# Patient Record
Sex: MALE | Race: WHITE | NOT HISPANIC OR LATINO | Employment: OTHER | URBAN - NONMETROPOLITAN AREA
[De-identification: names, ages, dates, MRNs, and addresses within clinical notes are randomized per-mention and may not be internally consistent; named-entity substitution may affect disease eponyms.]

---

## 2017-01-01 ENCOUNTER — HOSPITAL ENCOUNTER (EMERGENCY)
Facility: HOSPITAL | Age: 52
Discharge: HOME | End: 2017-12-16
Attending: EMERGENCY MEDICINE | Admitting: EMERGENCY MEDICINE

## 2017-01-01 ENCOUNTER — TELEPHONE (OUTPATIENT)
Dept: FAMILY MEDICINE CLINIC | Facility: CLINIC | Age: 52
End: 2017-01-01

## 2017-01-01 ENCOUNTER — APPOINTMENT (OUTPATIENT)
Dept: GENERAL RADIOLOGY | Facility: HOSPITAL | Age: 52
End: 2017-01-01

## 2017-01-01 VITALS
HEIGHT: 73 IN | SYSTOLIC BLOOD PRESSURE: 138 MMHG | DIASTOLIC BLOOD PRESSURE: 87 MMHG | BODY MASS INDEX: 35.78 KG/M2 | HEART RATE: 84 BPM | RESPIRATION RATE: 18 BRPM | OXYGEN SATURATION: 97 % | WEIGHT: 270 LBS | TEMPERATURE: 98 F

## 2017-01-01 DIAGNOSIS — S22.42XA CLOSED FRACTURE OF MULTIPLE RIBS OF LEFT SIDE, INITIAL ENCOUNTER: Primary | ICD-10-CM

## 2017-01-01 PROCEDURE — 96372 THER/PROPH/DIAG INJ SC/IM: CPT

## 2017-01-01 PROCEDURE — 99283 EMERGENCY DEPT VISIT LOW MDM: CPT

## 2017-01-01 PROCEDURE — 71101 X-RAY EXAM UNILAT RIBS/CHEST: CPT

## 2017-01-01 RX ORDER — TRAMADOL HYDROCHLORIDE 50 MG/1
50 TABLET ORAL EVERY 8 HOURS PRN
Qty: 12 TABLET | Refills: 0 | Status: SHIPPED | OUTPATIENT
Start: 2017-01-01 | End: 2018-01-01

## 2017-01-01 RX ADMIN — MEPERIDINE HYDROCHLORIDE 25 MG: 25 INJECTION, SOLUTION INTRAMUSCULAR; INTRAVENOUS; SUBCUTANEOUS at 20:45

## 2017-01-05 ENCOUNTER — HOSPITAL ENCOUNTER (OUTPATIENT)
Dept: OTHER | Facility: HOSPITAL | Age: 52
Discharge: HOME | End: 2017-01-05
Attending: FAMILY MEDICINE | Admitting: FAMILY MEDICINE

## 2017-01-05 LAB
BACTERIA #/AREA URNS AUTO: ABNORMAL /[HPF]
CASTS URNS QL MICRO: ABNORMAL
CLARITY UR REFRACT.AUTO: ABNORMAL
COLOR UR AUTO: YELLOW
CRYSTALS FLD MICRO: ABNORMAL
EPI CELLS #/AREA URNS AUTO: ABNORMAL /[HPF]
GLUCOSE UR STRIP.AUTO-MCNC: NEGATIVE MG/DL
HGB UR QL STRIP.AUTO: ABNORMAL
KETONES UR STRIP.AUTO-MCNC: NEGATIVE MG/DL
LEUKOCYTE ESTERASE UR QL STRIP.AUTO: ABNORMAL
Lab: ABNORMAL
MUCOUS THREADS URNS QL MICRO: NEGATIVE
NITRITE UR QL STRIP.AUTO: POSITIVE
PH UR STRIP.AUTO: 6 PH UNITS (ref 5–9)
PROT UR STRIP.AUTO-MCNC: NEGATIVE MG/DL
RBC #/AREA URNS AUTO: ABNORMAL /[HPF]
SP GR UR REFRACT.AUTO: 1.01 (ref 1–1.03)
SQUAMOUS SPT QL MICRO: ABNORMAL
UROBILINOGEN UR STRIP-ACNC: 4
WBC # UR AUTO: ABNORMAL /UL

## 2017-01-13 ENCOUNTER — HOSPITAL ENCOUNTER (OUTPATIENT)
Dept: OTHER | Facility: HOSPITAL | Age: 52
Discharge: HOME | End: 2017-01-13
Attending: INTERNAL MEDICINE | Admitting: INTERNAL MEDICINE

## 2017-01-13 LAB
ALBUMIN SERPL-MCNC: 2.3 GM/DL (ref 3.4–4.8)
ALP SERPL-CCNC: 138 U/L (ref 38–126)
ALT SERPL-CCNC: 25 U/L (ref 21–72)
ANION GAP SERPL CALCULATED.3IONS-SCNC: 10 MMOL/L (ref 5–15)
AST SERPL-CCNC: 38 U/L (ref 17–59)
BASOPHILS # BLD AUTO: 0.09 X1000/UL (ref 0–0.2)
BASOPHILS NFR BLD AUTO: 0.7 % (ref 0–2)
BILIRUB SERPL-MCNC: 1.7 MG/DL (ref 0.2–1.3)
BUN SERPL-MCNC: 46 MG/DL (ref 7–21)
CALCIUM SERPL-MCNC: 9.6 MG/DL (ref 8.4–10.2)
CHLORIDE SERPL-SCNC: 96 MMOL/L (ref 95–110)
CO2 SERPL-SCNC: 26 MMOL/L (ref 22–31)
CONV AUTO IG#: 0.05 X1000/UL (ref 0.01–0.02)
CONV AUTO IG%: 0.4 % (ref 0–0.5)
CREAT SERPL-MCNC: 2.8 MG/DL (ref 0.7–1.3)
EOSINOPHIL # BLD AUTO: 0.35 X1000/UL (ref 0–0.7)
EOSINOPHIL NFR BLD AUTO: 2.5 % (ref 0–7)
ERYTHROCYTE [DISTWIDTH] IN BLOOD: 14.9 % (ref 11.5–14.5)
GLUCOSE SERPL-MCNC: 88 MG/DL (ref 60–100)
GRANULOCYTES # BLD AUTO: 9.97 X1000/UL (ref 2–8.6)
GRANULOCYTES NFR BLD AUTO: 72.5 % (ref 37–80)
HCT VFR BLD CALC: 29.2 % (ref 39–49)
HGB BLD-MCNC: 10 GM/DL (ref 13.7–17.3)
INR PPP: 1.3 (ref 0.8–1.2)
LYMPHOCYTES # BLD AUTO: 2 X1000/UL (ref 0.6–4.2)
LYMPHOCYTES NFR BLD AUTO: 14.6 % (ref 10–50)
MCH RBC QN: 35.1 PG (ref 26–34)
MCHC RBC-ENTMCNC: 34.2 GM/DL (ref 31.5–36.3)
MCV RBC: 102.5 FL (ref 80–98)
MONOCYTES # BLD AUTO: 1.28 X1000/UL (ref 0–0.9)
MONOCYTES NFR BLD AUTO: 9.3 % (ref 0–12)
PLATELET # BLD: 170 X1000/MM3 (ref 150–450)
PMV BLD: 9.1 FL (ref 8–12)
POTASSIUM SERPL-SCNC: 5.6 MMOL/L (ref 3.5–5.1)
PROT SERPL-MCNC: 6.7 GM/DL (ref 6.3–8.6)
PROTHROMBIN TIME: 15.8 SECONDS (ref 11.1–15.3)
RBC # BLD: 2.85 MEGA/MM3 (ref 4.37–5.74)
SODIUM SERPL-SCNC: 132 MMOL/L (ref 137–145)
WBC # BLD: 13.7 X1000/UL (ref 3.2–9.8)

## 2017-01-15 LAB — AFP-TM SERPL-MCNC: <0.7 NG/ML (ref 0–8.3)

## 2017-01-18 LAB
Lab: NORMAL
Lab: NORMAL
REF LAB TEST METHOD: NORMAL

## 2017-01-20 ENCOUNTER — OFFICE VISIT (OUTPATIENT)
Dept: ORTHOPEDIC SURGERY | Facility: CLINIC | Age: 52
End: 2017-01-20

## 2017-01-20 DIAGNOSIS — S72.492D OTHER CLOSED FRACTURE OF DISTAL END OF LEFT FEMUR WITH ROUTINE HEALING, SUBSEQUENT ENCOUNTER: ICD-10-CM

## 2017-01-20 DIAGNOSIS — S42.294D OTHER CLOSED NONDISPLACED FRACTURE OF PROXIMAL END OF RIGHT HUMERUS WITH ROUTINE HEALING, SUBSEQUENT ENCOUNTER: Primary | ICD-10-CM

## 2017-01-20 PROBLEM — S42.309A CLOSED FRACTURE OF HUMERUS: Status: ACTIVE | Noted: 2017-01-20

## 2017-01-20 PROBLEM — S72.90XA FRACTURE OF FEMUR (HCC): Status: ACTIVE | Noted: 2017-01-20

## 2017-01-20 PROCEDURE — 99024 POSTOP FOLLOW-UP VISIT: CPT | Performed by: ORTHOPAEDIC SURGERY

## 2017-01-20 RX ORDER — CLINDAMYCIN HYDROCHLORIDE 300 MG/1
300 CAPSULE ORAL 3 TIMES DAILY
COMMUNITY
End: 2017-02-21

## 2017-01-20 RX ORDER — MORPHINE SULFATE 15 MG/1
15 TABLET, FILM COATED, EXTENDED RELEASE ORAL 2 TIMES DAILY
COMMUNITY
End: 2017-02-21

## 2017-01-20 RX ORDER — AMLODIPINE BESYLATE 2.5 MG/1
2.5 TABLET ORAL DAILY
COMMUNITY
End: 2017-02-21

## 2017-01-20 NOTE — MR AVS SNAPSHOT
Armando Mcmullen   1/20/2017 8:50 AM   Office Visit    Dept Phone:  [phone]   Encounter #:  [id number]    Provider:  Shawn Vanegas MD   Department:  [facility]                Your Full Care Plan              Your Updated Medication List          This list is accurate as of: 1/20/17  9:15 AM.  Always use your most recent med list.                amLODIPine 2.5 MG tablet   Commonly known as:  NORVASC       clindamycin 300 MG capsule   Commonly known as:  CLEOCIN       lactulose 10 GM/15ML solution   Commonly known as:  CHRONULAC       LIDODERM 5 %   Generic drug:  lidocaine       losartan 100 MG tablet   Commonly known as:  COZAAR       Morphine 15 MG 12 hr tablet   Commonly known as:  MS CONTIN       MULTIVITAMIN ADULT PO       oxyCODONE-acetaminophen  MG per tablet   Commonly known as:  PERCOCET       triamterene-hydrochlorothiazide 37.5-25 MG per tablet   Commonly known as:  MAXZIDE-25       vitamin D 10415 UNITS capsule capsule   Commonly known as:  ERGOCALCIFEROL               You Were Diagnosed With        Codes Comments    Other closed nondisplaced fracture of proximal end of right humerus with routine healing, subsequent encounter    -  Primary ICD-10-CM: S42.294D  ICD-9-CM: V54.11     Other closed fracture of distal end of left femur with routine healing, subsequent encounter     ICD-10-CM: S72.492D  ICD-9-CM: V54.15       Instructions     None    Patient Instructions History      Upcoming Appointments     Visit Type Date Time Department    POST-OP 1/20/2017  8:50 AM [facility]    FOLLOW UP 2/28/2017 10:20 AM [facility]      MyChart Signup     Our records indicate that you have declined [facility]. If you would like to sign up for [email or web address], please email [email or web address] or call [phone] to obtain an activation code.             Other Info from Your Visit           Your Appointments      Feb 28, 2017 10:20 AM CST   Follow Up with Shawn Vanegas MD   [facility] (--)    [address]  [facility] [address] [phone]           Arrive 15 minutes prior to appointment.              Allergies     Codeine Sulfate        Reason for Visit     Right Shoulder - Follow-up, Fracture     Left Knee - Follow-up, Fracture           Problems and Diagnoses Noted     Broken arm    Fracture of femur

## 2017-01-20 NOTE — PROGRESS NOTES
The patient is a 51 y.o. male who presents for followup.    Chief Complaint   Patient presents with   • Right Shoulder - Follow-up, Fracture   • Left Knee - Follow-up, Fracture       HPI: patient was seen in the ER on 1/13/2017 due to increased pain had xrays done of left knee and right shoulder/humerus. Repeat xrays done on 1/19/2017 by mobile xray.  No new complaints.  He is doing much better and is now transferring on his own.  He is still using a wheelchair for mobilization.  His pain is improved.  He does have a sore on the outside part of his right arm that has been draining slightly but no fevers or chills.      Current Outpatient Prescriptions:   •  amLODIPine (NORVASC) 2.5 MG tablet, Take 2.5 mg by mouth Daily., Disp: , Rfl:   •  clindamycin (CLEOCIN) 300 MG capsule, Take 300 mg by mouth 3 (Three) Times a Day., Disp: , Rfl:   •  lactulose (CHRONULAC) 10 GM/15ML solution, Take 20 g by mouth 2 (Two) Times a Day As Needed., Disp: , Rfl:   •  lidocaine (LIDODERM) 5 %, Place 1 patch on the skin Daily. Remove & Discard patch within 12 hours or as directed by MD, Disp: , Rfl:   •  losartan (COZAAR) 100 MG tablet, Take 100 mg by mouth Daily., Disp: , Rfl:   •  Morphine (MS CONTIN) 15 MG 12 hr tablet, Take 15 mg by mouth 2 (Two) Times a Day., Disp: , Rfl:   •  Multiple Vitamins-Minerals (MULTIVITAMIN ADULT PO), Take  by mouth., Disp: , Rfl:   •  oxyCODONE-acetaminophen (PERCOCET)  MG per tablet, Take 1 tablet by mouth Every 6 (Six) Hours As Needed for moderate pain (4-6)., Disp: , Rfl:   •  triamterene-hydrochlorothiazide (MAXZIDE-25) 37.5-25 MG per tablet, Take 1 tablet by mouth Daily., Disp: , Rfl:   •  vitamin D (ERGOCALCIFEROL) 32653 UNITS capsule capsule, Take 50,000 Units by mouth 1 (One) Time Per Week., Disp: , Rfl:     Allergies   Allergen Reactions   • Codeine Sulfate        ROS:  No fevers or chills.  No nausea or vomiting    PHYSICAL EXAM:    There were no vitals filed for this visit.    GAIT:      []  Normal  []  Antalgic    Assistive device: []  None  []  Walker     []  Crutches  []  Cane     [x]  Wheelchair  []  Stretcher    Patient is awake and alert, answers questions appropriately, and is in no apparent distress.    Skin on the left below-knee amputation stump is in good condition.  No breakdown.  No erythema.    Right upper extremity shows that he has some stiffness in his right elbow but good finger and wrist motion.  He has some mild swelling in the upper arm and an open sore on the posterior lateral aspect of his upper brachium.  No surrounding erythema.    Xr Shoulder 2+ View Right    Result Date: 1/13/2017  Narrative: Patient Name-  LAMONT FISHER Patient ID-  [id number] Ordering-  ORLANDO SORIANO MD Attending-  DR MAGANA ------------------------------------------------  Two view right shoulder  HISTORY- Fracture follow-up  AP and scapular Y view were obtained.  COMPARISON- November 25, 2016  Increased diastases of fracture of the humeral neck. Slight increased displacement fragments of the comminuted fractures of the humeral head. Very minimal callus formation laterally. No dislocation. Old right rib fractures.  CONCLUSION- Increased diastases of fracture of the humeral neck. Slight increased displacement fragments of the comminuted fractures of the humeral head. Very minimal callus formation laterally.  [id number]  Electronically Signed By- Claude Whatley MD On: 2017-01-13 17:06:35    Xr Shoulder 2+ View Right    Impression: 3 views of the right shoulder show a right proximal humerus fracture that has mildly collapsing humeral head.  He shows no further displacement from previous films.  Progressive healing noted in the proximal humerus.  No other acute bony on amount is noted.  Severe osteopenia is noted. Comparison views 11/25/16. 12/22/16 at 2:17 PM by Shawn Vanegas MD     Xr Humerus Right    Result Date: 1/13/2017  Narrative: Patient Name-  LAMONT FISHER Patient ID-  [id number]  Ordering-  ORLANDO SORIANO MD Attending-  DR MAGANA Referring-     ------------------------------------------------ Status post fall and pain.  The right humerus, two views.  Comparison is made with study dated November 25, 2016.  Examination again revealed comminuted fracture of the right humeral head. Very little callus formation is seen. There appears to be lytic changes seen in the humeral head and neck. The alignment is satisfactory.  CONCLUSION- Fracture of the right humeral head. Some callus formation is seen compared to the prior exam. Pathologic fracture cannot be rule out.  Electronically Signed By- Blayne Conrad MD On: 2017-01-13 17:28:40    Xr Knee 1 Or 2 View Left    Impression: AP and lateral of the left knee shows mild varus angulation of a distal femur fracture.  Progressive healing is noted.  He has severe osteopenia but no changes in position or alignment from previous films.  No other acute bony abnormality is noted.  Comparison views 11/29/16. 12/22/16 at 2:17 PM by Shawn Vanegas MD     Xr Knee 3 View Left    Result Date: 1/13/2017  Narrative: Patient Name-  LAMONT FISHER Patient ID-  [id number] Ordering-  ORLANDO SORIANO MD Attending-  DR MAGANA Referring-     ------------------------------------------------ Status post fall and knee pain.  Left knee, three views.  Comparison is made with study dated November 29, 2016.  Examination again revealed healed or healing fracture of the distal femur with mild deformity. Good callus formation is seen. The alignment is unchanged. There is old healed fracture of the proximal tibia. The alignment is well maintained. No acute fracture is identified. There are degenerative changes in the knee joint manifested by joint space narrowing and mild periarticular osteophytes. No joint effusion is seen. There is severe osteopenia. There is below the knee amputation.  CONCLUSION- No acute abnormality is identified. Healed or healing fracture of the  distal femur and old fracture of the proximal tibia.  Electronically Signed By- Blayne Conrad MD On: 2017-01-13 17:22:49      ASSESSMENT:  Diagnoses and all orders for this visit:    Other closed nondisplaced fracture of proximal end of right humerus with routine healing, subsequent encounter    Other closed fracture of distal end of left femur with routine healing, subsequent encounter    Other orders  -     amLODIPine (NORVASC) 2.5 MG tablet; Take 2.5 mg by mouth Daily.  -     Morphine (MS CONTIN) 15 MG 12 hr tablet; Take 15 mg by mouth 2 (Two) Times a Day.  -     clindamycin (CLEOCIN) 300 MG capsule; Take 300 mg by mouth 3 (Three) Times a Day.        PLAN:    Patient was seen in the emergency department approximately one week ago with new x-rays of his proximal humerus and his distal femur.  He has a severely comminuted humeral head fracture with a proximal humerus fracture that has maintained its overall alignment and does seem to have some progressive healing.  He has severe osteopenia noted throughout the x-rays.  His distal femur on the left shows slow progression of healing.  No new changes are noted.  He also shows severe osteopenia in this area.    The patient has been progressing much better and is able to transition on his own now.  No surgical indication is warranted for either fracture and they should slowly and progressively improved.  He is to continue with mobility and began gentle range of motion exercises involving his right upper extremity.  He is going to be discharged from the nursing facility and is content to be going home.  He tells me that he will be having home health come to do physical therapy.  He or Dr. Aguero is going to let me know which home health agency is involved so that I can send specific orders.  Return in about 4 weeks (around 2/17/2017) for Recheck with repeat xrays.    Shawn Vanegas MD

## 2017-02-20 ENCOUNTER — APPOINTMENT (OUTPATIENT)
Dept: GENERAL RADIOLOGY | Facility: HOSPITAL | Age: 52
End: 2017-02-20

## 2017-02-20 ENCOUNTER — HOSPITAL ENCOUNTER (INPATIENT)
Facility: HOSPITAL | Age: 52
LOS: 16 days | Discharge: NURSING, REHAB OR LONG-TERM CARE | End: 2017-03-09
Attending: FAMILY MEDICINE | Admitting: FAMILY MEDICINE

## 2017-02-20 DIAGNOSIS — N39.0 URINARY TRACT INFECTION, SITE UNSPECIFIED: ICD-10-CM

## 2017-02-20 DIAGNOSIS — Z74.09 IMPAIRED FUNCTIONAL MOBILITY, BALANCE, AND ENDURANCE: ICD-10-CM

## 2017-02-20 DIAGNOSIS — R26.89 IMPAIRED GAIT AND MOBILITY: ICD-10-CM

## 2017-02-20 DIAGNOSIS — E72.20 HYPERAMMONEMIA (HCC): Primary | ICD-10-CM

## 2017-02-20 DIAGNOSIS — K59.1 FUNCTIONAL DIARRHEA: ICD-10-CM

## 2017-02-20 LAB
ALBUMIN SERPL-MCNC: 2.5 G/DL (ref 3.4–4.8)
ALBUMIN/GLOB SERPL: 0.7 G/DL (ref 1.1–1.8)
ALP SERPL-CCNC: 69 U/L (ref 38–126)
ALT SERPL W P-5'-P-CCNC: 27 U/L (ref 21–72)
AMMONIA BLD-SCNC: 80 UMOL/L (ref 9–30)
AMYLASE SERPL-CCNC: 59 U/L (ref 50–130)
ANION GAP SERPL CALCULATED.3IONS-SCNC: 10 MMOL/L (ref 5–15)
ANISOCYTOSIS BLD QL: NORMAL
AST SERPL-CCNC: 38 U/L (ref 17–59)
BACTERIA UR QL AUTO: ABNORMAL /HPF
BASOPHILS # BLD AUTO: 0.14 10*3/MM3 (ref 0–0.2)
BASOPHILS NFR BLD AUTO: 1.5 % (ref 0–2)
BILIRUB SERPL-MCNC: 5.4 MG/DL (ref 0.2–1.3)
BILIRUB UR QL STRIP: ABNORMAL
BUN BLD-MCNC: 11 MG/DL (ref 7–21)
BUN/CREAT SERPL: 10.6 (ref 7–25)
CALCIUM SPEC-SCNC: 8.9 MG/DL (ref 8.4–10.2)
CHLORIDE SERPL-SCNC: 111 MMOL/L (ref 95–110)
CK SERPL-CCNC: 38 U/L (ref 55–170)
CLARITY UR: ABNORMAL
CO2 SERPL-SCNC: 17 MMOL/L (ref 22–31)
COLOR UR: ABNORMAL
CREAT BLD-MCNC: 1.04 MG/DL (ref 0.7–1.3)
DEPRECATED RDW RBC AUTO: 63.8 FL (ref 35.1–43.9)
EOSINOPHIL # BLD AUTO: 0.25 10*3/MM3 (ref 0–0.7)
EOSINOPHIL NFR BLD AUTO: 2.7 % (ref 0–7)
ERYTHROCYTE [DISTWIDTH] IN BLOOD BY AUTOMATED COUNT: 16.8 % (ref 11.5–14.5)
GFR SERPL CREATININE-BSD FRML MDRD: 75 ML/MIN/1.73 (ref 56–130)
GLOBULIN UR ELPH-MCNC: 3.7 GM/DL (ref 2.3–3.5)
GLUCOSE BLD-MCNC: 82 MG/DL (ref 60–100)
GLUCOSE UR STRIP-MCNC: NEGATIVE MG/DL
HCT VFR BLD AUTO: 34.5 % (ref 39–49)
HGB BLD-MCNC: 11.7 G/DL (ref 13.7–17.3)
HGB UR QL STRIP.AUTO: ABNORMAL
HYALINE CASTS UR QL AUTO: ABNORMAL /LPF
HYPOCHROMIA BLD QL: NORMAL
IMM GRANULOCYTES # BLD: 0.06 10*3/MM3 (ref 0–0.02)
IMM GRANULOCYTES NFR BLD: 0.6 % (ref 0–0.5)
KETONES UR QL STRIP: ABNORMAL
LEUKOCYTE ESTERASE UR QL STRIP.AUTO: ABNORMAL
LIPASE SERPL-CCNC: 189 U/L (ref 23–300)
LYMPHOCYTES # BLD AUTO: 2.46 10*3/MM3 (ref 0.6–4.2)
LYMPHOCYTES NFR BLD AUTO: 26.3 % (ref 10–50)
MCH RBC QN AUTO: 34.9 PG (ref 26.5–34)
MCHC RBC AUTO-ENTMCNC: 33.9 G/DL (ref 31.5–36.3)
MCV RBC AUTO: 103 FL (ref 80–98)
MONOCYTES # BLD AUTO: 1.15 10*3/MM3 (ref 0–0.9)
MONOCYTES NFR BLD AUTO: 12.3 % (ref 0–12)
MUCOUS THREADS URNS QL MICRO: ABNORMAL /HPF
NEUTROPHILS # BLD AUTO: 5.3 10*3/MM3 (ref 2–8.6)
NEUTROPHILS NFR BLD AUTO: 56.6 % (ref 37–80)
NITRITE UR QL STRIP: NEGATIVE
NRBC BLD MANUAL-RTO: 0 /100 WBC (ref 0–0)
PH UR STRIP.AUTO: 5.5 [PH] (ref 5–9)
PLATELET # BLD AUTO: 60 10*3/MM3 (ref 150–450)
PMV BLD AUTO: 9.6 FL (ref 8–12)
POTASSIUM BLD-SCNC: 3.5 MMOL/L (ref 3.5–5.1)
PROT SERPL-MCNC: 6.2 G/DL (ref 6.3–8.6)
PROT UR QL STRIP: ABNORMAL
RBC # BLD AUTO: 3.35 10*6/MM3 (ref 4.37–5.74)
RBC # UR: ABNORMAL /HPF
REF LAB TEST METHOD: ABNORMAL
SMALL PLATELETS BLD QL SMEAR: NORMAL
SODIUM BLD-SCNC: 138 MMOL/L (ref 137–145)
SP GR UR STRIP: 1.02 (ref 1–1.03)
SQUAMOUS #/AREA URNS HPF: ABNORMAL /HPF
UROBILINOGEN UR QL STRIP: ABNORMAL
WBC MORPH BLD: NORMAL
WBC NRBC COR # BLD: 9.36 10*3/MM3 (ref 3.2–9.8)
WBC UR QL AUTO: ABNORMAL /HPF

## 2017-02-20 PROCEDURE — 87086 URINE CULTURE/COLONY COUNT: CPT | Performed by: FAMILY MEDICINE

## 2017-02-20 PROCEDURE — 82140 ASSAY OF AMMONIA: CPT | Performed by: FAMILY MEDICINE

## 2017-02-20 PROCEDURE — 80053 COMPREHEN METABOLIC PANEL: CPT | Performed by: FAMILY MEDICINE

## 2017-02-20 PROCEDURE — 83690 ASSAY OF LIPASE: CPT | Performed by: FAMILY MEDICINE

## 2017-02-20 PROCEDURE — 25010000002 ONDANSETRON PER 1 MG: Performed by: FAMILY MEDICINE

## 2017-02-20 PROCEDURE — 81001 URINALYSIS AUTO W/SCOPE: CPT | Performed by: FAMILY MEDICINE

## 2017-02-20 PROCEDURE — 99284 EMERGENCY DEPT VISIT MOD MDM: CPT

## 2017-02-20 PROCEDURE — 85025 COMPLETE CBC W/AUTO DIFF WBC: CPT | Performed by: FAMILY MEDICINE

## 2017-02-20 PROCEDURE — 82150 ASSAY OF AMYLASE: CPT | Performed by: FAMILY MEDICINE

## 2017-02-20 PROCEDURE — 85007 BL SMEAR W/DIFF WBC COUNT: CPT | Performed by: FAMILY MEDICINE

## 2017-02-20 PROCEDURE — 82550 ASSAY OF CK (CPK): CPT | Performed by: FAMILY MEDICINE

## 2017-02-20 PROCEDURE — 74020 HC XR ABDOMEN FLAT & UPRIGHT: CPT

## 2017-02-20 PROCEDURE — 87077 CULTURE AEROBIC IDENTIFY: CPT | Performed by: FAMILY MEDICINE

## 2017-02-20 PROCEDURE — 25010000002 HYDROMORPHONE PER 4 MG: Performed by: FAMILY MEDICINE

## 2017-02-20 PROCEDURE — G0378 HOSPITAL OBSERVATION PER HR: HCPCS

## 2017-02-20 PROCEDURE — 87186 SC STD MICRODIL/AGAR DIL: CPT | Performed by: FAMILY MEDICINE

## 2017-02-20 RX ORDER — ONDANSETRON 2 MG/ML
4 INJECTION INTRAMUSCULAR; INTRAVENOUS ONCE
Status: COMPLETED | OUTPATIENT
Start: 2017-02-20 | End: 2017-02-20

## 2017-02-20 RX ORDER — LACTULOSE 10 G/15ML
30 SOLUTION ORAL ONCE
Status: COMPLETED | OUTPATIENT
Start: 2017-02-20 | End: 2017-02-21

## 2017-02-20 RX ADMIN — ONDANSETRON 4 MG: 2 INJECTION INTRAMUSCULAR; INTRAVENOUS at 21:11

## 2017-02-20 RX ADMIN — SODIUM CHLORIDE 1000 ML: 9 INJECTION, SOLUTION INTRAVENOUS at 21:11

## 2017-02-20 RX ADMIN — HYDROMORPHONE HYDROCHLORIDE 1 MG: 1 INJECTION, SOLUTION INTRAMUSCULAR; INTRAVENOUS; SUBCUTANEOUS at 21:11

## 2017-02-21 LAB
ALBUMIN SERPL-MCNC: 2.2 G/DL (ref 3.4–4.8)
ALBUMIN/GLOB SERPL: 0.6 G/DL (ref 1.1–1.8)
ALP SERPL-CCNC: 60 U/L (ref 38–126)
ALT SERPL W P-5'-P-CCNC: 27 U/L (ref 21–72)
ANION GAP SERPL CALCULATED.3IONS-SCNC: 8 MMOL/L (ref 5–15)
AST SERPL-CCNC: 34 U/L (ref 17–59)
BASOPHILS # BLD AUTO: 0.11 10*3/MM3 (ref 0–0.2)
BASOPHILS NFR BLD AUTO: 1.7 % (ref 0–2)
BILIRUB SERPL-MCNC: 5 MG/DL (ref 0.2–1.3)
BUN BLD-MCNC: 11 MG/DL (ref 7–21)
BUN/CREAT SERPL: 10.4 (ref 7–25)
C DIFF TOX GENS STL QL NAA+PROBE: POSITIVE
CALCIUM SPEC-SCNC: 8.4 MG/DL (ref 8.4–10.2)
CHLORIDE SERPL-SCNC: 112 MMOL/L (ref 95–110)
CO2 SERPL-SCNC: 22 MMOL/L (ref 22–31)
CREAT BLD-MCNC: 1.06 MG/DL (ref 0.7–1.3)
DEPRECATED RDW RBC AUTO: 63.8 FL (ref 35.1–43.9)
EOSINOPHIL # BLD AUTO: 0.28 10*3/MM3 (ref 0–0.7)
EOSINOPHIL NFR BLD AUTO: 4.4 % (ref 0–7)
ERYTHROCYTE [DISTWIDTH] IN BLOOD BY AUTOMATED COUNT: 16.8 % (ref 11.5–14.5)
GFR SERPL CREATININE-BSD FRML MDRD: 73 ML/MIN/1.73 (ref 60–130)
GLOBULIN UR ELPH-MCNC: 3.6 GM/DL (ref 2.3–3.5)
GLUCOSE BLD-MCNC: 75 MG/DL (ref 60–100)
HCT VFR BLD AUTO: 30.1 % (ref 39–49)
HGB BLD-MCNC: 10.1 G/DL (ref 13.7–17.3)
HOLD SPECIMEN: NORMAL
IMM GRANULOCYTES # BLD: 0.02 10*3/MM3 (ref 0–0.02)
IMM GRANULOCYTES NFR BLD: 0.3 % (ref 0–0.5)
LYMPHOCYTES # BLD AUTO: 2.06 10*3/MM3 (ref 0.6–4.2)
LYMPHOCYTES NFR BLD AUTO: 32.3 % (ref 10–50)
MCH RBC QN AUTO: 34.6 PG (ref 26.5–34)
MCHC RBC AUTO-ENTMCNC: 33.6 G/DL (ref 31.5–36.3)
MCV RBC AUTO: 103.1 FL (ref 80–98)
MONOCYTES # BLD AUTO: 0.63 10*3/MM3 (ref 0–0.9)
MONOCYTES NFR BLD AUTO: 9.9 % (ref 0–12)
NEUTROPHILS # BLD AUTO: 3.27 10*3/MM3 (ref 2–8.6)
NEUTROPHILS NFR BLD AUTO: 51.4 % (ref 37–80)
PLATELET # BLD AUTO: 69 10*3/MM3 (ref 150–450)
PMV BLD AUTO: 9.8 FL (ref 8–12)
POTASSIUM BLD-SCNC: 3.4 MMOL/L (ref 3.5–5.1)
PROT SERPL-MCNC: 5.8 G/DL (ref 6.3–8.6)
RBC # BLD AUTO: 2.92 10*6/MM3 (ref 4.37–5.74)
SODIUM BLD-SCNC: 142 MMOL/L (ref 137–145)
WBC NRBC COR # BLD: 6.37 10*3/MM3 (ref 3.2–9.8)

## 2017-02-21 PROCEDURE — 87493 C DIFF AMPLIFIED PROBE: CPT | Performed by: INTERNAL MEDICINE

## 2017-02-21 PROCEDURE — 87040 BLOOD CULTURE FOR BACTERIA: CPT | Performed by: FAMILY MEDICINE

## 2017-02-21 PROCEDURE — 25010000002 LEVOFLOXACIN PER 250 MG: Performed by: INTERNAL MEDICINE

## 2017-02-21 PROCEDURE — G0378 HOSPITAL OBSERVATION PER HR: HCPCS

## 2017-02-21 PROCEDURE — 85025 COMPLETE CBC W/AUTO DIFF WBC: CPT | Performed by: INTERNAL MEDICINE

## 2017-02-21 PROCEDURE — 80053 COMPREHEN METABOLIC PANEL: CPT | Performed by: INTERNAL MEDICINE

## 2017-02-21 PROCEDURE — 25010000002 CEFTRIAXONE: Performed by: FAMILY MEDICINE

## 2017-02-21 PROCEDURE — 25010000002 VANCOMYCIN PER 500 MG: Performed by: FAMILY MEDICINE

## 2017-02-21 PROCEDURE — 87040 BLOOD CULTURE FOR BACTERIA: CPT | Performed by: INTERNAL MEDICINE

## 2017-02-21 PROCEDURE — 25010000002 HYDROMORPHONE PER 4 MG: Performed by: INTERNAL MEDICINE

## 2017-02-21 RX ORDER — LACTULOSE 10 G/15ML
30 SOLUTION ORAL 2 TIMES DAILY
COMMUNITY
End: 2017-05-16 | Stop reason: HOSPADM

## 2017-02-21 RX ORDER — OXYCODONE AND ACETAMINOPHEN 10; 325 MG/1; MG/1
1 TABLET ORAL EVERY 4 HOURS PRN
COMMUNITY
End: 2017-04-27 | Stop reason: HOSPADM

## 2017-02-21 RX ORDER — NYSTATIN 100000 [USP'U]/G
POWDER TOPICAL EVERY 12 HOURS SCHEDULED
Status: DISCONTINUED | OUTPATIENT
Start: 2017-02-21 | End: 2017-03-09 | Stop reason: HOSPADM

## 2017-02-21 RX ORDER — ERGOCALCIFEROL 1.25 MG/1
50000 CAPSULE ORAL WEEKLY
COMMUNITY
End: 2017-06-07 | Stop reason: SDUPTHER

## 2017-02-21 RX ORDER — SODIUM CHLORIDE 9 MG/ML
25 INJECTION, SOLUTION INTRAVENOUS CONTINUOUS
Status: DISCONTINUED | OUTPATIENT
Start: 2017-02-21 | End: 2017-03-09 | Stop reason: HOSPADM

## 2017-02-21 RX ORDER — AMLODIPINE BESYLATE 2.5 MG/1
2.5 TABLET ORAL DAILY PRN
COMMUNITY
End: 2017-06-07

## 2017-02-21 RX ORDER — LEVOFLOXACIN 5 MG/ML
750 INJECTION, SOLUTION INTRAVENOUS EVERY 24 HOURS
Status: DISCONTINUED | OUTPATIENT
Start: 2017-02-21 | End: 2017-02-21

## 2017-02-21 RX ORDER — GABAPENTIN 100 MG/1
300 CAPSULE ORAL 3 TIMES DAILY
COMMUNITY
End: 2018-01-01

## 2017-02-21 RX ORDER — CLINDAMYCIN HYDROCHLORIDE 300 MG/1
300 CAPSULE ORAL 3 TIMES DAILY
Status: ON HOLD | COMMUNITY
Start: 2017-02-07 | End: 2017-02-27

## 2017-02-21 RX ADMIN — CEFTRIAXONE 1 G: 1 INJECTION, POWDER, FOR SOLUTION INTRAMUSCULAR; INTRAVENOUS at 21:47

## 2017-02-21 RX ADMIN — METRONIDAZOLE 500 MG: 500 INJECTION, SOLUTION INTRAVENOUS at 09:23

## 2017-02-21 RX ADMIN — NYSTATIN: 100000 POWDER TOPICAL at 21:50

## 2017-02-21 RX ADMIN — HYDROMORPHONE HYDROCHLORIDE 1 MG: 1 INJECTION, SOLUTION INTRAMUSCULAR; INTRAVENOUS; SUBCUTANEOUS at 04:44

## 2017-02-21 RX ADMIN — NYSTATIN: 100000 POWDER TOPICAL at 15:41

## 2017-02-21 RX ADMIN — METRONIDAZOLE 500 MG: 500 INJECTION, SOLUTION INTRAVENOUS at 15:39

## 2017-02-21 RX ADMIN — HYDROMORPHONE HYDROCHLORIDE 1 MG: 1 INJECTION, SOLUTION INTRAMUSCULAR; INTRAVENOUS; SUBCUTANEOUS at 16:51

## 2017-02-21 RX ADMIN — LEVOFLOXACIN 750 MG: 5 INJECTION, SOLUTION INTRAVENOUS at 04:49

## 2017-02-21 RX ADMIN — HYDROMORPHONE HYDROCHLORIDE 1 MG: 1 INJECTION, SOLUTION INTRAMUSCULAR; INTRAVENOUS; SUBCUTANEOUS at 09:22

## 2017-02-21 RX ADMIN — Medication: at 16:00

## 2017-02-21 RX ADMIN — METRONIDAZOLE 500 MG: 500 INJECTION, SOLUTION INTRAVENOUS at 02:50

## 2017-02-21 RX ADMIN — VANCOMYCIN HYDROCHLORIDE 250 MG: 1 INJECTION, POWDER, LYOPHILIZED, FOR SOLUTION INTRAVENOUS at 22:40

## 2017-02-21 RX ADMIN — CEFTRIAXONE 1 G: 1 INJECTION, POWDER, FOR SOLUTION INTRAMUSCULAR; INTRAVENOUS at 00:26

## 2017-02-21 RX ADMIN — RIFAXIMIN 550 MG: 550 TABLET ORAL at 15:40

## 2017-02-21 RX ADMIN — Medication: at 21:50

## 2017-02-21 RX ADMIN — NYSTATIN: 100000 POWDER TOPICAL at 17:22

## 2017-02-21 RX ADMIN — HYDROMORPHONE HYDROCHLORIDE 1 MG: 1 INJECTION, SOLUTION INTRAMUSCULAR; INTRAVENOUS; SUBCUTANEOUS at 21:47

## 2017-02-21 RX ADMIN — LACTULOSE 30 G: 10 SOLUTION ORAL at 01:01

## 2017-02-21 RX ADMIN — SODIUM CHLORIDE 100 ML/HR: 900 INJECTION, SOLUTION INTRAVENOUS at 02:50

## 2017-02-21 RX ADMIN — RIFAXIMIN 550 MG: 550 TABLET ORAL at 17:22

## 2017-02-22 ENCOUNTER — APPOINTMENT (OUTPATIENT)
Dept: GENERAL RADIOLOGY | Facility: HOSPITAL | Age: 52
End: 2017-02-22

## 2017-02-22 PROBLEM — S72.456G: Status: ACTIVE | Noted: 2017-01-20

## 2017-02-22 PROBLEM — S42.201G CLOSED FRACTURE OF PROXIMAL END OF RIGHT HUMERUS WITH DELAYED HEALING: Status: ACTIVE | Noted: 2017-01-20

## 2017-02-22 LAB
ALBUMIN SERPL-MCNC: 2 G/DL (ref 3.4–4.8)
ALBUMIN/GLOB SERPL: 0.6 G/DL (ref 1.1–1.8)
ALP SERPL-CCNC: 79 U/L (ref 38–126)
ALT SERPL W P-5'-P-CCNC: 23 U/L (ref 21–72)
AMMONIA BLD-SCNC: 38 UMOL/L (ref 9–30)
ANION GAP SERPL CALCULATED.3IONS-SCNC: 8 MMOL/L (ref 5–15)
ARTERIAL PATENCY WRIST A: ABNORMAL
ARTICHOKE IGE QN: 36 MG/DL (ref 1–129)
AST SERPL-CCNC: 74 U/L (ref 17–59)
ATMOSPHERIC PRESS: ABNORMAL MMHG
BASE EXCESS BLDA CALC-SCNC: -2.9 MMOL/L (ref -2.4–2.4)
BASOPHILS # BLD AUTO: 0.09 10*3/MM3 (ref 0–0.2)
BASOPHILS NFR BLD AUTO: 1 % (ref 0–2)
BDY SITE: ABNORMAL
BILIRUB SERPL-MCNC: 3.4 MG/DL (ref 0.2–1.3)
BUN BLD-MCNC: 11 MG/DL (ref 7–21)
BUN/CREAT SERPL: 9.8 (ref 7–25)
CA-I BLD-MCNC: 4.4 MG/DL (ref 4.5–4.9)
CALCIUM SPEC-SCNC: 8 MG/DL (ref 8.4–10.2)
CHLORIDE SERPL-SCNC: 109 MMOL/L (ref 95–110)
CHOLEST SERPL-MCNC: 88 MG/DL (ref 0–199)
CO2 BLDA-SCNC: 21.4 MMOL/L (ref 23–27)
CO2 SERPL-SCNC: 20 MMOL/L (ref 22–31)
CREAT BLD-MCNC: 1.12 MG/DL (ref 0.7–1.3)
CRP SERPL-MCNC: 2.8 MG/DL (ref 0–1)
CRP SERPL-MCNC: 3.5 MG/DL (ref 0–1)
D-LACTATE SERPL-SCNC: 1.6 MMOL/L (ref 0.5–2)
DEPRECATED RDW RBC AUTO: 62 FL (ref 35.1–43.9)
EOSINOPHIL # BLD AUTO: 0.58 10*3/MM3 (ref 0–0.7)
EOSINOPHIL NFR BLD AUTO: 6.7 % (ref 0–7)
ERYTHROCYTE [DISTWIDTH] IN BLOOD BY AUTOMATED COUNT: 16.4 % (ref 11.5–14.5)
GFR SERPL CREATININE-BSD FRML MDRD: 69 ML/MIN/1.73 (ref 56–130)
GLOBULIN UR ELPH-MCNC: 3.4 GM/DL (ref 2.3–3.5)
GLUCOSE BLD-MCNC: 84 MG/DL (ref 60–100)
GLUCOSE BLDA-MCNC: 95 MMOL/L
HBA1C MFR BLD: <4 % (ref 4–5.6)
HCO3 BLDA-SCNC: 20.4 MMOL/L (ref 22–26)
HCT VFR BLD AUTO: 29.6 % (ref 39–49)
HCT VFR BLD CALC: 31 % (ref 40–54)
HDLC SERPL-MCNC: 22 MG/DL (ref 60–200)
HGB BLD-MCNC: 10.2 G/DL (ref 13.7–17.3)
HGB BLDA-MCNC: 10.4 G/DL (ref 14–18)
IMM GRANULOCYTES # BLD: 0.03 10*3/MM3 (ref 0–0.02)
IMM GRANULOCYTES NFR BLD: 0.3 % (ref 0–0.5)
LDLC/HDLC SERPL: 2.3 {RATIO} (ref 0–3.55)
LYMPHOCYTES # BLD AUTO: 1.42 10*3/MM3 (ref 0.6–4.2)
LYMPHOCYTES NFR BLD AUTO: 16.3 % (ref 10–50)
MAGNESIUM SERPL-MCNC: 1.7 MG/DL (ref 1.6–2.3)
MCH RBC QN AUTO: 35.3 PG (ref 26.5–34)
MCHC RBC AUTO-ENTMCNC: 34.5 G/DL (ref 31.5–36.3)
MCV RBC AUTO: 102.4 FL (ref 80–98)
MODALITY: ABNORMAL
MONOCYTES # BLD AUTO: 0.74 10*3/MM3 (ref 0–0.9)
MONOCYTES NFR BLD AUTO: 8.5 % (ref 0–12)
NEUTROPHILS # BLD AUTO: 5.86 10*3/MM3 (ref 2–8.6)
NEUTROPHILS NFR BLD AUTO: 67.2 % (ref 37–80)
PCO2 BLDA: 30.7 MM HG (ref 35–45)
PH BLDA: 7.44 PH UNITS (ref 7.35–7.45)
PLATELET # BLD AUTO: 54 10*3/MM3 (ref 150–450)
PMV BLD AUTO: 9.7 FL (ref 8–12)
PO2 BLDA: 66.7 MM HG (ref 80–105)
POTASSIUM BLD-SCNC: 3.2 MMOL/L (ref 3.5–5.1)
POTASSIUM BLDA-SCNC: 3.04 MMOL/L (ref 3.6–4.9)
PROT SERPL-MCNC: 5.4 G/DL (ref 6.3–8.6)
RBC # BLD AUTO: 2.89 10*6/MM3 (ref 4.37–5.74)
SAO2 % BLDCOA: 92.9 % (ref 94–100)
SODIUM BLD-SCNC: 137 MMOL/L (ref 137–145)
SODIUM BLDA-SCNC: 135.5 MMOL/L (ref 138–146)
TRIGL SERPL-MCNC: 77 MG/DL (ref 20–199)
TSH SERPL DL<=0.05 MIU/L-ACNC: 2.36 MIU/ML (ref 0.46–4.68)
WBC NRBC COR # BLD: 8.72 10*3/MM3 (ref 3.2–9.8)

## 2017-02-22 PROCEDURE — 87077 CULTURE AEROBIC IDENTIFY: CPT | Performed by: FAMILY MEDICINE

## 2017-02-22 PROCEDURE — 86140 C-REACTIVE PROTEIN: CPT | Performed by: FAMILY MEDICINE

## 2017-02-22 PROCEDURE — 99253 IP/OBS CNSLTJ NEW/EST LOW 45: CPT | Performed by: ORTHOPAEDIC SURGERY

## 2017-02-22 PROCEDURE — 83036 HEMOGLOBIN GLYCOSYLATED A1C: CPT | Performed by: FAMILY MEDICINE

## 2017-02-22 PROCEDURE — 83735 ASSAY OF MAGNESIUM: CPT | Performed by: FAMILY MEDICINE

## 2017-02-22 PROCEDURE — 25010000002 CEFTRIAXONE: Performed by: FAMILY MEDICINE

## 2017-02-22 PROCEDURE — 25010000002 VANCOMYCIN PER 500 MG: Performed by: FAMILY MEDICINE

## 2017-02-22 PROCEDURE — 25010000002 HYDROMORPHONE PER 4 MG: Performed by: INTERNAL MEDICINE

## 2017-02-22 PROCEDURE — 83605 ASSAY OF LACTIC ACID: CPT | Performed by: FAMILY MEDICINE

## 2017-02-22 PROCEDURE — 80061 LIPID PANEL: CPT | Performed by: FAMILY MEDICINE

## 2017-02-22 PROCEDURE — 80053 COMPREHEN METABOLIC PANEL: CPT | Performed by: FAMILY MEDICINE

## 2017-02-22 PROCEDURE — 87070 CULTURE OTHR SPECIMN AEROBIC: CPT | Performed by: FAMILY MEDICINE

## 2017-02-22 PROCEDURE — 84443 ASSAY THYROID STIM HORMONE: CPT | Performed by: FAMILY MEDICINE

## 2017-02-22 PROCEDURE — 87205 SMEAR GRAM STAIN: CPT | Performed by: FAMILY MEDICINE

## 2017-02-22 PROCEDURE — 73060 X-RAY EXAM OF HUMERUS: CPT

## 2017-02-22 PROCEDURE — 87186 SC STD MICRODIL/AGAR DIL: CPT | Performed by: FAMILY MEDICINE

## 2017-02-22 PROCEDURE — 82803 BLOOD GASES ANY COMBINATION: CPT | Performed by: FAMILY MEDICINE

## 2017-02-22 PROCEDURE — 85025 COMPLETE CBC W/AUTO DIFF WBC: CPT | Performed by: INTERNAL MEDICINE

## 2017-02-22 PROCEDURE — 87147 CULTURE TYPE IMMUNOLOGIC: CPT | Performed by: FAMILY MEDICINE

## 2017-02-22 PROCEDURE — 82140 ASSAY OF AMMONIA: CPT | Performed by: FAMILY MEDICINE

## 2017-02-22 RX ORDER — POTASSIUM CHLORIDE 7.45 MG/ML
10 INJECTION INTRAVENOUS
Status: DISCONTINUED | OUTPATIENT
Start: 2017-02-22 | End: 2017-03-09 | Stop reason: HOSPADM

## 2017-02-22 RX ADMIN — HYDROMORPHONE HYDROCHLORIDE 1 MG: 1 INJECTION, SOLUTION INTRAMUSCULAR; INTRAVENOUS; SUBCUTANEOUS at 09:52

## 2017-02-22 RX ADMIN — HYDROMORPHONE HYDROCHLORIDE 1 MG: 1 INJECTION, SOLUTION INTRAMUSCULAR; INTRAVENOUS; SUBCUTANEOUS at 02:43

## 2017-02-22 RX ADMIN — CEFTRIAXONE 1 G: 1 INJECTION, POWDER, FOR SOLUTION INTRAMUSCULAR; INTRAVENOUS at 20:52

## 2017-02-22 RX ADMIN — RIFAXIMIN 550 MG: 550 TABLET ORAL at 09:04

## 2017-02-22 RX ADMIN — RIFAXIMIN 550 MG: 550 TABLET ORAL at 17:56

## 2017-02-22 RX ADMIN — HYDROMORPHONE HYDROCHLORIDE 1 MG: 1 INJECTION, SOLUTION INTRAMUSCULAR; INTRAVENOUS; SUBCUTANEOUS at 19:36

## 2017-02-22 RX ADMIN — VANCOMYCIN HYDROCHLORIDE 250 MG: 1 INJECTION, POWDER, LYOPHILIZED, FOR SOLUTION INTRAVENOUS at 01:22

## 2017-02-22 RX ADMIN — VANCOMYCIN HYDROCHLORIDE 250 MG: 1 INJECTION, POWDER, LYOPHILIZED, FOR SOLUTION INTRAVENOUS at 05:39

## 2017-02-22 RX ADMIN — NYSTATIN: 100000 POWDER TOPICAL at 09:04

## 2017-02-22 RX ADMIN — VANCOMYCIN HYDROCHLORIDE 250 MG: 1 INJECTION, POWDER, LYOPHILIZED, FOR SOLUTION INTRAVENOUS at 17:57

## 2017-02-22 RX ADMIN — Medication: at 20:52

## 2017-02-22 RX ADMIN — HYDROMORPHONE HYDROCHLORIDE 1 MG: 1 INJECTION, SOLUTION INTRAMUSCULAR; INTRAVENOUS; SUBCUTANEOUS at 14:18

## 2017-02-22 RX ADMIN — Medication: at 09:04

## 2017-02-22 RX ADMIN — VANCOMYCIN HYDROCHLORIDE 250 MG: 1 INJECTION, POWDER, LYOPHILIZED, FOR SOLUTION INTRAVENOUS at 11:05

## 2017-02-22 RX ADMIN — Medication: at 17:05

## 2017-02-22 RX ADMIN — VANCOMYCIN HYDROCHLORIDE 1750 MG: 500 INJECTION, POWDER, LYOPHILIZED, FOR SOLUTION INTRAVENOUS at 17:57

## 2017-02-22 RX ADMIN — NYSTATIN: 100000 POWDER TOPICAL at 20:52

## 2017-02-23 ENCOUNTER — APPOINTMENT (OUTPATIENT)
Dept: MRI IMAGING | Facility: HOSPITAL | Age: 52
End: 2017-02-23

## 2017-02-23 ENCOUNTER — APPOINTMENT (OUTPATIENT)
Dept: ULTRASOUND IMAGING | Facility: HOSPITAL | Age: 52
End: 2017-02-23

## 2017-02-23 ENCOUNTER — APPOINTMENT (OUTPATIENT)
Dept: GENERAL RADIOLOGY | Facility: HOSPITAL | Age: 52
End: 2017-02-23

## 2017-02-23 ENCOUNTER — APPOINTMENT (OUTPATIENT)
Dept: INTERVENTIONAL RADIOLOGY/VASCULAR | Facility: HOSPITAL | Age: 52
End: 2017-02-23

## 2017-02-23 LAB
ALBUMIN SERPL-MCNC: 2 G/DL (ref 3.4–4.8)
ALBUMIN/GLOB SERPL: 0.6 G/DL (ref 1.1–1.8)
ALP SERPL-CCNC: 71 U/L (ref 38–126)
ALT SERPL W P-5'-P-CCNC: 29 U/L (ref 21–72)
ANION GAP SERPL CALCULATED.3IONS-SCNC: 6 MMOL/L (ref 5–15)
AST SERPL-CCNC: 51 U/L (ref 17–59)
BASOPHILS # BLD AUTO: 0.11 10*3/MM3 (ref 0–0.2)
BASOPHILS NFR BLD AUTO: 1.4 % (ref 0–2)
BILIRUB SERPL-MCNC: 2.7 MG/DL (ref 0.2–1.3)
BUN BLD-MCNC: 9 MG/DL (ref 7–21)
BUN/CREAT SERPL: 9.2 (ref 7–25)
CALCIUM SPEC-SCNC: 7.7 MG/DL (ref 8.4–10.2)
CHLORIDE SERPL-SCNC: 110 MMOL/L (ref 95–110)
CO2 SERPL-SCNC: 20 MMOL/L (ref 22–31)
CREAT BLD-MCNC: 0.98 MG/DL (ref 0.7–1.3)
CRP SERPL-MCNC: 3.7 MG/DL (ref 0–1)
DEPRECATED RDW RBC AUTO: 58.9 FL (ref 35.1–43.9)
EOSINOPHIL # BLD AUTO: 0.58 10*3/MM3 (ref 0–0.7)
EOSINOPHIL NFR BLD AUTO: 7.4 % (ref 0–7)
ERYTHROCYTE [DISTWIDTH] IN BLOOD BY AUTOMATED COUNT: 15.9 % (ref 11.5–14.5)
GFR SERPL CREATININE-BSD FRML MDRD: 80 ML/MIN/1.73 (ref 56–130)
GLOBULIN UR ELPH-MCNC: 3.2 GM/DL (ref 2.3–3.5)
GLUCOSE BLD-MCNC: 97 MG/DL (ref 60–100)
HCT VFR BLD AUTO: 28.8 % (ref 39–49)
HGB BLD-MCNC: 9.8 G/DL (ref 13.7–17.3)
IMM GRANULOCYTES # BLD: 0.03 10*3/MM3 (ref 0–0.02)
IMM GRANULOCYTES NFR BLD: 0.4 % (ref 0–0.5)
LYMPHOCYTES # BLD AUTO: 2.24 10*3/MM3 (ref 0.6–4.2)
LYMPHOCYTES NFR BLD AUTO: 28.4 % (ref 10–50)
MAGNESIUM SERPL-MCNC: 1.7 MG/DL (ref 1.6–2.3)
MCH RBC QN AUTO: 34.6 PG (ref 26.5–34)
MCHC RBC AUTO-ENTMCNC: 34 G/DL (ref 31.5–36.3)
MCV RBC AUTO: 101.8 FL (ref 80–98)
MONOCYTES # BLD AUTO: 0.95 10*3/MM3 (ref 0–0.9)
MONOCYTES NFR BLD AUTO: 12 % (ref 0–12)
NEUTROPHILS # BLD AUTO: 3.98 10*3/MM3 (ref 2–8.6)
NEUTROPHILS NFR BLD AUTO: 50.4 % (ref 37–80)
PLATELET # BLD AUTO: 58 10*3/MM3 (ref 150–450)
PMV BLD AUTO: 10.3 FL (ref 8–12)
POTASSIUM BLD-SCNC: 2.9 MMOL/L (ref 3.5–5.1)
PROT SERPL-MCNC: 5.2 G/DL (ref 6.3–8.6)
RBC # BLD AUTO: 2.83 10*6/MM3 (ref 4.37–5.74)
SODIUM BLD-SCNC: 136 MMOL/L (ref 137–145)
WBC NRBC COR # BLD: 7.89 10*3/MM3 (ref 3.2–9.8)

## 2017-02-23 PROCEDURE — 80053 COMPREHEN METABOLIC PANEL: CPT | Performed by: INTERNAL MEDICINE

## 2017-02-23 PROCEDURE — 25010000002 GADOTERIDOL PER 1 ML: Performed by: INTERNAL MEDICINE

## 2017-02-23 PROCEDURE — C1751 CATH, INF, PER/CENT/MIDLINE: HCPCS

## 2017-02-23 PROCEDURE — 73220 MRI UPPR EXTREMITY W/O&W/DYE: CPT

## 2017-02-23 PROCEDURE — 83735 ASSAY OF MAGNESIUM: CPT | Performed by: FAMILY MEDICINE

## 2017-02-23 PROCEDURE — 02HV33Z INSERTION OF INFUSION DEVICE INTO SUPERIOR VENA CAVA, PERCUTANEOUS APPROACH: ICD-10-PCS | Performed by: FAMILY MEDICINE

## 2017-02-23 PROCEDURE — 25010000002 VANCOMYCIN PER 500 MG: Performed by: FAMILY MEDICINE

## 2017-02-23 PROCEDURE — 25010000002 HYDROMORPHONE PER 4 MG: Performed by: INTERNAL MEDICINE

## 2017-02-23 PROCEDURE — 25010000003 POTASSIUM CHLORIDE 10 MEQ/100ML SOLUTION: Performed by: FAMILY MEDICINE

## 2017-02-23 PROCEDURE — B548ZZA ULTRASONOGRAPHY OF SUPERIOR VENA CAVA, GUIDANCE: ICD-10-PCS | Performed by: FAMILY MEDICINE

## 2017-02-23 PROCEDURE — 85025 COMPLETE CBC W/AUTO DIFF WBC: CPT | Performed by: INTERNAL MEDICINE

## 2017-02-23 PROCEDURE — 86140 C-REACTIVE PROTEIN: CPT | Performed by: FAMILY MEDICINE

## 2017-02-23 PROCEDURE — 76937 US GUIDE VASCULAR ACCESS: CPT

## 2017-02-23 PROCEDURE — A9576 INJ PROHANCE MULTIPACK: HCPCS | Performed by: INTERNAL MEDICINE

## 2017-02-23 PROCEDURE — 25010000002 CEFTRIAXONE: Performed by: FAMILY MEDICINE

## 2017-02-23 PROCEDURE — 77001 FLUOROGUIDE FOR VEIN DEVICE: CPT

## 2017-02-23 RX ADMIN — POTASSIUM CHLORIDE 10 MEQ: 10 INJECTION, SOLUTION INTRAVENOUS at 17:33

## 2017-02-23 RX ADMIN — HYDROMORPHONE HYDROCHLORIDE 1 MG: 1 INJECTION, SOLUTION INTRAMUSCULAR; INTRAVENOUS; SUBCUTANEOUS at 18:31

## 2017-02-23 RX ADMIN — VANCOMYCIN HYDROCHLORIDE 250 MG: 1 INJECTION, POWDER, LYOPHILIZED, FOR SOLUTION INTRAVENOUS at 11:15

## 2017-02-23 RX ADMIN — POTASSIUM CHLORIDE 10 MEQ: 10 INJECTION, SOLUTION INTRAVENOUS at 18:31

## 2017-02-23 RX ADMIN — SODIUM CHLORIDE 50 ML/HR: 900 INJECTION, SOLUTION INTRAVENOUS at 10:05

## 2017-02-23 RX ADMIN — VANCOMYCIN HYDROCHLORIDE 1750 MG: 500 INJECTION, POWDER, LYOPHILIZED, FOR SOLUTION INTRAVENOUS at 05:36

## 2017-02-23 RX ADMIN — NYSTATIN: 100000 POWDER TOPICAL at 08:15

## 2017-02-23 RX ADMIN — CEFTRIAXONE 1 G: 1 INJECTION, POWDER, FOR SOLUTION INTRAMUSCULAR; INTRAVENOUS at 20:46

## 2017-02-23 RX ADMIN — Medication: at 17:39

## 2017-02-23 RX ADMIN — VANCOMYCIN HYDROCHLORIDE 1750 MG: 500 INJECTION, POWDER, LYOPHILIZED, FOR SOLUTION INTRAVENOUS at 17:34

## 2017-02-23 RX ADMIN — HYDROMORPHONE HYDROCHLORIDE 1 MG: 1 INJECTION, SOLUTION INTRAMUSCULAR; INTRAVENOUS; SUBCUTANEOUS at 22:29

## 2017-02-23 RX ADMIN — Medication: at 08:14

## 2017-02-23 RX ADMIN — VANCOMYCIN HYDROCHLORIDE 250 MG: 1 INJECTION, POWDER, LYOPHILIZED, FOR SOLUTION INTRAVENOUS at 00:07

## 2017-02-23 RX ADMIN — VANCOMYCIN HYDROCHLORIDE 250 MG: 1 INJECTION, POWDER, LYOPHILIZED, FOR SOLUTION INTRAVENOUS at 05:36

## 2017-02-23 RX ADMIN — HYDROMORPHONE HYDROCHLORIDE 1 MG: 1 INJECTION, SOLUTION INTRAMUSCULAR; INTRAVENOUS; SUBCUTANEOUS at 05:49

## 2017-02-23 RX ADMIN — VANCOMYCIN HYDROCHLORIDE 250 MG: 1 INJECTION, POWDER, LYOPHILIZED, FOR SOLUTION INTRAVENOUS at 23:54

## 2017-02-23 RX ADMIN — POTASSIUM CHLORIDE 10 MEQ: 10 INJECTION, SOLUTION INTRAVENOUS at 12:57

## 2017-02-23 RX ADMIN — RIFAXIMIN 550 MG: 550 TABLET ORAL at 17:34

## 2017-02-23 RX ADMIN — POTASSIUM CHLORIDE 10 MEQ: 10 INJECTION, SOLUTION INTRAVENOUS at 11:13

## 2017-02-23 RX ADMIN — POTASSIUM CHLORIDE 10 MEQ: 10 INJECTION, SOLUTION INTRAVENOUS at 10:05

## 2017-02-23 RX ADMIN — HYDROMORPHONE HYDROCHLORIDE 1 MG: 1 INJECTION, SOLUTION INTRAMUSCULAR; INTRAVENOUS; SUBCUTANEOUS at 00:07

## 2017-02-23 RX ADMIN — NYSTATIN: 100000 POWDER TOPICAL at 20:45

## 2017-02-23 RX ADMIN — POTASSIUM CHLORIDE 10 MEQ: 10 INJECTION, SOLUTION INTRAVENOUS at 14:39

## 2017-02-23 RX ADMIN — HYDROMORPHONE HYDROCHLORIDE 1 MG: 1 INJECTION, SOLUTION INTRAMUSCULAR; INTRAVENOUS; SUBCUTANEOUS at 10:10

## 2017-02-23 RX ADMIN — GADOTERIDOL 20 ML: 279.3 INJECTION, SOLUTION INTRAVENOUS at 10:30

## 2017-02-23 RX ADMIN — VANCOMYCIN HYDROCHLORIDE 250 MG: 1 INJECTION, POWDER, LYOPHILIZED, FOR SOLUTION INTRAVENOUS at 17:34

## 2017-02-23 RX ADMIN — RIFAXIMIN 550 MG: 550 TABLET ORAL at 08:14

## 2017-02-23 RX ADMIN — Medication: at 20:45

## 2017-02-23 RX ADMIN — SODIUM CHLORIDE 50 ML/HR: 900 INJECTION, SOLUTION INTRAVENOUS at 00:06

## 2017-02-24 ENCOUNTER — DOCUMENTATION (OUTPATIENT)
Dept: ORTHOPEDIC SURGERY | Facility: CLINIC | Age: 52
End: 2017-02-24

## 2017-02-24 ENCOUNTER — PREP FOR SURGERY (OUTPATIENT)
Dept: ORTHOPEDIC SURGERY | Facility: CLINIC | Age: 52
End: 2017-02-24

## 2017-02-24 DIAGNOSIS — M86.611 CHRONIC OSTEOMYELITIS OF RIGHT SHOULDER REGION (HCC): Primary | ICD-10-CM

## 2017-02-24 DIAGNOSIS — K73.9 CHRONIC HEPATITIS (HCC): ICD-10-CM

## 2017-02-24 DIAGNOSIS — S42.294G OTHER CLOSED NONDISPLACED FRACTURE OF PROXIMAL END OF RIGHT HUMERUS WITH DELAYED HEALING, SUBSEQUENT ENCOUNTER: ICD-10-CM

## 2017-02-24 DIAGNOSIS — K74.60 HEPATIC CIRRHOSIS, UNSPECIFIED HEPATIC CIRRHOSIS TYPE (HCC): ICD-10-CM

## 2017-02-24 DIAGNOSIS — M86.611 CHRONIC OSTEOMYELITIS OF RIGHT SHOULDER REGION (HCC): Primary | Chronic | ICD-10-CM

## 2017-02-24 PROBLEM — E72.20 HYPERAMMONEMIA (HCC): Status: RESOLVED | Noted: 2017-02-20 | Resolved: 2017-02-24

## 2017-02-24 PROBLEM — F19.11 HISTORY OF SUBSTANCE ABUSE (HCC): Chronic | Status: ACTIVE | Noted: 2017-02-24

## 2017-02-24 LAB
ALBUMIN SERPL-MCNC: 2 G/DL (ref 3.4–4.8)
ALBUMIN/GLOB SERPL: 0.6 G/DL (ref 1.1–1.8)
ALP SERPL-CCNC: 65 U/L (ref 38–126)
ALT SERPL W P-5'-P-CCNC: 25 U/L (ref 21–72)
AMMONIA BLD-SCNC: 18 UMOL/L (ref 9–30)
ANION GAP SERPL CALCULATED.3IONS-SCNC: 3 MMOL/L (ref 5–15)
AST SERPL-CCNC: 40 U/L (ref 17–59)
BACTERIA SPEC AEROBE CULT: ABNORMAL
BACTERIA SPEC AEROBE CULT: ABNORMAL
BASOPHILS # BLD AUTO: 0.12 10*3/MM3 (ref 0–0.2)
BASOPHILS NFR BLD AUTO: 1.6 % (ref 0–2)
BETA LACTAMASE: ABNORMAL
BILIRUB SERPL-MCNC: 3.3 MG/DL (ref 0.2–1.3)
BUN BLD-MCNC: 7 MG/DL (ref 7–21)
BUN/CREAT SERPL: 8.1 (ref 7–25)
CALCIUM SPEC-SCNC: 7.9 MG/DL (ref 8.4–10.2)
CHLORIDE SERPL-SCNC: 110 MMOL/L (ref 95–110)
CO2 SERPL-SCNC: 21 MMOL/L (ref 22–31)
CREAT BLD-MCNC: 0.86 MG/DL (ref 0.7–1.3)
DEPRECATED RDW RBC AUTO: 61.6 FL (ref 35.1–43.9)
EOSINOPHIL # BLD AUTO: 0.59 10*3/MM3 (ref 0–0.7)
EOSINOPHIL NFR BLD AUTO: 7.7 % (ref 0–7)
ERYTHROCYTE [DISTWIDTH] IN BLOOD BY AUTOMATED COUNT: 16.4 % (ref 11.5–14.5)
GFR SERPL CREATININE-BSD FRML MDRD: 93 ML/MIN/1.73 (ref 60–130)
GLOBULIN UR ELPH-MCNC: 3.4 GM/DL (ref 2.3–3.5)
GLUCOSE BLD-MCNC: 77 MG/DL (ref 60–100)
GRAM STN SPEC: ABNORMAL
GRAM STN SPEC: ABNORMAL
HCT VFR BLD AUTO: 29.6 % (ref 39–49)
HGB BLD-MCNC: 10.1 G/DL (ref 13.7–17.3)
IMM GRANULOCYTES # BLD: 0.03 10*3/MM3 (ref 0–0.02)
IMM GRANULOCYTES NFR BLD: 0.4 % (ref 0–0.5)
INR PPP: 1.62 (ref 0.8–1.2)
LYMPHOCYTES # BLD AUTO: 2.33 10*3/MM3 (ref 0.6–4.2)
LYMPHOCYTES NFR BLD AUTO: 30.5 % (ref 10–50)
MAGNESIUM SERPL-MCNC: 1.7 MG/DL (ref 1.6–2.3)
MCH RBC QN AUTO: 34.6 PG (ref 26.5–34)
MCHC RBC AUTO-ENTMCNC: 34.1 G/DL (ref 31.5–36.3)
MCV RBC AUTO: 101.4 FL (ref 80–98)
MONOCYTES # BLD AUTO: 0.99 10*3/MM3 (ref 0–0.9)
MONOCYTES NFR BLD AUTO: 12.9 % (ref 0–12)
NEUTROPHILS # BLD AUTO: 3.59 10*3/MM3 (ref 2–8.6)
NEUTROPHILS NFR BLD AUTO: 46.9 % (ref 37–80)
NRBC BLD MANUAL-RTO: 0 /100 WBC (ref 0–0)
PLATELET # BLD AUTO: 54 10*3/MM3 (ref 150–450)
PMV BLD AUTO: 8.7 FL (ref 8–12)
POTASSIUM BLD-SCNC: 3.4 MMOL/L (ref 3.5–5.1)
PROT SERPL-MCNC: 5.4 G/DL (ref 6.3–8.6)
PROTHROMBIN TIME: 19 SECONDS (ref 11.1–15.3)
RBC # BLD AUTO: 2.92 10*6/MM3 (ref 4.37–5.74)
SODIUM BLD-SCNC: 134 MMOL/L (ref 137–145)
VANCOMYCIN TROUGH SERPL-MCNC: 15.57 MCG/ML (ref 10–15)
WBC NRBC COR # BLD: 7.65 10*3/MM3 (ref 3.2–9.8)

## 2017-02-24 PROCEDURE — 25010000002 HYDROMORPHONE PER 4 MG: Performed by: INTERNAL MEDICINE

## 2017-02-24 PROCEDURE — 83735 ASSAY OF MAGNESIUM: CPT | Performed by: FAMILY MEDICINE

## 2017-02-24 PROCEDURE — 85025 COMPLETE CBC W/AUTO DIFF WBC: CPT | Performed by: INTERNAL MEDICINE

## 2017-02-24 PROCEDURE — 25010000002 CEFTRIAXONE: Performed by: FAMILY MEDICINE

## 2017-02-24 PROCEDURE — 80202 ASSAY OF VANCOMYCIN: CPT | Performed by: FAMILY MEDICINE

## 2017-02-24 PROCEDURE — 99232 SBSQ HOSP IP/OBS MODERATE 35: CPT | Performed by: ORTHOPAEDIC SURGERY

## 2017-02-24 PROCEDURE — 85610 PROTHROMBIN TIME: CPT | Performed by: FAMILY MEDICINE

## 2017-02-24 PROCEDURE — 80053 COMPREHEN METABOLIC PANEL: CPT | Performed by: INTERNAL MEDICINE

## 2017-02-24 PROCEDURE — 82140 ASSAY OF AMMONIA: CPT | Performed by: FAMILY MEDICINE

## 2017-02-24 PROCEDURE — 25010000002 VANCOMYCIN PER 500 MG: Performed by: FAMILY MEDICINE

## 2017-02-24 RX ORDER — LACTULOSE 10 G/15ML
20 SOLUTION ORAL DAILY
Status: DISCONTINUED | OUTPATIENT
Start: 2017-02-24 | End: 2017-03-08 | Stop reason: SDUPTHER

## 2017-02-24 RX ORDER — OXYCODONE AND ACETAMINOPHEN 10; 325 MG/1; MG/1
1 TABLET ORAL EVERY 4 HOURS PRN
Status: DISCONTINUED | OUTPATIENT
Start: 2017-02-24 | End: 2017-03-09 | Stop reason: HOSPADM

## 2017-02-24 RX ADMIN — Medication: at 08:13

## 2017-02-24 RX ADMIN — VANCOMYCIN HYDROCHLORIDE 250 MG: 1 INJECTION, POWDER, LYOPHILIZED, FOR SOLUTION INTRAVENOUS at 17:20

## 2017-02-24 RX ADMIN — NYSTATIN: 100000 POWDER TOPICAL at 08:13

## 2017-02-24 RX ADMIN — VANCOMYCIN HYDROCHLORIDE 1750 MG: 500 INJECTION, POWDER, LYOPHILIZED, FOR SOLUTION INTRAVENOUS at 04:40

## 2017-02-24 RX ADMIN — LACTULOSE 20 G: 10 SOLUTION ORAL at 08:13

## 2017-02-24 RX ADMIN — CEFTRIAXONE 1 G: 1 INJECTION, POWDER, FOR SOLUTION INTRAMUSCULAR; INTRAVENOUS at 20:55

## 2017-02-24 RX ADMIN — SODIUM CHLORIDE 50 ML/HR: 900 INJECTION, SOLUTION INTRAVENOUS at 18:09

## 2017-02-24 RX ADMIN — VANCOMYCIN HYDROCHLORIDE 250 MG: 1 INJECTION, POWDER, LYOPHILIZED, FOR SOLUTION INTRAVENOUS at 12:12

## 2017-02-24 RX ADMIN — NYSTATIN: 100000 POWDER TOPICAL at 20:55

## 2017-02-24 RX ADMIN — OXYCODONE HYDROCHLORIDE AND ACETAMINOPHEN 1 TABLET: 10; 325 TABLET ORAL at 23:58

## 2017-02-24 RX ADMIN — OXYCODONE HYDROCHLORIDE AND ACETAMINOPHEN 1 TABLET: 10; 325 TABLET ORAL at 08:12

## 2017-02-24 RX ADMIN — HYDROMORPHONE HYDROCHLORIDE 1 MG: 1 INJECTION, SOLUTION INTRAMUSCULAR; INTRAVENOUS; SUBCUTANEOUS at 03:11

## 2017-02-24 RX ADMIN — Medication: at 15:14

## 2017-02-24 RX ADMIN — VANCOMYCIN HYDROCHLORIDE 250 MG: 1 INJECTION, POWDER, LYOPHILIZED, FOR SOLUTION INTRAVENOUS at 06:42

## 2017-02-24 RX ADMIN — VANCOMYCIN HYDROCHLORIDE 1750 MG: 500 INJECTION, POWDER, LYOPHILIZED, FOR SOLUTION INTRAVENOUS at 15:28

## 2017-02-24 RX ADMIN — Medication: at 20:55

## 2017-02-24 RX ADMIN — RIFAXIMIN 550 MG: 550 TABLET ORAL at 08:12

## 2017-02-24 RX ADMIN — OXYCODONE HYDROCHLORIDE AND ACETAMINOPHEN 1 TABLET: 10; 325 TABLET ORAL at 15:14

## 2017-02-24 RX ADMIN — OXYCODONE HYDROCHLORIDE AND ACETAMINOPHEN 1 TABLET: 10; 325 TABLET ORAL at 19:35

## 2017-02-24 RX ADMIN — VANCOMYCIN HYDROCHLORIDE 250 MG: 1 INJECTION, POWDER, LYOPHILIZED, FOR SOLUTION INTRAVENOUS at 23:58

## 2017-02-24 RX ADMIN — RIFAXIMIN 550 MG: 550 TABLET ORAL at 17:20

## 2017-02-25 ENCOUNTER — ANESTHESIA EVENT (OUTPATIENT)
Dept: PERIOP | Facility: HOSPITAL | Age: 52
End: 2017-02-25

## 2017-02-25 LAB
ALBUMIN SERPL-MCNC: 1.8 G/DL (ref 3.4–4.8)
ALBUMIN/GLOB SERPL: 0.6 G/DL (ref 1.1–1.8)
ALP SERPL-CCNC: 64 U/L (ref 38–126)
ALT SERPL W P-5'-P-CCNC: 29 U/L (ref 21–72)
ANION GAP SERPL CALCULATED.3IONS-SCNC: 1 MMOL/L (ref 5–15)
AST SERPL-CCNC: 38 U/L (ref 17–59)
BASOPHILS # BLD AUTO: 0.13 10*3/MM3 (ref 0–0.2)
BASOPHILS NFR BLD AUTO: 2.1 % (ref 0–2)
BILIRUB SERPL-MCNC: 2.5 MG/DL (ref 0.2–1.3)
BUN BLD-MCNC: 7 MG/DL (ref 7–21)
BUN/CREAT SERPL: 8 (ref 7–25)
CALCIUM SPEC-SCNC: 7.6 MG/DL (ref 8.4–10.2)
CHLORIDE SERPL-SCNC: 111 MMOL/L (ref 95–110)
CO2 SERPL-SCNC: 21 MMOL/L (ref 22–31)
CREAT BLD-MCNC: 0.88 MG/DL (ref 0.7–1.3)
DEPRECATED RDW RBC AUTO: 58.6 FL (ref 35.1–43.9)
EOSINOPHIL # BLD AUTO: 0.57 10*3/MM3 (ref 0–0.7)
EOSINOPHIL NFR BLD AUTO: 9 % (ref 0–7)
ERYTHROCYTE [DISTWIDTH] IN BLOOD BY AUTOMATED COUNT: 15.9 % (ref 11.5–14.5)
GFR SERPL CREATININE-BSD FRML MDRD: 91 ML/MIN/1.73 (ref 60–130)
GLOBULIN UR ELPH-MCNC: 3.2 GM/DL (ref 2.3–3.5)
GLUCOSE BLD-MCNC: 107 MG/DL (ref 60–100)
HCT VFR BLD AUTO: 27.3 % (ref 39–49)
HGB BLD-MCNC: 9.5 G/DL (ref 13.7–17.3)
IMM GRANULOCYTES # BLD: 0.01 10*3/MM3 (ref 0–0.02)
IMM GRANULOCYTES NFR BLD: 0.2 % (ref 0–0.5)
LYMPHOCYTES # BLD AUTO: 2.13 10*3/MM3 (ref 0.6–4.2)
LYMPHOCYTES NFR BLD AUTO: 33.8 % (ref 10–50)
MAGNESIUM SERPL-MCNC: 1.9 MG/DL (ref 1.6–2.3)
MCH RBC QN AUTO: 35.1 PG (ref 26.5–34)
MCHC RBC AUTO-ENTMCNC: 34.8 G/DL (ref 31.5–36.3)
MCV RBC AUTO: 100.7 FL (ref 80–98)
MONOCYTES # BLD AUTO: 0.69 10*3/MM3 (ref 0–0.9)
MONOCYTES NFR BLD AUTO: 10.9 % (ref 0–12)
NEUTROPHILS # BLD AUTO: 2.78 10*3/MM3 (ref 2–8.6)
NEUTROPHILS NFR BLD AUTO: 44 % (ref 37–80)
PLATELET # BLD AUTO: 66 10*3/MM3 (ref 150–450)
PMV BLD AUTO: 10.9 FL (ref 8–12)
POTASSIUM BLD-SCNC: 3.3 MMOL/L (ref 3.5–5.1)
POTASSIUM BLD-SCNC: 3.4 MMOL/L (ref 3.5–5.1)
PROT SERPL-MCNC: 5 G/DL (ref 6.3–8.6)
RBC # BLD AUTO: 2.71 10*6/MM3 (ref 4.37–5.74)
SODIUM BLD-SCNC: 133 MMOL/L (ref 137–145)
WBC NRBC COR # BLD: 6.31 10*3/MM3 (ref 3.2–9.8)

## 2017-02-25 PROCEDURE — 84132 ASSAY OF SERUM POTASSIUM: CPT | Performed by: FAMILY MEDICINE

## 2017-02-25 PROCEDURE — 25010000003 POTASSIUM CHLORIDE 10 MEQ/100ML SOLUTION: Performed by: FAMILY MEDICINE

## 2017-02-25 PROCEDURE — 25010000002 CEFTRIAXONE: Performed by: FAMILY MEDICINE

## 2017-02-25 PROCEDURE — 25010000002 VANCOMYCIN PER 500 MG: Performed by: FAMILY MEDICINE

## 2017-02-25 PROCEDURE — 83735 ASSAY OF MAGNESIUM: CPT | Performed by: FAMILY MEDICINE

## 2017-02-25 PROCEDURE — 85025 COMPLETE CBC W/AUTO DIFF WBC: CPT | Performed by: INTERNAL MEDICINE

## 2017-02-25 PROCEDURE — 80053 COMPREHEN METABOLIC PANEL: CPT | Performed by: INTERNAL MEDICINE

## 2017-02-25 RX ORDER — MAGNESIUM SULFATE HEPTAHYDRATE 40 MG/ML
4 INJECTION, SOLUTION INTRAVENOUS AS NEEDED
Status: DISCONTINUED | OUTPATIENT
Start: 2017-02-25 | End: 2017-03-09 | Stop reason: HOSPADM

## 2017-02-25 RX ADMIN — OXYCODONE HYDROCHLORIDE AND ACETAMINOPHEN 1 TABLET: 10; 325 TABLET ORAL at 16:47

## 2017-02-25 RX ADMIN — POTASSIUM CHLORIDE 10 MEQ: 10 INJECTION, SOLUTION INTRAVENOUS at 04:31

## 2017-02-25 RX ADMIN — POTASSIUM CHLORIDE 10 MEQ: 10 INJECTION, SOLUTION INTRAVENOUS at 16:58

## 2017-02-25 RX ADMIN — POTASSIUM CHLORIDE 10 MEQ: 10 INJECTION, SOLUTION INTRAVENOUS at 20:19

## 2017-02-25 RX ADMIN — POTASSIUM CHLORIDE 10 MEQ: 10 INJECTION, SOLUTION INTRAVENOUS at 08:19

## 2017-02-25 RX ADMIN — NYSTATIN: 100000 POWDER TOPICAL at 08:22

## 2017-02-25 RX ADMIN — VANCOMYCIN HYDROCHLORIDE 250 MG: 1 INJECTION, POWDER, LYOPHILIZED, FOR SOLUTION INTRAVENOUS at 16:48

## 2017-02-25 RX ADMIN — VANCOMYCIN HYDROCHLORIDE 250 MG: 1 INJECTION, POWDER, LYOPHILIZED, FOR SOLUTION INTRAVENOUS at 06:17

## 2017-02-25 RX ADMIN — VANCOMYCIN HYDROCHLORIDE 1750 MG: 500 INJECTION, POWDER, LYOPHILIZED, FOR SOLUTION INTRAVENOUS at 04:30

## 2017-02-25 RX ADMIN — Medication: at 21:07

## 2017-02-25 RX ADMIN — OXYCODONE HYDROCHLORIDE AND ACETAMINOPHEN 1 TABLET: 10; 325 TABLET ORAL at 04:30

## 2017-02-25 RX ADMIN — Medication: at 15:42

## 2017-02-25 RX ADMIN — OXYCODONE HYDROCHLORIDE AND ACETAMINOPHEN 1 TABLET: 10; 325 TABLET ORAL at 08:19

## 2017-02-25 RX ADMIN — VANCOMYCIN HYDROCHLORIDE 1750 MG: 500 INJECTION, POWDER, LYOPHILIZED, FOR SOLUTION INTRAVENOUS at 15:42

## 2017-02-25 RX ADMIN — POTASSIUM CHLORIDE 10 MEQ: 10 INJECTION, SOLUTION INTRAVENOUS at 06:41

## 2017-02-25 RX ADMIN — OXYCODONE HYDROCHLORIDE AND ACETAMINOPHEN 1 TABLET: 10; 325 TABLET ORAL at 21:08

## 2017-02-25 RX ADMIN — Medication: at 08:22

## 2017-02-25 RX ADMIN — OXYCODONE HYDROCHLORIDE AND ACETAMINOPHEN 1 TABLET: 10; 325 TABLET ORAL at 12:26

## 2017-02-25 RX ADMIN — MAGNESIUM SULFATE HEPTAHYDRATE 4 G: 40 INJECTION, SOLUTION INTRAVENOUS at 04:31

## 2017-02-25 RX ADMIN — MAGNESIUM SULFATE HEPTAHYDRATE 4 G: 40 INJECTION, SOLUTION INTRAVENOUS at 16:47

## 2017-02-25 RX ADMIN — SODIUM CHLORIDE 50 ML/HR: 900 INJECTION, SOLUTION INTRAVENOUS at 21:14

## 2017-02-25 RX ADMIN — POTASSIUM CHLORIDE 10 MEQ: 10 INJECTION, SOLUTION INTRAVENOUS at 17:58

## 2017-02-25 RX ADMIN — POTASSIUM CHLORIDE 10 MEQ: 10 INJECTION, SOLUTION INTRAVENOUS at 19:03

## 2017-02-25 RX ADMIN — VANCOMYCIN HYDROCHLORIDE 250 MG: 1 INJECTION, POWDER, LYOPHILIZED, FOR SOLUTION INTRAVENOUS at 12:22

## 2017-02-25 RX ADMIN — CEFTRIAXONE 1 G: 1 INJECTION, POWDER, FOR SOLUTION INTRAMUSCULAR; INTRAVENOUS at 20:19

## 2017-02-25 RX ADMIN — RIFAXIMIN 550 MG: 550 TABLET ORAL at 08:19

## 2017-02-25 RX ADMIN — RIFAXIMIN 550 MG: 550 TABLET ORAL at 16:47

## 2017-02-25 RX ADMIN — NYSTATIN: 100000 POWDER TOPICAL at 21:09

## 2017-02-26 LAB
ALBUMIN SERPL-MCNC: 1.8 G/DL (ref 3.4–4.8)
ALBUMIN/GLOB SERPL: 0.5 G/DL (ref 1.1–1.8)
ALP SERPL-CCNC: 82 U/L (ref 38–126)
ALT SERPL W P-5'-P-CCNC: 30 U/L (ref 21–72)
ANION GAP SERPL CALCULATED.3IONS-SCNC: 2 MMOL/L (ref 5–15)
AST SERPL-CCNC: 45 U/L (ref 17–59)
BACTERIA SPEC AEROBE CULT: NORMAL
BACTERIA SPEC AEROBE CULT: NORMAL
BASOPHILS # BLD AUTO: 0.15 10*3/MM3 (ref 0–0.2)
BASOPHILS NFR BLD AUTO: 2.2 % (ref 0–2)
BILIRUB SERPL-MCNC: 1.9 MG/DL (ref 0.2–1.3)
BUN BLD-MCNC: 7 MG/DL (ref 7–21)
BUN/CREAT SERPL: 6.8 (ref 7–25)
CALCIUM SPEC-SCNC: 7.6 MG/DL (ref 8.4–10.2)
CHLORIDE SERPL-SCNC: 111 MMOL/L (ref 95–110)
CO2 SERPL-SCNC: 21 MMOL/L (ref 22–31)
CREAT BLD-MCNC: 1.03 MG/DL (ref 0.7–1.3)
DEPRECATED RDW RBC AUTO: 58.1 FL (ref 35.1–43.9)
EOSINOPHIL # BLD AUTO: 0.57 10*3/MM3 (ref 0–0.7)
EOSINOPHIL NFR BLD AUTO: 8.3 % (ref 0–7)
ERYTHROCYTE [DISTWIDTH] IN BLOOD BY AUTOMATED COUNT: 15.9 % (ref 11.5–14.5)
GFR SERPL CREATININE-BSD FRML MDRD: 76 ML/MIN/1.73 (ref 60–130)
GLOBULIN UR ELPH-MCNC: 3.3 GM/DL (ref 2.3–3.5)
GLUCOSE BLD-MCNC: 94 MG/DL (ref 60–100)
HCT VFR BLD AUTO: 27.2 % (ref 39–49)
HGB BLD-MCNC: 9.5 G/DL (ref 13.7–17.3)
IMM GRANULOCYTES # BLD: 0.01 10*3/MM3 (ref 0–0.02)
IMM GRANULOCYTES NFR BLD: 0.1 % (ref 0–0.5)
LYMPHOCYTES # BLD AUTO: 2.38 10*3/MM3 (ref 0.6–4.2)
LYMPHOCYTES NFR BLD AUTO: 34.5 % (ref 10–50)
MCH RBC QN AUTO: 34.9 PG (ref 26.5–34)
MCHC RBC AUTO-ENTMCNC: 34.9 G/DL (ref 31.5–36.3)
MCV RBC AUTO: 100 FL (ref 80–98)
MONOCYTES # BLD AUTO: 0.82 10*3/MM3 (ref 0–0.9)
MONOCYTES NFR BLD AUTO: 11.9 % (ref 0–12)
NEUTROPHILS # BLD AUTO: 2.96 10*3/MM3 (ref 2–8.6)
NEUTROPHILS NFR BLD AUTO: 43 % (ref 37–80)
PLATELET # BLD AUTO: 70 10*3/MM3 (ref 150–450)
PMV BLD AUTO: 9.9 FL (ref 8–12)
POTASSIUM BLD-SCNC: 3.8 MMOL/L (ref 3.5–5.1)
PROT SERPL-MCNC: 5.1 G/DL (ref 6.3–8.6)
RBC # BLD AUTO: 2.72 10*6/MM3 (ref 4.37–5.74)
SODIUM BLD-SCNC: 134 MMOL/L (ref 137–145)
WBC NRBC COR # BLD: 6.89 10*3/MM3 (ref 3.2–9.8)

## 2017-02-26 PROCEDURE — 25010000002 FUROSEMIDE PER 20 MG: Performed by: FAMILY MEDICINE

## 2017-02-26 PROCEDURE — 25010000002 CEFTRIAXONE: Performed by: FAMILY MEDICINE

## 2017-02-26 PROCEDURE — 85025 COMPLETE CBC W/AUTO DIFF WBC: CPT | Performed by: INTERNAL MEDICINE

## 2017-02-26 PROCEDURE — 25010000002 VANCOMYCIN PER 500 MG: Performed by: FAMILY MEDICINE

## 2017-02-26 PROCEDURE — 80053 COMPREHEN METABOLIC PANEL: CPT | Performed by: INTERNAL MEDICINE

## 2017-02-26 RX ORDER — FUROSEMIDE 20 MG/1
20 TABLET ORAL DAILY
Status: DISCONTINUED | OUTPATIENT
Start: 2017-02-27 | End: 2017-02-27

## 2017-02-26 RX ORDER — FUROSEMIDE 10 MG/ML
40 INJECTION INTRAMUSCULAR; INTRAVENOUS ONCE
Status: COMPLETED | OUTPATIENT
Start: 2017-02-26 | End: 2017-02-26

## 2017-02-26 RX ADMIN — Medication: at 17:14

## 2017-02-26 RX ADMIN — VANCOMYCIN HYDROCHLORIDE 1750 MG: 500 INJECTION, POWDER, LYOPHILIZED, FOR SOLUTION INTRAVENOUS at 05:00

## 2017-02-26 RX ADMIN — VANCOMYCIN HYDROCHLORIDE 250 MG: 1 INJECTION, POWDER, LYOPHILIZED, FOR SOLUTION INTRAVENOUS at 17:14

## 2017-02-26 RX ADMIN — Medication: at 21:14

## 2017-02-26 RX ADMIN — RIFAXIMIN 550 MG: 550 TABLET ORAL at 08:22

## 2017-02-26 RX ADMIN — VANCOMYCIN HYDROCHLORIDE 1750 MG: 500 INJECTION, POWDER, LYOPHILIZED, FOR SOLUTION INTRAVENOUS at 17:14

## 2017-02-26 RX ADMIN — OXYCODONE HYDROCHLORIDE AND ACETAMINOPHEN 1 TABLET: 10; 325 TABLET ORAL at 21:14

## 2017-02-26 RX ADMIN — VANCOMYCIN HYDROCHLORIDE 250 MG: 1 INJECTION, POWDER, LYOPHILIZED, FOR SOLUTION INTRAVENOUS at 12:23

## 2017-02-26 RX ADMIN — VANCOMYCIN HYDROCHLORIDE 250 MG: 1 INJECTION, POWDER, LYOPHILIZED, FOR SOLUTION INTRAVENOUS at 00:39

## 2017-02-26 RX ADMIN — CEFTRIAXONE 1 G: 1 INJECTION, POWDER, FOR SOLUTION INTRAMUSCULAR; INTRAVENOUS at 21:14

## 2017-02-26 RX ADMIN — OXYCODONE HYDROCHLORIDE AND ACETAMINOPHEN 1 TABLET: 10; 325 TABLET ORAL at 06:10

## 2017-02-26 RX ADMIN — OXYCODONE HYDROCHLORIDE AND ACETAMINOPHEN 1 TABLET: 10; 325 TABLET ORAL at 17:16

## 2017-02-26 RX ADMIN — OXYCODONE HYDROCHLORIDE AND ACETAMINOPHEN 1 TABLET: 10; 325 TABLET ORAL at 00:39

## 2017-02-26 RX ADMIN — NYSTATIN: 100000 POWDER TOPICAL at 08:22

## 2017-02-26 RX ADMIN — Medication: at 08:22

## 2017-02-26 RX ADMIN — NYSTATIN: 100000 POWDER TOPICAL at 21:14

## 2017-02-26 RX ADMIN — VANCOMYCIN HYDROCHLORIDE 250 MG: 1 INJECTION, POWDER, LYOPHILIZED, FOR SOLUTION INTRAVENOUS at 06:09

## 2017-02-26 RX ADMIN — RIFAXIMIN 550 MG: 550 TABLET ORAL at 17:14

## 2017-02-26 RX ADMIN — OXYCODONE HYDROCHLORIDE AND ACETAMINOPHEN 1 TABLET: 10; 325 TABLET ORAL at 10:39

## 2017-02-26 RX ADMIN — FUROSEMIDE 40 MG: 10 INJECTION, SOLUTION INTRAMUSCULAR; INTRAVENOUS at 10:39

## 2017-02-27 ENCOUNTER — ANESTHESIA (OUTPATIENT)
Dept: PERIOP | Facility: HOSPITAL | Age: 52
End: 2017-02-27

## 2017-02-27 PROBLEM — M86.611: Chronic | Status: ACTIVE | Noted: 2017-02-24

## 2017-02-27 LAB
ALBUMIN SERPL-MCNC: 1.9 G/DL (ref 3.4–4.8)
ALBUMIN/GLOB SERPL: 0.6 G/DL (ref 1.1–1.8)
ALP SERPL-CCNC: 66 U/L (ref 38–126)
ALT SERPL W P-5'-P-CCNC: 25 U/L (ref 21–72)
ANION GAP SERPL CALCULATED.3IONS-SCNC: 2 MMOL/L (ref 5–15)
AST SERPL-CCNC: 44 U/L (ref 17–59)
BASOPHILS # BLD AUTO: 0.19 10*3/MM3 (ref 0–0.2)
BASOPHILS NFR BLD AUTO: 2.7 % (ref 0–2)
BILIRUB SERPL-MCNC: 2.4 MG/DL (ref 0.2–1.3)
BUN BLD-MCNC: 7 MG/DL (ref 7–21)
BUN/CREAT SERPL: 6.3 (ref 7–25)
CALCIUM SPEC-SCNC: 8 MG/DL (ref 8.4–10.2)
CHLORIDE SERPL-SCNC: 109 MMOL/L (ref 95–110)
CO2 SERPL-SCNC: 23 MMOL/L (ref 22–31)
CREAT BLD-MCNC: 1.11 MG/DL (ref 0.7–1.3)
DEPRECATED RDW RBC AUTO: 58.5 FL (ref 35.1–43.9)
EOSINOPHIL # BLD AUTO: 0.68 10*3/MM3 (ref 0–0.7)
EOSINOPHIL NFR BLD AUTO: 9.6 % (ref 0–7)
ERYTHROCYTE [DISTWIDTH] IN BLOOD BY AUTOMATED COUNT: 16 % (ref 11.5–14.5)
GFR SERPL CREATININE-BSD FRML MDRD: 70 ML/MIN/1.73 (ref 60–130)
GLOBULIN UR ELPH-MCNC: 3.3 GM/DL (ref 2.3–3.5)
GLUCOSE BLD-MCNC: 71 MG/DL (ref 60–100)
HCT VFR BLD AUTO: 27.8 % (ref 39–49)
HGB BLD-MCNC: 9.8 G/DL (ref 13.7–17.3)
IMM GRANULOCYTES # BLD: 0.02 10*3/MM3 (ref 0–0.02)
IMM GRANULOCYTES NFR BLD: 0.3 % (ref 0–0.5)
LYMPHOCYTES # BLD AUTO: 2.4 10*3/MM3 (ref 0.6–4.2)
LYMPHOCYTES NFR BLD AUTO: 33.7 % (ref 10–50)
MCH RBC QN AUTO: 35.5 PG (ref 26.5–34)
MCHC RBC AUTO-ENTMCNC: 35.3 G/DL (ref 31.5–36.3)
MCV RBC AUTO: 100.7 FL (ref 80–98)
MONOCYTES # BLD AUTO: 0.74 10*3/MM3 (ref 0–0.9)
MONOCYTES NFR BLD AUTO: 10.4 % (ref 0–12)
NEUTROPHILS # BLD AUTO: 3.09 10*3/MM3 (ref 2–8.6)
NEUTROPHILS NFR BLD AUTO: 43.3 % (ref 37–80)
PLATELET # BLD AUTO: 71 10*3/MM3 (ref 150–450)
PMV BLD AUTO: 10.1 FL (ref 8–12)
POTASSIUM BLD-SCNC: 3.5 MMOL/L (ref 3.5–5.1)
PROT SERPL-MCNC: 5.2 G/DL (ref 6.3–8.6)
RBC # BLD AUTO: 2.76 10*6/MM3 (ref 4.37–5.74)
SODIUM BLD-SCNC: 134 MMOL/L (ref 137–145)
VANCOMYCIN TROUGH SERPL-MCNC: 39.71 MCG/ML (ref 10–15)
WBC NRBC COR # BLD: 7.12 10*3/MM3 (ref 3.2–9.8)

## 2017-02-27 PROCEDURE — 25010000002 PIPERACILLIN SOD-TAZOBACTAM PER 1 G: Performed by: FAMILY MEDICINE

## 2017-02-27 PROCEDURE — 0J9D0ZZ DRAINAGE OF RIGHT UPPER ARM SUBCUTANEOUS TISSUE AND FASCIA, OPEN APPROACH: ICD-10-PCS | Performed by: ORTHOPAEDIC SURGERY

## 2017-02-27 PROCEDURE — 85025 COMPLETE CBC W/AUTO DIFF WBC: CPT | Performed by: INTERNAL MEDICINE

## 2017-02-27 PROCEDURE — 80053 COMPREHEN METABOLIC PANEL: CPT | Performed by: INTERNAL MEDICINE

## 2017-02-27 PROCEDURE — 23030 I&D SHOULDER DEEP ABSC/HMTMA: CPT | Performed by: ORTHOPAEDIC SURGERY

## 2017-02-27 PROCEDURE — 25010000002 MEPIVACAINE PF 2 % SOLUTION 20 ML VIAL: Performed by: ORTHOPAEDIC SURGERY

## 2017-02-27 PROCEDURE — 80202 ASSAY OF VANCOMYCIN: CPT | Performed by: ORTHOPAEDIC SURGERY

## 2017-02-27 PROCEDURE — 25010000002 VANCOMYCIN PER 500 MG: Performed by: FAMILY MEDICINE

## 2017-02-27 RX ORDER — ERGOCALCIFEROL 1.25 MG/1
50000 CAPSULE ORAL
Status: DISCONTINUED | OUTPATIENT
Start: 2017-03-01 | End: 2017-03-09 | Stop reason: HOSPADM

## 2017-02-27 RX ORDER — BACTERIOSTATIC SODIUM CHLORIDE 0.9 %
VIAL (ML) INJECTION AS NEEDED
Status: DISCONTINUED | OUTPATIENT
Start: 2017-02-27 | End: 2017-03-09 | Stop reason: HOSPADM

## 2017-02-27 RX ORDER — SODIUM CHLORIDE 0.9 % (FLUSH) 0.9 %
10 SYRINGE (ML) INJECTION ONCE
Status: COMPLETED | OUTPATIENT
Start: 2017-02-27 | End: 2017-02-27

## 2017-02-27 RX ORDER — LACTULOSE 10 G/15ML
30 SOLUTION ORAL 2 TIMES DAILY
Status: DISCONTINUED | OUTPATIENT
Start: 2017-02-27 | End: 2017-03-09 | Stop reason: HOSPADM

## 2017-02-27 RX ORDER — FUROSEMIDE 40 MG/1
40 TABLET ORAL DAILY
Status: DISCONTINUED | OUTPATIENT
Start: 2017-02-28 | End: 2017-03-01

## 2017-02-27 RX ORDER — OXYCODONE AND ACETAMINOPHEN 10; 325 MG/1; MG/1
1 TABLET ORAL EVERY 4 HOURS PRN
Status: DISCONTINUED | OUTPATIENT
Start: 2017-02-27 | End: 2017-03-09 | Stop reason: HOSPADM

## 2017-02-27 RX ORDER — GABAPENTIN 100 MG/1
100 CAPSULE ORAL 3 TIMES DAILY
Status: DISCONTINUED | OUTPATIENT
Start: 2017-02-27 | End: 2017-03-09 | Stop reason: HOSPADM

## 2017-02-27 RX ORDER — AMLODIPINE BESYLATE 2.5 MG/1
2.5 TABLET ORAL DAILY PRN
Status: DISCONTINUED | OUTPATIENT
Start: 2017-02-27 | End: 2017-03-09 | Stop reason: HOSPADM

## 2017-02-27 RX ORDER — BACITRACIN 50000 [USP'U]/1
INJECTION, POWDER, LYOPHILIZED, FOR SOLUTION INTRAMUSCULAR AS NEEDED
Status: DISCONTINUED | OUTPATIENT
Start: 2017-02-27 | End: 2017-03-09 | Stop reason: HOSPADM

## 2017-02-27 RX ADMIN — Medication 1 TABLET: at 16:36

## 2017-02-27 RX ADMIN — OXYCODONE HYDROCHLORIDE AND ACETAMINOPHEN 1 TABLET: 10; 325 TABLET ORAL at 22:05

## 2017-02-27 RX ADMIN — GABAPENTIN 100 MG: 100 CAPSULE ORAL at 16:36

## 2017-02-27 RX ADMIN — VANCOMYCIN HYDROCHLORIDE 250 MG: 1 INJECTION, POWDER, LYOPHILIZED, FOR SOLUTION INTRAVENOUS at 00:33

## 2017-02-27 RX ADMIN — FUROSEMIDE 20 MG: 20 TABLET ORAL at 10:07

## 2017-02-27 RX ADMIN — Medication: at 16:37

## 2017-02-27 RX ADMIN — TAZOBACTAM SODIUM AND PIPERACILLIN SODIUM 3.38 G: 375; 3 INJECTION, SOLUTION INTRAVENOUS at 18:15

## 2017-02-27 RX ADMIN — VANCOMYCIN HYDROCHLORIDE 1750 MG: 500 INJECTION, POWDER, LYOPHILIZED, FOR SOLUTION INTRAVENOUS at 05:49

## 2017-02-27 RX ADMIN — NYSTATIN: 100000 POWDER TOPICAL at 10:09

## 2017-02-27 RX ADMIN — OXYCODONE HYDROCHLORIDE AND ACETAMINOPHEN 1 TABLET: 10; 325 TABLET ORAL at 14:25

## 2017-02-27 RX ADMIN — VANCOMYCIN HYDROCHLORIDE 250 MG: 1 INJECTION, POWDER, LYOPHILIZED, FOR SOLUTION INTRAVENOUS at 05:49

## 2017-02-27 RX ADMIN — OXYCODONE HYDROCHLORIDE AND ACETAMINOPHEN 1 TABLET: 10; 325 TABLET ORAL at 01:20

## 2017-02-27 RX ADMIN — Medication: at 10:09

## 2017-02-27 RX ADMIN — RIFAXIMIN 550 MG: 550 TABLET ORAL at 10:08

## 2017-02-27 RX ADMIN — NYSTATIN: 100000 POWDER TOPICAL at 22:05

## 2017-02-27 RX ADMIN — Medication: at 20:45

## 2017-02-27 RX ADMIN — SODIUM CHLORIDE, PRESERVATIVE FREE 10 ML: 5 INJECTION INTRAVENOUS at 12:20

## 2017-02-27 RX ADMIN — GABAPENTIN 100 MG: 100 CAPSULE ORAL at 20:45

## 2017-02-27 RX ADMIN — LACTULOSE 20 G: 10 SOLUTION ORAL at 10:06

## 2017-02-27 RX ADMIN — RIFAXIMIN 550 MG: 550 TABLET ORAL at 18:14

## 2017-02-27 RX ADMIN — OXYCODONE HYDROCHLORIDE AND ACETAMINOPHEN 1 TABLET: 10; 325 TABLET ORAL at 18:20

## 2017-02-27 RX ADMIN — OXYCODONE HYDROCHLORIDE AND ACETAMINOPHEN 1 TABLET: 10; 325 TABLET ORAL at 06:47

## 2017-02-27 NOTE — ANESTHESIA PREPROCEDURE EVALUATION
Anesthesia Evaluation     Patient summary reviewed and Nursing notes reviewed   NPO Status: > 8 hours   Airway   Mallampati: II  TM distance: >3 FB  Neck ROM: full  possible difficult intubation  Dental    (+) poor dentation    Comment: Upper incisors chipped.    Pulmonary - negative pulmonary ROS and normal exam    breath sounds clear to auscultation  Cardiovascular - normal exam    Rhythm: regular  Rate: normal    (+) hypertension,       Neuro/Psych  (+) numbness, psychiatric history Anxiety and Depression,    GI/Hepatic/Renal/Endo    (+)  hepatitis C, liver disease, chronic renal disease (Creatinine 1.1),     Musculoskeletal     (+) back pain,   Abdominal    Substance History - negative use     OB/GYN negative ob/gyn ROS         Other   (+) arthritis                                 Anesthesia Plan    ASA 4     MAC     intravenous induction   Anesthetic plan and risks discussed with patient.    Plan discussed with CRNA.

## 2017-02-27 NOTE — ANESTHESIA POSTPROCEDURE EVALUATION
Patient: Armando Mcmullen    Procedure Summary     Date Anesthesia Start Anesthesia Stop Room / Location    02/27/17 1223 1255  [facility] OR [facility] / [facility]       Procedure Diagnosis Surgeon Provider    INCISION AND DRAINAGE RIGHT SHOULDER (Right Shoulder) Chronic osteomyelitis of right shoulder region  (Chronic osteomyelitis of right shoulder region [M86.611]) MD Enoc Antonio MD          Anesthesia Type: MAC  Last vitals  BP      Temp      Pulse     Resp      SpO2        Post Anesthesia Care and Evaluation    Patient location during evaluation: bedside  Patient participation: complete - patient participated  Level of consciousness: awake and awake and alert  Pain score: 3  Pain management: satisfactory to patient  Airway patency: patent  Anesthetic complications: No anesthetic complications  PONV Status: none  Cardiovascular status: acceptable and stable  Respiratory status: acceptable, room air, unassisted and spontaneous ventilation  Hydration status: acceptable  Post Neuraxial Block status: Motor and sensory function returned to baseline

## 2017-02-27 NOTE — PROGRESS NOTES
Seen in the hospital and still a presently a patient in the hospital because of C. difficile diarrhea.  He is a draining sinus from his right shoulder fracture that occurred in November of last year.  Developed a spontaneous infection and this drained is MRSA positive.  Subcutaneous abscess.  Sinus tract to the right proximal humeral area.  Plan is to debride that and drain it better.  This is a non-curable condition.  The patient's metabolic status does not allow him to have any type of surgical procedure.  This is an extremely high risk problem and is not amenable to cure unless one does a forequarter amputation.  Patient is not septic this infection is not causing him any problems as long as he continues to drain within up to just provide a better drainage and he is developed a sinus tract will continue with that.  We'll provide suppression antibiotics.  I've discussed this with Dr. Vanegas and Dr. Aguero his treating physician.02/27/17 at 7:50 AM by Shawn Cortez MD

## 2017-02-28 PROBLEM — N30.00 ACUTE CYSTITIS WITHOUT HEMATURIA: Status: ACTIVE | Noted: 2017-02-28

## 2017-02-28 LAB
ALBUMIN SERPL-MCNC: 1.8 G/DL (ref 3.4–4.8)
ALBUMIN/GLOB SERPL: 0.6 G/DL (ref 1.1–1.8)
ALP SERPL-CCNC: 66 U/L (ref 38–126)
ALT SERPL W P-5'-P-CCNC: 30 U/L (ref 21–72)
ANION GAP SERPL CALCULATED.3IONS-SCNC: 1 MMOL/L (ref 5–15)
AST SERPL-CCNC: 43 U/L (ref 17–59)
BASOPHILS # BLD AUTO: 0.14 10*3/MM3 (ref 0–0.2)
BASOPHILS NFR BLD AUTO: 2.4 % (ref 0–2)
BILIRUB SERPL-MCNC: 2.4 MG/DL (ref 0.2–1.3)
BUN BLD-MCNC: 7 MG/DL (ref 7–21)
BUN/CREAT SERPL: 6.3 (ref 7–25)
CALCIUM SPEC-SCNC: 8.1 MG/DL (ref 8.4–10.2)
CHLORIDE SERPL-SCNC: 109 MMOL/L (ref 95–110)
CO2 SERPL-SCNC: 24 MMOL/L (ref 22–31)
CREAT BLD-MCNC: 1.11 MG/DL (ref 0.7–1.3)
DEPRECATED RDW RBC AUTO: 58.8 FL (ref 35.1–43.9)
EOSINOPHIL # BLD AUTO: 0.52 10*3/MM3 (ref 0–0.7)
EOSINOPHIL NFR BLD AUTO: 8.7 % (ref 0–7)
ERYTHROCYTE [DISTWIDTH] IN BLOOD BY AUTOMATED COUNT: 16 % (ref 11.5–14.5)
GFR SERPL CREATININE-BSD FRML MDRD: 70 ML/MIN/1.73 (ref 60–130)
GLOBULIN UR ELPH-MCNC: 3.1 GM/DL (ref 2.3–3.5)
GLUCOSE BLD-MCNC: 70 MG/DL (ref 60–100)
HCT VFR BLD AUTO: 27 % (ref 39–49)
HGB BLD-MCNC: 9.3 G/DL (ref 13.7–17.3)
IMM GRANULOCYTES # BLD: 0.01 10*3/MM3 (ref 0–0.02)
IMM GRANULOCYTES NFR BLD: 0.2 % (ref 0–0.5)
LYMPHOCYTES # BLD AUTO: 2.11 10*3/MM3 (ref 0.6–4.2)
LYMPHOCYTES NFR BLD AUTO: 35.5 % (ref 10–50)
MAGNESIUM SERPL-MCNC: 1.7 MG/DL (ref 1.6–2.3)
MCH RBC QN AUTO: 34.7 PG (ref 26.5–34)
MCHC RBC AUTO-ENTMCNC: 34.4 G/DL (ref 31.5–36.3)
MCV RBC AUTO: 100.7 FL (ref 80–98)
MONOCYTES # BLD AUTO: 0.68 10*3/MM3 (ref 0–0.9)
MONOCYTES NFR BLD AUTO: 11.4 % (ref 0–12)
NEUTROPHILS # BLD AUTO: 2.49 10*3/MM3 (ref 2–8.6)
NEUTROPHILS NFR BLD AUTO: 41.8 % (ref 37–80)
PLATELET # BLD AUTO: 71 10*3/MM3 (ref 150–450)
PMV BLD AUTO: 9.9 FL (ref 8–12)
POTASSIUM BLD-SCNC: 3.4 MMOL/L (ref 3.5–5.1)
POTASSIUM BLD-SCNC: 3.5 MMOL/L (ref 3.5–5.1)
PROT SERPL-MCNC: 4.9 G/DL (ref 6.3–8.6)
RBC # BLD AUTO: 2.68 10*6/MM3 (ref 4.37–5.74)
SODIUM BLD-SCNC: 134 MMOL/L (ref 137–145)
VANCOMYCIN SERPL-MCNC: 29.38 MCG/ML
WBC NRBC COR # BLD: 5.95 10*3/MM3 (ref 3.2–9.8)

## 2017-02-28 PROCEDURE — 85025 COMPLETE CBC W/AUTO DIFF WBC: CPT | Performed by: INTERNAL MEDICINE

## 2017-02-28 PROCEDURE — 80053 COMPREHEN METABOLIC PANEL: CPT | Performed by: INTERNAL MEDICINE

## 2017-02-28 PROCEDURE — 25010000002 PIPERACILLIN SOD-TAZOBACTAM PER 1 G: Performed by: FAMILY MEDICINE

## 2017-02-28 PROCEDURE — 25010000003 POTASSIUM CHLORIDE 10 MEQ/100ML SOLUTION: Performed by: FAMILY MEDICINE

## 2017-02-28 PROCEDURE — 84132 ASSAY OF SERUM POTASSIUM: CPT | Performed by: FAMILY MEDICINE

## 2017-02-28 PROCEDURE — 83735 ASSAY OF MAGNESIUM: CPT | Performed by: FAMILY MEDICINE

## 2017-02-28 PROCEDURE — 80202 ASSAY OF VANCOMYCIN: CPT | Performed by: FAMILY MEDICINE

## 2017-02-28 RX ADMIN — Medication 1 TABLET: at 08:32

## 2017-02-28 RX ADMIN — Medication: at 16:39

## 2017-02-28 RX ADMIN — LACTULOSE 30 G: 10 SOLUTION ORAL at 17:50

## 2017-02-28 RX ADMIN — TAZOBACTAM SODIUM AND PIPERACILLIN SODIUM 3.38 G: 375; 3 INJECTION, SOLUTION INTRAVENOUS at 16:39

## 2017-02-28 RX ADMIN — GABAPENTIN 100 MG: 100 CAPSULE ORAL at 16:38

## 2017-02-28 RX ADMIN — POTASSIUM CHLORIDE 10 MEQ: 10 INJECTION, SOLUTION INTRAVENOUS at 13:01

## 2017-02-28 RX ADMIN — OXYCODONE HYDROCHLORIDE AND ACETAMINOPHEN 1 TABLET: 10; 325 TABLET ORAL at 09:57

## 2017-02-28 RX ADMIN — POTASSIUM CHLORIDE 10 MEQ: 10 INJECTION, SOLUTION INTRAVENOUS at 11:45

## 2017-02-28 RX ADMIN — RIFAXIMIN 550 MG: 550 TABLET ORAL at 08:32

## 2017-02-28 RX ADMIN — POTASSIUM CHLORIDE 10 MEQ: 10 INJECTION, SOLUTION INTRAVENOUS at 09:57

## 2017-02-28 RX ADMIN — TAZOBACTAM SODIUM AND PIPERACILLIN SODIUM 3.38 G: 375; 3 INJECTION, SOLUTION INTRAVENOUS at 11:45

## 2017-02-28 RX ADMIN — Medication: at 08:32

## 2017-02-28 RX ADMIN — NYSTATIN: 100000 POWDER TOPICAL at 08:33

## 2017-02-28 RX ADMIN — OXYCODONE HYDROCHLORIDE AND ACETAMINOPHEN 1 TABLET: 10; 325 TABLET ORAL at 14:07

## 2017-02-28 RX ADMIN — OXYCODONE HYDROCHLORIDE AND ACETAMINOPHEN 1 TABLET: 10; 325 TABLET ORAL at 05:43

## 2017-02-28 RX ADMIN — GABAPENTIN 100 MG: 100 CAPSULE ORAL at 08:32

## 2017-02-28 RX ADMIN — POTASSIUM CHLORIDE 10 MEQ: 10 INJECTION, SOLUTION INTRAVENOUS at 14:07

## 2017-02-28 RX ADMIN — OXYCODONE HYDROCHLORIDE AND ACETAMINOPHEN 1 TABLET: 10; 325 TABLET ORAL at 22:39

## 2017-02-28 RX ADMIN — TAZOBACTAM SODIUM AND PIPERACILLIN SODIUM 3.38 G: 375; 3 INJECTION, SOLUTION INTRAVENOUS at 05:24

## 2017-02-28 RX ADMIN — NYSTATIN: 100000 POWDER TOPICAL at 20:23

## 2017-02-28 RX ADMIN — GABAPENTIN 100 MG: 100 CAPSULE ORAL at 20:22

## 2017-02-28 RX ADMIN — FUROSEMIDE 40 MG: 40 TABLET ORAL at 08:32

## 2017-02-28 RX ADMIN — Medication: at 20:22

## 2017-02-28 RX ADMIN — RIFAXIMIN 550 MG: 550 TABLET ORAL at 17:50

## 2017-02-28 RX ADMIN — TAZOBACTAM SODIUM AND PIPERACILLIN SODIUM 3.38 G: 375; 3 INJECTION, SOLUTION INTRAVENOUS at 23:02

## 2017-02-28 RX ADMIN — OXYCODONE HYDROCHLORIDE AND ACETAMINOPHEN 1 TABLET: 10; 325 TABLET ORAL at 18:25

## 2017-03-01 LAB
ALBUMIN SERPL-MCNC: 1.9 G/DL (ref 3.4–4.8)
ALBUMIN/GLOB SERPL: 0.6 G/DL (ref 1.1–1.8)
ALP SERPL-CCNC: 70 U/L (ref 38–126)
ALT SERPL W P-5'-P-CCNC: 24 U/L (ref 21–72)
ANION GAP SERPL CALCULATED.3IONS-SCNC: 1 MMOL/L (ref 5–15)
AST SERPL-CCNC: 39 U/L (ref 17–59)
BASOPHILS # BLD AUTO: 0.2 10*3/MM3 (ref 0–0.2)
BASOPHILS NFR BLD AUTO: 3.1 % (ref 0–2)
BILIRUB SERPL-MCNC: 2.5 MG/DL (ref 0.2–1.3)
BUN BLD-MCNC: 7 MG/DL (ref 7–21)
BUN/CREAT SERPL: 5.2 (ref 7–25)
CALCIUM SPEC-SCNC: 8.1 MG/DL (ref 8.4–10.2)
CHLORIDE SERPL-SCNC: 105 MMOL/L (ref 95–110)
CO2 SERPL-SCNC: 28 MMOL/L (ref 22–31)
CREAT BLD-MCNC: 1.35 MG/DL (ref 0.7–1.3)
DEPRECATED RDW RBC AUTO: 59.7 FL (ref 35.1–43.9)
EOSINOPHIL # BLD AUTO: 0.65 10*3/MM3 (ref 0–0.7)
EOSINOPHIL NFR BLD AUTO: 10 % (ref 0–7)
ERYTHROCYTE [DISTWIDTH] IN BLOOD BY AUTOMATED COUNT: 16.2 % (ref 11.5–14.5)
GFR SERPL CREATININE-BSD FRML MDRD: 55 ML/MIN/1.73 (ref 60–130)
GLOBULIN UR ELPH-MCNC: 3.3 GM/DL (ref 2.3–3.5)
GLUCOSE BLD-MCNC: 75 MG/DL (ref 60–100)
HCT VFR BLD AUTO: 27.5 % (ref 39–49)
HGB BLD-MCNC: 9.5 G/DL (ref 13.7–17.3)
IMM GRANULOCYTES # BLD: 0.01 10*3/MM3 (ref 0–0.02)
IMM GRANULOCYTES NFR BLD: 0.2 % (ref 0–0.5)
LYMPHOCYTES # BLD AUTO: 2.28 10*3/MM3 (ref 0.6–4.2)
LYMPHOCYTES NFR BLD AUTO: 34.9 % (ref 10–50)
MAGNESIUM SERPL-MCNC: 2.4 MG/DL (ref 1.6–2.3)
MCH RBC QN AUTO: 35.1 PG (ref 26.5–34)
MCHC RBC AUTO-ENTMCNC: 34.5 G/DL (ref 31.5–36.3)
MCV RBC AUTO: 101.5 FL (ref 80–98)
MONOCYTES # BLD AUTO: 0.73 10*3/MM3 (ref 0–0.9)
MONOCYTES NFR BLD AUTO: 11.2 % (ref 0–12)
NEUTROPHILS # BLD AUTO: 2.66 10*3/MM3 (ref 2–8.6)
NEUTROPHILS NFR BLD AUTO: 40.6 % (ref 37–80)
PLATELET # BLD AUTO: 72 10*3/MM3 (ref 150–450)
PMV BLD AUTO: 9.8 FL (ref 8–12)
POTASSIUM BLD-SCNC: 3.4 MMOL/L (ref 3.5–5.1)
PROT SERPL-MCNC: 5.2 G/DL (ref 6.3–8.6)
RBC # BLD AUTO: 2.71 10*6/MM3 (ref 4.37–5.74)
SODIUM BLD-SCNC: 134 MMOL/L (ref 137–145)
VANCOMYCIN SERPL-MCNC: 18.39 MCG/ML
WBC NRBC COR # BLD: 6.53 10*3/MM3 (ref 3.2–9.8)

## 2017-03-01 PROCEDURE — G8979 MOBILITY GOAL STATUS: HCPCS | Performed by: PHYSICAL THERAPIST

## 2017-03-01 PROCEDURE — 83735 ASSAY OF MAGNESIUM: CPT | Performed by: FAMILY MEDICINE

## 2017-03-01 PROCEDURE — 97162 PT EVAL MOD COMPLEX 30 MIN: CPT | Performed by: PHYSICAL THERAPIST

## 2017-03-01 PROCEDURE — 25010000002 VANCOMYCIN PER 500 MG: Performed by: FAMILY MEDICINE

## 2017-03-01 PROCEDURE — G8978 MOBILITY CURRENT STATUS: HCPCS | Performed by: PHYSICAL THERAPIST

## 2017-03-01 PROCEDURE — 85025 COMPLETE CBC W/AUTO DIFF WBC: CPT | Performed by: INTERNAL MEDICINE

## 2017-03-01 PROCEDURE — 25010000002 PIPERACILLIN SOD-TAZOBACTAM PER 1 G: Performed by: FAMILY MEDICINE

## 2017-03-01 PROCEDURE — 80202 ASSAY OF VANCOMYCIN: CPT | Performed by: FAMILY MEDICINE

## 2017-03-01 PROCEDURE — 80053 COMPREHEN METABOLIC PANEL: CPT | Performed by: INTERNAL MEDICINE

## 2017-03-01 PROCEDURE — 99024 POSTOP FOLLOW-UP VISIT: CPT | Performed by: ORTHOPAEDIC SURGERY

## 2017-03-01 RX ORDER — POTASSIUM CHLORIDE 20 MEQ/1
20 TABLET, EXTENDED RELEASE ORAL 2 TIMES DAILY WITH MEALS
Status: DISCONTINUED | OUTPATIENT
Start: 2017-03-01 | End: 2017-03-02

## 2017-03-01 RX ORDER — FUROSEMIDE 40 MG/1
40 TABLET ORAL 2 TIMES DAILY
Status: DISCONTINUED | OUTPATIENT
Start: 2017-03-01 | End: 2017-03-09 | Stop reason: HOSPADM

## 2017-03-01 RX ADMIN — VANCOMYCIN HYDROCHLORIDE 1750 MG: 1 INJECTION, POWDER, LYOPHILIZED, FOR SOLUTION INTRAVENOUS at 12:53

## 2017-03-01 RX ADMIN — VANCOMYCIN HYDROCHLORIDE 250 MG: 1 INJECTION, POWDER, LYOPHILIZED, FOR SOLUTION INTRAVENOUS at 11:17

## 2017-03-01 RX ADMIN — OXYCODONE HYDROCHLORIDE AND ACETAMINOPHEN 1 TABLET: 10; 325 TABLET ORAL at 07:08

## 2017-03-01 RX ADMIN — OXYCODONE HYDROCHLORIDE AND ACETAMINOPHEN 1 TABLET: 10; 325 TABLET ORAL at 20:06

## 2017-03-01 RX ADMIN — RIFAXIMIN 550 MG: 550 TABLET ORAL at 17:07

## 2017-03-01 RX ADMIN — ERGOCALCIFEROL 50000 UNITS: 1.25 CAPSULE ORAL at 09:14

## 2017-03-01 RX ADMIN — FUROSEMIDE 40 MG: 40 TABLET ORAL at 17:07

## 2017-03-01 RX ADMIN — NYSTATIN: 100000 POWDER TOPICAL at 09:13

## 2017-03-01 RX ADMIN — GABAPENTIN 100 MG: 100 CAPSULE ORAL at 16:05

## 2017-03-01 RX ADMIN — Medication: at 23:05

## 2017-03-01 RX ADMIN — TAZOBACTAM SODIUM AND PIPERACILLIN SODIUM 3.38 G: 375; 3 INJECTION, SOLUTION INTRAVENOUS at 05:59

## 2017-03-01 RX ADMIN — TAZOBACTAM SODIUM AND PIPERACILLIN SODIUM 3.38 G: 375; 3 INJECTION, SOLUTION INTRAVENOUS at 11:17

## 2017-03-01 RX ADMIN — POTASSIUM CHLORIDE 20 MEQ: 20 TABLET, EXTENDED RELEASE ORAL at 09:14

## 2017-03-01 RX ADMIN — OXYCODONE HYDROCHLORIDE AND ACETAMINOPHEN 1 TABLET: 10; 325 TABLET ORAL at 02:27

## 2017-03-01 RX ADMIN — TAZOBACTAM SODIUM AND PIPERACILLIN SODIUM 3.38 G: 375; 3 INJECTION, SOLUTION INTRAVENOUS at 23:04

## 2017-03-01 RX ADMIN — GABAPENTIN 100 MG: 100 CAPSULE ORAL at 09:14

## 2017-03-01 RX ADMIN — RIFAXIMIN 550 MG: 550 TABLET ORAL at 09:14

## 2017-03-01 RX ADMIN — FUROSEMIDE 40 MG: 40 TABLET ORAL at 09:14

## 2017-03-01 RX ADMIN — NYSTATIN: 100000 POWDER TOPICAL at 23:05

## 2017-03-01 RX ADMIN — OXYCODONE HYDROCHLORIDE AND ACETAMINOPHEN 1 TABLET: 10; 325 TABLET ORAL at 11:17

## 2017-03-01 RX ADMIN — TAZOBACTAM SODIUM AND PIPERACILLIN SODIUM 3.38 G: 375; 3 INJECTION, SOLUTION INTRAVENOUS at 17:10

## 2017-03-01 RX ADMIN — GABAPENTIN 100 MG: 100 CAPSULE ORAL at 20:06

## 2017-03-01 RX ADMIN — Medication 1 TABLET: at 09:14

## 2017-03-01 RX ADMIN — POTASSIUM CHLORIDE 20 MEQ: 20 TABLET, EXTENDED RELEASE ORAL at 17:07

## 2017-03-01 RX ADMIN — OXYCODONE HYDROCHLORIDE AND ACETAMINOPHEN 1 TABLET: 10; 325 TABLET ORAL at 16:05

## 2017-03-01 RX ADMIN — MAGNESIUM SULFATE HEPTAHYDRATE 4 G: 40 INJECTION, SOLUTION INTRAVENOUS at 01:04

## 2017-03-01 RX ADMIN — Medication: at 09:14

## 2017-03-01 RX ADMIN — Medication: at 17:07

## 2017-03-01 NOTE — PLAN OF CARE
Problem: Patient Care Overview (Adult)  Goal: Plan of Care Review  Outcome: Ongoing (interventions implemented as appropriate)    03/01/17 1330   Outcome Evaluation   Outcome Summary/Follow up Plan PT evaluation completed. Pt demonstrates an overall decrease in functional strength, limiting functional mobility & transfers at this time. Pt transferred stand pivot from bed<>bsc with SBA with no AD. Gait not attempted this date due to pt request. Pt will benefit from skilled PT services in order to improve strength, safety, overall functional mobility/transfers. Pt may benefit from SNF placement at time of discharge.   Coping/Psychosocial Response Interventions   Plan Of Care Reviewed With patient       Goal: Discharge Needs Assessment    03/01/17 1330 03/01/17 1656   Discharge Needs Assessment   Discharge Facility/Level Of Care Needs --  LTACH (long term acute care hospital);nursing facility, skilled   Self-Care   Equipment Currently Used at Home crutches;hospital bed;prosthesis;commode;shower chair;wheelchair;walker, rolling  (Pt mostly uses crutches for ambulation) --          Problem: Inpatient Physical Therapy  Goal: Transfer Training Goal 1 LTG- PT  Outcome: Ongoing (interventions implemented as appropriate)    03/01/17 1330   Transfer Training PT LTG   Transfer Training PT LTG, Date Established 03/01/17   Transfer Training PT LTG, Time to Achieve by discharge   Transfer Training PT LTG, Activity Type bed to chair /chair to bed;sit to stand/stand to sit;toilet   Transfer Training PT LTG, [place] Level conditional independence   Transfer Training PT LTG, Assist Device (AAD)       Goal: Gait Training Goal STG- PT  Outcome: Ongoing (interventions implemented as appropriate)    03/01/17 1330   Gait Training PT STG   Gait Training Goal PT STG, Date Established 03/01/17   Gait Training Goal PT STG, Time to Achieve 3 days   Gait Training Goal PT STG, [place] Level moderate assist (50% patient effort);minimum  assist (75% patient effort)   Gait Training Goal PT STG, Assist Device (AAD)   Gait Training Goal PT STG, Distance to Achieve 5 feet       Goal: Gait Training Goal LTG- PT  Outcome: Ongoing (interventions implemented as appropriate)    03/01/17 1330   Gait Training PT LTG   Gait Training Goal PT LTG, Date Established 03/01/17   Gait Training Goal PT LTG, Time to Achieve by discharge   Gait Training Goal PT LTG, [place] Level contact guard assist   Gait Training Goal PT LTG, Assist Device (AAD)   Gait Training Goal PT LTG, Distance to Achieve 20 feet       Goal: Wheelchair Propulsion Goal LTG- PT  Outcome: Ongoing (interventions implemented as appropriate)    03/01/17 1330   Wheelchair Propulsion PT LTG   Wheelchair Propulsion Goal PT LTG, Date Established 03/01/17   Wheelchair Propulsion Goal PT LTG, Time to Achieve by discharge   Wheelchair Propulsion Goal PT LTG, [place] Level conditional independence   Wheelchair Propulsion Goal PT LTG, Distance to Achieve 150 feet

## 2017-03-01 NOTE — PLAN OF CARE
Problem: Patient Care Overview (Adult)  Goal: Plan of Care Review  Outcome: Ongoing (interventions implemented as appropriate)    03/01/17 3742   Patient Care Overview   Progress no change   Coping/Psychosocial Response Interventions   Plan Of Care Reviewed With patient       Goal: Adult Individualization and Mutuality  Outcome: Ongoing (interventions implemented as appropriate)  Goal: Discharge Needs Assessment  Outcome: Ongoing (interventions implemented as appropriate)    Problem: Fall Risk (Adult)  Goal: Absence of Falls  Outcome: Ongoing (interventions implemented as appropriate)    Problem: Pressure Ulcer (Adult)  Goal: Signs and Symptoms of Listed Potential Problems Will be Absent or Manageable (Pressure Ulcer)  Outcome: Ongoing (interventions implemented as appropriate)

## 2017-03-01 NOTE — PLAN OF CARE
Problem: Patient Care Overview (Adult)  Goal: Plan of Care Review  Outcome: Ongoing (interventions implemented as appropriate)    03/01/17 0457   Patient Care Overview   Progress no change   Coping/Psychosocial Response Interventions   Plan Of Care Reviewed With patient       Goal: Adult Individualization and Mutuality  Outcome: Ongoing (interventions implemented as appropriate)  Goal: Discharge Needs Assessment  Outcome: Ongoing (interventions implemented as appropriate)    Problem: Fall Risk (Adult)  Goal: Absence of Falls  Outcome: Ongoing (interventions implemented as appropriate)    Problem: Pressure Ulcer (Adult)  Goal: Signs and Symptoms of Listed Potential Problems Will be Absent or Manageable (Pressure Ulcer)  Outcome: Ongoing (interventions implemented as appropriate)

## 2017-03-01 NOTE — PROGRESS NOTES
Acute Care - Physical Therapy Initial Evaluation  [facility]     Patient Name: Armando Mcmullen  : 1965  MRN: [id number]  Today's Date: 3/1/2017   Onset of Illness/Injury or Date of Surgery Date: 17 (s/p I&D R shoulder on 17)  Date of Referral to PT: 17  Referring Physician: Dr. Aguero      Admit Date: 2017     Visit Dx:    ICD-10-CM ICD-9-CM   1. Hyperammonemia E72.20 270.6   2. Urinary tract infection, site unspecified N39.0    3. Functional diarrhea K59.1 564.5   4. Impaired functional mobility, balance, and endurance Z74.09 V49.89     Patient Active Problem List   Diagnosis   • Anxiety state   • Back pain   • Furuncle of buttock   • Chronic hepatitis   • Hepatic cirrhosis   • Depression   • Hypersplenism   • Insomnia   • Mononeuritis   • Osteoarthritis   • Essential hypertension   • Closed fracture of proximal end of right humerus with delayed healing   • Closed nondisplaced supracondylar fracture of distal end of femur without intracondylar extension with delayed healing   • History of substance abuse   • Chronic osteomyelitis of right shoulder region   • Acute cystitis without hematuria     Past Medical History   Diagnosis Date   • Anxiety state 2008   • Back pain 2008   • Chronic hepatitis 2009   • Depression 2008   • Essential hypertension 2008   • Furuncle of buttock 2009   • Hepatic cirrhosis 2009   • Hypersplenism 2010   • Insomnia 2008   • Mononeuritis 2009   • Osteoarthritis 2008     Past Surgical History   Procedure Laterality Date   • Shoulder surgery     • Hip surgery     • Leg amputation     • Wrist surgery     • Incision and drainage leg Right 2017     Procedure: INCISION AND DRAINAGE RIGHT SHOULDER;  Surgeon: Shawn Cortez MD;  Location: [facility] OR;  Service:           PT ASSESSMENT (last 72 hours)      PT Evaluation       17 1330 17 1539    Rehab Evaluation    Document Type evaluation  -KG      Subjective Information agree to therapy;complains of;pain  -KG     Patient Effort, Rehab Treatment good  -KG     General Information    Patient Profile Review yes  -KG     Onset of Illness/Injury or Date of Surgery Date 02/20/17   s/p I&D R shoulder on 2/27/17  -KG     Referring Physician Dr. Aguero  -KG     General Observations Pt supine in bed with HOB elevated upon arrival; LLE BKA  -KG     Precautions/Limitations fall precautions  -KG     Prior Level of Function independent:;all household mobility;gait;transfer;ADL's   Son stays with pt at times; helps with IADL's  -KG     Equipment Currently Used at Home crutches;hospital bed;prosthesis;commode;shower chair;wheelchair;walker, rolling   Pt mostly uses crutches for ambulation  -KG     Plans/Goals Discussed With patient  -KG     Risks Reviewed patient:  -KG     Benefits Reviewed patient:;improve function;increase independence;increase strength  -KG     Barriers to Rehab --   Co-morbidities  -KG     Living Environment    Lives With child(wallace), adult   Son  -KG     Living Arrangements house  -KG     Home Accessibility no concerns;bed and bath on same level;ramps present at home  -KG     Stair Railings at Home none  -KG     Transportation Available  ambulance  -RA    Clinical Impression    Date of Referral to PT 03/01/17  -KG     PT Diagnosis s/p I&D R shoulder on 2/27/17; decreased functional strength/mobility  -KG     Prognosis Good  -KG     Functional Level At Time Of Evaluation Mod I bed mobility, SBA for transfers  -KG     Patient/Family Goals Statement Improve strength  -KG     Criteria for Skilled Therapeutic Interventions Met yes;treatment indicated  -KG     Pathology/Pathophysiology Noted (Describe Specifically for Each System) musculoskeletal  -KG     Impairments Found (describe specific impairments) aerobic capacity/endurance;gait, locomotion, and balance;muscle performance  -KG     Rehab Potential good, to achieve stated therapy goals  -KG      Predicted Duration of Therapy Intervention (days/wks) Until discharge or all therapy goals met  -KG     Vital Signs    Pretreatment Heart Rate (beats/min) 91  -KG     Posttreatment Heart Rate (beats/min) 93  -KG     Pre SpO2 (%) 97  -KG     O2 Delivery Pre Treatment room air  -KG     Post SpO2 (%) 96  -KG     O2 Delivery Post Treatment room air  -KG     Pre Patient Position Supine  -KG     Intra Patient Position Sitting  -KG     Post Patient Position Supine  -KG     Pain Assessment    Pain Assessment 0-10  -KG     Pain Score 5  -KG     Post Pain Score 5  -KG     Pain Type Acute pain;Surgical pain  -KG     Pain Location Shoulder  -KG     Vision Assessment/Intervention    Visual Impairment WFL  -KG     Cognitive Assessment/Intervention    Current Cognitive/Communication Assessment functional  -KG     Orientation Status oriented x 4  -KG     Follows Commands/Answers Questions 100% of the time;able to follow single-step instructions  -KG     Personal Safety mild impairment  -KG     Personal Safety Interventions gait belt;supervised activity;nonskid shoes/slippers when out of bed  -KG     ROM (Range of Motion)    General ROM Detail BLE AROM WFL; R shoulder elevation limited to ~20 deg  -KG     MMT (Manual Muscle Testing)    General MMT Assessment Detail Bilateral hip flexion 4+/5, [place] knee flex-ext grossy 3+/5, R ankle DF grossly 3+/5  -KG     Mobility Assessment/Training    Extremity Weight-Bearing Status --   WBAT  -KG     Bed Mobility, Assessment/Treatment    Bed Mobility, Assistive Device head of bed elevated;bed rails  -KG     Bed Mob, Supine to Sit, [place] independent  -KG     Bed Mob, Sit to Supine, [place] independent  -KG     Transfer Assessment/Treatment    Transfers, Bed-Chair [place] supervision required  -KG     Transfers, Chair-Bed [place] supervision required  -KG     Transfers, Bed-Chair-Bed, Assist Device --   No AD; stand pivot to BSC  -KG     Transfers, Sit-Stand [place]  supervision required  -KG     Transfers, Stand-Sit [place] supervision required  -KG     Transfers, Sit-Stand-Sit, Assist Device --   No AD; stand pivot to BSC  -KG     Transfer, Impairments strength decreased;impaired balance  -KG     Gait Assessment/Treatment    Gait, [place] Level not tested  -KG     Sensory Assessment/Intervention    Light Touch LLE;RLE  -KG     LLE Light Touch WNL  -KG     RLE Light Touch WNL  -KG     Edema Management    Edema Amount left:;right:;moderate   in BLE  -KG     Positioning and Restraints    Pre-Treatment Position in bed  -KG     Post Treatment Position bed  -KG     In Bed supine;call light within reach;exit alarm on  -KG       User Key  (r) = Recorded By, (t) = Taken By, (c) = Cosigned By    Initials Name Provider Type    KG Ailyn Nava, PT Physical Therapist    VERONICA Flores           Physical Therapy Education     Title: PT OT SLP Therapies (Active)     Topic: Physical Therapy (Active)     Point: Mobility training (Active)    Learning Progress Summary    Learner Readiness Method Response Comment Documented by Status   Patient Acceptance E NR  KG 03/01/17 1330 Active               Point: Home exercise program (Active)    Learning Progress Summary    Learner Readiness Method Response Comment Documented by Status   Patient Acceptance E,D,TB NR LAQ, heels slides, ankle pumps KG 03/01/17 1330 Active                      User Key     Initials Effective Dates Name Provider Type Discipline    KG 10/17/16 -  Ailyn Nava, PT Physical Therapist PT                PT Recommendation and Plan  Anticipated Discharge Disposition: skilled nursing facility  Planned Therapy Interventions: bed mobility training, gait training, home exercise program, strengthening, transfer training  PT Frequency: other (see comments) (5-14 times/wk)  Plan of Care Review  Plan Of Care Reviewed With: patient  Outcome Summary/Follow up Plan: PT evaluation completed. Pt demonstrates an  overall decrease in functional strength, limiting functional mobility & transfers at this time. Pt transferred stand pivot from bed<>bsc with SBA with no AD. Gait not attempted this date due to pt request. Pt will benefit from skilled PT services in order to improve strength, safety, overall functional mobility/transfers. Pt may benefit from SNF placement at time of discharge.          IP PT Goals       03/01/17 1330          Transfer Training PT LTG    Transfer Training PT LTG, Date Established 03/01/17  -KG      Transfer Training PT LTG, Time to Achieve by discharge  -KG      Transfer Training PT LTG, Activity Type bed to chair /chair to bed;sit to stand/stand to sit;toilet  -KG      Transfer Training PT LTG, [place] Level conditional independence  -KG      Transfer Training PT LTG, Assist Device --   AAD  -KG      Gait Training PT STG    Gait Training Goal PT STG, Date Established 03/01/17  -KG      Gait Training Goal PT STG, Time to Achieve 3 days  -KG      Gait Training Goal PT STG, [place] Level moderate assist (50% patient effort);minimum assist (75% patient effort)  -KG      Gait Training Goal PT STG, Assist Device --   AAD  -KG      Gait Training Goal PT STG, Distance to Achieve 5 feet  -KG      Gait Training PT LTG    Gait Training Goal PT LTG, Date Established 03/01/17  -KG      Gait Training Goal PT LTG, Time to Achieve by discharge  -KG      Gait Training Goal PT LTG, [place] Level contact guard assist  -KG      Gait Training Goal PT LTG, Assist Device --   AAD  -KG      Gait Training Goal PT LTG, Distance to Achieve 20 feet  -KG      Wheelchair Propulsion PT LTG    Wheelchair Propulsion Goal PT LTG, Date Established 03/01/17  -KG      Wheelchair Propulsion Goal PT LTG, Time to Achieve by discharge  -KG      Wheelchair Propulsion Goal PT LTG, [place] Level conditional independence  -KG      Wheelchair Propulsion Goal PT LTG, Distance to Achieve 150 feet  -KG        User Key  (r) =  Recorded By, (t) = Taken By, (c) = Cosigned By    Initials Name Provider Type    THELMA Nava PT Physical Therapist                Outcome Measures       03/01/17 1330          How much help from another person do you currently need...    Turning from your back to your side while in flat bed without using bedrails? 4  -KG      Moving from lying on back to sitting on the side of a flat bed without bedrails? 4  -KG      Moving to and from a bed to a chair (including a wheelchair)? 3  -KG      Standing up from a chair using your arms (e.g., wheelchair, bedside chair)? 3  -KG      Climbing 3-5 steps with a railing? 1  -KG      To walk in hospital room? 2  -KG      AM-PAC 6 Clicks Score 17  -KG      Functional Assessment    Outcome Measure Options AM-PAC 6 Clicks Basic Mobility (PT)  -KG        User Key  (r) = Recorded By, (t) = Taken By, (c) = Cosigned By    Initials Name Provider Type    THELMA Nava PT Physical Therapist           Time Calculation:         PT Charges       03/01/17 1330          Time Calculation    Start Time 1330  -KG      Stop Time 1354  -KG      Time Calculation (min) 24 min  -KG      PT Received On 03/01/17  -KG      PT Goal Re-Cert Due Date 03/14/17  -KG      Time Calculation- PT    Total Timed Code Minutes- PT 0 minute(s)  -KG        User Key  (r) = Recorded By, (t) = Taken By, (c) = Cosigned By    Initials Name Provider Type    THELMA Nava PT Physical Therapist          Therapy Charges for Today     Code Description Service Date Service Provider Modifiers Qty    08459892077 HC PT MOBILITY CURRENT 3/1/2017 Ailyn Nava, PT GP, CK 1    42434854886 HC PT MOBILITY PROJECTED 3/1/2017 Ailyn Nava, PT GP, CI 1    38192950320 HC PT EVAL MOD COMPLEXITY 2 3/1/2017 Ailyn Nava, PT  1          PT G-Codes  PT Professional Judgement Used?: Yes  Outcome Measure Options: AM-PAC 6 Clicks Basic Mobility (PT)  Score: 17  Functional Limitation: Mobility: Walking and moving  around  Mobility: Walking and Moving Around Current Status (): At least 40 percent but less than 60 percent impaired, limited or restricted  Mobility: Walking and Moving Around Goal Status (): At least 1 percent but less than 20 percent impaired, limited or restricted      Ailyn Nava, PT  3/1/2017

## 2017-03-01 NOTE — PLAN OF CARE
Problem: Patient Care Overview (Adult)  Goal: Plan of Care Review  Outcome: Ongoing (interventions implemented as appropriate)    02/28/17 1805   Patient Care Overview   Progress no change   Outcome Evaluation   Outcome Summary/Follow up Plan patient feels he is swelling more and thats what is causing his pain    Coping/Psychosocial Response Interventions   Plan Of Care Reviewed With patient       Goal: Adult Individualization and Mutuality  Outcome: Ongoing (interventions implemented as appropriate)  Goal: Discharge Needs Assessment  Outcome: Ongoing (interventions implemented as appropriate)    Problem: Fall Risk (Adult)  Goal: Absence of Falls  Outcome: Ongoing (interventions implemented as appropriate)    Problem: Pressure Ulcer (Adult)  Goal: Signs and Symptoms of Listed Potential Problems Will be Absent or Manageable (Pressure Ulcer)  Outcome: Ongoing (interventions implemented as appropriate)

## 2017-03-01 NOTE — PROGRESS NOTES
POD #1 co of pain as expected minimal drainage.03/01/17 at 11:28 AM by Shawn Cortez MD  Treatment plans discussed with dr hammond yesterday.03/01/17 at 11:29 AM by Shawn Cortez MD

## 2017-03-01 NOTE — PROGRESS NOTES
[facility] progress note    HPI: Patient is a 51-year-old male who is status post incision for drainage of a abscess.   Patient states that his arm is having significant swelling as well as bilateral lower extremities.  He states the increased dose of Lasix did not help enough to go ahead and increase this slightly further also start this patient on some oral potassium replacement to see if this will help.  The following portions of the patient's history were reviewed and updated as appropriate: allergies, current medications, past family history, past medical history, past social history, past surgical history and problem list.    Review Of Systems:  Review of Systems   Constitution: Positive for weakness. Negative for decreased appetite, fever and malaise/fatigue.   HENT: Negative for congestion, ear discharge, ear pain, headaches, hearing loss, hoarse voice, nosebleeds and sore throat.    Eyes: Negative for blurred vision, discharge, double vision, pain, photophobia, visual disturbance and visual halos.   Cardiovascular: Negative for chest pain, claudication, dyspnea on exertion, leg swelling, near-syncope, palpitations and paroxysmal nocturnal dyspnea.   Respiratory: Negative for cough, hemoptysis, shortness of breath, snoring, sputum production and wheezing.    Endocrine: Negative for cold intolerance, heat intolerance, polydipsia, polyphagia and polyuria.   Skin: Negative for color change, flushing, itching, nail changes and rash.   Musculoskeletal: Positive for arthritis, back pain, joint pain, muscle weakness and stiffness. Negative for joint swelling, muscle cramps and neck pain.   Gastrointestinal: Negative for bloating, abdominal pain, anorexia, change in bowel habit, bowel incontinence, constipation, diarrhea, dysphagia, heartburn, hematochezia, nausea and vomiting.   Genitourinary: Negative for flank pain, frequency, hematuria, hesitancy, nocturia and urgency.   Neurological: Negative for  dizziness, focal weakness, loss of balance, numbness, paresthesias and sensory change.   Psychiatric/Behavioral: Negative for altered mental status, depression, memory loss, substance abuse and thoughts of violence. The patient does not have insomnia and is not nervous/anxious.    Allergic/Immunologic: Negative for environmental allergies, HIV exposure, hives and persistent infections.       LABS:   Recent Results (from the past 24 hour(s))   Potassium    Collection Time: 02/28/17  6:48 PM   Result Value Ref Range    Potassium 3.4 (L) 3.5 - 5.1 mmol/L   Magnesium    Collection Time: 02/28/17  6:48 PM   Result Value Ref Range    Magnesium 1.7 1.6 - 2.3 mg/dL   Comprehensive Metabolic Panel    Collection Time: 03/01/17  6:42 AM   Result Value Ref Range    Glucose 75 60 - 100 mg/dL    BUN 7 7 - 21 mg/dL    Creatinine 1.35 (H) 0.70 - 1.30 mg/dL    Sodium 134 (L) 137 - 145 mmol/L    Potassium 3.4 (L) 3.5 - 5.1 mmol/L    Chloride 105 95 - 110 mmol/L    CO2 28.0 22.0 - 31.0 mmol/L    Calcium 8.1 (L) 8.4 - 10.2 mg/dL    Total Protein 5.2 (L) 6.3 - 8.6 g/dL    Albumin 1.90 (L) 3.40 - 4.80 g/dL    ALT (SGPT) 24 21 - 72 U/L    AST (SGOT) 39 17 - 59 U/L    Alkaline Phosphatase 70 38 - 126 U/L    Total Bilirubin 2.5 (H) 0.2 - 1.3 mg/dL    eGFR Non African Amer 55 (L) >60 mL/min/1.73    Globulin 3.3 2.3 - 3.5 gm/dL    A/G Ratio 0.6 (L) 1.1 - 1.8 g/dL    BUN/Creatinine Ratio 5.2 (L) 7.0 - 25.0    Anion Gap 1.0 (L) 5.0 - 15.0 mmol/L   Vancomycin, Random    Collection Time: 03/01/17  6:42 AM   Result Value Ref Range    Vancomycin Random 18.39 mcg/mL   CBC Auto Differential    Collection Time: 03/01/17  6:42 AM   Result Value Ref Range    WBC 6.53 3.20 - 9.80 10*3/mm3    RBC 2.71 (L) 4.37 - 5.74 10*6/mm3    Hemoglobin 9.5 (L) 13.7 - 17.3 g/dL    Hematocrit 27.5 (L) 39.0 - 49.0 %    .5 (H) 80.0 - 98.0 fL    MCH 35.1 (H) 26.5 - 34.0 pg    MCHC 34.5 31.5 - 36.3 g/dL    RDW 16.2 (H) 11.5 - 14.5 %    RDW-SD 59.7 (H) 35.1 - 43.9 fl     MPV 9.8 8.0 - 12.0 fL    Platelets 72 (L) 150 - 450 10*3/mm3    Neutrophil % 40.6 37.0 - 80.0 %    Lymphocyte % 34.9 10.0 - 50.0 %    Monocyte % 11.2 0.0 - 12.0 %    Eosinophil % 10.0 (H) 0.0 - 7.0 %    Basophil % 3.1 (H) 0.0 - 2.0 %    Immature Grans % 0.2 0.0 - 0.5 %    Neutrophils, Absolute 2.66 2.00 - 8.60 10*3/mm3    Lymphocytes, Absolute 2.28 0.60 - 4.20 10*3/mm3    Monocytes, Absolute 0.73 0.00 - 0.90 10*3/mm3    Eosinophils, Absolute 0.65 0.00 - 0.70 10*3/mm3    Basophils, Absolute 0.20 0.00 - 0.20 10*3/mm3    Immature Grans, Absolute 0.01 0.00 - 0.02 10*3/mm3   Magnesium    Collection Time: 03/01/17  6:42 AM   Result Value Ref Range    Magnesium 2.4 (H) 1.6 - 2.3 mg/dL   ]  Physical Exam:  Vitals  Vitals:    03/01/17 0015   BP: 133/80   Pulse: 76   Resp: 20   Temp: 98.6 °F (37 °C)   SpO2: 96%       Exam:  Physical Exam   Constitutional: He is oriented to person, place, and time. He appears well-developed and well-nourished. No distress.   HENT:   Head: Normocephalic and atraumatic.   Right Ear: External ear normal.   Left Ear: External ear normal.   Nose: Nose normal.   Mouth/Throat: Oropharynx is clear and moist.   Eyes: Conjunctivae and EOM are normal. Pupils are equal, round, and reactive to light. Right eye exhibits no discharge. Left eye exhibits no discharge. No scleral icterus.   Neck: No JVD present. No thyromegaly present.   Cardiovascular: Normal rate, regular rhythm, normal heart sounds and intact distal pulses.  Exam reveals no gallop and no friction rub.    No murmur heard.  Pulmonary/Chest: Effort normal. No stridor. No respiratory distress. He has no wheezes. He has no rales.   Abdominal: Soft. Bowel sounds are normal. He exhibits no distension and no mass. There is no tenderness. No hernia.   Musculoskeletal: Normal range of motion. He exhibits no edema, tenderness or deformity.   Lymphadenopathy:     He has no cervical adenopathy.   Neurological: He is alert and oriented to person,  place, and time. He has normal reflexes. He displays normal reflexes. No cranial nerve deficit. He exhibits normal muscle tone. Coordination normal.   Skin: Skin is warm and dry. No rash noted. He is not diaphoretic. No erythema.    Open wound on right arm   Psychiatric: He has a normal mood and affect. His behavior is normal. Judgment and thought content normal.       Assessment:  Active Hospital Problems (** Indicates Principal Problem)    Diagnosis Date Noted   • Acute cystitis without hematuria [N30.00] 02/28/2017   • Closed fracture of proximal end of right humerus with delayed healing [S42.201G] 01/20/2017   • Closed nondisplaced supracondylar fracture of distal end of femur without intracondylar extension with delayed healing [S72.456G] 01/20/2017   • Chronic hepatitis [K73.9] 06/18/2009   • Hepatic cirrhosis [K74.60] 05/26/2009   • Essential hypertension [I10] 12/01/2008      Resolved Hospital Problems    Diagnosis Date Noted Date Resolved   • Hyperammonemia [E72.20] 02/20/2017 02/24/2017       Plan:     Patient currently on Zosyn for urinary tract infection, we'll increase this patient's Lasix and added potassium supplementation and awaiting long-term placement for this patient.

## 2017-03-02 PROBLEM — E87.6 HYPOKALEMIA: Status: ACTIVE | Noted: 2017-03-02

## 2017-03-02 LAB
ALBUMIN SERPL-MCNC: 2.1 G/DL (ref 3.4–4.8)
ALBUMIN/GLOB SERPL: 0.7 G/DL (ref 1.1–1.8)
ALP SERPL-CCNC: 76 U/L (ref 38–126)
ALT SERPL W P-5'-P-CCNC: 30 U/L (ref 21–72)
ANION GAP SERPL CALCULATED.3IONS-SCNC: 3 MMOL/L (ref 5–15)
AST SERPL-CCNC: 37 U/L (ref 17–59)
BASOPHILS # BLD AUTO: 0.2 10*3/MM3 (ref 0–0.2)
BASOPHILS NFR BLD AUTO: 2.8 % (ref 0–2)
BILIRUB SERPL-MCNC: 2.2 MG/DL (ref 0.2–1.3)
BUN BLD-MCNC: 9 MG/DL (ref 7–21)
BUN/CREAT SERPL: 6.3 (ref 7–25)
CALCIUM SPEC-SCNC: 8 MG/DL (ref 8.4–10.2)
CHLORIDE SERPL-SCNC: 104 MMOL/L (ref 95–110)
CO2 SERPL-SCNC: 29 MMOL/L (ref 22–31)
CREAT BLD-MCNC: 1.44 MG/DL (ref 0.7–1.3)
DEPRECATED RDW RBC AUTO: 60.4 FL (ref 35.1–43.9)
EOSINOPHIL # BLD AUTO: 0.59 10*3/MM3 (ref 0–0.7)
EOSINOPHIL NFR BLD AUTO: 8.3 % (ref 0–7)
ERYTHROCYTE [DISTWIDTH] IN BLOOD BY AUTOMATED COUNT: 16.3 % (ref 11.5–14.5)
GFR SERPL CREATININE-BSD FRML MDRD: 51 ML/MIN/1.73 (ref 60–130)
GLOBULIN UR ELPH-MCNC: 3.2 GM/DL (ref 2.3–3.5)
GLUCOSE BLD-MCNC: 70 MG/DL (ref 60–100)
HCT VFR BLD AUTO: 28.8 % (ref 39–49)
HGB BLD-MCNC: 9.9 G/DL (ref 13.7–17.3)
IMM GRANULOCYTES # BLD: 0.02 10*3/MM3 (ref 0–0.02)
IMM GRANULOCYTES NFR BLD: 0.3 % (ref 0–0.5)
LYMPHOCYTES # BLD AUTO: 2.57 10*3/MM3 (ref 0.6–4.2)
LYMPHOCYTES NFR BLD AUTO: 36.1 % (ref 10–50)
MCH RBC QN AUTO: 35.1 PG (ref 26.5–34)
MCHC RBC AUTO-ENTMCNC: 34.4 G/DL (ref 31.5–36.3)
MCV RBC AUTO: 102.1 FL (ref 80–98)
MONOCYTES # BLD AUTO: 0.87 10*3/MM3 (ref 0–0.9)
MONOCYTES NFR BLD AUTO: 12.2 % (ref 0–12)
NEUTROPHILS # BLD AUTO: 2.86 10*3/MM3 (ref 2–8.6)
NEUTROPHILS NFR BLD AUTO: 40.3 % (ref 37–80)
PLATELET # BLD AUTO: 81 10*3/MM3 (ref 150–450)
PMV BLD AUTO: 10.3 FL (ref 8–12)
POTASSIUM BLD-SCNC: 3.3 MMOL/L (ref 3.5–5.1)
PROT SERPL-MCNC: 5.3 G/DL (ref 6.3–8.6)
RBC # BLD AUTO: 2.82 10*6/MM3 (ref 4.37–5.74)
SODIUM BLD-SCNC: 136 MMOL/L (ref 137–145)
WBC NRBC COR # BLD: 7.11 10*3/MM3 (ref 3.2–9.8)

## 2017-03-02 PROCEDURE — 97110 THERAPEUTIC EXERCISES: CPT

## 2017-03-02 PROCEDURE — 25010000002 VANCOMYCIN PER 500 MG: Performed by: FAMILY MEDICINE

## 2017-03-02 PROCEDURE — 25010000002 PIPERACILLIN SOD-TAZOBACTAM PER 1 G: Performed by: FAMILY MEDICINE

## 2017-03-02 PROCEDURE — 85025 COMPLETE CBC W/AUTO DIFF WBC: CPT | Performed by: INTERNAL MEDICINE

## 2017-03-02 PROCEDURE — 97530 THERAPEUTIC ACTIVITIES: CPT

## 2017-03-02 PROCEDURE — 80053 COMPREHEN METABOLIC PANEL: CPT | Performed by: INTERNAL MEDICINE

## 2017-03-02 RX ORDER — POTASSIUM CHLORIDE 20 MEQ/1
20 TABLET, EXTENDED RELEASE ORAL
Status: DISCONTINUED | OUTPATIENT
Start: 2017-03-02 | End: 2017-03-03

## 2017-03-02 RX ADMIN — OXYCODONE HYDROCHLORIDE AND ACETAMINOPHEN 1 TABLET: 10; 325 TABLET ORAL at 05:24

## 2017-03-02 RX ADMIN — TAZOBACTAM SODIUM AND PIPERACILLIN SODIUM 3.38 G: 375; 3 INJECTION, SOLUTION INTRAVENOUS at 17:54

## 2017-03-02 RX ADMIN — NYSTATIN: 100000 POWDER TOPICAL at 21:53

## 2017-03-02 RX ADMIN — FUROSEMIDE 40 MG: 40 TABLET ORAL at 17:53

## 2017-03-02 RX ADMIN — RIFAXIMIN 550 MG: 550 TABLET ORAL at 17:53

## 2017-03-02 RX ADMIN — GABAPENTIN 100 MG: 100 CAPSULE ORAL at 10:48

## 2017-03-02 RX ADMIN — OXYCODONE HYDROCHLORIDE AND ACETAMINOPHEN 1 TABLET: 10; 325 TABLET ORAL at 21:55

## 2017-03-02 RX ADMIN — VANCOMYCIN HYDROCHLORIDE 250 MG: 1 INJECTION, POWDER, LYOPHILIZED, FOR SOLUTION INTRAVENOUS at 17:53

## 2017-03-02 RX ADMIN — OXYCODONE HYDROCHLORIDE AND ACETAMINOPHEN 1 TABLET: 10; 325 TABLET ORAL at 09:01

## 2017-03-02 RX ADMIN — GABAPENTIN 100 MG: 100 CAPSULE ORAL at 18:49

## 2017-03-02 RX ADMIN — VANCOMYCIN HYDROCHLORIDE 250 MG: 1 INJECTION, POWDER, LYOPHILIZED, FOR SOLUTION INTRAVENOUS at 01:16

## 2017-03-02 RX ADMIN — FUROSEMIDE 40 MG: 40 TABLET ORAL at 09:00

## 2017-03-02 RX ADMIN — TAZOBACTAM SODIUM AND PIPERACILLIN SODIUM 3.38 G: 375; 3 INJECTION, SOLUTION INTRAVENOUS at 12:49

## 2017-03-02 RX ADMIN — NYSTATIN: 100000 POWDER TOPICAL at 09:05

## 2017-03-02 RX ADMIN — VANCOMYCIN HYDROCHLORIDE 250 MG: 1 INJECTION, POWDER, LYOPHILIZED, FOR SOLUTION INTRAVENOUS at 07:17

## 2017-03-02 RX ADMIN — OXYCODONE HYDROCHLORIDE AND ACETAMINOPHEN 1 TABLET: 10; 325 TABLET ORAL at 00:07

## 2017-03-02 RX ADMIN — Medication: at 17:57

## 2017-03-02 RX ADMIN — TAZOBACTAM SODIUM AND PIPERACILLIN SODIUM 3.38 G: 375; 3 INJECTION, SOLUTION INTRAVENOUS at 05:24

## 2017-03-02 RX ADMIN — GABAPENTIN 100 MG: 100 CAPSULE ORAL at 21:54

## 2017-03-02 RX ADMIN — OXYCODONE HYDROCHLORIDE AND ACETAMINOPHEN 1 TABLET: 10; 325 TABLET ORAL at 18:05

## 2017-03-02 RX ADMIN — OXYCODONE HYDROCHLORIDE AND ACETAMINOPHEN 1 TABLET: 10; 325 TABLET ORAL at 21:53

## 2017-03-02 RX ADMIN — POTASSIUM CHLORIDE 20 MEQ: 20 TABLET, EXTENDED RELEASE ORAL at 17:53

## 2017-03-02 RX ADMIN — OXYCODONE HYDROCHLORIDE AND ACETAMINOPHEN 1 TABLET: 10; 325 TABLET ORAL at 13:00

## 2017-03-02 RX ADMIN — POTASSIUM CHLORIDE 20 MEQ: 20 TABLET, EXTENDED RELEASE ORAL at 09:00

## 2017-03-02 RX ADMIN — VANCOMYCIN HYDROCHLORIDE 250 MG: 1 INJECTION, POWDER, LYOPHILIZED, FOR SOLUTION INTRAVENOUS at 12:49

## 2017-03-02 RX ADMIN — Medication: at 21:53

## 2017-03-02 RX ADMIN — Medication 1 TABLET: at 09:00

## 2017-03-02 RX ADMIN — Medication: at 09:05

## 2017-03-02 RX ADMIN — RIFAXIMIN 550 MG: 550 TABLET ORAL at 09:00

## 2017-03-02 RX ADMIN — OXYCODONE HYDROCHLORIDE AND ACETAMINOPHEN 1 TABLET: 10; 325 TABLET ORAL at 21:56

## 2017-03-02 NOTE — PROGRESS NOTES
Acute Care - Physical Therapy Treatment Note  [facility]     Patient Name: Armando Mcmullen  : 1965  MRN: [id number]  Today's Date: 3/2/2017  Onset of Illness/Injury or Date of Surgery Date: 17 (s/p I&D R shoulder on 17)  Date of Referral to PT: 17  Referring Physician: Dr. Aguero    Admit Date: 2017    Visit Dx:    ICD-10-CM ICD-9-CM   1. Hyperammonemia E72.20 270.6   2. Urinary tract infection, site unspecified N39.0    3. Functional diarrhea K59.1 564.5   4. Impaired functional mobility, balance, and endurance Z74.09 V49.89   5. Impaired gait and mobility R26.89 781.2     Patient Active Problem List   Diagnosis   • Anxiety state   • Back pain   • Furuncle of buttock   • Chronic hepatitis   • Hepatic cirrhosis   • Depression   • Hypersplenism   • Insomnia   • Mononeuritis   • Osteoarthritis   • Essential hypertension   • Closed fracture of proximal end of right humerus with delayed healing   • Closed nondisplaced supracondylar fracture of distal end of femur without intracondylar extension with delayed healing   • History of substance abuse   • Chronic osteomyelitis of right shoulder region   • Acute cystitis without hematuria   • Hypokalemia               Adult Rehabilitation Note       17 1435          Rehab Assessment/Intervention    Discipline physical therapy assistant  -LN      Document Type therapy note (daily note)  -LN      Subjective Information agree to therapy;complains of;pain  -LN      Precautions/Limitations fall precautions  -LN      Recorded by [LN] Stephanie HAY Florian, PTA      Vital Signs    Pre Systolic BP Rehab 120  -LN      Pre Treatment Diastolic BP 60  -LN      Post Systolic BP Rehab 120  -LN      Post Treatment Diastolic BP 70  -LN      Pretreatment Heart Rate (beats/min) 73  -LN      Posttreatment Heart Rate (beats/min) 83  -LN      Pre SpO2 (%) 97  -LN      O2 Delivery Pre Treatment room air  -LN      Post SpO2 (%) 97  -LN      Recorded by [DYLAN] Stephanie HAY  JULITA Du      Pain Assessment    Pain Assessment 0-10  -LN      Pain Score 7  -LN      Post Pain Score 7  -LN      Pain Type Acute pain  -LN      Pain Location Shoulder  -LN      Pain Orientation Right  -LN      Recorded by [LN] Stephanie Du PTA      Cognitive Assessment/Intervention    Orientation Status oriented x 4  -LN      Recorded by [LN] Stephanie Du PTA      Bed Mobility, Assessment/Treatment    Bed Mobility, Assistive Device bed rails;head of bed elevated  -LN      Bed Mob, Supine to Sit, [place] conditional independence  -LN      Recorded by [LN] Stephanie Du PTA      Transfer Assessment/Treatment    Transfers, Bed-Chair [place] contact guard assist   sit pivot to chair  -LN      Transfers, Bed-Chair-Bed, Assist Device --   none  -LN      Recorded by [LN] Stephanie Du, JULITA      Gait Assessment/Treatment    Gait, Comment deferred no prosthesis  -LN      Recorded by [LN] Stephanie Du, JULITA      Therapy Exercises    Right Lower Extremity AROM:;20 reps;sitting;ankle pumps/circles;hip abduction/adduction;hip flexion;LAQ   with 2#  -LN      Left Lower Extremity AROM:;20 reps;sitting;hip abduction/adduction;hip flexion;LAQ  -LN      Recorded by [LN] Stephanie Du PTA      Positioning and Restraints    Post Treatment Position chair  -LN      In Chair sitting;call light within reach;encouraged to call for assist;legs elevated  -LN      Recorded by [LN] Stephanie Du PTA        User Key  (r) = Recorded By, (t) = Taken By, (c) = Cosigned By    Initials Name Effective Dates    LN Stephanie Du PTA 10/17/16 -                 IP PT Goals       03/02/17 1538 03/02/17 1435 03/01/17 1330    Transfer Training PT LTG    Transfer Training PT LTG, Date Established   03/01/17  -KG    Transfer Training PT LTG, Time to Achieve   by discharge  -KG    Transfer Training PT LTG, Activity Type   bed to chair /chair to bed;sit to stand/stand to sit;toilet  -KG    Transfer Training PT LTG, [place] Level   conditional  independence  -KG    Transfer Training PT LTG, Assist Device   --   AAD  -KG    Transfer Training PT  LTG, Date Goal Reviewed 03/02/17  -LN      Transfer Training PT LTG, Outcome goal not met  -LN      Transfer Training PT LTG, Reason Goal Not Met progress slower than expected  -LN      Gait Training PT STG    Gait Training Goal PT STG, Date Established   03/01/17  -KG    Gait Training Goal PT STG, Time to Achieve   3 days  -KG    Gait Training Goal PT STG, [place] Level   moderate assist (50% patient effort);minimum assist (75% patient effort)  -KG    Gait Training Goal PT STG, Assist Device   --   AAD  -KG    Gait Training Goal PT STG, Distance to Achieve   5 feet  -KG    Gait Training Goal PT STG, Date Goal Reviewed  03/02/17  -LN     Gait Training Goal PT STG, Outcome  goal not met  -LN     Gait Training Goal PT STG, Reason Goal Not Met  progress slower than expected  -LN     Gait Training PT LTG    Gait Training Goal PT LTG, Date Established   03/01/17  -KG    Gait Training Goal PT LTG, Time to Achieve   by discharge  -KG    Gait Training Goal PT LTG, [place] Level   contact guard assist  -KG    Gait Training Goal PT LTG, Assist Device   --   AAD  -KG    Gait Training Goal PT LTG, Distance to Achieve   20 feet  -KG    Gait Training Goal PT LTG, Date Goal Reviewed  03/02/17  -LN     Gait Training Goal PT LTG, Outcome  goal not met  -LN     Gait Training Goal PT LTG, Reason Goal Not Met  progress slower than expected  -LN     Wheelchair Propulsion PT LTG    Wheelchair Propulsion Goal PT LTG, Date Established   03/01/17  -KG    Wheelchair Propulsion Goal PT LTG, Time to Achieve   by discharge  -KG    Wheelchair Propulsion Goal PT LTG, [place] Level   conditional independence  -KG    Wheelchair Propulsion Goal PT LTG, Distance to Achieve   150 feet  -KG    Wheelchair Propulsion Goal PT LTG, Date Goal Reviewed  03/02/17  -LN     Wheelchair Propulsion Goal PT LTG, Outcome  goal not met  -LN      Wheelchair Propulsion Goal PT LTG, Reason Goal Not Met  progress slower than expected  -LN       User Key  (r) = Recorded By, (t) = Taken By, (c) = Cosigned By    Initials Name Provider Type    KG Ailyn Nava, PT Physical Therapist    LN Stephanie Du, JULITA Physical Therapy Assistant          Physical Therapy Education     Title: PT OT SLP Therapies (Active)     Topic: Physical Therapy (Active)     Point: Mobility training (Active)    Learning Progress Summary    Learner Readiness Method Response Comment Documented by Status   Patient Acceptance E NR  LN 03/02/17 1537 Active    Acceptance E NR  KG 03/01/17 1330 Active               Point: Home exercise program (Active)    Learning Progress Summary    Learner Readiness Method Response Comment Documented by Status   Patient Acceptance E NR  LN 03/02/17 1537 Active    Acceptance E,D,TB NR LAQ, heels slides, ankle pumps KG 03/01/17 1330 Active               Point: Precautions (Active)    Learning Progress Summary    Learner Readiness Method Response Comment Documented by Status   Patient Acceptance E NR  LN 03/02/17 1537 Active                      User Key     Initials Effective Dates Name Provider Type Discipline    KG 10/17/16 -  Ailyn Nava, PT Physical Therapist PT     10/17/16 -  Stephanie Du PTA Physical Therapy Assistant PT                    PT Recommendation and Plan  Anticipated Discharge Disposition: skilled nursing facility  Planned Therapy Interventions: bed mobility training, gait training, home exercise program, strengthening, transfer training  PT Frequency: other (see comments) (5-14 times/wk)  Plan of Care Review  Progress: progress toward functional goals as expected  Outcome Summary/Follow up Plan: bed to chair with cga of 1,20 reps of ex-may benefit from cont therapy at [facility]          Outcome Measures       03/02/17 1435 03/01/17 1330       How much help from another person do you currently need...    Turning from your back to your side  while in flat bed without using bedrails? 4  -LN 4  -KG     Moving from lying on back to sitting on the side of a flat bed without bedrails? 4  -LN 4  -KG     Moving to and from a bed to a chair (including a wheelchair)? 3  -LN 3  -KG     Standing up from a chair using your arms (e.g., wheelchair, bedside chair)? 2  -LN 3  -KG     Climbing 3-5 steps with a railing? 1  -LN 1  -KG     To walk in hospital room? 2  -LN 2  -KG     AM-PAC 6 Clicks Score 16  -LN 17  -KG     Functional Assessment    Outcome Measure Options AM-PAC 6 Clicks Basic Mobility (PT)  -LN AM-PAC 6 Clicks Basic Mobility (PT)  -KG       User Key  (r) = Recorded By, (t) = Taken By, (c) = Cosigned By    Initials Name Provider Type    KG Ailyn Nava, PT Physical Therapist    LN Stephanie Du PTA Physical Therapy Assistant           Time Calculation:         PT Charges       03/02/17 1435          Time Calculation    Start Time 1435  -LN      Stop Time 1505  -LN      Time Calculation (min) 30 min  -LN      PT Received On 03/02/17  -LN      Time Calculation- PT    Total Timed Code Minutes- PT 30 minute(s)  -LN        User Key  (r) = Recorded By, (t) = Taken By, (c) = Cosigned By    Initials Name Provider Type    LN Stephanie S JULITA Du Physical Therapy Assistant          Therapy Charges for Today     Code Description Service Date Service Provider Modifiers Qty    71084102727 HC NSG/PT ASSIST 3/2/2017 Stephanie Du PTA  1    [id number]  PT THER PROC EA 15 MIN 3/2/2017 Stephanie S JULITA Du GP 1    [id number]  PT THERAPEUTIC ACT EA 15 MIN 3/2/2017 Stephanie S Florian, PTA GP 1          PT G-Codes  PT Professional Judgement Used?: Yes  Outcome Measure Options: AM-PAC 6 Clicks Basic Mobility (PT)  Score: 17  Functional Limitation: Mobility: Walking and moving around  Mobility: Walking and Moving Around Current Status (): At least 40 percent but less than 60 percent impaired, limited or restricted  Mobility: Walking and Moving Around Goal Status (): At  least 1 percent but less than 20 percent impaired, limited or restricted    Stephanie Du, PTA  3/2/2017

## 2017-03-02 NOTE — PLAN OF CARE
Problem: Patient Care Overview (Adult)  Goal: Plan of Care Review  Outcome: Ongoing (interventions implemented as appropriate)    03/02/17 0557   Patient Care Overview   Progress improving   Coping/Psychosocial Response Interventions   Plan Of Care Reviewed With patient       Goal: Adult Individualization and Mutuality  Outcome: Ongoing (interventions implemented as appropriate)  Goal: Discharge Needs Assessment  Outcome: Ongoing (interventions implemented as appropriate)    Problem: Fall Risk (Adult)  Goal: Absence of Falls  Outcome: Ongoing (interventions implemented as appropriate)    Problem: Pressure Ulcer (Adult)  Goal: Signs and Symptoms of Listed Potential Problems Will be Absent or Manageable (Pressure Ulcer)  Outcome: Ongoing (interventions implemented as appropriate)

## 2017-03-02 NOTE — SIGNIFICANT NOTE
"   03/02/17 0935   Rehab Treatment   Discipline physical therapy assistant   Treatment Not Performed patient/family decline treatment, pt not feeling well  (\"not right now-my stomach is cramping,my legs are swollen,and my arm is hurting\" encouraged supine ex but pt cont to decline)     "

## 2017-03-02 NOTE — DISCHARGE PLACEMENT REQUEST
"Lamont Mcmullen (52 y.o. Male)     Date of Birth Social Security Number Address Home Phone MRN    [id number]  [id number]  [facility] [address]  [id number]    L[employer] Marital Status          Other        Admission Date Admission Type Admitting Provider Attending Provider Department, Room/Bed    2/20/17 Emergency Cj Aguero MD G[place], Abraham Stuart, MD [facility] [address], 332/1    Discharge Date Discharge Disposition Discharge Destination                      Attending Provider: Cj Aguero MD     Allergies:  Aspirin, Codeine Sulfate    Isolation:  Spore, Contact   Infection:  MRSA (02/23/17), C.difficile (02/21/17)   Code Status:  FULL    Ht:  72.01\" (182.9 cm)   Wt:  259 lb 14.4 oz (118 kg)    Admission Cmt:  None   Principal Problem:  None                Active Insurance as of 2/20/2017     Primary Coverage     Payor Plan Insurance Group Employer/Plan Group    [facility]      Payor Plan Address Payor Plan Phone Number Effective From Effective To    [address] [address]  1/1/2014     [place] [address]       Subscriber Name Subscriber Birth Date Member ID       LAMONT MCMULLEN 1965 [id number]                 Emergency Contacts      (Rel.) Home Phone Work Phone Mobile Phone    Loco Mcmullen (Daughter) -- -- [phone]               History & Physical      Bravo Marinelli MD at 2/21/2017 12:58 AM                [facility] Admission      Date of Admission: 2/20/2017      Primary Care Physician: Cj Aguero MD      Chief Complaint: Diarrhea    HPI: This is a 51-year-old white male admitted to the hospital with diarrhea that has been going on for the past few days, described as watery nonbloody.  He also reports right arm drainage and has been treated with antibiotics.  He continues to follow up with Dr. Vanegas.  He reports darker urine but no " hematuria or dysuria.  He has decreased oral intake and decreased appetite.  He denies subjective fever or chills.  Denies nausea or vomiting.  Reports epigastric abdominal pain that has been going on for the past few weeks but got worse in the past few days, described as sharp and nonradiating moderate in severity.  He is currently in but does not appear to be in distress.      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 No pertinent family history.    Social History:   Social History     Social History   • Marital status:      Spouse name: N/A   • Number of children: N/A   • Years of education: N/A     Social History Main Topics   • Smoking status: Current Every Day Smoker     Packs/day: 0.50     Types: Cigarettes   • Smokeless tobacco: None   • Alcohol use No   • Drug use: No   • Sexual activity: Not Asked     Other Topics Concern   • None     Social History Narrative   • None       Allergies:   Allergies   Allergen Reactions   • Codeine Sulfate        Medications:   Prior to Admission medications    Medication Sig Start Date End Date Taking? Authorizing Provider   amLODIPine (NORVASC) 2.5 MG tablet Take 2.5 mg by mouth Daily.   Yes Historical Provider, MD   clindamycin (CLEOCIN) 300 MG capsule Take 300 mg by mouth 3 (Three) Times a Day.   Yes Historical Provider, MD   lactulose (CHRONULAC) 10 GM/15ML solution Take 20 g by mouth 2 (Two) Times a Day As Needed.   Yes  Historical Provider, MD   losartan (COZAAR) 100 MG tablet Take 100 mg by mouth Daily.   Yes Historical Provider, MD   Multiple Vitamins-Minerals (MULTIVITAMIN ADULT PO) Take  by mouth.   Yes Historical Provider, MD   triamterene-hydrochlorothiazide (MAXZIDE-25) 37.5-25 MG per tablet Take 1 tablet by mouth Daily.   Yes Historical Provider, MD   vitamin D (ERGOCALCIFEROL) 26130 UNITS capsule capsule Take 50,000 Units by mouth 1 (One) Time Per Week.   Yes Historical Provider, MD   lidocaine (LIDODERM) 5 % Place 1 patch on the skin Daily. Remove & Discard patch within 12 hours or as directed by MD    Historical Provider, MD   Morphine (MS CONTIN) 15 MG 12 hr tablet Take 15 mg by mouth 2 (Two) Times a Day.    Historical Provider, MD   oxyCODONE-acetaminophen (PERCOCET)  MG per tablet Take 1 tablet by mouth Every 6 (Six) Hours As Needed for moderate pain (4-6).    Historical Provider, MD       Review of Systems:  Review of Systems   Otherwise complete ROS is negative except as mentioned above.    Physical Exam:   Temp:  [97.3 °F (36.3 °C)] 97.3 °F (36.3 °C)  Heart Rate:  [55-66] 62  Resp:  [16-18] 18  BP: (138-166)/(72-96) 162/83  Physical Exam  Patient appears well, alert and oriented x 3, pleasant, cooperative.   Neck supple  No JVD No thyromegaly.  JENNI. Ears, throat are normal.  Lungs are clear to auscultation. No crackles no wheezing   CV S1S2 normal, no murmurs, clicks, gallops or rubs.  Abdomen is soft, no tenderness, masses or organomegaly.    Extremities: no clubbing cyanosis or edema , left BKA, right shoulder chronic wound with Bandage.  Neurological CN 2-12 intact   Skin is normal without suspicious lesions noted.    Results Reviewed:  I have personally reviewed current lab, radiology, and data and agree with results.  Lab Results (last 24 hours)     Procedure Component Value Units Date/Time    Comprehensive Metabolic Panel [[id number]]  (Abnormal) Collected:  02/20/17 2102    Specimen:  Blood Updated:   02/20/17 2124     Glucose 82 mg/dL      BUN 11 mg/dL      Creatinine 1.04 mg/dL      Sodium 138 mmol/L      Potassium 3.5 mmol/L      Chloride 111 (H) mmol/L      CO2 17.0 (L) mmol/L      Calcium 8.9 mg/dL      Total Protein 6.2 (L) g/dL      Albumin 2.50 (L) g/dL      ALT (SGPT) 27 U/L      AST (SGOT) 38 U/L      Alkaline Phosphatase 69 U/L      Total Bilirubin 5.4 (H) mg/dL      eGFR Non African Amer 75 mL/min/1.73      Globulin 3.7 (H) gm/dL      A/G Ratio 0.7 (L) g/dL      BUN/Creatinine Ratio 10.6      Anion Gap 10.0 mmol/L     Amylase [[id number]]  (Normal) Collected:  02/20/17 2102    Specimen:  Blood Updated:  02/20/17 2124     Amylase 59 U/L     Lipase [[id number]]  (Normal) Collected:  02/20/17 2102    Specimen:  Blood Updated:  02/20/17 2124     Lipase 189 U/L     CK [[id number]]  (Abnormal) Collected:  02/20/17 2102    Specimen:  Blood Updated:  02/20/17 2124     Creatine Kinase 38 (L) U/L     Ammonia [[id number]]  (Abnormal) Collected:  02/20/17 2102    Specimen:  Blood Updated:  02/20/17 2124     Ammonia 80 (H) umol/L     CBC Auto Differential [[id number]]  (Abnormal) Collected:  02/20/17 2136    Specimen:  Blood Updated:  02/20/17 2143     WBC 9.36 10*3/mm3      RBC 3.35 (L) 10*6/mm3      Hemoglobin 11.7 (L) g/dL      Hematocrit 34.5 (L) %      .0 (H) fL      MCH 34.9 (H) pg      MCHC 33.9 g/dL      RDW 16.8 (H) %      RDW-SD 63.8 (H) fl      MPV 9.6 fL      Platelets 60 (L) 10*3/mm3      Neutrophil % 56.6 %      Lymphocyte % 26.3 %      Monocyte % 12.3 (H) %      Eosinophil % 2.7 %      Basophil % 1.5 %      Immature Grans % 0.6 (H) %      Neutrophils, Absolute 5.30 10*3/mm3      Lymphocytes, Absolute 2.46 10*3/mm3      Monocytes, Absolute 1.15 (H) 10*3/mm3      Eosinophils, Absolute 0.25 10*3/mm3      Basophils, Absolute 0.14 10*3/mm3      Immature Grans, Absolute 0.06 (H) 10*3/mm3      nRBC 0.0 /100 WBC     Urine Culture [[id number]] Collected:  02/20/17 2881    Specimen:  Urine from Urine, Clean Catch  Updated:  02/20/17 2152    Urinalysis With / Culture If Indicated [[id number]]  (Abnormal) Collected:  02/20/17 2145    Specimen:  Urine from Urine, Clean Catch Updated:  02/20/17 2202     Color, UA Orange (A)      Appearance, UA Turbid (A)      pH, UA 5.5      Specific Gravity, UA 1.017      Glucose, UA Negative      Ketones, UA Trace (A)      Bilirubin, UA Small (1+) (A)      Blood, UA Moderate (2+) (A)      Protein, UA Trace (A)      Leuk Esterase, UA Large (3+) (A)      Nitrite, UA Negative      Urobilinogen, UA 2.0 E.U./dL (A)     Urinalysis, Microscopic Only [[id number]]  (Abnormal) Collected:  02/20/17 2145    Specimen:  Urine from Urine, Clean Catch Updated:  02/20/17 2220     RBC, UA 6-12 (A) /HPF      WBC, UA Too Numerous to Count (A) /HPF      Bacteria, UA 1+ (A) /HPF      Squamous Epithelial Cells, UA 3-5 (A) /HPF      Hyaline Casts, UA 7-12 /LPF      Mucus, UA Small/1+ (A) /HPF      Methodology Automated Microscopy     CBC & Differential [[id number]] Collected:  02/20/17 2136    Specimen:  Blood Updated:  02/20/17 2232    Narrative:       The following orders were created for panel order CBC & Differential.  Procedure                               Abnormality         Status                     ---------                               -----------         ------                     Scan Slide[[id number]]                                        Final result               CBC Auto Differential[[id number]]         Abnormal            Final result                 Please view results for these tests on the individual orders.    Scan Slide [[id number]] Collected:  02/20/17 2136    Specimen:  Blood Updated:  02/20/17 2232     Anisocytosis Slight/1+      Hypochromia Slight/1+      WBC Morphology Normal      Platelet Estimate Decreased         Imaging Results (last 24 hours)     Procedure Component Value Units Date/Time    XR Abdomen Flat & Upright [[id number]] Collected:  02/20/17 2147     Updated:  02/20/17 2150    Narrative:          [facility]    Patient Name: LAMONT MCMULLEN    ATTENDING: ORLANDO SORIANO     REFERRING: ORLANDO SORIANO    ORDERING: ORLANDO SORIANO    -----------------------    PROCEDURE: Abdomen series    Date of exam: 2/20/2017    HISTORY: Abdomen pain    Supine and erect images of the abdomen were obtained. Study is  compared to prior exam of 12/4/2016.    The bowel gas pattern is  unremarkable. There is no evidence of  significant distention or  obstruction. No free air is seen. No  mass lesions or abnormal  calcifications are appreciated.  Vascular stent is noted in the right upper abdomen. Compression  screw is seen in the left hip.      Impression:       Unremarkable abdomen series.      Electronically signed by:  Pramod Grant MD  2/20/2017 9:49 PM  CST Workstation: [id number]            Assessment/Plan:             1.  Diarrhea: Unspecified etiology, and a patient with recent antibiotic usage, obtain blood cultures, stool cultures as well as C. difficile cultures start empirically on Flagyl and Levaquin, start IV fluids normal saline at 100 mL/h.  2.  Acute UTI: Start Levaquin, obtain urine and blood cultures.  3.  Chronic right shoulder wound, consult with wound care.  4.  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  5.  Hypovolemia start normal saline at 100 mL per hour.  6.  Metabolic acidosis: In the setting of diarrhea this should improve with improvement in volume status.  SCDs for DVT prophylaxis.    Bravo Marinelli MD  02/21/17  12:58 AM                     Electronically signed by Bravo Marinelli MD at 2/21/2017  1:54 AM      Shawn Vanegas MD at 2/22/2017  8:55 PM          Patient Name:  Lamont Mcmullen  Consult Date:  2/22/2017      Referring Provider:  Bel Celeste MD  Reason for Consultation:  Draining wound right arm     Patient Care Team:  Cj Aguero MD as PCP - General    Chief complaint: right arm  pain    Subjective .     History of present illness:  The patient is a 51-year-old white male who is well known to our service.  He has a long list of chronic, serious, medical conditions.  He had a fall in November suffering a comminuted right proximal humerus fracture.  He also had a recurrence of a left distal femur fracture that was minimally displaced.  Patient also has a history of a below-knee amputation on the left side.  He has been treated nonoperatively due to his poor medical status and severity of his fractures.  He has had a sore on the right upper arm for a long period of time and has had some intermittent drainage from this.  There is no report of a specific wound or injury that started this process.  No new injury is reported as well.  Patient was admitted for diarrhea, acute UTI, hepatic encephalopathy.  While he was here consult was obtained for evaluation of his right arm injury and recommendation for further treatment options.    Review of Systems:  He denies any fevers or chills.  He denies nausea or vomiting.  Denies any chest pain.  He has had some abdominal pain and is complaining of pain in his right arm all other systems reviewed as negative.    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 No pertinent family history.,   Social History   Substance Use Topics   • Smoking status: Current Every Day Smoker     Packs/day: 0.25     Types: Cigarettes   • Smokeless tobacco: None   • Alcohol use No   ,   Prescriptions Prior  to Admission   Medication Sig Dispense Refill Last Dose   • amLODIPine (NORVASC) 2.5 MG tablet Take 2.5 mg by mouth Daily As Needed (only takes if his blood pressure is high).   Past Week at Unknown time   • clindamycin (CLEOCIN) 300 MG capsule Take 300 mg by mouth 3 (Three) Times a Day.   2/20/2017 at Unknown time   • gabapentin (NEURONTIN) 100 MG capsule Take 100 mg by mouth 3 (Three) Times a Day.   2/20/2017 at Unknown time   • lactulose (CHRONULAC) 10 GM/15ML solution Take 30 g by mouth 2 (Two) Times a Day.   2/20/2017 at Unknown time   • Multiple Vitamins-Minerals (COMPLETE MULTIVITAMIN/MINERAL PO) Take 1 tablet by mouth Daily.   2/20/2017 at Unknown time   • oxyCODONE-acetaminophen (PERCOCET)  MG per tablet Take 1 tablet by mouth Every 4 (Four) Hours As Needed for moderate pain (4-6) (every 4-6 hours as needed).   2/20/2017 at Unknown time   • vitamin D (ERGOCALCIFEROL) 18077 UNITS capsule capsule Take 50,000 Units by mouth 1 (One) Time Per Week. Takes on Wednesdays   2/15/2017    and Allergies:  Codeine sulfate    Objective     Vital Signs   Temp:  [97.7 °F (36.5 °C)] 97.7 °F (36.5 °C)  Heart Rate:  [79-86] 84  Resp:  [20] 20  BP: (132)/(78) 132/78    Physical Exam:   The patient is awake, alert, oriented, and in no apparent distress.  He is morbidly obese and lying in the bed.  His mobility is very poor.    The right upper extremity shows very limited motion.  He is able to bend his elbow he is able to wiggle his fingers.  He has good capillary refill distally.  He has a sore about the size of a quarter on the lateral aspect of the brachium.  He has some granulating tissue in the base of the wound.  He has some mild serous drainage with squeezing around this area.  It is very tender to touch.  He has essentially no motion of his shoulder but does have active activation of the muscles.  Good stability on exam.  He has poor muscle tone.  Left upper extremity shows good range of motion actively.  Good  capillary refill distally.  Poor muscle tone and strength diffusely.  Stable joint exam.  Intact distal sensation grossly.    Results Review:    Imaging Results (last 24 hours)     Procedure Component Value Units Date/Time    XR Humerus Right [[id number]] Collected:  02/22/17 1335     Updated:  02/22/17 1340    Narrative:       Patient Name:  LAMONT FISHER  Patient ID:  [id number]   Ordering:  MARJ FORRESTER  Attending:  HENRY CALVO  Referring:  MARJ FORRESTER  ------------------------------------------------  Procedure: Right humerus two views    Reason for exam: Wound right arm, possible osteomyelitis.    FINDINGS: Comparison exam dated January 13, 2017. No significant  interval change in appearance of right humerus comminuted  surgical neck fracture. Cannot exclude pathologic fracture.  Otherwise right humerus is unremarkable.      Impression:       1.  No significant interval change in appearance of right humerus  comminuted surgical neck fracture. Cannot exclude pathologic  fracture.    Electronically signed by:  Eduard Vigil MD  2/22/2017 1:39 PM CST  Workstation: [id number]          Lab Results (last 24 hours)     Procedure Component Value Units Date/Time    Blood Gas, Arterial [[id number]]  (Abnormal) Collected:  02/22/17 0428    Specimen:  Arterial Blood Updated:  02/22/17 0443     Site --       Not performed at this site.        Aren's Test --       Not performed at this site.        pH, Arterial 7.441 pH units      pCO2, Arterial 30.7 (L) mm Hg      pO2, Arterial 66.7 (L) mm Hg      HCO3, Arterial 20.4 (L) mmol/L      Base Excess, Arterial -2.9 (L) mmol/L      O2 Saturation, Arterial 92.9 (L) %      Hemoglobin, Blood Gas 10.4 (L) g/dL      Hematocrit, Blood Gas 31.0 %      CO2 Content 21.4 (L)      Sodium, Arterial 135.5 (L) mmol/L      Potassium, Arterial 3.04 (L) mmol/L      Glucose, Arterial 95 mmol/L      Barometric Pressure for Blood Gas -- mmHg       Not performed at this site.        Modality --        Not performed at this site.        Ionized Calcium 4.4 (L) mg/dL     Urine Culture [[id number]]  (Abnormal) Collected:  02/20/17 2145    Specimen:  Urine from Urine, Clean Catch Updated:  02/22/17 0632     Urine Culture >100,000 CFU/mL Gram Negative Bacilli (A)      Beta Lactamase --     Blood Culture [[id number]]  (Normal) Collected:  02/21/17 0540    Specimen:  Blood from Arm, Left Updated:  02/22/17 0701     Blood Culture No growth at 24 hours     Blood Culture [[id number]]  (Normal) Collected:  02/21/17 0525    Specimen:  Blood from Arm, Left Updated:  02/22/17 0701     Blood Culture No growth at 24 hours     CBC & Differential [[id number]] Collected:  02/22/17 0600    Specimen:  Blood Updated:  02/22/17 0708    Narrative:       The following orders were created for panel order CBC & Differential.  Procedure                               Abnormality         Status                     ---------                               -----------         ------                     CBC Auto Differential[[id number]]         Abnormal            Final result                 Please view results for these tests on the individual orders.    CBC Auto Differential [[id number]]  (Abnormal) Collected:  02/22/17 0600    Specimen:  Blood Updated:  02/22/17 0708     WBC 8.72 10*3/mm3      RBC 2.89 (L) 10*6/mm3      Hemoglobin 10.2 (L) g/dL      Hematocrit 29.6 (L) %      .4 (H) fL      MCH 35.3 (H) pg      MCHC 34.5 g/dL      RDW 16.4 (H) %      RDW-SD 62.0 (H) fl      MPV 9.7 fL      Platelets 54 (L) 10*3/mm3      Neutrophil % 67.2 %      Lymphocyte % 16.3 %      Monocyte % 8.5 %      Eosinophil % 6.7 %      Basophil % 1.0 %      Immature Grans % 0.3 %      Neutrophils, Absolute 5.86 10*3/mm3      Lymphocytes, Absolute 1.42 10*3/mm3      Monocytes, Absolute 0.74 10*3/mm3      Eosinophils, Absolute 0.58 10*3/mm3      Basophils, Absolute 0.09 10*3/mm3      Immature Grans, Absolute 0.03 (H) 10*3/mm3     Lactic Acid, Plasma [[id number]]   (Normal) Collected:  02/22/17 0600    Specimen:  Blood Updated:  02/22/17 0713     Lactate 1.6 mmol/L     Magnesium [[id number]]  (Normal) Collected:  02/22/17 0600    Specimen:  Blood Updated:  02/22/17 0714     Magnesium 1.7 mg/dL     C-reactive Protein [[id number]]  (Abnormal) Collected:  02/22/17 0600    Specimen:  Blood Updated:  02/22/17 0714     C-Reactive Protein 2.80 (H) mg/dL     Comprehensive Metabolic Panel [[id number]]  (Abnormal) Collected:  02/22/17 0600    Specimen:  Blood Updated:  02/22/17 0720     Glucose 84 mg/dL      BUN 11 mg/dL      Creatinine 1.12 mg/dL      Sodium 137 mmol/L      Potassium 3.2 (L) mmol/L      Chloride 109 mmol/L      CO2 20.0 (L) mmol/L      Calcium 8.0 (L) mg/dL      Total Protein 5.4 (L) g/dL      Albumin 2.00 (L) g/dL      ALT (SGPT) 23 U/L      AST (SGOT) 74 (H) U/L      Alkaline Phosphatase 79 U/L      Total Bilirubin 3.4 (H) mg/dL      eGFR Non African Amer 69 mL/min/1.73      Globulin 3.4 gm/dL      A/G Ratio 0.6 (L) g/dL      BUN/Creatinine Ratio 9.8      Anion Gap 8.0 mmol/L     Ammonia [[id number]]  (Abnormal) Collected:  02/22/17 0600    Specimen:  Blood Updated:  02/22/17 0723     Ammonia 38 (H) umol/L       Specimen hemolyzed.  Results may be affected.       Lipid Panel [[id number]]  (Abnormal) Collected:  02/22/17 0600    Specimen:  Blood Updated:  02/22/17 0723     Total Cholesterol 88 mg/dL      Triglycerides 77 mg/dL      HDL Cholesterol 22 (L) mg/dL      LDL Cholesterol  36 mg/dL      LDL/HDL Ratio 2.30     TSH [[id number]]  (Normal) Collected:  02/22/17 0600    Specimen:  Blood Updated:  02/22/17 0746     TSH 2.360 mIU/mL     Hemoglobin A1c [[id number]]  (Abnormal) Collected:  02/22/17 0600    Specimen:  Blood Updated:  02/22/17 0805     Hemoglobin A1C <4 (L) %     C-reactive Protein [[id number]]  (Abnormal) Collected:  02/22/17 1316    Specimen:  Blood Updated:  02/22/17 1346     C-Reactive Protein 3.50 (H) mg/dL     Wound Culture [[id number]] Collected:  02/22/17 8452     Specimen:  Wound from Arm Updated:  02/22/17 8580     Gram Stain Result Rare (1+) WBCs seen       Rare (1+) Gram positive cocci in pairs            I reviewed the patient's new clinical results.  I reviewed the patient's new imaging results and agree with the interpretation.      Assessment/Plan     Active Problems:    Chronic hepatitis    Hepatic cirrhosis    Essential hypertension    Closed fracture of proximal end of right humerus with delayed healing    Closed nondisplaced supracondylar fracture of distal end of femur without intracondylar extension with delayed healing    Hyperammonemia      From an orthopedic standpoint, the patient has a nonhealing, comminuted, fracture of the proximal humerus.  He also has a wound on the lateral aspect of the upper arm.  These are chronic and stable.  He is an extremely poor surgical candidate.  To further assess the wound on the lateral aspect of his arm I recommendation would be an MRI of the humerus.  This would allow for evaluation of the extent of the wound, any potential abscess underlying, and potential for osteomyelitis.  Otherwise, continuing with IV antibiotics and wound care would be warranted.  Thank you for this consult and for allowing me to participate in the care of this patient.    I discussed the patients findings and my recommendations with patient, nursing staff and consulting provider    Shawn Vanegas MD  02/22/17  9:09 PM             Electronically signed by Shawn Vanegas MD at 2/22/2017  9:12 PM      Shawn Cortez MD at 2/27/2017 12:12 PM          H&P reviewed. The patient was examined and there are no changes to the H&P.   Risk and benefits discussed     Electronically signed by Shawn Cortez MD at 2/27/2017 12:14 PM        Hospital Medications (active)       Dose Frequency Start End    amLODIPine (NORVASC) tablet 2.5 mg 2.5 mg Daily PRN 2/27/2017     Sig - Route: Take 1 tablet by mouth Daily As Needed (only takes if his blood  "pressure is high). - Oral    bacitracin injection  As Needed 2/27/2017     Sig: As Needed.    furosemide (LASIX) tablet 40 mg 40 mg 2 Times Daily 3/1/2017     Sig - Route: Take 1 tablet by mouth 2 (Two) Times a Day. - Oral    gabapentin (NEURONTIN) capsule 100 mg 100 mg 3 Times Daily 2/27/2017     Sig - Route: Take 1 capsule by mouth 3 (Three) Times a Day. - Oral    lactulose (CHRONULAC) 10 GM/15ML solution 20 g 20 g Daily 2/24/2017     Sig - Route: Take 30 mL by mouth Daily. - Oral    lactulose (CHRONULAC) 10 GM/15ML solution 30 g 30 g 2 Times Daily 2/27/2017     Sig - Route: Take 45 mL by mouth 2 (Two) Times a Day. - Oral    magic butt ointment  3 Times Daily 2/21/2017     Sig - Route: Apply  topically 3 (Three) Times a Day. - Topical    Magnesium Sulfate 4 GM/100ML infusion 4 g 4 g As Needed 2/25/2017     Sig - Route: Infuse 100 mL into a venous catheter As Needed (magnesium sulfate 1 g in D5W 100 mL IVPB - Mg 1.6 - 1.9 mg/dL). - Intravenous    Linked Group 1:  \"Or\" Linked Group Details        magnesium sulfate 6 g in dextrose (D5W) 5 % 250 mL infusion 6 g As Needed 2/25/2017     Sig - Route: Infuse 6 g into a venous catheter As Needed (Mg 1.1-1.5 mg/dL). - Intravenous    Linked Group 1:  \"Or\" Linked Group Details        magnesium sulfate 8 g in dextrose (D5W) 5 % 250 mL infusion 8 g As Needed 2/25/2017     Sig - Route: Infuse 8 g into a venous catheter As Needed (Mg less than or equal to 1 mg/dL). - Intravenous    Linked Group 1:  \"Or\" Linked Group Details        magnesium sulfate in D5W 1g/100mL (PREMIX) IVPB 1 g 1 g As Needed 2/25/2017     Sig - Route: Infuse 100 mL into a venous catheter As Needed (Mg less than or equal 1 mg/dL). - Intravenous    Linked Group 1:  \"Or\" Linked Group Details        mepivacaine PF (CARBOCAINE) 2 % injection  As Needed 2/27/2017     Sig: As Needed.    multivitamin with minerals tablet 1 tablet 1 tablet Daily 2/27/2017     Sig - Route: Take 1 tablet by mouth Daily. - Oral    " nystatin (MYCOSTATIN) powder  Every 12 Hours Scheduled 2/21/2017     Sig - Route: Apply  topically Every 12 (Twelve) Hours. - Topical    oxyCODONE-acetaminophen (PERCOCET)  MG per tablet 1 tablet 1 tablet Every 4 Hours PRN 2/24/2017 3/6/2017    Sig - Route: Take 1 tablet by mouth Every 4 (Four) Hours As Needed for severe pain (7-10). - Oral    oxyCODONE-acetaminophen (PERCOCET)  MG per tablet 1 tablet 1 tablet Every 4 Hours PRN 2/27/2017     Sig - Route: Take 1 tablet by mouth Every 4 (Four) Hours As Needed for moderate pain (4-6) (every 4-6 hours as needed). - Oral    Pharmacy to dose vancomycin  Continuous PRN 2/22/2017     Sig - Route: Continuous As Needed for consult. - Does not apply    piperacillin-tazobactam (ZOSYN) 3.375 g in dextrose 50 mL IVPB (premix) 3.375 g Every 6 Hours 2/27/2017     Sig - Route: Infuse 50 mL into a venous catheter Every 6 (Six) Hours. - Intravenous    potassium chloride (K-DUR,KLOR-CON) CR tablet 20 mEq 20 mEq 2 Times Daily With Meals 3/1/2017     Sig - Route: Take 1 tablet by mouth 2 (Two) Times a Day With Meals. - Oral    potassium chloride 10 mEq in 100 mL IVPB 10 mEq Every 1 Hour PRN 2/22/2017     Sig - Route: Infuse 100 mL into a venous catheter Every 1 (One) Hour As Needed (See admin Instructions.). - Intravenous    rifaximin (XIFAXAN) tablet 550 mg 550 mg 2 Times Daily 2/21/2017     Sig - Route: Take 1 tablet by mouth 2 (Two) Times a Day. - Oral    sodium chloride (BACTERIOSTATIC) injection  As Needed 2/27/2017     Sig: As Needed.    sodium chloride 0.9 % infusion 25 mL/hr Continuous 2/21/2017     Sig - Route: Infuse 25 mL/hr into a venous catheter Continuous. - Intravenous    vancomycin (VANCOCIN) 1,750 mg in sodium chloride 0.9 % 500 mL IVPB 1,750 mg Once 3/1/2017 3/1/2017    Sig - Route: Infuse 1,750 mg into a venous catheter 1 (One) Time. - Intravenous    vancomycin (VANCOCIN) 50 mg/ml oral solution 250 mg 250 mg Every 6 Hours Scheduled 2/21/2017     Sig -  Route: Take 5 mL by mouth Every 6 (Six) Hours. - Oral    vitamin D (ERGOCALCIFEROL) capsule 50,000 Units 50,000 Units Every 7 Days 3/1/2017     Sig - Route: Take 1 capsule by mouth Every 7 (Seven) Days. - Oral             Physician Progress Notes (last 24 hours) (Notes from 3/1/2017 10:51 AM through 3/2/2017 10:51 AM)      Shawn Cortez MD at 3/1/2017 11:24 AM  Version 1 of 1         POD #1 co of pain as expected minimal drainage.03/01/17 at 11:28 AM by Shawn Cortez MD  Treatment plans discussed with dr hammond yesterday.03/01/17 at 11:29 AM by Shawn Cortez MD       Electronically signed by Shawn Cortez MD at 3/1/2017 11:31 AM        Consult Notes (last 24 hours) (Notes from 3/1/2017 10:51 AM through 3/2/2017 10:51 AM)     No notes of this type exist for this encounter.

## 2017-03-02 NOTE — PROGRESS NOTES
[facility] progress note    HPI: Patient is a 51-year-old male who is status post incision for drainage of a abscess.   His continued to do well after surgery and the abscess continues to drain discussed this with Dr. Seth and advised that this is going to need to continue to drain an order for this patient not to become septic.  We'll continue him on vancomycin for the time being.  He states that his edema has gotten significantly better with his increased dose of Lasix.  The following portions of the patient's history were reviewed and updated as appropriate: allergies, current medications, past family history, past medical history, past social history, past surgical history and problem list.    Review Of Systems:  Review of Systems   Constitution: Positive for weakness. Negative for decreased appetite, fever and malaise/fatigue.   HENT: Negative for congestion, ear discharge, ear pain, headaches, hearing loss, hoarse voice, nosebleeds and sore throat.    Eyes: Negative for blurred vision, discharge, double vision, pain, photophobia, visual disturbance and visual halos.   Cardiovascular: Negative for chest pain, claudication, dyspnea on exertion, leg swelling, near-syncope, palpitations and paroxysmal nocturnal dyspnea.   Respiratory: Negative for cough, hemoptysis, shortness of breath, snoring, sputum production and wheezing.    Endocrine: Negative for cold intolerance, heat intolerance, polydipsia, polyphagia and polyuria.   Skin: Negative for color change, flushing, itching, nail changes and rash.   Musculoskeletal: Positive for arthritis, back pain, joint pain, muscle weakness and stiffness. Negative for joint swelling, muscle cramps and neck pain.   Gastrointestinal: Negative for bloating, abdominal pain, anorexia, change in bowel habit, bowel incontinence, constipation, diarrhea, dysphagia, heartburn, hematochezia, nausea and vomiting.   Genitourinary: Negative for flank pain, frequency,  hematuria, hesitancy, nocturia and urgency.   Neurological: Negative for dizziness, focal weakness, loss of balance, numbness, paresthesias and sensory change.   Psychiatric/Behavioral: Negative for altered mental status, depression, memory loss, substance abuse and thoughts of violence. The patient does not have insomnia and is not nervous/anxious.    Allergic/Immunologic: Negative for environmental allergies, HIV exposure, hives and persistent infections.       LABS:   Recent Results (from the past 24 hour(s))   Comprehensive Metabolic Panel    Collection Time: 03/02/17  5:22 AM   Result Value Ref Range    Glucose 70 60 - 100 mg/dL    BUN 9 7 - 21 mg/dL    Creatinine 1.44 (H) 0.70 - 1.30 mg/dL    Sodium 136 (L) 137 - 145 mmol/L    Potassium 3.3 (L) 3.5 - 5.1 mmol/L    Chloride 104 95 - 110 mmol/L    CO2 29.0 22.0 - 31.0 mmol/L    Calcium 8.0 (L) 8.4 - 10.2 mg/dL    Total Protein 5.3 (L) 6.3 - 8.6 g/dL    Albumin 2.10 (L) 3.40 - 4.80 g/dL    ALT (SGPT) 30 21 - 72 U/L    AST (SGOT) 37 17 - 59 U/L    Alkaline Phosphatase 76 38 - 126 U/L    Total Bilirubin 2.2 (H) 0.2 - 1.3 mg/dL    eGFR Non African Amer 51 (L) >60 mL/min/1.73    Globulin 3.2 2.3 - 3.5 gm/dL    A/G Ratio 0.7 (L) 1.1 - 1.8 g/dL    BUN/Creatinine Ratio 6.3 (L) 7.0 - 25.0    Anion Gap 3.0 (L) 5.0 - 15.0 mmol/L   CBC Auto Differential    Collection Time: 03/02/17  5:22 AM   Result Value Ref Range    WBC 7.11 3.20 - 9.80 10*3/mm3    RBC 2.82 (L) 4.37 - 5.74 10*6/mm3    Hemoglobin 9.9 (L) 13.7 - 17.3 g/dL    Hematocrit 28.8 (L) 39.0 - 49.0 %    .1 (H) 80.0 - 98.0 fL    MCH 35.1 (H) 26.5 - 34.0 pg    MCHC 34.4 31.5 - 36.3 g/dL    RDW 16.3 (H) 11.5 - 14.5 %    RDW-SD 60.4 (H) 35.1 - 43.9 fl    MPV 10.3 8.0 - 12.0 fL    Platelets 81 (L) 150 - 450 10*3/mm3    Neutrophil % 40.3 37.0 - 80.0 %    Lymphocyte % 36.1 10.0 - 50.0 %    Monocyte % 12.2 (H) 0.0 - 12.0 %    Eosinophil % 8.3 (H) 0.0 - 7.0 %    Basophil % 2.8 (H) 0.0 - 2.0 %    Immature Grans % 0.3  0.0 - 0.5 %    Neutrophils, Absolute 2.86 2.00 - 8.60 10*3/mm3    Lymphocytes, Absolute 2.57 0.60 - 4.20 10*3/mm3    Monocytes, Absolute 0.87 0.00 - 0.90 10*3/mm3    Eosinophils, Absolute 0.59 0.00 - 0.70 10*3/mm3    Basophils, Absolute 0.20 0.00 - 0.20 10*3/mm3    Immature Grans, Absolute 0.02 0.00 - 0.02 10*3/mm3   ]  Physical Exam:  Vitals  Vitals:    03/02/17 0839   BP: 132/70   Pulse: 77   Resp: 18   Temp: 98 °F (36.7 °C)   SpO2: 94%       Exam:  Physical Exam   Constitutional: He is oriented to person, place, and time. He appears well-developed and well-nourished. No distress.   HENT:   Head: Normocephalic and atraumatic.   Right Ear: External ear normal.   Left Ear: External ear normal.   Nose: Nose normal.   Mouth/Throat: Oropharynx is clear and moist.   Eyes: Conjunctivae and EOM are normal. Pupils are equal, round, and reactive to light. Right eye exhibits no discharge. Left eye exhibits no discharge. No scleral icterus.   Neck: No JVD present. No thyromegaly present.   Cardiovascular: Normal rate, regular rhythm, normal heart sounds and intact distal pulses.  Exam reveals no gallop and no friction rub.    No murmur heard.  Pulmonary/Chest: Effort normal. No stridor. No respiratory distress. He has no wheezes. He has no rales.   Abdominal: Soft. Bowel sounds are normal. He exhibits no distension and no mass. There is no tenderness. No hernia.   Musculoskeletal: Normal range of motion. He exhibits no edema, tenderness or deformity.   Lymphadenopathy:     He has no cervical adenopathy.   Neurological: He is alert and oriented to person, place, and time. He has normal reflexes. He displays normal reflexes. No cranial nerve deficit. He exhibits normal muscle tone. Coordination normal.   Skin: Skin is warm and dry. No rash noted. He is not diaphoretic. No erythema.    Open wound on right arm   Psychiatric: He has a normal mood and affect. His behavior is normal. Judgment and thought content normal.        Assessment:  Active Hospital Problems (** Indicates Principal Problem)    Diagnosis Date Noted   • Hypokalemia [E87.6] 03/02/2017   • Acute cystitis without hematuria [N30.00] 02/28/2017   • Chronic osteomyelitis of right shoulder region [M86.611] 02/24/2017   • Closed fracture of proximal end of right humerus with delayed healing [S42.201G] 01/20/2017   • Closed nondisplaced supracondylar fracture of distal end of femur without intracondylar extension with delayed healing [S72.456G] 01/20/2017   • Chronic hepatitis [K73.9] 06/18/2009   • Hepatic cirrhosis [K74.60] 05/26/2009   • Essential hypertension [I10] 12/01/2008      Resolved Hospital Problems    Diagnosis Date Noted Date Resolved   • Hyperammonemia [E72.20] 02/20/2017 02/24/2017       Plan:     To continue on vancomycin and Zosyn for his urinary tract infection and osteomyelitis.  We'll continue to replace this patient's potassium increase his dosage to 20 3 times a day and follow his levels.

## 2017-03-02 NOTE — PLAN OF CARE
Problem: Patient Care Overview (Adult)  Goal: Plan of Care Review  Outcome: Ongoing (interventions implemented as appropriate)    03/02/17 0900 03/02/17 1435   Patient Care Overview   Progress --  progress toward functional goals as expected   Outcome Evaluation   Outcome Summary/Follow up Plan --  bed to chair with cga of 1,20 reps of ex-may benefit from cont therapy at snf   Coping/Psychosocial Response Interventions   Plan Of Care Reviewed With patient --        Goal: Adult Individualization and Mutuality    03/02/17 1538   Individualization   Patient Specific Preferences pt prefers pm treatments       Goal: Discharge Needs Assessment  Outcome: Ongoing (interventions implemented as appropriate)    02/21/17 1830 02/28/17 1539 03/01/17 1330   Discharge Needs Assessment   Concerns To Be Addressed --  --  --    Readmission Within The Last 30 Days no previous admission in last 30 days --  --    Equipment Needed After Discharge none --  --    Discharge Facility/Level Of Care Needs --  --  --    Discharge Planning Comments --  Pt needs 4 weeks IV antx, LTACH vs nursing facility discussed. --    Current Health   Anticipated Changes Related to Illness none --  --    Self-Care   Equipment Currently Used at Home --  --  crutches;hospital bed;prosthesis;commode;shower chair;wheelchair;walker, rolling  (Pt mostly uses crutches for ambulation)   Living Environment   Transportation Available --  ambulance --      03/01/17 1656 03/02/17 0557   Discharge Needs Assessment   Concerns To Be Addressed --  no discharge needs identified   Readmission Within The Last 30 Days --  --    Equipment Needed After Discharge --  --    Discharge Facility/Level Of Care Needs LTACH ([facility] acute [facility]);nursing facility, skilled --    Discharge Planning Comments --  --    Current Health   Anticipated Changes Related to Illness --  --    Self-Care   Equipment Currently Used at Home --  --    Living Environment   Transportation Available  --  --          Problem: Inpatient Physical Therapy  Goal: Transfer Training Goal 1 LTG- PT  Outcome: Ongoing (interventions implemented as appropriate)    03/01/17 1330 03/02/17 1538   Transfer Training PT LTG   Transfer Training PT LTG, Date Established 03/01/17 --    Transfer Training PT LTG, Time to Achieve by discharge --    Transfer Training PT LTG, Activity Type bed to chair /chair to bed;sit to stand/stand to sit;toilet --    Transfer Training PT LTG, [place] Level conditional independence --    Transfer Training PT LTG, Assist Device (AAD) --    Transfer Training PT LTG, Date Goal Reviewed --  03/02/17   Transfer Training PT LTG, Outcome --  goal not met   Transfer Training PT LTG, Reason Goal Not Met --  progress slower than expected       Goal: Gait Training Goal STG- PT  Outcome: Ongoing (interventions implemented as appropriate)    03/01/17 1330 03/02/17 1435   Gait Training PT STG   Gait Training Goal PT STG, Date Established 03/01/17 --    Gait Training Goal PT STG, Time to Achieve 3 days --    Gait Training Goal PT STG, [place] Level moderate assist (50% patient effort);minimum assist (75% patient effort) --    Gait Training Goal PT STG, Assist Device (AAD) --    Gait Training Goal PT STG, Distance to Achieve 5 feet --    Gait Training Goal PT STG, Date Goal Reviewed --  03/02/17   Gait Training Goal PT STG, Outcome --  goal not met   Gait Training Goal PT STG, Reason Goal Not Met --  progress slower than expected       Goal: Gait Training Goal LTG- PT  Outcome: Ongoing (interventions implemented as appropriate)    03/01/17 1330 03/02/17 1435   Gait Training PT LTG   Gait Training Goal PT LTG, Date Established 03/01/17 --    Gait Training Goal PT LTG, Time to Achieve by discharge --    Gait Training Goal PT LTG, [place] Level contact guard assist --    Gait Training Goal PT LTG, Assist Device (AAD) --    Gait Training Goal PT LTG, Distance to Achieve 20 feet --    Gait Training Goal PT  LTG, Date Goal Reviewed --  03/02/17   Gait Training Goal PT LTG, Outcome --  goal not met   Gait Training Goal PT LTG, Reason Goal Not Met --  progress slower than expected       Goal: Wheelchair Propulsion Goal LTG- PT  Outcome: Ongoing (interventions implemented as appropriate)    03/01/17 1330 03/02/17 1435   Wheelchair Propulsion PT LTG   Wheelchair Propulsion Goal PT LTG, Date Established 03/01/17 --    Wheelchair Propulsion Goal PT LTG, Time to Achieve by discharge --    Wheelchair Propulsion Goal PT LTG, [place] Level conditional independence --    Wheelchair Propulsion Goal PT LTG, Distance to Achieve 150 feet --    Wheelchair Propulsion Goal PT LTG, Date Goal Reviewed --  03/02/17   Wheelchair Propulsion Goal PT LTG, Outcome --  goal not met   Wheelchair Propulsion Goal PT LTG, Reason Goal Not Met --  progress slower than expected

## 2017-03-03 ENCOUNTER — APPOINTMENT (OUTPATIENT)
Dept: GENERAL RADIOLOGY | Facility: HOSPITAL | Age: 52
End: 2017-03-03

## 2017-03-03 LAB
ALBUMIN SERPL-MCNC: 2.2 G/DL (ref 3.4–4.8)
ALBUMIN/GLOB SERPL: 0.7 G/DL (ref 1.1–1.8)
ALP SERPL-CCNC: 76 U/L (ref 38–126)
ALT SERPL W P-5'-P-CCNC: 23 U/L (ref 21–72)
ANION GAP SERPL CALCULATED.3IONS-SCNC: 4 MMOL/L (ref 5–15)
ANISOCYTOSIS BLD QL: NORMAL
AST SERPL-CCNC: 36 U/L (ref 17–59)
BASOPHILS # BLD MANUAL: 0.07 10*3/MM3 (ref 0–0.2)
BASOPHILS NFR BLD AUTO: 1 % (ref 0–2)
BILIRUB SERPL-MCNC: 2.6 MG/DL (ref 0.2–1.3)
BUN BLD-MCNC: 9 MG/DL (ref 7–21)
BUN/CREAT SERPL: 6.6 (ref 7–25)
CALCIUM SPEC-SCNC: 7.9 MG/DL (ref 8.4–10.2)
CHLORIDE SERPL-SCNC: 101 MMOL/L (ref 95–110)
CO2 SERPL-SCNC: 29 MMOL/L (ref 22–31)
CREAT BLD-MCNC: 1.36 MG/DL (ref 0.7–1.3)
DEPRECATED RDW RBC AUTO: 62.2 FL (ref 35.1–43.9)
EOSINOPHIL # BLD MANUAL: 0.39 10*3/MM3 (ref 0–0.7)
EOSINOPHIL NFR BLD MANUAL: 6 % (ref 0–7)
ERYTHROCYTE [DISTWIDTH] IN BLOOD BY AUTOMATED COUNT: 16.6 % (ref 11.5–14.5)
GFR SERPL CREATININE-BSD FRML MDRD: 55 ML/MIN/1.73 (ref 60–130)
GLOBULIN UR ELPH-MCNC: 3 GM/DL (ref 2.3–3.5)
GLUCOSE BLD-MCNC: 97 MG/DL (ref 60–100)
HCT VFR BLD AUTO: 28.9 % (ref 39–49)
HGB BLD-MCNC: 10 G/DL (ref 13.7–17.3)
LYMPHOCYTES # BLD MANUAL: 2.28 10*3/MM3 (ref 0.6–4.2)
LYMPHOCYTES NFR BLD MANUAL: 35 % (ref 10–50)
LYMPHOCYTES NFR BLD MANUAL: 5 % (ref 0–12)
MACROCYTES BLD QL SMEAR: NORMAL
MCH RBC QN AUTO: 35.5 PG (ref 26.5–34)
MCHC RBC AUTO-ENTMCNC: 34.6 G/DL (ref 31.5–36.3)
MCV RBC AUTO: 102.5 FL (ref 80–98)
MONOCYTES # BLD AUTO: 0.33 10*3/MM3 (ref 0–0.9)
NEUTROPHILS # BLD AUTO: 3.32 10*3/MM3 (ref 2–8.6)
NEUTROPHILS NFR BLD MANUAL: 50 % (ref 37–80)
NEUTS BAND NFR BLD MANUAL: 1 % (ref 0–5)
NT-PROBNP SERPL-MCNC: 992 PG/ML (ref 0–900)
PLATELET # BLD AUTO: 75 10*3/MM3 (ref 150–450)
PMV BLD AUTO: 10.2 FL (ref 8–12)
POLYCHROMASIA BLD QL SMEAR: NORMAL
POTASSIUM BLD-SCNC: 3.3 MMOL/L (ref 3.5–5.1)
PROT SERPL-MCNC: 5.2 G/DL (ref 6.3–8.6)
RBC # BLD AUTO: 2.82 10*6/MM3 (ref 4.37–5.74)
SMALL PLATELETS BLD QL SMEAR: NORMAL
SODIUM BLD-SCNC: 134 MMOL/L (ref 137–145)
VANCOMYCIN SERPL-MCNC: 14.65 MCG/ML
VARIANT LYMPHS NFR BLD MANUAL: 2 % (ref 0–5)
WBC MORPH BLD: NORMAL
WBC NRBC COR # BLD: 6.5 10*3/MM3 (ref 3.2–9.8)

## 2017-03-03 PROCEDURE — 83880 ASSAY OF NATRIURETIC PEPTIDE: CPT | Performed by: FAMILY MEDICINE

## 2017-03-03 PROCEDURE — 80053 COMPREHEN METABOLIC PANEL: CPT | Performed by: INTERNAL MEDICINE

## 2017-03-03 PROCEDURE — 85007 BL SMEAR W/DIFF WBC COUNT: CPT | Performed by: INTERNAL MEDICINE

## 2017-03-03 PROCEDURE — 71020 HC CHEST PA AND LATERAL: CPT

## 2017-03-03 PROCEDURE — 85025 COMPLETE CBC W/AUTO DIFF WBC: CPT | Performed by: INTERNAL MEDICINE

## 2017-03-03 PROCEDURE — 25010000002 VANCOMYCIN PER 500 MG: Performed by: FAMILY MEDICINE

## 2017-03-03 PROCEDURE — 80202 ASSAY OF VANCOMYCIN: CPT | Performed by: FAMILY MEDICINE

## 2017-03-03 PROCEDURE — 25010000002 PIPERACILLIN SOD-TAZOBACTAM PER 1 G: Performed by: FAMILY MEDICINE

## 2017-03-03 PROCEDURE — 97110 THERAPEUTIC EXERCISES: CPT

## 2017-03-03 RX ORDER — POTASSIUM CHLORIDE 20 MEQ/1
40 TABLET, EXTENDED RELEASE ORAL 2 TIMES DAILY WITH MEALS
Status: DISCONTINUED | OUTPATIENT
Start: 2017-03-03 | End: 2017-03-09 | Stop reason: HOSPADM

## 2017-03-03 RX ADMIN — OXYCODONE HYDROCHLORIDE AND ACETAMINOPHEN 1 TABLET: 10; 325 TABLET ORAL at 17:31

## 2017-03-03 RX ADMIN — GABAPENTIN 100 MG: 100 CAPSULE ORAL at 17:30

## 2017-03-03 RX ADMIN — TAZOBACTAM SODIUM AND PIPERACILLIN SODIUM 3.38 G: 375; 3 INJECTION, SOLUTION INTRAVENOUS at 22:49

## 2017-03-03 RX ADMIN — GABAPENTIN 100 MG: 100 CAPSULE ORAL at 20:36

## 2017-03-03 RX ADMIN — VANCOMYCIN HYDROCHLORIDE 250 MG: 1 INJECTION, POWDER, LYOPHILIZED, FOR SOLUTION INTRAVENOUS at 01:32

## 2017-03-03 RX ADMIN — FUROSEMIDE 40 MG: 40 TABLET ORAL at 08:45

## 2017-03-03 RX ADMIN — VANCOMYCIN HYDROCHLORIDE 250 MG: 1 INJECTION, POWDER, LYOPHILIZED, FOR SOLUTION INTRAVENOUS at 23:45

## 2017-03-03 RX ADMIN — OXYCODONE HYDROCHLORIDE AND ACETAMINOPHEN 1 TABLET: 10; 325 TABLET ORAL at 13:10

## 2017-03-03 RX ADMIN — POTASSIUM CHLORIDE 40 MEQ: 20 TABLET, EXTENDED RELEASE ORAL at 17:31

## 2017-03-03 RX ADMIN — OXYCODONE HYDROCHLORIDE AND ACETAMINOPHEN 1 TABLET: 10; 325 TABLET ORAL at 20:36

## 2017-03-03 RX ADMIN — POTASSIUM CHLORIDE 40 MEQ: 20 TABLET, EXTENDED RELEASE ORAL at 08:45

## 2017-03-03 RX ADMIN — VANCOMYCIN HYDROCHLORIDE 250 MG: 1 INJECTION, POWDER, LYOPHILIZED, FOR SOLUTION INTRAVENOUS at 13:10

## 2017-03-03 RX ADMIN — OXYCODONE HYDROCHLORIDE AND ACETAMINOPHEN 1 TABLET: 10; 325 TABLET ORAL at 08:49

## 2017-03-03 RX ADMIN — GABAPENTIN 100 MG: 100 CAPSULE ORAL at 08:45

## 2017-03-03 RX ADMIN — RIFAXIMIN 550 MG: 550 TABLET ORAL at 17:30

## 2017-03-03 RX ADMIN — VANCOMYCIN HYDROCHLORIDE 250 MG: 1 INJECTION, POWDER, LYOPHILIZED, FOR SOLUTION INTRAVENOUS at 05:18

## 2017-03-03 RX ADMIN — RIFAXIMIN 550 MG: 550 TABLET ORAL at 08:45

## 2017-03-03 RX ADMIN — Medication: at 17:34

## 2017-03-03 RX ADMIN — FUROSEMIDE 40 MG: 40 TABLET ORAL at 17:31

## 2017-03-03 RX ADMIN — VANCOMYCIN HYDROCHLORIDE 1500 MG: 1 INJECTION, POWDER, LYOPHILIZED, FOR SOLUTION INTRAVENOUS at 11:31

## 2017-03-03 RX ADMIN — Medication 1 TABLET: at 08:45

## 2017-03-03 RX ADMIN — VANCOMYCIN HYDROCHLORIDE 250 MG: 1 INJECTION, POWDER, LYOPHILIZED, FOR SOLUTION INTRAVENOUS at 17:31

## 2017-03-03 RX ADMIN — TAZOBACTAM SODIUM AND PIPERACILLIN SODIUM 3.38 G: 375; 3 INJECTION, SOLUTION INTRAVENOUS at 13:22

## 2017-03-03 RX ADMIN — LACTULOSE 30 G: 10 SOLUTION ORAL at 08:45

## 2017-03-03 RX ADMIN — TAZOBACTAM SODIUM AND PIPERACILLIN SODIUM 3.38 G: 375; 3 INJECTION, SOLUTION INTRAVENOUS at 04:35

## 2017-03-03 RX ADMIN — OXYCODONE HYDROCHLORIDE AND ACETAMINOPHEN 1 TABLET: 10; 325 TABLET ORAL at 23:45

## 2017-03-03 RX ADMIN — OXYCODONE HYDROCHLORIDE AND ACETAMINOPHEN 1 TABLET: 10; 325 TABLET ORAL at 04:35

## 2017-03-03 RX ADMIN — TAZOBACTAM SODIUM AND PIPERACILLIN SODIUM 3.38 G: 375; 3 INJECTION, SOLUTION INTRAVENOUS at 17:31

## 2017-03-03 NOTE — PROGRESS NOTES
Acute Care - Physical Therapy Treatment Note  [facility]     Patient Name: Armando Mcmullen  : 1965  MRN: [id number]  Today's Date: 3/3/2017  Onset of Illness/Injury or Date of Surgery Date: 17 (s/p I&D R shoulder on 17)  Date of Referral to PT: 17  Referring Physician: Dr. Aguero    Admit Date: 2017    Visit Dx:    ICD-10-CM ICD-9-CM   1. Hyperammonemia E72.20 270.6   2. Urinary tract infection, site unspecified N39.0    3. Functional diarrhea K59.1 564.5   4. Impaired functional mobility, balance, and endurance Z74.09 V49.89   5. Impaired gait and mobility R26.89 781.2     Patient Active Problem List   Diagnosis   • Anxiety state   • Back pain   • Furuncle of buttock   • Chronic hepatitis   • Hepatic cirrhosis   • Depression   • Hypersplenism   • Insomnia   • Mononeuritis   • Osteoarthritis   • Essential hypertension   • Closed fracture of proximal end of right humerus with delayed healing   • Closed nondisplaced supracondylar fracture of distal end of femur without intracondylar extension with delayed healing   • History of substance abuse   • Chronic osteomyelitis of right shoulder region   • Acute cystitis without hematuria   • Hypokalemia               Adult Rehabilitation Note       17 1403 17 1435       Rehab Assessment/Intervention    Discipline physical therapy assistant  -LN physical therapy assistant  -LN     Document Type therapy note (daily note)  -LN therapy note (daily note)  -LN     Subjective Information agree to therapy;complains of;pain  -LN agree to therapy;complains of;pain  -LN     Patient Effort, Rehab Treatment good   declined w/c mobs and oob this rx  -LN      Precautions/Limitations fall precautions   contact,c-diff  -LN fall precautions  -LN     Recorded by [LN] Stephanie Du, PTA [LN] Stephanie Du, PTA     Vital Signs    Pre Systolic BP Rehab 120  -  -LN     Pre Treatment Diastolic BP 64  -LN 60  -LN     Post Systolic BP Rehab 130  -LN  120  -LN     Post Treatment Diastolic BP 60  -LN 70  -LN     Pretreatment Heart Rate (beats/min) 78  -LN 73  -LN     Posttreatment Heart Rate (beats/min) 83  -LN 83  -LN     Pre SpO2 (%) 98  -LN 97  -LN     O2 Delivery Pre Treatment  room air  -LN     Post SpO2 (%) 96  -LN 97  -LN     Pre Patient Position Supine  -LN      Intra Patient Position Sitting  -LN      Post Patient Position Supine  -LN      Recorded by [LN] Stephanie Du PTA [LN] Stephanie Du PTA     Pain Assessment    Pain Assessment 0-10  -LN 0-10  -LN     Pain Score 7  -LN 7  -LN     Post Pain Score 7  -LN 7  -LN     Pain Type Acute pain  -LN Acute pain  -LN     Pain Location Shoulder  -LN Shoulder  -LN     Pain Orientation Right  -LN Right  -LN     Recorded by [LN] Stephanie Du PTA [LN] Stephanie Du PTA     Cognitive Assessment/Intervention    Orientation Status oriented to;person     -LN oriented x 4  -LN     Recorded by [LN] Stephanie Du, JULITA [LN] Stephanie Du PTA     Bed Mobility, Assessment/Treatment    Bed Mobility, Assistive Device head of bed elevated;bed rails  -LN bed rails;head of bed elevated  -LN     Bed Mob, Supine to Sit, [place] conditional independence  -LN conditional independence  -LN     Bed Mob, Sit to Supine, [place] conditional independence  -LN      Recorded by [LN] Stephanie Du, JULITA [LN] Stephanie Du PTA     Transfer Assessment/Treatment    Transfers, Bed-Chair [place]  contact guard assist   sit pivot to chair  -LN     Transfers, Bed-Chair-Bed, Assist Device  --   none  -LN     Recorded by  [LN] Stephanie Du PTA     Gait Assessment/Treatment    Gait, Comment --   deferred no prosthesis  -LN deferred no prosthesis  -LN     Recorded by [LN] Stephanie Du, JULITA [LN] Stephanie Du PTA     Therapy Exercises    Right Lower Extremity  AROM:;20 reps;sitting;ankle pumps/circles;hip abduction/adduction;hip flexion;LAQ   with 2#  -LN     Left Lower Extremity  AROM:;20 reps;sitting;hip abduction/adduction;hip  flexion;LAQ  -LN     Bilateral Lower Extremities AROM:;20 reps;sitting;hip abduction/adduction;hip flexion;LAQ   with 2# on right;ankle pumps r le  -LN      Recorded by [LN] Stephanie Du, PTA [LN] Stephanie Du PTA     Positioning and Restraints    Post Treatment Position chair  -LN chair  -LN     In Bed supine;call light within reach;encouraged to call for assist  -LN      In Chair  sitting;call light within reach;encouraged to call for assist;legs elevated  -LN     Recorded by [LN] Stephanie Du, PTA [LN] Stephanie Du, JULITA       User Key  (r) = Recorded By, (t) = Taken By, (c) = Cosigned By    Initials Name Effective Dates    LN Stephanie Du PTA 10/17/16 -                 IP PT Goals       03/03/17 1437 03/03/17 1403 03/02/17 1538    Transfer Training PT LTG    Transfer Training PT  LTG, Date Goal Reviewed 03/03/17  -LN  03/02/17  -LN    Transfer Training PT LTG, Outcome goal not met  -LN  goal not met  -LN    Transfer Training PT LTG, Reason Goal Not Met progress slower than expected  -LN  progress slower than expected  -LN    Gait Training PT STG    Gait Training Goal PT STG, Date Goal Reviewed  03/03/17  -LN     Gait Training Goal PT STG, Outcome  goal not met  -LN     Gait Training Goal PT STG, Reason Goal Not Met  progress slower than expected  -LN     Gait Training PT LTG    Gait Training Goal PT LTG, Date Goal Reviewed  03/03/17  -LN     Gait Training Goal PT LTG, Outcome  goal not met  -LN     Gait Training Goal PT LTG, Reason Goal Not Met  progress slower than expected  -LN     Wheelchair Propulsion PT LTG    Wheelchair Propulsion Goal PT LTG, Date Goal Reviewed  03/03/17  -LN     Wheelchair Propulsion Goal PT LTG, Outcome  goal not met  -LN     Wheelchair Propulsion Goal PT LTG, Reason Goal Not Met  progress slower than expected  -LN       03/02/17 1435 03/01/17 1330       Transfer Training PT LTG    Transfer Training PT LTG, Date Established  03/01/17  -KG     Transfer Training PT LTG, Time to Achieve   by discharge  -KG     Transfer Training PT LTG, Activity Type  bed to chair /chair to bed;sit to stand/stand to sit;toilet  -KG     Transfer Training PT LTG, [place] Level  conditional independence  -KG     Transfer Training PT LTG, Assist Device  --   AAD  -KG     Gait Training PT STG    Gait Training Goal PT STG, Date Established  03/01/17  -KG     Gait Training Goal PT STG, Time to Achieve  3 days  -KG     Gait Training Goal PT STG, [place] Level  moderate assist (50% patient effort);minimum assist (75% patient effort)  -KG     Gait Training Goal PT STG, Assist Device  --   AAD  -KG     Gait Training Goal PT STG, Distance to Achieve  5 feet  -KG     Gait Training Goal PT STG, Date Goal Reviewed 03/02/17  -LN      Gait Training Goal PT STG, Outcome goal not met  -LN      Gait Training Goal PT STG, Reason Goal Not Met progress slower than expected  -LN      Gait Training PT LTG    Gait Training Goal PT LTG, Date Established  03/01/17  -KG     Gait Training Goal PT LTG, Time to Achieve  by discharge  -KG     Gait Training Goal PT LTG, [place] Level  contact guard assist  -KG     Gait Training Goal PT LTG, Assist Device  --   AAD  -KG     Gait Training Goal PT LTG, Distance to Achieve  20 feet  -KG     Gait Training Goal PT LTG, Date Goal Reviewed 03/02/17  -LN      Gait Training Goal PT LTG, Outcome goal not met  -LN      Gait Training Goal PT LTG, Reason Goal Not Met progress slower than expected  -LN      Wheelchair Propulsion PT LTG    Wheelchair Propulsion Goal PT LTG, Date Established  03/01/17  -KG     Wheelchair Propulsion Goal PT LTG, Time to Achieve  by discharge  -KG     Wheelchair Propulsion Goal PT LTG, [place] Level  conditional independence  -KG     Wheelchair Propulsion Goal PT LTG, Distance to Achieve  150 feet  -KG     Wheelchair Propulsion Goal PT LTG, Date Goal Reviewed 03/02/17  -LN      Wheelchair Propulsion Goal PT LTG, Outcome goal not met  -LN      Wheelchair Propulsion  Goal PT LTG, Reason Goal Not Met progress slower than expected  -LN        User Key  (r) = Recorded By, (t) = Taken By, (c) = Cosigned By    Initials Name Provider Type    KG Ailyn Nava, PT Physical Therapist    LN Stephanie Du, JULITA Physical Therapy Assistant          Physical Therapy Education     Title: PT OT SLP Therapies (Active)     Topic: Physical Therapy (Active)     Point: Mobility training (Active)    Learning Progress Summary    Learner Readiness Method Response Comment Documented by Status   Patient Acceptance E NR  LN 03/03/17 1437 Active    Acceptance E NR  LN 03/02/17 1537 Active    Acceptance E NR  KG 03/01/17 1330 Active               Point: Home exercise program (Active)    Learning Progress Summary    Learner Readiness Method Response Comment Documented by Status   Patient Acceptance E NR  LN 03/03/17 1437 Active    Acceptance E NR  LN 03/02/17 1537 Active    Acceptance E,D,TB NR LAQ, heels slides, ankle pumps KG 03/01/17 1330 Active               Point: Precautions (Active)    Learning Progress Summary    Learner Readiness Method Response Comment Documented by Status   Patient Acceptance E NR  LN 03/03/17 1437 Active    Acceptance E NR  LN 03/02/17 1537 Active                      User Key     Initials Effective Dates Name Provider Type Discipline    KG 10/17/16 -  Ailyn Nava, PT Physical Therapist PT    LN 10/17/16 -  Stephanie Du, JULITA Physical Therapy Assistant PT                    PT Recommendation and Plan  Anticipated Discharge Disposition: skilled nursing facility  Planned Therapy Interventions: bed mobility training, gait training, home exercise program, strengthening, transfer training  PT Frequency: other (see comments) (5-14 times/wk)  Plan of Care Review  Progress: progress toward functional goals as expected  Outcome Summary/Follow up Plan: pt performed ther ex with vss,declined oob to w/c this pm,would benefit from cont therapy at snf prior to d/c home          Outcome  Measures       03/03/17 1403 03/02/17 1435 03/01/17 1330    How much help from another person do you currently need...    Turning from your back to your side while in flat bed without using bedrails? 4  -LN 4  -LN 4  -KG    Moving from lying on back to sitting on the side of a flat bed without bedrails? 4  -LN 4  -LN 4  -KG    Moving to and from a bed to a chair (including a wheelchair)? 3  -LN 3  -LN 3  -KG    Standing up from a chair using your arms (e.g., wheelchair, bedside chair)? 2  -LN 2  -LN 3  -KG    Climbing 3-5 steps with a railing? 1  -LN 1  -LN 1  -KG    To walk in hospital room? 2  -LN 2  -LN 2  -KG    AM-PAC 6 Clicks Score 16  -LN 16  -LN 17  -KG    Functional Assessment    Outcome Measure Options AM-PAC 6 Clicks Basic Mobility (PT)  -LN AM-PAC 6 Clicks Basic Mobility (PT)  -LN AM-PAC 6 Clicks Basic Mobility (PT)  -KG      User Key  (r) = Recorded By, (t) = Taken By, (c) = Cosigned By    Initials Name Provider Type    KG Ailyn Nava, PT Physical Therapist    LN Stephanie Du PTA Physical Therapy Assistant           Time Calculation:         PT Charges       03/03/17 1403          Time Calculation    Start Time 1403  -LN      Stop Time 1421  -LN      Time Calculation (min) 18 min  -LN      PT Received On 03/03/17  -LN      Time Calculation- PT    Total Timed Code Minutes- PT 18 minute(s)  -LN        User Key  (r) = Recorded By, (t) = Taken By, (c) = Cosigned By    Initials Name Provider Type    LN Stephanie BHARTI Du PTA Physical Therapy Assistant          Therapy Charges for Today     Code Description Service Date Service Provider Modifiers Qty    87594034759 HC NSG/PT ASSIST 3/2/2017 Stephanie Du PTA  1    04904583412 HC PT THER PROC EA 15 MIN 3/2/2017 Stephanie S Florian, PTA GP 1    76667895984 HC PT THERAPEUTIC ACT EA 15 MIN 3/2/2017 Stephanie S Florian, PTA GP 1    87664297543 HC PT THER PROC EA 15 MIN 3/3/2017 Stephanie S Florian, PTA GP 1          PT G-Codes  PT Professional Judgement Used?: Yes  Outcome Measure  Options: AM-PAC 6 Clicks Basic Mobility (PT)  Score: 17  Functional Limitation: Mobility: Walking and moving around  Mobility: Walking and Moving Around Current Status (): At least 40 percent but less than 60 percent impaired, limited or restricted  Mobility: Walking and Moving Around Goal Status (): At least 1 percent but less than 20 percent impaired, limited or restricted    Stephanie Du, PTA  3/3/2017

## 2017-03-03 NOTE — PROGRESS NOTES
[facility] progress note    HPI: Patient is a 51-year-old male who is status post incision for drainage of a abscess.     The abscess continues to drain nicely and patient continues to do well on his antibiotics.  He is concerned about swelling and edema today we had recently increased his Lasix will see how his BNP is doing at this time.    The following portions of the patient's history were reviewed and updated as appropriate: allergies, current medications, past family history, past medical history, past social history, past surgical history and problem list.    Review Of Systems:  Review of Systems   Constitution: Positive for weakness. Negative for decreased appetite, fever and malaise/fatigue.   HENT: Negative for congestion, ear discharge, ear pain, headaches, hearing loss, hoarse voice, nosebleeds and sore throat.    Eyes: Negative for blurred vision, discharge, double vision, pain, photophobia, visual disturbance and visual halos.   Cardiovascular: Negative for chest pain, claudication, dyspnea on exertion, leg swelling, near-syncope, palpitations and paroxysmal nocturnal dyspnea.   Respiratory: Negative for cough, hemoptysis, shortness of breath, snoring, sputum production and wheezing.    Endocrine: Negative for cold intolerance, heat intolerance, polydipsia, polyphagia and polyuria.   Skin: Negative for color change, flushing, itching, nail changes and rash.   Musculoskeletal: Positive for arthritis, back pain, joint pain, muscle weakness and stiffness. Negative for joint swelling, muscle cramps and neck pain.   Gastrointestinal: Negative for bloating, abdominal pain, anorexia, change in bowel habit, bowel incontinence, constipation, diarrhea, dysphagia, heartburn, hematochezia, nausea and vomiting.   Genitourinary: Negative for flank pain, frequency, hematuria, hesitancy, nocturia and urgency.   Neurological: Negative for dizziness, focal weakness, loss of balance, numbness, paresthesias and  sensory change.   Psychiatric/Behavioral: Negative for altered mental status, depression, memory loss, substance abuse and thoughts of violence. The patient does not have insomnia and is not nervous/anxious.    Allergic/Immunologic: Negative for environmental allergies, HIV exposure, hives and persistent infections.       LABS:   Recent Results (from the past 24 hour(s))   Comprehensive Metabolic Panel    Collection Time: 03/03/17  6:30 AM   Result Value Ref Range    Glucose 97 60 - 100 mg/dL    BUN 9 7 - 21 mg/dL    Creatinine 1.36 (H) 0.70 - 1.30 mg/dL    Sodium 134 (L) 137 - 145 mmol/L    Potassium 3.3 (L) 3.5 - 5.1 mmol/L    Chloride 101 95 - 110 mmol/L    CO2 29.0 22.0 - 31.0 mmol/L    Calcium 7.9 (L) 8.4 - 10.2 mg/dL    Total Protein 5.2 (L) 6.3 - 8.6 g/dL    Albumin 2.20 (L) 3.40 - 4.80 g/dL    ALT (SGPT) 23 21 - 72 U/L    AST (SGOT) 36 17 - 59 U/L    Alkaline Phosphatase 76 38 - 126 U/L    Total Bilirubin 2.6 (H) 0.2 - 1.3 mg/dL    eGFR Non African Amer 55 (L) >60 mL/min/1.73    Globulin 3.0 2.3 - 3.5 gm/dL    A/G Ratio 0.7 (L) 1.1 - 1.8 g/dL    BUN/Creatinine Ratio 6.6 (L) 7.0 - 25.0    Anion Gap 4.0 (L) 5.0 - 15.0 mmol/L   Vancomycin, Random    Collection Time: 03/03/17  6:30 AM   Result Value Ref Range    Vancomycin Random 14.65 mcg/mL   CBC Auto Differential    Collection Time: 03/03/17  6:30 AM   Result Value Ref Range    WBC 6.50 3.20 - 9.80 10*3/mm3    RBC 2.82 (L) 4.37 - 5.74 10*6/mm3    Hemoglobin 10.0 (L) 13.7 - 17.3 g/dL    Hematocrit 28.9 (L) 39.0 - 49.0 %    .5 (H) 80.0 - 98.0 fL    MCH 35.5 (H) 26.5 - 34.0 pg    MCHC 34.6 31.5 - 36.3 g/dL    RDW 16.6 (H) 11.5 - 14.5 %    RDW-SD 62.2 (H) 35.1 - 43.9 fl    MPV 10.2 8.0 - 12.0 fL    Platelets 75 (L) 150 - 450 10*3/mm3   ]  Physical Exam:  Vitals  Vitals:    03/03/17 0811   BP: 144/89   Pulse: 77   Resp: 18   Temp: 96.3 °F (35.7 °C)   SpO2: 97%       Exam:  Physical Exam   Constitutional: He is oriented to person, place, and time. He appears  well-developed and well-nourished. No distress.   HENT:   Head: Normocephalic and atraumatic.   Right Ear: External ear normal.   Left Ear: External ear normal.   Nose: Nose normal.   Mouth/Throat: Oropharynx is clear and moist.   Eyes: Conjunctivae and EOM are normal. Pupils are equal, round, and reactive to light. Right eye exhibits no discharge. Left eye exhibits no discharge. No scleral icterus.   Neck: No JVD present. No thyromegaly present.   Cardiovascular: Normal rate, regular rhythm, normal heart sounds and intact distal pulses.  Exam reveals no gallop and no friction rub.    No murmur heard.  Pulmonary/Chest: Effort normal. No stridor. No respiratory distress. He has no wheezes. He has no rales.   Abdominal: Soft. Bowel sounds are normal. He exhibits no distension and no mass. There is no tenderness. No hernia.   Musculoskeletal: Normal range of motion. He exhibits no edema, tenderness or deformity.   Lymphadenopathy:     He has no cervical adenopathy.   Neurological: He is alert and oriented to person, place, and time. He has normal reflexes. He displays normal reflexes. No cranial nerve deficit. He exhibits normal muscle tone. Coordination normal.   Skin: Skin is warm and dry. No rash noted. He is not diaphoretic. No erythema.    Open wound on right arm   Psychiatric: He has a normal mood and affect. His behavior is normal. Judgment and thought content normal.       Assessment:  Active Hospital Problems (** Indicates Principal Problem)    Diagnosis Date Noted   • Hypokalemia [E87.6] 03/02/2017   • Acute cystitis without hematuria [N30.00] 02/28/2017   • Chronic osteomyelitis of right shoulder region [M86.611] 02/24/2017   • Closed fracture of proximal end of right humerus with delayed healing [S42.201G] 01/20/2017   • Closed nondisplaced supracondylar fracture of distal end of femur without intracondylar extension with delayed healing [S72.456G] 01/20/2017   • Chronic hepatitis [K73.9] 06/18/2009   •  Hepatic cirrhosis [K74.60] 05/26/2009   • Essential hypertension [I10] 12/01/2008      Resolved Hospital Problems    Diagnosis Date Noted Date Resolved   • Hyperammonemia [E72.20] 02/20/2017 02/24/2017       Plan:   Patient to continue on current dose of antibiotics awaiting case management for placement.

## 2017-03-03 NOTE — CONSULTS
"Adult Nutrition  Assessment    Patient Name:  Armando Mcmullen  YOB: 1965  MRN: [id number]  Admit Date:  2/20/2017    Assessment Date:  3/3/2017          Reason for Assessment       03/03/17 1733    Reason for Assessment    Reason For Assessment/Visit follow up protocol                Anthropometrics       03/03/17 1733    Anthropometrics    Height 182.9 cm (72.01\")    Weight 115 kg (253 lb 8.5 oz)    Ideal Body Weight (IBW)    Ideal Body Weight (IBW), Male (kg) 82.09    % Ideal Body Weight 140.38    Body Mass Index (BMI)    BMI (kg/m2) 34.45    BMI Grade 30 - 34.9- obesity - grade I            Labs/Tests/Procedures/Meds       03/03/17 1734    Labs/Tests/Procedures/Meds    Labs/Tests Review Hgb Hct;Alb;Na+;Creat    Medication Review Diuretic;Multivitamin            Physical Findings       03/03/17 1735    Physical Appearance    Skin other (see comments)   right upper arm blister, incision right shoulder              Nutrition Prescription Ordered       03/03/17 1735    Nutrition Prescription PO    Current PO Diet Regular    Common Modifiers Low Protein    Low Protein Details 60 gm Protein            Evaluation of Received Nutrient/Fluid Intake       03/03/17 1736    PO Evaluation    Number of Meals 14    % PO Intake 100% - 5x, 75% - 2x, 50% - 4x, 25% - 2x, 0% - 1x            Comments:  Pt reports good appetite.  Voiced no food preferences.  Intake - 100% - 5x, 75% - 2x, 50% - 4x, 25%- 2x, 0% - 1x.  Pt with wt fluctuations - possibly fluid.  Lasix prescribed.  Magic Butt prescribed due to wounds.  Hgb, Hct are low.  MVI prescribed.        Electronically signed by:  Nirmala Ashton, MODE  03/03/17 5:39 PM  "

## 2017-03-03 NOTE — PAYOR COMM NOTE
"Lamont Mcmullen (52 y.o. Male)     Date of Birth Social Security Number Address Home Phone MRN    [id number]  [id number]  [facility] [address]  [id number]    [facility] Marital Status          Other        Admission Date Admission Type Admitting Provider Attending Provider Department, Room/Bed    2/20/17 Emergency Cj Aguero MD Galloway, Abraham Stuart, MD [address], 332/1    Discharge Date Discharge Disposition Discharge Destination                      Attending Provider: Cj Aguero MD     Allergies:  Aspirin, Codeine Sulfate    Isolation:  Spore, Contact   Infection:  MRSA (02/23/17), C.difficile (02/21/17)   Code Status:  FULL    Ht:  72.01\" (182.9 cm)   Wt:  253 lb 3.2 oz (115 kg)    Admission Cmt:  None   Principal Problem:  None                Active Insurance as of 2/20/2017     Primary Coverage     Payor Plan Insurance Group Employer/Plan Group    [employer] [place] [employer] [employer] [place] [place]      Payor Plan Address Payor Plan Phone Number Effective From Effective To    [address] [address]  1/1/2014     [place] [place] [address]       Subscriber Name Subscriber Birth Date Member ID       LAMONT MCMULLEN 1965 [id number]                 Emergency Contacts      (Rel.) Home Phone Work Phone Mobile Phone    Loco Mcmullen (Daughter) -- -- [phone]            Insurance Information                [employer] [place] Phone:     Subscriber: Benedict Lamont Subscriber#: [id number]    Group#:  Precert#:              Physician Progress Notes (last 72 hours) (Notes from 2/28/2017  8:42 AM through 3/3/2017  8:42 AM)      Cj Aguero MD at 2/28/2017  9:49 AM  Version 1 of 1         [facility] progress note    HPI: Patient is a 51-year-old male who is status post incision for drainage of a abscess.   Patient states that he is doing well at this time and that his pain is fairly well controlled.  " He does complain is continued edema but is receiving a increased dose of Lasix today so will see how he does.  The following portions of the patient's history were reviewed and updated as appropriate: allergies, current medications, past family history, past medical history, past social history, past surgical history and problem list.    Review Of Systems:  Review of Systems   Constitution: Positive for weakness. Negative for decreased appetite, fever and malaise/fatigue.   HENT: Negative for congestion, ear discharge, ear pain, headaches, hearing loss, hoarse voice, nosebleeds and sore throat.    Eyes: Negative for blurred vision, discharge, double vision, pain, photophobia, visual disturbance and visual halos.   Cardiovascular: Negative for chest pain, claudication, dyspnea on exertion, leg swelling, near-syncope, palpitations and paroxysmal nocturnal dyspnea.   Respiratory: Negative for cough, hemoptysis, shortness of breath, snoring, sputum production and wheezing.    Endocrine: Negative for cold intolerance, heat intolerance, polydipsia, polyphagia and polyuria.   Skin: Negative for color change, flushing, itching, nail changes and rash.   Musculoskeletal: Positive for arthritis, back pain, joint pain, muscle weakness and stiffness. Negative for joint swelling, muscle cramps and neck pain.   Gastrointestinal: Negative for bloating, abdominal pain, anorexia, change in bowel habit, bowel incontinence, constipation, diarrhea, dysphagia, heartburn, hematochezia, nausea and vomiting.   Genitourinary: Negative for flank pain, frequency, hematuria, hesitancy, nocturia and urgency.   Neurological: Negative for dizziness, focal weakness, loss of balance, numbness, paresthesias and sensory change.   Psychiatric/Behavioral: Negative for altered mental status, depression, memory loss, substance abuse and thoughts of violence. The patient does not have insomnia and is not nervous/anxious.    Allergic/Immunologic: Negative  for environmental allergies, HIV exposure, hives and persistent infections.       LABS:   Recent Results (from the past 24 hour(s))   Comprehensive Metabolic Panel    Collection Time: 02/28/17  5:38 AM   Result Value Ref Range    Glucose 70 60 - 100 mg/dL    BUN 7 7 - 21 mg/dL    Creatinine 1.11 0.70 - 1.30 mg/dL    Sodium 134 (L) 137 - 145 mmol/L    Potassium 3.5 3.5 - 5.1 mmol/L    Chloride 109 95 - 110 mmol/L    CO2 24.0 22.0 - 31.0 mmol/L    Calcium 8.1 (L) 8.4 - 10.2 mg/dL    Total Protein 4.9 (L) 6.3 - 8.6 g/dL    Albumin 1.80 (L) 3.40 - 4.80 g/dL    ALT (SGPT) 30 21 - 72 U/L    AST (SGOT) 43 17 - 59 U/L    Alkaline Phosphatase 66 38 - 126 U/L    Total Bilirubin 2.4 (H) 0.2 - 1.3 mg/dL    eGFR Non African Amer 70 >60 mL/min/1.73    Globulin 3.1 2.3 - 3.5 gm/dL    A/G Ratio 0.6 (L) 1.1 - 1.8 g/dL    BUN/Creatinine Ratio 6.3 (L) 7.0 - 25.0    Anion Gap 1.0 (L) 5.0 - 15.0 mmol/L   Vancomycin, Random    Collection Time: 02/28/17  5:38 AM   Result Value Ref Range    Vancomycin Random 29.38 mcg/mL   CBC Auto Differential    Collection Time: 02/28/17  5:38 AM   Result Value Ref Range    WBC 5.95 3.20 - 9.80 10*3/mm3    RBC 2.68 (L) 4.37 - 5.74 10*6/mm3    Hemoglobin 9.3 (L) 13.7 - 17.3 g/dL    Hematocrit 27.0 (L) 39.0 - 49.0 %    .7 (H) 80.0 - 98.0 fL    MCH 34.7 (H) 26.5 - 34.0 pg    MCHC 34.4 31.5 - 36.3 g/dL    RDW 16.0 (H) 11.5 - 14.5 %    RDW-SD 58.8 (H) 35.1 - 43.9 fl    MPV 9.9 8.0 - 12.0 fL    Platelets 71 (L) 150 - 450 10*3/mm3    Neutrophil % 41.8 37.0 - 80.0 %    Lymphocyte % 35.5 10.0 - 50.0 %    Monocyte % 11.4 0.0 - 12.0 %    Eosinophil % 8.7 (H) 0.0 - 7.0 %    Basophil % 2.4 (H) 0.0 - 2.0 %    Immature Grans % 0.2 0.0 - 0.5 %    Neutrophils, Absolute 2.49 2.00 - 8.60 10*3/mm3    Lymphocytes, Absolute 2.11 0.60 - 4.20 10*3/mm3    Monocytes, Absolute 0.68 0.00 - 0.90 10*3/mm3    Eosinophils, Absolute 0.52 0.00 - 0.70 10*3/mm3    Basophils, Absolute 0.14 0.00 - 0.20 10*3/mm3    Immature Grans,  Absolute 0.01 0.00 - 0.02 10*3/mm3   ]  Physical Exam:  Vitals  Vitals:    02/28/17 0718   BP: 139/78   Pulse: 72   Resp: 18   Temp: 97.6 °F (36.4 °C)   SpO2: 96%       Exam:  Physical Exam   Constitutional: He is oriented to person, place, and time. He appears well-developed and well-nourished. No distress.   HENT:   Head: Normocephalic and atraumatic.   Right Ear: External ear normal.   Left Ear: External ear normal.   Nose: Nose normal.   Mouth/Throat: Oropharynx is clear and moist.   Eyes: Conjunctivae and EOM are normal. Pupils are equal, round, and reactive to light. Right eye exhibits no discharge. Left eye exhibits no discharge. No scleral icterus.   Neck: No JVD present. No thyromegaly present.   Cardiovascular: Normal rate, regular rhythm, normal heart sounds and intact distal pulses.  Exam reveals no gallop and no friction rub.    No murmur heard.  Pulmonary/Chest: Effort normal. No stridor. No respiratory distress. He has no wheezes. He has no rales.   Abdominal: Soft. Bowel sounds are normal. He exhibits no distension and no mass. There is no tenderness. No hernia.   Musculoskeletal: Normal range of motion. He exhibits no edema, tenderness or deformity.   Lymphadenopathy:     He has no cervical adenopathy.   Neurological: He is alert and oriented to person, place, and time. He has normal reflexes. He displays normal reflexes. No cranial nerve deficit. He exhibits normal muscle tone. Coordination normal.   Skin: Skin is warm and dry. No rash noted. He is not diaphoretic. No erythema.    Open wound on right arm   Psychiatric: He has a normal mood and affect. His behavior is normal. Judgment and thought content normal.       Assessment:  Active Hospital Problems (** Indicates Principal Problem)    Diagnosis Date Noted   • Acute cystitis without hematuria [N30.00] 02/28/2017   • Closed fracture of proximal end of right humerus with delayed healing [S42.201G] 01/20/2017   • Closed nondisplaced supracondylar  fracture of distal end of femur without intracondylar extension with delayed healing [S72.456G] 01/20/2017   • Chronic hepatitis [K73.9] 06/18/2009   • Hepatic cirrhosis [K74.60] 05/26/2009   • Essential hypertension [I10] 12/01/2008      Resolved Hospital Problems    Diagnosis Date Noted Date Resolved   • Hyperammonemia [E72.20] 02/20/2017 02/24/2017       Plan:     Patient currently on Zosyn for urinary tract infection, has significant edema and will be following dose of Lasix for this currently awaiting case management for nursing home placement     Electronically signed by Cj Aguero MD at 2/28/2017  9:51 AM      Cj Aguero MD at 3/1/2017  7:51 AM  Version 1 of 1         [facility] progress note    HPI: Patient is a 51-year-old male who is status post incision for drainage of a abscess.   Patient states that his arm is having significant swelling as well as bilateral lower extremities.  He states the increased dose of Lasix did not help enough to go ahead and increase this slightly further also start this patient on some oral potassium replacement to see if this will help.  The following portions of the patient's history were reviewed and updated as appropriate: allergies, current medications, past family history, past medical history, past social history, past surgical history and problem list.    Review Of Systems:  Review of Systems   Constitution: Positive for weakness. Negative for decreased appetite, fever and malaise/fatigue.   HENT: Negative for congestion, ear discharge, ear pain, headaches, hearing loss, hoarse voice, nosebleeds and sore throat.    Eyes: Negative for blurred vision, discharge, double vision, pain, photophobia, visual disturbance and visual halos.   Cardiovascular: Negative for chest pain, claudication, dyspnea on exertion, leg swelling, near-syncope, palpitations and paroxysmal nocturnal dyspnea.   Respiratory: Negative for cough, hemoptysis,  shortness of breath, snoring, sputum production and wheezing.    Endocrine: Negative for cold intolerance, heat intolerance, polydipsia, polyphagia and polyuria.   Skin: Negative for color change, flushing, itching, nail changes and rash.   Musculoskeletal: Positive for arthritis, back pain, joint pain, muscle weakness and stiffness. Negative for joint swelling, muscle cramps and neck pain.   Gastrointestinal: Negative for bloating, abdominal pain, anorexia, change in bowel habit, bowel incontinence, constipation, diarrhea, dysphagia, heartburn, hematochezia, nausea and vomiting.   Genitourinary: Negative for flank pain, frequency, hematuria, hesitancy, nocturia and urgency.   Neurological: Negative for dizziness, focal weakness, loss of balance, numbness, paresthesias and sensory change.   Psychiatric/Behavioral: Negative for altered mental status, depression, memory loss, substance abuse and thoughts of violence. The patient does not have insomnia and is not nervous/anxious.    Allergic/Immunologic: Negative for environmental allergies, HIV exposure, hives and persistent infections.       LABS:   Recent Results (from the past 24 hour(s))   Potassium    Collection Time: 02/28/17  6:48 PM   Result Value Ref Range    Potassium 3.4 (L) 3.5 - 5.1 mmol/L   Magnesium    Collection Time: 02/28/17  6:48 PM   Result Value Ref Range    Magnesium 1.7 1.6 - 2.3 mg/dL   Comprehensive Metabolic Panel    Collection Time: 03/01/17  6:42 AM   Result Value Ref Range    Glucose 75 60 - 100 mg/dL    BUN 7 7 - 21 mg/dL    Creatinine 1.35 (H) 0.70 - 1.30 mg/dL    Sodium 134 (L) 137 - 145 mmol/L    Potassium 3.4 (L) 3.5 - 5.1 mmol/L    Chloride 105 95 - 110 mmol/L    CO2 28.0 22.0 - 31.0 mmol/L    Calcium 8.1 (L) 8.4 - 10.2 mg/dL    Total Protein 5.2 (L) 6.3 - 8.6 g/dL    Albumin 1.90 (L) 3.40 - 4.80 g/dL    ALT (SGPT) 24 21 - 72 U/L    AST (SGOT) 39 17 - 59 U/L    Alkaline Phosphatase 70 38 - 126 U/L    Total Bilirubin 2.5 (H) 0.2 - 1.3  mg/dL    eGFR Non African Amer 55 (L) >60 mL/min/1.73    Globulin 3.3 2.3 - 3.5 gm/dL    A/G Ratio 0.6 (L) 1.1 - 1.8 g/dL    BUN/Creatinine Ratio 5.2 (L) 7.0 - 25.0    Anion Gap 1.0 (L) 5.0 - 15.0 mmol/L   Vancomycin, Random    Collection Time: 03/01/17  6:42 AM   Result Value Ref Range    Vancomycin Random 18.39 mcg/mL   CBC Auto Differential    Collection Time: 03/01/17  6:42 AM   Result Value Ref Range    WBC 6.53 3.20 - 9.80 10*3/mm3    RBC 2.71 (L) 4.37 - 5.74 10*6/mm3    Hemoglobin 9.5 (L) 13.7 - 17.3 g/dL    Hematocrit 27.5 (L) 39.0 - 49.0 %    .5 (H) 80.0 - 98.0 fL    MCH 35.1 (H) 26.5 - 34.0 pg    MCHC 34.5 31.5 - 36.3 g/dL    RDW 16.2 (H) 11.5 - 14.5 %    RDW-SD 59.7 (H) 35.1 - 43.9 fl    MPV 9.8 8.0 - 12.0 fL    Platelets 72 (L) 150 - 450 10*3/mm3    Neutrophil % 40.6 37.0 - 80.0 %    Lymphocyte % 34.9 10.0 - 50.0 %    Monocyte % 11.2 0.0 - 12.0 %    Eosinophil % 10.0 (H) 0.0 - 7.0 %    Basophil % 3.1 (H) 0.0 - 2.0 %    Immature Grans % 0.2 0.0 - 0.5 %    Neutrophils, Absolute 2.66 2.00 - 8.60 10*3/mm3    Lymphocytes, Absolute 2.28 0.60 - 4.20 10*3/mm3    Monocytes, Absolute 0.73 0.00 - 0.90 10*3/mm3    Eosinophils, Absolute 0.65 0.00 - 0.70 10*3/mm3    Basophils, Absolute 0.20 0.00 - 0.20 10*3/mm3    Immature Grans, Absolute 0.01 0.00 - 0.02 10*3/mm3   Magnesium    Collection Time: 03/01/17  6:42 AM   Result Value Ref Range    Magnesium 2.4 (H) 1.6 - 2.3 mg/dL   ]  Physical Exam:  Vitals  Vitals:    03/01/17 0015   BP: 133/80   Pulse: 76   Resp: 20   Temp: 98.6 °F (37 °C)   SpO2: 96%       Exam:  Physical Exam   Constitutional: He is oriented to person, place, and time. He appears well-developed and well-nourished. No distress.   HENT:   Head: Normocephalic and atraumatic.   Right Ear: External ear normal.   Left Ear: External ear normal.   Nose: Nose normal.   Mouth/Throat: Oropharynx is clear and moist.   Eyes: Conjunctivae and EOM are normal. Pupils are equal, round, and reactive to light.  Right eye exhibits no discharge. Left eye exhibits no discharge. No scleral icterus.   Neck: No JVD present. No thyromegaly present.   Cardiovascular: Normal rate, regular rhythm, normal heart sounds and intact distal pulses.  Exam reveals no gallop and no friction rub.    No murmur heard.  Pulmonary/Chest: Effort normal. No stridor. No respiratory distress. He has no wheezes. He has no rales.   Abdominal: Soft. Bowel sounds are normal. He exhibits no distension and no mass. There is no tenderness. No hernia.   Musculoskeletal: Normal range of motion. He exhibits no edema, tenderness or deformity.   Lymphadenopathy:     He has no cervical adenopathy.   Neurological: He is alert and oriented to person, place, and time. He has normal reflexes. He displays normal reflexes. No cranial nerve deficit. He exhibits normal muscle tone. Coordination normal.   Skin: Skin is warm and dry. No rash noted. He is not diaphoretic. No erythema.    Open wound on right arm   Psychiatric: He has a normal mood and affect. His behavior is normal. Judgment and thought content normal.       Assessment:  Active Hospital Problems (** Indicates Principal Problem)    Diagnosis Date Noted   • Acute cystitis without hematuria [N30.00] 02/28/2017   • Closed fracture of proximal end of right humerus with delayed healing [S42.201G] 01/20/2017   • Closed nondisplaced supracondylar fracture of distal end of femur without intracondylar extension with delayed healing [S72.456G] 01/20/2017   • Chronic hepatitis [K73.9] 06/18/2009   • Hepatic cirrhosis [K74.60] 05/26/2009   • Essential hypertension [I10] 12/01/2008      Resolved Hospital Problems    Diagnosis Date Noted Date Resolved   • Hyperammonemia [E72.20] 02/20/2017 02/24/2017       Plan:     Patient currently on Zosyn for urinary tract infection, we'll increase this patient's Lasix and added potassium supplementation and awaiting long-term placement for this patient.     Electronically signed by  Cj Hammond MD at 3/1/2017  7:53 AM      Shawn Cortez MD at 3/1/2017 11:24 AM  Version 1 of 1         POD #1 co of pain as expected minimal drainage.03/01/17 at 11:28 AM by Shawn Cortez MD  Treatment plans discussed with dr hammond yesterday.03/01/17 at 11:29 AM by Shawn Cortez MD       Electronically signed by Shawn Cortez MD at 3/1/2017 11:31 AM      Cj Hammond MD at 3/2/2017 12:22 PM  Version 1 of 1         [facility] progress note    HPI: Patient is a 51-year-old male who is status post incision for drainage of a abscess.   His continued to do well after surgery and the abscess continues to drain discussed this with Dr. Seth and advised that this is going to need to continue to drain an order for this patient not to become septic.  We'll continue him on vancomycin for the time being.  He states that his edema has gotten significantly better with his increased dose of Lasix.  The following portions of the patient's history were reviewed and updated as appropriate: allergies, current medications, past family history, past medical history, past social history, past surgical history and problem list.    Review Of Systems:  Review of Systems   Constitution: Positive for weakness. Negative for decreased appetite, fever and malaise/fatigue.   HENT: Negative for congestion, ear discharge, ear pain, headaches, hearing loss, hoarse voice, nosebleeds and sore throat.    Eyes: Negative for blurred vision, discharge, double vision, pain, photophobia, visual disturbance and visual halos.   Cardiovascular: Negative for chest pain, claudication, dyspnea on exertion, leg swelling, near-syncope, palpitations and paroxysmal nocturnal dyspnea.   Respiratory: Negative for cough, hemoptysis, shortness of breath, snoring, sputum production and wheezing.    Endocrine: Negative for cold intolerance, heat intolerance, polydipsia, polyphagia and polyuria.   Skin: Negative for color  change, flushing, itching, nail changes and rash.   Musculoskeletal: Positive for arthritis, back pain, joint pain, muscle weakness and stiffness. Negative for joint swelling, muscle cramps and neck pain.   Gastrointestinal: Negative for bloating, abdominal pain, anorexia, change in bowel habit, bowel incontinence, constipation, diarrhea, dysphagia, heartburn, hematochezia, nausea and vomiting.   Genitourinary: Negative for flank pain, frequency, hematuria, hesitancy, nocturia and urgency.   Neurological: Negative for dizziness, focal weakness, loss of balance, numbness, paresthesias and sensory change.   Psychiatric/Behavioral: Negative for altered mental status, depression, memory loss, substance abuse and thoughts of violence. The patient does not have insomnia and is not nervous/anxious.    Allergic/Immunologic: Negative for environmental allergies, HIV exposure, hives and persistent infections.       LABS:   Recent Results (from the past 24 hour(s))   Comprehensive Metabolic Panel    Collection Time: 03/02/17  5:22 AM   Result Value Ref Range    Glucose 70 60 - 100 mg/dL    BUN 9 7 - 21 mg/dL    Creatinine 1.44 (H) 0.70 - 1.30 mg/dL    Sodium 136 (L) 137 - 145 mmol/L    Potassium 3.3 (L) 3.5 - 5.1 mmol/L    Chloride 104 95 - 110 mmol/L    CO2 29.0 22.0 - 31.0 mmol/L    Calcium 8.0 (L) 8.4 - 10.2 mg/dL    Total Protein 5.3 (L) 6.3 - 8.6 g/dL    Albumin 2.10 (L) 3.40 - 4.80 g/dL    ALT (SGPT) 30 21 - 72 U/L    AST (SGOT) 37 17 - 59 U/L    Alkaline Phosphatase 76 38 - 126 U/L    Total Bilirubin 2.2 (H) 0.2 - 1.3 mg/dL    eGFR Non African Amer 51 (L) >60 mL/min/1.73    Globulin 3.2 2.3 - 3.5 gm/dL    A/G Ratio 0.7 (L) 1.1 - 1.8 g/dL    BUN/Creatinine Ratio 6.3 (L) 7.0 - 25.0    Anion Gap 3.0 (L) 5.0 - 15.0 mmol/L   CBC Auto Differential    Collection Time: 03/02/17  5:22 AM   Result Value Ref Range    WBC 7.11 3.20 - 9.80 10*3/mm3    RBC 2.82 (L) 4.37 - 5.74 10*6/mm3    Hemoglobin 9.9 (L) 13.7 - 17.3 g/dL     Hematocrit 28.8 (L) 39.0 - 49.0 %    .1 (H) 80.0 - 98.0 fL    MCH 35.1 (H) 26.5 - 34.0 pg    MCHC 34.4 31.5 - 36.3 g/dL    RDW 16.3 (H) 11.5 - 14.5 %    RDW-SD 60.4 (H) 35.1 - 43.9 fl    MPV 10.3 8.0 - 12.0 fL    Platelets 81 (L) 150 - 450 10*3/mm3    Neutrophil % 40.3 37.0 - 80.0 %    Lymphocyte % 36.1 10.0 - 50.0 %    Monocyte % 12.2 (H) 0.0 - 12.0 %    Eosinophil % 8.3 (H) 0.0 - 7.0 %    Basophil % 2.8 (H) 0.0 - 2.0 %    Immature Grans % 0.3 0.0 - 0.5 %    Neutrophils, Absolute 2.86 2.00 - 8.60 10*3/mm3    Lymphocytes, Absolute 2.57 0.60 - 4.20 10*3/mm3    Monocytes, Absolute 0.87 0.00 - 0.90 10*3/mm3    Eosinophils, Absolute 0.59 0.00 - 0.70 10*3/mm3    Basophils, Absolute 0.20 0.00 - 0.20 10*3/mm3    Immature Grans, Absolute 0.02 0.00 - 0.02 10*3/mm3   ]  Physical Exam:  Vitals  Vitals:    03/02/17 0839   BP: 132/70   Pulse: 77   Resp: 18   Temp: 98 °F (36.7 °C)   SpO2: 94%       Exam:  Physical Exam   Constitutional: He is oriented to person, place, and time. He appears well-developed and well-nourished. No distress.   HENT:   Head: Normocephalic and atraumatic.   Right Ear: External ear normal.   Left Ear: External ear normal.   Nose: Nose normal.   Mouth/Throat: Oropharynx is clear and moist.   Eyes: Conjunctivae and EOM are normal. Pupils are equal, round, and reactive to light. Right eye exhibits no discharge. Left eye exhibits no discharge. No scleral icterus.   Neck: No JVD present. No thyromegaly present.   Cardiovascular: Normal rate, regular rhythm, normal heart sounds and intact distal pulses.  Exam reveals no gallop and no friction rub.    No murmur heard.  Pulmonary/Chest: Effort normal. No stridor. No respiratory distress. He has no wheezes. He has no rales.   Abdominal: Soft. Bowel sounds are normal. He exhibits no distension and no mass. There is no tenderness. No hernia.   Musculoskeletal: Normal range of motion. He exhibits no edema, tenderness or deformity.   Lymphadenopathy:     He  has no cervical adenopathy.   Neurological: He is alert and oriented to person, place, and time. He has normal reflexes. He displays normal reflexes. No cranial nerve deficit. He exhibits normal muscle tone. Coordination normal.   Skin: Skin is warm and dry. No rash noted. He is not diaphoretic. No erythema.    Open wound on right arm   Psychiatric: He has a normal mood and affect. His behavior is normal. Judgment and thought content normal.       Assessment:  Active Hospital Problems (** Indicates Principal Problem)    Diagnosis Date Noted   • Hypokalemia [E87.6] 03/02/2017   • Acute cystitis without hematuria [N30.00] 02/28/2017   • Chronic osteomyelitis of right shoulder region [M86.611] 02/24/2017   • Closed fracture of proximal end of right humerus with delayed healing [S42.201G] 01/20/2017   • Closed nondisplaced supracondylar fracture of distal end of femur without intracondylar extension with delayed healing [S72.456G] 01/20/2017   • Chronic hepatitis [K73.9] 06/18/2009   • Hepatic cirrhosis [K74.60] 05/26/2009   • Essential hypertension [I10] 12/01/2008      Resolved Hospital Problems    Diagnosis Date Noted Date Resolved   • Hyperammonemia [E72.20] 02/20/2017 02/24/2017       Plan:     To continue on vancomycin and Zosyn for his urinary tract infection and osteomyelitis.  We'll continue to replace this patient's potassium increase his dosage to 20 3 times a day and follow his levels.     Electronically signed by Cj Aguero MD at 3/2/2017 12:23 PM      Cj Aguero MD at 3/3/2017  8:17 AM  Version 1 of 1         [facility] progress note    HPI: Patient is a 51-year-old male who is status post incision for drainage of a abscess.     The abscess continues to drain nicely and patient continues to do well on his antibiotics.  He is concerned about swelling and edema today we had recently increased his Lasix will see how his BNP is doing at this time.    The following portions  of the patient's history were reviewed and updated as appropriate: allergies, current medications, past family history, past medical history, past social history, past surgical history and problem list.    Review Of Systems:  Review of Systems   Constitution: Positive for weakness. Negative for decreased appetite, fever and malaise/fatigue.   HENT: Negative for congestion, ear discharge, ear pain, headaches, hearing loss, hoarse voice, nosebleeds and sore throat.    Eyes: Negative for blurred vision, discharge, double vision, pain, photophobia, visual disturbance and visual halos.   Cardiovascular: Negative for chest pain, claudication, dyspnea on exertion, leg swelling, near-syncope, palpitations and paroxysmal nocturnal dyspnea.   Respiratory: Negative for cough, hemoptysis, shortness of breath, snoring, sputum production and wheezing.    Endocrine: Negative for cold intolerance, heat intolerance, polydipsia, polyphagia and polyuria.   Skin: Negative for color change, flushing, itching, nail changes and rash.   Musculoskeletal: Positive for arthritis, back pain, joint pain, muscle weakness and stiffness. Negative for joint swelling, muscle cramps and neck pain.   Gastrointestinal: Negative for bloating, abdominal pain, anorexia, change in bowel habit, bowel incontinence, constipation, diarrhea, dysphagia, heartburn, hematochezia, nausea and vomiting.   Genitourinary: Negative for flank pain, frequency, hematuria, hesitancy, nocturia and urgency.   Neurological: Negative for dizziness, focal weakness, loss of balance, numbness, paresthesias and sensory change.   Psychiatric/Behavioral: Negative for altered mental status, depression, memory loss, substance abuse and thoughts of violence. The patient does not have insomnia and is not nervous/anxious.    Allergic/Immunologic: Negative for environmental allergies, HIV exposure, hives and persistent infections.       LABS:   Recent Results (from the past 24 hour(s))    Comprehensive Metabolic Panel    Collection Time: 03/03/17  6:30 AM   Result Value Ref Range    Glucose 97 60 - 100 mg/dL    BUN 9 7 - 21 mg/dL    Creatinine 1.36 (H) 0.70 - 1.30 mg/dL    Sodium 134 (L) 137 - 145 mmol/L    Potassium 3.3 (L) 3.5 - 5.1 mmol/L    Chloride 101 95 - 110 mmol/L    CO2 29.0 22.0 - 31.0 mmol/L    Calcium 7.9 (L) 8.4 - 10.2 mg/dL    Total Protein 5.2 (L) 6.3 - 8.6 g/dL    Albumin 2.20 (L) 3.40 - 4.80 g/dL    ALT (SGPT) 23 21 - 72 U/L    AST (SGOT) 36 17 - 59 U/L    Alkaline Phosphatase 76 38 - 126 U/L    Total Bilirubin 2.6 (H) 0.2 - 1.3 mg/dL    eGFR Non African Amer 55 (L) >60 mL/min/1.73    Globulin 3.0 2.3 - 3.5 gm/dL    A/G Ratio 0.7 (L) 1.1 - 1.8 g/dL    BUN/Creatinine Ratio 6.6 (L) 7.0 - 25.0    Anion Gap 4.0 (L) 5.0 - 15.0 mmol/L   Vancomycin, Random    Collection Time: 03/03/17  6:30 AM   Result Value Ref Range    Vancomycin Random 14.65 mcg/mL   CBC Auto Differential    Collection Time: 03/03/17  6:30 AM   Result Value Ref Range    WBC 6.50 3.20 - 9.80 10*3/mm3    RBC 2.82 (L) 4.37 - 5.74 10*6/mm3    Hemoglobin 10.0 (L) 13.7 - 17.3 g/dL    Hematocrit 28.9 (L) 39.0 - 49.0 %    .5 (H) 80.0 - 98.0 fL    MCH 35.5 (H) 26.5 - 34.0 pg    MCHC 34.6 31.5 - 36.3 g/dL    RDW 16.6 (H) 11.5 - 14.5 %    RDW-SD 62.2 (H) 35.1 - 43.9 fl    MPV 10.2 8.0 - 12.0 fL    Platelets 75 (L) 150 - 450 10*3/mm3   ]  Physical Exam:  Vitals  Vitals:    03/03/17 0811   BP: 144/89   Pulse: 77   Resp: 18   Temp: 96.3 °F (35.7 °C)   SpO2: 97%       Exam:  Physical Exam   Constitutional: He is oriented to person, place, and time. He appears well-developed and well-nourished. No distress.   HENT:   Head: Normocephalic and atraumatic.   Right Ear: External ear normal.   Left Ear: External ear normal.   Nose: Nose normal.   Mouth/Throat: Oropharynx is clear and moist.   Eyes: Conjunctivae and EOM are normal. Pupils are equal, round, and reactive to light. Right eye exhibits no discharge. Left eye exhibits no  discharge. No scleral icterus.   Neck: No JVD present. No thyromegaly present.   Cardiovascular: Normal rate, regular rhythm, normal heart sounds and intact distal pulses.  Exam reveals no gallop and no friction rub.    No murmur heard.  Pulmonary/Chest: Effort normal. No stridor. No respiratory distress. He has no wheezes. He has no rales.   Abdominal: Soft. Bowel sounds are normal. He exhibits no distension and no mass. There is no tenderness. No hernia.   Musculoskeletal: Normal range of motion. He exhibits no edema, tenderness or deformity.   Lymphadenopathy:     He has no cervical adenopathy.   Neurological: He is alert and oriented to person, place, and time. He has normal reflexes. He displays normal reflexes. No cranial nerve deficit. He exhibits normal muscle tone. Coordination normal.   Skin: Skin is warm and dry. No rash noted. He is not diaphoretic. No erythema.    Open wound on right arm   Psychiatric: He has a normal mood and affect. His behavior is normal. Judgment and thought content normal.       Assessment:  Active Hospital Problems (** Indicates Principal Problem)    Diagnosis Date Noted   • Hypokalemia [E87.6] 03/02/2017   • Acute cystitis without hematuria [N30.00] 02/28/2017   • Chronic osteomyelitis of right shoulder region [M86.611] 02/24/2017   • Closed fracture of proximal end of right humerus with delayed healing [S42.201G] 01/20/2017   • Closed nondisplaced supracondylar fracture of distal end of femur without intracondylar extension with delayed healing [S72.456G] 01/20/2017   • Chronic hepatitis [K73.9] 06/18/2009   • Hepatic cirrhosis [K74.60] 05/26/2009   • Essential hypertension [I10] 12/01/2008      Resolved Hospital Problems    Diagnosis Date Noted Date Resolved   • Hyperammonemia [E72.20] 02/20/2017 02/24/2017       Plan:   Patient to continue on current dose of antibiotics awaiting case management for placement.     Electronically signed by Cj Aguero MD at 3/3/2017   8:19 AM        Consult Notes (last 72 hours) (Notes from 2/28/2017  8:42 AM through 3/3/2017  8:42 AM)     No notes of this type exist for this encounter.

## 2017-03-03 NOTE — PROGRESS NOTES
"Pharmacokinetics by Pharmacy - Vancomycin    Armando Mcmullen is a 52 y.o. male  [Ht: 72.01\" (182.9 cm); Wt: 253 lb 3.2 oz (115 kg)]    Estimated Creatinine Clearance: 82.3 mL/min (by C-G formula based on Cr of 1.36).   Lab Results   Component Value Date    CREATININE 1.36 (H) 03/03/2017    CREATININE 1.44 (H) 03/02/2017    CREATININE 1.35 (H) 03/01/2017    CREATININE 2.1 (H) 01/19/2017    CREATININE 2.8 (H) 01/13/2017    CREATININE 1.2 12/08/2016      Lab Results   Component Value Date    WBC 6.50 03/03/2017    WBC 7.11 03/02/2017    WBC 6.53 03/01/2017      Temp Readings from Last 1 Encounters:   03/03/17 96.3 °F (35.7 °C) (Oral)      Lab Results   Component Value Date    VANCOTROUGH 39.71 (H) 02/27/2017    VANCORANDOM 14.65 03/03/2017         Culture Results:  Microbiology Results (last 10 days)       Procedure Component Value - Date/Time      Wound Culture [[id number]]  (Abnormal)  (Susceptibility) Collected:  02/22/17 1422    Lab Status:  Final result Specimen:  Wound from Arm Updated:  02/24/17 0953     Wound Culture        Moderate growth (3+) Staphylococcus aureus, MRSA (C)        Methicillin resistant Staphylococcus aureus, Patient may be an isolation risk.  Multi Drug Resistant Organism  contact precautions requested          Gram Stain Result Rare (1+) WBCs seen       Rare (1+) Gram positive cocci in pairs     Susceptibility        Staphylococcus aureus, MRSA     NERI     Clindamycin >4 ug/ml Resistant     Erythromycin >4 ug/ml Resistant     Gentamicin <=4 ug/ml Susceptible     Oxacillin >2 ug/ml Resistant     Penicillin G >8 ug/ml Resistant     Rifampin <=1 ug/ml Susceptible     Trimethoprim + Sulfamethoxazole <=0.5/9.5 ug/ml Susceptible     Vancomycin 1 ug/ml Susceptible                                 Indication for use: Abscess    Current Vancomycin Dose:  Pulse dosing.      Assessment/Plan:  Random level was 14.65 roughly 36 hours since the last dose (trough goal 15-20).  Will dose vancomycin 1500 mg IV q " 24 hours with a trough goal of 15-20.  Will check trough 3/5.  Patient also on Zosyn for a UTI (P. Aeruginosa), day 5 of therapy.  Pharmacy will continue to monitor renal function and adjust dose accordingly.    Rudolph Younger III, [facility]   03/03/17 9:36 AM

## 2017-03-03 NOTE — PLAN OF CARE
Problem: Patient Care Overview (Adult)  Goal: Plan of Care Review  Outcome: Ongoing (interventions implemented as appropriate)    03/02/17 2100 03/03/17 1403   Patient Care Overview   Progress --  progress toward functional goals as expected   Outcome Evaluation   Outcome Summary/Follow up Plan --  pt performed ther ex with vss,declined oob to w/c this pm,would benefit from cont therapy at snf prior to d/c home   Coping/Psychosocial Response Interventions   Plan Of Care Reviewed With patient --        Goal: Discharge Needs Assessment  Outcome: Ongoing (interventions implemented as appropriate)    02/21/17 1830 02/28/17 1539 03/01/17 1330   Discharge Needs Assessment   Concerns To Be Addressed --  --  --    Readmission Within The Last 30 Days no previous admission in last 30 days --  --    Equipment Needed After Discharge none --  --    Discharge Facility/Level Of Care Needs --  --  --    Discharge Planning Comments --  Pt needs 4 weeks IV antx, LTACH vs nursing facility discussed. --    Current Health   Anticipated Changes Related to Illness none --  --    Self-Care   Equipment Currently Used at Home --  --  crutches;hospital bed;prosthesis;commode;shower chair;wheelchair;walker, rolling  (Pt mostly uses crutches for ambulation)   Living Environment   Transportation Available --  ambulance --      03/01/17 1656 03/02/17 0557   Discharge Needs Assessment   Concerns To Be Addressed --  no discharge needs identified   Readmission Within The Last 30 Days --  --    Equipment Needed After Discharge --  --    Discharge Facility/Level Of Care Needs LTACH (long [facility] acute [facility] hospital);nursing facility, skilled --    Discharge Planning Comments --  --    Current Health   Anticipated Changes Related to Illness --  --    Self-Care   Equipment Currently Used at Home --  --    Living Environment   Transportation Available --  --          Problem: Inpatient Physical Therapy  Goal: Transfer Training Goal 1 LTG- PT  Outcome:  Ongoing (interventions implemented as appropriate)    03/01/17 1330 03/03/17 1437   Transfer Training PT LTG   Transfer Training PT LTG, Date Established 03/01/17 --    Transfer Training PT LTG, Time to Achieve by discharge --    Transfer Training PT LTG, Activity Type bed to chair /chair to bed;sit to stand/stand to sit;toilet --    Transfer Training PT LTG, [place] Level conditional independence --    Transfer Training PT LTG, Assist Device (AAD) --    Transfer Training PT LTG, Date Goal Reviewed --  03/03/17   Transfer Training PT LTG, Outcome --  goal not met   Transfer Training PT LTG, Reason Goal Not Met --  progress slower than expected       Goal: Gait Training Goal STG- PT  Outcome: Ongoing (interventions implemented as appropriate)    03/01/17 1330 03/03/17 1403   Gait Training PT STG   Gait Training Goal PT STG, Date Established 03/01/17 --    Gait Training Goal PT STG, Time to Achieve 3 days --    Gait Training Goal PT STG, [place] Level moderate assist (50% patient effort);minimum assist (75% patient effort) --    Gait Training Goal PT STG, Assist Device (AAD) --    Gait Training Goal PT STG, Distance to Achieve 5 feet --    Gait Training Goal PT STG, Date Goal Reviewed --  03/03/17   Gait Training Goal PT STG, Outcome --  goal not met   Gait Training Goal PT STG, Reason Goal Not Met --  progress slower than expected       Goal: Gait Training Goal LTG- PT  Outcome: Ongoing (interventions implemented as appropriate)    03/01/17 1330 03/03/17 1403   Gait Training PT LTG   Gait Training Goal PT LTG, Date Established 03/01/17 --    Gait Training Goal PT LTG, Time to Achieve by discharge --    Gait Training Goal PT LTG, [place] Level contact guard assist --    Gait Training Goal PT LTG, Assist Device (AAD) --    Gait Training Goal PT LTG, Distance to Achieve 20 feet --    Gait Training Goal PT LTG, Date Goal Reviewed --  03/03/17   Gait Training Goal PT LTG, Outcome --  goal not met   Gait  Training Goal PT LTG, Reason Goal Not Met --  progress slower than expected       Goal: Wheelchair Propulsion Goal LTG- PT  Outcome: Ongoing (interventions implemented as appropriate)    03/01/17 1330 03/03/17 1403   Wheelchair Propulsion PT LTG   Wheelchair Propulsion Goal PT LTG, Date Established 03/01/17 --    Wheelchair Propulsion Goal PT LTG, Time to Achieve by discharge --    Wheelchair Propulsion Goal PT LTG, [place] Level conditional independence --    Wheelchair Propulsion Goal PT LTG, Distance to Achieve 150 feet --    Wheelchair Propulsion Goal PT LTG, Date Goal Reviewed --  03/03/17   Wheelchair Propulsion Goal PT LTG, Outcome --  goal not met   Wheelchair Propulsion Goal PT LTG, Reason Goal Not Met --  progress slower than expected

## 2017-03-03 NOTE — DISCHARGE PLACEMENT REQUEST
"Lamont Mcmullen (52 y.o. Male)     Date of Birth Social Security Number Address Home Phone MRN    1965  [address]  [facility] [address]  [id number]    [employer] Marital Status          Other        Admission Date Admission Type Admitting Provider Attending Provider Department, Room/Bed    2/20/17 Emergency Cj Aguero MD [place], Abraham Stuart, MD [address], 332/1    Discharge Date Discharge Disposition Discharge Destination                      Attending Provider: Cj Aguero MD     Allergies:  Aspirin, Codeine Sulfate    Isolation:  Spore, Contact   Infection:  MRSA (02/23/17), C.difficile (02/21/17)   Code Status:  FULL    Ht:  72.01\" (182.9 cm)   Wt:  253 lb 3.2 oz (115 kg)    Admission Cmt:  None   Principal Problem:  None                Active Insurance as of 2/20/2017     Primary Coverage     Payor Plan Insurance Group Employer/Plan Group    [facility] [facility]      Payor Plan Address Payor Plan Phone Number Effective From Effective To    [address] [address]  1/1/2014     [place] [address]       Subscriber Name Subscriber Birth Date Member ID       LAMONT MCMULLEN 1965 [id number]                 Emergency Contacts      (Rel.) Home Phone Work Phone Mobile Phone    Loco Mcmullen (Daughter) -- -- [phone]               Physical Therapy Notes (last 72 hours) (Notes from 2/28/2017 10:54 AM through 3/3/2017 10:54 AM)      Ailyn Nava, PT at 3/1/2017  5:07 PM  Version 1 of 1         Problem: Patient Care Overview (Adult)  Goal: Plan of Care Review  Outcome: Ongoing (interventions implemented as appropriate)    03/01/17 1330   Outcome Evaluation   Outcome Summary/Follow up Plan PT evaluation completed. Pt demonstrates an overall decrease in functional strength, limiting functional mobility & transfers at this time. Pt transferred stand pivot from bed<>bsc with SBA with no AD. Gait not " attempted this date due to pt request. Pt will benefit from skilled PT services in order to improve strength, safety, overall functional mobility/transfers. Pt may benefit from SNF placement at time of discharge.   Coping/Psychosocial Response Interventions   Plan Of Care Reviewed With patient       Goal: Discharge Needs Assessment    03/01/17 1330 03/01/17 1656   Discharge Needs Assessment   Discharge Facility/Level Of Care Needs --  LTACH (long term acute care hospital);nursing facility, skilled   Self-Care   Equipment Currently Used at Home crutches;hospital bed;prosthesis;commode;shower chair;wheelchair;walker, rolling  (Pt mostly uses crutches for ambulation) --          Problem: Inpatient Physical Therapy  Goal: Transfer Training Goal 1 LTG- PT  Outcome: Ongoing (interventions implemented as appropriate)    03/01/17 1330   Transfer Training PT LTG   Transfer Training PT LTG, Date Established 03/01/17   Transfer Training PT LTG, Time to Achieve by discharge   Transfer Training PT LTG, Activity Type bed to chair /chair to bed;sit to stand/stand to sit;toilet   Transfer Training PT LTG, [place] Level conditional independence   Transfer Training PT LTG, Assist Device (AAD)       Goal: Gait Training Goal STG- PT  Outcome: Ongoing (interventions implemented as appropriate)    03/01/17 1330   Gait Training PT STG   Gait Training Goal PT STG, Date Established 03/01/17   Gait Training Goal PT STG, Time to Achieve 3 days   Gait Training Goal PT STG, [place] Level moderate assist (50% patient effort);minimum assist (75% patient effort)   Gait Training Goal PT STG, Assist Device (AAD)   Gait Training Goal PT STG, Distance to Achieve 5 feet       Goal: Gait Training Goal LTG- PT  Outcome: Ongoing (interventions implemented as appropriate)    03/01/17 1330   Gait Training PT LTG   Gait Training Goal PT LTG, Date Established 03/01/17   Gait Training Goal PT LTG, Time to Achieve by discharge   Gait Training Goal PT  LTG, [place] Level contact guard assist   Gait Training Goal PT LTG, Assist Device (AAD)   Gait Training Goal PT LTG, Distance to Achieve 20 feet       Goal: Wheelchair Propulsion Goal LTG- PT  Outcome: Ongoing (interventions implemented as appropriate)    17 1330   Wheelchair Propulsion PT LTG   Wheelchair Propulsion Goal PT LTG, Date Established 17   Wheelchair Propulsion Goal PT LTG, Time to Achieve by discharge   Wheelchair Propulsion Goal PT LTG, [place] Level conditional independence   Wheelchair Propulsion Goal PT LTG, Distance to Achieve 150 feet              Electronically signed by Ailyn Nava, PT at 3/1/2017  5:07 PM      Ailyn Nava, PT at 3/1/2017  5:09 PM  Version 1 of 1         Acute Care - Physical Therapy Initial Evaluation  [facility]     Patient Name: Armando Mcmullen  : 1965  MRN: [id number]  Today's Date: 3/1/2017   Onset of Illness/Injury or Date of Surgery Date: 17 (s/p I&D R shoulder on 17)  Date of Referral to PT: 17  Referring Physician: Dr. Aguero      Admit Date: 2017     Visit Dx:    ICD-10-CM ICD-9-CM   1. Hyperammonemia E72.20 270.6   2. Urinary tract infection, site unspecified N39.0    3. Functional diarrhea K59.1 564.5   4. Impaired functional mobility, balance, and endurance Z74.09 V49.89     Patient Active Problem List   Diagnosis   • Anxiety state   • Back pain   • Furuncle of buttock   • Chronic hepatitis   • Hepatic cirrhosis   • Depression   • Hypersplenism   • Insomnia   • Mononeuritis   • Osteoarthritis   • Essential hypertension   • Closed fracture of proximal end of right humerus with delayed healing   • Closed nondisplaced supracondylar fracture of distal end of femur without intracondylar extension with delayed healing   • History of substance abuse   • Chronic osteomyelitis of right shoulder region   • Acute cystitis without hematuria     Past Medical History   Diagnosis Date   • Anxiety state 2008    • Back pain 12/1/2008   • Chronic hepatitis 6/18/2009   • Depression 12/1/2008   • Essential hypertension 12/1/2008   • Furuncle of buttock 11/23/2009   • Hepatic cirrhosis 5/26/2009   • Hypersplenism 6/28/2010   • Insomnia 12/1/2008   • Mononeuritis 11/23/2009   • Osteoarthritis 12/1/2008     Past Surgical History   Procedure Laterality Date   • Shoulder surgery     • Hip surgery     • Leg amputation     • Wrist surgery     • Incision and drainage leg Right 2/27/2017     Procedure: INCISION AND DRAINAGE RIGHT SHOULDER;  Surgeon: Shawn Cortez MD;  Location: [facility] OR;  Service:           PT ASSESSMENT (last 72 hours)      PT Evaluation       03/01/17 1330 02/28/17 1539    Rehab Evaluation    Document Type evaluation  -KG     Subjective Information agree to therapy;complains of;pain  -KG     Patient Effort, Rehab Treatment good  -KG     General Information    Patient Profile Review yes  -KG     Onset of Illness/Injury or Date of Surgery Date 02/20/17   s/p I&D R shoulder on 2/27/17  -KG     Referring Physician Dr. Aguero  -KG     General Observations Pt supine in bed with HOB elevated upon arrival; LLE BKA  -KG     Precautions/Limitations fall precautions  -KG     Prior Level of Function independent:;all household mobility;gait;transfer;ADL's   Son stays with pt at times; helps with IADL's  -KG     Equipment Currently Used at Home crutches;hospital bed;prosthesis;commode;shower chair;wheelchair;walker, rolling   Pt mostly uses crutches for ambulation  -KG     Plans/Goals Discussed With patient  -KG     Risks Reviewed patient:  -KG     Benefits Reviewed patient:;improve function;increase independence;increase strength  -KG     Barriers to Rehab --   Co-morbidities  -KG     Living Environment    Lives With child(wallace), adult   Son  -KG     Living Arrangements house  -KG     Home Accessibility no concerns;bed and bath on same level;ramps present at home  -KG     Stair Railings at Home none  -KG     Transportation  Available  ambulance  -RA    Clinical Impression    Date of Referral to PT 03/01/17  -KG     PT Diagnosis s/p I&D R shoulder on 2/27/17; decreased functional strength/mobility  -KG     Prognosis Good  -KG     Functional Level At Time Of Evaluation Mod I bed mobility, SBA for transfers  -KG     Patient/Family Goals Statement Improve strength  -KG     Criteria for Skilled Therapeutic Interventions Met yes;treatment indicated  -KG     Pathology/Pathophysiology Noted (Describe Specifically for Each System) musculoskeletal  -KG     Impairments Found (describe specific impairments) aerobic capacity/endurance;gait, locomotion, and balance;muscle performance  -KG     Rehab Potential good, to achieve stated therapy goals  -KG     Predicted Duration of Therapy Intervention (days/wks) Until discharge or all therapy goals met  -KG     Vital Signs    Pretreatment Heart Rate (beats/min) 91  -KG     Posttreatment Heart Rate (beats/min) 93  -KG     Pre SpO2 (%) 97  -KG     O2 Delivery Pre Treatment room air  -KG     Post SpO2 (%) 96  -KG     O2 Delivery Post Treatment room air  -KG     Pre Patient Position Supine  -KG     Intra Patient Position Sitting  -KG     Post Patient Position Supine  -KG     Pain Assessment    Pain Assessment 0-10  -KG     Pain Score 5  -KG     Post Pain Score 5  -KG     Pain Type Acute pain;Surgical pain  -KG     Pain Location Shoulder  -KG     Vision Assessment/Intervention    Visual Impairment WFL  -KG     Cognitive Assessment/Intervention    Current Cognitive/Communication Assessment functional  -KG     Orientation Status oriented x 4  -KG     Follows Commands/Answers Questions 100% of the time;able to follow single-step instructions  -KG     Personal Safety mild impairment  -KG     Personal Safety Interventions gait belt;supervised activity;nonskid shoes/slippers when out of bed  -KG     ROM (Range of Motion)    General ROM Detail BLE AROM WFL; R shoulder elevation limited to ~20 deg  -KG     MMT  (Manual Muscle Testing)    General MMT Assessment Detail Bilateral hip flexion 4+/5, [place] knee flex-ext grossy 3+/5, R ankle DF grossly 3+/5  -KG     Mobility Assessment/Training    Extremity Weight-Bearing Status --   WBAT  -KG     Bed Mobility, Assessment/Treatment    Bed Mobility, Assistive Device head of bed elevated;bed rails  -KG     Bed Mob, Supine to Sit, [place] independent  -KG     Bed Mob, Sit to Supine, [place] independent  -KG     Transfer Assessment/Treatment    Transfers, Bed-Chair [place] supervision required  -KG     Transfers, Chair-Bed [place] supervision required  -KG     Transfers, Bed-Chair-Bed, Assist Device --   No AD; stand pivot to BSC  -KG     Transfers, Sit-Stand [place] supervision required  -KG     Transfers, Stand-Sit [place] supervision required  -KG     Transfers, Sit-Stand-Sit, Assist Device --   No AD; stand pivot to BSC  -KG     Transfer, Impairments strength decreased;impaired balance  -KG     Gait Assessment/Treatment    Gait, [place] Level not tested  -KG     Sensory Assessment/Intervention    Light Touch LLE;RLE  -KG     LLE Light Touch WNL  -KG     RLE Light Touch WNL  -KG     Edema Management    Edema Amount left:;right:;moderate   in BLE  -KG     Positioning and Restraints    Pre-Treatment Position in bed  -KG     Post Treatment Position bed  -KG     In Bed supine;call light within reach;exit alarm on  -KG       User Key  (r) = Recorded By, (t) = Taken By, (c) = Cosigned By    Initials Name Provider Type    KG Ailyn Nava, PT Physical Therapist    VERONICA Flores           Physical Therapy Education     Title: PT OT SLP Therapies (Active)     Topic: Physical Therapy (Active)     Point: Mobility training (Active)    Learning Progress Summary    Learner Readiness Method Response Comment Documented by Status   Patient Acceptance E NR  KG 03/01/17 8191 Active               Point: Home exercise program (Active)    Learning  Progress Summary    Learner Readiness Method Response Comment Documented by Status   Patient Acceptance E,D,TB NR LAQ, heels slides, ankle pumps KG 03/01/17 1330 Active                      User Key     Initials Effective Dates Name Provider Type Discipline    KG 10/17/16 -  Ailyn Nava, PT Physical Therapist PT                PT Recommendation and Plan  Anticipated Discharge Disposition: skilled nursing facility  Planned Therapy Interventions: bed mobility training, gait training, home exercise program, strengthening, transfer training  PT Frequency: other (see comments) (5-14 times/wk)  Plan of Care Review  Plan Of Care Reviewed With: patient  Outcome Summary/Follow up Plan: PT evaluation completed. Pt demonstrates an overall decrease in functional strength, limiting functional mobility & transfers at this time. Pt transferred stand pivot from bed<>bsc with SBA with no AD. Gait not attempted this date due to pt request. Pt will benefit from skilled PT services in order to improve strength, safety, overall functional mobility/transfers. Pt may benefit from SNF placement at time of discharge.          IP PT Goals       03/01/17 1330          Transfer Training PT LTG    Transfer Training PT LTG, Date Established 03/01/17  -KG      Transfer Training PT LTG, Time to Achieve by discharge  -KG      Transfer Training PT LTG, Activity Type bed to chair /chair to bed;sit to stand/stand to sit;toilet  -KG      Transfer Training PT LTG, [place] Level conditional independence  -KG      Transfer Training PT LTG, Assist Device --   AAD  -KG      Gait Training PT STG    Gait Training Goal PT STG, Date Established 03/01/17  -KG      Gait Training Goal PT STG, Time to Achieve 3 days  -KG      Gait Training Goal PT STG, [place] Level moderate assist (50% patient effort);minimum assist (75% patient effort)  -KG      Gait Training Goal PT STG, Assist Device --   AAD  -KG      Gait Training Goal PT STG, Distance to  Achieve 5 feet  -KG      Gait Training PT LTG    Gait Training Goal PT LTG, Date Established 03/01/17  -KG      Gait Training Goal PT LTG, Time to Achieve by discharge  -KG      Gait Training Goal PT LTG, [place] Level contact guard assist  -KG      Gait Training Goal PT LTG, Assist Device --   AAD  -KG      Gait Training Goal PT LTG, Distance to Achieve 20 feet  -KG      Wheelchair Propulsion PT LTG    Wheelchair Propulsion Goal PT LTG, Date Established 03/01/17  -KG      Wheelchair Propulsion Goal PT LTG, Time to Achieve by discharge  -KG      Wheelchair Propulsion Goal PT LTG, [place] Level conditional independence  -KG      Wheelchair Propulsion Goal PT LTG, Distance to Achieve 150 feet  -KG        User Key  (r) = Recorded By, (t) = Taken By, (c) = Cosigned By    Initials Name Provider Type    THELMA Nava PT Physical Therapist                Outcome Measures       03/01/17 1330          How much help from another person do you currently need...    Turning from your back to your side while in flat bed without using bedrails? 4  -KG      Moving from lying on back to sitting on the side of a flat bed without bedrails? 4  -KG      Moving to and from a bed to a chair (including a wheelchair)? 3  -KG      Standing up from a chair using your arms (e.g., wheelchair, bedside chair)? 3  -KG      Climbing 3-5 steps with a railing? 1  -KG      To walk in hospital room? 2  -KG      AM-PAC 6 Clicks Score 17  -KG      Functional Assessment    Outcome Measure Options AM-PAC 6 Clicks Basic Mobility (PT)  -KG        User Key  (r) = Recorded By, (t) = Taken By, (c) = Cosigned By    Initials Name Provider Type    THELMA Nava PT Physical Therapist           Time Calculation:         PT Charges       03/01/17 1330          Time Calculation    Start Time 1330  -KG      Stop Time 1354  -KG      Time Calculation (min) 24 min  -KG      PT Received On 03/01/17  -KG      PT Goal Re-Cert Due Date 03/14/17  -KG    "   Time Calculation- PT    Total Timed Code Minutes- PT 0 minute(s)  -KG        User Key  (r) = Recorded By, (t) = Taken By, (c) = Cosigned By    Initials Name Provider Type    KG Ailyn Nava PT Physical Therapist          Therapy Charges for Today     Code Description Service Date Service Provider Modifiers Qty    21167070779 HC PT MOBILITY CURRENT 3/1/2017 Ailyn Nava, PT GP, CK 1    61214477059 HC PT MOBILITY PROJECTED 3/1/2017 Ailyn Nava, PT GP, CI 1    35202742868 HC PT EVAL MOD COMPLEXITY 2 3/1/2017 Ailyn Nava, PT  1          PT G-Codes  PT Professional Judgement Used?: Yes  Outcome Measure Options: AM-PAC 6 Clicks Basic Mobility (PT)  Score: 17  Functional Limitation: Mobility: Walking and moving around  Mobility: Walking and Moving Around Current Status (): At least 40 percent but less than 60 percent impaired, limited or restricted  Mobility: Walking and Moving Around Goal Status (): At least 1 percent but less than 20 percent impaired, limited or restricted      Ailyn Nava PT  3/1/2017          Electronically signed by Ailyn Nava PT at 3/1/2017  5:09 PM      Stephanie Du PTA at 3/2/2017  9:36 AM  Version 1 of 1 03/02/17 0935   Rehab Treatment   Discipline physical therapy assistant   Treatment Not Performed patient/family decline treatment, pt not feeling well  (\"not right now-my stomach is cramping,my legs are swollen,and my arm is hurting\" encouraged supine ex but pt cont to decline)        Electronically signed by Stephanie Du PTA at 3/2/2017  9:36 AM      Stephanie Du PTA at 3/2/2017  3:41 PM  Version 1 of 1         Problem: Patient Care Overview (Adult)  Goal: Plan of Care Review  Outcome: Ongoing (interventions implemented as appropriate)    03/02/17 0900 03/02/17 1435   Patient Care Overview   Progress --  progress toward functional goals as expected   Outcome Evaluation   Outcome Summary/Follow up Plan --  bed to chair with cga of 1,20 " reps of ex-may benefit from cont therapy at snf   Coping/Psychosocial Response Interventions   Plan Of Care Reviewed With patient --        Goal: Adult Individualization and Mutuality    03/02/17 1538   Individualization   Patient Specific Preferences pt prefers pm treatments       Goal: Discharge Needs Assessment  Outcome: Ongoing (interventions implemented as appropriate)    02/21/17 1830 02/28/17 1539 03/01/17 1330   Discharge Needs Assessment   Concerns To Be Addressed --  --  --    Readmission Within The Last 30 Days no previous admission in last 30 days --  --    Equipment Needed After Discharge none --  --    Discharge Facility/Level Of Care Needs --  --  --    Discharge Planning Comments --  Pt needs 4 weeks IV antx, LTACH vs nursing facility discussed. --    Current Health   Anticipated Changes Related to Illness none --  --    Self-Care   Equipment Currently Used at Home --  --  crutches;hospital bed;prosthesis;commode;shower chair;wheelchair;walker, rolling  (Pt mostly uses crutches for ambulation)   Living Environment   Transportation Available --  ambulance --      03/01/17 1656 03/02/17 0557   Discharge Needs Assessment   Concerns To Be Addressed --  no discharge needs identified   Readmission Within The Last 30 Days --  --    Equipment Needed After Discharge --  --    Discharge Facility/Level Of Care Needs LTACH (long term acute care hospital);nursing facility, skilled --    Discharge Planning Comments --  --    Current Health   Anticipated Changes Related to Illness --  --    Self-Care   Equipment Currently Used at Home --  --    Living Environment   Transportation Available --  --          Problem: Inpatient Physical Therapy  Goal: Transfer Training Goal 1 LTG- PT  Outcome: Ongoing (interventions implemented as appropriate)    03/01/17 1330 03/02/17 1538   Transfer Training PT LTG   Transfer Training PT LTG, Date Established 03/01/17 --    Transfer Training PT LTG, Time to Achieve by discharge --     Transfer Training PT LTG, Activity Type bed to chair /chair to bed;sit to stand/stand to sit;toilet --    Transfer Training PT LTG, [place] Level conditional independence --    Transfer Training PT LTG, Assist Device (AAD) --    Transfer Training PT LTG, Date Goal Reviewed --  03/02/17   Transfer Training PT LTG, Outcome --  goal not met   Transfer Training PT LTG, Reason Goal Not Met --  progress slower than expected       Goal: Gait Training Goal STG- PT  Outcome: Ongoing (interventions implemented as appropriate)    03/01/17 1330 03/02/17 1435   Gait Training PT STG   Gait Training Goal PT STG, Date Established 03/01/17 --    Gait Training Goal PT STG, Time to Achieve 3 days --    Gait Training Goal PT STG, [place] Level moderate assist (50% patient effort);minimum assist (75% patient effort) --    Gait Training Goal PT STG, Assist Device (AAD) --    Gait Training Goal PT STG, Distance to Achieve 5 feet --    Gait Training Goal PT STG, Date Goal Reviewed --  03/02/17   Gait Training Goal PT STG, Outcome --  goal not met   Gait Training Goal PT STG, Reason Goal Not Met --  progress slower than expected       Goal: Gait Training Goal LTG- PT  Outcome: Ongoing (interventions implemented as appropriate)    03/01/17 1330 03/02/17 1435   Gait Training PT LTG   Gait Training Goal PT LTG, Date Established 03/01/17 --    Gait Training Goal PT LTG, Time to Achieve by discharge --    Gait Training Goal PT LTG, [place] Level contact guard assist --    Gait Training Goal PT LTG, Assist Device (AAD) --    Gait Training Goal PT LTG, Distance to Achieve 20 feet --    Gait Training Goal PT LTG, Date Goal Reviewed --  03/02/17   Gait Training Goal PT LTG, Outcome --  goal not met   Gait Training Goal PT LTG, Reason Goal Not Met --  progress slower than expected       Goal: Wheelchair Propulsion Goal LTG- PT  Outcome: Ongoing (interventions implemented as appropriate)    03/01/17 1330 03/02/17 1435   Wheelchair  Propulsion PT LTG   Wheelchair Propulsion Goal PT LTG, Date Established 17 --    Wheelchair Propulsion Goal PT LTG, Time to Achieve by discharge --    Wheelchair Propulsion Goal PT LTG, [place] Level conditional independence --    Wheelchair Propulsion Goal PT LTG, Distance to Achieve 150 feet --    Wheelchair Propulsion Goal PT LTG, Date Goal Reviewed --  17   Wheelchair Propulsion Goal PT LTG, Outcome --  goal not met   Wheelchair Propulsion Goal PT LTG, Reason Goal Not Met --  progress slower than expected              Electronically signed by Stephanie Du PTA at 3/2/2017  3:41 PM      Stephanie Du PTA at 3/2/2017  3:42 PM  Version 1 of 1         Acute Care - Physical Therapy Treatment Note  [facility]     Patient Name: Armando Mcmullen  : 1965  MRN: [id number]  Today's Date: 3/2/2017  Onset of Illness/Injury or Date of Surgery Date: 17 (s/p I&D R shoulder on 17)  Date of Referral to PT: 17  Referring Physician: Dr. Aguero    Admit Date: 2017    Visit Dx:    ICD-10-CM ICD-9-CM   1. Hyperammonemia E72.20 270.6   2. Urinary tract infection, site unspecified N39.0    3. Functional diarrhea K59.1 564.5   4. Impaired functional mobility, balance, and endurance Z74.09 V49.89   5. Impaired gait and mobility R26.89 781.2     Patient Active Problem List   Diagnosis   • Anxiety state   • Back pain   • Furuncle of buttock   • Chronic hepatitis   • Hepatic cirrhosis   • Depression   • Hypersplenism   • Insomnia   • Mononeuritis   • Osteoarthritis   • Essential hypertension   • Closed fracture of proximal end of right humerus with delayed healing   • Closed nondisplaced supracondylar fracture of distal end of femur without intracondylar extension with delayed healing   • History of substance abuse   • Chronic osteomyelitis of right shoulder region   • Acute cystitis without hematuria   • Hypokalemia               Adult Rehabilitation Note       17 1435          Rehab  Assessment/Intervention    Discipline physical therapy assistant  -LN      Document Type therapy note (daily note)  -LN      Subjective Information agree to therapy;complains of;pain  -LN      Precautions/Limitations fall precautions  -LN      Recorded by [LN] Stephanie Du PTA      Vital Signs    Pre Systolic BP Rehab 120  -LN      Pre Treatment Diastolic BP 60  -LN      Post Systolic BP Rehab 120  -LN      Post Treatment Diastolic BP 70  -LN      Pretreatment Heart Rate (beats/min) 73  -LN      Posttreatment Heart Rate (beats/min) 83  -LN      Pre SpO2 (%) 97  -LN      O2 Delivery Pre Treatment room air  -LN      Post SpO2 (%) 97  -LN      Recorded by [LN] Stephanie Du PTA      Pain Assessment    Pain Assessment 0-10  -LN      Pain Score 7  -LN      Post Pain Score 7  -LN      Pain Type Acute pain  -LN      Pain Location Shoulder  -LN      Pain Orientation Right  -LN      Recorded by [LN] Stephanie Du PTA      Cognitive Assessment/Intervention    Orientation Status oriented x 4  -LN      Recorded by [LN] Stephanie Du PTA      Bed Mobility, Assessment/Treatment    Bed Mobility, Assistive Device bed rails;head of bed elevated  -LN      Bed Mob, Supine to Sit, [place] conditional independence  -LN      Recorded by [LN] Stephanie Du, JULITA      Transfer Assessment/Treatment    Transfers, Bed-Chair [place] contact guard assist   sit pivot to chair  -LN      Transfers, Bed-Chair-Bed, Assist Device --   none  -LN      Recorded by [LN] Stephanie Du, JULITA      Gait Assessment/Treatment    Gait, Comment deferred no prosthesis  -LN      Recorded by [LN] Stephanie Du, JULITA      Therapy Exercises    Right Lower Extremity AROM:;20 reps;sitting;ankle pumps/circles;hip abduction/adduction;hip flexion;LAQ   with 2#  -LN      Left Lower Extremity AROM:;20 reps;sitting;hip abduction/adduction;hip flexion;LAQ  -LN      Recorded by [LN] Stephanie Du PTA      Positioning and Restraints    Post Treatment Position chair  -LN       In Chair sitting;call light within reach;encouraged to call for assist;legs elevated  -LN      Recorded by [LN] Stephanie Du PTA        User Key  (r) = Recorded By, (t) = Taken By, (c) = Cosigned By    Initials Name Effective Dates    DYLAN Du, PTA 10/17/16 -                 IP PT Goals       03/02/17 1538 03/02/17 1435 03/01/17 1330    Transfer Training PT LTG    Transfer Training PT LTG, Date Established   03/01/17  -KG    Transfer Training PT LTG, Time to Achieve   by discharge  -KG    Transfer Training PT LTG, Activity Type   bed to chair /chair to bed;sit to stand/stand to sit;toilet  -KG    Transfer Training PT LTG, [place] Level   conditional independence  -KG    Transfer Training PT LTG, Assist Device   --   AAD  -KG    Transfer Training PT  LTG, Date Goal Reviewed 03/02/17  -LN      Transfer Training PT LTG, Outcome goal not met  -LN      Transfer Training PT LTG, Reason Goal Not Met progress slower than expected  -LN      Gait Training PT STG    Gait Training Goal PT STG, Date Established   03/01/17  -KG    Gait Training Goal PT STG, Time to Achieve   3 days  -KG    Gait Training Goal PT STG, [place] Level   moderate assist (50% patient effort);minimum assist (75% patient effort)  -KG    Gait Training Goal PT STG, Assist Device   --   AAD  -KG    Gait Training Goal PT STG, Distance to Achieve   5 feet  -KG    Gait Training Goal PT STG, Date Goal Reviewed  03/02/17  -LN     Gait Training Goal PT STG, Outcome  goal not met  -LN     Gait Training Goal PT STG, Reason Goal Not Met  progress slower than expected  -LN     Gait Training PT LTG    Gait Training Goal PT LTG, Date Established   03/01/17  -KG    Gait Training Goal PT LTG, Time to Achieve   by discharge  -KG    Gait Training Goal PT LTG, [place] Level   contact guard assist  -KG    Gait Training Goal PT LTG, Assist Device   --   AAD  -KG    Gait Training Goal PT LTG, Distance to Achieve   20 feet  -KG    Gait Training Goal PT  LTG, Date Goal Reviewed  03/02/17  -LN     Gait Training Goal PT LTG, Outcome  goal not met  -LN     Gait Training Goal PT LTG, Reason Goal Not Met  progress slower than expected  -LN     Wheelchair Propulsion PT LTG    Wheelchair Propulsion Goal PT LTG, Date Established   03/01/17  -KG    Wheelchair Propulsion Goal PT LTG, Time to Achieve   by discharge  -KG    Wheelchair Propulsion Goal PT LTG, [place] Level   conditional independence  -KG    Wheelchair Propulsion Goal PT LTG, Distance to Achieve   150 feet  -KG    Wheelchair Propulsion Goal PT LTG, Date Goal Reviewed  03/02/17  -LN     Wheelchair Propulsion Goal PT LTG, Outcome  goal not met  -LN     Wheelchair Propulsion Goal PT LTG, Reason Goal Not Met  progress slower than expected  -LN       User Key  (r) = Recorded By, (t) = Taken By, (c) = Cosigned By    Initials Name Provider Type    KG Ailyn Nava, PT Physical Therapist    LN Stephanie Du, JULITA Physical Therapy Assistant          Physical Therapy Education     Title: PT OT SLP Therapies (Active)     Topic: Physical Therapy (Active)     Point: Mobility training (Active)    Learning Progress Summary    Learner Readiness Method Response Comment Documented by Status   Patient Acceptance E NR  LN 03/02/17 1537 Active    Acceptance E NR  KG 03/01/17 1330 Active               Point: Home exercise program (Active)    Learning Progress Summary    Learner Readiness Method Response Comment Documented by Status   Patient Acceptance E NR  LN 03/02/17 1537 Active    Acceptance E,D,TB NR LAQ, heels slides, ankle pumps KG 03/01/17 1330 Active               Point: Precautions (Active)    Learning Progress Summary    Learner Readiness Method Response Comment Documented by Status   Patient Acceptance E NR  LN 03/02/17 1537 Active                      User Key     Initials Effective Dates Name Provider Type Discipline    KG 10/17/16 -  Ailyn Nava, PT Physical Therapist PT    LN 10/17/16 -  Stephanie Du, JULITA  Physical Therapy Assistant PT                    PT Recommendation and Plan  Anticipated Discharge Disposition: skilled nursing facility  Planned Therapy Interventions: bed mobility training, gait training, home exercise program, strengthening, transfer training  PT Frequency: other (see comments) (5-14 times/wk)  Plan of Care Review  Progress: progress toward functional goals as expected  Outcome Summary/Follow up Plan: bed to chair with cga of 1,20 reps of ex-may benefit from cont therapy at snf          Outcome Measures       03/02/17 1435 03/01/17 1330       How much help from another person do you currently need...    Turning from your back to your side while in flat bed without using bedrails? 4  -LN 4  -KG     Moving from lying on back to sitting on the side of a flat bed without bedrails? 4  -LN 4  -KG     Moving to and from a bed to a chair (including a wheelchair)? 3  -LN 3  -KG     Standing up from a chair using your arms (e.g., wheelchair, bedside chair)? 2  -LN 3  -KG     Climbing 3-5 steps with a railing? 1  -LN 1  -KG     To walk in hospital room? 2  -LN 2  -KG     AM-PAC 6 Clicks Score 16  -LN 17  -KG     Functional Assessment    Outcome Measure Options AM-PAC 6 Clicks Basic Mobility (PT)  -LN AM-PAC 6 Clicks Basic Mobility (PT)  -KG       User Key  (r) = Recorded By, (t) = Taken By, (c) = Cosigned By    Initials Name Provider Type    THELMA Nava, PT Physical Therapist    DYLAN Du PTA Physical Therapy Assistant           Time Calculation:         PT Charges       03/02/17 1435          Time Calculation    Start Time 1435  -LN      Stop Time 1505  -LN      Time Calculation (min) 30 min  -LN      PT Received On 03/02/17  -LN      Time Calculation- PT    Total Timed Code Minutes- PT 30 minute(s)  -LN        User Key  (r) = Recorded By, (t) = Taken By, (c) = Cosigned By    Initials Name Provider Type    DYLAN Du PTA Physical Therapy Assistant          Therapy Charges for Today      Code Description Service Date Service Provider Modifiers Qty    06935191526 HC NSG/PT ASSIST 3/2/2017 Stephanie Du PTA  1    88133223458 HC PT THER PROC EA 15 MIN 3/2/2017 Stephanie Du PTA GP 1    15804781329 HC PT THERAPEUTIC ACT EA 15 MIN 3/2/2017 Stephanie Du PTA GP 1          PT G-Codes  PT Professional Judgement Used?: Yes  Outcome Measure Options: AM-PAC 6 Clicks Basic Mobility (PT)  Score: 17  Functional Limitation: Mobility: Walking and moving around  Mobility: Walking and Moving Around Current Status (): At least 40 percent but less than 60 percent impaired, limited or restricted  Mobility: Walking and Moving Around Goal Status (): At least 1 percent but less than 20 percent impaired, limited or restricted    Stephanie Du PTA  3/2/2017          Electronically signed by Stephanie Du PTA at 3/2/2017  3:43 PM        Occupational Therapy Notes (last 72 hours) (Notes from 2/28/2017 10:54 AM through 3/3/2017 10:54 AM)     No notes of this type exist for this encounter.

## 2017-03-03 NOTE — PLAN OF CARE
Problem: Patient Care Overview (Adult)  Goal: Plan of Care Review  Outcome: Ongoing (interventions implemented as appropriate)  Intake 100% - 5x, 75% - 2x, 50% - 4x, 25% - 2x, 0% - 1x    03/03/17 3946   Patient Care Overview   Progress no change   Outcome Evaluation   Outcome Summary/Follow up Plan ammonia level not repreated at present   Coping/Psychosocial Response Interventions   Plan Of Care Reviewed With patient

## 2017-03-03 NOTE — PLAN OF CARE
Problem: Patient Care Overview (Adult)  Goal: Plan of Care Review  Outcome: Ongoing (interventions implemented as appropriate)    03/02/17 8415   Patient Care Overview   Progress improving   Coping/Psychosocial Response Interventions   Plan Of Care Reviewed With patient       Goal: Adult Individualization and Mutuality  Outcome: Ongoing (interventions implemented as appropriate)  Goal: Discharge Needs Assessment  Outcome: Ongoing (interventions implemented as appropriate)    Problem: Fall Risk (Adult)  Goal: Absence of Falls  Outcome: Ongoing (interventions implemented as appropriate)    Problem: Pressure Ulcer (Adult)  Goal: Signs and Symptoms of Listed Potential Problems Will be Absent or Manageable (Pressure Ulcer)  Outcome: Ongoing (interventions implemented as appropriate)

## 2017-03-04 ENCOUNTER — APPOINTMENT (OUTPATIENT)
Dept: CT IMAGING | Facility: HOSPITAL | Age: 52
End: 2017-03-04

## 2017-03-04 LAB
ALBUMIN SERPL-MCNC: 2.2 G/DL (ref 3.4–4.8)
ALBUMIN/GLOB SERPL: 0.7 G/DL (ref 1.1–1.8)
ALP SERPL-CCNC: 102 U/L (ref 38–126)
ALT SERPL W P-5'-P-CCNC: 16 U/L (ref 21–72)
ANION GAP SERPL CALCULATED.3IONS-SCNC: 1 MMOL/L (ref 5–15)
AST SERPL-CCNC: 38 U/L (ref 17–59)
BASOPHILS # BLD AUTO: 0.25 10*3/MM3 (ref 0–0.2)
BASOPHILS NFR BLD AUTO: 3.1 % (ref 0–2)
BILIRUB SERPL-MCNC: 2.1 MG/DL (ref 0.2–1.3)
BUN BLD-MCNC: 10 MG/DL (ref 7–21)
BUN/CREAT SERPL: 6.3 (ref 7–25)
CALCIUM SPEC-SCNC: 8.3 MG/DL (ref 8.4–10.2)
CHLORIDE SERPL-SCNC: 100 MMOL/L (ref 95–110)
CO2 SERPL-SCNC: 33 MMOL/L (ref 22–31)
CREAT BLD-MCNC: 1.58 MG/DL (ref 0.7–1.3)
CRP SERPL-MCNC: 2.2 MG/DL (ref 0–1)
DEPRECATED RDW RBC AUTO: 62.4 FL (ref 35.1–43.9)
EOSINOPHIL # BLD AUTO: 0.98 10*3/MM3 (ref 0–0.7)
EOSINOPHIL NFR BLD AUTO: 12.1 % (ref 0–7)
ERYTHROCYTE [DISTWIDTH] IN BLOOD BY AUTOMATED COUNT: 16.4 % (ref 11.5–14.5)
GFR SERPL CREATININE-BSD FRML MDRD: 46 ML/MIN/1.73 (ref 60–130)
GLOBULIN UR ELPH-MCNC: 3.3 GM/DL (ref 2.3–3.5)
GLUCOSE BLD-MCNC: 80 MG/DL (ref 60–100)
HCT VFR BLD AUTO: 29.8 % (ref 39–49)
HGB BLD-MCNC: 10.3 G/DL (ref 13.7–17.3)
IMM GRANULOCYTES # BLD: 0.02 10*3/MM3 (ref 0–0.02)
IMM GRANULOCYTES NFR BLD: 0.2 % (ref 0–0.5)
LYMPHOCYTES # BLD AUTO: 3.14 10*3/MM3 (ref 0.6–4.2)
LYMPHOCYTES NFR BLD AUTO: 38.9 % (ref 10–50)
MCH RBC QN AUTO: 35.5 PG (ref 26.5–34)
MCHC RBC AUTO-ENTMCNC: 34.6 G/DL (ref 31.5–36.3)
MCV RBC AUTO: 102.8 FL (ref 80–98)
MONOCYTES # BLD AUTO: 0.84 10*3/MM3 (ref 0–0.9)
MONOCYTES NFR BLD AUTO: 10.4 % (ref 0–12)
NEUTROPHILS # BLD AUTO: 2.84 10*3/MM3 (ref 2–8.6)
NEUTROPHILS NFR BLD AUTO: 35.3 % (ref 37–80)
PLATELET # BLD AUTO: 82 10*3/MM3 (ref 150–450)
PMV BLD AUTO: 10.4 FL (ref 8–12)
POTASSIUM BLD-SCNC: 3.6 MMOL/L (ref 3.5–5.1)
PROT SERPL-MCNC: 5.5 G/DL (ref 6.3–8.6)
RBC # BLD AUTO: 2.9 10*6/MM3 (ref 4.37–5.74)
SODIUM BLD-SCNC: 134 MMOL/L (ref 137–145)
WBC NRBC COR # BLD: 8.07 10*3/MM3 (ref 3.2–9.8)

## 2017-03-04 PROCEDURE — 85025 COMPLETE CBC W/AUTO DIFF WBC: CPT | Performed by: INTERNAL MEDICINE

## 2017-03-04 PROCEDURE — 74176 CT ABD & PELVIS W/O CONTRAST: CPT

## 2017-03-04 PROCEDURE — 25010000002 VANCOMYCIN PER 500 MG: Performed by: FAMILY MEDICINE

## 2017-03-04 PROCEDURE — 25010000002 PIPERACILLIN SOD-TAZOBACTAM PER 1 G: Performed by: FAMILY MEDICINE

## 2017-03-04 PROCEDURE — 80053 COMPREHEN METABOLIC PANEL: CPT | Performed by: INTERNAL MEDICINE

## 2017-03-04 PROCEDURE — 86140 C-REACTIVE PROTEIN: CPT | Performed by: ORTHOPAEDIC SURGERY

## 2017-03-04 RX ORDER — PANTOPRAZOLE SODIUM 40 MG/1
40 TABLET, DELAYED RELEASE ORAL
Status: DISCONTINUED | OUTPATIENT
Start: 2017-03-05 | End: 2017-03-09 | Stop reason: HOSPADM

## 2017-03-04 RX ADMIN — POTASSIUM CHLORIDE 40 MEQ: 20 TABLET, EXTENDED RELEASE ORAL at 08:58

## 2017-03-04 RX ADMIN — CEFTAZIDIME 1 G: 1 INJECTION, POWDER, FOR SOLUTION INTRAMUSCULAR; INTRAVENOUS at 17:45

## 2017-03-04 RX ADMIN — GABAPENTIN 100 MG: 100 CAPSULE ORAL at 08:58

## 2017-03-04 RX ADMIN — NYSTATIN: 100000 POWDER TOPICAL at 20:44

## 2017-03-04 RX ADMIN — VANCOMYCIN HYDROCHLORIDE 250 MG: 1 INJECTION, POWDER, LYOPHILIZED, FOR SOLUTION INTRAVENOUS at 05:45

## 2017-03-04 RX ADMIN — RIFAXIMIN 550 MG: 550 TABLET ORAL at 08:58

## 2017-03-04 RX ADMIN — TAZOBACTAM SODIUM AND PIPERACILLIN SODIUM 3.38 G: 375; 3 INJECTION, SOLUTION INTRAVENOUS at 04:47

## 2017-03-04 RX ADMIN — POTASSIUM CHLORIDE 40 MEQ: 20 TABLET, EXTENDED RELEASE ORAL at 17:45

## 2017-03-04 RX ADMIN — Medication: at 20:45

## 2017-03-04 RX ADMIN — GABAPENTIN 100 MG: 100 CAPSULE ORAL at 20:45

## 2017-03-04 RX ADMIN — CEFTAZIDIME 1 G: 1 INJECTION, POWDER, FOR SOLUTION INTRAMUSCULAR; INTRAVENOUS at 14:06

## 2017-03-04 RX ADMIN — VANCOMYCIN HYDROCHLORIDE 250 MG: 1 INJECTION, POWDER, LYOPHILIZED, FOR SOLUTION INTRAVENOUS at 14:46

## 2017-03-04 RX ADMIN — Medication: at 17:46

## 2017-03-04 RX ADMIN — GABAPENTIN 100 MG: 100 CAPSULE ORAL at 17:45

## 2017-03-04 RX ADMIN — OXYCODONE HYDROCHLORIDE AND ACETAMINOPHEN 1 TABLET: 10; 325 TABLET ORAL at 14:00

## 2017-03-04 RX ADMIN — FUROSEMIDE 40 MG: 40 TABLET ORAL at 08:58

## 2017-03-04 RX ADMIN — OXYCODONE HYDROCHLORIDE AND ACETAMINOPHEN 1 TABLET: 10; 325 TABLET ORAL at 17:45

## 2017-03-04 RX ADMIN — Medication: at 09:01

## 2017-03-04 RX ADMIN — FUROSEMIDE 40 MG: 40 TABLET ORAL at 17:45

## 2017-03-04 RX ADMIN — OXYCODONE HYDROCHLORIDE AND ACETAMINOPHEN 1 TABLET: 10; 325 TABLET ORAL at 08:58

## 2017-03-04 RX ADMIN — Medication 1 TABLET: at 08:58

## 2017-03-04 RX ADMIN — NYSTATIN: 100000 POWDER TOPICAL at 09:01

## 2017-03-04 RX ADMIN — RIFAXIMIN 550 MG: 550 TABLET ORAL at 17:45

## 2017-03-04 RX ADMIN — VANCOMYCIN HYDROCHLORIDE 250 MG: 1 INJECTION, POWDER, LYOPHILIZED, FOR SOLUTION INTRAVENOUS at 23:45

## 2017-03-04 RX ADMIN — VANCOMYCIN HYDROCHLORIDE 1500 MG: 1 INJECTION, POWDER, LYOPHILIZED, FOR SOLUTION INTRAVENOUS at 14:06

## 2017-03-04 RX ADMIN — VANCOMYCIN HYDROCHLORIDE 250 MG: 1 INJECTION, POWDER, LYOPHILIZED, FOR SOLUTION INTRAVENOUS at 17:45

## 2017-03-04 RX ADMIN — OXYCODONE HYDROCHLORIDE AND ACETAMINOPHEN 1 TABLET: 10; 325 TABLET ORAL at 20:45

## 2017-03-04 RX ADMIN — OXYCODONE HYDROCHLORIDE AND ACETAMINOPHEN 1 TABLET: 10; 325 TABLET ORAL at 03:42

## 2017-03-04 NOTE — PROGRESS NOTES
[facility] Medicine Services  INPATIENT PROGRESS NOTE     LOS: 10 days   Patient Care Team:  Cj Aguero MD as PCP - General    Chief Complaint:  <principal problem not specified>      Subjective     Interval History:     Patient Complaints: Patient complains of having pain and abdominal wall.    History taken from: patient RN    Review of Systems:    Review of Systems   Constitutional: Negative for appetite change, chills, diaphoresis and fever.   HENT: Negative for congestion, rhinorrhea, sore throat and trouble swallowing.    Eyes: Negative for visual disturbance.   Respiratory: Negative for cough, chest tightness, shortness of breath and wheezing.    Cardiovascular: Negative for chest pain, palpitations and leg swelling.   Gastrointestinal: Positive for abdominal pain. Negative for blood in stool, diarrhea, nausea and vomiting. Anal bleeding: on lateral side in the folds of pannus.   Endocrine: Negative for cold intolerance and heat intolerance.   Genitourinary: Negative for decreased urine volume and difficulty urinating.   Musculoskeletal: Negative for back pain, gait problem and neck pain.   Skin: Negative for rash.   Neurological: Negative for dizziness, syncope, weakness, light-headedness, numbness and headaches.   Psychiatric/Behavioral: The patient is not nervous/anxious.          Objective     Vital Signs  Temp:  [97.3 °F (36.3 °C)-98.7 °F (37.1 °C)] 97.7 °F (36.5 °C)  Heart Rate:  [63-84] 84  Resp:  [18] 18  BP: (120-137)/(64-89) 137/89    Physical Exam:   Physical Exam   Constitutional: He is oriented to person, place, and time. He appears well-developed and well-nourished.   HENT:   Head: Normocephalic and atraumatic.   Nose: Nose normal.   Eyes: Conjunctivae and EOM are normal. Pupils are equal, round, and reactive to light.   Neck: Normal range of motion. Neck supple. No JVD present. No tracheal deviation present. No thyromegaly present.    Cardiovascular: Normal rate, regular rhythm, normal heart sounds and intact distal pulses.    Pulmonary/Chest: Effort normal and breath sounds normal. No respiratory distress. He has no wheezes. He has no rales. He exhibits no tenderness.   Abdominal: Soft. Bowel sounds are normal. He exhibits no distension. There is tenderness (by lateral abdominal wall on left side. Possible some subcutaneous swelling. No erythema or warmth noted.). There is no rebound and no guarding.   Musculoskeletal: He exhibits no edema.   Lymphadenopathy:     He has no cervical adenopathy.   Neurological: He is alert and oriented to person, place, and time. No cranial nerve deficit.   Skin: Skin is warm and dry.   Intact   Psychiatric: He has a normal mood and affect.   Nursing note and vitals reviewed.         Results Review:       Results from last 7 days  Lab Units 03/04/17  0602 03/03/17  0630 03/02/17  0522 03/01/17  0642 02/28/17  1848 02/28/17  0538 02/27/17  0517 02/26/17  0257   SODIUM mmol/L 134* 134* 136* 134*  --  134* 134* 134*   POTASSIUM mmol/L 3.6 3.3* 3.3* 3.4* 3.4* 3.5 3.5 3.8   CHLORIDE mmol/L 100 101 104 105  --  109 109 111*   TOTAL CO2 mmol/L 33.0* 29.0 29.0 28.0  --  24.0 23.0 21.0*   BUN mg/dL 10 9 9 7  --  7 7 7   CREATININE mg/dL 1.58* 1.36* 1.44* 1.35*  --  1.11 1.11 1.03   GLUCOSE mg/dL 80 97 70 75  --  70 71 94   CALCIUM mg/dL 8.3* 7.9* 8.0* 8.1*  --  8.1* 8.0* 7.6*   BILIRUBIN mg/dL 2.1* 2.6* 2.2* 2.5*  --  2.4* 2.4* 1.9*   ALK PHOS U/L 102 76 76 70  --  66 66 82   ALT (SGPT) U/L 16* 23 30 24  --  30 25 30   AST (SGOT) U/L 38 36 37 39  --  43 44 45         Results from last 7 days  Lab Units 03/01/17  0642 02/28/17  1848   MAGNESIUM mg/dL 2.4* 1.7         Results from last 7 days  Lab Units 03/04/17  0602 03/03/17  0630 03/02/17  0522 03/01/17  0642 02/28/17  0538 02/27/17  0516 02/26/17  0257   WBC 10*3/mm3 8.07 6.50 7.11 6.53 5.95 7.12 6.89   HEMOGLOBIN g/dL 10.3* 10.0* 9.9* 9.5* 9.3* 9.8* 9.5*   HEMATOCRIT  % 29.8* 28.9* 28.8* 27.5* 27.0* 27.8* 27.2*   PLATELETS 10*3/mm3 82* 75* 81* 72* 71* 71* 70*              Imaging Results (last 7 days)     Procedure Component Value Units Date/Time    US Guided Vascular Access [[id number]] Resulted:  02/27/17 0835     Updated:  02/27/17 0835    XR Chest PA & Lateral [[id number]] Collected:  03/03/17 1042     Updated:  03/03/17 1048    Narrative:       Patient Name:  LAMONT FISHER  Patient ID:  [id number]   Ordering:  ROGER REDDING  Attending:  ROGER REDDING  Referring:  ROGER REDDING  ------------------------------------------------      PROCEDURE: Chest PA and lateral    REASON FOR EXAM: edema, 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  Left PICC  line with tip overlying the SVC. . Cardiac and pulmonary  vasculature are normal . Lungs are clear. Pleural spaces are  normal . No acute osseous abnormality. Stable proximal right  humerus surgical neck comminuted fracture. Old right lateral  seventh eighth and ninth rib fractures. T12 old anterior  vertebral body wedge compression fractures with loss of anterior  vertebral body height by 30-40%.      Impression:       1.  Stable proximal right humerus surgical neck comminuted  fracture.  2.  Old right lateral seventh eighth and ninth rib fractures. T12  old anterior vertebral body wedge compression fractures with loss  of anterior vertebral body height by 30-40%.  3.  No acute cardiopulmonary abnormality.    Electronically signed by:  Eduard Vigil MD  3/3/2017 10:46 AM [facility]  Workstation: [id number]                                   Medication Review:   Current Facility-Administered Medications   Medication Dose Route Frequency Provider Last Rate Last Dose   • amLODIPine (NORVASC) tablet 2.5 mg  2.5  mg Oral Daily PRN Shawn Cortez MD       • bacitracin injection    PRN Shawn Cortez MD   100,000 Units at 02/27/17 1244   • ceftazidime (FORTAZ) 1 g/100 mL 0.9% NS IVPB (mpb)  1 g Intravenous Q8H Shawn Vanegas MD       • furosemide (LASIX) tablet 40 mg  40 mg Oral BID Cj Aguero MD   40 mg at 03/04/17 0858   • gabapentin (NEURONTIN) capsule 100 mg  100 mg Oral TID Shawn Cortez MD   100 mg at 03/04/17 0858   • lactulose (CHRONULAC) 10 GM/15ML solution 20 g  20 g Oral Daily Cj Aguero MD   20 g at 02/27/17 1006   • lactulose (CHRONULAC) 10 GM/15ML solution 30 g  30 g Oral BID Shawn Cortez MD   30 g at 03/03/17 0845   • magic butt ointment   Topical TID Bel Celeste MD       • Magnesium Sulfate 4 GM/100ML infusion 4 g  4 g Intravenous PRN Cj Aguero MD   4 g at 03/01/17 0104    Or   • magnesium sulfate in D5W 1g/100mL (PREMIX) IVPB 1 g  1 g Intravenous PRN Cj Aguero MD        Or   • magnesium sulfate 8 g in dextrose (D5W) 5 % 250 mL infusion  8 g Intravenous PRN Cj Aguero MD        Or   • magnesium sulfate 6 g in dextrose (D5W) 5 % 250 mL infusion  6 g Intravenous PRN Cj Aguero MD       • mepivacaine PF (CARBOCAINE) 2 % injection    PRN Shawn Cortez MD   20 mL at 02/27/17 1248   • multivitamin with minerals tablet 1 tablet  1 tablet Oral Daily Shawn Cortez MD   1 tablet at 03/04/17 0858   • nystatin (MYCOSTATIN) powder   Topical Q12H Bel Celeste MD       • oxyCODONE-acetaminophen (PERCOCET)  MG per tablet 1 tablet  1 tablet Oral Q4H PRN Cj Aguero MD   1 tablet at 03/04/17 0858   • oxyCODONE-acetaminophen (PERCOCET)  MG per tablet 1 tablet  1 tablet Oral Q4H PRN Shawn Cortez MD   1 tablet at 03/03/17 4406   • Pharmacy to dose vancomycin   Does not apply Continuous PRN Bel Celeste MD       • potassium chloride (K-DUR,KLOR-CON) CR tablet 40 mEq  40 mEq Oral  BID With Meals Cj Aguero MD   40 mEq at 03/04/17 0858   • potassium chloride 10 mEq in 100 mL IVPB  10 mEq Intravenous Q1H PRN Bel Celeste MD   Stopped at 02/28/17 1514   • rifaximin (XIFAXAN) tablet 550 mg  550 mg Oral BID Bel Celeste MD   550 mg at 03/04/17 0858   • sodium chloride (BACTERIOSTATIC) injection    PRN Shawn Cortez MD   27 mL at 02/27/17 1237   • sodium chloride 0.9 % infusion  25 mL/hr Intravenous Continuous Cj Aguero MD 25 mL/hr at 02/26/17 1040 25 mL/hr at 02/26/17 1040   • vancomycin (VANCOCIN) 1,500 mg in sodium chloride 0.9 % 500 mL IVPB  1,500 mg Intravenous Q24H Cj Aguero MD   1,500 mg at 03/03/17 1131   • vancomycin (VANCOCIN) 50 mg/ml oral solution 250 mg  250 mg Oral Q6H Bel Celeste MD   250 mg at 03/04/17 0545   • vitamin D (ERGOCALCIFEROL) capsule 50,000 Units  50,000 Units Oral Q7 Days Shawn Cortez MD   50,000 Units at 03/01/17 0914         Assessment/Plan     Active Problems:    Chronic hepatitis    Hepatic cirrhosis    Essential hypertension    Closed fracture of proximal end of right humerus with delayed healing    Closed nondisplaced supracondylar fracture of distal end of femur without intracondylar extension with delayed healing    Chronic osteomyelitis of right shoulder region    Acute cystitis without hematuria    Hypokalemia          -Continue IV antibiotics.  -We'll get a noncontrast abdominal and pelvis CT.  -Patient is awaiting placement at this point.  -DVT and GI prophylaxis in place.      Isaura Schmitt MD  03/04/17  12:04 PM    EMR Dragon/Transcription disclaimer:   Much of this encounter note is an electronic transcription/translation of spoken language to printed text. The electronic translation of spoken language may permit erroneous, or at times, nonsensical words or phrases to be inadvertently transcribed; Although I have reviewed the note for such errors, some may still exist.

## 2017-03-04 NOTE — PROGRESS NOTES
Ortho progress note:    No new complaints.    Vitals:    03/04/17 0013 03/04/17 0027 03/04/17 0300 03/04/17 0743   BP:  132/64     BP Location:  Left arm     Patient Position:  Lying     Pulse: 80 84  72   Resp:  18     Temp:  98.6 °F (37 °C)     TempSrc:  Temporal Artery      SpO2:  94%     Weight:   255 lb 9.6 oz (116 kg)    Height:           Current Facility-Administered Medications:   •  amLODIPine (NORVASC) tablet 2.5 mg, 2.5 mg, Oral, Daily PRN, Shawn Cortez MD  •  bacitracin injection, , , PRN, Shawn Cortez MD, 100,000 Units at 02/27/17 1244  •  furosemide (LASIX) tablet 40 mg, 40 mg, Oral, BID, Cj Aguero MD, 40 mg at 03/03/17 1731  •  gabapentin (NEURONTIN) capsule 100 mg, 100 mg, Oral, TID, Shawn Cortez MD, 100 mg at 03/03/17 2036  •  lactulose (CHRONULAC) 10 GM/15ML solution 20 g, 20 g, Oral, Daily, Cj Aguero MD, 20 g at 02/27/17 1006  •  lactulose (CHRONULAC) 10 GM/15ML solution 30 g, 30 g, Oral, BID, Shawn Cortez MD, 30 g at 03/03/17 0845  •  magic butt ointment, , Topical, TID, Bel Celeste MD  •  magnesium sulfate in D5W 1g/100mL (PREMIX) IVPB 1 g, 1 g, Intravenous, PRN **OR** magnesium sulfate 8 g in dextrose (D5W) 5 % 250 mL infusion, 8 g, Intravenous, PRN **OR** magnesium sulfate 6 g in dextrose (D5W) 5 % 250 mL infusion, 6 g, Intravenous, PRN **OR** Magnesium Sulfate 4 GM/100ML infusion 4 g, 4 g, Intravenous, PRN, Cj Aguero MD, 4 g at 03/01/17 0104  •  mepivacaine PF (CARBOCAINE) 2 % injection, , , PRN, Shawn Cortez MD, 20 mL at 02/27/17 1248  •  multivitamin with minerals tablet 1 tablet, 1 tablet, Oral, Daily, Shawn Cortez MD, 1 tablet at 03/03/17 0845  •  nystatin (MYCOSTATIN) powder, , Topical, Q12H, Bel Celeste MD  •  oxyCODONE-acetaminophen (PERCOCET)  MG per tablet 1 tablet, 1 tablet, Oral, Q4H PRN, Cj Aguero MD, 1 tablet at 03/04/17 0342  •  oxyCODONE-acetaminophen (PERCOCET)  MG per  tablet 1 tablet, 1 tablet, Oral, Q4H PRN, Shawn Cortez MD, 1 tablet at 03/03/17 2345  •  Pharmacy to dose vancomycin, , Does not apply, Continuous PRN, Bel Celeste MD  •  piperacillin-tazobactam (ZOSYN) 3.375 g in dextrose 50 mL IVPB (premix), 3.375 g, Intravenous, Q6H, Cj Aguero MD, Last Rate: 0 mL/hr at 03/03/17 2330, 3.375 g at 03/04/17 0447  •  potassium chloride (K-DUR,KLOR-CON) CR tablet 40 mEq, 40 mEq, Oral, BID With Meals, Cj Aguero MD, 40 mEq at 03/03/17 1731  •  potassium chloride 10 mEq in 100 mL IVPB, 10 mEq, Intravenous, Q1H PRN, Bel Celeste MD, Stopped at 02/28/17 1514  •  rifaximin (XIFAXAN) tablet 550 mg, 550 mg, Oral, BID, eBl Celeste MD, 550 mg at 03/03/17 1730  •  sodium chloride (BACTERIOSTATIC) injection, , , PRN, Shawn Cortez MD, 27 mL at 02/27/17 1237  •  sodium chloride 0.9 % infusion, 25 mL/hr, Intravenous, Continuous, Cj Aguero MD, Last Rate: 25 mL/hr at 02/26/17 1040, 25 mL/hr at 02/26/17 1040  •  vancomycin (VANCOCIN) 1,500 mg in sodium chloride 0.9 % 500 mL IVPB, 1,500 mg, Intravenous, Q24H, Cj Aguero MD, 1,500 mg at 03/03/17 1131  •  vancomycin (VANCOCIN) 50 mg/ml oral solution 250 mg, 250 mg, Oral, Q6H, Bel Celeste MD, 250 mg at 03/04/17 0545  •  vitamin D (ERGOCALCIFEROL) capsule 50,000 Units, 50,000 Units, Oral, Q7 Days, Shawn Cortez MD, 50,000 Units at 03/01/17 0914    On vanc and zosyn.  S/p I&D right arm.  Very poor surgical candidate.    Continue observation and IV antibiotics.    Monitor CRP    03/04/17 at 8:16 AM by Shawn Vanegas MD

## 2017-03-04 NOTE — PROGRESS NOTES
"Pharmacokinetics by Pharmacy - Vancomycin    Armando Mcmullen is a 52 y.o. male  [Ht: 72.01\" (182.9 cm); Wt: 255 lb 9.6 oz (116 kg)]    Estimated Creatinine Clearance: 71.1 mL/min (by C-G formula based on Cr of 1.58).   Lab Results   Component Value Date    CREATININE 1.58 (H) 03/04/2017    CREATININE 1.36 (H) 03/03/2017    CREATININE 1.44 (H) 03/02/2017    CREATININE 2.1 (H) 01/19/2017    CREATININE 2.8 (H) 01/13/2017    CREATININE 1.2 12/08/2016      Lab Results   Component Value Date    WBC 8.07 03/04/2017    WBC 6.50 03/03/2017    WBC 7.11 03/02/2017      Temp Readings from Last 1 Encounters:   03/04/17 97.7 °F (36.5 °C) (Temporal Artery )      Lab Results   Component Value Date    VANCOTROUGH 39.71 (H) 02/27/2017    VANCORANDOM 14.65 03/03/2017         Culture Results:  Microbiology Results (last 10 days)       Procedure Component Value - Date/Time      Wound Culture [[id number]]  (Abnormal)  (Susceptibility) Collected:  02/22/17 1422    Lab Status:  Final result Specimen:  Wound from Arm Updated:  02/24/17 0953     Wound Culture        Moderate growth (3+) Staphylococcus aureus, MRSA (C)        Methicillin resistant Staphylococcus aureus, Patient may be an isolation risk.  Multi Drug Resistant Organism  contact precautions requested          Gram Stain Result Rare (1+) WBCs seen       Rare (1+) Gram positive cocci in pairs     Susceptibility        Staphylococcus aureus, MRSA     NERI     Clindamycin >4 ug/ml Resistant     Erythromycin >4 ug/ml Resistant     Gentamicin <=4 ug/ml Susceptible     Oxacillin >2 ug/ml Resistant     Penicillin G >8 ug/ml Resistant     Rifampin <=1 ug/ml Susceptible     Trimethoprim + Sulfamethoxazole <=0.5/9.5 ug/ml Susceptible     Vancomycin 1 ug/ml Susceptible                                 Indication for use: Abscess     Current Vancomycin Dose:  1500 mg IVPB every 24 hours, day 10 of therapy.      Assessment/Plan:  Renal function still worsening.  Spoke with Dr. Vanegas, he would like " to leave patient on vancomycin for now but agreed to switch pip/tazo to ceftazidime.  Will reevalulate in the AM, BMP and vancomycin trough tomorrow.  Pharmacy will continue to monitor renal function and adjust dose accordingly.    Rudolph Younger III, [facility]   03/04/17 9:53 AM

## 2017-03-05 LAB
ALBUMIN SERPL-MCNC: 2.2 G/DL (ref 3.4–4.8)
ALBUMIN/GLOB SERPL: 0.7 G/DL (ref 1.1–1.8)
ALP SERPL-CCNC: 76 U/L (ref 38–126)
ALT SERPL W P-5'-P-CCNC: 22 U/L (ref 21–72)
ANION GAP SERPL CALCULATED.3IONS-SCNC: 3 MMOL/L (ref 5–15)
AST SERPL-CCNC: 39 U/L (ref 17–59)
BASOPHILS # BLD AUTO: 0.2 10*3/MM3 (ref 0–0.2)
BASOPHILS NFR BLD AUTO: 2.4 % (ref 0–2)
BILIRUB SERPL-MCNC: 2.3 MG/DL (ref 0.2–1.3)
BUN BLD-MCNC: 12 MG/DL (ref 7–21)
BUN/CREAT SERPL: 7.6 (ref 7–25)
CALCIUM SPEC-SCNC: 8.4 MG/DL (ref 8.4–10.2)
CHLORIDE SERPL-SCNC: 99 MMOL/L (ref 95–110)
CO2 SERPL-SCNC: 32 MMOL/L (ref 22–31)
CREAT BLD-MCNC: 1.58 MG/DL (ref 0.7–1.3)
DEPRECATED RDW RBC AUTO: 60.7 FL (ref 35.1–43.9)
EOSINOPHIL # BLD AUTO: 1.16 10*3/MM3 (ref 0–0.7)
EOSINOPHIL NFR BLD AUTO: 14.1 % (ref 0–7)
ERYTHROCYTE [DISTWIDTH] IN BLOOD BY AUTOMATED COUNT: 16.1 % (ref 11.5–14.5)
GFR SERPL CREATININE-BSD FRML MDRD: 46 ML/MIN/1.73 (ref 60–130)
GLOBULIN UR ELPH-MCNC: 3.3 GM/DL (ref 2.3–3.5)
GLUCOSE BLD-MCNC: 79 MG/DL (ref 60–100)
HCT VFR BLD AUTO: 29.5 % (ref 39–49)
HGB BLD-MCNC: 10.1 G/DL (ref 13.7–17.3)
IMM GRANULOCYTES # BLD: 0.02 10*3/MM3 (ref 0–0.02)
IMM GRANULOCYTES NFR BLD: 0.2 % (ref 0–0.5)
LYMPHOCYTES # BLD AUTO: 3.23 10*3/MM3 (ref 0.6–4.2)
LYMPHOCYTES NFR BLD AUTO: 39.2 % (ref 10–50)
MAGNESIUM SERPL-MCNC: 1.7 MG/DL (ref 1.6–2.3)
MCH RBC QN AUTO: 35.2 PG (ref 26.5–34)
MCHC RBC AUTO-ENTMCNC: 34.2 G/DL (ref 31.5–36.3)
MCV RBC AUTO: 102.8 FL (ref 80–98)
MONOCYTES # BLD AUTO: 0.75 10*3/MM3 (ref 0–0.9)
MONOCYTES NFR BLD AUTO: 9.1 % (ref 0–12)
NEUTROPHILS # BLD AUTO: 2.88 10*3/MM3 (ref 2–8.6)
NEUTROPHILS NFR BLD AUTO: 35 % (ref 37–80)
NRBC BLD MANUAL-RTO: 0 /100 WBC (ref 0–0)
PLATELET # BLD AUTO: 80 10*3/MM3 (ref 150–450)
PMV BLD AUTO: 10.2 FL (ref 8–12)
POTASSIUM BLD-SCNC: 3.6 MMOL/L (ref 3.5–5.1)
PROT SERPL-MCNC: 5.5 G/DL (ref 6.3–8.6)
RBC # BLD AUTO: 2.87 10*6/MM3 (ref 4.37–5.74)
SODIUM BLD-SCNC: 134 MMOL/L (ref 137–145)
VANCOMYCIN TROUGH SERPL-MCNC: 20.11 MCG/ML (ref 10–15)
WBC NRBC COR # BLD: 8.24 10*3/MM3 (ref 3.2–9.8)

## 2017-03-05 PROCEDURE — 97530 THERAPEUTIC ACTIVITIES: CPT

## 2017-03-05 PROCEDURE — 85025 COMPLETE CBC W/AUTO DIFF WBC: CPT | Performed by: INTERNAL MEDICINE

## 2017-03-05 PROCEDURE — 97110 THERAPEUTIC EXERCISES: CPT

## 2017-03-05 PROCEDURE — 83735 ASSAY OF MAGNESIUM: CPT | Performed by: INTERNAL MEDICINE

## 2017-03-05 PROCEDURE — 25010000002 VANCOMYCIN PER 500 MG: Performed by: FAMILY MEDICINE

## 2017-03-05 PROCEDURE — 80053 COMPREHEN METABOLIC PANEL: CPT | Performed by: INTERNAL MEDICINE

## 2017-03-05 PROCEDURE — 93005 ELECTROCARDIOGRAM TRACING: CPT | Performed by: FAMILY MEDICINE

## 2017-03-05 PROCEDURE — 80202 ASSAY OF VANCOMYCIN: CPT | Performed by: FAMILY MEDICINE

## 2017-03-05 RX ADMIN — NYSTATIN: 100000 POWDER TOPICAL at 20:32

## 2017-03-05 RX ADMIN — SODIUM CHLORIDE 25 ML/HR: 900 INJECTION, SOLUTION INTRAVENOUS at 19:00

## 2017-03-05 RX ADMIN — CEFTAZIDIME 1 G: 1 INJECTION, POWDER, FOR SOLUTION INTRAMUSCULAR; INTRAVENOUS at 08:56

## 2017-03-05 RX ADMIN — GABAPENTIN 100 MG: 100 CAPSULE ORAL at 08:56

## 2017-03-05 RX ADMIN — CEFTAZIDIME 1 G: 1 INJECTION, POWDER, FOR SOLUTION INTRAMUSCULAR; INTRAVENOUS at 02:54

## 2017-03-05 RX ADMIN — OXYCODONE HYDROCHLORIDE AND ACETAMINOPHEN 1 TABLET: 10; 325 TABLET ORAL at 10:08

## 2017-03-05 RX ADMIN — OXYCODONE HYDROCHLORIDE AND ACETAMINOPHEN 1 TABLET: 10; 325 TABLET ORAL at 06:11

## 2017-03-05 RX ADMIN — GABAPENTIN 100 MG: 100 CAPSULE ORAL at 20:32

## 2017-03-05 RX ADMIN — VANCOMYCIN HYDROCHLORIDE 250 MG: 1 INJECTION, POWDER, LYOPHILIZED, FOR SOLUTION INTRAVENOUS at 18:10

## 2017-03-05 RX ADMIN — RIFAXIMIN 550 MG: 550 TABLET ORAL at 08:56

## 2017-03-05 RX ADMIN — CEFTAZIDIME 1 G: 1 INJECTION, POWDER, FOR SOLUTION INTRAMUSCULAR; INTRAVENOUS at 18:12

## 2017-03-05 RX ADMIN — OXYCODONE HYDROCHLORIDE AND ACETAMINOPHEN 1 TABLET: 10; 325 TABLET ORAL at 18:09

## 2017-03-05 RX ADMIN — RIFAXIMIN 550 MG: 550 TABLET ORAL at 18:10

## 2017-03-05 RX ADMIN — Medication: at 20:32

## 2017-03-05 RX ADMIN — OXYCODONE HYDROCHLORIDE AND ACETAMINOPHEN 1 TABLET: 10; 325 TABLET ORAL at 23:10

## 2017-03-05 RX ADMIN — POTASSIUM CHLORIDE 40 MEQ: 20 TABLET, EXTENDED RELEASE ORAL at 18:10

## 2017-03-05 RX ADMIN — Medication 1 TABLET: at 08:56

## 2017-03-05 RX ADMIN — Medication: at 08:57

## 2017-03-05 RX ADMIN — FUROSEMIDE 40 MG: 40 TABLET ORAL at 08:56

## 2017-03-05 RX ADMIN — VANCOMYCIN HYDROCHLORIDE 1500 MG: 1 INJECTION, POWDER, LYOPHILIZED, FOR SOLUTION INTRAVENOUS at 13:17

## 2017-03-05 RX ADMIN — OXYCODONE HYDROCHLORIDE AND ACETAMINOPHEN 1 TABLET: 10; 325 TABLET ORAL at 00:49

## 2017-03-05 RX ADMIN — VANCOMYCIN HYDROCHLORIDE 250 MG: 1 INJECTION, POWDER, LYOPHILIZED, FOR SOLUTION INTRAVENOUS at 13:18

## 2017-03-05 RX ADMIN — PANTOPRAZOLE SODIUM 40 MG: 40 TABLET, DELAYED RELEASE ORAL at 06:10

## 2017-03-05 RX ADMIN — VANCOMYCIN HYDROCHLORIDE 250 MG: 1 INJECTION, POWDER, LYOPHILIZED, FOR SOLUTION INTRAVENOUS at 06:10

## 2017-03-05 RX ADMIN — GABAPENTIN 100 MG: 100 CAPSULE ORAL at 18:10

## 2017-03-05 RX ADMIN — OXYCODONE HYDROCHLORIDE AND ACETAMINOPHEN 1 TABLET: 10; 325 TABLET ORAL at 14:17

## 2017-03-05 RX ADMIN — POTASSIUM CHLORIDE 40 MEQ: 20 TABLET, EXTENDED RELEASE ORAL at 08:56

## 2017-03-05 RX ADMIN — NYSTATIN: 100000 POWDER TOPICAL at 08:58

## 2017-03-05 RX ADMIN — FUROSEMIDE 40 MG: 40 TABLET ORAL at 18:10

## 2017-03-05 NOTE — PLAN OF CARE
Problem: Patient Care Overview (Adult)  Goal: Plan of Care Review  Outcome: Ongoing (interventions implemented as appropriate)    03/05/17 1001   Patient Care Overview   Progress no change   Outcome Evaluation   Outcome Summary/Follow up Plan pt goldie ther ex on EOB well, pt w/ Ind. sitting balance w/ ther ex, defers OOB to chair secondary to pain, pt cont. to require skilled PT services at this time   Coping/Psychosocial Response Interventions   Plan Of Care Reviewed With patient         Problem: Inpatient Physical Therapy  Goal: Transfer Training Goal 1 LTG- PT  Outcome: Ongoing (interventions implemented as appropriate)    03/01/17 1330 03/03/17 1437 03/05/17 1001   Transfer Training PT LTG   Transfer Training PT LTG, Date Established 03/01/17 --  --    Transfer Training PT LTG, Time to Achieve by discharge --  --    Transfer Training PT LTG, Activity Type bed to chair /chair to bed;sit to stand/stand to sit;toilet --  --    Transfer Training PT LTG, [place] Level conditional independence --  --    Transfer Training PT LTG, Assist Device (AAD) --  --    Transfer Training PT LTG, Date Goal Reviewed --  --  03/05/17   Transfer Training PT LTG, Outcome --  --  goal not met   Transfer Training PT LTG, Reason Goal Not Met --  progress slower than expected --        Goal: Gait Training Goal STG- PT  Outcome: Ongoing (interventions implemented as appropriate)    03/01/17 1330 03/03/17 1403 03/05/17 1001   Gait Training PT STG   Gait Training Goal PT STG, Date Established 03/01/17 --  --    Gait Training Goal PT STG, Time to Achieve 3 days --  --    Gait Training Goal PT STG, [place] Level moderate assist (50% patient effort);minimum assist (75% patient effort) --  --    Gait Training Goal PT STG, Assist Device (AAD) --  --    Gait Training Goal PT STG, Distance to Achieve 5 feet --  --    Gait Training Goal PT STG, Date Goal Reviewed --  --  03/05/17   Gait Training Goal PT STG, Outcome --  --  goal not met    Gait Training Goal PT STG, Reason Goal Not Met --  progress slower than expected --        Goal: Gait Training Goal LTG- PT  Outcome: Ongoing (interventions implemented as appropriate)    03/01/17 1330 03/03/17 1403 03/05/17 1001   Gait Training PT LTG   Gait Training Goal PT LTG, Date Established 03/01/17 --  --    Gait Training Goal PT LTG, Time to Achieve by discharge --  --    Gait Training Goal PT LTG, [place] Level contact guard assist --  --    Gait Training Goal PT LTG, Assist Device (AAD) --  --    Gait Training Goal PT LTG, Distance to Achieve 20 feet --  --    Gait Training Goal PT LTG, Date Goal Reviewed --  --  03/05/17   Gait Training Goal PT LTG, Outcome --  --  goal not met   Gait Training Goal PT LTG, Reason Goal Not Met --  progress slower than expected --        Goal: Wheelchair Propulsion Goal LTG- PT  Outcome: Ongoing (interventions implemented as appropriate)    03/01/17 1330 03/03/17 1403 03/05/17 1001   Wheelchair Propulsion PT LTG   Wheelchair Propulsion Goal PT LTG, Date Established 03/01/17 --  --    Wheelchair Propulsion Goal PT LTG, Time to Achieve by discharge --  --    Wheelchair Propulsion Goal PT LTG, [place] Level conditional independence --  --    Wheelchair Propulsion Goal PT LTG, Distance to Achieve 150 feet --  --    Wheelchair Propulsion Goal PT LTG, Date Goal Reviewed --  --  03/05/17   Wheelchair Propulsion Goal PT LTG, Outcome --  --  goal not met   Wheelchair Propulsion Goal PT LTG, Reason Goal Not Met --  progress slower than expected --

## 2017-03-05 NOTE — PROGRESS NOTES
Acute Care - Physical Therapy Treatment Note  [facility]     Patient Name: Armando Mcmullen  : 1965  MRN: [id number]  Today's Date: 3/5/2017  Onset of Illness/Injury or Date of Surgery Date: 17 (s/p I&D R shoulder on 17)  Date of Referral to PT: 17  Referring Physician: Dr. Aguero    Admit Date: 2017    Visit Dx:    ICD-10-CM ICD-9-CM   1. Hyperammonemia E72.20 270.6   2. Urinary tract infection, site unspecified N39.0    3. Functional diarrhea K59.1 564.5   4. Impaired functional mobility, balance, and endurance Z74.09 V49.89   5. Impaired gait and mobility R26.89 781.2     Patient Active Problem List   Diagnosis   • Anxiety state   • Back pain   • Furuncle of buttock   • Chronic hepatitis   • Hepatic cirrhosis   • Depression   • Hypersplenism   • Insomnia   • Mononeuritis   • Osteoarthritis   • Essential hypertension   • Closed fracture of proximal end of right humerus with delayed healing   • Closed nondisplaced supracondylar fracture of distal end of femur without intracondylar extension with delayed healing   • History of substance abuse   • Chronic osteomyelitis of right shoulder region   • Acute cystitis without hematuria   • Hypokalemia               Adult Rehabilitation Note       17 1001 17 1403 17 1435    Rehab Assessment/Intervention    Discipline physical therapy assistant  -JW physical therapy assistant  -LN physical therapy assistant  -LN    Document Type therapy note (daily note)  -JW therapy note (daily note)  -LN therapy note (daily note)  -LN    Subjective Information agree to therapy;complains of;pain  -JW agree to therapy;complains of;pain  -LN agree to therapy;complains of;pain  -LN    Patient Effort, Rehab Treatment  good   declined w/c mobs and oob this rx  -LN     Precautions/Limitations fall precautions;other (see comments)   c-diff  -JW fall precautions   contact,c-diff  -LN fall precautions  -LN    Recorded by [JW] Casie Emmanuel,  PTA [LN] Stephanie Du, PTA [LN] Stephanie S Florian, PTA    Vital Signs    Pre Systolic BP Rehab  120  -  -LN    Pre Treatment Diastolic BP  64  -LN 60  -LN    Post Systolic BP Rehab  130  -  -LN    Post Treatment Diastolic BP  60  -LN 70  -LN    Pretreatment Heart Rate (beats/min)  78  -LN 73  -LN    Posttreatment Heart Rate (beats/min)  83  -LN 83  -LN    Pre SpO2 (%)  98  -LN 97  -LN    O2 Delivery Pre Treatment   room air  -LN    Post SpO2 (%)  96  -LN 97  -LN    Pre Patient Position  Supine  -LN     Intra Patient Position  Sitting  -LN     Post Patient Position  Supine  -LN     Recorded by  [LN] Stephanie Du, PTA [LN] Stephanie S Florian, PTA    Pain Assessment    Pain Assessment 0-10  -JW 0-10  -LN 0-10  -LN    Pain Score 9  -JW 7  -LN 7  -LN    Post Pain Score 9  -JW 7  -LN 7  -LN    Pain Type Surgical pain  -JW Acute pain  -LN Acute pain  -LN    Pain Location Shoulder  -JW Shoulder  -LN Shoulder  -LN    Pain Orientation Right  -JW Right  -LN Right  -LN    Pain Intervention(s) Medication (See MAR);Repositioned  -JW      Recorded by [JW] Casie Emmanuel, JULITA [LN] Stephanie Du, PTA [LN] Stephanie S Florian, PTA    Cognitive Assessment/Intervention    Current Cognitive/Communication Assessment functional  -JW      Orientation Status oriented x 4  -JW oriented to;person     -LN oriented x 4  -LN    Follows Commands/Answers Questions 100% of the time  -JW      Recorded by [JW] Casie Emmanuel, JULITA [LN] Stephanie Du, PTA [LN] Stephanie S Florian, PTA    Bed Mobility, Assessment/Treatment    Bed Mobility, Assistive Device bed rails;head of bed elevated  -JW head of bed elevated;bed rails  -LN bed rails;head of bed elevated  -LN    Bed Mob, Supine to Sit, [place] conditional independence  -JW conditional independence  -LN conditional independence  -LN    Bed Mob, Sit to Supine, [place] conditional independence  -JW conditional independence  -LN     Recorded by [JW] Casie Emmanuel, JULITA [LN] Stephanie Du,  PTA [LN] Stephanie BHARTI Du, PTA    Transfer Assessment/Treatment    Transfers, Bed-Chair [place]   contact guard assist   sit pivot to chair  -LN    Transfers, Bed-Chair-Bed, Assist Device   --   none  -LN    Transfer, Comment pt defers t/fers secondary to increased pain  -JW      Recorded by [JW] Casie Emmanuel, PTA  [LN] Stephanie Du, PTA    Gait Assessment/Treatment    Gait, Comment  --   deferred no prosthesis  -LN deferred no prosthesis  -LN    Recorded by  [LN] Stephanie Du, PTA [LN] Stephanie S Florian, PTA    Therapy Exercises    Right Lower Extremity AROM:;20 reps;sitting;ankle pumps/circles  -JW  AROM:;20 reps;sitting;ankle pumps/circles;hip abduction/adduction;hip flexion;LAQ   with 2#  -LN    Left Lower Extremity   AROM:;20 reps;sitting;hip abduction/adduction;hip flexion;LAQ  -LN    Bilateral Lower Extremities AROM:;20 reps;sitting;hip flexion;LAQ   add squeeze w/ pillow  -JW AROM:;20 reps;sitting;hip abduction/adduction;hip flexion;LAQ   with 2# on right;ankle pumps r le  -LN     Recorded by [JW] Casie Emmanuel PTA [LN] Stephanie Du, PTA [LN] Stephanie S Florian, PTA    Positioning and Restraints    Pre-Treatment Position in bed  -JW      Post Treatment Position bed  -JW chair  -LN chair  -LN    In Bed supine;call light within reach;encouraged to call for assist;exit alarm on  -JW supine;call light within reach;encouraged to call for assist  -LN     In Chair   sitting;call light within reach;encouraged to call for assist;legs elevated  -LN    Recorded by [JW] Casie Emmanuel, JULITA [LN] Stephanie Du, PTA [LN] Stephanie Du, PTA      User Key  (r) = Recorded By, (t) = Taken By, (c) = Cosigned By    Initials Name Effective Dates    AVELINA Emmanuel, JULITA 08/11/15 -     LN Stephanie Du, PTA 10/17/16 -                 IP PT Goals       03/05/17 1001 03/03/17 1437 03/03/17 1403    Transfer Training PT LTG    Transfer Training PT  LTG, Date Goal Reviewed 03/05/17  -JW 03/03/17  -LN     Transfer  Training PT LTG, Outcome goal not met  -JW goal not met  -LN     Transfer Training PT LTG, Reason Goal Not Met  progress slower than expected  -LN     Gait Training PT STG    Gait Training Goal PT STG, Date Goal Reviewed 03/05/17  -JW  03/03/17  -LN    Gait Training Goal PT STG, Outcome goal not met  -JW  goal not met  -LN    Gait Training Goal PT STG, Reason Goal Not Met   progress slower than expected  -LN    Gait Training PT LTG    Gait Training Goal PT LTG, Date Goal Reviewed 03/05/17  -JW  03/03/17  -LN    Gait Training Goal PT LTG, Outcome goal not met  -JW  goal not met  -LN    Gait Training Goal PT LTG, Reason Goal Not Met   progress slower than expected  -LN    Wheelchair Propulsion PT LTG    Wheelchair Propulsion Goal PT LTG, Date Goal Reviewed 03/05/17  -JW  03/03/17  -LN    Wheelchair Propulsion Goal PT LTG, Outcome goal not met  -JW  goal not met  -LN    Wheelchair Propulsion Goal PT LTG, Reason Goal Not Met   progress slower than expected  -LN      03/02/17 1538 03/02/17 1435 03/01/17 1330    Transfer Training PT LTG    Transfer Training PT LTG, Date Established   03/01/17  -KG    Transfer Training PT LTG, Time to Achieve   by discharge  -KG    Transfer Training PT LTG, Activity Type   bed to chair /chair to bed;sit to stand/stand to sit;toilet  -KG    Transfer Training PT LTG, [place] Level   conditional independence  -KG    Transfer Training PT LTG, Assist Device   --   AAD  -KG    Transfer Training PT  LTG, Date Goal Reviewed 03/02/17  -LN      Transfer Training PT LTG, Outcome goal not met  -LN      Transfer Training PT LTG, Reason Goal Not Met progress slower than expected  -LN      Gait Training PT STG    Gait Training Goal PT STG, Date Established   03/01/17  -KG    Gait Training Goal PT STG, Time to Achieve   3 days  -KG    Gait Training Goal PT STG, [place] Level   moderate assist (50% patient effort);minimum assist (75% patient effort)  -KG    Gait Training Goal PT STG, Assist  Device   --   AAD  -KG    Gait Training Goal PT STG, Distance to Achieve   5 feet  -KG    Gait Training Goal PT STG, Date Goal Reviewed  03/02/17  -LN     Gait Training Goal PT STG, Outcome  goal not met  -LN     Gait Training Goal PT STG, Reason Goal Not Met  progress slower than expected  -LN     Gait Training PT LTG    Gait Training Goal PT LTG, Date Established   03/01/17  -KG    Gait Training Goal PT LTG, Time to Achieve   by discharge  -KG    Gait Training Goal PT LTG, [place] Level   contact guard assist  -KG    Gait Training Goal PT LTG, Assist Device   --   AAD  -KG    Gait Training Goal PT LTG, Distance to Achieve   20 feet  -KG    Gait Training Goal PT LTG, Date Goal Reviewed  03/02/17  -LN     Gait Training Goal PT LTG, Outcome  goal not met  -LN     Gait Training Goal PT LTG, Reason Goal Not Met  progress slower than expected  -LN     Wheelchair Propulsion PT LTG    Wheelchair Propulsion Goal PT LTG, Date Established   03/01/17  -KG    Wheelchair Propulsion Goal PT LTG, Time to Achieve   by discharge  -KG    Wheelchair Propulsion Goal PT LTG, [place] Level   conditional independence  -KG    Wheelchair Propulsion Goal PT LTG, Distance to Achieve   150 feet  -KG    Wheelchair Propulsion Goal PT LTG, Date Goal Reviewed  03/02/17  -LN     Wheelchair Propulsion Goal PT LTG, Outcome  goal not met  -LN     Wheelchair Propulsion Goal PT LTG, Reason Goal Not Met  progress slower than expected  -LN       User Key  (r) = Recorded By, (t) = Taken By, (c) = Cosigned By    Initials Name Provider Type    JW Casie Emmanuel, PTA Physical Therapy Assistant    KG Ailyn Nava, PT Physical Therapist    LN Stephanie Du, PTA Physical Therapy Assistant          Physical Therapy Education     Title: PT OT SLP Therapies (Active)     Topic: Physical Therapy (Active)     Point: Mobility training (Active)    Learning Progress Summary    Learner Readiness Method Response Comment Documented by Status   Patient  Acceptance E NR  LN 03/03/17 1437 Active    Acceptance E NR  LN 03/02/17 1537 Active    Acceptance E NR  KG 03/01/17 1330 Active               Point: Home exercise program (Active)    Learning Progress Summary    Learner Readiness Method Response Comment Documented by Status   Patient Acceptance E NR  LN 03/03/17 1437 Active    Acceptance E NR  LN 03/02/17 1537 Active    Acceptance E,D,TB NR LAQ, heels slides, ankle pumps KG 03/01/17 1330 Active               Point: Precautions (Active)    Learning Progress Summary    Learner Readiness Method Response Comment Documented by Status   Patient Acceptance E NR  LN 03/03/17 1437 Active    Acceptance E NR  LN 03/02/17 1537 Active                      User Key     Initials Effective Dates Name Provider Type Discipline    KG 10/17/16 -  Ailyn Nava, PT Physical Therapist PT    LN 10/17/16 -  Stephanie Du PTA Physical Therapy Assistant PT                    PT Recommendation and Plan  Anticipated Discharge Disposition: skilled nursing facility  Planned Therapy Interventions: bed mobility training, gait training, home exercise program, strengthening, transfer training  PT Frequency: other (see comments) (5-14 times/wk)  Plan of Care Review  Plan Of Care Reviewed With: patient  Progress: no change  Outcome Summary/Follow up Plan: pt goldie ther ex on EOB well, pt w/ Ind. sitting balance w/ ther ex, defers OOB to  chair secondary to pain, pt cont. to require skilled PT services  at this time          Outcome Measures       03/05/17 1001 03/03/17 1403 03/02/17 1435    How much help from another person do you currently need...    Turning from your back to your side while in flat bed without using bedrails? 4  -JW 4  -LN 4  -LN    Moving from lying on back to sitting on the side of a flat bed without bedrails? 4  -JW 4  -LN 4  -LN    Moving to and from a bed to a chair (including a wheelchair)? 3  -JW 3  -LN 3  -LN    Standing up from a chair using your arms (e.g., wheelchair,  bedside chair)? 2  -JW 2  -LN 2  -LN    Climbing 3-5 steps with a railing? 1  -JW 1  -LN 1  -LN    To walk in hospital room? 2  -JW 2  -LN 2  -LN    AM-PAC 6 Clicks Score 16  -JW 16  -LN 16  -LN    Functional Assessment    Outcome Measure Options AM-PAC 6 Clicks Basic Mobility (PT)  -JW AM-PAC 6 Clicks Basic Mobility (PT)  -LN AM-PAC 6 Clicks Basic Mobility (PT)  -LN      User Key  (r) = Recorded By, (t) = Taken By, (c) = Cosigned By    Initials Name Provider Type    AVELINA Emmanuel PTA Physical Therapy Assistant    DYLNA Du PTA Physical Therapy Assistant           Time Calculation:         PT Charges       03/05/17 1001          Time Calculation    Start Time 1001  -      Stop Time 1026  -      Time Calculation (min) 25 min  -      PT Received On 03/05/17  -      Time Calculation- PT    Total Timed Code Minutes- PT 25 minute(s)  -        User Key  (r) = Recorded By, (t) = Taken By, (c) = Cosigned By    Initials Name Provider Type    AVELINA Emmanuel PTA Physical Therapy Assistant          Therapy Charges for Today     Code Description Service Date Service Provider Modifiers Qty    34212655018 HC PT THERAPEUTIC ACT EA 15 MIN 3/5/2017 Casie Emmanuel PTA GP 1    71804001231 HC PT THER PROC EA 15 MIN 3/5/2017 Casie Emmanuel PTA GP 1          PT G-Codes  PT Professional Judgement Used?: Yes  Outcome Measure Options: AM-PAC 6 Clicks Basic Mobility (PT)  Score: 17  Functional Limitation: Mobility: Walking and moving around  Mobility: Walking and Moving Around Current Status (): At least 40 percent but less than 60 percent impaired, limited or restricted  Mobility: Walking and Moving Around Goal Status (): At least 1 percent but less than 20 percent impaired, limited or restricted    Casie Emmanuel PTA  3/5/2017

## 2017-03-05 NOTE — PROGRESS NOTES
"Pharmacokinetics by Pharmacy - Vancomycin    Armando Mcmullen is a 52 y.o. male  [Ht: 72.01\" (182.9 cm); Wt: 252 lb (114 kg)]    Estimated Creatinine Clearance: 70.5 mL/min (by C-G formula based on Cr of 1.58).   Lab Results   Component Value Date    CREATININE 1.58 (H) 03/05/2017    CREATININE 1.58 (H) 03/04/2017    CREATININE 1.36 (H) 03/03/2017    CREATININE 2.1 (H) 01/19/2017    CREATININE 2.8 (H) 01/13/2017    CREATININE 1.2 12/08/2016      Lab Results   Component Value Date    WBC 8.24 03/05/2017    WBC 8.07 03/04/2017    WBC 6.50 03/03/2017      Temp Readings from Last 1 Encounters:   03/05/17 97.8 °F (36.6 °C)      Lab Results   Component Value Date    VANCOTROUGH 20.11 (H) 03/05/2017    VANCORANDOM 14.65 03/03/2017         Culture Results:  Microbiology Results (last 10 days)       ** No results found for the last 240 hours. **               Indication for use: Abscess     Current Vancomycin Dose:  1500 mg IVPB every 24 hours, day 11 of therapy.      Assessment/Plan:  Vancomycin trough 20.11.  Trough drawn roughly 2.5 hours early, actual trough would be within goal of 15-20. No change.  Spoke with Abiola BEAR and asked her to go ahead and hang the next dose despite the trough being greater than 20.  Pharmacy will continue to monitor renal function and adjust dose accordingly.    Rudolph Younger III, [facility]   03/05/17 12:13 PM   "

## 2017-03-05 NOTE — PLAN OF CARE
Problem: Patient Care Overview (Adult)  Goal: Plan of Care Review  Outcome: Ongoing (interventions implemented as appropriate)    03/03/17 1746   Patient Care Overview   Progress no change   Outcome Evaluation   Outcome Summary/Follow up Plan ammonia level not repreated at present   Coping/Psychosocial Response Interventions   Plan Of Care Reviewed With patient       Goal: Adult Individualization and Mutuality  Outcome: Ongoing (interventions implemented as appropriate)    Problem: Fall Risk (Adult)  Goal: Absence of Falls  Outcome: Outcome(s) achieved Date Met:  03/04/17    Problem: Pressure Ulcer (Adult)  Goal: Signs and Symptoms of Listed Potential Problems Will be Absent or Manageable (Pressure Ulcer)  Outcome: Ongoing (interventions implemented as appropriate)

## 2017-03-05 NOTE — PROGRESS NOTES
[facility] Medicine Services  INPATIENT PROGRESS NOTE     LOS: 11 days   Patient Care Team:  Cj Aguero MD as PCP - General    Chief Complaint:  SOA      Subjective     Interval History:     Patient Complaints: Patient resting comfortably denies any complaints.    History taken from: patient RN    Review of Systems:    Review of Systems   Constitutional: Negative for appetite change, chills, diaphoresis and fever.   HENT: Negative for congestion, rhinorrhea, sore throat and trouble swallowing.    Eyes: Negative for visual disturbance.   Respiratory: Negative for cough, chest tightness, shortness of breath and wheezing.    Cardiovascular: Negative for chest pain, palpitations and leg swelling.   Gastrointestinal: Positive for abdominal pain. Negative for blood in stool, diarrhea, nausea and vomiting. Anal bleeding: on lateral side in the folds of pannus.   Endocrine: Negative for cold intolerance and heat intolerance.   Genitourinary: Negative for decreased urine volume and difficulty urinating.   Musculoskeletal: Negative for back pain, gait problem and neck pain.   Skin: Negative for rash.   Neurological: Negative for dizziness, syncope, weakness, light-headedness, numbness and headaches.   Psychiatric/Behavioral: The patient is not nervous/anxious.          Objective     Vital Signs  Temp:  [97.7 °F (36.5 °C)-98.2 °F (36.8 °C)] 97.8 °F (36.6 °C)  Heart Rate:  [54-88] 88  Resp:  [18-20] 20  BP: (100-123)/(57-77) 120/77    Physical Exam:   Physical Exam   Constitutional: He is oriented to person, place, and time. He appears well-developed and well-nourished.   HENT:   Head: Normocephalic and atraumatic.   Nose: Nose normal.   Eyes: Conjunctivae and EOM are normal. Pupils are equal, round, and reactive to light.   Neck: Normal range of motion. Neck supple. No JVD present. No tracheal deviation present. No thyromegaly present.   Cardiovascular: Normal rate,  regular rhythm, normal heart sounds and intact distal pulses.    Pulmonary/Chest: Effort normal and breath sounds normal. No respiratory distress. He has no wheezes. He has no rales. He exhibits no tenderness.   Abdominal: Soft. Bowel sounds are normal. He exhibits no distension. There is tenderness (by lateral abdominal wall on left side. Possible some subcutaneous swelling. No erythema or warmth noted.). There is no rebound and no guarding.   Musculoskeletal: He exhibits no edema.   Lymphadenopathy:     He has no cervical adenopathy.   Neurological: He is alert and oriented to person, place, and time. No cranial nerve deficit.   Skin: Skin is warm and dry.   Intact   Psychiatric: He has a normal mood and affect.   Nursing note and vitals reviewed.         Results Review:         Results from last 7 days  Lab Units 03/05/17  0653 03/04/17  0602 03/03/17  0630 03/02/17  0522 03/01/17  0642 02/28/17  1848 02/28/17  0538 02/27/17  0517   SODIUM mmol/L 134* 134* 134* 136* 134*  --  134* 134*   POTASSIUM mmol/L 3.6 3.6 3.3* 3.3* 3.4* 3.4* 3.5 3.5   CHLORIDE mmol/L 99 100 101 104 105  --  109 109   TOTAL CO2 mmol/L 32.0* 33.0* 29.0 29.0 28.0  --  24.0 23.0   BUN mg/dL 12 10 9 9 7  --  7 7   CREATININE mg/dL 1.58* 1.58* 1.36* 1.44* 1.35*  --  1.11 1.11   GLUCOSE mg/dL 79 80 97 70 75  --  70 71   CALCIUM mg/dL 8.4 8.3* 7.9* 8.0* 8.1*  --  8.1* 8.0*   BILIRUBIN mg/dL 2.3* 2.1* 2.6* 2.2* 2.5*  --  2.4* 2.4*   ALK PHOS U/L 76 102 76 76 70  --  66 66   ALT (SGPT) U/L 22 16* 23 30 24  --  30 25   AST (SGOT) U/L 39 38 36 37 39  --  43 44         Results from last 7 days  Lab Units 03/05/17  0653 03/01/17  0642 02/28/17  1848   MAGNESIUM mg/dL 1.7 2.4* 1.7         Results from last 7 days  Lab Units 03/05/17  0653 03/04/17  0602 03/03/17  0630 03/02/17  0522 03/01/17  0642 02/28/17  0538 02/27/17  0516   WBC 10*3/mm3 8.24 8.07 6.50 7.11 6.53 5.95 7.12   HEMOGLOBIN g/dL 10.1* 10.3* 10.0* 9.9* 9.5* 9.3* 9.8*   HEMATOCRIT % 29.5*  29.8* 28.9* 28.8* 27.5* 27.0* 27.8*   PLATELETS 10*3/mm3 80* 82* 75* 81* 72* 71* 71*              Imaging Results (last 7 days)     Procedure Component Value Units Date/Time    US Guided Vascular Access [[id number]] Resulted:  02/27/17 0835     Updated:  02/27/17 0835    XR Chest PA & Lateral [[id number]] Collected:  03/03/17 1042     Updated:  03/03/17 1048    Narrative:       Patient Name:  LAMONT FISHER  Patient ID:  [id number]   Ordering:  ROGER REDDING  Attending:  ROGER REDDING  Referring:  ROGER REDDING  ------------------------------------------------      PROCEDURE: Chest PA and lateral    REASON FOR EXAM: edema, 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  Left PICC  line with tip overlying the SVC. . Cardiac and pulmonary  vasculature are normal . Lungs are clear. Pleural spaces are  normal . No acute osseous abnormality. Stable proximal right  humerus surgical neck comminuted fracture. Old right lateral  seventh eighth and ninth rib fractures. T12 old anterior  vertebral body wedge compression fractures with loss of anterior  vertebral body height by 30-40%.      Impression:       1.  Stable proximal right humerus surgical neck comminuted  fracture.  2.  Old right lateral seventh eighth and ninth rib fractures. T12  old anterior vertebral body wedge compression fractures with loss  of anterior vertebral body height by 30-40%.  3.  No acute cardiopulmonary abnormality.    Electronically signed by:  Eduard Vigil MD  3/3/2017 10:46 AM [facility]  Workstation: [id number]                                   Medication Review:   Current Facility-Administered Medications   Medication Dose Route Frequency Provider Last Rate Last Dose   • amLODIPine (NORVASC) tablet 2.5 mg  2.5 mg Oral  Daily PRN Shawn Cortez MD       • bacitracin injection    PRN Shawn Cortez MD   100,000 Units at 02/27/17 1244   • ceftazidime (FORTAZ) 1 g/100 mL 0.9% NS IVPB (mpb)  1 g Intravenous Q8H Shawn Vanegas MD   1 g at 03/05/17 0856   • furosemide (LASIX) tablet 40 mg  40 mg Oral BID Cj Aguero MD   40 mg at 03/05/17 0856   • gabapentin (NEURONTIN) capsule 100 mg  100 mg Oral TID Shawn Cortez MD   100 mg at 03/05/17 0856   • lactulose (CHRONULAC) 10 GM/15ML solution 20 g  20 g Oral Daily Cj Aguero MD   20 g at 02/27/17 1006   • lactulose (CHRONULAC) 10 GM/15ML solution 30 g  30 g Oral BID Shawn Cortez MD   30 g at 03/03/17 0845   • magic butt ointment   Topical TID Bel Celeste MD       • Magnesium Sulfate 4 GM/100ML infusion 4 g  4 g Intravenous PRN Cj Aguero MD   4 g at 03/01/17 0104    Or   • magnesium sulfate in D5W 1g/100mL (PREMIX) IVPB 1 g  1 g Intravenous PRN Cj Aguero MD        Or   • magnesium sulfate 8 g in dextrose (D5W) 5 % 250 mL infusion  8 g Intravenous PRN Cj Aguero MD        Or   • magnesium sulfate 6 g in dextrose (D5W) 5 % 250 mL infusion  6 g Intravenous PRN Cj Aguero MD       • mepivacaine PF (CARBOCAINE) 2 % injection    PRN Shawn Cortez MD   20 mL at 02/27/17 1248   • multivitamin with minerals tablet 1 tablet  1 tablet Oral Daily Shawn Cortez MD   1 tablet at 03/05/17 0856   • nystatin (MYCOSTATIN) powder   Topical Q12H Bel Celeste MD       • oxyCODONE-acetaminophen (PERCOCET)  MG per tablet 1 tablet  1 tablet Oral Q4H PRN Isaura Schmitt MD   1 tablet at 03/04/17 1745   • oxyCODONE-acetaminophen (PERCOCET)  MG per tablet 1 tablet  1 tablet Oral Q4H PRN Shawn Cortez MD   1 tablet at 03/05/17 1008   • pantoprazole (PROTONIX) EC tablet 40 mg  40 mg Oral Q AM Isaura Schmitt MD   40 mg at 03/05/17 0610   • Pharmacy to dose vancomycin   Does  not apply Continuous PRN Bel Celeste MD       • potassium chloride (K-DUR,KLOR-CON) CR tablet 40 mEq  40 mEq Oral BID With Meals Cj Aguero MD   40 mEq at 03/05/17 0856   • potassium chloride 10 mEq in 100 mL IVPB  10 mEq Intravenous Q1H PRN Bel Celeste MD   Stopped at 02/28/17 1514   • rifaximin (XIFAXAN) tablet 550 mg  550 mg Oral BID Bel Celeste MD   550 mg at 03/05/17 0856   • sodium chloride (BACTERIOSTATIC) injection    PRN Shawn Cortez MD   27 mL at 02/27/17 1237   • sodium chloride 0.9 % infusion  25 mL/hr Intravenous Continuous Cj Aguero MD 25 mL/hr at 02/26/17 1040 25 mL/hr at 02/26/17 1040   • vancomycin (VANCOCIN) 1,500 mg in sodium chloride 0.9 % 500 mL IVPB  1,500 mg Intravenous Q24H Cj Aguero MD   1,500 mg at 03/04/17 1406   • vancomycin (VANCOCIN) 50 mg/ml oral solution 250 mg  250 mg Oral Q6H Bel Celeste MD   250 mg at 03/05/17 0610   • vitamin D (ERGOCALCIFEROL) capsule 50,000 Units  50,000 Units Oral Q7 Days Shawn Cortez MD   50,000 Units at 03/01/17 0914         Assessment/Plan     Active Problems:    Chronic hepatitis    Hepatic cirrhosis    Essential hypertension    Closed fracture of proximal end of right humerus with delayed healing    Closed nondisplaced supracondylar fracture of distal end of femur without intracondylar extension with delayed healing    Chronic osteomyelitis of right shoulder region    Acute cystitis without hematuria    Hypokalemia          -CAT scan reviewed.  -We'll continue IV antibiotics and present management.  -Patient is awaiting placement at this point.  -DVT and GI prophylaxis in place.      Isaura Schmitt MD  03/05/17  11:39 AM    EMR Dragon/Transcription disclaimer:   Much of this encounter note is an electronic transcription/translation of spoken language to printed text. The electronic translation of spoken language may permit erroneous, or at times, nonsensical words or  phrases to be inadvertently transcribed; Although I have reviewed the note for such errors, some may still exist.

## 2017-03-06 PROBLEM — R60.1 ANASARCA: Status: ACTIVE | Noted: 2017-03-06

## 2017-03-06 LAB
ALBUMIN SERPL-MCNC: 2.1 G/DL (ref 3.4–4.8)
ALBUMIN/GLOB SERPL: 0.7 G/DL (ref 1.1–1.8)
ALP SERPL-CCNC: 98 U/L (ref 38–126)
ALT SERPL W P-5'-P-CCNC: 24 U/L (ref 21–72)
ANION GAP SERPL CALCULATED.3IONS-SCNC: 4 MMOL/L (ref 5–15)
AST SERPL-CCNC: 38 U/L (ref 17–59)
BASOPHILS # BLD AUTO: 0.22 10*3/MM3 (ref 0–0.2)
BASOPHILS NFR BLD AUTO: 3.4 % (ref 0–2)
BILIRUB SERPL-MCNC: 1.8 MG/DL (ref 0.2–1.3)
BUN BLD-MCNC: 11 MG/DL (ref 7–21)
BUN/CREAT SERPL: 7.5 (ref 7–25)
CALCIUM SPEC-SCNC: 8.2 MG/DL (ref 8.4–10.2)
CHLORIDE SERPL-SCNC: 99 MMOL/L (ref 95–110)
CO2 SERPL-SCNC: 31 MMOL/L (ref 22–31)
CREAT BLD-MCNC: 1.46 MG/DL (ref 0.7–1.3)
DEPRECATED RDW RBC AUTO: 65.3 FL (ref 35.1–43.9)
EOSINOPHIL # BLD AUTO: 0.88 10*3/MM3 (ref 0–0.7)
EOSINOPHIL NFR BLD AUTO: 13.5 % (ref 0–7)
ERYTHROCYTE [DISTWIDTH] IN BLOOD BY AUTOMATED COUNT: 17.1 % (ref 11.5–14.5)
GFR SERPL CREATININE-BSD FRML MDRD: 51 ML/MIN/1.73 (ref 60–130)
GLOBULIN UR ELPH-MCNC: 3.2 GM/DL (ref 2.3–3.5)
GLUCOSE BLD-MCNC: 104 MG/DL (ref 60–100)
HCT VFR BLD AUTO: 28.5 % (ref 39–49)
HGB BLD-MCNC: 9.5 G/DL (ref 13.7–17.3)
IMM GRANULOCYTES # BLD: 0.02 10*3/MM3 (ref 0–0.02)
IMM GRANULOCYTES NFR BLD: 0.3 % (ref 0–0.5)
LYMPHOCYTES # BLD AUTO: 2.41 10*3/MM3 (ref 0.6–4.2)
LYMPHOCYTES NFR BLD AUTO: 37 % (ref 10–50)
MAGNESIUM SERPL-MCNC: 1.8 MG/DL (ref 1.6–2.3)
MCH RBC QN AUTO: 34.8 PG (ref 26.5–34)
MCHC RBC AUTO-ENTMCNC: 33.3 G/DL (ref 31.5–36.3)
MCV RBC AUTO: 104.4 FL (ref 80–98)
MONOCYTES # BLD AUTO: 0.67 10*3/MM3 (ref 0–0.9)
MONOCYTES NFR BLD AUTO: 10.3 % (ref 0–12)
NEUTROPHILS # BLD AUTO: 2.32 10*3/MM3 (ref 2–8.6)
NEUTROPHILS NFR BLD AUTO: 35.5 % (ref 37–80)
NRBC BLD MANUAL-RTO: 0 /100 WBC (ref 0–0)
PLATELET # BLD AUTO: 73 10*3/MM3 (ref 150–450)
PMV BLD AUTO: 8.8 FL (ref 8–12)
POTASSIUM BLD-SCNC: 3.5 MMOL/L (ref 3.5–5.1)
PROT SERPL-MCNC: 5.3 G/DL (ref 6.3–8.6)
RBC # BLD AUTO: 2.73 10*6/MM3 (ref 4.37–5.74)
SODIUM BLD-SCNC: 134 MMOL/L (ref 137–145)
WBC NRBC COR # BLD: 6.52 10*3/MM3 (ref 3.2–9.8)

## 2017-03-06 PROCEDURE — 97110 THERAPEUTIC EXERCISES: CPT | Performed by: PHYSICAL THERAPIST

## 2017-03-06 PROCEDURE — 85025 COMPLETE CBC W/AUTO DIFF WBC: CPT | Performed by: INTERNAL MEDICINE

## 2017-03-06 PROCEDURE — 83735 ASSAY OF MAGNESIUM: CPT | Performed by: INTERNAL MEDICINE

## 2017-03-06 PROCEDURE — 80053 COMPREHEN METABOLIC PANEL: CPT | Performed by: INTERNAL MEDICINE

## 2017-03-06 PROCEDURE — 25010000002 VANCOMYCIN PER 500 MG: Performed by: FAMILY MEDICINE

## 2017-03-06 PROCEDURE — 97530 THERAPEUTIC ACTIVITIES: CPT | Performed by: PHYSICAL THERAPIST

## 2017-03-06 RX ADMIN — Medication: at 16:59

## 2017-03-06 RX ADMIN — GABAPENTIN 100 MG: 100 CAPSULE ORAL at 21:19

## 2017-03-06 RX ADMIN — Medication 1 TABLET: at 08:30

## 2017-03-06 RX ADMIN — CEFTAZIDIME 1 G: 1 INJECTION, POWDER, FOR SOLUTION INTRAMUSCULAR; INTRAVENOUS at 03:15

## 2017-03-06 RX ADMIN — GABAPENTIN 100 MG: 100 CAPSULE ORAL at 08:29

## 2017-03-06 RX ADMIN — OXYCODONE HYDROCHLORIDE AND ACETAMINOPHEN 1 TABLET: 10; 325 TABLET ORAL at 09:47

## 2017-03-06 RX ADMIN — VANCOMYCIN HYDROCHLORIDE 250 MG: 1 INJECTION, POWDER, LYOPHILIZED, FOR SOLUTION INTRAVENOUS at 17:43

## 2017-03-06 RX ADMIN — RIFAXIMIN 550 MG: 550 TABLET ORAL at 17:43

## 2017-03-06 RX ADMIN — CEFTAZIDIME 1 G: 1 INJECTION, POWDER, FOR SOLUTION INTRAMUSCULAR; INTRAVENOUS at 17:43

## 2017-03-06 RX ADMIN — VANCOMYCIN HYDROCHLORIDE 1500 MG: 1 INJECTION, POWDER, LYOPHILIZED, FOR SOLUTION INTRAVENOUS at 11:11

## 2017-03-06 RX ADMIN — LACTULOSE 30 G: 10 SOLUTION ORAL at 17:58

## 2017-03-06 RX ADMIN — OXYCODONE HYDROCHLORIDE AND ACETAMINOPHEN 1 TABLET: 10; 325 TABLET ORAL at 16:59

## 2017-03-06 RX ADMIN — CEFTAZIDIME 1 G: 1 INJECTION, POWDER, FOR SOLUTION INTRAMUSCULAR; INTRAVENOUS at 09:47

## 2017-03-06 RX ADMIN — OXYCODONE HYDROCHLORIDE AND ACETAMINOPHEN 1 TABLET: 10; 325 TABLET ORAL at 05:27

## 2017-03-06 RX ADMIN — VANCOMYCIN HYDROCHLORIDE 250 MG: 1 INJECTION, POWDER, LYOPHILIZED, FOR SOLUTION INTRAVENOUS at 11:11

## 2017-03-06 RX ADMIN — PANTOPRAZOLE SODIUM 40 MG: 40 TABLET, DELAYED RELEASE ORAL at 05:25

## 2017-03-06 RX ADMIN — VANCOMYCIN HYDROCHLORIDE 250 MG: 1 INJECTION, POWDER, LYOPHILIZED, FOR SOLUTION INTRAVENOUS at 00:00

## 2017-03-06 RX ADMIN — POTASSIUM CHLORIDE 40 MEQ: 20 TABLET, EXTENDED RELEASE ORAL at 08:29

## 2017-03-06 RX ADMIN — NYSTATIN: 100000 POWDER TOPICAL at 08:30

## 2017-03-06 RX ADMIN — OXYCODONE HYDROCHLORIDE AND ACETAMINOPHEN 1 TABLET: 10; 325 TABLET ORAL at 21:19

## 2017-03-06 RX ADMIN — GABAPENTIN 100 MG: 100 CAPSULE ORAL at 16:59

## 2017-03-06 RX ADMIN — RIFAXIMIN 550 MG: 550 TABLET ORAL at 08:28

## 2017-03-06 RX ADMIN — FUROSEMIDE 40 MG: 40 TABLET ORAL at 17:43

## 2017-03-06 RX ADMIN — POTASSIUM CHLORIDE 40 MEQ: 20 TABLET, EXTENDED RELEASE ORAL at 17:43

## 2017-03-06 RX ADMIN — VANCOMYCIN HYDROCHLORIDE 250 MG: 1 INJECTION, POWDER, LYOPHILIZED, FOR SOLUTION INTRAVENOUS at 05:26

## 2017-03-06 RX ADMIN — Medication: at 08:30

## 2017-03-06 RX ADMIN — Medication: at 21:19

## 2017-03-06 RX ADMIN — FUROSEMIDE 40 MG: 40 TABLET ORAL at 08:29

## 2017-03-06 RX ADMIN — NYSTATIN: 100000 POWDER TOPICAL at 21:19

## 2017-03-06 NOTE — DISCHARGE PLACEMENT REQUEST
"Lamont Mcmullen (52 y.o. Male)     Date of Birth Social Security Number Address Home Phone MRN    [id number]  [id number]  [facility] [address]  [id number]    Jewish Marital Status          Other        Admission Date Admission Type Admitting Provider Attending Provider Department, Room/Bed    2/20/17 Emergency Cj Aguero MD [place], Abraham Stuart, MD [facility] [address], 332/1    Discharge Date Discharge Disposition Discharge Destination                      Attending Provider: Cj Aguero MD     Allergies:  Aspirin, Codeine Sulfate    Isolation:  Spore, Contact   Infection:  MRSA (02/23/17), C.difficile (02/21/17)   Code Status:  FULL    Ht:  72.01\" (182.9 cm)   Wt:  249 lb 4.8 oz (113 kg)    Admission Cmt:  None   Principal Problem:  None                Active Insurance as of 2/20/2017     Primary Coverage     Payor Plan Insurance Group Employer/Plan Group    [facility]      Payor Plan Address Payor Plan Phone Number Effective From Effective To    [address] [address]  1/1/2014     [place] [address]       Subscriber Name Subscriber Birth Date Member ID       LAMONT MCMULLEN 1965 [id number]                 Emergency Contacts      (Rel.) Home Phone Work Phone Mobile Phone    Loco Mcmullen (Daughter) -- -- [phone]               History & Physical      Bravo Marinelli MD at 2/21/2017 12:58 AM                [facility] Admission      Date of Admission: 2/20/2017      Primary Care Physician: Cj Aguero MD      Chief Complaint: Diarrhea    HPI: This is a 51-year-old white male admitted to the hospital with diarrhea that has been going on for the past few days, described as watery nonbloody.  He also reports right arm drainage and has been treated with antibiotics.  He continues to follow up with Dr. Vanegas.  He reports darker urine but no " hematuria or dysuria.  He has decreased oral intake and decreased appetite.  He denies subjective fever or chills.  Denies nausea or vomiting.  Reports epigastric abdominal pain that has been going on for the past few weeks but got worse in the past few days, described as sharp and nonradiating moderate in severity.  He is currently in but does not appear to be in distress.      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 No pertinent family history.    Social History:   Social History     Social History   • Marital status:      Spouse name: N/A   • Number of children: N/A   • Years of education: N/A     Social History Main Topics   • Smoking status: Current Every Day Smoker     Packs/day: 0.50     Types: Cigarettes   • Smokeless tobacco: None   • Alcohol use No   • Drug use: No   • Sexual activity: Not Asked     Other Topics Concern   • None     Social History Narrative   • None       Allergies:   Allergies   Allergen Reactions   • Codeine Sulfate        Medications:   Prior to Admission medications    Medication Sig Start Date End Date Taking? Authorizing Provider   amLODIPine (NORVASC) 2.5 MG tablet Take 2.5 mg by mouth Daily.   Yes Historical Provider, MD   clindamycin (CLEOCIN) 300 MG capsule Take 300 mg by mouth 3 (Three) Times a Day.   Yes Historical Provider, MD   lactulose (CHRONULAC) 10 GM/15ML solution Take 20 g by mouth 2 (Two) Times a Day As Needed.   Yes  Historical Provider, MD   losartan (COZAAR) 100 MG tablet Take 100 mg by mouth Daily.   Yes Historical Provider, MD   Multiple Vitamins-Minerals (MULTIVITAMIN ADULT PO) Take  by mouth.   Yes Historical Provider, MD   triamterene-hydrochlorothiazide (MAXZIDE-25) 37.5-25 MG per tablet Take 1 tablet by mouth Daily.   Yes Historical Provider, MD   vitamin D (ERGOCALCIFEROL) 84365 UNITS capsule capsule Take 50,000 Units by mouth 1 (One) Time Per Week.   Yes Historical Provider, MD   lidocaine (LIDODERM) 5 % Place 1 patch on the skin Daily. Remove & Discard patch within 12 hours or as directed by MD    Historical Provider, MD   Morphine (MS CONTIN) 15 MG 12 hr tablet Take 15 mg by mouth 2 (Two) Times a Day.    Historical Provider, MD   oxyCODONE-acetaminophen (PERCOCET)  MG per tablet Take 1 tablet by mouth Every 6 (Six) Hours As Needed for moderate pain (4-6).    Historical Provider, MD       Review of Systems:  Review of Systems   Otherwise complete ROS is negative except as mentioned above.    Physical Exam:   Temp:  [97.3 °F (36.3 °C)] 97.3 °F (36.3 °C)  Heart Rate:  [55-66] 62  Resp:  [16-18] 18  BP: (138-166)/(72-96) 162/83  Physical Exam  Patient appears well, alert and oriented x 3, pleasant, cooperative.   Neck supple  No JVD No thyromegaly.  JENNI. Ears, throat are normal.  Lungs are clear to auscultation. No crackles no wheezing   CV S1S2 normal, no murmurs, clicks, gallops or rubs.  Abdomen is soft, no tenderness, masses or organomegaly.    Extremities: no clubbing cyanosis or edema , left BKA, right shoulder chronic wound with Bandage.  Neurological CN 2-12 intact   Skin is normal without suspicious lesions noted.    Results Reviewed:  I have personally reviewed current lab, radiology, and data and agree with results.  Lab Results (last 24 hours)     Procedure Component Value Units Date/Time    Comprehensive Metabolic Panel [[id number]]  (Abnormal) Collected:  02/20/17 2102    Specimen:  Blood Updated:   02/20/17 2124     Glucose 82 mg/dL      BUN 11 mg/dL      Creatinine 1.04 mg/dL      Sodium 138 mmol/L      Potassium 3.5 mmol/L      Chloride 111 (H) mmol/L      CO2 17.0 (L) mmol/L      Calcium 8.9 mg/dL      Total Protein 6.2 (L) g/dL      Albumin 2.50 (L) g/dL      ALT (SGPT) 27 U/L      AST (SGOT) 38 U/L      Alkaline Phosphatase 69 U/L      Total Bilirubin 5.4 (H) mg/dL      eGFR Non African Amer 75 mL/min/1.73      Globulin 3.7 (H) gm/dL      A/G Ratio 0.7 (L) g/dL      BUN/Creatinine Ratio 10.6      Anion Gap 10.0 mmol/L     Amylase [[id number]]  (Normal) Collected:  02/20/17 2102    Specimen:  Blood Updated:  02/20/17 2124     Amylase 59 U/L     Lipase [[id number]]  (Normal) Collected:  02/20/17 2102    Specimen:  Blood Updated:  02/20/17 2124     Lipase 189 U/L     CK [[id number]]  (Abnormal) Collected:  02/20/17 2102    Specimen:  Blood Updated:  02/20/17 2124     Creatine Kinase 38 (L) U/L     Ammonia [[id number]]  (Abnormal) Collected:  02/20/17 2102    Specimen:  Blood Updated:  02/20/17 2124     Ammonia 80 (H) umol/L     CBC Auto Differential [[id number]]  (Abnormal) Collected:  02/20/17 2136    Specimen:  Blood Updated:  02/20/17 2143     WBC 9.36 10*3/mm3      RBC 3.35 (L) 10*6/mm3      Hemoglobin 11.7 (L) g/dL      Hematocrit 34.5 (L) %      .0 (H) fL      MCH 34.9 (H) pg      MCHC 33.9 g/dL      RDW 16.8 (H) %      RDW-SD 63.8 (H) fl      MPV 9.6 fL      Platelets 60 (L) 10*3/mm3      Neutrophil % 56.6 %      Lymphocyte % 26.3 %      Monocyte % 12.3 (H) %      Eosinophil % 2.7 %      Basophil % 1.5 %      Immature Grans % 0.6 (H) %      Neutrophils, Absolute 5.30 10*3/mm3      Lymphocytes, Absolute 2.46 10*3/mm3      Monocytes, Absolute 1.15 (H) 10*3/mm3      Eosinophils, Absolute 0.25 10*3/mm3      Basophils, Absolute 0.14 10*3/mm3      Immature Grans, Absolute 0.06 (H) 10*3/mm3      nRBC 0.0 /100 WBC     Urine Culture [[id number]] Collected:  02/20/17 6018    Specimen:  Urine from Urine, Clean Catch  Updated:  02/20/17 2152    Urinalysis With / Culture If Indicated [[id number]]  (Abnormal) Collected:  02/20/17 2145    Specimen:  Urine from Urine, Clean Catch Updated:  02/20/17 2202     Color, UA Orange (A)      Appearance, UA Turbid (A)      pH, UA 5.5      Specific Gravity, UA 1.017      Glucose, UA Negative      Ketones, UA Trace (A)      Bilirubin, UA Small (1+) (A)      Blood, UA Moderate (2+) (A)      Protein, UA Trace (A)      Leuk Esterase, UA Large (3+) (A)      Nitrite, UA Negative      Urobilinogen, UA 2.0 E.U./dL (A)     Urinalysis, Microscopic Only [[id number]]  (Abnormal) Collected:  02/20/17 2145    Specimen:  Urine from Urine, Clean Catch Updated:  02/20/17 2220     RBC, UA 6-12 (A) /HPF      WBC, UA Too Numerous to Count (A) /HPF      Bacteria, UA 1+ (A) /HPF      Squamous Epithelial Cells, UA 3-5 (A) /HPF      Hyaline Casts, UA 7-12 /LPF      Mucus, UA Small/1+ (A) /HPF      Methodology Automated Microscopy     CBC & Differential [[id number]] Collected:  02/20/17 2136    Specimen:  Blood Updated:  02/20/17 2232    Narrative:       The following orders were created for panel order CBC & Differential.  Procedure                               Abnormality         Status                     ---------                               -----------         ------                     Scan Slide[[id number]]                                        Final result               CBC Auto Differential[[id number]]         Abnormal            Final result                 Please view results for these tests on the individual orders.    Scan Slide [[id number]] Collected:  02/20/17 2136    Specimen:  Blood Updated:  02/20/17 2232     Anisocytosis Slight/1+      Hypochromia Slight/1+      WBC Morphology Normal      Platelet Estimate Decreased         Imaging Results (last 24 hours)     Procedure Component Value Units Date/Time    XR Abdomen Flat & Upright [[id number]] Collected:  02/20/17 2147     Updated:  02/20/17 2150    Narrative:          [facility]    Patient Name: LAMONT MCMULLEN    ATTENDING: ORLANDO SORIANO     REFERRING: ORLANDO SORIANO    ORDERING: ORLANDO SORIANO    -----------------------    PROCEDURE: Abdomen series    Date of exam: 2/20/2017    HISTORY: Abdomen pain    Supine and erect images of the abdomen were obtained. Study is  compared to prior exam of 12/4/2016.    The bowel gas pattern is  unremarkable. There is no evidence of  significant distention or  obstruction. No free air is seen. No  mass lesions or abnormal  calcifications are appreciated.  Vascular stent is noted in the right upper abdomen. Compression  screw is seen in the left hip.      Impression:       Unremarkable abdomen series.      Electronically signed by:  Pramod Grant MD  2/20/2017 9:49 PM  CST Workstation: [id number]            Assessment/Plan:             1.  Diarrhea: Unspecified etiology, and a patient with recent antibiotic usage, obtain blood cultures, stool cultures as well as C. difficile cultures start empirically on Flagyl and Levaquin, start IV fluids normal saline at 100 mL/h.  2.  Acute UTI: Start Levaquin, obtain urine and blood cultures.  3.  Chronic right shoulder wound, consult with wound care.  4.  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  5.  Hypovolemia start normal saline at 100 mL per hour.  6.  Metabolic acidosis: In the setting of diarrhea this should improve with improvement in volume status.  SCDs for DVT prophylaxis.    Bravo Marinelli MD  02/21/17  12:58 AM                     Electronically signed by Bravo Marinelli MD at 2/21/2017  1:54 AM      Shawn Vanegas MD at 2/22/2017  8:55 PM          Patient Name:  Lamont Mcmullen  Consult Date:  2/22/2017      Referring Provider:  Bel Celeste MD  Reason for Consultation:  Draining wound right arm     Patient Care Team:  Cj Aguero MD as PCP - General    Chief complaint: right arm  pain    Subjective .     History of present illness:  The patient is a 51-year-old white male who is well known to our service.  He has a long list of chronic, serious, medical conditions.  He had a fall in November suffering a comminuted right proximal humerus fracture.  He also had a recurrence of a left distal femur fracture that was minimally displaced.  Patient also has a history of a below-knee amputation on the left side.  He has been treated nonoperatively due to his poor medical status and severity of his fractures.  He has had a sore on the right upper arm for a long period of time and has had some intermittent drainage from this.  There is no report of a specific wound or injury that started this process.  No new injury is reported as well.  Patient was admitted for diarrhea, acute UTI, hepatic encephalopathy.  While he was here consult was obtained for evaluation of his right arm injury and recommendation for further treatment options.    Review of Systems:  He denies any fevers or chills.  He denies nausea or vomiting.  Denies any chest pain.  He has had some abdominal pain and is complaining of pain in his right arm all other systems reviewed as negative.    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 No pertinent family history.,   Social History   Substance Use Topics   • Smoking status: Current Every Day Smoker     Packs/day: 0.25     Types: Cigarettes   • Smokeless tobacco: None   • Alcohol use No   ,   Prescriptions Prior  to Admission   Medication Sig Dispense Refill Last Dose   • amLODIPine (NORVASC) 2.5 MG tablet Take 2.5 mg by mouth Daily As Needed (only takes if his blood pressure is high).   Past Week at Unknown time   • clindamycin (CLEOCIN) 300 MG capsule Take 300 mg by mouth 3 (Three) Times a Day.   2/20/2017 at Unknown time   • gabapentin (NEURONTIN) 100 MG capsule Take 100 mg by mouth 3 (Three) Times a Day.   2/20/2017 at Unknown time   • lactulose (CHRONULAC) 10 GM/15ML solution Take 30 g by mouth 2 (Two) Times a Day.   2/20/2017 at Unknown time   • Multiple Vitamins-Minerals (COMPLETE MULTIVITAMIN/MINERAL PO) Take 1 tablet by mouth Daily.   2/20/2017 at Unknown time   • oxyCODONE-acetaminophen (PERCOCET)  MG per tablet Take 1 tablet by mouth Every 4 (Four) Hours As Needed for moderate pain (4-6) (every 4-6 hours as needed).   2/20/2017 at Unknown time   • vitamin D (ERGOCALCIFEROL) 85304 UNITS capsule capsule Take 50,000 Units by mouth 1 (One) Time Per Week. Takes on Wednesdays   2/15/2017    and Allergies:  Codeine sulfate    Objective     Vital Signs   Temp:  [97.7 °F (36.5 °C)] 97.7 °F (36.5 °C)  Heart Rate:  [79-86] 84  Resp:  [20] 20  BP: (132)/(78) 132/78    Physical Exam:   The patient is awake, alert, oriented, and in no apparent distress.  He is morbidly obese and lying in the bed.  His mobility is very poor.    The right upper extremity shows very limited motion.  He is able to bend his elbow he is able to wiggle his fingers.  He has good capillary refill distally.  He has a sore about the size of a quarter on the lateral aspect of the brachium.  He has some granulating tissue in the base of the wound.  He has some mild serous drainage with squeezing around this area.  It is very tender to touch.  He has essentially no motion of his shoulder but does have active activation of the muscles.  Good stability on exam.  He has poor muscle tone.  Left upper extremity shows good range of motion actively.  Good  capillary refill distally.  Poor muscle tone and strength diffusely.  Stable joint exam.  Intact distal sensation grossly.    Results Review:    Imaging Results (last 24 hours)     Procedure Component Value Units Date/Time    XR Humerus Right [[id number]] Collected:  02/22/17 1335     Updated:  02/22/17 1340    Narrative:       Patient Name:  LAMONT FISHER  Patient ID:  [id number]   Ordering:  MARJ FORRESTER  Attending:  HENRY ACLVO  Referring:  MARJ FORRESTER  ------------------------------------------------  Procedure: Right humerus two views    Reason for exam: Wound right arm, possible osteomyelitis.    FINDINGS: Comparison exam dated January 13, 2017. No significant  interval change in appearance of right humerus comminuted  surgical neck fracture. Cannot exclude pathologic fracture.  Otherwise right humerus is unremarkable.      Impression:       1.  No significant interval change in appearance of right humerus  comminuted surgical neck fracture. Cannot exclude pathologic  fracture.    Electronically signed by:  Eduard Vigil MD  2/22/2017 1:39 PM CST  Workstation: [id number]          Lab Results (last 24 hours)     Procedure Component Value Units Date/Time    Blood Gas, Arterial [[id number]]  (Abnormal) Collected:  02/22/17 0428    Specimen:  Arterial Blood Updated:  02/22/17 0443     Site --       Not performed at this site.        Aren's Test --       Not performed at this site.        pH, Arterial 7.441 pH units      pCO2, Arterial 30.7 (L) mm Hg      pO2, Arterial 66.7 (L) mm Hg      HCO3, Arterial 20.4 (L) mmol/L      Base Excess, Arterial -2.9 (L) mmol/L      O2 Saturation, Arterial 92.9 (L) %      Hemoglobin, Blood Gas 10.4 (L) g/dL      Hematocrit, Blood Gas 31.0 %      CO2 Content 21.4 (L)      Sodium, Arterial 135.5 (L) mmol/L      Potassium, Arterial 3.04 (L) mmol/L      Glucose, Arterial 95 mmol/L      Barometric Pressure for Blood Gas -- mmHg       Not performed at this site.        Modality --        Not performed at this site.        Ionized Calcium 4.4 (L) mg/dL     Urine Culture [[id number]]  (Abnormal) Collected:  02/20/17 2145    Specimen:  Urine from Urine, Clean Catch Updated:  02/22/17 0632     Urine Culture >100,000 CFU/mL Gram Negative Bacilli (A)      Beta Lactamase --     Blood Culture [[id number]]  (Normal) Collected:  02/21/17 0540    Specimen:  Blood from Arm, Left Updated:  02/22/17 0701     Blood Culture No growth at 24 hours     Blood Culture [[id number]]  (Normal) Collected:  02/21/17 0525    Specimen:  Blood from Arm, Left Updated:  02/22/17 0701     Blood Culture No growth at 24 hours     CBC & Differential [[id number]] Collected:  02/22/17 0600    Specimen:  Blood Updated:  02/22/17 0708    Narrative:       The following orders were created for panel order CBC & Differential.  Procedure                               Abnormality         Status                     ---------                               -----------         ------                     CBC Auto Differential[[id number]]         Abnormal            Final result                 Please view results for these tests on the individual orders.    CBC Auto Differential [[id number]]  (Abnormal) Collected:  02/22/17 0600    Specimen:  Blood Updated:  02/22/17 0708     WBC 8.72 10*3/mm3      RBC 2.89 (L) 10*6/mm3      Hemoglobin 10.2 (L) g/dL      Hematocrit 29.6 (L) %      .4 (H) fL      MCH 35.3 (H) pg      MCHC 34.5 g/dL      RDW 16.4 (H) %      RDW-SD 62.0 (H) fl      MPV 9.7 fL      Platelets 54 (L) 10*3/mm3      Neutrophil % 67.2 %      Lymphocyte % 16.3 %      Monocyte % 8.5 %      Eosinophil % 6.7 %      Basophil % 1.0 %      Immature Grans % 0.3 %      Neutrophils, Absolute 5.86 10*3/mm3      Lymphocytes, Absolute 1.42 10*3/mm3      Monocytes, Absolute 0.74 10*3/mm3      Eosinophils, Absolute 0.58 10*3/mm3      Basophils, Absolute 0.09 10*3/mm3      Immature Grans, Absolute 0.03 (H) 10*3/mm3     Lactic Acid, Plasma [[id number]]   (Normal) Collected:  02/22/17 0600    Specimen:  Blood Updated:  02/22/17 0713     Lactate 1.6 mmol/L     Magnesium [[id number]]  (Normal) Collected:  02/22/17 0600    Specimen:  Blood Updated:  02/22/17 0714     Magnesium 1.7 mg/dL     C-reactive Protein [[id number]]  (Abnormal) Collected:  02/22/17 0600    Specimen:  Blood Updated:  02/22/17 0714     C-Reactive Protein 2.80 (H) mg/dL     Comprehensive Metabolic Panel [[id number]]  (Abnormal) Collected:  02/22/17 0600    Specimen:  Blood Updated:  02/22/17 0720     Glucose 84 mg/dL      BUN 11 mg/dL      Creatinine 1.12 mg/dL      Sodium 137 mmol/L      Potassium 3.2 (L) mmol/L      Chloride 109 mmol/L      CO2 20.0 (L) mmol/L      Calcium 8.0 (L) mg/dL      Total Protein 5.4 (L) g/dL      Albumin 2.00 (L) g/dL      ALT (SGPT) 23 U/L      AST (SGOT) 74 (H) U/L      Alkaline Phosphatase 79 U/L      Total Bilirubin 3.4 (H) mg/dL      eGFR Non African Amer 69 mL/min/1.73      Globulin 3.4 gm/dL      A/G Ratio 0.6 (L) g/dL      BUN/Creatinine Ratio 9.8      Anion Gap 8.0 mmol/L     Ammonia [[id number]]  (Abnormal) Collected:  02/22/17 0600    Specimen:  Blood Updated:  02/22/17 0723     Ammonia 38 (H) umol/L       Specimen hemolyzed.  Results may be affected.       Lipid Panel [[id number]]  (Abnormal) Collected:  02/22/17 0600    Specimen:  Blood Updated:  02/22/17 0723     Total Cholesterol 88 mg/dL      Triglycerides 77 mg/dL      HDL Cholesterol 22 (L) mg/dL      LDL Cholesterol  36 mg/dL      LDL/HDL Ratio 2.30     TSH [[id number]]  (Normal) Collected:  02/22/17 0600    Specimen:  Blood Updated:  02/22/17 0746     TSH 2.360 mIU/mL     Hemoglobin A1c [[id number]]  (Abnormal) Collected:  02/22/17 0600    Specimen:  Blood Updated:  02/22/17 0805     Hemoglobin A1C <4 (L) %     C-reactive Protein [[id number]]  (Abnormal) Collected:  02/22/17 1316    Specimen:  Blood Updated:  02/22/17 1346     C-Reactive Protein 3.50 (H) mg/dL     Wound Culture [[id number]] Collected:  02/22/17 2572     Specimen:  Wound from Arm Updated:  02/22/17 8250     Gram Stain Result Rare (1+) WBCs seen       Rare (1+) Gram positive cocci in pairs            I reviewed the patient's new clinical results.  I reviewed the patient's new imaging results and agree with the interpretation.      Assessment/Plan     Active Problems:    Chronic hepatitis    Hepatic cirrhosis    Essential hypertension    Closed fracture of proximal end of right humerus with delayed healing    Closed nondisplaced supracondylar fracture of distal end of femur without intracondylar extension with delayed healing    Hyperammonemia      From an orthopedic standpoint, the patient has a nonhealing, comminuted, fracture of the proximal humerus.  He also has a wound on the lateral aspect of the upper arm.  These are chronic and stable.  He is an extremely poor surgical candidate.  To further assess the wound on the lateral aspect of his arm I recommendation would be an MRI of the humerus.  This would allow for evaluation of the extent of the wound, any potential abscess underlying, and potential for osteomyelitis.  Otherwise, continuing with IV antibiotics and wound care would be warranted.  Thank you for this consult and for allowing me to participate in the care of this patient.    I discussed the patients findings and my recommendations with patient, nursing staff and consulting provider    Shawn Vanegas MD  02/22/17  9:09 PM             Electronically signed by Shawn Vanegas MD at 2/22/2017  9:12 PM      Shawn Cortez MD at 2/27/2017 12:12 PM          H&P reviewed. The patient was examined and there are no changes to the H&P.   Risk and benefits discussed     Electronically signed by Shawn Cortez MD at 2/27/2017 12:14 PM        Hospital Medications (active)       Dose Frequency Start End    amLODIPine (NORVASC) tablet 2.5 mg 2.5 mg Daily PRN 2/27/2017     Sig - Route: Take 1 tablet by mouth Daily As Needed (only takes if his blood  "pressure is high). - Oral    bacitracin injection  As Needed 2/27/2017     Sig: As Needed.    ceftazidime (FORTAZ) 1 g/100 mL 0.9% NS IVPB (mpb) 1 g Every 8 Hours 3/4/2017     Sig - Route: Infuse 100 mL into a venous catheter Every 8 (Eight) Hours. - Intravenous    furosemide (LASIX) tablet 40 mg 40 mg 2 Times Daily 3/1/2017     Sig - Route: Take 1 tablet by mouth 2 (Two) Times a Day. - Oral    gabapentin (NEURONTIN) capsule 100 mg 100 mg 3 Times Daily 2/27/2017     Sig - Route: Take 1 capsule by mouth 3 (Three) Times a Day. - Oral    lactulose (CHRONULAC) 10 GM/15ML solution 20 g 20 g Daily 2/24/2017     Sig - Route: Take 30 mL by mouth Daily. - Oral    lactulose (CHRONULAC) 10 GM/15ML solution 30 g 30 g 2 Times Daily 2/27/2017     Sig - Route: Take 45 mL by mouth 2 (Two) Times a Day. - Oral    magic butt ointment  3 Times Daily 2/21/2017     Sig - Route: Apply  topically 3 (Three) Times a Day. - Topical    Magnesium Sulfate 4 GM/100ML infusion 4 g 4 g As Needed 2/25/2017     Sig - Route: Infuse 100 mL into a venous catheter As Needed (magnesium sulfate 1 g in D5W 100 mL IVPB - Mg 1.6 - 1.9 mg/dL). - Intravenous    Linked Group 1:  \"Or\" Linked Group Details        magnesium sulfate 6 g in dextrose (D5W) 5 % 250 mL infusion 6 g As Needed 2/25/2017     Sig - Route: Infuse 6 g into a venous catheter As Needed (Mg 1.1-1.5 mg/dL). - Intravenous    Linked Group 1:  \"Or\" Linked Group Details        magnesium sulfate 8 g in dextrose (D5W) 5 % 250 mL infusion 8 g As Needed 2/25/2017     Sig - Route: Infuse 8 g into a venous catheter As Needed (Mg less than or equal to 1 mg/dL). - Intravenous    Linked Group 1:  \"Or\" Linked Group Details        magnesium sulfate in D5W 1g/100mL (PREMIX) IVPB 1 g 1 g As Needed 2/25/2017     Sig - Route: Infuse 100 mL into a venous catheter As Needed (Mg less than or equal 1 mg/dL). - Intravenous    Linked Group 1:  \"Or\" Linked Group Details        mepivacaine PF (CARBOCAINE) 2 % injection  As " Needed 2/27/2017     Sig: As Needed.    multivitamin with minerals tablet 1 tablet 1 tablet Daily 2/27/2017     Sig - Route: Take 1 tablet by mouth Daily. - Oral    nystatin (MYCOSTATIN) powder  Every 12 Hours Scheduled 2/21/2017     Sig - Route: Apply  topically Every 12 (Twelve) Hours. - Topical    oxyCODONE-acetaminophen (PERCOCET)  MG per tablet 1 tablet 1 tablet Every 4 Hours PRN 2/24/2017 3/14/2017    Sig - Route: Take 1 tablet by mouth Every 4 (Four) Hours As Needed for severe pain (7-10). - Oral    oxyCODONE-acetaminophen (PERCOCET)  MG per tablet 1 tablet 1 tablet Every 4 Hours PRN 2/27/2017     Sig - Route: Take 1 tablet by mouth Every 4 (Four) Hours As Needed for moderate pain (4-6) (every 4-6 hours as needed). - Oral    pantoprazole (PROTONIX) EC tablet 40 mg 40 mg Every Early Morning 3/5/2017     Sig - Route: Take 1 tablet by mouth Every Morning. - Oral    Pharmacy to dose vancomycin  Continuous PRN 2/22/2017     Sig - Route: Continuous As Needed for consult. - Does not apply    potassium chloride (K-DUR,KLOR-CON) CR tablet 40 mEq 40 mEq 2 Times Daily With Meals 3/3/2017     Sig - Route: Take 2 tablets by mouth 2 (Two) Times a Day With Meals. - Oral    potassium chloride 10 mEq in 100 mL IVPB 10 mEq Every 1 Hour PRN 2/22/2017     Sig - Route: Infuse 100 mL into a venous catheter Every 1 (One) Hour As Needed (See admin Instructions.). - Intravenous    rifaximin (XIFAXAN) tablet 550 mg 550 mg 2 Times Daily 2/21/2017     Sig - Route: Take 1 tablet by mouth 2 (Two) Times a Day. - Oral    sodium chloride (BACTERIOSTATIC) injection  As Needed 2/27/2017     Sig: As Needed.    sodium chloride 0.9 % infusion 25 mL/hr Continuous 2/21/2017     Sig - Route: Infuse 25 mL/hr into a venous catheter Continuous. - Intravenous    vancomycin (VANCOCIN) 1,500 mg in sodium chloride 0.9 % 500 mL IVPB 1,500 mg Every 24 Hours 3/3/2017     Sig - Route: Infuse 1,500 mg into a venous catheter Daily. - Intravenous     vancomycin (VANCOCIN) 50 mg/ml oral solution 250 mg 250 mg Every 6 Hours Scheduled 2/21/2017     Sig - Route: Take 5 mL by mouth Every 6 (Six) Hours. - Oral    vitamin D (ERGOCALCIFEROL) capsule 50,000 Units 50,000 Units Every 7 Days 3/1/2017     Sig - Route: Take 1 capsule by mouth Every 7 (Seven) Days. - Oral             Physician Progress Notes (last 24 hours) (Notes from 3/5/2017 10:01 AM through 3/6/2017 10:01 AM)      Isaura Schmitt MD at 3/5/2017 11:39 AM  Version 1 of 1                [facility] Medicine Services  INPATIENT PROGRESS NOTE     LOS: 11 days   Patient Care Team:  Cj Aguero MD as PCP - General    Chief Complaint:  SOA      Subjective     Interval History:     Patient Complaints: Patient resting comfortably denies any complaints.    History taken from: patient RN    Review of Systems:    Review of Systems   Constitutional: Negative for appetite change, chills, diaphoresis and fever.   HENT: Negative for congestion, rhinorrhea, sore throat and trouble swallowing.    Eyes: Negative for visual disturbance.   Respiratory: Negative for cough, chest tightness, shortness of breath and wheezing.    Cardiovascular: Negative for chest pain, palpitations and leg swelling.   Gastrointestinal: Positive for abdominal pain. Negative for blood in stool, diarrhea, nausea and vomiting. Anal bleeding: on lateral side in the folds of pannus.   Endocrine: Negative for cold intolerance and heat intolerance.   Genitourinary: Negative for decreased urine volume and difficulty urinating.   Musculoskeletal: Negative for back pain, gait problem and neck pain.   Skin: Negative for rash.   Neurological: Negative for dizziness, syncope, weakness, light-headedness, numbness and headaches.   Psychiatric/Behavioral: The patient is not nervous/anxious.          Objective     Vital Signs  Temp:  [97.7 °F (36.5 °C)-98.2 °F (36.8 °C)] 97.8 °F (36.6 °C)  Heart Rate:   [54-88] 88  Resp:  [18-20] 20  BP: (100-123)/(57-77) 120/77    Physical Exam:   Physical Exam   Constitutional: He is oriented to person, place, and time. He appears well-developed and well-nourished.   HENT:   Head: Normocephalic and atraumatic.   Nose: Nose normal.   Eyes: Conjunctivae and EOM are normal. Pupils are equal, round, and reactive to light.   Neck: Normal range of motion. Neck supple. No JVD present. No tracheal deviation present. No thyromegaly present.   Cardiovascular: Normal rate, regular rhythm, normal heart sounds and intact distal pulses.    Pulmonary/Chest: Effort normal and breath sounds normal. No respiratory distress. He has no wheezes. He has no rales. He exhibits no tenderness.   Abdominal: Soft. Bowel sounds are normal. He exhibits no distension. There is tenderness (by lateral abdominal wall on left side. Possible some subcutaneous swelling. No erythema or warmth noted.). There is no rebound and no guarding.   Musculoskeletal: He exhibits no edema.   Lymphadenopathy:     He has no cervical adenopathy.   Neurological: He is alert and oriented to person, place, and time. No cranial nerve deficit.   Skin: Skin is warm and dry.   Intact   Psychiatric: He has a normal mood and affect. note and vitals reviewed.         Results Review:         Results from last 7 days  Lab Units 03/05/17  0653 03/04/17  0602 03/03/17  0630 03/02/17  0522 03/01/17  0642 02/28/17  1848 02/28/17  0538 02/27/17  0517   SODIUM mmol/L 134* 134* 134* 136* 134*  --  134* 134*   POTASSIUM mmol/L 3.6 3.6 3.3* 3.3* 3.4* 3.4* 3.5 3.5   CHLORIDE mmol/L 99 100 101 104 105  --  109 109   TOTAL CO2 mmol/L 32.0* 33.0* 29.0 29.0 28.0  --  24.0 23.0   BUN mg/dL 12 10 9 9 7  --  7 7   CREATININE mg/dL 1.58* 1.58* 1.36* 1.44* 1.35*  --  1.11 1.11   GLUCOSE mg/dL 79 80 97 70 75  --  70 71   CALCIUM mg/dL 8.4 8.3* 7.9* 8.0* 8.1*  --  8.1* 8.0*   BILIRUBIN mg/dL 2.3* 2.1* 2.6* 2.2* 2.5*  --  2.4* 2.4*   ALK PHOS U/L 76 102 76 76 70   --  66 66   ALT (SGPT) U/L 22 16* 23 30 24  --  30 25   AST (SGOT) U/L 39 38 36 37 39  --  43 44         Results from last 7 days  Lab Units 03/05/17  0653 03/01/17  0642 02/28/17  1848   MAGNESIUM mg/dL 1.7 2.4* 1.7         Results from last 7 days  Lab Units 03/05/17  0653 03/04/17  0602 03/03/17  0630 03/02/17  0522 03/01/17  0642 02/28/17  0538 02/27/17  0516   WBC 10*3/mm3 8.24 8.07 6.50 7.11 6.53 5.95 7.12   HEMOGLOBIN g/dL 10.1* 10.3* 10.0* 9.9* 9.5* 9.3* 9.8*   HEMATOCRIT % 29.5* 29.8* 28.9* 28.8* 27.5* 27.0* 27.8*   PLATELETS 10*3/mm3 80* 82* 75* 81* 72* 71* 71*              Imaging Results (last 7 days)     Procedure Component Value Units Date/Time    US Guided Vascular Access [[id number]] Resulted:  02/27/17 0835     Updated:  02/27/17 0835    XR Chest PA & Lateral [[id number]] Collected:  03/03/17 1042     Updated:  03/03/17 1048    Narrative:       Patient Name:  LAMONT FISHER  Patient ID:  [id number]   Ordering:  ROGER REDDING  Attending:  ROGER REDDING  Referring:  ROGER REDDING  ------------------------------------------------      PROCEDURE: Chest PA and lateral    REASON FOR EXAM: edema, 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  Left PICC  line with tip overlying the SVC. . Cardiac and pulmonary  vasculature are normal . Lungs are clear. Pleural spaces are  normal . No acute osseous abnormality. Stable proximal right  humerus surgical neck comminuted fracture. Old right lateral  seventh eighth and ninth rib fractures. T12 old anterior  vertebral body wedge compression fractures with loss of anterior  vertebral body height by 30-40%.      Impression:       1.  Stable proximal right humerus surgical neck comminuted  fracture.  2.  Old right lateral seventh  eighth and ninth rib fractures. T12  old anterior vertebral body wedge compression fractures with loss  of anterior vertebral body height by 30-40%.  3.  No acute cardiopulmonary abnormality.    Electronically signed by:  Eduard Vigil MD  3/3/2017 10:46 AM [facility]  Workstation: [id number]                                   Medication Review:   Current Facility-Administered Medications   Medication Dose Route Frequency Provider Last Rate Last Dose   • amLODIPine (NORVASC) tablet 2.5 mg  2.5 mg Oral Daily PRN Shawn Cortez MD       • bacitracin injection    PRN Shawn Cortez MD   100,000 Units at 02/27/17 1244   • ceftazidime (FORTAZ) 1 g/100 mL 0.9% NS IVPB (mpb)  1 g Intravenous Q8H Shawn Vanegas MD   1 g at 03/05/17 0856   • furosemide (LASIX) tablet 40 mg  40 mg Oral BID Cj Aguero MD   40 mg at 03/05/17 0856   • gabapentin (NEURONTIN) capsule 100 mg  100 mg Oral TID Shawn Cortez MD   100 mg at 03/05/17 0856   • lactulose (CHRONULAC) 10 GM/15ML solution 20 g  20 g Oral Daily Cj Aguero MD   20 g at 02/27/17 1006   • lactulose (CHRONULAC) 10 GM/15ML solution 30 g  30 g Oral BID Shawn Cortez MD   30 g at 03/03/17 0845   • magic butt ointment   Topical TID Bel Celeste MD       • Magnesium Sulfate 4 GM/100ML infusion 4 g  4 g Intravenous PRN Cj Aguero MD   4 g at 03/01/17 0104    Or   • magnesium sulfate in D5W 1g/100mL (PREMIX) IVPB 1 g  1 g Intravenous PRN Cj Aguero MD        Or   • magnesium sulfate 8 g in dextrose (D5W) 5 % 250 mL infusion  8 g Intravenous PRN Cj Aguero MD        Or   • magnesium sulfate 6 g in dextrose (D5W) 5 % 250 mL infusion  6 g Intravenous PRN Cj Aguero MD       • mepivacaine PF (CARBOCAINE) 2 % injection    PRN Shawn Cortez MD   20 mL at 02/27/17 1248   • multivitamin with minerals tablet 1 tablet  1 tablet Oral Daily Shawn Cortez MD   1 tablet at 03/05/17 0808   • nystatin  (MYCOSTATIN) powder   Topical Q12H Bel Celeste MD       • oxyCODONE-acetaminophen (PERCOCET)  MG per tablet 1 tablet  1 tablet Oral Q4H PRN Isaura Schmitt MD   1 tablet at 03/04/17 1745   • oxyCODONE-acetaminophen (PERCOCET)  MG per tablet 1 tablet  1 tablet Oral Q4H PRN Shawn Cortez MD   1 tablet at 03/05/17 1008   • pantoprazole (PROTONIX) EC tablet 40 mg  40 mg Oral Q AM Isaura Schmitt MD   40 mg at 03/05/17 0610   • Pharmacy to dose vancomycin   Does not apply Continuous PRN Bel Celeste MD       • potassium chloride (K-DUR,KLOR-CON) CR tablet 40 mEq  40 mEq Oral BID With Meals Cj Aguero MD   40 mEq at 03/05/17 0856   • potassium chloride 10 mEq in 100 mL IVPB  10 mEq Intravenous Q1H PRN Bel Celeste MD   Stopped at 02/28/17 1514   • rifaximin (XIFAXAN) tablet 550 mg  550 mg Oral BID Bel Celeste MD   550 mg at 03/05/17 0856   • sodium chloride (BACTERIOSTATIC) injection    PRN Shawn Cortez MD   27 mL at 02/27/17 1237   • sodium chloride 0.9 % infusion  25 mL/hr Intravenous Continuous Cj Aguero MD 25 mL/hr at 02/26/17 1040 25 mL/hr at 02/26/17 1040   • vancomycin (VANCOCIN) 1,500 mg in sodium chloride 0.9 % 500 mL IVPB  1,500 mg Intravenous Q24H Cj Aguero MD   1,500 mg at 03/04/17 1406   • vancomycin (VANCOCIN) 50 mg/ml oral solution 250 mg  250 mg Oral Q6H Bel Celeste MD   250 mg at 03/05/17 0610   • vitamin D (ERGOCALCIFEROL) capsule 50,000 Units  50,000 Units Oral Q7 Days Shawn Cortez MD   50,000 Units at 03/01/17 0914         Assessment&#47;Plan     Active Problems:    Chronic hepatitis    Hepatic cirrhosis    Essential hypertension    Closed fracture of proximal end of right humerus with delayed healing    Closed nondisplaced supracondylar fracture of distal end of femur without intracondylar extension with delayed healing    Chronic osteomyelitis of right shoulder region    Acute  cystitis without hematuria    Hypokalemia          CAT scan reviewed.  -We'll continue IV antibiotics and present management.  -Patient is awaiting placement at this point.  -DVT and GI prophylaxis in place.      Isaura Schmitt MD  03/05/17  11:39 AM    EMR Dragon/Transcription disclaimer:   Much of this encounter note is an electronic transcription/translation of spoken language to printed text. The electronic translation of spoken language may permit erroneous, or at times, nonsensical words or phrases to be inadvertently transcribed; Although I have reviewed the note for such errors, some may still exist.               Electronically signed by Isaura Schmitt MD at 3/5/2017  1:47 PM

## 2017-03-06 NOTE — PROGRESS NOTES
Continued Stay Note  [facility]     Patient Name: Armando Mcmullen  MRN: [id number]  Today's Date: 3/6/2017    Admit Date: 2/20/2017          Discharge Plan       03/06/17 1004    Case Management/Social Work Plan    Plan SNF    Patient/Family In Agreement With Plan yes    Additional Comments CM spoke with pt at bedside this date to inform him that [facility] has denied per Fanny. Pt reports he does not want a referral to be sent to [facility] or [facility] but agrees to a referral to be sent to [facility]. CM explained that [facility] may not have bed availability and whether pt had alternate choices. Cm provided pt with list of all SNF in the surrounding areas. CM faxed referral to [facility] this date and will f/u re: referral later this date.               Discharge Codes     None        Expected Discharge Date and Time     Expected Discharge Date Expected Discharge Time    Mar 3, 2017             COLLIN Vasquez

## 2017-03-06 NOTE — PROGRESS NOTES
[facility] progress note    HPI:  Patient continues to do well at this time.  He states that he is having significant edema in his body this is most likely secondary to anasarca and his chronic  Liver failure.  Will increase this patient's Lasix at this time.  He otherwise states no new problems complaints or concerns  The following portions of the patient's history were reviewed and updated as appropriate: allergies, current medications, past family history, past medical history, past social history, past surgical history and problem list.    Review Of Systems:  Review of Systems   Constitution: Positive for weakness. Negative for decreased appetite, fever and malaise/fatigue.   HENT: Negative for congestion, ear discharge, ear pain, headaches, hearing loss, hoarse voice, nosebleeds and sore throat.    Eyes: Negative for blurred vision, discharge, double vision, pain, photophobia, visual disturbance and visual halos.   Cardiovascular: Negative for chest pain, claudication, dyspnea on exertion, leg swelling, near-syncope, palpitations and paroxysmal nocturnal dyspnea.   Respiratory: Negative for cough, hemoptysis, shortness of breath, snoring, sputum production and wheezing.    Endocrine: Negative for cold intolerance, heat intolerance, polydipsia, polyphagia and polyuria.   Skin: Negative for color change, flushing, itching, nail changes and rash.   Musculoskeletal: Positive for arthritis, back pain, joint pain, muscle weakness and stiffness. Negative for joint swelling, muscle cramps and neck pain.   Gastrointestinal: Negative for bloating, abdominal pain, anorexia, change in bowel habit, bowel incontinence, constipation, diarrhea, dysphagia, heartburn, hematochezia, nausea and vomiting.   Genitourinary: Negative for flank pain, frequency, hematuria, hesitancy, nocturia and urgency.   Neurological: Negative for dizziness, focal weakness, loss of balance, numbness, paresthesias and sensory change.    Psychiatric/Behavioral: Negative for altered mental status, depression, memory loss, substance abuse and thoughts of violence. The patient does not have insomnia and is not nervous/anxious.    Allergic/Immunologic: Negative for environmental allergies, HIV exposure, hives and persistent infections.       LABS:   Recent Results (from the past 24 hour(s))   Comprehensive Metabolic Panel    Collection Time: 03/06/17  6:29 AM   Result Value Ref Range    Glucose 104 (H) 60 - 100 mg/dL    BUN 11 7 - 21 mg/dL    Creatinine 1.46 (H) 0.70 - 1.30 mg/dL    Sodium 134 (L) 137 - 145 mmol/L    Potassium 3.5 3.5 - 5.1 mmol/L    Chloride 99 95 - 110 mmol/L    CO2 31.0 22.0 - 31.0 mmol/L    Calcium 8.2 (L) 8.4 - 10.2 mg/dL    Total Protein 5.3 (L) 6.3 - 8.6 g/dL    Albumin 2.10 (L) 3.40 - 4.80 g/dL    ALT (SGPT) 24 21 - 72 U/L    AST (SGOT) 38 17 - 59 U/L    Alkaline Phosphatase 98 38 - 126 U/L    Total Bilirubin 1.8 (H) 0.2 - 1.3 mg/dL    eGFR Non African Amer 51 (L) >60 mL/min/1.73    Globulin 3.2 2.3 - 3.5 gm/dL    A/G Ratio 0.7 (L) 1.1 - 1.8 g/dL    BUN/Creatinine Ratio 7.5 7.0 - 25.0    Anion Gap 4.0 (L) 5.0 - 15.0 mmol/L   Magnesium    Collection Time: 03/06/17  6:29 AM   Result Value Ref Range    Magnesium 1.8 1.6 - 2.3 mg/dL   CBC Auto Differential    Collection Time: 03/06/17  6:29 AM   Result Value Ref Range    WBC 6.52 3.20 - 9.80 10*3/mm3    RBC 2.73 (L) 4.37 - 5.74 10*6/mm3    Hemoglobin 9.5 (L) 13.7 - 17.3 g/dL    Hematocrit 28.5 (L) 39.0 - 49.0 %    .4 (H) 80.0 - 98.0 fL    MCH 34.8 (H) 26.5 - 34.0 pg    MCHC 33.3 31.5 - 36.3 g/dL    RDW 17.1 (H) 11.5 - 14.5 %    RDW-SD 65.3 (H) 35.1 - 43.9 fl    MPV 8.8 8.0 - 12.0 fL    Platelets 73 (L) 150 - 450 10*3/mm3    Neutrophil % 35.5 (L) 37.0 - 80.0 %    Lymphocyte % 37.0 10.0 - 50.0 %    Monocyte % 10.3 0.0 - 12.0 %    Eosinophil % 13.5 (H) 0.0 - 7.0 %    Basophil % 3.4 (H) 0.0 - 2.0 %    Immature Grans % 0.3 0.0 - 0.5 %    Neutrophils, Absolute 2.32 2.00 - 8.60  10*3/mm3    Lymphocytes, Absolute 2.41 0.60 - 4.20 10*3/mm3    Monocytes, Absolute 0.67 0.00 - 0.90 10*3/mm3    Eosinophils, Absolute 0.88 (H) 0.00 - 0.70 10*3/mm3    Basophils, Absolute 0.22 (H) 0.00 - 0.20 10*3/mm3    Immature Grans, Absolute 0.02 0.00 - 0.02 10*3/mm3    nRBC 0.0 0.0 - 0.0 /100 WBC   ]  Physical Exam:  Vitals  Vitals:    03/06/17 0927   BP:    Pulse: 80   Resp:    Temp:    SpO2:        Exam:  Physical Exam   Constitutional: He is oriented to person, place, and time. He appears well-developed and well-nourished. No distress.   HENT:   Head: Normocephalic and atraumatic.   Right Ear: External ear normal.   Left Ear: External ear normal.   Nose: Nose normal.   Mouth/Throat: Oropharynx is clear and moist.   Eyes: Conjunctivae and EOM are normal. Pupils are equal, round, and reactive to light. Right eye exhibits no discharge. Left eye exhibits no discharge. No scleral icterus.   Neck: No JVD present. No thyromegaly present.   Cardiovascular: Normal rate, regular rhythm, normal heart sounds and intact distal pulses.  Exam reveals no gallop and no friction rub.    No murmur heard.  Pulmonary/Chest: Effort normal. No stridor. No respiratory distress. He has no wheezes. He has no rales.   Abdominal: Soft. Bowel sounds are normal. He exhibits no distension and no mass. There is no tenderness. No hernia.   Musculoskeletal: Normal range of motion. He exhibits no edema, tenderness or deformity.   Lymphadenopathy:     He has no cervical adenopathy.   Neurological: He is alert and oriented to person, place, and time. He has normal reflexes. He displays normal reflexes. No cranial nerve deficit. He exhibits normal muscle tone. Coordination normal.   Skin: Skin is warm and dry. No rash noted. He is not diaphoretic. No erythema.    Open wound on right arm   Psychiatric: He has a normal mood and affect. His behavior is normal. Judgment and thought content normal.       Assessment:  Active Hospital Problems (**  Indicates Principal Problem)    Diagnosis Date Noted   • Hypokalemia [E87.6] 03/02/2017   • Acute cystitis without hematuria [N30.00] 02/28/2017   • Chronic osteomyelitis of right shoulder region [M86.611] 02/24/2017   • Closed fracture of proximal end of right humerus with delayed healing [S42.201G] 01/20/2017   • Closed nondisplaced supracondylar fracture of distal end of femur without intracondylar extension with delayed healing [S72.456G] 01/20/2017   • Chronic hepatitis [K73.9] 06/18/2009   • Hepatic cirrhosis [K74.60] 05/26/2009   • Essential hypertension [I10] 12/01/2008      Resolved Hospital Problems    Diagnosis Date Noted Date Resolved   • Hyperammonemia [E72.20] 02/20/2017 02/24/2017       Plan:   Patient still awaiting case management for placement in a facility for continued antibiotics

## 2017-03-06 NOTE — PLAN OF CARE
Problem: Fall Risk (Adult)  Goal: Identify Related Risk Factors and Signs and Symptoms  Outcome: Ongoing (interventions implemented as appropriate)    02/21/17 1830   Fall Risk   Fall Risk: Related Risk Factors gait/mobility problems;history of falls   Fall Risk: Signs and Symptoms presence of risk factors         Problem: Pressure Ulcer (Adult)  Goal: Signs and Symptoms of Listed Potential Problems Will be Absent or Manageable (Pressure Ulcer)  Outcome: Ongoing (interventions implemented as appropriate)    03/06/17 0537   Pressure Ulcer   Problems Assessed (Pressure Ulcer) all   Problems Present (Pressure Ulcer) pain

## 2017-03-06 NOTE — PROGRESS NOTES
Acute Care - Physical Therapy Treatment Note  [facility]     Patient Name: Armando Mcmullen  : 1965  MRN: [id number]  Today's Date: 3/6/2017  Onset of Illness/Injury or Date of Surgery Date: 17 (s/p I&D R shoulder on 17)  Date of Referral to PT: 17  Referring Physician: Dr. Aguero    Admit Date: 2017    Visit Dx:    ICD-10-CM ICD-9-CM   1. Hyperammonemia E72.20 270.6   2. Urinary tract infection, site unspecified N39.0    3. Functional diarrhea K59.1 564.5   4. Impaired functional mobility, balance, and endurance Z74.09 V49.89   5. Impaired gait and mobility R26.89 781.2     Patient Active Problem List   Diagnosis   • Anxiety state   • Back pain   • Furuncle of buttock   • Chronic hepatitis   • Hepatic cirrhosis   • Depression   • Hypersplenism   • Insomnia   • Mononeuritis   • Osteoarthritis   • Essential hypertension   • Closed fracture of proximal end of right humerus with delayed healing   • Closed nondisplaced supracondylar fracture of distal end of femur without intracondylar extension with delayed healing   • History of substance abuse   • Chronic osteomyelitis of right shoulder region   • Acute cystitis without hematuria   • Hypokalemia               Adult Rehabilitation Note       17 0838 17 1001 17 1403    Rehab Assessment/Intervention    Discipline physical therapist  -LM physical therapy assistant  -JW physical therapy assistant  -LN    Document Type therapy note (daily note)  -LM therapy note (daily note)  -JW therapy note (daily note)  -LN    Subjective Information agree to therapy;complains of;pain  -LM agree to therapy;complains of;pain  -JW agree to therapy;complains of;pain  -LN    Patient Effort, Rehab Treatment good  -LM  good   declined w/c mobs and oob this rx  -LN    Symptoms Noted During/After Treatment none  -LM      Precautions/Limitations fall precautions;other (see comments)   C-Diff  -LM fall precautions;other (see comments)   c-diff   -JW fall precautions   contact,c-diff  -LN    Recorded by [LM] Stephanie Vergara, PT [JW] Casie Emmanuel, PTA [LN] Stephanie Du PTA    Vital Signs    Pre Systolic BP Rehab 137  -LM  120  -LN    Pre Treatment Diastolic BP 83  -LM  64  -LN    Post Systolic BP Rehab 139  -LM  130  -LN    Post Treatment Diastolic BP 85  -LM  60  -LN    Pretreatment Heart Rate (beats/min) 74  -LM  78  -LN    Posttreatment Heart Rate (beats/min) 75  -LM  83  -LN    Pre SpO2 (%) 95  -LM  98  -LN    O2 Delivery Pre Treatment room air  -LM      Post SpO2 (%) 96  -LM  96  -LN    O2 Delivery Post Treatment room air  -LM      Pre Patient Position Supine  -LM  Supine  -LN    Intra Patient Position   Sitting  -LN    Post Patient Position Sitting  -LM  Supine  -LN    Recorded by [LM] Stephanie Vergara, PT  [LN] Stephanie Du PTA    Pain Assessment    Pain Assessment 0-10  -LM 0-10  -JW 0-10  -LN    Pain Score 5  -LM 9  -JW 7  -LN    Post Pain Score 6  -LM 9  -JW 7  -LN    Pain Type Surgical pain  -LM Surgical pain  -JW Acute pain  -LN    Pain Location Shoulder  -LM Shoulder  -JW Shoulder  -LN    Pain Orientation Right  -LM Right  -JW Right  -LN    Pain Intervention(s)  Medication (See MAR);Repositioned  -JW     Recorded by [LM] Stephanie Vergara, PT [JW] Casie Emmanuel, PTA [LN] Stephanie Du, PTA    Cognitive Assessment/Intervention    Current Cognitive/Communication Assessment functional  -LM functional  -JW     Orientation Status oriented x 4  -LM oriented x 4  -JW oriented to;person     -LN    Follows Commands/Answers Questions 100% of the time  -% of the time  -JW     Personal Safety WNL/WFL  -LM      Recorded by [LM] Stephanie Vergara, PT [JW] Casie Emmanuel, PTA [LN] Stephanie Du, PTA    Bed Mobility, Assessment/Treatment    Bed Mobility, Assistive Device bed rails;head of bed elevated  -LM bed rails;head of bed elevated  -JW head of bed elevated;bed rails  -LN    Bed Mob, Supine to Sit, [place] conditional independence  -LM  conditional independence  -JW conditional independence  -LN    Bed Mob, Sit to Supine, [place] not tested  -LM conditional independence  -JW conditional independence  -LN    Recorded by [LM] Stephanie Vergara, PT [JW] Casie Emmanuel, PTA [LN] Stephanie Du, JULITA    Transfer Assessment/Treatment    Transfers, Bed-Chair [place] stand by assist   Sit Pivot to recliner  -LM      Transfers, Bed-Chair-Bed, Assist Device --   None  -LM      Transfer, Comment  pt defers t/fers secondary to increased pain  -JW     Recorded by [LM] Stephanie Vergara, PT [JW] Casie Emmanuel, PTA     Gait Assessment/Treatment    Gait, Comment Pt defers ambulation - states he hasn't walked since last Thanksgiving  -LM  --   deferred no prosthesis  -LN    Recorded by [LM] Stephanie Vergara, PT  [LN] Stephanie Du, JULITA    Therapy Exercises    Right Lower Extremity AROM:;30 reps;supine;ankle pumps/circles  -LM AROM:;20 reps;sitting;ankle pumps/circles  -JW     Bilateral Lower Extremities AROM:;supine;20 reps;SLR;hip abduction/adduction;heel slides;sitting;LAQ  -LM AROM:;20 reps;sitting;hip flexion;LAQ   add squeeze w/ pillow  -JW AROM:;20 reps;sitting;hip abduction/adduction;hip flexion;LAQ   with 2# on right;ankle pumps r le  -LN    Recorded by [LM] Stephanie Vergara, PT [JW] Casie Emmanuel, PTA [LN] Stephanie Du, PTA    Positioning and Restraints    Pre-Treatment Position in bed  -LM in bed  -JW     Post Treatment Position chair  -LM bed  -JW chair  -LN    In Bed  supine;call light within reach;encouraged to call for assist;exit alarm on  -JW supine;call light within reach;encouraged to call for assist  -LN    In Chair notified nsg;sitting;call light within reach;encouraged to call for assist  -LM      Recorded by [LM] Stephanie Vergara, PT [JW] Casie Emmanuel, PTA [LN] Stephanie Du PTA      User Key  (r) = Recorded By, (t) = Taken By, (c) = Cosigned By    Initials Name Effective Dates     Casie Emmanuel PTA 08/11/15 -      Stephanie  Jai, PT 06/15/16 -     LN Stephanie Du, PTA 10/17/16 -                 IP PT Goals       03/06/17 0838 03/05/17 1001 03/03/17 1437    Transfer Training PT LTG    Transfer Training PT  LTG, Date Goal Reviewed 03/06/17  -LM 03/05/17  -JW 03/03/17  -LN    Transfer Training PT LTG, Outcome goal ongoing  -LM goal not met  -JW goal not met  -LN    Transfer Training PT LTG, Reason Goal Not Met   progress slower than expected  -LN    Gait Training PT STG    Gait Training Goal PT STG, Date Goal Reviewed 03/06/17  -LM 03/05/17  -JW     Gait Training Goal PT STG, Outcome goal not met  -LM goal not met  -JW     Gait Training PT LTG    Gait Training Goal PT LTG, Date Goal Reviewed 03/06/17  -LM 03/05/17  -JW     Gait Training Goal PT LTG, Outcome goal not met  -LM goal not met  -JW     Wheelchair Propulsion PT LTG    Wheelchair Propulsion Goal PT LTG, Date Goal Reviewed 03/06/17  -LM 03/05/17  -JW     Wheelchair Propulsion Goal PT LTG, Outcome goal ongoing  -LM goal not met  -JW       03/03/17 1403 03/02/17 1538 03/02/17 1435    Transfer Training PT LTG    Transfer Training PT  LTG, Date Goal Reviewed  03/02/17  -LN     Transfer Training PT LTG, Outcome  goal not met  -LN     Transfer Training PT LTG, Reason Goal Not Met  progress slower than expected  -LN     Gait Training PT STG    Gait Training Goal PT STG, Date Goal Reviewed 03/03/17  -LN  03/02/17  -LN    Gait Training Goal PT STG, Outcome goal not met  -LN  goal not met  -LN    Gait Training Goal PT STG, Reason Goal Not Met progress slower than expected  -LN  progress slower than expected  -LN    Gait Training PT LTG    Gait Training Goal PT LTG, Date Goal Reviewed 03/03/17  -LN  03/02/17  -LN    Gait Training Goal PT LTG, Outcome goal not met  -LN  goal not met  -LN    Gait Training Goal PT LTG, Reason Goal Not Met progress slower than expected  -LN  progress slower than expected  -LN    Wheelchair Propulsion PT LTG    Wheelchair Propulsion Goal PT LTG, Date Goal  Reviewed 03/03/17  -LN  03/02/17  -LN    Wheelchair Propulsion Goal PT LTG, Outcome goal not met  -LN  goal not met  -LN    Wheelchair Propulsion Goal PT LTG, Reason Goal Not Met progress slower than expected  -LN  progress slower than expected  -LN      03/01/17 1330          Transfer Training PT LTG    Transfer Training PT LTG, Date Established 03/01/17  -KG      Transfer Training PT LTG, Time to Achieve by discharge  -KG      Transfer Training PT LTG, Activity Type bed to chair /chair to bed;sit to stand/stand to sit;toilet  -KG      Transfer Training PT LTG, [place] conditional independence  -KG      Transfer Training PT LTG, Assist Device --   AAD  -KG      Gait Training PT STG    Gait Training Goal PT STG, Date Established 03/01/17  -KG      Gait Training Goal PT STG, Time to Achieve 3 days  -KG      Gait Training Goal PT STG, [place] moderate assist (50% patient effort);minimum assist (75% patient effort)  -KG      Gait Training Goal PT STG, Assist Device --   AAD  -KG      Gait Training Goal PT STG, Distance to Achieve 5 feet  -KG      Gait Training PT LTG    Gait Training Goal PT LTG, Date Established 03/01/17  -KG      Gait Training Goal PT LTG, Time to Achieve by discharge  -KG      Gait Training Goal PT LTG, [place] contact guard assist  -KG      Gait Training Goal PT LTG, Assist Device --   AAD  -KG      Gait Training Goal PT LTG, Distance to Achieve 20 feet  -KG      Wheelchair Propulsion PT LTG    Wheelchair Propulsion Goal PT LTG, Date Established 03/01/17  -KG      Wheelchair Propulsion Goal PT LTG, Time to Achieve by discharge  -KG      Wheelchair Propulsion Goal PT LTG, [place] conditional independence  -KG      Wheelchair Propulsion Goal PT LTG, Distance to Achieve 150 feet  -KG        User Key  (r) = Recorded By, (t) = Taken By, (c) = Cosigned By    Initials Name Provider Type    AVELINA Emmanuel, PTA Physical Therapy Assistant    OBED Brown  Jai, PT Physical Therapist    KG Ailyn Nava, PT Physical Therapist    LN Stephanie Du, PTA Physical Therapy Assistant          Physical Therapy Education     Title: PT OT SLP Therapies (Active)     Topic: Physical Therapy (Active)     Point: Mobility training (Done)    Learning Progress Summary    Learner Readiness Method Response Comment Documented by Status   Patient Acceptance E VU Pt educated on safety with stand pivot transfers and able to complete safely. LM 03/06/17 1145 Done    Acceptance E NR  LN 03/03/17 1437 Active    Acceptance E NR  LN 03/02/17 1537 Active    Acceptance E NR  KG 03/01/17 1330 Active               Point: Home exercise program (Active)    Learning Progress Summary    Learner Readiness Method Response Comment Documented by Status   Patient Acceptance E NR  LN 03/03/17 1437 Active    Acceptance E NR  LN 03/02/17 1537 Active    Acceptance E,D,TB NR LAQ, heels slides, ankle pumps KG 03/01/17 1330 Active               Point: Precautions (Done)    Learning Progress Summary    Learner Readiness Method Response Comment Documented by Status   Patient Acceptance E VU Pt educated on safety with stand pivot transfers and able to complete safely. LM 03/06/17 1145 Done    Acceptance E NR  LN 03/03/17 1437 Active    Acceptance E NR  LN 03/02/17 1537 Active                      User Key     Initials Effective Dates Name Provider Type Discipline     06/15/16 -  Stephanie Vergara, PT Physical Therapist PT     10/17/16 -  Ailyn Nava, PT Physical Therapist PT     10/17/16 -  Stephanie Du, JULITA Physical Therapy Assistant PT                    PT Recommendation and Plan  Anticipated Discharge Disposition: skilled nursing facility  Planned Therapy Interventions: bed mobility training, gait training, home exercise program, strengthening, transfer training  PT Frequency: other (see comments) (5-14 times/wk)  Plan of Care Review  Plan Of Care Reviewed With: patient  Outcome Summary/Follow up Plan:  Pt completed bed ther ex well but had increase in pain in B knees.  Pt then sat EOB independently and completed sit pivot t/f to recliner with SBA.  Pt continues to be appropriate for skilled PT to improve mobility and safety prior to d/c.          Outcome Measures       03/06/17 0838 03/05/17 1001 03/03/17 1403    How much help from another person do you currently need...    Turning from your back to your side while in flat bed without using bedrails? 4  -LM 4  -JW 4  -LN    Moving from lying on back to sitting on the side of a flat bed without bedrails? 4  -LM 4  -JW 4  -LN    Moving to and from a bed to a chair (including a wheelchair)? 3  -LM 3  -JW 3  -LN    Standing up from a chair using your arms (e.g., wheelchair, bedside chair)? 3  -LM 2  -JW 2  -LN    Climbing 3-5 steps with a railing? 1  -LM 1  -JW 1  -LN    To walk in hospital room? 1  -LM 2  -JW 2  -LN    AM-PAC 6 Clicks Score 16  -LM 16  -JW 16  -LN    Functional Assessment    Outcome Measure Options AM-PAC 6 Clicks Basic Mobility (PT)  -LM AM-PAC 6 Clicks Basic Mobility (PT)  -JW AM-PAC 6 Clicks Basic Mobility (PT)  -LN      User Key  (r) = Recorded By, (t) = Taken By, (c) = Cosigned By    Initials Name Provider Type     Casie Emmanuel, PTA Physical Therapy Assistant    OBED Vergara PT Physical Therapist    DYLAN Du PTA Physical Therapy Assistant           Time Calculation:         PT Charges       03/06/17 0838          Time Calculation    Start Time 0838  -LM      Stop Time 0906  -LM      Time Calculation (min) 28 min  -LM      PT Received On 03/06/17  -LM      Time Calculation- PT    Total Timed Code Minutes- PT 28 minute(s)  -LM        User Key  (r) = Recorded By, (t) = Taken By, (c) = Cosigned By    Initials Name Provider Type    OBED Vergara PT Physical Therapist          Therapy Charges for Today     Code Description Service Date Service Provider Modifiers Qty    41252645584 HC PT THER PROC EA 15 MIN 3/6/2017 Stephanie  Jai, PT GP 1    [id number]  PT THERAPEUTIC ACT EA 15 MIN 3/6/2017 Stephanie Vergara, PT GP 1          PT G-Codes  PT Professional Judgement Used?: Yes  Outcome Measure Options: AM-PAC 6 Clicks Basic Mobility (PT)  Score: 17  Functional Limitation: Mobility: Walking and moving around  Mobility: Walking and Moving Around Current Status (): At least 40 percent but less than 60 percent impaired, limited or restricted  Mobility: Walking and Moving Around Goal Status (): At least 1 percent but less than 20 percent impaired, limited or restricted    Stephanie Vergara, PT  3/6/2017

## 2017-03-06 NOTE — PLAN OF CARE
Problem: Patient Care Overview (Adult)  Goal: Plan of Care Review  Outcome: Ongoing (interventions implemented as appropriate)    03/06/17 0838   Outcome Evaluation   Outcome Summary/Follow up Plan Pt completed bed ther ex well but had increase in pain in B knees. Pt then sat EOB independently and completed sit pivot t/f to recliner with SBA. Pt continues to be appropriate for skilled PT to improve mobility and safety prior to d/c.   Coping/Psychosocial Response Interventions   Plan Of Care Reviewed With patient         Problem: Inpatient Physical Therapy  Goal: Transfer Training Goal 1 LTG- PT  Outcome: Ongoing (interventions implemented as appropriate)    03/06/17 0838   Transfer Training PT LTG   Transfer Training PT LTG, Date Goal Reviewed 03/06/17   Transfer Training PT LTG, Outcome goal ongoing       Goal: Gait Training Goal STG- PT  Outcome: Ongoing (interventions implemented as appropriate)    03/06/17 0838   Gait Training PT STG   Gait Training Goal PT STG, Date Goal Reviewed 03/06/17   Gait Training Goal PT STG, Outcome goal not met       Goal: Gait Training Goal LTG- PT  Outcome: Ongoing (interventions implemented as appropriate)    03/06/17 0838   Gait Training PT LTG   Gait Training Goal PT LTG, Date Goal Reviewed 03/06/17   Gait Training Goal PT LTG, Outcome goal not met       Goal: Wheelchair Propulsion Goal LTG- PT  Outcome: Ongoing (interventions implemented as appropriate)    03/06/17 0838   Wheelchair Propulsion PT LTG   Wheelchair Propulsion Goal PT LTG, Date Goal Reviewed 03/06/17   Wheelchair Propulsion Goal PT LTG, Outcome goal ongoing

## 2017-03-07 LAB
ALBUMIN SERPL-MCNC: 2 G/DL (ref 3.4–4.8)
ALBUMIN/GLOB SERPL: 0.6 G/DL (ref 1.1–1.8)
ALP SERPL-CCNC: 109 U/L (ref 38–126)
ALT SERPL W P-5'-P-CCNC: 15 U/L (ref 21–72)
ANION GAP SERPL CALCULATED.3IONS-SCNC: 2 MMOL/L (ref 5–15)
AST SERPL-CCNC: 44 U/L (ref 17–59)
BASOPHILS # BLD AUTO: 0.19 10*3/MM3 (ref 0–0.2)
BASOPHILS NFR BLD AUTO: 2.9 % (ref 0–2)
BILIRUB SERPL-MCNC: 1.7 MG/DL (ref 0.2–1.3)
BUN BLD-MCNC: 11 MG/DL (ref 7–21)
BUN/CREAT SERPL: 7.2 (ref 7–25)
CALCIUM SPEC-SCNC: 8.3 MG/DL (ref 8.4–10.2)
CHLORIDE SERPL-SCNC: 99 MMOL/L (ref 95–110)
CO2 SERPL-SCNC: 33 MMOL/L (ref 22–31)
CREAT BLD-MCNC: 1.52 MG/DL (ref 0.7–1.3)
DEPRECATED RDW RBC AUTO: 62 FL (ref 35.1–43.9)
EOSINOPHIL # BLD AUTO: 0.94 10*3/MM3 (ref 0–0.7)
EOSINOPHIL NFR BLD AUTO: 14.6 % (ref 0–7)
ERYTHROCYTE [DISTWIDTH] IN BLOOD BY AUTOMATED COUNT: 16.4 % (ref 11.5–14.5)
GFR SERPL CREATININE-BSD FRML MDRD: 48 ML/MIN/1.73 (ref 60–130)
GLOBULIN UR ELPH-MCNC: 3.3 GM/DL (ref 2.3–3.5)
GLUCOSE BLD-MCNC: 89 MG/DL (ref 60–100)
HCT VFR BLD AUTO: 28.1 % (ref 39–49)
HGB BLD-MCNC: 9.6 G/DL (ref 13.7–17.3)
IMM GRANULOCYTES # BLD: 0.01 10*3/MM3 (ref 0–0.02)
IMM GRANULOCYTES NFR BLD: 0.2 % (ref 0–0.5)
LYMPHOCYTES # BLD AUTO: 2.27 10*3/MM3 (ref 0.6–4.2)
LYMPHOCYTES NFR BLD AUTO: 35.2 % (ref 10–50)
MAGNESIUM SERPL-MCNC: 1.9 MG/DL (ref 1.6–2.3)
MCH RBC QN AUTO: 35.4 PG (ref 26.5–34)
MCHC RBC AUTO-ENTMCNC: 34.2 G/DL (ref 31.5–36.3)
MCV RBC AUTO: 103.7 FL (ref 80–98)
MONOCYTES # BLD AUTO: 0.69 10*3/MM3 (ref 0–0.9)
MONOCYTES NFR BLD AUTO: 10.7 % (ref 0–12)
NEUTROPHILS # BLD AUTO: 2.35 10*3/MM3 (ref 2–8.6)
NEUTROPHILS NFR BLD AUTO: 36.4 % (ref 37–80)
PLATELET # BLD AUTO: 69 10*3/MM3 (ref 150–450)
PMV BLD AUTO: 10.2 FL (ref 8–12)
POTASSIUM BLD-SCNC: 3.4 MMOL/L (ref 3.5–5.1)
PROT SERPL-MCNC: 5.3 G/DL (ref 6.3–8.6)
RBC # BLD AUTO: 2.71 10*6/MM3 (ref 4.37–5.74)
SODIUM BLD-SCNC: 134 MMOL/L (ref 137–145)
VANCOMYCIN TROUGH SERPL-MCNC: 22.92 MCG/ML (ref 10–15)
WBC NRBC COR # BLD: 6.45 10*3/MM3 (ref 3.2–9.8)

## 2017-03-07 PROCEDURE — 83735 ASSAY OF MAGNESIUM: CPT | Performed by: INTERNAL MEDICINE

## 2017-03-07 PROCEDURE — 97110 THERAPEUTIC EXERCISES: CPT

## 2017-03-07 PROCEDURE — 85025 COMPLETE CBC W/AUTO DIFF WBC: CPT | Performed by: INTERNAL MEDICINE

## 2017-03-07 PROCEDURE — 97530 THERAPEUTIC ACTIVITIES: CPT

## 2017-03-07 PROCEDURE — 80053 COMPREHEN METABOLIC PANEL: CPT | Performed by: INTERNAL MEDICINE

## 2017-03-07 PROCEDURE — 25010000002 VANCOMYCIN PER 500 MG: Performed by: FAMILY MEDICINE

## 2017-03-07 PROCEDURE — 80202 ASSAY OF VANCOMYCIN: CPT | Performed by: FAMILY MEDICINE

## 2017-03-07 RX ORDER — ZOLPIDEM TARTRATE 5 MG/1
5 TABLET ORAL NIGHTLY PRN
Status: DISCONTINUED | OUTPATIENT
Start: 2017-03-07 | End: 2017-03-09 | Stop reason: HOSPADM

## 2017-03-07 RX ADMIN — Medication: at 09:13

## 2017-03-07 RX ADMIN — ZOLPIDEM TARTRATE 5 MG: 5 TABLET, FILM COATED ORAL at 23:31

## 2017-03-07 RX ADMIN — GABAPENTIN 100 MG: 100 CAPSULE ORAL at 09:11

## 2017-03-07 RX ADMIN — OXYCODONE HYDROCHLORIDE AND ACETAMINOPHEN 1 TABLET: 10; 325 TABLET ORAL at 11:17

## 2017-03-07 RX ADMIN — OXYCODONE HYDROCHLORIDE AND ACETAMINOPHEN 1 TABLET: 10; 325 TABLET ORAL at 05:57

## 2017-03-07 RX ADMIN — POTASSIUM CHLORIDE 40 MEQ: 20 TABLET, EXTENDED RELEASE ORAL at 09:11

## 2017-03-07 RX ADMIN — CEFTAZIDIME 1 G: 1 INJECTION, POWDER, FOR SOLUTION INTRAMUSCULAR; INTRAVENOUS at 18:31

## 2017-03-07 RX ADMIN — NYSTATIN: 100000 POWDER TOPICAL at 20:40

## 2017-03-07 RX ADMIN — Medication: at 20:40

## 2017-03-07 RX ADMIN — GABAPENTIN 100 MG: 100 CAPSULE ORAL at 20:40

## 2017-03-07 RX ADMIN — FUROSEMIDE 40 MG: 40 TABLET ORAL at 19:27

## 2017-03-07 RX ADMIN — VANCOMYCIN HYDROCHLORIDE 250 MG: 1 INJECTION, POWDER, LYOPHILIZED, FOR SOLUTION INTRAVENOUS at 18:31

## 2017-03-07 RX ADMIN — VANCOMYCIN HYDROCHLORIDE 250 MG: 1 INJECTION, POWDER, LYOPHILIZED, FOR SOLUTION INTRAVENOUS at 11:17

## 2017-03-07 RX ADMIN — Medication 1 TABLET: at 09:12

## 2017-03-07 RX ADMIN — NYSTATIN: 100000 POWDER TOPICAL at 09:13

## 2017-03-07 RX ADMIN — GABAPENTIN 100 MG: 100 CAPSULE ORAL at 15:22

## 2017-03-07 RX ADMIN — OXYCODONE HYDROCHLORIDE AND ACETAMINOPHEN 1 TABLET: 10; 325 TABLET ORAL at 23:31

## 2017-03-07 RX ADMIN — Medication: at 15:22

## 2017-03-07 RX ADMIN — OXYCODONE HYDROCHLORIDE AND ACETAMINOPHEN 1 TABLET: 10; 325 TABLET ORAL at 15:22

## 2017-03-07 RX ADMIN — FUROSEMIDE 40 MG: 40 TABLET ORAL at 09:12

## 2017-03-07 RX ADMIN — VANCOMYCIN HYDROCHLORIDE 250 MG: 1 INJECTION, POWDER, LYOPHILIZED, FOR SOLUTION INTRAVENOUS at 00:57

## 2017-03-07 RX ADMIN — VANCOMYCIN HYDROCHLORIDE 250 MG: 1 INJECTION, POWDER, LYOPHILIZED, FOR SOLUTION INTRAVENOUS at 23:31

## 2017-03-07 RX ADMIN — CEFTAZIDIME 1 G: 1 INJECTION, POWDER, FOR SOLUTION INTRAMUSCULAR; INTRAVENOUS at 03:28

## 2017-03-07 RX ADMIN — SODIUM CHLORIDE 25 ML/HR: 900 INJECTION, SOLUTION INTRAVENOUS at 13:38

## 2017-03-07 RX ADMIN — RIFAXIMIN 550 MG: 550 TABLET ORAL at 18:31

## 2017-03-07 RX ADMIN — POTASSIUM CHLORIDE 40 MEQ: 20 TABLET, EXTENDED RELEASE ORAL at 18:31

## 2017-03-07 RX ADMIN — VANCOMYCIN HYDROCHLORIDE 250 MG: 1 INJECTION, POWDER, LYOPHILIZED, FOR SOLUTION INTRAVENOUS at 05:57

## 2017-03-07 RX ADMIN — RIFAXIMIN 550 MG: 550 TABLET ORAL at 09:12

## 2017-03-07 RX ADMIN — PANTOPRAZOLE SODIUM 40 MG: 40 TABLET, DELAYED RELEASE ORAL at 05:57

## 2017-03-07 RX ADMIN — CEFTAZIDIME 1 G: 1 INJECTION, POWDER, FOR SOLUTION INTRAMUSCULAR; INTRAVENOUS at 11:21

## 2017-03-07 RX ADMIN — OXYCODONE HYDROCHLORIDE AND ACETAMINOPHEN 1 TABLET: 10; 325 TABLET ORAL at 00:57

## 2017-03-07 RX ADMIN — OXYCODONE HYDROCHLORIDE AND ACETAMINOPHEN 1 TABLET: 10; 325 TABLET ORAL at 19:27

## 2017-03-07 NOTE — PLAN OF CARE
Problem: Patient Care Overview (Adult)  Goal: Plan of Care Review  Outcome: Ongoing (interventions implemented as appropriate)  Goal: Adult Individualization and Mutuality  Outcome: Ongoing (interventions implemented as appropriate)  Goal: Discharge Needs Assessment  Outcome: Ongoing (interventions implemented as appropriate)    Problem: Pressure Ulcer (Adult)  Goal: Signs and Symptoms of Listed Potential Problems Will be Absent or Manageable (Pressure Ulcer)  Outcome: Ongoing (interventions implemented as appropriate)

## 2017-03-07 NOTE — DISCHARGE PLACEMENT REQUEST
"Lamont Mcmullen (52 y.o. Male)     Date of Birth Social Security Number Address Home Phone MRN    1965  [id number]  [facility] [address]  [id number]    Rastafari Marital Status          Other        Admission Date Admission Type Admitting Provider Attending Provider Department, Room/Bed    2/20/17 Emergency Cj Aguero MD [place], Abraham Stuart, MD [address], [address]    Discharge Date Discharge Disposition Discharge Destination                      Attending Provider: Cj Aguero MD     Allergies:  Aspirin, Codeine Sulfate    Isolation:  Spore, Contact   Infection:  MRSA (02/23/17), C.difficile (02/21/17)   Code Status:  FULL    Ht:  72.01\" (182.9 cm)   Wt:  249 lb 4.8 oz (113 kg)    Admission Cmt:  None   Principal Problem:  None                Active Insurance as of 2/20/2017     Primary Coverage     Payor Plan Insurance Group Employer/Plan Group    [employer] [place] [employer] [employer] [place]      Payor Plan Address Payor Plan Phone Number Effective From Effective To    [address] [address]  1/1/2014     [place] [address]       Subscriber Name Subscriber Birth Date Member ID       LAMONT MCMULLEN 1965 [id number]                 Emergency Contacts      (Rel.) Home Phone Work Phone Mobile Phone    Loco Mcmullen (Daughter) -- -- [phone]            Insurance Information                [employer] [employer] [place] [employer] [place] [place] Phone:     Subscriber: Lamont Mcmullen Subscriber#: [id number]    Group#:  Precert#:           "

## 2017-03-07 NOTE — PAYOR COMM NOTE
"Lamont Mcmullen (52 y.o. Male)     Date of Birth Social Security Number Address Home Phone MRN    [id number]  [id number]  [facility] [address]  [id number]    Jehovah's witness Marital Status          Other        Admission Date Admission Type Admitting Provider Attending Provider Department, Room/Bed    2/20/17 Emergency Cj Aguero MD Galloway, Abraham Stuart, MD [address], 332/[address]    Discharge Date Discharge Disposition Discharge Destination                      Attending Provider: Cj Aguero MD     Allergies:  Aspirin, Codeine Sulfate    Isolation:  Spore, Contact   Infection:  MRSA (02/23/17), C.difficile (02/21/17)   Code Status:  FULL    Ht:  72.01\" (182.9 cm)   Wt:  249 lb 4.8 oz (113 kg)    Admission Cmt:  None   Principal Problem:  None                Active Insurance as of 2/20/2017     Primary Coverage     Payor Plan Insurance Group Employer/Plan Group    [facility]      Payor Plan Address Payor Plan Phone Number Effective From Effective To    [address] [address]  1/1/2014     [place] [address]       Subscriber Name Subscriber Birth Date Member ID       LAMONT MCMULLEN 1965 [id number]                 Emergency Contacts      (Rel.) Home Phone Work Phone Mobile Phone    Loco Mcmullen (Daughter) -- -- [phone]            [facility] Medications (active)       Dose Frequency Start End    amLODIPine (NORVASC) tablet 2.5 mg 2.5 mg Daily PRN 2/27/2017     Sig - Route: Take 1 tablet by mouth Daily As Needed (only takes if his blood pressure is high). - Oral    bacitracin injection  As Needed 2/27/2017     Sig: As Needed.    ceftazidime (FORTAZ) 1 g/100 mL 0.9% NS IVPB (mpb) 1 g Every 8 Hours 3/4/2017     Sig - Route: Infuse 100 mL into a venous catheter Every 8 (Eight) Hours. - Intravenous    furosemide (LASIX) tablet 40 mg 40 mg 2 Times Daily 3/1/2017     Sig - Route: Take 1 tablet by mouth 2 (Two) " "Times a Day. - Oral    gabapentin (NEURONTIN) capsule 100 mg 100 mg 3 Times Daily 2/27/2017     Sig - Route: Take 1 capsule by mouth 3 (Three) Times a Day. - Oral    lactulose (CHRONULAC) 10 GM/15ML solution 20 g 20 g Daily 2/24/2017     Sig - Route: Take 30 mL by mouth Daily. - Oral    lactulose (CHRONULAC) 10 GM/15ML solution 30 g 30 g 2 Times Daily 2/27/2017     Sig - Route: Take 45 mL by mouth 2 (Two) Times a Day. - Oral    magic butt ointment  3 Times Daily 2/21/2017     Sig - Route: Apply  topically 3 (Three) Times a Day. - Topical    Magnesium Sulfate 4 GM/100ML infusion 4 g 4 g As Needed 2/25/2017     Sig - Route: Infuse 100 mL into a venous catheter As Needed (magnesium sulfate 1 g in D5W 100 mL IVPB - Mg 1.6 - 1.9 mg/dL). - Intravenous    Linked Group 1:  \"Or\" Linked Group Details        magnesium sulfate 6 g in dextrose (D5W) 5 % 250 mL infusion 6 g As Needed 2/25/2017     Sig - Route: Infuse 6 g into a venous catheter As Needed (Mg 1.1-1.5 mg/dL). - Intravenous    Linked Group 1:  \"Or\" Linked Group Details        magnesium sulfate 8 g in dextrose (D5W) 5 % 250 mL infusion 8 g As Needed 2/25/2017     Sig - Route: Infuse 8 g into a venous catheter As Needed (Mg less than or equal to 1 mg/dL). - Intravenous    Linked Group 1:  \"Or\" Linked Group Details        magnesium sulfate in D5W 1g/100mL (PREMIX) IVPB 1 g 1 g As Needed 2/25/2017     Sig - Route: Infuse 100 mL into a venous catheter As Needed (Mg less than or equal 1 mg/dL). - Intravenous    Linked Group 1:  \"Or\" Linked Group Details        mepivacaine PF (CARBOCAINE) 2 % injection  As Needed 2/27/2017     Sig: As Needed.    multivitamin with minerals tablet 1 tablet 1 tablet Daily 2/27/2017     Sig - Route: Take 1 tablet by mouth Daily. - Oral    nystatin (MYCOSTATIN) powder  Every 12 Hours Scheduled 2/21/2017     Sig - Route: Apply  topically Every 12 (Twelve) Hours. - Topical    oxyCODONE-acetaminophen (PERCOCET)  MG per tablet 1 tablet 1 " tablet Every 4 Hours PRN 2/24/2017 3/14/2017    Sig - Route: Take 1 tablet by mouth Every 4 (Four) Hours As Needed for severe pain (7-10). - Oral    oxyCODONE-acetaminophen (PERCOCET)  MG per tablet 1 tablet 1 tablet Every 4 Hours PRN 2/27/2017     Sig - Route: Take 1 tablet by mouth Every 4 (Four) Hours As Needed for moderate pain (4-6) (every 4-6 hours as needed). - Oral    pantoprazole (PROTONIX) EC tablet 40 mg 40 mg Every Early Morning 3/5/2017     Sig - Route: Take 1 tablet by mouth Every Morning. - Oral    Pharmacy to dose vancomycin  Continuous PRN 2/22/2017     Sig - Route: Continuous As Needed for consult. - Does not apply    potassium chloride (K-DUR,KLOR-CON) CR tablet 40 mEq 40 mEq 2 Times Daily With Meals 3/3/2017     Sig - Route: Take 2 tablets by mouth 2 (Two) Times a Day With Meals. - Oral    potassium chloride 10 mEq in 100 mL IVPB 10 mEq Every 1 Hour PRN 2/22/2017     Sig - Route: Infuse 100 mL into a venous catheter Every 1 (One) Hour As Needed (See admin Instructions.). - Intravenous    rifaximin (XIFAXAN) tablet 550 mg 550 mg 2 Times Daily 2/21/2017     Sig - Route: Take 1 tablet by mouth 2 (Two) Times a Day. - Oral    sodium chloride (BACTERIOSTATIC) injection  As Needed 2/27/2017     Sig: As Needed.    sodium chloride 0.9 % infusion 25 mL/hr Continuous 2/21/2017     Sig - Route: Infuse 25 mL/hr into a venous catheter Continuous. - Intravenous    vancomycin (VANCOCIN) 1,250 mg in sodium chloride 0.9 % 250 mL IVPB 1,250 mg Every 24 Hours 3/7/2017     Sig - Route: Infuse 1,250 mg into a venous catheter Daily. - Intravenous    vancomycin (VANCOCIN) 50 mg/ml oral solution 250 mg 250 mg Every 6 Hours Scheduled 2/21/2017     Sig - Route: Take 5 mL by mouth Every 6 (Six) Hours. - Oral    vitamin D (ERGOCALCIFEROL) capsule 50,000 Units 50,000 Units Every 7 Days 3/1/2017     Sig - Route: Take 1 capsule by mouth Every 7 (Seven) Days. - Oral    zolpidem (AMBIEN) tablet 5 mg 5 mg Nightly PRN  3/7/2017 3/17/2017    Sig - Route: Take 1 tablet by mouth At Night As Needed for sleep. - Oral    vancomycin (VANCOCIN) 1,500 mg in sodium chloride 0.9 % 500 mL IVPB (Discontinued) 1,500 mg Every 24 Hours 3/3/2017 3/7/2017    Sig - Route: Infuse 1,500 mg into a venous catheter Daily. - Intravenous    Reason for Discontinue: Dose adjustment             Physician Progress Notes (last 24 hours) (Notes from 3/6/2017 12:54 PM through 3/7/2017 12:54 PM)      Cj Aguero MD at 3/6/2017  4:04 PM  Version 1 of 1         [facility] progress note    HPI:  Patient continues to do well at this time.  He states that he is having significant edema in his body this is most likely secondary to anasarca and his chronic  Liver failure.  Will increase this patient's Lasix at this time.  He otherwise states no new problems complaints or concerns  The following portions of the patient's history were reviewed and updated as appropriate: allergies, current medications, past family history, past medical history, past social history, past surgical history and problem list.    Review Of Systems:  Review of Systems   Constitution: Positive for weakness. Negative for decreased appetite, fever and malaise/fatigue.   HENT: Negative for congestion, ear discharge, ear pain, headaches, hearing loss, hoarse voice, nosebleeds and sore throat.    Eyes: Negative for blurred vision, discharge, double vision, pain, photophobia, visual disturbance and visual halos.   Cardiovascular: Negative for chest pain, claudication, dyspnea on exertion, leg swelling, near-syncope, palpitations and paroxysmal nocturnal dyspnea.   Respiratory: Negative for cough, hemoptysis, shortness of breath, snoring, sputum production and wheezing.    Endocrine: Negative for cold intolerance, heat intolerance, polydipsia, polyphagia and polyuria.   Skin: Negative for color change, flushing, itching, nail changes and rash.   Musculoskeletal: Positive for  arthritis, back pain, joint pain, muscle weakness and stiffness. Negative for joint swelling, muscle cramps and neck pain.   Gastrointestinal: Negative for bloating, abdominal pain, anorexia, change in bowel habit, bowel incontinence, constipation, diarrhea, dysphagia, heartburn, hematochezia, nausea and vomiting.   Genitourinary: Negative for flank pain, frequency, hematuria, hesitancy, nocturia and urgency.   Neurological: Negative for dizziness, focal weakness, loss of balance, numbness, paresthesias and sensory change.   Psychiatric/Behavioral: Negative for altered mental status, depression, memory loss, substance abuse and thoughts of violence. The patient does not have insomnia and is not nervous/anxious.    Allergic/Immunologic: Negative for environmental allergies, HIV exposure, hives and persistent infections.       LABS:   Recent Results (from the past 24 hour(s))   Comprehensive Metabolic Panel    Collection Time: 03/06/17  6:29 AM   Result Value Ref Range    Glucose 104 (H) 60 - 100 mg/dL    BUN 11 7 - 21 mg/dL    Creatinine 1.46 (H) 0.70 - 1.30 mg/dL    Sodium 134 (L) 137 - 145 mmol/L    Potassium 3.5 3.5 - 5.1 mmol/L    Chloride 99 95 - 110 mmol/L    CO2 31.0 22.0 - 31.0 mmol/L    Calcium 8.2 (L) 8.4 - 10.2 mg/dL    Total Protein 5.3 (L) 6.3 - 8.6 g/dL    Albumin 2.10 (L) 3.40 - 4.80 g/dL    ALT (SGPT) 24 21 - 72 U/L    AST (SGOT) 38 17 - 59 U/L    Alkaline Phosphatase 98 38 - 126 U/L    Total Bilirubin 1.8 (H) 0.2 - 1.3 mg/dL    eGFR Non African Amer 51 (L) >60 mL/min/1.73    Globulin 3.2 2.3 - 3.5 gm/dL    A/G Ratio 0.7 (L) 1.1 - 1.8 g/dL    BUN/Creatinine Ratio 7.5 7.0 - 25.0    Anion Gap 4.0 (L) 5.0 - 15.0 mmol/L   Magnesium    Collection Time: 03/06/17  6:29 AM   Result Value Ref Range    Magnesium 1.8 1.6 - 2.3 mg/dL   CBC Auto Differential    Collection Time: 03/06/17  6:29 AM   Result Value Ref Range    WBC 6.52 3.20 - 9.80 10*3/mm3    RBC 2.73 (L) 4.37 - 5.74 10*6/mm3    Hemoglobin 9.5 (L)  13.7 - 17.3 g/dL    Hematocrit 28.5 (L) 39.0 - 49.0 %    .4 (H) 80.0 - 98.0 fL    MCH 34.8 (H) 26.5 - 34.0 pg    MCHC 33.3 31.5 - 36.3 g/dL    RDW 17.1 (H) 11.5 - 14.5 %    RDW-SD 65.3 (H) 35.1 - 43.9 fl    MPV 8.8 8.0 - 12.0 fL    Platelets 73 (L) 150 - 450 10*3/mm3    Neutrophil % 35.5 (L) 37.0 - 80.0 %    Lymphocyte % 37.0 10.0 - 50.0 %    Monocyte % 10.3 0.0 - 12.0 %    Eosinophil % 13.5 (H) 0.0 - 7.0 %    Basophil % 3.4 (H) 0.0 - 2.0 %    Immature Grans % 0.3 0.0 - 0.5 %    Neutrophils, Absolute 2.32 2.00 - 8.60 10*3/mm3    Lymphocytes, Absolute 2.41 0.60 - 4.20 10*3/mm3    Monocytes, Absolute 0.67 0.00 - 0.90 10*3/mm3    Eosinophils, Absolute 0.88 (H) 0.00 - 0.70 10*3/mm3    Basophils, Absolute 0.22 (H) 0.00 - 0.20 10*3/mm3    Immature Grans, Absolute 0.02 0.00 - 0.02 10*3/mm3    nRBC 0.0 0.0 - 0.0 /100 WBC   ]  Physical Exam:  Vitals  Vitals:    03/06/17 0927   BP:    Pulse: 80   Resp:    Temp:    SpO2:        Exam:  Physical Exam   Constitutional: He is oriented to person, place, and time. He appears well-developed and well-nourished. No distress.   HENT:   Head: Normocephalic and atraumatic.   Right Ear: External ear normal.   Left Ear: External ear normal.   Nose: Nose normal.   Mouth/Throat: Oropharynx is clear and moist.   Eyes: Conjunctivae and EOM are normal. Pupils are equal, round, and reactive to light. Right eye exhibits no discharge. Left eye exhibits no discharge. No scleral icterus.   Neck: No JVD present. No thyromegaly present.   Cardiovascular: Normal rate, regular rhythm, normal heart sounds and intact distal pulses.  Exam reveals no gallop and no friction rub.    No murmur heard.  Pulmonary/Chest: Effort normal. No stridor. No respiratory distress. He has no wheezes. He has no rales.   Abdominal: Soft. Bowel sounds are normal. He exhibits no distension and no mass. There is no tenderness. No hernia.   Musculoskeletal: Normal range of motion. He exhibits no edema, tenderness or  deformity.   Lymphadenopathy:     He has no cervical adenopathy.   Neurological: He is alert and oriented to person, place, and time. He has normal reflexes. He displays normal reflexes. No cranial nerve deficit. He exhibits normal muscle tone. Coordination normal.   Skin: Skin is warm and dry. No rash noted. He is not diaphoretic. No erythema.    Open wound on right arm   Psychiatric: He has a normal mood and affect. His behavior is normal. Judgment and thought content normal.       Assessment:  Active Hospital Problems (** Indicates Principal Problem)    Diagnosis Date Noted   • Hypokalemia [E87.6] 03/02/2017   • Acute cystitis without hematuria [N30.00] 02/28/2017   • Chronic osteomyelitis of right shoulder region [M86.611] 02/24/2017   • Closed fracture of proximal end of right humerus with delayed healing [S42.201G] 01/20/2017   • Closed nondisplaced supracondylar fracture of distal end of femur without intracondylar extension with delayed healing [S72.456G] 01/20/2017   • Chronic hepatitis [K73.9] 06/18/2009   • Hepatic cirrhosis [K74.60] 05/26/2009   • Essential hypertension [I10] 12/01/2008      Resolved Hospital Problems    Diagnosis Date Noted Date Resolved   • Hyperammonemia [E72.20] 02/20/2017 02/24/2017       Plan:   Patient still awaiting case management for placement in a facility for continued antibiotics     Electronically signed by Cj Aguero MD at 3/6/2017  4:06 PM      Cj Aguero MD at 3/7/2017  9:02 AM  Version 1 of 1         [facility] progress note    HPI:  Patient continues to do well at this time.   Patient complains of difficulty sleeping this a.m. Would like something to help him sleep at night.  He otherwise states that he does feel somewhat better.   Anasarca Remained stable and he continues to have drainage from his wound site. Patient's hypokalemia is moderately decreased today but will follow this closely.  The following portions of the patient's  history were reviewed and updated as appropriate: allergies, current medications, past family history, past medical history, past social history, past surgical history and problem list.    Review Of Systems:  Review of Systems   Constitution: Positive for weakness. Negative for decreased appetite, fever and malaise/fatigue.   HENT: Negative for congestion, ear discharge, ear pain, headaches, hearing loss, hoarse voice, nosebleeds and sore throat.    Eyes: Negative for blurred vision, discharge, double vision, pain, photophobia, visual disturbance and visual halos.   Cardiovascular: Negative for chest pain, claudication, dyspnea on exertion, leg swelling, near-syncope, palpitations and paroxysmal nocturnal dyspnea.   Respiratory: Negative for cough, hemoptysis, shortness of breath, snoring, sputum production and wheezing.    Endocrine: Negative for cold intolerance, heat intolerance, polydipsia, polyphagia and polyuria.   Skin: Negative for color change, flushing, itching, nail changes and rash.   Musculoskeletal: Positive for arthritis, back pain, joint pain, muscle weakness and stiffness. Negative for joint swelling, muscle cramps and neck pain.   Gastrointestinal: Negative for bloating, abdominal pain, anorexia, change in bowel habit, bowel incontinence, constipation, diarrhea, dysphagia, heartburn, hematochezia, nausea and vomiting.   Genitourinary: Negative for flank pain, frequency, hematuria, hesitancy, nocturia and urgency.   Neurological: Negative for dizziness, focal weakness, loss of balance, numbness, paresthesias and sensory change.   Psychiatric/Behavioral: Negative for altered mental status, depression, memory loss, substance abuse and thoughts of violence. The patient does not have insomnia and is not nervous/anxious.    Allergic/Immunologic: Negative for environmental allergies, HIV exposure, hives and persistent infections.       LABS:   Recent Results (from the past 24 hour(s))   Comprehensive  Metabolic Panel    Collection Time: 03/07/17  5:48 AM   Result Value Ref Range    Glucose 89 60 - 100 mg/dL    BUN 11 7 - 21 mg/dL    Creatinine 1.52 (H) 0.70 - 1.30 mg/dL    Sodium 134 (L) 137 - 145 mmol/L    Potassium 3.4 (L) 3.5 - 5.1 mmol/L    Chloride 99 95 - 110 mmol/L    CO2 33.0 (H) 22.0 - 31.0 mmol/L    Calcium 8.3 (L) 8.4 - 10.2 mg/dL    Total Protein 5.3 (L) 6.3 - 8.6 g/dL    Albumin 2.00 (L) 3.40 - 4.80 g/dL    ALT (SGPT) 15 (L) 21 - 72 U/L    AST (SGOT) 44 17 - 59 U/L    Alkaline Phosphatase 109 38 - 126 U/L    Total Bilirubin 1.7 (H) 0.2 - 1.3 mg/dL    eGFR Non African Amer 48 (L) >60 mL/min/1.73    Globulin 3.3 2.3 - 3.5 gm/dL    A/G Ratio 0.6 (L) 1.1 - 1.8 g/dL    BUN/Creatinine Ratio 7.2 7.0 - 25.0    Anion Gap 2.0 (L) 5.0 - 15.0 mmol/L   Magnesium    Collection Time: 03/07/17  5:48 AM   Result Value Ref Range    Magnesium 1.9 1.6 - 2.3 mg/dL   CBC Auto Differential    Collection Time: 03/07/17  5:48 AM   Result Value Ref Range    WBC 6.45 3.20 - 9.80 10*3/mm3    RBC 2.71 (L) 4.37 - 5.74 10*6/mm3    Hemoglobin 9.6 (L) 13.7 - 17.3 g/dL    Hematocrit 28.1 (L) 39.0 - 49.0 %    .7 (H) 80.0 - 98.0 fL    MCH 35.4 (H) 26.5 - 34.0 pg    MCHC 34.2 31.5 - 36.3 g/dL    RDW 16.4 (H) 11.5 - 14.5 %    RDW-SD 62.0 (H) 35.1 - 43.9 fl    MPV 10.2 8.0 - 12.0 fL    Platelets 69 (L) 150 - 450 10*3/mm3    Neutrophil % 36.4 (L) 37.0 - 80.0 %    Lymphocyte % 35.2 10.0 - 50.0 %    Monocyte % 10.7 0.0 - 12.0 %    Eosinophil % 14.6 (H) 0.0 - 7.0 %    Basophil % 2.9 (H) 0.0 - 2.0 %    Immature Grans % 0.2 0.0 - 0.5 %    Neutrophils, Absolute 2.35 2.00 - 8.60 10*3/mm3    Lymphocytes, Absolute 2.27 0.60 - 4.20 10*3/mm3    Monocytes, Absolute 0.69 0.00 - 0.90 10*3/mm3    Eosinophils, Absolute 0.94 (H) 0.00 - 0.70 10*3/mm3    Basophils, Absolute 0.19 0.00 - 0.20 10*3/mm3    Immature Grans, Absolute 0.01 0.00 - 0.02 10*3/mm3   ]  Physical Exam:  Vitals  Vitals:    03/07/17 0743   BP: 136/71   Pulse: 60   Resp: 18   Temp:  97.4 °F (36.3 °C)   SpO2: 99%       Exam:  Physical Exam   Constitutional: He is oriented to person, place, and time. He appears well-developed and well-nourished. No distress.   HENT:   Head: Normocephalic and atraumatic.   Right Ear: External ear normal.   Left Ear: External ear normal.   Nose: Nose normal.   Mouth/Throat: Oropharynx is clear and moist.   Eyes: Conjunctivae and EOM are normal. Pupils are equal, round, and reactive to light. Right eye exhibits no discharge. Left eye exhibits no discharge. No scleral icterus.   Neck: No JVD present. No thyromegaly present.   Cardiovascular: Normal rate, regular rhythm, normal heart sounds and intact distal pulses.  Exam reveals no gallop and no friction rub.    No murmur heard.  Pulmonary/Chest: Effort normal. No stridor. No respiratory distress. He has no wheezes. He has no rales.   Abdominal: Soft. Bowel sounds are normal. He exhibits no distension and no mass. There is no tenderness. No hernia.   Musculoskeletal: Normal range of motion. He exhibits no edema, tenderness or deformity.   Lymphadenopathy:     He has no cervical adenopathy.   Neurological: He is alert and oriented to person, place, and time. He has normal reflexes. He displays normal reflexes. No cranial nerve deficit. He exhibits normal muscle tone. Coordination normal.   Skin: Skin is warm and dry. No rash noted. He is not diaphoretic. No erythema.    Open wound on right arm   Psychiatric: He has a normal mood and affect. His behavior is normal. Judgment and thought content normal.       Assessment:  Active Hospital Problems (** Indicates Principal Problem)    Diagnosis Date Noted   • Anasarca [R60.1] 03/06/2017   • Hypokalemia [E87.6] 03/02/2017   • Acute cystitis without hematuria [N30.00] 02/28/2017   • Chronic osteomyelitis of right shoulder region [M86.611] 02/24/2017   • Closed fracture of proximal end of right humerus with delayed healing [S42.201G] 01/20/2017   • Closed nondisplaced  supracondylar fracture of distal end of femur without intracondylar extension with delayed healing [S72.456G] 01/20/2017   • Chronic hepatitis [K73.9] 06/18/2009   • Hepatic cirrhosis [K74.60] 05/26/2009   • Essential hypertension [I10] 12/01/2008      Resolved Hospital Problems    Diagnosis Date Noted Date Resolved   • Hyperammonemia [E72.20] 02/20/2017 02/24/2017       Plan:   Patient still awaiting case management for placement in a facility for continued antibiotics     Electronically signed by Cj Aguero MD at 3/7/2017  9:03 AM        Consult Notes (last 24 hours) (Notes from 3/6/2017 12:54 PM through 3/7/2017 12:54 PM)     No notes of this type exist for this encounter.

## 2017-03-07 NOTE — PROGRESS NOTES
"Pharmacokinetics by Pharmacy - Vancomycin    Armando Mcmullen is a 52 y.o. male  [Ht: 72.01\" (182.9 cm); Wt: 249 lb 4.8 oz (113 kg)]    Estimated Creatinine Clearance: 73 mL/min (by C-G formula based on Cr of 1.52).   Lab Results   Component Value Date    CREATININE 1.52 (H) 03/07/2017    CREATININE 1.46 (H) 03/06/2017    CREATININE 1.58 (H) 03/05/2017    CREATININE 2.1 (H) 01/19/2017    CREATININE 2.8 (H) 01/13/2017    CREATININE 1.2 12/08/2016      Lab Results   Component Value Date    WBC 6.45 03/07/2017    WBC 6.52 03/06/2017    WBC 8.24 03/05/2017      Temp Readings from Last 1 Encounters:   03/07/17 97.4 °F (36.3 °C)      Lab Results   Component Value Date    VANCOTROUGH 22.92 (H) 03/07/2017    VANCORANDOM 14.65 03/03/2017         Culture Results:  Microbiology Results (last 10 days)       ** No results found for the last 240 hours. **               Indication for use: abscess    Current Vancomycin Dose:  1250 mg IVPB every 24 hours, day 13 of therapy.      Assessment/Plan:  Trough supratherapeutic, 22.92. Drawn 30min early, however trough still expected to be high. Will decrease dose to 1250mg q24h. Trough ordered for 3/11 @ 1130 Pharmacy will continue to monitor renal function and adjust dose accordingly.    Jono Breen, [facility]   03/07/17 11:11 AM    "

## 2017-03-07 NOTE — PROGRESS NOTES
Acute Care - Physical Therapy Treatment Note  [facility]     Patient Name: Armando Mcmullen  : 1965  MRN: [id number]  Today's Date: 3/7/2017  Onset of Illness/Injury or Date of Surgery Date: 17 (s/p I&D R shoulder on 17)  Date of Referral to PT: 17  Referring Physician: Dr. Aguero    Admit Date: 2017    Visit Dx:    ICD-10-CM ICD-9-CM   1. Hyperammonemia E72.20 270.6   2. Urinary tract infection, site unspecified N39.0    3. Functional diarrhea K59.1 564.5   4. Impaired functional mobility, balance, and endurance Z74.09 V49.89   5. Impaired gait and mobility R26.89 781.2     Patient Active Problem List   Diagnosis   • Anxiety state   • Back pain   • Furuncle of buttock   • Chronic hepatitis   • Hepatic cirrhosis   • Depression   • Hypersplenism   • Insomnia   • Mononeuritis   • Osteoarthritis   • Essential hypertension   • Closed fracture of proximal end of right humerus with delayed healing   • Closed nondisplaced supracondylar fracture of distal end of femur without intracondylar extension with delayed healing   • History of substance abuse   • Chronic osteomyelitis of right shoulder region   • Acute cystitis without hematuria   • Hypokalemia   • Anasarca               Adult Rehabilitation Note       17 1115 17 0838 17 1001    Rehab Assessment/Intervention    Discipline physical therapy assistant  -CH physical therapist  -LM physical therapy assistant  -JW    Document Type therapy note (daily note)  -CH therapy note (daily note)  -LM therapy note (daily note)  -JW    Subjective Information agree to therapy;complains of;pain  -CH agree to therapy;complains of;pain  -LM agree to therapy;complains of;pain  -JW    Patient Effort, Rehab Treatment good  -CH good  -LM     Symptoms Noted During/After Treatment  none  -LM     Precautions/Limitations fall precautions;other (see comments)   C-Diff, MRSA  -CH fall precautions;other (see comments)   C-Diff  -LM fall  precautions;other (see comments)   c-diff  -JW    Recorded by [] Gail Mckoy PTA [LM] Stephanie Vergara, PT [JW] Casie Emmanuel, PTA    Vital Signs    Pre Systolic BP Rehab 122  -  -LM     Pre Treatment Diastolic BP 78  -CH 83  -LM     Post Systolic BP Rehab 148  -  -LM     Post Treatment Diastolic BP 85  -CH 85  -LM     Pretreatment Heart Rate (beats/min) 70  -CH 74  -LM     Posttreatment Heart Rate (beats/min) 73  -CH 75  -LM     Pre SpO2 (%) 96  -CH 95  -LM     O2 Delivery Pre Treatment room air  -CH room air  -LM     Post SpO2 (%) 94  -CH 96  -LM     O2 Delivery Post Treatment room air  -CH room air  -LM     Pre Patient Position Supine  -CH Supine  -LM     Intra Patient Position Sitting  -CH      Post Patient Position Sitting  -CH Sitting  -LM     Recorded by [] Gail Mckoy PTA [LM] Stephanie Vergara, LIAN     Pain Assessment    Pain Assessment 0-10  -CH 0-10  -LM 0-10  -JW    Pain Score 10  -CH 5  -LM 9  -JW    Post Pain Score 6  -CH 6  -LM 9  -JW    Pain Type Surgical pain  -CH Surgical pain  -LM Surgical pain  -JW    Pain Location Shoulder  -CH Shoulder  -LM Shoulder  -JW    Pain Orientation Right  -CH Right  -LM Right  -JW    Pain Intervention(s) Medication (See MAR)  -  Medication (See MAR);Repositioned  -JW    Recorded by [] Gail Mckoy PTA [LM] Stephanie Vergara, PT [JW] Casie Emmanuel, PTA    Vision Assessment/Intervention    Visual Impairment WFL  -CH      Recorded by [] Gail Mckoy PTA      Cognitive Assessment/Intervention    Current Cognitive/Communication Assessment functional  -CH functional  -LM functional  -JW    Orientation Status oriented x 4;person;place;time;situation  -CH oriented x 4  -LM oriented x 4  -JW    Follows Commands/Answers Questions 100% of the time  -% of the time  -% of the time  -JW    Personal Safety WNL/WFL  -CH WNL/WFL  -LM     Recorded by [] Gail Mckoy PTA [LM] Stephanie Vergara, PT [JW] Casie Emmanuel, PTA     Bed Mobility, Assessment/Treatment    Bed Mobility, Assistive Device bed rails;head of bed elevated  -CH bed rails;head of bed elevated  -LM bed rails;head of bed elevated  -JW    Bed Mob, Supine to Sit, [place] conditional independence  -CH conditional independence  -LM conditional independence  -JW    Bed Mob, Sit to Supine, [place] not tested  -CH not tested  -LM conditional independence  -JW    Recorded by [CH] Gail Mckoy, PTA [LM] Stephanie Vergara, PT [JW] Casie Emmanuel, PTA    Transfer Assessment/Treatment    Transfers, Bed-Chair [place] stand by assist  -CH stand by assist   Sit Pivot to recliner  -LM     Transfers, Chair-Bed [place] not tested  -CH      Transfers, Bed-Chair-Bed, Assist Device --   none  -CH --   None  -LM     Transfer, Comment pt defers t/f to w/c and w/c mobility due to increased pain in R UE  -CH  pt defers t/fers secondary to increased pain  -JW    Recorded by [CH] Gail Mckoy, PTA [LM] Stephanie Vergara, PT [JW] Casie Emmanuel, PTA    Gait Assessment/Treatment    Gait, Comment  Pt defers ambulation - states he hasn't walked since last Thanksgiving  -LM     Recorded by  [LM] Stephanie Vergara PT     Therapy Exercises    Right Lower Extremity  AROM:;30 reps;supine;ankle pumps/circles  -LM AROM:;20 reps;sitting;ankle pumps/circles  -JW    Bilateral Lower Extremities AROM:;25 reps;sitting;ankle pumps/circles;hip abduction/adduction;hip flexion;LAQ;glut sets  -CH AROM:;supine;20 reps;SLR;hip abduction/adduction;heel slides;sitting;LAQ  -LM AROM:;20 reps;sitting;hip flexion;LAQ   add squeeze w/ pillow  -JW    Recorded by [CH] Gail Mckoy, PTA [LM] Stephanie Vergara, PT [JW] Casie Emmanuel, PTA    Positioning and Restraints    Pre-Treatment Position in bed  -CH in bed  -LM in bed  -JW    Post Treatment Position chair  -CH chair  -LM bed  -JW    In Bed   supine;call light within reach;encouraged to call for assist;exit alarm on  -JW    In Chair sitting;call  light within reach;encouraged to call for assist;reclined;legs elevated  - notified nsg;sitting;call light within reach;encouraged to call for assist  -LM     Recorded by [CH] Gail Mckoy, PTA [LM] Stephanie Vergara, PT [JW] Casie Emmanuel, PTA      User Key  (r) = Recorded By, (t) = Taken By, (c) = Cosigned By    Initials Name Effective Dates    JW Casie Emmanuel, PTA 08/11/15 -     LM Stephanie Vergara, PT 06/15/16 -     CH Gail Mckoy, PTA 10/17/16 -                 IP PT Goals       03/07/17 1115 03/06/17 0838 03/05/17 1001    Transfer Training PT LTG    Transfer Training PT  LTG, Date Goal Reviewed 03/07/17  -CH 03/06/17  -LM 03/05/17  -JW    Transfer Training PT LTG, Outcome goal ongoing  -CH goal ongoing  -LM goal not met  -JW    Gait Training PT STG    Gait Training Goal PT STG, Date Goal Reviewed 03/07/17  -CH 03/06/17  -LM 03/05/17  -JW    Gait Training Goal PT STG, Outcome goal ongoing  -CH goal not met  -LM goal not met  -JW    Gait Training PT LTG    Gait Training Goal PT LTG, Date Goal Reviewed 03/07/17  -CH 03/06/17  -LM 03/05/17  -JW    Gait Training Goal PT LTG, Outcome goal ongoing  -CH goal not met  -LM goal not met  -JW    Wheelchair Propulsion PT LTG    Wheelchair Propulsion Goal PT LTG, Date Goal Reviewed 03/07/17  -CH 03/06/17  -LM 03/05/17  -JW    Wheelchair Propulsion Goal PT LTG, Outcome goal ongoing  -CH goal ongoing  -LM goal not met  -JW      03/03/17 1437 03/03/17 1403 03/02/17 1538    Transfer Training PT LTG    Transfer Training PT  LTG, Date Goal Reviewed 03/03/17  -LN  03/02/17  -LN    Transfer Training PT LTG, Outcome goal not met  -LN  goal not met  -LN    Transfer Training PT LTG, Reason Goal Not Met progress slower than expected  -LN  progress slower than expected  -LN    Gait Training PT STG    Gait Training Goal PT STG, Date Goal Reviewed  03/03/17  -LN     Gait Training Goal PT STG, Outcome  goal not met  -LN     Gait Training Goal PT STG, Reason Goal Not Met   progress slower than expected  -LN     Gait Training PT LTG    Gait Training Goal PT LTG, Date Goal Reviewed  03/03/17  -LN     Gait Training Goal PT LTG, Outcome  goal not met  -LN     Gait Training Goal PT LTG, Reason Goal Not Met  progress slower than expected  -LN     Wheelchair Propulsion PT LTG    Wheelchair Propulsion Goal PT LTG, Date Goal Reviewed  03/03/17  -LN     Wheelchair Propulsion Goal PT LTG, Outcome  goal not met  -LN     Wheelchair Propulsion Goal PT LTG, Reason Goal Not Met  progress slower than expected  -LN       03/02/17 1435 03/01/17 1330       Transfer Training PT LTG    Transfer Training PT LTG, Date Established  03/01/17  -KG     Transfer Training PT LTG, Time to Achieve  by discharge  -KG     Transfer Training PT LTG, Activity Type  bed to chair /chair to bed;sit to stand/stand to sit;toilet  -KG     Transfer Training PT LTG, [place] Level  conditional independence  -KG     Transfer Training PT LTG, Assist Device  --   AAD  -KG     Gait Training PT STG    Gait Training Goal PT STG, Date Established  03/01/17  -KG     Gait Training Goal PT STG, Time to Achieve  3 days  -KG     Gait Training Goal PT STG, [place] Level  moderate assist (50% patient effort);minimum assist (75% patient effort)  -KG     Gait Training Goal PT STG, Assist Device  --   AAD  -KG     Gait Training Goal PT STG, Distance to Achieve  5 feet  -KG     Gait Training Goal PT STG, Date Goal Reviewed 03/02/17  -LN      Gait Training Goal PT STG, Outcome goal not met  -LN      Gait Training Goal PT STG, Reason Goal Not Met progress slower than expected  -LN      Gait Training PT LTG    Gait Training Goal PT LTG, Date Established  03/01/17  -KG     Gait Training Goal PT LTG, Time to Achieve  by discharge  -KG     Gait Training Goal PT LTG, [place] Level  contact guard assist  -KG     Gait Training Goal PT LTG, Assist Device  --   AAD  -KG     Gait Training Goal PT LTG, Distance to Achieve  20 feet  -KG     Gait  Training Goal PT LTG, Date Goal Reviewed 03/02/17  -LN      Gait Training Goal PT LTG, Outcome goal not met  -LN      Gait Training Goal PT LTG, Reason Goal Not Met progress slower than expected  -LN      Wheelchair Propulsion PT LTG    Wheelchair Propulsion Goal PT LTG, Date Established  03/01/17  -KG     Wheelchair Propulsion Goal PT LTG, Time to Achieve  by discharge  -KG     Wheelchair Propulsion Goal PT LTG, [place] Level  conditional independence  -KG     Wheelchair Propulsion Goal PT LTG, Distance to Achieve  150 feet  -KG     Wheelchair Propulsion Goal PT LTG, Date Goal Reviewed 03/02/17  -LN      Wheelchair Propulsion Goal PT LTG, Outcome goal not met  -LN      Wheelchair Propulsion Goal PT LTG, Reason Goal Not Met progress slower than expected  -LN        User Key  (r) = Recorded By, (t) = Taken By, (c) = Cosigned By    Initials Name Provider Type    JW Casie Emmanuel, PTA Physical Therapy Assistant    LM Stephanie Vergara, PT Physical Therapist    KG Ailyn Nava, PT Physical Therapist    ALEXIS Mckoy, PTA Physical Therapy Assistant    LN Stephanie Du, PTA Physical Therapy Assistant          Physical Therapy Education     Title: PT OT SLP Therapies (Active)     Topic: Physical Therapy (Active)     Point: Mobility training (Done)    Learning Progress Summary    Learner Readiness Method Response Comment Documented by Status   Patient Acceptance E VU Pt educated on safety with stand pivot transfers and able to complete safely. LM 03/06/17 1145 Done    Acceptance E NR  LN 03/03/17 1437 Active    Acceptance E NR  LN 03/02/17 1537 Active    Acceptance E NR  KG 03/01/17 1330 Active               Point: Home exercise program (Active)    Learning Progress Summary    Learner Readiness Method Response Comment Documented by Status   Patient Acceptance E NR  LN 03/03/17 1437 Active    Acceptance E NR  LN 03/02/17 1537 Active    Acceptance E,D,TB NR LAQ, heels slides, ankle pumps KG 03/01/17 1330  Active               Point: Precautions (Done)    Learning Progress Summary    Learner Readiness Method Response Comment Documented by Status   Patient Acceptance E VU Pt educated on safety with stand pivot transfers and able to complete safely. LM 03/06/17 1145 Done    Acceptance E NR  LN 03/03/17 1437 Active    Acceptance E NR  LN 03/02/17 1537 Active                      User Key     Initials Effective Dates Name Provider Type Discipline    LM 06/15/16 -  Stephanie Vergara, PT Physical Therapist PT    KG 10/17/16 -  Ailyn Nava, PT Physical Therapist PT    LN 10/17/16 -  Stephanie Du, JULITA Physical Therapy Assistant PT                    PT Recommendation and Plan  Anticipated Discharge Disposition: skilled nursing facility  Planned Therapy Interventions: bed mobility training, gait training, home exercise program, strengthening, transfer training  PT Frequency: other (see comments) (5-14 times/wk)  Plan of Care Review  Plan Of Care Reviewed With: patient  Outcome Summary/Follow up Plan: No new goals met this tx. Pt t/f'd from bed to chair SBA X1 with no AD. Pt performed sitting ther ex AROM, however did have complaints of increased pain in B knees. Pt would benefit from continued skilled therapy services          Outcome Measures       03/07/17 1115 03/06/17 0838 03/05/17 1001    How much help from another person do you currently need...    Turning from your back to your side while in flat bed without using bedrails? 4  -CH 4  -LM 4  -JW    Moving from lying on back to sitting on the side of a flat bed without bedrails? 4  -CH 4  -LM 4  -JW    Moving to and from a bed to a chair (including a wheelchair)? 3  -CH 3  -LM 3  -JW    Standing up from a chair using your arms (e.g., wheelchair, bedside chair)? 3  -CH 3  -LM 2  -JW    Climbing 3-5 steps with a railing? 1  -CH 1  -LM 1  -JW    To walk in hospital room? 1  -CH 1  -LM 2  -JW    AM-PAC 6 Clicks Score 16  -CH 16  -LM 16  -JW    Functional Assessment    Outcome  Measure Options AM-PAC 6 Clicks Basic Mobility (PT)  - AM-PAC 6 Clicks Basic Mobility (PT)  -LM AM-PAC 6 Clicks Basic Mobility (PT)  -      User Key  (r) = Recorded By, (t) = Taken By, (c) = Cosigned By    Initials Name Provider Type    JW Casie Emmanuel, PTA Physical Therapy Assistant    LM Stephanie Vergara, PT Physical Therapist     Gail Mckoy, PTA Physical Therapy Assistant           Time Calculation:         PT Charges       03/07/17 1115          Time Calculation    Start Time 1115  -CH      Stop Time 1146  -CH      Time Calculation (min) 31 min  -CH      PT Received On 03/07/17  -      PT Goal Re-Cert Due Date 03/14/17  -      Time Calculation- PT    Total Timed Code Minutes- PT 31 minute(s)  -        User Key  (r) = Recorded By, (t) = Taken By, (c) = Cosigned By    Initials Name Provider Type     Gail Mckoy, PTA Physical Therapy Assistant          Therapy Charges for Today     Code Description Service Date Service Provider Modifiers Qty    64894283418 HC PT THERAPEUTIC ACT EA 15 MIN 3/7/2017 Gail Mckoy, PTA GP 1    80336331490 HC PT THER PROC EA 15 MIN 3/7/2017 Gail Mckoy, JULITA GP 1          PT G-Codes  PT Professional Judgement Used?: Yes  Outcome Measure Options: AM-PAC 6 Clicks Basic Mobility (PT)  Score: 17  Functional Limitation: Mobility: Walking and moving around  Mobility: Walking and Moving Around Current Status (): At least 40 percent but less than 60 percent impaired, limited or restricted  Mobility: Walking and Moving Around Goal Status (): At least 1 percent but less than 20 percent impaired, limited or restricted    Gail Mckoy PTA  3/7/2017

## 2017-03-07 NOTE — PLAN OF CARE
Problem: Patient Care Overview (Adult)  Goal: Plan of Care Review  Outcome: Ongoing (interventions implemented as appropriate)    03/07/17 1115   Outcome Evaluation   Outcome Summary/Follow up Plan No new goals met this tx. Pt t/f'd from bed to chair SBA X1 with no AD. Pt performed sitting ther ex AROM, however did have complaints of increased pain in B knees. Pt would benefit from continued skilled therapy services   Coping/Psychosocial Response Interventions   Plan Of Care Reviewed With patient       Goal: Discharge Needs Assessment  Outcome: Ongoing (interventions implemented as appropriate)    03/07/17 1115   Discharge Needs Assessment   Discharge Facility/Level Of Care Needs nursing facility, skilled;LTACH (long term acute care hospital)   Self-Care   Equipment Currently Used at Home crutches;hospital bed;prosthesis;commode;shower chair;wheelchair;walker, rolling         Problem: Inpatient Physical Therapy  Goal: Transfer Training Goal 1 LTG- PT  Outcome: Ongoing (interventions implemented as appropriate)    03/01/17 1330 03/03/17 1437 03/07/17 1115   Transfer Training PT LTG   Transfer Training PT LTG, Date Established 03/01/17 --  --    Transfer Training PT LTG, Time to Achieve by discharge --  --    Transfer Training PT LTG, Activity Type bed to chair /chair to bed;sit to stand/stand to sit;toilet --  --    Transfer Training PT LTG, [place] Level conditional independence --  --    Transfer Training PT LTG, Assist Device (AAD) --  --    Transfer Training PT LTG, Date Goal Reviewed --  --  03/07/17   Transfer Training PT LTG, Outcome --  --  goal ongoing   Transfer Training PT LTG, Reason Goal Not Met --  progress slower than expected --        Goal: Gait Training Goal STG- PT  Outcome: Ongoing (interventions implemented as appropriate)    03/01/17 1330 03/03/17 1403 03/07/17 1115   Gait Training PT STG   Gait Training Goal PT STG, Date Established 03/01/17 --  --    Gait Training Goal PT STG, Time to  Achieve 3 days --  --    Gait Training Goal PT STG, [place] Level moderate assist (50% patient effort);minimum assist (75% patient effort) --  --    Gait Training Goal PT STG, Assist Device (AAD) --  --    Gait Training Goal PT STG, Distance to Achieve 5 feet --  --    Gait Training Goal PT STG, Date Goal Reviewed --  --  03/07/17   Gait Training Goal PT STG, Outcome --  --  goal ongoing   Gait Training Goal PT STG, Reason Goal Not Met --  progress slower than expected --        Goal: Gait Training Goal LTG- PT  Outcome: Ongoing (interventions implemented as appropriate)    03/01/17 1330 03/03/17 1403 03/07/17 1115   Gait Training PT LTG   Gait Training Goal PT LTG, Date Established 03/01/17 --  --    Gait Training Goal PT LTG, Time to Achieve by discharge --  --    Gait Training Goal PT LTG, [place] contact guard assist --  --    Gait Training Goal PT LTG, Assist Device (AAD) --  --    Gait Training Goal PT LTG, Distance to Achieve 20 feet --  --    Gait Training Goal PT LTG, Date Goal Reviewed --  --  03/07/17   Gait Training Goal PT LTG, Outcome --  --  goal ongoing   Gait Training Goal PT LTG, Reason Goal Not Met --  progress slower than expected --        Goal: Wheelchair Propulsion Goal LTG- PT  Outcome: Ongoing (interventions implemented as appropriate)    03/01/17 1330 03/03/17 1403 03/07/17 1115   Wheelchair Propulsion PT LTG   Wheelchair Propulsion Goal PT LTG, Date Established 03/01/17 --  --    Wheelchair Propulsion Goal PT LTG, Time to Achieve by discharge --  --    Wheelchair Propulsion Goal PT LTG, [place] conditional independence --  --    Wheelchair Propulsion Goal PT LTG, Distance to Achieve 150 feet --  --    Wheelchair Propulsion Goal PT LTG, Date Goal Reviewed --  --  03/07/17   Wheelchair Propulsion Goal PT LTG, Outcome --  --  goal ongoing   Wheelchair Propulsion Goal PT LTG, Reason Goal Not Met --  progress slower than expected --

## 2017-03-07 NOTE — PROGRESS NOTES
[facility] progress note    HPI:  Patient continues to do well at this time.   Patient complains of difficulty sleeping this a.m. Would like something to help him sleep at night.  He otherwise states that he does feel somewhat better.   Anasarca Remained stable and he continues to have drainage from his wound site. Patient's hypokalemia is moderately decreased today but will follow this closely.  The following portions of the patient's history were reviewed and updated as appropriate: allergies, current medications, past family history, past medical history, past social history, past surgical history and problem list.    Review Of Systems:  Review of Systems   Constitution: Positive for weakness. Negative for decreased appetite, fever and malaise/fatigue.   HENT: Negative for congestion, ear discharge, ear pain, headaches, hearing loss, hoarse voice, nosebleeds and sore throat.    Eyes: Negative for blurred vision, discharge, double vision, pain, photophobia, visual disturbance and visual halos.   Cardiovascular: Negative for chest pain, claudication, dyspnea on exertion, leg swelling, near-syncope, palpitations and paroxysmal nocturnal dyspnea.   Respiratory: Negative for cough, hemoptysis, shortness of breath, snoring, sputum production and wheezing.    Endocrine: Negative for cold intolerance, heat intolerance, polydipsia, polyphagia and polyuria.   Skin: Negative for color change, flushing, itching, nail changes and rash.   Musculoskeletal: Positive for arthritis, back pain, joint pain, muscle weakness and stiffness. Negative for joint swelling, muscle cramps and neck pain.   Gastrointestinal: Negative for bloating, abdominal pain, anorexia, change in bowel habit, bowel incontinence, constipation, diarrhea, dysphagia, heartburn, hematochezia, nausea and vomiting.   Genitourinary: Negative for flank pain, frequency, hematuria, hesitancy, nocturia and urgency.   Neurological: Negative for dizziness,  focal weakness, loss of balance, numbness, paresthesias and sensory change.   Psychiatric/Behavioral: Negative for altered mental status, depression, memory loss, substance abuse and thoughts of violence. The patient does not have insomnia and is not nervous/anxious.    Allergic/Immunologic: Negative for environmental allergies, HIV exposure, hives and persistent infections.       LABS:   Recent Results (from the past 24 hour(s))   Comprehensive Metabolic Panel    Collection Time: 03/07/17  5:48 AM   Result Value Ref Range    Glucose 89 60 - 100 mg/dL    BUN 11 7 - 21 mg/dL    Creatinine 1.52 (H) 0.70 - 1.30 mg/dL    Sodium 134 (L) 137 - 145 mmol/L    Potassium 3.4 (L) 3.5 - 5.1 mmol/L    Chloride 99 95 - 110 mmol/L    CO2 33.0 (H) 22.0 - 31.0 mmol/L    Calcium 8.3 (L) 8.4 - 10.2 mg/dL    Total Protein 5.3 (L) 6.3 - 8.6 g/dL    Albumin 2.00 (L) 3.40 - 4.80 g/dL    ALT (SGPT) 15 (L) 21 - 72 U/L    AST (SGOT) 44 17 - 59 U/L    Alkaline Phosphatase 109 38 - 126 U/L    Total Bilirubin 1.7 (H) 0.2 - 1.3 mg/dL    eGFR Non African Amer 48 (L) >60 mL/min/1.73    Globulin 3.3 2.3 - 3.5 gm/dL    A/G Ratio 0.6 (L) 1.1 - 1.8 g/dL    BUN/Creatinine Ratio 7.2 7.0 - 25.0    Anion Gap 2.0 (L) 5.0 - 15.0 mmol/L   Magnesium    Collection Time: 03/07/17  5:48 AM   Result Value Ref Range    Magnesium 1.9 1.6 - 2.3 mg/dL   CBC Auto Differential    Collection Time: 03/07/17  5:48 AM   Result Value Ref Range    WBC 6.45 3.20 - 9.80 10*3/mm3    RBC 2.71 (L) 4.37 - 5.74 10*6/mm3    Hemoglobin 9.6 (L) 13.7 - 17.3 g/dL    Hematocrit 28.1 (L) 39.0 - 49.0 %    .7 (H) 80.0 - 98.0 fL    MCH 35.4 (H) 26.5 - 34.0 pg    MCHC 34.2 31.5 - 36.3 g/dL    RDW 16.4 (H) 11.5 - 14.5 %    RDW-SD 62.0 (H) 35.1 - 43.9 fl    MPV 10.2 8.0 - 12.0 fL    Platelets 69 (L) 150 - 450 10*3/mm3    Neutrophil % 36.4 (L) 37.0 - 80.0 %    Lymphocyte % 35.2 10.0 - 50.0 %    Monocyte % 10.7 0.0 - 12.0 %    Eosinophil % 14.6 (H) 0.0 - 7.0 %    Basophil % 2.9 (H) 0.0 -  2.0 %    Immature Grans % 0.2 0.0 - 0.5 %    Neutrophils, Absolute 2.35 2.00 - 8.60 10*3/mm3    Lymphocytes, Absolute 2.27 0.60 - 4.20 10*3/mm3    Monocytes, Absolute 0.69 0.00 - 0.90 10*3/mm3    Eosinophils, Absolute 0.94 (H) 0.00 - 0.70 10*3/mm3    Basophils, Absolute 0.19 0.00 - 0.20 10*3/mm3    Immature Grans, Absolute 0.01 0.00 - 0.02 10*3/mm3   ]  Physical Exam:  Vitals  Vitals:    03/07/17 0743   BP: 136/71   Pulse: 60   Resp: 18   Temp: 97.4 °F (36.3 °C)   SpO2: 99%       Exam:  Physical Exam   Constitutional: He is oriented to person, place, and time. He appears well-developed and well-nourished. No distress.   HENT:   Head: Normocephalic and atraumatic.   Right Ear: External ear normal.   Left Ear: External ear normal.   Nose: Nose normal.   Mouth/Throat: Oropharynx is clear and moist.   Eyes: Conjunctivae and EOM are normal. Pupils are equal, round, and reactive to light. Right eye exhibits no discharge. Left eye exhibits no discharge. No scleral icterus.   Neck: No JVD present. No thyromegaly present.   Cardiovascular: Normal rate, regular rhythm, normal heart sounds and intact distal pulses.  Exam reveals no gallop and no friction rub.    No murmur heard.  Pulmonary/Chest: Effort normal. No stridor. No respiratory distress. He has no wheezes. He has no rales.   Abdominal: Soft. Bowel sounds are normal. He exhibits no distension and no mass. There is no tenderness. No hernia.   Musculoskeletal: Normal range of motion. He exhibits no edema, tenderness or deformity.   Lymphadenopathy:     He has no cervical adenopathy.   Neurological: He is alert and oriented to person, place, and time. He has normal reflexes. He displays normal reflexes. No cranial nerve deficit. He exhibits normal muscle tone. Coordination normal.   Skin: Skin is warm and dry. No rash noted. He is not diaphoretic. No erythema.    Open wound on right arm   Psychiatric: He has a normal mood and affect. His behavior is normal. Judgment and  thought content normal.       Assessment:  Active Hospital Problems (** Indicates Principal Problem)    Diagnosis Date Noted   • Anasarca [R60.1] 03/06/2017   • Hypokalemia [E87.6] 03/02/2017   • Acute cystitis without hematuria [N30.00] 02/28/2017   • Chronic osteomyelitis of right shoulder region [M86.611] 02/24/2017   • Closed fracture of proximal end of right humerus with delayed healing [S42.201G] 01/20/2017   • Closed nondisplaced supracondylar fracture of distal end of femur without intracondylar extension with delayed healing [S72.456G] 01/20/2017   • Chronic hepatitis [K73.9] 06/18/2009   • Hepatic cirrhosis [K74.60] 05/26/2009   • Essential hypertension [I10] 12/01/2008      Resolved Hospital Problems    Diagnosis Date Noted Date Resolved   • Hyperammonemia [E72.20] 02/20/2017 02/24/2017       Plan:   Patient still awaiting case management for placement in a facility for continued antibiotics

## 2017-03-08 LAB
ALBUMIN SERPL-MCNC: 2.4 G/DL (ref 3.4–4.8)
ALBUMIN/GLOB SERPL: 0.7 G/DL (ref 1.1–1.8)
ALP SERPL-CCNC: 142 U/L (ref 38–126)
ALT SERPL W P-5'-P-CCNC: 26 U/L (ref 21–72)
ANION GAP SERPL CALCULATED.3IONS-SCNC: 8 MMOL/L (ref 5–15)
AST SERPL-CCNC: 42 U/L (ref 17–59)
BASOPHILS # BLD AUTO: 0.29 10*3/MM3 (ref 0–0.2)
BASOPHILS NFR BLD AUTO: 3.2 % (ref 0–2)
BILIRUB SERPL-MCNC: 1.9 MG/DL (ref 0.2–1.3)
BUN BLD-MCNC: 12 MG/DL (ref 7–21)
BUN/CREAT SERPL: 7.7 (ref 7–25)
CALCIUM SPEC-SCNC: 8.4 MG/DL (ref 8.4–10.2)
CHLORIDE SERPL-SCNC: 103 MMOL/L (ref 95–110)
CO2 SERPL-SCNC: 27 MMOL/L (ref 22–31)
CREAT BLD-MCNC: 1.56 MG/DL (ref 0.7–1.3)
DEPRECATED RDW RBC AUTO: 66.6 FL (ref 35.1–43.9)
EOSINOPHIL # BLD AUTO: 1.11 10*3/MM3 (ref 0–0.7)
EOSINOPHIL NFR BLD AUTO: 12.4 % (ref 0–7)
ERYTHROCYTE [DISTWIDTH] IN BLOOD BY AUTOMATED COUNT: 17.3 % (ref 11.5–14.5)
GFR SERPL CREATININE-BSD FRML MDRD: 47 ML/MIN/1.73 (ref 56–130)
GLOBULIN UR ELPH-MCNC: 3.5 GM/DL (ref 2.3–3.5)
GLUCOSE BLD-MCNC: 85 MG/DL (ref 60–100)
HCT VFR BLD AUTO: 31.8 % (ref 39–49)
HGB BLD-MCNC: 10.6 G/DL (ref 13.7–17.3)
IMM GRANULOCYTES # BLD: 0.03 10*3/MM3 (ref 0–0.02)
IMM GRANULOCYTES NFR BLD: 0.3 % (ref 0–0.5)
LYMPHOCYTES # BLD AUTO: 3.21 10*3/MM3 (ref 0.6–4.2)
LYMPHOCYTES NFR BLD AUTO: 35.7 % (ref 10–50)
MAGNESIUM SERPL-MCNC: 1.8 MG/DL (ref 1.6–2.3)
MCH RBC QN AUTO: 35 PG (ref 26.5–34)
MCHC RBC AUTO-ENTMCNC: 33.3 G/DL (ref 31.5–36.3)
MCV RBC AUTO: 105 FL (ref 80–98)
MONOCYTES # BLD AUTO: 0.85 10*3/MM3 (ref 0–0.9)
MONOCYTES NFR BLD AUTO: 9.5 % (ref 0–12)
NEUTROPHILS # BLD AUTO: 3.49 10*3/MM3 (ref 2–8.6)
NEUTROPHILS NFR BLD AUTO: 38.9 % (ref 37–80)
NRBC BLD MANUAL-RTO: 0 /100 WBC (ref 0–0)
PLATELET # BLD AUTO: 87 10*3/MM3 (ref 150–450)
PMV BLD AUTO: 9.9 FL (ref 8–12)
POTASSIUM BLD-SCNC: 4 MMOL/L (ref 3.5–5.1)
PROT SERPL-MCNC: 5.9 G/DL (ref 6.3–8.6)
RBC # BLD AUTO: 3.03 10*6/MM3 (ref 4.37–5.74)
SODIUM BLD-SCNC: 138 MMOL/L (ref 137–145)
WBC NRBC COR # BLD: 8.98 10*3/MM3 (ref 3.2–9.8)

## 2017-03-08 PROCEDURE — 25010000002 VANCOMYCIN PER 500 MG: Performed by: FAMILY MEDICINE

## 2017-03-08 PROCEDURE — 83735 ASSAY OF MAGNESIUM: CPT | Performed by: INTERNAL MEDICINE

## 2017-03-08 PROCEDURE — 85025 COMPLETE CBC W/AUTO DIFF WBC: CPT | Performed by: INTERNAL MEDICINE

## 2017-03-08 PROCEDURE — 80053 COMPREHEN METABOLIC PANEL: CPT | Performed by: INTERNAL MEDICINE

## 2017-03-08 RX ADMIN — GABAPENTIN 100 MG: 100 CAPSULE ORAL at 09:11

## 2017-03-08 RX ADMIN — CEFTAZIDIME 1 G: 1 INJECTION, POWDER, FOR SOLUTION INTRAMUSCULAR; INTRAVENOUS at 11:08

## 2017-03-08 RX ADMIN — Medication: at 09:12

## 2017-03-08 RX ADMIN — POTASSIUM CHLORIDE 40 MEQ: 20 TABLET, EXTENDED RELEASE ORAL at 09:10

## 2017-03-08 RX ADMIN — RIFAXIMIN 550 MG: 550 TABLET ORAL at 17:24

## 2017-03-08 RX ADMIN — VANCOMYCIN HYDROCHLORIDE 250 MG: 1 INJECTION, POWDER, LYOPHILIZED, FOR SOLUTION INTRAVENOUS at 23:32

## 2017-03-08 RX ADMIN — OXYCODONE HYDROCHLORIDE AND ACETAMINOPHEN 1 TABLET: 10; 325 TABLET ORAL at 23:32

## 2017-03-08 RX ADMIN — VANCOMYCIN HYDROCHLORIDE 250 MG: 1 INJECTION, POWDER, LYOPHILIZED, FOR SOLUTION INTRAVENOUS at 13:32

## 2017-03-08 RX ADMIN — OXYCODONE HYDROCHLORIDE AND ACETAMINOPHEN 1 TABLET: 10; 325 TABLET ORAL at 06:04

## 2017-03-08 RX ADMIN — Medication: at 21:04

## 2017-03-08 RX ADMIN — Medication: at 16:00

## 2017-03-08 RX ADMIN — FUROSEMIDE 40 MG: 40 TABLET ORAL at 17:25

## 2017-03-08 RX ADMIN — VANCOMYCIN HYDROCHLORIDE 250 MG: 1 INJECTION, POWDER, LYOPHILIZED, FOR SOLUTION INTRAVENOUS at 17:25

## 2017-03-08 RX ADMIN — GABAPENTIN 100 MG: 100 CAPSULE ORAL at 21:03

## 2017-03-08 RX ADMIN — OXYCODONE HYDROCHLORIDE AND ACETAMINOPHEN 1 TABLET: 10; 325 TABLET ORAL at 15:26

## 2017-03-08 RX ADMIN — CEFTAZIDIME 1 G: 1 INJECTION, POWDER, FOR SOLUTION INTRAMUSCULAR; INTRAVENOUS at 03:06

## 2017-03-08 RX ADMIN — POTASSIUM CHLORIDE 40 MEQ: 20 TABLET, EXTENDED RELEASE ORAL at 17:24

## 2017-03-08 RX ADMIN — VANCOMYCIN HYDROCHLORIDE 250 MG: 1 INJECTION, POWDER, LYOPHILIZED, FOR SOLUTION INTRAVENOUS at 06:04

## 2017-03-08 RX ADMIN — VANCOMYCIN HYDROCHLORIDE 1250 MG: 1 INJECTION, POWDER, LYOPHILIZED, FOR SOLUTION INTRAVENOUS at 13:31

## 2017-03-08 RX ADMIN — GABAPENTIN 100 MG: 100 CAPSULE ORAL at 15:27

## 2017-03-08 RX ADMIN — ERGOCALCIFEROL 50000 UNITS: 1.25 CAPSULE ORAL at 09:11

## 2017-03-08 RX ADMIN — FUROSEMIDE 40 MG: 40 TABLET ORAL at 09:11

## 2017-03-08 RX ADMIN — NYSTATIN: 100000 POWDER TOPICAL at 09:13

## 2017-03-08 RX ADMIN — NYSTATIN: 100000 POWDER TOPICAL at 21:04

## 2017-03-08 RX ADMIN — OXYCODONE HYDROCHLORIDE AND ACETAMINOPHEN 1 TABLET: 10; 325 TABLET ORAL at 19:38

## 2017-03-08 RX ADMIN — RIFAXIMIN 550 MG: 550 TABLET ORAL at 09:11

## 2017-03-08 RX ADMIN — OXYCODONE HYDROCHLORIDE AND ACETAMINOPHEN 1 TABLET: 10; 325 TABLET ORAL at 11:08

## 2017-03-08 RX ADMIN — CEFTAZIDIME 1 G: 1 INJECTION, POWDER, FOR SOLUTION INTRAMUSCULAR; INTRAVENOUS at 18:18

## 2017-03-08 RX ADMIN — Medication 1 TABLET: at 09:13

## 2017-03-08 RX ADMIN — PANTOPRAZOLE SODIUM 40 MG: 40 TABLET, DELAYED RELEASE ORAL at 06:04

## 2017-03-08 NOTE — PLAN OF CARE
Problem: Patient Care Overview (Adult)  Goal: Plan of Care Review  Outcome: Ongoing (interventions implemented as appropriate)  Goal: Adult Individualization and Mutuality  Outcome: Ongoing (interventions implemented as appropriate)  Goal: Discharge Needs Assessment  Outcome: Ongoing (interventions implemented as appropriate)    Problem: Fall Risk (Adult)  Goal: Identify Related Risk Factors and Signs and Symptoms  Outcome: Ongoing (interventions implemented as appropriate)    Problem: Pressure Ulcer (Adult)  Goal: Signs and Symptoms of Listed Potential Problems Will be Absent or Manageable (Pressure Ulcer)  Outcome: Ongoing (interventions implemented as appropriate)

## 2017-03-08 NOTE — PROGRESS NOTES
[facility] progress note    HPI:  Patient continues to do well at this time.     Patient states that his sleeping is gotten significantly better since we started him on the Ambien he continues to have significant drainage from his wound at this time and continues on IV antibiotic  The following portions of the patient's history were reviewed and updated as appropriate: allergies, current medications, past family history, past medical history, past social history, past surgical history and problem list.    Review Of Systems:  Review of Systems   Constitution: Positive for weakness. Negative for decreased appetite, fever and malaise/fatigue.   HENT: Negative for congestion, ear discharge, ear pain, headaches, hearing loss, hoarse voice, nosebleeds and sore throat.    Eyes: Negative for blurred vision, discharge, double vision, pain, photophobia, visual disturbance and visual halos.   Cardiovascular: Negative for chest pain, claudication, dyspnea on exertion, leg swelling, near-syncope, palpitations and paroxysmal nocturnal dyspnea.   Respiratory: Negative for cough, hemoptysis, shortness of breath, snoring, sputum production and wheezing.    Endocrine: Negative for cold intolerance, heat intolerance, polydipsia, polyphagia and polyuria.   Skin: Negative for color change, flushing, itching, nail changes and rash.   Musculoskeletal: Positive for arthritis, back pain, joint pain, muscle weakness and stiffness. Negative for joint swelling, muscle cramps and neck pain.   Gastrointestinal: Negative for bloating, abdominal pain, anorexia, change in bowel habit, bowel incontinence, constipation, diarrhea, dysphagia, heartburn, hematochezia, nausea and vomiting.   Genitourinary: Negative for flank pain, frequency, hematuria, hesitancy, nocturia and urgency.   Neurological: Negative for dizziness, focal weakness, loss of balance, numbness, paresthesias and sensory change.   Psychiatric/Behavioral: Negative for altered  mental status, depression, memory loss, substance abuse and thoughts of violence. The patient does not have insomnia and is not nervous/anxious.    Allergic/Immunologic: Negative for environmental allergies, HIV exposure, hives and persistent infections.       LABS:   Recent Results (from the past 24 hour(s))   Vancomycin, Trough    Collection Time: 03/07/17  9:58 AM   Result Value Ref Range    Vancomycin Trough 22.92 (H) 10.00 - 15.00 mcg/mL   Comprehensive Metabolic Panel    Collection Time: 03/08/17  6:46 AM   Result Value Ref Range    Glucose 85 60 - 100 mg/dL    BUN 12 7 - 21 mg/dL    Creatinine 1.56 (H) 0.70 - 1.30 mg/dL    Sodium 138 137 - 145 mmol/L    Potassium 4.0 3.5 - 5.1 mmol/L    Chloride 103 95 - 110 mmol/L    CO2 27.0 22.0 - 31.0 mmol/L    Calcium 8.4 8.4 - 10.2 mg/dL    Total Protein 5.9 (L) 6.3 - 8.6 g/dL    Albumin 2.40 (L) 3.40 - 4.80 g/dL    ALT (SGPT) 26 21 - 72 U/L    AST (SGOT) 42 17 - 59 U/L    Alkaline Phosphatase 142 (H) 38 - 126 U/L    Total Bilirubin 1.9 (H) 0.2 - 1.3 mg/dL    eGFR Non  Amer 47 (L) 56 - 130 mL/min/1.73    Globulin 3.5 2.3 - 3.5 gm/dL    A/G Ratio 0.7 (L) 1.1 - 1.8 g/dL    BUN/Creatinine Ratio 7.7 7.0 - 25.0    Anion Gap 8.0 5.0 - 15.0 mmol/L   Magnesium    Collection Time: 03/08/17  6:46 AM   Result Value Ref Range    Magnesium 1.8 1.6 - 2.3 mg/dL   CBC Auto Differential    Collection Time: 03/08/17  6:46 AM   Result Value Ref Range    WBC 8.98 3.20 - 9.80 10*3/mm3    RBC 3.03 (L) 4.37 - 5.74 10*6/mm3    Hemoglobin 10.6 (L) 13.7 - 17.3 g/dL    Hematocrit 31.8 (L) 39.0 - 49.0 %    .0 (H) 80.0 - 98.0 fL    MCH 35.0 (H) 26.5 - 34.0 pg    MCHC 33.3 31.5 - 36.3 g/dL    RDW 17.3 (H) 11.5 - 14.5 %    RDW-SD 66.6 (H) 35.1 - 43.9 fl    MPV 9.9 8.0 - 12.0 fL    Platelets 87 (L) 150 - 450 10*3/mm3    Neutrophil % 38.9 37.0 - 80.0 %    Lymphocyte % 35.7 10.0 - 50.0 %    Monocyte % 9.5 0.0 - 12.0 %    Eosinophil % 12.4 (H) 0.0 - 7.0 %    Basophil % 3.2 (H) 0.0 - 2.0 %     Immature Grans % 0.3 0.0 - 0.5 %    Neutrophils, Absolute 3.49 2.00 - 8.60 10*3/mm3    Lymphocytes, Absolute 3.21 0.60 - 4.20 10*3/mm3    Monocytes, Absolute 0.85 0.00 - 0.90 10*3/mm3    Eosinophils, Absolute 1.11 (H) 0.00 - 0.70 10*3/mm3    Basophils, Absolute 0.29 (H) 0.00 - 0.20 10*3/mm3    Immature Grans, Absolute 0.03 (H) 0.00 - 0.02 10*3/mm3    nRBC 0.0 0.0 - 0.0 /100 WBC   ]  Physical Exam:  Vitals  Vitals:    03/08/17 0714   BP:    Pulse: 79   Resp:    Temp:    SpO2:        Exam:  Physical Exam   Constitutional: He is oriented to person, place, and time. He appears well-developed and well-nourished. No distress.   HENT:   Head: Normocephalic and atraumatic.   Right Ear: External ear normal.   Left Ear: External ear normal.   Nose: Nose normal.   Mouth/Throat: Oropharynx is clear and moist.   Eyes: Conjunctivae and EOM are normal. Pupils are equal, round, and reactive to light. Right eye exhibits no discharge. Left eye exhibits no discharge. No scleral icterus.   Neck: No JVD present. No thyromegaly present.   Cardiovascular: Normal rate, regular rhythm, normal heart sounds and intact distal pulses.  Exam reveals no gallop and no friction rub.    No murmur heard.  Pulmonary/Chest: Effort normal. No stridor. No respiratory distress. He has no wheezes. He has no rales.   Abdominal: Soft. Bowel sounds are normal. He exhibits no distension and no mass. There is no tenderness. No hernia.   Musculoskeletal: Normal range of motion. He exhibits no edema, tenderness or deformity.   Lymphadenopathy:     He has no cervical adenopathy.   Neurological: He is alert and oriented to person, place, and time. He has normal reflexes. He displays normal reflexes. No cranial nerve deficit. He exhibits normal muscle tone. Coordination normal.   Skin: Skin is warm and dry. No rash noted. He is not diaphoretic. No erythema.    Open wound on right arm   Psychiatric: He has a normal mood and affect. His behavior is normal. Judgment  and thought content normal.       Assessment:  Active Hospital Problems (** Indicates Principal Problem)    Diagnosis Date Noted   • Anasarca [R60.1] 03/06/2017   • Hypokalemia [E87.6] 03/02/2017   • Acute cystitis without hematuria [N30.00] 02/28/2017   • Chronic osteomyelitis of right shoulder region [M86.611] 02/24/2017   • Closed fracture of proximal end of right humerus with delayed healing [S42.201G] 01/20/2017   • Closed nondisplaced supracondylar fracture of distal end of femur without intracondylar extension with delayed healing [S72.456G] 01/20/2017   • Chronic hepatitis [K73.9] 06/18/2009   • Hepatic cirrhosis [K74.60] 05/26/2009   • Essential hypertension [I10] 12/01/2008      Resolved Hospital Problems    Diagnosis Date Noted Date Resolved   • Hyperammonemia [E72.20] 02/20/2017 02/24/2017       Plan:   Patient still awaiting case management for placement in a facility for continued antibiotics

## 2017-03-08 NOTE — SIGNIFICANT NOTE
"   03/08/17 1326   Rehab Treatment   Discipline physical therapy assistant   Treatment Not Performed patient/family declined treatment  (pt states his stomach is cramping and his diarrhea is back again-\"I just don't feel up to therapy today\" offered eob or supine ther ex bu cont to decline)     "

## 2017-03-08 NOTE — NURSING NOTE
Patient has skin tear coccyx fold due to excoriation. Pt has loose stools that has contributed to wound. Needs good skin care and protection. Magic Butt has been ordered. NPOA.

## 2017-03-09 VITALS
BODY MASS INDEX: 33.77 KG/M2 | DIASTOLIC BLOOD PRESSURE: 74 MMHG | HEIGHT: 72 IN | RESPIRATION RATE: 18 BRPM | HEART RATE: 74 BPM | WEIGHT: 249.3 LBS | OXYGEN SATURATION: 95 % | SYSTOLIC BLOOD PRESSURE: 115 MMHG | TEMPERATURE: 96.8 F

## 2017-03-09 LAB
ALBUMIN SERPL-MCNC: 2.2 G/DL (ref 3.4–4.8)
ALBUMIN/GLOB SERPL: 0.7 G/DL (ref 1.1–1.8)
ALP SERPL-CCNC: 112 U/L (ref 38–126)
ALT SERPL W P-5'-P-CCNC: 21 U/L (ref 21–72)
ANION GAP SERPL CALCULATED.3IONS-SCNC: 5 MMOL/L (ref 5–15)
AST SERPL-CCNC: 40 U/L (ref 17–59)
BASOPHILS # BLD AUTO: 0.25 10*3/MM3 (ref 0–0.2)
BASOPHILS NFR BLD AUTO: 3.3 % (ref 0–2)
BILIRUB SERPL-MCNC: 1.8 MG/DL (ref 0.2–1.3)
BUN BLD-MCNC: 11 MG/DL (ref 7–21)
BUN/CREAT SERPL: 8.1 (ref 7–25)
CALCIUM SPEC-SCNC: 8.3 MG/DL (ref 8.4–10.2)
CHLORIDE SERPL-SCNC: 101 MMOL/L (ref 95–110)
CO2 SERPL-SCNC: 30 MMOL/L (ref 22–31)
CREAT BLD-MCNC: 1.36 MG/DL (ref 0.7–1.3)
DEPRECATED RDW RBC AUTO: 61 FL (ref 35.1–43.9)
EOSINOPHIL # BLD AUTO: 0.93 10*3/MM3 (ref 0–0.7)
EOSINOPHIL NFR BLD AUTO: 12.2 % (ref 0–7)
ERYTHROCYTE [DISTWIDTH] IN BLOOD BY AUTOMATED COUNT: 16.1 % (ref 11.5–14.5)
GFR SERPL CREATININE-BSD FRML MDRD: 55 ML/MIN/1.73 (ref 60–130)
GLOBULIN UR ELPH-MCNC: 3.2 GM/DL (ref 2.3–3.5)
GLUCOSE BLD-MCNC: 69 MG/DL (ref 60–100)
HCT VFR BLD AUTO: 28.9 % (ref 39–49)
HGB BLD-MCNC: 9.8 G/DL (ref 13.7–17.3)
IMM GRANULOCYTES # BLD: 0.03 10*3/MM3 (ref 0–0.02)
IMM GRANULOCYTES NFR BLD: 0.4 % (ref 0–0.5)
LYMPHOCYTES # BLD AUTO: 2.81 10*3/MM3 (ref 0.6–4.2)
LYMPHOCYTES NFR BLD AUTO: 36.9 % (ref 10–50)
MAGNESIUM SERPL-MCNC: 1.8 MG/DL (ref 1.6–2.3)
MCH RBC QN AUTO: 35.1 PG (ref 26.5–34)
MCHC RBC AUTO-ENTMCNC: 33.9 G/DL (ref 31.5–36.3)
MCV RBC AUTO: 103.6 FL (ref 80–98)
MONOCYTES # BLD AUTO: 0.8 10*3/MM3 (ref 0–0.9)
MONOCYTES NFR BLD AUTO: 10.5 % (ref 0–12)
NEUTROPHILS # BLD AUTO: 2.8 10*3/MM3 (ref 2–8.6)
NEUTROPHILS NFR BLD AUTO: 36.7 % (ref 37–80)
PLATELET # BLD AUTO: 76 10*3/MM3 (ref 150–450)
PMV BLD AUTO: 10.2 FL (ref 8–12)
POTASSIUM BLD-SCNC: 3.6 MMOL/L (ref 3.5–5.1)
PROT SERPL-MCNC: 5.4 G/DL (ref 6.3–8.6)
RBC # BLD AUTO: 2.79 10*6/MM3 (ref 4.37–5.74)
SODIUM BLD-SCNC: 136 MMOL/L (ref 137–145)
WBC NRBC COR # BLD: 7.62 10*3/MM3 (ref 3.2–9.8)

## 2017-03-09 PROCEDURE — 25010000002 VANCOMYCIN PER 500 MG: Performed by: FAMILY MEDICINE

## 2017-03-09 PROCEDURE — 85025 COMPLETE CBC W/AUTO DIFF WBC: CPT | Performed by: INTERNAL MEDICINE

## 2017-03-09 PROCEDURE — 80053 COMPREHEN METABOLIC PANEL: CPT | Performed by: INTERNAL MEDICINE

## 2017-03-09 PROCEDURE — 83735 ASSAY OF MAGNESIUM: CPT | Performed by: INTERNAL MEDICINE

## 2017-03-09 RX ORDER — PROMETHAZINE HYDROCHLORIDE 25 MG/1
25 TABLET ORAL EVERY 6 HOURS PRN
Status: DISCONTINUED | OUTPATIENT
Start: 2017-03-09 | End: 2017-03-09 | Stop reason: HOSPADM

## 2017-03-09 RX ORDER — FUROSEMIDE 40 MG/1
40 TABLET ORAL 2 TIMES DAILY
Qty: 30 TABLET | Refills: 11 | Status: SHIPPED | OUTPATIENT
Start: 2017-03-09 | End: 2017-06-07 | Stop reason: SDUPTHER

## 2017-03-09 RX ORDER — PROMETHAZINE HYDROCHLORIDE 25 MG/1
25 TABLET ORAL EVERY 6 HOURS PRN
Qty: 30 TABLET | Refills: 11 | Status: SHIPPED | OUTPATIENT
Start: 2017-03-09 | End: 2017-06-07

## 2017-03-09 RX ORDER — OXYCODONE AND ACETAMINOPHEN 10; 325 MG/1; MG/1
1 TABLET ORAL EVERY 4 HOURS PRN
Qty: 180 TABLET | Refills: 0 | Status: SHIPPED | OUTPATIENT
Start: 2017-03-09 | End: 2017-03-14

## 2017-03-09 RX ORDER — PANTOPRAZOLE SODIUM 40 MG/1
40 TABLET, DELAYED RELEASE ORAL DAILY
Qty: 30 TABLET | Refills: 11 | Status: SHIPPED | OUTPATIENT
Start: 2017-03-09 | End: 2017-06-07 | Stop reason: SDUPTHER

## 2017-03-09 RX ORDER — NYSTATIN 100000 [USP'U]/G
POWDER TOPICAL EVERY 12 HOURS SCHEDULED
Qty: 60 G | Refills: 11 | Status: SHIPPED | OUTPATIENT
Start: 2017-03-09 | End: 2017-09-20 | Stop reason: SDUPTHER

## 2017-03-09 RX ORDER — POTASSIUM CHLORIDE 20 MEQ/1
40 TABLET, EXTENDED RELEASE ORAL 2 TIMES DAILY WITH MEALS
Qty: 30 TABLET | Refills: 11 | Status: SHIPPED | OUTPATIENT
Start: 2017-03-09 | End: 2017-06-08 | Stop reason: SDUPTHER

## 2017-03-09 RX ADMIN — FUROSEMIDE 40 MG: 40 TABLET ORAL at 08:13

## 2017-03-09 RX ADMIN — VANCOMYCIN HYDROCHLORIDE 250 MG: 1 INJECTION, POWDER, LYOPHILIZED, FOR SOLUTION INTRAVENOUS at 05:58

## 2017-03-09 RX ADMIN — OXYCODONE HYDROCHLORIDE AND ACETAMINOPHEN 1 TABLET: 10; 325 TABLET ORAL at 03:46

## 2017-03-09 RX ADMIN — GABAPENTIN 100 MG: 100 CAPSULE ORAL at 08:14

## 2017-03-09 RX ADMIN — CEFTAZIDIME 1 G: 1 INJECTION, POWDER, FOR SOLUTION INTRAMUSCULAR; INTRAVENOUS at 01:32

## 2017-03-09 RX ADMIN — OXYCODONE HYDROCHLORIDE AND ACETAMINOPHEN 1 TABLET: 10; 325 TABLET ORAL at 17:12

## 2017-03-09 RX ADMIN — Medication: at 17:17

## 2017-03-09 RX ADMIN — VANCOMYCIN HYDROCHLORIDE 1250 MG: 1 INJECTION, POWDER, LYOPHILIZED, FOR SOLUTION INTRAVENOUS at 12:23

## 2017-03-09 RX ADMIN — VANCOMYCIN HYDROCHLORIDE 250 MG: 1 INJECTION, POWDER, LYOPHILIZED, FOR SOLUTION INTRAVENOUS at 17:12

## 2017-03-09 RX ADMIN — OXYCODONE HYDROCHLORIDE AND ACETAMINOPHEN 1 TABLET: 10; 325 TABLET ORAL at 08:13

## 2017-03-09 RX ADMIN — GABAPENTIN 100 MG: 100 CAPSULE ORAL at 17:12

## 2017-03-09 RX ADMIN — POTASSIUM CHLORIDE 40 MEQ: 20 TABLET, EXTENDED RELEASE ORAL at 17:12

## 2017-03-09 RX ADMIN — POTASSIUM CHLORIDE 40 MEQ: 20 TABLET, EXTENDED RELEASE ORAL at 08:13

## 2017-03-09 RX ADMIN — Medication 1 TABLET: at 08:13

## 2017-03-09 RX ADMIN — CEFTAZIDIME 1 G: 1 INJECTION, POWDER, FOR SOLUTION INTRAMUSCULAR; INTRAVENOUS at 12:23

## 2017-03-09 RX ADMIN — PANTOPRAZOLE SODIUM 40 MG: 40 TABLET, DELAYED RELEASE ORAL at 05:58

## 2017-03-09 RX ADMIN — OXYCODONE HYDROCHLORIDE AND ACETAMINOPHEN 1 TABLET: 10; 325 TABLET ORAL at 12:34

## 2017-03-09 RX ADMIN — NYSTATIN: 100000 POWDER TOPICAL at 08:16

## 2017-03-09 RX ADMIN — Medication: at 08:16

## 2017-03-09 RX ADMIN — RIFAXIMIN 550 MG: 550 TABLET ORAL at 08:13

## 2017-03-09 RX ADMIN — FUROSEMIDE 40 MG: 40 TABLET ORAL at 17:12

## 2017-03-09 RX ADMIN — RIFAXIMIN 550 MG: 550 TABLET ORAL at 17:12

## 2017-03-09 RX ADMIN — SODIUM CHLORIDE 25 ML/HR: 900 INJECTION, SOLUTION INTRAVENOUS at 08:21

## 2017-03-09 NOTE — PROGRESS NOTES
[facility] progress note    HPI:  Patient continues to do well at this time.Patient complains of a headache today but otherwise states that he has remained in a stable condition.  We will go ahead and give him some Phenergan for nausea and follow up to see how he does.  The following portions of the patient's history were reviewed and updated as appropriate: allergies, current medications, past family history, past medical history, past social history, past surgical history and problem list.    Review Of Systems:  Review of Systems   Constitution: Positive for weakness. Negative for decreased appetite, fever and malaise/fatigue.   HENT: Negative for congestion, ear discharge, ear pain, headaches, hearing loss, hoarse voice, nosebleeds and sore throat.    Eyes: Negative for blurred vision, discharge, double vision, pain, photophobia, visual disturbance and visual halos.   Cardiovascular: Negative for chest pain, claudication, dyspnea on exertion, leg swelling, near-syncope, palpitations and paroxysmal nocturnal dyspnea.   Respiratory: Negative for cough, hemoptysis, shortness of breath, snoring, sputum production and wheezing.    Endocrine: Negative for cold intolerance, heat intolerance, polydipsia, polyphagia and polyuria.   Skin: Negative for color change, flushing, itching, nail changes and rash.   Musculoskeletal: Positive for arthritis, back pain, joint pain, muscle weakness and stiffness. Negative for joint swelling, muscle cramps and neck pain.   Gastrointestinal: Negative for bloating, abdominal pain, anorexia, change in bowel habit, bowel incontinence, constipation, diarrhea, dysphagia, heartburn, hematochezia, nausea and vomiting.   Genitourinary: Negative for flank pain, frequency, hematuria, hesitancy, nocturia and urgency.   Neurological: Negative for dizziness, focal weakness, loss of balance, numbness, paresthesias and sensory change.   Psychiatric/Behavioral: Negative for altered mental  status, depression, memory loss, substance abuse and thoughts of violence. The patient does not have insomnia and is not nervous/anxious.    Allergic/Immunologic: Negative for environmental allergies, HIV exposure, hives and persistent infections.       LABS:   Recent Results (from the past 24 hour(s))   Comprehensive Metabolic Panel    Collection Time: 03/09/17  5:36 AM   Result Value Ref Range    Glucose 69 60 - 100 mg/dL    BUN 11 7 - 21 mg/dL    Creatinine 1.36 (H) 0.70 - 1.30 mg/dL    Sodium 136 (L) 137 - 145 mmol/L    Potassium 3.6 3.5 - 5.1 mmol/L    Chloride 101 95 - 110 mmol/L    CO2 30.0 22.0 - 31.0 mmol/L    Calcium 8.3 (L) 8.4 - 10.2 mg/dL    Total Protein 5.4 (L) 6.3 - 8.6 g/dL    Albumin 2.20 (L) 3.40 - 4.80 g/dL    ALT (SGPT) 21 21 - 72 U/L    AST (SGOT) 40 17 - 59 U/L    Alkaline Phosphatase 112 38 - 126 U/L    Total Bilirubin 1.8 (H) 0.2 - 1.3 mg/dL    eGFR Non African Amer 55 (L) >60 mL/min/1.73    Globulin 3.2 2.3 - 3.5 gm/dL    A/G Ratio 0.7 (L) 1.1 - 1.8 g/dL    BUN/Creatinine Ratio 8.1 7.0 - 25.0    Anion Gap 5.0 5.0 - 15.0 mmol/L   Magnesium    Collection Time: 03/09/17  5:36 AM   Result Value Ref Range    Magnesium 1.8 1.6 - 2.3 mg/dL   CBC Auto Differential    Collection Time: 03/09/17  5:36 AM   Result Value Ref Range    WBC 7.62 3.20 - 9.80 10*3/mm3    RBC 2.79 (L) 4.37 - 5.74 10*6/mm3    Hemoglobin 9.8 (L) 13.7 - 17.3 g/dL    Hematocrit 28.9 (L) 39.0 - 49.0 %    .6 (H) 80.0 - 98.0 fL    MCH 35.1 (H) 26.5 - 34.0 pg    MCHC 33.9 31.5 - 36.3 g/dL    RDW 16.1 (H) 11.5 - 14.5 %    RDW-SD 61.0 (H) 35.1 - 43.9 fl    MPV 10.2 8.0 - 12.0 fL    Platelets 76 (L) 150 - 450 10*3/mm3    Neutrophil % 36.7 (L) 37.0 - 80.0 %    Lymphocyte % 36.9 10.0 - 50.0 %    Monocyte % 10.5 0.0 - 12.0 %    Eosinophil % 12.2 (H) 0.0 - 7.0 %    Basophil % 3.3 (H) 0.0 - 2.0 %    Immature Grans % 0.4 0.0 - 0.5 %    Neutrophils, Absolute 2.80 2.00 - 8.60 10*3/mm3    Lymphocytes, Absolute 2.81 0.60 - 4.20 10*3/mm3     Monocytes, Absolute 0.80 0.00 - 0.90 10*3/mm3    Eosinophils, Absolute 0.93 (H) 0.00 - 0.70 10*3/mm3    Basophils, Absolute 0.25 (H) 0.00 - 0.20 10*3/mm3    Immature Grans, Absolute 0.03 (H) 0.00 - 0.02 10*3/mm3   ]  Physical Exam:  Vitals  Vitals:    03/09/17 0805   BP: 112/82   Pulse: 86   Resp: 18   Temp:    SpO2: 95%       Exam:  Physical Exam   Constitutional: He is oriented to person, place, and time. He appears well-developed and well-nourished. No distress.   HENT:   Head: Normocephalic and atraumatic.   Right Ear: External ear normal.   Left Ear: External ear normal.   Nose: Nose normal.   Mouth/Throat: Oropharynx is clear and moist.   Eyes: Conjunctivae and EOM are normal. Pupils are equal, round, and reactive to light. Right eye exhibits no discharge. Left eye exhibits no discharge. No scleral icterus.   Neck: No JVD present. No thyromegaly present.   Cardiovascular: Normal rate, regular rhythm, normal heart sounds and intact distal pulses.  Exam reveals no gallop and no friction rub.    No murmur heard.  Pulmonary/Chest: Effort normal. No stridor. No respiratory distress. He has no wheezes. He has no rales.   Abdominal: Soft. Bowel sounds are normal. He exhibits no distension and no mass. There is no tenderness. No hernia.   Musculoskeletal: Normal range of motion. He exhibits no edema, tenderness or deformity.   Lymphadenopathy:     He has no cervical adenopathy.   Neurological: He is alert and oriented to person, place, and time. He has normal reflexes. He displays normal reflexes. No cranial nerve deficit. He exhibits normal muscle tone. Coordination normal.   Skin: Skin is warm and dry. No rash noted. He is not diaphoretic. No erythema.    Open wound on right arm   Psychiatric: He has a normal mood and affect. His behavior is normal. Judgment and thought content normal.       Assessment:  Active Hospital Problems (** Indicates Principal Problem)    Diagnosis Date Noted   • Bo [R60.1] 03/06/2017    • Hypokalemia [E87.6] 03/02/2017   • Acute cystitis without hematuria [N30.00] 02/28/2017   • Chronic osteomyelitis of right shoulder region [M86.611] 02/24/2017   • Closed fracture of proximal end of right humerus with delayed healing [S42.201G] 01/20/2017   • Closed nondisplaced supracondylar fracture of distal end of femur without intracondylar extension with delayed healing [S72.456G] 01/20/2017   • Chronic hepatitis [K73.9] 06/18/2009   • Hepatic cirrhosis [K74.60] 05/26/2009   • Essential hypertension [I10] 12/01/2008      Resolved Hospital Problems    Diagnosis Date Noted Date Resolved   • Hyperammonemia [E72.20] 02/20/2017 02/24/2017       Plan:   Patient still awaiting case management for placement in a facility

## 2017-03-09 NOTE — DISCHARGE PLACEMENT REQUEST
"Lamont Mcmullen (52 y.o. Male)     Date of Birth Social Security Number Address Home Phone MRN    [id number]  [id number]  [facility] [id number]  [id number]    [employer] Marital Status          Other        Admission Date Admission Type Admitting Provider Attending Provider Department, Room/Bed    2/20/17 Emergency Cj Aguero MD G[place], Abraham Stuart, MD [address], 332[address]1    Discharge Date Discharge Disposition Discharge Destination                      Attending Provider: Cj Aguero MD     Allergies:  Aspirin, Codeine Sulfate    Isolation:  Spore, Contact   Infection:  MRSA (02/23/17), C.difficile (02/21/17)   Code Status:  FULL    Ht:  72.01\" (182.9 cm)   Wt:  249 lb 4.8 oz (113 kg)    Admission Cmt:  None   Principal Problem:  None                Active Insurance as of 2/20/2017     Primary Coverage     Payor Plan Insurance Group Employer/Plan Group    [facility]      Payor Plan Address Payor Plan Phone Number Effective From Effective To    [address] [address]  1/1/2014     [place] [address]       Subscriber Name Subscriber Birth Date Member ID       LAMONT MCMULLEN 1965 [id number]                 Emergency Contacts      (Rel.) Home Phone Work Phone Mobile Phone    Loco Mcmullen (Daughter) -- -- [phone]              "

## 2017-03-09 NOTE — DISCHARGE SUMMARY
[facility] Discharge Summary        Date of Admission: 2/20/2017  Date of Discharge:  3/9/2017    Presenting Problem:  Functional diarrhea [K59.1]  Hyperammonemia [E72.20]  Urinary tract infection, site unspecified [N39.0]  Hyperammonemia [E72.20]       Final Discharge Diagnoses:  Hospital Problem List     Chronic hepatitis    Hepatic cirrhosis    Essential hypertension    Closed fracture of proximal end of right humerus with delayed healing    Closed nondisplaced supracondylar fracture of distal end of femur without intracondylar extension with delayed healing    Chronic osteomyelitis of right shoulder region (Chronic)    Acute cystitis without hematuria    Hypokalemia    Anasarca          Consults:   Consults     Date and Time Order Name Status Description    2/23/2017 0958 Inpatient Consult to Family Practice      2/22/2017 3459 Inpatient Consult to Orthopedic Surgery Completed     2/20/2017 1772 Hospitalist (on-call MD unless specified)            Procedures Performed: Procedure(s):  INCISION AND DRAINAGE RIGHT SHOULDER                  Hospital Course: Patient is a 52-year-old male   Who was hospitalized secondary to multiple medical complaints.  Patient states that his primary concern is his chronic osteomyelitis. 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 Chiu with better drainage tract.  Patient also initially had C difficile as well as a multi drug-resistant urinary tract infection but this appears to be doing better at this time.    Condition on Discharge:    Stable    Discharge Disposition:  Skilled Nursing Facility (DC - External)    Discharge Medications:   Armando Mcmullen   Home Medication  Instructions [facility]:[phone]    Printed on:03/09/17 0805   Medication Information                      amLODIPine (NORVASC) 2.5 MG tablet  Take 2.5 mg by mouth Daily As Needed (only takes if his blood pressure is high).             Bacitracin Zinc (MAGIC BUTT OINTMENT)  Apply 1 g topically 3 (Three) Times a Day.             furosemide (LASIX) 40 MG tablet  Take 1 tablet by mouth 2 (Two) Times a Day.             gabapentin (NEURONTIN) 100 MG capsule  Take 100 mg by mouth 3 (Three) Times a Day.             lactulose (CHRONULAC) 10 GM/15ML solution  Take 30 g by mouth 2 (Two) Times a Day.             Multiple Vitamins-Minerals (COMPLETE MULTIVITAMIN/MINERAL PO)  Take 1 tablet by mouth Daily.             nystatin (MYCOSTATIN) 221061 UNIT/GM powder  Apply  topically Every 12 (Twelve) Hours.             oxyCODONE-acetaminophen (PERCOCET)  MG per tablet  Take 1 tablet by mouth Every 4 (Four) Hours As Needed for moderate pain (4-6) (every 4-6 hours as needed).             oxyCODONE-acetaminophen (PERCOCET)  MG per tablet  Take 1 tablet by mouth Every 4 (Four) Hours As Needed for severe pain (7-10) for up to 5 days.             pantoprazole (PROTONIX) 40 MG EC tablet  Take 1 tablet by mouth Daily.             potassium chloride (K-DUR,KLOR-CON) 20 MEQ CR tablet  Take 2 tablets by mouth 2 (Two) Times a Day With Meals.             promethazine (PHENERGAN) 25 MG tablet  Take 1 tablet by mouth Every 6 (Six) Hours As Needed for Nausea or Vomiting.             rifaximin (XIFAXAN) 550 MG tablet  Take 1 tablet by mouth 2 (Two) Times a Day.             vancomycin 1,250 mg in sodium chloride 0.9 % 250 mL IVPB  Infuse 1,250 mg into a venous catheter Daily for 30 doses. Indications: abscess             vitamin D (ERGOCALCIFEROL) 85814 UNITS capsule capsule  Take 50,000 Units by mouth 1 (One) Time Per Week. Takes on Wednesdays                 Discharge Diet:   Diet Instructions     Diet: Regular; Thin Liquids, No  Restrictions       Discharge Diet:  Regular   Fluid Consistency:  Thin Liquids, No Restrictions                 Activity at Discharge:   Activity Instructions     Activity as Tolerated                     Discharge Care Plan/Instructions:  Continue on IV antibiotics to be followed by provider at nursing home.    Follow-up Appointments:   No future appointments.    Test Results Pending at Discharge:     Cj Aguero MD  03/09/17  4:36 PM

## 2017-03-09 NOTE — PLAN OF CARE
Problem: Patient Care Overview (Adult)  Goal: Plan of Care Review  Outcome: Ongoing (interventions implemented as appropriate)  Goal: Adult Individualization and Mutuality  Outcome: Ongoing (interventions implemented as appropriate)  Goal: Discharge Needs Assessment  Outcome: Ongoing (interventions implemented as appropriate)    Problem: Fall Risk (Adult)  Goal: Identify Related Risk Factors and Signs and Symptoms  Outcome: Ongoing (interventions implemented as appropriate)    Problem: Pressure Ulcer (Adult)  Goal: Signs and Symptoms of Listed Potential Problems Will be Absent or Manageable (Pressure Ulcer)  Outcome: Ongoing (interventions implemented as appropriate)    Problem: Skin Integrity Impairment, Risk/Actual (Adult)  Goal: Identify Related Risk Factors and Signs and Symptoms  Outcome: Ongoing (interventions implemented as appropriate)  Goal: Skin Integrity/Wound Healing  Outcome: Ongoing (interventions implemented as appropriate)

## 2017-03-09 NOTE — SIGNIFICANT NOTE
03/09/17 1435   Rehab Treatment   Discipline physical therapy assistant   Treatment Not Performed patient/family declined treatment   Recommendation   PT - Next Appointment 03/10/17

## 2017-03-10 NOTE — PLAN OF CARE
Problem: Inpatient Physical Therapy  Goal: Transfer Training Goal 1 LTG- PT  Outcome: Unable to achieve outcome(s) by discharge Date Met:  03/10/17    03/10/17 0830   Transfer Training PT LTG   Transfer Training PT LTG, Date Goal Reviewed 03/10/17   Transfer Training PT LTG, Outcome goal not met   Transfer Training PT LTG, Reason Goal Not Met discharged from facility       Goal: Gait Training Goal STG- PT  Outcome: Unable to achieve outcome(s) by discharge Date Met:  03/10/17    03/10/17 0830   Gait Training PT STG   Gait Training Goal PT STG, Date Goal Reviewed 03/10/17   Gait Training Goal PT STG, Outcome goal not met   Gait Training Goal PT STG, Reason Goal Not Met discharged from facility       Goal: Gait Training Goal LTG- PT  Outcome: Unable to achieve outcome(s) by discharge Date Met:  03/10/17    03/10/17 0830   Gait Training PT LTG   Gait Training Goal PT LTG, Date Goal Reviewed 03/10/17   Gait Training Goal PT LTG, Outcome goal not met   Gait Training Goal PT LTG, Reason Goal Not Met discharged from facility       Goal: Wheelchair Propulsion Goal LTG- PT  Outcome: Unable to achieve outcome(s) by discharge Date Met:  03/10/17    03/10/17 0830   Wheelchair Propulsion PT LTG   Wheelchair Propulsion Goal PT LTG, Date Goal Reviewed 03/10/17   Wheelchair Propulsion Goal PT LTG, Outcome goal not met   Wheelchair Propulsion Goal PT LTG, Reason Goal Not Met discharged from facility

## 2017-03-10 NOTE — THERAPY DISCHARGE NOTE
Acute Care - Physical Therapy Discharge Summary  [facility]       Patient Name: Armando Mcmullen  : 1965  MRN: [id number]    Today's Date: 3/10/2017  Onset of Illness/Injury or Date of Surgery Date: 17 (s/p I&D R shoulder on 17)    Date of Referral to PT: 17  Referring Physician: Dr. Aguero      Admit Date: 2017      PT Recommendation and Plan    Visit Dx:    ICD-10-CM ICD-9-CM   1. Hyperammonemia E72.20 270.6   2. Urinary tract infection, site unspecified N39.0    3. Functional diarrhea K59.1 564.5   4. Impaired functional mobility, balance, and endurance Z74.09 V49.89   5. Impaired gait and mobility R26.89 781.2             Outcome Measures       17 1115          How much help from another person do you currently need...    Turning from your back to your side while in flat bed without using bedrails? 4  -CH      Moving from lying on back to sitting on the side of a flat bed without bedrails? 4  -CH      Moving to and from a bed to a chair (including a wheelchair)? 3  -CH      Standing up from a chair using your arms (e.g., wheelchair, bedside chair)? 3  -CH      Climbing 3-5 steps with a railing? 1  -CH      To walk in hospital room? 1  -CH      AM-PAC 6 Clicks Score 16  -CH      Functional Assessment    Outcome Measure Options AM-PAC 6 Clicks Basic Mobility (PT)  -CH        User Key  (r) = Recorded By, (t) = Taken By, (c) = Cosigned By    Initials Name Provider Type    ALEXIS Mckoy, PTA Physical Therapy Assistant                      IP PT Goals       03/10/17 0830 17 1115 17 0838    Transfer Training PT LTG    Transfer Training PT  LTG, Date Goal Reviewed 03/10/17  -MN 17  -CH 17  -LM    Transfer Training PT LTG, Outcome goal not met  -MN goal ongoing  -CH goal ongoing  -LM    Transfer Training PT LTG, Reason Goal Not Met discharged from facility  -MN      Gait Training PT STG    Gait Training Goal PT STG, Date Goal Reviewed 03/10/17  -MN  03/07/17  -CH 03/06/17  -LM    Gait Training Goal PT STG, Outcome goal not met  -MN goal ongoing  -CH goal not met  -LM    Gait Training Goal PT STG, Reason Goal Not Met discharged from facility  -MN      Gait Training PT LTG    Gait Training Goal PT LTG, Date Goal Reviewed 03/10/17  -MN 03/07/17  -CH 03/06/17  -LM    Gait Training Goal PT LTG, Outcome goal not met  -MN goal ongoing  -CH goal not met  -LM    Gait Training Goal PT LTG, Reason Goal Not Met discharged from facility  -MN      Wheelchair Propulsion PT LTG    Wheelchair Propulsion Goal PT LTG, Date Goal Reviewed 03/10/17  -MN 03/07/17  -CH 03/06/17  -LM    Wheelchair Propulsion Goal PT LTG, Outcome goal not met  -MN goal ongoing  -CH goal ongoing  -LM    Wheelchair Propulsion Goal PT LTG, Reason Goal Not Met discharged from facility  -MN        03/05/17 1001 03/03/17 1437 03/03/17 1403    Transfer Training PT LTG    Transfer Training PT  LTG, Date Goal Reviewed 03/05/17  -JW 03/03/17  -LN     Transfer Training PT LTG, Outcome goal not met  - goal not met  -LN     Transfer Training PT LTG, Reason Goal Not Met  progress slower than expected  -LN     Gait Training PT STG    Gait Training Goal PT STG, Date Goal Reviewed 03/05/17  -JW  03/03/17  -LN    Gait Training Goal PT STG, Outcome goal not met  -  goal not met  -LN    Gait Training Goal PT STG, Reason Goal Not Met   progress slower than expected  -LN    Gait Training PT LTG    Gait Training Goal PT LTG, Date Goal Reviewed 03/05/17  -JW  03/03/17  -LN    Gait Training Goal PT LTG, Outcome goal not met  -  goal not met  -LN    Gait Training Goal PT LTG, Reason Goal Not Met   progress slower than expected  -LN    Wheelchair Propulsion PT LTG    Wheelchair Propulsion Goal PT LTG, Date Goal Reviewed 03/05/17  -JW  03/03/17  -LN    Wheelchair Propulsion Goal PT LTG, Outcome goal not met  -  goal not met  -LN    Wheelchair Propulsion Goal PT LTG, Reason Goal Not Met   progress slower than expected  -LN       03/02/17 1538 03/02/17 1435 03/01/17 1330    Transfer Training PT LTG    Transfer Training PT LTG, Date Established   03/01/17  -KG    Transfer Training PT LTG, Time to Achieve   by discharge  -KG    Transfer Training PT LTG, Activity Type   bed to chair /chair to bed;sit to stand/stand to sit;toilet  -KG    Transfer Training PT LTG, [place] Level   conditional independence  -KG    Transfer Training PT LTG, Assist Device   --   AAD  -KG    Transfer Training PT  LTG, Date Goal Reviewed 03/02/17  -LN      Transfer Training PT LTG, Outcome goal not met  -LN      Transfer Training PT LTG, Reason Goal Not Met progress slower than expected  -LN      Gait Training PT STG    Gait Training Goal PT STG, Date Established   03/01/17  -KG    Gait Training Goal PT STG, Time to Achieve   3 days  -KG    Gait Training Goal PT STG, [place] Level   moderate assist (50% patient effort);minimum assist (75% patient effort)  -KG    Gait Training Goal PT STG, Assist Device   --   AAD  -KG    Gait Training Goal PT STG, Distance to Achieve   5 feet  -KG    Gait Training Goal PT STG, Date Goal Reviewed  03/02/17  -LN     Gait Training Goal PT STG, Outcome  goal not met  -LN     Gait Training Goal PT STG, Reason Goal Not Met  progress slower than expected  -LN     Gait Training PT LTG    Gait Training Goal PT LTG, Date Established   03/01/17  -KG    Gait Training Goal PT LTG, Time to Achieve   by discharge  -KG    Gait Training Goal PT LTG, [place] Level   contact guard assist  -KG    Gait Training Goal PT LTG, Assist Device   --   AAD  -KG    Gait Training Goal PT LTG, Distance to Achieve   20 feet  -KG    Gait Training Goal PT LTG, Date Goal Reviewed  03/02/17  -LN     Gait Training Goal PT LTG, Outcome  goal not met  -LN     Gait Training Goal PT LTG, Reason Goal Not Met  progress slower than expected  -LN     Wheelchair Propulsion PT LTG    Wheelchair Propulsion Goal PT LTG, Date Established   03/01/17  -KG     Wheelchair Propulsion Goal PT LTG, Time to Achieve   by discharge  -KG    Wheelchair Propulsion Goal PT LTG, [place] Level   conditional independence  -KG    Wheelchair Propulsion Goal PT LTG, Distance to Achieve   150 feet  -KG    Wheelchair Propulsion Goal PT LTG, Date Goal Reviewed  03/02/17  -LN     Wheelchair Propulsion Goal PT LTG, Outcome  goal not met  -LN     Wheelchair Propulsion Goal PT LTG, Reason Goal Not Met  progress slower than expected  -LN       User Key  (r) = Recorded By, (t) = Taken By, (c) = Cosigned By    Initials Name Provider Type    JW Casie Emmanuel, PTA Physical Therapy Assistant    LM Stephanie Vergara, PT Physical Therapist    KG Ailyn Nava, PT Physical Therapist    MN Tammy Salazar, PT Physical Therapist    ALEXIS Mckoy, PTA Physical Therapy Assistant    LN Stephanie Du, PTA Physical Therapy Assistant              PT Discharge Summary  Anticipated Discharge Disposition: skilled nursing facility  Reason for Discharge: Discharge from facility, Per MD order  Outcomes Achieved: Unable to make functional progress toward goals at this time  Discharge Destination: SNF      Tammy Salazar, PT   3/10/2017

## 2017-03-23 DIAGNOSIS — S72.492D OTHER CLOSED FRACTURE OF DISTAL END OF LEFT FEMUR WITH ROUTINE HEALING, SUBSEQUENT ENCOUNTER: Primary | ICD-10-CM

## 2017-03-23 DIAGNOSIS — Z89.9 STATUS POST AMPUTATION: ICD-10-CM

## 2017-04-23 ENCOUNTER — APPOINTMENT (OUTPATIENT)
Dept: GENERAL RADIOLOGY | Facility: HOSPITAL | Age: 52
End: 2017-04-23

## 2017-04-23 ENCOUNTER — APPOINTMENT (OUTPATIENT)
Dept: CT IMAGING | Facility: HOSPITAL | Age: 52
End: 2017-04-23

## 2017-04-23 ENCOUNTER — HOSPITAL ENCOUNTER (INPATIENT)
Facility: HOSPITAL | Age: 52
LOS: 4 days | Discharge: HOME | End: 2017-04-27
Attending: EMERGENCY MEDICINE | Admitting: FAMILY MEDICINE

## 2017-04-23 DIAGNOSIS — E72.20 HYPERAMMONEMIA (HCC): ICD-10-CM

## 2017-04-23 DIAGNOSIS — A41.9 SEPSIS, DUE TO UNSPECIFIED ORGANISM: Primary | ICD-10-CM

## 2017-04-23 DIAGNOSIS — G93.41 METABOLIC ENCEPHALOPATHY: ICD-10-CM

## 2017-04-23 LAB
ALBUMIN SERPL-MCNC: 3.4 G/DL (ref 3.4–4.8)
ALBUMIN/GLOB SERPL: 0.8 G/DL (ref 1.1–1.8)
ALP SERPL-CCNC: 165 U/L (ref 38–126)
ALT SERPL W P-5'-P-CCNC: 23 U/L (ref 21–72)
AMMONIA BLD-SCNC: 300 UMOL/L (ref 9–30)
AMPHET+METHAMPHET UR QL: NEGATIVE
ANION GAP SERPL CALCULATED.3IONS-SCNC: 16 MMOL/L (ref 5–15)
APTT PPP: 35.9 SECONDS (ref 20–40.3)
ARTERIAL PATENCY WRIST A: ABNORMAL
AST SERPL-CCNC: 50 U/L (ref 17–59)
ATMOSPHERIC PRESS: ABNORMAL MMHG
BACTERIA UR QL AUTO: ABNORMAL /HPF
BARBITURATES UR QL SCN: NEGATIVE
BASE EXCESS BLDA CALC-SCNC: -5.7 MMOL/L (ref -2.4–2.4)
BASOPHILS # BLD AUTO: 0.12 10*3/MM3 (ref 0–0.2)
BASOPHILS NFR BLD AUTO: 1.2 % (ref 0–2)
BDY SITE: ABNORMAL
BENZODIAZ UR QL SCN: NEGATIVE
BILIRUB SERPL-MCNC: 2.4 MG/DL (ref 0.2–1.3)
BILIRUB UR QL STRIP: NEGATIVE
BUN BLD-MCNC: 21 MG/DL (ref 7–21)
BUN/CREAT SERPL: 11.2 (ref 7–25)
CA-I BLD-MCNC: 4.5 MG/DL (ref 4.5–4.9)
CALCIUM SPEC-SCNC: 9.1 MG/DL (ref 8.4–10.2)
CANNABINOIDS SERPL QL: POSITIVE
CHLORIDE SERPL-SCNC: 110 MMOL/L (ref 95–110)
CLARITY UR: CLEAR
CO2 BLDA-SCNC: 18.8 MMOL/L (ref 23–27)
CO2 SERPL-SCNC: 18 MMOL/L (ref 22–31)
COCAINE UR QL: NEGATIVE
COLOR UR: YELLOW
CREAT BLD-MCNC: 1.88 MG/DL (ref 0.7–1.3)
D-LACTATE SERPL-SCNC: 2.4 MMOL/L (ref 0.5–2)
D-LACTATE SERPL-SCNC: 8.3 MMOL/L (ref 0.5–2)
DEPRECATED RDW RBC AUTO: 52.7 FL (ref 35.1–43.9)
EOSINOPHIL # BLD AUTO: 0.35 10*3/MM3 (ref 0–0.7)
EOSINOPHIL NFR BLD AUTO: 3.5 % (ref 0–7)
ERYTHROCYTE [DISTWIDTH] IN BLOOD BY AUTOMATED COUNT: 14.7 % (ref 11.5–14.5)
ETHANOL BLD-MCNC: <10 MG/DL (ref 0–10)
ETHANOL UR QL: <0.01 %
GFR SERPL CREATININE-BSD FRML MDRD: 38 ML/MIN/1.73 (ref 56–130)
GLOBULIN UR ELPH-MCNC: 4.1 GM/DL (ref 2.3–3.5)
GLUCOSE BLD-MCNC: 125 MG/DL (ref 60–100)
GLUCOSE BLDA-MCNC: 115 MMOL/L
GLUCOSE UR STRIP-MCNC: NEGATIVE MG/DL
HCO3 BLDA-SCNC: 17.9 MMOL/L (ref 22–26)
HCT VFR BLD AUTO: 38.6 % (ref 39–49)
HCT VFR BLD CALC: 37 % (ref 40–54)
HGB BLD-MCNC: 13.3 G/DL (ref 13.7–17.3)
HGB BLDA-MCNC: 12.5 G/DL (ref 14–18)
HGB UR QL STRIP.AUTO: ABNORMAL
HYALINE CASTS UR QL AUTO: ABNORMAL /LPF
IMM GRANULOCYTES # BLD: 0.02 10*3/MM3 (ref 0–0.02)
IMM GRANULOCYTES NFR BLD: 0.2 % (ref 0–0.5)
INR PPP: 1.27 (ref 0.8–1.2)
KETONES UR QL STRIP: NEGATIVE
LEUKOCYTE ESTERASE UR QL STRIP.AUTO: ABNORMAL
LIPASE SERPL-CCNC: 105 U/L (ref 23–300)
LYMPHOCYTES # BLD AUTO: 1.28 10*3/MM3 (ref 0.6–4.2)
LYMPHOCYTES NFR BLD AUTO: 12.7 % (ref 10–50)
MAGNESIUM SERPL-MCNC: 2.2 MG/DL (ref 1.6–2.3)
MCH RBC QN AUTO: 34.1 PG (ref 26.5–34)
MCHC RBC AUTO-ENTMCNC: 34.5 G/DL (ref 31.5–36.3)
MCV RBC AUTO: 99 FL (ref 80–98)
METHADONE UR QL SCN: NEGATIVE
MODALITY: ABNORMAL
MONOCYTES # BLD AUTO: 0.74 10*3/MM3 (ref 0–0.9)
MONOCYTES NFR BLD AUTO: 7.3 % (ref 0–12)
NEUTROPHILS # BLD AUTO: 7.58 10*3/MM3 (ref 2–8.6)
NEUTROPHILS NFR BLD AUTO: 75.1 % (ref 37–80)
NITRITE UR QL STRIP: NEGATIVE
NRBC BLD MANUAL-RTO: 0 /100 WBC (ref 0–0)
OPIATES UR QL: POSITIVE
OXYCODONE UR QL SCN: POSITIVE
PCO2 BLDA: 29.4 MM HG (ref 35–45)
PH BLDA: 7.4 PH UNITS (ref 7.35–7.45)
PH UR STRIP.AUTO: 5.5 [PH] (ref 5–9)
PLATELET # BLD AUTO: 98 10*3/MM3 (ref 150–450)
PMV BLD AUTO: 10.3 FL (ref 8–12)
PO2 BLDA: 91.4 MM HG (ref 80–105)
POTASSIUM BLD-SCNC: 4.3 MMOL/L (ref 3.5–5.1)
POTASSIUM BLDA-SCNC: 4.11 MMOL/L (ref 3.6–4.9)
PROT SERPL-MCNC: 7.5 G/DL (ref 6.3–8.6)
PROT UR QL STRIP: NEGATIVE
PROTHROMBIN TIME: 15.9 SECONDS (ref 11.1–15.3)
RBC # BLD AUTO: 3.9 10*6/MM3 (ref 4.37–5.74)
RBC # UR: ABNORMAL /HPF
REF LAB TEST METHOD: ABNORMAL
SAO2 % BLDCOA: 96.7 % (ref 94–100)
SODIUM BLD-SCNC: 144 MMOL/L (ref 137–145)
SODIUM BLDA-SCNC: 143.7 MMOL/L (ref 138–146)
SP GR UR STRIP: 1.01 (ref 1–1.03)
SQUAMOUS #/AREA URNS HPF: ABNORMAL /HPF
TROPONIN I SERPL-MCNC: <0.012 NG/ML
UROBILINOGEN UR QL STRIP: ABNORMAL
WBC NRBC COR # BLD: 10.09 10*3/MM3 (ref 3.2–9.8)
WBC UR QL AUTO: ABNORMAL /HPF

## 2017-04-23 PROCEDURE — 25010000002 LORAZEPAM PER 2 MG: Performed by: EMERGENCY MEDICINE

## 2017-04-23 PROCEDURE — 99285 EMERGENCY DEPT VISIT HI MDM: CPT

## 2017-04-23 PROCEDURE — 93010 ELECTROCARDIOGRAM REPORT: CPT | Performed by: INTERNAL MEDICINE

## 2017-04-23 PROCEDURE — 80307 DRUG TEST PRSMV CHEM ANLYZR: CPT | Performed by: EMERGENCY MEDICINE

## 2017-04-23 PROCEDURE — 90715 TDAP VACCINE 7 YRS/> IM: CPT | Performed by: EMERGENCY MEDICINE

## 2017-04-23 PROCEDURE — 83690 ASSAY OF LIPASE: CPT | Performed by: EMERGENCY MEDICINE

## 2017-04-23 PROCEDURE — 25010000002 PIPERACILLIN SOD-TAZOBACTAM PER 1 G: Performed by: EMERGENCY MEDICINE

## 2017-04-23 PROCEDURE — 83735 ASSAY OF MAGNESIUM: CPT | Performed by: EMERGENCY MEDICINE

## 2017-04-23 PROCEDURE — 93005 ELECTROCARDIOGRAM TRACING: CPT | Performed by: EMERGENCY MEDICINE

## 2017-04-23 PROCEDURE — 87040 BLOOD CULTURE FOR BACTERIA: CPT | Performed by: EMERGENCY MEDICINE

## 2017-04-23 PROCEDURE — 85025 COMPLETE CBC W/AUTO DIFF WBC: CPT | Performed by: EMERGENCY MEDICINE

## 2017-04-23 PROCEDURE — 84484 ASSAY OF TROPONIN QUANT: CPT | Performed by: EMERGENCY MEDICINE

## 2017-04-23 PROCEDURE — 70450 CT HEAD/BRAIN W/O DYE: CPT

## 2017-04-23 PROCEDURE — 82803 BLOOD GASES ANY COMBINATION: CPT | Performed by: EMERGENCY MEDICINE

## 2017-04-23 PROCEDURE — 80053 COMPREHEN METABOLIC PANEL: CPT | Performed by: EMERGENCY MEDICINE

## 2017-04-23 PROCEDURE — 25010000002 PIPERACILLIN SOD-TAZOBACTAM PER 1 G: Performed by: FAMILY MEDICINE

## 2017-04-23 PROCEDURE — 25010000002 TDAP 5-2.5-18.5 LF-MCG/0.5 SUSPENSION: Performed by: EMERGENCY MEDICINE

## 2017-04-23 PROCEDURE — 83605 ASSAY OF LACTIC ACID: CPT | Performed by: EMERGENCY MEDICINE

## 2017-04-23 PROCEDURE — 25010000002 VANCOMYCIN PER 500 MG: Performed by: FAMILY MEDICINE

## 2017-04-23 PROCEDURE — 25010000002 LEVOFLOXACIN PER 250 MG: Performed by: EMERGENCY MEDICINE

## 2017-04-23 PROCEDURE — 85730 THROMBOPLASTIN TIME PARTIAL: CPT | Performed by: EMERGENCY MEDICINE

## 2017-04-23 PROCEDURE — 74176 CT ABD & PELVIS W/O CONTRAST: CPT

## 2017-04-23 PROCEDURE — 82140 ASSAY OF AMMONIA: CPT | Performed by: EMERGENCY MEDICINE

## 2017-04-23 PROCEDURE — 90471 IMMUNIZATION ADMIN: CPT | Performed by: EMERGENCY MEDICINE

## 2017-04-23 PROCEDURE — 81001 URINALYSIS AUTO W/SCOPE: CPT | Performed by: EMERGENCY MEDICINE

## 2017-04-23 PROCEDURE — 71010 HC CHEST PA OR AP: CPT

## 2017-04-23 PROCEDURE — 85610 PROTHROMBIN TIME: CPT | Performed by: EMERGENCY MEDICINE

## 2017-04-23 RX ORDER — HYDRALAZINE HYDROCHLORIDE 20 MG/ML
10 INJECTION INTRAMUSCULAR; INTRAVENOUS EVERY 6 HOURS PRN
Status: DISCONTINUED | OUTPATIENT
Start: 2017-04-23 | End: 2017-04-27 | Stop reason: HOSPADM

## 2017-04-23 RX ORDER — DOCUSATE SODIUM 100 MG/1
100 CAPSULE, LIQUID FILLED ORAL 2 TIMES DAILY
Status: DISCONTINUED | OUTPATIENT
Start: 2017-04-23 | End: 2017-04-27 | Stop reason: HOSPADM

## 2017-04-23 RX ORDER — SODIUM CHLORIDE 9 MG/ML
125 INJECTION, SOLUTION INTRAVENOUS CONTINUOUS
Status: DISCONTINUED | OUTPATIENT
Start: 2017-04-23 | End: 2017-04-27 | Stop reason: HOSPADM

## 2017-04-23 RX ORDER — AMLODIPINE BESYLATE 2.5 MG/1
2.5 TABLET ORAL DAILY PRN
Status: DISCONTINUED | OUTPATIENT
Start: 2017-04-23 | End: 2017-04-27 | Stop reason: HOSPADM

## 2017-04-23 RX ORDER — LORAZEPAM 2 MG/ML
1 INJECTION INTRAMUSCULAR ONCE
Status: COMPLETED | OUTPATIENT
Start: 2017-04-23 | End: 2017-04-23

## 2017-04-23 RX ORDER — LEVOFLOXACIN 5 MG/ML
750 INJECTION, SOLUTION INTRAVENOUS EVERY 24 HOURS
Status: DISCONTINUED | OUTPATIENT
Start: 2017-04-24 | End: 2017-04-23

## 2017-04-23 RX ORDER — LACTULOSE 10 G/15ML
30 SOLUTION ORAL 2 TIMES DAILY
Status: DISCONTINUED | OUTPATIENT
Start: 2017-04-23 | End: 2017-04-27 | Stop reason: HOSPADM

## 2017-04-23 RX ORDER — NYSTATIN 100000 [USP'U]/G
POWDER TOPICAL EVERY 12 HOURS SCHEDULED
Status: DISCONTINUED | OUTPATIENT
Start: 2017-04-23 | End: 2017-04-27 | Stop reason: HOSPADM

## 2017-04-23 RX ORDER — LEVOFLOXACIN 5 MG/ML
750 INJECTION, SOLUTION INTRAVENOUS ONCE
Status: COMPLETED | OUTPATIENT
Start: 2017-04-23 | End: 2017-04-23

## 2017-04-23 RX ORDER — LORAZEPAM 2 MG/ML
1 INJECTION INTRAMUSCULAR ONCE
Status: DISCONTINUED | OUTPATIENT
Start: 2017-04-23 | End: 2017-04-23

## 2017-04-23 RX ORDER — SODIUM CHLORIDE 0.9 % (FLUSH) 0.9 %
10 SYRINGE (ML) INJECTION AS NEEDED
Status: DISCONTINUED | OUTPATIENT
Start: 2017-04-23 | End: 2017-04-27 | Stop reason: HOSPADM

## 2017-04-23 RX ORDER — NICOTINE 21 MG/24HR
1 PATCH, TRANSDERMAL 24 HOURS TRANSDERMAL EVERY 24 HOURS
Status: DISCONTINUED | OUTPATIENT
Start: 2017-04-23 | End: 2017-04-27 | Stop reason: HOSPADM

## 2017-04-23 RX ORDER — GABAPENTIN 100 MG/1
100 CAPSULE ORAL 3 TIMES DAILY
Status: DISCONTINUED | OUTPATIENT
Start: 2017-04-23 | End: 2017-04-27 | Stop reason: HOSPADM

## 2017-04-23 RX ORDER — LACTULOSE 10 G/15ML
20 SOLUTION ORAL ONCE
Status: COMPLETED | OUTPATIENT
Start: 2017-04-23 | End: 2017-04-23

## 2017-04-23 RX ORDER — ONDANSETRON 2 MG/ML
4 INJECTION INTRAMUSCULAR; INTRAVENOUS EVERY 6 HOURS PRN
Status: DISCONTINUED | OUTPATIENT
Start: 2017-04-23 | End: 2017-04-27 | Stop reason: HOSPADM

## 2017-04-23 RX ORDER — ACETAMINOPHEN 325 MG/1
650 TABLET ORAL EVERY 4 HOURS PRN
Status: DISCONTINUED | OUTPATIENT
Start: 2017-04-23 | End: 2017-04-27 | Stop reason: HOSPADM

## 2017-04-23 RX ORDER — PANTOPRAZOLE SODIUM 40 MG/1
40 TABLET, DELAYED RELEASE ORAL DAILY
Status: DISCONTINUED | OUTPATIENT
Start: 2017-04-23 | End: 2017-04-27 | Stop reason: HOSPADM

## 2017-04-23 RX ORDER — FUROSEMIDE 40 MG/1
40 TABLET ORAL 2 TIMES DAILY
Status: DISCONTINUED | OUTPATIENT
Start: 2017-04-23 | End: 2017-04-27 | Stop reason: HOSPADM

## 2017-04-23 RX ORDER — OXYCODONE AND ACETAMINOPHEN 10; 325 MG/1; MG/1
1 TABLET ORAL EVERY 4 HOURS PRN
Status: DISCONTINUED | OUTPATIENT
Start: 2017-04-23 | End: 2017-04-27 | Stop reason: HOSPADM

## 2017-04-23 RX ORDER — SODIUM CHLORIDE 0.9 % (FLUSH) 0.9 %
1-10 SYRINGE (ML) INJECTION AS NEEDED
Status: DISCONTINUED | OUTPATIENT
Start: 2017-04-23 | End: 2017-04-27 | Stop reason: HOSPADM

## 2017-04-23 RX ORDER — PROMETHAZINE HYDROCHLORIDE 25 MG/1
25 TABLET ORAL EVERY 6 HOURS PRN
Status: DISCONTINUED | OUTPATIENT
Start: 2017-04-23 | End: 2017-04-27 | Stop reason: HOSPADM

## 2017-04-23 RX ADMIN — VANCOMYCIN HYDROCHLORIDE 1750 MG: 5 INJECTION, POWDER, LYOPHILIZED, FOR SOLUTION INTRAVENOUS at 16:35

## 2017-04-23 RX ADMIN — RIFAXIMIN 550 MG: 550 TABLET ORAL at 17:58

## 2017-04-23 RX ADMIN — OXYCODONE HYDROCHLORIDE AND ACETAMINOPHEN 1 TABLET: 10; 325 TABLET ORAL at 19:47

## 2017-04-23 RX ADMIN — METRONIDAZOLE 500 MG: 500 INJECTION, SOLUTION INTRAVENOUS at 15:29

## 2017-04-23 RX ADMIN — LORAZEPAM 1 MG: 2 INJECTION, SOLUTION INTRAMUSCULAR; INTRAVENOUS at 12:14

## 2017-04-23 RX ADMIN — Medication 1 G: at 19:51

## 2017-04-23 RX ADMIN — TETANUS TOXOID, REDUCED DIPHTHERIA TOXOID AND ACELLULAR PERTUSSIS VACCINE, ADSORBED 0.5 ML: 5; 2.5; 8; 8; 2.5 SUSPENSION INTRAMUSCULAR at 12:33

## 2017-04-23 RX ADMIN — LACTULOSE 30 G: 10 SOLUTION ORAL at 17:28

## 2017-04-23 RX ADMIN — SODIUM CHLORIDE 3000 ML: 9 INJECTION, SOLUTION INTRAVENOUS at 12:38

## 2017-04-23 RX ADMIN — METRONIDAZOLE 500 MG: 500 INJECTION, SOLUTION INTRAVENOUS at 23:44

## 2017-04-23 RX ADMIN — LEVOFLOXACIN 750 MG: 5 INJECTION, SOLUTION INTRAVENOUS at 12:40

## 2017-04-23 RX ADMIN — SODIUM CHLORIDE 125 ML/HR: 900 INJECTION, SOLUTION INTRAVENOUS at 15:26

## 2017-04-23 RX ADMIN — FUROSEMIDE 40 MG: 40 TABLET ORAL at 17:28

## 2017-04-23 RX ADMIN — TAZOBACTAM SODIUM AND PIPERACILLIN SODIUM 4.5 G: 500; 4 INJECTION, SOLUTION INTRAVENOUS at 13:33

## 2017-04-23 RX ADMIN — SODIUM CHLORIDE 500 ML: 9 INJECTION, SOLUTION INTRAVENOUS at 12:13

## 2017-04-23 RX ADMIN — NICOTINE 1 PATCH: 21 PATCH TRANSDERMAL at 17:28

## 2017-04-23 RX ADMIN — LACTULOSE 20 G: 10 SOLUTION ORAL at 13:50

## 2017-04-23 RX ADMIN — TAZOBACTAM SODIUM AND PIPERACILLIN SODIUM 4.5 G: 500; 4 INJECTION, SOLUTION INTRAVENOUS at 19:47

## 2017-04-23 RX ADMIN — NYSTATIN: 100000 POWDER TOPICAL at 19:50

## 2017-04-24 PROBLEM — G93.41 METABOLIC ENCEPHALOPATHY: Status: ACTIVE | Noted: 2017-04-24

## 2017-04-24 LAB
AMMONIA BLD-SCNC: 112 UMOL/L (ref 9–30)
ANION GAP SERPL CALCULATED.3IONS-SCNC: 6 MMOL/L (ref 5–15)
BASOPHILS # BLD AUTO: 0.08 10*3/MM3 (ref 0–0.2)
BASOPHILS NFR BLD AUTO: 1 % (ref 0–2)
BUN BLD-MCNC: 18 MG/DL (ref 7–21)
BUN/CREAT SERPL: 11.3 (ref 7–25)
C DIFF TOX GENS STL QL NAA+PROBE: NEGATIVE
CALCIUM SPEC-SCNC: 8.4 MG/DL (ref 8.4–10.2)
CHLORIDE SERPL-SCNC: 118 MMOL/L (ref 95–110)
CO2 SERPL-SCNC: 21 MMOL/L (ref 22–31)
CREAT BLD-MCNC: 1.59 MG/DL (ref 0.7–1.3)
DEPRECATED RDW RBC AUTO: 50.4 FL (ref 35.1–43.9)
EOSINOPHIL # BLD AUTO: 0.5 10*3/MM3 (ref 0–0.7)
EOSINOPHIL NFR BLD AUTO: 6.3 % (ref 0–7)
ERYTHROCYTE [DISTWIDTH] IN BLOOD BY AUTOMATED COUNT: 14.1 % (ref 11.5–14.5)
GFR SERPL CREATININE-BSD FRML MDRD: 46 ML/MIN/1.73 (ref 56–130)
GLUCOSE BLD-MCNC: 77 MG/DL (ref 60–100)
HCT VFR BLD AUTO: 31 % (ref 39–49)
HEMOCCULT STL QL: POSITIVE
HGB BLD-MCNC: 10.9 G/DL (ref 13.7–17.3)
IMM GRANULOCYTES # BLD: 0.03 10*3/MM3 (ref 0–0.02)
IMM GRANULOCYTES NFR BLD: 0.4 % (ref 0–0.5)
LYMPHOCYTES # BLD AUTO: 1.94 10*3/MM3 (ref 0.6–4.2)
LYMPHOCYTES NFR BLD AUTO: 24.6 % (ref 10–50)
MCH RBC QN AUTO: 34.4 PG (ref 26.5–34)
MCHC RBC AUTO-ENTMCNC: 35.2 G/DL (ref 31.5–36.3)
MCV RBC AUTO: 97.8 FL (ref 80–98)
MONOCYTES # BLD AUTO: 0.87 10*3/MM3 (ref 0–0.9)
MONOCYTES NFR BLD AUTO: 11 % (ref 0–12)
NEUTROPHILS # BLD AUTO: 4.47 10*3/MM3 (ref 2–8.6)
NEUTROPHILS NFR BLD AUTO: 56.7 % (ref 37–80)
PLATELET # BLD AUTO: 61 10*3/MM3 (ref 150–450)
PMV BLD AUTO: 10.9 FL (ref 8–12)
POTASSIUM BLD-SCNC: 4.1 MMOL/L (ref 3.5–5.1)
RBC # BLD AUTO: 3.17 10*6/MM3 (ref 4.37–5.74)
SODIUM BLD-SCNC: 145 MMOL/L (ref 137–145)
WBC NRBC COR # BLD: 7.89 10*3/MM3 (ref 3.2–9.8)

## 2017-04-24 PROCEDURE — 87493 C DIFF AMPLIFIED PROBE: CPT | Performed by: FAMILY MEDICINE

## 2017-04-24 PROCEDURE — 85025 COMPLETE CBC W/AUTO DIFF WBC: CPT | Performed by: FAMILY MEDICINE

## 2017-04-24 PROCEDURE — 80048 BASIC METABOLIC PNL TOTAL CA: CPT | Performed by: FAMILY MEDICINE

## 2017-04-24 PROCEDURE — 82270 OCCULT BLOOD FECES: CPT | Performed by: FAMILY MEDICINE

## 2017-04-24 PROCEDURE — 25010000002 VANCOMYCIN PER 500 MG: Performed by: FAMILY MEDICINE

## 2017-04-24 PROCEDURE — 25010000002 PIPERACILLIN SOD-TAZOBACTAM PER 1 G: Performed by: FAMILY MEDICINE

## 2017-04-24 PROCEDURE — 82140 ASSAY OF AMMONIA: CPT | Performed by: FAMILY MEDICINE

## 2017-04-24 RX ADMIN — TAZOBACTAM SODIUM AND PIPERACILLIN SODIUM 4.5 G: 500; 4 INJECTION, SOLUTION INTRAVENOUS at 00:58

## 2017-04-24 RX ADMIN — TAZOBACTAM SODIUM AND PIPERACILLIN SODIUM 4.5 G: 500; 4 INJECTION, SOLUTION INTRAVENOUS at 19:58

## 2017-04-24 RX ADMIN — LACTULOSE 30 G: 10 SOLUTION ORAL at 18:06

## 2017-04-24 RX ADMIN — METRONIDAZOLE 500 MG: 500 INJECTION, SOLUTION INTRAVENOUS at 22:56

## 2017-04-24 RX ADMIN — PANTOPRAZOLE SODIUM 40 MG: 40 TABLET, DELAYED RELEASE ORAL at 09:14

## 2017-04-24 RX ADMIN — SODIUM CHLORIDE 125 ML/HR: 900 INJECTION, SOLUTION INTRAVENOUS at 22:57

## 2017-04-24 RX ADMIN — METRONIDAZOLE 500 MG: 500 INJECTION, SOLUTION INTRAVENOUS at 06:24

## 2017-04-24 RX ADMIN — NYSTATIN: 100000 POWDER TOPICAL at 20:01

## 2017-04-24 RX ADMIN — NYSTATIN: 100000 POWDER TOPICAL at 09:17

## 2017-04-24 RX ADMIN — NICOTINE 1 PATCH: 21 PATCH TRANSDERMAL at 15:38

## 2017-04-24 RX ADMIN — FUROSEMIDE 40 MG: 40 TABLET ORAL at 09:14

## 2017-04-24 RX ADMIN — LACTULOSE 30 G: 10 SOLUTION ORAL at 09:16

## 2017-04-24 RX ADMIN — Medication 1 G: at 16:51

## 2017-04-24 RX ADMIN — TAZOBACTAM SODIUM AND PIPERACILLIN SODIUM 4.5 G: 500; 4 INJECTION, SOLUTION INTRAVENOUS at 13:24

## 2017-04-24 RX ADMIN — METRONIDAZOLE 500 MG: 500 INJECTION, SOLUTION INTRAVENOUS at 15:38

## 2017-04-24 RX ADMIN — SODIUM CHLORIDE 125 ML/HR: 900 INJECTION, SOLUTION INTRAVENOUS at 06:26

## 2017-04-24 RX ADMIN — GABAPENTIN 100 MG: 100 CAPSULE ORAL at 19:59

## 2017-04-24 RX ADMIN — FUROSEMIDE 40 MG: 40 TABLET ORAL at 18:06

## 2017-04-24 RX ADMIN — OXYCODONE HYDROCHLORIDE AND ACETAMINOPHEN 1 TABLET: 10; 325 TABLET ORAL at 19:58

## 2017-04-24 RX ADMIN — VANCOMYCIN HYDROCHLORIDE 1500 MG: 1 INJECTION, POWDER, LYOPHILIZED, FOR SOLUTION INTRAVENOUS at 17:22

## 2017-04-24 RX ADMIN — Medication 1 G: at 20:01

## 2017-04-24 RX ADMIN — GABAPENTIN 100 MG: 100 CAPSULE ORAL at 09:15

## 2017-04-24 RX ADMIN — RIFAXIMIN 550 MG: 550 TABLET ORAL at 18:06

## 2017-04-24 RX ADMIN — Medication 1 G: at 09:17

## 2017-04-24 RX ADMIN — RIFAXIMIN 550 MG: 550 TABLET ORAL at 09:15

## 2017-04-24 RX ADMIN — TAZOBACTAM SODIUM AND PIPERACILLIN SODIUM 4.5 G: 500; 4 INJECTION, SOLUTION INTRAVENOUS at 06:30

## 2017-04-24 RX ADMIN — GABAPENTIN 100 MG: 100 CAPSULE ORAL at 16:51

## 2017-04-25 LAB
ALBUMIN SERPL-MCNC: 2.5 G/DL (ref 3.4–4.8)
ALBUMIN SERPL-MCNC: 2.6 G/DL (ref 3.4–4.8)
ALBUMIN/GLOB SERPL: 0.7 G/DL (ref 1.1–1.8)
ALBUMIN/GLOB SERPL: 0.7 G/DL (ref 1.1–1.8)
ALP SERPL-CCNC: 79 U/L (ref 38–126)
ALP SERPL-CCNC: 82 U/L (ref 38–126)
ALT SERPL W P-5'-P-CCNC: 29 U/L (ref 21–72)
ALT SERPL W P-5'-P-CCNC: 29 U/L (ref 21–72)
AMMONIA BLD-SCNC: 19 UMOL/L (ref 9–30)
ANION GAP SERPL CALCULATED.3IONS-SCNC: 11 MMOL/L (ref 5–15)
ANION GAP SERPL CALCULATED.3IONS-SCNC: 13 MMOL/L (ref 5–15)
AST SERPL-CCNC: 49 U/L (ref 17–59)
AST SERPL-CCNC: 54 U/L (ref 17–59)
BASOPHILS # BLD AUTO: 0.08 10*3/MM3 (ref 0–0.2)
BASOPHILS NFR BLD AUTO: 1.2 % (ref 0–2)
BILIRUB SERPL-MCNC: 4.1 MG/DL (ref 0.2–1.3)
BILIRUB SERPL-MCNC: 4.2 MG/DL (ref 0.2–1.3)
BUN BLD-MCNC: 16 MG/DL (ref 7–21)
BUN BLD-MCNC: 18 MG/DL (ref 7–21)
BUN/CREAT SERPL: 10.7 (ref 7–25)
BUN/CREAT SERPL: 9.5 (ref 7–25)
CALCIUM SPEC-SCNC: 8.7 MG/DL (ref 8.4–10.2)
CALCIUM SPEC-SCNC: 8.8 MG/DL (ref 8.4–10.2)
CHLORIDE SERPL-SCNC: 117 MMOL/L (ref 95–110)
CHLORIDE SERPL-SCNC: 118 MMOL/L (ref 95–110)
CO2 SERPL-SCNC: 17 MMOL/L (ref 22–31)
CO2 SERPL-SCNC: 18 MMOL/L (ref 22–31)
CREAT BLD-MCNC: 1.68 MG/DL (ref 0.7–1.3)
CREAT BLD-MCNC: 1.69 MG/DL (ref 0.7–1.3)
DEPRECATED RDW RBC AUTO: 52.4 FL (ref 35.1–43.9)
EOSINOPHIL # BLD AUTO: 0.28 10*3/MM3 (ref 0–0.7)
EOSINOPHIL NFR BLD AUTO: 4.2 % (ref 0–7)
ERYTHROCYTE [DISTWIDTH] IN BLOOD BY AUTOMATED COUNT: 14.8 % (ref 11.5–14.5)
GFR SERPL CREATININE-BSD FRML MDRD: 43 ML/MIN/1.73 (ref 56–130)
GFR SERPL CREATININE-BSD FRML MDRD: 43 ML/MIN/1.73 (ref 56–130)
GLOBULIN UR ELPH-MCNC: 3.5 GM/DL (ref 2.3–3.5)
GLOBULIN UR ELPH-MCNC: 3.6 GM/DL (ref 2.3–3.5)
GLUCOSE BLD-MCNC: 88 MG/DL (ref 60–100)
GLUCOSE BLD-MCNC: 91 MG/DL (ref 60–100)
HCT VFR BLD AUTO: 32.1 % (ref 39–49)
HGB BLD-MCNC: 11.2 G/DL (ref 13.7–17.3)
IMM GRANULOCYTES # BLD: 0.04 10*3/MM3 (ref 0–0.02)
IMM GRANULOCYTES NFR BLD: 0.6 % (ref 0–0.5)
LYMPHOCYTES # BLD AUTO: 1.4 10*3/MM3 (ref 0.6–4.2)
LYMPHOCYTES NFR BLD AUTO: 21.2 % (ref 10–50)
MCH RBC QN AUTO: 33.9 PG (ref 26.5–34)
MCHC RBC AUTO-ENTMCNC: 34.9 G/DL (ref 31.5–36.3)
MCV RBC AUTO: 97.3 FL (ref 80–98)
MONOCYTES # BLD AUTO: 0.69 10*3/MM3 (ref 0–0.9)
MONOCYTES NFR BLD AUTO: 10.5 % (ref 0–12)
NEUTROPHILS # BLD AUTO: 4.11 10*3/MM3 (ref 2–8.6)
NEUTROPHILS NFR BLD AUTO: 62.3 % (ref 37–80)
PLATELET # BLD AUTO: 51 10*3/MM3 (ref 150–450)
PMV BLD AUTO: 10.6 FL (ref 8–12)
POTASSIUM BLD-SCNC: 3.2 MMOL/L (ref 3.5–5.1)
POTASSIUM BLD-SCNC: 3.4 MMOL/L (ref 3.5–5.1)
PROT SERPL-MCNC: 6 G/DL (ref 6.3–8.6)
PROT SERPL-MCNC: 6.2 G/DL (ref 6.3–8.6)
RBC # BLD AUTO: 3.3 10*6/MM3 (ref 4.37–5.74)
SODIUM BLD-SCNC: 147 MMOL/L (ref 137–145)
SODIUM BLD-SCNC: 147 MMOL/L (ref 137–145)
WBC NRBC COR # BLD: 6.6 10*3/MM3 (ref 3.2–9.8)

## 2017-04-25 PROCEDURE — 82140 ASSAY OF AMMONIA: CPT | Performed by: FAMILY MEDICINE

## 2017-04-25 PROCEDURE — 25010000002 PIPERACILLIN SOD-TAZOBACTAM PER 1 G: Performed by: FAMILY MEDICINE

## 2017-04-25 PROCEDURE — 80053 COMPREHEN METABOLIC PANEL: CPT | Performed by: FAMILY MEDICINE

## 2017-04-25 PROCEDURE — 85025 COMPLETE CBC W/AUTO DIFF WBC: CPT | Performed by: FAMILY MEDICINE

## 2017-04-25 PROCEDURE — 25010000002 VANCOMYCIN PER 500 MG: Performed by: FAMILY MEDICINE

## 2017-04-25 PROCEDURE — 25010000003 POTASSIUM CHLORIDE 10 MEQ/100ML SOLUTION: Performed by: FAMILY MEDICINE

## 2017-04-25 PROCEDURE — 25010000002 HYDRALAZINE PER 20 MG: Performed by: FAMILY MEDICINE

## 2017-04-25 RX ORDER — POTASSIUM CHLORIDE 7.45 MG/ML
10 INJECTION INTRAVENOUS
Status: DISCONTINUED | OUTPATIENT
Start: 2017-04-25 | End: 2017-04-27 | Stop reason: HOSPADM

## 2017-04-25 RX ORDER — SODIUM CHLORIDE 9 MG/ML
INJECTION, SOLUTION INTRAVENOUS
Status: COMPLETED
Start: 2017-04-25 | End: 2017-04-25

## 2017-04-25 RX ORDER — POTASSIUM CHLORIDE 1.5 G/1.77G
40 POWDER, FOR SOLUTION ORAL AS NEEDED
Status: DISCONTINUED | OUTPATIENT
Start: 2017-04-25 | End: 2017-04-27 | Stop reason: HOSPADM

## 2017-04-25 RX ORDER — POTASSIUM CHLORIDE 750 MG/1
40 CAPSULE, EXTENDED RELEASE ORAL AS NEEDED
Status: DISCONTINUED | OUTPATIENT
Start: 2017-04-25 | End: 2017-04-27 | Stop reason: HOSPADM

## 2017-04-25 RX ADMIN — Medication 1 G: at 15:08

## 2017-04-25 RX ADMIN — GABAPENTIN 100 MG: 100 CAPSULE ORAL at 15:08

## 2017-04-25 RX ADMIN — FUROSEMIDE 40 MG: 40 TABLET ORAL at 18:12

## 2017-04-25 RX ADMIN — OXYCODONE HYDROCHLORIDE AND ACETAMINOPHEN 1 TABLET: 10; 325 TABLET ORAL at 08:12

## 2017-04-25 RX ADMIN — METRONIDAZOLE 500 MG: 500 INJECTION, SOLUTION INTRAVENOUS at 23:03

## 2017-04-25 RX ADMIN — TAZOBACTAM SODIUM AND PIPERACILLIN SODIUM 4.5 G: 500; 4 INJECTION, SOLUTION INTRAVENOUS at 08:20

## 2017-04-25 RX ADMIN — SODIUM CHLORIDE 250 ML: 900 INJECTION, SOLUTION INTRAVENOUS at 15:04

## 2017-04-25 RX ADMIN — OXYCODONE HYDROCHLORIDE AND ACETAMINOPHEN 1 TABLET: 10; 325 TABLET ORAL at 02:53

## 2017-04-25 RX ADMIN — LACTULOSE 30 G: 10 SOLUTION ORAL at 08:13

## 2017-04-25 RX ADMIN — OXYCODONE HYDROCHLORIDE AND ACETAMINOPHEN 1 TABLET: 10; 325 TABLET ORAL at 21:37

## 2017-04-25 RX ADMIN — AMLODIPINE BESYLATE 2.5 MG: 2.5 TABLET ORAL at 12:28

## 2017-04-25 RX ADMIN — METRONIDAZOLE 500 MG: 500 INJECTION, SOLUTION INTRAVENOUS at 06:25

## 2017-04-25 RX ADMIN — TAZOBACTAM SODIUM AND PIPERACILLIN SODIUM 4.5 G: 500; 4 INJECTION, SOLUTION INTRAVENOUS at 21:38

## 2017-04-25 RX ADMIN — NYSTATIN: 100000 POWDER TOPICAL at 08:13

## 2017-04-25 RX ADMIN — METRONIDAZOLE 500 MG: 500 INJECTION, SOLUTION INTRAVENOUS at 14:51

## 2017-04-25 RX ADMIN — LACTULOSE 30 G: 10 SOLUTION ORAL at 18:15

## 2017-04-25 RX ADMIN — OXYCODONE HYDROCHLORIDE AND ACETAMINOPHEN 1 TABLET: 10; 325 TABLET ORAL at 13:15

## 2017-04-25 RX ADMIN — SODIUM CHLORIDE 250 ML: 9 INJECTION, SOLUTION INTRAVENOUS at 15:04

## 2017-04-25 RX ADMIN — POTASSIUM CHLORIDE 40 MEQ: 1.5 POWDER, FOR SOLUTION ORAL at 21:38

## 2017-04-25 RX ADMIN — GABAPENTIN 100 MG: 100 CAPSULE ORAL at 21:37

## 2017-04-25 RX ADMIN — Medication 1 G: at 21:40

## 2017-04-25 RX ADMIN — Medication 1 G: at 08:13

## 2017-04-25 RX ADMIN — RIFAXIMIN 550 MG: 550 TABLET ORAL at 08:15

## 2017-04-25 RX ADMIN — VANCOMYCIN HYDROCHLORIDE 1500 MG: 1 INJECTION, POWDER, LYOPHILIZED, FOR SOLUTION INTRAVENOUS at 16:17

## 2017-04-25 RX ADMIN — HYDRALAZINE HYDROCHLORIDE 10 MG: 20 INJECTION INTRAMUSCULAR; INTRAVENOUS at 14:05

## 2017-04-25 RX ADMIN — FUROSEMIDE 40 MG: 40 TABLET ORAL at 08:12

## 2017-04-25 RX ADMIN — SODIUM CHLORIDE 125 ML/HR: 900 INJECTION, SOLUTION INTRAVENOUS at 22:26

## 2017-04-25 RX ADMIN — POTASSIUM CHLORIDE 40 MEQ: 1.5 POWDER, FOR SOLUTION ORAL at 15:44

## 2017-04-25 RX ADMIN — PANTOPRAZOLE SODIUM 40 MG: 40 TABLET, DELAYED RELEASE ORAL at 08:13

## 2017-04-25 RX ADMIN — POTASSIUM CHLORIDE 10 MEQ: 7.46 INJECTION, SOLUTION INTRAVENOUS at 14:21

## 2017-04-25 RX ADMIN — RIFAXIMIN 550 MG: 550 TABLET ORAL at 18:12

## 2017-04-25 RX ADMIN — HYDRALAZINE HYDROCHLORIDE 10 MG: 20 INJECTION INTRAMUSCULAR; INTRAVENOUS at 06:21

## 2017-04-25 RX ADMIN — DOCUSATE SODIUM 100 MG: 100 CAPSULE, LIQUID FILLED ORAL at 08:13

## 2017-04-25 RX ADMIN — GABAPENTIN 100 MG: 100 CAPSULE ORAL at 08:14

## 2017-04-25 RX ADMIN — NICOTINE 1 PATCH: 21 PATCH TRANSDERMAL at 15:06

## 2017-04-25 RX ADMIN — NYSTATIN: 100000 POWDER TOPICAL at 21:40

## 2017-04-25 RX ADMIN — TAZOBACTAM SODIUM AND PIPERACILLIN SODIUM 4.5 G: 500; 4 INJECTION, SOLUTION INTRAVENOUS at 01:53

## 2017-04-25 RX ADMIN — TAZOBACTAM SODIUM AND PIPERACILLIN SODIUM 4.5 G: 500; 4 INJECTION, SOLUTION INTRAVENOUS at 13:15

## 2017-04-25 RX ADMIN — SODIUM CHLORIDE 125 ML/HR: 900 INJECTION, SOLUTION INTRAVENOUS at 08:14

## 2017-04-26 LAB
ALBUMIN SERPL-MCNC: 2.3 G/DL (ref 3.4–4.8)
ALBUMIN/GLOB SERPL: 0.7 G/DL (ref 1.1–1.8)
ALP SERPL-CCNC: 67 U/L (ref 38–126)
ALT SERPL W P-5'-P-CCNC: 29 U/L (ref 21–72)
ANION GAP SERPL CALCULATED.3IONS-SCNC: 9 MMOL/L (ref 5–15)
AST SERPL-CCNC: 50 U/L (ref 17–59)
BASOPHILS # BLD AUTO: 0.13 10*3/MM3 (ref 0–0.2)
BASOPHILS NFR BLD AUTO: 2.1 % (ref 0–2)
BILIRUB SERPL-MCNC: 3.3 MG/DL (ref 0.2–1.3)
BUN BLD-MCNC: 13 MG/DL (ref 7–21)
BUN/CREAT SERPL: 7.4 (ref 7–25)
CALCIUM SPEC-SCNC: 8 MG/DL (ref 8.4–10.2)
CHLORIDE SERPL-SCNC: 111 MMOL/L (ref 95–110)
CO2 SERPL-SCNC: 18 MMOL/L (ref 22–31)
CREAT BLD-MCNC: 1.75 MG/DL (ref 0.7–1.3)
DEPRECATED RDW RBC AUTO: 51.1 FL (ref 35.1–43.9)
EOSINOPHIL # BLD AUTO: 0.6 10*3/MM3 (ref 0–0.7)
EOSINOPHIL NFR BLD AUTO: 9.8 % (ref 0–7)
ERYTHROCYTE [DISTWIDTH] IN BLOOD BY AUTOMATED COUNT: 14.5 % (ref 11.5–14.5)
GFR SERPL CREATININE-BSD FRML MDRD: 41 ML/MIN/1.73 (ref 56–130)
GLOBULIN UR ELPH-MCNC: 3.3 GM/DL (ref 2.3–3.5)
GLUCOSE BLD-MCNC: 76 MG/DL (ref 60–100)
HCT VFR BLD AUTO: 30.2 % (ref 39–49)
HGB BLD-MCNC: 10.5 G/DL (ref 13.7–17.3)
IMM GRANULOCYTES # BLD: 0.02 10*3/MM3 (ref 0–0.02)
IMM GRANULOCYTES NFR BLD: 0.3 % (ref 0–0.5)
LYMPHOCYTES # BLD AUTO: 2.04 10*3/MM3 (ref 0.6–4.2)
LYMPHOCYTES NFR BLD AUTO: 33.2 % (ref 10–50)
MAGNESIUM SERPL-MCNC: 1.6 MG/DL (ref 1.6–2.3)
MCH RBC QN AUTO: 33.5 PG (ref 26.5–34)
MCHC RBC AUTO-ENTMCNC: 34.8 G/DL (ref 31.5–36.3)
MCV RBC AUTO: 96.5 FL (ref 80–98)
MONOCYTES # BLD AUTO: 0.63 10*3/MM3 (ref 0–0.9)
MONOCYTES NFR BLD AUTO: 10.3 % (ref 0–12)
NEUTROPHILS # BLD AUTO: 2.72 10*3/MM3 (ref 2–8.6)
NEUTROPHILS NFR BLD AUTO: 44.3 % (ref 37–80)
PLATELET # BLD AUTO: 50 10*3/MM3 (ref 150–450)
PMV BLD AUTO: 11.7 FL (ref 8–12)
POTASSIUM BLD-SCNC: 3.2 MMOL/L (ref 3.5–5.1)
PROT SERPL-MCNC: 5.6 G/DL (ref 6.3–8.6)
RBC # BLD AUTO: 3.13 10*6/MM3 (ref 4.37–5.74)
SODIUM BLD-SCNC: 138 MMOL/L (ref 137–145)
VANCOMYCIN TROUGH SERPL-MCNC: 21.62 MCG/ML (ref 10–15)
WBC NRBC COR # BLD: 6.14 10*3/MM3 (ref 3.2–9.8)

## 2017-04-26 PROCEDURE — 80053 COMPREHEN METABOLIC PANEL: CPT | Performed by: FAMILY MEDICINE

## 2017-04-26 PROCEDURE — 25010000002 PIPERACILLIN SOD-TAZOBACTAM PER 1 G: Performed by: FAMILY MEDICINE

## 2017-04-26 PROCEDURE — 25010000002 VANCOMYCIN PER 500 MG: Performed by: FAMILY MEDICINE

## 2017-04-26 PROCEDURE — 85025 COMPLETE CBC W/AUTO DIFF WBC: CPT | Performed by: FAMILY MEDICINE

## 2017-04-26 PROCEDURE — 83735 ASSAY OF MAGNESIUM: CPT | Performed by: FAMILY MEDICINE

## 2017-04-26 PROCEDURE — 25010000002 ONDANSETRON PER 1 MG: Performed by: FAMILY MEDICINE

## 2017-04-26 PROCEDURE — 80202 ASSAY OF VANCOMYCIN: CPT | Performed by: FAMILY MEDICINE

## 2017-04-26 RX ORDER — POTASSIUM CHLORIDE 750 MG/1
40 CAPSULE, EXTENDED RELEASE ORAL AS NEEDED
Status: DISCONTINUED | OUTPATIENT
Start: 2017-04-26 | End: 2017-04-27 | Stop reason: HOSPADM

## 2017-04-26 RX ORDER — POTASSIUM CHLORIDE 1.5 G/1.77G
40 POWDER, FOR SOLUTION ORAL AS NEEDED
Status: DISCONTINUED | OUTPATIENT
Start: 2017-04-26 | End: 2017-04-27 | Stop reason: HOSPADM

## 2017-04-26 RX ORDER — POTASSIUM CHLORIDE 1.5 G/1.77G
20 POWDER, FOR SOLUTION ORAL DAILY
Status: DISCONTINUED | OUTPATIENT
Start: 2017-04-26 | End: 2017-04-27 | Stop reason: HOSPADM

## 2017-04-26 RX ADMIN — OXYCODONE HYDROCHLORIDE AND ACETAMINOPHEN 1 TABLET: 10; 325 TABLET ORAL at 16:33

## 2017-04-26 RX ADMIN — SODIUM CHLORIDE 125 ML/HR: 900 INJECTION, SOLUTION INTRAVENOUS at 20:57

## 2017-04-26 RX ADMIN — FUROSEMIDE 40 MG: 40 TABLET ORAL at 08:01

## 2017-04-26 RX ADMIN — OXYCODONE HYDROCHLORIDE AND ACETAMINOPHEN 1 TABLET: 10; 325 TABLET ORAL at 08:03

## 2017-04-26 RX ADMIN — Medication 1 G: at 16:38

## 2017-04-26 RX ADMIN — Medication 10 ML: at 20:59

## 2017-04-26 RX ADMIN — POTASSIUM CHLORIDE 40 MEQ: 750 CAPSULE, EXTENDED RELEASE ORAL at 08:01

## 2017-04-26 RX ADMIN — NICOTINE 1 PATCH: 21 PATCH TRANSDERMAL at 16:40

## 2017-04-26 RX ADMIN — NYSTATIN: 100000 POWDER TOPICAL at 20:58

## 2017-04-26 RX ADMIN — Medication 1 G: at 20:59

## 2017-04-26 RX ADMIN — RIFAXIMIN 550 MG: 550 TABLET ORAL at 18:00

## 2017-04-26 RX ADMIN — ONDANSETRON 4 MG: 2 INJECTION INTRAMUSCULAR; INTRAVENOUS at 09:14

## 2017-04-26 RX ADMIN — TAZOBACTAM SODIUM AND PIPERACILLIN SODIUM 4.5 G: 500; 4 INJECTION, SOLUTION INTRAVENOUS at 20:56

## 2017-04-26 RX ADMIN — OXYCODONE HYDROCHLORIDE AND ACETAMINOPHEN 1 TABLET: 10; 325 TABLET ORAL at 20:58

## 2017-04-26 RX ADMIN — GABAPENTIN 100 MG: 100 CAPSULE ORAL at 20:57

## 2017-04-26 RX ADMIN — GABAPENTIN 100 MG: 100 CAPSULE ORAL at 16:41

## 2017-04-26 RX ADMIN — RIFAXIMIN 550 MG: 550 TABLET ORAL at 08:01

## 2017-04-26 RX ADMIN — TAZOBACTAM SODIUM AND PIPERACILLIN SODIUM 4.5 G: 500; 4 INJECTION, SOLUTION INTRAVENOUS at 15:24

## 2017-04-26 RX ADMIN — GABAPENTIN 100 MG: 100 CAPSULE ORAL at 08:01

## 2017-04-26 RX ADMIN — OXYCODONE HYDROCHLORIDE AND ACETAMINOPHEN 1 TABLET: 10; 325 TABLET ORAL at 11:49

## 2017-04-26 RX ADMIN — METRONIDAZOLE 500 MG: 500 INJECTION, SOLUTION INTRAVENOUS at 23:15

## 2017-04-26 RX ADMIN — TAZOBACTAM SODIUM AND PIPERACILLIN SODIUM 4.5 G: 500; 4 INJECTION, SOLUTION INTRAVENOUS at 08:40

## 2017-04-26 RX ADMIN — METRONIDAZOLE 500 MG: 500 INJECTION, SOLUTION INTRAVENOUS at 07:29

## 2017-04-26 RX ADMIN — POTASSIUM CHLORIDE 20 MEQ: 1.5 POWDER, FOR SOLUTION ORAL at 11:32

## 2017-04-26 RX ADMIN — NYSTATIN: 100000 POWDER TOPICAL at 08:01

## 2017-04-26 RX ADMIN — Medication 1 G: at 08:01

## 2017-04-26 RX ADMIN — TAZOBACTAM SODIUM AND PIPERACILLIN SODIUM 4.5 G: 500; 4 INJECTION, SOLUTION INTRAVENOUS at 01:30

## 2017-04-26 RX ADMIN — PANTOPRAZOLE SODIUM 40 MG: 40 TABLET, DELAYED RELEASE ORAL at 08:01

## 2017-04-26 RX ADMIN — METRONIDAZOLE 500 MG: 500 INJECTION, SOLUTION INTRAVENOUS at 16:33

## 2017-04-26 RX ADMIN — SODIUM CHLORIDE 125 ML/HR: 900 INJECTION, SOLUTION INTRAVENOUS at 08:03

## 2017-04-26 RX ADMIN — LACTULOSE 30 G: 10 SOLUTION ORAL at 08:01

## 2017-04-26 RX ADMIN — FUROSEMIDE 40 MG: 40 TABLET ORAL at 17:58

## 2017-04-27 VITALS
DIASTOLIC BLOOD PRESSURE: 97 MMHG | HEIGHT: 72 IN | HEART RATE: 80 BPM | WEIGHT: 247.36 LBS | RESPIRATION RATE: 20 BRPM | SYSTOLIC BLOOD PRESSURE: 145 MMHG | BODY MASS INDEX: 33.5 KG/M2 | OXYGEN SATURATION: 95 % | TEMPERATURE: 96.3 F

## 2017-04-27 PROBLEM — A41.9 SEPSIS (HCC): Status: RESOLVED | Noted: 2017-04-23 | Resolved: 2017-04-27

## 2017-04-27 LAB
ALBUMIN SERPL-MCNC: 2.3 G/DL (ref 3.4–4.8)
ALBUMIN/GLOB SERPL: 0.7 G/DL (ref 1.1–1.8)
ALP SERPL-CCNC: 67 U/L (ref 38–126)
ALT SERPL W P-5'-P-CCNC: 22 U/L (ref 21–72)
ANION GAP SERPL CALCULATED.3IONS-SCNC: 8 MMOL/L (ref 5–15)
AST SERPL-CCNC: 46 U/L (ref 17–59)
BASOPHILS # BLD AUTO: 0.09 10*3/MM3 (ref 0–0.2)
BASOPHILS NFR BLD AUTO: 1.4 % (ref 0–2)
BILIRUB SERPL-MCNC: 2.4 MG/DL (ref 0.2–1.3)
BUN BLD-MCNC: 11 MG/DL (ref 7–21)
BUN/CREAT SERPL: 6.6 (ref 7–25)
CALCIUM SPEC-SCNC: 7.8 MG/DL (ref 8.4–10.2)
CHLORIDE SERPL-SCNC: 106 MMOL/L (ref 95–110)
CO2 SERPL-SCNC: 21 MMOL/L (ref 22–31)
CREAT BLD-MCNC: 1.67 MG/DL (ref 0.7–1.3)
DEPRECATED RDW RBC AUTO: 51.2 FL (ref 35.1–43.9)
EOSINOPHIL # BLD AUTO: 0.68 10*3/MM3 (ref 0–0.7)
EOSINOPHIL NFR BLD AUTO: 10.7 % (ref 0–7)
ERYTHROCYTE [DISTWIDTH] IN BLOOD BY AUTOMATED COUNT: 14.3 % (ref 11.5–14.5)
GFR SERPL CREATININE-BSD FRML MDRD: 43 ML/MIN/1.73 (ref 60–130)
GLOBULIN UR ELPH-MCNC: 3.4 GM/DL (ref 2.3–3.5)
GLUCOSE BLD-MCNC: 82 MG/DL (ref 60–100)
HCT VFR BLD AUTO: 31.2 % (ref 39–49)
HGB BLD-MCNC: 11 G/DL (ref 13.7–17.3)
IMM GRANULOCYTES # BLD: 0.03 10*3/MM3 (ref 0–0.02)
IMM GRANULOCYTES NFR BLD: 0.5 % (ref 0–0.5)
LYMPHOCYTES # BLD AUTO: 1.77 10*3/MM3 (ref 0.6–4.2)
LYMPHOCYTES NFR BLD AUTO: 27.8 % (ref 10–50)
MCH RBC QN AUTO: 34.1 PG (ref 26.5–34)
MCHC RBC AUTO-ENTMCNC: 35.3 G/DL (ref 31.5–36.3)
MCV RBC AUTO: 96.6 FL (ref 80–98)
MONOCYTES # BLD AUTO: 0.62 10*3/MM3 (ref 0–0.9)
MONOCYTES NFR BLD AUTO: 9.7 % (ref 0–12)
NEUTROPHILS # BLD AUTO: 3.17 10*3/MM3 (ref 2–8.6)
NEUTROPHILS NFR BLD AUTO: 49.9 % (ref 37–80)
PLATELET # BLD AUTO: 46 10*3/MM3 (ref 150–450)
PMV BLD AUTO: 10.9 FL (ref 8–12)
POTASSIUM BLD-SCNC: 3.4 MMOL/L (ref 3.5–5.1)
PROT SERPL-MCNC: 5.7 G/DL (ref 6.3–8.6)
RBC # BLD AUTO: 3.23 10*6/MM3 (ref 4.37–5.74)
SODIUM BLD-SCNC: 135 MMOL/L (ref 137–145)
WBC NRBC COR # BLD: 6.36 10*3/MM3 (ref 3.2–9.8)

## 2017-04-27 PROCEDURE — 25010000002 VANCOMYCIN PER 500 MG: Performed by: FAMILY MEDICINE

## 2017-04-27 PROCEDURE — 25010000002 PIPERACILLIN SOD-TAZOBACTAM PER 1 G: Performed by: FAMILY MEDICINE

## 2017-04-27 PROCEDURE — 80053 COMPREHEN METABOLIC PANEL: CPT | Performed by: FAMILY MEDICINE

## 2017-04-27 PROCEDURE — 85025 COMPLETE CBC W/AUTO DIFF WBC: CPT | Performed by: FAMILY MEDICINE

## 2017-04-27 RX ADMIN — TAZOBACTAM SODIUM AND PIPERACILLIN SODIUM 4.5 G: 500; 4 INJECTION, SOLUTION INTRAVENOUS at 01:30

## 2017-04-27 RX ADMIN — VANCOMYCIN HYDROCHLORIDE 1250 MG: 1 INJECTION, POWDER, LYOPHILIZED, FOR SOLUTION INTRAVENOUS at 08:18

## 2017-04-27 RX ADMIN — RIFAXIMIN 550 MG: 550 TABLET ORAL at 08:08

## 2017-04-27 RX ADMIN — LACTULOSE 30 G: 10 SOLUTION ORAL at 08:08

## 2017-04-27 RX ADMIN — METRONIDAZOLE 500 MG: 500 INJECTION, SOLUTION INTRAVENOUS at 08:12

## 2017-04-27 RX ADMIN — POTASSIUM CHLORIDE 20 MEQ: 1.5 POWDER, FOR SOLUTION ORAL at 08:08

## 2017-04-27 RX ADMIN — Medication 1 G: at 08:11

## 2017-04-27 RX ADMIN — DOCUSATE SODIUM 100 MG: 100 CAPSULE, LIQUID FILLED ORAL at 08:08

## 2017-04-27 RX ADMIN — FUROSEMIDE 40 MG: 40 TABLET ORAL at 08:08

## 2017-04-27 RX ADMIN — GABAPENTIN 100 MG: 100 CAPSULE ORAL at 08:08

## 2017-04-27 RX ADMIN — TAZOBACTAM SODIUM AND PIPERACILLIN SODIUM 4.5 G: 500; 4 INJECTION, SOLUTION INTRAVENOUS at 08:17

## 2017-04-27 RX ADMIN — OXYCODONE HYDROCHLORIDE AND ACETAMINOPHEN 1 TABLET: 10; 325 TABLET ORAL at 04:18

## 2017-04-27 RX ADMIN — NYSTATIN: 100000 POWDER TOPICAL at 08:12

## 2017-04-28 LAB
BACTERIA SPEC AEROBE CULT: NORMAL
BACTERIA SPEC AEROBE CULT: NORMAL

## 2017-05-05 ENCOUNTER — APPOINTMENT (OUTPATIENT)
Dept: GENERAL RADIOLOGY | Facility: HOSPITAL | Age: 52
End: 2017-05-05

## 2017-05-05 ENCOUNTER — HOSPITAL ENCOUNTER (INPATIENT)
Facility: HOSPITAL | Age: 52
LOS: 11 days | Discharge: HOME HEALTH | End: 2017-05-16
Attending: FAMILY MEDICINE | Admitting: INTERNAL MEDICINE

## 2017-05-05 ENCOUNTER — APPOINTMENT (OUTPATIENT)
Dept: CT IMAGING | Facility: HOSPITAL | Age: 52
End: 2017-05-05

## 2017-05-05 DIAGNOSIS — K74.60 CIRRHOSIS OF LIVER WITHOUT ASCITES, UNSPECIFIED HEPATIC CIRRHOSIS TYPE (HCC): Chronic | ICD-10-CM

## 2017-05-05 DIAGNOSIS — Z74.09 IMPAIRED MOBILITY AND ADLS: ICD-10-CM

## 2017-05-05 DIAGNOSIS — K74.60 HEPATIC CIRRHOSIS, UNSPECIFIED HEPATIC CIRRHOSIS TYPE (HCC): Chronic | ICD-10-CM

## 2017-05-05 DIAGNOSIS — N19: ICD-10-CM

## 2017-05-05 DIAGNOSIS — Z78.9 IMPAIRED MOBILITY AND ADLS: ICD-10-CM

## 2017-05-05 DIAGNOSIS — Z74.09 IMPAIRED FUNCTIONAL MOBILITY, BALANCE, GAIT, AND ENDURANCE: ICD-10-CM

## 2017-05-05 DIAGNOSIS — K76.82 ACUTE HEPATIC ENCEPHALOPATHY (HCC): Primary | ICD-10-CM

## 2017-05-05 DIAGNOSIS — K73.9 CHRONIC HEPATITIS (HCC): Chronic | ICD-10-CM

## 2017-05-05 PROBLEM — N18.9 ACUTE ON CHRONIC RENAL FAILURE (HCC): Chronic | Status: ACTIVE | Noted: 2017-05-05

## 2017-05-05 PROBLEM — F19.10 SUBSTANCE ABUSE (HCC): Chronic | Status: ACTIVE | Noted: 2017-05-05

## 2017-05-05 PROBLEM — K72.91 LIVER FAILURE WITH HEPATIC COMA (HCC): Chronic | Status: ACTIVE | Noted: 2017-05-05

## 2017-05-05 PROBLEM — N17.9 ACUTE ON CHRONIC RENAL FAILURE (HCC): Chronic | Status: ACTIVE | Noted: 2017-05-05

## 2017-05-05 LAB
ALBUMIN SERPL-MCNC: 2.7 G/DL (ref 3.4–4.8)
ALBUMIN/GLOB SERPL: 0.8 G/DL (ref 1.1–1.8)
ALP SERPL-CCNC: 152 U/L (ref 38–126)
ALT SERPL W P-5'-P-CCNC: 23 U/L (ref 21–72)
AMMONIA BLD-SCNC: 271 UMOL/L (ref 9–30)
AMPHET+METHAMPHET UR QL: NEGATIVE
ANION GAP SERPL CALCULATED.3IONS-SCNC: 10 MMOL/L (ref 5–15)
ARTERIAL PATENCY WRIST A: ABNORMAL
AST SERPL-CCNC: 41 U/L (ref 17–59)
ATMOSPHERIC PRESS: ABNORMAL MMHG
BACTERIA UR QL AUTO: NORMAL /HPF
BARBITURATES UR QL SCN: NEGATIVE
BASE EXCESS BLDA CALC-SCNC: 0.5 MMOL/L (ref -2.4–2.4)
BASOPHILS # BLD AUTO: 0.13 10*3/MM3 (ref 0–0.2)
BASOPHILS NFR BLD AUTO: 1 % (ref 0–2)
BDY SITE: ABNORMAL
BENZODIAZ UR QL SCN: NEGATIVE
BILIRUB SERPL-MCNC: 1.8 MG/DL (ref 0.2–1.3)
BILIRUB UR QL STRIP: NEGATIVE
BUN BLD-MCNC: 30 MG/DL (ref 7–21)
BUN/CREAT SERPL: 14 (ref 7–25)
CA-I BLD-MCNC: 4.7 MG/DL (ref 4.5–4.9)
CALCIUM SPEC-SCNC: 8.5 MG/DL (ref 8.4–10.2)
CANNABINOIDS SERPL QL: POSITIVE
CHLORIDE SERPL-SCNC: 105 MMOL/L (ref 95–110)
CK MB SERPL-CCNC: 1.26 NG/ML (ref 0–5)
CK SERPL-CCNC: 47 U/L (ref 55–170)
CLARITY UR: CLEAR
CO2 BLDA-SCNC: 24.1 MMOL/L (ref 23–27)
CO2 SERPL-SCNC: 22 MMOL/L (ref 22–31)
COCAINE UR QL: NEGATIVE
COLOR UR: YELLOW
CREAT BLD-MCNC: 2.15 MG/DL (ref 0.7–1.3)
DEPRECATED RDW RBC AUTO: 59.8 FL (ref 35.1–43.9)
EOSINOPHIL # BLD AUTO: 0.94 10*3/MM3 (ref 0–0.7)
EOSINOPHIL NFR BLD AUTO: 7.4 % (ref 0–7)
ERYTHROCYTE [DISTWIDTH] IN BLOOD BY AUTOMATED COUNT: 16.4 % (ref 11.5–14.5)
GFR SERPL CREATININE-BSD FRML MDRD: 32 ML/MIN/1.73 (ref 60–130)
GLOBULIN UR ELPH-MCNC: 3.3 GM/DL (ref 2.3–3.5)
GLUCOSE BLD-MCNC: 115 MG/DL (ref 60–100)
GLUCOSE BLDA-MCNC: 106 MMOL/L
GLUCOSE BLDC GLUCOMTR-MCNC: 121 MG/DL (ref 70–130)
GLUCOSE UR STRIP-MCNC: NEGATIVE MG/DL
HCO3 BLDA-SCNC: 23.1 MMOL/L (ref 22–26)
HCT VFR BLD AUTO: 32.9 % (ref 39–49)
HCT VFR BLD CALC: 35 % (ref 40–54)
HGB BLD-MCNC: 11.2 G/DL (ref 13.7–17.3)
HGB BLDA-MCNC: 11.8 G/DL (ref 14–18)
HGB UR QL STRIP.AUTO: NEGATIVE
HOLD SPECIMEN: NORMAL
HOLD SPECIMEN: NORMAL
HYALINE CASTS UR QL AUTO: NORMAL /LPF
IMM GRANULOCYTES # BLD: 0.05 10*3/MM3 (ref 0–0.02)
IMM GRANULOCYTES NFR BLD: 0.4 % (ref 0–0.5)
KETONES UR QL STRIP: NEGATIVE
LEUKOCYTE ESTERASE UR QL STRIP.AUTO: ABNORMAL
LIPASE SERPL-CCNC: 64 U/L (ref 23–300)
LYMPHOCYTES # BLD AUTO: 2.19 10*3/MM3 (ref 0.6–4.2)
LYMPHOCYTES NFR BLD AUTO: 17.3 % (ref 10–50)
MCH RBC QN AUTO: 34.1 PG (ref 26.5–34)
MCHC RBC AUTO-ENTMCNC: 34 G/DL (ref 31.5–36.3)
MCV RBC AUTO: 100.3 FL (ref 80–98)
METHADONE UR QL SCN: NEGATIVE
MODALITY: ABNORMAL
MONOCYTES # BLD AUTO: 0.82 10*3/MM3 (ref 0–0.9)
MONOCYTES NFR BLD AUTO: 6.5 % (ref 0–12)
NEUTROPHILS # BLD AUTO: 8.56 10*3/MM3 (ref 2–8.6)
NEUTROPHILS NFR BLD AUTO: 67.4 % (ref 37–80)
NITRITE UR QL STRIP: NEGATIVE
NRBC BLD MANUAL-RTO: 0 /100 WBC (ref 0–0)
OPIATES UR QL: NEGATIVE
OXYCODONE UR QL SCN: POSITIVE
PCO2 BLDA: 30.9 MM HG (ref 35–45)
PH BLDA: 7.49 PH UNITS (ref 7.35–7.45)
PH UR STRIP.AUTO: 8 [PH] (ref 5–9)
PLATELET # BLD AUTO: 41 10*3/MM3 (ref 150–450)
PMV BLD AUTO: 10.4 FL (ref 8–12)
PO2 BLDA: 88.2 MM HG (ref 80–105)
POTASSIUM BLD-SCNC: 4.9 MMOL/L (ref 3.5–5.1)
POTASSIUM BLDA-SCNC: 4.88 MMOL/L (ref 3.6–4.9)
PROT SERPL-MCNC: 6 G/DL (ref 6.3–8.6)
PROT UR QL STRIP: NEGATIVE
RBC # BLD AUTO: 3.28 10*6/MM3 (ref 4.37–5.74)
RBC # UR: NORMAL /HPF
REF LAB TEST METHOD: NORMAL
SAO2 % BLDCOA: 96.5 %
SODIUM BLD-SCNC: 137 MMOL/L (ref 137–145)
SODIUM BLDA-SCNC: 137.5 MMOL/L (ref 138–146)
SP GR UR STRIP: <=1.005 (ref 1–1.03)
SQUAMOUS #/AREA URNS HPF: NORMAL /HPF
T4 FREE SERPL-MCNC: 0.69 NG/DL (ref 0.78–2.19)
TROPONIN I SERPL-MCNC: <0.012 NG/ML
TSH SERPL DL<=0.05 MIU/L-ACNC: 1.84 MIU/ML (ref 0.46–4.68)
UROBILINOGEN UR QL STRIP: ABNORMAL
WBC NRBC COR # BLD: 12.69 10*3/MM3 (ref 3.2–9.8)
WBC UR QL AUTO: NORMAL /HPF
WHOLE BLOOD HOLD SPECIMEN: NORMAL

## 2017-05-05 PROCEDURE — 84443 ASSAY THYROID STIM HORMONE: CPT | Performed by: FAMILY MEDICINE

## 2017-05-05 PROCEDURE — 87040 BLOOD CULTURE FOR BACTERIA: CPT | Performed by: FAMILY MEDICINE

## 2017-05-05 PROCEDURE — 93005 ELECTROCARDIOGRAM TRACING: CPT | Performed by: FAMILY MEDICINE

## 2017-05-05 PROCEDURE — 80307 DRUG TEST PRSMV CHEM ANLYZR: CPT | Performed by: FAMILY MEDICINE

## 2017-05-05 PROCEDURE — 82803 BLOOD GASES ANY COMBINATION: CPT | Performed by: FAMILY MEDICINE

## 2017-05-05 PROCEDURE — 85025 COMPLETE CBC W/AUTO DIFF WBC: CPT | Performed by: FAMILY MEDICINE

## 2017-05-05 PROCEDURE — 80053 COMPREHEN METABOLIC PANEL: CPT | Performed by: FAMILY MEDICINE

## 2017-05-05 PROCEDURE — 25010000002 ALBUMIN HUMAN 25% PER 50 ML: Performed by: INTERNAL MEDICINE

## 2017-05-05 PROCEDURE — 74000 HC ABDOMEN KUB: CPT

## 2017-05-05 PROCEDURE — 71010 HC CHEST PA OR AP: CPT

## 2017-05-05 PROCEDURE — 84439 ASSAY OF FREE THYROXINE: CPT | Performed by: FAMILY MEDICINE

## 2017-05-05 PROCEDURE — 81001 URINALYSIS AUTO W/SCOPE: CPT | Performed by: FAMILY MEDICINE

## 2017-05-05 PROCEDURE — 93010 ELECTROCARDIOGRAM REPORT: CPT | Performed by: INTERNAL MEDICINE

## 2017-05-05 PROCEDURE — 94760 N-INVAS EAR/PLS OXIMETRY 1: CPT

## 2017-05-05 PROCEDURE — 82553 CREATINE MB FRACTION: CPT | Performed by: FAMILY MEDICINE

## 2017-05-05 PROCEDURE — 83690 ASSAY OF LIPASE: CPT | Performed by: FAMILY MEDICINE

## 2017-05-05 PROCEDURE — 84484 ASSAY OF TROPONIN QUANT: CPT | Performed by: FAMILY MEDICINE

## 2017-05-05 PROCEDURE — 70450 CT HEAD/BRAIN W/O DYE: CPT

## 2017-05-05 PROCEDURE — P9046 ALBUMIN (HUMAN), 25%, 20 ML: HCPCS | Performed by: INTERNAL MEDICINE

## 2017-05-05 PROCEDURE — 82962 GLUCOSE BLOOD TEST: CPT

## 2017-05-05 PROCEDURE — 99285 EMERGENCY DEPT VISIT HI MDM: CPT

## 2017-05-05 PROCEDURE — 25010000002 FUROSEMIDE PER 20 MG: Performed by: INTERNAL MEDICINE

## 2017-05-05 PROCEDURE — 82550 ASSAY OF CK (CPK): CPT | Performed by: FAMILY MEDICINE

## 2017-05-05 PROCEDURE — 82140 ASSAY OF AMMONIA: CPT | Performed by: FAMILY MEDICINE

## 2017-05-05 RX ORDER — SODIUM CHLORIDE 0.9 % (FLUSH) 0.9 %
1-10 SYRINGE (ML) INJECTION AS NEEDED
Status: DISCONTINUED | OUTPATIENT
Start: 2017-05-05 | End: 2017-05-16 | Stop reason: HOSPADM

## 2017-05-05 RX ORDER — LISINOPRIL 20 MG/1
20 TABLET ORAL DAILY
COMMUNITY
End: 2017-09-06 | Stop reason: SDUPTHER

## 2017-05-05 RX ORDER — LACTULOSE 10 G/15ML
20 SOLUTION ORAL 3 TIMES DAILY
Status: DISCONTINUED | OUTPATIENT
Start: 2017-05-05 | End: 2017-05-09

## 2017-05-05 RX ORDER — ALBUMIN (HUMAN) 12.5 G/50ML
25 SOLUTION INTRAVENOUS 2 TIMES DAILY
Status: COMPLETED | OUTPATIENT
Start: 2017-05-05 | End: 2017-05-07

## 2017-05-05 RX ORDER — FUROSEMIDE 10 MG/ML
20 INJECTION INTRAMUSCULAR; INTRAVENOUS 2 TIMES DAILY
Status: DISCONTINUED | OUTPATIENT
Start: 2017-05-05 | End: 2017-05-16 | Stop reason: HOSPADM

## 2017-05-05 RX ORDER — SODIUM CHLORIDE 9 MG/ML
125 INJECTION, SOLUTION INTRAVENOUS CONTINUOUS
Status: DISCONTINUED | OUTPATIENT
Start: 2017-05-05 | End: 2017-05-05

## 2017-05-05 RX ORDER — LACTULOSE 10 G/15ML
20 SOLUTION ORAL ONCE
Status: COMPLETED | OUTPATIENT
Start: 2017-05-05 | End: 2017-05-05

## 2017-05-05 RX ORDER — DICLOFENAC SODIUM AND MISOPROSTOL 75; 200 MG/1; UG/1
1 TABLET, DELAYED RELEASE ORAL 2 TIMES DAILY
COMMUNITY
End: 2017-06-07

## 2017-05-05 RX ADMIN — ALBUMIN (HUMAN) 25 G: 0.25 INJECTION, SOLUTION INTRAVENOUS at 17:14

## 2017-05-05 RX ADMIN — LACTULOSE 20 G: 10 SOLUTION ORAL at 14:09

## 2017-05-05 RX ADMIN — SODIUM CHLORIDE 125 ML/HR: 900 INJECTION, SOLUTION INTRAVENOUS at 13:00

## 2017-05-05 RX ADMIN — LACTULOSE 20 G: 10 SOLUTION ORAL at 17:14

## 2017-05-05 RX ADMIN — FUROSEMIDE 20 MG: 10 INJECTION, SOLUTION INTRAVENOUS at 17:14

## 2017-05-05 RX ADMIN — LACTULOSE 20 G: 10 SOLUTION ORAL at 20:38

## 2017-05-06 LAB
ALBUMIN SERPL-MCNC: 2.5 G/DL (ref 3.4–4.8)
ALBUMIN/GLOB SERPL: 0.8 G/DL (ref 1.1–1.8)
ALP SERPL-CCNC: 90 U/L (ref 38–126)
ALT SERPL W P-5'-P-CCNC: 24 U/L (ref 21–72)
AMMONIA BLD-SCNC: 71 UMOL/L (ref 9–30)
ANION GAP SERPL CALCULATED.3IONS-SCNC: 10 MMOL/L (ref 5–15)
AST SERPL-CCNC: 42 U/L (ref 17–59)
BASOPHILS # BLD AUTO: 0.14 10*3/MM3 (ref 0–0.2)
BASOPHILS NFR BLD AUTO: 1.8 % (ref 0–2)
BILIRUB SERPL-MCNC: 2.4 MG/DL (ref 0.2–1.3)
BUN BLD-MCNC: 26 MG/DL (ref 7–21)
BUN/CREAT SERPL: 14.3 (ref 7–25)
CALCIUM SPEC-SCNC: 8.9 MG/DL (ref 8.4–10.2)
CHLORIDE SERPL-SCNC: 112 MMOL/L (ref 95–110)
CO2 SERPL-SCNC: 22 MMOL/L (ref 22–31)
CREAT BLD-MCNC: 1.82 MG/DL (ref 0.7–1.3)
DEPRECATED RDW RBC AUTO: 57.9 FL (ref 35.1–43.9)
EOSINOPHIL # BLD AUTO: 0.8 10*3/MM3 (ref 0–0.7)
EOSINOPHIL NFR BLD AUTO: 10.1 % (ref 0–7)
ERYTHROCYTE [DISTWIDTH] IN BLOOD BY AUTOMATED COUNT: 16.1 % (ref 11.5–14.5)
GFR SERPL CREATININE-BSD FRML MDRD: 39 ML/MIN/1.73 (ref 56–130)
GLOBULIN UR ELPH-MCNC: 3.1 GM/DL (ref 2.3–3.5)
GLUCOSE BLD-MCNC: 76 MG/DL (ref 60–100)
HCT VFR BLD AUTO: 29.1 % (ref 39–49)
HGB BLD-MCNC: 10 G/DL (ref 13.7–17.3)
IMM GRANULOCYTES # BLD: 0.04 10*3/MM3 (ref 0–0.02)
IMM GRANULOCYTES NFR BLD: 0.5 % (ref 0–0.5)
LYMPHOCYTES # BLD AUTO: 2.45 10*3/MM3 (ref 0.6–4.2)
LYMPHOCYTES NFR BLD AUTO: 30.9 % (ref 10–50)
MCH RBC QN AUTO: 33.9 PG (ref 26.5–34)
MCHC RBC AUTO-ENTMCNC: 34.4 G/DL (ref 31.5–36.3)
MCV RBC AUTO: 98.6 FL (ref 80–98)
MONOCYTES # BLD AUTO: 0.77 10*3/MM3 (ref 0–0.9)
MONOCYTES NFR BLD AUTO: 9.7 % (ref 0–12)
NEUTROPHILS # BLD AUTO: 3.72 10*3/MM3 (ref 2–8.6)
NEUTROPHILS NFR BLD AUTO: 47 % (ref 37–80)
PLATELET # BLD AUTO: 82 10*3/MM3 (ref 150–450)
PMV BLD AUTO: 10.3 FL (ref 8–12)
POTASSIUM BLD-SCNC: 4 MMOL/L (ref 3.5–5.1)
PROT SERPL-MCNC: 5.6 G/DL (ref 6.3–8.6)
RBC # BLD AUTO: 2.95 10*6/MM3 (ref 4.37–5.74)
SODIUM BLD-SCNC: 144 MMOL/L (ref 137–145)
WBC NRBC COR # BLD: 7.92 10*3/MM3 (ref 3.2–9.8)

## 2017-05-06 PROCEDURE — P9046 ALBUMIN (HUMAN), 25%, 20 ML: HCPCS | Performed by: INTERNAL MEDICINE

## 2017-05-06 PROCEDURE — 82140 ASSAY OF AMMONIA: CPT | Performed by: INTERNAL MEDICINE

## 2017-05-06 PROCEDURE — 85025 COMPLETE CBC W/AUTO DIFF WBC: CPT | Performed by: INTERNAL MEDICINE

## 2017-05-06 PROCEDURE — 25010000002 ALBUMIN HUMAN 25% PER 50 ML: Performed by: INTERNAL MEDICINE

## 2017-05-06 PROCEDURE — 25010000002 FUROSEMIDE PER 20 MG: Performed by: INTERNAL MEDICINE

## 2017-05-06 PROCEDURE — 80053 COMPREHEN METABOLIC PANEL: CPT | Performed by: INTERNAL MEDICINE

## 2017-05-06 RX ADMIN — LACTULOSE 20 G: 10 SOLUTION ORAL at 18:37

## 2017-05-06 RX ADMIN — LACTULOSE 20 G: 10 SOLUTION ORAL at 20:00

## 2017-05-06 RX ADMIN — ALBUMIN (HUMAN) 25 G: 0.25 INJECTION, SOLUTION INTRAVENOUS at 08:50

## 2017-05-06 RX ADMIN — FUROSEMIDE 20 MG: 10 INJECTION, SOLUTION INTRAVENOUS at 08:51

## 2017-05-06 RX ADMIN — LACTULOSE 20 G: 10 SOLUTION ORAL at 08:50

## 2017-05-06 RX ADMIN — FUROSEMIDE 20 MG: 10 INJECTION, SOLUTION INTRAVENOUS at 19:49

## 2017-05-06 RX ADMIN — ALBUMIN (HUMAN) 25 G: 0.25 INJECTION, SOLUTION INTRAVENOUS at 18:38

## 2017-05-07 LAB
ALBUMIN SERPL-MCNC: 2.7 G/DL (ref 3.4–4.8)
ALBUMIN/GLOB SERPL: 1 G/DL (ref 1.1–1.8)
ALP SERPL-CCNC: 70 U/L (ref 38–126)
ALT SERPL W P-5'-P-CCNC: 27 U/L (ref 21–72)
AMMONIA BLD-SCNC: 45 UMOL/L (ref 9–30)
ANION GAP SERPL CALCULATED.3IONS-SCNC: 10 MMOL/L (ref 5–15)
AST SERPL-CCNC: 50 U/L (ref 17–59)
BASOPHILS # BLD AUTO: 0.16 10*3/MM3 (ref 0–0.2)
BASOPHILS NFR BLD AUTO: 1.8 % (ref 0–2)
BILIRUB SERPL-MCNC: 2.1 MG/DL (ref 0.2–1.3)
BUN BLD-MCNC: 19 MG/DL (ref 7–21)
BUN/CREAT SERPL: 11.9 (ref 7–25)
CALCIUM SPEC-SCNC: 8.5 MG/DL (ref 8.4–10.2)
CHLORIDE SERPL-SCNC: 104 MMOL/L (ref 95–110)
CO2 SERPL-SCNC: 21 MMOL/L (ref 22–31)
CREAT BLD-MCNC: 1.59 MG/DL (ref 0.7–1.3)
DEPRECATED RDW RBC AUTO: 56.7 FL (ref 35.1–43.9)
EOSINOPHIL # BLD AUTO: 0.73 10*3/MM3 (ref 0–0.7)
EOSINOPHIL NFR BLD AUTO: 8.4 % (ref 0–7)
ERYTHROCYTE [DISTWIDTH] IN BLOOD BY AUTOMATED COUNT: 15.9 % (ref 11.5–14.5)
GFR SERPL CREATININE-BSD FRML MDRD: 46 ML/MIN/1.73 (ref 60–130)
GLOBULIN UR ELPH-MCNC: 2.8 GM/DL (ref 2.3–3.5)
GLUCOSE BLD-MCNC: 75 MG/DL (ref 60–100)
HCT VFR BLD AUTO: 26.7 % (ref 39–49)
HGB BLD-MCNC: 9.3 G/DL (ref 13.7–17.3)
IMM GRANULOCYTES # BLD: 0.06 10*3/MM3 (ref 0–0.02)
IMM GRANULOCYTES NFR BLD: 0.7 % (ref 0–0.5)
INR PPP: 1.44 (ref 0.8–1.2)
LYMPHOCYTES # BLD AUTO: 2.59 10*3/MM3 (ref 0.6–4.2)
LYMPHOCYTES NFR BLD AUTO: 29.9 % (ref 10–50)
MCH RBC QN AUTO: 34.2 PG (ref 26.5–34)
MCHC RBC AUTO-ENTMCNC: 34.8 G/DL (ref 31.5–36.3)
MCV RBC AUTO: 98.2 FL (ref 80–98)
MONOCYTES # BLD AUTO: 1.21 10*3/MM3 (ref 0–0.9)
MONOCYTES NFR BLD AUTO: 14 % (ref 0–12)
NEUTROPHILS # BLD AUTO: 3.91 10*3/MM3 (ref 2–8.6)
NEUTROPHILS NFR BLD AUTO: 45.2 % (ref 37–80)
PLATELET # BLD AUTO: 91 10*3/MM3 (ref 150–450)
PMV BLD AUTO: 10.6 FL (ref 8–12)
POTASSIUM BLD-SCNC: 3.3 MMOL/L (ref 3.5–5.1)
PROT SERPL-MCNC: 5.5 G/DL (ref 6.3–8.6)
PROTHROMBIN TIME: 17.5 SECONDS (ref 11.1–15.3)
RBC # BLD AUTO: 2.72 10*6/MM3 (ref 4.37–5.74)
SODIUM BLD-SCNC: 135 MMOL/L (ref 137–145)
WBC NRBC COR # BLD: 8.66 10*3/MM3 (ref 3.2–9.8)

## 2017-05-07 PROCEDURE — 25010000002 FUROSEMIDE PER 20 MG: Performed by: INTERNAL MEDICINE

## 2017-05-07 PROCEDURE — 82140 ASSAY OF AMMONIA: CPT | Performed by: INTERNAL MEDICINE

## 2017-05-07 PROCEDURE — P9046 ALBUMIN (HUMAN), 25%, 20 ML: HCPCS | Performed by: INTERNAL MEDICINE

## 2017-05-07 PROCEDURE — 85025 COMPLETE CBC W/AUTO DIFF WBC: CPT | Performed by: INTERNAL MEDICINE

## 2017-05-07 PROCEDURE — 85610 PROTHROMBIN TIME: CPT | Performed by: INTERNAL MEDICINE

## 2017-05-07 PROCEDURE — 25010000002 ALBUMIN HUMAN 25% PER 50 ML: Performed by: INTERNAL MEDICINE

## 2017-05-07 PROCEDURE — 80053 COMPREHEN METABOLIC PANEL: CPT | Performed by: INTERNAL MEDICINE

## 2017-05-07 RX ORDER — POTASSIUM CHLORIDE 1.5 G/1.77G
40 POWDER, FOR SOLUTION ORAL AS NEEDED
Status: DISCONTINUED | OUTPATIENT
Start: 2017-05-07 | End: 2017-05-16 | Stop reason: HOSPADM

## 2017-05-07 RX ORDER — GABAPENTIN 100 MG/1
100 CAPSULE ORAL EVERY 8 HOURS SCHEDULED
Status: DISCONTINUED | OUTPATIENT
Start: 2017-05-07 | End: 2017-05-09

## 2017-05-07 RX ORDER — FUROSEMIDE 40 MG/1
40 TABLET ORAL DAILY
Status: DISCONTINUED | OUTPATIENT
Start: 2017-05-07 | End: 2017-05-11

## 2017-05-07 RX ORDER — PANTOPRAZOLE SODIUM 40 MG/1
40 TABLET, DELAYED RELEASE ORAL
Status: DISCONTINUED | OUTPATIENT
Start: 2017-05-07 | End: 2017-05-16 | Stop reason: HOSPADM

## 2017-05-07 RX ORDER — NYSTATIN 100000 [USP'U]/G
POWDER TOPICAL EVERY 12 HOURS SCHEDULED
Status: DISCONTINUED | OUTPATIENT
Start: 2017-05-07 | End: 2017-05-16 | Stop reason: HOSPADM

## 2017-05-07 RX ORDER — AMLODIPINE BESYLATE 5 MG/1
5 TABLET ORAL
Status: DISCONTINUED | OUTPATIENT
Start: 2017-05-07 | End: 2017-05-16 | Stop reason: HOSPADM

## 2017-05-07 RX ORDER — POTASSIUM CHLORIDE 750 MG/1
40 CAPSULE, EXTENDED RELEASE ORAL AS NEEDED
Status: DISCONTINUED | OUTPATIENT
Start: 2017-05-07 | End: 2017-05-14 | Stop reason: SDUPTHER

## 2017-05-07 RX ORDER — POTASSIUM CHLORIDE 750 MG/1
40 CAPSULE, EXTENDED RELEASE ORAL AS NEEDED
Status: DISCONTINUED | OUTPATIENT
Start: 2017-05-07 | End: 2017-05-16 | Stop reason: HOSPADM

## 2017-05-07 RX ORDER — POTASSIUM CHLORIDE 1.5 G/1.77G
40 POWDER, FOR SOLUTION ORAL AS NEEDED
Status: DISCONTINUED | OUTPATIENT
Start: 2017-05-07 | End: 2017-05-14 | Stop reason: SDUPTHER

## 2017-05-07 RX ORDER — LISINOPRIL 20 MG/1
20 TABLET ORAL
Status: DISCONTINUED | OUTPATIENT
Start: 2017-05-07 | End: 2017-05-16 | Stop reason: HOSPADM

## 2017-05-07 RX ADMIN — FUROSEMIDE 20 MG: 10 INJECTION, SOLUTION INTRAVENOUS at 18:07

## 2017-05-07 RX ADMIN — FUROSEMIDE 20 MG: 10 INJECTION, SOLUTION INTRAVENOUS at 10:46

## 2017-05-07 RX ADMIN — FUROSEMIDE 40 MG: 40 TABLET ORAL at 13:16

## 2017-05-07 RX ADMIN — NYSTATIN: 100000 POWDER TOPICAL at 10:46

## 2017-05-07 RX ADMIN — ALBUMIN (HUMAN) 25 G: 0.25 INJECTION, SOLUTION INTRAVENOUS at 10:07

## 2017-05-07 RX ADMIN — GABAPENTIN 100 MG: 100 CAPSULE ORAL at 13:16

## 2017-05-07 RX ADMIN — Medication 10 ML: at 10:46

## 2017-05-07 RX ADMIN — GABAPENTIN 100 MG: 100 CAPSULE ORAL at 21:38

## 2017-05-07 RX ADMIN — LACTULOSE 20 G: 10 SOLUTION ORAL at 20:10

## 2017-05-07 RX ADMIN — LACTULOSE 20 G: 10 SOLUTION ORAL at 17:13

## 2017-05-07 RX ADMIN — AMLODIPINE BESYLATE 5 MG: 5 TABLET ORAL at 13:16

## 2017-05-07 RX ADMIN — LISINOPRIL 20 MG: 20 TABLET ORAL at 13:16

## 2017-05-07 RX ADMIN — NYSTATIN: 100000 POWDER TOPICAL at 22:06

## 2017-05-07 RX ADMIN — LACTULOSE 20 G: 10 SOLUTION ORAL at 10:07

## 2017-05-08 LAB
ALBUMIN SERPL-MCNC: 3.2 G/DL (ref 3.4–4.8)
ALBUMIN/GLOB SERPL: 1.1 G/DL (ref 1.1–1.8)
ALP SERPL-CCNC: 115 U/L (ref 38–126)
ALT SERPL W P-5'-P-CCNC: 66 U/L (ref 21–72)
AMMONIA BLD-SCNC: 76 UMOL/L (ref 9–30)
ANION GAP SERPL CALCULATED.3IONS-SCNC: 10 MMOL/L (ref 5–15)
AST SERPL-CCNC: 177 U/L (ref 17–59)
BASOPHILS # BLD AUTO: 0.14 10*3/MM3 (ref 0–0.2)
BASOPHILS NFR BLD AUTO: 1.8 % (ref 0–2)
BILIRUB SERPL-MCNC: 2.2 MG/DL (ref 0.2–1.3)
BUN BLD-MCNC: 15 MG/DL (ref 7–21)
BUN/CREAT SERPL: 10.5 (ref 7–25)
CALCIUM SPEC-SCNC: 9 MG/DL (ref 8.4–10.2)
CHLORIDE SERPL-SCNC: 102 MMOL/L (ref 95–110)
CO2 SERPL-SCNC: 26 MMOL/L (ref 22–31)
CREAT BLD-MCNC: 1.43 MG/DL (ref 0.7–1.3)
DEPRECATED RDW RBC AUTO: 58.2 FL (ref 35.1–43.9)
EOSINOPHIL # BLD AUTO: 0.64 10*3/MM3 (ref 0–0.7)
EOSINOPHIL NFR BLD AUTO: 8.2 % (ref 0–7)
ERYTHROCYTE [DISTWIDTH] IN BLOOD BY AUTOMATED COUNT: 16.2 % (ref 11.5–14.5)
GFR SERPL CREATININE-BSD FRML MDRD: 52 ML/MIN/1.73 (ref 60–130)
GLOBULIN UR ELPH-MCNC: 2.9 GM/DL (ref 2.3–3.5)
GLUCOSE BLD-MCNC: 101 MG/DL (ref 60–100)
HCT VFR BLD AUTO: 30.9 % (ref 39–49)
HGB BLD-MCNC: 10.7 G/DL (ref 13.7–17.3)
IMM GRANULOCYTES # BLD: 0.07 10*3/MM3 (ref 0–0.02)
IMM GRANULOCYTES NFR BLD: 0.9 % (ref 0–0.5)
LYMPHOCYTES # BLD AUTO: 2.16 10*3/MM3 (ref 0.6–4.2)
LYMPHOCYTES NFR BLD AUTO: 27.7 % (ref 10–50)
MCH RBC QN AUTO: 34.1 PG (ref 26.5–34)
MCHC RBC AUTO-ENTMCNC: 34.6 G/DL (ref 31.5–36.3)
MCV RBC AUTO: 98.4 FL (ref 80–98)
MONOCYTES # BLD AUTO: 1.1 10*3/MM3 (ref 0–0.9)
MONOCYTES NFR BLD AUTO: 14.1 % (ref 0–12)
NEUTROPHILS # BLD AUTO: 3.69 10*3/MM3 (ref 2–8.6)
NEUTROPHILS NFR BLD AUTO: 47.3 % (ref 37–80)
PLATELET # BLD AUTO: 115 10*3/MM3 (ref 150–450)
PMV BLD AUTO: 9.4 FL (ref 8–12)
POTASSIUM BLD-SCNC: 3.6 MMOL/L (ref 3.5–5.1)
PROT SERPL-MCNC: 6.1 G/DL (ref 6.3–8.6)
RBC # BLD AUTO: 3.14 10*6/MM3 (ref 4.37–5.74)
SODIUM BLD-SCNC: 138 MMOL/L (ref 137–145)
WBC NRBC COR # BLD: 7.8 10*3/MM3 (ref 3.2–9.8)

## 2017-05-08 PROCEDURE — 82140 ASSAY OF AMMONIA: CPT | Performed by: HOSPITALIST

## 2017-05-08 PROCEDURE — 80053 COMPREHEN METABOLIC PANEL: CPT | Performed by: HOSPITALIST

## 2017-05-08 PROCEDURE — 85025 COMPLETE CBC W/AUTO DIFF WBC: CPT | Performed by: HOSPITALIST

## 2017-05-08 PROCEDURE — 25010000002 FUROSEMIDE PER 20 MG: Performed by: INTERNAL MEDICINE

## 2017-05-08 RX ADMIN — GABAPENTIN 100 MG: 100 CAPSULE ORAL at 06:00

## 2017-05-08 RX ADMIN — GABAPENTIN 100 MG: 100 CAPSULE ORAL at 13:23

## 2017-05-08 RX ADMIN — PANTOPRAZOLE SODIUM 40 MG: 40 TABLET, DELAYED RELEASE ORAL at 06:00

## 2017-05-08 RX ADMIN — NYSTATIN: 100000 POWDER TOPICAL at 22:00

## 2017-05-08 RX ADMIN — LISINOPRIL 20 MG: 20 TABLET ORAL at 09:23

## 2017-05-08 RX ADMIN — FUROSEMIDE 40 MG: 40 TABLET ORAL at 09:23

## 2017-05-08 RX ADMIN — NYSTATIN: 100000 POWDER TOPICAL at 09:23

## 2017-05-08 RX ADMIN — Medication 10 ML: at 09:20

## 2017-05-08 RX ADMIN — LACTULOSE 20 G: 10 SOLUTION ORAL at 17:19

## 2017-05-08 RX ADMIN — FUROSEMIDE 20 MG: 10 INJECTION, SOLUTION INTRAVENOUS at 17:19

## 2017-05-08 RX ADMIN — FUROSEMIDE 20 MG: 10 INJECTION, SOLUTION INTRAVENOUS at 09:23

## 2017-05-08 RX ADMIN — AMLODIPINE BESYLATE 5 MG: 5 TABLET ORAL at 09:23

## 2017-05-08 RX ADMIN — LACTULOSE 20 G: 10 SOLUTION ORAL at 09:23

## 2017-05-08 RX ADMIN — LACTULOSE 20 G: 10 SOLUTION ORAL at 21:58

## 2017-05-08 RX ADMIN — GABAPENTIN 100 MG: 100 CAPSULE ORAL at 21:58

## 2017-05-09 LAB
ALBUMIN SERPL-MCNC: 3.1 G/DL (ref 3.4–4.8)
ALBUMIN/GLOB SERPL: 0.9 G/DL (ref 1.1–1.8)
ALP SERPL-CCNC: 107 U/L (ref 38–126)
ALT SERPL W P-5'-P-CCNC: 69 U/L (ref 21–72)
ANION GAP SERPL CALCULATED.3IONS-SCNC: 10 MMOL/L (ref 5–15)
AST SERPL-CCNC: 157 U/L (ref 17–59)
BASOPHILS # BLD AUTO: 0.2 10*3/MM3 (ref 0–0.2)
BASOPHILS NFR BLD AUTO: 2.4 % (ref 0–2)
BILIRUB SERPL-MCNC: 2.6 MG/DL (ref 0.2–1.3)
BUN BLD-MCNC: 14 MG/DL (ref 7–21)
BUN/CREAT SERPL: 10.1 (ref 7–25)
CALCIUM SPEC-SCNC: 9.1 MG/DL (ref 8.4–10.2)
CHLORIDE SERPL-SCNC: 103 MMOL/L (ref 95–110)
CO2 SERPL-SCNC: 24 MMOL/L (ref 22–31)
CREAT BLD-MCNC: 1.39 MG/DL (ref 0.7–1.3)
DEPRECATED RDW RBC AUTO: 55.7 FL (ref 35.1–43.9)
EOSINOPHIL # BLD AUTO: 0.95 10*3/MM3 (ref 0–0.7)
EOSINOPHIL NFR BLD AUTO: 11.4 % (ref 0–7)
ERYTHROCYTE [DISTWIDTH] IN BLOOD BY AUTOMATED COUNT: 15.7 % (ref 11.5–14.5)
GFR SERPL CREATININE-BSD FRML MDRD: 53 ML/MIN/1.73 (ref 56–130)
GLOBULIN UR ELPH-MCNC: 3.3 GM/DL (ref 2.3–3.5)
GLUCOSE BLD-MCNC: 81 MG/DL (ref 60–100)
HCT VFR BLD AUTO: 32 % (ref 39–49)
HGB BLD-MCNC: 11.3 G/DL (ref 13.7–17.3)
IMM GRANULOCYTES # BLD: 0.08 10*3/MM3 (ref 0–0.02)
IMM GRANULOCYTES NFR BLD: 1 % (ref 0–0.5)
LYMPHOCYTES # BLD AUTO: 2.71 10*3/MM3 (ref 0.6–4.2)
LYMPHOCYTES NFR BLD AUTO: 32.5 % (ref 10–50)
MCH RBC QN AUTO: 34.2 PG (ref 26.5–34)
MCHC RBC AUTO-ENTMCNC: 35.3 G/DL (ref 31.5–36.3)
MCV RBC AUTO: 97 FL (ref 80–98)
MONOCYTES # BLD AUTO: 1.16 10*3/MM3 (ref 0–0.9)
MONOCYTES NFR BLD AUTO: 13.9 % (ref 0–12)
NEUTROPHILS # BLD AUTO: 3.23 10*3/MM3 (ref 2–8.6)
NEUTROPHILS NFR BLD AUTO: 38.8 % (ref 37–80)
PLATELET # BLD AUTO: 130 10*3/MM3 (ref 150–450)
PMV BLD AUTO: 10.4 FL (ref 8–12)
POTASSIUM BLD-SCNC: 3.4 MMOL/L (ref 3.5–5.1)
PROT SERPL-MCNC: 6.4 G/DL (ref 6.3–8.6)
RBC # BLD AUTO: 3.3 10*6/MM3 (ref 4.37–5.74)
SODIUM BLD-SCNC: 137 MMOL/L (ref 137–145)
WBC NRBC COR # BLD: 8.33 10*3/MM3 (ref 3.2–9.8)

## 2017-05-09 PROCEDURE — 80053 COMPREHEN METABOLIC PANEL: CPT | Performed by: HOSPITALIST

## 2017-05-09 PROCEDURE — 25010000002 FUROSEMIDE PER 20 MG: Performed by: INTERNAL MEDICINE

## 2017-05-09 PROCEDURE — 85025 COMPLETE CBC W/AUTO DIFF WBC: CPT | Performed by: HOSPITALIST

## 2017-05-09 RX ORDER — HYDROCODONE BITARTRATE AND ACETAMINOPHEN 5; 325 MG/1; MG/1
1 TABLET ORAL ONCE AS NEEDED
Status: COMPLETED | OUTPATIENT
Start: 2017-05-09 | End: 2017-05-09

## 2017-05-09 RX ORDER — LACTULOSE 10 G/15ML
20 SOLUTION ORAL 4 TIMES DAILY
Status: DISCONTINUED | OUTPATIENT
Start: 2017-05-09 | End: 2017-05-11

## 2017-05-09 RX ORDER — GABAPENTIN 100 MG/1
200 CAPSULE ORAL EVERY 8 HOURS SCHEDULED
Status: DISCONTINUED | OUTPATIENT
Start: 2017-05-09 | End: 2017-05-10

## 2017-05-09 RX ADMIN — NYSTATIN: 100000 POWDER TOPICAL at 22:13

## 2017-05-09 RX ADMIN — NYSTATIN: 100000 POWDER TOPICAL at 08:50

## 2017-05-09 RX ADMIN — LACTULOSE 20 G: 10 SOLUTION ORAL at 08:47

## 2017-05-09 RX ADMIN — LACTULOSE 20 G: 20 SOLUTION ORAL at 17:35

## 2017-05-09 RX ADMIN — FUROSEMIDE 20 MG: 10 INJECTION, SOLUTION INTRAVENOUS at 08:47

## 2017-05-09 RX ADMIN — HYDROCODONE BITARTRATE AND ACETAMINOPHEN 1 TABLET: 5; 325 TABLET ORAL at 22:12

## 2017-05-09 RX ADMIN — FUROSEMIDE 40 MG: 40 TABLET ORAL at 08:47

## 2017-05-09 RX ADMIN — FUROSEMIDE 20 MG: 10 INJECTION, SOLUTION INTRAVENOUS at 17:35

## 2017-05-09 RX ADMIN — GABAPENTIN 100 MG: 100 CAPSULE ORAL at 05:36

## 2017-05-09 RX ADMIN — GABAPENTIN 200 MG: 100 CAPSULE ORAL at 22:12

## 2017-05-09 RX ADMIN — GABAPENTIN 100 MG: 100 CAPSULE ORAL at 13:28

## 2017-05-09 RX ADMIN — Medication 10 ML: at 22:14

## 2017-05-09 RX ADMIN — AMLODIPINE BESYLATE 5 MG: 5 TABLET ORAL at 08:47

## 2017-05-09 RX ADMIN — LISINOPRIL 20 MG: 20 TABLET ORAL at 08:47

## 2017-05-09 RX ADMIN — LACTULOSE 20 G: 20 SOLUTION ORAL at 22:11

## 2017-05-09 RX ADMIN — PANTOPRAZOLE SODIUM 40 MG: 40 TABLET, DELAYED RELEASE ORAL at 05:36

## 2017-05-10 LAB
BACTERIA SPEC AEROBE CULT: NORMAL
BACTERIA SPEC AEROBE CULT: NORMAL

## 2017-05-10 PROCEDURE — 25010000002 FUROSEMIDE PER 20 MG: Performed by: INTERNAL MEDICINE

## 2017-05-10 PROCEDURE — 97140 MANUAL THERAPY 1/> REGIONS: CPT

## 2017-05-10 PROCEDURE — G8988 SELF CARE GOAL STATUS: HCPCS

## 2017-05-10 PROCEDURE — 97165 OT EVAL LOW COMPLEX 30 MIN: CPT

## 2017-05-10 PROCEDURE — 97535 SELF CARE MNGMENT TRAINING: CPT

## 2017-05-10 PROCEDURE — G8987 SELF CARE CURRENT STATUS: HCPCS

## 2017-05-10 RX ORDER — GABAPENTIN 300 MG/1
300 CAPSULE ORAL EVERY 8 HOURS SCHEDULED
Status: DISCONTINUED | OUTPATIENT
Start: 2017-05-10 | End: 2017-05-16 | Stop reason: HOSPADM

## 2017-05-10 RX ADMIN — FUROSEMIDE 40 MG: 40 TABLET ORAL at 08:30

## 2017-05-10 RX ADMIN — GABAPENTIN 200 MG: 100 CAPSULE ORAL at 13:50

## 2017-05-10 RX ADMIN — LACTULOSE 20 G: 20 SOLUTION ORAL at 18:09

## 2017-05-10 RX ADMIN — LISINOPRIL 20 MG: 20 TABLET ORAL at 08:30

## 2017-05-10 RX ADMIN — LACTULOSE 20 G: 20 SOLUTION ORAL at 20:42

## 2017-05-10 RX ADMIN — Medication 10 ML: at 18:10

## 2017-05-10 RX ADMIN — LACTULOSE 20 G: 20 SOLUTION ORAL at 08:29

## 2017-05-10 RX ADMIN — FUROSEMIDE 20 MG: 10 INJECTION, SOLUTION INTRAVENOUS at 08:29

## 2017-05-10 RX ADMIN — NYSTATIN: 100000 POWDER TOPICAL at 08:30

## 2017-05-10 RX ADMIN — AMLODIPINE BESYLATE 5 MG: 5 TABLET ORAL at 08:30

## 2017-05-10 RX ADMIN — GABAPENTIN 300 MG: 300 CAPSULE ORAL at 22:17

## 2017-05-10 RX ADMIN — NYSTATIN: 100000 POWDER TOPICAL at 20:42

## 2017-05-10 RX ADMIN — PANTOPRAZOLE SODIUM 40 MG: 40 TABLET, DELAYED RELEASE ORAL at 05:33

## 2017-05-10 RX ADMIN — LACTULOSE 20 G: 20 SOLUTION ORAL at 13:50

## 2017-05-10 RX ADMIN — GABAPENTIN 200 MG: 100 CAPSULE ORAL at 05:33

## 2017-05-10 RX ADMIN — FUROSEMIDE 20 MG: 10 INJECTION, SOLUTION INTRAVENOUS at 18:09

## 2017-05-11 LAB
ALBUMIN SERPL-MCNC: 2.9 G/DL (ref 3.4–4.8)
ALBUMIN/GLOB SERPL: 1 G/DL (ref 1.1–1.8)
ALP SERPL-CCNC: 95 U/L (ref 38–126)
ALT SERPL W P-5'-P-CCNC: 60 U/L (ref 21–72)
AMMONIA BLD-SCNC: 98 UMOL/L (ref 9–30)
ANION GAP SERPL CALCULATED.3IONS-SCNC: 9 MMOL/L (ref 5–15)
AST SERPL-CCNC: 128 U/L (ref 17–59)
BASOPHILS # BLD AUTO: 0.16 10*3/MM3 (ref 0–0.2)
BASOPHILS NFR BLD AUTO: 1.8 % (ref 0–2)
BILIRUB SERPL-MCNC: 1.9 MG/DL (ref 0.2–1.3)
BUN BLD-MCNC: 21 MG/DL (ref 7–21)
BUN/CREAT SERPL: 10.1 (ref 7–25)
CALCIUM SPEC-SCNC: 8.7 MG/DL (ref 8.4–10.2)
CHLORIDE SERPL-SCNC: 97 MMOL/L (ref 95–110)
CO2 SERPL-SCNC: 29 MMOL/L (ref 22–31)
CREAT BLD-MCNC: 2.08 MG/DL (ref 0.7–1.3)
DEPRECATED RDW RBC AUTO: 56.5 FL (ref 35.1–43.9)
EOSINOPHIL # BLD AUTO: 1.13 10*3/MM3 (ref 0–0.7)
EOSINOPHIL NFR BLD AUTO: 12.7 % (ref 0–7)
ERYTHROCYTE [DISTWIDTH] IN BLOOD BY AUTOMATED COUNT: 15.8 % (ref 11.5–14.5)
GFR SERPL CREATININE-BSD FRML MDRD: 34 ML/MIN/1.73 (ref 60–130)
GLOBULIN UR ELPH-MCNC: 3 GM/DL (ref 2.3–3.5)
GLUCOSE BLD-MCNC: 77 MG/DL (ref 60–100)
HCT VFR BLD AUTO: 29.6 % (ref 39–49)
HGB BLD-MCNC: 10.4 G/DL (ref 13.7–17.3)
HOLD SPECIMEN: NORMAL
IMM GRANULOCYTES # BLD: 0.06 10*3/MM3 (ref 0–0.02)
IMM GRANULOCYTES NFR BLD: 0.7 % (ref 0–0.5)
LYMPHOCYTES # BLD AUTO: 3.27 10*3/MM3 (ref 0.6–4.2)
LYMPHOCYTES NFR BLD AUTO: 36.7 % (ref 10–50)
MCH RBC QN AUTO: 34.2 PG (ref 26.5–34)
MCHC RBC AUTO-ENTMCNC: 35.1 G/DL (ref 31.5–36.3)
MCV RBC AUTO: 97.4 FL (ref 80–98)
MONOCYTES # BLD AUTO: 1.07 10*3/MM3 (ref 0–0.9)
MONOCYTES NFR BLD AUTO: 12 % (ref 0–12)
NEUTROPHILS # BLD AUTO: 3.21 10*3/MM3 (ref 2–8.6)
NEUTROPHILS NFR BLD AUTO: 36.1 % (ref 37–80)
NRBC BLD MANUAL-RTO: 0 /100 WBC (ref 0–0)
PLATELET # BLD AUTO: 142 10*3/MM3 (ref 150–450)
PMV BLD AUTO: 10.1 FL (ref 8–12)
POTASSIUM BLD-SCNC: 3.8 MMOL/L (ref 3.5–5.1)
PROT SERPL-MCNC: 5.9 G/DL (ref 6.3–8.6)
RBC # BLD AUTO: 3.04 10*6/MM3 (ref 4.37–5.74)
SODIUM BLD-SCNC: 135 MMOL/L (ref 137–145)
WBC NRBC COR # BLD: 8.9 10*3/MM3 (ref 3.2–9.8)
WHOLE BLOOD HOLD SPECIMEN: NORMAL

## 2017-05-11 PROCEDURE — 80053 COMPREHEN METABOLIC PANEL: CPT | Performed by: HOSPITALIST

## 2017-05-11 PROCEDURE — 82140 ASSAY OF AMMONIA: CPT | Performed by: NURSE PRACTITIONER

## 2017-05-11 PROCEDURE — 97535 SELF CARE MNGMENT TRAINING: CPT

## 2017-05-11 PROCEDURE — 97110 THERAPEUTIC EXERCISES: CPT

## 2017-05-11 PROCEDURE — 25010000002 FUROSEMIDE PER 20 MG: Performed by: INTERNAL MEDICINE

## 2017-05-11 PROCEDURE — 97162 PT EVAL MOD COMPLEX 30 MIN: CPT

## 2017-05-11 PROCEDURE — 97755 ASSISTIVE TECHNOLOGY ASSESS: CPT

## 2017-05-11 PROCEDURE — G8978 MOBILITY CURRENT STATUS: HCPCS

## 2017-05-11 PROCEDURE — 25010000002 ZIPRASIDONE MESYLATE PER 10 MG: Performed by: INTERNAL MEDICINE

## 2017-05-11 PROCEDURE — G8979 MOBILITY GOAL STATUS: HCPCS

## 2017-05-11 PROCEDURE — 85025 COMPLETE CBC W/AUTO DIFF WBC: CPT | Performed by: HOSPITALIST

## 2017-05-11 RX ORDER — LACTULOSE 10 G/15ML
30 SOLUTION ORAL 4 TIMES DAILY
Status: DISCONTINUED | OUTPATIENT
Start: 2017-05-11 | End: 2017-05-14

## 2017-05-11 RX ORDER — ZIPRASIDONE MESYLATE 20 MG/ML
20 INJECTION, POWDER, LYOPHILIZED, FOR SOLUTION INTRAMUSCULAR ONCE
Status: COMPLETED | OUTPATIENT
Start: 2017-05-11 | End: 2017-05-11

## 2017-05-11 RX ADMIN — Medication 10 ML: at 13:57

## 2017-05-11 RX ADMIN — LACTULOSE 20 G: 20 SOLUTION ORAL at 08:47

## 2017-05-11 RX ADMIN — NYSTATIN: 100000 POWDER TOPICAL at 20:49

## 2017-05-11 RX ADMIN — PANTOPRAZOLE SODIUM 40 MG: 40 TABLET, DELAYED RELEASE ORAL at 05:24

## 2017-05-11 RX ADMIN — NYSTATIN: 100000 POWDER TOPICAL at 08:48

## 2017-05-11 RX ADMIN — GABAPENTIN 300 MG: 300 CAPSULE ORAL at 05:24

## 2017-05-11 RX ADMIN — ZIPRASIDONE MESYLATE 20 MG: 20 INJECTION, POWDER, LYOPHILIZED, FOR SOLUTION INTRAMUSCULAR at 23:03

## 2017-05-11 RX ADMIN — FUROSEMIDE 20 MG: 10 INJECTION, SOLUTION INTRAVENOUS at 13:57

## 2017-05-11 RX ADMIN — LACTULOSE 20 G: 20 SOLUTION ORAL at 13:57

## 2017-05-11 RX ADMIN — LACTULOSE 30 G: 10 SOLUTION ORAL at 18:35

## 2017-05-11 RX ADMIN — FUROSEMIDE 20 MG: 10 INJECTION, SOLUTION INTRAVENOUS at 18:35

## 2017-05-11 RX ADMIN — Medication: at 20:49

## 2017-05-12 ENCOUNTER — APPOINTMENT (OUTPATIENT)
Dept: GENERAL RADIOLOGY | Facility: HOSPITAL | Age: 52
End: 2017-05-12

## 2017-05-12 LAB
AMMONIA BLD-SCNC: 84 UMOL/L (ref 9–30)
ANION GAP SERPL CALCULATED.3IONS-SCNC: 14 MMOL/L (ref 5–15)
BUN BLD-MCNC: 21 MG/DL (ref 7–21)
BUN/CREAT SERPL: 11.9 (ref 7–25)
CALCIUM SPEC-SCNC: 9.5 MG/DL (ref 8.4–10.2)
CHLORIDE SERPL-SCNC: 102 MMOL/L (ref 95–110)
CO2 SERPL-SCNC: 27 MMOL/L (ref 22–31)
CREAT BLD-MCNC: 1.77 MG/DL (ref 0.7–1.3)
DEPRECATED RDW RBC AUTO: 56.8 FL (ref 35.1–43.9)
ERYTHROCYTE [DISTWIDTH] IN BLOOD BY AUTOMATED COUNT: 15.9 % (ref 11.5–14.5)
GFR SERPL CREATININE-BSD FRML MDRD: 40 ML/MIN/1.73 (ref 56–130)
GLUCOSE BLD-MCNC: 94 MG/DL (ref 60–100)
HCT VFR BLD AUTO: 32.7 % (ref 39–49)
HGB BLD-MCNC: 11.3 G/DL (ref 13.7–17.3)
MCH RBC QN AUTO: 33.9 PG (ref 26.5–34)
MCHC RBC AUTO-ENTMCNC: 34.6 G/DL (ref 31.5–36.3)
MCV RBC AUTO: 98.2 FL (ref 80–98)
PLATELET # BLD AUTO: 158 10*3/MM3 (ref 150–450)
PMV BLD AUTO: 10.2 FL (ref 8–12)
POTASSIUM BLD-SCNC: 3.6 MMOL/L (ref 3.5–5.1)
RBC # BLD AUTO: 3.33 10*6/MM3 (ref 4.37–5.74)
SODIUM BLD-SCNC: 143 MMOL/L (ref 137–145)
WBC NRBC COR # BLD: 10.62 10*3/MM3 (ref 3.2–9.8)

## 2017-05-12 PROCEDURE — 74000 HC ABDOMEN KUB: CPT

## 2017-05-12 PROCEDURE — 82140 ASSAY OF AMMONIA: CPT | Performed by: NURSE PRACTITIONER

## 2017-05-12 PROCEDURE — 80048 BASIC METABOLIC PNL TOTAL CA: CPT | Performed by: NURSE PRACTITIONER

## 2017-05-12 PROCEDURE — 85027 COMPLETE CBC AUTOMATED: CPT | Performed by: NURSE PRACTITIONER

## 2017-05-12 PROCEDURE — 25010000002 LORAZEPAM PER 2 MG: Performed by: INTERNAL MEDICINE

## 2017-05-12 PROCEDURE — 25010000002 FUROSEMIDE PER 20 MG: Performed by: INTERNAL MEDICINE

## 2017-05-12 RX ORDER — LORAZEPAM 2 MG/ML
1 INJECTION INTRAMUSCULAR ONCE
Status: COMPLETED | OUTPATIENT
Start: 2017-05-12 | End: 2017-05-12

## 2017-05-12 RX ADMIN — GABAPENTIN 300 MG: 300 CAPSULE ORAL at 22:00

## 2017-05-12 RX ADMIN — LACTULOSE 30 G: 10 SOLUTION ORAL at 14:22

## 2017-05-12 RX ADMIN — NYSTATIN: 100000 POWDER TOPICAL at 14:24

## 2017-05-12 RX ADMIN — NYSTATIN: 100000 POWDER TOPICAL at 20:22

## 2017-05-12 RX ADMIN — GABAPENTIN 300 MG: 300 CAPSULE ORAL at 14:22

## 2017-05-12 RX ADMIN — Medication: at 09:00

## 2017-05-12 RX ADMIN — FUROSEMIDE 20 MG: 10 INJECTION, SOLUTION INTRAVENOUS at 14:22

## 2017-05-12 RX ADMIN — LORAZEPAM 1 MG: 2 INJECTION, SOLUTION INTRAMUSCULAR; INTRAVENOUS at 05:27

## 2017-05-12 RX ADMIN — AMLODIPINE BESYLATE 5 MG: 5 TABLET ORAL at 14:22

## 2017-05-12 RX ADMIN — LACTULOSE 30 G: 10 SOLUTION ORAL at 20:20

## 2017-05-13 LAB
AMMONIA BLD-SCNC: 84 UMOL/L (ref 9–30)
ANION GAP SERPL CALCULATED.3IONS-SCNC: 13 MMOL/L (ref 5–15)
BUN BLD-MCNC: 24 MG/DL (ref 7–21)
BUN/CREAT SERPL: 12.7 (ref 7–25)
CALCIUM SPEC-SCNC: 9.5 MG/DL (ref 8.4–10.2)
CHLORIDE SERPL-SCNC: 104 MMOL/L (ref 95–110)
CO2 SERPL-SCNC: 27 MMOL/L (ref 22–31)
CREAT BLD-MCNC: 1.89 MG/DL (ref 0.7–1.3)
DEPRECATED RDW RBC AUTO: 58.1 FL (ref 35.1–43.9)
ERYTHROCYTE [DISTWIDTH] IN BLOOD BY AUTOMATED COUNT: 16 % (ref 11.5–14.5)
GFR SERPL CREATININE-BSD FRML MDRD: 37 ML/MIN/1.73 (ref 60–130)
GLUCOSE BLD-MCNC: 86 MG/DL (ref 60–100)
HCT VFR BLD AUTO: 31.6 % (ref 39–49)
HGB BLD-MCNC: 11 G/DL (ref 13.7–17.3)
MCH RBC QN AUTO: 34.5 PG (ref 26.5–34)
MCHC RBC AUTO-ENTMCNC: 34.8 G/DL (ref 31.5–36.3)
MCV RBC AUTO: 99.1 FL (ref 80–98)
PLATELET # BLD AUTO: 153 10*3/MM3 (ref 150–450)
PMV BLD AUTO: 10.4 FL (ref 8–12)
POTASSIUM BLD-SCNC: 3.5 MMOL/L (ref 3.5–5.1)
RBC # BLD AUTO: 3.19 10*6/MM3 (ref 4.37–5.74)
SODIUM BLD-SCNC: 144 MMOL/L (ref 137–145)
WBC NRBC COR # BLD: 8.49 10*3/MM3 (ref 3.2–9.8)

## 2017-05-13 PROCEDURE — 82140 ASSAY OF AMMONIA: CPT | Performed by: NURSE PRACTITIONER

## 2017-05-13 PROCEDURE — 97535 SELF CARE MNGMENT TRAINING: CPT

## 2017-05-13 PROCEDURE — 97110 THERAPEUTIC EXERCISES: CPT

## 2017-05-13 PROCEDURE — 25010000002 FUROSEMIDE PER 20 MG: Performed by: INTERNAL MEDICINE

## 2017-05-13 PROCEDURE — 85027 COMPLETE CBC AUTOMATED: CPT | Performed by: NURSE PRACTITIONER

## 2017-05-13 PROCEDURE — 80048 BASIC METABOLIC PNL TOTAL CA: CPT | Performed by: NURSE PRACTITIONER

## 2017-05-13 RX ORDER — TRAMADOL HYDROCHLORIDE 50 MG/1
50 TABLET ORAL EVERY 6 HOURS PRN
Status: DISCONTINUED | OUTPATIENT
Start: 2017-05-13 | End: 2017-05-16 | Stop reason: HOSPADM

## 2017-05-13 RX ADMIN — Medication: at 17:36

## 2017-05-13 RX ADMIN — PANTOPRAZOLE SODIUM 40 MG: 40 TABLET, DELAYED RELEASE ORAL at 05:24

## 2017-05-13 RX ADMIN — NYSTATIN: 100000 POWDER TOPICAL at 09:07

## 2017-05-13 RX ADMIN — LACTULOSE 30 G: 10 SOLUTION ORAL at 20:44

## 2017-05-13 RX ADMIN — GABAPENTIN 300 MG: 300 CAPSULE ORAL at 22:02

## 2017-05-13 RX ADMIN — GABAPENTIN 300 MG: 300 CAPSULE ORAL at 14:11

## 2017-05-13 RX ADMIN — LACTULOSE 30 G: 10 SOLUTION ORAL at 17:36

## 2017-05-13 RX ADMIN — AMLODIPINE BESYLATE 5 MG: 5 TABLET ORAL at 09:04

## 2017-05-13 RX ADMIN — LACTULOSE 30 G: 10 SOLUTION ORAL at 09:05

## 2017-05-13 RX ADMIN — FUROSEMIDE 20 MG: 10 INJECTION, SOLUTION INTRAVENOUS at 17:36

## 2017-05-13 RX ADMIN — Medication: at 09:05

## 2017-05-13 RX ADMIN — LACTULOSE 30 G: 10 SOLUTION ORAL at 12:41

## 2017-05-13 RX ADMIN — NYSTATIN: 100000 POWDER TOPICAL at 22:02

## 2017-05-13 RX ADMIN — LISINOPRIL 20 MG: 20 TABLET ORAL at 09:04

## 2017-05-13 RX ADMIN — FUROSEMIDE 20 MG: 10 INJECTION, SOLUTION INTRAVENOUS at 09:05

## 2017-05-13 RX ADMIN — Medication 10 ML: at 09:05

## 2017-05-13 RX ADMIN — GABAPENTIN 300 MG: 300 CAPSULE ORAL at 05:25

## 2017-05-13 RX ADMIN — TRAMADOL HYDROCHLORIDE 50 MG: 50 TABLET, FILM COATED ORAL at 14:38

## 2017-05-14 LAB
AMMONIA BLD-SCNC: 81 UMOL/L (ref 9–30)
ANION GAP SERPL CALCULATED.3IONS-SCNC: 12 MMOL/L (ref 5–15)
BUN BLD-MCNC: 26 MG/DL (ref 7–21)
BUN/CREAT SERPL: 12.3 (ref 7–25)
CALCIUM SPEC-SCNC: 9.8 MG/DL (ref 8.4–10.2)
CHLORIDE SERPL-SCNC: 101 MMOL/L (ref 95–110)
CO2 SERPL-SCNC: 26 MMOL/L (ref 22–31)
CREAT BLD-MCNC: 2.12 MG/DL (ref 0.7–1.3)
DEPRECATED RDW RBC AUTO: 56 FL (ref 35.1–43.9)
ERYTHROCYTE [DISTWIDTH] IN BLOOD BY AUTOMATED COUNT: 15.6 % (ref 11.5–14.5)
GFR SERPL CREATININE-BSD FRML MDRD: 33 ML/MIN/1.73 (ref 60–130)
GLUCOSE BLD-MCNC: 119 MG/DL (ref 60–100)
HCT VFR BLD AUTO: 31.7 % (ref 39–49)
HGB BLD-MCNC: 11 G/DL (ref 13.7–17.3)
MCH RBC QN AUTO: 34 PG (ref 26.5–34)
MCHC RBC AUTO-ENTMCNC: 34.7 G/DL (ref 31.5–36.3)
MCV RBC AUTO: 97.8 FL (ref 80–98)
PLATELET # BLD AUTO: 169 10*3/MM3 (ref 150–450)
PMV BLD AUTO: 10.1 FL (ref 8–12)
POTASSIUM BLD-SCNC: 3.6 MMOL/L (ref 3.5–5.1)
POTASSIUM BLD-SCNC: 4.2 MMOL/L (ref 3.5–5.1)
RBC # BLD AUTO: 3.24 10*6/MM3 (ref 4.37–5.74)
SODIUM BLD-SCNC: 139 MMOL/L (ref 137–145)
WBC NRBC COR # BLD: 10.4 10*3/MM3 (ref 3.2–9.8)

## 2017-05-14 PROCEDURE — 82140 ASSAY OF AMMONIA: CPT | Performed by: NURSE PRACTITIONER

## 2017-05-14 PROCEDURE — 25010000002 FUROSEMIDE PER 20 MG: Performed by: INTERNAL MEDICINE

## 2017-05-14 PROCEDURE — 97110 THERAPEUTIC EXERCISES: CPT

## 2017-05-14 PROCEDURE — 80048 BASIC METABOLIC PNL TOTAL CA: CPT | Performed by: NURSE PRACTITIONER

## 2017-05-14 PROCEDURE — 84132 ASSAY OF SERUM POTASSIUM: CPT | Performed by: INTERNAL MEDICINE

## 2017-05-14 PROCEDURE — 85027 COMPLETE CBC AUTOMATED: CPT | Performed by: NURSE PRACTITIONER

## 2017-05-14 PROCEDURE — 97530 THERAPEUTIC ACTIVITIES: CPT

## 2017-05-14 RX ORDER — LACTULOSE 10 G/15ML
40 SOLUTION ORAL 4 TIMES DAILY
Status: DISCONTINUED | OUTPATIENT
Start: 2017-05-14 | End: 2017-05-16 | Stop reason: HOSPADM

## 2017-05-14 RX ADMIN — Medication: at 08:40

## 2017-05-14 RX ADMIN — FUROSEMIDE 20 MG: 10 INJECTION, SOLUTION INTRAVENOUS at 08:42

## 2017-05-14 RX ADMIN — AMLODIPINE BESYLATE 5 MG: 5 TABLET ORAL at 08:42

## 2017-05-14 RX ADMIN — LACTULOSE 30 G: 10 SOLUTION ORAL at 08:41

## 2017-05-14 RX ADMIN — LACTULOSE 40 G: 10 SOLUTION ORAL at 20:34

## 2017-05-14 RX ADMIN — GABAPENTIN 300 MG: 300 CAPSULE ORAL at 21:35

## 2017-05-14 RX ADMIN — LACTULOSE 30 G: 10 SOLUTION ORAL at 13:16

## 2017-05-14 RX ADMIN — Medication 10 ML: at 17:25

## 2017-05-14 RX ADMIN — GABAPENTIN 300 MG: 300 CAPSULE ORAL at 13:17

## 2017-05-14 RX ADMIN — GABAPENTIN 300 MG: 300 CAPSULE ORAL at 06:14

## 2017-05-14 RX ADMIN — PANTOPRAZOLE SODIUM 40 MG: 40 TABLET, DELAYED RELEASE ORAL at 06:14

## 2017-05-14 RX ADMIN — Medication: at 17:25

## 2017-05-14 RX ADMIN — Medication 10 ML: at 08:42

## 2017-05-14 RX ADMIN — LACTULOSE 40 G: 10 SOLUTION ORAL at 17:25

## 2017-05-14 RX ADMIN — TRAMADOL HYDROCHLORIDE 50 MG: 50 TABLET, FILM COATED ORAL at 09:43

## 2017-05-14 RX ADMIN — LISINOPRIL 20 MG: 20 TABLET ORAL at 08:42

## 2017-05-14 RX ADMIN — NYSTATIN: 100000 POWDER TOPICAL at 08:40

## 2017-05-14 RX ADMIN — FUROSEMIDE 20 MG: 10 INJECTION, SOLUTION INTRAVENOUS at 17:25

## 2017-05-15 LAB
AMMONIA BLD-SCNC: 34 UMOL/L (ref 9–30)
ANION GAP SERPL CALCULATED.3IONS-SCNC: 10 MMOL/L (ref 5–15)
BUN BLD-MCNC: 26 MG/DL (ref 7–21)
BUN/CREAT SERPL: 12.6 (ref 7–25)
CALCIUM SPEC-SCNC: 9.6 MG/DL (ref 8.4–10.2)
CHLORIDE SERPL-SCNC: 104 MMOL/L (ref 95–110)
CO2 SERPL-SCNC: 27 MMOL/L (ref 22–31)
CREAT BLD-MCNC: 2.06 MG/DL (ref 0.7–1.3)
DEPRECATED RDW RBC AUTO: 57.7 FL (ref 35.1–43.9)
ERYTHROCYTE [DISTWIDTH] IN BLOOD BY AUTOMATED COUNT: 15.9 % (ref 11.5–14.5)
GFR SERPL CREATININE-BSD FRML MDRD: 34 ML/MIN/1.73 (ref 60–130)
GLUCOSE BLD-MCNC: 115 MG/DL (ref 60–100)
HCT VFR BLD AUTO: 31 % (ref 39–49)
HGB BLD-MCNC: 10.6 G/DL (ref 13.7–17.3)
MCH RBC QN AUTO: 34.2 PG (ref 26.5–34)
MCHC RBC AUTO-ENTMCNC: 34.2 G/DL (ref 31.5–36.3)
MCV RBC AUTO: 100 FL (ref 80–98)
PLATELET # BLD AUTO: 160 10*3/MM3 (ref 150–450)
PMV BLD AUTO: 10.6 FL (ref 8–12)
POTASSIUM BLD-SCNC: 3.6 MMOL/L (ref 3.5–5.1)
RBC # BLD AUTO: 3.1 10*6/MM3 (ref 4.37–5.74)
SODIUM BLD-SCNC: 141 MMOL/L (ref 137–145)
WBC NRBC COR # BLD: 9.13 10*3/MM3 (ref 3.2–9.8)

## 2017-05-15 PROCEDURE — 97110 THERAPEUTIC EXERCISES: CPT

## 2017-05-15 PROCEDURE — 97530 THERAPEUTIC ACTIVITIES: CPT

## 2017-05-15 PROCEDURE — 25010000002 FUROSEMIDE PER 20 MG: Performed by: INTERNAL MEDICINE

## 2017-05-15 PROCEDURE — 82140 ASSAY OF AMMONIA: CPT | Performed by: NURSE PRACTITIONER

## 2017-05-15 PROCEDURE — 85027 COMPLETE CBC AUTOMATED: CPT | Performed by: NURSE PRACTITIONER

## 2017-05-15 PROCEDURE — 80048 BASIC METABOLIC PNL TOTAL CA: CPT | Performed by: NURSE PRACTITIONER

## 2017-05-15 RX ADMIN — Medication: at 18:04

## 2017-05-15 RX ADMIN — NYSTATIN: 100000 POWDER TOPICAL at 21:17

## 2017-05-15 RX ADMIN — Medication: at 08:55

## 2017-05-15 RX ADMIN — NYSTATIN: 100000 POWDER TOPICAL at 08:54

## 2017-05-15 RX ADMIN — LISINOPRIL 20 MG: 20 TABLET ORAL at 08:53

## 2017-05-15 RX ADMIN — LACTULOSE 40 G: 10 SOLUTION ORAL at 11:24

## 2017-05-15 RX ADMIN — GABAPENTIN 300 MG: 300 CAPSULE ORAL at 21:14

## 2017-05-15 RX ADMIN — FUROSEMIDE 20 MG: 10 INJECTION, SOLUTION INTRAVENOUS at 08:53

## 2017-05-15 RX ADMIN — GABAPENTIN 300 MG: 300 CAPSULE ORAL at 05:44

## 2017-05-15 RX ADMIN — FUROSEMIDE 20 MG: 10 INJECTION, SOLUTION INTRAVENOUS at 18:04

## 2017-05-15 RX ADMIN — GABAPENTIN 300 MG: 300 CAPSULE ORAL at 14:00

## 2017-05-15 RX ADMIN — PANTOPRAZOLE SODIUM 40 MG: 40 TABLET, DELAYED RELEASE ORAL at 05:44

## 2017-05-15 RX ADMIN — LACTULOSE 40 G: 10 SOLUTION ORAL at 21:14

## 2017-05-15 RX ADMIN — AMLODIPINE BESYLATE 5 MG: 5 TABLET ORAL at 08:53

## 2017-05-15 RX ADMIN — LACTULOSE 40 G: 10 SOLUTION ORAL at 18:04

## 2017-05-15 RX ADMIN — LACTULOSE 40 G: 10 SOLUTION ORAL at 08:53

## 2017-05-16 VITALS
HEART RATE: 83 BPM | HEIGHT: 72 IN | RESPIRATION RATE: 16 BRPM | TEMPERATURE: 98.1 F | DIASTOLIC BLOOD PRESSURE: 64 MMHG | SYSTOLIC BLOOD PRESSURE: 98 MMHG | BODY MASS INDEX: 29.32 KG/M2 | WEIGHT: 216.49 LBS | OXYGEN SATURATION: 97 %

## 2017-05-16 LAB
AMMONIA BLD-SCNC: 49 UMOL/L (ref 9–30)
ANION GAP SERPL CALCULATED.3IONS-SCNC: 14 MMOL/L (ref 5–15)
BUN BLD-MCNC: 23 MG/DL (ref 7–21)
BUN/CREAT SERPL: 11.9 (ref 7–25)
CALCIUM SPEC-SCNC: 9.6 MG/DL (ref 8.4–10.2)
CHLORIDE SERPL-SCNC: 101 MMOL/L (ref 95–110)
CO2 SERPL-SCNC: 24 MMOL/L (ref 22–31)
CREAT BLD-MCNC: 1.94 MG/DL (ref 0.7–1.3)
DEPRECATED RDW RBC AUTO: 55.6 FL (ref 35.1–43.9)
ERYTHROCYTE [DISTWIDTH] IN BLOOD BY AUTOMATED COUNT: 15.4 % (ref 11.5–14.5)
GFR SERPL CREATININE-BSD FRML MDRD: 36 ML/MIN/1.73 (ref 56–130)
GLUCOSE BLD-MCNC: 137 MG/DL (ref 60–100)
HCT VFR BLD AUTO: 29.9 % (ref 39–49)
HGB BLD-MCNC: 10.4 G/DL (ref 13.7–17.3)
MCH RBC QN AUTO: 34.2 PG (ref 26.5–34)
MCHC RBC AUTO-ENTMCNC: 34.8 G/DL (ref 31.5–36.3)
MCV RBC AUTO: 98.4 FL (ref 80–98)
PLATELET # BLD AUTO: 154 10*3/MM3 (ref 150–450)
PMV BLD AUTO: 10.4 FL (ref 8–12)
POTASSIUM BLD-SCNC: 3.4 MMOL/L (ref 3.5–5.1)
RBC # BLD AUTO: 3.04 10*6/MM3 (ref 4.37–5.74)
SODIUM BLD-SCNC: 139 MMOL/L (ref 137–145)
WBC NRBC COR # BLD: 7.67 10*3/MM3 (ref 3.2–9.8)

## 2017-05-16 PROCEDURE — 97530 THERAPEUTIC ACTIVITIES: CPT

## 2017-05-16 PROCEDURE — 97535 SELF CARE MNGMENT TRAINING: CPT

## 2017-05-16 PROCEDURE — 85027 COMPLETE CBC AUTOMATED: CPT | Performed by: NURSE PRACTITIONER

## 2017-05-16 PROCEDURE — 97110 THERAPEUTIC EXERCISES: CPT

## 2017-05-16 PROCEDURE — 80048 BASIC METABOLIC PNL TOTAL CA: CPT | Performed by: NURSE PRACTITIONER

## 2017-05-16 PROCEDURE — 82140 ASSAY OF AMMONIA: CPT | Performed by: NURSE PRACTITIONER

## 2017-05-16 PROCEDURE — 97542 WHEELCHAIR MNGMENT TRAINING: CPT

## 2017-05-16 RX ORDER — LACTULOSE 10 G/15ML
30 SOLUTION ORAL 4 TIMES DAILY
Qty: 1892 ML | Refills: 0 | Status: SHIPPED | OUTPATIENT
Start: 2017-05-16 | End: 2017-09-06 | Stop reason: SDUPTHER

## 2017-05-16 RX ORDER — POTASSIUM CHLORIDE 750 MG/1
40 CAPSULE, EXTENDED RELEASE ORAL ONCE
Status: DISCONTINUED | OUTPATIENT
Start: 2017-05-16 | End: 2017-05-16 | Stop reason: HOSPADM

## 2017-05-16 RX ADMIN — NYSTATIN: 100000 POWDER TOPICAL at 09:29

## 2017-05-16 RX ADMIN — GABAPENTIN 300 MG: 300 CAPSULE ORAL at 13:20

## 2017-05-16 RX ADMIN — POTASSIUM CHLORIDE 40 MEQ: 750 CAPSULE, EXTENDED RELEASE ORAL at 10:25

## 2017-05-16 RX ADMIN — GABAPENTIN 300 MG: 300 CAPSULE ORAL at 05:46

## 2017-05-16 RX ADMIN — LACTULOSE 40 G: 10 SOLUTION ORAL at 12:02

## 2017-05-16 RX ADMIN — Medication: at 08:34

## 2017-05-16 RX ADMIN — TRAMADOL HYDROCHLORIDE 50 MG: 50 TABLET, FILM COATED ORAL at 10:25

## 2017-05-16 RX ADMIN — PANTOPRAZOLE SODIUM 40 MG: 40 TABLET, DELAYED RELEASE ORAL at 05:46

## 2017-05-16 RX ADMIN — Medication 10 ML: at 08:34

## 2017-05-16 RX ADMIN — LACTULOSE 40 G: 10 SOLUTION ORAL at 08:34

## 2017-06-05 ENCOUNTER — OFFICE VISIT (OUTPATIENT)
Dept: ORTHOPEDIC SURGERY | Facility: CLINIC | Age: 52
End: 2017-06-05

## 2017-06-05 ENCOUNTER — LAB (OUTPATIENT)
Dept: LAB | Facility: HOSPITAL | Age: 52
End: 2017-06-05

## 2017-06-05 DIAGNOSIS — M86.611 CHRONIC OSTEOMYELITIS OF RIGHT SHOULDER REGION (HCC): ICD-10-CM

## 2017-06-05 DIAGNOSIS — M86.611 CHRONIC OSTEOMYELITIS OF RIGHT SHOULDER REGION (HCC): Primary | ICD-10-CM

## 2017-06-05 LAB
BASOPHILS # BLD AUTO: 0.14 10*3/MM3 (ref 0–0.2)
BASOPHILS NFR BLD AUTO: 1.8 % (ref 0–2)
CRP SERPL-MCNC: 1.9 MG/DL (ref 0–1)
DEPRECATED RDW RBC AUTO: 49.9 FL (ref 35.1–43.9)
EOSINOPHIL # BLD AUTO: 0.49 10*3/MM3 (ref 0–0.7)
EOSINOPHIL NFR BLD AUTO: 6.4 % (ref 0–7)
ERYTHROCYTE [DISTWIDTH] IN BLOOD BY AUTOMATED COUNT: 13.8 % (ref 11.5–14.5)
ERYTHROCYTE [SEDIMENTATION RATE] IN BLOOD: 30 MM/HR (ref 0–15)
HCT VFR BLD AUTO: 32.8 % (ref 39–49)
HGB BLD-MCNC: 11.2 G/DL (ref 13.7–17.3)
IMM GRANULOCYTES # BLD: 0.01 10*3/MM3 (ref 0–0.02)
IMM GRANULOCYTES NFR BLD: 0.1 % (ref 0–0.5)
LYMPHOCYTES # BLD AUTO: 2.43 10*3/MM3 (ref 0.6–4.2)
LYMPHOCYTES NFR BLD AUTO: 31.8 % (ref 10–50)
MCH RBC QN AUTO: 33.8 PG (ref 26.5–34)
MCHC RBC AUTO-ENTMCNC: 34.1 G/DL (ref 31.5–36.3)
MCV RBC AUTO: 99.1 FL (ref 80–98)
MONOCYTES # BLD AUTO: 0.52 10*3/MM3 (ref 0–0.9)
MONOCYTES NFR BLD AUTO: 6.8 % (ref 0–12)
NEUTROPHILS # BLD AUTO: 4.05 10*3/MM3 (ref 2–8.6)
NEUTROPHILS NFR BLD AUTO: 53.1 % (ref 37–80)
PLATELET # BLD AUTO: 104 10*3/MM3 (ref 150–450)
PMV BLD AUTO: 11.5 FL (ref 8–12)
RBC # BLD AUTO: 3.31 10*6/MM3 (ref 4.37–5.74)
WBC NRBC COR # BLD: 7.64 10*3/MM3 (ref 3.2–9.8)

## 2017-06-05 PROCEDURE — 80053 COMPREHEN METABOLIC PANEL: CPT | Performed by: NURSE PRACTITIONER

## 2017-06-05 PROCEDURE — 85025 COMPLETE CBC W/AUTO DIFF WBC: CPT | Performed by: NURSE PRACTITIONER

## 2017-06-05 PROCEDURE — 85651 RBC SED RATE NONAUTOMATED: CPT | Performed by: NURSE PRACTITIONER

## 2017-06-05 PROCEDURE — 99213 OFFICE O/P EST LOW 20 MIN: CPT | Performed by: NURSE PRACTITIONER

## 2017-06-05 PROCEDURE — 86140 C-REACTIVE PROTEIN: CPT | Performed by: NURSE PRACTITIONER

## 2017-06-05 PROCEDURE — 36415 COLL VENOUS BLD VENIPUNCTURE: CPT

## 2017-06-05 NOTE — PROGRESS NOTES
Armando Mcmullen is a 52 y.o. male returns for     Chief Complaint   Patient presents with   • Right Shoulder - Follow-up       HISTORY OF PRESENT ILLNESS: Patient is in today for follow-up of right shoulder pain treatment of chronic osteomyelitis and increased pain since the previous evaluation back in February which Dr. Seth performed an incision and drainage of right shoulder.  He reports an increase in the pain denies any fever chills.  He recently spent approximately a month and a half in the hospital for other comorbidities.      CONCURRENT MEDICAL HISTORY:    Past Medical History:   Diagnosis Date   • Anxiety state 12/1/2008   • Back pain 12/1/2008   • Chronic hepatitis 6/18/2009   • Depression 12/1/2008   • Essential hypertension 12/1/2008   • Hepatic cirrhosis 5/26/2009   • Hypersplenism 6/28/2010   • Insomnia 12/1/2008   • Osteoarthritis 12/1/2008       Allergies   Allergen Reactions   • Aspirin Other (See Comments)     D/T liver   • Codeine Sulfate          Current Outpatient Prescriptions:   •  amLODIPine (NORVASC) 2.5 MG tablet, Take 2.5 mg by mouth Daily As Needed (only takes if his blood pressure is high)., Disp: , Rfl:   •  Bacitracin Zinc (MAGIC BUTT OINTMENT), Apply 1 g topically 3 (Three) Times a Day., Disp: 120 g, Rfl: 11  •  diclofenac-misoprostol (ARTHROTEC 75) 75-0.2 MG EC tablet, Take 1 tablet by mouth 2 (Two) Times a Day., Disp: , Rfl:   •  furosemide (LASIX) 40 MG tablet, Take 1 tablet by mouth 2 (Two) Times a Day., Disp: 30 tablet, Rfl: 11  •  gabapentin (NEURONTIN) 100 MG capsule, Take 100 mg by mouth 3 (Three) Times a Day., Disp: , Rfl:   •  Incontinence Supplies (BEDPAN) misc, Bedpan (Dx occasional incontinence, weakness, AMS related to hepatic encephalopathy), Disp: 1 each, Rfl: 0  •  lactulose (CHRONULAC) 10 GM/15ML solution, Take 45 mL by mouth 4 (Four) Times a Day., Disp: 1892 mL, Rfl: 0  •  lisinopril (PRINIVIL,ZESTRIL) 20 MG tablet, Take 20 mg by mouth Daily., Disp: , Rfl:   •   Mis. Devices (COMMODE BEDSIDE) misc, Large bedside commode (Dx hepatic encephalopathy, LLE amputation), Disp: 1 each, Rfl: 0  •  Multiple Vitamins-Minerals (COMPLETE MULTIVITAMIN/MINERAL PO), Take 1 tablet by mouth Daily., Disp: , Rfl:   •  nystatin (MYCOSTATIN) 153777 UNIT/GM powder, Apply  topically Every 12 (Twelve) Hours., Disp: 60 g, Rfl: 11  •  pantoprazole (PROTONIX) 40 MG EC tablet, Take 1 tablet by mouth Daily., Disp: 30 tablet, Rfl: 11  •  potassium chloride (K-DUR,KLOR-CON) 20 MEQ CR tablet, Take 2 tablets by mouth 2 (Two) Times a Day With Meals., Disp: 30 tablet, Rfl: 11  •  promethazine (PHENERGAN) 25 MG tablet, Take 1 tablet by mouth Every 6 (Six) Hours As Needed for Nausea or Vomiting., Disp: 30 tablet, Rfl: 11  •  rifaximin (XIFAXAN) 550 MG tablet, Take 1 tablet by mouth 2 (Two) Times a Day., Disp: 60 tablet, Rfl: 11  •  vitamin D (ERGOCALCIFEROL) 48592 UNITS capsule capsule, Take 50,000 Units by mouth 1 (One) Time Per Week. Takes on Wednesdays, Disp: , Rfl:     Past Surgical History:   Procedure Laterality Date   • HIP SURGERY     • INCISION AND DRAINAGE LEG Right 2/27/2017   • LEG AMPUTATION     • SHOULDER SURGERY     • TIPS PROCEDURE         ROS  No fevers or chills.  No chest pain or shortness of air.  No GI or  disturbances.    PHYSICAL EXAMINATION:       There were no vitals taken for this visit.    Physical Exam   Constitutional: He is oriented to person, place, and time. Vital signs are normal. He appears well-developed and well-nourished. He is cooperative.   HENT:   Head: Normocephalic and atraumatic.   Neck: Trachea normal and phonation normal.   Pulmonary/Chest: Effort normal. No respiratory distress.   Abdominal: Soft. Normal appearance. He exhibits no distension.   Neurological: He is alert and oriented to person, place, and time. GCS eye subscore is 4. GCS verbal subscore is 5. GCS motor subscore is 6.   Skin: Skin is warm, dry and intact.   Psychiatric: He has a normal mood and  affect. His speech is normal and behavior is normal. Judgment and thought content normal. Cognition and memory are normal.   Vitals reviewed.      GAIT:     []  Normal  []  Antalgic    Assistive device: []  None  []  Walker     []  Crutches  []  Cane     [x]  Wheelchair  []  Stretcher    Right Shoulder Exam     Tenderness   Right shoulder tenderness location: Diffuse pain.    Range of Motion   Active Abduction: abnormal   Passive Abduction: abnormal   Extension: abnormal   Forward Flexion: abnormal   External Rotation: abnormal   Internal Rotation 0 degrees: abnormal   Internal Rotation 90 degrees: abnormal     Muscle Strength   Abduction: 3/5   Internal Rotation: 3/5   External Rotation: 3/5   Supraspinatus: 3/5     Other   Erythema: absent  Scars: absent  Sensation: normal  Pulse: present      Left Shoulder Exam   Left shoulder exam is normal.              Xr Shoulder 2+ View Right    Result Date: 6/5/2017  Narrative: Three views of the right shoulder reveals non-union of a proximal humerus fracture with changes consist ant with osteomyelitis.   No acute bony abnormalities are noted.   06/05/17 at 10:58 PM by KALEB Clifton         ASSESSMENT:    Diagnoses and all orders for this visit:    Chronic osteomyelitis of right shoulder region  -     Sedimentation Rate; Future  -     C-reactive Protein; Future  -     CBC & Differential; Future          PLAN  This point the patient does not appear to be septic.  There is no change in his plain film x-rays from previous evaluations are performed CRP and CBC and sedimentation rate to evaluate for exacerbations of her chronic osteomyelitis.  He may also need an MRI of the shoulder in the future to rule out an abscess.    No Follow-up on file.    KALEB Clifton

## 2017-06-07 ENCOUNTER — OFFICE VISIT (OUTPATIENT)
Dept: FAMILY MEDICINE CLINIC | Facility: CLINIC | Age: 52
End: 2017-06-07

## 2017-06-07 VITALS
SYSTOLIC BLOOD PRESSURE: 146 MMHG | BODY MASS INDEX: 29.26 KG/M2 | HEART RATE: 90 BPM | HEIGHT: 72 IN | OXYGEN SATURATION: 99 % | WEIGHT: 216 LBS | DIASTOLIC BLOOD PRESSURE: 80 MMHG

## 2017-06-07 DIAGNOSIS — K74.60 CIRRHOSIS OF LIVER WITHOUT ASCITES, UNSPECIFIED HEPATIC CIRRHOSIS TYPE (HCC): Primary | Chronic | ICD-10-CM

## 2017-06-07 LAB
ALBUMIN SERPL-MCNC: 3 G/DL (ref 3.4–4.8)
ALBUMIN/GLOB SERPL: 0.8 G/DL (ref 1.1–1.8)
ALP SERPL-CCNC: 132 U/L (ref 38–126)
ALT SERPL W P-5'-P-CCNC: 48 U/L (ref 21–72)
ANION GAP SERPL CALCULATED.3IONS-SCNC: 10 MMOL/L (ref 5–15)
AST SERPL-CCNC: 73 U/L (ref 17–59)
BILIRUB SERPL-MCNC: 2 MG/DL (ref 0.2–1.3)
BUN BLD-MCNC: 23 MG/DL (ref 7–21)
BUN/CREAT SERPL: 6.9 (ref 7–25)
CALCIUM SPEC-SCNC: 9.5 MG/DL (ref 8.4–10.2)
CHLORIDE SERPL-SCNC: 107 MMOL/L (ref 95–110)
CO2 SERPL-SCNC: 20 MMOL/L (ref 22–31)
CREAT BLD-MCNC: 3.32 MG/DL (ref 0.7–1.3)
GFR SERPL CREATININE-BSD FRML MDRD: 20 ML/MIN/1.73 (ref 56–130)
GLOBULIN UR ELPH-MCNC: 3.9 GM/DL (ref 2.3–3.5)
GLUCOSE BLD-MCNC: 73 MG/DL (ref 60–100)
POTASSIUM BLD-SCNC: 5.6 MMOL/L (ref 3.5–5.1)
PROT SERPL-MCNC: 6.9 G/DL (ref 6.3–8.6)
SODIUM BLD-SCNC: 137 MMOL/L (ref 137–145)

## 2017-06-07 PROCEDURE — 99203 OFFICE O/P NEW LOW 30 MIN: CPT | Performed by: FAMILY MEDICINE

## 2017-06-07 RX ORDER — LISINOPRIL 20 MG/1
20 TABLET ORAL DAILY
Status: CANCELLED | OUTPATIENT
Start: 2017-06-07

## 2017-06-07 RX ORDER — DICLOFENAC SODIUM AND MISOPROSTOL 75; 200 MG/1; UG/1
1 TABLET, DELAYED RELEASE ORAL 2 TIMES DAILY
Status: CANCELLED | OUTPATIENT
Start: 2017-06-07

## 2017-06-07 RX ORDER — PANTOPRAZOLE SODIUM 40 MG/1
40 TABLET, DELAYED RELEASE ORAL DAILY
Qty: 30 TABLET | Refills: 11 | Status: SHIPPED | OUTPATIENT
Start: 2017-06-07 | End: 2017-09-06 | Stop reason: SDUPTHER

## 2017-06-07 RX ORDER — ERGOCALCIFEROL 1.25 MG/1
50000 CAPSULE ORAL
Qty: 4 CAPSULE | Refills: 11 | Status: SHIPPED | OUTPATIENT
Start: 2017-06-07 | End: 2017-09-06 | Stop reason: SDUPTHER

## 2017-06-07 RX ORDER — FUROSEMIDE 40 MG/1
40 TABLET ORAL 2 TIMES DAILY
Qty: 60 TABLET | Refills: 1 | Status: SHIPPED | OUTPATIENT
Start: 2017-06-07 | End: 2017-09-06 | Stop reason: SDUPTHER

## 2017-06-07 RX ORDER — GABAPENTIN 100 MG/1
100 CAPSULE ORAL 3 TIMES DAILY
Status: CANCELLED | OUTPATIENT
Start: 2017-06-07
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
## 2017-06-08 DIAGNOSIS — K74.60 CIRRHOSIS OF LIVER WITHOUT ASCITES, UNSPECIFIED HEPATIC CIRRHOSIS TYPE (HCC): Primary | Chronic | ICD-10-CM

## 2017-06-08 RX ORDER — POTASSIUM CHLORIDE 20 MEQ/1
20 TABLET, EXTENDED RELEASE ORAL DAILY
Qty: 30 TABLET | Refills: 11
Start: 2017-06-08 | End: 2017-09-06 | Stop reason: SDUPTHER

## 2017-06-09 ENCOUNTER — APPOINTMENT (OUTPATIENT)
Dept: LAB | Facility: HOSPITAL | Age: 52
End: 2017-06-09

## 2017-06-09 LAB
ALBUMIN SERPL-MCNC: 2.8 G/DL (ref 3.4–4.8)
ALBUMIN/GLOB SERPL: 0.8 G/DL (ref 1.1–1.8)
ALP SERPL-CCNC: 126 U/L (ref 38–126)
ALT SERPL W P-5'-P-CCNC: 45 U/L (ref 21–72)
ANION GAP SERPL CALCULATED.3IONS-SCNC: 11 MMOL/L (ref 5–15)
AST SERPL-CCNC: 113 U/L (ref 17–59)
BILIRUB SERPL-MCNC: 2.3 MG/DL (ref 0.2–1.3)
BUN BLD-MCNC: 20 MG/DL (ref 7–21)
BUN/CREAT SERPL: 8.2 (ref 7–25)
CALCIUM SPEC-SCNC: 9.2 MG/DL (ref 8.4–10.2)
CHLORIDE SERPL-SCNC: 108 MMOL/L (ref 95–110)
CO2 SERPL-SCNC: 23 MMOL/L (ref 22–31)
CREAT BLD-MCNC: 2.44 MG/DL (ref 0.7–1.3)
GFR SERPL CREATININE-BSD FRML MDRD: 28 ML/MIN/1.73 (ref 56–130)
GLOBULIN UR ELPH-MCNC: 3.6 GM/DL (ref 2.3–3.5)
GLUCOSE BLD-MCNC: 115 MG/DL (ref 60–100)
POTASSIUM BLD-SCNC: 4.1 MMOL/L (ref 3.5–5.1)
PROT SERPL-MCNC: 6.4 G/DL (ref 6.3–8.6)
SODIUM BLD-SCNC: 142 MMOL/L (ref 137–145)

## 2017-06-09 PROCEDURE — 80053 COMPREHEN METABOLIC PANEL: CPT | Performed by: FAMILY MEDICINE

## 2017-06-09 PROCEDURE — 36415 COLL VENOUS BLD VENIPUNCTURE: CPT | Performed by: FAMILY MEDICINE

## 2017-06-14 ENCOUNTER — APPOINTMENT (OUTPATIENT)
Dept: ULTRASOUND IMAGING | Facility: HOSPITAL | Age: 52
End: 2017-06-14

## 2017-06-14 ENCOUNTER — HOSPITAL ENCOUNTER (INPATIENT)
Facility: HOSPITAL | Age: 52
LOS: 14 days | Discharge: HOME HEALTH | End: 2017-06-28
Attending: EMERGENCY MEDICINE | Admitting: INTERNAL MEDICINE

## 2017-06-14 ENCOUNTER — APPOINTMENT (OUTPATIENT)
Dept: GENERAL RADIOLOGY | Facility: HOSPITAL | Age: 52
End: 2017-06-14

## 2017-06-14 ENCOUNTER — APPOINTMENT (OUTPATIENT)
Dept: CT IMAGING | Facility: HOSPITAL | Age: 52
End: 2017-06-14

## 2017-06-14 DIAGNOSIS — Z74.09 IMPAIRED MOBILITY AND ADLS: ICD-10-CM

## 2017-06-14 DIAGNOSIS — R13.10 DYSPHAGIA, UNSPECIFIED TYPE: ICD-10-CM

## 2017-06-14 DIAGNOSIS — Z78.9 IMPAIRED MOBILITY AND ADLS: ICD-10-CM

## 2017-06-14 DIAGNOSIS — J96.00 ACUTE RESPIRATORY FAILURE, UNSPECIFIED WHETHER WITH HYPOXIA OR HYPERCAPNIA (HCC): ICD-10-CM

## 2017-06-14 DIAGNOSIS — N17.9 ACUTE ON CHRONIC RENAL FAILURE (HCC): Chronic | ICD-10-CM

## 2017-06-14 DIAGNOSIS — D73.1 HYPERSPLENISM: ICD-10-CM

## 2017-06-14 DIAGNOSIS — A41.9 SEPSIS, DUE TO UNSPECIFIED ORGANISM: ICD-10-CM

## 2017-06-14 DIAGNOSIS — R60.1 ANASARCA: ICD-10-CM

## 2017-06-14 DIAGNOSIS — M86.611 CHRONIC OSTEOMYELITIS OF RIGHT SHOULDER REGION (HCC): ICD-10-CM

## 2017-06-14 DIAGNOSIS — R40.2430 GLASGOW COMA SCALE TOTAL SCORE 3-8, UNSPECIFIED TIME: Primary | ICD-10-CM

## 2017-06-14 DIAGNOSIS — N18.9 ACUTE ON CHRONIC RENAL FAILURE (HCC): Chronic | ICD-10-CM

## 2017-06-14 DIAGNOSIS — K76.82 ACUTE HEPATIC ENCEPHALOPATHY (HCC): ICD-10-CM

## 2017-06-14 DIAGNOSIS — Z74.09 IMPAIRED MOBILITY: ICD-10-CM

## 2017-06-14 DIAGNOSIS — F19.10 SUBSTANCE ABUSE (HCC): Chronic | ICD-10-CM

## 2017-06-14 LAB
ALBUMIN SERPL-MCNC: 2.9 G/DL (ref 3.4–4.8)
ALBUMIN/GLOB SERPL: 0.8 G/DL (ref 1.1–1.8)
ALP SERPL-CCNC: 127 U/L (ref 38–126)
ALT SERPL W P-5'-P-CCNC: 42 U/L (ref 21–72)
AMMONIA BLD-SCNC: 172 UMOL/L (ref 9–30)
AMMONIA BLD-SCNC: 63 UMOL/L (ref 9–30)
AMPHET+METHAMPHET UR QL: NEGATIVE
ANION GAP SERPL CALCULATED.3IONS-SCNC: 13 MMOL/L (ref 5–15)
ANISOCYTOSIS BLD QL: NORMAL
APTT PPP: 35 SECONDS (ref 20–40.3)
ARTERIAL PATENCY WRIST A: ABNORMAL
AST SERPL-CCNC: 61 U/L (ref 17–59)
ATMOSPHERIC PRESS: ABNORMAL MMHG
BACTERIA UR QL AUTO: ABNORMAL /HPF
BARBITURATES UR QL SCN: NEGATIVE
BASE EXCESS BLDA CALC-SCNC: -6.7 MMOL/L (ref -2.4–2.4)
BASOPHILS # BLD AUTO: 0.05 10*3/MM3 (ref 0–0.2)
BASOPHILS NFR BLD AUTO: 0.8 % (ref 0–2)
BDY SITE: ABNORMAL
BENZODIAZ UR QL SCN: NEGATIVE
BILIRUB SERPL-MCNC: 2.9 MG/DL (ref 0.2–1.3)
BILIRUB UR QL STRIP: NEGATIVE
BUN BLD-MCNC: 19 MG/DL (ref 7–21)
BUN/CREAT SERPL: 8.3 (ref 7–25)
CA-I BLD-MCNC: 4.7 MG/DL (ref 4.5–4.9)
CALCIUM SPEC-SCNC: 8.4 MG/DL (ref 8.4–10.2)
CANNABINOIDS SERPL QL: POSITIVE
CHLORIDE SERPL-SCNC: 113 MMOL/L (ref 95–110)
CK MB SERPL-CCNC: 0.84 NG/ML (ref 0–5)
CK SERPL-CCNC: 108 U/L (ref 55–170)
CLARITY UR: ABNORMAL
CO2 BLDA-SCNC: 17.6 MMOL/L (ref 23–27)
CO2 SERPL-SCNC: 18 MMOL/L (ref 22–31)
COCAINE UR QL: NEGATIVE
COLOR UR: YELLOW
CREAT BLD-MCNC: 2.28 MG/DL (ref 0.7–1.3)
DEPRECATED RDW RBC AUTO: 49.5 FL (ref 35.1–43.9)
EOSINOPHIL # BLD AUTO: 0.1 10*3/MM3 (ref 0–0.7)
EOSINOPHIL NFR BLD AUTO: 1.5 % (ref 0–7)
ERYTHROCYTE [DISTWIDTH] IN BLOOD BY AUTOMATED COUNT: 13.8 % (ref 11.5–14.5)
ETHANOL BLD-MCNC: <10 MG/DL (ref 0–10)
ETHANOL UR QL: <0.01 %
GFR SERPL CREATININE-BSD FRML MDRD: 30 ML/MIN/1.73 (ref 56–130)
GLOBULIN UR ELPH-MCNC: 3.8 GM/DL (ref 2.3–3.5)
GLUCOSE BLD-MCNC: 132 MG/DL (ref 60–100)
GLUCOSE BLDA-MCNC: 119 MMOL/L
GLUCOSE BLDC GLUCOMTR-MCNC: 126 MG/DL (ref 70–130)
GLUCOSE BLDC GLUCOMTR-MCNC: 146 MG/DL (ref 70–130)
GLUCOSE UR STRIP-MCNC: NEGATIVE MG/DL
HCO3 BLDA-SCNC: 16.7 MMOL/L (ref 22–26)
HCT VFR BLD AUTO: 28.6 % (ref 39–49)
HCT VFR BLD CALC: 31 % (ref 40–54)
HGB BLD-MCNC: 9.9 G/DL (ref 13.7–17.3)
HGB BLDA-MCNC: 10.6 G/DL (ref 14–18)
HGB UR QL STRIP.AUTO: ABNORMAL
HYALINE CASTS UR QL AUTO: ABNORMAL /LPF
HYPOCHROMIA BLD QL: NORMAL
IMM GRANULOCYTES # BLD: 0.04 10*3/MM3 (ref 0–0.02)
IMM GRANULOCYTES NFR BLD: 0.6 % (ref 0–0.5)
INR PPP: 1.34 (ref 0.8–1.2)
KETONES UR QL STRIP: NEGATIVE
LEUKOCYTE ESTERASE UR QL STRIP.AUTO: ABNORMAL
LIPASE SERPL-CCNC: 195 U/L (ref 23–300)
LYMPHOCYTES # BLD AUTO: 0.97 10*3/MM3 (ref 0.6–4.2)
LYMPHOCYTES NFR BLD AUTO: 14.8 % (ref 10–50)
MAGNESIUM SERPL-MCNC: 1.8 MG/DL (ref 1.6–2.3)
MCH RBC QN AUTO: 33.9 PG (ref 26.5–34)
MCHC RBC AUTO-ENTMCNC: 34.6 G/DL (ref 31.5–36.3)
MCV RBC AUTO: 97.9 FL (ref 80–98)
METHADONE UR QL SCN: NEGATIVE
MODALITY: ABNORMAL
MONOCYTES # BLD AUTO: 0.75 10*3/MM3 (ref 0–0.9)
MONOCYTES NFR BLD AUTO: 11.4 % (ref 0–12)
NEUTROPHILS # BLD AUTO: 4.65 10*3/MM3 (ref 2–8.6)
NEUTROPHILS NFR BLD AUTO: 70.9 % (ref 37–80)
NITRITE UR QL STRIP: NEGATIVE
OPIATES UR QL: NEGATIVE
OXYCODONE UR QL SCN: NEGATIVE
PCO2 BLDA: 27 MM HG (ref 35–45)
PH BLDA: 7.41 PH UNITS (ref 7.35–7.45)
PH UR STRIP.AUTO: 5.5 [PH] (ref 5–9)
PLATELET # BLD AUTO: 70 10*3/MM3 (ref 150–450)
PMV BLD AUTO: 10.8 FL (ref 8–12)
PO2 BLDA: 245.2 MM HG (ref 80–105)
POTASSIUM BLD-SCNC: 4.2 MMOL/L (ref 3.5–5.1)
POTASSIUM BLDA-SCNC: 4.06 MMOL/L (ref 3.6–4.9)
PROT SERPL-MCNC: 6.7 G/DL (ref 6.3–8.6)
PROT UR QL STRIP: NEGATIVE
PROTHROMBIN TIME: 16.6 SECONDS (ref 11.1–15.3)
RBC # BLD AUTO: 2.92 10*6/MM3 (ref 4.37–5.74)
RBC # UR: ABNORMAL /HPF
REF LAB TEST METHOD: ABNORMAL
SAO2 % BLDCOA: 99.4 %
SMALL PLATELETS BLD QL SMEAR: NORMAL
SODIUM BLD-SCNC: 144 MMOL/L (ref 137–145)
SODIUM BLDA-SCNC: 141.2 MMOL/L (ref 138–146)
SP GR UR STRIP: 1.01 (ref 1–1.03)
SQUAMOUS #/AREA URNS HPF: ABNORMAL /HPF
TROPONIN I SERPL-MCNC: 0.01 NG/ML
TROPONIN I SERPL-MCNC: 0.06 NG/ML
UROBILINOGEN UR QL STRIP: ABNORMAL
WBC MORPH BLD: NORMAL
WBC NRBC COR # BLD: 6.56 10*3/MM3 (ref 3.2–9.8)
WBC UR QL AUTO: ABNORMAL /HPF
YEAST URNS QL MICRO: ABNORMAL /HPF

## 2017-06-14 PROCEDURE — 87040 BLOOD CULTURE FOR BACTERIA: CPT | Performed by: EMERGENCY MEDICINE

## 2017-06-14 PROCEDURE — 94002 VENT MGMT INPAT INIT DAY: CPT

## 2017-06-14 PROCEDURE — 85610 PROTHROMBIN TIME: CPT | Performed by: EMERGENCY MEDICINE

## 2017-06-14 PROCEDURE — 82550 ASSAY OF CK (CPK): CPT | Performed by: EMERGENCY MEDICINE

## 2017-06-14 PROCEDURE — 80307 DRUG TEST PRSMV CHEM ANLYZR: CPT | Performed by: EMERGENCY MEDICINE

## 2017-06-14 PROCEDURE — 94799 UNLISTED PULMONARY SVC/PX: CPT

## 2017-06-14 PROCEDURE — 25010000002 SUCCINYLCHOLINE PER 20 MG: Performed by: EMERGENCY MEDICINE

## 2017-06-14 PROCEDURE — 82553 CREATINE MB FRACTION: CPT | Performed by: EMERGENCY MEDICINE

## 2017-06-14 PROCEDURE — 94760 N-INVAS EAR/PLS OXIMETRY 1: CPT

## 2017-06-14 PROCEDURE — 25010000002 PIPERACILLIN SOD-TAZOBACTAM PER 1 G: Performed by: INTERNAL MEDICINE

## 2017-06-14 PROCEDURE — 0T9B70Z DRAINAGE OF BLADDER WITH DRAINAGE DEVICE, VIA NATURAL OR ARTIFICIAL OPENING: ICD-10-PCS | Performed by: EMERGENCY MEDICINE

## 2017-06-14 PROCEDURE — 25010000002 MAGNESIUM SULFATE PER 500 MG OF MAGNESIUM: Performed by: EMERGENCY MEDICINE

## 2017-06-14 PROCEDURE — 84484 ASSAY OF TROPONIN QUANT: CPT | Performed by: EMERGENCY MEDICINE

## 2017-06-14 PROCEDURE — 31500 INSERT EMERGENCY AIRWAY: CPT | Performed by: EMERGENCY MEDICINE

## 2017-06-14 PROCEDURE — 87150 DNA/RNA AMPLIFIED PROBE: CPT | Performed by: EMERGENCY MEDICINE

## 2017-06-14 PROCEDURE — 5A1955Z RESPIRATORY VENTILATION, GREATER THAN 96 CONSECUTIVE HOURS: ICD-10-PCS | Performed by: EMERGENCY MEDICINE

## 2017-06-14 PROCEDURE — 25010000002 PIPERACILLIN-TAZOBACTAM: Performed by: EMERGENCY MEDICINE

## 2017-06-14 PROCEDURE — 25010000002 PROPOFOL 1000 MG/ML EMULSION: Performed by: EMERGENCY MEDICINE

## 2017-06-14 PROCEDURE — 80053 COMPREHEN METABOLIC PANEL: CPT | Performed by: EMERGENCY MEDICINE

## 2017-06-14 PROCEDURE — 85025 COMPLETE CBC W/AUTO DIFF WBC: CPT | Performed by: EMERGENCY MEDICINE

## 2017-06-14 PROCEDURE — 70450 CT HEAD/BRAIN W/O DYE: CPT

## 2017-06-14 PROCEDURE — 87086 URINE CULTURE/COLONY COUNT: CPT | Performed by: EMERGENCY MEDICINE

## 2017-06-14 PROCEDURE — 82803 BLOOD GASES ANY COMBINATION: CPT | Performed by: EMERGENCY MEDICINE

## 2017-06-14 PROCEDURE — 85007 BL SMEAR W/DIFF WBC COUNT: CPT | Performed by: EMERGENCY MEDICINE

## 2017-06-14 PROCEDURE — 0BH17EZ INSERTION OF ENDOTRACHEAL AIRWAY INTO TRACHEA, VIA NATURAL OR ARTIFICIAL OPENING: ICD-10-PCS | Performed by: EMERGENCY MEDICINE

## 2017-06-14 PROCEDURE — 99291 CRITICAL CARE FIRST HOUR: CPT

## 2017-06-14 PROCEDURE — 82962 GLUCOSE BLOOD TEST: CPT

## 2017-06-14 PROCEDURE — 31500 INSERT EMERGENCY AIRWAY: CPT

## 2017-06-14 PROCEDURE — 84484 ASSAY OF TROPONIN QUANT: CPT | Performed by: INTERNAL MEDICINE

## 2017-06-14 PROCEDURE — 93010 ELECTROCARDIOGRAM REPORT: CPT | Performed by: INTERNAL MEDICINE

## 2017-06-14 PROCEDURE — 71010 HC CHEST PA OR AP: CPT

## 2017-06-14 PROCEDURE — 93005 ELECTROCARDIOGRAM TRACING: CPT | Performed by: EMERGENCY MEDICINE

## 2017-06-14 PROCEDURE — 82140 ASSAY OF AMMONIA: CPT | Performed by: EMERGENCY MEDICINE

## 2017-06-14 PROCEDURE — 83690 ASSAY OF LIPASE: CPT | Performed by: EMERGENCY MEDICINE

## 2017-06-14 PROCEDURE — 81001 URINALYSIS AUTO W/SCOPE: CPT | Performed by: EMERGENCY MEDICINE

## 2017-06-14 PROCEDURE — 36600 WITHDRAWAL OF ARTERIAL BLOOD: CPT

## 2017-06-14 PROCEDURE — 51702 INSERT TEMP BLADDER CATH: CPT

## 2017-06-14 PROCEDURE — 25010000002 THIAMINE PER 100 MG: Performed by: EMERGENCY MEDICINE

## 2017-06-14 PROCEDURE — 87070 CULTURE OTHR SPECIMN AEROBIC: CPT | Performed by: EMERGENCY MEDICINE

## 2017-06-14 PROCEDURE — 85730 THROMBOPLASTIN TIME PARTIAL: CPT | Performed by: EMERGENCY MEDICINE

## 2017-06-14 PROCEDURE — 87205 SMEAR GRAM STAIN: CPT | Performed by: EMERGENCY MEDICINE

## 2017-06-14 PROCEDURE — 83735 ASSAY OF MAGNESIUM: CPT | Performed by: EMERGENCY MEDICINE

## 2017-06-14 PROCEDURE — 25010000002 VANCOMYCIN PER 500 MG: Performed by: EMERGENCY MEDICINE

## 2017-06-14 PROCEDURE — 74176 CT ABD & PELVIS W/O CONTRAST: CPT

## 2017-06-14 PROCEDURE — 82140 ASSAY OF AMMONIA: CPT | Performed by: FAMILY MEDICINE

## 2017-06-14 RX ORDER — SODIUM CHLORIDE 9 MG/ML
75 INJECTION, SOLUTION INTRAVENOUS CONTINUOUS
Status: DISCONTINUED | OUTPATIENT
Start: 2017-06-14 | End: 2017-06-16

## 2017-06-14 RX ORDER — ONDANSETRON 2 MG/ML
4 INJECTION INTRAMUSCULAR; INTRAVENOUS EVERY 6 HOURS PRN
Status: DISCONTINUED | OUTPATIENT
Start: 2017-06-14 | End: 2017-06-28 | Stop reason: HOSPADM

## 2017-06-14 RX ORDER — LACTULOSE 10 G/15ML
45 SOLUTION ORAL ONCE
Status: COMPLETED | OUTPATIENT
Start: 2017-06-14 | End: 2017-06-14

## 2017-06-14 RX ORDER — LACTULOSE 10 G/15ML
30 SOLUTION ORAL 2 TIMES DAILY
Status: DISCONTINUED | OUTPATIENT
Start: 2017-06-14 | End: 2017-06-16

## 2017-06-14 RX ORDER — SUCCINYLCHOLINE CHLORIDE 20 MG/ML
INJECTION INTRAMUSCULAR; INTRAVENOUS
Status: COMPLETED | OUTPATIENT
Start: 2017-06-14 | End: 2017-06-14

## 2017-06-14 RX ORDER — ETOMIDATE 2 MG/ML
INJECTION INTRAVENOUS
Status: COMPLETED | OUTPATIENT
Start: 2017-06-14 | End: 2017-06-14

## 2017-06-14 RX ORDER — SODIUM CHLORIDE 0.9 % (FLUSH) 0.9 %
10 SYRINGE (ML) INJECTION AS NEEDED
Status: DISCONTINUED | OUTPATIENT
Start: 2017-06-14 | End: 2017-06-28 | Stop reason: HOSPADM

## 2017-06-14 RX ORDER — ONDANSETRON 2 MG/ML
4 INJECTION INTRAMUSCULAR; INTRAVENOUS EVERY 6 HOURS PRN
Status: DISCONTINUED | OUTPATIENT
Start: 2017-06-14 | End: 2017-06-14 | Stop reason: SDUPTHER

## 2017-06-14 RX ADMIN — TAZOBACTAM SODIUM AND PIPERACILLIN SODIUM 3.38 G: 375; 3 INJECTION, SOLUTION INTRAVENOUS at 12:54

## 2017-06-14 RX ADMIN — VANCOMYCIN HYDROCHLORIDE 1500 MG: 5 INJECTION, POWDER, LYOPHILIZED, FOR SOLUTION INTRAVENOUS at 09:47

## 2017-06-14 RX ADMIN — FOLIC ACID 1000 ML/HR: 5 INJECTION, SOLUTION INTRAMUSCULAR; INTRAVENOUS; SUBCUTANEOUS at 09:46

## 2017-06-14 RX ADMIN — LACTULOSE 45 ML: 10 SOLUTION ORAL at 08:52

## 2017-06-14 RX ADMIN — PROPOFOL 30 MCG/KG/MIN: 10 INJECTION, EMULSION INTRAVENOUS at 07:45

## 2017-06-14 RX ADMIN — PROPOFOL 35 MCG/KG/MIN: 10 INJECTION, EMULSION INTRAVENOUS at 16:05

## 2017-06-14 RX ADMIN — TAZOBACTAM SODIUM AND PIPERACILLIN SODIUM 3.38 G: 375; 3 INJECTION, SOLUTION INTRAVENOUS at 21:01

## 2017-06-14 RX ADMIN — SODIUM CHLORIDE 125 ML/HR: 900 INJECTION, SOLUTION INTRAVENOUS at 07:30

## 2017-06-14 RX ADMIN — PIPERACILLIN SODIUM,TAZOBACTAM SODIUM 3.38 G: 3; .375 INJECTION, POWDER, FOR SOLUTION INTRAVENOUS at 08:48

## 2017-06-14 RX ADMIN — SUCCINYLCHOLINE CHLORIDE 100 MG: 20 INJECTION, SOLUTION INTRAMUSCULAR; INTRAVENOUS at 07:30

## 2017-06-14 RX ADMIN — SODIUM CHLORIDE 125 ML/HR: 900 INJECTION, SOLUTION INTRAVENOUS at 11:52

## 2017-06-14 RX ADMIN — LACTULOSE 30 G: 10 SOLUTION ORAL at 22:18

## 2017-06-14 RX ADMIN — ETOMIDATE 20 MG: 2 INJECTION, SOLUTION INTRAVENOUS at 07:29

## 2017-06-14 RX ADMIN — SODIUM CHLORIDE 125 ML/HR: 900 INJECTION, SOLUTION INTRAVENOUS at 19:35

## 2017-06-14 RX ADMIN — PROPOFOL 40 MCG/KG/MIN: 10 INJECTION, EMULSION INTRAVENOUS at 11:52

## 2017-06-14 RX ADMIN — PROPOFOL 30 MCG/KG/MIN: 10 INJECTION, EMULSION INTRAVENOUS at 22:18

## 2017-06-15 ENCOUNTER — APPOINTMENT (OUTPATIENT)
Dept: GENERAL RADIOLOGY | Facility: HOSPITAL | Age: 52
End: 2017-06-15

## 2017-06-15 LAB
ALBUMIN SERPL-MCNC: 2.2 G/DL (ref 3.4–4.8)
ALBUMIN SERPL-MCNC: 2.3 G/DL (ref 3.4–4.8)
ALBUMIN/GLOB SERPL: 0.7 G/DL (ref 1.1–1.8)
ALBUMIN/GLOB SERPL: 0.7 G/DL (ref 1.1–1.8)
ALP SERPL-CCNC: 78 U/L (ref 38–126)
ALP SERPL-CCNC: 83 U/L (ref 38–126)
ALT SERPL W P-5'-P-CCNC: 38 U/L (ref 21–72)
ALT SERPL W P-5'-P-CCNC: 45 U/L (ref 21–72)
AMMONIA BLD-SCNC: 73 UMOL/L (ref 9–30)
ANION GAP SERPL CALCULATED.3IONS-SCNC: 11 MMOL/L (ref 5–15)
ANION GAP SERPL CALCULATED.3IONS-SCNC: 8 MMOL/L (ref 5–15)
AST SERPL-CCNC: 61 U/L (ref 17–59)
AST SERPL-CCNC: 63 U/L (ref 17–59)
BACTERIA SPEC AEROBE CULT: NORMAL
BASOPHILS # BLD AUTO: 0.07 10*3/MM3 (ref 0–0.2)
BASOPHILS # BLD AUTO: 0.09 10*3/MM3 (ref 0–0.2)
BASOPHILS NFR BLD AUTO: 1 % (ref 0–2)
BASOPHILS NFR BLD AUTO: 1.1 % (ref 0–2)
BILIRUB SERPL-MCNC: 2.2 MG/DL (ref 0.2–1.3)
BILIRUB SERPL-MCNC: 2.3 MG/DL (ref 0.2–1.3)
BUN BLD-MCNC: 17 MG/DL (ref 7–21)
BUN BLD-MCNC: 18 MG/DL (ref 7–21)
BUN/CREAT SERPL: 8.8 (ref 7–25)
BUN/CREAT SERPL: 9 (ref 7–25)
CALCIUM SPEC-SCNC: 8.1 MG/DL (ref 8.4–10.2)
CALCIUM SPEC-SCNC: 8.3 MG/DL (ref 8.4–10.2)
CHLORIDE SERPL-SCNC: 117 MMOL/L (ref 95–110)
CHLORIDE SERPL-SCNC: 118 MMOL/L (ref 95–110)
CO2 SERPL-SCNC: 19 MMOL/L (ref 22–31)
CO2 SERPL-SCNC: 20 MMOL/L (ref 22–31)
CREAT BLD-MCNC: 1.93 MG/DL (ref 0.7–1.3)
CREAT BLD-MCNC: 2 MG/DL (ref 0.7–1.3)
DEPRECATED RDW RBC AUTO: 51 FL (ref 35.1–43.9)
DEPRECATED RDW RBC AUTO: 52.2 FL (ref 35.1–43.9)
EOSINOPHIL # BLD AUTO: 0.33 10*3/MM3 (ref 0–0.7)
EOSINOPHIL # BLD AUTO: 0.5 10*3/MM3 (ref 0–0.7)
EOSINOPHIL NFR BLD AUTO: 4.8 % (ref 0–7)
EOSINOPHIL NFR BLD AUTO: 6.2 % (ref 0–7)
ERYTHROCYTE [DISTWIDTH] IN BLOOD BY AUTOMATED COUNT: 14.4 % (ref 11.5–14.5)
ERYTHROCYTE [DISTWIDTH] IN BLOOD BY AUTOMATED COUNT: 14.7 % (ref 11.5–14.5)
GFR SERPL CREATININE-BSD FRML MDRD: 35 ML/MIN/1.73 (ref 60–130)
GFR SERPL CREATININE-BSD FRML MDRD: 37 ML/MIN/1.73 (ref 56–130)
GLOBULIN UR ELPH-MCNC: 3.2 GM/DL (ref 2.3–3.5)
GLOBULIN UR ELPH-MCNC: 3.3 GM/DL (ref 2.3–3.5)
GLUCOSE BLD-MCNC: 74 MG/DL (ref 60–100)
GLUCOSE BLD-MCNC: 76 MG/DL (ref 60–100)
HCT VFR BLD AUTO: 26.4 % (ref 39–49)
HCT VFR BLD AUTO: 26.8 % (ref 39–49)
HGB BLD-MCNC: 9.1 G/DL (ref 13.7–17.3)
HGB BLD-MCNC: 9.2 G/DL (ref 13.7–17.3)
IMM GRANULOCYTES # BLD: 0.02 10*3/MM3 (ref 0–0.02)
IMM GRANULOCYTES # BLD: 0.03 10*3/MM3 (ref 0–0.02)
IMM GRANULOCYTES NFR BLD: 0.3 % (ref 0–0.5)
IMM GRANULOCYTES NFR BLD: 0.4 % (ref 0–0.5)
LYMPHOCYTES # BLD AUTO: 1.6 10*3/MM3 (ref 0.6–4.2)
LYMPHOCYTES # BLD AUTO: 2.22 10*3/MM3 (ref 0.6–4.2)
LYMPHOCYTES NFR BLD AUTO: 23.2 % (ref 10–50)
LYMPHOCYTES NFR BLD AUTO: 27.4 % (ref 10–50)
MCH RBC QN AUTO: 33.1 PG (ref 26.5–34)
MCH RBC QN AUTO: 33.5 PG (ref 26.5–34)
MCHC RBC AUTO-ENTMCNC: 34.3 G/DL (ref 31.5–36.3)
MCHC RBC AUTO-ENTMCNC: 34.5 G/DL (ref 31.5–36.3)
MCV RBC AUTO: 96.4 FL (ref 80–98)
MCV RBC AUTO: 97.1 FL (ref 80–98)
MONOCYTES # BLD AUTO: 0.64 10*3/MM3 (ref 0–0.9)
MONOCYTES # BLD AUTO: 0.98 10*3/MM3 (ref 0–0.9)
MONOCYTES NFR BLD AUTO: 12.1 % (ref 0–12)
MONOCYTES NFR BLD AUTO: 9.3 % (ref 0–12)
NEUTROPHILS # BLD AUTO: 4.25 10*3/MM3 (ref 2–8.6)
NEUTROPHILS # BLD AUTO: 4.29 10*3/MM3 (ref 2–8.6)
NEUTROPHILS NFR BLD AUTO: 52.8 % (ref 37–80)
NEUTROPHILS NFR BLD AUTO: 61.4 % (ref 37–80)
NRBC BLD MANUAL-RTO: 0 /100 WBC (ref 0–0)
NRBC BLD MANUAL-RTO: 0 /100 WBC (ref 0–0)
PLATELET # BLD AUTO: 58 10*3/MM3 (ref 150–450)
PLATELET # BLD AUTO: 69 10*3/MM3 (ref 150–450)
PMV BLD AUTO: 10.1 FL (ref 8–12)
PMV BLD AUTO: 11.1 FL (ref 8–12)
POTASSIUM BLD-SCNC: 3.5 MMOL/L (ref 3.5–5.1)
POTASSIUM BLD-SCNC: 3.7 MMOL/L (ref 3.5–5.1)
PROT SERPL-MCNC: 5.4 G/DL (ref 6.3–8.6)
PROT SERPL-MCNC: 5.6 G/DL (ref 6.3–8.6)
RBC # BLD AUTO: 2.72 10*6/MM3 (ref 4.37–5.74)
RBC # BLD AUTO: 2.78 10*6/MM3 (ref 4.37–5.74)
SODIUM BLD-SCNC: 146 MMOL/L (ref 137–145)
SODIUM BLD-SCNC: 147 MMOL/L (ref 137–145)
TROPONIN I SERPL-MCNC: 0.04 NG/ML
TROPONIN I SERPL-MCNC: 0.05 NG/ML
WBC NRBC COR # BLD: 6.91 10*3/MM3 (ref 3.2–9.8)
WBC NRBC COR # BLD: 8.11 10*3/MM3 (ref 3.2–9.8)

## 2017-06-15 PROCEDURE — 71010 HC CHEST PA OR AP: CPT

## 2017-06-15 PROCEDURE — 80053 COMPREHEN METABOLIC PANEL: CPT | Performed by: INTERNAL MEDICINE

## 2017-06-15 PROCEDURE — 82140 ASSAY OF AMMONIA: CPT | Performed by: FAMILY MEDICINE

## 2017-06-15 PROCEDURE — 84484 ASSAY OF TROPONIN QUANT: CPT | Performed by: INTERNAL MEDICINE

## 2017-06-15 PROCEDURE — 85025 COMPLETE CBC W/AUTO DIFF WBC: CPT | Performed by: INTERNAL MEDICINE

## 2017-06-15 PROCEDURE — 80053 COMPREHEN METABOLIC PANEL: CPT | Performed by: FAMILY MEDICINE

## 2017-06-15 PROCEDURE — 85025 COMPLETE CBC W/AUTO DIFF WBC: CPT | Performed by: FAMILY MEDICINE

## 2017-06-15 PROCEDURE — 25010000002 PROPOFOL 1000 MG/ML EMULSION: Performed by: EMERGENCY MEDICINE

## 2017-06-15 PROCEDURE — 94760 N-INVAS EAR/PLS OXIMETRY 1: CPT

## 2017-06-15 PROCEDURE — 25010000002 VANCOMYCIN PER 500 MG: Performed by: INTERNAL MEDICINE

## 2017-06-15 PROCEDURE — 94799 UNLISTED PULMONARY SVC/PX: CPT

## 2017-06-15 PROCEDURE — 25010000002 PIPERACILLIN SOD-TAZOBACTAM PER 1 G: Performed by: INTERNAL MEDICINE

## 2017-06-15 PROCEDURE — 94003 VENT MGMT INPAT SUBQ DAY: CPT

## 2017-06-15 PROCEDURE — 99232 SBSQ HOSP IP/OBS MODERATE 35: CPT | Performed by: INTERNAL MEDICINE

## 2017-06-15 RX ORDER — PANTOPRAZOLE SODIUM 40 MG/10ML
40 INJECTION, POWDER, LYOPHILIZED, FOR SOLUTION INTRAVENOUS
Status: DISCONTINUED | OUTPATIENT
Start: 2017-06-15 | End: 2017-06-15

## 2017-06-15 RX ORDER — PANTOPRAZOLE SODIUM 40 MG/10ML
40 INJECTION, POWDER, LYOPHILIZED, FOR SOLUTION INTRAVENOUS
Status: DISCONTINUED | OUTPATIENT
Start: 2017-06-15 | End: 2017-06-28 | Stop reason: HOSPADM

## 2017-06-15 RX ADMIN — SODIUM CHLORIDE 125 ML/HR: 900 INJECTION, SOLUTION INTRAVENOUS at 15:00

## 2017-06-15 RX ADMIN — PROPOFOL 60 MCG/KG/MIN: 10 INJECTION, EMULSION INTRAVENOUS at 17:54

## 2017-06-15 RX ADMIN — PROPOFOL 40 MCG/KG/MIN: 10 INJECTION, EMULSION INTRAVENOUS at 12:07

## 2017-06-15 RX ADMIN — TAZOBACTAM SODIUM AND PIPERACILLIN SODIUM 3.38 G: 375; 3 INJECTION, SOLUTION INTRAVENOUS at 21:55

## 2017-06-15 RX ADMIN — TAZOBACTAM SODIUM AND PIPERACILLIN SODIUM 3.38 G: 375; 3 INJECTION, SOLUTION INTRAVENOUS at 04:33

## 2017-06-15 RX ADMIN — LACTULOSE 30 G: 10 SOLUTION ORAL at 08:53

## 2017-06-15 RX ADMIN — VANCOMYCIN HYDROCHLORIDE 1750 MG: 500 INJECTION, POWDER, LYOPHILIZED, FOR SOLUTION INTRAVENOUS at 09:58

## 2017-06-15 RX ADMIN — SODIUM CHLORIDE 125 ML/HR: 900 INJECTION, SOLUTION INTRAVENOUS at 03:30

## 2017-06-15 RX ADMIN — PANTOPRAZOLE SODIUM 40 MG: 40 INJECTION, POWDER, FOR SOLUTION INTRAVENOUS at 02:30

## 2017-06-15 RX ADMIN — LACTULOSE 30 G: 10 SOLUTION ORAL at 17:06

## 2017-06-15 RX ADMIN — PROPOFOL 30 MCG/KG/MIN: 10 INJECTION, EMULSION INTRAVENOUS at 08:39

## 2017-06-15 RX ADMIN — PROPOFOL 30 MCG/KG/MIN: 10 INJECTION, EMULSION INTRAVENOUS at 23:00

## 2017-06-15 RX ADMIN — PROPOFOL 60 MCG/KG/MIN: 10 INJECTION, EMULSION INTRAVENOUS at 15:00

## 2017-06-15 RX ADMIN — PROPOFOL 17.87 MCG/KG/MIN: 10 INJECTION, EMULSION INTRAVENOUS at 02:40

## 2017-06-15 RX ADMIN — TAZOBACTAM SODIUM AND PIPERACILLIN SODIUM 3.38 G: 375; 3 INJECTION, SOLUTION INTRAVENOUS at 12:29

## 2017-06-16 ENCOUNTER — APPOINTMENT (OUTPATIENT)
Dept: GENERAL RADIOLOGY | Facility: HOSPITAL | Age: 52
End: 2017-06-16

## 2017-06-16 LAB
ALBUMIN SERPL-MCNC: 2.1 G/DL (ref 3.4–4.8)
ALBUMIN/GLOB SERPL: 0.7 G/DL (ref 1.1–1.8)
ALP SERPL-CCNC: 71 U/L (ref 38–126)
ALT SERPL W P-5'-P-CCNC: 38 U/L (ref 21–72)
AMMONIA BLD-SCNC: 86 UMOL/L (ref 9–30)
AMMONIA BLD-SCNC: 98 UMOL/L (ref 9–30)
ANION GAP SERPL CALCULATED.3IONS-SCNC: 11 MMOL/L (ref 5–15)
ARTERIAL PATENCY WRIST A: ABNORMAL
AST SERPL-CCNC: 62 U/L (ref 17–59)
ATMOSPHERIC PRESS: ABNORMAL MMHG
BACTERIA BLD CULT: ABNORMAL
BACTERIA SPEC AEROBE CULT: ABNORMAL
BACTERIA SPEC RESP CULT: NORMAL
BASE EXCESS BLDA CALC-SCNC: -6 MMOL/L (ref -2.4–2.4)
BASOPHILS # BLD AUTO: 0.09 10*3/MM3 (ref 0–0.2)
BASOPHILS NFR BLD AUTO: 1.6 % (ref 0–2)
BDY SITE: ABNORMAL
BILIRUB SERPL-MCNC: 2.1 MG/DL (ref 0.2–1.3)
BUN BLD-MCNC: 16 MG/DL (ref 7–21)
BUN/CREAT SERPL: 8.6 (ref 7–25)
CA-I BLD-MCNC: 4.8 MG/DL (ref 4.5–4.9)
CALCIUM SPEC-SCNC: 8 MG/DL (ref 8.4–10.2)
CHLORIDE SERPL-SCNC: 120 MMOL/L (ref 95–110)
CO2 BLDA-SCNC: 19 MMOL/L (ref 23–27)
CO2 SERPL-SCNC: 18 MMOL/L (ref 22–31)
CREAT BLD-MCNC: 1.86 MG/DL (ref 0.7–1.3)
DEPRECATED RDW RBC AUTO: 50.8 FL (ref 35.1–43.9)
EOSINOPHIL # BLD AUTO: 0.43 10*3/MM3 (ref 0–0.7)
EOSINOPHIL NFR BLD AUTO: 7.5 % (ref 0–7)
ERYTHROCYTE [DISTWIDTH] IN BLOOD BY AUTOMATED COUNT: 14.2 % (ref 11.5–14.5)
GFR SERPL CREATININE-BSD FRML MDRD: 38 ML/MIN/1.73 (ref 60–130)
GLOBULIN UR ELPH-MCNC: 3 GM/DL (ref 2.3–3.5)
GLUCOSE BLD-MCNC: 66 MG/DL (ref 60–100)
GLUCOSE BLDA-MCNC: 68 MMOL/L
GRAM STN SPEC: ABNORMAL
GRAM STN SPEC: NORMAL
HCO3 BLDA-SCNC: 18 MMOL/L (ref 22–26)
HCT VFR BLD AUTO: 26 % (ref 39–49)
HCT VFR BLD CALC: 28 % (ref 40–54)
HGB BLD-MCNC: 8.9 G/DL (ref 13.7–17.3)
HGB BLDA-MCNC: 9.5 G/DL (ref 14–18)
IMM GRANULOCYTES # BLD: 0.02 10*3/MM3 (ref 0–0.02)
IMM GRANULOCYTES NFR BLD: 0.4 % (ref 0–0.5)
INR PPP: 1.5 (ref 0.8–1.2)
LYMPHOCYTES # BLD AUTO: 1.91 10*3/MM3 (ref 0.6–4.2)
LYMPHOCYTES NFR BLD AUTO: 33.5 % (ref 10–50)
MCH RBC QN AUTO: 33.6 PG (ref 26.5–34)
MCHC RBC AUTO-ENTMCNC: 34.2 G/DL (ref 31.5–36.3)
MCV RBC AUTO: 98.1 FL (ref 80–98)
MODALITY: ABNORMAL
MONOCYTES # BLD AUTO: 0.53 10*3/MM3 (ref 0–0.9)
MONOCYTES NFR BLD AUTO: 9.3 % (ref 0–12)
NEUTROPHILS # BLD AUTO: 2.73 10*3/MM3 (ref 2–8.6)
NEUTROPHILS NFR BLD AUTO: 47.7 % (ref 37–80)
PCO2 BLDA: 30.2 MM HG (ref 35–45)
PH BLDA: 7.39 PH UNITS (ref 7.35–7.45)
PLATELET # BLD AUTO: 49 10*3/MM3 (ref 150–450)
PMV BLD AUTO: 11.2 FL (ref 8–12)
PO2 BLDA: 110.2 MM HG (ref 80–105)
POTASSIUM BLD-SCNC: 3.5 MMOL/L (ref 3.5–5.1)
POTASSIUM BLDA-SCNC: 3.62 MMOL/L (ref 3.6–4.9)
PROT SERPL-MCNC: 5.1 G/DL (ref 6.3–8.6)
PROTHROMBIN TIME: 18.1 SECONDS (ref 11.1–15.3)
RBC # BLD AUTO: 2.65 10*6/MM3 (ref 4.37–5.74)
SAO2 % BLDCOA: 97.3 % (ref 94–100)
SODIUM BLD-SCNC: 149 MMOL/L (ref 137–145)
SODIUM BLDA-SCNC: 147.7 MMOL/L (ref 138–146)
WBC NRBC COR # BLD: 5.71 10*3/MM3 (ref 3.2–9.8)

## 2017-06-16 PROCEDURE — 36600 WITHDRAWAL OF ARTERIAL BLOOD: CPT

## 2017-06-16 PROCEDURE — 94799 UNLISTED PULMONARY SVC/PX: CPT

## 2017-06-16 PROCEDURE — 82803 BLOOD GASES ANY COMBINATION: CPT | Performed by: INTERNAL MEDICINE

## 2017-06-16 PROCEDURE — 25010000002 PIPERACILLIN SOD-TAZOBACTAM PER 1 G: Performed by: INTERNAL MEDICINE

## 2017-06-16 PROCEDURE — 85610 PROTHROMBIN TIME: CPT | Performed by: INTERNAL MEDICINE

## 2017-06-16 PROCEDURE — 25010000002 MIDAZOLAM 50 MG/10ML SOLUTION 10 ML VIAL: Performed by: INTERNAL MEDICINE

## 2017-06-16 PROCEDURE — 25010000002 PROPOFOL 1000 MG/ML EMULSION: Performed by: EMERGENCY MEDICINE

## 2017-06-16 PROCEDURE — 82140 ASSAY OF AMMONIA: CPT | Performed by: INTERNAL MEDICINE

## 2017-06-16 PROCEDURE — 94760 N-INVAS EAR/PLS OXIMETRY 1: CPT

## 2017-06-16 PROCEDURE — 85025 COMPLETE CBC W/AUTO DIFF WBC: CPT | Performed by: INTERNAL MEDICINE

## 2017-06-16 PROCEDURE — 94003 VENT MGMT INPAT SUBQ DAY: CPT

## 2017-06-16 PROCEDURE — 25010000002 VANCOMYCIN PER 500 MG: Performed by: INTERNAL MEDICINE

## 2017-06-16 PROCEDURE — 71010 HC CHEST PA OR AP: CPT

## 2017-06-16 PROCEDURE — 99232 SBSQ HOSP IP/OBS MODERATE 35: CPT | Performed by: INTERNAL MEDICINE

## 2017-06-16 PROCEDURE — 80053 COMPREHEN METABOLIC PANEL: CPT | Performed by: INTERNAL MEDICINE

## 2017-06-16 RX ORDER — DEXTROSE MONOHYDRATE 50 MG/ML
75 INJECTION, SOLUTION INTRAVENOUS CONTINUOUS
Status: DISCONTINUED | OUTPATIENT
Start: 2017-06-16 | End: 2017-06-23

## 2017-06-16 RX ORDER — LACTULOSE 10 G/15ML
30 SOLUTION ORAL 4 TIMES DAILY
Status: DISCONTINUED | OUTPATIENT
Start: 2017-06-16 | End: 2017-06-17

## 2017-06-16 RX ADMIN — SODIUM CHLORIDE 125 ML/HR: 900 INJECTION, SOLUTION INTRAVENOUS at 09:26

## 2017-06-16 RX ADMIN — LACTULOSE 30 G: 10 SOLUTION ORAL at 20:48

## 2017-06-16 RX ADMIN — PANTOPRAZOLE SODIUM 40 MG: 40 INJECTION, POWDER, FOR SOLUTION INTRAVENOUS at 05:49

## 2017-06-16 RX ADMIN — MIDAZOLAM 3 MG/HR: 5 INJECTION INTRAMUSCULAR; INTRAVENOUS at 18:26

## 2017-06-16 RX ADMIN — TAZOBACTAM SODIUM AND PIPERACILLIN SODIUM 3.38 G: 375; 3 INJECTION, SOLUTION INTRAVENOUS at 04:23

## 2017-06-16 RX ADMIN — LACTULOSE 30 G: 10 SOLUTION ORAL at 18:00

## 2017-06-16 RX ADMIN — TAZOBACTAM SODIUM AND PIPERACILLIN SODIUM 3.38 G: 375; 3 INJECTION, SOLUTION INTRAVENOUS at 21:18

## 2017-06-16 RX ADMIN — VANCOMYCIN HYDROCHLORIDE 1750 MG: 500 INJECTION, POWDER, LYOPHILIZED, FOR SOLUTION INTRAVENOUS at 09:58

## 2017-06-16 RX ADMIN — DEXTROSE MONOHYDRATE 75 ML/HR: 50 INJECTION, SOLUTION INTRAVENOUS at 14:38

## 2017-06-16 RX ADMIN — MIDAZOLAM 6 MG/HR: 5 INJECTION INTRAMUSCULAR; INTRAVENOUS at 10:30

## 2017-06-16 RX ADMIN — SODIUM CHLORIDE 125 ML/HR: 900 INJECTION, SOLUTION INTRAVENOUS at 00:21

## 2017-06-16 RX ADMIN — PROPOFOL 30 MCG/KG/MIN: 10 INJECTION, EMULSION INTRAVENOUS at 04:45

## 2017-06-16 RX ADMIN — TAZOBACTAM SODIUM AND PIPERACILLIN SODIUM 3.38 G: 375; 3 INJECTION, SOLUTION INTRAVENOUS at 13:28

## 2017-06-16 RX ADMIN — LACTULOSE 30 G: 10 SOLUTION ORAL at 09:24

## 2017-06-17 ENCOUNTER — APPOINTMENT (OUTPATIENT)
Dept: INTERVENTIONAL RADIOLOGY/VASCULAR | Facility: HOSPITAL | Age: 52
End: 2017-06-17

## 2017-06-17 ENCOUNTER — APPOINTMENT (OUTPATIENT)
Dept: GENERAL RADIOLOGY | Facility: HOSPITAL | Age: 52
End: 2017-06-17

## 2017-06-17 LAB
ALBUMIN SERPL-MCNC: 2 G/DL (ref 3.4–4.8)
ALBUMIN/GLOB SERPL: 0.7 G/DL (ref 1.1–1.8)
ALP SERPL-CCNC: 70 U/L (ref 38–126)
ALT SERPL W P-5'-P-CCNC: 37 U/L (ref 21–72)
ANION GAP SERPL CALCULATED.3IONS-SCNC: 10 MMOL/L (ref 5–15)
ARTERIAL PATENCY WRIST A: ABNORMAL
AST SERPL-CCNC: 53 U/L (ref 17–59)
ATMOSPHERIC PRESS: ABNORMAL MMHG
BASE EXCESS BLDA CALC-SCNC: -3.5 MMOL/L (ref -2.4–2.4)
BASOPHILS # BLD AUTO: 0.09 10*3/MM3 (ref 0–0.2)
BASOPHILS NFR BLD AUTO: 1.3 % (ref 0–2)
BDY SITE: ABNORMAL
BILIRUB SERPL-MCNC: 2.3 MG/DL (ref 0.2–1.3)
BUN BLD-MCNC: 13 MG/DL (ref 7–21)
BUN/CREAT SERPL: 6.7 (ref 7–25)
CA-I BLD-MCNC: 4.9 MG/DL (ref 4.5–4.9)
CALCIUM SPEC-SCNC: 7.9 MG/DL (ref 8.4–10.2)
CHLORIDE SERPL-SCNC: 120 MMOL/L (ref 95–110)
CO2 BLDA-SCNC: 20.7 MMOL/L (ref 23–27)
CO2 SERPL-SCNC: 18 MMOL/L (ref 22–31)
CREAT BLD-MCNC: 1.93 MG/DL (ref 0.7–1.3)
DEPRECATED RDW RBC AUTO: 50.5 FL (ref 35.1–43.9)
EOSINOPHIL # BLD AUTO: 0.46 10*3/MM3 (ref 0–0.7)
EOSINOPHIL NFR BLD AUTO: 6.7 % (ref 0–7)
ERYTHROCYTE [DISTWIDTH] IN BLOOD BY AUTOMATED COUNT: 14.3 % (ref 11.5–14.5)
GFR SERPL CREATININE-BSD FRML MDRD: 37 ML/MIN/1.73 (ref 56–130)
GLOBULIN UR ELPH-MCNC: 2.9 GM/DL (ref 2.3–3.5)
GLUCOSE BLD-MCNC: 83 MG/DL (ref 60–100)
GLUCOSE BLDA-MCNC: 90 MMOL/L
HCO3 BLDA-SCNC: 19.8 MMOL/L (ref 22–26)
HCT VFR BLD AUTO: 26.2 % (ref 39–49)
HCT VFR BLD CALC: 29 % (ref 40–54)
HGB BLD-MCNC: 8.9 G/DL (ref 13.7–17.3)
HGB BLDA-MCNC: 9.9 G/DL (ref 14–18)
IMM GRANULOCYTES # BLD: 0.03 10*3/MM3 (ref 0–0.02)
IMM GRANULOCYTES NFR BLD: 0.4 % (ref 0–0.5)
LYMPHOCYTES # BLD AUTO: 1.7 10*3/MM3 (ref 0.6–4.2)
LYMPHOCYTES NFR BLD AUTO: 24.8 % (ref 10–50)
MCH RBC QN AUTO: 33.1 PG (ref 26.5–34)
MCHC RBC AUTO-ENTMCNC: 34 G/DL (ref 31.5–36.3)
MCV RBC AUTO: 97.4 FL (ref 80–98)
MODALITY: ABNORMAL
MONOCYTES # BLD AUTO: 0.72 10*3/MM3 (ref 0–0.9)
MONOCYTES NFR BLD AUTO: 10.5 % (ref 0–12)
NEUTROPHILS # BLD AUTO: 3.85 10*3/MM3 (ref 2–8.6)
NEUTROPHILS NFR BLD AUTO: 56.3 % (ref 37–80)
PCO2 BLDA: 29.7 MM HG (ref 35–45)
PH BLDA: 7.44 PH UNITS (ref 7.35–7.45)
PLATELET # BLD AUTO: 63 10*3/MM3 (ref 150–450)
PMV BLD AUTO: 10.9 FL (ref 8–12)
PO2 BLDA: 64.4 MM HG (ref 80–105)
POTASSIUM BLD-SCNC: 2.9 MMOL/L (ref 3.5–5.1)
POTASSIUM BLDA-SCNC: 3.19 MMOL/L (ref 3.6–4.9)
PROT SERPL-MCNC: 4.9 G/DL (ref 6.3–8.6)
RBC # BLD AUTO: 2.69 10*6/MM3 (ref 4.37–5.74)
SAO2 % BLDCOA: 92.1 %
SODIUM BLD-SCNC: 148 MMOL/L (ref 137–145)
SODIUM BLDA-SCNC: 145.4 MMOL/L (ref 138–146)
VANCOMYCIN TROUGH SERPL-MCNC: 30.58 MCG/ML (ref 10–15)
WBC NRBC COR # BLD: 6.85 10*3/MM3 (ref 3.2–9.8)

## 2017-06-17 PROCEDURE — 36600 WITHDRAWAL OF ARTERIAL BLOOD: CPT

## 2017-06-17 PROCEDURE — 80202 ASSAY OF VANCOMYCIN: CPT | Performed by: INTERNAL MEDICINE

## 2017-06-17 PROCEDURE — 25010000003 POTASSIUM CHLORIDE 10 MEQ/100ML SOLUTION: Performed by: INTERNAL MEDICINE

## 2017-06-17 PROCEDURE — 94003 VENT MGMT INPAT SUBQ DAY: CPT

## 2017-06-17 PROCEDURE — 82803 BLOOD GASES ANY COMBINATION: CPT | Performed by: INTERNAL MEDICINE

## 2017-06-17 PROCEDURE — 99232 SBSQ HOSP IP/OBS MODERATE 35: CPT | Performed by: INTERNAL MEDICINE

## 2017-06-17 PROCEDURE — 25010000002 PIPERACILLIN SOD-TAZOBACTAM PER 1 G: Performed by: INTERNAL MEDICINE

## 2017-06-17 PROCEDURE — 94799 UNLISTED PULMONARY SVC/PX: CPT

## 2017-06-17 PROCEDURE — 80053 COMPREHEN METABOLIC PANEL: CPT | Performed by: INTERNAL MEDICINE

## 2017-06-17 PROCEDURE — 94640 AIRWAY INHALATION TREATMENT: CPT

## 2017-06-17 PROCEDURE — 94003 VENT MGMT INPAT SUBQ DAY: CPT | Performed by: INTERNAL MEDICINE

## 2017-06-17 PROCEDURE — 25010000002 METHYLPREDNISOLONE PER 40 MG: Performed by: INTERNAL MEDICINE

## 2017-06-17 PROCEDURE — 85025 COMPLETE CBC W/AUTO DIFF WBC: CPT | Performed by: INTERNAL MEDICINE

## 2017-06-17 PROCEDURE — 25010000002 MIDAZOLAM 50 MG/10ML SOLUTION 10 ML VIAL: Performed by: INTERNAL MEDICINE

## 2017-06-17 RX ORDER — METHYLPREDNISOLONE SODIUM SUCCINATE 40 MG/ML
40 INJECTION, POWDER, LYOPHILIZED, FOR SOLUTION INTRAMUSCULAR; INTRAVENOUS EVERY 6 HOURS
Status: DISCONTINUED | OUTPATIENT
Start: 2017-06-17 | End: 2017-06-25

## 2017-06-17 RX ORDER — POTASSIUM CHLORIDE 7.45 MG/ML
10 INJECTION INTRAVENOUS
Status: DISCONTINUED | OUTPATIENT
Start: 2017-06-17 | End: 2017-06-28 | Stop reason: HOSPADM

## 2017-06-17 RX ORDER — ALBUTEROL SULFATE 90 UG/1
4 AEROSOL, METERED RESPIRATORY (INHALATION)
Status: DISCONTINUED | OUTPATIENT
Start: 2017-06-17 | End: 2017-06-21

## 2017-06-17 RX ORDER — LACTULOSE 10 G/15ML
30 SOLUTION ORAL 2 TIMES DAILY
Status: DISCONTINUED | OUTPATIENT
Start: 2017-06-17 | End: 2017-06-18

## 2017-06-17 RX ADMIN — POTASSIUM CHLORIDE 10 MEQ: 7.46 INJECTION, SOLUTION INTRAVENOUS at 18:39

## 2017-06-17 RX ADMIN — LACTULOSE 30 G: 10 SOLUTION ORAL at 09:05

## 2017-06-17 RX ADMIN — ALBUTEROL SULFATE 4 PUFF: 90 AEROSOL, METERED RESPIRATORY (INHALATION) at 19:06

## 2017-06-17 RX ADMIN — LACTULOSE 30 G: 10 SOLUTION ORAL at 18:40

## 2017-06-17 RX ADMIN — POTASSIUM CHLORIDE 10 MEQ: 7.46 INJECTION, SOLUTION INTRAVENOUS at 21:33

## 2017-06-17 RX ADMIN — PANTOPRAZOLE SODIUM 40 MG: 40 INJECTION, POWDER, FOR SOLUTION INTRAVENOUS at 05:19

## 2017-06-17 RX ADMIN — POTASSIUM CHLORIDE 10 MEQ: 7.46 INJECTION, SOLUTION INTRAVENOUS at 17:20

## 2017-06-17 RX ADMIN — IPRATROPIUM BROMIDE 4 PUFF: 17 AEROSOL, METERED RESPIRATORY (INHALATION) at 15:45

## 2017-06-17 RX ADMIN — IPRATROPIUM BROMIDE 4 PUFF: 17 AEROSOL, METERED RESPIRATORY (INHALATION) at 23:10

## 2017-06-17 RX ADMIN — ALBUTEROL SULFATE 4 PUFF: 90 AEROSOL, METERED RESPIRATORY (INHALATION) at 11:28

## 2017-06-17 RX ADMIN — IPRATROPIUM BROMIDE 4 PUFF: 17 AEROSOL, METERED RESPIRATORY (INHALATION) at 12:20

## 2017-06-17 RX ADMIN — TAZOBACTAM SODIUM AND PIPERACILLIN SODIUM 3.38 G: 375; 3 INJECTION, SOLUTION INTRAVENOUS at 13:28

## 2017-06-17 RX ADMIN — IPRATROPIUM BROMIDE 4 PUFF: 17 AEROSOL, METERED RESPIRATORY (INHALATION) at 19:06

## 2017-06-17 RX ADMIN — DEXTROSE MONOHYDRATE 75 ML/HR: 50 INJECTION, SOLUTION INTRAVENOUS at 02:07

## 2017-06-17 RX ADMIN — TAZOBACTAM SODIUM AND PIPERACILLIN SODIUM 3.38 G: 375; 3 INJECTION, SOLUTION INTRAVENOUS at 05:18

## 2017-06-17 RX ADMIN — MIDAZOLAM 3 MG/HR: 5 INJECTION INTRAMUSCULAR; INTRAVENOUS at 15:01

## 2017-06-17 RX ADMIN — METHYLPREDNISOLONE SODIUM SUCCINATE 40 MG: 40 INJECTION, POWDER, FOR SOLUTION INTRAMUSCULAR; INTRAVENOUS at 13:30

## 2017-06-17 RX ADMIN — ALBUTEROL SULFATE 4 PUFF: 90 AEROSOL, METERED RESPIRATORY (INHALATION) at 23:10

## 2017-06-17 RX ADMIN — ALBUTEROL SULFATE 4 PUFF: 90 AEROSOL, METERED RESPIRATORY (INHALATION) at 15:45

## 2017-06-17 RX ADMIN — METHYLPREDNISOLONE SODIUM SUCCINATE 40 MG: 40 INJECTION, POWDER, FOR SOLUTION INTRAMUSCULAR; INTRAVENOUS at 21:32

## 2017-06-17 RX ADMIN — TAZOBACTAM SODIUM AND PIPERACILLIN SODIUM 3.38 G: 375; 3 INJECTION, SOLUTION INTRAVENOUS at 21:32

## 2017-06-17 RX ADMIN — POTASSIUM CHLORIDE 10 MEQ: 7.46 INJECTION, SOLUTION INTRAVENOUS at 22:45

## 2017-06-17 RX ADMIN — SODIUM BICARBONATE 50 MEQ: 84 INJECTION INTRAVENOUS at 09:12

## 2017-06-18 ENCOUNTER — APPOINTMENT (OUTPATIENT)
Dept: GENERAL RADIOLOGY | Facility: HOSPITAL | Age: 52
End: 2017-06-18

## 2017-06-18 ENCOUNTER — APPOINTMENT (OUTPATIENT)
Dept: ULTRASOUND IMAGING | Facility: HOSPITAL | Age: 52
End: 2017-06-18

## 2017-06-18 ENCOUNTER — APPOINTMENT (OUTPATIENT)
Dept: INTERVENTIONAL RADIOLOGY/VASCULAR | Facility: HOSPITAL | Age: 52
End: 2017-06-18

## 2017-06-18 LAB
ALBUMIN SERPL-MCNC: 2.2 G/DL (ref 3.4–4.8)
ALBUMIN/GLOB SERPL: 0.7 G/DL (ref 1.1–1.8)
ALP SERPL-CCNC: 77 U/L (ref 38–126)
ALT SERPL W P-5'-P-CCNC: 42 U/L (ref 21–72)
AMMONIA BLD-SCNC: <9 UMOL/L (ref 9–30)
ANION GAP SERPL CALCULATED.3IONS-SCNC: 11 MMOL/L (ref 5–15)
ARTERIAL PATENCY WRIST A: ABNORMAL
AST SERPL-CCNC: 46 U/L (ref 17–59)
ATMOSPHERIC PRESS: ABNORMAL MMHG
BASE EXCESS BLDA CALC-SCNC: -5.4 MMOL/L (ref -2.4–2.4)
BASOPHILS # BLD AUTO: 0.02 10*3/MM3 (ref 0–0.2)
BASOPHILS NFR BLD AUTO: 0.3 % (ref 0–2)
BDY SITE: ABNORMAL
BILIRUB SERPL-MCNC: 2 MG/DL (ref 0.2–1.3)
BUN BLD-MCNC: 12 MG/DL (ref 7–21)
BUN/CREAT SERPL: 7.2 (ref 7–25)
CA-I BLD-MCNC: 4.9 MG/DL (ref 4.5–4.9)
CALCIUM SPEC-SCNC: 8.2 MG/DL (ref 8.4–10.2)
CHLORIDE SERPL-SCNC: 116 MMOL/L (ref 95–110)
CO2 BLDA-SCNC: 18.5 MMOL/L (ref 23–27)
CO2 SERPL-SCNC: 17 MMOL/L (ref 22–31)
CREAT BLD-MCNC: 1.67 MG/DL (ref 0.7–1.3)
DEPRECATED RDW RBC AUTO: 48.4 FL (ref 35.1–43.9)
EOSINOPHIL # BLD AUTO: 0 10*3/MM3 (ref 0–0.7)
EOSINOPHIL NFR BLD AUTO: 0 % (ref 0–7)
ERYTHROCYTE [DISTWIDTH] IN BLOOD BY AUTOMATED COUNT: 14 % (ref 11.5–14.5)
GFR SERPL CREATININE-BSD FRML MDRD: 43 ML/MIN/1.73 (ref 56–130)
GLOBULIN UR ELPH-MCNC: 3.2 GM/DL (ref 2.3–3.5)
GLUCOSE BLD-MCNC: 144 MG/DL (ref 60–100)
GLUCOSE BLDA-MCNC: 102 MMOL/L
HCO3 BLDA-SCNC: 17.6 MMOL/L (ref 22–26)
HCT VFR BLD AUTO: 25.5 % (ref 39–49)
HCT VFR BLD CALC: 29 % (ref 40–54)
HGB BLD-MCNC: 8.9 G/DL (ref 13.7–17.3)
HGB BLDA-MCNC: 9.8 G/DL (ref 14–18)
IMM GRANULOCYTES # BLD: 0.06 10*3/MM3 (ref 0–0.02)
IMM GRANULOCYTES NFR BLD: 1 % (ref 0–0.5)
LYMPHOCYTES # BLD AUTO: 0.84 10*3/MM3 (ref 0.6–4.2)
LYMPHOCYTES NFR BLD AUTO: 13.7 % (ref 10–50)
MAGNESIUM SERPL-MCNC: 2.2 MG/DL (ref 1.6–2.3)
MCH RBC QN AUTO: 33.2 PG (ref 26.5–34)
MCHC RBC AUTO-ENTMCNC: 34.9 G/DL (ref 31.5–36.3)
MCV RBC AUTO: 95.1 FL (ref 80–98)
MODALITY: ABNORMAL
MONOCYTES # BLD AUTO: 0.3 10*3/MM3 (ref 0–0.9)
MONOCYTES NFR BLD AUTO: 4.9 % (ref 0–12)
NEUTROPHILS # BLD AUTO: 4.91 10*3/MM3 (ref 2–8.6)
NEUTROPHILS NFR BLD AUTO: 80.1 % (ref 37–80)
PCO2 BLDA: 26.4 MM HG (ref 35–45)
PH BLDA: 7.44 PH UNITS (ref 7.35–7.45)
PLATELET # BLD AUTO: 57 10*3/MM3 (ref 150–450)
PMV BLD AUTO: 11.1 FL (ref 8–12)
PO2 BLDA: 87.5 MM HG (ref 80–105)
POTASSIUM BLD-SCNC: 3.2 MMOL/L (ref 3.5–5.1)
POTASSIUM BLDA-SCNC: 3.74 MMOL/L (ref 3.6–4.9)
PROT SERPL-MCNC: 5.4 G/DL (ref 6.3–8.6)
RBC # BLD AUTO: 2.68 10*6/MM3 (ref 4.37–5.74)
SAO2 % BLDCOA: 95.7 % (ref 94–100)
SODIUM BLD-SCNC: 144 MMOL/L (ref 137–145)
SODIUM BLDA-SCNC: 143.2 MMOL/L (ref 138–146)
VANCOMYCIN SERPL-MCNC: 18.37 MCG/ML
WBC NRBC COR # BLD: 6.13 10*3/MM3 (ref 3.2–9.8)

## 2017-06-18 PROCEDURE — 83735 ASSAY OF MAGNESIUM: CPT | Performed by: INTERNAL MEDICINE

## 2017-06-18 PROCEDURE — 80053 COMPREHEN METABOLIC PANEL: CPT | Performed by: INTERNAL MEDICINE

## 2017-06-18 PROCEDURE — 94668 MNPJ CHEST WALL SBSQ: CPT

## 2017-06-18 PROCEDURE — 94667 MNPJ CHEST WALL 1ST: CPT

## 2017-06-18 PROCEDURE — 99232 SBSQ HOSP IP/OBS MODERATE 35: CPT | Performed by: INTERNAL MEDICINE

## 2017-06-18 PROCEDURE — 94003 VENT MGMT INPAT SUBQ DAY: CPT | Performed by: INTERNAL MEDICINE

## 2017-06-18 PROCEDURE — 25010000003 POTASSIUM CHLORIDE 10 MEQ/100ML SOLUTION: Performed by: INTERNAL MEDICINE

## 2017-06-18 PROCEDURE — 80202 ASSAY OF VANCOMYCIN: CPT | Performed by: INTERNAL MEDICINE

## 2017-06-18 PROCEDURE — 25010000002 PIPERACILLIN SOD-TAZOBACTAM PER 1 G: Performed by: INTERNAL MEDICINE

## 2017-06-18 PROCEDURE — 94799 UNLISTED PULMONARY SVC/PX: CPT

## 2017-06-18 PROCEDURE — 82140 ASSAY OF AMMONIA: CPT | Performed by: INTERNAL MEDICINE

## 2017-06-18 PROCEDURE — 94003 VENT MGMT INPAT SUBQ DAY: CPT

## 2017-06-18 PROCEDURE — 25010000002 METHYLPREDNISOLONE PER 40 MG: Performed by: INTERNAL MEDICINE

## 2017-06-18 PROCEDURE — 25010000002 HYDRALAZINE PER 20 MG: Performed by: INTERNAL MEDICINE

## 2017-06-18 PROCEDURE — 25010000002 MIDAZOLAM 50 MG/10ML SOLUTION 10 ML VIAL: Performed by: INTERNAL MEDICINE

## 2017-06-18 PROCEDURE — 82803 BLOOD GASES ANY COMBINATION: CPT | Performed by: INTERNAL MEDICINE

## 2017-06-18 PROCEDURE — 71010 HC CHEST PA OR AP: CPT

## 2017-06-18 PROCEDURE — 85025 COMPLETE CBC W/AUTO DIFF WBC: CPT | Performed by: INTERNAL MEDICINE

## 2017-06-18 RX ORDER — AMLODIPINE BESYLATE 5 MG/1
5 TABLET ORAL
Status: DISCONTINUED | OUTPATIENT
Start: 2017-06-18 | End: 2017-06-25

## 2017-06-18 RX ORDER — ACETYLCYSTEINE 100 MG/ML
2 SOLUTION ORAL; RESPIRATORY (INHALATION)
Status: DISCONTINUED | OUTPATIENT
Start: 2017-06-18 | End: 2017-06-21

## 2017-06-18 RX ORDER — ENALAPRILAT 2.5 MG/2ML
1.25 INJECTION INTRAVENOUS EVERY 6 HOURS
Status: DISCONTINUED | OUTPATIENT
Start: 2017-06-18 | End: 2017-06-24

## 2017-06-18 RX ORDER — POTASSIUM CHLORIDE 7.45 MG/ML
10 INJECTION INTRAVENOUS
Status: DISCONTINUED | OUTPATIENT
Start: 2017-06-18 | End: 2017-06-18

## 2017-06-18 RX ORDER — HYDRALAZINE HYDROCHLORIDE 20 MG/ML
10 INJECTION INTRAMUSCULAR; INTRAVENOUS EVERY 6 HOURS PRN
Status: DISCONTINUED | OUTPATIENT
Start: 2017-06-18 | End: 2017-06-28 | Stop reason: HOSPADM

## 2017-06-18 RX ORDER — LACTULOSE 10 G/15ML
30 SOLUTION ORAL 3 TIMES DAILY
Status: DISCONTINUED | OUTPATIENT
Start: 2017-06-18 | End: 2017-06-19

## 2017-06-18 RX ADMIN — ALBUTEROL SULFATE 4 PUFF: 90 AEROSOL, METERED RESPIRATORY (INHALATION) at 19:02

## 2017-06-18 RX ADMIN — DEXTROSE MONOHYDRATE 75 ML/HR: 50 INJECTION, SOLUTION INTRAVENOUS at 16:37

## 2017-06-18 RX ADMIN — ALBUTEROL SULFATE 4 PUFF: 90 AEROSOL, METERED RESPIRATORY (INHALATION) at 07:05

## 2017-06-18 RX ADMIN — ALBUTEROL SULFATE 4 PUFF: 90 AEROSOL, METERED RESPIRATORY (INHALATION) at 02:54

## 2017-06-18 RX ADMIN — METHYLPREDNISOLONE SODIUM SUCCINATE 40 MG: 40 INJECTION, POWDER, FOR SOLUTION INTRAMUSCULAR; INTRAVENOUS at 15:30

## 2017-06-18 RX ADMIN — ALBUTEROL SULFATE 4 PUFF: 90 AEROSOL, METERED RESPIRATORY (INHALATION) at 23:20

## 2017-06-18 RX ADMIN — POTASSIUM CHLORIDE 10 MEQ: 7.46 INJECTION, SOLUTION INTRAVENOUS at 05:50

## 2017-06-18 RX ADMIN — ALBUTEROL SULFATE 4 PUFF: 90 AEROSOL, METERED RESPIRATORY (INHALATION) at 15:44

## 2017-06-18 RX ADMIN — IPRATROPIUM BROMIDE 4 PUFF: 17 AEROSOL, METERED RESPIRATORY (INHALATION) at 11:49

## 2017-06-18 RX ADMIN — POTASSIUM CHLORIDE 10 MEQ: 7.46 INJECTION, SOLUTION INTRAVENOUS at 06:59

## 2017-06-18 RX ADMIN — AMLODIPINE BESYLATE 5 MG: 5 TABLET ORAL at 19:44

## 2017-06-18 RX ADMIN — IPRATROPIUM BROMIDE 4 PUFF: 17 AEROSOL, METERED RESPIRATORY (INHALATION) at 07:05

## 2017-06-18 RX ADMIN — METHYLPREDNISOLONE SODIUM SUCCINATE 40 MG: 40 INJECTION, POWDER, FOR SOLUTION INTRAMUSCULAR; INTRAVENOUS at 19:54

## 2017-06-18 RX ADMIN — HYDRALAZINE HYDROCHLORIDE 10 MG: 20 INJECTION INTRAMUSCULAR; INTRAVENOUS at 16:33

## 2017-06-18 RX ADMIN — METHYLPREDNISOLONE SODIUM SUCCINATE 40 MG: 40 INJECTION, POWDER, FOR SOLUTION INTRAMUSCULAR; INTRAVENOUS at 08:05

## 2017-06-18 RX ADMIN — MIDAZOLAM 8 MG/HR: 5 INJECTION INTRAMUSCULAR; INTRAVENOUS at 19:45

## 2017-06-18 RX ADMIN — PANTOPRAZOLE SODIUM 40 MG: 40 INJECTION, POWDER, FOR SOLUTION INTRAVENOUS at 05:25

## 2017-06-18 RX ADMIN — HYDRALAZINE HYDROCHLORIDE 10 MG: 20 INJECTION INTRAMUSCULAR; INTRAVENOUS at 21:30

## 2017-06-18 RX ADMIN — TAZOBACTAM SODIUM AND PIPERACILLIN SODIUM 3.38 G: 375; 3 INJECTION, SOLUTION INTRAVENOUS at 04:03

## 2017-06-18 RX ADMIN — POTASSIUM CHLORIDE 10 MEQ: 7.46 INJECTION, SOLUTION INTRAVENOUS at 10:14

## 2017-06-18 RX ADMIN — IPRATROPIUM BROMIDE 4 PUFF: 17 AEROSOL, METERED RESPIRATORY (INHALATION) at 23:20

## 2017-06-18 RX ADMIN — IPRATROPIUM BROMIDE 4 PUFF: 17 AEROSOL, METERED RESPIRATORY (INHALATION) at 19:01

## 2017-06-18 RX ADMIN — ALBUTEROL SULFATE 4 PUFF: 90 AEROSOL, METERED RESPIRATORY (INHALATION) at 11:48

## 2017-06-18 RX ADMIN — LACTULOSE 30 G: 10 SOLUTION ORAL at 20:02

## 2017-06-18 RX ADMIN — MIDAZOLAM 6 MG/HR: 5 INJECTION INTRAMUSCULAR; INTRAVENOUS at 01:33

## 2017-06-18 RX ADMIN — ACETYLCYSTEINE 2 ML: 100 SOLUTION ORAL; RESPIRATORY (INHALATION) at 19:02

## 2017-06-18 RX ADMIN — ACETYLCYSTEINE 2 ML: 100 SOLUTION ORAL; RESPIRATORY (INHALATION) at 15:43

## 2017-06-18 RX ADMIN — IPRATROPIUM BROMIDE 4 PUFF: 17 AEROSOL, METERED RESPIRATORY (INHALATION) at 02:54

## 2017-06-18 RX ADMIN — TAZOBACTAM SODIUM AND PIPERACILLIN SODIUM 3.38 G: 375; 3 INJECTION, SOLUTION INTRAVENOUS at 19:54

## 2017-06-18 RX ADMIN — LACTULOSE 30 G: 10 SOLUTION ORAL at 08:05

## 2017-06-18 RX ADMIN — MIDAZOLAM 8 MG/HR: 5 INJECTION INTRAMUSCULAR; INTRAVENOUS at 12:01

## 2017-06-18 RX ADMIN — METHYLPREDNISOLONE SODIUM SUCCINATE 40 MG: 40 INJECTION, POWDER, FOR SOLUTION INTRAMUSCULAR; INTRAVENOUS at 04:02

## 2017-06-18 RX ADMIN — ENALAPRILAT 1.25 MG: 1.25 INJECTION INTRAVENOUS at 18:56

## 2017-06-18 RX ADMIN — IPRATROPIUM BROMIDE 4 PUFF: 17 AEROSOL, METERED RESPIRATORY (INHALATION) at 15:43

## 2017-06-18 RX ADMIN — TAZOBACTAM SODIUM AND PIPERACILLIN SODIUM 3.38 G: 375; 3 INJECTION, SOLUTION INTRAVENOUS at 12:46

## 2017-06-18 RX ADMIN — LACTULOSE 30 G: 10 SOLUTION ORAL at 16:33

## 2017-06-18 RX ADMIN — POTASSIUM CHLORIDE 10 MEQ: 7.46 INJECTION, SOLUTION INTRAVENOUS at 08:05

## 2017-06-19 ENCOUNTER — APPOINTMENT (OUTPATIENT)
Dept: ULTRASOUND IMAGING | Facility: HOSPITAL | Age: 52
End: 2017-06-19

## 2017-06-19 ENCOUNTER — APPOINTMENT (OUTPATIENT)
Dept: GENERAL RADIOLOGY | Facility: HOSPITAL | Age: 52
End: 2017-06-19

## 2017-06-19 LAB
ALBUMIN SERPL-MCNC: 2.4 G/DL (ref 3.4–4.8)
ALBUMIN/GLOB SERPL: 0.6 G/DL (ref 1.1–1.8)
ALP SERPL-CCNC: 76 U/L (ref 38–126)
ALT SERPL W P-5'-P-CCNC: 37 U/L (ref 21–72)
ANION GAP SERPL CALCULATED.3IONS-SCNC: 10 MMOL/L (ref 5–15)
ARTERIAL PATENCY WRIST A: ABNORMAL
AST SERPL-CCNC: 50 U/L (ref 17–59)
ATMOSPHERIC PRESS: ABNORMAL MMHG
BACTERIA SPEC AEROBE CULT: NORMAL
BASE EXCESS BLDA CALC-SCNC: -3 MMOL/L (ref -2.4–2.4)
BASOPHILS # BLD AUTO: 0.01 10*3/MM3 (ref 0–0.2)
BASOPHILS NFR BLD AUTO: 0.1 % (ref 0–2)
BDY SITE: ABNORMAL
BILIRUB SERPL-MCNC: 2.1 MG/DL (ref 0.2–1.3)
BUN BLD-MCNC: 14 MG/DL (ref 7–21)
BUN/CREAT SERPL: 9.3 (ref 7–25)
CA-I BLD-MCNC: 5 MG/DL (ref 4.5–4.9)
CALCIUM SPEC-SCNC: 8.5 MG/DL (ref 8.4–10.2)
CHLORIDE SERPL-SCNC: 109 MMOL/L (ref 95–110)
CO2 BLDA-SCNC: 20.4 MMOL/L (ref 23–27)
CO2 SERPL-SCNC: 18 MMOL/L (ref 22–31)
CREAT BLD-MCNC: 1.5 MG/DL (ref 0.7–1.3)
DEPRECATED RDW RBC AUTO: 47.9 FL (ref 35.1–43.9)
EOSINOPHIL # BLD AUTO: 0 10*3/MM3 (ref 0–0.7)
EOSINOPHIL NFR BLD AUTO: 0 % (ref 0–7)
ERYTHROCYTE [DISTWIDTH] IN BLOOD BY AUTOMATED COUNT: 14.1 % (ref 11.5–14.5)
GFR SERPL CREATININE-BSD FRML MDRD: 49 ML/MIN/1.73 (ref 60–130)
GLOBULIN UR ELPH-MCNC: 3.7 GM/DL (ref 2.3–3.5)
GLUCOSE BLD-MCNC: 176 MG/DL (ref 60–100)
GLUCOSE BLDA-MCNC: 183 MMOL/L
HCO3 BLDA-SCNC: 19.5 MMOL/L (ref 22–26)
HCT VFR BLD AUTO: 27.4 % (ref 39–49)
HCT VFR BLD CALC: 30 % (ref 40–54)
HGB BLD-MCNC: 9.6 G/DL (ref 13.7–17.3)
HGB BLDA-MCNC: 10.2 G/DL (ref 14–18)
IMM GRANULOCYTES # BLD: 0.12 10*3/MM3 (ref 0–0.02)
IMM GRANULOCYTES NFR BLD: 0.8 % (ref 0–0.5)
LYMPHOCYTES # BLD AUTO: 0.86 10*3/MM3 (ref 0.6–4.2)
LYMPHOCYTES NFR BLD AUTO: 5.5 % (ref 10–50)
MAGNESIUM SERPL-MCNC: 2.3 MG/DL (ref 1.6–2.3)
MCH RBC QN AUTO: 33.1 PG (ref 26.5–34)
MCHC RBC AUTO-ENTMCNC: 35 G/DL (ref 31.5–36.3)
MCV RBC AUTO: 94.5 FL (ref 80–98)
MODALITY: ABNORMAL
MONOCYTES # BLD AUTO: 0.37 10*3/MM3 (ref 0–0.9)
MONOCYTES NFR BLD AUTO: 2.4 % (ref 0–12)
NEUTROPHILS # BLD AUTO: 14.23 10*3/MM3 (ref 2–8.6)
NEUTROPHILS NFR BLD AUTO: 91.2 % (ref 37–80)
PCO2 BLDA: 26.9 MM HG (ref 35–45)
PH BLDA: 7.48 PH UNITS (ref 7.35–7.45)
PLATELET # BLD AUTO: 67 10*3/MM3 (ref 150–450)
PMV BLD AUTO: 10.6 FL (ref 8–12)
PO2 BLDA: 79.4 MM HG (ref 80–105)
POTASSIUM BLD-SCNC: 3.7 MMOL/L (ref 3.5–5.1)
POTASSIUM BLDA-SCNC: 3.8 MMOL/L (ref 3.6–4.9)
PROT SERPL-MCNC: 6.1 G/DL (ref 6.3–8.6)
RBC # BLD AUTO: 2.9 10*6/MM3 (ref 4.37–5.74)
SAO2 % BLDCOA: 95.7 %
SODIUM BLD-SCNC: 137 MMOL/L (ref 137–145)
SODIUM BLDA-SCNC: 137.8 MMOL/L (ref 138–146)
WBC NRBC COR # BLD: 15.59 10*3/MM3 (ref 3.2–9.8)

## 2017-06-19 PROCEDURE — 94799 UNLISTED PULMONARY SVC/PX: CPT

## 2017-06-19 PROCEDURE — 25010000002 METHYLPREDNISOLONE PER 40 MG: Performed by: INTERNAL MEDICINE

## 2017-06-19 PROCEDURE — 71010 HC CHEST PA OR AP: CPT

## 2017-06-19 PROCEDURE — 99232 SBSQ HOSP IP/OBS MODERATE 35: CPT | Performed by: INTERNAL MEDICINE

## 2017-06-19 PROCEDURE — 25010000002 PIPERACILLIN SOD-TAZOBACTAM PER 1 G: Performed by: INTERNAL MEDICINE

## 2017-06-19 PROCEDURE — 25010000002 MIDAZOLAM 50 MG/10ML SOLUTION 10 ML VIAL: Performed by: INTERNAL MEDICINE

## 2017-06-19 PROCEDURE — 94003 VENT MGMT INPAT SUBQ DAY: CPT

## 2017-06-19 PROCEDURE — 85025 COMPLETE CBC W/AUTO DIFF WBC: CPT | Performed by: INTERNAL MEDICINE

## 2017-06-19 PROCEDURE — 94760 N-INVAS EAR/PLS OXIMETRY 1: CPT

## 2017-06-19 PROCEDURE — 80053 COMPREHEN METABOLIC PANEL: CPT | Performed by: INTERNAL MEDICINE

## 2017-06-19 PROCEDURE — 83735 ASSAY OF MAGNESIUM: CPT | Performed by: INTERNAL MEDICINE

## 2017-06-19 PROCEDURE — 94668 MNPJ CHEST WALL SBSQ: CPT

## 2017-06-19 PROCEDURE — 82803 BLOOD GASES ANY COMBINATION: CPT | Performed by: INTERNAL MEDICINE

## 2017-06-19 RX ORDER — LACTULOSE 10 G/15ML
30 SOLUTION ORAL 3 TIMES DAILY
Status: DISCONTINUED | OUTPATIENT
Start: 2017-06-19 | End: 2017-06-28 | Stop reason: HOSPADM

## 2017-06-19 RX ADMIN — MIDAZOLAM 8 MG/HR: 5 INJECTION INTRAMUSCULAR; INTRAVENOUS at 03:22

## 2017-06-19 RX ADMIN — PANTOPRAZOLE SODIUM 40 MG: 40 INJECTION, POWDER, FOR SOLUTION INTRAVENOUS at 05:54

## 2017-06-19 RX ADMIN — IPRATROPIUM BROMIDE 4 PUFF: 17 AEROSOL, METERED RESPIRATORY (INHALATION) at 07:30

## 2017-06-19 RX ADMIN — IPRATROPIUM BROMIDE 4 PUFF: 17 AEROSOL, METERED RESPIRATORY (INHALATION) at 03:45

## 2017-06-19 RX ADMIN — ALBUTEROL SULFATE 4 PUFF: 90 AEROSOL, METERED RESPIRATORY (INHALATION) at 07:30

## 2017-06-19 RX ADMIN — IPRATROPIUM BROMIDE 4 PUFF: 17 AEROSOL, METERED RESPIRATORY (INHALATION) at 11:06

## 2017-06-19 RX ADMIN — TAZOBACTAM SODIUM AND PIPERACILLIN SODIUM 3.38 G: 375; 3 INJECTION, SOLUTION INTRAVENOUS at 19:59

## 2017-06-19 RX ADMIN — DEXTROSE MONOHYDRATE 75 ML/HR: 50 INJECTION, SOLUTION INTRAVENOUS at 05:41

## 2017-06-19 RX ADMIN — ACETYLCYSTEINE 2 ML: 100 SOLUTION ORAL; RESPIRATORY (INHALATION) at 01:01

## 2017-06-19 RX ADMIN — ACETYLCYSTEINE 2 ML: 100 SOLUTION ORAL; RESPIRATORY (INHALATION) at 07:31

## 2017-06-19 RX ADMIN — ALBUTEROL SULFATE 4 PUFF: 90 AEROSOL, METERED RESPIRATORY (INHALATION) at 13:23

## 2017-06-19 RX ADMIN — IPRATROPIUM BROMIDE 4 PUFF: 17 AEROSOL, METERED RESPIRATORY (INHALATION) at 23:04

## 2017-06-19 RX ADMIN — LACTULOSE 30 G: 10 SOLUTION ORAL at 16:30

## 2017-06-19 RX ADMIN — ALBUTEROL SULFATE 4 PUFF: 90 AEROSOL, METERED RESPIRATORY (INHALATION) at 03:45

## 2017-06-19 RX ADMIN — LACTULOSE 30 G: 10 SOLUTION ORAL at 21:56

## 2017-06-19 RX ADMIN — IPRATROPIUM BROMIDE 4 PUFF: 17 AEROSOL, METERED RESPIRATORY (INHALATION) at 15:02

## 2017-06-19 RX ADMIN — METHYLPREDNISOLONE SODIUM SUCCINATE 40 MG: 40 INJECTION, POWDER, FOR SOLUTION INTRAMUSCULAR; INTRAVENOUS at 19:44

## 2017-06-19 RX ADMIN — METHYLPREDNISOLONE SODIUM SUCCINATE 40 MG: 40 INJECTION, POWDER, FOR SOLUTION INTRAMUSCULAR; INTRAVENOUS at 14:20

## 2017-06-19 RX ADMIN — ALBUTEROL SULFATE 4 PUFF: 90 AEROSOL, METERED RESPIRATORY (INHALATION) at 23:04

## 2017-06-19 RX ADMIN — METHYLPREDNISOLONE SODIUM SUCCINATE 40 MG: 40 INJECTION, POWDER, FOR SOLUTION INTRAMUSCULAR; INTRAVENOUS at 08:48

## 2017-06-19 RX ADMIN — DEXTROSE MONOHYDRATE 75 ML/HR: 50 INJECTION, SOLUTION INTRAVENOUS at 16:42

## 2017-06-19 RX ADMIN — TAZOBACTAM SODIUM AND PIPERACILLIN SODIUM 3.38 G: 375; 3 INJECTION, SOLUTION INTRAVENOUS at 14:20

## 2017-06-19 RX ADMIN — ACETYLCYSTEINE 2 ML: 100 SOLUTION ORAL; RESPIRATORY (INHALATION) at 19:01

## 2017-06-19 RX ADMIN — ALBUTEROL SULFATE 4 PUFF: 90 AEROSOL, METERED RESPIRATORY (INHALATION) at 19:01

## 2017-06-19 RX ADMIN — IPRATROPIUM BROMIDE 4 PUFF: 17 AEROSOL, METERED RESPIRATORY (INHALATION) at 19:01

## 2017-06-19 RX ADMIN — METHYLPREDNISOLONE SODIUM SUCCINATE 40 MG: 40 INJECTION, POWDER, FOR SOLUTION INTRAMUSCULAR; INTRAVENOUS at 03:03

## 2017-06-19 RX ADMIN — ALBUTEROL SULFATE 4 PUFF: 90 AEROSOL, METERED RESPIRATORY (INHALATION) at 15:03

## 2017-06-19 RX ADMIN — ALBUTEROL SULFATE 4 PUFF: 90 AEROSOL, METERED RESPIRATORY (INHALATION) at 11:06

## 2017-06-19 RX ADMIN — TAZOBACTAM SODIUM AND PIPERACILLIN SODIUM 3.38 G: 375; 3 INJECTION, SOLUTION INTRAVENOUS at 05:34

## 2017-06-19 RX ADMIN — ACETYLCYSTEINE 2 ML: 100 SOLUTION ORAL; RESPIRATORY (INHALATION) at 13:22

## 2017-06-20 ENCOUNTER — APPOINTMENT (OUTPATIENT)
Dept: GENERAL RADIOLOGY | Facility: HOSPITAL | Age: 52
End: 2017-06-20

## 2017-06-20 ENCOUNTER — APPOINTMENT (OUTPATIENT)
Dept: ULTRASOUND IMAGING | Facility: HOSPITAL | Age: 52
End: 2017-06-20

## 2017-06-20 ENCOUNTER — APPOINTMENT (OUTPATIENT)
Dept: INTERVENTIONAL RADIOLOGY/VASCULAR | Facility: HOSPITAL | Age: 52
End: 2017-06-20

## 2017-06-20 LAB
ALBUMIN SERPL-MCNC: 2.4 G/DL (ref 3.4–4.8)
ALBUMIN/GLOB SERPL: 0.7 G/DL (ref 1.1–1.8)
ALP SERPL-CCNC: 89 U/L (ref 38–126)
ALT SERPL W P-5'-P-CCNC: 58 U/L (ref 21–72)
ANION GAP SERPL CALCULATED.3IONS-SCNC: 9 MMOL/L (ref 5–15)
ARTERIAL PATENCY WRIST A: ABNORMAL
AST SERPL-CCNC: 71 U/L (ref 17–59)
ATMOSPHERIC PRESS: ABNORMAL MMHG
BASE EXCESS BLDA CALC-SCNC: -1.7 MMOL/L (ref -2.4–2.4)
BASOPHILS # BLD AUTO: 0.01 10*3/MM3 (ref 0–0.2)
BASOPHILS NFR BLD AUTO: 0.1 % (ref 0–2)
BDY SITE: ABNORMAL
BILIRUB SERPL-MCNC: 1.8 MG/DL (ref 0.2–1.3)
BUN BLD-MCNC: 19 MG/DL (ref 7–21)
BUN/CREAT SERPL: 11.7 (ref 7–25)
CA-I BLD-MCNC: 5.1 MG/DL (ref 4.5–4.9)
CALCIUM SPEC-SCNC: 8.7 MG/DL (ref 8.4–10.2)
CHLORIDE SERPL-SCNC: 111 MMOL/L (ref 95–110)
CO2 BLDA-SCNC: 22.4 MMOL/L (ref 23–27)
CO2 SERPL-SCNC: 19 MMOL/L (ref 22–31)
CREAT BLD-MCNC: 1.63 MG/DL (ref 0.7–1.3)
DEPRECATED RDW RBC AUTO: 48.2 FL (ref 35.1–43.9)
EOSINOPHIL # BLD AUTO: 0 10*3/MM3 (ref 0–0.7)
EOSINOPHIL NFR BLD AUTO: 0 % (ref 0–7)
ERYTHROCYTE [DISTWIDTH] IN BLOOD BY AUTOMATED COUNT: 14.1 % (ref 11.5–14.5)
GFR SERPL CREATININE-BSD FRML MDRD: 44 ML/MIN/1.73 (ref 56–130)
GLOBULIN UR ELPH-MCNC: 3.4 GM/DL (ref 2.3–3.5)
GLUCOSE BLD-MCNC: 122 MG/DL (ref 60–100)
GLUCOSE BLDA-MCNC: 149 MMOL/L
HCO3 BLDA-SCNC: 21.4 MMOL/L (ref 22–26)
HCT VFR BLD AUTO: 26.2 % (ref 39–49)
HCT VFR BLD CALC: 30 % (ref 40–54)
HGB BLD-MCNC: 9.2 G/DL (ref 13.7–17.3)
HGB BLDA-MCNC: 10.2 G/DL (ref 14–18)
IMM GRANULOCYTES # BLD: 0.13 10*3/MM3 (ref 0–0.02)
IMM GRANULOCYTES NFR BLD: 0.8 % (ref 0–0.5)
LYMPHOCYTES # BLD AUTO: 0.76 10*3/MM3 (ref 0.6–4.2)
LYMPHOCYTES NFR BLD AUTO: 4.8 % (ref 10–50)
MCH RBC QN AUTO: 33.2 PG (ref 26.5–34)
MCHC RBC AUTO-ENTMCNC: 35.1 G/DL (ref 31.5–36.3)
MCV RBC AUTO: 94.6 FL (ref 80–98)
MODALITY: ABNORMAL
MONOCYTES # BLD AUTO: 0.75 10*3/MM3 (ref 0–0.9)
MONOCYTES NFR BLD AUTO: 4.7 % (ref 0–12)
NEUTROPHILS # BLD AUTO: 14.2 10*3/MM3 (ref 2–8.6)
NEUTROPHILS NFR BLD AUTO: 89.6 % (ref 37–80)
PCO2 BLDA: 30.6 MM HG (ref 35–45)
PH BLDA: 7.46 PH UNITS (ref 7.35–7.45)
PLATELET # BLD AUTO: 62 10*3/MM3 (ref 150–450)
PMV BLD AUTO: 11.1 FL (ref 8–12)
PO2 BLDA: 88.4 MM HG (ref 80–105)
POTASSIUM BLD-SCNC: 3.8 MMOL/L (ref 3.5–5.1)
POTASSIUM BLDA-SCNC: 3.83 MMOL/L (ref 3.6–4.9)
PROT SERPL-MCNC: 5.8 G/DL (ref 6.3–8.6)
RBC # BLD AUTO: 2.77 10*6/MM3 (ref 4.37–5.74)
SAO2 % BLDCOA: 96.6 %
SODIUM BLD-SCNC: 139 MMOL/L (ref 137–145)
SODIUM BLDA-SCNC: 138.1 MMOL/L (ref 138–146)
WBC NRBC COR # BLD: 15.85 10*3/MM3 (ref 3.2–9.8)

## 2017-06-20 PROCEDURE — 80053 COMPREHEN METABOLIC PANEL: CPT | Performed by: INTERNAL MEDICINE

## 2017-06-20 PROCEDURE — 94003 VENT MGMT INPAT SUBQ DAY: CPT

## 2017-06-20 PROCEDURE — 85025 COMPLETE CBC W/AUTO DIFF WBC: CPT | Performed by: INTERNAL MEDICINE

## 2017-06-20 PROCEDURE — 36600 WITHDRAWAL OF ARTERIAL BLOOD: CPT

## 2017-06-20 PROCEDURE — 94668 MNPJ CHEST WALL SBSQ: CPT

## 2017-06-20 PROCEDURE — 99232 SBSQ HOSP IP/OBS MODERATE 35: CPT | Performed by: INTERNAL MEDICINE

## 2017-06-20 PROCEDURE — 94799 UNLISTED PULMONARY SVC/PX: CPT

## 2017-06-20 PROCEDURE — C1751 CATH, INF, PER/CENT/MIDLINE: HCPCS

## 2017-06-20 PROCEDURE — 25010000002 PIPERACILLIN SOD-TAZOBACTAM PER 1 G: Performed by: INTERNAL MEDICINE

## 2017-06-20 PROCEDURE — 71010 HC CHEST PA OR AP: CPT

## 2017-06-20 PROCEDURE — 25010000002 MIDAZOLAM 50 MG/10ML SOLUTION 10 ML VIAL: Performed by: INTERNAL MEDICINE

## 2017-06-20 PROCEDURE — 25010000002 METHYLPREDNISOLONE PER 40 MG: Performed by: INTERNAL MEDICINE

## 2017-06-20 PROCEDURE — 76937 US GUIDE VASCULAR ACCESS: CPT

## 2017-06-20 PROCEDURE — 94760 N-INVAS EAR/PLS OXIMETRY 1: CPT

## 2017-06-20 PROCEDURE — 82803 BLOOD GASES ANY COMBINATION: CPT | Performed by: INTERNAL MEDICINE

## 2017-06-20 RX ADMIN — IPRATROPIUM BROMIDE 4 PUFF: 17 AEROSOL, METERED RESPIRATORY (INHALATION) at 03:02

## 2017-06-20 RX ADMIN — IPRATROPIUM BROMIDE 4 PUFF: 17 AEROSOL, METERED RESPIRATORY (INHALATION) at 11:18

## 2017-06-20 RX ADMIN — IPRATROPIUM BROMIDE 4 PUFF: 17 AEROSOL, METERED RESPIRATORY (INHALATION) at 19:20

## 2017-06-20 RX ADMIN — IPRATROPIUM BROMIDE 4 PUFF: 17 AEROSOL, METERED RESPIRATORY (INHALATION) at 23:24

## 2017-06-20 RX ADMIN — ENALAPRILAT 1.25 MG: 1.25 INJECTION INTRAVENOUS at 18:15

## 2017-06-20 RX ADMIN — ALBUTEROL SULFATE 4 PUFF: 90 AEROSOL, METERED RESPIRATORY (INHALATION) at 15:14

## 2017-06-20 RX ADMIN — AMLODIPINE BESYLATE 5 MG: 5 TABLET ORAL at 10:10

## 2017-06-20 RX ADMIN — ALBUTEROL SULFATE 4 PUFF: 90 AEROSOL, METERED RESPIRATORY (INHALATION) at 23:24

## 2017-06-20 RX ADMIN — DEXTROSE MONOHYDRATE 75 ML/HR: 50 INJECTION, SOLUTION INTRAVENOUS at 05:58

## 2017-06-20 RX ADMIN — ALBUTEROL SULFATE 4 PUFF: 90 AEROSOL, METERED RESPIRATORY (INHALATION) at 03:02

## 2017-06-20 RX ADMIN — DEXTROSE MONOHYDRATE 75 ML/HR: 50 INJECTION, SOLUTION INTRAVENOUS at 21:07

## 2017-06-20 RX ADMIN — MIDAZOLAM 8 MG/HR: 5 INJECTION INTRAMUSCULAR; INTRAVENOUS at 12:25

## 2017-06-20 RX ADMIN — MIDAZOLAM 8 MG/HR: 5 INJECTION INTRAMUSCULAR; INTRAVENOUS at 18:11

## 2017-06-20 RX ADMIN — ENALAPRILAT 1.25 MG: 1.25 INJECTION INTRAVENOUS at 13:05

## 2017-06-20 RX ADMIN — IPRATROPIUM BROMIDE 4 PUFF: 17 AEROSOL, METERED RESPIRATORY (INHALATION) at 06:46

## 2017-06-20 RX ADMIN — LACTULOSE 30 G: 10 SOLUTION ORAL at 15:38

## 2017-06-20 RX ADMIN — ALBUTEROL SULFATE 4 PUFF: 90 AEROSOL, METERED RESPIRATORY (INHALATION) at 19:20

## 2017-06-20 RX ADMIN — METHYLPREDNISOLONE SODIUM SUCCINATE 40 MG: 40 INJECTION, POWDER, FOR SOLUTION INTRAMUSCULAR; INTRAVENOUS at 21:08

## 2017-06-20 RX ADMIN — TAZOBACTAM SODIUM AND PIPERACILLIN SODIUM 3.38 G: 375; 3 INJECTION, SOLUTION INTRAVENOUS at 21:07

## 2017-06-20 RX ADMIN — LACTULOSE 30 G: 10 SOLUTION ORAL at 10:11

## 2017-06-20 RX ADMIN — IPRATROPIUM BROMIDE 4 PUFF: 17 AEROSOL, METERED RESPIRATORY (INHALATION) at 15:14

## 2017-06-20 RX ADMIN — ACETYLCYSTEINE 2 ML: 100 SOLUTION ORAL; RESPIRATORY (INHALATION) at 19:20

## 2017-06-20 RX ADMIN — ALBUTEROL SULFATE 4 PUFF: 90 AEROSOL, METERED RESPIRATORY (INHALATION) at 11:18

## 2017-06-20 RX ADMIN — LACTULOSE 30 G: 10 SOLUTION ORAL at 21:08

## 2017-06-20 RX ADMIN — METHYLPREDNISOLONE SODIUM SUCCINATE 40 MG: 40 INJECTION, POWDER, FOR SOLUTION INTRAMUSCULAR; INTRAVENOUS at 02:40

## 2017-06-20 RX ADMIN — ACETYLCYSTEINE 2 ML: 100 SOLUTION ORAL; RESPIRATORY (INHALATION) at 13:12

## 2017-06-20 RX ADMIN — TAZOBACTAM SODIUM AND PIPERACILLIN SODIUM 3.38 G: 375; 3 INJECTION, SOLUTION INTRAVENOUS at 13:05

## 2017-06-20 RX ADMIN — ACETYLCYSTEINE 2 ML: 100 SOLUTION ORAL; RESPIRATORY (INHALATION) at 06:47

## 2017-06-20 RX ADMIN — TAZOBACTAM SODIUM AND PIPERACILLIN SODIUM 3.38 G: 375; 3 INJECTION, SOLUTION INTRAVENOUS at 06:00

## 2017-06-20 RX ADMIN — METHYLPREDNISOLONE SODIUM SUCCINATE 40 MG: 40 INJECTION, POWDER, FOR SOLUTION INTRAMUSCULAR; INTRAVENOUS at 14:49

## 2017-06-20 RX ADMIN — ACETYLCYSTEINE 2 ML: 100 SOLUTION ORAL; RESPIRATORY (INHALATION) at 01:24

## 2017-06-20 RX ADMIN — PANTOPRAZOLE SODIUM 40 MG: 40 INJECTION, POWDER, FOR SOLUTION INTRAVENOUS at 06:01

## 2017-06-20 RX ADMIN — ENALAPRILAT 1.25 MG: 1.25 INJECTION INTRAVENOUS at 10:11

## 2017-06-20 RX ADMIN — METHYLPREDNISOLONE SODIUM SUCCINATE 40 MG: 40 INJECTION, POWDER, FOR SOLUTION INTRAMUSCULAR; INTRAVENOUS at 10:10

## 2017-06-20 RX ADMIN — ALBUTEROL SULFATE 4 PUFF: 90 AEROSOL, METERED RESPIRATORY (INHALATION) at 06:46

## 2017-06-21 ENCOUNTER — APPOINTMENT (OUTPATIENT)
Dept: GENERAL RADIOLOGY | Facility: HOSPITAL | Age: 52
End: 2017-06-21

## 2017-06-21 LAB
ALBUMIN SERPL-MCNC: 2.3 G/DL (ref 3.4–4.8)
ALBUMIN/GLOB SERPL: 0.7 G/DL (ref 1.1–1.8)
ALP SERPL-CCNC: 116 U/L (ref 38–126)
ALT SERPL W P-5'-P-CCNC: 76 U/L (ref 21–72)
AMMONIA BLD-SCNC: 15 UMOL/L (ref 9–30)
ANION GAP SERPL CALCULATED.3IONS-SCNC: 7 MMOL/L (ref 5–15)
ARTERIAL PATENCY WRIST A: ABNORMAL
AST SERPL-CCNC: 122 U/L (ref 17–59)
ATMOSPHERIC PRESS: ABNORMAL MMHG
BASE EXCESS BLDA CALC-SCNC: -1.5 MMOL/L (ref -2.4–2.4)
BASOPHILS # BLD AUTO: 0.01 10*3/MM3 (ref 0–0.2)
BASOPHILS NFR BLD AUTO: 0.1 % (ref 0–2)
BDY SITE: ABNORMAL
BILIRUB SERPL-MCNC: 1.7 MG/DL (ref 0.2–1.3)
BUN BLD-MCNC: 25 MG/DL (ref 7–21)
BUN/CREAT SERPL: 16.2 (ref 7–25)
CA-I BLD-MCNC: 5.1 MG/DL (ref 4.5–4.9)
CALCIUM SPEC-SCNC: 8.5 MG/DL (ref 8.4–10.2)
CHLORIDE SERPL-SCNC: 109 MMOL/L (ref 95–110)
CO2 BLDA-SCNC: 23.3 MMOL/L (ref 23–27)
CO2 SERPL-SCNC: 22 MMOL/L (ref 22–31)
CREAT BLD-MCNC: 1.54 MG/DL (ref 0.7–1.3)
DEPRECATED RDW RBC AUTO: 48.5 FL (ref 35.1–43.9)
EOSINOPHIL # BLD AUTO: 0 10*3/MM3 (ref 0–0.7)
EOSINOPHIL NFR BLD AUTO: 0 % (ref 0–7)
ERYTHROCYTE [DISTWIDTH] IN BLOOD BY AUTOMATED COUNT: 14.4 % (ref 11.5–14.5)
GFR SERPL CREATININE-BSD FRML MDRD: 47 ML/MIN/1.73 (ref 60–130)
GLOBULIN UR ELPH-MCNC: 3.1 GM/DL (ref 2.3–3.5)
GLUCOSE BLD-MCNC: 122 MG/DL (ref 60–100)
GLUCOSE BLDA-MCNC: 129 MMOL/L
HCO3 BLDA-SCNC: 22.3 MMOL/L (ref 22–26)
HCT VFR BLD AUTO: 25.3 % (ref 39–49)
HCT VFR BLD CALC: 28 % (ref 40–54)
HGB BLD-MCNC: 8.8 G/DL (ref 13.7–17.3)
HGB BLDA-MCNC: 9.6 G/DL (ref 14–18)
IMM GRANULOCYTES # BLD: 0.17 10*3/MM3 (ref 0–0.02)
IMM GRANULOCYTES NFR BLD: 1.5 % (ref 0–0.5)
LYMPHOCYTES # BLD AUTO: 0.8 10*3/MM3 (ref 0.6–4.2)
LYMPHOCYTES NFR BLD AUTO: 6.9 % (ref 10–50)
MCH RBC QN AUTO: 33.1 PG (ref 26.5–34)
MCHC RBC AUTO-ENTMCNC: 34.8 G/DL (ref 31.5–36.3)
MCV RBC AUTO: 95.1 FL (ref 80–98)
MODALITY: ABNORMAL
MONOCYTES # BLD AUTO: 0.67 10*3/MM3 (ref 0–0.9)
MONOCYTES NFR BLD AUTO: 5.7 % (ref 0–12)
NEUTROPHILS # BLD AUTO: 10.02 10*3/MM3 (ref 2–8.6)
NEUTROPHILS NFR BLD AUTO: 85.8 % (ref 37–80)
PCO2 BLDA: 33.9 MM HG (ref 35–45)
PH BLDA: 7.44 PH UNITS (ref 7.35–7.45)
PLATELET # BLD AUTO: 58 10*3/MM3 (ref 150–450)
PMV BLD AUTO: 10.9 FL (ref 8–12)
PO2 BLDA: 86.1 MM HG (ref 80–105)
POTASSIUM BLD-SCNC: 3.7 MMOL/L (ref 3.5–5.1)
POTASSIUM BLDA-SCNC: 3.71 MMOL/L (ref 3.6–4.9)
PROT SERPL-MCNC: 5.4 G/DL (ref 6.3–8.6)
RBC # BLD AUTO: 2.66 10*6/MM3 (ref 4.37–5.74)
SAO2 % BLDCOA: 95.6 % (ref 94–100)
SODIUM BLD-SCNC: 138 MMOL/L (ref 137–145)
SODIUM BLDA-SCNC: 137.5 MMOL/L (ref 138–146)
WBC NRBC COR # BLD: 11.67 10*3/MM3 (ref 3.2–9.8)
WHOLE BLOOD HOLD SPECIMEN: NORMAL

## 2017-06-21 PROCEDURE — 25010000002 PIPERACILLIN SOD-TAZOBACTAM PER 1 G: Performed by: INTERNAL MEDICINE

## 2017-06-21 PROCEDURE — 82140 ASSAY OF AMMONIA: CPT | Performed by: INTERNAL MEDICINE

## 2017-06-21 PROCEDURE — 94003 VENT MGMT INPAT SUBQ DAY: CPT

## 2017-06-21 PROCEDURE — 94799 UNLISTED PULMONARY SVC/PX: CPT

## 2017-06-21 PROCEDURE — 25010000002 HYDRALAZINE PER 20 MG: Performed by: INTERNAL MEDICINE

## 2017-06-21 PROCEDURE — 94640 AIRWAY INHALATION TREATMENT: CPT

## 2017-06-21 PROCEDURE — 94668 MNPJ CHEST WALL SBSQ: CPT

## 2017-06-21 PROCEDURE — 80053 COMPREHEN METABOLIC PANEL: CPT | Performed by: INTERNAL MEDICINE

## 2017-06-21 PROCEDURE — 85025 COMPLETE CBC W/AUTO DIFF WBC: CPT | Performed by: INTERNAL MEDICINE

## 2017-06-21 PROCEDURE — 82803 BLOOD GASES ANY COMBINATION: CPT | Performed by: INTERNAL MEDICINE

## 2017-06-21 PROCEDURE — 99232 SBSQ HOSP IP/OBS MODERATE 35: CPT | Performed by: INTERNAL MEDICINE

## 2017-06-21 PROCEDURE — 71010 HC CHEST PA OR AP: CPT

## 2017-06-21 PROCEDURE — 25010000002 MORPHINE SULFATE (PF) 2 MG/ML SOLUTION: Performed by: HOSPITALIST

## 2017-06-21 PROCEDURE — 25010000002 MIDAZOLAM 50 MG/10ML SOLUTION 10 ML VIAL: Performed by: INTERNAL MEDICINE

## 2017-06-21 PROCEDURE — 25010000002 METHYLPREDNISOLONE PER 40 MG: Performed by: INTERNAL MEDICINE

## 2017-06-21 PROCEDURE — 94760 N-INVAS EAR/PLS OXIMETRY 1: CPT

## 2017-06-21 PROCEDURE — 36600 WITHDRAWAL OF ARTERIAL BLOOD: CPT

## 2017-06-21 RX ORDER — ALBUTEROL SULFATE 2.5 MG/3ML
2.5 SOLUTION RESPIRATORY (INHALATION)
Status: DISCONTINUED | OUTPATIENT
Start: 2017-06-21 | End: 2017-06-28 | Stop reason: HOSPADM

## 2017-06-21 RX ORDER — MORPHINE SULFATE 2 MG/ML
1 INJECTION, SOLUTION INTRAMUSCULAR; INTRAVENOUS EVERY 4 HOURS PRN
Status: DISCONTINUED | OUTPATIENT
Start: 2017-06-21 | End: 2017-06-28 | Stop reason: HOSPADM

## 2017-06-21 RX ADMIN — Medication 10 ML: at 20:03

## 2017-06-21 RX ADMIN — METHYLPREDNISOLONE SODIUM SUCCINATE 40 MG: 40 INJECTION, POWDER, FOR SOLUTION INTRAMUSCULAR; INTRAVENOUS at 19:56

## 2017-06-21 RX ADMIN — AMLODIPINE BESYLATE 5 MG: 5 TABLET ORAL at 09:02

## 2017-06-21 RX ADMIN — ENALAPRILAT 1.25 MG: 1.25 INJECTION INTRAVENOUS at 20:03

## 2017-06-21 RX ADMIN — TAZOBACTAM SODIUM AND PIPERACILLIN SODIUM 3.38 G: 375; 3 INJECTION, SOLUTION INTRAVENOUS at 04:24

## 2017-06-21 RX ADMIN — PANTOPRAZOLE SODIUM 40 MG: 40 INJECTION, POWDER, FOR SOLUTION INTRAVENOUS at 06:01

## 2017-06-21 RX ADMIN — TAZOBACTAM SODIUM AND PIPERACILLIN SODIUM 3.38 G: 375; 3 INJECTION, SOLUTION INTRAVENOUS at 12:23

## 2017-06-21 RX ADMIN — LACTULOSE 30 G: 10 SOLUTION ORAL at 21:11

## 2017-06-21 RX ADMIN — Medication 10 ML: at 09:03

## 2017-06-21 RX ADMIN — HYDRALAZINE HYDROCHLORIDE 10 MG: 20 INJECTION INTRAMUSCULAR; INTRAVENOUS at 16:21

## 2017-06-21 RX ADMIN — IPRATROPIUM BROMIDE 4 PUFF: 17 AEROSOL, METERED RESPIRATORY (INHALATION) at 07:38

## 2017-06-21 RX ADMIN — ACETYLCYSTEINE 2 ML: 100 SOLUTION ORAL; RESPIRATORY (INHALATION) at 01:17

## 2017-06-21 RX ADMIN — METHYLPREDNISOLONE SODIUM SUCCINATE 40 MG: 40 INJECTION, POWDER, FOR SOLUTION INTRAMUSCULAR; INTRAVENOUS at 09:03

## 2017-06-21 RX ADMIN — IPRATROPIUM BROMIDE 4 PUFF: 17 AEROSOL, METERED RESPIRATORY (INHALATION) at 03:22

## 2017-06-21 RX ADMIN — ALBUTEROL SULFATE 2.5 MG: 2.5 SOLUTION RESPIRATORY (INHALATION) at 19:24

## 2017-06-21 RX ADMIN — MORPHINE SULFATE 1 MG: 2 INJECTION, SOLUTION INTRAMUSCULAR; INTRAVENOUS at 21:04

## 2017-06-21 RX ADMIN — ALBUTEROL SULFATE 4 PUFF: 90 AEROSOL, METERED RESPIRATORY (INHALATION) at 03:22

## 2017-06-21 RX ADMIN — ALBUTEROL SULFATE 4 PUFF: 90 AEROSOL, METERED RESPIRATORY (INHALATION) at 07:38

## 2017-06-21 RX ADMIN — ENALAPRILAT 1.25 MG: 1.25 INJECTION INTRAVENOUS at 02:03

## 2017-06-21 RX ADMIN — ENALAPRILAT 1.25 MG: 1.25 INJECTION INTRAVENOUS at 12:37

## 2017-06-21 RX ADMIN — MIDAZOLAM 8 MG/HR: 5 INJECTION INTRAMUSCULAR; INTRAVENOUS at 02:04

## 2017-06-21 RX ADMIN — ENALAPRILAT 1.25 MG: 1.25 INJECTION INTRAVENOUS at 06:01

## 2017-06-21 RX ADMIN — DEXTROSE MONOHYDRATE 75 ML/HR: 50 INJECTION, SOLUTION INTRAVENOUS at 09:52

## 2017-06-21 RX ADMIN — METHYLPREDNISOLONE SODIUM SUCCINATE 40 MG: 40 INJECTION, POWDER, FOR SOLUTION INTRAMUSCULAR; INTRAVENOUS at 13:54

## 2017-06-21 RX ADMIN — TAZOBACTAM SODIUM AND PIPERACILLIN SODIUM 3.38 G: 375; 3 INJECTION, SOLUTION INTRAVENOUS at 19:56

## 2017-06-21 RX ADMIN — METHYLPREDNISOLONE SODIUM SUCCINATE 40 MG: 40 INJECTION, POWDER, FOR SOLUTION INTRAMUSCULAR; INTRAVENOUS at 02:03

## 2017-06-21 RX ADMIN — LACTULOSE 30 G: 10 SOLUTION ORAL at 16:26

## 2017-06-22 LAB
ALBUMIN SERPL-MCNC: 2.6 G/DL (ref 3.4–4.8)
ALBUMIN/GLOB SERPL: 0.7 G/DL (ref 1.1–1.8)
ALP SERPL-CCNC: 128 U/L (ref 38–126)
ALT SERPL W P-5'-P-CCNC: 95 U/L (ref 21–72)
ANION GAP SERPL CALCULATED.3IONS-SCNC: 11 MMOL/L (ref 5–15)
ARTERIAL PATENCY WRIST A: ABNORMAL
AST SERPL-CCNC: 114 U/L (ref 17–59)
ATMOSPHERIC PRESS: ABNORMAL MMHG
BASE EXCESS BLDA CALC-SCNC: -2.8 MMOL/L (ref -2.4–2.4)
BASOPHILS # BLD AUTO: 0.02 10*3/MM3 (ref 0–0.2)
BASOPHILS NFR BLD AUTO: 0.2 % (ref 0–2)
BDY SITE: ABNORMAL
BILIRUB SERPL-MCNC: 2.8 MG/DL (ref 0.2–1.3)
BUN BLD-MCNC: 30 MG/DL (ref 7–21)
BUN/CREAT SERPL: 19.2 (ref 7–25)
CA-I BLD-MCNC: 5.2 MG/DL (ref 4.5–4.9)
CALCIUM SPEC-SCNC: 8.8 MG/DL (ref 8.4–10.2)
CHLORIDE SERPL-SCNC: 111 MMOL/L (ref 95–110)
CO2 BLDA-SCNC: 21.1 MMOL/L (ref 23–27)
CO2 SERPL-SCNC: 19 MMOL/L (ref 22–31)
CREAT BLD-MCNC: 1.56 MG/DL (ref 0.7–1.3)
DEPRECATED RDW RBC AUTO: 48.9 FL (ref 35.1–43.9)
EOSINOPHIL # BLD AUTO: 0 10*3/MM3 (ref 0–0.7)
EOSINOPHIL NFR BLD AUTO: 0 % (ref 0–7)
ERYTHROCYTE [DISTWIDTH] IN BLOOD BY AUTOMATED COUNT: 14.7 % (ref 11.5–14.5)
GFR SERPL CREATININE-BSD FRML MDRD: 47 ML/MIN/1.73 (ref 56–130)
GLOBULIN UR ELPH-MCNC: 3.6 GM/DL (ref 2.3–3.5)
GLUCOSE BLD-MCNC: 115 MG/DL (ref 60–100)
GLUCOSE BLDA-MCNC: 131 MMOL/L
HCO3 BLDA-SCNC: 20.2 MMOL/L (ref 22–26)
HCT VFR BLD AUTO: 27.5 % (ref 39–49)
HCT VFR BLD CALC: 30 % (ref 40–54)
HGB BLD-MCNC: 9.6 G/DL (ref 13.7–17.3)
HGB BLDA-MCNC: 10.3 G/DL (ref 14–18)
IMM GRANULOCYTES # BLD: 0.24 10*3/MM3 (ref 0–0.02)
IMM GRANULOCYTES NFR BLD: 2.1 % (ref 0–0.5)
LYMPHOCYTES # BLD AUTO: 0.68 10*3/MM3 (ref 0.6–4.2)
LYMPHOCYTES NFR BLD AUTO: 6.1 % (ref 10–50)
MCH RBC QN AUTO: 33.2 PG (ref 26.5–34)
MCHC RBC AUTO-ENTMCNC: 34.9 G/DL (ref 31.5–36.3)
MCV RBC AUTO: 95.2 FL (ref 80–98)
MODALITY: ABNORMAL
MONOCYTES # BLD AUTO: 0.65 10*3/MM3 (ref 0–0.9)
MONOCYTES NFR BLD AUTO: 5.8 % (ref 0–12)
NEUTROPHILS # BLD AUTO: 9.59 10*3/MM3 (ref 2–8.6)
NEUTROPHILS NFR BLD AUTO: 85.8 % (ref 37–80)
PCO2 BLDA: 29.1 MM HG (ref 35–45)
PH BLDA: 7.46 PH UNITS (ref 7.35–7.45)
PLATELET # BLD AUTO: 51 10*3/MM3 (ref 150–450)
PMV BLD AUTO: 10.4 FL (ref 8–12)
PO2 BLDA: 72.3 MM HG (ref 80–105)
POTASSIUM BLD-SCNC: 3.4 MMOL/L (ref 3.5–5.1)
POTASSIUM BLDA-SCNC: 3.62 MMOL/L (ref 3.6–4.9)
PROT SERPL-MCNC: 6.2 G/DL (ref 6.3–8.6)
RBC # BLD AUTO: 2.89 10*6/MM3 (ref 4.37–5.74)
SAO2 % BLDCOA: 93.2 % (ref 94–100)
SODIUM BLD-SCNC: 141 MMOL/L (ref 137–145)
SODIUM BLDA-SCNC: 140.7 MMOL/L (ref 138–146)
WBC NRBC COR # BLD: 11.18 10*3/MM3 (ref 3.2–9.8)

## 2017-06-22 PROCEDURE — 92610 EVALUATE SWALLOWING FUNCTION: CPT | Performed by: SPEECH-LANGUAGE PATHOLOGIST

## 2017-06-22 PROCEDURE — 25010000002 HYDRALAZINE PER 20 MG: Performed by: INTERNAL MEDICINE

## 2017-06-22 PROCEDURE — G8981 BODY POS CURRENT STATUS: HCPCS

## 2017-06-22 PROCEDURE — 99232 SBSQ HOSP IP/OBS MODERATE 35: CPT | Performed by: INTERNAL MEDICINE

## 2017-06-22 PROCEDURE — 94760 N-INVAS EAR/PLS OXIMETRY 1: CPT

## 2017-06-22 PROCEDURE — G8996 SWALLOW CURRENT STATUS: HCPCS | Performed by: SPEECH-LANGUAGE PATHOLOGIST

## 2017-06-22 PROCEDURE — G8982 BODY POS GOAL STATUS: HCPCS

## 2017-06-22 PROCEDURE — 97150 GROUP THERAPEUTIC PROCEDURES: CPT

## 2017-06-22 PROCEDURE — 94799 UNLISTED PULMONARY SVC/PX: CPT

## 2017-06-22 PROCEDURE — 36600 WITHDRAWAL OF ARTERIAL BLOOD: CPT

## 2017-06-22 PROCEDURE — 25010000002 METHYLPREDNISOLONE PER 40 MG: Performed by: INTERNAL MEDICINE

## 2017-06-22 PROCEDURE — G8997 SWALLOW GOAL STATUS: HCPCS | Performed by: SPEECH-LANGUAGE PATHOLOGIST

## 2017-06-22 PROCEDURE — 25010000002 PIPERACILLIN SOD-TAZOBACTAM PER 1 G: Performed by: INTERNAL MEDICINE

## 2017-06-22 PROCEDURE — 82803 BLOOD GASES ANY COMBINATION: CPT | Performed by: INTERNAL MEDICINE

## 2017-06-22 PROCEDURE — 97163 PT EVAL HIGH COMPLEX 45 MIN: CPT

## 2017-06-22 PROCEDURE — 25010000002 MORPHINE SULFATE (PF) 2 MG/ML SOLUTION: Performed by: HOSPITALIST

## 2017-06-22 PROCEDURE — 80053 COMPREHEN METABOLIC PANEL: CPT | Performed by: INTERNAL MEDICINE

## 2017-06-22 PROCEDURE — 85025 COMPLETE CBC W/AUTO DIFF WBC: CPT | Performed by: INTERNAL MEDICINE

## 2017-06-22 RX ADMIN — METHYLPREDNISOLONE SODIUM SUCCINATE 40 MG: 40 INJECTION, POWDER, FOR SOLUTION INTRAMUSCULAR; INTRAVENOUS at 13:23

## 2017-06-22 RX ADMIN — ENALAPRILAT 1.25 MG: 1.25 INJECTION INTRAVENOUS at 19:50

## 2017-06-22 RX ADMIN — ALBUTEROL SULFATE 2.5 MG: 2.5 SOLUTION RESPIRATORY (INHALATION) at 19:36

## 2017-06-22 RX ADMIN — METHYLPREDNISOLONE SODIUM SUCCINATE 40 MG: 40 INJECTION, POWDER, FOR SOLUTION INTRAMUSCULAR; INTRAVENOUS at 08:54

## 2017-06-22 RX ADMIN — ALBUTEROL SULFATE 2.5 MG: 2.5 SOLUTION RESPIRATORY (INHALATION) at 07:27

## 2017-06-22 RX ADMIN — HYDRALAZINE HYDROCHLORIDE 10 MG: 20 INJECTION INTRAMUSCULAR; INTRAVENOUS at 22:03

## 2017-06-22 RX ADMIN — METHYLPREDNISOLONE SODIUM SUCCINATE 40 MG: 40 INJECTION, POWDER, FOR SOLUTION INTRAMUSCULAR; INTRAVENOUS at 02:00

## 2017-06-22 RX ADMIN — MORPHINE SULFATE 1 MG: 2 INJECTION, SOLUTION INTRAMUSCULAR; INTRAVENOUS at 19:19

## 2017-06-22 RX ADMIN — LACTULOSE 30 G: 10 SOLUTION ORAL at 16:53

## 2017-06-22 RX ADMIN — ALBUTEROL SULFATE 2.5 MG: 2.5 SOLUTION RESPIRATORY (INHALATION) at 00:48

## 2017-06-22 RX ADMIN — ALBUTEROL SULFATE 2.5 MG: 2.5 SOLUTION RESPIRATORY (INHALATION) at 13:01

## 2017-06-22 RX ADMIN — LACTULOSE 30 G: 10 SOLUTION ORAL at 08:54

## 2017-06-22 RX ADMIN — AMLODIPINE BESYLATE 5 MG: 5 TABLET ORAL at 08:54

## 2017-06-22 RX ADMIN — DEXTROSE MONOHYDRATE 75 ML/HR: 50 INJECTION, SOLUTION INTRAVENOUS at 00:20

## 2017-06-22 RX ADMIN — ENALAPRILAT 1.25 MG: 1.25 INJECTION INTRAVENOUS at 08:54

## 2017-06-22 RX ADMIN — LACTULOSE 30 G: 10 SOLUTION ORAL at 20:18

## 2017-06-22 RX ADMIN — ENALAPRILAT 1.25 MG: 1.25 INJECTION INTRAVENOUS at 13:23

## 2017-06-22 RX ADMIN — METHYLPREDNISOLONE SODIUM SUCCINATE 40 MG: 40 INJECTION, POWDER, FOR SOLUTION INTRAMUSCULAR; INTRAVENOUS at 19:51

## 2017-06-22 RX ADMIN — MORPHINE SULFATE 1 MG: 2 INJECTION, SOLUTION INTRAMUSCULAR; INTRAVENOUS at 15:57

## 2017-06-22 RX ADMIN — ENALAPRILAT 1.25 MG: 1.25 INJECTION INTRAVENOUS at 00:54

## 2017-06-22 RX ADMIN — DEXTROSE MONOHYDRATE 75 ML/HR: 50 INJECTION, SOLUTION INTRAVENOUS at 16:10

## 2017-06-22 RX ADMIN — TAZOBACTAM SODIUM AND PIPERACILLIN SODIUM 3.38 G: 375; 3 INJECTION, SOLUTION INTRAVENOUS at 19:54

## 2017-06-22 RX ADMIN — TAZOBACTAM SODIUM AND PIPERACILLIN SODIUM 3.38 G: 375; 3 INJECTION, SOLUTION INTRAVENOUS at 13:23

## 2017-06-22 RX ADMIN — TAZOBACTAM SODIUM AND PIPERACILLIN SODIUM 3.38 G: 375; 3 INJECTION, SOLUTION INTRAVENOUS at 04:45

## 2017-06-22 RX ADMIN — PANTOPRAZOLE SODIUM 40 MG: 40 INJECTION, POWDER, FOR SOLUTION INTRAVENOUS at 05:56

## 2017-06-23 ENCOUNTER — APPOINTMENT (OUTPATIENT)
Dept: GENERAL RADIOLOGY | Facility: HOSPITAL | Age: 52
End: 2017-06-23

## 2017-06-23 LAB
ALBUMIN SERPL-MCNC: 2.6 G/DL (ref 3.4–4.8)
ALBUMIN/GLOB SERPL: 0.7 G/DL (ref 1.1–1.8)
ALP SERPL-CCNC: 120 U/L (ref 38–126)
ALT SERPL W P-5'-P-CCNC: 90 U/L (ref 21–72)
ANION GAP SERPL CALCULATED.3IONS-SCNC: 10 MMOL/L (ref 5–15)
AST SERPL-CCNC: 90 U/L (ref 17–59)
BASOPHILS # BLD AUTO: 0.02 10*3/MM3 (ref 0–0.2)
BASOPHILS NFR BLD AUTO: 0.2 % (ref 0–2)
BILIRUB SERPL-MCNC: 2.7 MG/DL (ref 0.2–1.3)
BUN BLD-MCNC: 31 MG/DL (ref 7–21)
BUN/CREAT SERPL: 19.9 (ref 7–25)
CALCIUM SPEC-SCNC: 8.9 MG/DL (ref 8.4–10.2)
CHLORIDE SERPL-SCNC: 109 MMOL/L (ref 95–110)
CO2 SERPL-SCNC: 20 MMOL/L (ref 22–31)
CREAT BLD-MCNC: 1.56 MG/DL (ref 0.7–1.3)
DEPRECATED RDW RBC AUTO: 49.8 FL (ref 35.1–43.9)
EOSINOPHIL # BLD AUTO: 0 10*3/MM3 (ref 0–0.7)
EOSINOPHIL NFR BLD AUTO: 0 % (ref 0–7)
ERYTHROCYTE [DISTWIDTH] IN BLOOD BY AUTOMATED COUNT: 15.1 % (ref 11.5–14.5)
GFR SERPL CREATININE-BSD FRML MDRD: 47 ML/MIN/1.73 (ref 60–130)
GLOBULIN UR ELPH-MCNC: 3.6 GM/DL (ref 2.3–3.5)
GLUCOSE BLD-MCNC: 119 MG/DL (ref 60–100)
HCT VFR BLD AUTO: 27.6 % (ref 39–49)
HGB BLD-MCNC: 9.9 G/DL (ref 13.7–17.3)
IMM GRANULOCYTES # BLD: 0.37 10*3/MM3 (ref 0–0.02)
IMM GRANULOCYTES NFR BLD: 3.7 % (ref 0–0.5)
LYMPHOCYTES # BLD AUTO: 0.5 10*3/MM3 (ref 0.6–4.2)
LYMPHOCYTES NFR BLD AUTO: 5 % (ref 10–50)
MCH RBC QN AUTO: 34.1 PG (ref 26.5–34)
MCHC RBC AUTO-ENTMCNC: 35.9 G/DL (ref 31.5–36.3)
MCV RBC AUTO: 95.2 FL (ref 80–98)
MONOCYTES # BLD AUTO: 0.56 10*3/MM3 (ref 0–0.9)
MONOCYTES NFR BLD AUTO: 5.6 % (ref 0–12)
NEUTROPHILS # BLD AUTO: 8.5 10*3/MM3 (ref 2–8.6)
NEUTROPHILS NFR BLD AUTO: 85.5 % (ref 37–80)
PLATELET # BLD AUTO: 54 10*3/MM3 (ref 150–450)
PMV BLD AUTO: 10.8 FL (ref 8–12)
POTASSIUM BLD-SCNC: 3.5 MMOL/L (ref 3.5–5.1)
PROT SERPL-MCNC: 6.2 G/DL (ref 6.3–8.6)
RBC # BLD AUTO: 2.9 10*6/MM3 (ref 4.37–5.74)
SODIUM BLD-SCNC: 139 MMOL/L (ref 137–145)
WBC NRBC COR # BLD: 9.95 10*3/MM3 (ref 3.2–9.8)

## 2017-06-23 PROCEDURE — 94760 N-INVAS EAR/PLS OXIMETRY 1: CPT

## 2017-06-23 PROCEDURE — G8988 SELF CARE GOAL STATUS: HCPCS

## 2017-06-23 PROCEDURE — 25010000002 MORPHINE SULFATE (PF) 2 MG/ML SOLUTION: Performed by: HOSPITALIST

## 2017-06-23 PROCEDURE — 97110 THERAPEUTIC EXERCISES: CPT

## 2017-06-23 PROCEDURE — 97166 OT EVAL MOD COMPLEX 45 MIN: CPT

## 2017-06-23 PROCEDURE — 25010000002 PIPERACILLIN SOD-TAZOBACTAM PER 1 G: Performed by: INTERNAL MEDICINE

## 2017-06-23 PROCEDURE — 85025 COMPLETE CBC W/AUTO DIFF WBC: CPT | Performed by: INTERNAL MEDICINE

## 2017-06-23 PROCEDURE — 99231 SBSQ HOSP IP/OBS SF/LOW 25: CPT | Performed by: INTERNAL MEDICINE

## 2017-06-23 PROCEDURE — 80053 COMPREHEN METABOLIC PANEL: CPT | Performed by: INTERNAL MEDICINE

## 2017-06-23 PROCEDURE — 71010 HC CHEST PA OR AP: CPT

## 2017-06-23 PROCEDURE — G8987 SELF CARE CURRENT STATUS: HCPCS

## 2017-06-23 PROCEDURE — 94799 UNLISTED PULMONARY SVC/PX: CPT

## 2017-06-23 PROCEDURE — 25010000003 POTASSIUM CHLORIDE 10 MEQ/100ML SOLUTION: Performed by: INTERNAL MEDICINE

## 2017-06-23 PROCEDURE — 92526 ORAL FUNCTION THERAPY: CPT | Performed by: SPEECH-LANGUAGE PATHOLOGIST

## 2017-06-23 PROCEDURE — 25010000002 METHYLPREDNISOLONE PER 40 MG: Performed by: INTERNAL MEDICINE

## 2017-06-23 RX ORDER — SODIUM CHLORIDE 9 MG/ML
30 INJECTION, SOLUTION INTRAVENOUS CONTINUOUS
Status: DISCONTINUED | OUTPATIENT
Start: 2017-06-23 | End: 2017-06-28 | Stop reason: HOSPADM

## 2017-06-23 RX ORDER — TEMAZEPAM 15 MG/1
15 CAPSULE ORAL NIGHTLY PRN
Status: DISCONTINUED | OUTPATIENT
Start: 2017-06-23 | End: 2017-06-28 | Stop reason: HOSPADM

## 2017-06-23 RX ADMIN — LACTULOSE 30 G: 10 SOLUTION ORAL at 08:36

## 2017-06-23 RX ADMIN — ENALAPRILAT 1.25 MG: 1.25 INJECTION INTRAVENOUS at 01:21

## 2017-06-23 RX ADMIN — TAZOBACTAM SODIUM AND PIPERACILLIN SODIUM 3.38 G: 375; 3 INJECTION, SOLUTION INTRAVENOUS at 03:59

## 2017-06-23 RX ADMIN — LACTULOSE 30 G: 10 SOLUTION ORAL at 15:36

## 2017-06-23 RX ADMIN — ENALAPRILAT 1.25 MG: 1.25 INJECTION INTRAVENOUS at 08:36

## 2017-06-23 RX ADMIN — ALBUTEROL SULFATE 2.5 MG: 2.5 SOLUTION RESPIRATORY (INHALATION) at 07:26

## 2017-06-23 RX ADMIN — POTASSIUM CHLORIDE 10 MEQ: 7.46 INJECTION, SOLUTION INTRAVENOUS at 05:50

## 2017-06-23 RX ADMIN — MORPHINE SULFATE 1 MG: 2 INJECTION, SOLUTION INTRAMUSCULAR; INTRAVENOUS at 13:33

## 2017-06-23 RX ADMIN — POTASSIUM CHLORIDE 10 MEQ: 7.46 INJECTION, SOLUTION INTRAVENOUS at 09:20

## 2017-06-23 RX ADMIN — TAZOBACTAM SODIUM AND PIPERACILLIN SODIUM 3.38 G: 375; 3 INJECTION, SOLUTION INTRAVENOUS at 20:31

## 2017-06-23 RX ADMIN — ALBUTEROL SULFATE 2.5 MG: 2.5 SOLUTION RESPIRATORY (INHALATION) at 01:26

## 2017-06-23 RX ADMIN — ENALAPRILAT 1.25 MG: 1.25 INJECTION INTRAVENOUS at 13:28

## 2017-06-23 RX ADMIN — LACTULOSE 30 G: 10 SOLUTION ORAL at 21:54

## 2017-06-23 RX ADMIN — ALBUTEROL SULFATE 2.5 MG: 2.5 SOLUTION RESPIRATORY (INHALATION) at 19:12

## 2017-06-23 RX ADMIN — AMLODIPINE BESYLATE 5 MG: 5 TABLET ORAL at 08:36

## 2017-06-23 RX ADMIN — SODIUM CHLORIDE 30 ML/HR: 900 INJECTION, SOLUTION INTRAVENOUS at 16:49

## 2017-06-23 RX ADMIN — ALBUTEROL SULFATE 2.5 MG: 2.5 SOLUTION RESPIRATORY (INHALATION) at 13:47

## 2017-06-23 RX ADMIN — METHYLPREDNISOLONE SODIUM SUCCINATE 40 MG: 40 INJECTION, POWDER, FOR SOLUTION INTRAMUSCULAR; INTRAVENOUS at 08:36

## 2017-06-23 RX ADMIN — POTASSIUM CHLORIDE 10 MEQ: 7.46 INJECTION, SOLUTION INTRAVENOUS at 05:07

## 2017-06-23 RX ADMIN — METHYLPREDNISOLONE SODIUM SUCCINATE 40 MG: 40 INJECTION, POWDER, FOR SOLUTION INTRAMUSCULAR; INTRAVENOUS at 01:21

## 2017-06-23 RX ADMIN — TAZOBACTAM SODIUM AND PIPERACILLIN SODIUM 3.38 G: 375; 3 INJECTION, SOLUTION INTRAVENOUS at 13:28

## 2017-06-23 RX ADMIN — SODIUM CHLORIDE 30 ML/HR: 900 INJECTION, SOLUTION INTRAVENOUS at 20:30

## 2017-06-23 RX ADMIN — POTASSIUM CHLORIDE 10 MEQ: 7.46 INJECTION, SOLUTION INTRAVENOUS at 07:12

## 2017-06-23 RX ADMIN — PANTOPRAZOLE SODIUM 40 MG: 40 INJECTION, POWDER, FOR SOLUTION INTRAVENOUS at 05:53

## 2017-06-23 RX ADMIN — METHYLPREDNISOLONE SODIUM SUCCINATE 40 MG: 40 INJECTION, POWDER, FOR SOLUTION INTRAMUSCULAR; INTRAVENOUS at 21:55

## 2017-06-23 RX ADMIN — ENALAPRILAT 1.25 MG: 1.25 INJECTION INTRAVENOUS at 20:30

## 2017-06-23 RX ADMIN — METHYLPREDNISOLONE SODIUM SUCCINATE 40 MG: 40 INJECTION, POWDER, FOR SOLUTION INTRAMUSCULAR; INTRAVENOUS at 15:36

## 2017-06-24 LAB
ALBUMIN SERPL-MCNC: 2.7 G/DL (ref 3.4–4.8)
ALBUMIN/GLOB SERPL: 0.8 G/DL (ref 1.1–1.8)
ALP SERPL-CCNC: 214 U/L (ref 38–126)
ALT SERPL W P-5'-P-CCNC: 128 U/L (ref 21–72)
AMMONIA BLD-SCNC: 14 UMOL/L (ref 9–30)
ANION GAP SERPL CALCULATED.3IONS-SCNC: 13 MMOL/L (ref 5–15)
AST SERPL-CCNC: 125 U/L (ref 17–59)
BASOPHILS # BLD AUTO: 0.02 10*3/MM3 (ref 0–0.2)
BASOPHILS NFR BLD AUTO: 0.2 % (ref 0–2)
BILIRUB SERPL-MCNC: 2.7 MG/DL (ref 0.2–1.3)
BUN BLD-MCNC: 35 MG/DL (ref 7–21)
BUN/CREAT SERPL: 20.8 (ref 7–25)
CALCIUM SPEC-SCNC: 8.7 MG/DL (ref 8.4–10.2)
CHLORIDE SERPL-SCNC: 110 MMOL/L (ref 95–110)
CO2 SERPL-SCNC: 19 MMOL/L (ref 22–31)
CREAT BLD-MCNC: 1.68 MG/DL (ref 0.7–1.3)
DEPRECATED RDW RBC AUTO: 50.6 FL (ref 35.1–43.9)
EOSINOPHIL # BLD AUTO: 0 10*3/MM3 (ref 0–0.7)
EOSINOPHIL NFR BLD AUTO: 0 % (ref 0–7)
ERYTHROCYTE [DISTWIDTH] IN BLOOD BY AUTOMATED COUNT: 15.6 % (ref 11.5–14.5)
GFR SERPL CREATININE-BSD FRML MDRD: 43 ML/MIN/1.73 (ref 56–130)
GLOBULIN UR ELPH-MCNC: 3.4 GM/DL (ref 2.3–3.5)
GLUCOSE BLD-MCNC: 119 MG/DL (ref 60–100)
HCT VFR BLD AUTO: 28.5 % (ref 39–49)
HGB BLD-MCNC: 10 G/DL (ref 13.7–17.3)
IMM GRANULOCYTES # BLD: 0.36 10*3/MM3 (ref 0–0.02)
IMM GRANULOCYTES NFR BLD: 2.7 % (ref 0–0.5)
LYMPHOCYTES # BLD AUTO: 0.44 10*3/MM3 (ref 0.6–4.2)
LYMPHOCYTES NFR BLD AUTO: 3.4 % (ref 10–50)
MCH RBC QN AUTO: 33.7 PG (ref 26.5–34)
MCHC RBC AUTO-ENTMCNC: 35.1 G/DL (ref 31.5–36.3)
MCV RBC AUTO: 96 FL (ref 80–98)
MONOCYTES # BLD AUTO: 0.55 10*3/MM3 (ref 0–0.9)
MONOCYTES NFR BLD AUTO: 4.2 % (ref 0–12)
NEUTROPHILS # BLD AUTO: 11.73 10*3/MM3 (ref 2–8.6)
NEUTROPHILS NFR BLD AUTO: 89.5 % (ref 37–80)
PLATELET # BLD AUTO: 52 10*3/MM3 (ref 150–450)
PMV BLD AUTO: 11.6 FL (ref 8–12)
POTASSIUM BLD-SCNC: 3.4 MMOL/L (ref 3.5–5.1)
PROT SERPL-MCNC: 6.1 G/DL (ref 6.3–8.6)
RBC # BLD AUTO: 2.97 10*6/MM3 (ref 4.37–5.74)
SODIUM BLD-SCNC: 142 MMOL/L (ref 137–145)
WBC NRBC COR # BLD: 13.1 10*3/MM3 (ref 3.2–9.8)

## 2017-06-24 PROCEDURE — 25010000002 METHYLPREDNISOLONE PER 40 MG: Performed by: INTERNAL MEDICINE

## 2017-06-24 PROCEDURE — 85025 COMPLETE CBC W/AUTO DIFF WBC: CPT | Performed by: INTERNAL MEDICINE

## 2017-06-24 PROCEDURE — 25010000002 FUROSEMIDE PER 20 MG: Performed by: INTERNAL MEDICINE

## 2017-06-24 PROCEDURE — 80053 COMPREHEN METABOLIC PANEL: CPT | Performed by: INTERNAL MEDICINE

## 2017-06-24 PROCEDURE — 25010000002 MORPHINE SULFATE (PF) 2 MG/ML SOLUTION: Performed by: HOSPITALIST

## 2017-06-24 PROCEDURE — 82140 ASSAY OF AMMONIA: CPT | Performed by: NURSE PRACTITIONER

## 2017-06-24 PROCEDURE — 94760 N-INVAS EAR/PLS OXIMETRY 1: CPT

## 2017-06-24 PROCEDURE — 92526 ORAL FUNCTION THERAPY: CPT | Performed by: SPEECH-LANGUAGE PATHOLOGIST

## 2017-06-24 PROCEDURE — 25010000002 PIPERACILLIN SOD-TAZOBACTAM PER 1 G: Performed by: INTERNAL MEDICINE

## 2017-06-24 PROCEDURE — 94799 UNLISTED PULMONARY SVC/PX: CPT

## 2017-06-24 PROCEDURE — 87040 BLOOD CULTURE FOR BACTERIA: CPT | Performed by: NURSE PRACTITIONER

## 2017-06-24 RX ORDER — FUROSEMIDE 10 MG/ML
40 INJECTION INTRAMUSCULAR; INTRAVENOUS DAILY
Status: DISCONTINUED | OUTPATIENT
Start: 2017-06-24 | End: 2017-06-27 | Stop reason: SDUPTHER

## 2017-06-24 RX ADMIN — ALBUTEROL SULFATE 2.5 MG: 2.5 SOLUTION RESPIRATORY (INHALATION) at 13:40

## 2017-06-24 RX ADMIN — ALBUTEROL SULFATE 2.5 MG: 2.5 SOLUTION RESPIRATORY (INHALATION) at 06:15

## 2017-06-24 RX ADMIN — PANTOPRAZOLE SODIUM 40 MG: 40 INJECTION, POWDER, FOR SOLUTION INTRAVENOUS at 06:03

## 2017-06-24 RX ADMIN — LACTULOSE 30 G: 10 SOLUTION ORAL at 15:41

## 2017-06-24 RX ADMIN — TEMAZEPAM 15 MG: 15 CAPSULE ORAL at 20:35

## 2017-06-24 RX ADMIN — METHYLPREDNISOLONE SODIUM SUCCINATE 40 MG: 40 INJECTION, POWDER, FOR SOLUTION INTRAMUSCULAR; INTRAVENOUS at 03:10

## 2017-06-24 RX ADMIN — MORPHINE SULFATE 1 MG: 2 INJECTION, SOLUTION INTRAMUSCULAR; INTRAVENOUS at 11:32

## 2017-06-24 RX ADMIN — METHYLPREDNISOLONE SODIUM SUCCINATE 40 MG: 40 INJECTION, POWDER, FOR SOLUTION INTRAMUSCULAR; INTRAVENOUS at 14:04

## 2017-06-24 RX ADMIN — TAZOBACTAM SODIUM AND PIPERACILLIN SODIUM 3.38 G: 375; 3 INJECTION, SOLUTION INTRAVENOUS at 20:36

## 2017-06-24 RX ADMIN — LACTULOSE 30 G: 10 SOLUTION ORAL at 08:40

## 2017-06-24 RX ADMIN — TEMAZEPAM 15 MG: 15 CAPSULE ORAL at 00:07

## 2017-06-24 RX ADMIN — ALBUTEROL SULFATE 2.5 MG: 2.5 SOLUTION RESPIRATORY (INHALATION) at 01:00

## 2017-06-24 RX ADMIN — TAZOBACTAM SODIUM AND PIPERACILLIN SODIUM 3.38 G: 375; 3 INJECTION, SOLUTION INTRAVENOUS at 14:05

## 2017-06-24 RX ADMIN — LACTULOSE 30 G: 10 SOLUTION ORAL at 21:20

## 2017-06-24 RX ADMIN — METHYLPREDNISOLONE SODIUM SUCCINATE 40 MG: 40 INJECTION, POWDER, FOR SOLUTION INTRAMUSCULAR; INTRAVENOUS at 20:36

## 2017-06-24 RX ADMIN — MORPHINE SULFATE 1 MG: 2 INJECTION, SOLUTION INTRAMUSCULAR; INTRAVENOUS at 19:17

## 2017-06-24 RX ADMIN — ALBUTEROL SULFATE 2.5 MG: 2.5 SOLUTION RESPIRATORY (INHALATION) at 18:58

## 2017-06-24 RX ADMIN — FUROSEMIDE 40 MG: 10 INJECTION, SOLUTION INTRAMUSCULAR; INTRAVENOUS at 14:05

## 2017-06-24 RX ADMIN — TAZOBACTAM SODIUM AND PIPERACILLIN SODIUM 3.38 G: 375; 3 INJECTION, SOLUTION INTRAVENOUS at 06:02

## 2017-06-24 RX ADMIN — METHYLPREDNISOLONE SODIUM SUCCINATE 40 MG: 40 INJECTION, POWDER, FOR SOLUTION INTRAMUSCULAR; INTRAVENOUS at 08:40

## 2017-06-24 RX ADMIN — AMLODIPINE BESYLATE 5 MG: 5 TABLET ORAL at 08:40

## 2017-06-25 LAB
ALBUMIN SERPL-MCNC: 2.3 G/DL (ref 3.4–4.8)
ALBUMIN/GLOB SERPL: 0.7 G/DL (ref 1.1–1.8)
ALP SERPL-CCNC: 162 U/L (ref 38–126)
ALT SERPL W P-5'-P-CCNC: 130 U/L (ref 21–72)
ANION GAP SERPL CALCULATED.3IONS-SCNC: 9 MMOL/L (ref 5–15)
AST SERPL-CCNC: 105 U/L (ref 17–59)
BASOPHILS # BLD AUTO: 0.02 10*3/MM3 (ref 0–0.2)
BASOPHILS NFR BLD AUTO: 0.2 % (ref 0–2)
BILIRUB SERPL-MCNC: 2.5 MG/DL (ref 0.2–1.3)
BUN BLD-MCNC: 33 MG/DL (ref 7–21)
BUN/CREAT SERPL: 19 (ref 7–25)
CALCIUM SPEC-SCNC: 8.3 MG/DL (ref 8.4–10.2)
CHLORIDE SERPL-SCNC: 105 MMOL/L (ref 95–110)
CO2 SERPL-SCNC: 21 MMOL/L (ref 22–31)
CREAT BLD-MCNC: 1.74 MG/DL (ref 0.7–1.3)
DEPRECATED RDW RBC AUTO: 51.5 FL (ref 35.1–43.9)
EOSINOPHIL # BLD AUTO: 0 10*3/MM3 (ref 0–0.7)
EOSINOPHIL NFR BLD AUTO: 0 % (ref 0–7)
ERYTHROCYTE [DISTWIDTH] IN BLOOD BY AUTOMATED COUNT: 16.2 % (ref 11.5–14.5)
GFR SERPL CREATININE-BSD FRML MDRD: 41 ML/MIN/1.73 (ref 60–130)
GLOBULIN UR ELPH-MCNC: 3.2 GM/DL (ref 2.3–3.5)
GLUCOSE BLD-MCNC: 116 MG/DL (ref 60–100)
GLUCOSE BLDC GLUCOMTR-MCNC: 163 MG/DL (ref 70–130)
HCT VFR BLD AUTO: 25.6 % (ref 39–49)
HGB BLD-MCNC: 9.1 G/DL (ref 13.7–17.3)
IMM GRANULOCYTES # BLD: 0.25 10*3/MM3 (ref 0–0.02)
IMM GRANULOCYTES NFR BLD: 2.8 % (ref 0–0.5)
LYMPHOCYTES # BLD AUTO: 0.35 10*3/MM3 (ref 0.6–4.2)
LYMPHOCYTES NFR BLD AUTO: 4 % (ref 10–50)
MAGNESIUM SERPL-MCNC: 2.4 MG/DL (ref 1.6–2.3)
MCH RBC QN AUTO: 33.7 PG (ref 26.5–34)
MCHC RBC AUTO-ENTMCNC: 35.5 G/DL (ref 31.5–36.3)
MCV RBC AUTO: 94.8 FL (ref 80–98)
MONOCYTES # BLD AUTO: 0.48 10*3/MM3 (ref 0–0.9)
MONOCYTES NFR BLD AUTO: 5.4 % (ref 0–12)
NEUTROPHILS # BLD AUTO: 7.74 10*3/MM3 (ref 2–8.6)
NEUTROPHILS NFR BLD AUTO: 87.6 % (ref 37–80)
NRBC BLD MANUAL-RTO: 0.3 /100 WBC (ref 0–0)
PLATELET # BLD AUTO: 42 10*3/MM3 (ref 150–450)
PMV BLD AUTO: ABNORMAL FL (ref 8–12)
POTASSIUM BLD-SCNC: 3.3 MMOL/L (ref 3.5–5.1)
PROT SERPL-MCNC: 5.5 G/DL (ref 6.3–8.6)
RBC # BLD AUTO: 2.7 10*6/MM3 (ref 4.37–5.74)
SODIUM BLD-SCNC: 135 MMOL/L (ref 137–145)
WBC NRBC COR # BLD: 8.84 10*3/MM3 (ref 3.2–9.8)

## 2017-06-25 PROCEDURE — 82962 GLUCOSE BLOOD TEST: CPT

## 2017-06-25 PROCEDURE — 94799 UNLISTED PULMONARY SVC/PX: CPT

## 2017-06-25 PROCEDURE — 97535 SELF CARE MNGMENT TRAINING: CPT

## 2017-06-25 PROCEDURE — 85025 COMPLETE CBC W/AUTO DIFF WBC: CPT | Performed by: INTERNAL MEDICINE

## 2017-06-25 PROCEDURE — 25010000003 POTASSIUM CHLORIDE 10 MEQ/100ML SOLUTION: Performed by: INTERNAL MEDICINE

## 2017-06-25 PROCEDURE — 25010000002 PIPERACILLIN SOD-TAZOBACTAM PER 1 G: Performed by: INTERNAL MEDICINE

## 2017-06-25 PROCEDURE — 25010000002 MORPHINE SULFATE (PF) 2 MG/ML SOLUTION: Performed by: HOSPITALIST

## 2017-06-25 PROCEDURE — 25010000002 METHYLPREDNISOLONE PER 40 MG: Performed by: INTERNAL MEDICINE

## 2017-06-25 PROCEDURE — 83735 ASSAY OF MAGNESIUM: CPT | Performed by: HOSPITALIST

## 2017-06-25 PROCEDURE — 25010000002 METHYLPREDNISOLONE PER 40 MG: Performed by: NURSE PRACTITIONER

## 2017-06-25 PROCEDURE — 97110 THERAPEUTIC EXERCISES: CPT

## 2017-06-25 PROCEDURE — 94760 N-INVAS EAR/PLS OXIMETRY 1: CPT

## 2017-06-25 PROCEDURE — 80053 COMPREHEN METABOLIC PANEL: CPT | Performed by: INTERNAL MEDICINE

## 2017-06-25 PROCEDURE — 25010000002 FUROSEMIDE PER 20 MG: Performed by: INTERNAL MEDICINE

## 2017-06-25 RX ORDER — SPIRONOLACTONE 25 MG/1
25 TABLET ORAL DAILY
Status: DISCONTINUED | OUTPATIENT
Start: 2017-06-25 | End: 2017-06-27

## 2017-06-25 RX ORDER — POTASSIUM CHLORIDE 750 MG/1
40 CAPSULE, EXTENDED RELEASE ORAL ONCE
Status: COMPLETED | OUTPATIENT
Start: 2017-06-25 | End: 2017-06-25

## 2017-06-25 RX ORDER — METHYLPREDNISOLONE SODIUM SUCCINATE 40 MG/ML
20 INJECTION, POWDER, LYOPHILIZED, FOR SOLUTION INTRAMUSCULAR; INTRAVENOUS EVERY 6 HOURS
Status: DISCONTINUED | OUTPATIENT
Start: 2017-06-25 | End: 2017-06-26

## 2017-06-25 RX ADMIN — METHYLPREDNISOLONE SODIUM SUCCINATE 40 MG: 40 INJECTION, POWDER, FOR SOLUTION INTRAMUSCULAR; INTRAVENOUS at 09:06

## 2017-06-25 RX ADMIN — TAZOBACTAM SODIUM AND PIPERACILLIN SODIUM 3.38 G: 375; 3 INJECTION, SOLUTION INTRAVENOUS at 05:31

## 2017-06-25 RX ADMIN — MORPHINE SULFATE 1 MG: 2 INJECTION, SOLUTION INTRAMUSCULAR; INTRAVENOUS at 01:00

## 2017-06-25 RX ADMIN — METHYLPREDNISOLONE SODIUM SUCCINATE 20 MG: 40 INJECTION, POWDER, FOR SOLUTION INTRAMUSCULAR; INTRAVENOUS at 13:42

## 2017-06-25 RX ADMIN — ALBUTEROL SULFATE 2.5 MG: 2.5 SOLUTION RESPIRATORY (INHALATION) at 00:38

## 2017-06-25 RX ADMIN — SODIUM CHLORIDE 30 ML/HR: 900 INJECTION, SOLUTION INTRAVENOUS at 05:30

## 2017-06-25 RX ADMIN — MORPHINE SULFATE 1 MG: 2 INJECTION, SOLUTION INTRAMUSCULAR; INTRAVENOUS at 18:05

## 2017-06-25 RX ADMIN — PANTOPRAZOLE SODIUM 40 MG: 40 INJECTION, POWDER, FOR SOLUTION INTRAVENOUS at 05:31

## 2017-06-25 RX ADMIN — ALBUTEROL SULFATE 2.5 MG: 2.5 SOLUTION RESPIRATORY (INHALATION) at 06:48

## 2017-06-25 RX ADMIN — FUROSEMIDE 40 MG: 10 INJECTION, SOLUTION INTRAMUSCULAR; INTRAVENOUS at 09:06

## 2017-06-25 RX ADMIN — SPIRONOLACTONE 25 MG: 25 TABLET ORAL at 09:06

## 2017-06-25 RX ADMIN — METHYLPREDNISOLONE SODIUM SUCCINATE 40 MG: 40 INJECTION, POWDER, FOR SOLUTION INTRAMUSCULAR; INTRAVENOUS at 01:00

## 2017-06-25 RX ADMIN — ALBUTEROL SULFATE 2.5 MG: 2.5 SOLUTION RESPIRATORY (INHALATION) at 19:03

## 2017-06-25 RX ADMIN — POTASSIUM CHLORIDE 10 MEQ: 7.46 INJECTION, SOLUTION INTRAVENOUS at 11:11

## 2017-06-25 RX ADMIN — MORPHINE SULFATE 1 MG: 2 INJECTION, SOLUTION INTRAMUSCULAR; INTRAVENOUS at 21:49

## 2017-06-25 RX ADMIN — LACTULOSE 30 G: 10 SOLUTION ORAL at 09:06

## 2017-06-25 RX ADMIN — POTASSIUM CHLORIDE 40 MEQ: 750 CAPSULE, EXTENDED RELEASE ORAL at 01:01

## 2017-06-25 RX ADMIN — ALBUTEROL SULFATE 2.5 MG: 2.5 SOLUTION RESPIRATORY (INHALATION) at 13:44

## 2017-06-25 RX ADMIN — METHYLPREDNISOLONE SODIUM SUCCINATE 20 MG: 40 INJECTION, POWDER, FOR SOLUTION INTRAMUSCULAR; INTRAVENOUS at 20:53

## 2017-06-25 RX ADMIN — MORPHINE SULFATE 1 MG: 2 INJECTION, SOLUTION INTRAMUSCULAR; INTRAVENOUS at 11:11

## 2017-06-26 LAB
ALBUMIN SERPL-MCNC: 2.3 G/DL (ref 3.4–4.8)
ALBUMIN/GLOB SERPL: 0.7 G/DL (ref 1.1–1.8)
ALP SERPL-CCNC: 158 U/L (ref 38–126)
ALT SERPL W P-5'-P-CCNC: 112 U/L (ref 21–72)
ANION GAP SERPL CALCULATED.3IONS-SCNC: 8 MMOL/L (ref 5–15)
ANISOCYTOSIS BLD QL: NORMAL
AST SERPL-CCNC: 64 U/L (ref 17–59)
BASOPHILS # BLD AUTO: 0.01 10*3/MM3 (ref 0–0.2)
BASOPHILS NFR BLD AUTO: 0.1 % (ref 0–2)
BILIRUB SERPL-MCNC: 2.4 MG/DL (ref 0.2–1.3)
BUN BLD-MCNC: 35 MG/DL (ref 7–21)
BUN/CREAT SERPL: 21.1 (ref 7–25)
CALCIUM SPEC-SCNC: 8.1 MG/DL (ref 8.4–10.2)
CHLORIDE SERPL-SCNC: 103 MMOL/L (ref 95–110)
CO2 SERPL-SCNC: 22 MMOL/L (ref 22–31)
CREAT BLD-MCNC: 1.66 MG/DL (ref 0.7–1.3)
DEPRECATED RDW RBC AUTO: 54 FL (ref 35.1–43.9)
EOSINOPHIL # BLD AUTO: 0 10*3/MM3 (ref 0–0.7)
EOSINOPHIL NFR BLD AUTO: 0 % (ref 0–7)
ERYTHROCYTE [DISTWIDTH] IN BLOOD BY AUTOMATED COUNT: 16.5 % (ref 11.5–14.5)
GFR SERPL CREATININE-BSD FRML MDRD: 44 ML/MIN/1.73 (ref 60–130)
GLOBULIN UR ELPH-MCNC: 3.2 GM/DL (ref 2.3–3.5)
GLUCOSE BLD-MCNC: 117 MG/DL (ref 60–100)
HCT VFR BLD AUTO: 27.3 % (ref 39–49)
HGB BLD-MCNC: 9.5 G/DL (ref 13.7–17.3)
IMM GRANULOCYTES # BLD: 0.23 10*3/MM3 (ref 0–0.02)
IMM GRANULOCYTES NFR BLD: 2.8 % (ref 0–0.5)
LYMPHOCYTES # BLD AUTO: 0.39 10*3/MM3 (ref 0.6–4.2)
LYMPHOCYTES NFR BLD AUTO: 4.8 % (ref 10–50)
MCH RBC QN AUTO: 33.7 PG (ref 26.5–34)
MCHC RBC AUTO-ENTMCNC: 34.8 G/DL (ref 31.5–36.3)
MCV RBC AUTO: 96.8 FL (ref 80–98)
MONOCYTES # BLD AUTO: 0.36 10*3/MM3 (ref 0–0.9)
MONOCYTES NFR BLD AUTO: 4.4 % (ref 0–12)
NEUTROPHILS # BLD AUTO: 7.22 10*3/MM3 (ref 2–8.6)
NEUTROPHILS NFR BLD AUTO: 87.9 % (ref 37–80)
NRBC BLD MANUAL-RTO: 0 /100 WBC (ref 0–0)
PLATELET # BLD AUTO: 41 10*3/MM3 (ref 150–450)
PMV BLD AUTO: ABNORMAL FL (ref 8–12)
POLYCHROMASIA BLD QL SMEAR: NORMAL
POTASSIUM BLD-SCNC: 3.4 MMOL/L (ref 3.5–5.1)
PROT SERPL-MCNC: 5.5 G/DL (ref 6.3–8.6)
RBC # BLD AUTO: 2.82 10*6/MM3 (ref 4.37–5.74)
SMALL PLATELETS BLD QL SMEAR: NORMAL
SODIUM BLD-SCNC: 133 MMOL/L (ref 137–145)
WBC MORPH BLD: NORMAL
WBC NRBC COR # BLD: 8.21 10*3/MM3 (ref 3.2–9.8)

## 2017-06-26 PROCEDURE — 63710000001 PREDNISONE PER 1 MG: Performed by: NURSE PRACTITIONER

## 2017-06-26 PROCEDURE — 25010000002 METHYLPREDNISOLONE PER 40 MG: Performed by: NURSE PRACTITIONER

## 2017-06-26 PROCEDURE — 80053 COMPREHEN METABOLIC PANEL: CPT | Performed by: INTERNAL MEDICINE

## 2017-06-26 PROCEDURE — 94799 UNLISTED PULMONARY SVC/PX: CPT

## 2017-06-26 PROCEDURE — 92526 ORAL FUNCTION THERAPY: CPT | Performed by: SPEECH-LANGUAGE PATHOLOGIST

## 2017-06-26 PROCEDURE — 97110 THERAPEUTIC EXERCISES: CPT

## 2017-06-26 PROCEDURE — G8996 SWALLOW CURRENT STATUS: HCPCS | Performed by: SPEECH-LANGUAGE PATHOLOGIST

## 2017-06-26 PROCEDURE — 94760 N-INVAS EAR/PLS OXIMETRY 1: CPT

## 2017-06-26 PROCEDURE — 85025 COMPLETE CBC W/AUTO DIFF WBC: CPT | Performed by: INTERNAL MEDICINE

## 2017-06-26 PROCEDURE — G8998 SWALLOW D/C STATUS: HCPCS | Performed by: SPEECH-LANGUAGE PATHOLOGIST

## 2017-06-26 PROCEDURE — G8997 SWALLOW GOAL STATUS: HCPCS | Performed by: SPEECH-LANGUAGE PATHOLOGIST

## 2017-06-26 PROCEDURE — 85007 BL SMEAR W/DIFF WBC COUNT: CPT | Performed by: INTERNAL MEDICINE

## 2017-06-26 PROCEDURE — 25010000002 FUROSEMIDE PER 20 MG: Performed by: INTERNAL MEDICINE

## 2017-06-26 PROCEDURE — 97530 THERAPEUTIC ACTIVITIES: CPT

## 2017-06-26 PROCEDURE — 25010000002 MORPHINE SULFATE (PF) 2 MG/ML SOLUTION: Performed by: HOSPITALIST

## 2017-06-26 RX ORDER — PREDNISONE 20 MG/1
20 TABLET ORAL 2 TIMES DAILY WITH MEALS
Status: DISCONTINUED | OUTPATIENT
Start: 2017-06-26 | End: 2017-06-28 | Stop reason: HOSPADM

## 2017-06-26 RX ADMIN — MORPHINE SULFATE 1 MG: 2 INJECTION, SOLUTION INTRAMUSCULAR; INTRAVENOUS at 22:42

## 2017-06-26 RX ADMIN — METHYLPREDNISOLONE SODIUM SUCCINATE 20 MG: 40 INJECTION, POWDER, FOR SOLUTION INTRAMUSCULAR; INTRAVENOUS at 08:34

## 2017-06-26 RX ADMIN — MORPHINE SULFATE 1 MG: 2 INJECTION, SOLUTION INTRAMUSCULAR; INTRAVENOUS at 09:30

## 2017-06-26 RX ADMIN — ALBUTEROL SULFATE 2.5 MG: 2.5 SOLUTION RESPIRATORY (INHALATION) at 00:01

## 2017-06-26 RX ADMIN — FUROSEMIDE 40 MG: 10 INJECTION, SOLUTION INTRAMUSCULAR; INTRAVENOUS at 08:34

## 2017-06-26 RX ADMIN — ALBUTEROL SULFATE 2.5 MG: 2.5 SOLUTION RESPIRATORY (INHALATION) at 18:28

## 2017-06-26 RX ADMIN — MORPHINE SULFATE 1 MG: 2 INJECTION, SOLUTION INTRAMUSCULAR; INTRAVENOUS at 05:12

## 2017-06-26 RX ADMIN — MORPHINE SULFATE 1 MG: 2 INJECTION, SOLUTION INTRAMUSCULAR; INTRAVENOUS at 17:26

## 2017-06-26 RX ADMIN — LACTULOSE 30 G: 10 SOLUTION ORAL at 21:38

## 2017-06-26 RX ADMIN — ALBUTEROL SULFATE 2.5 MG: 2.5 SOLUTION RESPIRATORY (INHALATION) at 13:24

## 2017-06-26 RX ADMIN — MORPHINE SULFATE 1 MG: 2 INJECTION, SOLUTION INTRAMUSCULAR; INTRAVENOUS at 13:20

## 2017-06-26 RX ADMIN — ALBUTEROL SULFATE 2.5 MG: 2.5 SOLUTION RESPIRATORY (INHALATION) at 06:39

## 2017-06-26 RX ADMIN — METHYLPREDNISOLONE SODIUM SUCCINATE 20 MG: 40 INJECTION, POWDER, FOR SOLUTION INTRAMUSCULAR; INTRAVENOUS at 02:37

## 2017-06-26 RX ADMIN — SODIUM CHLORIDE 30 ML/HR: 900 INJECTION, SOLUTION INTRAVENOUS at 14:14

## 2017-06-26 RX ADMIN — Medication 10 ML: at 08:34

## 2017-06-26 RX ADMIN — SPIRONOLACTONE 25 MG: 25 TABLET ORAL at 08:34

## 2017-06-26 RX ADMIN — LACTULOSE 30 G: 10 SOLUTION ORAL at 15:21

## 2017-06-26 RX ADMIN — PANTOPRAZOLE SODIUM 40 MG: 40 INJECTION, POWDER, FOR SOLUTION INTRAVENOUS at 05:12

## 2017-06-26 RX ADMIN — PREDNISONE 20 MG: 20 TABLET ORAL at 17:23

## 2017-06-26 RX ADMIN — LACTULOSE 30 G: 10 SOLUTION ORAL at 08:34

## 2017-06-27 LAB
ALBUMIN SERPL-MCNC: 2.4 G/DL (ref 3.4–4.8)
ALBUMIN/GLOB SERPL: 0.8 G/DL (ref 1.1–1.8)
ALP SERPL-CCNC: 273 U/L (ref 38–126)
ALT SERPL W P-5'-P-CCNC: 226 U/L (ref 21–72)
ANION GAP SERPL CALCULATED.3IONS-SCNC: 7 MMOL/L (ref 5–15)
AST SERPL-CCNC: 224 U/L (ref 17–59)
BASOPHILS # BLD AUTO: 0.01 10*3/MM3 (ref 0–0.2)
BASOPHILS NFR BLD AUTO: 0.1 % (ref 0–2)
BILIRUB SERPL-MCNC: 2.7 MG/DL (ref 0.2–1.3)
BUN BLD-MCNC: 32 MG/DL (ref 7–21)
BUN/CREAT SERPL: 22.1 (ref 7–25)
CALCIUM SPEC-SCNC: 8.1 MG/DL (ref 8.4–10.2)
CHLORIDE SERPL-SCNC: 103 MMOL/L (ref 95–110)
CO2 SERPL-SCNC: 23 MMOL/L (ref 22–31)
CREAT BLD-MCNC: 1.45 MG/DL (ref 0.7–1.3)
DEPRECATED RDW RBC AUTO: 53.1 FL (ref 35.1–43.9)
EOSINOPHIL # BLD AUTO: 0 10*3/MM3 (ref 0–0.7)
EOSINOPHIL NFR BLD AUTO: 0 % (ref 0–7)
ERYTHROCYTE [DISTWIDTH] IN BLOOD BY AUTOMATED COUNT: 17 % (ref 11.5–14.5)
GFR SERPL CREATININE-BSD FRML MDRD: 51 ML/MIN/1.73 (ref 56–130)
GLOBULIN UR ELPH-MCNC: 3.1 GM/DL (ref 2.3–3.5)
GLUCOSE BLD-MCNC: 99 MG/DL (ref 60–100)
HCT VFR BLD AUTO: 27.3 % (ref 39–49)
HGB BLD-MCNC: 9.6 G/DL (ref 13.7–17.3)
IMM GRANULOCYTES # BLD: 0.23 10*3/MM3 (ref 0–0.02)
IMM GRANULOCYTES NFR BLD: 2.1 % (ref 0–0.5)
LYMPHOCYTES # BLD AUTO: 0.56 10*3/MM3 (ref 0.6–4.2)
LYMPHOCYTES NFR BLD AUTO: 5 % (ref 10–50)
MCH RBC QN AUTO: 33.4 PG (ref 26.5–34)
MCHC RBC AUTO-ENTMCNC: 35.2 G/DL (ref 31.5–36.3)
MCV RBC AUTO: 95.1 FL (ref 80–98)
MONOCYTES # BLD AUTO: 0.74 10*3/MM3 (ref 0–0.9)
MONOCYTES NFR BLD AUTO: 6.6 % (ref 0–12)
NEUTROPHILS # BLD AUTO: 9.63 10*3/MM3 (ref 2–8.6)
NEUTROPHILS NFR BLD AUTO: 86.2 % (ref 37–80)
NRBC BLD MANUAL-RTO: 0 /100 WBC (ref 0–0)
PLATELET # BLD AUTO: 40 10*3/MM3 (ref 150–450)
PMV BLD AUTO: 10.4 FL (ref 8–12)
POTASSIUM BLD-SCNC: 3 MMOL/L (ref 3.5–5.1)
PROT SERPL-MCNC: 5.5 G/DL (ref 6.3–8.6)
RBC # BLD AUTO: 2.87 10*6/MM3 (ref 4.37–5.74)
SODIUM BLD-SCNC: 133 MMOL/L (ref 137–145)
WBC NRBC COR # BLD: 11.17 10*3/MM3 (ref 3.2–9.8)

## 2017-06-27 PROCEDURE — 25010000002 MORPHINE SULFATE (PF) 2 MG/ML SOLUTION: Performed by: HOSPITALIST

## 2017-06-27 PROCEDURE — 97110 THERAPEUTIC EXERCISES: CPT

## 2017-06-27 PROCEDURE — 85025 COMPLETE CBC W/AUTO DIFF WBC: CPT | Performed by: INTERNAL MEDICINE

## 2017-06-27 PROCEDURE — 94799 UNLISTED PULMONARY SVC/PX: CPT

## 2017-06-27 PROCEDURE — 80053 COMPREHEN METABOLIC PANEL: CPT | Performed by: INTERNAL MEDICINE

## 2017-06-27 PROCEDURE — 63710000001 PREDNISONE PER 1 MG: Performed by: NURSE PRACTITIONER

## 2017-06-27 PROCEDURE — 94760 N-INVAS EAR/PLS OXIMETRY 1: CPT

## 2017-06-27 PROCEDURE — 25010000002 FUROSEMIDE PER 20 MG: Performed by: INTERNAL MEDICINE

## 2017-06-27 PROCEDURE — 97535 SELF CARE MNGMENT TRAINING: CPT

## 2017-06-27 RX ORDER — SPIRONOLACTONE 25 MG/1
25 TABLET ORAL ONCE
Status: COMPLETED | OUTPATIENT
Start: 2017-06-27 | End: 2017-06-27

## 2017-06-27 RX ORDER — FUROSEMIDE 10 MG/ML
20 INJECTION INTRAMUSCULAR; INTRAVENOUS DAILY
Status: DISCONTINUED | OUTPATIENT
Start: 2017-06-27 | End: 2017-06-28 | Stop reason: HOSPADM

## 2017-06-27 RX ORDER — SPIRONOLACTONE 50 MG/1
50 TABLET, FILM COATED ORAL DAILY
Status: DISCONTINUED | OUTPATIENT
Start: 2017-06-28 | End: 2017-06-28 | Stop reason: HOSPADM

## 2017-06-27 RX ORDER — POTASSIUM CHLORIDE 750 MG/1
40 CAPSULE, EXTENDED RELEASE ORAL ONCE
Status: COMPLETED | OUTPATIENT
Start: 2017-06-27 | End: 2017-06-27

## 2017-06-27 RX ADMIN — MORPHINE SULFATE 1 MG: 2 INJECTION, SOLUTION INTRAMUSCULAR; INTRAVENOUS at 21:30

## 2017-06-27 RX ADMIN — SPIRONOLACTONE 25 MG: 25 TABLET ORAL at 08:07

## 2017-06-27 RX ADMIN — LACTULOSE 30 G: 10 SOLUTION ORAL at 15:58

## 2017-06-27 RX ADMIN — MORPHINE SULFATE 1 MG: 2 INJECTION, SOLUTION INTRAMUSCULAR; INTRAVENOUS at 15:58

## 2017-06-27 RX ADMIN — PREDNISONE 20 MG: 20 TABLET ORAL at 18:13

## 2017-06-27 RX ADMIN — ALBUTEROL SULFATE 2.5 MG: 2.5 SOLUTION RESPIRATORY (INHALATION) at 01:13

## 2017-06-27 RX ADMIN — ALBUTEROL SULFATE 2.5 MG: 2.5 SOLUTION RESPIRATORY (INHALATION) at 12:57

## 2017-06-27 RX ADMIN — ALBUTEROL SULFATE 2.5 MG: 2.5 SOLUTION RESPIRATORY (INHALATION) at 06:33

## 2017-06-27 RX ADMIN — PANTOPRAZOLE SODIUM 40 MG: 40 INJECTION, POWDER, FOR SOLUTION INTRAVENOUS at 06:47

## 2017-06-27 RX ADMIN — Medication 10 ML: at 08:07

## 2017-06-27 RX ADMIN — PREDNISONE 20 MG: 20 TABLET ORAL at 08:07

## 2017-06-27 RX ADMIN — POTASSIUM CHLORIDE 40 MEQ: 750 CAPSULE, EXTENDED RELEASE ORAL at 18:13

## 2017-06-27 RX ADMIN — ALBUTEROL SULFATE 2.5 MG: 2.5 SOLUTION RESPIRATORY (INHALATION) at 18:45

## 2017-06-27 RX ADMIN — LACTULOSE 30 G: 10 SOLUTION ORAL at 08:07

## 2017-06-27 RX ADMIN — FUROSEMIDE 40 MG: 10 INJECTION, SOLUTION INTRAMUSCULAR; INTRAVENOUS at 08:29

## 2017-06-27 RX ADMIN — MORPHINE SULFATE 1 MG: 2 INJECTION, SOLUTION INTRAMUSCULAR; INTRAVENOUS at 08:29

## 2017-06-27 RX ADMIN — SPIRONOLACTONE 25 MG: 25 TABLET ORAL at 12:55

## 2017-06-27 RX ADMIN — POTASSIUM CHLORIDE 40 MEQ: 750 CAPSULE, EXTENDED RELEASE ORAL at 09:50

## 2017-06-27 RX ADMIN — LACTULOSE 30 G: 10 SOLUTION ORAL at 20:28

## 2017-06-27 RX ADMIN — FUROSEMIDE 20 MG: 10 INJECTION, SOLUTION INTRAVENOUS at 18:13

## 2017-06-28 VITALS
OXYGEN SATURATION: 96 % | SYSTOLIC BLOOD PRESSURE: 123 MMHG | BODY MASS INDEX: 33.73 KG/M2 | TEMPERATURE: 97.3 F | DIASTOLIC BLOOD PRESSURE: 77 MMHG | HEART RATE: 77 BPM | HEIGHT: 72 IN | WEIGHT: 249.06 LBS | RESPIRATION RATE: 18 BRPM

## 2017-06-28 LAB
ALBUMIN SERPL-MCNC: 2.4 G/DL (ref 3.4–4.8)
ALBUMIN/GLOB SERPL: 0.8 G/DL (ref 1.1–1.8)
ALP SERPL-CCNC: 278 U/L (ref 38–126)
ALT SERPL W P-5'-P-CCNC: 207 U/L (ref 21–72)
ANION GAP SERPL CALCULATED.3IONS-SCNC: 7 MMOL/L (ref 5–15)
AST SERPL-CCNC: 144 U/L (ref 17–59)
BASOPHILS # BLD AUTO: 0.01 10*3/MM3 (ref 0–0.2)
BASOPHILS NFR BLD AUTO: 0.1 % (ref 0–2)
BILIRUB SERPL-MCNC: 2.6 MG/DL (ref 0.2–1.3)
BUN BLD-MCNC: 29 MG/DL (ref 7–21)
BUN/CREAT SERPL: 20.1 (ref 7–25)
CALCIUM SPEC-SCNC: 8.1 MG/DL (ref 8.4–10.2)
CHLORIDE SERPL-SCNC: 101 MMOL/L (ref 95–110)
CO2 SERPL-SCNC: 25 MMOL/L (ref 22–31)
CREAT BLD-MCNC: 1.44 MG/DL (ref 0.7–1.3)
DEPRECATED RDW RBC AUTO: 55.1 FL (ref 35.1–43.9)
EOSINOPHIL # BLD AUTO: 0.02 10*3/MM3 (ref 0–0.7)
EOSINOPHIL NFR BLD AUTO: 0.2 % (ref 0–7)
ERYTHROCYTE [DISTWIDTH] IN BLOOD BY AUTOMATED COUNT: 17.3 % (ref 11.5–14.5)
GFR SERPL CREATININE-BSD FRML MDRD: 51 ML/MIN/1.73 (ref 60–130)
GLOBULIN UR ELPH-MCNC: 2.9 GM/DL (ref 2.3–3.5)
GLUCOSE BLD-MCNC: 95 MG/DL (ref 60–100)
HCT VFR BLD AUTO: 27.1 % (ref 39–49)
HGB BLD-MCNC: 9.6 G/DL (ref 13.7–17.3)
IMM GRANULOCYTES # BLD: 0.21 10*3/MM3 (ref 0–0.02)
IMM GRANULOCYTES NFR BLD: 1.8 % (ref 0–0.5)
LYMPHOCYTES # BLD AUTO: 0.56 10*3/MM3 (ref 0.6–4.2)
LYMPHOCYTES NFR BLD AUTO: 4.7 % (ref 10–50)
MCH RBC QN AUTO: 33.8 PG (ref 26.5–34)
MCHC RBC AUTO-ENTMCNC: 35.4 G/DL (ref 31.5–36.3)
MCV RBC AUTO: 95.4 FL (ref 80–98)
MONOCYTES # BLD AUTO: 0.72 10*3/MM3 (ref 0–0.9)
MONOCYTES NFR BLD AUTO: 6 % (ref 0–12)
NEUTROPHILS # BLD AUTO: 10.44 10*3/MM3 (ref 2–8.6)
NEUTROPHILS NFR BLD AUTO: 87.2 % (ref 37–80)
PLATELET # BLD AUTO: 42 10*3/MM3 (ref 150–450)
PMV BLD AUTO: 11.2 FL (ref 8–12)
POTASSIUM BLD-SCNC: 3.5 MMOL/L (ref 3.5–5.1)
PROT SERPL-MCNC: 5.3 G/DL (ref 6.3–8.6)
RBC # BLD AUTO: 2.84 10*6/MM3 (ref 4.37–5.74)
SODIUM BLD-SCNC: 133 MMOL/L (ref 137–145)
WBC NRBC COR # BLD: 11.96 10*3/MM3 (ref 3.2–9.8)

## 2017-06-28 PROCEDURE — 97755 ASSISTIVE TECHNOLOGY ASSESS: CPT

## 2017-06-28 PROCEDURE — 63710000001 PREDNISONE PER 1 MG: Performed by: NURSE PRACTITIONER

## 2017-06-28 PROCEDURE — 25010000002 MORPHINE SULFATE (PF) 2 MG/ML SOLUTION: Performed by: HOSPITALIST

## 2017-06-28 PROCEDURE — 97110 THERAPEUTIC EXERCISES: CPT

## 2017-06-28 PROCEDURE — 97535 SELF CARE MNGMENT TRAINING: CPT

## 2017-06-28 PROCEDURE — 85025 COMPLETE CBC W/AUTO DIFF WBC: CPT | Performed by: INTERNAL MEDICINE

## 2017-06-28 PROCEDURE — 97530 THERAPEUTIC ACTIVITIES: CPT

## 2017-06-28 PROCEDURE — 80053 COMPREHEN METABOLIC PANEL: CPT | Performed by: INTERNAL MEDICINE

## 2017-06-28 PROCEDURE — 94760 N-INVAS EAR/PLS OXIMETRY 1: CPT

## 2017-06-28 PROCEDURE — 94799 UNLISTED PULMONARY SVC/PX: CPT

## 2017-06-28 RX ORDER — DIPHENHYDRAMINE, LIDOCAINE, NYSTATIN
5 KIT ORAL 4 TIMES DAILY
Qty: 120 ML | Refills: 3 | Status: SHIPPED | OUTPATIENT
Start: 2017-06-28 | End: 2018-01-01 | Stop reason: HOSPADM

## 2017-06-28 RX ORDER — FUROSEMIDE 40 MG/1
40 TABLET ORAL 2 TIMES DAILY
Status: DISCONTINUED | OUTPATIENT
Start: 2017-06-28 | End: 2017-06-28 | Stop reason: HOSPADM

## 2017-06-28 RX ADMIN — PREDNISONE 20 MG: 20 TABLET ORAL at 09:13

## 2017-06-28 RX ADMIN — MORPHINE SULFATE 1 MG: 2 INJECTION, SOLUTION INTRAMUSCULAR; INTRAVENOUS at 03:54

## 2017-06-28 RX ADMIN — LACTULOSE 30 G: 10 SOLUTION ORAL at 09:13

## 2017-06-28 RX ADMIN — FUROSEMIDE 40 MG: 40 TABLET ORAL at 14:30

## 2017-06-28 RX ADMIN — PANTOPRAZOLE SODIUM 40 MG: 40 INJECTION, POWDER, FOR SOLUTION INTRAVENOUS at 05:46

## 2017-06-28 RX ADMIN — ALBUTEROL SULFATE 2.5 MG: 2.5 SOLUTION RESPIRATORY (INHALATION) at 00:53

## 2017-06-28 RX ADMIN — ALBUTEROL SULFATE 2.5 MG: 2.5 SOLUTION RESPIRATORY (INHALATION) at 06:47

## 2017-06-28 RX ADMIN — SPIRONOLACTONE 50 MG: 50 TABLET, FILM COATED ORAL at 09:13

## 2017-06-28 RX ADMIN — SODIUM CHLORIDE 30 ML/HR: 900 INJECTION, SOLUTION INTRAVENOUS at 09:23

## 2017-06-28 RX ADMIN — ALBUTEROL SULFATE 2.5 MG: 2.5 SOLUTION RESPIRATORY (INHALATION) at 13:48

## 2017-06-29 LAB
BACTERIA SPEC AEROBE CULT: NORMAL
BACTERIA SPEC AEROBE CULT: NORMAL

## 2017-06-30 ENCOUNTER — APPOINTMENT (OUTPATIENT)
Dept: GENERAL RADIOLOGY | Facility: HOSPITAL | Age: 52
End: 2017-06-30

## 2017-06-30 ENCOUNTER — HOSPITAL ENCOUNTER (INPATIENT)
Facility: HOSPITAL | Age: 52
LOS: 11 days | Discharge: NURSING, REHAB OR LONG-TERM CARE | End: 2017-07-12
Attending: FAMILY MEDICINE | Admitting: INTERNAL MEDICINE

## 2017-06-30 DIAGNOSIS — Z74.09 IMPAIRED MOBILITY AND ADLS: ICD-10-CM

## 2017-06-30 DIAGNOSIS — L03.115 CELLULITIS OF RIGHT LOWER EXTREMITY: Primary | ICD-10-CM

## 2017-06-30 DIAGNOSIS — Z78.9 IMPAIRED MOBILITY AND ADLS: ICD-10-CM

## 2017-06-30 DIAGNOSIS — Z74.09 IMPAIRED FUNCTIONAL MOBILITY, BALANCE, GAIT, AND ENDURANCE: ICD-10-CM

## 2017-06-30 LAB
ALBUMIN SERPL-MCNC: 2.6 G/DL (ref 3.4–4.8)
ALBUMIN/GLOB SERPL: 0.8 G/DL (ref 1.1–1.8)
ALP SERPL-CCNC: 227 U/L (ref 38–126)
ALT SERPL W P-5'-P-CCNC: 161 U/L (ref 21–72)
AMMONIA BLD-SCNC: 36 UMOL/L (ref 9–30)
ANION GAP SERPL CALCULATED.3IONS-SCNC: 9 MMOL/L (ref 5–15)
ARTERIAL PATENCY WRIST A: ABNORMAL
AST SERPL-CCNC: 151 U/L (ref 17–59)
ATMOSPHERIC PRESS: ABNORMAL MMHG
BACTERIA UR QL AUTO: ABNORMAL /HPF
BASE EXCESS BLDA CALC-SCNC: 1.1 MMOL/L (ref -2.4–2.4)
BASOPHILS # BLD AUTO: 0.01 10*3/MM3 (ref 0–0.2)
BASOPHILS NFR BLD AUTO: 0.1 % (ref 0–2)
BDY SITE: ABNORMAL
BILIRUB SERPL-MCNC: 4.7 MG/DL (ref 0.2–1.3)
BILIRUB UR QL STRIP: NEGATIVE
BUN BLD-MCNC: 28 MG/DL (ref 7–21)
BUN/CREAT SERPL: 20.7 (ref 7–25)
CA-I BLD-MCNC: 4.7 MG/DL (ref 4.5–4.9)
CALCIUM SPEC-SCNC: 8.3 MG/DL (ref 8.4–10.2)
CHLORIDE SERPL-SCNC: 103 MMOL/L (ref 95–110)
CK SERPL-CCNC: 771 U/L (ref 55–170)
CLARITY UR: CLEAR
CO2 BLDA-SCNC: 25.1 MMOL/L (ref 23–27)
CO2 SERPL-SCNC: 26 MMOL/L (ref 22–31)
COLOR UR: YELLOW
CREAT BLD-MCNC: 1.35 MG/DL (ref 0.7–1.3)
D-LACTATE SERPL-SCNC: 1.9 MMOL/L (ref 0.5–2)
DEPRECATED RDW RBC AUTO: 63.8 FL (ref 35.1–43.9)
EOSINOPHIL # BLD AUTO: 0.55 10*3/MM3 (ref 0–0.7)
EOSINOPHIL NFR BLD AUTO: 4.4 % (ref 0–7)
ERYTHROCYTE [DISTWIDTH] IN BLOOD BY AUTOMATED COUNT: 19 % (ref 11.5–14.5)
GFR SERPL CREATININE-BSD FRML MDRD: 55 ML/MIN/1.73 (ref 56–130)
GLOBULIN UR ELPH-MCNC: 3.1 GM/DL (ref 2.3–3.5)
GLUCOSE BLD-MCNC: 64 MG/DL (ref 60–100)
GLUCOSE BLDA-MCNC: 109 MMOL/L
GLUCOSE BLDC GLUCOMTR-MCNC: 128 MG/DL (ref 70–130)
GLUCOSE BLDC GLUCOMTR-MCNC: 59 MG/DL (ref 70–130)
GLUCOSE UR STRIP-MCNC: ABNORMAL MG/DL
HCO3 BLDA-SCNC: 24.1 MMOL/L (ref 22–26)
HCT VFR BLD AUTO: 29.7 % (ref 39–49)
HCT VFR BLD CALC: 33 % (ref 40–54)
HGB BLD-MCNC: 10.3 G/DL (ref 13.7–17.3)
HGB BLDA-MCNC: 11.1 G/DL (ref 14–18)
HGB UR QL STRIP.AUTO: NEGATIVE
HOLD SPECIMEN: NORMAL
HYALINE CASTS UR QL AUTO: ABNORMAL /LPF
IMM GRANULOCYTES # BLD: 0.08 10*3/MM3 (ref 0–0.02)
IMM GRANULOCYTES NFR BLD: 0.6 % (ref 0–0.5)
KETONES UR QL STRIP: NEGATIVE
LEUKOCYTE ESTERASE UR QL STRIP.AUTO: ABNORMAL
LYMPHOCYTES # BLD AUTO: 1.37 10*3/MM3 (ref 0.6–4.2)
LYMPHOCYTES NFR BLD AUTO: 11 % (ref 10–50)
MCH RBC QN AUTO: 34.1 PG (ref 26.5–34)
MCHC RBC AUTO-ENTMCNC: 34.7 G/DL (ref 31.5–36.3)
MCV RBC AUTO: 98.3 FL (ref 80–98)
MODALITY: ABNORMAL
MONOCYTES # BLD AUTO: 0.79 10*3/MM3 (ref 0–0.9)
MONOCYTES NFR BLD AUTO: 6.3 % (ref 0–12)
NEUTROPHILS # BLD AUTO: 9.68 10*3/MM3 (ref 2–8.6)
NEUTROPHILS NFR BLD AUTO: 77.6 % (ref 37–80)
NITRITE UR QL STRIP: NEGATIVE
NT-PROBNP SERPL-MCNC: 723 PG/ML (ref 0–900)
PCO2 BLDA: 32.8 MM HG (ref 35–45)
PH BLDA: 7.48 PH UNITS (ref 7.35–7.45)
PH UR STRIP.AUTO: <=5 [PH] (ref 5–9)
PLATELET # BLD AUTO: 53 10*3/MM3 (ref 150–450)
PMV BLD AUTO: 10.8 FL (ref 8–12)
PO2 BLDA: 72.1 MM HG (ref 80–105)
POTASSIUM BLD-SCNC: 3.5 MMOL/L (ref 3.5–5.1)
POTASSIUM BLDA-SCNC: 3.66 MMOL/L (ref 3.6–4.9)
PROT SERPL-MCNC: 5.7 G/DL (ref 6.3–8.6)
PROT UR QL STRIP: NEGATIVE
RBC # BLD AUTO: 3.02 10*6/MM3 (ref 4.37–5.74)
RBC # UR: ABNORMAL /HPF
REF LAB TEST METHOD: ABNORMAL
SAO2 % BLDCOA: 94 %
SODIUM BLD-SCNC: 138 MMOL/L (ref 137–145)
SODIUM BLDA-SCNC: 138.9 MMOL/L (ref 138–146)
SP GR UR STRIP: 1.01 (ref 1–1.03)
SQUAMOUS #/AREA URNS HPF: ABNORMAL /HPF
UROBILINOGEN UR QL STRIP: ABNORMAL
WBC NRBC COR # BLD: 12.48 10*3/MM3 (ref 3.2–9.8)
WBC UR QL AUTO: ABNORMAL /HPF
WHOLE BLOOD HOLD SPECIMEN: NORMAL
YEAST URNS QL MICRO: ABNORMAL /HPF

## 2017-06-30 PROCEDURE — 85025 COMPLETE CBC W/AUTO DIFF WBC: CPT | Performed by: NURSE PRACTITIONER

## 2017-06-30 PROCEDURE — 81001 URINALYSIS AUTO W/SCOPE: CPT | Performed by: NURSE PRACTITIONER

## 2017-06-30 PROCEDURE — 25010000002 MORPHINE SULFATE (PF) 2 MG/ML SOLUTION: Performed by: INTERNAL MEDICINE

## 2017-06-30 PROCEDURE — 73590 X-RAY EXAM OF LOWER LEG: CPT

## 2017-06-30 PROCEDURE — 25010000002 VANCOMYCIN PER 500 MG: Performed by: NURSE PRACTITIONER

## 2017-06-30 PROCEDURE — 82550 ASSAY OF CK (CPK): CPT | Performed by: FAMILY MEDICINE

## 2017-06-30 PROCEDURE — 82140 ASSAY OF AMMONIA: CPT | Performed by: NURSE PRACTITIONER

## 2017-06-30 PROCEDURE — G0378 HOSPITAL OBSERVATION PER HR: HCPCS

## 2017-06-30 PROCEDURE — 25010000003 POTASSIUM CHLORIDE 10 MEQ/100ML SOLUTION: Performed by: INTERNAL MEDICINE

## 2017-06-30 PROCEDURE — 25010000002 HYDROMORPHONE PER 4 MG: Performed by: NURSE PRACTITIONER

## 2017-06-30 PROCEDURE — 82962 GLUCOSE BLOOD TEST: CPT

## 2017-06-30 PROCEDURE — 83880 ASSAY OF NATRIURETIC PEPTIDE: CPT | Performed by: FAMILY MEDICINE

## 2017-06-30 PROCEDURE — 71010 HC CHEST PA OR AP: CPT

## 2017-06-30 PROCEDURE — 36600 WITHDRAWAL OF ARTERIAL BLOOD: CPT

## 2017-06-30 PROCEDURE — 80053 COMPREHEN METABOLIC PANEL: CPT | Performed by: NURSE PRACTITIONER

## 2017-06-30 PROCEDURE — 25010000002 METHYLPREDNISOLONE PER 125 MG: Performed by: INTERNAL MEDICINE

## 2017-06-30 PROCEDURE — 82803 BLOOD GASES ANY COMBINATION: CPT | Performed by: INTERNAL MEDICINE

## 2017-06-30 PROCEDURE — 83605 ASSAY OF LACTIC ACID: CPT | Performed by: NURSE PRACTITIONER

## 2017-06-30 PROCEDURE — 25010000002 ONDANSETRON PER 1 MG: Performed by: NURSE PRACTITIONER

## 2017-06-30 PROCEDURE — 87086 URINE CULTURE/COLONY COUNT: CPT | Performed by: NURSE PRACTITIONER

## 2017-06-30 PROCEDURE — 25010000002 FUROSEMIDE PER 20 MG: Performed by: INTERNAL MEDICINE

## 2017-06-30 PROCEDURE — 99284 EMERGENCY DEPT VISIT MOD MDM: CPT

## 2017-06-30 PROCEDURE — 73562 X-RAY EXAM OF KNEE 3: CPT

## 2017-06-30 RX ORDER — METHYLPREDNISOLONE SODIUM SUCCINATE 125 MG/2ML
60 INJECTION, POWDER, LYOPHILIZED, FOR SOLUTION INTRAMUSCULAR; INTRAVENOUS EVERY 8 HOURS
Status: DISCONTINUED | OUTPATIENT
Start: 2017-06-30 | End: 2017-07-01

## 2017-06-30 RX ORDER — PANTOPRAZOLE SODIUM 40 MG/1
40 TABLET, DELAYED RELEASE ORAL DAILY
Status: DISCONTINUED | OUTPATIENT
Start: 2017-07-01 | End: 2017-07-12 | Stop reason: HOSPADM

## 2017-06-30 RX ORDER — ONDANSETRON 2 MG/ML
4 INJECTION INTRAMUSCULAR; INTRAVENOUS EVERY 6 HOURS PRN
Status: DISCONTINUED | OUTPATIENT
Start: 2017-06-30 | End: 2017-07-12 | Stop reason: HOSPADM

## 2017-06-30 RX ORDER — GABAPENTIN 300 MG/1
300 CAPSULE ORAL 3 TIMES DAILY
Status: DISCONTINUED | OUTPATIENT
Start: 2017-06-30 | End: 2017-07-12 | Stop reason: HOSPADM

## 2017-06-30 RX ORDER — ONDANSETRON 2 MG/ML
4 INJECTION INTRAMUSCULAR; INTRAVENOUS ONCE
Status: COMPLETED | OUTPATIENT
Start: 2017-06-30 | End: 2017-06-30

## 2017-06-30 RX ORDER — HYDRALAZINE HYDROCHLORIDE 20 MG/ML
10 INJECTION INTRAMUSCULAR; INTRAVENOUS EVERY 6 HOURS PRN
Status: DISCONTINUED | OUTPATIENT
Start: 2017-06-30 | End: 2017-07-12 | Stop reason: HOSPADM

## 2017-06-30 RX ORDER — FUROSEMIDE 10 MG/ML
60 INJECTION INTRAMUSCULAR; INTRAVENOUS EVERY 12 HOURS
Status: DISCONTINUED | OUTPATIENT
Start: 2017-06-30 | End: 2017-07-01

## 2017-06-30 RX ORDER — LACTULOSE 10 G/15ML
30 SOLUTION ORAL 3 TIMES DAILY
Status: DISCONTINUED | OUTPATIENT
Start: 2017-06-30 | End: 2017-07-01

## 2017-06-30 RX ORDER — LACTULOSE 10 G/15ML
30 SOLUTION ORAL 4 TIMES DAILY
Status: DISCONTINUED | OUTPATIENT
Start: 2017-06-30 | End: 2017-06-30 | Stop reason: SDUPTHER

## 2017-06-30 RX ORDER — MORPHINE SULFATE 2 MG/ML
1 INJECTION, SOLUTION INTRAMUSCULAR; INTRAVENOUS EVERY 4 HOURS PRN
Status: DISPENSED | OUTPATIENT
Start: 2017-06-30 | End: 2017-07-03

## 2017-06-30 RX ORDER — SODIUM CHLORIDE 0.9 % (FLUSH) 0.9 %
10 SYRINGE (ML) INJECTION AS NEEDED
Status: DISCONTINUED | OUTPATIENT
Start: 2017-06-30 | End: 2017-07-12 | Stop reason: HOSPADM

## 2017-06-30 RX ORDER — ACETAMINOPHEN 325 MG/1
650 TABLET ORAL EVERY 6 HOURS PRN
Status: DISCONTINUED | OUTPATIENT
Start: 2017-06-30 | End: 2017-07-12 | Stop reason: HOSPADM

## 2017-06-30 RX ORDER — IPRATROPIUM BROMIDE AND ALBUTEROL SULFATE 2.5; .5 MG/3ML; MG/3ML
3 SOLUTION RESPIRATORY (INHALATION)
Status: DISCONTINUED | OUTPATIENT
Start: 2017-06-30 | End: 2017-07-12 | Stop reason: HOSPADM

## 2017-06-30 RX ORDER — SPIRONOLACTONE 50 MG/1
50 TABLET, FILM COATED ORAL DAILY
Status: DISCONTINUED | OUTPATIENT
Start: 2017-06-30 | End: 2017-07-06

## 2017-06-30 RX ORDER — TEMAZEPAM 15 MG/1
15 CAPSULE ORAL NIGHTLY PRN
Status: DISPENSED | OUTPATIENT
Start: 2017-06-30 | End: 2017-07-05

## 2017-06-30 RX ORDER — POTASSIUM CHLORIDE 7.45 MG/ML
10 INJECTION INTRAVENOUS
Status: DISCONTINUED | OUTPATIENT
Start: 2017-06-30 | End: 2017-07-12 | Stop reason: HOSPADM

## 2017-06-30 RX ADMIN — TEMAZEPAM 15 MG: 15 CAPSULE ORAL at 21:42

## 2017-06-30 RX ADMIN — GABAPENTIN 300 MG: 300 CAPSULE ORAL at 21:34

## 2017-06-30 RX ADMIN — HYDROMORPHONE HYDROCHLORIDE 0.5 MG: 1 INJECTION, SOLUTION INTRAMUSCULAR; INTRAVENOUS; SUBCUTANEOUS at 12:30

## 2017-06-30 RX ADMIN — MORPHINE SULFATE 1 MG: 2 INJECTION, SOLUTION INTRAMUSCULAR; INTRAVENOUS at 21:42

## 2017-06-30 RX ADMIN — LACTULOSE 30 G: 10 SOLUTION ORAL at 17:41

## 2017-06-30 RX ADMIN — FUROSEMIDE 60 MG: 10 INJECTION, SOLUTION INTRAVENOUS at 17:41

## 2017-06-30 RX ADMIN — METHYLPREDNISOLONE SODIUM SUCCINATE 60 MG: 125 INJECTION, POWDER, FOR SOLUTION INTRAMUSCULAR; INTRAVENOUS at 17:42

## 2017-06-30 RX ADMIN — SPIRONOLACTONE 50 MG: 50 TABLET, FILM COATED ORAL at 17:41

## 2017-06-30 RX ADMIN — Medication: at 16:15

## 2017-06-30 RX ADMIN — GABAPENTIN 300 MG: 300 CAPSULE ORAL at 17:41

## 2017-06-30 RX ADMIN — ONDANSETRON 4 MG: 2 INJECTION INTRAMUSCULAR; INTRAVENOUS at 12:29

## 2017-06-30 RX ADMIN — Medication 10 ML: at 17:43

## 2017-06-30 RX ADMIN — Medication 10 ML: at 12:29

## 2017-06-30 RX ADMIN — METHYLPREDNISOLONE SODIUM SUCCINATE 60 MG: 125 INJECTION, POWDER, FOR SOLUTION INTRAMUSCULAR; INTRAVENOUS at 23:52

## 2017-06-30 RX ADMIN — LACTULOSE 30 G: 10 SOLUTION ORAL at 21:35

## 2017-06-30 RX ADMIN — POTASSIUM CHLORIDE 10 MEQ: 7.46 INJECTION, SOLUTION INTRAVENOUS at 23:53

## 2017-06-30 RX ADMIN — VANCOMYCIN HYDROCHLORIDE 1500 MG: 500 INJECTION, POWDER, LYOPHILIZED, FOR SOLUTION INTRAVENOUS at 15:04

## 2017-07-01 ENCOUNTER — APPOINTMENT (OUTPATIENT)
Dept: ULTRASOUND IMAGING | Facility: HOSPITAL | Age: 52
End: 2017-07-01

## 2017-07-01 ENCOUNTER — APPOINTMENT (OUTPATIENT)
Dept: GENERAL RADIOLOGY | Facility: HOSPITAL | Age: 52
End: 2017-07-01

## 2017-07-01 ENCOUNTER — APPOINTMENT (OUTPATIENT)
Dept: INTERVENTIONAL RADIOLOGY/VASCULAR | Facility: HOSPITAL | Age: 52
End: 2017-07-01

## 2017-07-01 LAB
ALBUMIN SERPL-MCNC: 2.4 G/DL (ref 3.4–4.8)
ALBUMIN/GLOB SERPL: 0.8 G/DL (ref 1.1–1.8)
ALP SERPL-CCNC: 263 U/L (ref 38–126)
ALT SERPL W P-5'-P-CCNC: 148 U/L (ref 21–72)
ANION GAP SERPL CALCULATED.3IONS-SCNC: 8 MMOL/L (ref 5–15)
AST SERPL-CCNC: 124 U/L (ref 17–59)
BACTERIA SPEC AEROBE CULT: NORMAL
BASOPHILS # BLD AUTO: 0 10*3/MM3 (ref 0–0.2)
BASOPHILS NFR BLD AUTO: 0 % (ref 0–2)
BILIRUB SERPL-MCNC: 2.7 MG/DL (ref 0.2–1.3)
BUN BLD-MCNC: 27 MG/DL (ref 7–21)
BUN/CREAT SERPL: 18.6 (ref 7–25)
CALCIUM SPEC-SCNC: 8.2 MG/DL (ref 8.4–10.2)
CHLORIDE SERPL-SCNC: 104 MMOL/L (ref 95–110)
CO2 SERPL-SCNC: 24 MMOL/L (ref 22–31)
CREAT BLD-MCNC: 1.45 MG/DL (ref 0.7–1.3)
DEPRECATED RDW RBC AUTO: 64.4 FL (ref 35.1–43.9)
EOSINOPHIL # BLD AUTO: 0.01 10*3/MM3 (ref 0–0.7)
EOSINOPHIL NFR BLD AUTO: 0.1 % (ref 0–7)
ERYTHROCYTE [DISTWIDTH] IN BLOOD BY AUTOMATED COUNT: 18.6 % (ref 11.5–14.5)
GFR SERPL CREATININE-BSD FRML MDRD: 51 ML/MIN/1.73 (ref 56–130)
GLOBULIN UR ELPH-MCNC: 3 GM/DL (ref 2.3–3.5)
GLUCOSE BLD-MCNC: 147 MG/DL (ref 60–100)
GLUCOSE BLDC GLUCOMTR-MCNC: 236 MG/DL (ref 70–130)
HBA1C MFR BLD: 4.39 % (ref 4–5.6)
HCT VFR BLD AUTO: 27.8 % (ref 39–49)
HGB BLD-MCNC: 9.8 G/DL (ref 13.7–17.3)
IMM GRANULOCYTES # BLD: 0.06 10*3/MM3 (ref 0–0.02)
IMM GRANULOCYTES NFR BLD: 0.6 % (ref 0–0.5)
LYMPHOCYTES # BLD AUTO: 0.38 10*3/MM3 (ref 0.6–4.2)
LYMPHOCYTES NFR BLD AUTO: 3.5 % (ref 10–50)
MCH RBC QN AUTO: 34.1 PG (ref 26.5–34)
MCHC RBC AUTO-ENTMCNC: 35.3 G/DL (ref 31.5–36.3)
MCV RBC AUTO: 96.9 FL (ref 80–98)
MONOCYTES # BLD AUTO: 0.16 10*3/MM3 (ref 0–0.9)
MONOCYTES NFR BLD AUTO: 1.5 % (ref 0–12)
NEUTROPHILS # BLD AUTO: 10.21 10*3/MM3 (ref 2–8.6)
NEUTROPHILS NFR BLD AUTO: 94.3 % (ref 37–80)
PLATELET # BLD AUTO: 48 10*3/MM3 (ref 150–450)
PMV BLD AUTO: 11 FL (ref 8–12)
POTASSIUM BLD-SCNC: 4.1 MMOL/L (ref 3.5–5.1)
PROT SERPL-MCNC: 5.4 G/DL (ref 6.3–8.6)
RBC # BLD AUTO: 2.87 10*6/MM3 (ref 4.37–5.74)
SODIUM BLD-SCNC: 136 MMOL/L (ref 137–145)
WBC NRBC COR # BLD: 10.82 10*3/MM3 (ref 3.2–9.8)

## 2017-07-01 PROCEDURE — 93971 EXTREMITY STUDY: CPT

## 2017-07-01 PROCEDURE — P9046 ALBUMIN (HUMAN), 25%, 20 ML: HCPCS | Performed by: INTERNAL MEDICINE

## 2017-07-01 PROCEDURE — 83036 HEMOGLOBIN GLYCOSYLATED A1C: CPT | Performed by: INTERNAL MEDICINE

## 2017-07-01 PROCEDURE — 80053 COMPREHEN METABOLIC PANEL: CPT | Performed by: INTERNAL MEDICINE

## 2017-07-01 PROCEDURE — 25010000002 ALBUMIN HUMAN 25% PER 50 ML: Performed by: INTERNAL MEDICINE

## 2017-07-01 PROCEDURE — G8988 SELF CARE GOAL STATUS: HCPCS

## 2017-07-01 PROCEDURE — 97110 THERAPEUTIC EXERCISES: CPT

## 2017-07-01 PROCEDURE — G8987 SELF CARE CURRENT STATUS: HCPCS

## 2017-07-01 PROCEDURE — 25010000002 MORPHINE SULFATE (PF) 2 MG/ML SOLUTION: Performed by: INTERNAL MEDICINE

## 2017-07-01 PROCEDURE — 97166 OT EVAL MOD COMPLEX 45 MIN: CPT

## 2017-07-01 PROCEDURE — 94640 AIRWAY INHALATION TREATMENT: CPT

## 2017-07-01 PROCEDURE — 25010000002 FUROSEMIDE PER 20 MG: Performed by: INTERNAL MEDICINE

## 2017-07-01 PROCEDURE — 94799 UNLISTED PULMONARY SVC/PX: CPT

## 2017-07-01 PROCEDURE — 76937 US GUIDE VASCULAR ACCESS: CPT

## 2017-07-01 PROCEDURE — 82962 GLUCOSE BLOOD TEST: CPT

## 2017-07-01 PROCEDURE — G8978 MOBILITY CURRENT STATUS: HCPCS

## 2017-07-01 PROCEDURE — 85025 COMPLETE CBC W/AUTO DIFF WBC: CPT | Performed by: INTERNAL MEDICINE

## 2017-07-01 PROCEDURE — 25010000002 VANCOMYCIN PER 500 MG: Performed by: INTERNAL MEDICINE

## 2017-07-01 PROCEDURE — 97530 THERAPEUTIC ACTIVITIES: CPT

## 2017-07-01 PROCEDURE — G8979 MOBILITY GOAL STATUS: HCPCS

## 2017-07-01 PROCEDURE — 02HV33Z INSERTION OF INFUSION DEVICE INTO SUPERIOR VENA CAVA, PERCUTANEOUS APPROACH: ICD-10-PCS | Performed by: RADIOLOGY

## 2017-07-01 PROCEDURE — B548ZZA ULTRASONOGRAPHY OF SUPERIOR VENA CAVA, GUIDANCE: ICD-10-PCS | Performed by: RADIOLOGY

## 2017-07-01 PROCEDURE — 97162 PT EVAL MOD COMPLEX 30 MIN: CPT

## 2017-07-01 PROCEDURE — C1751 CATH, INF, PER/CENT/MIDLINE: HCPCS

## 2017-07-01 PROCEDURE — 25010000003 POTASSIUM CHLORIDE 10 MEQ/100ML SOLUTION: Performed by: INTERNAL MEDICINE

## 2017-07-01 RX ORDER — CLOTRIMAZOLE 1 G/ML
SOLUTION TOPICAL EVERY 12 HOURS SCHEDULED
Status: DISCONTINUED | OUTPATIENT
Start: 2017-07-01 | End: 2017-07-12 | Stop reason: HOSPADM

## 2017-07-01 RX ORDER — FUROSEMIDE 10 MG/ML
40 INJECTION INTRAMUSCULAR; INTRAVENOUS EVERY 12 HOURS
Status: DISCONTINUED | OUTPATIENT
Start: 2017-07-01 | End: 2017-07-06

## 2017-07-01 RX ORDER — FLUCONAZOLE 200 MG/1
200 TABLET ORAL DAILY
Status: DISCONTINUED | OUTPATIENT
Start: 2017-07-01 | End: 2017-07-12 | Stop reason: HOSPADM

## 2017-07-01 RX ORDER — ALBUMIN (HUMAN) 12.5 G/50ML
12.5 SOLUTION INTRAVENOUS ONCE
Status: COMPLETED | OUTPATIENT
Start: 2017-07-01 | End: 2017-07-01

## 2017-07-01 RX ADMIN — MORPHINE SULFATE 1 MG: 2 INJECTION, SOLUTION INTRAMUSCULAR; INTRAVENOUS at 12:20

## 2017-07-01 RX ADMIN — POTASSIUM CHLORIDE 10 MEQ: 7.46 INJECTION, SOLUTION INTRAVENOUS at 01:06

## 2017-07-01 RX ADMIN — SPIRONOLACTONE 50 MG: 50 TABLET, FILM COATED ORAL at 12:20

## 2017-07-01 RX ADMIN — POTASSIUM CHLORIDE 10 MEQ: 7.46 INJECTION, SOLUTION INTRAVENOUS at 03:12

## 2017-07-01 RX ADMIN — GABAPENTIN 300 MG: 300 CAPSULE ORAL at 20:38

## 2017-07-01 RX ADMIN — LACTULOSE 30 G: 10 SOLUTION ORAL at 16:31

## 2017-07-01 RX ADMIN — PANTOPRAZOLE SODIUM 40 MG: 40 TABLET, DELAYED RELEASE ORAL at 12:21

## 2017-07-01 RX ADMIN — POTASSIUM CHLORIDE 10 MEQ: 7.46 INJECTION, SOLUTION INTRAVENOUS at 02:00

## 2017-07-01 RX ADMIN — IPRATROPIUM BROMIDE AND ALBUTEROL SULFATE 3 ML: 2.5; .5 SOLUTION RESPIRATORY (INHALATION) at 16:09

## 2017-07-01 RX ADMIN — GABAPENTIN 300 MG: 300 CAPSULE ORAL at 16:31

## 2017-07-01 RX ADMIN — FUROSEMIDE 60 MG: 10 INJECTION, SOLUTION INTRAVENOUS at 05:45

## 2017-07-01 RX ADMIN — CLOTRIMAZOLE: 1 SOLUTION TOPICAL at 20:38

## 2017-07-01 RX ADMIN — IPRATROPIUM BROMIDE AND ALBUTEROL SULFATE 3 ML: 2.5; .5 SOLUTION RESPIRATORY (INHALATION) at 07:42

## 2017-07-01 RX ADMIN — LACTULOSE 30 G: 10 SOLUTION ORAL at 12:20

## 2017-07-01 RX ADMIN — FLUCONAZOLE 200 MG: 200 TABLET ORAL at 12:19

## 2017-07-01 RX ADMIN — Medication 10 ML: at 17:49

## 2017-07-01 RX ADMIN — ALBUMIN (HUMAN) 12.5 G: 5 SOLUTION INTRAVENOUS at 11:45

## 2017-07-01 RX ADMIN — TEMAZEPAM 15 MG: 15 CAPSULE ORAL at 20:45

## 2017-07-01 RX ADMIN — Medication 10 ML: at 12:22

## 2017-07-01 RX ADMIN — VANCOMYCIN HYDROCHLORIDE 1500 MG: 1 INJECTION, POWDER, LYOPHILIZED, FOR SOLUTION INTRAVENOUS at 12:20

## 2017-07-01 RX ADMIN — GABAPENTIN 300 MG: 300 CAPSULE ORAL at 12:20

## 2017-07-01 RX ADMIN — MORPHINE SULFATE 1 MG: 2 INJECTION, SOLUTION INTRAMUSCULAR; INTRAVENOUS at 16:36

## 2017-07-01 RX ADMIN — FUROSEMIDE 40 MG: 10 INJECTION, SOLUTION INTRAMUSCULAR; INTRAVENOUS at 16:31

## 2017-07-01 RX ADMIN — CLOTRIMAZOLE: 1 SOLUTION TOPICAL at 12:21

## 2017-07-01 RX ADMIN — MORPHINE SULFATE 1 MG: 2 INJECTION, SOLUTION INTRAMUSCULAR; INTRAVENOUS at 20:45
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
## 2017-07-01 NOTE — PAYOR COMM NOTE
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 Intravenous    Cosign for Ordering: Accepted by Roque Parker MD on 6/30/2017 12:58 PM    ondansetron (ZOFRAN) injection 4 mg 4 mg Every 6 Hours PRN 6/30/2017     Sig - Route: Infuse 2 mL into a venous catheter Every 6 (Six) Hours As Needed for Nausea or Vomiting. - Intravenous    pantoprazole (PROTONIX) EC tablet 40 mg 40 mg Daily 7/1/2017     Sig - Route: Take 1 tablet by mouth Daily. - Oral    Pharmacy to dose vancomycin  Once 6/30/2017 6/30/2017    Sig - Route: 1 (One) Time. - Does not apply    Cosign for Ordering: Accepted by Roque Parker MD on 7/1/2017  7:16 AM    Pharmacy to dose vancomycin  Continuous PRN 6/30/2017     Sig - Route: Continuous As Needed for Consult. - Does not apply    potassium chloride 10 mEq in 100 mL IVPB 10 mEq Every 1 Hour PRN 6/30/2017     Sig - Route: Infuse 100 mL into a venous catheter Every 1 (One) Hour As Needed (See admin Instructions.). - Intravenous    sodium chloride 0.9 % flush 10 mL 10 mL As Needed 6/30/2017     Sig - Route: Infuse 10 mL into a venous catheter As Needed for Line Care. - Intravenous    Cosign for Ordering: Accepted by Roque Parker MD  "on 6/30/2017 12:58 PM    Linked Group 1:  \"And\" Linked Group Details        sodium chloride 0.9 % flush 10 mL 10 mL As Needed 6/30/2017     Sig - Route: Infuse 10 mL into a venous catheter As Needed for Line Care. - Intravenous    spironolactone (ALDACTONE) tablet 50 mg 50 mg Daily 6/30/2017     Sig - Route: Take 1 tablet by mouth Daily. - Oral    temazepam (RESTORIL) capsule 15 mg 15 mg Nightly PRN 6/30/2017 7/5/2017    Sig - Route: Take 1 capsule by mouth At Night As Needed for Sleep. - Oral    vancomycin (VANCOCIN) 1,500 mg in sodium chloride 0.9 % 500 mL IVPB 15 mg/kg × 106 kg Once 6/30/2017 6/30/2017    Sig - Route: Infuse 1,500 mg into a venous catheter 1 (One) Time. - Intravenous    Cosign for Ordering: Accepted by Roque Parker MD on 7/1/2017  7:16 AM    vancomycin (VANCOCIN) 1,500 mg in sodium chloride 0.9 % 500 mL IVPB 1,500 mg Every 18 Hours 7/1/2017     Sig - Route: Infuse 1,500 mg into a venous catheter Every 18 (Eighteen) Hours. - Intravenous    enoxaparin (LOVENOX) syringe 40 mg (Discontinued) 40 mg Every 24 Hours 7/1/2017 7/1/2017    Sig - Route: Inject 0.4 mL under the skin Daily. - Subcutaneous    furosemide (LASIX) injection 60 mg (Discontinued) 60 mg Every 12 Hours 6/30/2017 7/1/2017    Sig - Route: Infuse 6 mL into a venous catheter Every 12 (Twelve) Hours. - Intravenous    lactulose (CHRONULAC) 10 GM/15ML solution 30 g (Discontinued) 30 g 4 Times Daily 6/30/2017 6/30/2017    Sig - Route: Take 45 mL by mouth 4 (Four) Times a Day. - Oral    Reason for Discontinue: Duplicate order    methylPREDNISolone sodium succinate (SOLU-Medrol) injection 60 mg (Discontinued) 60 mg Every 8 Hours 6/30/2017 7/1/2017    Sig - Route: Infuse 0.96 mL into a venous catheter Every 8 (Eight) Hours. - Intravenous             Physician Progress Notes (last 72 hours) (Notes from 6/28/2017  2:13 PM through 7/1/2017  2:13 PM)      Armando Richard MD at 7/1/2017 10:59 AM  Version 1 of 1                [facility] " [facility] Medicine Services  INPATIENT PROGRESS NOTE     LOS: 0 days   Patient Care Team:  Cj Aguero MD as PCP - General  Shawn Cortez MD as Surgeon (Orthopedic Surgery)  Tushar Becerril DO as Consulting Physician (Gastroenterology)  Josep Colón MD as Consulting Physician (Nephrology)    Chief Complaint:    Patient is seen for follow-up today.  He was readmitted yesterday due to ongoing cellulitis of the right lower extremity.  He complains of periodic pain on the right lower extremity, weakness but denies fever, chills, nausea, vomiting, hematemesis, hematochezia, or shortness of air.      Subjective     Interval History:     Patient Complaints: As above    History taken from: Patient    Review of Systems:    Review of Systems   Constitutional: Negative for activity change, appetite change, chills, diaphoresis, fatigue, fever and unexpected weight change.   Respiratory: Negative for apnea, cough, choking, chest tightness, shortness of breath, wheezing and stridor.    Cardiovascular: Positive for leg swelling. Negative for chest pain and palpitations.   Gastrointestinal: Negative for abdominal distention, abdominal pain, anal bleeding, blood in stool, constipation, diarrhea, nausea, rectal pain and vomiting.   Endocrine: Negative for cold intolerance, heat intolerance, polydipsia, polyphagia and polyuria.   Genitourinary: Negative for decreased urine volume, difficulty urinating, dysuria, enuresis, flank pain, frequency, hematuria and urgency.   Musculoskeletal: Negative for arthralgias, back pain, joint swelling, myalgias, neck pain and neck stiffness.   Neurological: Positive for weakness. Negative for dizziness, tremors, seizures, syncope, facial asymmetry, speech difficulty, light-headedness, numbness and headaches.   Psychiatric/Behavioral: Negative for agitation, behavioral problems and confusion.         Objective     Vital Signs  Temp:  [97.3 °F (36.3  °C)-97.8 °F (36.6 °C)] 97.6 °F (36.4 °C)  Heart Rate:  [63-72] 71  Resp:  [18-20] 18  BP: (110-145)/(65-84) 126/71    Physical Exam:   Physical Exam   Constitutional: He is oriented to person, place, and time. He appears well-developed and well-nourished. No distress.   HENT:   Head: Normocephalic and atraumatic.   Eyes: EOM are normal. Pupils are equal, round, and reactive to light.   Neck: Normal range of motion. Neck supple. No JVD present. No thyromegaly present.   Cardiovascular: Normal rate, regular rhythm and intact distal pulses.  Exam reveals no gallop.    No murmur heard.  Pulmonary/Chest: Effort normal and breath sounds normal. Stridor present. No respiratory distress. He has no wheezes. He has no rales.   Abdominal: Soft. Bowel sounds are normal. He exhibits no distension. There is no tenderness. There is no rebound and no guarding.   Musculoskeletal: He exhibits edema.   Neurological: He is alert and oriented to person, place, and time.   Skin: He is not diaphoretic.   Psychiatric: He has a normal mood and affect. His behavior is normal.   Nursing note and vitals reviewed.   Extremities: He has left lower extremity BKA.  There is pitting pedal edema on the right foot  Skin: Whitish plaques on both groins compatible with tinea corporis.       Results Review:       Results from last 7 days  Lab Units 07/01/17  0748 06/30/17  1739 06/30/17  1204 06/28/17  0541 06/27/17  0537 06/26/17  0603 06/25/17  0518   SODIUM mmol/L 136*  --  138 133* 133* 133* 135*   SODIUM, ARTERIAL mmol/L  --  138.9  --   --   --   --   --    POTASSIUM mmol/L 4.1  --  3.5 3.5 3.0* 3.4* 3.3*   CHLORIDE mmol/L 104  --  103 101 103 103 105   CO2 mmol/L 24.0  --  26.0 25.0 23.0 22.0 21.0*   BUN mg/dL 27*  --  28* 29* 32* 35* 33*   CREATININE mg/dL 1.45*  --  1.35* 1.44* 1.45* 1.66* 1.74*   GLUCOSE mg/dL 147*  --  64 95 99 117* 116*   GLUCOSE, ARTERIAL mmol/L  --  109  --   --   --   --   --    CALCIUM mg/dL 8.2*  --  8.3* 8.1* 8.1* 8.1*  8.3*   BILIRUBIN mg/dL 2.7*  --  4.7* 2.6* 2.7* 2.4* 2.5*   ALK PHOS U/L 263*  --  227* 278* 273* 158* 162*   ALT (SGPT) U/L 148*  --  161* 207* 226* 112* 130*   AST (SGOT) U/L 124*  --  151* 144* 224* 64* 105*         Results from last 7 days  Lab Units 06/25/17  0010   MAGNESIUM mg/dL 2.4*         Results from last 7 days  Lab Units 07/01/17  0748 06/30/17  1204 06/28/17  0541 06/27/17  0537 06/26/17  0603 06/25/17  0518   WBC 10*3/mm3 10.82* 12.48* 11.96* 11.17* 8.21 8.84   HEMOGLOBIN g/dL 9.8* 10.3* 9.6* 9.6* 9.5* 9.1*   HEMATOCRIT % 27.8* 29.7* 27.1* 27.3* 27.3* 25.6*   PLATELETS 10*3/mm3 48* 53* 42* 40* 41* 42*       Lab Results   Component Value Date    CKTOTAL 771 (H) 06/30/2017    CKMB 0.84 06/14/2017    TROPONINI 0.038 (H) 06/15/2017       pH, Arterial   Date Value Ref Range Status   06/30/2017 7.484 (H) 7.350 - 7.450 pH units Final     CO2   Date Value Ref Range Status   07/01/2017 24.0 22.0 - 31.0 mmol/L Final              Imaging Results (last 7 days)     Procedure Component Value Units Date/Time    XR Tibia Fibula 2 View Right [[id number]] Collected:  06/30/17 1159     Updated:  06/30/17 1226    Narrative:         PROCEDURE: Right tibia and fibula 4 views    REASON FOR EXAM: fell    FINDINGS:  No comparison. .  Bony structures of the tibia and  fibula are normal. There is no evidence of fracture or  dislocation. No radiopaque foreign body . Mild diffuse right  lower leg soft tissue swelling and subcutaneous fatty stranding.      Impression:       1.  Mild right lower leg soft tissues swelling and subcutaneous  fatty stranding suspicious for edema and/or cellulitis.  2.  Otherwise unremarkable right tibia and fibula.    Electronically signed by:  Eduard Vigil MD  6/30/2017 12:25 PM CDT  Workstation: [id number]    XR Knee 3 View Right [[id number]] Collected:  06/30/17 1159     Updated:  06/30/17 1241    Narrative:         EXAM:  Radiograph(s), Osseous         REGION:   Knee    SIDE:     Right     VIEWS:    4             INDICATION:    Pain status post fall      COMPARISON:    none            FINDINGS:           No evidence of a fracture or bony malalignment.     No evidence of osteolytic/osteoblastic disease.     Age-related degenerative changes, with moderate medial, lateral,  and patellofemoral compartment degenerative changes.   Presumed healed or partially healed Osgood-Schlatter defect.                            .        Impression:       CONCLUSION:           1. No gross evidence of acute osseous pathology.              Note:  if pain or symptoms persist beyond reasonable expectations  and follow-up imaging is anticipated,  cross sectional imaging  (MRI) is suggested, as is deemed clinically appropriate.                                 Electronically signed by:  PRISCILLA Bustillo MD  6/30/2017 12:40  PM CDT Workstation: [id number]    XR Chest 1 View [[id number]] Collected:  06/30/17 1349     Updated:  06/30/17 1419    Narrative:       [facility] [place]    Patient Name: LAMONT FISHER    ORDERING: ORLANDO SORIANO     ATTENDING: ORLANDO SORIANO     REFERRING: ORLANDO SORIANO    -----------------------    PROCEDURE: Portable chest x-ray    TECHNIQUE: Single AP view of the chest    COMPARISON: 6/23/2017, 12/3/2015    HISTORY: SOA    FINDINGS:     Life-support devices: None    Lungs/pleura: No overt pulmonary vascular congestion, focal  pulmonary parenchymal opacity, pleural effusion or pneumothorax.    Heart, hilar and mediastinal structures:  Enlargement of the  right hilar shadow is unchanged since 12/3/2015 and likely  reflects and enlarged right pulmonary artery. Cardiac silhouette  is normal in size.    Multiple old healed bilateral rib fractures are noted. Ununited  old left clavicular fracture noted. Old healed right clavicular  fracture noted. Ununited right humeral fracture unchanged, screws  noted in the proximal left humerus.      Impression:       CONCLUSION:  No acute pulmonary  disease.    Electronically signed by:  Jose Boudreaux MD  6/30/2017 2:18 PM CDT  Workstation: [id number]                           Urine Culture   Date Value Ref Range Status   06/30/2017 Culture in progress  Preliminary           Medication Review:   Current Facility-Administered Medications   Medication Dose Route Frequency Provider Last Rate Last Dose   • acetaminophen (TYLENOL) tablet 650 mg  650 mg Oral Q6H PRN Santiago Marinelli MD       • clotrimazole (LOTRIMIN) 1 % external solution   Topical Q12H Armando Richard MD       • fluconazole (DIFLUCAN) tablet 200 mg  200 mg Oral Daily Armando Richard MD       • furosemide (LASIX) injection 60 mg  60 mg Intravenous Q12H Santiago Marinelli MD   60 mg at 07/01/17 0545   • gabapentin (NEURONTIN) capsule 300 mg  300 mg Oral TID Santiago Marinelli MD   300 mg at 06/30/17 2134   • hydrALAZINE (APRESOLINE) injection 10 mg  10 mg Intravenous Q6H PRN Santiago Marinelli MD       • ipratropium-albuterol (DUO-NEB) nebulizer solution 3 mL  3 mL Nebulization 4x Daily - RT Santiago Marinelli MD   3 mL at 07/01/17 0742   • lactulose (CHRONULAC) 10 GM/15ML solution 30 g  30 g Oral TID Santiago Marinelli MD   30 g at 06/30/17 2135   • Morphine sulfate (PF) injection 1 mg  1 mg Intravenous Q4H PRN Santiago Marinelli MD   1 mg at 06/30/17 2142   • ondansetron (ZOFRAN) injection 4 mg  4 mg Intravenous Q6H PRN Santiago Marinelli MD       • pantoprazole (PROTONIX) EC tablet 40 mg  40 mg Oral Daily Santiago Marinelli MD       • Pharmacy to dose vancomycin   Does not apply Continuous PRN Santiago Marinelli MD       • potassium chloride 10 mEq in 100 mL IVPB  10 mEq Intravenous Q1H PRN Santiago Marinelli  mL/hr at 07/01/17 0312 10 mEq at 07/01/17 0312   • sodium chloride 0.9 % flush 10 mL  10 mL Intravenous PRN KALEB Flores   10 mL at 06/30/17 1229   • sodium chloride 0.9 % flush 10 mL  10 mL Intravenous PRN Santiago Marinelli MD   10 mL at 06/30/17 0783   • spironolactone (ALDACTONE) tablet 50 mg  50 mg Oral Daily  Santiago Marinelli MD   50 mg at 06/30/17 1741   • temazepam (RESTORIL) capsule 15 mg  15 mg Oral Nightly PRN Santiago Marinelli MD   15 mg at 06/30/17 2142   • vancomycin (VANCOCIN) 1,500 mg in sodium chloride 0.9 % 500 mL IVPB  1,500 mg Intravenous Q18H Santiago Marinelli MD             Assessment/Plan   1.  Cellulitis right lower extremity: Continue broad-spectrum antibiotics.  Check duplex ultrasound to rule out DVT.  2.  Candiduria: Begin oral Diflucan.  Urine cultures are pending.  3.  History of liver cirrhosis: This accounts for the elevated LFTs and thrombocytopenia.  Continue lactulose and Aldactone.  4.  Tinea corporis: Prescribe local application of antifungal cream.  5.  Deconditioning: Physiotherapy is ongoing.  6.  Continue Protonix/ TEDs for for GI and DVT prophylaxis respectively.  7.  Anticipate discharge in 2-3 days.        Armando Richard MD  07/01/17      EMR Dragon/Transcription disclaimer:   Much of this encounter note is an electronic transcription/translation of spoken language to printed text. The electronic translation of spoken language may permit erroneous, or at times, nonsensical words or phrases to be inadvertently transcribed; Although I have reviewed the note for such errors, some may still exist.                 Electronically signed by Armando Richard MD at 7/1/2017 11:13 AM        Consult Notes (last 72 hours) (Notes from 6/28/2017  2:13 PM through 7/1/2017  2:13 PM)     No notes of this type exist for this encounter.

## 2017-07-01 NOTE — PROGRESS NOTES
[facility] Medicine Services  INPATIENT PROGRESS NOTE     LOS: 0 days   Patient Care Team:  Cj Aguero MD as PCP - General  Shawn Cortez MD as Surgeon (Orthopedic Surgery)  Tushar Becerril DO as Consulting Physician (Gastroenterology)  Josep Colón MD as Consulting Physician (Nephrology)    Chief Complaint:    Patient is seen for follow-up today.  He was readmitted yesterday due to ongoing cellulitis of the right lower extremity.  He complains of periodic pain on the right lower extremity, weakness but denies fever, chills, nausea, vomiting, hematemesis, hematochezia, or shortness of air.      Subjective     Interval History:     Patient Complaints: As above    History taken from: Patient    Review of Systems:    Review of Systems   Constitutional: Negative for activity change, appetite change, chills, diaphoresis, fatigue, fever and unexpected weight change.   Respiratory: Negative for apnea, cough, choking, chest tightness, shortness of breath, wheezing and stridor.    Cardiovascular: Positive for leg swelling. Negative for chest pain and palpitations.   Gastrointestinal: Negative for abdominal distention, abdominal pain, anal bleeding, blood in stool, constipation, diarrhea, nausea, rectal pain and vomiting.   Endocrine: Negative for cold intolerance, heat intolerance, polydipsia, polyphagia and polyuria.   Genitourinary: Negative for decreased urine volume, difficulty urinating, dysuria, enuresis, flank pain, frequency, hematuria and urgency.   Musculoskeletal: Negative for arthralgias, back pain, joint swelling, myalgias, neck pain and neck stiffness.   Neurological: Positive for weakness. Negative for dizziness, tremors, seizures, syncope, facial asymmetry, speech difficulty, light-headedness, numbness and headaches.   Psychiatric/Behavioral: Negative for agitation, behavioral problems and confusion.         Objective     Vital Signs  Temp:   [97.3 °F (36.3 °C)-97.8 °F (36.6 °C)] 97.6 °F (36.4 °C)  Heart Rate:  [63-72] 71  Resp:  [18-20] 18  BP: (110-145)/(65-84) 126/71    Physical Exam:   Physical Exam   Constitutional: He is oriented to person, place, and time. He appears well-developed and well-nourished. No distress.   HENT:   Head: Normocephalic and atraumatic.   Eyes: EOM are normal. Pupils are equal, round, and reactive to light.   Neck: Normal range of motion. Neck supple. No JVD present. No thyromegaly present.   Cardiovascular: Normal rate, regular rhythm and intact distal pulses.  Exam reveals no gallop.    No murmur heard.  Pulmonary/Chest: Effort normal and breath sounds normal. Stridor present. No respiratory distress. He has no wheezes. He has no rales.   Abdominal: Soft. Bowel sounds are normal. He exhibits no distension. There is no tenderness. There is no rebound and no guarding.   Musculoskeletal: He exhibits edema.   Neurological: He is alert and oriented to person, place, and time.   Skin: He is not diaphoretic.   Psychiatric: He has a normal mood and affect. His behavior is normal.   Nursing note and vitals reviewed.   Extremities: He has left lower extremity BKA.  There is pitting pedal edema on the right foot  Skin: Whitish plaques on both groins compatible with tinea corporis.       Results Review:       Results from last 7 days  Lab Units 07/01/17  0748 06/30/17  1739 06/30/17  1204 06/28/17  0541 06/27/17  0537 06/26/17  0603 06/25/17  0518   SODIUM mmol/L 136*  --  138 133* 133* 133* 135*   SODIUM, ARTERIAL mmol/L  --  138.9  --   --   --   --   --    POTASSIUM mmol/L 4.1  --  3.5 3.5 3.0* 3.4* 3.3*   CHLORIDE mmol/L 104  --  103 101 103 103 105   CO2 mmol/L 24.0  --  26.0 25.0 23.0 22.0 21.0*   BUN mg/dL 27*  --  28* 29* 32* 35* 33*   CREATININE mg/dL 1.45*  --  1.35* 1.44* 1.45* 1.66* 1.74*   GLUCOSE mg/dL 147*  --  64 95 99 117* 116*   GLUCOSE, ARTERIAL mmol/L  --  109  --   --   --   --   --    CALCIUM mg/dL 8.2*  --  8.3*  8.1* 8.1* 8.1* 8.3*   BILIRUBIN mg/dL 2.7*  --  4.7* 2.6* 2.7* 2.4* 2.5*   ALK PHOS U/L 263*  --  227* 278* 273* 158* 162*   ALT (SGPT) U/L 148*  --  161* 207* 226* 112* 130*   AST (SGOT) U/L 124*  --  151* 144* 224* 64* 105*         Results from last 7 days  Lab Units 06/25/17  0010   MAGNESIUM mg/dL 2.4*         Results from last 7 days  Lab Units 07/01/17  0748 06/30/17  1204 06/28/17  0541 06/27/17  0537 06/26/17  0603 06/25/17  0518   WBC 10*3/mm3 10.82* 12.48* 11.96* 11.17* 8.21 8.84   HEMOGLOBIN g/dL 9.8* 10.3* 9.6* 9.6* 9.5* 9.1*   HEMATOCRIT % 27.8* 29.7* 27.1* 27.3* 27.3* 25.6*   PLATELETS 10*3/mm3 48* 53* 42* 40* 41* 42*       Lab Results   Component Value Date    CKTOTAL 771 (H) 06/30/2017    CKMB 0.84 06/14/2017    TROPONINI 0.038 (H) 06/15/2017       pH, Arterial   Date Value Ref Range Status   06/30/2017 7.484 (H) 7.350 - 7.450 pH units Final     CO2   Date Value Ref Range Status   07/01/2017 24.0 22.0 - 31.0 mmol/L Final              Imaging Results (last 7 days)     Procedure Component Value Units Date/Time    XR Tibia Fibula 2 View Right [[id number]] Collected:  06/30/17 1159     Updated:  06/30/17 1226    Narrative:         PROCEDURE: Right tibia and fibula 4 views    REASON FOR EXAM: fell    FINDINGS:  No comparison. .  Bony structures of the tibia and  fibula are normal. There is no evidence of fracture or  dislocation. No radiopaque foreign body . Mild diffuse right  lower leg soft tissue swelling and subcutaneous fatty stranding.      Impression:       1.  Mild right lower leg soft tissues swelling and subcutaneous  fatty stranding suspicious for edema and/or cellulitis.  2.  Otherwise unremarkable right tibia and fibula.    Electronically signed by:  Eduard Vigil MD  6/30/2017 12:25 PM CDT  Workstation: [id number]    XR Knee 3 View Right [[id number]] Collected:  06/30/17 1159     Updated:  06/30/17 1241    Narrative:         EXAM:  Radiograph(s), Osseous         REGION:   Knee    SIDE:      Right     VIEWS:   4             INDICATION:    Pain status post fall      COMPARISON:    none            FINDINGS:           No evidence of a fracture or bony malalignment.     No evidence of osteolytic/osteoblastic disease.     Age-related degenerative changes, with moderate medial, lateral,  and patellofemoral compartment degenerative changes.   Presumed healed or partially healed Osgood-Schlatter defect.                            .        Impression:       CONCLUSION:           1. No gross evidence of acute osseous pathology.              Note:  if pain or symptoms persist beyond reasonable expectations  and follow-up imaging is anticipated,  cross sectional imaging  (MRI) is suggested, as is deemed clinically appropriate.                                 Electronically signed by:  PRISCILLA Bustillo MD  6/30/2017 12:40  PM CDT Workstation: [id number]    XR Chest 1 View [[id number]] Collected:  06/30/17 1349     Updated:  06/30/17 1419    Narrative:       [facility] [place]    Patient Name: LAMONT FISHER    ORDERING: ORLANDO SORIANO     ATTENDING: ORLANDO SORIANO     REFERRING: ORLANDO SORIANO    -----------------------    PROCEDURE: Portable chest x-ray    TECHNIQUE: Single AP view of the chest    COMPARISON: 6/23/2017, 12/3/2015    HISTORY: SOA    FINDINGS:     Life-support devices: None    Lungs/pleura: No overt pulmonary vascular congestion, focal  pulmonary parenchymal opacity, pleural effusion or pneumothorax.    Heart, hilar and mediastinal structures:  Enlargement of the  right hilar shadow is unchanged since 12/3/2015 and likely  reflects and enlarged right pulmonary artery. Cardiac silhouette  is normal in size.    Multiple old healed bilateral rib fractures are noted. Ununited  old left clavicular fracture noted. Old healed right clavicular  fracture noted. Ununited right humeral fracture unchanged, screws  noted in the proximal left humerus.      Impression:        CONCLUSION:  No acute pulmonary disease.    Electronically signed by:  Jose Boudreaux MD  6/30/2017 2:18 PM CDT  Workstation: [id number]                           Urine Culture   Date Value Ref Range Status   06/30/2017 Culture in progress  Preliminary           Medication Review:   Current Facility-Administered Medications   Medication Dose Route Frequency Provider Last Rate Last Dose   • acetaminophen (TYLENOL) tablet 650 mg  650 mg Oral Q6H PRN Santiago Marinelli MD       • clotrimazole (LOTRIMIN) 1 % external solution   Topical Q12H Armando Richard MD       • fluconazole (DIFLUCAN) tablet 200 mg  200 mg Oral Daily Armando Richard MD       • furosemide (LASIX) injection 60 mg  60 mg Intravenous Q12H Santiago Marinelli MD   60 mg at 07/01/17 0545   • gabapentin (NEURONTIN) capsule 300 mg  300 mg Oral TID Santiago Marinelli MD   300 mg at 06/30/17 2134   • hydrALAZINE (APRESOLINE) injection 10 mg  10 mg Intravenous Q6H PRN Santiago Marinelli MD       • ipratropium-albuterol (DUO-NEB) nebulizer solution 3 mL  3 mL Nebulization 4x Daily - RT Santiago Marinelli MD   3 mL at 07/01/17 0742   • lactulose (CHRONULAC) 10 GM/15ML solution 30 g  30 g Oral TID Santiago Marinelli MD   30 g at 06/30/17 2135   • Morphine sulfate (PF) injection 1 mg  1 mg Intravenous Q4H PRN Santiago Marinelli MD   1 mg at 06/30/17 2142   • ondansetron (ZOFRAN) injection 4 mg  4 mg Intravenous Q6H PRN Santiago Marinelli MD       • pantoprazole (PROTONIX) EC tablet 40 mg  40 mg Oral Daily Santiago Marinelli MD       • Pharmacy to dose vancomycin   Does not apply Continuous PRN Santiago Marinelli MD       • potassium chloride 10 mEq in 100 mL IVPB  10 mEq Intravenous Q1H PRN Santiago Marinelli  mL/hr at 07/01/17 0312 10 mEq at 07/01/17 0312   • sodium chloride 0.9 % flush 10 mL  10 mL Intravenous PRN KALEB Flores   10 mL at 06/30/17 1229   • sodium chloride 0.9 % flush 10 mL  10 mL Intravenous PRN Samer MD Isis   10 mL at 06/30/17 5337   • spironolactone (ALDACTONE)  tablet 50 mg  50 mg Oral Daily Jessir MD Isis   50 mg at 06/30/17 1741   • temazepam (RESTORIL) capsule 15 mg  15 mg Oral Nightly PRN Jessir MD Isis   15 mg at 06/30/17 2142   • vancomycin (VANCOCIN) 1,500 mg in sodium chloride 0.9 % 500 mL IVPB  1,500 mg Intravenous Q18H Samer MD Isis             Assessment/Plan   1.  Cellulitis right lower extremity: Continue broad-spectrum antibiotics.  Check duplex ultrasound to rule out DVT.  2.  Candiduria: Begin oral Diflucan.  Urine cultures are pending.  3.  History of liver cirrhosis: This accounts for the elevated LFTs and thrombocytopenia.  Continue lactulose and Aldactone.  4.  Tinea corporis: Prescribe local application of antifungal cream.  5.  Deconditioning: Physiotherapy is ongoing.  6.  Continue Protonix/ TEDs for for GI and DVT prophylaxis respectively.  7.  Anticipate discharge in 2-3 days.        Armando Richard MD  07/01/17      EMR Dragon/Transcription disclaimer:   Much of this encounter note is an electronic transcription/translation of spoken language to printed text. The electronic translation of spoken language may permit erroneous, or at times, nonsensical words or phrases to be inadvertently transcribed; Although I have reviewed the note for such errors, some may still exist.

## 2017-07-01 NOTE — PLAN OF CARE
Problem: Patient Care Overview (Adult)  Goal: Plan of Care Review  Outcome: Ongoing (interventions implemented as appropriate)    07/01/17 1601   Coping/Psychosocial Response Interventions   Plan Of Care Reviewed With patient   Outcome Evaluation   Outcome Summary/Follow up Plan Initial OT evaluation completed. Pt demonstrates decreased ROM R shoulder as well as decreased BUE strength. He performed supine-sit SBA with bedrail/HOB up & tolerated sitting EOB x10 min. Pt was able to tolerate leaning forward for RLE weight bearing EOB but deferred standing this date. He would benefit from skilled OT to increase independence, strength, & ax tolerance. Pt will require placement for further rehab prior to discharge home.         Problem: Inpatient Occupational Therapy  Goal: Transfer Training Goal 1 LTG- OT  Outcome: Ongoing (interventions implemented as appropriate)    07/01/17 1601   Transfer Training OT LTG   Transfer Training OT LTG, Date Established 07/01/17   Transfer Training OT LTG, Time to Achieve 2 wks   Transfer Training OT LTG, Activity Type toilet   Transfer Training OT LTG, [place] Level minimum assist (75% patient effort)   Transfer Training OT LTG, Assist Device commode, bedside       Goal: Static Standing Balance Goal OT- STG  Outcome: Ongoing (interventions implemented as appropriate)    07/01/17 1601   Static Standing Balance OT STG   Static Standing Balance OT STG, Date Established 07/01/17   Static Standing Balance OT STG, Time to Achieve 5 - 7 days   Static Standing Balance OT STG, [place] Level minimum assist (75% patient effort)   Static Standing Balance OT STG, Assist Device assistive device   Static Standing Balance OT STG, Additional Goal 30 sec       Goal: Bathing Goal LTG- OT  Outcome: Ongoing (interventions implemented as appropriate)    07/01/17 1601   Bathing OT LTG   Bathing Goal OT LTG, Date Established 07/01/17   Bathing Goal OT LTG, Time to Achieve 2 wks   Bathing Goal OT LTG,  Activity Type upper body bathing;lower body bathing   Bathing Goal OT LTG, [place] Level minimum assist (75% patient effort)       Goal: Grooming Goal STG- OT  Outcome: Ongoing (interventions implemented as appropriate)    07/01/17 1601   Grooming OT STG   Grooming Goal OT STG, Date Established 07/01/17   Grooming Goal OT STG, Time to Achieve 5 - 7 days   Grooming Goal OT STG, [place] Level independent   Grooming Goal OT STG, Position sitting, edge of bed

## 2017-07-01 NOTE — PROGRESS NOTES
TWO PATIENT IDENTIFIERS WERE USED. CONSENT WAS SIGNED PER PATIENT EDUCATION MATERIAL WAS GIVEN TO PATIENT AND / OR FAMILY. THE PATIENT WAS DRAPED WITH FULL BODY DRAPE AND PATIENT'SLEFT   ARM WAS PREPPED WITH CHLORAPREP.  ULTRASOUND WAS USED TO LOCALIZE THELEFT BRACHIAL VEIN. SUBCUTANEOUS TISSUE AT THE CATHETER SITE WAS INFILTRATED WITH 2% LIDOCAINE. UNDER ULTRASOUND GUIDANCE, THE VEIN WAS ACCESSED WITH A 21GAUGE  NEEDLE. AN 0.018 WIRE WAS THEN THREADED THROUGH THE NEEDLE INTO THE CENTRAL VENOUS SYSTEM. THE 21GAUGE  NEEDLE WAS REMOVED AND A 6 FRENCHPEEL AWAY SHEATH WAS PLACED OVER THE WIRE. THE PICC LINE CATHETER WAS CUT AT 44 CM. THE PICC LINE CATHETER WAS THEN PLACED OVER THE WIRE INTO THE VEIN, THE SHEATH WAS PEELED AWAY,WIRE WAS REMOVED. CATHETER WAS FLUSHED WITH NORMAL SALINE AND TIPS APPLIED. BIOPATCH PLACED. CATHETER SECURED WITHSTATLOCK  AND TEGADERM. PATIENT TOLERATED PROCEDURE WELL. THIS WAS DONE IN   ANGIOSUITE      IMPRESSION: SUCCESSFUL PLACEMENT OF TRIPLE LUMEN PICC        Rob Daly  7/1/2017  11:22 AM

## 2017-07-01 NOTE — PLAN OF CARE
Problem: Patient Care Overview (Adult)  Goal: Plan of Care Review  Outcome: Ongoing (interventions implemented as appropriate)    Problem: Infection, Risk/Actual (Adult)  Goal: Identify Related Risk Factors and Signs and Symptoms  Outcome: Outcome(s) achieved Date Met:  07/01/17  Goal: Infection Prevention/Resolution  Outcome: Ongoing (interventions implemented as appropriate)    Problem: Fall Risk (Adult)  Goal: Identify Related Risk Factors and Signs and Symptoms  Outcome: Outcome(s) achieved Date Met:  07/01/17  Goal: Absence of Falls  Outcome: Ongoing (interventions implemented as appropriate)    Problem: Pain, Acute (Adult)  Goal: Identify Related Risk Factors and Signs and Symptoms  Outcome: Outcome(s) achieved Date Met:  07/01/17  Goal: Acceptable Pain Control/Comfort Level  Outcome: Ongoing (interventions implemented as appropriate)

## 2017-07-01 NOTE — PLAN OF CARE
Problem: Patient Care Overview (Adult)  Goal: Plan of Care Review  Outcome: Ongoing (interventions implemented as appropriate)    07/01/17 1309   Coping/Psychosocial Response Interventions   Plan Of Care Reviewed With patient   Outcome Evaluation   Outcome Summary/Follow up Plan PT evaluation completed today. Pt presents with sig decrease in UE/LE strength, increased edema BLEs and pain limiting ability to perform all mobility. Pt recently d/c from hospital and was only home 2 days before returning. He says that when he got home from last admission, it took 3 people to transfer into his recliner and then he did not leave recliner for 2 days 2* weakness. Prior to initial admission pt reports being indep with bed mobility and transfers in/out of w/c. He will need placement for further rehab prior to d/c home.          Problem: Inpatient Physical Therapy  Goal: Bed Mobility Goal LTG- PT  Outcome: Ongoing (interventions implemented as appropriate)    07/01/17 1309   Bed Mobility PT LTG   Bed Mobility PT LTG, Date Established 07/01/17   Bed Mobility PT LTG, Time to Achieve 2 wks   Bed Mobility PT LTG, Activity Type roll left/roll right;supine to sit/sit to supine   Bed Mobility PT LTG, [place] Level minimum assist (75% patient effort)   Bed Mobility PT Goal LTG, Assist Device bed rails       Goal: Transfer Training Goal 1 LTG- PT  Outcome: Ongoing (interventions implemented as appropriate)    07/01/17 1309   Transfer Training PT LTG   Transfer Training PT LTG, Date Established 07/01/17   Transfer Training PT LTG, Time to Achieve 2 wks   Transfer Training PT LTG, Activity Type bed to chair /chair to bed;other (see comments)  (w/c)   Transfer Training PT LTG, [place] Level moderate assist (50% patient effort)   Transfer Training PT LTG, Assist Device other (see comments)  (slide board prn)   Transfer Training PT LTG, Additional Goal scoot or squat pivot transfer       Goal: Static Sitting Balance Goal STG-  PT  Outcome: Ongoing (interventions implemented as appropriate)    07/01/17 7475   Static Sitting Balance PT STG   Static Sitting Balance PT STG, Date Established 07/01/17   Static Sitting Balance PT STG, Time to Achieve 2 - 3 days   Static Sitting Balance PT STG, [place] Level contact guard assist   Static Sitting Balance PT STG, Assist Device UE Support

## 2017-07-02 LAB
ALBUMIN SERPL-MCNC: 2.3 G/DL (ref 3.4–4.8)
ALBUMIN/GLOB SERPL: 0.9 G/DL (ref 1.1–1.8)
ALP SERPL-CCNC: 279 U/L (ref 38–126)
ALT SERPL W P-5'-P-CCNC: 146 U/L (ref 21–72)
ANION GAP SERPL CALCULATED.3IONS-SCNC: 5 MMOL/L (ref 5–15)
AST SERPL-CCNC: 125 U/L (ref 17–59)
BASOPHILS # BLD AUTO: 0 10*3/MM3 (ref 0–0.2)
BASOPHILS NFR BLD AUTO: 0 % (ref 0–2)
BILIRUB SERPL-MCNC: 2.4 MG/DL (ref 0.2–1.3)
BUN BLD-MCNC: 25 MG/DL (ref 7–21)
BUN/CREAT SERPL: 18.8 (ref 7–25)
CALCIUM SPEC-SCNC: 8.2 MG/DL (ref 8.4–10.2)
CHLORIDE SERPL-SCNC: 102 MMOL/L (ref 95–110)
CO2 SERPL-SCNC: 28 MMOL/L (ref 22–31)
CREAT BLD-MCNC: 1.33 MG/DL (ref 0.7–1.3)
DEPRECATED RDW RBC AUTO: 67.2 FL (ref 35.1–43.9)
EOSINOPHIL # BLD AUTO: 0.15 10*3/MM3 (ref 0–0.7)
EOSINOPHIL NFR BLD AUTO: 1.1 % (ref 0–7)
ERYTHROCYTE [DISTWIDTH] IN BLOOD BY AUTOMATED COUNT: 19.3 % (ref 11.5–14.5)
GFR SERPL CREATININE-BSD FRML MDRD: 56 ML/MIN/1.73 (ref 60–130)
GLOBULIN UR ELPH-MCNC: 2.6 GM/DL (ref 2.3–3.5)
GLUCOSE BLD-MCNC: 113 MG/DL (ref 60–100)
HCT VFR BLD AUTO: 24.9 % (ref 39–49)
HGB BLD-MCNC: 8.7 G/DL (ref 13.7–17.3)
IMM GRANULOCYTES # BLD: 0.06 10*3/MM3 (ref 0–0.02)
IMM GRANULOCYTES NFR BLD: 0.5 % (ref 0–0.5)
LYMPHOCYTES # BLD AUTO: 0.55 10*3/MM3 (ref 0.6–4.2)
LYMPHOCYTES NFR BLD AUTO: 4.2 % (ref 10–50)
MCH RBC QN AUTO: 34.4 PG (ref 26.5–34)
MCHC RBC AUTO-ENTMCNC: 34.9 G/DL (ref 31.5–36.3)
MCV RBC AUTO: 98.4 FL (ref 80–98)
MONOCYTES # BLD AUTO: 0.81 10*3/MM3 (ref 0–0.9)
MONOCYTES NFR BLD AUTO: 6.2 % (ref 0–12)
NEUTROPHILS # BLD AUTO: 11.54 10*3/MM3 (ref 2–8.6)
NEUTROPHILS NFR BLD AUTO: 88 % (ref 37–80)
PLATELET # BLD AUTO: 41 10*3/MM3 (ref 150–450)
PMV BLD AUTO: 10.7 FL (ref 8–12)
POTASSIUM BLD-SCNC: 3.8 MMOL/L (ref 3.5–5.1)
PROT SERPL-MCNC: 4.9 G/DL (ref 6.3–8.6)
RBC # BLD AUTO: 2.53 10*6/MM3 (ref 4.37–5.74)
SODIUM BLD-SCNC: 135 MMOL/L (ref 137–145)
VANCOMYCIN PEAK SERPL-MCNC: 44.25 MCG/ML (ref 30–40)
VANCOMYCIN TROUGH SERPL-MCNC: 27.26 MCG/ML (ref 10–15)
WBC NRBC COR # BLD: 13.11 10*3/MM3 (ref 3.2–9.8)

## 2017-07-02 PROCEDURE — 80053 COMPREHEN METABOLIC PANEL: CPT | Performed by: INTERNAL MEDICINE

## 2017-07-02 PROCEDURE — 94799 UNLISTED PULMONARY SVC/PX: CPT

## 2017-07-02 PROCEDURE — 82962 GLUCOSE BLOOD TEST: CPT

## 2017-07-02 PROCEDURE — 25010000002 VANCOMYCIN PER 500 MG: Performed by: INTERNAL MEDICINE

## 2017-07-02 PROCEDURE — 25010000002 MORPHINE SULFATE (PF) 2 MG/ML SOLUTION: Performed by: INTERNAL MEDICINE

## 2017-07-02 PROCEDURE — 25010000002 PIPERACILLIN SOD-TAZOBACTAM PER 1 G: Performed by: INTERNAL MEDICINE

## 2017-07-02 PROCEDURE — 80202 ASSAY OF VANCOMYCIN: CPT | Performed by: INTERNAL MEDICINE

## 2017-07-02 PROCEDURE — 25010000002 FUROSEMIDE PER 20 MG: Performed by: INTERNAL MEDICINE

## 2017-07-02 PROCEDURE — 94760 N-INVAS EAR/PLS OXIMETRY 1: CPT

## 2017-07-02 PROCEDURE — 85025 COMPLETE CBC W/AUTO DIFF WBC: CPT | Performed by: INTERNAL MEDICINE

## 2017-07-02 RX ADMIN — MORPHINE SULFATE 1 MG: 2 INJECTION, SOLUTION INTRAMUSCULAR; INTRAVENOUS at 18:19

## 2017-07-02 RX ADMIN — MORPHINE SULFATE 1 MG: 2 INJECTION, SOLUTION INTRAMUSCULAR; INTRAVENOUS at 01:04

## 2017-07-02 RX ADMIN — FLUCONAZOLE 200 MG: 200 TABLET ORAL at 08:54

## 2017-07-02 RX ADMIN — IPRATROPIUM BROMIDE AND ALBUTEROL SULFATE 3 ML: 2.5; .5 SOLUTION RESPIRATORY (INHALATION) at 12:04

## 2017-07-02 RX ADMIN — MORPHINE SULFATE 1 MG: 2 INJECTION, SOLUTION INTRAMUSCULAR; INTRAVENOUS at 21:52

## 2017-07-02 RX ADMIN — MORPHINE SULFATE 1 MG: 2 INJECTION, SOLUTION INTRAMUSCULAR; INTRAVENOUS at 13:30

## 2017-07-02 RX ADMIN — TEMAZEPAM 15 MG: 15 CAPSULE ORAL at 21:52

## 2017-07-02 RX ADMIN — Medication 10 ML: at 16:47

## 2017-07-02 RX ADMIN — SPIRONOLACTONE 50 MG: 50 TABLET, FILM COATED ORAL at 08:54

## 2017-07-02 RX ADMIN — GABAPENTIN 300 MG: 300 CAPSULE ORAL at 08:54

## 2017-07-02 RX ADMIN — LACTULOSE 30 G: 10 SOLUTION ORAL at 16:47

## 2017-07-02 RX ADMIN — FUROSEMIDE 40 MG: 10 INJECTION, SOLUTION INTRAMUSCULAR; INTRAVENOUS at 16:48

## 2017-07-02 RX ADMIN — GABAPENTIN 300 MG: 300 CAPSULE ORAL at 16:48

## 2017-07-02 RX ADMIN — MORPHINE SULFATE 1 MG: 2 INJECTION, SOLUTION INTRAMUSCULAR; INTRAVENOUS at 08:54

## 2017-07-02 RX ADMIN — IPRATROPIUM BROMIDE AND ALBUTEROL SULFATE 3 ML: 2.5; .5 SOLUTION RESPIRATORY (INHALATION) at 08:06

## 2017-07-02 RX ADMIN — CLOTRIMAZOLE: 1 SOLUTION TOPICAL at 08:55

## 2017-07-02 RX ADMIN — CLOTRIMAZOLE: 1 SOLUTION TOPICAL at 21:12

## 2017-07-02 RX ADMIN — IPRATROPIUM BROMIDE AND ALBUTEROL SULFATE 3 ML: 2.5; .5 SOLUTION RESPIRATORY (INHALATION) at 20:02

## 2017-07-02 RX ADMIN — IPRATROPIUM BROMIDE AND ALBUTEROL SULFATE 3 ML: 2.5; .5 SOLUTION RESPIRATORY (INHALATION) at 15:27

## 2017-07-02 RX ADMIN — FUROSEMIDE 40 MG: 10 INJECTION, SOLUTION INTRAMUSCULAR; INTRAVENOUS at 04:54

## 2017-07-02 RX ADMIN — TAZOBACTAM SODIUM AND PIPERACILLIN SODIUM 3.38 G: 375; 3 INJECTION, SOLUTION INTRAVENOUS at 12:15

## 2017-07-02 RX ADMIN — GABAPENTIN 300 MG: 300 CAPSULE ORAL at 21:11

## 2017-07-02 RX ADMIN — Medication 10 ML: at 12:04

## 2017-07-02 RX ADMIN — PANTOPRAZOLE SODIUM 40 MG: 40 TABLET, DELAYED RELEASE ORAL at 08:54

## 2017-07-02 RX ADMIN — ACETAMINOPHEN 650 MG: 325 TABLET ORAL at 08:54

## 2017-07-02 RX ADMIN — TAZOBACTAM SODIUM AND PIPERACILLIN SODIUM 3.38 G: 375; 3 INJECTION, SOLUTION INTRAVENOUS at 23:16

## 2017-07-02 RX ADMIN — LACTULOSE 30 G: 10 SOLUTION ORAL at 08:55

## 2017-07-02 RX ADMIN — Medication 10 ML: at 16:46

## 2017-07-02 RX ADMIN — TAZOBACTAM SODIUM AND PIPERACILLIN SODIUM 3.38 G: 375; 3 INJECTION, SOLUTION INTRAVENOUS at 18:19

## 2017-07-02 RX ADMIN — VANCOMYCIN HYDROCHLORIDE 1500 MG: 1 INJECTION, POWDER, LYOPHILIZED, FOR SOLUTION INTRAVENOUS at 02:51

## 2017-07-02 NOTE — PROGRESS NOTES
[facility] Medicine Services  INPATIENT PROGRESS NOTE     LOS: 1 day   Patient Care Team:  Cj Aguero MD as PCP - General  Shawn Cortez MD as Surgeon (Orthopedic Surgery)  Tushar Becerril DO as Consulting Physician (Gastroenterology)  Josep Colón MD as Consulting Physician (Nephrology)    Chief Complaint:    Patient is seen for follow-up today.  He is feeling better today but continues to complain of periodic pain on the right lower extremity and weakness.  Swelling right lower extremity persist.       Subjective     Interval History:     Patient Complaints: As above    History taken from: Patient    Review of Systems:    Review of Systems   Constitutional: Negative for activity change, appetite change, chills, diaphoresis, fatigue, fever and unexpected weight change.   Respiratory: Negative for apnea, cough, choking, chest tightness, shortness of breath, wheezing and stridor.    Cardiovascular: Positive for leg swelling. Negative for chest pain and palpitations.   Gastrointestinal: Negative for abdominal distention, abdominal pain, anal bleeding, blood in stool, constipation, diarrhea, nausea, rectal pain and vomiting.   Endocrine: Negative for cold intolerance, heat intolerance, polydipsia, polyphagia and polyuria.   Genitourinary: Negative for decreased urine volume, difficulty urinating, dysuria, enuresis, flank pain, frequency, hematuria and urgency.   Musculoskeletal: Negative for arthralgias, back pain, joint swelling, myalgias, neck pain and neck stiffness.   Neurological: Positive for weakness. Negative for dizziness, tremors, seizures, syncope, facial asymmetry, speech difficulty, light-headedness, numbness and headaches.   Psychiatric/Behavioral: Negative for agitation, behavioral problems and confusion.         Objective     Vital Signs  Temp:  [96.6 °F (35.9 °C)-96.8 °F (36 °C)] 96.6 °F (35.9 °C)  Heart Rate:  [69-94] 80  Resp:  [16-20]  18  BP: (132-140)/(74-82) 140/78    Physical Exam:   Physical Exam   Constitutional: He is oriented to person, place, and time. He appears well-developed and well-nourished. No distress.   HENT:   Head: Normocephalic and atraumatic.   Eyes: EOM are normal. Pupils are equal, round, and reactive to light.   Neck: Normal range of motion. Neck supple. No JVD present. No thyromegaly present.   Cardiovascular: Normal rate, regular rhythm and intact distal pulses.  Exam reveals no gallop.    No murmur heard.  Pulmonary/Chest: Effort normal and breath sounds normal. No stridor. No respiratory distress. He has no wheezes. He has no rales.   Abdominal: Soft. Bowel sounds are normal. He exhibits no distension. There is no tenderness. There is no rebound and no guarding.   Musculoskeletal: He exhibits edema.   Neurological: He is alert and oriented to person, place, and time.   Skin: He is not diaphoretic.   Psychiatric: He has a normal mood and affect. His behavior is normal.   Nursing note and vitals reviewed.   Extremities: He has left lower extremity BKA.  There is pitting pedal edema on the right foot  Skin: Whitish plaques on both groins compatible with tinea cruris.       Results Review:         Results from last 7 days  Lab Units 07/02/17  0524 07/01/17  0748 06/30/17  1739 06/30/17  1204 06/28/17  0541 06/27/17  0537 06/26/17  0603   SODIUM mmol/L 135* 136*  --  138 133* 133* 133*   SODIUM, ARTERIAL mmol/L  --   --  138.9  --   --   --   --    POTASSIUM mmol/L 3.8 4.1  --  3.5 3.5 3.0* 3.4*   CHLORIDE mmol/L 102 104  --  103 101 103 103   CO2 mmol/L 28.0 24.0  --  26.0 25.0 23.0 22.0   BUN mg/dL 25* 27*  --  28* 29* 32* 35*   CREATININE mg/dL 1.33* 1.45*  --  1.35* 1.44* 1.45* 1.66*   GLUCOSE mg/dL 113* 147*  --  64 95 99 117*   GLUCOSE, ARTERIAL mmol/L  --   --  109  --   --   --   --    CALCIUM mg/dL 8.2* 8.2*  --  8.3* 8.1* 8.1* 8.1*   BILIRUBIN mg/dL 2.4* 2.7*  --  4.7* 2.6* 2.7* 2.4*   ALK PHOS U/L 279* 263*  --   227* 278* 273* 158*   ALT (SGPT) U/L 146* 148*  --  161* 207* 226* 112*   AST (SGOT) U/L 125* 124*  --  151* 144* 224* 64*               Results from last 7 days  Lab Units 07/02/17  0524 07/01/17  0748 06/30/17  1204 06/28/17  0541 06/27/17  0537 06/26/17  0603   WBC 10*3/mm3 13.11* 10.82* 12.48* 11.96* 11.17* 8.21   HEMOGLOBIN g/dL 8.7* 9.8* 10.3* 9.6* 9.6* 9.5*   HEMATOCRIT % 24.9* 27.8* 29.7* 27.1* 27.3* 27.3*   PLATELETS 10*3/mm3 41* 48* 53* 42* 40* 41*       Lab Results   Component Value Date    CKTOTAL 771 (H) 06/30/2017    CKMB 0.84 06/14/2017    TROPONINI 0.038 (H) 06/15/2017       pH, Arterial   Date Value Ref Range Status   06/30/2017 7.484 (H) 7.350 - 7.450 pH units Final     CO2   Date Value Ref Range Status   07/02/2017 28.0 22.0 - 31.0 mmol/L Final              Imaging Results (last 7 days)     Procedure Component Value Units Date/Time    XR Tibia Fibula 2 View Right [[id number]] Collected:  06/30/17 1159     Updated:  06/30/17 1226    Narrative:         PROCEDURE: Right tibia and fibula 4 views    REASON FOR EXAM: fell    FINDINGS:  No comparison. .  Bony structures of the tibia and  fibula are normal. There is no evidence of fracture or  dislocation. No radiopaque foreign body . Mild diffuse right  lower leg soft tissue swelling and subcutaneous fatty stranding.      Impression:       1.  Mild right lower leg soft tissues swelling and subcutaneous  fatty stranding suspicious for edema and/or cellulitis.  2.  Otherwise unremarkable right tibia and fibula.    Electronically signed by:  Eduard Vigil MD  6/30/2017 12:25 PM CDT  Workstation: [id number]    XR Knee 3 View Right [[id number]] Collected:  06/30/17 1159     Updated:  06/30/17 1241    Narrative:         EXAM:  Radiograph(s), Osseous         REGION:   Knee    SIDE:     Right     VIEWS:   4             INDICATION:    Pain status post fall      COMPARISON:    none            FINDINGS:           No evidence of a fracture or bony malalignment.     No  evidence of osteolytic/osteoblastic disease.     Age-related degenerative changes, with moderate medial, lateral,  and patellofemoral compartment degenerative changes.   Presumed healed or partially healed Osgood-Schlatter defect.                            .        Impression:       CONCLUSION:           1. No gross evidence of acute osseous pathology.              Note:  if pain or symptoms persist beyond reasonable expectations  and follow-up imaging is anticipated,  cross sectional imaging  (MRI) is suggested, as is deemed clinically appropriate.                                 Electronically signed by:  PRISCILLA Bustillo MD  6/30/2017 12:40  PM CDT Workstation: [id number]    XR Chest 1 View [[id number]] Collected:  06/30/17 1349     Updated:  06/30/17 1419    Narrative:       [facility] [place]    Patient Name: LAMONT FISHER    ORDERING: ORLANDO SORIANO     ATTENDING: ORLANDO SORIANO     REFERRING: ORLANDO SORIANO    -----------------------    PROCEDURE: Portable chest x-ray    TECHNIQUE: Single AP view of the chest    COMPARISON: 6/23/2017, 12/3/2015    HISTORY: SOA    FINDINGS:     Life-support devices: None    Lungs/pleura: No overt pulmonary vascular congestion, focal  pulmonary parenchymal opacity, pleural effusion or pneumothorax.    Heart, hilar and mediastinal structures:  Enlargement of the  right hilar shadow is unchanged since 12/3/2015 and likely  reflects and enlarged right pulmonary artery. Cardiac silhouette  is normal in size.    Multiple old healed bilateral rib fractures are noted. Ununited  old left clavicular fracture noted. Old healed right clavicular  fracture noted. Ununited right humeral fracture unchanged, screws  noted in the proximal left humerus.      Impression:       CONCLUSION:  No acute pulmonary disease.    Electronically signed by:  Jose Boudreaux MD  6/30/2017 2:18 PM CDT  Workstation: [id number]                           Urine Culture   Date Value Ref  Range Status   06/30/2017 Culture in progress  Preliminary           Medication Review:   Current Facility-Administered Medications   Medication Dose Route Frequency Provider Last Rate Last Dose   • acetaminophen (TYLENOL) tablet 650 mg  650 mg Oral Q6H PRN Santiago Marinelli MD   650 mg at 07/02/17 0854   • clotrimazole (LOTRIMIN) 1 % external solution   Topical Q12H Armando Richard MD       • fluconazole (DIFLUCAN) tablet 200 mg  200 mg Oral Daily Armando Richard MD   200 mg at 07/02/17 0854   • furosemide (LASIX) injection 40 mg  40 mg Intravenous Q12H Armando Richard MD   40 mg at 07/02/17 0454   • gabapentin (NEURONTIN) capsule 300 mg  300 mg Oral TID Santiago Marinelli MD   300 mg at 07/02/17 0854   • hydrALAZINE (APRESOLINE) injection 10 mg  10 mg Intravenous Q6H PRN Santiago Marinelli MD       • ipratropium-albuterol (DUO-NEB) nebulizer solution 3 mL  3 mL Nebulization 4x Daily - RT Santiago Marinelli MD   3 mL at 07/02/17 0806   • lactulose (CHRONULAC) 30 g  30 g Oral TID Santiago Marinelli MD   30 g at 07/02/17 0855   • Morphine sulfate (PF) injection 1 mg  1 mg Intravenous Q4H PRN Santiago Marinelli MD   1 mg at 07/02/17 0854   • ondansetron (ZOFRAN) injection 4 mg  4 mg Intravenous Q6H PRN Santiago Marinelli MD       • pantoprazole (PROTONIX) EC tablet 40 mg  40 mg Oral Daily Santiago Marinelli MD   40 mg at 07/02/17 0854   • Pharmacy to dose vancomycin   Does not apply Continuous PRN Santiago Marinelli MD       • potassium chloride 10 mEq in 100 mL IVPB  10 mEq Intravenous Q1H PRN Santiago Marinelli  mL/hr at 07/01/17 0312 10 mEq at 07/01/17 0312   • sodium chloride 0.9 % flush 10 mL  10 mL Intravenous PRN KALEB Flores   10 mL at 06/30/17 1229   • sodium chloride 0.9 % flush 10 mL  10 mL Intravenous PRN Santiago Marinelli MD   10 mL at 07/01/17 1749   • spironolactone (ALDACTONE) tablet 50 mg  50 mg Oral Daily Samer MD Isis   50 mg at 07/02/17 0868   • temazepam (RESTORIL) capsule 15 mg  15 mg Oral Nightly PRN Samer  MD Isis   15 mg at 07/01/17 2045   • vancomycin (VANCOCIN) 1,500 mg in sodium chloride 0.9 % 500 mL IVPB  1,500 mg Intravenous Q18H Samer MD Isis   1,500 mg at 07/02/17 0251         Assessment/Plan   1.  Cellulitis right lower extremity: Continue broad-spectrum antibiotics.  Duplex ultrasound was negative for DVT.  2.  Candiduria: Begin oral Diflucan.  Urine cultures showed mixed growth.   3.  History of liver cirrhosis: This accounts for the elevated LFTs and thrombocytopenia.  Continue lactulose and Aldactone.  4.  Tinea cruris:: Continue local application of antifungal cream.  5.  Deconditioning: Physiotherapy is ongoing.  6.  Continue Protonix/ TEDs for for GI and DVT prophylaxis respectively.  7.  Anticipate discharge in 2-3 days.        Armando Richard MD  07/02/17      EMR Dragon/Transcription disclaimer:   Much of this encounter note is an electronic transcription/translation of spoken language to printed text. The electronic translation of spoken language may permit erroneous, or at times, nonsensical words or phrases to be inadvertently transcribed; Although I have reviewed the note for such errors, some may still exist.

## 2017-07-02 NOTE — PLAN OF CARE
Problem: Patient Care Overview (Adult)  Goal: Plan of Care Review  Outcome: Ongoing (interventions implemented as appropriate)    07/02/17 0426   Coping/Psychosocial Response Interventions   Plan Of Care Reviewed With patient   Patient Care Overview   Progress improving   Outcome Evaluation   Outcome Summary/Follow up Plan pt c/o pain early in the shift which was relieved with adminstration of IV analgesics; vital signs stable; will continue to monitor       Goal: Adult Individualization and Mutuality  Outcome: Ongoing (interventions implemented as appropriate)  Goal: Discharge Needs Assessment  Outcome: Ongoing (interventions implemented as appropriate)    Problem: Infection, Risk/Actual (Adult)  Goal: Infection Prevention/Resolution  Outcome: Ongoing (interventions implemented as appropriate)    Problem: Fall Risk (Adult)  Goal: Absence of Falls  Outcome: Ongoing (interventions implemented as appropriate)    Problem: Pain, Acute (Adult)  Goal: Acceptable Pain Control/Comfort Level  Outcome: Ongoing (interventions implemented as appropriate)

## 2017-07-02 NOTE — PROGRESS NOTES
Discharge Planning Assessment  [facility]     Patient Name: Armando Mcmullen  MRN: [id number]  Today's Date: 7/2/2017    Admit Date: 6/30/2017          Discharge Needs Assessment       07/02/17 1609    Living Environment    Lives With child(wallace), adult   son; pt reports no 24/7 care at home    Living Environment    Provides Primary Care For no one, unable/limited ability to care for self    Primary Care Provided By child(wallace) (specify)    Quality Of Family Relationships involved    Able to Return to Prior Living Arrangements other (see comments)   awaiting therapy recommendations.     Discharge Needs Assessment    Concerns To Be Addressed adjustment to diagnosis/illness concerns    Readmission Within The Last 30 Days previous discharge plan unsuccessful    Equipment Needed After Discharge none    Discharge Disposition home healthcare service;skilled nursing facility            Discharge Plan       07/02/17 1610    Case Management/Social Work Plan    Plan HH vs SNF    Additional Comments Pt reports he DOES NOT HAVE 24/7 CARE AT HOME. LSW to review therapy recommendations re: safe d/c plan. Per prior notes, pt had reported that his family would take turns caring for him so that he would have 24/7 care.         Discharge Placement     No information found                Demographic Summary       07/02/17 1607    Referral Information    Admission Type inpatient    Arrived From home healthcare service   [facility]    Contact Information    Permission Granted to Share Information With ;primary care physician    Primary Care Physician Information    Name Liss Daugherty            Functional Status       07/02/17 1608    IADL    Medications independent    Meal Preparation assistive person   Children    Housekeeping assistive person   Children    Laundry assistive person   Children    Shopping assistive person   Children    Oral Care independent            Psychosocial     None            Abuse/Neglect     None             Legal     None            Substance Abuse     None            Patient Forms     None          COLLIN Vasquez

## 2017-07-02 NOTE — PROGRESS NOTES
"Pharmacokinetics by Pharmacy - Vancomycin    Armando Mcmullen is a 52 y.o. male  [Ht: 73\" (185.4 cm); Wt: 237 lb 9.6 oz (108 kg)]    Estimated Creatinine Clearance: 82.8 mL/min (by C-G formula based on Cr of 1.33).   Lab Results   Component Value Date    CREATININE 1.33 (H) 07/02/2017    CREATININE 1.45 (H) 07/01/2017    CREATININE 1.35 (H) 06/30/2017      Lab Results   Component Value Date    WBC 13.11 (H) 07/02/2017    WBC 10.82 (H) 07/01/2017    WBC 12.48 (H) 06/30/2017      Temp Readings from Last 1 Encounters:   07/02/17 96.6 °F (35.9 °C) (Temporal Artery )      Lab Results   Component Value Date    VANCOPEAK 44.25 (H) 07/02/2017    VANCOTROUGH 30.58 (H) 06/17/2017    VANCORANDOM 18.37 06/18/2017         Culture Results:  Microbiology Results (last 10 days)       Procedure Component Value - Date/Time      Urine Culture [[id number]] Collected:  06/30/17 2157    Lab Status:  Final result Specimen:  Urine from Urine, Clean Catch Updated:  07/01/17 1310     Urine Culture Mixed Culture    Narrative:         Specimen contains mixed organisms of questionable pathogenicity which indicates contamination with commensal renetta.  Further identification is unlikely to provide clinically useful information.  Suggest recollection.    Blood Culture [[id number]]  (Normal) Collected:  06/24/17 1044    Lab Status:  Final result Specimen:  Blood from Wrist, Left Updated:  06/29/17 1101     Blood Culture No growth at 5 days    Blood Culture [[id number]]  (Normal) Collected:  06/24/17 1044    Lab Status:  Final result Specimen:  Blood from Arm, Left Updated:  06/29/17 1101     Blood Culture No growth at 5 days               Indication for use: cellulitis    Current Vancomycin Dose:  1500 mg IVPB every 18 hours, day 3 of therapy.      Assessment/Plan:  Reviewed above labs and cultures.   Vancomycin peak level 44.25 (goal 30-40). Level was drawn at appropriate time. Level above goal. Trough level already ordered for tonight. Will evaluate " level tonight. Pharmacy will continue to monitor renal function and adjust dose accordingly.    Jenny Adamson, [facility]   07/02/17 10:29 AM

## 2017-07-03 LAB
ABO GROUP BLD: NORMAL
ALBUMIN SERPL-MCNC: 2.3 G/DL (ref 3.4–4.8)
ALBUMIN/GLOB SERPL: 0.9 G/DL (ref 1.1–1.8)
ALP SERPL-CCNC: 269 U/L (ref 38–126)
ALT SERPL W P-5'-P-CCNC: 145 U/L (ref 21–72)
ANION GAP SERPL CALCULATED.3IONS-SCNC: 8 MMOL/L (ref 5–15)
ANISOCYTOSIS BLD QL: ABNORMAL
AST SERPL-CCNC: 93 U/L (ref 17–59)
BASOPHILS # BLD AUTO: 0.01 10*3/MM3 (ref 0–0.2)
BASOPHILS NFR BLD AUTO: 0.1 % (ref 0–2)
BILIRUB SERPL-MCNC: 3.1 MG/DL (ref 0.2–1.3)
BLD GP AB SCN SERPL QL: NEGATIVE
BUN BLD-MCNC: 22 MG/DL (ref 7–21)
BUN/CREAT SERPL: 12.9 (ref 7–25)
CALCIUM SPEC-SCNC: 8.3 MG/DL (ref 8.4–10.2)
CHLORIDE SERPL-SCNC: 99 MMOL/L (ref 95–110)
CO2 SERPL-SCNC: 28 MMOL/L (ref 22–31)
CREAT BLD-MCNC: 1.7 MG/DL (ref 0.7–1.3)
DEPRECATED RDW RBC AUTO: 74.6 FL (ref 35.1–43.9)
EOSINOPHIL # BLD AUTO: 0.58 10*3/MM3 (ref 0–0.7)
EOSINOPHIL # BLD MANUAL: 0.41 10*3/MM3 (ref 0–0.7)
EOSINOPHIL NFR BLD AUTO: 4.2 % (ref 0–7)
EOSINOPHIL NFR BLD MANUAL: 3 % (ref 0–7)
ERYTHROCYTE [DISTWIDTH] IN BLOOD BY AUTOMATED COUNT: 21.1 % (ref 11.5–14.5)
GFR SERPL CREATININE-BSD FRML MDRD: 42 ML/MIN/1.73 (ref 60–130)
GLOBULIN UR ELPH-MCNC: 2.7 GM/DL (ref 2.3–3.5)
GLUCOSE BLD-MCNC: 101 MG/DL (ref 60–100)
GLUCOSE BLDC GLUCOMTR-MCNC: 145 MG/DL (ref 70–130)
GLUCOSE BLDC GLUCOMTR-MCNC: 150 MG/DL (ref 70–130)
GLUCOSE BLDC GLUCOMTR-MCNC: 158 MG/DL (ref 70–130)
GLUCOSE BLDC GLUCOMTR-MCNC: 164 MG/DL (ref 70–130)
GLUCOSE BLDC GLUCOMTR-MCNC: 93 MG/DL (ref 70–130)
HCT VFR BLD AUTO: 26.5 % (ref 39–49)
HGB BLD-MCNC: 8.9 G/DL (ref 13.7–17.3)
IMM GRANULOCYTES # BLD: 0.12 10*3/MM3 (ref 0–0.02)
IMM GRANULOCYTES NFR BLD: 0.9 % (ref 0–0.5)
LYMPHOCYTES # BLD AUTO: 1.82 10*3/MM3 (ref 0.6–4.2)
LYMPHOCYTES # BLD MANUAL: 0.96 10*3/MM3 (ref 0.6–4.2)
LYMPHOCYTES NFR BLD AUTO: 13.3 % (ref 10–50)
LYMPHOCYTES NFR BLD MANUAL: 4 % (ref 0–12)
LYMPHOCYTES NFR BLD MANUAL: 7 % (ref 10–50)
Lab: NORMAL
MCH RBC QN AUTO: 33.8 PG (ref 26.5–34)
MCHC RBC AUTO-ENTMCNC: 33.6 G/DL (ref 31.5–36.3)
MCV RBC AUTO: 100.8 FL (ref 80–98)
MONOCYTES # BLD AUTO: 0.55 10*3/MM3 (ref 0–0.9)
MONOCYTES # BLD AUTO: 1.01 10*3/MM3 (ref 0–0.9)
MONOCYTES NFR BLD AUTO: 7.4 % (ref 0–12)
NEUTROPHILS # BLD AUTO: 10.16 10*3/MM3 (ref 2–8.6)
NEUTROPHILS # BLD AUTO: 11.78 10*3/MM3 (ref 2–8.6)
NEUTROPHILS NFR BLD AUTO: 74.1 % (ref 37–80)
NEUTROPHILS NFR BLD MANUAL: 78 % (ref 37–80)
NEUTS BAND NFR BLD MANUAL: 8 % (ref 0–5)
PLATELET # BLD AUTO: 37 10*3/MM3 (ref 150–450)
PMV BLD AUTO: ABNORMAL FL (ref 8–12)
POTASSIUM BLD-SCNC: 3.7 MMOL/L (ref 3.5–5.1)
PROT SERPL-MCNC: 5 G/DL (ref 6.3–8.6)
RBC # BLD AUTO: 2.63 10*6/MM3 (ref 4.37–5.74)
RH BLD: NEGATIVE
SMALL PLATELETS BLD QL SMEAR: ABNORMAL
SODIUM BLD-SCNC: 135 MMOL/L (ref 137–145)
WBC MORPH BLD: NORMAL
WBC NRBC COR # BLD: 13.7 10*3/MM3 (ref 3.2–9.8)

## 2017-07-03 PROCEDURE — 82962 GLUCOSE BLOOD TEST: CPT

## 2017-07-03 PROCEDURE — 94799 UNLISTED PULMONARY SVC/PX: CPT

## 2017-07-03 PROCEDURE — 85007 BL SMEAR W/DIFF WBC COUNT: CPT | Performed by: INTERNAL MEDICINE

## 2017-07-03 PROCEDURE — P9035 PLATELET PHERES LEUKOREDUCED: HCPCS

## 2017-07-03 PROCEDURE — 86900 BLOOD TYPING SEROLOGIC ABO: CPT | Performed by: INTERNAL MEDICINE

## 2017-07-03 PROCEDURE — 25010000002 PIPERACILLIN SOD-TAZOBACTAM PER 1 G: Performed by: INTERNAL MEDICINE

## 2017-07-03 PROCEDURE — 85025 COMPLETE CBC W/AUTO DIFF WBC: CPT | Performed by: INTERNAL MEDICINE

## 2017-07-03 PROCEDURE — 25010000002 MORPHINE SULFATE (PF) 2 MG/ML SOLUTION: Performed by: HOSPITALIST

## 2017-07-03 PROCEDURE — 86850 RBC ANTIBODY SCREEN: CPT | Performed by: INTERNAL MEDICINE

## 2017-07-03 PROCEDURE — 86901 BLOOD TYPING SEROLOGIC RH(D): CPT | Performed by: INTERNAL MEDICINE

## 2017-07-03 PROCEDURE — 97110 THERAPEUTIC EXERCISES: CPT

## 2017-07-03 PROCEDURE — 80053 COMPREHEN METABOLIC PANEL: CPT | Performed by: INTERNAL MEDICINE

## 2017-07-03 PROCEDURE — 36430 TRANSFUSION BLD/BLD COMPNT: CPT

## 2017-07-03 PROCEDURE — 25010000002 MORPHINE SULFATE (PF) 2 MG/ML SOLUTION: Performed by: INTERNAL MEDICINE

## 2017-07-03 PROCEDURE — 97530 THERAPEUTIC ACTIVITIES: CPT

## 2017-07-03 PROCEDURE — 25010000002 FUROSEMIDE PER 20 MG: Performed by: INTERNAL MEDICINE

## 2017-07-03 PROCEDURE — 94760 N-INVAS EAR/PLS OXIMETRY 1: CPT

## 2017-07-03 RX ORDER — MORPHINE SULFATE 2 MG/ML
1 INJECTION, SOLUTION INTRAMUSCULAR; INTRAVENOUS EVERY 4 HOURS PRN
Status: DISCONTINUED | OUTPATIENT
Start: 2017-07-03 | End: 2017-07-12 | Stop reason: HOSPADM

## 2017-07-03 RX ADMIN — LACTULOSE 30 G: 10 SOLUTION ORAL at 21:48

## 2017-07-03 RX ADMIN — FUROSEMIDE 40 MG: 10 INJECTION, SOLUTION INTRAMUSCULAR; INTRAVENOUS at 04:45

## 2017-07-03 RX ADMIN — GABAPENTIN 300 MG: 300 CAPSULE ORAL at 08:14

## 2017-07-03 RX ADMIN — TEMAZEPAM 15 MG: 15 CAPSULE ORAL at 21:43

## 2017-07-03 RX ADMIN — SPIRONOLACTONE 50 MG: 50 TABLET, FILM COATED ORAL at 08:14

## 2017-07-03 RX ADMIN — FUROSEMIDE 40 MG: 10 INJECTION, SOLUTION INTRAMUSCULAR; INTRAVENOUS at 17:26

## 2017-07-03 RX ADMIN — MORPHINE SULFATE 1 MG: 2 INJECTION, SOLUTION INTRAMUSCULAR; INTRAVENOUS at 15:01

## 2017-07-03 RX ADMIN — GABAPENTIN 300 MG: 300 CAPSULE ORAL at 21:43

## 2017-07-03 RX ADMIN — MORPHINE SULFATE 1 MG: 2 INJECTION, SOLUTION INTRAMUSCULAR; INTRAVENOUS at 22:16

## 2017-07-03 RX ADMIN — CLOTRIMAZOLE: 1 SOLUTION TOPICAL at 21:53

## 2017-07-03 RX ADMIN — TAZOBACTAM SODIUM AND PIPERACILLIN SODIUM 3.38 G: 375; 3 INJECTION, SOLUTION INTRAVENOUS at 11:47

## 2017-07-03 RX ADMIN — LACTULOSE 30 G: 10 SOLUTION ORAL at 08:17

## 2017-07-03 RX ADMIN — GABAPENTIN 300 MG: 300 CAPSULE ORAL at 15:01

## 2017-07-03 RX ADMIN — LACTULOSE 30 G: 10 SOLUTION ORAL at 15:02

## 2017-07-03 RX ADMIN — TAZOBACTAM SODIUM AND PIPERACILLIN SODIUM 3.38 G: 375; 3 INJECTION, SOLUTION INTRAVENOUS at 17:26

## 2017-07-03 RX ADMIN — IPRATROPIUM BROMIDE AND ALBUTEROL SULFATE 3 ML: 2.5; .5 SOLUTION RESPIRATORY (INHALATION) at 19:51

## 2017-07-03 RX ADMIN — MORPHINE SULFATE 1 MG: 2 INJECTION, SOLUTION INTRAMUSCULAR; INTRAVENOUS at 08:14

## 2017-07-03 RX ADMIN — TAZOBACTAM SODIUM AND PIPERACILLIN SODIUM 3.38 G: 375; 3 INJECTION, SOLUTION INTRAVENOUS at 08:00

## 2017-07-03 RX ADMIN — IPRATROPIUM BROMIDE AND ALBUTEROL SULFATE 3 ML: 2.5; .5 SOLUTION RESPIRATORY (INHALATION) at 07:19

## 2017-07-03 RX ADMIN — FLUCONAZOLE 200 MG: 200 TABLET ORAL at 08:14

## 2017-07-03 RX ADMIN — PANTOPRAZOLE SODIUM 40 MG: 40 TABLET, DELAYED RELEASE ORAL at 08:14

## 2017-07-03 RX ADMIN — CLOTRIMAZOLE: 1 SOLUTION TOPICAL at 08:15

## 2017-07-03 RX ADMIN — IPRATROPIUM BROMIDE AND ALBUTEROL SULFATE 3 ML: 2.5; .5 SOLUTION RESPIRATORY (INHALATION) at 14:42

## 2017-07-03 NOTE — SIGNIFICANT NOTE
07/03/17 1457   Rehab Treatment   Discipline physical therapy assistant   Treatment Not Performed patient/family decline treatment, pt not feeling well

## 2017-07-03 NOTE — PROGRESS NOTES
Risks and benefits of platelets transfusion has been explained to the patient patient agrees to get a transfusion

## 2017-07-03 NOTE — PLAN OF CARE
Problem: Patient Care Overview (Adult)  Goal: Plan of Care Review  Outcome: Ongoing (interventions implemented as appropriate)    07/03/17 0648 07/03/17 0815 07/03/17 1807   Coping/Psychosocial Response Interventions   Plan Of Care Reviewed With --  patient --    Patient Care Overview   Progress improving --  --    Outcome Evaluation   Outcome Summary/Follow up Plan --  --  Pt c/o pain throughout the day. Vital signs stable; will continue to monitor.        Goal: Adult Individualization and Mutuality  Outcome: Ongoing (interventions implemented as appropriate)  Goal: Discharge Needs Assessment  Outcome: Ongoing (interventions implemented as appropriate)    Problem: Infection, Risk/Actual (Adult)  Goal: Infection Prevention/Resolution  Outcome: Ongoing (interventions implemented as appropriate)    Problem: Fall Risk (Adult)  Goal: Absence of Falls  Outcome: Ongoing (interventions implemented as appropriate)    Problem: Pain, Acute (Adult)  Goal: Acceptable Pain Control/Comfort Level  Outcome: Ongoing (interventions implemented as appropriate)

## 2017-07-03 NOTE — PROGRESS NOTES
[facility] Medicine Services  INPATIENT PROGRESS NOTE    Length of Stay: 2  Date of Admission: 6/30/2017  Primary Care Physician: Cj Aguero MD    Subjective   Chief Complaint: Still weak is not complaining of chest pain at this time  HPI:      Review of Systems   Heart S1-S2 regular rate and rhythm    All pertinent negatives and positives are as above. All other systems have been reviewed and are negative unless otherwise stated.     Objective    Temp:  [96.5 °F (35.8 °C)-99.1 °F (37.3 °C)] 98 °F (36.7 °C)  Heart Rate:  [] 114  Resp:  [16-20] 18  BP: (111-150)/(65-85) 150/85    Physical Exam  Heart S1-S2 regular rate and rhythm  Chest decreased breath sounds bilaterally  Abdomen soft and nontender  Extremities no tenderness      Results Review:  I have reviewed the labs, radiology results, and diagnostic studies.    Laboratory Data:     Results from last 7 days  Lab Units 07/03/17  0544 07/02/17  0524 07/01/17  0748   SODIUM mmol/L 135* 135* 136*   POTASSIUM mmol/L 3.7 3.8 4.1   CHLORIDE mmol/L 99 102 104   CO2 mmol/L 28.0 28.0 24.0   BUN mg/dL 22* 25* 27*   CREATININE mg/dL 1.70* 1.33* 1.45*   GLUCOSE mg/dL 101* 113* 147*   CALCIUM mg/dL 8.3* 8.2* 8.2*   BILIRUBIN mg/dL 3.1* 2.4* 2.7*   ALK PHOS U/L 269* 279* 263*   ALT (SGPT) U/L 145* 146* 148*   AST (SGOT) U/L 93* 125* 124*   ANION GAP mmol/L 8.0 5.0 8.0     Estimated Creatinine Clearance: 64.8 mL/min (by C-G formula based on Cr of 1.7).            Results from last 7 days  Lab Units 07/03/17  0544 07/02/17  0524 07/01/17  0748 06/30/17  1204 06/28/17  0541   WBC 10*3/mm3 13.70* 13.11* 10.82* 12.48* 11.96*   HEMOGLOBIN g/dL 8.9* 8.7* 9.8* 10.3* 9.6*   HEMATOCRIT % 26.5* 24.9* 27.8* 29.7* 27.1*   PLATELETS 10*3/mm3 37* 41* 48* 53* 42*           Culture Data:   No results found for: BLOODCX  Urine Culture   Date Value Ref Range Status   06/30/2017 Mixed Culture  Final     No results found for:  RESPCX  No results found for: WOUNDCX  No results found for: STOOLCX  No components found for: BODYFLD    Radiology Data:   Imaging Results (last 24 hours)     ** No results found for the last 24 hours. **          I have reviewed the patient current medications.     Assessment/Plan     Hospital Problem List     Cellulitis of right lower extremity          Plan:  1. Cellulitis right lower extremity: Continue broad-spectrum antibiotics. Duplex ultrasound was negative for DVT.  2. Candiduria: Begin oral Diflucan. Urine cultures showed mixed growth.   3. History of liver cirrhosis: This accounts for the elevated LFTs and thrombocytopenia. Continue lactulose and Aldactone.  4. Tinea cruris:: Continue local application of antifungal cream.  5. Deconditioning: Physiotherapy is ongoing.  6. Continue Protonix/ TEDs for for GI and DVT prophylaxis respectively.  7.  Patient continued to be weak patient may need to go to rehabilitation once discharged

## 2017-07-03 NOTE — PLAN OF CARE
Problem: Patient Care Overview (Adult)  Goal: Plan of Care Review  Outcome: Ongoing (interventions implemented as appropriate)    07/03/17 0648   Coping/Psychosocial Response Interventions   Plan Of Care Reviewed With patient   Patient Care Overview   Progress improving   Outcome Evaluation   Outcome Summary/Follow up Plan pt slept well tonight; vital signs stable; will continue to monitor       Goal: Adult Individualization and Mutuality  Outcome: Ongoing (interventions implemented as appropriate)  Goal: Discharge Needs Assessment  Outcome: Ongoing (interventions implemented as appropriate)    Problem: Infection, Risk/Actual (Adult)  Goal: Infection Prevention/Resolution  Outcome: Ongoing (interventions implemented as appropriate)    Problem: Fall Risk (Adult)  Goal: Absence of Falls  Outcome: Ongoing (interventions implemented as appropriate)    Problem: Pain, Acute (Adult)  Goal: Acceptable Pain Control/Comfort Level  Outcome: Ongoing (interventions implemented as appropriate)

## 2017-07-03 NOTE — PLAN OF CARE
Problem: Patient Care Overview (Adult)  Goal: Plan of Care Review  Outcome: Ongoing (interventions implemented as appropriate)    07/03/17 0648 07/03/17 0815 07/03/17 1345   Coping/Psychosocial Response Interventions   Plan Of Care Reviewed With --  patient --    Patient Care Overview   Progress improving --  --    Outcome Evaluation   Outcome Summary/Follow up Plan --  --  Pt progress will be slow as pt R arm immobile 2' severe pain from unhealed old fx, no OT goals met to date. Recommend SNF placement upon d/c        Goal: Discharge Needs Assessment  Outcome: Ongoing (interventions implemented as appropriate)    07/01/17 0905 07/02/17 1609   Discharge Needs Assessment   Concerns To Be Addressed --  adjustment to diagnosis/illness concerns   Readmission Within The Last 30 Days --  previous discharge plan unsuccessful   Equipment Needed After Discharge --  none   Discharge Disposition --  home healthcare service;skilled nursing facility   Living Environment   Transportation Available family or friend will provide  (or PACS) --    Self-Care   Equipment Currently Used at Home wheelchair;commode;walker, susan;walker, standard;bath bench  (prior to last admit was indep with stand pivot transfers) --          Problem: Inpatient Occupational Therapy  Goal: Transfer Training Goal 1 LTG- OT  Outcome: Ongoing (interventions implemented as appropriate)    07/01/17 1601 07/03/17 1345   Transfer Training OT LTG   Transfer Training OT LTG, Date Established 07/01/17 --    Transfer Training OT LTG, Time to Achieve 2 wks --    Transfer Training OT LTG, Activity Type toilet --    Transfer Training OT LTG, [place] Level minimum assist (75% patient effort) --    Transfer Training OT LTG, Assist Device commode, bedside --    Transfer Training OT LTG, Date Goal Reviewed --  07/03/17       Goal: Static Standing Balance Goal OT- STG  Outcome: Ongoing (interventions implemented as appropriate)    07/01/17 1601 07/03/17 1345   Static  Standing Balance OT STG   Static Standing Balance OT STG, Date Established 07/01/17 --    Static Standing Balance OT STG, Time to Achieve 5 - 7 days --    Static Standing Balance OT STG, [place] Level minimum assist (75% patient effort) --    Static Standing Balance OT STG, Assist Device assistive device --    Static Standing Balance OT STG, Additional Goal 30 sec --    Static Standing Balance OT STG, Date Goal Reviewed --  07/03/17       Goal: Bathing Goal LTG- OT  Outcome: Ongoing (interventions implemented as appropriate)    07/01/17 1601 07/03/17 1345   Bathing OT LTG   Bathing Goal OT LTG, Date Established 07/01/17 --    Bathing Goal OT LTG, Time to Achieve 2 wks --    Bathing Goal OT LTG, Activity Type upper body bathing;lower body bathing --    Bathing Goal OT LTG, [place] Level minimum assist (75% patient effort) --    Bathing Goal OT LTG, Date Goal Reviewed --  07/03/17       Goal: Grooming Goal STG- OT  Outcome: Ongoing (interventions implemented as appropriate)    07/01/17 1601 07/03/17 1345   Grooming OT STG   Grooming Goal OT STG, Date Established 07/01/17 --    Grooming Goal OT STG, Time to Achieve 5 - 7 days --    Grooming Goal OT STG, [place] Level independent --    Grooming Goal OT STG, Position sitting, edge of bed --    Grooming Goal OT STG, Date Goal Reviewed --  07/03/17

## 2017-07-03 NOTE — THERAPY TREATMENT NOTE
Acute Care - Occupational Therapy Treatment Note  [facility]     Patient Name: Armando Mcmullen  : 1965  MRN: [id number]  Today's Date: 7/3/2017  Onset of Illness/Injury or Date of Surgery Date: 17  Date of Referral to OT: 17  Referring Physician: Dr. JANE Marinelli      Admit Date: 2017    Visit Dx:     ICD-10-CM ICD-9-CM   1. Cellulitis of right lower extremity L03.115 682.6   2. Impaired functional mobility, balance, gait, and endurance Z74.09 V49.89   3. Impaired mobility and ADLs Z74.09 799.89     Patient Active Problem List   Diagnosis   • Anxiety state   • Back pain   • Chronic hepatitis   • Hepatic cirrhosis   • Depression   • Insomnia   • Essential hypertension   • Hypokalemia   • Anasarca   • Acute hepatic encephalopathy   • Substance abuse   • Acute on chronic renal failure   • Liver failure with hepatic coma   • Osteoarthritis   • Hypersplenism   • Chronic osteomyelitis of right shoulder region   • [place] coma scale total score 3-8   • Anxiety state   • Cellulitis of right lower extremity             Adult Rehabilitation Note       17 1345          Rehab Assessment/Intervention    Discipline occupational therapy assistant  -      Document Type therapy note (daily note)  -      Subjective Information agree to therapy;complains of;weakness;fatigue;pain  -      Patient Effort, Rehab Treatment poor  -      Recorded by [] FÉLIX Escalona/L      Pain Assessment    Pain Assessment 0-10  -      Pain Score 7  -      Post Pain Score 8  -      Pain Type Acute pain;Chronic pain  -      Pain Location Leg   and R arm  -      Pain Orientation Right  -      Recorded by [] FÉLIX Escalona/L      Cognitive Assessment/Intervention    Current Cognitive/Communication Assessment functional  -      Orientation Status oriented x 4  -      Follows Commands/Answers Questions 100% of the time  -      Recorded by [] FÉLIX Escalona/L      Bed Mobility,  Assessment/Treatment    Bed Mobility, Scoot/Bridge, [place] supervision required;verbal cues required  -      Bed Mob, Supine to Sit, [place] verbal cues required;contact guard assist  -      Bed Mob, Sit to Supine, [place] verbal cues required;contact guard assist  -      Recorded by [] FLIP Escalona      Balance Skills Training    Sitting-Level of Assistance Distant supervision  -      Sitting-Balance Support Right upper extremity supported  -      Sitting-Balance Activities Trunk control activities  -      Sitting # of Minutes 7  -      Recorded by [] FLIP Escalona      Positioning and Restraints    Pre-Treatment Position in bed  -      Post Treatment Position bed  -      In Bed sitting;call light within reach;encouraged to call for assist  -      Recorded by [] FLIP Escalona        User Key  (r) = Recorded By, (t) = Taken By, (c) = Cosigned By    Initials Name Effective Dates     FLIP Escalona 10/17/16 -                 OT Goals       07/03/17 1345 07/01/17 1601       Transfer Training OT LTG    Transfer Training OT LTG, Date Established  07/01/17  -RW     Transfer Training OT LTG, Time to Achieve  2 wks  -RW     Transfer Training OT LTG, Activity Type  toilet  -RW     Transfer Training OT LTG, [place] Level  minimum assist (75% patient effort)  -RW     Transfer Training OT LTG, Assist Device  commode, bedside  -RW     Transfer Training OT LTG, Date Goal Reviewed 07/03/17  -      Static Standing Balance OT STG    Static Standing Balance OT STG, Date Established  07/01/17  -     Static Standing Balance OT STG, Time to Achieve  5 - 7 days  -RW     Static Standing Balance OT STG, [place] Level  minimum assist (75% patient effort)  -RW     Static Standing Balance OT STG, Assist Device  assistive device  -RW     Static Standing Balance OT STG, Additional Goal  30 sec  -RW     Static Standing Balance OT STG, Date Goal Reviewed  07/03/17  -      Bathing OT LTG    Bathing Goal OT LTG, Date Established  07/01/17  -RW     Bathing Goal OT LTG, Time to Achieve  2 wks  -RW     Bathing Goal OT LTG, Activity Type  upper body bathing;lower body bathing  -RW     Bathing Goal OT LTG, [place] Level  minimum assist (75% patient effort)  -RW     Bathing Goal OT LTG, Date Goal Reviewed 07/03/17  -      Grooming OT STG    Grooming Goal OT STG, Date Established  07/01/17  -     Grooming Goal OT STG, Time to Achieve  5 - 7 days  -RW     Grooming Goal OT STG, [place] Level  independent  -RW     Grooming Goal OT STG, Position  sitting, edge of bed  -RW     Grooming Goal OT STG, Date Goal Reviewed 07/03/17  -        User Key  (r) = Recorded By, (t) = Taken By, (c) = Cosigned By    Initials Name Provider Type     Doris Simon, OTR/L Occupational Therapist     FÉLIX Escalona/L Occupational Therapy Assistant          Occupational Therapy Education     Title: PT OT SLP Therapies (Active)     Topic: Occupational Therapy (Active)     Point: ADL training (Active)    Description: Instruct learner(s) on proper safety adaptation and remediation techniques during self care or transfers.   Instruct in proper use of assistive devices.    Learning Progress Summary    Learner Readiness Method Response Comment Documented by Status   Patient Acceptance E NR bed mobility, benefits of OOB/progression of therapy tx  07/01/17 1559 Active                      User Key     Initials Effective Dates Name Provider Type Discipline     10/17/16 -  Doris Simon, OTR/L Occupational Therapist OT                  OT Recommendation and Plan  Anticipated Discharge Disposition: skilled nursing facility, inpatient rehabilitation facility  Planned Therapy Interventions: activity intolerance, adaptive equipment training, ADL retraining, balance training, bed mobility training, energy conservation, home exercise program, ROM (Range of Motion),  strengthening, transfer training, wheelchair fit and training  Therapy Frequency: other (see comments) (3-14 times/wk)  Plan of Care Review  Outcome Summary/Follow up Plan: Pt progress will be slow as pt R arm immobile 2' severe pain from unhealed old fx, no OT goals met to date.  Recommend  SNF  placement upon d/c         Outcome Measures       07/03/17 1345 07/01/17 1449 07/01/17 0905    How much help from another person do you currently need...    Turning from your back to your side while in flat bed without using bedrails?   2  -MN    Moving from lying on back to sitting on the side of a flat bed without bedrails?   2  -MN    Moving to and from a bed to a chair (including a wheelchair)?   1  -MN    Standing up from a chair using your arms (e.g., wheelchair, bedside chair)?   1  -MN    Climbing 3-5 steps with a railing?   1  -MN    To walk in hospital room?   1  -MN    AM-PAC 6 Clicks Score   8  -MN    How much help from another is currently needed...    Putting on and taking off regular lower body clothing? 1  -JH 2  -RW     Bathing (including washing, rinsing, and drying) 2  - 2  -RW     Toileting (which includes using toilet bed pan or urinal) 1  - 2  -RW     Putting on and taking off regular upper body clothing 2  - 3  -RW     Taking care of personal grooming (such as brushing teeth) 3  -JH 3  -RW     Eating meals 3  - 4  -RW     Score 12  - 16  -RW     Functional Assessment    Outcome Measure Options  AM-PAC 6 Clicks Daily Activity (OT)  -RW AM-PAC 6 Clicks Basic Mobility (PT)  -MN      User Key  (r) = Recorded By, (t) = Taken By, (c) = Cosigned By    Initials Name Provider Type    RW Doris Simon OTR/L Occupational Therapist    LEAH Salazar, PT Physical Therapist     FÉLIX Escalona/L Occupational Therapy Assistant           Time Calculation:         Time Calculation- OT       07/03/17 1525          Time Calculation- OT    OT Start Time 1345  -      OT Stop Time 1415  -       OT Time Calculation (min) 30 min  -      Total Timed Code Minutes- OT 30 minute(s)  -      OT Received On 07/03/17  -        User Key  (r) = Recorded By, (t) = Taken By, (c) = Cosigned By    Initials Name Provider Type     FLIP Escalona Occupational Therapy Assistant           Therapy Charges for Today     Code Description Service Date Service Provider Modifiers Qty    76858831544 HC OT THERAPEUTIC ACT EA 15 MIN 7/3/2017 FLIP Escalona GO 1    90170807751 HC OT THER PROC EA 15 MIN 7/3/2017 FLIP Escalona GO 1          OT G-codes  OT Professional Judgement Used?: Yes  OT Functional Scales Options: AM-PAC 6 Clicks Daily Activity (OT)  Score: 16  Functional Limitation: Self care  Self Care Current Status (): At least 40 percent but less than 60 percent impaired, limited or restricted  Self Care Goal Status (): At least 20 percent but less than 40 percent impaired, limited or restricted    FLIP Escalona  7/3/2017

## 2017-07-03 NOTE — PROGRESS NOTES
"Pharmacokinetics by Pharmacy - Vancomycin    Armando Mcmullen is a 52 y.o. male  [Ht: 73\" (185.4 cm); Wt: 237 lb 9.6 oz (108 kg)]    Estimated Creatinine Clearance: 64.8 mL/min (by C-G formula based on Cr of 1.7).   Lab Results   Component Value Date    CREATININE 1.70 (H) 07/03/2017    CREATININE 1.33 (H) 07/02/2017    CREATININE 1.45 (H) 07/01/2017      Lab Results   Component Value Date    WBC 13.70 (H) 07/03/2017    WBC 13.11 (H) 07/02/2017    WBC 10.82 (H) 07/01/2017      Temp Readings from Last 1 Encounters:   07/03/17 98 °F (36.7 °C) (Axillary)      Lab Results   Component Value Date    VANCOPEAK 44.25 (H) 07/02/2017    VANCOTROUGH 27.26 (H) 07/02/2017    VANCORANDOM 18.37 06/18/2017         Culture Results:  Microbiology Results (last 10 days)       Procedure Component Value - Date/Time      Urine Culture [[id number]] Collected:  06/30/17 2157    Lab Status:  Final result Specimen:  Urine from Urine, Clean Catch Updated:  07/01/17 1310     Urine Culture Mixed Culture    Narrative:         Specimen contains mixed organisms of questionable pathogenicity which indicates contamination with commensal renetta.  Further identification is unlikely to provide clinically useful information.  Suggest recollection.    Blood Culture [[id number]]  (Normal) Collected:  06/24/17 1044    Lab Status:  Final result Specimen:  Blood from Wrist, Left Updated:  06/29/17 1101     Blood Culture No growth at 5 days    Blood Culture [[id number]]  (Normal) Collected:  06/24/17 1044    Lab Status:  Final result Specimen:  Blood from Arm, Left Updated:  06/29/17 1101     Blood Culture No growth at 5 days               Indication for use: cellulitis    Current Vancomycin Dose:  1500 mg IVPB every 18 hours, day 4 of therapy.      Assessment/Plan:  Reviewed above labs and cultures.   Vancomycin trough level ys 27.26 07/02 at 1943. Level was drawn approximately 30 minutes early. Goal is 10-20. Dose was held.  Spoke with BHARTI Marinelli regarding the " patient status and need for continued use of Vancomycin. Pt is also on Zosyn.  No growth in blood cultures x 5 days.  He agreed to stop the Vancomycin and continue with Zosyn at present.      Jenny Adamson [facility]   07/03/17 10:50 AM

## 2017-07-04 LAB
ALBUMIN SERPL-MCNC: 2.4 G/DL (ref 3.4–4.8)
ALBUMIN/GLOB SERPL: 0.9 G/DL (ref 1.1–1.8)
ALP SERPL-CCNC: 251 U/L (ref 38–126)
ALT SERPL W P-5'-P-CCNC: 117 U/L (ref 21–72)
ANION GAP SERPL CALCULATED.3IONS-SCNC: 9 MMOL/L (ref 5–15)
AST SERPL-CCNC: 69 U/L (ref 17–59)
BASOPHILS # BLD AUTO: 0.01 10*3/MM3 (ref 0–0.2)
BASOPHILS NFR BLD AUTO: 0.1 % (ref 0–2)
BILIRUB SERPL-MCNC: 3.3 MG/DL (ref 0.2–1.3)
BUN BLD-MCNC: 22 MG/DL (ref 7–21)
BUN/CREAT SERPL: 12.5 (ref 7–25)
CALCIUM SPEC-SCNC: 8.6 MG/DL (ref 8.4–10.2)
CHLORIDE SERPL-SCNC: 93 MMOL/L (ref 95–110)
CO2 SERPL-SCNC: 33 MMOL/L (ref 22–31)
CREAT BLD-MCNC: 1.76 MG/DL (ref 0.7–1.3)
DEPRECATED RDW RBC AUTO: 74.1 FL (ref 35.1–43.9)
EOSINOPHIL # BLD AUTO: 0.64 10*3/MM3 (ref 0–0.7)
EOSINOPHIL NFR BLD AUTO: 6.8 % (ref 0–7)
ERYTHROCYTE [DISTWIDTH] IN BLOOD BY AUTOMATED COUNT: 20.6 % (ref 11.5–14.5)
GFR SERPL CREATININE-BSD FRML MDRD: 41 ML/MIN/1.73 (ref 60–130)
GLOBULIN UR ELPH-MCNC: 2.7 GM/DL (ref 2.3–3.5)
GLUCOSE BLD-MCNC: 100 MG/DL (ref 60–100)
GLUCOSE BLDC GLUCOMTR-MCNC: 106 MG/DL (ref 70–130)
GLUCOSE BLDC GLUCOMTR-MCNC: 128 MG/DL (ref 70–130)
GLUCOSE BLDC GLUCOMTR-MCNC: 139 MG/DL (ref 70–130)
GLUCOSE BLDC GLUCOMTR-MCNC: 163 MG/DL (ref 70–130)
HCT VFR BLD AUTO: 25.3 % (ref 39–49)
HGB BLD-MCNC: 8.5 G/DL (ref 13.7–17.3)
IMM GRANULOCYTES # BLD: 0.09 10*3/MM3 (ref 0–0.02)
IMM GRANULOCYTES NFR BLD: 1 % (ref 0–0.5)
LYMPHOCYTES # BLD AUTO: 1.77 10*3/MM3 (ref 0.6–4.2)
LYMPHOCYTES NFR BLD AUTO: 18.9 % (ref 10–50)
MCH RBC QN AUTO: 34.3 PG (ref 26.5–34)
MCHC RBC AUTO-ENTMCNC: 33.6 G/DL (ref 31.5–36.3)
MCV RBC AUTO: 102 FL (ref 80–98)
MONOCYTES # BLD AUTO: 0.67 10*3/MM3 (ref 0–0.9)
MONOCYTES NFR BLD AUTO: 7.2 % (ref 0–12)
NEUTROPHILS # BLD AUTO: 6.18 10*3/MM3 (ref 2–8.6)
NEUTROPHILS NFR BLD AUTO: 66 % (ref 37–80)
PLATELET # BLD AUTO: 71 10*3/MM3 (ref 150–450)
PMV BLD AUTO: 10.1 FL (ref 8–12)
POTASSIUM BLD-SCNC: 3.8 MMOL/L (ref 3.5–5.1)
PROT SERPL-MCNC: 5.1 G/DL (ref 6.3–8.6)
RBC # BLD AUTO: 2.48 10*6/MM3 (ref 4.37–5.74)
SODIUM BLD-SCNC: 135 MMOL/L (ref 137–145)
WBC NRBC COR # BLD: 9.36 10*3/MM3 (ref 3.2–9.8)

## 2017-07-04 PROCEDURE — 85025 COMPLETE CBC W/AUTO DIFF WBC: CPT | Performed by: INTERNAL MEDICINE

## 2017-07-04 PROCEDURE — 25010000002 FUROSEMIDE PER 20 MG: Performed by: INTERNAL MEDICINE

## 2017-07-04 PROCEDURE — 25010000002 MORPHINE SULFATE (PF) 2 MG/ML SOLUTION: Performed by: HOSPITALIST

## 2017-07-04 PROCEDURE — 80053 COMPREHEN METABOLIC PANEL: CPT | Performed by: INTERNAL MEDICINE

## 2017-07-04 PROCEDURE — 25010000002 PIPERACILLIN SOD-TAZOBACTAM PER 1 G: Performed by: INTERNAL MEDICINE

## 2017-07-04 PROCEDURE — 94799 UNLISTED PULMONARY SVC/PX: CPT

## 2017-07-04 PROCEDURE — 94760 N-INVAS EAR/PLS OXIMETRY 1: CPT

## 2017-07-04 PROCEDURE — 82962 GLUCOSE BLOOD TEST: CPT

## 2017-07-04 RX ADMIN — GABAPENTIN 300 MG: 300 CAPSULE ORAL at 20:31

## 2017-07-04 RX ADMIN — PANTOPRAZOLE SODIUM 40 MG: 40 TABLET, DELAYED RELEASE ORAL at 08:01

## 2017-07-04 RX ADMIN — SPIRONOLACTONE 50 MG: 50 TABLET, FILM COATED ORAL at 08:01

## 2017-07-04 RX ADMIN — GABAPENTIN 300 MG: 300 CAPSULE ORAL at 08:01

## 2017-07-04 RX ADMIN — CLOTRIMAZOLE: 1 SOLUTION TOPICAL at 08:02

## 2017-07-04 RX ADMIN — FUROSEMIDE 40 MG: 10 INJECTION, SOLUTION INTRAMUSCULAR; INTRAVENOUS at 05:40

## 2017-07-04 RX ADMIN — MORPHINE SULFATE 1 MG: 2 INJECTION, SOLUTION INTRAMUSCULAR; INTRAVENOUS at 16:57

## 2017-07-04 RX ADMIN — IPRATROPIUM BROMIDE AND ALBUTEROL SULFATE 3 ML: 2.5; .5 SOLUTION RESPIRATORY (INHALATION) at 16:47

## 2017-07-04 RX ADMIN — GABAPENTIN 300 MG: 300 CAPSULE ORAL at 16:46

## 2017-07-04 RX ADMIN — TEMAZEPAM 15 MG: 15 CAPSULE ORAL at 23:03

## 2017-07-04 RX ADMIN — TAZOBACTAM SODIUM AND PIPERACILLIN SODIUM 3.38 G: 375; 3 INJECTION, SOLUTION INTRAVENOUS at 18:21

## 2017-07-04 RX ADMIN — MORPHINE SULFATE 1 MG: 2 INJECTION, SOLUTION INTRAMUSCULAR; INTRAVENOUS at 07:53

## 2017-07-04 RX ADMIN — TAZOBACTAM SODIUM AND PIPERACILLIN SODIUM 3.38 G: 375; 3 INJECTION, SOLUTION INTRAVENOUS at 11:49

## 2017-07-04 RX ADMIN — LACTULOSE 30 G: 10 SOLUTION ORAL at 09:23

## 2017-07-04 RX ADMIN — LACTULOSE 30 G: 10 SOLUTION ORAL at 20:31

## 2017-07-04 RX ADMIN — LACTULOSE 30 G: 10 SOLUTION ORAL at 16:48

## 2017-07-04 RX ADMIN — IPRATROPIUM BROMIDE AND ALBUTEROL SULFATE 3 ML: 2.5; .5 SOLUTION RESPIRATORY (INHALATION) at 19:45

## 2017-07-04 RX ADMIN — FLUCONAZOLE 200 MG: 200 TABLET ORAL at 08:01

## 2017-07-04 RX ADMIN — IPRATROPIUM BROMIDE AND ALBUTEROL SULFATE 3 ML: 2.5; .5 SOLUTION RESPIRATORY (INHALATION) at 10:56

## 2017-07-04 RX ADMIN — CLOTRIMAZOLE: 1 SOLUTION TOPICAL at 20:32

## 2017-07-04 RX ADMIN — TAZOBACTAM SODIUM AND PIPERACILLIN SODIUM 3.38 G: 375; 3 INJECTION, SOLUTION INTRAVENOUS at 01:32

## 2017-07-04 RX ADMIN — MORPHINE SULFATE 1 MG: 2 INJECTION, SOLUTION INTRAMUSCULAR; INTRAVENOUS at 02:54

## 2017-07-04 RX ADMIN — MORPHINE SULFATE 1 MG: 2 INJECTION, SOLUTION INTRAMUSCULAR; INTRAVENOUS at 23:03

## 2017-07-04 RX ADMIN — FUROSEMIDE 40 MG: 10 INJECTION, SOLUTION INTRAMUSCULAR; INTRAVENOUS at 16:46

## 2017-07-04 RX ADMIN — TAZOBACTAM SODIUM AND PIPERACILLIN SODIUM 3.38 G: 375; 3 INJECTION, SOLUTION INTRAVENOUS at 05:40

## 2017-07-04 RX ADMIN — IPRATROPIUM BROMIDE AND ALBUTEROL SULFATE 3 ML: 2.5; .5 SOLUTION RESPIRATORY (INHALATION) at 15:20

## 2017-07-04 NOTE — PROGRESS NOTES
[facility]  INPATIENT PROGRESS NOTE    Length of Stay: 3  Date of Admission: 6/30/2017  Primary Care Physician: Cj Aguero MD    Subjective   Chief Complaint: Not having chest pain or shortness of breath at this time  HPI:      Review of Systems     All pertinent negatives and positives are as above. All other systems have been reviewed and are negative unless otherwise stated.     Objective    Temp:  [96.1 °F (35.6 °C)-98.1 °F (36.7 °C)] 96.1 °F (35.6 °C)  Heart Rate:  [] 92  Resp:  [16-20] 16  BP: (112-140)/(64-85) 117/72    Physical Exam  Heart S1-S2 regular rate and rhythm  Chest decreased breath sounds bilaterally  Abdomen soft and nontender  Extremities no tenderness      Results Review:  I have reviewed the labs, radiology results, and diagnostic studies.    Laboratory Data:     Results from last 7 days  Lab Units 07/04/17  0648 07/03/17  0544 07/02/17  0524   SODIUM mmol/L 135* 135* 135*   POTASSIUM mmol/L 3.8 3.7 3.8   CHLORIDE mmol/L 93* 99 102   CO2 mmol/L 33.0* 28.0 28.0   BUN mg/dL 22* 22* 25*   CREATININE mg/dL 1.76* 1.70* 1.33*   GLUCOSE mg/dL 100 101* 113*   CALCIUM mg/dL 8.6 8.3* 8.2*   BILIRUBIN mg/dL 3.3* 3.1* 2.4*   ALK PHOS U/L 251* 269* 279*   ALT (SGPT) U/L 117* 145* 146*   AST (SGOT) U/L 69* 93* 125*   ANION GAP mmol/L 9.0 8.0 5.0     Estimated Creatinine Clearance: 62.5 mL/min (by C-G formula based on Cr of 1.76).            Results from last 7 days  Lab Units 07/04/17  0648 07/03/17  0544 07/02/17  0524 07/01/17  0748 06/30/17  1204   WBC 10*3/mm3 9.36 13.70* 13.11* 10.82* 12.48*   HEMOGLOBIN g/dL 8.5* 8.9* 8.7* 9.8* 10.3*   HEMATOCRIT % 25.3* 26.5* 24.9* 27.8* 29.7*   PLATELETS 10*3/mm3 71* 37* 41* 48* 53*           Culture Data:   No results found for: BLOODCX  No results found for: URINECX  No results found for: RESPCX  No results found for: WOUNDCX  No results found for: STOOLCX  No components found for:  BODYFLD    Radiology Data:   Imaging Results (last 24 hours)     ** No results found for the last 24 hours. **          I have reviewed the patient current medications.     Assessment/Plan     Hospital Problem List     Cellulitis of right lower extremity          Plan:  1. Cellulitis right lower extremity: Continue broad-spectrum antibiotics. Duplex ultrasound was negative for DVT.  2. Candiduria: Begin oral Diflucan. Urine cultures showed mixed growth.   3. History of liver cirrhosis: This accounts for the elevated LFTs and thrombocytopenia. Continue lactulose and Aldactone.  4. Tinea cruris:: Continue local application of antifungal cream.  5. Deconditioning: Physiotherapy is ongoing.  6. Continue Protonix/ TEDs for for GI and DVT prophylaxis respectively.  7. Patient continued to be weak patient may need to go to rehabilitation once discharged  Thrombocytopenia patient received 2 units of packed platelets

## 2017-07-04 NOTE — CONSULTS
"Adult Nutrition  Assessment    Patient Name:  Armando Mcmullen  YOB: 1965  MRN: [id number]  Admit Date:  6/30/2017    Assessment Date:  7/4/2017          Reason for Assessment       07/04/17 1555    Reason for Assessment    Reason For Assessment/Visit per organizational policy    Identified At Risk By Screening Criteria large or nonhealing wound, burn or pressure ulcer                  Anthropometrics       07/04/17 1555    Anthropometrics    Height --    Weight --    Anthropometrics (Special Considerations)    Height Used for Calculations 1.85 m (6' 0.83\")    Weight Used for Calculations 108 kg (238 lb 1.6 oz)    Additional Documentation Amputations Present (Ideal Body Weight) (Row)    Amputations Present BKA 7%    RD Calculated     RD Calculated %     Ideal Body Weight (IBW)    Ideal Body Weight (IBW), Male (kg) --    % Ideal Body Weight --    Body Mass Index (BMI)    BMI (kg/m2) --    BMI Grade --            Labs/Tests/Procedures/Meds       07/04/17 1556    Labs/Tests/Procedures/Meds    Labs/Tests Review Glucose;BUN;Na+;Alb;Creat    Medication Review Diuretic;Reviewed, pertinent            Physical Findings       07/04/17 1557    Physical Findings/Assessment    Additional Documentation Physical Appearance (Group)    Physical Appearance    Overall Physical Appearance amputee;other (see comments)   left BKA    Skin other (see comments)   right upper arm blister, incision right shoulder, skin tear midline coxxyx, wound left amputation stump, stage 2 left lateral coxxyx, medial coccyx skin tear            Estimated/Assessed Needs       07/04/17 1627    Calculation Measurements    Weight Used For Calculations 85.3 kg (188 lb 0.8 oz)    Height Used for Calculations 1.854 m (6' 0.99\")    Estimated/Assessed Energy Needs    Energy Need Method [place]    Age 52    RMR ([place] Jeor Equation) 1751.75    Estimated Kcal Range  2101 - 2277    Estimated/Assessed Protein Needs    Weight Used " for Protein Calculation 85.3 kg (188 lb 0.8 oz)    Protein (gm/kg) 1.2    1.2 Gm Protein (gm) 102.36    Estimated/Assessed Fluid Needs    Fluid Need Method --   2559            Nutrition Prescription Ordered       07/04/17 1629    Nutrition Prescription PO    Current PO Diet Regular    Common Modifiers Low Sodium;Other (comment)   NO straw            Evaluation of Received Nutrient/Fluid Intake       07/04/17 1631    PO Evaluation    Number of Meals 11    % PO Intake 100% - 11x            Comments:  Pt reports pretty good appetite.  Willing to receive milk with meals to increase protein in diet.  Pt with stage 2 pressure ulcer left lateral coccyx.  Meds and labs reviewed.        Electronically signed by:  Nirmala Ashton RD  07/04/17 4:32 PM

## 2017-07-04 NOTE — PLAN OF CARE
Problem: Patient Care Overview (Adult)  Goal: Plan of Care Review  Outcome: Ongoing (interventions implemented as appropriate)    Problem: Infection, Risk/Actual (Adult)  Goal: Infection Prevention/Resolution  Outcome: Ongoing (interventions implemented as appropriate)    Problem: Fall Risk (Adult)  Goal: Absence of Falls  Outcome: Ongoing (interventions implemented as appropriate)    Problem: Pain, Acute (Adult)  Goal: Acceptable Pain Control/Comfort Level  Outcome: Ongoing (interventions implemented as appropriate)

## 2017-07-04 NOTE — PLAN OF CARE
Problem: Patient Care Overview (Adult)  Goal: Plan of Care Review  Outcome: Ongoing (interventions implemented as appropriate)    07/04/17 1639   Coping/Psychosocial Response Interventions   Plan Of Care Reviewed With patient   Patient Care Overview   Progress no change   Outcome Evaluation   Outcome Summary/Follow up Plan initial assessment       Goal: Adult Individualization and Mutuality  Outcome: Ongoing (interventions implemented as appropriate)  Goal: Discharge Needs Assessment  Outcome: Ongoing (interventions implemented as appropriate)    Problem: Infection, Risk/Actual (Adult)  Goal: Infection Prevention/Resolution  Outcome: Ongoing (interventions implemented as appropriate)    Problem: Fall Risk (Adult)  Goal: Absence of Falls  Outcome: Ongoing (interventions implemented as appropriate)    Problem: Pain, Acute (Adult)  Goal: Acceptable Pain Control/Comfort Level  Outcome: Ongoing (interventions implemented as appropriate)

## 2017-07-04 NOTE — NURSING NOTE
PLT unit number would not scan right in the computer. Called and talked to Perfecto in the blood bank about what was going on. She stated that it was ok and just to use paper form . Nan BEAR and I checked the blood product, unit number, product code, exp rate date, donor type together and pt ID. See VS. First unit started at this time.

## 2017-07-04 NOTE — NURSING NOTE
2nd unit of plt completed. No adverse reaction to either unit. Pt tolerated well, see vs. 250 units received.

## 2017-07-05 LAB
ABO + RH BLD: NORMAL
ABO + RH BLD: NORMAL
ALBUMIN SERPL-MCNC: 2.5 G/DL (ref 3.4–4.8)
ALBUMIN/GLOB SERPL: 0.9 G/DL (ref 1.1–1.8)
ALP SERPL-CCNC: 239 U/L (ref 38–126)
ALT SERPL W P-5'-P-CCNC: 109 U/L (ref 21–72)
ANION GAP SERPL CALCULATED.3IONS-SCNC: 10 MMOL/L (ref 5–15)
AST SERPL-CCNC: 55 U/L (ref 17–59)
BASOPHILS # BLD AUTO: 0.02 10*3/MM3 (ref 0–0.2)
BASOPHILS NFR BLD AUTO: 0.2 % (ref 0–2)
BH BB BLOOD EXPIRATION DATE: NORMAL
BH BB BLOOD EXPIRATION DATE: NORMAL
BH BB BLOOD TYPE BARCODE: 5100
BH BB BLOOD TYPE BARCODE: 5100
BH BB DISPENSE STATUS: NORMAL
BH BB DISPENSE STATUS: NORMAL
BH BB PRODUCT CODE: NORMAL
BH BB PRODUCT CODE: NORMAL
BH BB UNIT NUMBER: NORMAL
BH BB UNIT NUMBER: NORMAL
BILIRUB SERPL-MCNC: 4.1 MG/DL (ref 0.2–1.3)
BUN BLD-MCNC: 21 MG/DL (ref 7–21)
BUN/CREAT SERPL: 10.5 (ref 7–25)
CALCIUM SPEC-SCNC: 8.8 MG/DL (ref 8.4–10.2)
CHLORIDE SERPL-SCNC: 92 MMOL/L (ref 95–110)
CO2 SERPL-SCNC: 32 MMOL/L (ref 22–31)
CREAT BLD-MCNC: 2 MG/DL (ref 0.7–1.3)
DEPRECATED RDW RBC AUTO: 74.1 FL (ref 35.1–43.9)
EOSINOPHIL # BLD AUTO: 0.86 10*3/MM3 (ref 0–0.7)
EOSINOPHIL NFR BLD AUTO: 7.1 % (ref 0–7)
ERYTHROCYTE [DISTWIDTH] IN BLOOD BY AUTOMATED COUNT: 20.6 % (ref 11.5–14.5)
GFR SERPL CREATININE-BSD FRML MDRD: 35 ML/MIN/1.73 (ref 60–130)
GLOBULIN UR ELPH-MCNC: 2.9 GM/DL (ref 2.3–3.5)
GLUCOSE BLD-MCNC: 127 MG/DL (ref 60–100)
GLUCOSE BLDC GLUCOMTR-MCNC: 127 MG/DL (ref 70–130)
GLUCOSE BLDC GLUCOMTR-MCNC: 127 MG/DL (ref 70–130)
GLUCOSE BLDC GLUCOMTR-MCNC: 144 MG/DL (ref 70–130)
GLUCOSE BLDC GLUCOMTR-MCNC: 149 MG/DL (ref 70–130)
HCT VFR BLD AUTO: 27.4 % (ref 39–49)
HGB BLD-MCNC: 9.3 G/DL (ref 13.7–17.3)
IMM GRANULOCYTES # BLD: 0.08 10*3/MM3 (ref 0–0.02)
IMM GRANULOCYTES NFR BLD: 0.7 % (ref 0–0.5)
LYMPHOCYTES # BLD AUTO: 2.18 10*3/MM3 (ref 0.6–4.2)
LYMPHOCYTES NFR BLD AUTO: 18 % (ref 10–50)
MCH RBC QN AUTO: 34.7 PG (ref 26.5–34)
MCHC RBC AUTO-ENTMCNC: 33.9 G/DL (ref 31.5–36.3)
MCV RBC AUTO: 102.2 FL (ref 80–98)
MONOCYTES # BLD AUTO: 0.78 10*3/MM3 (ref 0–0.9)
MONOCYTES NFR BLD AUTO: 6.5 % (ref 0–12)
NEUTROPHILS # BLD AUTO: 8.17 10*3/MM3 (ref 2–8.6)
NEUTROPHILS NFR BLD AUTO: 67.5 % (ref 37–80)
PLATELET # BLD AUTO: 63 10*3/MM3 (ref 150–450)
PMV BLD AUTO: 10.7 FL (ref 8–12)
POTASSIUM BLD-SCNC: 3.8 MMOL/L (ref 3.5–5.1)
PROT SERPL-MCNC: 5.4 G/DL (ref 6.3–8.6)
RBC # BLD AUTO: 2.68 10*6/MM3 (ref 4.37–5.74)
SODIUM BLD-SCNC: 134 MMOL/L (ref 137–145)
UNIT  ABO: NORMAL
UNIT  ABO: NORMAL
UNIT  RH: NORMAL
UNIT  RH: NORMAL
WBC NRBC COR # BLD: 12.09 10*3/MM3 (ref 3.2–9.8)

## 2017-07-05 PROCEDURE — 97110 THERAPEUTIC EXERCISES: CPT

## 2017-07-05 PROCEDURE — 25010000002 PIPERACILLIN SOD-TAZOBACTAM PER 1 G: Performed by: INTERNAL MEDICINE

## 2017-07-05 PROCEDURE — 25010000002 MORPHINE SULFATE (PF) 2 MG/ML SOLUTION: Performed by: HOSPITALIST

## 2017-07-05 PROCEDURE — 85025 COMPLETE CBC W/AUTO DIFF WBC: CPT | Performed by: INTERNAL MEDICINE

## 2017-07-05 PROCEDURE — 94760 N-INVAS EAR/PLS OXIMETRY 1: CPT

## 2017-07-05 PROCEDURE — 80053 COMPREHEN METABOLIC PANEL: CPT | Performed by: INTERNAL MEDICINE

## 2017-07-05 PROCEDURE — 97530 THERAPEUTIC ACTIVITIES: CPT

## 2017-07-05 PROCEDURE — 25010000002 FUROSEMIDE PER 20 MG: Performed by: INTERNAL MEDICINE

## 2017-07-05 PROCEDURE — 82962 GLUCOSE BLOOD TEST: CPT

## 2017-07-05 PROCEDURE — 94799 UNLISTED PULMONARY SVC/PX: CPT

## 2017-07-05 RX ORDER — SODIUM CHLORIDE 9 MG/ML
INJECTION, SOLUTION INTRAVENOUS
Status: DISPENSED
Start: 2017-07-05 | End: 2017-07-06

## 2017-07-05 RX ADMIN — Medication 10 ML: at 08:46

## 2017-07-05 RX ADMIN — CLOTRIMAZOLE: 1 SOLUTION TOPICAL at 21:22

## 2017-07-05 RX ADMIN — LACTULOSE 30 G: 10 SOLUTION ORAL at 21:22

## 2017-07-05 RX ADMIN — SPIRONOLACTONE 50 MG: 50 TABLET, FILM COATED ORAL at 08:43

## 2017-07-05 RX ADMIN — Medication 10 ML: at 09:04

## 2017-07-05 RX ADMIN — GABAPENTIN 300 MG: 300 CAPSULE ORAL at 21:20

## 2017-07-05 RX ADMIN — MORPHINE SULFATE 1 MG: 2 INJECTION, SOLUTION INTRAMUSCULAR; INTRAVENOUS at 23:58

## 2017-07-05 RX ADMIN — TAZOBACTAM SODIUM AND PIPERACILLIN SODIUM 3.38 G: 375; 3 INJECTION, SOLUTION INTRAVENOUS at 23:58

## 2017-07-05 RX ADMIN — TAZOBACTAM SODIUM AND PIPERACILLIN SODIUM 3.38 G: 375; 3 INJECTION, SOLUTION INTRAVENOUS at 18:17

## 2017-07-05 RX ADMIN — LACTULOSE 30 G: 10 SOLUTION ORAL at 09:00

## 2017-07-05 RX ADMIN — IPRATROPIUM BROMIDE AND ALBUTEROL SULFATE 3 ML: 2.5; .5 SOLUTION RESPIRATORY (INHALATION) at 21:12

## 2017-07-05 RX ADMIN — CLOTRIMAZOLE: 1 SOLUTION TOPICAL at 09:00

## 2017-07-05 RX ADMIN — TAZOBACTAM SODIUM AND PIPERACILLIN SODIUM 3.38 G: 375; 3 INJECTION, SOLUTION INTRAVENOUS at 13:35

## 2017-07-05 RX ADMIN — GABAPENTIN 300 MG: 300 CAPSULE ORAL at 17:35

## 2017-07-05 RX ADMIN — IPRATROPIUM BROMIDE AND ALBUTEROL SULFATE 3 ML: 2.5; .5 SOLUTION RESPIRATORY (INHALATION) at 15:11

## 2017-07-05 RX ADMIN — FLUCONAZOLE 200 MG: 200 TABLET ORAL at 08:44

## 2017-07-05 RX ADMIN — IPRATROPIUM BROMIDE AND ALBUTEROL SULFATE 3 ML: 2.5; .5 SOLUTION RESPIRATORY (INHALATION) at 11:31

## 2017-07-05 RX ADMIN — GABAPENTIN 300 MG: 300 CAPSULE ORAL at 09:03

## 2017-07-05 RX ADMIN — PANTOPRAZOLE SODIUM 40 MG: 40 TABLET, DELAYED RELEASE ORAL at 08:44

## 2017-07-05 RX ADMIN — TAZOBACTAM SODIUM AND PIPERACILLIN SODIUM 3.38 G: 375; 3 INJECTION, SOLUTION INTRAVENOUS at 05:45

## 2017-07-05 RX ADMIN — LACTULOSE 30 G: 10 SOLUTION ORAL at 17:35

## 2017-07-05 RX ADMIN — FUROSEMIDE 40 MG: 10 INJECTION, SOLUTION INTRAMUSCULAR; INTRAVENOUS at 05:45

## 2017-07-05 RX ADMIN — IPRATROPIUM BROMIDE AND ALBUTEROL SULFATE 3 ML: 2.5; .5 SOLUTION RESPIRATORY (INHALATION) at 07:20

## 2017-07-05 RX ADMIN — MORPHINE SULFATE 1 MG: 2 INJECTION, SOLUTION INTRAMUSCULAR; INTRAVENOUS at 09:03

## 2017-07-05 RX ADMIN — Medication 10 ML: at 18:15

## 2017-07-05 RX ADMIN — Medication 10 ML: at 08:43

## 2017-07-05 RX ADMIN — FUROSEMIDE 40 MG: 10 INJECTION, SOLUTION INTRAMUSCULAR; INTRAVENOUS at 17:35

## 2017-07-05 RX ADMIN — TAZOBACTAM SODIUM AND PIPERACILLIN SODIUM 3.38 G: 375; 3 INJECTION, SOLUTION INTRAVENOUS at 00:01

## 2017-07-05 RX ADMIN — MORPHINE SULFATE 1 MG: 2 INJECTION, SOLUTION INTRAMUSCULAR; INTRAVENOUS at 18:14

## 2017-07-05 NOTE — PLAN OF CARE
Problem: Inpatient Occupational Therapy  Goal: Transfer Training Goal 1 LTG- OT  Outcome: Ongoing (interventions implemented as appropriate)    07/01/17 1601 07/05/17 1302   Transfer Training OT LTG   Transfer Training OT LTG, Date Established 07/01/17 --    Transfer Training OT LTG, Time to Achieve 2 wks --    Transfer Training OT LTG, Activity Type toilet --    Transfer Training OT LTG, [place] Level minimum assist (75% patient effort) --    Transfer Training OT LTG, Assist Device commode, bedside --    Transfer Training OT LTG, Date Goal Reviewed --  07/05/17       Goal: Static Standing Balance Goal OT- STG  Outcome: Ongoing (interventions implemented as appropriate)    07/01/17 1601 07/05/17 1302   Static Standing Balance OT STG   Static Standing Balance OT STG, Date Established 07/01/17 --    Static Standing Balance OT STG, Time to Achieve 5 - 7 days --    Static Standing Balance OT STG, [place] Level minimum assist (75% patient effort) --    Static Standing Balance OT STG, Assist Device assistive device --    Static Standing Balance OT STG, Additional Goal 30 sec --    Static Standing Balance OT STG, Date Goal Reviewed --  07/05/17       Goal: Bathing Goal LTG- OT  Outcome: Ongoing (interventions implemented as appropriate)    07/01/17 1601 07/05/17 1302   Bathing OT LTG   Bathing Goal OT LTG, Date Established 07/01/17 --    Bathing Goal OT LTG, Time to Achieve 2 wks --    Bathing Goal OT LTG, Activity Type upper body bathing;lower body bathing --    Bathing Goal OT LTG, [place] Level minimum assist (75% patient effort) --    Bathing Goal OT LTG, Date Goal Reviewed --  07/05/17   Bathing Goal OT LTG, Outcome --  goal not met       Goal: Grooming Goal STG- OT  Outcome: Ongoing (interventions implemented as appropriate)    07/01/17 1601 07/05/17 1302   Grooming OT STG   Grooming Goal OT STG, Date Established 07/01/17 --    Grooming Goal OT STG, Time to Achieve 5 - 7 days --    Grooming Goal OT  STG, [place] Level independent --    Grooming Goal OT STG, Position sitting, edge of bed --    Grooming Goal OT STG, Date Goal Reviewed --  07/05/17   Grooming Goal OT STG, Outcome --  goal not met

## 2017-07-05 NOTE — PROGRESS NOTES
[facility] Medicine Services  INPATIENT PROGRESS NOTE    Length of Stay: 4  Date of Admission: 6/30/2017  Primary Care Physician: Cj Aguero MD    Subjective   Chief Complaint: Better today  HPI:  No complains of chest pain    Review of Systems     All pertinent negatives and positives are as above. All other systems have been reviewed and are negative unless otherwise stated.     Objective    Temp:  [96.4 °F (35.8 °C)-97.6 °F (36.4 °C)] 97.6 °F (36.4 °C)  Heart Rate:  [] 101  Resp:  [16-18] 18  BP: (109-121)/(72-80) 119/80    Physical Exam  Heart S1-S2 regular rate and rhythm  Chest decreased breath sounds bilaterally  Abdomen soft and nontender  Extremities no tenderness      Results Review:  I have reviewed the labs, radiology results, and diagnostic studies.    Laboratory Data:     Results from last 7 days  Lab Units 07/05/17  0603 07/04/17 0648 07/03/17  0544   SODIUM mmol/L 134* 135* 135*   POTASSIUM mmol/L 3.8 3.8 3.7   CHLORIDE mmol/L 92* 93* 99   CO2 mmol/L 32.0* 33.0* 28.0   BUN mg/dL 21 22* 22*   CREATININE mg/dL 2.00* 1.76* 1.70*   GLUCOSE mg/dL 127* 100 101*   CALCIUM mg/dL 8.8 8.6 8.3*   BILIRUBIN mg/dL 4.1* 3.3* 3.1*   ALK PHOS U/L 239* 251* 269*   ALT (SGPT) U/L 109* 117* 145*   AST (SGOT) U/L 55 69* 93*   ANION GAP mmol/L 10.0 9.0 8.0     Estimated Creatinine Clearance: 53 mL/min (by C-G formula based on Cr of 2).            Results from last 7 days  Lab Units 07/05/17  0603 07/04/17  0648 07/03/17  0544 07/02/17  0524 07/01/17  0748   WBC 10*3/mm3 12.09* 9.36 13.70* 13.11* 10.82*   HEMOGLOBIN g/dL 9.3* 8.5* 8.9* 8.7* 9.8*   HEMATOCRIT % 27.4* 25.3* 26.5* 24.9* 27.8*   PLATELETS 10*3/mm3 63* 71* 37* 41* 48*           Culture Data:   No results found for: BLOODCX  No results found for: URINECX  No results found for: RESPCX  No results found for: WOUNDCX  No results found for: STOOLCX  No components found for: BODYFLD    Radiology Data:    Imaging Results (last 24 hours)     ** No results found for the last 24 hours. **          I have reviewed the patient current medications.     Assessment/Plan     Hospital Problem List     Cellulitis of right lower extremity          Plan:  1. Cellulitis right lower extremity: Continue broad-spectrum antibiotics.  Overall improving.  2. Candiduria: Begin oral Diflucan. Urine cultures showed mixed growth.   3. History of liver cirrhosis: This accounts for the elevated LFTs and thrombocytopenia. Continue lactulose and Aldactone.  4. Tinea cruris:: Continue local application of antifungal cream.  5. Deconditioning: Physiotherapy is ongoing.  6. Continue Protonix/ TEDs for for GI and DVT prophylaxis respectively.  7. Patient continued to be weak patient may need to go to rehabilitation once discharged  Thrombocytopenia patient received 2 units of packed platelets  Discussed with the patient okay to go to rehabilitation in 2-3 days

## 2017-07-05 NOTE — THERAPY TREATMENT NOTE
Acute Care - Occupational Therapy Treatment Note  [facility]     Patient Name: Armando Mcmullen  : 1965  MRN: [id number]  Today's Date: 2017  Onset of Illness/Injury or Date of Surgery Date: 17  Date of Referral to OT: 17  Referring Physician: Dr. JANE Marinelli      Admit Date: 2017    Visit Dx:     ICD-10-CM ICD-9-CM   1. Cellulitis of right lower extremity L03.115 682.6   2. Impaired functional mobility, balance, gait, and endurance Z74.09 V49.89   3. Impaired mobility and ADLs Z74.09 799.89     Patient Active Problem List   Diagnosis   • Anxiety state   • Back pain   • Chronic hepatitis   • Hepatic cirrhosis   • Depression   • Insomnia   • Essential hypertension   • Hypokalemia   • Anasarca   • Acute hepatic encephalopathy   • Substance abuse   • Acute on chronic renal failure   • Liver failure with hepatic coma   • Osteoarthritis   • Hypersplenism   • Chronic osteomyelitis of right shoulder region   • [place] coma scale total score 3-8   • Anxiety state   • Cellulitis of right lower extremity             Adult Rehabilitation Note       17 1302 17 1345       Rehab Assessment/Intervention    Discipline occupational therapy assistant  -EB occupational therapy assistant  -     Document Type therapy note (daily note)  -EB therapy note (daily note)  -     Subjective Information agree to therapy  - agree to therapy;complains of;weakness;fatigue;pain  -     Patient Effort, Rehab Treatment fair  -EB poor  -JH     Recorded by [EB] FÉLIX Gtz/MARV [] FÉLIX Escalona/L     Pain Assessment    Pain Assessment 0-10  -EB 0-10  -     Pain Score 7  -EB 7  -JH     Post Pain Score 7  -EB 8  -JH     Pain Type  Acute pain;Chronic pain  -     Pain Location Leg  -EB Leg   and R arm  -     Pain Orientation Right;Left  -EB Right  -     Recorded by [EB] FLIP Gtz [] FÉLIX Escalona/L     Cognitive Assessment/Intervention    Current  Cognitive/Communication Assessment functional  -EB functional  -     Orientation Status oriented x 4  -EB oriented x 4  -JH     Follows Commands/Answers Questions 100% of the time  -% of the time  -JH     Recorded by [EB] FLIP Gtz [JH] FLIP Escalona     Bed Mobility, Assessment/Treatment    Bed Mobility, Roll Left, [place] independent  -EB      Bed Mobility, Scoot/Bridge, [place]  supervision required;verbal cues required  -     Bed Mob, Supine to Sit, [place] independent  -EB verbal cues required;contact guard assist  -     Bed Mob, Sit to Supine, [place]  verbal cues required;contact guard assist  -     Recorded by [EB] FLIP Gtz [JH] FLIP Escalona     Transfer Assessment/Treatment    Transfer, Comment declines all functional transfers  -EB      Recorded by [EB] FLIP Gtz      Lower Body Bathing Assessment/Training    LB Bathing Assess/Train, Comment declines bathing/dressing tasks  -EB      Recorded by [EB] FLIP Gtz      Balance Skills Training    Sitting-Level of Assistance Distant supervision  -EB Distant supervision  -     Sitting-Balance Support  Right upper extremity supported  -     Sitting-Balance Activities Trunk control activities;Left UE Weight Bearing  - Trunk control activities  -     Sitting # of Minutes 24  -EB 7  -JH     Standing-Level of Assistance --   declined  -EB      Recorded by [EB] FLIP Gtz [JH] FLIP Escalona     Therapy Exercises    Left Upper Extremity 20 reps;AROM:;sitting;elbow flexion/extension;hand pumps;pronation/supination;shoulder abduction/adduction;shoulder extension/flexion  -EB      Recorded by [EB] FLIP Gtz      Positioning and Restraints    Pre-Treatment Position in bed  -EB in bed  -     Post Treatment Position bed  - bed  -     In Bed supine;call light within reach;encouraged to call for assist  -  sitting;call light within reach;encouraged to call for assist  -     Recorded by [EB] MACIE GtzA/MARV [] MACIE EscalonaA/L       User Key  (r) = Recorded By, (t) = Taken By, (c) = Cosigned By    Initials Name Effective Dates     MACIE EscalonaA/L 10/17/16 -     EB MACIE GtzA/MARV 10/17/16 -                 OT Goals       07/05/17 1302 07/03/17 1345 07/01/17 1601    Transfer Training OT LTG    Transfer Training OT LTG, Date Established   07/01/17  -RW    Transfer Training OT LTG, Time to Achieve   2 wks  -RW    Transfer Training OT LTG, Activity Type   toilet  -RW    Transfer Training OT LTG, [place] Level   minimum assist (75% patient effort)  -RW    Transfer Training OT LTG, Assist Device   commode, bedside  -RW    Transfer Training OT LTG, Date Goal Reviewed 07/05/17  -EB 07/03/17  -     Static Standing Balance OT STG    Static Standing Balance OT STG, Date Established   07/01/17  -RW    Static Standing Balance OT STG, Time to Achieve   5 - 7 days  -RW    Static Standing Balance OT STG, [place] Level   minimum assist (75% patient effort)  -RW    Static Standing Balance OT STG, Assist Device   assistive device  -RW    Static Standing Balance OT STG, Additional Goal   30 sec  -RW    Static Standing Balance OT STG, Date Goal Reviewed 07/05/17  -EB 07/03/17  -     Bathing OT LTG    Bathing Goal OT LTG, Date Established   07/01/17  -RW    Bathing Goal OT LTG, Time to Achieve   2 wks  -RW    Bathing Goal OT LTG, Activity Type   upper body bathing;lower body bathing  -RW    Bathing Goal OT LTG, [place] Level   minimum assist (75% patient effort)  -RW    Bathing Goal OT LTG, Date Goal Reviewed 07/05/17  -EB 07/03/17  -     Bathing Goal OT LTG, Outcome goal not met  -      Grooming OT STG    Grooming Goal OT STG, Date Established   07/01/17  -RW    Grooming Goal OT STG, Time to Achieve   5 - 7 days  -RW    Grooming Goal OT STG, [place] Level   independent  -RW     Grooming Goal OT STG, Position   sitting, edge of bed  -    Grooming Goal OT STG, Date Goal Reviewed 07/05/17  - 07/03/17  -     Grooming Goal OT STG, Outcome goal not met  -        User Key  (r) = Recorded By, (t) = Taken By, (c) = Cosigned By    Initials Name Provider Type     Doris Simon, OTR/L Occupational Therapist     Juanita Hickey HEARD/L Occupational Therapy Assistant     Lakisha Pennington HEARD/L Occupational Therapy Assistant          Occupational Therapy Education     Title: PT OT SLP Therapies (Active)     Topic: Occupational Therapy (Done)     Point: ADL training (Done)    Description: Instruct learner(s) on proper safety adaptation and remediation techniques during self care or transfers.   Instruct in proper use of assistive devices.    Learning Progress Summary    Learner Readiness Method Response Comment Documented by Status   Patient Acceptance E VU Patient verbalizes understanding w/s and e/c methods.  07/05/17 1302 Done    Acceptance E NR bed mobility, benefits of OOB/progression of therapy tx RW 07/01/17 1559 Active               Point: Home exercise program (Done)    Description: Instruct learner(s) on appropriate technique for monitoring, assisting and/or progressing therapeutic exercises/activities.    Learning Progress Summary    Learner Readiness Method Response Comment Documented by Status   Patient Acceptance E VU Patient verbalizes understanding w/s and e/c methods.  07/05/17 1302 Done               Point: Precautions (Done)    Description: Instruct learner(s) on prescribed precautions during self-care and functional transfers.    Learning Progress Summary    Learner Readiness Method Response Comment Documented by Status   Patient Acceptance E VU Patient verbalizes understanding w/s and e/c methods.  07/05/17 1302 Done               Point: Body mechanics (Done)    Description: Instruct learner(s) on proper positioning and spine alignment during self-care,  functional mobility activities and/or exercises.    Learning Progress Summary    Learner Readiness Method Response Comment Documented by Status   Patient Acceptance E VU Patient verbalizes understanding w/s and e/c methods. EB 07/05/17 1302 Done                      User Key     Initials Effective Dates Name Provider Type [facility] 10/17/16 -  Doris Simon OTR/L Occupational Therapist OT     10/17/16 -  FÉLIX Gtz/MARV Occupational Therapy Assistant OT                  OT Recommendation and Plan  Anticipated Discharge Disposition: skilled nursing facility, inpatient rehabilitation facility  Planned Therapy Interventions: activity intolerance, adaptive equipment training, ADL retraining, balance training, bed mobility training, energy conservation, home exercise program, ROM (Range of Motion), strengthening, transfer training, wheelchair fit and training  Therapy Frequency: other (see comments) (3-14 times/wk)           Outcome Measures       07/05/17 1300 07/03/17 1345       How much help from another is currently needed...    Putting on and taking off regular lower body clothing? 1  -EB 1  -JH     Bathing (including washing, rinsing, and drying) 2  -EB 2  -JH     Toileting (which includes using toilet bed pan or urinal) 1  -EB 1  -JH     Putting on and taking off regular upper body clothing 2  -EB 2  -JH     Taking care of personal grooming (such as brushing teeth) 3  -EB 3  -JH     Eating meals 3  -EB 3  -JH     Score 12  -EB 12  -JH     Functional Assessment    Outcome Measure Options AM-PAC 6 Clicks Daily Activity (OT)  -EB        User Key  (r) = Recorded By, (t) = Taken By, (c) = Cosigned By    Initials Name Provider Type     FÉLIX Escalona/L Occupational Therapy Assistant     FÉLIX Gtz/MARV Occupational Therapy Assistant           Time Calculation:         Time Calculation- OT       07/05/17 1302          Time Calculation- OT    OT Start Time 1302  -EB      OT Stop  Time 1333  -EB      OT Time Calculation (min) 31 min  -EB        User Key  (r) = Recorded By, (t) = Taken By, (c) = Cosigned By    Initials Name Provider Type     FÉLIX Gtz/MARV Occupational Therapy Assistant           Therapy Charges for Today     Code Description Service Date Service Provider Modifiers Qty    36660136849 HC OT THER PROC EA 15 MIN 7/5/2017 FÉLIX Gtz/L GO 1    50570968277 HC OT THERAPEUTIC ACT EA 15 MIN 7/5/2017 FLIP Gtz GO 1          OT G-codes  OT Professional Judgement Used?: Yes  OT Functional Scales Options: AM-PAC 6 Clicks Daily Activity (OT)  Score: 16  Functional Limitation: Self care  Self Care Current Status (): At least 40 percent but less than 60 percent impaired, limited or restricted  Self Care Goal Status (): At least 20 percent but less than 40 percent impaired, limited or restricted    FLIP Gtz  7/5/2017

## 2017-07-06 ENCOUNTER — APPOINTMENT (OUTPATIENT)
Dept: CT IMAGING | Facility: HOSPITAL | Age: 52
End: 2017-07-06

## 2017-07-06 LAB
ALBUMIN SERPL-MCNC: 2.4 G/DL (ref 3.4–4.8)
ALBUMIN/GLOB SERPL: 0.9 G/DL (ref 1.1–1.8)
ALP SERPL-CCNC: 207 U/L (ref 38–126)
ALT SERPL W P-5'-P-CCNC: 90 U/L (ref 21–72)
AMMONIA BLD-SCNC: 73 UMOL/L (ref 9–30)
ANION GAP SERPL CALCULATED.3IONS-SCNC: 10 MMOL/L (ref 5–15)
ANISOCYTOSIS BLD QL: NORMAL
AST SERPL-CCNC: 43 U/L (ref 17–59)
BASOPHILS # BLD AUTO: 0.02 10*3/MM3 (ref 0–0.2)
BASOPHILS NFR BLD AUTO: 0.2 % (ref 0–2)
BILIRUB SERPL-MCNC: 3.8 MG/DL (ref 0.2–1.3)
BUN BLD-MCNC: 23 MG/DL (ref 7–21)
BUN/CREAT SERPL: 10.6 (ref 7–25)
CALCIUM SPEC-SCNC: 8.5 MG/DL (ref 8.4–10.2)
CHLORIDE SERPL-SCNC: 91 MMOL/L (ref 95–110)
CO2 SERPL-SCNC: 33 MMOL/L (ref 22–31)
CREAT BLD-MCNC: 2.17 MG/DL (ref 0.7–1.3)
DEPRECATED RDW RBC AUTO: 75.1 FL (ref 35.1–43.9)
EOSINOPHIL # BLD AUTO: 0.91 10*3/MM3 (ref 0–0.7)
EOSINOPHIL NFR BLD AUTO: 7.9 % (ref 0–7)
ERYTHROCYTE [DISTWIDTH] IN BLOOD BY AUTOMATED COUNT: 20.8 % (ref 11.5–14.5)
GFR SERPL CREATININE-BSD FRML MDRD: 32 ML/MIN/1.73 (ref 56–130)
GLOBULIN UR ELPH-MCNC: 2.8 GM/DL (ref 2.3–3.5)
GLUCOSE BLD-MCNC: 108 MG/DL (ref 60–100)
GLUCOSE BLDC GLUCOMTR-MCNC: 132 MG/DL (ref 70–130)
GLUCOSE BLDC GLUCOMTR-MCNC: 156 MG/DL (ref 70–130)
GLUCOSE BLDC GLUCOMTR-MCNC: 160 MG/DL (ref 70–130)
GLUCOSE BLDC GLUCOMTR-MCNC: 182 MG/DL (ref 70–130)
HCT VFR BLD AUTO: 27.4 % (ref 39–49)
HGB BLD-MCNC: 9.2 G/DL (ref 13.7–17.3)
IMM GRANULOCYTES # BLD: 0.1 10*3/MM3 (ref 0–0.02)
IMM GRANULOCYTES NFR BLD: 0.9 % (ref 0–0.5)
LYMPHOCYTES # BLD AUTO: 2.02 10*3/MM3 (ref 0.6–4.2)
LYMPHOCYTES NFR BLD AUTO: 17.6 % (ref 10–50)
MACROCYTES BLD QL SMEAR: NORMAL
MCH RBC QN AUTO: 34.6 PG (ref 26.5–34)
MCHC RBC AUTO-ENTMCNC: 33.6 G/DL (ref 31.5–36.3)
MCV RBC AUTO: 103 FL (ref 80–98)
MONOCYTES # BLD AUTO: 0.73 10*3/MM3 (ref 0–0.9)
MONOCYTES NFR BLD AUTO: 6.4 % (ref 0–12)
NEUTROPHILS # BLD AUTO: 7.7 10*3/MM3 (ref 2–8.6)
NEUTROPHILS NFR BLD AUTO: 67 % (ref 37–80)
PLATELET # BLD AUTO: 45 10*3/MM3 (ref 150–450)
PMV BLD AUTO: 10.9 FL (ref 8–12)
POTASSIUM BLD-SCNC: 3.7 MMOL/L (ref 3.5–5.1)
PROT SERPL-MCNC: 5.2 G/DL (ref 6.3–8.6)
RBC # BLD AUTO: 2.66 10*6/MM3 (ref 4.37–5.74)
SMALL PLATELETS BLD QL SMEAR: NORMAL
SODIUM BLD-SCNC: 134 MMOL/L (ref 137–145)
WBC MORPH BLD: NORMAL
WBC NRBC COR # BLD: 11.48 10*3/MM3 (ref 3.2–9.8)

## 2017-07-06 PROCEDURE — 25010000002 MORPHINE SULFATE (PF) 2 MG/ML SOLUTION: Performed by: HOSPITALIST

## 2017-07-06 PROCEDURE — 94799 UNLISTED PULMONARY SVC/PX: CPT

## 2017-07-06 PROCEDURE — 97110 THERAPEUTIC EXERCISES: CPT

## 2017-07-06 PROCEDURE — 73200 CT UPPER EXTREMITY W/O DYE: CPT

## 2017-07-06 PROCEDURE — 25010000002 LEVOFLOXACIN PER 250 MG: Performed by: PHYSICIAN ASSISTANT

## 2017-07-06 PROCEDURE — 85007 BL SMEAR W/DIFF WBC COUNT: CPT | Performed by: INTERNAL MEDICINE

## 2017-07-06 PROCEDURE — 82962 GLUCOSE BLOOD TEST: CPT

## 2017-07-06 PROCEDURE — 94760 N-INVAS EAR/PLS OXIMETRY 1: CPT

## 2017-07-06 PROCEDURE — 85025 COMPLETE CBC W/AUTO DIFF WBC: CPT | Performed by: INTERNAL MEDICINE

## 2017-07-06 PROCEDURE — 80053 COMPREHEN METABOLIC PANEL: CPT | Performed by: INTERNAL MEDICINE

## 2017-07-06 PROCEDURE — 25010000002 LINEZOLID 600 MG/300ML SOLUTION: Performed by: PHYSICIAN ASSISTANT

## 2017-07-06 PROCEDURE — 82140 ASSAY OF AMMONIA: CPT | Performed by: PHYSICIAN ASSISTANT

## 2017-07-06 PROCEDURE — 25010000002 FUROSEMIDE PER 20 MG: Performed by: INTERNAL MEDICINE

## 2017-07-06 PROCEDURE — 87070 CULTURE OTHR SPECIMN AEROBIC: CPT | Performed by: PHYSICIAN ASSISTANT

## 2017-07-06 PROCEDURE — 25010000002 PIPERACILLIN SOD-TAZOBACTAM PER 1 G: Performed by: INTERNAL MEDICINE

## 2017-07-06 PROCEDURE — 87205 SMEAR GRAM STAIN: CPT | Performed by: PHYSICIAN ASSISTANT

## 2017-07-06 RX ORDER — BACITRACIN ZINC 500 [USP'U]/G
OINTMENT TOPICAL
Status: DISCONTINUED | OUTPATIENT
Start: 2017-07-06 | End: 2017-07-12 | Stop reason: HOSPADM

## 2017-07-06 RX ORDER — SODIUM CHLORIDE 9 MG/ML
75 INJECTION, SOLUTION INTRAVENOUS CONTINUOUS
Status: DISCONTINUED | OUTPATIENT
Start: 2017-07-06 | End: 2017-07-09

## 2017-07-06 RX ORDER — SODIUM CHLORIDE 9 MG/ML
INJECTION, SOLUTION INTRAVENOUS
Status: COMPLETED
Start: 2017-07-06 | End: 2017-07-08

## 2017-07-06 RX ORDER — LEVOFLOXACIN 5 MG/ML
750 INJECTION, SOLUTION INTRAVENOUS
Status: DISCONTINUED | OUTPATIENT
Start: 2017-07-06 | End: 2017-07-12 | Stop reason: HOSPADM

## 2017-07-06 RX ORDER — LINEZOLID 2 MG/ML
600 INJECTION, SOLUTION INTRAVENOUS EVERY 12 HOURS
Status: DISCONTINUED | OUTPATIENT
Start: 2017-07-06 | End: 2017-07-12 | Stop reason: HOSPADM

## 2017-07-06 RX ORDER — TEMAZEPAM 15 MG/1
15 CAPSULE ORAL NIGHTLY PRN
Status: DISCONTINUED | OUTPATIENT
Start: 2017-07-06 | End: 2017-07-12 | Stop reason: HOSPADM

## 2017-07-06 RX ADMIN — FLUCONAZOLE 200 MG: 200 TABLET ORAL at 09:30

## 2017-07-06 RX ADMIN — LINEZOLID 600 MG: 600 INJECTION, SOLUTION INTRAVENOUS at 13:31

## 2017-07-06 RX ADMIN — IPRATROPIUM BROMIDE AND ALBUTEROL SULFATE 3 ML: 2.5; .5 SOLUTION RESPIRATORY (INHALATION) at 12:47

## 2017-07-06 RX ADMIN — LEVOFLOXACIN 750 MG: 5 INJECTION, SOLUTION INTRAVENOUS at 13:30

## 2017-07-06 RX ADMIN — Medication: at 21:04

## 2017-07-06 RX ADMIN — CLOTRIMAZOLE: 1 SOLUTION TOPICAL at 21:04

## 2017-07-06 RX ADMIN — PANTOPRAZOLE SODIUM 40 MG: 40 TABLET, DELAYED RELEASE ORAL at 09:30

## 2017-07-06 RX ADMIN — LACTULOSE 30 G: 10 SOLUTION ORAL at 09:31

## 2017-07-06 RX ADMIN — TEMAZEPAM 15 MG: 15 CAPSULE ORAL at 00:39

## 2017-07-06 RX ADMIN — LACTULOSE 30 G: 10 SOLUTION ORAL at 17:49

## 2017-07-06 RX ADMIN — CLOTRIMAZOLE: 1 SOLUTION TOPICAL at 09:30

## 2017-07-06 RX ADMIN — IPRATROPIUM BROMIDE AND ALBUTEROL SULFATE 3 ML: 2.5; .5 SOLUTION RESPIRATORY (INHALATION) at 16:07

## 2017-07-06 RX ADMIN — GABAPENTIN 300 MG: 300 CAPSULE ORAL at 21:04

## 2017-07-06 RX ADMIN — LACTULOSE 30 G: 10 SOLUTION ORAL at 21:05

## 2017-07-06 RX ADMIN — Medication 10 ML: at 18:21

## 2017-07-06 RX ADMIN — IPRATROPIUM BROMIDE AND ALBUTEROL SULFATE 3 ML: 2.5; .5 SOLUTION RESPIRATORY (INHALATION) at 20:05

## 2017-07-06 RX ADMIN — GABAPENTIN 300 MG: 300 CAPSULE ORAL at 17:48

## 2017-07-06 RX ADMIN — BACITRACIN ZINC: 500 OINTMENT TOPICAL at 18:43

## 2017-07-06 RX ADMIN — FUROSEMIDE 40 MG: 10 INJECTION, SOLUTION INTRAMUSCULAR; INTRAVENOUS at 06:18

## 2017-07-06 RX ADMIN — SODIUM CHLORIDE 75 ML/HR: 900 INJECTION, SOLUTION INTRAVENOUS at 13:32

## 2017-07-06 RX ADMIN — GABAPENTIN 300 MG: 300 CAPSULE ORAL at 09:30

## 2017-07-06 RX ADMIN — SPIRONOLACTONE 50 MG: 50 TABLET, FILM COATED ORAL at 09:30

## 2017-07-06 RX ADMIN — TEMAZEPAM 15 MG: 15 CAPSULE ORAL at 22:40

## 2017-07-06 RX ADMIN — MORPHINE SULFATE 1 MG: 2 INJECTION, SOLUTION INTRAMUSCULAR; INTRAVENOUS at 09:31

## 2017-07-06 RX ADMIN — MORPHINE SULFATE 1 MG: 2 INJECTION, SOLUTION INTRAMUSCULAR; INTRAVENOUS at 18:20

## 2017-07-06 RX ADMIN — Medication 10 ML: at 09:34

## 2017-07-06 RX ADMIN — IPRATROPIUM BROMIDE AND ALBUTEROL SULFATE 3 ML: 2.5; .5 SOLUTION RESPIRATORY (INHALATION) at 09:02

## 2017-07-06 RX ADMIN — TAZOBACTAM SODIUM AND PIPERACILLIN SODIUM 3.38 G: 375; 3 INJECTION, SOLUTION INTRAVENOUS at 06:17

## 2017-07-06 RX ADMIN — MORPHINE SULFATE 1 MG: 2 INJECTION, SOLUTION INTRAMUSCULAR; INTRAVENOUS at 22:36

## 2017-07-06 NOTE — PAYOR COMM NOTE
"BenedictLamont (52 y.o. Male)     Date of Birth Social Security Number Address Home Phone MRN    1965  [address]  [facility] [id number] [phone] [id number]    Yarsani Marital Status          Other        Admission Date Admission Type Admitting Provider Attending Provider Department, Room/Bed    6/30/17 Emergency EchenduLamont MD Bleibel, Samer, MD [address], 428/1    Discharge Date Discharge Disposition Discharge Destination                      Attending Provider: Santiago Marinelli MD     Allergies:  Aspirin, Codeine Sulfate    Isolation:  None   Infection:  None   Code Status:  FULL    Ht:  73\" (185.4 cm)   Wt:  219 lb 1.6 oz (99.4 kg)    Admission Cmt:  None   Principal Problem:  None                Active Insurance as of 6/30/2017     Primary Coverage     Payor Plan Insurance Group Employer/Plan Group    [place]      Payor Plan Address Payor Plan Phone Number Effective From Effective To    [address] [address]  1/1/2014     [place] [address]       Subscriber Name Subscriber Birth Date Member ID       LAMONT MCMULLEN 1965 [id number]                 Emergency Contacts      (Rel.) Home Phone Work Phone Mobile Phone    Loco Mcmullen (Daughter) -- -- [phone]    BenedictMiguel (Son) -- -- [phone]    BenedictShreyas (Brother) -- -- [phone]            Insurance Information                [place] Phone:     Subscriber: Lamont Mcmullen Subscriber#: [id number]    Group#:  Precert#:           Vital Signs (last 24 hours)       07/05 0700  -  07/06 0659 07/06 0700  -  07/06 1339   Most Recent    Temp (°F) 97.4 -  97.8       97.4 (36.3)    Heart Rate 90 -  111    104 -  108     108    Resp   18      18     18    /56 -  124/69       124/69    SpO2 (%) 92 -  96    92 -  94     92          Hospital Medications (active)       Dose Frequency Start End    acetaminophen " (TYLENOL) tablet 650 mg 650 mg Every 6 Hours PRN 6/30/2017     Sig - Route: Take 2 tablets by mouth Every 6 (Six) Hours As Needed for Mild Pain (1-3). - Oral    clotrimazole (LOTRIMIN) 1 % external solution  Every 12 Hours Scheduled 7/1/2017 7/8/2017    Sig - Route: Apply  topically Every 12 (Twelve) Hours. - Topical    fluconazole (DIFLUCAN) tablet 200 mg 200 mg Daily 7/1/2017     Sig - Route: Take 1 tablet by mouth Daily. - Oral    gabapentin (NEURONTIN) capsule 300 mg 300 mg 3 Times Daily 6/30/2017     Sig - Route: Take 1 capsule by mouth 3 (Three) Times a Day. - Oral    hydrALAZINE (APRESOLINE) injection 10 mg 10 mg Every 6 Hours PRN 6/30/2017     Sig - Route: Infuse 0.5 mL into a venous catheter Every 6 (Six) Hours As Needed for High Blood Pressure. - Intravenous    ipratropium-albuterol (DUO-NEB) nebulizer solution 3 mL 3 mL 4 Times Daily - RT 6/30/2017     Sig - Route: Take 3 mL by nebulization 4 (Four) Times a Day. - Nebulization    lactulose (CHRONULAC) 30 g 30 g 3 Times Daily 7/1/2017     Sig - Route: Take 30 g by mouth 3 (Three) Times a Day. - Oral    levoFLOXacin (LEVAQUIN) 750 mg/150 mL D5W (premix) (LEVAQUIN) 750 mg 750 mg Every 48 Hours 7/6/2017     Sig - Route: Infuse 150 mL into a venous catheter Every Other Day. - Intravenous    Linezolid (ZYVOX) 600 mg 300 mL 600 mg Every 12 Hours 7/6/2017     Sig - Route: Infuse 300 mL into a venous catheter Every 12 (Twelve) Hours. - Intravenous    Cosign for Ordering: Required by Allen Hercules MD    Morphine sulfate (PF) injection 1 mg 1 mg Every 4 Hours PRN 7/3/2017 7/13/2017    Sig - Route: Infuse 0.5 mL into a venous catheter Every 4 (Four) Hours As Needed for Severe Pain (7-10). - Intravenous    ondansetron (ZOFRAN) injection 4 mg 4 mg Every 6 Hours PRN 6/30/2017     Sig - Route: Infuse 2 mL into a venous catheter Every 6 (Six) Hours As Needed for Nausea or Vomiting. - Intravenous    pantoprazole (PROTONIX) EC tablet 40 mg 40 mg Daily 7/1/2017     Sig -  "Route: Take 1 tablet by mouth Daily. - Oral    Pharmacy to Dose LevoFLOXacin (LEVAQUIN)  Continuous PRN 7/6/2017     Sig - Route: Continuous As Needed for Consult (PO per renal dosing). - Does not apply    Cosign for Ordering: Required by Allen Hercules MD    potassium chloride 10 mEq in 100 mL IVPB 10 mEq Every 1 Hour PRN 6/30/2017     Sig - Route: Infuse 100 mL into a venous catheter Every 1 (One) Hour As Needed (See admin Instructions.). - Intravenous    sodium chloride 0.9 % flush 10 mL 10 mL As Needed 6/30/2017     Sig - Route: Infuse 10 mL into a venous catheter As Needed for Line Care. - Intravenous    Cosign for Ordering: Accepted by Roque Parker MD on 6/30/2017 12:58 PM    Linked Group 1:  \"And\" Linked Group Details        sodium chloride 0.9 % flush 10 mL 10 mL As Needed 6/30/2017     Sig - Route: Infuse 10 mL into a venous catheter As Needed for Line Care. - Intravenous    sodium chloride 0.9 % infusion  - ADS Override Pull   7/5/2017 7/6/2017    Notes to Pharmacy: Created by cabinet override    sodium chloride 0.9 % infusion  - ADS Override Pull   7/6/2017 7/7/2017    Notes to Pharmacy: Created by cabinet override    sodium chloride 0.9 % infusion 75 mL/hr Continuous 7/6/2017     Sig - Route: Infuse 75 mL/hr into a venous catheter Continuous. - Intravenous    Cosign for Ordering: Required by Allen Hercules MD    spironolactone (ALDACTONE) tablet 50 mg 50 mg Daily 6/30/2017     Sig - Route: Take 1 tablet by mouth Daily. - Oral    temazepam (RESTORIL) capsule 15 mg 15 mg Nightly PRN 6/30/2017 7/5/2017    Sig - Route: Take 1 capsule by mouth At Night As Needed for Sleep. - Oral    temazepam (RESTORIL) capsule 15 mg 15 mg Nightly PRN 7/6/2017 7/16/2017    Sig - Route: Take 1 capsule by mouth At Night As Needed for Sleep. - Oral    furosemide (LASIX) injection 40 mg (Discontinued) 40 mg Every 12 Hours 7/1/2017 7/6/2017    Sig - Route: Infuse 4 mL into a venous catheter Every 12 (Twelve) Hours. - " Intravenous    piperacillin-tazobactam (ZOSYN) 3.375 g in iso-osmotic dextrose 50 ml (premix) (Discontinued) 3.375 g Every 6 Hours 7/2/2017 7/6/2017    Sig - Route: Infuse 50 mL into a venous catheter Every 6 (Six) Hours. - Intravenous             Physician Progress Notes (last 24 hours) (Notes from 7/5/2017  1:39 PM through 7/6/2017  1:39 PM)      KENROY Goode at 7/6/2017 12:39 PM  Version 1 of 1             [facility] Medicine Services  INPATIENT PROGRESS NOTE    Length of Stay: 5  Date of Admission: 6/30/2017  Primary Care Physician: Cj Aguero MD    Subjective   Chief Complaint/HPI:  This 62-year-old  male was admitted hospitalist services secondary to increased lower extremity edema and redness.  Patient has history of respiratory failure, hepatic encephalopathy, and was recently intubated.  Patient successfully extubated.  Now on regular [facility] floor.    Patient has erythematous or purpuric skin lesion on the right lower extremity.  It is questionable whether this is cellulitis versus vascular disease versus a complication of his thrombocytopenia.  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  Cultures will be collected.    Patient does have a history of thrombocytopenia.  He has followed with Dr. Becerril in the past, but has been noncompliant with follow-ups as well as his lactulose while at home.  We will recheck an ammonia, but patient is alert and oriented at this time, is taking lactulose while here.    Patient has been on Zosyn and vancomycin.  Vancomycin has increased creatinine as well as the Lasix twice a day.  We have stopped vancomycin and Lasix.  Have consulted Dr. Colón of nephrology, the patient's nephrologist.  We will instate gentle fluid hydration at this time and await nephrology recommendations.    Platelets stable, no overt bleeding.    Have stopped Zosyn given the drugs propensity to induce  thrombocytopenia.  However place with Levaquin, which should have good pseudomonas and gram-negative coverage and has a less likely side effect of thrombocytopenia.  We will have wound care evaluate.    No gastroenterology coverage at this time, we'll consider consulting Dr. Becerril when he is available.    Patient had complained of shoulder pain to his nurse.  Patient has a history of possible abscess versus fistula in the shoulder.  We will start with CT.  CT will have to be without contrast given the patient's renal failure.    Review of Systems   Constitutional: Negative for chills and fever.   Respiratory: Negative for cough.    Cardiovascular: Negative for chest pain.   Gastrointestinal: Negative for abdominal pain, constipation, nausea and vomiting.   Genitourinary: Negative for dysuria.   Musculoskeletal: Negative for back pain.   Neurological: Negative for dizziness, seizures, syncope and headaches.   Psychiatric/Behavioral: Negative for confusion.      All pertinent negatives and positives are as above. All other systems have been reviewed and are negative unless otherwise stated.     Objective    Temp:  [97.4 °F (36.3 °C)-97.8 °F (36.6 °C)] 97.4 °F (36.3 °C)  Heart Rate:  [] 104  Resp:  [18] 18  BP: (111-124)/(56-82) 124/69    Physical Exam   Constitutional: He is oriented to person, place, and time. He appears well-developed and well-nourished.   HENT:   Head: Normocephalic and atraumatic.   Eyes: Pupils are equal, round, and reactive to light.   Cardiovascular: Normal rate and regular rhythm.    Abdominal: Soft. Bowel sounds are normal. He exhibits no distension. There is no tenderness.   Neurological: He is alert and oriented to person, place, and time.   Skin: Skin is warm and dry.   Erythema anterior right LE, no bleeding, distal pulse intact, capillary refill intact   Psychiatric: He has a normal mood and affect. His behavior is normal.     Results Review:  I have reviewed the labs, radiology  results, and diagnostic studies.    Laboratory Data:     Results from last 7 days  Lab Units 07/06/17  0547 07/05/17  0603 07/04/17  0648   SODIUM mmol/L 134* 134* 135*   POTASSIUM mmol/L 3.7 3.8 3.8   CHLORIDE mmol/L 91* 92* 93*   CO2 mmol/L 33.0* 32.0* 33.0*   BUN mg/dL 23* 21 22*   CREATININE mg/dL 2.17* 2.00* 1.76*   GLUCOSE mg/dL 108* 127* 100   CALCIUM mg/dL 8.5 8.8 8.6   BILIRUBIN mg/dL 3.8* 4.1* 3.3*   ALK PHOS U/L 207* 239* 251*   ALT (SGPT) U/L 90* 109* 117*   AST (SGOT) U/L 43 55 69*   ANION GAP mmol/L 10.0 10.0 9.0     Estimated Creatinine Clearance: 48.8 mL/min (by C-G formula based on Cr of 2.17).            Results from last 7 days  Lab Units 07/06/17  0547 07/05/17  0603 07/04/17  0648 07/03/17  0544 07/02/17  0524   WBC 10*3/mm3 11.48* 12.09* 9.36 13.70* 13.11*   HEMOGLOBIN g/dL 9.2* 9.3* 8.5* 8.9* 8.7*   HEMATOCRIT % 27.4* 27.4* 25.3* 26.5* 24.9*   PLATELETS 10*3/mm3 45* 63* 71* 37* 41*           Culture Data:   No results found for: BLOODCX  No results found for: URINECX  No results found for: RESPCX  No results found for: WOUNDCX  No results found for: STOOLCX  No components found for: BODYFLD    Radiology Data:   Imaging Results (last 24 hours)     ** No results found for the last 24 hours. **          I have reviewed the patient current medications.     Assessment/Plan     Hospital Problem List     Cellulitis of right lower extremity          Plan:  As noted above.            This document has been electronically signed by KENROY Goode on July 6, 2017 12:39 PM         Electronically signed by KENROY Goode at 7/6/2017 12:47 PM        Consult Notes (last 24 hours) (Notes from 7/5/2017  1:40 PM through 7/6/2017  1:40 PM)     No notes of this type exist for this encounter.        Physical Therapy Notes (last 24 hours) (Notes from 7/5/2017  1:40 PM through 7/6/2017  1:40 PM)     No notes of this type exist for this encounter.           Occupational Therapy Notes (last 24 hours)  (Notes from 7/5/2017  1:40 PM through 7/6/2017  1:40 PM)      Lakisha Pennington, FÉLIX/L at 7/5/2017  1:49 PM  Version 1 of 1         Problem: Inpatient Occupational Therapy  Goal: Transfer Training Goal 1 LTG- OT  Outcome: Ongoing (interventions implemented as appropriate)    07/01/17 1601 07/05/17 1302   Transfer Training OT LTG   Transfer Training OT LTG, Date Established 07/01/17 --    Transfer Training OT LTG, Time to Achieve 2 wks --    Transfer Training OT LTG, Activity Type toilet --    Transfer Training OT LTG, [place] Level minimum assist (75% patient effort) --    Transfer Training OT LTG, Assist Device commode, bedside --    Transfer Training OT LTG, Date Goal Reviewed --  07/05/17       Goal: Static Standing Balance Goal OT- STG  Outcome: Ongoing (interventions implemented as appropriate)    07/01/17 1601 07/05/17 1302   Static Standing Balance OT STG   Static Standing Balance OT STG, Date Established 07/01/17 --    Static Standing Balance OT STG, Time to Achieve 5 - 7 days --    Static Standing Balance OT STG, [place] Level minimum assist (75% patient effort) --    Static Standing Balance OT STG, Assist Device assistive device --    Static Standing Balance OT STG, Additional Goal 30 sec --    Static Standing Balance OT STG, Date Goal Reviewed --  07/05/17       Goal: Bathing Goal LTG- OT  Outcome: Ongoing (interventions implemented as appropriate)    07/01/17 1601 07/05/17 1302   Bathing OT LTG   Bathing Goal OT LTG, Date Established 07/01/17 --    Bathing Goal OT LTG, Time to Achieve 2 wks --    Bathing Goal OT LTG, Activity Type upper body bathing;lower body bathing --    Bathing Goal OT LTG, [place] Level minimum assist (75% patient effort) --    Bathing Goal OT LTG, Date Goal Reviewed --  07/05/17   Bathing Goal OT LTG, Outcome --  goal not met       Goal: Grooming Goal STG- OT  Outcome: Ongoing (interventions implemented as appropriate)    07/01/17 1601 07/05/17 1302   Grooming OT STG    Grooming Goal OT STG, Date Established 17 --    Grooming Goal OT STG, Time to Achieve 5 - 7 days --    Grooming Goal OT STG, [place] Level independent --    Grooming Goal OT STG, Position sitting, edge of bed --    Grooming Goal OT STG, Date Goal Reviewed --  17   Grooming Goal OT STG, Outcome --  goal not met              Electronically signed by FÉLIX Gtz/L at 2017  1:49 PM      FÉLIX Gtz/L at 2017  1:50 PM  Version 1 of 1         Acute Care - Occupational Therapy Treatment Note  [facility]     Patient Name: Armando Mcmullen  : 1965  MRN: [id number]  Today's Date: 2017  Onset of Illness/Injury or Date of Surgery Date: 17  Date of Referral to OT: 17  Referring Physician: Dr. JANE Marinelli      Admit Date: 2017    Visit Dx:     ICD-10-CM ICD-9-CM   1. Cellulitis of right lower extremity L03.115 682.6   2. Impaired functional mobility, balance, gait, and endurance Z74.09 V49.89   3. Impaired mobility and ADLs Z74.09 799.89     Patient Active Problem List   Diagnosis   • Anxiety state   • Back pain   • Chronic hepatitis   • Hepatic cirrhosis   • Depression   • Insomnia   • Essential hypertension   • Hypokalemia   • Anasarca   • Acute hepatic encephalopathy   • Substance abuse   • Acute on chronic renal failure   • Liver failure with hepatic coma   • Osteoarthritis   • Hypersplenism   • Chronic osteomyelitis of right shoulder region   • [place] coma scale total score 3-8   • Anxiety state   • Cellulitis of right lower extremity             Adult Rehabilitation Note       17 1302 17 1345       Rehab Assessment/Intervention    Discipline occupational therapy assistant  -EB occupational therapy assistant  -     Document Type therapy note (daily note)  -EB therapy note (daily note)  -     Subjective Information agree to therapy  -EB agree to therapy;complains of;weakness;fatigue;pain  -     Patient Effort, Rehab Treatment fair   -EB poor  -JH     Recorded by [EB] FLIP Gtz [JH] FLIP Escalona     Pain Assessment    Pain Assessment 0-10  -EB 0-10  -JH     Pain Score 7  -EB 7  -JH     Post Pain Score 7  -EB 8  -JH     Pain Type  Acute pain;Chronic pain  -     Pain Location Leg  -EB Leg   and R arm  -JH     Pain Orientation Right;Left  -EB Right  -JH     Recorded by [EB] FLIP Gtz [] FLIP Escalona     Cognitive Assessment/Intervention    Current Cognitive/Communication Assessment functional  -EB functional  -     Orientation Status oriented x 4  -EB oriented x 4  -     Follows Commands/Answers Questions 100% of the time  -% of the time  -JH     Recorded by [EB] FLIP Gtz [JH] FLIP Escalona     Bed Mobility, Assessment/Treatment    Bed Mobility, Roll Left, [place] independent  -EB      Bed Mobility, Scoot/Bridge, [place]  supervision required;verbal cues required  -     Bed Mob, Supine to Sit, [place] independent  -EB verbal cues required;contact guard assist  -     Bed Mob, Sit to Supine, [place]  verbal cues required;contact guard assist  -JH     Recorded by [EB] FLIP Gtz [JH] FLIP Escalona     Transfer Assessment/Treatment    Transfer, Comment declines all functional transfers  -EB      Recorded by [EB] FLIP Gtz      Lower Body Bathing Assessment/Training    LB Bathing Assess/Train, Comment declines bathing/dressing tasks  -EB      Recorded by [EB] FLIP Gtz      Balance Skills Training    Sitting-Level of Assistance Distant supervision  -EB Distant supervision  -     Sitting-Balance Support  Right upper extremity supported  -     Sitting-Balance Activities Trunk control activities;Left UE Weight Bearing  -EB Trunk control activities  -     Sitting # of Minutes 24  -EB 7  -JH     Standing-Level of Assistance --   declined  -EB      Recorded by [EB] FLIP Gtz  [] FLIP Escalona     Therapy Exercises    Left Upper Extremity 20 reps;AROM:;sitting;elbow flexion/extension;hand pumps;pronation/supination;shoulder abduction/adduction;shoulder extension/flexion  -EB      Recorded by [EB] FLIP Gtz      Positioning and Restraints    Pre-Treatment Position in bed  -EB in bed  -JH     Post Treatment Position bed  -EB bed  -JH     In Bed supine;call light within reach;encouraged to call for assist  -EB sitting;call light within reach;encouraged to call for assist  -     Recorded by [EB] FLIP Gtz [] FLIP Escalona       User Key  (r) = Recorded By, (t) = Taken By, (c) = Cosigned By    Initials Name Effective Dates     FLIP Escalona 10/17/16 -     EB FLIP Gtz 10/17/16 -                 OT Goals       07/05/17 1302 07/03/17 1345 07/01/17 1601    Transfer Training OT LTG    Transfer Training OT LTG, Date Established   07/01/17  -RW    Transfer Training OT LTG, Time to Achieve   2 wks  -RW    Transfer Training OT LTG, Activity Type   toilet  -RW    Transfer Training OT LTG, [place] Level   minimum assist (75% patient effort)  -RW    Transfer Training OT LTG, Assist Device   commode, bedside  -RW    Transfer Training OT LTG, Date Goal Reviewed 07/05/17  -EB 07/03/17  -     Static Standing Balance OT STG    Static Standing Balance OT STG, Date Established   07/01/17  -RW    Static Standing Balance OT STG, Time to Achieve   5 - 7 days  -RW    Static Standing Balance OT STG, [place] Level   minimum assist (75% patient effort)  -RW    Static Standing Balance OT STG, Assist Device   assistive device  -RW    Static Standing Balance OT STG, Additional Goal   30 sec  -RW    Static Standing Balance OT STG, Date Goal Reviewed 07/05/17  -EB 07/03/17  -     Bathing OT LTG    Bathing Goal OT LTG, Date Established   07/01/17  -RW    Bathing Goal OT LTG, Time to Achieve   2 wks  -RW    Bathing Goal OT LTG,  Activity Type   upper body bathing;lower body bathing  -RW    Bathing Goal OT LTG, [place] Level   minimum assist (75% patient effort)  -RW    Bathing Goal OT LTG, Date Goal Reviewed 07/05/17  -EB 07/03/17  -     Bathing Goal OT LTG, Outcome goal not met  -EB      Grooming OT STG    Grooming Goal OT STG, Date Established   07/01/17  -RW    Grooming Goal OT STG, Time to Achieve   5 - 7 days  -RW    Grooming Goal OT STG, [place] Level   independent  -RW    Grooming Goal OT STG, Position   sitting, edge of bed  -RW    Grooming Goal OT STG, Date Goal Reviewed 07/05/17  -EB 07/03/17  -     Grooming Goal OT STG, Outcome goal not met  -        User Key  (r) = Recorded By, (t) = Taken By, (c) = Cosigned By    Initials Name Provider Type     Doris Simon, OTR/L Occupational Therapist     Juanita Hickey HEARD/L Occupational Therapy Assistant     MACIE GtzA/L Occupational Therapy Assistant          Occupational Therapy Education     Title: PT OT SLP Therapies (Active)     Topic: Occupational Therapy (Done)     Point: ADL training (Done)    Description: Instruct learner(s) on proper safety adaptation and remediation techniques during self care or transfers.   Instruct in proper use of assistive devices.    Learning Progress Summary    Learner Readiness Method Response Comment Documented by Status   Patient Acceptance E VU Patient verbalizes understanding w/s and e/c methods.  07/05/17 1302 Done    Acceptance E NR bed mobility, benefits of OOB/progression of therapy tx  07/01/17 1559 Active               Point: Home exercise program (Done)    Description: Instruct learner(s) on appropriate technique for monitoring, assisting and/or progressing therapeutic exercises/activities.    Learning Progress Summary    Learner Readiness Method Response Comment Documented by Status   Patient Acceptance E VU Patient verbalizes understanding w/s and e/c methods.  07/05/17 1302 Done                Point: Precautions (Done)    Description: Instruct learner(s) on prescribed precautions during self-care and functional transfers.    Learning Progress Summary    Learner Readiness Method Response Comment Documented by Status   Patient Acceptance E VU Patient verbalizes understanding w/s and e/c methods. EB 07/05/17 1302 Done               Point: Body mechanics (Done)    Description: Instruct learner(s) on proper positioning and spine alignment during self-care, functional mobility activities and/or exercises.    Learning Progress Summary    Learner Readiness Method Response Comment Documented by Status   Patient Acceptance E VU Patient verbalizes understanding w/s and e/c methods. EB 07/05/17 1302 Done                      User Key     Initials Effective Dates Name Provider Type Discipline     10/17/16 -  CHRISTINE Wilburn/L Occupational Therapist OT    EB 10/17/16 -  FÉLIX Gtz/MARV Occupational Therapy Assistant OT                  OT Recommendation and Plan  Anticipated Discharge Disposition: skilled nursing facility, inpatient rehabilitation facility  Planned Therapy Interventions: activity intolerance, adaptive equipment training, ADL retraining, balance training, bed mobility training, energy conservation, home exercise program, ROM (Range of Motion), strengthening, transfer training, wheelchair fit and training  Therapy Frequency: other (see comments) (3-14 times/wk)           Outcome Measures       07/05/17 1300 07/03/17 1345       How much help from another is currently needed...    Putting on and taking off regular lower body clothing? 1  -EB 1  -JH     Bathing (including washing, rinsing, and drying) 2  -EB 2  -JH     Toileting (which includes using toilet bed pan or urinal) 1  -EB 1  -JH     Putting on and taking off regular upper body clothing 2  -EB 2  -JH     Taking care of personal grooming (such as brushing teeth) 3  -EB 3  -JH     Eating meals 3  -EB 3  -JH     Score 12  -EB 12   -     Functional Assessment    Outcome Measure Options AM-PAC 6 Clicks Daily Activity (OT)  -        User Key  (r) = Recorded By, (t) = Taken By, (c) = Cosigned By    Initials Name Provider Type     FÉLIX Escalona/L Occupational Therapy Assistant    FLIP Gamez Occupational Therapy Assistant           Time Calculation:         Time Calculation- OT       07/05/17 1302          Time Calculation- OT    OT Start Time 1302  -EB      OT Stop Time 1333  -EB      OT Time Calculation (min) 31 min  -        User Key  (r) = Recorded By, (t) = Taken By, (c) = Cosigned By    Initials Name Provider Type     FÉLIX Gtz/MARV Occupational Therapy Assistant           Therapy Charges for Today     Code Description Service Date Service Provider Modifiers Qty    04463052048 HC OT THER PROC EA 15 MIN 7/5/2017 FLIP Gtz GO 1    05249875500 HC OT THERAPEUTIC ACT EA 15 MIN 7/5/2017 FLIP Gtz GO 1          OT G-codes  OT Professional Judgement Used?: Yes  OT Functional Scales Options: AM-PAC 6 Clicks Daily Activity (OT)  Score: 16  Functional Limitation: Self care  Self Care Current Status (): At least 40 percent but less than 60 percent impaired, limited or restricted  Self Care Goal Status (): At least 20 percent but less than 40 percent impaired, limited or restricted    FLIP Gtz  7/5/2017     Electronically signed by FLIP Gtz at 7/5/2017  1:51 PM      for reference number  [id number]

## 2017-07-06 NOTE — NURSING NOTE
Patient has open wounds on his right lower leg. Patient states he hasn't scratched his leg or had any other form of trauma.  Dorsalis pedal pulses palpable. 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 Measures 2.5 x 12 x 0.1 cm. 100 % granulation. All need wound care. Stump wound needs protection and off loading. All POA.

## 2017-07-06 NOTE — PLAN OF CARE
Problem: Patient Care Overview (Adult)  Goal: Plan of Care Review  Outcome: Ongoing (interventions implemented as appropriate)    07/06/17 1415   Coping/Psychosocial Response Interventions   Plan Of Care Reviewed With patient   Patient Care Overview   Progress progress toward functional goals is gradual   Outcome Evaluation   Outcome Summary/Follow up Plan pt is making slow progress with mobility and goals. PTA recommends NHP at discharge for rehab to home         Problem: Inpatient Physical Therapy  Goal: Bed Mobility Goal LTG- PT  Outcome: Ongoing (interventions implemented as appropriate)    07/01/17 1309 07/06/17 1415   Bed Mobility PT LTG   Bed Mobility PT LTG, Date Established 07/01/17 --    Bed Mobility PT LTG, Time to Achieve 2 wks --    Bed Mobility PT LTG, Activity Type roll left/roll right;supine to sit/sit to supine --    Bed Mobility PT LTG, [place] Level minimum assist (75% patient effort) --    Bed Mobility PT Goal LTG, Assist Device bed rails --    Bed Mobility PT LTG, Date Goal Reviewed --  07/06/17   Bed Mobility PT LTG, Outcome --  goal ongoing       Goal: Transfer Training Goal 1 LTG- PT  Outcome: Ongoing (interventions implemented as appropriate)    07/01/17 1309 07/06/17 1415   Transfer Training PT LTG   Transfer Training PT LTG, Date Established 07/01/17 --    Transfer Training PT LTG, Time to Achieve 2 wks --    Transfer Training PT LTG, Activity Type bed to chair /chair to bed;other (see comments)  (w/c) --    Transfer Training PT LTG, [place] Level moderate assist (50% patient effort) --    Transfer Training PT LTG, Assist Device other (see comments)  (slide board prn) --    Transfer Training PT LTG, Additional Goal scoot or squat pivot transfer --    Transfer Training PT LTG, Date Goal Reviewed --  07/06/17   Transfer Training PT LTG, Outcome --  goal ongoing       Goal: Static Sitting Balance Goal STG- PT  Outcome: Ongoing (interventions implemented as appropriate)    07/01/17  1309 07/06/17 1415   Static Sitting Balance PT STG   Static Sitting Balance PT STG, Date Established 07/01/17 --    Static Sitting Balance PT STG, Time to Achieve 2 - 3 days --    Static Sitting Balance PT STG, [place] Level contact guard assist --    Static Sitting Balance PT STG, Assist Device UE Support --    Static Sitting Balance PT STG, Date Goal Reviewed --  07/06/17   Static Sitting Balance PT STG, Outcome --  goal ongoing

## 2017-07-06 NOTE — PROGRESS NOTES
[facility] Medicine Services  INPATIENT PROGRESS NOTE    Length of Stay: 5  Date of Admission: 6/30/2017  Primary Care Physician: Cj Aguero MD    Subjective   Chief Complaint/HPI:  This 62-year-old  male was admitted hospitalist services secondary to increased lower extremity edema and redness.  Patient has history of respiratory failure, hepatic encephalopathy, and was recently intubated.  Patient successfully extubated.  Now on regular [facility] floor.    Patient has erythematous or purpuric skin lesion on the right lower extremity.  It is questionable whether this is cellulitis versus vascular disease versus a complication of his thrombocytopenia.  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  Cultures will be collected.    Patient does have a history of thrombocytopenia.  He has followed with Dr. Becerril in the past, but has been noncompliant with follow-ups as well as his lactulose while at home.  We will recheck an ammonia, but patient is alert and oriented at this time, is taking lactulose while here.    Patient has been on Zosyn and vancomycin.  Vancomycin has increased creatinine as well as the Lasix twice a day.  We have stopped vancomycin and Lasix.  Have consulted Dr. Colón of nephrology, the patient's nephrologist.  We will instate gentle fluid hydration at this time and await nephrology recommendations.    Platelets stable, no overt bleeding.    Have stopped Zosyn given the drugs propensity to induce thrombocytopenia.  However place with Levaquin, which should have good pseudomonas and gram-negative coverage and has a less likely side effect of thrombocytopenia.  We will have wound care evaluate.    No gastroenterology coverage at this time, we'll consider consulting Dr. Becerril when he is available.    Patient had complained of shoulder pain to his nurse.  Patient has a history of possible abscess versus fistula in the  shoulder.  We will start with CT.  CT will have to be without contrast given the patient's renal failure.    Review of Systems   Constitutional: Negative for chills and fever.   Respiratory: Negative for cough.    Cardiovascular: Negative for chest pain.   Gastrointestinal: Negative for abdominal pain, constipation, nausea and vomiting.   Genitourinary: Negative for dysuria.   Musculoskeletal: Negative for back pain.   Neurological: Negative for dizziness, seizures, syncope and headaches.   Psychiatric/Behavioral: Negative for confusion.      All pertinent negatives and positives are as above. All other systems have been reviewed and are negative unless otherwise stated.     Objective    Temp:  [97.4 °F (36.3 °C)-97.8 °F (36.6 °C)] 97.4 °F (36.3 °C)  Heart Rate:  [] 104  Resp:  [18] 18  BP: (111-124)/(56-82) 124/69    Physical Exam   Constitutional: He is oriented to person, place, and time. He appears well-developed and well-nourished.   HENT:   Head: Normocephalic and atraumatic.   Eyes: Pupils are equal, round, and reactive to light.   Cardiovascular: Normal rate and regular rhythm.    Abdominal: Soft. Bowel sounds are normal. He exhibits no distension. There is no tenderness.   Neurological: He is alert and oriented to person, place, and time.   Skin: Skin is warm and dry.   Erythema anterior right LE, no bleeding, distal pulse intact, capillary refill intact   Psychiatric: He has a normal mood and affect. His behavior is normal.     Results Review:  I have reviewed the labs, radiology results, and diagnostic studies.    Laboratory Data:     Results from last 7 days  Lab Units 07/06/17  0547 07/05/17  0603 07/04/17  0648   SODIUM mmol/L 134* 134* 135*   POTASSIUM mmol/L 3.7 3.8 3.8   CHLORIDE mmol/L 91* 92* 93*   CO2 mmol/L 33.0* 32.0* 33.0*   BUN mg/dL 23* 21 22*   CREATININE mg/dL 2.17* 2.00* 1.76*   GLUCOSE mg/dL 108* 127* 100   CALCIUM mg/dL 8.5 8.8 8.6   BILIRUBIN mg/dL 3.8* 4.1* 3.3*   ALK PHOS U/L  207* 239* 251*   ALT (SGPT) U/L 90* 109* 117*   AST (SGOT) U/L 43 55 69*   ANION GAP mmol/L 10.0 10.0 9.0     Estimated Creatinine Clearance: 48.8 mL/min (by C-G formula based on Cr of 2.17).            Results from last 7 days  Lab Units 07/06/17  0547 07/05/17  0603 07/04/17  0648 07/03/17  0544 07/02/17  0524   WBC 10*3/mm3 11.48* 12.09* 9.36 13.70* 13.11*   HEMOGLOBIN g/dL 9.2* 9.3* 8.5* 8.9* 8.7*   HEMATOCRIT % 27.4* 27.4* 25.3* 26.5* 24.9*   PLATELETS 10*3/mm3 45* 63* 71* 37* 41*           Culture Data:   No results found for: BLOODCX  No results found for: URINECX  No results found for: RESPCX  No results found for: WOUNDCX  No results found for: STOOLCX  No components found for: BODYFLD    Radiology Data:   Imaging Results (last 24 hours)     ** No results found for the last 24 hours. **          I have reviewed the patient current medications.     Assessment/Plan     Hospital Problem List     Cellulitis of right lower extremity          Plan:  As noted above.            This document has been electronically signed by KENROY Goode on July 6, 2017 12:39 PM

## 2017-07-06 NOTE — THERAPY TREATMENT NOTE
Acute Care - Physical Therapy Treatment Note  [facility]     Patient Name: Armando Mcmullen  : 1965  MRN: [id number]  Today's Date: 2017  Onset of Illness/Injury or Date of Surgery Date: 17  Date of Referral to PT: 17  Referring Physician: Dr. JANE Marinelli    Admit Date: 2017    Visit Dx:    ICD-10-CM ICD-9-CM   1. Cellulitis of right lower extremity L03.115 682.6   2. Impaired functional mobility, balance, gait, and endurance Z74.09 V49.89   3. Impaired mobility and ADLs Z74.09 799.89     Patient Active Problem List   Diagnosis   • Anxiety state   • Back pain   • Chronic hepatitis   • Hepatic cirrhosis   • Depression   • Insomnia   • Essential hypertension   • Hypokalemia   • Anasarca   • Acute hepatic encephalopathy   • Substance abuse   • Acute on chronic renal failure   • Liver failure with hepatic coma   • Osteoarthritis   • Hypersplenism   • Chronic osteomyelitis of right shoulder region   • [place] coma scale total score 3-8   • Anxiety state   • Cellulitis of right lower extremity               Adult Rehabilitation Note       17 1415 17 1302       Rehab Assessment/Intervention    Discipline physical therapy assistant  -FAMILIA occupational therapy assistant  -EB     Document Type therapy note (daily note)  -FAMILIA therapy note (daily note)  -EB     Subjective Information no complaints;agree to therapy;fatigue;pain  -FAMILIA agree to therapy  -EB     Patient Effort, Rehab Treatment adequate  -FAMILIA fair  -EB     Precautions/Limitations fall precautions  -FAMILIA      Specific Treatment Considerations pt declined OOB   -FAMILIA      Recorded by [FAMILIA] Kayley Domínguez PTA [EB] MACIE GtzA/L     Vital Signs    Pre Systolic BP Rehab 119  -FAMILIA      Pre Treatment Diastolic BP 75  -FAMILIA      Pretreatment Heart Rate (beats/min) 109  -FAMILIA      Intratreatment Heart Rate (beats/min) 108  -FAMILIA      Posttreatment Heart Rate (beats/min) 109  -FAMILIA      Pre SpO2 (%) 95  -FAMILIA      O2 Delivery Pre Treatment room  air  -FAMILIA      Intra SpO2 (%) 96  -FAMILIA      O2 Delivery Intra Treatment room air  -FAMILIA      Post SpO2 (%) 97  -FAMILIA      O2 Delivery Post Treatment room air  -FAMILIA      Pre Patient Position Supine  -FAMILIA      Intra Patient Position Supine  -FAMILIA      Post Patient Position Supine  -FAMILIA      Recorded by [FAMILIA] Kayley Domínguez PTA      Pain Assessment    Pain Assessment 0-10  -FAMILIA 0-10  -EB     Pain Score 8  -FAMILIA 7  -EB     Post Pain Score 8  -FAMILIA 7  -EB     Pain Type Chronic pain  -FAMILIA      Pain Location Generalized  -FAMILIA Leg  -EB     Pain Orientation  Right;Left  -EB     Recorded by [FAMILIA] Kayley Domínguez PTA [EB] MACIE GtzA/L     Cognitive Assessment/Intervention    Current Cognitive/Communication Assessment functional  -FAMILIA functional  -EB     Orientation Status oriented to;person;time  -FAMILIA oriented x 4  -EB     Follows Commands/Answers Questions 100% of the time  -FAMILIA 100% of the time  -EB     Personal Safety mild impairment  -FAMILIA      Recorded by [FAMILIA] Kayley Domínguez PTA [EB] Lakisha Pennington HEARD/L     Bed Mobility, Assessment/Treatment    Bed Mobility, Roll Left, [place]  independent  -EB     Bed Mob, Supine to Sit, [place]  independent  -EB     Recorded by  [EB] MACIE GtzA/L     Transfer Assessment/Treatment    Transfer, Comment  declines all functional transfers  -EB     Recorded by  [EB] MACIE GtzA/L     Lower Body Bathing Assessment/Training    LB Bathing Assess/Train, Comment  declines bathing/dressing tasks  -EB     Recorded by  [EB] MACIE GtzA/L     Balance Skills Training    Sitting-Level of Assistance  Distant supervision  -EB     Sitting-Balance Activities  Trunk control activities;Left UE Weight Bearing  -EB     Sitting # of Minutes  24  -EB     Standing-Level of Assistance  --   declined  -EB     Recorded by  [EB] MACIE GtzA/L     Therapy Exercises    Bilateral Lower Extremities AROM:;20 reps;supine;ankle pumps/circles;glut sets;hip  abduction/adduction;hip ER;hip IR;hip flexion;SAQ;SLR  -FAMILIA      Left Upper Extremity  20 reps;AROM:;sitting;elbow flexion/extension;hand pumps;pronation/supination;shoulder abduction/adduction;shoulder extension/flexion  -EB     Recorded by [FAMILIA] Kayley Domínguez PTA [EB] Lakisha Pennington HEARD/MARV     Positioning and Restraints    Pre-Treatment Position in bed  - in bed  -EB     Post Treatment Position bed  - bed  -EB     In Bed supine;call light within reach;encouraged to call for assist;exit alarm on;LLE elevated;side rails up x2  -FAMILIA supine;call light within reach;encouraged to call for assist  -EB     Recorded by [FAMILIA] Kayley Domínguez PTA [EB] Lakisha Pennington HEARD/L       User Key  (r) = Recorded By, (t) = Taken By, (c) = Cosigned By    Initials Name Effective Dates     Kayley Domínguez PTA 10/17/16 -     EB FÉLIX Gtz/AMRV 10/17/16 -                 IP PT Goals       07/06/17 1415 07/01/17 1309       Bed Mobility PT LTG    Bed Mobility PT LTG, Date Established  07/01/17  -MN     Bed Mobility PT LTG, Time to Achieve  2 wks  -MN     Bed Mobility PT LTG, Activity Type  roll left/roll right;supine to sit/sit to supine  -MN     Bed Mobility PT LTG, [place] Level  minimum assist (75% patient effort)  -MN     Bed Mobility PT Goal  LTG, Assist Device  bed rails  -MN     Bed Mobility PT LTG, Date Goal Reviewed 07/06/17  -      Bed Mobility PT LTG, Outcome goal ongoing  -      Transfer Training PT LTG    Transfer Training PT LTG, Date Established  07/01/17  -MN     Transfer Training PT LTG, Time to Achieve  2 wks  -MN     Transfer Training PT LTG, Activity Type  bed to chair /chair to bed;other (see comments)   w/c  -MN     Transfer Training PT LTG, [place] Level  moderate assist (50% patient effort)  -MN     Transfer Training PT LTG, Assist Device  other (see comments)   slide board prn  -MN     Transfer Training PT LTG, Additional Goal  scoot or squat pivot transfer  -MN     Transfer Training  PT  LTG, Date Goal Reviewed 07/06/17  -FAMILIA      Transfer Training PT LTG, Outcome goal ongoing  -FAMILIA      Static Sitting Balance PT STG    Static Sitting Balance PT STG, Date Established  07/01/17  -MN     Static Sitting Balance PT STG, Time to Achieve  2 - 3 days  -MN     Static Sitting Balance PT STG, [place] Level  contact guard assist  -MN     Static Sitting Balance PT STG, Assist Device  UE Support  -MN     Static Sitting Balance PT STG, Date Goal Reviewed 07/06/17  -FAMILIA      Static Sitting Balance PT STG, Outcome goal ongoing  -FAMILIA        User Key  (r) = Recorded By, (t) = Taken By, (c) = Cosigned By    Initials Name Provider Type    MN Tammy Salazar, PT Physical Therapist    FAMILIA Domínguez PTA Physical Therapy Assistant          Physical Therapy Education     Title: PT OT SLP Therapies (Active)     Topic: Physical Therapy (Active)     Point: Precautions (Active)    Learning Progress Summary    Learner Readiness Method Response Comment Documented by Status   Patient Acceptance E NR importance of participation in PT for improved mobility and decrease risk of DVT MN 07/01/17 1306 Active                      User Key     Initials Effective Dates Name Provider Type Discipline    MN 10/17/16 -  Tammy Salazar, PT Physical Therapist PT                    PT Recommendation and Plan  Anticipated Discharge Disposition: skilled nursing facility, transitional care  Planned Therapy Interventions: balance training, bed mobility training, home exercise program, neuromuscular re-education, patient/family education, postural re-education, ROM (Range of Motion), strengthening, stretching, transfer training, wheelchair management/propulsion training  PT Frequency: other (see comments) (5-14x/week)  Plan of Care Review  Plan Of Care Reviewed With: patient  Progress: progress toward functional goals is gradual  Outcome Summary/Follow up Plan: pt is making slow progress with mobility and goals. PTA recommends NHP at  discharge for rehab to home          Outcome Measures       07/06/17 1415 07/05/17 1300       How much help from another person do you currently need...    Turning from your back to your side while in flat bed without using bedrails? 2  -FAMILIA      Moving from lying on back to sitting on the side of a flat bed without bedrails? 2  -FAMILIA      Moving to and from a bed to a chair (including a wheelchair)? 1  -FAMILIA      Standing up from a chair using your arms (e.g., wheelchair, bedside chair)? 1  -FAMILIA      Climbing 3-5 steps with a railing? 1  -FAMILIA      To walk in hospital room? 1  -FAMILIA      AM-PAC 6 Clicks Score 8  -FAMILIA      How much help from another is currently needed...    Putting on and taking off regular lower body clothing?  1  -EB     Bathing (including washing, rinsing, and drying)  2  -EB     Toileting (which includes using toilet bed pan or urinal)  1  -EB     Putting on and taking off regular upper body clothing  2  -EB     Taking care of personal grooming (such as brushing teeth)  3  -EB     Eating meals  3  -EB     Score  12  -EB     Functional Assessment    Outcome Measure Options  AM-PAC 6 Clicks Daily Activity (OT)  -EB       User Key  (r) = Recorded By, (t) = Taken By, (c) = Cosigned By    Initials Name Provider Type    FAMILIA Domínguez PTA Physical Therapy Assistant    EB FÉLIX Gtz/MARV Occupational Therapy Assistant           Time Calculation:         PT Charges       07/06/17 1456          Time Calculation    Start Time 1415  -FAMILIA      Stop Time 1445  -FAMILIA      Time Calculation (min) 30 min  -FAMILIA      Time Calculation- PT    Total Timed Code Minutes- PT 30 minute(s)  -FAMILIA        User Key  (r) = Recorded By, (t) = Taken By, (c) = Cosigned By    Initials Name Provider Type    FAMILIA Domínguez PTA Physical Therapy Assistant          Therapy Charges for Today     Code Description Service Date Service Provider Modifiers Qty    [id number]  PT THER PROC EA 15 MIN 7/6/2017 Kayley Domínguez PTA GP 2           PT G-Codes  PT Professional Judgement Used?: Yes  Outcome Measure Options: AM-PAC 6 Clicks Daily Activity (OT)  Score: 8  Functional Limitation: Mobility: Walking and moving around  Mobility: Walking and Moving Around Current Status (): At least 80 percent but less than 100 percent impaired, limited or restricted  Mobility: Walking and Moving Around Goal Status (): At least 60 percent but less than 80 percent impaired, limited or restricted    Kayley Domínguez, PTA  7/6/2017

## 2017-07-06 NOTE — DISCHARGE PLACEMENT REQUEST
"BenedictLamont (52 y.o. Male)     Date of Birth Social Security Number Address Home Phone MRN    1965  [address]  [facility] [address] [phone] [id number]    Jew Marital Status          Other        Admission Date Admission Type Admitting Provider Attending Provider Department, Room/Bed    6/30/17 Emergency EchenduLamont MD Bleibel, Samer, MD [address], 428/1    Discharge Date Discharge Disposition Discharge Destination                      Attending Provider: Santiago Marinelli MD     Allergies:  Aspirin, Codeine Sulfate    Isolation:  None   Infection:  None   Code Status:  FULL    Ht:  73\" (185.4 cm)   Wt:  219 lb 1.6 oz (99.4 kg)    Admission Cmt:  None   Principal Problem:  None                Active Insurance as of 6/30/2017     Primary Coverage     Payor Plan Insurance Group Employer/Plan Group    [place]      Payor Plan Address Payor Plan Phone Number Effective From Effective To    [address] [address]  1/1/2014     [place] [address]       Subscriber Name Subscriber Birth Date Member ID       LAMONT MCMULLEN 1965 [id number]                 Emergency Contacts      (Rel.) Home Phone Work Phone Mobile Phone    Loco Mcmullen (Daughter) -- -- [phone]    BenedictMiguel (Son) -- -- [phone]    BenedictShreyas (Brother) -- -- [phone]            Insurance Information                [place] Phone:     Subscriber: Lamont Mcmullen Subscriber#: [id number]    Group#:  Precert#:           Vital Signs (last 24 hours)       07/05 0700  -  07/06 0659 07/06 0700  -  07/06 1338   Most Recent    Temp (°F) 97.4 -  97.8       97.4 (36.3)    Heart Rate 90 -  111    104 -  108     108    Resp   18      18     18    /56 -  124/69       124/69    SpO2 (%) 92 -  96    92 -  94     92          Hospital Medications (active)       Dose Frequency Start End    acetaminophen " (TYLENOL) tablet 650 mg 650 mg Every 6 Hours PRN 6/30/2017     Sig - Route: Take 2 tablets by mouth Every 6 (Six) Hours As Needed for Mild Pain (1-3). - Oral    clotrimazole (LOTRIMIN) 1 % external solution  Every 12 Hours Scheduled 7/1/2017 7/8/2017    Sig - Route: Apply  topically Every 12 (Twelve) Hours. - Topical    fluconazole (DIFLUCAN) tablet 200 mg 200 mg Daily 7/1/2017     Sig - Route: Take 1 tablet by mouth Daily. - Oral    gabapentin (NEURONTIN) capsule 300 mg 300 mg 3 Times Daily 6/30/2017     Sig - Route: Take 1 capsule by mouth 3 (Three) Times a Day. - Oral    hydrALAZINE (APRESOLINE) injection 10 mg 10 mg Every 6 Hours PRN 6/30/2017     Sig - Route: Infuse 0.5 mL into a venous catheter Every 6 (Six) Hours As Needed for High Blood Pressure. - Intravenous    ipratropium-albuterol (DUO-NEB) nebulizer solution 3 mL 3 mL 4 Times Daily - RT 6/30/2017     Sig - Route: Take 3 mL by nebulization 4 (Four) Times a Day. - Nebulization    lactulose (CHRONULAC) 30 g 30 g 3 Times Daily 7/1/2017     Sig - Route: Take 30 g by mouth 3 (Three) Times a Day. - Oral    levoFLOXacin (LEVAQUIN) 750 mg/150 mL D5W (premix) (LEVAQUIN) 750 mg 750 mg Every 48 Hours 7/6/2017     Sig - Route: Infuse 150 mL into a venous catheter Every Other Day. - Intravenous    Linezolid (ZYVOX) 600 mg 300 mL 600 mg Every 12 Hours 7/6/2017     Sig - Route: Infuse 300 mL into a venous catheter Every 12 (Twelve) Hours. - Intravenous    Cosign for Ordering: Required by Allen Hercules MD    Morphine sulfate (PF) injection 1 mg 1 mg Every 4 Hours PRN 7/3/2017 7/13/2017    Sig - Route: Infuse 0.5 mL into a venous catheter Every 4 (Four) Hours As Needed for Severe Pain (7-10). - Intravenous    ondansetron (ZOFRAN) injection 4 mg 4 mg Every 6 Hours PRN 6/30/2017     Sig - Route: Infuse 2 mL into a venous catheter Every 6 (Six) Hours As Needed for Nausea or Vomiting. - Intravenous    pantoprazole (PROTONIX) EC tablet 40 mg 40 mg Daily 7/1/2017     Sig -  "Route: Take 1 tablet by mouth Daily. - Oral    Pharmacy to Dose LevoFLOXacin (LEVAQUIN)  Continuous PRN 7/6/2017     Sig - Route: Continuous As Needed for Consult (PO per renal dosing). - Does not apply    Cosign for Ordering: Required by Allen Hercules MD    potassium chloride 10 mEq in 100 mL IVPB 10 mEq Every 1 Hour PRN 6/30/2017     Sig - Route: Infuse 100 mL into a venous catheter Every 1 (One) Hour As Needed (See admin Instructions.). - Intravenous    sodium chloride 0.9 % flush 10 mL 10 mL As Needed 6/30/2017     Sig - Route: Infuse 10 mL into a venous catheter As Needed for Line Care. - Intravenous    Cosign for Ordering: Accepted by Roque Parker MD on 6/30/2017 12:58 PM    Linked Group 1:  \"And\" Linked Group Details        sodium chloride 0.9 % flush 10 mL 10 mL As Needed 6/30/2017     Sig - Route: Infuse 10 mL into a venous catheter As Needed for Line Care. - Intravenous    sodium chloride 0.9 % infusion  - ADS Override Pull   7/5/2017 7/6/2017    Notes to Pharmacy: Created by cabinet override    sodium chloride 0.9 % infusion  - ADS Override Pull   7/6/2017 7/7/2017    Notes to Pharmacy: Created by cabinet override    sodium chloride 0.9 % infusion 75 mL/hr Continuous 7/6/2017     Sig - Route: Infuse 75 mL/hr into a venous catheter Continuous. - Intravenous    Cosign for Ordering: Required by Allen Hercules MD    spironolactone (ALDACTONE) tablet 50 mg 50 mg Daily 6/30/2017     Sig - Route: Take 1 tablet by mouth Daily. - Oral    temazepam (RESTORIL) capsule 15 mg 15 mg Nightly PRN 6/30/2017 7/5/2017    Sig - Route: Take 1 capsule by mouth At Night As Needed for Sleep. - Oral    temazepam (RESTORIL) capsule 15 mg 15 mg Nightly PRN 7/6/2017 7/16/2017    Sig - Route: Take 1 capsule by mouth At Night As Needed for Sleep. - Oral    furosemide (LASIX) injection 40 mg (Discontinued) 40 mg Every 12 Hours 7/1/2017 7/6/2017    Sig - Route: Infuse 4 mL into a venous catheter Every 12 (Twelve) Hours. - " Intravenous    piperacillin-tazobactam (ZOSYN) 3.375 g in iso-osmotic dextrose 50 ml (premix) (Discontinued) 3.375 g Every 6 Hours 7/2/2017 7/6/2017    Sig - Route: Infuse 50 mL into a venous catheter Every 6 (Six) Hours. - Intravenous             Physician Progress Notes (last 24 hours) (Notes from 7/5/2017  1:38 PM through 7/6/2017  1:38 PM)      KENROY Goode at 7/6/2017 12:39 PM  Version 1 of 1             [facility] Medicine Services  INPATIENT PROGRESS NOTE    Length of Stay: 5  Date of Admission: 6/30/2017  Primary Care Physician: Cj Aguero MD    Subjective   Chief Complaint/HPI:  This 62-year-old  male was admitted hospitalist services secondary to increased lower extremity edema and redness.  Patient has history of respiratory failure, hepatic encephalopathy, and was recently intubated.  Patient successfully extubated.  Now on regular [facility] floor.    Patient has erythematous or purpuric skin lesion on the right lower extremity.  It is questionable whether this is cellulitis versus vascular disease versus a complication of his thrombocytopenia.  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  Cultures will be collected.    Patient does have a history of thrombocytopenia.  He has followed with Dr. Becerril in the past, but has been noncompliant with follow-ups as well as his lactulose while at home.  We will recheck an ammonia, but patient is alert and oriented at this time, is taking lactulose while here.    Patient has been on Zosyn and vancomycin.  Vancomycin has increased creatinine as well as the Lasix twice a day.  We have stopped vancomycin and Lasix.  Have consulted Dr. Colón of nephrology, the patient's nephrologist.  We will instate gentle fluid hydration at this time and await nephrology recommendations.    Platelets stable, no overt bleeding.    Have stopped Zosyn given the drugs propensity to induce  thrombocytopenia.  However place with Levaquin, which should have good pseudomonas and gram-negative coverage and has a less likely side effect of thrombocytopenia.  We will have wound care evaluate.    No gastroenterology coverage at this time, we'll consider consulting Dr. Becerril when he is available.    Patient had complained of shoulder pain to his nurse.  Patient has a history of possible abscess versus fistula in the shoulder.  We will start with CT.  CT will have to be without contrast given the patient's renal failure.    Review of Systems   Constitutional: Negative for chills and fever.   Respiratory: Negative for cough.    Cardiovascular: Negative for chest pain.   Gastrointestinal: Negative for abdominal pain, constipation, nausea and vomiting.   Genitourinary: Negative for dysuria.   Musculoskeletal: Negative for back pain.   Neurological: Negative for dizziness, seizures, syncope and headaches.   Psychiatric/Behavioral: Negative for confusion.      All pertinent negatives and positives are as above. All other systems have been reviewed and are negative unless otherwise stated.     Objective    Temp:  [97.4 °F (36.3 °C)-97.8 °F (36.6 °C)] 97.4 °F (36.3 °C)  Heart Rate:  [] 104  Resp:  [18] 18  BP: (111-124)/(56-82) 124/69    Physical Exam   Constitutional: He is oriented to person, place, and time. He appears well-developed and well-nourished.   HENT:   Head: Normocephalic and atraumatic.   Eyes: Pupils are equal, round, and reactive to light.   Cardiovascular: Normal rate and regular rhythm.    Abdominal: Soft. Bowel sounds are normal. He exhibits no distension. There is no tenderness.   Neurological: He is alert and oriented to person, place, and time.   Skin: Skin is warm and dry.   Erythema anterior right LE, no bleeding, distal pulse intact, capillary refill intact   Psychiatric: He has a normal mood and affect. His behavior is normal.     Results Review:  I have reviewed the labs, radiology  results, and diagnostic studies.    Laboratory Data:     Results from last 7 days  Lab Units 07/06/17  0547 07/05/17  0603 07/04/17  0648   SODIUM mmol/L 134* 134* 135*   POTASSIUM mmol/L 3.7 3.8 3.8   CHLORIDE mmol/L 91* 92* 93*   CO2 mmol/L 33.0* 32.0* 33.0*   BUN mg/dL 23* 21 22*   CREATININE mg/dL 2.17* 2.00* 1.76*   GLUCOSE mg/dL 108* 127* 100   CALCIUM mg/dL 8.5 8.8 8.6   BILIRUBIN mg/dL 3.8* 4.1* 3.3*   ALK PHOS U/L 207* 239* 251*   ALT (SGPT) U/L 90* 109* 117*   AST (SGOT) U/L 43 55 69*   ANION GAP mmol/L 10.0 10.0 9.0     Estimated Creatinine Clearance: 48.8 mL/min (by C-G formula based on Cr of 2.17).            Results from last 7 days  Lab Units 07/06/17  0547 07/05/17  0603 07/04/17  0648 07/03/17  0544 07/02/17  0524   WBC 10*3/mm3 11.48* 12.09* 9.36 13.70* 13.11*   HEMOGLOBIN g/dL 9.2* 9.3* 8.5* 8.9* 8.7*   HEMATOCRIT % 27.4* 27.4* 25.3* 26.5* 24.9*   PLATELETS 10*3/mm3 45* 63* 71* 37* 41*           Culture Data:   No results found for: BLOODCX  No results found for: URINECX  No results found for: RESPCX  No results found for: WOUNDCX  No results found for: STOOLCX  No components found for: BODYFLD    Radiology Data:   Imaging Results (last 24 hours)     ** No results found for the last 24 hours. **          I have reviewed the patient current medications.     Assessment/Plan     Hospital Problem List     Cellulitis of right lower extremity          Plan:  As noted above.            This document has been electronically signed by KENROY Goode on July 6, 2017 12:39 PM         Electronically signed by KENROY Goode at 7/6/2017 12:47 PM        Consult Notes (last 24 hours) (Notes from 7/5/2017  1:38 PM through 7/6/2017  1:38 PM)     No notes of this type exist for this encounter.        Physical Therapy Notes (last 24 hours) (Notes from 7/5/2017  1:38 PM through 7/6/2017  1:38 PM)     No notes of this type exist for this encounter.           Occupational Therapy Notes (last 24 hours)  (Notes from 7/5/2017  1:38 PM through 7/6/2017  1:38 PM)      Lakisha Pennington, FÉLIX/L at 7/5/2017  1:49 PM  Version 1 of 1         Problem: Inpatient Occupational Therapy  Goal: Transfer Training Goal 1 LTG- OT  Outcome: Ongoing (interventions implemented as appropriate)    07/01/17 1601 07/05/17 1302   Transfer Training OT LTG   Transfer Training OT LTG, Date Established 07/01/17 --    Transfer Training OT LTG, Time to Achieve 2 wks --    Transfer Training OT LTG, Activity Type toilet --    Transfer Training OT LTG, [place] Level minimum assist (75% patient effort) --    Transfer Training OT LTG, Assist Device commode, bedside --    Transfer Training OT LTG, Date Goal Reviewed --  07/05/17       Goal: Static Standing Balance Goal OT- STG  Outcome: Ongoing (interventions implemented as appropriate)    07/01/17 1601 07/05/17 1302   Static Standing Balance OT STG   Static Standing Balance OT STG, Date Established 07/01/17 --    Static Standing Balance OT STG, Time to Achieve 5 - 7 days --    Static Standing Balance OT STG, [place] Level minimum assist (75% patient effort) --    Static Standing Balance OT STG, Assist Device assistive device --    Static Standing Balance OT STG, Additional Goal 30 sec --    Static Standing Balance OT STG, Date Goal Reviewed --  07/05/17       Goal: Bathing Goal LTG- OT  Outcome: Ongoing (interventions implemented as appropriate)    07/01/17 1601 07/05/17 1302   Bathing OT LTG   Bathing Goal OT LTG, Date Established 07/01/17 --    Bathing Goal OT LTG, Time to Achieve 2 wks --    Bathing Goal OT LTG, Activity Type upper body bathing;lower body bathing --    Bathing Goal OT LTG, [place] Level minimum assist (75% patient effort) --    Bathing Goal OT LTG, Date Goal Reviewed --  07/05/17   Bathing Goal OT LTG, Outcome --  goal not met       Goal: Grooming Goal STG- OT  Outcome: Ongoing (interventions implemented as appropriate)    07/01/17 1601 07/05/17 1302   Grooming OT STG    Grooming Goal OT STG, Date Established 17 --    Grooming Goal OT STG, Time to Achieve 5 - 7 days --    Grooming Goal OT STG, [place] Level independent --    Grooming Goal OT STG, Position sitting, edge of bed --    Grooming Goal OT STG, Date Goal Reviewed --  17   Grooming Goal OT STG, Outcome --  goal not met              Electronically signed by FÉLIX Gtz/L at 2017  1:49 PM      FÉLIX Gtz/L at 2017  1:50 PM  Version 1 of 1         Acute Care - Occupational Therapy Treatment Note  [facility]     Patient Name: Armando Mcmullen  : 1965  MRN: [id number]  Today's Date: 2017  Onset of Illness/Injury or Date of Surgery Date: 17  Date of Referral to OT: 17  Referring Physician: Dr. JANE Marinelli      Admit Date: 2017    Visit Dx:     ICD-10-CM ICD-9-CM   1. Cellulitis of right lower extremity L03.115 682.6   2. Impaired functional mobility, balance, gait, and endurance Z74.09 V49.89   3. Impaired mobility and ADLs Z74.09 799.89     Patient Active Problem List   Diagnosis   • Anxiety state   • Back pain   • Chronic hepatitis   • Hepatic cirrhosis   • Depression   • Insomnia   • Essential hypertension   • Hypokalemia   • Anasarca   • Acute hepatic encephalopathy   • Substance abuse   • Acute on chronic renal failure   • Liver failure with hepatic coma   • Osteoarthritis   • Hypersplenism   • Chronic osteomyelitis of right shoulder region   • [place] coma scale total score 3-8   • Anxiety state   • Cellulitis of right lower extremity             Adult Rehabilitation Note       17 1302 17 1345       Rehab Assessment/Intervention    Discipline occupational therapy assistant  -EB occupational therapy assistant  -     Document Type therapy note (daily note)  -EB therapy note (daily note)  -     Subjective Information agree to therapy  -EB agree to therapy;complains of;weakness;fatigue;pain  -     Patient Effort, Rehab Treatment fair   -EB poor  -JH     Recorded by [EB] FLIP Gtz [JH] FLIP Escalona     Pain Assessment    Pain Assessment 0-10  -EB 0-10  -JH     Pain Score 7  -EB 7  -JH     Post Pain Score 7  -EB 8  -JH     Pain Type  Acute pain;Chronic pain  -     Pain Location Leg  -EB Leg   and R arm  -JH     Pain Orientation Right;Left  -EB Right  -JH     Recorded by [EB] FLIP Gtz [] FLIP Escalona     Cognitive Assessment/Intervention    Current Cognitive/Communication Assessment functional  -EB functional  -     Orientation Status oriented x 4  -EB oriented x 4  -     Follows Commands/Answers Questions 100% of the time  -% of the time  -JH     Recorded by [EB] FLIP Gtz [JH] FLIP Escalona     Bed Mobility, Assessment/Treatment    Bed Mobility, Roll Left, [place] independent  -EB      Bed Mobility, Scoot/Bridge, [place]  supervision required;verbal cues required  -     Bed Mob, Supine to Sit, [place] independent  -EB verbal cues required;contact guard assist  -     Bed Mob, Sit to Supine, [place]  verbal cues required;contact guard assist  -JH     Recorded by [EB] FLIP Gtz [JH] FLIP Escalona     Transfer Assessment/Treatment    Transfer, Comment declines all functional transfers  -EB      Recorded by [EB] FLIP Gtz      Lower Body Bathing Assessment/Training    LB Bathing Assess/Train, Comment declines bathing/dressing tasks  -EB      Recorded by [EB] FLIP Gtz      Balance Skills Training    Sitting-Level of Assistance Distant supervision  -EB Distant supervision  -     Sitting-Balance Support  Right upper extremity supported  -     Sitting-Balance Activities Trunk control activities;Left UE Weight Bearing  -EB Trunk control activities  -     Sitting # of Minutes 24  -EB 7  -JH     Standing-Level of Assistance --   declined  -EB      Recorded by [EB] FLIP Gtz  [] FLIP Escalona     Therapy Exercises    Left Upper Extremity 20 reps;AROM:;sitting;elbow flexion/extension;hand pumps;pronation/supination;shoulder abduction/adduction;shoulder extension/flexion  -EB      Recorded by [EB] FLIP Gtz      Positioning and Restraints    Pre-Treatment Position in bed  -EB in bed  -JH     Post Treatment Position bed  -EB bed  -JH     In Bed supine;call light within reach;encouraged to call for assist  -EB sitting;call light within reach;encouraged to call for assist  -     Recorded by [EB] FLIP Gtz [] FLIP Escalona       User Key  (r) = Recorded By, (t) = Taken By, (c) = Cosigned By    Initials Name Effective Dates     FLIP Escalona 10/17/16 -     EB FLIP Gtz 10/17/16 -                 OT Goals       07/05/17 1302 07/03/17 1345 07/01/17 1601    Transfer Training OT LTG    Transfer Training OT LTG, Date Established   07/01/17  -RW    Transfer Training OT LTG, Time to Achieve   2 wks  -RW    Transfer Training OT LTG, Activity Type   toilet  -RW    Transfer Training OT LTG, [place] Level   minimum assist (75% patient effort)  -RW    Transfer Training OT LTG, Assist Device   commode, bedside  -RW    Transfer Training OT LTG, Date Goal Reviewed 07/05/17  -EB 07/03/17  -     Static Standing Balance OT STG    Static Standing Balance OT STG, Date Established   07/01/17  -RW    Static Standing Balance OT STG, Time to Achieve   5 - 7 days  -RW    Static Standing Balance OT STG, [place] Level   minimum assist (75% patient effort)  -RW    Static Standing Balance OT STG, Assist Device   assistive device  -RW    Static Standing Balance OT STG, Additional Goal   30 sec  -RW    Static Standing Balance OT STG, Date Goal Reviewed 07/05/17  -EB 07/03/17  -     Bathing OT LTG    Bathing Goal OT LTG, Date Established   07/01/17  -RW    Bathing Goal OT LTG, Time to Achieve   2 wks  -RW    Bathing Goal OT LTG,  Activity Type   upper body bathing;lower body bathing  -RW    Bathing Goal OT LTG, [place] Level   minimum assist (75% patient effort)  -RW    Bathing Goal OT LTG, Date Goal Reviewed 07/05/17  -EB 07/03/17  -     Bathing Goal OT LTG, Outcome goal not met  -EB      Grooming OT STG    Grooming Goal OT STG, Date Established   07/01/17  -RW    Grooming Goal OT STG, Time to Achieve   5 - 7 days  -RW    Grooming Goal OT STG, [place] Level   independent  -RW    Grooming Goal OT STG, Position   sitting, edge of bed  -RW    Grooming Goal OT STG, Date Goal Reviewed 07/05/17  -EB 07/03/17  -     Grooming Goal OT STG, Outcome goal not met  -        User Key  (r) = Recorded By, (t) = Taken By, (c) = Cosigned By    Initials Name Provider Type     Doris Simon, OTR/L Occupational Therapist     Juanita Hickey HEARD/L Occupational Therapy Assistant     MACIE GtzA/L Occupational Therapy Assistant          Occupational Therapy Education     Title: PT OT SLP Therapies (Active)     Topic: Occupational Therapy (Done)     Point: ADL training (Done)    Description: Instruct learner(s) on proper safety adaptation and remediation techniques during self care or transfers.   Instruct in proper use of assistive devices.    Learning Progress Summary    Learner Readiness Method Response Comment Documented by Status   Patient Acceptance E VU Patient verbalizes understanding w/s and e/c methods.  07/05/17 1302 Done    Acceptance E NR bed mobility, benefits of OOB/progression of therapy tx  07/01/17 1559 Active               Point: Home exercise program (Done)    Description: Instruct learner(s) on appropriate technique for monitoring, assisting and/or progressing therapeutic exercises/activities.    Learning Progress Summary    Learner Readiness Method Response Comment Documented by Status   Patient Acceptance E VU Patient verbalizes understanding w/s and e/c methods.  07/05/17 1302 Done                Point: Precautions (Done)    Description: Instruct learner(s) on prescribed precautions during self-care and functional transfers.    Learning Progress Summary    Learner Readiness Method Response Comment Documented by Status   Patient Acceptance E VU Patient verbalizes understanding w/s and e/c methods. EB 07/05/17 1302 Done               Point: Body mechanics (Done)    Description: Instruct learner(s) on proper positioning and spine alignment during self-care, functional mobility activities and/or exercises.    Learning Progress Summary    Learner Readiness Method Response Comment Documented by Status   Patient Acceptance E VU Patient verbalizes understanding w/s and e/c methods. EB 07/05/17 1302 Done                      User Key     Initials Effective Dates Name Provider Type Discipline     10/17/16 -  CHRISTINE Wilburn/L Occupational Therapist OT    EB 10/17/16 -  FÉLIX Gtz/MARV Occupational Therapy Assistant OT                  OT Recommendation and Plan  Anticipated Discharge Disposition: skilled nursing facility, inpatient rehabilitation facility  Planned Therapy Interventions: activity intolerance, adaptive equipment training, ADL retraining, balance training, bed mobility training, energy conservation, home exercise program, ROM (Range of Motion), strengthening, transfer training, wheelchair fit and training  Therapy Frequency: other (see comments) (3-14 times/wk)           Outcome Measures       07/05/17 1300 07/03/17 1345       How much help from another is currently needed...    Putting on and taking off regular lower body clothing? 1  -EB 1  -JH     Bathing (including washing, rinsing, and drying) 2  -EB 2  -JH     Toileting (which includes using toilet bed pan or urinal) 1  -EB 1  -JH     Putting on and taking off regular upper body clothing 2  -EB 2  -JH     Taking care of personal grooming (such as brushing teeth) 3  -EB 3  -JH     Eating meals 3  -EB 3  -JH     Score 12  -EB 12   -     Functional Assessment    Outcome Measure Options AM-PAC 6 Clicks Daily Activity (OT)  -        User Key  (r) = Recorded By, (t) = Taken By, (c) = Cosigned By    Initials Name Provider Type     FÉLIX Escalona/L Occupational Therapy Assistant    FLIP Gamez Occupational Therapy Assistant           Time Calculation:         Time Calculation- OT       07/05/17 1302          Time Calculation- OT    OT Start Time 1302  -EB      OT Stop Time 1333  -EB      OT Time Calculation (min) 31 min  -        User Key  (r) = Recorded By, (t) = Taken By, (c) = Cosigned By    Initials Name Provider Type     FÉLIX Gtz/MARV Occupational Therapy Assistant           Therapy Charges for Today     Code Description Service Date Service Provider Modifiers Qty    13116067611 HC OT THER PROC EA 15 MIN 7/5/2017 FLIP Gtz GO 1    41519444007 HC OT THERAPEUTIC ACT EA 15 MIN 7/5/2017 FLIP Gtz GO 1          OT G-codes  OT Professional Judgement Used?: Yes  OT Functional Scales Options: AM-PAC 6 Clicks Daily Activity (OT)  Score: 16  Functional Limitation: Self care  Self Care Current Status (): At least 40 percent but less than 60 percent impaired, limited or restricted  Self Care Goal Status (): At least 20 percent but less than 40 percent impaired, limited or restricted    FLIP Gtz  7/5/2017     Electronically signed by FLIP Gtz at 7/5/2017  1:51 PM       update for  [id number]

## 2017-07-07 LAB
GLUCOSE BLDC GLUCOMTR-MCNC: 118 MG/DL (ref 70–130)
GLUCOSE BLDC GLUCOMTR-MCNC: 128 MG/DL (ref 70–130)
GLUCOSE BLDC GLUCOMTR-MCNC: 136 MG/DL (ref 70–130)
GLUCOSE BLDC GLUCOMTR-MCNC: 154 MG/DL (ref 70–130)
GLUCOSE BLDC GLUCOMTR-MCNC: 177 MG/DL (ref 70–130)

## 2017-07-07 PROCEDURE — 25010000002 LINEZOLID 600 MG/300ML SOLUTION: Performed by: PHYSICIAN ASSISTANT

## 2017-07-07 PROCEDURE — 82962 GLUCOSE BLOOD TEST: CPT

## 2017-07-07 PROCEDURE — 94799 UNLISTED PULMONARY SVC/PX: CPT

## 2017-07-07 PROCEDURE — 94760 N-INVAS EAR/PLS OXIMETRY 1: CPT

## 2017-07-07 PROCEDURE — 25010000002 MORPHINE SULFATE (PF) 2 MG/ML SOLUTION: Performed by: HOSPITALIST

## 2017-07-07 RX ADMIN — GABAPENTIN 300 MG: 300 CAPSULE ORAL at 09:43

## 2017-07-07 RX ADMIN — GABAPENTIN 300 MG: 300 CAPSULE ORAL at 17:07

## 2017-07-07 RX ADMIN — MORPHINE SULFATE 1 MG: 2 INJECTION, SOLUTION INTRAMUSCULAR; INTRAVENOUS at 23:04

## 2017-07-07 RX ADMIN — Medication: at 17:08

## 2017-07-07 RX ADMIN — PANTOPRAZOLE SODIUM 40 MG: 40 TABLET, DELAYED RELEASE ORAL at 09:44

## 2017-07-07 RX ADMIN — BACITRACIN ZINC: 500 OINTMENT TOPICAL at 09:42

## 2017-07-07 RX ADMIN — Medication: at 09:44

## 2017-07-07 RX ADMIN — TEMAZEPAM 15 MG: 15 CAPSULE ORAL at 23:04

## 2017-07-07 RX ADMIN — SODIUM CHLORIDE 75 ML/HR: 9 INJECTION, SOLUTION INTRAVENOUS at 19:25

## 2017-07-07 RX ADMIN — IPRATROPIUM BROMIDE AND ALBUTEROL SULFATE 3 ML: 2.5; .5 SOLUTION RESPIRATORY (INHALATION) at 06:44

## 2017-07-07 RX ADMIN — LINEZOLID 600 MG: 600 INJECTION, SOLUTION INTRAVENOUS at 00:39

## 2017-07-07 RX ADMIN — FLUCONAZOLE 200 MG: 200 TABLET ORAL at 09:43

## 2017-07-07 RX ADMIN — IPRATROPIUM BROMIDE AND ALBUTEROL SULFATE 3 ML: 2.5; .5 SOLUTION RESPIRATORY (INHALATION) at 20:55

## 2017-07-07 RX ADMIN — SODIUM CHLORIDE 75 ML/HR: 900 INJECTION, SOLUTION INTRAVENOUS at 19:25

## 2017-07-07 RX ADMIN — LINEZOLID 600 MG: 600 INJECTION, SOLUTION INTRAVENOUS at 12:27

## 2017-07-07 RX ADMIN — CLOTRIMAZOLE: 1 SOLUTION TOPICAL at 20:48

## 2017-07-07 RX ADMIN — MORPHINE SULFATE 1 MG: 2 INJECTION, SOLUTION INTRAMUSCULAR; INTRAVENOUS at 03:34

## 2017-07-07 RX ADMIN — GABAPENTIN 300 MG: 300 CAPSULE ORAL at 20:47

## 2017-07-07 RX ADMIN — SODIUM CHLORIDE 75 ML/HR: 900 INJECTION, SOLUTION INTRAVENOUS at 05:17

## 2017-07-07 RX ADMIN — LACTULOSE 30 G: 10 SOLUTION ORAL at 17:07

## 2017-07-07 RX ADMIN — IPRATROPIUM BROMIDE AND ALBUTEROL SULFATE 3 ML: 2.5; .5 SOLUTION RESPIRATORY (INHALATION) at 10:27

## 2017-07-07 RX ADMIN — MORPHINE SULFATE 1 MG: 2 INJECTION, SOLUTION INTRAMUSCULAR; INTRAVENOUS at 09:51

## 2017-07-07 RX ADMIN — MORPHINE SULFATE 1 MG: 2 INJECTION, SOLUTION INTRAMUSCULAR; INTRAVENOUS at 14:26

## 2017-07-07 RX ADMIN — LACTULOSE 30 G: 10 SOLUTION ORAL at 09:44

## 2017-07-07 RX ADMIN — Medication: at 20:47

## 2017-07-07 RX ADMIN — Medication 10 ML: at 23:04

## 2017-07-07 RX ADMIN — CLOTRIMAZOLE: 1 SOLUTION TOPICAL at 09:43

## 2017-07-07 NOTE — SIGNIFICANT NOTE
07/07/17 0854   Rehab Treatment   Discipline occupational therapist   Treatment Not Performed other (see comments)  (RUE CT shows severely comminuted proximal humerus fx. OT to hold pending hospitalist plan.)

## 2017-07-07 NOTE — PAYOR COMM NOTE
"Lamont Mcmullen (52 y.o. Male)     Date of Birth Social Security Number Address Home Phone MRN    [id number]  [id number]  [facility] [address] [phone] [id number]    Jain Marital Status          Other        Admission Date Admission Type Admitting Provider Attending Provider Department, Room/Bed    6/30/17 Emergency EchenduLamont MD Bleibel, Samer, MD [address], 428/1    Discharge Date Discharge Disposition Discharge Destination                      Attending Provider: Santiago Marinelli MD     Allergies:  Aspirin, Codeine Sulfate    Isolation:  None   Infection:  None   Code Status:  FULL    Ht:  73\" (185.4 cm)   Wt:  219 lb 1.6 oz (99.4 kg)    Admission Cmt:  None   Principal Problem:  None                Active Insurance as of 6/30/2017     Primary Coverage     Payor Plan Insurance Group Employer/Plan Group    [employer] [employer]      Payor Plan Address Payor Plan Phone Number Effective From Effective To    [address] [address]  1/1/2014     [place] [address]       Subscriber Name Subscriber Birth Date Member ID       LAMONT MCMULLEN 1965 [id number]                 Emergency Contacts      (Rel.) Home Phone Work Phone Mobile Phone    Loco Mcmullen (Daughter) -- -- [phone]    BenedictMiguel (Son) -- -- [phone]    BenedictShreyas (Brother) -- -- [phone]            Insurance Information                [employer] [place] Phone:     Subscriber: Lamont Mcmullen Subscriber#: [id number]    Group#:  Precert#:           Vital Signs (last 24 hours)       07/06 0700  -  07/07 0659 07/07 0700  -  07/07 1542   Most Recent    Temp (°F) 96.5 -  97.2    96.9 -  98.4     98.4 (36.9)    Heart Rate 97 -  108    84 -  92     92    Resp   18      18     18    /69 -  157/88    113/76 -  125/76     113/76    SpO2 (%) 91 -  97    94 -  98     96          Hospital Medications (active)       Dose Frequency " Start End    acetaminophen (TYLENOL) tablet 650 mg 650 mg Every 6 Hours PRN 6/30/2017     Sig - Route: Take 2 tablets by mouth Every 6 (Six) Hours As Needed for Mild Pain . - Oral    bacitracin ointment  Every 24 Hours Scheduled 7/6/2017     Sig - Route: Apply  topically Daily. - Topical    clotrimazole (LOTRIMIN) 1 % external solution  Every 12 Hours Scheduled 7/1/2017 7/14/2017    Sig - Route: Apply  topically Every 12 (Twelve) Hours. - Topical    fluconazole (DIFLUCAN) tablet 200 mg 200 mg Daily 7/1/2017     Sig - Route: Take 1 tablet by mouth Daily. - Oral    gabapentin (NEURONTIN) capsule 300 mg 300 mg 3 Times Daily 6/30/2017     Sig - Route: Take 1 capsule by mouth 3 (Three) Times a Day. - Oral    hydrALAZINE (APRESOLINE) injection 10 mg 10 mg Every 6 Hours PRN 6/30/2017     Sig - Route: Infuse 0.5 mL into a venous catheter Every 6 (Six) Hours As Needed for High Blood Pressure. - Intravenous    ipratropium-albuterol (DUO-NEB) nebulizer solution 3 mL 3 mL 4 Times Daily - RT 6/30/2017     Sig - Route: Take 3 mL by nebulization 4 (Four) Times a Day. - Nebulization    lactulose (CHRONULAC) 30 g 30 g 3 Times Daily 7/1/2017     Sig - Route: Take 30 g by mouth 3 (Three) Times a Day. - Oral    levoFLOXacin (LEVAQUIN) 750 mg/150 mL D5W (premix) (LEVAQUIN) 750 mg 750 mg Every 48 Hours 7/6/2017     Sig - Route: Infuse 150 mL into a venous catheter Every Other Day. - Intravenous    Linezolid (ZYVOX) 600 mg 300 mL 600 mg Every 12 Hours 7/6/2017     Sig - Route: Infuse 300 mL into a venous catheter Every 12 (Twelve) Hours. - Intravenous    Cosign for Ordering: Accepted by Allen Hercules MD on 7/6/2017  3:25 PM    magic butt ointment  3 Times Daily 7/6/2017     Sig - Route: Apply  topically 3 (Three) Times a Day. - Topical    Morphine sulfate (PF) injection 1 mg 1 mg Every 4 Hours PRN 7/3/2017 7/13/2017    Sig - Route: Infuse 0.5 mL into a venous catheter Every 4 (Four) Hours As Needed for Severe Pain (7-10). - Intravenous  "   ondansetron (ZOFRAN) injection 4 mg 4 mg Every 6 Hours PRN 6/30/2017     Sig - Route: Infuse 2 mL into a venous catheter Every 6 (Six) Hours As Needed for Nausea or Vomiting. - Intravenous    pantoprazole (PROTONIX) EC tablet 40 mg 40 mg Daily 7/1/2017     Sig - Route: Take 1 tablet by mouth Daily. - Oral    Pharmacy to Dose LevoFLOXacin (LEVAQUIN)  Continuous PRN 7/6/2017     Sig - Route: Continuous As Needed for Consult (PO per renal dosing). - Does not apply    Cosign for Ordering: Accepted by Allen Hercules MD on 7/6/2017  3:25 PM    potassium chloride 10 mEq in 100 mL IVPB 10 mEq Every 1 Hour PRN 6/30/2017     Sig - Route: Infuse 100 mL into a venous catheter Every 1 (One) Hour As Needed (See admin Instructions.). - Intravenous    sodium chloride 0.9 % flush 10 mL 10 mL As Needed 6/30/2017     Sig - Route: Infuse 10 mL into a venous catheter As Needed for Line Care. - Intravenous    Cosign for Ordering: Accepted by Roque Parker MD on 6/30/2017 12:58 PM    Linked Group 1:  \"And\" Linked Group Details        sodium chloride 0.9 % flush 10 mL 10 mL As Needed 6/30/2017     Sig - Route: Infuse 10 mL into a venous catheter As Needed for Line Care. - Intravenous    sodium chloride 0.9 % infusion  - ADS Override Pull   7/6/2017 7/7/2017    Notes to Pharmacy: Created by cabinet override    sodium chloride 0.9 % infusion 75 mL/hr Continuous 7/6/2017     Sig - Route: Infuse 75 mL/hr into a venous catheter Continuous. - Intravenous    Cosign for Ordering: Accepted by Allen Hercules MD on 7/6/2017  3:25 PM    temazepam (RESTORIL) capsule 15 mg 15 mg Nightly PRN 7/6/2017 7/16/2017    Sig - Route: Take 1 capsule by mouth At Night As Needed for Sleep. - Oral    spironolactone (ALDACTONE) tablet 50 mg (Discontinued) 50 mg Daily 6/30/2017 7/6/2017    Sig - Route: Take 1 tablet by mouth Daily. - Oral        reference number [id number]   Provider has seen pt but has not documented on him as of yet.    Pt cont on " nebs q 4hrs  On po lactulose,  Iv zyvox and zosyn  Has had iv ms x 3.  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    Plan for poss rehab to home next week

## 2017-07-07 NOTE — PAYOR COMM NOTE
"Benedict Lamont (52 y.o. Male)     Date of Birth Social Security Number Address Home Phone MRN    1965  [address]  [facility] [address] [phone] [id number]    [employer] Mar[employer] Status          Other        Admission Date Admission Type Admitting Provider Attending Provider Department, Room/Bed    6/30/17 Emergency EchenduLamont MD Bleibel, Samer, MD [address], 428/1    Discharge Date Discharge Disposition Discharge Destination                      Attending Provider: Santiago Marinleli MD     Allergies:  Aspirin, Codeine Sulfate    Isolation:  None   Infection:  None   Code Status:  FULL    Ht:  73\" (185.4 cm)   Wt:  219 lb 1.6 oz (99.4 kg)    Admission Cmt:  None   Principal Problem:  None                Active Insurance as of 6/30/2017     Primary Coverage     Payor Plan Insurance Group Employer/Plan Group    [place]      Payor Plan Address Payor Plan Phone Number Effective From Effective To    [address] [address]  1/1/2014     [place] [address]       Subscriber Name Subscriber Birth Date Member ID       LAMONT MCMULLEN 1965 [id number]                 Emergency Contacts      (Rel.) Home Phone Work Phone Mobile Phone    Loco Mcmullen (Daughter) -- -- [phone]    BenedictMiguel (Son) -- -- [phone]    BenedictShreyas (Brother) -- -- [phone]            Insurance Information                [place] Phone:     Subscriber: Lamont Mcmullen Subscriber#: [id number]    Group#:  Precert#:           Vital Signs (last 24 hours)       07/06 0700  -  07/07 0659 07/07 0700  -  07/07 1258   Most Recent    Temp (°F) 96.5 -  97.2      96.9     96.9 (36.1)    Heart Rate 97 -  108    84 -  92     92    Resp   18      18     18    /69 -  157/88      125/76     125/76    SpO2 (%) 91 -  97    94 -  98     94          Hospital Medications (active)       Dose Frequency Start End    " acetaminophen (TYLENOL) tablet 650 mg 650 mg Every 6 Hours PRN 6/30/2017     Sig - Route: Take 2 tablets by mouth Every 6 (Six) Hours As Needed for Mild Pain (Pain Score 1-3). - Oral    bacitracin ointment  Every 24 Hours Scheduled 7/6/2017     Sig - Route: Apply  topically Daily. - Topical    clotrimazole (LOTRIMIN) 1 % external solution  Every 12 Hours Scheduled 7/1/2017 7/14/2017    Sig - Route: Apply  topically Every 12 (Twelve) Hours. - Topical    fluconazole (DIFLUCAN) tablet 200 mg 200 mg Daily 7/1/2017     Sig - Route: Take 1 tablet by mouth Daily. - Oral    gabapentin (NEURONTIN) capsule 300 mg 300 mg 3 Times Daily 6/30/2017     Sig - Route: Take 1 capsule by mouth 3 (Three) Times a Day. - Oral    hydrALAZINE (APRESOLINE) injection 10 mg 10 mg Every 6 Hours PRN 6/30/2017     Sig - Route: Infuse 0.5 mL into a venous catheter Every 6 (Six) Hours As Needed for High Blood Pressure. - Intravenous    ipratropium-albuterol (DUO-NEB) nebulizer solution 3 mL 3 mL 4 Times Daily - RT 6/30/2017     Sig - Route: Take 3 mL by nebulization 4 (Four) Times a Day. - Nebulization    lactulose (CHRONULAC) 30 g 30 g 3 Times Daily 7/1/2017     Sig - Route: Take 30 g by mouth 3 (Three) Times a Day. - Oral    levoFLOXacin (LEVAQUIN) 750 mg/150 mL D5W (premix) (LEVAQUIN) 750 mg 750 mg Every 48 Hours 7/6/2017     Sig - Route: Infuse 150 mL into a venous catheter Every Other Day. - Intravenous    Linezolid (ZYVOX) 600 mg 300 mL 600 mg Every 12 Hours 7/6/2017     Sig - Route: Infuse 300 mL into a venous catheter Every 12 (Twelve) Hours. - Intravenous    Cosign for Ordering: Accepted by Allen Hercules MD on 7/6/2017  3:25 PM    magic butt ointment  3 Times Daily 7/6/2017     Sig - Route: Apply  topically 3 (Three) Times a Day. - Topical    Morphine sulfate (PF) injection 1 mg 1 mg Every 4 Hours PRN 7/3/2017 7/13/2017    Sig - Route: Infuse 0.5 mL into a venous catheter Every 4 (Four) Hours As Needed for Severe Pain (7-10). -  "Intravenous    ondansetron (ZOFRAN) injection 4 mg 4 mg Every 6 Hours PRN 6/30/2017     Sig - Route: Infuse 2 mL into a venous catheter Every 6 (Six) Hours As Needed for Nausea or Vomiting. - Intravenous    pantoprazole (PROTONIX) EC tablet 40 mg 40 mg Daily 7/1/2017     Sig - Route: Take 1 tablet by mouth Daily. - Oral    Pharmacy to Dose LevoFLOXacin (LEVAQUIN)  Continuous PRN 7/6/2017     Sig - Route: Continuous As Needed for Consult (PO per renal dosing). - Does not apply    Cosign for Ordering: Accepted by Allen Hercules MD on 7/6/2017  3:25 PM    potassium chloride 10 mEq in 100 mL IVPB 10 mEq Every 1 Hour PRN 6/30/2017     Sig - Route: Infuse 100 mL into a venous catheter Every 1 (One) Hour As Needed (See admin Instructions.). - Intravenous    sodium chloride 0.9 % flush 10 mL 10 mL As Needed 6/30/2017     Sig - Route: Infuse 10 mL into a venous catheter As Needed for Line Care. - Intravenous    Cosign for Ordering: Accepted by Roque Parker MD on 6/30/2017 12:58 PM    Linked Group 1:  \"And\" Linked Group Details        sodium chloride 0.9 % flush 10 mL 10 mL As Needed 6/30/2017     Sig - Route: Infuse 10 mL into a venous catheter As Needed for Line Care. - Intravenous    sodium chloride 0.9 % infusion  - ADS Override Pull   7/6/2017 7/7/2017    Notes to Pharmacy: Created by cabinet override    sodium chloride 0.9 % infusion 75 mL/hr Continuous 7/6/2017     Sig - Route: Infuse 75 mL/hr into a venous catheter Continuous. - Intravenous    Cosign for Ordering: Accepted by Allen Hercules MD on 7/6/2017  3:25 PM    temazepam (RESTORIL) capsule 15 mg 15 mg Nightly PRN 7/6/2017 7/16/2017    Sig - Route: Take 1 capsule by mouth At Night As Needed for Sleep. - Oral    spironolactone (ALDACTONE) tablet 50 mg (Discontinued) 50 mg Daily 6/30/2017 7/6/2017    Sig - Route: Take 1 tablet by mouth Daily. - Oral          Physician Progress Notes (last 24 hours) (Notes from 7/6/2017 12:58 PM through 7/7/2017 12:58 PM)  "    No notes of this type exist for this encounter.        Consult Notes (last 24 hours) (Notes from 7/6/2017 12:58 PM through 7/7/2017 12:58 PM)     No notes of this type exist for this encounter.           Physical Therapy Notes (last 24 hours) (Notes from 7/6/2017 12:58 PM through 7/7/2017 12:58 PM)      Kayley Domínguez, PTA at 7/6/2017  2:56 PM  Version 1 of 1         Problem: Patient Care Overview (Adult)  Goal: Plan of Care Review  Outcome: Ongoing (interventions implemented as appropriate)    07/06/17 1415   Coping/Psychosocial Response Interventions   Plan Of Care Reviewed With patient   Patient Care Overview   Progress progress toward functional goals is gradual   Outcome Evaluation   Outcome Summary/Follow up Plan pt is making slow progress with mobility and goals. PTA recommends NHP at discharge for rehab to home         Problem: Inpatient Physical Therapy  Goal: Bed Mobility Goal LTG- PT  Outcome: Ongoing (interventions implemented as appropriate)    07/01/17 1309 07/06/17 1415   Bed Mobility PT LTG   Bed Mobility PT LTG, Date Established 07/01/17 --    Bed Mobility PT LTG, Time to Achieve 2 wks --    Bed Mobility PT LTG, Activity Type roll left/roll right;supine to sit/sit to supine --    Bed Mobility PT LTG, [place] Level minimum assist (75% patient effort) --    Bed Mobility PT Goal LTG, Assist Device bed rails --    Bed Mobility PT LTG, Date Goal Reviewed --  07/06/17   Bed Mobility PT LTG, Outcome --  goal ongoing       Goal: Transfer Training Goal 1 LTG- PT  Outcome: Ongoing (interventions implemented as appropriate)    07/01/17 1309 07/06/17 1415   Transfer Training PT LTG   Transfer Training PT LTG, Date Established 07/01/17 --    Transfer Training PT LTG, Time to Achieve 2 wks --    Transfer Training PT LTG, Activity Type bed to chair /chair to bed;other (see comments)  (w/c) --    Transfer Training PT LTG, [place] Level moderate assist (50% patient effort) --    Transfer Training PT  LTG, Assist Device other (see comments)  (slide board prn) --    Transfer Training PT LTG, Additional Goal scoot or squat pivot transfer --    Transfer Training PT LTG, Date Goal Reviewed --  17   Transfer Training PT LTG, Outcome --  goal ongoing       Goal: Static Sitting Balance Goal STG- PT  Outcome: Ongoing (interventions implemented as appropriate)    17 1309 17 1415   Static Sitting Balance PT STG   Static Sitting Balance PT STG, Date Established 17 --    Static Sitting Balance PT STG, Time to Achieve 2 - 3 days --    Static Sitting Balance PT STG, [place] Level contact guard assist --    Static Sitting Balance PT STG, Assist Device UE Support --    Static Sitting Balance PT STG, Date Goal Reviewed --  17   Static Sitting Balance PT STG, Outcome --  goal ongoing              Electronically signed by Kayley Domínguez PTA at 2017  2:56 PM      Kayley Domínguez PTA at 2017  2:59 PM  Version 1 of 1         [facility] - Physical Therapy Treatment Note  [facility]     Patient Name: Armando Mcmullen  : 1965  MRN: [id number]  Today's Date: 2017  Onset of Illness/Injury or Date of Surgery Date: 17  Date of Referral to PT: 17  Referring Physician: Dr. JANE Marinelli    Admit Date: 2017    Visit Dx:    ICD-10-CM ICD-9-CM   1. Cellulitis of right lower extremity L03.115 682.6   2. Impaired functional mobility, balance, gait, and endurance Z74.09 V49.89   3. Impaired mobility and ADLs Z74.09 799.89     Patient Active Problem List   Diagnosis   • Anxiety state   • Back pain   • Chronic hepatitis   • Hepatic cirrhosis   • Depression   • Insomnia   • Essential hypertension   • Hypokalemia   • Anasarca   • Acute hepatic encephalopathy   • Substance abuse   • Acute on chronic renal failure   • Liver failure with hepatic coma   • Osteoarthritis   • Hypersplenism   • Chronic osteomyelitis of right shoulder region   • [place] coma scale total score 3-8   • Anxiety  state   • Cellulitis of right lower extremity               Adult Rehabilitation Note       07/06/17 1415 07/05/17 1302       Rehab Assessment/Intervention    Discipline physical therapy assistant  -FAMILIA occupational therapy assistant  -EB     Document Type therapy note (daily note)  -FAMILIA therapy note (daily note)  -EB     Subjective Information no complaints;agree to therapy;fatigue;pain  -FAMILIA agree to therapy  -EB     Patient Effort, Rehab Treatment adequate  -FAMILIA fair  -EB     Precautions/Limitations fall precautions  -FAMILIA      Specific Treatment Considerations pt declined OOB   -FAMILIA      Recorded by [FAMILIA] Kayley Domínguez PTA [EB] Lakisha Pennington, HEARD/L     Vital Signs    Pre Systolic BP Rehab 119  -FAMILIA      Pre Treatment Diastolic BP 75  -FAMILIA      Pretreatment Heart Rate (beats/min) 109  -FAMILIA      Intratreatment Heart Rate (beats/min) 108  -FAMILIA      Posttreatment Heart Rate (beats/min) 109  -FAMILIA      Pre SpO2 (%) 95  -FAMILIA      O2 Delivery Pre Treatment room air  -FAMILIA      Intra SpO2 (%) 96  -FAMILIA      O2 Delivery Intra Treatment room air  -FAMILIA      Post SpO2 (%) 97  -FAMILIA      O2 Delivery Post Treatment room air  -FAMILIA      Pre Patient Position Supine  -FAMILIA      Intra Patient Position Supine  -FAMILIA      Post Patient Position Supine  -FAMILIA      Recorded by [FAMILIA] Kayley Domínguez PTA      Pain Assessment    Pain Assessment 0-10  -FAMILIA 0-10  -EB     Pain Score 8  -FAMILIA 7  -EB     Post Pain Score 8  -FAMILIA 7  -EB     Pain Type Chronic pain  -FAMILIA      Pain Location Generalized  -FAMILIA Leg  -EB     Pain Orientation  Right;Left  -EB     Recorded by [FAMILIA] Kayley Domínguez PTA [EB] Lakisha Pennington, HEARD/L     Cognitive Assessment/Intervention    Current Cognitive/Communication Assessment functional  -FAMILIA functional  -EB     Orientation Status oriented to;person;time  -FAMILIA oriented x 4  -EB     Follows Commands/Answers Questions 100% of the time  -FAMILIA 100% of the time  -EB     Personal Safety mild impairment  -FAMILIA      Recorded by [FAMILIA] Kayley oDmínguez PTA [EB]  FLIP Gtz     Bed Mobility, Assessment/Treatment    Bed Mobility, Roll Left, [place]  independent  -EB     Bed Mob, Supine to Sit, [place]  independent  -EB     Recorded by  [EB] FLIP Gtz     Transfer Assessment/Treatment    Transfer, Comment  declines all functional transfers  -EB     Recorded by  [EB] FÉLIX Gtz/L     Lower Body Bathing Assessment/Training    LB Bathing Assess/Train, Comment  declines bathing/dressing tasks  -EB     Recorded by  [EB] FLIP Gtz     Balance Skills Training    Sitting-Level of Assistance  Distant supervision  -EB     Sitting-Balance Activities  Trunk control activities;Left UE Weight Bearing  -EB     Sitting # of Minutes  24  -EB     Standing-Level of Assistance  --   declined  -EB     Recorded by  [EB] FLIP Gtz     Therapy Exercises    Bilateral Lower Extremities AROM:;20 reps;supine;ankle pumps/circles;glut sets;hip abduction/adduction;hip ER;hip IR;hip flexion;SAQ;SLR  -FAMILIA      Left Upper Extremity  20 reps;AROM:;sitting;elbow flexion/extension;hand pumps;pronation/supination;shoulder abduction/adduction;shoulder extension/flexion  -EB     Recorded by [FAMILIA] Kayley Domínguez PTA [EB] FÉLIX Gtz/MARV     Positioning and Restraints    Pre-Treatment Position in bed  -FAMILIA in bed  -EB     Post Treatment Position bed  - bed  -EB     In Bed supine;call light within reach;encouraged to call for assist;exit alarm on;LLE elevated;side rails up x2  -FAMILIA supine;call light within reach;encouraged to call for assist  -EB     Recorded by [FAMILIA] Kayley Domínguez PTA [EB] FÉLIX Gtz/L       User Key  (r) = Recorded By, (t) = Taken By, (c) = Cosigned By    Initials Name Effective Dates    FAMILIA Domínguez PTA 10/17/16 -     EB FLIP Gtz 10/17/16 -                 IP PT Goals       07/06/17 1415 07/01/17 1309       Bed Mobility PT LTG    Bed Mobility PT LTG, Date Established  07/01/17   -MN     Bed Mobility PT LTG, Time to Achieve  2 wks  -MN     Bed Mobility PT LTG, Activity Type  roll left/roll right;supine to sit/sit to supine  -MN     Bed Mobility PT LTG, [place] Level  minimum assist (75% patient effort)  -MN     Bed Mobility PT Goal  LTG, Assist Device  bed rails  -MN     Bed Mobility PT LTG, Date Goal Reviewed 07/06/17  -FAMLIIA      Bed Mobility PT LTG, Outcome goal ongoing  -FAMILIA      Transfer Training PT LTG    Transfer Training PT LTG, Date Established  07/01/17  -MN     Transfer Training PT LTG, Time to Achieve  2 wks  -MN     Transfer Training PT LTG, Activity Type  bed to chair /chair to bed;other (see comments)   w/c  -MN     Transfer Training PT LTG, [place] Level  moderate assist (50% patient effort)  -MN     Transfer Training PT LTG, Assist Device  other (see comments)   slide board prn  -MN     Transfer Training PT LTG, Additional Goal  scoot or squat pivot transfer  -MN     Transfer Training PT  LTG, Date Goal Reviewed 07/06/17  -      Transfer Training PT LTG, Outcome goal ongoing  -      Static Sitting Balance PT STG    Static Sitting Balance PT STG, Date Established  07/01/17  -MN     Static Sitting Balance PT STG, Time to Achieve  2 - 3 days  -MN     Static Sitting Balance PT STG, [place] Level  contact guard assist  -MN     Static Sitting Balance PT STG, Assist Device  UE Support  -MN     Static Sitting Balance PT STG, Date Goal Reviewed 07/06/17  -      Static Sitting Balance PT STG, Outcome goal ongoing  -        User Key  (r) = Recorded By, (t) = Taken By, (c) = Cosigned By    Initials Name Provider Type    LEAH Salazar, PT Physical Therapist    FAMILIA Domínguez PTA Physical Therapy Assistant          Physical Therapy Education     Title: PT OT SLP Therapies (Active)     Topic: Physical Therapy (Active)     Point: Precautions (Active)    Learning Progress Summary    Learner Readiness Method Response Comment Documented by Status   Patient  Acceptance E NR importance of participation in PT for improved mobility and decrease risk of DVT MN 07/01/17 1306 Active                      User Key     Initials Effective Dates Name Provider Type Discipline    MN 10/17/16 -  Tammy Salazar, PT Physical Therapist PT                    PT Recommendation and Plan  Anticipated Discharge Disposition: skilled nursing facility, transitional care  Planned Therapy Interventions: balance training, bed mobility training, home exercise program, neuromuscular re-education, patient/family education, postural re-education, ROM (Range of Motion), strengthening, stretching, transfer training, wheelchair management/propulsion training  PT Frequency: other (see comments) (5-14x/week)  Plan of Care Review  Plan Of Care Reviewed With: patient  Progress: progress toward functional goals is gradual  Outcome Summary/Follow up Plan: pt is making slow progress with mobility and goals. PTA recommends NHP at discharge for rehab to home          Outcome Measures       07/06/17 1415 07/05/17 1300       How much help from another person do you currently need...    Turning from your back to your side while in flat bed without using bedrails? 2  -FAMILIA      Moving from lying on back to sitting on the side of a flat bed without bedrails? 2  -FAMILIA      Moving to and from a bed to a chair (including a wheelchair)? 1  -FAMILIA      Standing up from a chair using your arms (e.g., wheelchair, bedside chair)? 1  -FAMILIA      Climbing 3-5 steps with a railing? 1  -FAMILIA      To walk in hospital room? 1  -FAMILIA      AM-PAC 6 Clicks Score 8  -FAMILIA      How much help from another is currently needed...    Putting on and taking off regular lower body clothing?  1  -EB     Bathing (including washing, rinsing, and drying)  2  -EB     Toileting (which includes using toilet bed pan or urinal)  1  -EB     Putting on and taking off regular upper body clothing  2  -EB     Taking care of personal grooming (such as brushing teeth)  3   -EB     Eating meals  3  -EB     Score  12  -EB     Functional Assessment    Outcome Measure Options  AM-PAC 6 Clicks Daily Activity (OT)  -EB       User Key  (r) = Recorded By, (t) = Taken By, (c) = Cosigned By    Initials Name Provider Type    FAMILIA Domínguez PTA Physical Therapy Assistant    EB FÉLIX Gtz/MARV Occupational Therapy Assistant           Time Calculation:         PT Charges       07/06/17 1456          Time Calculation    Start Time 1415  -FAMILIA      Stop Time 1445  -FAMILIA      Time Calculation (min) 30 min  -FAMILIA      Time Calculation- PT    Total Timed Code Minutes- PT 30 minute(s)  -FAMILIA        User Key  (r) = Recorded By, (t) = Taken By, (c) = Cosigned By    Initials Name Provider Type    FAMILIA Domínguez PTA Physical Therapy Assistant          Therapy Charges for Today     Code Description Service Date Service Provider Modifiers Qty    [id number] HC PT THER PROC EA 15 MIN 7/6/2017 Kayley Domínguez PTA GP 2          PT G-Codes  PT Professional Judgement Used?: Yes  Outcome Measure Options: AM-PAC 6 Clicks Daily Activity (OT)  Score: 8  Functional Limitation: Mobility: Walking and moving around  Mobility: Walking and Moving Around Current Status (): At least 80 percent but less than 100 percent impaired, limited or restricted  Mobility: Walking and Moving Around Goal Status (): At least 60 percent but less than 80 percent impaired, limited or restricted    Kayley Domínguez PTA  7/6/2017          Electronically signed by Kayley Domínguez PTA at 7/6/2017  2:59 PM           Occupational Therapy Notes (last 24 hours) (Notes from 7/6/2017 12:58 PM through 7/7/2017 12:58 PM)      CHRISTINE Wilburn/L at 7/7/2017  8:55 AM  Version 1 of 1            07/07/17 0854   Rehab Treatment   Discipline occupational therapist   Treatment Not Performed other (see comments)  (RUE CT shows severely comminuted proximal humerus fx. OT to hold pending hospitalist plan.)        Electronically signed by  Doris Simon, OTR/L at 7/7/2017  8:56 AM      [id number]

## 2017-07-07 NOTE — PAYOR COMM NOTE
"Lamont Mcmullen (52 y.o. Male)     Date of Birth Social Security Number Address Home Phone MRN    1965  [address]  [facility] [id number] [phone] [id number]    [employer] Marital Status          Other        Admission Date Admission Type Admitting Provider Attending Provider Department, Room/Bed    6/30/17 Emergency Echendu, MD Isis Dietrich Samer, MD [address], [facility]/1    Discharge Date Discharge Disposition Discharge Destination                      Attending Provider: Santiago Marinelli MD     Allergies:  Aspirin, Codeine Sulfate    Isolation:  None   Infection:  None   Code Status:  FULL    Ht:  73\" (185.4 cm)   Wt:  219 lb 1.6 oz (99.4 kg)    Admission Cmt:  None   Principal Problem:  None                Active Insurance as of 6/30/2017     Primary Coverage     Payor Plan Insurance Group Employer/Plan Group    [place]      Payor Plan Address Payor Plan Phone Number Effective From Effective To    [address] [address]  1/1/2014     [place] [place] [address]       Subscriber Name Subscriber Birth Date Member ID       LAMONT MCMULLEN 1965 [id number]                 Emergency Contacts      (Rel.) Home Phone Work Phone Mobile Phone    BenedictLoco (Daughter) -- -- [phone]    BenedictMiguel (Son) -- -- [phone]    BenedictShreyas (Brother) -- -- [phone]            Insurance Information                [place] Phone:     Subscriber: Lamont Mcmullen Subscriber#: [id number]    Group#:  Precert#:              Physician Progress Notes (last 24 hours) (Notes from 7/6/2017  4:09 PM through 7/7/2017  4:09 PM)      Santiago Marinelli MD at 7/7/2017  3:50 PM  Version 1 of 1             [facility] Medicine Services  INPATIENT PROGRESS NOTE    Length of Stay: 6  Date of Admission: 6/30/2017  Primary Care Physician: Cj Aguero MD    Subjective "   Chief Complaint:   HPI:  Still weak is not opening of chest pain at this time, complains of shoulder pain    Review of Systems     All pertinent negatives and positives are as above. All other systems have been reviewed and are negative unless otherwise stated.     Objective    Temp:  [96.5 °F (35.8 °C)-98.4 °F (36.9 °C)] 98.4 °F (36.9 °C)  Heart Rate:  [] 92  Resp:  [18] 18  BP: (113-157)/(69-88) 113/76    Physical Exam  Heart S1-S2 regular rate and rhythm  Chest decreased breath sounds bilaterally  Abdomen soft and nontender  Extremities no tenderness      Results Review:  I have reviewed the labs, radiology results, and diagnostic studies.    Laboratory Data:     Results from last 7 days  Lab Units 07/06/17  0547 07/05/17  0603 07/04/17  0648   SODIUM mmol/L 134* 134* 135*   POTASSIUM mmol/L 3.7 3.8 3.8   CHLORIDE mmol/L 91* 92* 93*   CO2 mmol/L 33.0* 32.0* 33.0*   BUN mg/dL 23* 21 22*   CREATININE mg/dL 2.17* 2.00* 1.76*   GLUCOSE mg/dL 108* 127* 100   CALCIUM mg/dL 8.5 8.8 8.6   BILIRUBIN mg/dL 3.8* 4.1* 3.3*   ALK PHOS U/L 207* 239* 251*   ALT (SGPT) U/L 90* 109* 117*   AST (SGOT) U/L 43 55 69*   ANION GAP mmol/L 10.0 10.0 9.0     Estimated Creatinine Clearance: 48.8 mL/min (by C-G formula based on Cr of 2.17).            Results from last 7 days  Lab Units 07/06/17  0547 07/05/17  0603 07/04/17  0648 07/03/17  0544 07/02/17  0524   WBC 10*3/mm3 11.48* 12.09* 9.36 13.70* 13.11*   HEMOGLOBIN g/dL 9.2* 9.3* 8.5* 8.9* 8.7*   HEMATOCRIT % 27.4* 27.4* 25.3* 26.5* 24.9*   PLATELETS 10*3/mm3 45* 63* 71* 37* 41*           Culture Data:   No results found for: BLOODCX  No results found for: URINECX  No results found for: RESPCX  Wound Culture   Date Value Ref Range Status   07/06/2017 Culture in progress  Preliminary     No results found for: STOOLCX  No components found for: BODYFLD    Radiology Data:   Imaging Results (last 24 hours)     Procedure Component Value Units Date/Time    CT Upper Extremity Right  Without Contrast [[id number]] Collected:  07/06/17 1526     Updated:  07/06/17 1611    Narrative:       Procedure: CT UPPER EXTREMITY RIGHT WO CONTRAST    Indication:  Persistent right shoulder pain pain. History of  abscess and fistula.     Technique: Axial, coronal, sagittal . This exam was performed  according to our departmental dose-optimization program which  includes automated exposure control, adjustment of the mA and/or  kV according to patient size and/or use of iterative  reconstruction technique.      Prior Relevant Exam: Plain film examination 1/13/2017 and  2/22/2017     Again noted is severely comminuted fracture involving the region  of the surgical neck of the proximal right humerus, with medial  displacement of the distal with respect to proximal fracture  fragments and overlap of fracture fragments of approximately 2.3  cm. On the current examination, associated soft tissue changes in  the region of the fracture site likely secondary to patient's  history of abscess. No evidence of fistulous connection to the  skin surface noted. The humeral head glenoid remains intact with  complete loss of the joint space and fracture involving the  superior glenoid as well.    Osteopenia.    Coracoclavicular and acromioclavicular joints appear intact.  Chronic calcification in the region of the coracoclavicular joint  noted.         Impression:       CONCLUSION:    1. Severely comminuted fractures involving the region of the  surgical neck of the proximal right humerus as above. On the  current examination, associated soft tissue changes in the region  of the fracture site likely secondary to patient's history of  abscess. No evidence of fistulous connection to the skin surface  noted.  2. Visualized portion of lung fields on the right demonstrates  several groundglass nodular densities with differential including  pneumonitis or less likely bronchoalveolar carcinoma. Correlation  suggested.  3. Other changes as  above.     Electronically signed by:  Oleg Bernal MD  7/6/2017 4:10 PM CDT  Workstation: [id number]          I have reviewed the patient current medications.     Assessment/Plan     Hospital Problem List     Cellulitis of right lower extremity        1. Cellulitis right lower extremity: Continue broad-spectrum antibiotics.  Overall improving.  2. Candiduria: Begin oral Diflucan. Urine cultures showed mixed growth.   3. History of liver cirrhosis: This accounts for the elevated LFTs and thrombocytopenia. Continue lactulose and Aldactone.  4. Tinea cruris:: Continue local application of antifungal cream.  5. Deconditioning: Physiotherapy is ongoing.  6. Continue Protonix/ TEDs for for GI and DVT prophylaxis respectively.  7. Patient continued to be weak patient may need to go to rehabilitation once discharged  Thrombocytopenia patient received 2 units of packed platelets  Discussed with the patient okay to go to rehabilitation in 2-3 days  Right humerus fracture this is likely has been gone for the last several weeks we will consult ortho  Discharge early next week to nursing home rehabilitation      Plan:       Electronically signed by Santiago Marinelli MD at 7/7/2017  3:52 PM

## 2017-07-07 NOTE — PLAN OF CARE
Problem: Fall Risk (Adult)  Goal: Absence of Falls  Outcome: Ongoing (interventions implemented as appropriate)    07/07/17 0404   Fall Risk (Adult)   Absence of Falls achieves outcome         Problem: Pain, Acute (Adult)  Goal: Acceptable Pain Control/Comfort Level  Outcome: Ongoing (interventions implemented as appropriate)    Problem: Cellulitis (Adult)  Goal: Signs and Symptoms of Listed Potential Problems Will be Absent or Manageable (Cellulitis)  Outcome: Ongoing (interventions implemented as appropriate)    Problem: Pressure Ulcer Risk (Cristiano Scale) (Adult,Obstetrics,Pediatric)  Goal: Skin Integrity  Outcome: Ongoing (interventions implemented as appropriate)
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
## 2017-07-07 NOTE — PROGRESS NOTES
[facility] Medicine Services  INPATIENT PROGRESS NOTE    Length of Stay: 6  Date of Admission: 6/30/2017  Primary Care Physician: Cj Aguero MD    Subjective   Chief Complaint:   HPI:  Still weak is not opening of chest pain at this time, complains of shoulder pain    Review of Systems     All pertinent negatives and positives are as above. All other systems have been reviewed and are negative unless otherwise stated.     Objective    Temp:  [96.5 °F (35.8 °C)-98.4 °F (36.9 °C)] 98.4 °F (36.9 °C)  Heart Rate:  [] 92  Resp:  [18] 18  BP: (113-157)/(69-88) 113/76    Physical Exam  Heart S1-S2 regular rate and rhythm  Chest decreased breath sounds bilaterally  Abdomen soft and nontender  Extremities no tenderness      Results Review:  I have reviewed the labs, radiology results, and diagnostic studies.    Laboratory Data:     Results from last 7 days  Lab Units 07/06/17  0547 07/05/17 0603 07/04/17  0648   SODIUM mmol/L 134* 134* 135*   POTASSIUM mmol/L 3.7 3.8 3.8   CHLORIDE mmol/L 91* 92* 93*   CO2 mmol/L 33.0* 32.0* 33.0*   BUN mg/dL 23* 21 22*   CREATININE mg/dL 2.17* 2.00* 1.76*   GLUCOSE mg/dL 108* 127* 100   CALCIUM mg/dL 8.5 8.8 8.6   BILIRUBIN mg/dL 3.8* 4.1* 3.3*   ALK PHOS U/L 207* 239* 251*   ALT (SGPT) U/L 90* 109* 117*   AST (SGOT) U/L 43 55 69*   ANION GAP mmol/L 10.0 10.0 9.0     Estimated Creatinine Clearance: 48.8 mL/min (by C-G formula based on Cr of 2.17).            Results from last 7 days  Lab Units 07/06/17  0547 07/05/17  0603 07/04/17  0648 07/03/17  0544 07/02/17  0524   WBC 10*3/mm3 11.48* 12.09* 9.36 13.70* 13.11*   HEMOGLOBIN g/dL 9.2* 9.3* 8.5* 8.9* 8.7*   HEMATOCRIT % 27.4* 27.4* 25.3* 26.5* 24.9*   PLATELETS 10*3/mm3 45* 63* 71* 37* 41*           Culture Data:   No results found for: BLOODCX  No results found for: URINECX  No results found for: RESPCX  Wound Culture   Date Value Ref Range Status   07/06/2017 Culture in  progress  Preliminary     No results found for: STOOLCX  No components found for: BODYFLD    Radiology Data:   Imaging Results (last 24 hours)     Procedure Component Value Units Date/Time    CT Upper Extremity Right Without Contrast [[id number]] Collected:  07/06/17 1526     Updated:  07/06/17 1611    Narrative:       Procedure: CT UPPER EXTREMITY RIGHT WO CONTRAST    Indication:  Persistent right shoulder pain pain. History of  abscess and fistula.     Technique: Axial, coronal, sagittal . This exam was performed  according to our departmental dose-optimization program which  includes automated exposure control, adjustment of the mA and/or  kV according to patient size and/or use of iterative  reconstruction technique.      Prior Relevant Exam: Plain film examination 1/13/2017 and  2/22/2017     Again noted is severely comminuted fracture involving the region  of the surgical neck of the proximal right humerus, with medial  displacement of the distal with respect to proximal fracture  fragments and overlap of fracture fragments of approximately 2.3  cm. On the current examination, associated soft tissue changes in  the region of the fracture site likely secondary to patient's  history of abscess. No evidence of fistulous connection to the  skin surface noted. The humeral head glenoid remains intact with  complete loss of the joint space and fracture involving the  superior glenoid as well.    Osteopenia.    Coracoclavicular and acromioclavicular joints appear intact.  Chronic calcification in the region of the coracoclavicular joint  noted.         Impression:       CONCLUSION:    1. Severely comminuted fractures involving the region of the  surgical neck of the proximal right humerus as above. On the  current examination, associated soft tissue changes in the region  of the fracture site likely secondary to patient's history of  abscess. No evidence of fistulous connection to the skin surface  noted.  2.  Visualized portion of lung fields on the right demonstrates  several groundglass nodular densities with differential including  pneumonitis or less likely bronchoalveolar carcinoma. Correlation  suggested.  3. Other changes as above.     Electronically signed by:  Oleg Bernal MD  7/6/2017 4:10 PM CDT  Workstation: [id number]          I have reviewed the patient current medications.     Assessment/Plan     Hospital Problem List     Cellulitis of right lower extremity        1. Cellulitis right lower extremity: Continue broad-spectrum antibiotics.  Overall improving.  2. Candiduria: Begin oral Diflucan. Urine cultures showed mixed growth.   3. History of liver cirrhosis: This accounts for the elevated LFTs and thrombocytopenia. Continue lactulose and Aldactone.  4. Tinea cruris:: Continue local application of antifungal cream.  5. Deconditioning: Physiotherapy is ongoing.  6. Continue Protonix/ TEDs for for GI and DVT prophylaxis respectively.  7. Patient continued to be weak patient may need to go to rehabilitation once discharged  Thrombocytopenia patient received 2 units of packed platelets  Discussed with the patient okay to go to rehabilitation in 2-3 days  Right humerus fracture this is likely has been gone for the last several weeks we will consult ortho  Discharge early next week to nursing home rehabilitation      Plan:

## 2017-07-07 NOTE — SIGNIFICANT NOTE
07/07/17 1304   Rehab Treatment   Discipline physical therapy assistant   Treatment Not Performed other (see comments)  (CT scan shows severe fracture of R UE, spoke with PT who request to hold today until speaking with hospitalist)

## 2017-07-07 NOTE — DISCHARGE PLACEMENT REQUEST
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  6/30/2017 12:25 PM CDT  Workstation: [id number]    XR Knee 3 View Right [[id number]] Collected:  06/30/17 1159     Updated:  06/30/17 1241    Narrative:         EXAM:  Radiograph(s), Osseous         REGION:   Knee    SIDE:     Right     VIEWS:   4             INDICATION:    Pain status post fall      COMPARISON:    none            FINDINGS:           No evidence of a fracture or bony malalignment.     No evidence of osteolytic/osteoblastic disease.     Age-related degenerative changes, with moderate medial, lateral,  and patellofemoral compartment degenerative changes.   Presumed healed or partially healed Osgood-Schlatter defect.                            .        Impression:       CONCLUSION:           1. No gross evidence of acute osseous pathology.              Note:  if pain or symptoms persist beyond reasonable expectations  and follow-up imaging is anticipated,  cross sectional imaging  (MRI) is suggested, as is deemed clinically appropriate.                                 Electronically signed by:  PRISCILLA  Anthony Bustillo MD  6/30/2017 12:40  PM CDT Workstation: [id number]    XR Chest 1 View [[id number]] Collected:  06/30/17 1349     Updated:  06/30/17 1419    Narrative:       [facility] [place]    Patient Name: LAMONT FISHER    ORDERING: ORLANDO SORIANO     ATTENDING: ORLANDO SORIANO     REFERRING: ORLANDO SORIANO    -----------------------    PROCEDURE: Portable chest x-ray    TECHNIQUE: Single AP view of the chest    COMPARISON: 6/23/2017, 12/3/2015    HISTORY: SOA    FINDINGS:     Life-support devices: None    Lungs/pleura: No overt pulmonary vascular congestion, focal  pulmonary parenchymal opacity, pleural effusion or pneumothorax.    Heart, hilar and mediastinal structures:  Enlargement of the  right hilar shadow is unchanged since 12/3/2015 and likely  reflects and enlarged right pulmonary artery. Cardiac silhouette  is normal in size.    Multiple old healed bilateral rib fractures are noted. Ununited  old left clavicular fracture noted. Old healed right clavicular  fracture noted. Ununited right humeral fracture unchanged, screws  noted in the proximal left humerus.      Impression:       CONCLUSION:  No acute pulmonary disease.    Electronically signed by:  Jose Boudreaux MD  6/30/2017 2:18 PM CDT  Workstation: [id number]                           Urine Culture   Date Value Ref Range Status   06/30/2017 Culture in progress  Preliminary           Medication Review:   Current Facility-Administered Medications   Medication Dose Route Frequency Provider Last Rate Last Dose   • acetaminophen (TYLENOL) tablet 650 mg  650 mg Oral Q6H PRN Santiago Marinelli MD       • clotrimazole (LOTRIMIN) 1 % external solution   Topical Q12H Lamont Richard MD       • fluconazole (DIFLUCAN) tablet 200 mg  200 mg Oral Daily Lamont Richard MD       • furosemide (LASIX) injection 60 mg  60 mg Intravenous Q12H Santiago Marinelli MD   60 mg at 07/01/17 0545   • gabapentin (NEURONTIN) capsule 300 mg  300 mg Oral  TID Santiago Marinelli MD   300 mg at 06/30/17 2134   • hydrALAZINE (APRESOLINE) injection 10 mg  10 mg Intravenous Q6H PRN Santiago Marinelli MD       • ipratropium-albuterol (DUO-NEB) nebulizer solution 3 mL  3 mL Nebulization 4x Daily - RT Santiago Marinelli MD   3 mL at 07/01/17 0742   • lactulose (CHRONULAC) 10 GM/15ML solution 30 g  30 g Oral TID Santiago Marinelli MD   30 g at 06/30/17 2135   • Morphine sulfate (PF) injection 1 mg  1 mg Intravenous Q4H PRN Sanitago Marinelli MD   1 mg at 06/30/17 2142   • ondansetron (ZOFRAN) injection 4 mg  4 mg Intravenous Q6H PRN Santiago Marinelli MD       • pantoprazole (PROTONIX) EC tablet 40 mg  40 mg Oral Daily Santiago Marinelli MD       • Pharmacy to dose vancomycin   Does not apply Continuous PRN Santiago Marinelli MD       • potassium chloride 10 mEq in 100 mL IVPB  10 mEq Intravenous Q1H PRN Santiago Marinelli  mL/hr at 07/01/17 0312 10 mEq at 07/01/17 0312   • sodium chloride 0.9 % flush 10 mL  10 mL Intravenous PRN KALEB Flores   10 mL at 06/30/17 1229   • sodium chloride 0.9 % flush 10 mL  10 mL Intravenous PRN Santiago Marinelli MD   10 mL at 06/30/17 1743   • spironolactone (ALDACTONE) tablet 50 mg  50 mg Oral Daily Santiago Marinelli MD   50 mg at 06/30/17 1741   • temazepam (RESTORIL) capsule 15 mg  15 mg Oral Nightly PRN Santiago Marinelli MD   15 mg at 06/30/17 2142   • vancomycin (VANCOCIN) 1,500 mg in sodium chloride 0.9 % 500 mL IVPB  1,500 mg Intravenous Q18H Santiago Marinelli MD             Assessment/Plan   1.  Cellulitis right lower extremity: Continue broad-spectrum antibiotics.  Check duplex ultrasound to rule out DVT.  2.  Candiduria: Begin oral Diflucan.  Urine cultures are pending.  3.  History of liver cirrhosis: This accounts for the elevated LFTs and thrombocytopenia.  Continue lactulose and Aldactone.  4.  Tinea corporis: Prescribe local application of antifungal cream.  5.  Deconditioning: Physiotherapy is ongoing.  6.  Continue Protonix/ TEDs for for GI and DVT prophylaxis  respectively.  7.  Anticipate discharge in 2-3 days.        Armando Richard MD  07/01/17      EMR Dragon/Transcription disclaimer:   Much of this encounter note is an electronic transcription/translation of spoken language to printed text. The electronic translation of spoken language may permit erroneous, or at times, nonsensical words or phrases to be inadvertently transcribed; Although I have reviewed the note for such errors, some may still exist.                 Electronically signed by Armando Richard MD at 7/1/2017 11:13 AM      Armando Richard MD at 7/2/2017 11:26 AM  Version 1 of 1                [facility] Medicine Services  INPATIENT PROGRESS NOTE     LOS: 1 day   Patient Care Team:  Cj Aguero MD as PCP - General  Shawn Cortez MD as Surgeon (Orthopedic Surgery)  Tushar Becerril DO as Consulting Physician (Gastroenterology)  Josep Colón MD as Consulting Physician (Nephrology)    Chief Complaint:    Patient is seen for follow-up today.  He is feeling better today but continues to complain of periodic pain on the right lower extremity and weakness.  Swelling right lower extremity persist.       Subjective     Interval History:     Patient Complaints: As above    History taken from: Patient    Review of Systems:    Review of Systems   Constitutional: Negative for activity change, appetite change, chills, diaphoresis, fatigue, fever and unexpected weight change.   Respiratory: Negative for apnea, cough, choking, chest tightness, shortness of breath, wheezing and stridor.    Cardiovascular: Positive for leg swelling. Negative for chest pain and palpitations.   Gastrointestinal: Negative for abdominal distention, abdominal pain, anal bleeding, blood in stool, constipation, diarrhea, nausea, rectal pain and vomiting.   Endocrine: Negative for cold intolerance, heat intolerance, polydipsia, polyphagia and polyuria.   Genitourinary: Negative for  decreased urine volume, difficulty urinating, dysuria, enuresis, flank pain, frequency, hematuria and urgency.   Musculoskeletal: Negative for arthralgias, back pain, joint swelling, myalgias, neck pain and neck stiffness.   Neurological: Positive for weakness. Negative for dizziness, tremors, seizures, syncope, facial asymmetry, speech difficulty, light-headedness, numbness and headaches.   Psychiatric/Behavioral: Negative for agitation, behavioral problems and confusion.         Objective     Vital Signs  Temp:  [96.6 °F (35.9 °C)-96.8 °F (36 °C)] 96.6 °F (35.9 °C)  Heart Rate:  [69-94] 80  Resp:  [16-20] 18  BP: (132-140)/(74-82) 140/78    Physical Exam:   Physical Exam   Constitutional: He is oriented to person, place, and time. He appears well-developed and well-nourished. No distress.   HENT:   Head: Normocephalic and atraumatic.   Eyes: EOM are normal. Pupils are equal, round, and reactive to light.   Neck: Normal range of motion. Neck supple. No JVD present. No thyromegaly present.   Cardiovascular: Normal rate, regular rhythm and intact distal pulses.  Exam reveals no gallop.    No murmur heard.  Pulmonary/Chest: Effort normal and breath sounds normal. No stridor. No respiratory distress. He has no wheezes. He has no rales.   Abdominal: Soft. Bowel sounds are normal. He exhibits no distension. There is no tenderness. There is no rebound and no guarding.   Musculoskeletal: He exhibits edema.   Neurological: He is alert and oriented to person, place, and time.   Skin: He is not diaphoretic.   Psychiatric: He has a normal mood and affect. His behavior is normal.   Nursing note and vitals reviewed.   Extremities: He has left lower extremity BKA.  There is pitting pedal edema on the right foot  Skin: Whitish plaques on both groins compatible with tinea cruris.       Results Review:         Results from last 7 days  Lab Units 07/02/17  0524 07/01/17  0748 06/30/17  1739 06/30/17  1204 06/28/17  0577  06/27/17  0537 06/26/17  0603   SODIUM mmol/L 135* 136*  --  138 133* 133* 133*   SODIUM, ARTERIAL mmol/L  --   --  138.9  --   --   --   --    POTASSIUM mmol/L 3.8 4.1  --  3.5 3.5 3.0* 3.4*   CHLORIDE mmol/L 102 104  --  103 101 103 103   CO2 mmol/L 28.0 24.0  --  26.0 25.0 23.0 22.0   BUN mg/dL 25* 27*  --  28* 29* 32* 35*   CREATININE mg/dL 1.33* 1.45*  --  1.35* 1.44* 1.45* 1.66*   GLUCOSE mg/dL 113* 147*  --  64 95 99 117*   GLUCOSE, ARTERIAL mmol/L  --   --  109  --   --   --   --    CALCIUM mg/dL 8.2* 8.2*  --  8.3* 8.1* 8.1* 8.1*   BILIRUBIN mg/dL 2.4* 2.7*  --  4.7* 2.6* 2.7* 2.4*   ALK PHOS U/L 279* 263*  --  227* 278* 273* 158*   ALT (SGPT) U/L 146* 148*  --  161* 207* 226* 112*   AST (SGOT) U/L 125* 124*  --  151* 144* 224* 64*               Results from last 7 days  Lab Units 07/02/17  0524 07/01/17  0748 06/30/17  1204 06/28/17  0541 06/27/17  0537 06/26/17  0603   WBC 10*3/mm3 13.11* 10.82* 12.48* 11.96* 11.17* 8.21   HEMOGLOBIN g/dL 8.7* 9.8* 10.3* 9.6* 9.6* 9.5*   HEMATOCRIT % 24.9* 27.8* 29.7* 27.1* 27.3* 27.3*   PLATELETS 10*3/mm3 41* 48* 53* 42* 40* 41*       Lab Results   Component Value Date    CKTOTAL 771 (H) 06/30/2017    CKMB 0.84 06/14/2017    TROPONINI 0.038 (H) 06/15/2017       pH, Arterial   Date Value Ref Range Status   06/30/2017 7.484 (H) 7.350 - 7.450 pH units Final     CO2   Date Value Ref Range Status   07/02/2017 28.0 22.0 - 31.0 mmol/L Final              Imaging Results (last 7 days)     Procedure Component Value Units Date/Time    XR Tibia Fibula 2 View Right [[id number]] Collected:  06/30/17 1159     Updated:  06/30/17 1226    Narrative:         PROCEDURE: Right tibia and fibula 4 views    REASON FOR EXAM: fell    FINDINGS:  No comparison. .  Bony structures of the tibia and  fibula are normal. There is no evidence of fracture or  dislocation. No radiopaque foreign body . Mild diffuse right  lower leg soft tissue swelling and subcutaneous fatty stranding.      Impression:        1.  Mild right lower leg soft tissues swelling and subcutaneous  fatty stranding suspicious for edema and/or cellulitis.  2.  Otherwise unremarkable right tibia and fibula.    Electronically signed by:  Eduard Vigil MD  6/30/2017 12:25 PM CDT  Workstation: [id number]    XR Knee 3 View Right [[id number]] Collected:  06/30/17 1159     Updated:  06/30/17 1241    Narrative:         EXAM:  Radiograph(s), Osseous         REGION:   Knee    SIDE:     Right     VIEWS:   4             INDICATION:    Pain status post fall      COMPARISON:    none            FINDINGS:           No evidence of a fracture or bony malalignment.     No evidence of osteolytic/osteoblastic disease.     Age-related degenerative changes, with moderate medial, lateral,  and patellofemoral compartment degenerative changes.   Presumed healed or partially healed Osgood-Schlatter defect.                            .        Impression:       CONCLUSION:           1. No gross evidence of acute osseous pathology.              Note:  if pain or symptoms persist beyond reasonable expectations  and follow-up imaging is anticipated,  cross sectional imaging  (MRI) is suggested, as is deemed clinically appropriate.                                 Electronically signed by:  PRISCILLA Bustillo MD  6/30/2017 12:40  PM CDT Workstation: [id number]    XR Chest 1 View [[id number]] Collected:  06/30/17 1349     Updated:  06/30/17 1419    Narrative:       [facility] [place]    Patient Name: LAMONT FISHER    ORDERING: ORLANDO SORIANO     ATTENDING: ORLANDO SORIANO     REFERRING: ORLANDO SORIANO    -----------------------    PROCEDURE: Portable chest x-ray    TECHNIQUE: Single AP view of the chest    COMPARISON: 6/23/2017, 12/3/2015    HISTORY: SOA    FINDINGS:     Life-support devices: None    Lungs/pleura: No overt pulmonary vascular congestion, focal  pulmonary parenchymal opacity, pleural effusion or pneumothorax.    Heart, hilar and mediastinal  structures:  Enlargement of the  right hilar shadow is unchanged since 12/3/2015 and likely  reflects and enlarged right pulmonary artery. Cardiac silhouette  is normal in size.    Multiple old healed bilateral rib fractures are noted. Ununited  old left clavicular fracture noted. Old healed right clavicular  fracture noted. Ununited right humeral fracture unchanged, screws  noted in the proximal left humerus.      Impression:       CONCLUSION:  No acute pulmonary disease.    Electronically signed by:  Jose Boudreaux MD  6/30/2017 2:18 PM CDT  Workstation: [id number]                           Urine Culture   Date Value Ref Range Status   06/30/2017 Culture in progress  Preliminary           Medication Review:   Current Facility-Administered Medications   Medication Dose Route Frequency Provider Last Rate Last Dose   • acetaminophen (TYLENOL) tablet 650 mg  650 mg Oral Q6H PRN Santiago Marinelli MD   650 mg at 07/02/17 0854   • clotrimazole (LOTRIMIN) 1 % external solution   Topical Q12H Armando Richard MD       • fluconazole (DIFLUCAN) tablet 200 mg  200 mg Oral Daily rAmando Richard MD   200 mg at 07/02/17 0854   • furosemide (LASIX) injection 40 mg  40 mg Intravenous Q12H Armando Richard MD   40 mg at 07/02/17 0454   • gabapentin (NEURONTIN) capsule 300 mg  300 mg Oral TID Santiago Marinelli MD   300 mg at 07/02/17 0854   • hydrALAZINE (APRESOLINE) injection 10 mg  10 mg Intravenous Q6H PRN Santiago Marinelli MD       • ipratropium-albuterol (DUO-NEB) nebulizer solution 3 mL  3 mL Nebulization 4x Daily - RT Santiago Marinelli MD   3 mL at 07/02/17 0806   • lactulose (CHRONULAC) 30 g  30 g Oral TID Santiago Marinelli MD   30 g at 07/02/17 0855   • Morphine sulfate (PF) injection 1 mg  1 mg Intravenous Q4H PRN Santiago Marinelli MD   1 mg at 07/02/17 0854   • ondansetron (ZOFRAN) injection 4 mg  4 mg Intravenous Q6H PRN Santiago Marinelli MD       • pantoprazole (PROTONIX) EC tablet 40 mg  40 mg Oral Daily Santiago Marinelli MD   40 mg at  07/02/17 0854   • Pharmacy to dose vancomycin   Does not apply Continuous PRN Santiago Marinelli MD       • potassium chloride 10 mEq in 100 mL IVPB  10 mEq Intravenous Q1H PRN Santiago Marinelli  mL/hr at 07/01/17 0312 10 mEq at 07/01/17 0312   • sodium chloride 0.9 % flush 10 mL  10 mL Intravenous PRN KALEB Flores   10 mL at 06/30/17 1229   • sodium chloride 0.9 % flush 10 mL  10 mL Intravenous PRN Santiago Marinelli MD   10 mL at 07/01/17 1749   • spironolactone (ALDACTONE) tablet 50 mg  50 mg Oral Daily Santiago Marinelli MD   50 mg at 07/02/17 0854   • temazepam (RESTORIL) capsule 15 mg  15 mg Oral Nightly PRN Santiago Marinelli MD   15 mg at 07/01/17 2045   • vancomycin (VANCOCIN) 1,500 mg in sodium chloride 0.9 % 500 mL IVPB  1,500 mg Intravenous Q18H Santiago Marinelli MD   1,500 mg at 07/02/17 0251         Assessment/Plan   1.  Cellulitis right lower extremity: Continue broad-spectrum antibiotics.  Duplex ultrasound was negative for DVT.  2.  Candiduria: Begin oral Diflucan.  Urine cultures showed mixed growth.   3.  History of liver cirrhosis: This accounts for the elevated LFTs and thrombocytopenia.  Continue lactulose and Aldactone.  4.  Tinea cruris:: Continue local application of antifungal cream.  5.  Deconditioning: Physiotherapy is ongoing.  6.  Continue Protonix/ TEDs for for GI and DVT prophylaxis respectively.  7.  Anticipate discharge in 2-3 days.        Armando Richard MD  07/02/17      EMR Dragon/Transcription disclaimer:   Much of this encounter note is an electronic transcription/translation of spoken language to printed text. The electronic translation of spoken language may permit erroneous, or at times, nonsensical words or phrases to be inadvertently transcribed; Although I have reviewed the note for such errors, some may still exist.                 Electronically signed by Armando Richard MD at 7/2/2017 11:32 AM      Santiago Marinelli MD at 7/3/2017 11:01 AM  Version 1 of [address] [place] [address]  [facility] Medicine Services  INPATIENT PROGRESS NOTE    Length of Stay: 2  Date of Admission: 6/30/2017  Primary Care Physician: Cj Aguero MD    Subjective   Chief Complaint: Still weak is not complaining of chest pain at this time  HPI:      Review of Systems   Heart S1-S2 regular rate and rhythm    All pertinent negatives and positives are as above. All other systems have been reviewed and are negative unless otherwise stated.     Objective    Temp:  [96.5 °F (35.8 °C)-99.1 °F (37.3 °C)] 98 °F (36.7 °C)  Heart Rate:  [] 114  Resp:  [16-20] 18  BP: (111-150)/(65-85) 150/85    Physical Exam  Heart S1-S2 regular rate and rhythm  Chest decreased breath sounds bilaterally  Abdomen soft and nontender  Extremities no tenderness      Results Review:  I have reviewed the labs, radiology results, and diagnostic studies.    Laboratory Data:     Results from last 7 days  Lab Units 07/03/17  0544 07/02/17  0524 07/01/17  0748   SODIUM mmol/L 135* 135* 136*   POTASSIUM mmol/L 3.7 3.8 4.1   CHLORIDE mmol/L 99 102 104   CO2 mmol/L 28.0 28.0 24.0   BUN mg/dL 22* 25* 27*   CREATININE mg/dL 1.70* 1.33* 1.45*   GLUCOSE mg/dL 101* 113* 147*   CALCIUM mg/dL 8.3* 8.2* 8.2*   BILIRUBIN mg/dL 3.1* 2.4* 2.7*   ALK PHOS U/L 269* 279* 263*   ALT (SGPT) U/L 145* 146* 148*   AST (SGOT) U/L 93* 125* 124*   ANION GAP mmol/L 8.0 5.0 8.0     Estimated Creatinine Clearance: 64.8 mL/min (by C-G formula based on Cr of 1.7).            Results from last 7 days  Lab Units 07/03/17  0544 07/02/17  0524 07/01/17  0748 06/30/17  1204 06/28/17  0541   WBC 10*3/mm3 13.70* 13.11* 10.82* 12.48* 11.96*   HEMOGLOBIN g/dL 8.9* 8.7* 9.8* 10.3* 9.6*   HEMATOCRIT % 26.5* 24.9* 27.8* 29.7* 27.1*   PLATELETS 10*3/mm3 37* 41* 48* 53* 42*           Culture Data:   No results found for: BLOODCX  Urine Culture   Date Value Ref Range Status   06/30/2017 Mixed Culture  Final     No results found for: RESPCX  No results  found for: WOUNDCX  No results found for: STOOLCX  No components found for: BODYFLD    Radiology Data:   Imaging Results (last 24 hours)     ** No results found for the last 24 hours. **          I have reviewed the patient current medications.     Assessment/Plan     Hospital Problem List     Cellulitis of right lower extremity          Plan:  1. Cellulitis right lower extremity: Continue broad-spectrum antibiotics. Duplex ultrasound was negative for DVT.  2. Candiduria: Begin oral Diflucan. Urine cultures showed mixed growth.   3. History of liver cirrhosis: This accounts for the elevated LFTs and thrombocytopenia. Continue lactulose and Aldactone.  4. Tinea cruris:: Continue local application of antifungal cream.  5. Deconditioning: Physiotherapy is ongoing.  6. Continue Protonix/ TEDs for for GI and DVT prophylaxis respectively.  7.  Patient continued to be weak patient may need to go to rehabilitation once discharged           Electronically signed by Santiago Marinelli MD at 7/3/2017 11:03 AM      Santiago Marinelli MD at 7/3/2017  6:43 PM  Version 1 of 1         Risks and benefits of platelets transfusion has been explained to the patient patient agrees to get a transfusion     Electronically signed by Santiago Marinelli MD at 7/3/2017  6:45 PM      Santiago Marinelli MD at 7/4/2017 11:42 AM  Version 1 of 1             [facility] Medicine Services  INPATIENT PROGRESS NOTE    Length of Stay: 3  Date of Admission: 6/30/2017  Primary Care Physician: Cj Aguero MD    Subjective   Chief Complaint: Not having chest pain or shortness of breath at this time  HPI:      Review of Systems     All pertinent negatives and positives are as above. All other systems have been reviewed and are negative unless otherwise stated.     Objective    Temp:  [96.1 °F (35.6 °C)-98.1 °F (36.7 °C)] 96.1 °F (35.6 °C)  Heart Rate:  [] 92  Resp:  [16-20] 16  BP: (112-140)/(64-85) 117/72    Physical  Exam  Heart S1-S2 regular rate and rhythm  Chest decreased breath sounds bilaterally  Abdomen soft and nontender  Extremities no tenderness      Results Review:  I have reviewed the labs, radiology results, and diagnostic studies.    Laboratory Data:     Results from last 7 days  Lab Units 07/04/17  0648 07/03/17  0544 07/02/17  0524   SODIUM mmol/L 135* 135* 135*   POTASSIUM mmol/L 3.8 3.7 3.8   CHLORIDE mmol/L 93* 99 102   CO2 mmol/L 33.0* 28.0 28.0   BUN mg/dL 22* 22* 25*   CREATININE mg/dL 1.76* 1.70* 1.33*   GLUCOSE mg/dL 100 101* 113*   CALCIUM mg/dL 8.6 8.3* 8.2*   BILIRUBIN mg/dL 3.3* 3.1* 2.4*   ALK PHOS U/L 251* 269* 279*   ALT (SGPT) U/L 117* 145* 146*   AST (SGOT) U/L 69* 93* 125*   ANION GAP mmol/L 9.0 8.0 5.0     Estimated Creatinine Clearance: 62.5 mL/min (by C-G formula based on Cr of 1.76).            Results from last 7 days  Lab Units 07/04/17  0648 07/03/17  0544 07/02/17  0524 07/01/17  0748 06/30/17  1204   WBC 10*3/mm3 9.36 13.70* 13.11* 10.82* 12.48*   HEMOGLOBIN g/dL 8.5* 8.9* 8.7* 9.8* 10.3*   HEMATOCRIT % 25.3* 26.5* 24.9* 27.8* 29.7*   PLATELETS 10*3/mm3 71* 37* 41* 48* 53*           Culture Data:   No results found for: BLOODCX  No results found for: URINECX  No results found for: RESPCX  No results found for: WOUNDCX  No results found for: STOOLCX  No components found for: BODYFLD    Radiology Data:   Imaging Results (last 24 hours)     ** No results found for the last 24 hours. **          I have reviewed the patient current medications.     Assessment/Plan     Hospital Problem List     Cellulitis of right lower extremity          Plan:  1. Cellulitis right lower extremity: Continue broad-spectrum antibiotics. Duplex ultrasound was negative for DVT.  2. Candiduria: Begin oral Diflucan. Urine cultures showed mixed growth.   3. History of liver cirrhosis: This accounts for the elevated LFTs and thrombocytopenia. Continue lactulose and Aldactone.  4. Tinea cruris:: Continue local  application of antifungal cream.  5. Deconditioning: Physiotherapy is ongoing.  6. Continue Protonix/ TEDs for for GI and DVT prophylaxis respectively.  7. Patient continued to be weak patient may need to go to rehabilitation once discharged  Thrombocytopenia patient received 2 units of packed platelets         Electronically signed by Santiago Marinelli MD at 7/4/2017 11:43 AM      Santiago Marinelli MD at 7/5/2017 11:56 AM  Version 1 of 1             [facility] Medicine Services  INPATIENT PROGRESS NOTE    Length of Stay: 4  Date of Admission: 6/30/2017  Primary Care Physician: Cj Aguero MD    Subjective   Chief Complaint: Better today  HPI:  No complains of chest pain    Review of Systems     All pertinent negatives and positives are as above. All other systems have been reviewed and are negative unless otherwise stated.     Objective    Temp:  [96.4 °F (35.8 °C)-97.6 °F (36.4 °C)] 97.6 °F (36.4 °C)  Heart Rate:  [] 101  Resp:  [16-18] 18  BP: (109-121)/(72-80) 119/80    Physical Exam  Heart S1-S2 regular rate and rhythm  Chest decreased breath sounds bilaterally  Abdomen soft and nontender  Extremities no tenderness      Results Review:  I have reviewed the labs, radiology results, and diagnostic studies.    Laboratory Data:     Results from last 7 days  Lab Units 07/05/17  0603 07/04/17  0648 07/03/17  0544   SODIUM mmol/L 134* 135* 135*   POTASSIUM mmol/L 3.8 3.8 3.7   CHLORIDE mmol/L 92* 93* 99   CO2 mmol/L 32.0* 33.0* 28.0   BUN mg/dL 21 22* 22*   CREATININE mg/dL 2.00* 1.76* 1.70*   GLUCOSE mg/dL 127* 100 101*   CALCIUM mg/dL 8.8 8.6 8.3*   BILIRUBIN mg/dL 4.1* 3.3* 3.1*   ALK PHOS U/L 239* 251* 269*   ALT (SGPT) U/L 109* 117* 145*   AST (SGOT) U/L 55 69* 93*   ANION GAP mmol/L 10.0 9.0 8.0     Estimated Creatinine Clearance: 53 mL/min (by C-G formula based on Cr of 2).            Results from last 7 days  Lab Units 07/05/17  0603 07/04/17  0698  07/03/17  0544 07/02/17  0524 07/01/17  0748   WBC 10*3/mm3 12.09* 9.36 13.70* 13.11* 10.82*   HEMOGLOBIN g/dL 9.3* 8.5* 8.9* 8.7* 9.8*   HEMATOCRIT % 27.4* 25.3* 26.5* 24.9* 27.8*   PLATELETS 10*3/mm3 63* 71* 37* 41* 48*           Culture Data:   No results found for: BLOODCX  No results found for: URINECX  No results found for: RESPCX  No results found for: WOUNDCX  No results found for: STOOLCX  No components found for: BODYFLD    Radiology Data:   Imaging Results (last 24 hours)     ** No results found for the last 24 hours. **          I have reviewed the patient current medications.     Assessment/Plan     Hospital Problem List     Cellulitis of right lower extremity          Plan:  1. Cellulitis right lower extremity: Continue broad-spectrum antibiotics.  Overall improving.  2. Candiduria: Begin oral Diflucan. Urine cultures showed mixed growth.   3. History of liver cirrhosis: This accounts for the elevated LFTs and thrombocytopenia. Continue lactulose and Aldactone.  4. Tinea cruris:: Continue local application of antifungal cream.  5. Deconditioning: Physiotherapy is ongoing.  6. Continue Protonix/ TEDs for for GI and DVT prophylaxis respectively.  7. Patient continued to be weak patient may need to go to rehabilitation once discharged  Thrombocytopenia patient received 2 units of packed platelets  Discussed with the patient okay to go to rehabilitation in 2-3 days         Electronically signed by Santiago Marinelli MD at 7/5/2017 11:57 AM      KENROY Goode at 7/6/2017 12:39 PM  Version 1 of 1    Attestation signed by Allen Hercules MD at 7/6/2017  3:27 PM        I have reviewed the documentation above and agree.                                     [facility] Medicine Services  INPATIENT PROGRESS NOTE    Length of Stay: 5  Date of Admission: 6/30/2017  Primary Care Physician: Cj Aguero MD    Subjective   Chief Complaint/HPI:  This 62-year-old   male was admitted hospitalist services secondary to increased lower extremity edema and redness.  Patient has history of respiratory failure, hepatic encephalopathy, and was recently intubated.  Patient successfully extubated.  Now on regular MedSurg floor.    Patient has erythematous or purpuric skin lesion on the right lower extremity.  It is questionable whether this is cellulitis versus vascular disease versus a complication of his thrombocytopenia.  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  Cultures will be collected.    Patient does have a history of thrombocytopenia.  He has followed with Dr. Becerril in the past, but has been noncompliant with follow-ups as well as his lactulose while at home.  We will recheck an ammonia, but patient is alert and oriented at this time, is taking lactulose while here.    Patient has been on Zosyn and vancomycin.  Vancomycin has increased creatinine as well as the Lasix twice a day.  We have stopped vancomycin and Lasix.  Have consulted Dr. Colón of nephrology, the patient's nephrologist.  We will instate gentle fluid hydration at this time and await nephrology recommendations.    Platelets stable, no overt bleeding.    Have stopped Zosyn given the drugs propensity to induce thrombocytopenia.  However place with Levaquin, which should have good pseudomonas and gram-negative coverage and has a less likely side effect of thrombocytopenia.  We will have wound care evaluate.    No gastroenterology coverage at this time, we'll consider consulting Dr. Becerril when he is available.    Patient had complained of shoulder pain to his nurse.  Patient has a history of possible abscess versus fistula in the shoulder.  We will start with CT.  CT will have to be without contrast given the patient's renal failure.    Review of Systems   Constitutional: Negative for chills and fever.   Respiratory: Negative for cough.    Cardiovascular: Negative for chest pain.    Gastrointestinal: Negative for abdominal pain, constipation, nausea and vomiting.   Genitourinary: Negative for dysuria.   Musculoskeletal: Negative for back pain.   Neurological: Negative for dizziness, seizures, syncope and headaches.   Psychiatric/Behavioral: Negative for confusion.      All pertinent negatives and positives are as above. All other systems have been reviewed and are negative unless otherwise stated.     Objective    Temp:  [97.4 °F (36.3 °C)-97.8 °F (36.6 °C)] 97.4 °F (36.3 °C)  Heart Rate:  [] 104  Resp:  [18] 18  BP: (111-124)/(56-82) 124/69    Physical Exam   Constitutional: He is oriented to person, place, and time. He appears well-developed and well-nourished.   HENT:   Head: Normocephalic and atraumatic.   Eyes: Pupils are equal, round, and reactive to light.   Cardiovascular: Normal rate and regular rhythm.    Abdominal: Soft. Bowel sounds are normal. He exhibits no distension. There is no tenderness.   Neurological: He is alert and oriented to person, place, and time.   Skin: Skin is warm and dry.   Erythema anterior right LE, no bleeding, distal pulse intact, capillary refill intact   Psychiatric: He has a normal mood and affect. His behavior is normal.     Results Review:  I have reviewed the labs, radiology results, and diagnostic studies.    Laboratory Data:     Results from last 7 days  Lab Units 07/06/17  0547 07/05/17  0603 07/04/17  0648   SODIUM mmol/L 134* 134* 135*   POTASSIUM mmol/L 3.7 3.8 3.8   CHLORIDE mmol/L 91* 92* 93*   CO2 mmol/L 33.0* 32.0* 33.0*   BUN mg/dL 23* 21 22*   CREATININE mg/dL 2.17* 2.00* 1.76*   GLUCOSE mg/dL 108* 127* 100   CALCIUM mg/dL 8.5 8.8 8.6   BILIRUBIN mg/dL 3.8* 4.1* 3.3*   ALK PHOS U/L 207* 239* 251*   ALT (SGPT) U/L 90* 109* 117*   AST (SGOT) U/L 43 55 69*   ANION GAP mmol/L 10.0 10.0 9.0     Estimated Creatinine Clearance: 48.8 mL/min (by C-G formula based on Cr of 2.17).            Results from last 7 days  Lab Units 07/06/17  0579  07/05/17  0603 07/04/17  0648 07/03/17  0544 07/02/17  0524   WBC 10*3/mm3 11.48* 12.09* 9.36 13.70* 13.11*   HEMOGLOBIN g/dL 9.2* 9.3* 8.5* 8.9* 8.7*   HEMATOCRIT % 27.4* 27.4* 25.3* 26.5* 24.9*   PLATELETS 10*3/mm3 45* 63* 71* 37* 41*           Culture Data:   No results found for: BLOODCX  No results found for: URINECX  No results found for: RESPCX  No results found for: WOUNDCX  No results found for: STOOLCX  No components found for: BODYFLD    Radiology Data:   Imaging Results (last 24 hours)     ** No results found for the last 24 hours. **          I have reviewed the patient current medications.     Assessment/Plan     Hospital Problem List     Cellulitis of right lower extremity          Plan:  As noted above.            This document has been electronically signed by KENROY Goode on July 6, 2017 12:39 PM         Electronically signed by Allen Hercules MD at 7/6/2017  3:27 PM

## 2017-07-08 LAB
ALBUMIN SERPL-MCNC: 2.3 G/DL (ref 3.4–4.8)
ALBUMIN/GLOB SERPL: 0.8 G/DL (ref 1.1–1.8)
ALP SERPL-CCNC: 193 U/L (ref 38–126)
ALT SERPL W P-5'-P-CCNC: 74 U/L (ref 21–72)
ANION GAP SERPL CALCULATED.3IONS-SCNC: 6 MMOL/L (ref 5–15)
ANISOCYTOSIS BLD QL: NORMAL
AST SERPL-CCNC: 35 U/L (ref 17–59)
BASOPHILS # BLD AUTO: 0.02 10*3/MM3 (ref 0–0.2)
BASOPHILS NFR BLD AUTO: 0.3 % (ref 0–2)
BILIRUB SERPL-MCNC: 3.1 MG/DL (ref 0.2–1.3)
BUN BLD-MCNC: 20 MG/DL (ref 7–21)
BUN/CREAT SERPL: 11 (ref 7–25)
CALCIUM SPEC-SCNC: 8.5 MG/DL (ref 8.4–10.2)
CHLORIDE SERPL-SCNC: 97 MMOL/L (ref 95–110)
CO2 SERPL-SCNC: 25 MMOL/L (ref 22–31)
CREAT BLD-MCNC: 1.82 MG/DL (ref 0.7–1.3)
DEPRECATED RDW RBC AUTO: 72 FL (ref 35.1–43.9)
EOSINOPHIL # BLD AUTO: 0.58 10*3/MM3 (ref 0–0.7)
EOSINOPHIL NFR BLD AUTO: 8.5 % (ref 0–7)
ERYTHROCYTE [DISTWIDTH] IN BLOOD BY AUTOMATED COUNT: 20.3 % (ref 11.5–14.5)
GFR SERPL CREATININE-BSD FRML MDRD: 39 ML/MIN/1.73 (ref 60–130)
GLOBULIN UR ELPH-MCNC: 2.8 GM/DL (ref 2.3–3.5)
GLUCOSE BLD-MCNC: 107 MG/DL (ref 60–100)
GLUCOSE BLDC GLUCOMTR-MCNC: 109 MG/DL (ref 70–130)
GLUCOSE BLDC GLUCOMTR-MCNC: 116 MG/DL (ref 70–130)
GLUCOSE BLDC GLUCOMTR-MCNC: 118 MG/DL (ref 70–130)
GLUCOSE BLDC GLUCOMTR-MCNC: 125 MG/DL (ref 70–130)
GLUCOSE BLDC GLUCOMTR-MCNC: 126 MG/DL (ref 70–130)
HCT VFR BLD AUTO: 25.3 % (ref 39–49)
HGB BLD-MCNC: 8.7 G/DL (ref 13.7–17.3)
HYPOCHROMIA BLD QL: NORMAL
IMM GRANULOCYTES # BLD: 0.04 10*3/MM3 (ref 0–0.02)
IMM GRANULOCYTES NFR BLD: 0.6 % (ref 0–0.5)
LYMPHOCYTES # BLD AUTO: 1.38 10*3/MM3 (ref 0.6–4.2)
LYMPHOCYTES NFR BLD AUTO: 20.2 % (ref 10–50)
MCH RBC QN AUTO: 34.3 PG (ref 26.5–34)
MCHC RBC AUTO-ENTMCNC: 34.4 G/DL (ref 31.5–36.3)
MCV RBC AUTO: 99.6 FL (ref 80–98)
MONOCYTES # BLD AUTO: 0.45 10*3/MM3 (ref 0–0.9)
MONOCYTES NFR BLD AUTO: 6.6 % (ref 0–12)
NEUTROPHILS # BLD AUTO: 4.35 10*3/MM3 (ref 2–8.6)
NEUTROPHILS NFR BLD AUTO: 63.8 % (ref 37–80)
NRBC BLD MANUAL-RTO: 0 /100 WBC (ref 0–0)
PLATELET # BLD AUTO: 26 10*3/MM3 (ref 150–450)
PMV BLD AUTO: ABNORMAL FL (ref 8–12)
POTASSIUM BLD-SCNC: 3.6 MMOL/L (ref 3.5–5.1)
PROT SERPL-MCNC: 5.1 G/DL (ref 6.3–8.6)
RBC # BLD AUTO: 2.54 10*6/MM3 (ref 4.37–5.74)
SMALL PLATELETS BLD QL SMEAR: NORMAL
SODIUM BLD-SCNC: 128 MMOL/L (ref 137–145)
WBC MORPH BLD: NORMAL
WBC NRBC COR # BLD: 6.82 10*3/MM3 (ref 3.2–9.8)

## 2017-07-08 PROCEDURE — 25010000002 LINEZOLID 600 MG/300ML SOLUTION: Performed by: PHYSICIAN ASSISTANT

## 2017-07-08 PROCEDURE — 85025 COMPLETE CBC W/AUTO DIFF WBC: CPT | Performed by: INTERNAL MEDICINE

## 2017-07-08 PROCEDURE — 82962 GLUCOSE BLOOD TEST: CPT

## 2017-07-08 PROCEDURE — 85007 BL SMEAR W/DIFF WBC COUNT: CPT | Performed by: INTERNAL MEDICINE

## 2017-07-08 PROCEDURE — 25010000002 MORPHINE SULFATE (PF) 2 MG/ML SOLUTION: Performed by: HOSPITALIST

## 2017-07-08 PROCEDURE — 94799 UNLISTED PULMONARY SVC/PX: CPT

## 2017-07-08 PROCEDURE — 25010000002 LEVOFLOXACIN PER 250 MG: Performed by: PHYSICIAN ASSISTANT

## 2017-07-08 PROCEDURE — 94760 N-INVAS EAR/PLS OXIMETRY 1: CPT

## 2017-07-08 PROCEDURE — 80053 COMPREHEN METABOLIC PANEL: CPT | Performed by: INTERNAL MEDICINE

## 2017-07-08 RX ADMIN — SODIUM CHLORIDE 75 ML/HR: 9 INJECTION, SOLUTION INTRAVENOUS at 09:14

## 2017-07-08 RX ADMIN — IPRATROPIUM BROMIDE AND ALBUTEROL SULFATE 3 ML: 2.5; .5 SOLUTION RESPIRATORY (INHALATION) at 10:21

## 2017-07-08 RX ADMIN — CLOTRIMAZOLE: 1 SOLUTION TOPICAL at 20:25

## 2017-07-08 RX ADMIN — LACTULOSE 30 G: 10 SOLUTION ORAL at 09:12

## 2017-07-08 RX ADMIN — Medication: at 20:26

## 2017-07-08 RX ADMIN — FLUCONAZOLE 200 MG: 200 TABLET ORAL at 09:13

## 2017-07-08 RX ADMIN — GABAPENTIN 300 MG: 300 CAPSULE ORAL at 20:25

## 2017-07-08 RX ADMIN — GABAPENTIN 300 MG: 300 CAPSULE ORAL at 09:13

## 2017-07-08 RX ADMIN — Medication: at 16:09

## 2017-07-08 RX ADMIN — MORPHINE SULFATE 1 MG: 2 INJECTION, SOLUTION INTRAMUSCULAR; INTRAVENOUS at 20:24

## 2017-07-08 RX ADMIN — LINEZOLID 600 MG: 600 INJECTION, SOLUTION INTRAVENOUS at 12:48

## 2017-07-08 RX ADMIN — GABAPENTIN 300 MG: 300 CAPSULE ORAL at 16:17

## 2017-07-08 RX ADMIN — MORPHINE SULFATE 1 MG: 2 INJECTION, SOLUTION INTRAMUSCULAR; INTRAVENOUS at 12:53

## 2017-07-08 RX ADMIN — Medication: at 09:13

## 2017-07-08 RX ADMIN — MORPHINE SULFATE 1 MG: 2 INJECTION, SOLUTION INTRAMUSCULAR; INTRAVENOUS at 06:53

## 2017-07-08 RX ADMIN — SODIUM CHLORIDE 75 ML/HR: 900 INJECTION, SOLUTION INTRAVENOUS at 09:14

## 2017-07-08 RX ADMIN — IPRATROPIUM BROMIDE AND ALBUTEROL SULFATE 3 ML: 2.5; .5 SOLUTION RESPIRATORY (INHALATION) at 07:11

## 2017-07-08 RX ADMIN — LACTULOSE 30 G: 10 SOLUTION ORAL at 16:17

## 2017-07-08 RX ADMIN — LEVOFLOXACIN 750 MG: 5 INJECTION, SOLUTION INTRAVENOUS at 13:30

## 2017-07-08 RX ADMIN — Medication 10 ML: at 16:09

## 2017-07-08 RX ADMIN — PANTOPRAZOLE SODIUM 40 MG: 40 TABLET, DELAYED RELEASE ORAL at 09:13

## 2017-07-08 RX ADMIN — BACITRACIN ZINC: 500 OINTMENT TOPICAL at 09:14

## 2017-07-08 RX ADMIN — LINEZOLID 600 MG: 600 INJECTION, SOLUTION INTRAVENOUS at 00:15

## 2017-07-08 RX ADMIN — CLOTRIMAZOLE: 1 SOLUTION TOPICAL at 09:14

## 2017-07-08 NOTE — PLAN OF CARE
Problem: Patient Care Overview (Adult)  Goal: Plan of Care Review  Outcome: Ongoing (interventions implemented as appropriate)    07/08/17 0522   Coping/Psychosocial Response Interventions   Plan Of Care Reviewed With patient   Patient Care Overview   Progress improving   Outcome Evaluation   Outcome Summary/Follow up Plan No new events overnight, pt still c/o of pain, gave prn meds, will continue to monitor.       Goal: Adult Individualization and Mutuality  Outcome: Ongoing (interventions implemented as appropriate)  Goal: Discharge Needs Assessment  Outcome: Ongoing (interventions implemented as appropriate)    Problem: Fall Risk (Adult)  Goal: Absence of Falls  Outcome: Ongoing (interventions implemented as appropriate)    Problem: Pain, Acute (Adult)  Goal: Acceptable Pain Control/Comfort Level  Outcome: Ongoing (interventions implemented as appropriate)    Problem: Cellulitis (Adult)  Goal: Signs and Symptoms of Listed Potential Problems Will be Absent or Manageable (Cellulitis)  Outcome: Ongoing (interventions implemented as appropriate)    Problem: Pressure Ulcer Risk (Cristiano Scale) (Adult,Obstetrics,Pediatric)  Goal: Identify Related Risk Factors and Signs and Symptoms  Outcome: Ongoing (interventions implemented as appropriate)  Goal: Skin Integrity  Outcome: Ongoing (interventions implemented as appropriate)

## 2017-07-08 NOTE — PROGRESS NOTES
[facility] Medicine Services  INPATIENT PROGRESS NOTE    Length of Stay: 7  Date of Admission: 6/30/2017  Primary Care Physician: Cj Aguero MD    Subjective   Subjective    Has pain in his leg.      Review of Systems   Constitutional: Negative for activity change.   Respiratory: Negative for shortness of breath.    Cardiovascular: Negative for chest pain.   Gastrointestinal: Negative for diarrhea.   Musculoskeletal: Positive for arthralgias.        All pertinent negatives and positives are as above. All other systems have been reviewed and are negative unless otherwise stated.     Objective    Temp:  [97.4 °F (36.3 °C)-98.4 °F (36.9 °C)] 98.2 °F (36.8 °C)  Heart Rate:  [82-92] 86  Resp:  [18] 18  BP: (113-147)/(76-89) 135/83  Physical Exam   Constitutional: He appears well-developed and well-nourished. No distress.   HENT:   Head: Normocephalic and atraumatic.   Cardiovascular: Normal rate.    Pulmonary/Chest: Effort normal. No respiratory distress.   Abdominal: Soft. He exhibits no distension.   Musculoskeletal: Normal range of motion. He exhibits no edema.   Neurological: He is alert. No cranial nerve deficit.   Skin: Skin is warm and dry. He is not diaphoretic. No erythema.   Psychiatric: He has a normal mood and affect. His behavior is normal. Judgment and thought content normal.   Vitals reviewed.          Results Review:  I have reviewed the labs, radiology results, and diagnostic studies.    Laboratory Data:   Lab Results (last 24 hours)     Procedure Component Value Units Date/Time    POC Glucose Fingerstick [[id number]]  (Normal) Collected:  07/07/17 1658    Specimen:  Blood Updated:  07/07/17 1717     Glucose 118 mg/dL       RN NotifiedMeter: [id number]: [id number] AMY GORE       POC Glucose Fingerstick [[id number]]  (Abnormal) Collected:  07/07/17 2010    Specimen:  Blood Updated:  07/07/17 2105     Glucose 177 (H) mg/dL       RN  NotifiedMeter: [id number]: [id number] TYREL BRYAN       POC Glucose Fingerstick [[id number]]  (Normal) Collected:  07/08/17 0423    Specimen:  Blood Updated:  07/08/17 0512     Glucose 125 mg/dL       RN NotifiedMeter: [id number]: [id number] TYREL BRYAN       Comprehensive Metabolic Panel [[id number]]  (Abnormal) Collected:  07/08/17 0725    Specimen:  Blood Updated:  07/08/17 0823     Glucose 107 (H) mg/dL      BUN 20 mg/dL      Creatinine 1.82 (H) mg/dL      Sodium 128 (L) mmol/L      Potassium 3.6 mmol/L      Chloride 97 mmol/L      CO2 25.0 mmol/L      Calcium 8.5 mg/dL      Total Protein 5.1 (L) g/dL      Albumin 2.30 (L) g/dL      ALT (SGPT) 74 (H) U/L      AST (SGOT) 35 U/L      Alkaline Phosphatase 193 (H) U/L      Total Bilirubin 3.1 (H) mg/dL      eGFR Non African Amer 39 (L) mL/min/1.73      Globulin 2.8 gm/dL      A/G Ratio 0.8 (L) g/dL      BUN/Creatinine Ratio 11.0     Anion Gap 6.0 mmol/L     CBC & Differential [[id number]] Collected:  07/08/17 0529    Specimen:  Blood Updated:  07/08/17 0900    Narrative:       The following orders were created for panel order CBC & Differential.  Procedure                               Abnormality         Status                     ---------                               -----------         ------                     Scan Slide[[id number]]                                                                  CBC Auto Differential[[id number]]        Abnormal            Final result                 Please view results for these tests on the individual orders.    CBC Auto Differential [[id number]]  (Abnormal) Collected:  07/08/17 0725    Specimen:  Blood Updated:  07/08/17 0900     WBC 6.82 10*3/mm3      RBC 2.54 (L) 10*6/mm3      Hemoglobin 8.7 (L) g/dL      Hematocrit 25.3 (L) %      MCV 99.6 (H) fL      MCH 34.3 (H) pg      MCHC 34.4 g/dL      RDW 20.3 (H) %      RDW-SD 72.0 (H) fl      MPV -- fL       INSTRUMENT UNABLE TO CALCULATE RESULT         Platelets 26 (L) 10*3/mm3      Neutrophil % 63.8 %      Lymphocyte % 20.2 %      Monocyte % 6.6 %      Eosinophil % 8.5 (H) %      Basophil % 0.3 %      Immature Grans % 0.6 (H) %      Neutrophils, Absolute 4.35 10*3/mm3      Lymphocytes, Absolute 1.38 10*3/mm3      Monocytes, Absolute 0.45 10*3/mm3      Eosinophils, Absolute 0.58 10*3/mm3      Basophils, Absolute 0.02 10*3/mm3      Immature Grans, Absolute 0.04 (H) 10*3/mm3      nRBC 0.0 /100 WBC     POC Glucose Fingerstick [[id number]]  (Normal) Collected:  07/08/17 0751    Specimen:  Blood Updated:  07/08/17 0903     Glucose 116 mg/dL       RN NotifiedMeter: [id number]: [id number] ELISABETH STEPHANIE       Wound Culture [[id number]]  (Normal) Collected:  07/06/17 1900    Specimen:  Wound from Leg Updated:  07/08/17 0906     Wound Culture No growth at 24 hours     Gram Stain Result Few (2+) WBCs seen      No organisms seen    Scan Slide [[id number]] Collected:  07/08/17 0725    Specimen:  Blood Updated:  07/08/17 0951     Anisocytosis Slight/1+     Hypochromia Slight/1+     WBC Morphology Normal     Platelet Estimate Decreased    POC Glucose Fingerstick [[id number]]  (Normal) Collected:  07/08/17 1122    Specimen:  Blood Updated:  07/08/17 1141     Glucose 126 mg/dL       RN NotifiedMeter: [id number]: [id number] ELISABETH STEPHANIE             Culture Data:   No results found for: BLOODCX  No results found for: URINECX  No results found for: RESPCX  Wound Culture   Date Value Ref Range Status   07/06/2017 No growth at 24 hours  Preliminary     No results found for: STOOLCX  No components found for: BODYFLD    Radiology Data:   Imaging Results (last 24 hours)     ** No results found for the last 24 hours. **          I have reviewed the patient current medications.     Assessment/Plan     Hospital Problem List     Cellulitis of right lower extremity          Cellulitis - right lower extremity - continue antibiotics  Acute on chronic kidney disease -   [place] following, creatinine slightly improved  Thrombocytopenia - continue monitoring platelet levels, may need hematology consult                Discharge Planning: I expect patient to be discharged to NH in 2-3 days.    Eduar Cisneros MD   07/08/17   12:08 PM

## 2017-07-08 NOTE — PROGRESS NOTES
"[facility]  [address] [place] [address]. [address]  T - [phone]  F - [phone]     Progress Note          PATIENT  DEMOGRAPHICS   PATIENT NAME: Armando Mcmullen                      PHYSICIAN: Josep Colón MD  : 1965  MRN: [id number]   LOS: 7 days    Patient Care Team:  Cj Aguero MD as PCP - General  Shawn Cortez MD as Surgeon (Orthopedic Surgery)  Tushar Becerril DO as Consulting Physician (Gastroenterology)  Josep Colón MD as Consulting Physician (Nephrology)  Subjective   SUBJECTIVE   Feels well no soa         Objective   OBJECTIVE   Vital Signs  Temp:  [97.4 °F (36.3 °C)-98.4 °F (36.9 °C)] 98.2 °F (36.8 °C)  Heart Rate:  [82-92] 86  Resp:  [18] 18  BP: (113-147)/(76-89) 135/83    Flowsheet Rows         First Filed Value    Admission Height  73\" (185.4 cm) Documented at 2017 1108    Admission Weight  233 lb 8 oz (106 kg) Documented at 2017 1108           I/O last 3 completed shifts:  In: 3640 [P.O.:1440; I.V.:1000; IV Piggyback:1200]  Out: 2400 [Urine:2400]    PHYSICAL EXAM    Physical Exam   Constitutional: He is oriented to person, place, and time. He appears well-developed.   HENT:   Head: Normocephalic.   Eyes: Pupils are equal, round, and reactive to light.   Cardiovascular: Normal rate, regular rhythm and normal heart sounds.    Pulmonary/Chest: Effort normal and breath sounds normal.   Abdominal: Soft. Bowel sounds are normal.   Musculoskeletal: He exhibits no edema.   Left bka   Neurological: He is alert and oriented to person, place, and time.       RESULTS   Results Review:      Results from last 7 days  Lab Units 17  0725 17  0547 17  0603   SODIUM mmol/L 128* 134* 134*   POTASSIUM mmol/L 3.6 3.7 3.8   CHLORIDE mmol/L 97 91* 92*   CO2 mmol/L 25.0 33.0* 32.0*   BUN mg/dL 20 23* 21   CREATININE mg/dL 1.82* 2.17* 2.00*   CALCIUM mg/dL 8.5 8.5 8.8   BILIRUBIN mg/dL 3.1* 3.8* 4.1*   ALK PHOS U/L 193* 207* 239*   ALT " (SGPT) U/L 74* 90* 109*   AST (SGOT) U/L 35 43 55   GLUCOSE mg/dL 107* 108* 127*       Estimated Creatinine Clearance: 58.2 mL/min (by C-G formula based on Cr of 1.82).                  Results from last 7 days  Lab Units 07/08/17  0725 07/06/17  0547 07/05/17  0603 07/04/17  0648 07/03/17  0544   WBC 10*3/mm3 6.82 11.48* 12.09* 9.36 13.70*   HEMOGLOBIN g/dL 8.7* 9.2* 9.3* 8.5* 8.9*   PLATELETS 10*3/mm3 26* 45* 63* 71* 37*               Imaging Results (last 24 hours)     ** No results found for the last 24 hours. **           MEDICATIONS      bacitracin  Topical Q24H   clotrimazole  Topical Q12H   fluconazole 200 mg Oral Daily   gabapentin 300 mg Oral TID   ipratropium-albuterol 3 mL Nebulization 4x Daily - RT   lactulose (CHRONULAC) 30 g 30 g Oral TID   levoFLOXacin 750 mg Intravenous Q48H   Linezolid 600 mg Intravenous Q12H   magic butt ointment  Topical TID   pantoprazole 40 mg Oral Daily       Pharmacy to Dose LevoFLOXacin (LEVAQUIN)     sodium chloride 75 mL/hr Last Rate: 75 mL/hr (07/08/17 0914)       Assessment/Plan   ASSESSMENT / PLAN    Active Problems:    Cellulitis of right lower extremity       1- CHRIS on CKD 3 baseline cr is close to 1.4-1.5. Cr is mildly elevated and is upto 2.1 it could be a combination of vancomycin and lasix. Both are now stopped. It is better with ivf now and may be in 24-48 hrs will stop ivf and resume some of his diuretics. Agreed with zyvox     2- hepatic encephalopathy, liver cirrhosis currently stable     3- Cellulitis of right lower extremity on antibiotics and it is improving              This document has been electronically signed by Josep Colón MD on July 8, 2017 10:38 AM

## 2017-07-09 LAB
ALBUMIN SERPL-MCNC: 2.1 G/DL (ref 3.4–4.8)
ALBUMIN/GLOB SERPL: 0.8 G/DL (ref 1.1–1.8)
ALP SERPL-CCNC: 168 U/L (ref 38–126)
ALT SERPL W P-5'-P-CCNC: 65 U/L (ref 21–72)
ANION GAP SERPL CALCULATED.3IONS-SCNC: 7 MMOL/L (ref 5–15)
AST SERPL-CCNC: 32 U/L (ref 17–59)
BASOPHILS # BLD AUTO: 0.04 10*3/MM3 (ref 0–0.2)
BASOPHILS NFR BLD AUTO: 0.7 % (ref 0–2)
BILIRUB SERPL-MCNC: 2.5 MG/DL (ref 0.2–1.3)
BUN BLD-MCNC: 20 MG/DL (ref 7–21)
BUN/CREAT SERPL: 12.2 (ref 7–25)
CALCIUM SPEC-SCNC: 8.4 MG/DL (ref 8.4–10.2)
CHLORIDE SERPL-SCNC: 100 MMOL/L (ref 95–110)
CO2 SERPL-SCNC: 24 MMOL/L (ref 22–31)
CREAT BLD-MCNC: 1.64 MG/DL (ref 0.7–1.3)
DEPRECATED RDW RBC AUTO: 71.8 FL (ref 35.1–43.9)
EOSINOPHIL # BLD AUTO: 0.47 10*3/MM3 (ref 0–0.7)
EOSINOPHIL NFR BLD AUTO: 8.7 % (ref 0–7)
ERYTHROCYTE [DISTWIDTH] IN BLOOD BY AUTOMATED COUNT: 19.9 % (ref 11.5–14.5)
GFR SERPL CREATININE-BSD FRML MDRD: 44 ML/MIN/1.73 (ref 56–130)
GLOBULIN UR ELPH-MCNC: 2.6 GM/DL (ref 2.3–3.5)
GLUCOSE BLD-MCNC: 95 MG/DL (ref 60–100)
GLUCOSE BLDC GLUCOMTR-MCNC: 105 MG/DL (ref 70–130)
GLUCOSE BLDC GLUCOMTR-MCNC: 111 MG/DL (ref 70–130)
GLUCOSE BLDC GLUCOMTR-MCNC: 114 MG/DL (ref 70–130)
GLUCOSE BLDC GLUCOMTR-MCNC: 148 MG/DL (ref 70–130)
GLUCOSE BLDC GLUCOMTR-MCNC: 228 MG/DL (ref 70–130)
HCT VFR BLD AUTO: 24.5 % (ref 39–49)
HGB BLD-MCNC: 8.3 G/DL (ref 13.7–17.3)
IMM GRANULOCYTES # BLD: 0.07 10*3/MM3 (ref 0–0.02)
IMM GRANULOCYTES NFR BLD: 1.3 % (ref 0–0.5)
LYMPHOCYTES # BLD AUTO: 1.16 10*3/MM3 (ref 0.6–4.2)
LYMPHOCYTES NFR BLD AUTO: 21.4 % (ref 10–50)
MCH RBC QN AUTO: 34.2 PG (ref 26.5–34)
MCHC RBC AUTO-ENTMCNC: 33.9 G/DL (ref 31.5–36.3)
MCV RBC AUTO: 100.8 FL (ref 80–98)
MONOCYTES # BLD AUTO: 0.4 10*3/MM3 (ref 0–0.9)
MONOCYTES NFR BLD AUTO: 7.4 % (ref 0–12)
NEUTROPHILS # BLD AUTO: 3.29 10*3/MM3 (ref 2–8.6)
NEUTROPHILS NFR BLD AUTO: 60.5 % (ref 37–80)
NRBC BLD MANUAL-RTO: 0 /100 WBC (ref 0–0)
PLATELET # BLD AUTO: 21 10*3/MM3 (ref 150–450)
PMV BLD AUTO: ABNORMAL FL (ref 8–12)
POTASSIUM BLD-SCNC: 4 MMOL/L (ref 3.5–5.1)
PROT SERPL-MCNC: 4.7 G/DL (ref 6.3–8.6)
RBC # BLD AUTO: 2.43 10*6/MM3 (ref 4.37–5.74)
SODIUM BLD-SCNC: 131 MMOL/L (ref 137–145)
WBC NRBC COR # BLD: 5.43 10*3/MM3 (ref 3.2–9.8)

## 2017-07-09 PROCEDURE — 85025 COMPLETE CBC W/AUTO DIFF WBC: CPT | Performed by: INTERNAL MEDICINE

## 2017-07-09 PROCEDURE — 80053 COMPREHEN METABOLIC PANEL: CPT | Performed by: INTERNAL MEDICINE

## 2017-07-09 PROCEDURE — 94760 N-INVAS EAR/PLS OXIMETRY 1: CPT

## 2017-07-09 PROCEDURE — 25010000002 LINEZOLID 600 MG/300ML SOLUTION: Performed by: PHYSICIAN ASSISTANT

## 2017-07-09 PROCEDURE — 94799 UNLISTED PULMONARY SVC/PX: CPT

## 2017-07-09 PROCEDURE — 82962 GLUCOSE BLOOD TEST: CPT

## 2017-07-09 PROCEDURE — 25010000002 MORPHINE SULFATE (PF) 2 MG/ML SOLUTION: Performed by: HOSPITALIST

## 2017-07-09 RX ORDER — FUROSEMIDE 40 MG/1
40 TABLET ORAL DAILY
Status: DISCONTINUED | OUTPATIENT
Start: 2017-07-10 | End: 2017-07-12 | Stop reason: HOSPADM

## 2017-07-09 RX ADMIN — MORPHINE SULFATE 1 MG: 2 INJECTION, SOLUTION INTRAMUSCULAR; INTRAVENOUS at 09:09

## 2017-07-09 RX ADMIN — IPRATROPIUM BROMIDE AND ALBUTEROL SULFATE 3 ML: 2.5; .5 SOLUTION RESPIRATORY (INHALATION) at 19:18

## 2017-07-09 RX ADMIN — LACTULOSE 30 G: 10 SOLUTION ORAL at 21:37

## 2017-07-09 RX ADMIN — Medication: at 17:04

## 2017-07-09 RX ADMIN — PANTOPRAZOLE SODIUM 40 MG: 40 TABLET, DELAYED RELEASE ORAL at 09:08

## 2017-07-09 RX ADMIN — Medication: at 09:10

## 2017-07-09 RX ADMIN — LACTULOSE 30 G: 10 SOLUTION ORAL at 09:10

## 2017-07-09 RX ADMIN — MORPHINE SULFATE 1 MG: 2 INJECTION, SOLUTION INTRAMUSCULAR; INTRAVENOUS at 04:48

## 2017-07-09 RX ADMIN — Medication 10 ML: at 20:21

## 2017-07-09 RX ADMIN — CLOTRIMAZOLE: 1 SOLUTION TOPICAL at 09:09

## 2017-07-09 RX ADMIN — MORPHINE SULFATE 1 MG: 2 INJECTION, SOLUTION INTRAMUSCULAR; INTRAVENOUS at 23:08

## 2017-07-09 RX ADMIN — CLOTRIMAZOLE: 1 SOLUTION TOPICAL at 20:21

## 2017-07-09 RX ADMIN — TEMAZEPAM 15 MG: 15 CAPSULE ORAL at 00:37

## 2017-07-09 RX ADMIN — Medication 10 ML: at 15:10

## 2017-07-09 RX ADMIN — LINEZOLID 600 MG: 600 INJECTION, SOLUTION INTRAVENOUS at 00:35

## 2017-07-09 RX ADMIN — FLUCONAZOLE 200 MG: 200 TABLET ORAL at 09:08

## 2017-07-09 RX ADMIN — GABAPENTIN 300 MG: 300 CAPSULE ORAL at 09:08

## 2017-07-09 RX ADMIN — TEMAZEPAM 15 MG: 15 CAPSULE ORAL at 23:08

## 2017-07-09 RX ADMIN — Medication: at 20:20

## 2017-07-09 RX ADMIN — MORPHINE SULFATE 1 MG: 2 INJECTION, SOLUTION INTRAMUSCULAR; INTRAVENOUS at 15:11

## 2017-07-09 RX ADMIN — SODIUM CHLORIDE 75 ML/HR: 900 INJECTION, SOLUTION INTRAVENOUS at 00:36

## 2017-07-09 RX ADMIN — MORPHINE SULFATE 1 MG: 2 INJECTION, SOLUTION INTRAMUSCULAR; INTRAVENOUS at 19:12

## 2017-07-09 RX ADMIN — MORPHINE SULFATE 1 MG: 2 INJECTION, SOLUTION INTRAMUSCULAR; INTRAVENOUS at 00:37

## 2017-07-09 RX ADMIN — GABAPENTIN 300 MG: 300 CAPSULE ORAL at 17:04

## 2017-07-09 RX ADMIN — GABAPENTIN 300 MG: 300 CAPSULE ORAL at 20:21

## 2017-07-09 RX ADMIN — LINEZOLID 600 MG: 600 INJECTION, SOLUTION INTRAVENOUS at 13:15

## 2017-07-09 RX ADMIN — BACITRACIN ZINC: 500 OINTMENT TOPICAL at 09:09

## 2017-07-09 NOTE — PROGRESS NOTES
"[facility]  [address] [place] [address]. [address]  T - [phone]  F - [phone]     Progress Note          PATIENT  DEMOGRAPHICS   PATIENT NAME: Armando Mcmullen                      PHYSICIAN: Josep Colón MD  : 1965  MRN: [id number]   LOS: 8 days    Patient Care Team:  Cj Aguero MD as PCP - General  Shawn Cortez MD as Surgeon (Orthopedic Surgery)  Tushar Becerril DO as Consulting Physician (Gastroenterology)  Josep Colón MD as Consulting Physician (Nephrology)  Subjective   SUBJECTIVE   Feels well no soa tolerating ivf         Objective   OBJECTIVE   Vital Signs  Temp:  [96.1 °F (35.6 °C)-97.6 °F (36.4 °C)] 96.8 °F (36 °C)  Heart Rate:  [77-88] 84  Resp:  [18] 18  BP: (125-134)/(74-82) 127/82    Flowsheet Rows         First Filed Value    Admission Height  73\" (185.4 cm) Documented at 2017 1108    Admission Weight  233 lb 8 oz (106 kg) Documented at 2017 1108           I/O last 3 completed shifts:  In: 6358.3 [P.O.:2160; I.V.:2998.3; IV Piggyback:1200]  Out: 4320 [Urine:4320]    PHYSICAL EXAM    Physical Exam   Constitutional: He is oriented to person, place, and time. He appears well-developed.   HENT:   Head: Normocephalic.   Eyes: Pupils are equal, round, and reactive to light.   Cardiovascular: Normal rate, regular rhythm and normal heart sounds.    Pulmonary/Chest: Effort normal and breath sounds normal.   Abdominal: Soft. Bowel sounds are normal.   Musculoskeletal: He exhibits no edema.   Left bka   Neurological: He is alert and oriented to person, place, and time.       RESULTS   Results Review:      Results from last 7 days  Lab Units 17  0623 17  0725 17  0547   SODIUM mmol/L 131* 128* 134*   POTASSIUM mmol/L 4.0 3.6 3.7   CHLORIDE mmol/L 100 97 91*   CO2 mmol/L 24.0 25.0 33.0*   BUN mg/dL 20 20 23*   CREATININE mg/dL 1.64* 1.82* 2.17*   CALCIUM mg/dL 8.4 8.5 8.5   BILIRUBIN mg/dL 2.5* 3.1* 3.8*   ALK PHOS U/L 168* " 193* 207*   ALT (SGPT) U/L 65 74* 90*   AST (SGOT) U/L 32 35 43   GLUCOSE mg/dL 95 107* 108*       Estimated Creatinine Clearance: 64.6 mL/min (by C-G formula based on Cr of 1.64).                  Results from last 7 days  Lab Units 07/09/17  0623 07/08/17  0725 07/06/17  0547 07/05/17  0603 07/04/17  0648   WBC 10*3/mm3 5.43 6.82 11.48* 12.09* 9.36   HEMOGLOBIN g/dL 8.3* 8.7* 9.2* 9.3* 8.5*   PLATELETS 10*3/mm3 21* 26* 45* 63* 71*               Imaging Results (last 24 hours)     ** No results found for the last 24 hours. **           MEDICATIONS      bacitracin  Topical Q24H   clotrimazole  Topical Q12H   fluconazole 200 mg Oral Daily   gabapentin 300 mg Oral TID   ipratropium-albuterol 3 mL Nebulization 4x Daily - RT   lactulose (CHRONULAC) 30 g 30 g Oral TID   levoFLOXacin 750 mg Intravenous Q48H   Linezolid 600 mg Intravenous Q12H   magic butt ointment  Topical TID   pantoprazole 40 mg Oral Daily       Pharmacy to Dose LevoFLOXacin (LEVAQUIN)     sodium chloride 75 mL/hr Last Rate: 75 mL/hr (07/09/17 0036)       Assessment/Plan   ASSESSMENT / PLAN    Active Problems:    Cellulitis of right lower extremity       1- CHRIS on CKD 3 baseline cr is close to 1.4-1.5. Cr is mildly elevated and is upto 2.1 it could be a combination of vancomycin and lasix. Both are now stopped. It is better with ivf now and i will stop ivf and resume diuretics from tomorrow Agreed with zyvox     2- hepatic encephalopathy, liver cirrhosis currently stable     3- Cellulitis of right lower extremity on antibiotics and it is improving              This document has been electronically signed by Josep Colón MD on July 9, 2017 11:43 AM

## 2017-07-09 NOTE — PLAN OF CARE
Problem: Patient Care Overview (Adult)  Goal: Plan of Care Review  Outcome: Ongoing (interventions implemented as appropriate)    07/09/17 0355   Coping/Psychosocial Response Interventions   Plan Of Care Reviewed With patient   Patient Care Overview   Progress improving   Outcome Evaluation   Outcome Summary/Follow up Plan no new events overnight, will continue to monitor.       Goal: Adult Individualization and Mutuality  Outcome: Ongoing (interventions implemented as appropriate)  Goal: Discharge Needs Assessment  Outcome: Ongoing (interventions implemented as appropriate)    Problem: Fall Risk (Adult)  Goal: Absence of Falls  Outcome: Ongoing (interventions implemented as appropriate)    Problem: Pain, Acute (Adult)  Goal: Acceptable Pain Control/Comfort Level  Outcome: Ongoing (interventions implemented as appropriate)    Problem: Cellulitis (Adult)  Goal: Signs and Symptoms of Listed Potential Problems Will be Absent or Manageable (Cellulitis)  Outcome: Ongoing (interventions implemented as appropriate)    Problem: Pressure Ulcer Risk (Cristiano Scale) (Adult,Obstetrics,Pediatric)  Goal: Identify Related Risk Factors and Signs and Symptoms  Outcome: Ongoing (interventions implemented as appropriate)  Goal: Skin Integrity  Outcome: Ongoing (interventions implemented as appropriate)

## 2017-07-09 NOTE — PROGRESS NOTES
[facility] Medicine [facility]  INPATIENT PROGRESS NOTE    Length of Stay: 8  Date of Admission: 6/30/2017  Primary Care Physician: Cj Aguero MD    Subjective   Subjective    Feels right shoulder pain    Review of Systems   Respiratory: Negative for shortness of breath.    Cardiovascular: Negative for chest pain.   Gastrointestinal: Negative for diarrhea.   Musculoskeletal: Positive for arthralgias.        All pertinent negatives and positives are as above. All other systems have been reviewed and are negative unless otherwise stated.     Objective    Temp:  [96.1 °F (35.6 °C)-97.6 °F (36.4 °C)] 96.8 °F (36 °C)  Heart Rate:  [77-88] 84  Resp:  [18] 18  BP: (125-134)/(74-82) 127/82  Physical Exam   Constitutional: He appears well-developed and well-nourished. No distress.   HENT:   Head: Normocephalic and atraumatic.   Cardiovascular: Normal rate.    Pulmonary/Chest: Effort normal. No respiratory distress. He has no wheezes.   Abdominal: Soft. He exhibits no distension.   Neurological: He is alert.   Skin: Skin is warm and dry. He is not diaphoretic. There is erythema.   Psychiatric: He has a normal mood and affect. His behavior is normal. Judgment and thought content normal.   Vitals reviewed.          Results Review:  I have reviewed the labs, radiology results, and diagnostic studies.    Laboratory Data:   Lab Results (last 24 hours)     Procedure Component Value Units Date/Time    POC Glucose Fingerstick [[id number]]  (Normal) Collected:  07/08/17 2011    Specimen:  Blood Updated:  07/08/17 2045     Glucose 118 mg/dL       RN NotifiedMeter: [id number]: [id number] TYREL ADAMS       POC Glucose Fingerstick [[id number]]  (Normal) Collected:  07/08/17 1609    Specimen:  Blood Updated:  07/08/17 2046     Glucose 109 mg/dL       Meter: [id number]: [id number] ELISABETH STEPHANIE       POC Glucose Fingerstick [[id number]]  (Normal) Collected:   07/09/17 0428    Specimen:  Blood Updated:  07/09/17 0508     Glucose 114 mg/dL       RN NotifiedMeter: [id number]: [id number] TYREL ADAMS       CBC Auto Differential [[id number]]  (Abnormal) Collected:  07/09/17 0623    Specimen:  Blood Updated:  07/09/17 0648     WBC 5.43 10*3/mm3      RBC 2.43 (L) 10*6/mm3      Hemoglobin 8.3 (L) g/dL      Hematocrit 24.5 (L) %      .8 (H) fL      MCH 34.2 (H) pg      MCHC 33.9 g/dL      RDW 19.9 (H) %      RDW-SD 71.8 (H) fl      MPV -- fL       Unable to calculate         Platelets 21 (C) 10*3/mm3      Neutrophil % 60.5 %      Lymphocyte % 21.4 %      Monocyte % 7.4 %      Eosinophil % 8.7 (H) %      Basophil % 0.7 %      Immature Grans % 1.3 (H) %      Neutrophils, Absolute 3.29 10*3/mm3      Lymphocytes, Absolute 1.16 10*3/mm3      Monocytes, Absolute 0.40 10*3/mm3      Eosinophils, Absolute 0.47 10*3/mm3      Basophils, Absolute 0.04 10*3/mm3      Immature Grans, Absolute 0.07 (H) 10*3/mm3      nRBC 0.0 /100 WBC     CBC & Differential [[id number]] Collected:  07/09/17 0623    Specimen:  Blood Updated:  07/09/17 0648    Narrative:       The following orders were created for panel order CBC & Differential.  Procedure                               Abnormality         Status                     ---------                               -----------         ------                     Scan Slide[[id number]]                                                                  CBC Auto Differential[[id number]]        Abnormal            Final result                 Please view results for these tests on the individual orders.    Comprehensive Metabolic Panel [[id number]]  (Abnormal) Collected:  07/09/17 0623    Specimen:  Blood Updated:  07/09/17 0657     Glucose 95 mg/dL      BUN 20 mg/dL      Creatinine 1.64 (H) mg/dL      Sodium 131 (L) mmol/L      Potassium 4.0 mmol/L      Chloride 100 mmol/L      CO2 24.0 mmol/L      Calcium 8.4 mg/dL      Total Protein 4.7 (L) g/dL       Albumin 2.10 (L) g/dL      ALT (SGPT) 65 U/L      AST (SGOT) 32 U/L      Alkaline Phosphatase 168 (H) U/L      Total Bilirubin 2.5 (H) mg/dL      eGFR Non African Amer 44 (L) mL/min/1.73      Globulin 2.6 gm/dL      A/G Ratio 0.8 (L) g/dL      BUN/Creatinine Ratio 12.2     Anion Gap 7.0 mmol/L     Wound Culture [[id number]]  (Normal) Collected:  07/06/17 1900    Specimen:  Wound from Leg Updated:  07/09/17 0803     Wound Culture No growth at 2 days     Gram Stain Result Few (2+) WBCs seen      No organisms seen    POC Glucose Fingerstick [[id number]]  (Normal) Collected:  07/09/17 0814    Specimen:  Blood Updated:  07/09/17 0910     Glucose 111 mg/dL       RN NotifiedMeter: [id number]: [id number] ELISABETH STEPHANIE       POC Glucose Fingerstick [[id number]]  (Abnormal) Collected:  07/09/17 1113    Specimen:  Blood Updated:  07/09/17 1129     Glucose 228 (H) mg/dL       RN NotifiedMeter: [id number]: [id number] ELISABETH STEPHANIE             Culture Data:   No results found for: BLOODCX  No results found for: URINECX  No results found for: RESPCX  Wound Culture   Date Value Ref Range Status   07/06/2017 No growth at 2 days  Preliminary     No results found for: STOOLCX  No components found for: BODYFLD    Radiology Data:   Imaging Results (last 24 hours)     ** No results found for the last 24 hours. **          I have reviewed the patient current medications.     Assessment/Plan     Hospital Problem List     Cellulitis of right lower extremity        Cellulitis - right lower extremity - continue antibiotics  Acute on chronic kidney disease - Dr. Colón following, creatinine slightly improved  Thrombocytopenia - continue monitoring platelet levels, i spoken with Dr. carrion and she recommends outpatient followup.  Needs placement for rehab  Right humerus fracture - Dr. Cortez will need to be consulted monday                Discharge Planning: I expect patient to be discharged to NH in 1-2 days.    Eduar  Rolando Cisneros MD   07/09/17   2:00 PM

## 2017-07-10 PROBLEM — N17.9 ACUTE RENAL FAILURE SUPERIMPOSED ON STAGE 3 CHRONIC KIDNEY DISEASE (HCC): Chronic | Status: ACTIVE | Noted: 2017-07-10

## 2017-07-10 PROBLEM — N18.30 ACUTE RENAL FAILURE SUPERIMPOSED ON STAGE 3 CHRONIC KIDNEY DISEASE (HCC): Chronic | Status: ACTIVE | Noted: 2017-07-10

## 2017-07-10 PROBLEM — D69.6 THROMBOCYTOPENIA (HCC): Status: ACTIVE | Noted: 2017-07-10

## 2017-07-10 PROBLEM — S42.301A FRACTURE OF RIGHT HUMERUS: Status: ACTIVE | Noted: 2017-07-10

## 2017-07-10 LAB
ALBUMIN SERPL-MCNC: 2.3 G/DL (ref 3.4–4.8)
ALBUMIN/GLOB SERPL: 0.8 G/DL (ref 1.1–1.8)
ALP SERPL-CCNC: 188 U/L (ref 38–126)
ALT SERPL W P-5'-P-CCNC: 59 U/L (ref 21–72)
ANION GAP SERPL CALCULATED.3IONS-SCNC: 8 MMOL/L (ref 5–15)
ANISOCYTOSIS BLD QL: ABNORMAL
AST SERPL-CCNC: 42 U/L (ref 17–59)
BASOPHILS # BLD AUTO: 0.02 10*3/MM3 (ref 0–0.2)
BASOPHILS NFR BLD AUTO: 0.4 % (ref 0–2)
BILIRUB SERPL-MCNC: 2.6 MG/DL (ref 0.2–1.3)
BUN BLD-MCNC: 18 MG/DL (ref 7–21)
BUN/CREAT SERPL: 12.5 (ref 7–25)
CALCIUM SPEC-SCNC: 8.5 MG/DL (ref 8.4–10.2)
CHLORIDE SERPL-SCNC: 99 MMOL/L (ref 95–110)
CO2 SERPL-SCNC: 23 MMOL/L (ref 22–31)
CREAT BLD-MCNC: 1.44 MG/DL (ref 0.7–1.3)
DEPRECATED RDW RBC AUTO: 69 FL (ref 35.1–43.9)
EOSINOPHIL # BLD AUTO: 0.57 10*3/MM3 (ref 0–0.7)
EOSINOPHIL # BLD MANUAL: 0.44 10*3/MM3 (ref 0–0.7)
EOSINOPHIL NFR BLD AUTO: 11.6 % (ref 0–7)
EOSINOPHIL NFR BLD MANUAL: 9 % (ref 0–7)
ERYTHROCYTE [DISTWIDTH] IN BLOOD BY AUTOMATED COUNT: 19.1 % (ref 11.5–14.5)
GFR SERPL CREATININE-BSD FRML MDRD: 51 ML/MIN/1.73 (ref 56–130)
GLOBULIN UR ELPH-MCNC: 2.9 GM/DL (ref 2.3–3.5)
GLUCOSE BLD-MCNC: 103 MG/DL (ref 60–100)
GLUCOSE BLDC GLUCOMTR-MCNC: 100 MG/DL (ref 70–130)
GLUCOSE BLDC GLUCOMTR-MCNC: 124 MG/DL (ref 70–130)
HANSEL STAIN: NEGATIVE
HCT VFR BLD AUTO: 25.6 % (ref 39–49)
HGB BLD-MCNC: 8.9 G/DL (ref 13.7–17.3)
IMM GRANULOCYTES # BLD: 0.02 10*3/MM3 (ref 0–0.02)
IMM GRANULOCYTES NFR BLD: 0.4 % (ref 0–0.5)
LYMPHOCYTES # BLD AUTO: 1.21 10*3/MM3 (ref 0.6–4.2)
LYMPHOCYTES # BLD MANUAL: 1.37 10*3/MM3 (ref 0.6–4.2)
LYMPHOCYTES NFR BLD AUTO: 24.7 % (ref 10–50)
LYMPHOCYTES NFR BLD MANUAL: 28 % (ref 10–50)
LYMPHOCYTES NFR BLD MANUAL: 5 % (ref 0–12)
MACROCYTES BLD QL SMEAR: ABNORMAL
MCH RBC QN AUTO: 34.8 PG (ref 26.5–34)
MCHC RBC AUTO-ENTMCNC: 34.8 G/DL (ref 31.5–36.3)
MCV RBC AUTO: 100 FL (ref 80–98)
MONOCYTES # BLD AUTO: 0.25 10*3/MM3 (ref 0–0.9)
MONOCYTES # BLD AUTO: 0.27 10*3/MM3 (ref 0–0.9)
MONOCYTES NFR BLD AUTO: 5.5 % (ref 0–12)
NEUTROPHILS # BLD AUTO: 2.81 10*3/MM3 (ref 2–8.6)
NEUTROPHILS # BLD AUTO: 2.84 10*3/MM3 (ref 2–8.6)
NEUTROPHILS NFR BLD AUTO: 57.4 % (ref 37–80)
NEUTROPHILS NFR BLD MANUAL: 44 % (ref 37–80)
NEUTS BAND NFR BLD MANUAL: 14 % (ref 0–5)
PLATELET # BLD AUTO: 23 10*3/MM3 (ref 150–450)
PMV BLD AUTO: 11.3 FL (ref 8–12)
POTASSIUM BLD-SCNC: 3.9 MMOL/L (ref 3.5–5.1)
PROT SERPL-MCNC: 5.2 G/DL (ref 6.3–8.6)
RBC # BLD AUTO: 2.56 10*6/MM3 (ref 4.37–5.74)
SMALL PLATELETS BLD QL SMEAR: ABNORMAL
SODIUM BLD-SCNC: 130 MMOL/L (ref 137–145)
SODIUM UR-SCNC: 33 MMOL/L (ref 30–90)
WBC MORPH BLD: NORMAL
WBC NRBC COR # BLD: 4.9 10*3/MM3 (ref 3.2–9.8)

## 2017-07-10 PROCEDURE — 94799 UNLISTED PULMONARY SVC/PX: CPT

## 2017-07-10 PROCEDURE — 25010000002 LINEZOLID 600 MG/300ML SOLUTION: Performed by: PHYSICIAN ASSISTANT

## 2017-07-10 PROCEDURE — 87205 SMEAR GRAM STAIN: CPT | Performed by: INTERNAL MEDICINE

## 2017-07-10 PROCEDURE — 80053 COMPREHEN METABOLIC PANEL: CPT | Performed by: INTERNAL MEDICINE

## 2017-07-10 PROCEDURE — 84300 ASSAY OF URINE SODIUM: CPT | Performed by: INTERNAL MEDICINE

## 2017-07-10 PROCEDURE — 82962 GLUCOSE BLOOD TEST: CPT

## 2017-07-10 PROCEDURE — 85025 COMPLETE CBC W/AUTO DIFF WBC: CPT | Performed by: INTERNAL MEDICINE

## 2017-07-10 PROCEDURE — 85007 BL SMEAR W/DIFF WBC COUNT: CPT | Performed by: INTERNAL MEDICINE

## 2017-07-10 PROCEDURE — 25010000002 LEVOFLOXACIN PER 250 MG: Performed by: PHYSICIAN ASSISTANT

## 2017-07-10 PROCEDURE — 25010000002 MORPHINE SULFATE (PF) 2 MG/ML SOLUTION: Performed by: HOSPITALIST

## 2017-07-10 PROCEDURE — 94760 N-INVAS EAR/PLS OXIMETRY 1: CPT

## 2017-07-10 RX ADMIN — Medication: at 09:26

## 2017-07-10 RX ADMIN — LACTULOSE 30 G: 10 SOLUTION ORAL at 22:39

## 2017-07-10 RX ADMIN — MORPHINE SULFATE 1 MG: 2 INJECTION, SOLUTION INTRAMUSCULAR; INTRAVENOUS at 18:33

## 2017-07-10 RX ADMIN — MORPHINE SULFATE 1 MG: 2 INJECTION, SOLUTION INTRAMUSCULAR; INTRAVENOUS at 22:49

## 2017-07-10 RX ADMIN — GABAPENTIN 300 MG: 300 CAPSULE ORAL at 09:24

## 2017-07-10 RX ADMIN — FUROSEMIDE 40 MG: 40 TABLET ORAL at 09:24

## 2017-07-10 RX ADMIN — GABAPENTIN 300 MG: 300 CAPSULE ORAL at 21:24

## 2017-07-10 RX ADMIN — LEVOFLOXACIN 750 MG: 5 INJECTION, SOLUTION INTRAVENOUS at 13:16

## 2017-07-10 RX ADMIN — LACTULOSE 30 G: 10 SOLUTION ORAL at 15:46

## 2017-07-10 RX ADMIN — Medication: at 21:25

## 2017-07-10 RX ADMIN — LINEZOLID 600 MG: 600 INJECTION, SOLUTION INTRAVENOUS at 12:20

## 2017-07-10 RX ADMIN — IPRATROPIUM BROMIDE AND ALBUTEROL SULFATE 3 ML: 2.5; .5 SOLUTION RESPIRATORY (INHALATION) at 20:56

## 2017-07-10 RX ADMIN — Medication: at 15:46

## 2017-07-10 RX ADMIN — MORPHINE SULFATE 1 MG: 2 INJECTION, SOLUTION INTRAMUSCULAR; INTRAVENOUS at 09:35

## 2017-07-10 RX ADMIN — GABAPENTIN 300 MG: 300 CAPSULE ORAL at 15:46

## 2017-07-10 RX ADMIN — PANTOPRAZOLE SODIUM 40 MG: 40 TABLET, DELAYED RELEASE ORAL at 09:24

## 2017-07-10 RX ADMIN — CLOTRIMAZOLE: 1 SOLUTION TOPICAL at 09:27

## 2017-07-10 RX ADMIN — Medication 10 ML: at 22:52

## 2017-07-10 RX ADMIN — BACITRACIN ZINC: 500 OINTMENT TOPICAL at 09:26

## 2017-07-10 RX ADMIN — LINEZOLID 600 MG: 600 INJECTION, SOLUTION INTRAVENOUS at 01:28

## 2017-07-10 RX ADMIN — LACTULOSE 30 G: 10 SOLUTION ORAL at 09:24

## 2017-07-10 RX ADMIN — IPRATROPIUM BROMIDE AND ALBUTEROL SULFATE 3 ML: 2.5; .5 SOLUTION RESPIRATORY (INHALATION) at 10:51

## 2017-07-10 RX ADMIN — FLUCONAZOLE 200 MG: 200 TABLET ORAL at 09:24

## 2017-07-10 RX ADMIN — MORPHINE SULFATE 1 MG: 2 INJECTION, SOLUTION INTRAMUSCULAR; INTRAVENOUS at 02:52

## 2017-07-10 RX ADMIN — IPRATROPIUM BROMIDE AND ALBUTEROL SULFATE 3 ML: 2.5; .5 SOLUTION RESPIRATORY (INHALATION) at 14:49

## 2017-07-10 RX ADMIN — MORPHINE SULFATE 1 MG: 2 INJECTION, SOLUTION INTRAMUSCULAR; INTRAVENOUS at 14:36

## 2017-07-10 RX ADMIN — TEMAZEPAM 15 MG: 15 CAPSULE ORAL at 22:49

## 2017-07-10 NOTE — PROGRESS NOTES
"[facility]  [address] [place] [address]. [address]  T - [phone]  F - [phone]     Progress Note          PATIENT  DEMOGRAPHICS   PATIENT NAME: Armando Mcmullen                      PHYSICIAN: Josep Colón MD  : 1965  MRN: [id number]   LOS: 9 days    Patient Care Team:  Cj Aguero MD as PCP - General  Shawn Cortez MD as Surgeon (Orthopedic Surgery)  Tushar Becerril DO as Consulting Physician (Gastroenterology)  Josep Colón MD as Consulting Physician (Nephrology)  Subjective   SUBJECTIVE   Tired no soa         Objective   OBJECTIVE   Vital Signs  Temp:  [96.6 °F (35.9 °C)-97 °F (36.1 °C)] 96.8 °F (36 °C)  Heart Rate:  [82-89] 87  Resp:  [16-18] 18  BP: (126-140)/(72-95) 140/95    Flowsheet Rows         First Filed Value    Admission Height  73\" (185.4 cm) Documented at 2017 1108    Admission Weight  233 lb 8 oz (106 kg) Documented at 2017 1108           I/O last 3 completed shifts:  In: 4118.3 [P.O.:1620; I.V.:998.3; IV Piggyback:1500]  Out: 2000 [Urine:2000]    PHYSICAL EXAM    Physical Exam   Constitutional: He is oriented to person, place, and time. He appears well-developed.   HENT:   Head: Normocephalic.   Eyes: Pupils are equal, round, and reactive to light.   Cardiovascular: Normal rate, regular rhythm and normal heart sounds.    Pulmonary/Chest: Effort normal and breath sounds normal.   Abdominal: Soft. Bowel sounds are normal.   Musculoskeletal: He exhibits no edema.   Left bka   Neurological: He is alert and oriented to person, place, and time.       RESULTS   Results Review:      Results from last 7 days  Lab Units 07/10/17  0753 17  0623 17  0725   SODIUM mmol/L 130* 131* 128*   POTASSIUM mmol/L 3.9 4.0 3.6   CHLORIDE mmol/L 99 100 97   CO2 mmol/L 23.0 24.0 25.0   BUN mg/dL 18 20 20   CREATININE mg/dL 1.44* 1.64* 1.82*   CALCIUM mg/dL 8.5 8.4 8.5   BILIRUBIN mg/dL 2.6* 2.5* 3.1*   ALK PHOS U/L 188* 168* 193*   ALT (SGPT) " U/L 59 65 74*   AST (SGOT) U/L 42 32 35   GLUCOSE mg/dL 103* 95 107*       Estimated Creatinine Clearance: 73.6 mL/min (by C-G formula based on Cr of 1.44).                  Results from last 7 days  Lab Units 07/10/17  0753 07/09/17  0623 07/08/17  0725 07/06/17  0547 07/05/17  0603   WBC 10*3/mm3 4.90 5.43 6.82 11.48* 12.09*   HEMOGLOBIN g/dL 8.9* 8.3* 8.7* 9.2* 9.3*   PLATELETS 10*3/mm3 23* 21* 26* 45* 63*               Imaging Results (last 24 hours)     ** No results found for the last 24 hours. **           MEDICATIONS      bacitracin  Topical Q24H   clotrimazole  Topical Q12H   fluconazole 200 mg Oral Daily   furosemide 40 mg Oral Daily   gabapentin 300 mg Oral TID   ipratropium-albuterol 3 mL Nebulization 4x Daily - RT   lactulose (CHRONULAC) 30 g 30 g Oral TID   levoFLOXacin 750 mg Intravenous Q48H   Linezolid 600 mg Intravenous Q12H   magic butt ointment  Topical TID   pantoprazole 40 mg Oral Daily       Pharmacy to Dose LevoFLOXacin (LEVAQUIN)        Assessment/Plan   ASSESSMENT / PLAN    Active Problems:    Cellulitis of right lower extremity    1- CHRIS on CKD 3 baseline cr is close to 1.4-1.5. Cr is mildly elevated and is upto 2.1 it could be a combination of vancomycin and lasix. Cr is at baseline. Now back on furosemide. Off lisinopril and we can resume in out pt setting. F/u in office in 3 weeks. Ct with zyvox     2- hepatic encephalopathy, liver cirrhosis currently stable     3- Cellulitis of right lower extremity on antibiotics and it is improving              This document has been electronically signed by Josep Colón MD on July 10, 2017 11:06 AM

## 2017-07-10 NOTE — PAYOR COMM NOTE
"Lamont Mcmullen (52 y.o. Male)     Date of Birth Social Security Number Address Home Phone MRN    1965  [address]  [facility] [address] [phone] [id number]    Holiness Marital Status          Other        Admission Date Admission Type Admitting Provider Attending Provider Department, Room/Bed    6/30/17 Emergency Echendu, MD Isis Dietrich Samer, MD [address], 428/1    Discharge Date Discharge Disposition Discharge Destination                      Attending Provider: Santiago Marinelli MD     Allergies:  Aspirin, Codeine Sulfate    Isolation:  None   Infection:  None   Code Status:  FULL    Ht:  73\" (185.4 cm)   Wt:  218 lb 14.7 oz (99.3 kg)    Admission Cmt:  None   Principal Problem:  Cellulitis of right lower extremity [L03.115] More...                 Active Insurance as of 6/30/2017     Primary Coverage     Payor Plan Insurance Group Employer/Plan Group    [place]      Payor Plan Address Payor Plan Phone Number Effective From Effective To    [address] [address]  1/1/2014     [place] [address]       Subscriber Name Subscriber Birth Date Member ID       [id number] 1965 [id number]                 Emergency Contacts      (Rel.) Home Phone Work Phone Mobile Phone    Loco Mcmullen (Daughter) -- -- [phone]    Miguel Mcmullen (Son) -- -- [phone]    BenedictShreyas (Brother) -- -- [phone]            Insurance Information                [employer] Phone:     Subscriber: Lamont Mcmullen Subscriber#: [id number]    Group#:  Precert#:           Vital Signs (last 72 hrs)       07/07 0700  -  07/08 0659 07/08 0700  -  07/09 0659 07/09 0700  -  07/10 0659 07/10 0700  -  07/10 1319   Most Recent    Temp (°F) 96.9 -  98.4    96.1 -  98.2    96.6 -  97      96.8     96.8 (36)    Heart Rate 82 -  92    84 -  88    77 -  87    87 -  89     87    Resp   18      18      18    16 - "  18     18    /76 -  147/86    125/76 -  135/83    126/72 -  134/76      140/95     140/95    SpO2 (%) 93 -  98    92 -  97    96 -  98    94 -  97     97          Hospital Medications (active)       Dose Frequency Start End    acetaminophen (TYLENOL) tablet 650 mg 650 mg Every 6 Hours PRN 6/30/2017     Sig - Route: Take 2 tablets by mouth Every 6 (Six) Hours As Needed for Mild Pain . - Oral    bacitracin ointment  Every 24 Hours Scheduled 7/6/2017     Sig - Route: Apply  topically Daily. - Topical    clotrimazole (LOTRIMIN) 1 % external solution  Every 12 Hours Scheduled 7/1/2017 7/14/2017    Sig - Route: Apply  topically Every 12 (Twelve) Hours. - Topical    fluconazole (DIFLUCAN) tablet 200 mg 200 mg Daily 7/1/2017     Sig - Route: Take 1 tablet by mouth Daily. - Oral    furosemide (LASIX) tablet 40 mg 40 mg Daily 7/10/2017     Sig - Route: Take 1 tablet by mouth Daily. - Oral    gabapentin (NEURONTIN) capsule 300 mg 300 mg 3 Times Daily 6/30/2017     Sig - Route: Take 1 capsule by mouth 3 (Three) Times a Day. - Oral    hydrALAZINE (APRESOLINE) injection 10 mg 10 mg Every 6 Hours PRN 6/30/2017     Sig - Route: Infuse 0.5 mL into a venous catheter Every 6 (Six) Hours As Needed for High Blood Pressure. - Intravenous    ipratropium-albuterol (DUO-NEB) nebulizer solution 3 mL 3 mL 4 Times Daily - RT 6/30/2017     Sig - Route: Take 3 mL by nebulization 4 (Four) Times a Day. - Nebulization    lactulose (CHRONULAC) 30 g 30 g 3 Times Daily 7/1/2017     Sig - Route: Take 30 g by mouth 3 (Three) Times a Day. - Oral    levoFLOXacin (LEVAQUIN) 750 mg/150 mL D5W (premix) (LEVAQUIN) 750 mg 750 mg Every 48 Hours 7/6/2017     Sig - Route: Infuse 150 mL into a venous catheter Every Other Day. - Intravenous    Linezolid (ZYVOX) 600 mg 300 mL 600 mg Every 12 Hours 7/6/2017     Sig - Route: Infuse 300 mL into a venous catheter Every 12 (Twelve) Hours. - Intravenous    Cosign for Ordering: Accepted by Allen Hercules MD on  "7/6/2017  3:25 PM    magic butt ointment  3 Times Daily 7/6/2017     Sig - Route: Apply  topically 3 (Three) Times a Day. - Topical    Morphine sulfate (PF) injection 1 mg 1 mg Every 4 Hours PRN 7/3/2017 7/13/2017    Sig - Route: Infuse 0.5 mL into a venous catheter Every 4 (Four) Hours As Needed for Severe Pain (7-10). - Intravenous    ondansetron (ZOFRAN) injection 4 mg 4 mg Every 6 Hours PRN 6/30/2017     Sig - Route: Infuse 2 mL into a venous catheter Every 6 (Six) Hours As Needed for Nausea or Vomiting. - Intravenous    pantoprazole (PROTONIX) EC tablet 40 mg 40 mg Daily 7/1/2017     Sig - Route: Take 1 tablet by mouth Daily. - Oral    Pharmacy to Dose LevoFLOXacin (LEVAQUIN)  Continuous PRN 7/6/2017     Sig - Route: Continuous As Needed for Consult (PO per renal dosing). - Does not apply    Cosign for Ordering: Accepted by Allen Hercules MD on 7/6/2017  3:25 PM    potassium chloride 10 mEq in 100 mL IVPB 10 mEq Every 1 Hour PRN 6/30/2017     Sig - Route: Infuse 100 mL into a venous catheter Every 1 (One) Hour As Needed (See admin Instructions.). - Intravenous    sodium chloride 0.9 % flush 10 mL 10 mL As Needed 6/30/2017     Sig - Route: Infuse 10 mL into a venous catheter As Needed for Line Care. - Intravenous    Cosign for Ordering: Accepted by Roque Parker MD on 6/30/2017 12:58 PM    Linked Group 1:  \"And\" Linked Group Details        sodium chloride 0.9 % flush 10 mL 10 mL As Needed 6/30/2017     Sig - Route: Infuse 10 mL into a venous catheter As Needed for Line Care. - Intravenous    temazepam (RESTORIL) capsule 15 mg 15 mg Nightly PRN 7/6/2017 7/16/2017    Sig - Route: Take 1 capsule by mouth At Night As Needed for Sleep. - Oral             Physician Progress Notes (last 72 hours) (Notes from 7/7/2017  1:19 PM through 7/10/2017  1:19 PM)      Santiago Marinelli MD at 7/7/2017  3:50 PM  Version 1 of 1             [facility] Medicine Services  INPATIENT PROGRESS " NOTE    Length of Stay: 6  Date of Admission: 6/30/2017  Primary Care Physician: Cj Aguero MD    Subjective   Chief Complaint:   HPI:  Still weak is not opening of chest pain at this time, complains of shoulder pain    Review of Systems     All pertinent negatives and positives are as above. All other systems have been reviewed and are negative unless otherwise stated.     Objective    Temp:  [96.5 °F (35.8 °C)-98.4 °F (36.9 °C)] 98.4 °F (36.9 °C)  Heart Rate:  [] 92  Resp:  [18] 18  BP: (113-157)/(69-88) 113/76    Physical Exam  Heart S1-S2 regular rate and rhythm  Chest decreased breath sounds bilaterally  Abdomen soft and nontender  Extremities no tenderness      Results Review:  I have reviewed the labs, radiology results, and diagnostic studies.    Laboratory Data:     Results from last 7 days  Lab Units 07/06/17  0547 07/05/17  0603 07/04/17  0648   SODIUM mmol/L 134* 134* 135*   POTASSIUM mmol/L 3.7 3.8 3.8   CHLORIDE mmol/L 91* 92* 93*   CO2 mmol/L 33.0* 32.0* 33.0*   BUN mg/dL 23* 21 22*   CREATININE mg/dL 2.17* 2.00* 1.76*   GLUCOSE mg/dL 108* 127* 100   CALCIUM mg/dL 8.5 8.8 8.6   BILIRUBIN mg/dL 3.8* 4.1* 3.3*   ALK PHOS U/L 207* 239* 251*   ALT (SGPT) U/L 90* 109* 117*   AST (SGOT) U/L 43 55 69*   ANION GAP mmol/L 10.0 10.0 9.0     Estimated Creatinine Clearance: 48.8 mL/min (by C-G formula based on Cr of 2.17).            Results from last 7 days  Lab Units 07/06/17  0547 07/05/17  0603 07/04/17  0648 07/03/17  0544 07/02/17  0524   WBC 10*3/mm3 11.48* 12.09* 9.36 13.70* 13.11*   HEMOGLOBIN g/dL 9.2* 9.3* 8.5* 8.9* 8.7*   HEMATOCRIT % 27.4* 27.4* 25.3* 26.5* 24.9*   PLATELETS 10*3/mm3 45* 63* 71* 37* 41*           Culture Data:   No results found for: BLOODCX  No results found for: URINECX  No results found for: RESPCX  Wound Culture   Date Value Ref Range Status   07/06/2017 Culture in progress  Preliminary     No results found for: STOOLCX  No components found for:  [facility]    Radiology Data:   Imaging Results (last 24 hours)     Procedure Component Value Units Date/Time    CT Upper Extremity Right Without Contrast [[id number]] Collected:  07/06/17 1526     Updated:  07/06/17 1611    Narrative:       Procedure: CT UPPER EXTREMITY RIGHT WO CONTRAST    Indication:  Persistent right shoulder pain pain. History of  abscess and fistula.     Technique: Axial, coronal, sagittal . This exam was performed  according to our departmental dose-optimization program which  includes automated exposure control, adjustment of the mA and/or  kV according to patient size and/or use of iterative  reconstruction technique.      Prior Relevant Exam: Plain film examination 1/13/2017 and  2/22/2017     Again noted is severely comminuted fracture involving the region  of the surgical neck of the proximal right humerus, with medial  displacement of the distal with respect to proximal fracture  fragments and overlap of fracture fragments of approximately 2.3  cm. On the current examination, associated soft tissue changes in  the region of the fracture site likely secondary to patient's  history of abscess. No evidence of fistulous connection to the  skin surface noted. The humeral head glenoid remains intact with  complete loss of the joint space and fracture involving the  superior glenoid as well.    Osteopenia.    Coracoclavicular and acromioclavicular joints appear intact.  Chronic calcification in the region of the coracoclavicular joint  noted.         Impression:       CONCLUSION:    1. Severely comminuted fractures involving the region of the  surgical neck of the proximal right humerus as above. On the  current examination, associated soft tissue changes in the region  of the fracture site likely secondary to patient's history of  abscess. No evidence of fistulous connection to the skin surface  noted.  2. Visualized portion of lung fields on the right demonstrates  several groundglass nodular  densities with differential including  pneumonitis or less likely bronchoalveolar carcinoma. Correlation  suggested.  3. Other changes as above.     Electronically signed by:  Oleg Bernal MD  2017 4:10 PM CDT  Workstation: Room n House          I have reviewed the patient current medications.     Assessment/Plan     Hospital Problem List     Cellulitis of right lower extremity        1. Cellulitis right lower extremity: Continue broad-spectrum antibiotics.  Overall improving.  2. Candiduria: Begin oral Diflucan. Urine cultures showed mixed growth.   3. History of liver cirrhosis: This accounts for the elevated LFTs and thrombocytopenia. Continue lactulose and Aldactone.  4. Tinea cruris:: Continue local application of antifungal cream.  5. Deconditioning: Physiotherapy is ongoing.  6. Continue Protonix/ TEDs for for GI and DVT prophylaxis respectively.  7. Patient continued to be weak patient may need to go to rehabilitation once discharged  Thrombocytopenia patient received 2 units of packed platelets  Discussed with the patient okay to go to rehabilitation in 2-3 days  Right humerus fracture this is likely has been gone for the last several weeks we will consult ortho  Discharge early next week to nursing home rehabilitation      Plan:       Electronically signed by Santiago Marinelli MD at 2017  3:52 PM      Joesp Colón MD at 2017 10:37 AM  Version 1 of 1         [facility]  [address] [place] [address]. [address]  T - [phone]  F - [phone]     Progress Note          PATIENT  DEMOGRAPHICS   PATIENT NAME: Armando Mcmullen                      PHYSICIAN: Josep Colón MD  : 1965  MRN: [id number]   LOS: 7 days    Patient Care Team:  Cj Aguero MD as PCP - General  Shawn Cortez MD as Surgeon (Orthopedic Surgery)  Tushar Becerril DO as Consulting Physician (Gastroenterology)  Josep Colón MD as Consulting Physician (Nephrology)  Subjective  "  SUBJECTIVE   Feels well no soa         Objective   OBJECTIVE   Vital Signs  Temp:  [97.4 °F (36.3 °C)-98.4 °F (36.9 °C)] 98.2 °F (36.8 °C)  Heart Rate:  [82-92] 86  Resp:  [18] 18  BP: (113-147)/(76-89) 135/83    Flowsheet Rows         First Filed Value    Admission Height  73\" (185.4 cm) Documented at 06/30/2017 1108    Admission Weight  233 lb 8 oz (106 kg) Documented at 06/30/2017 1108           I/O last 3 completed shifts:  In: 3640 [P.O.:1440; I.V.:1000; IV Piggyback:1200]  Out: 2400 [Urine:2400]    PHYSICAL EXAM    Physical Exam   Constitutional: He is oriented to person, place, and time. He appears well-developed.   HENT:   Head: Normocephalic.   Eyes: Pupils are equal, round, and reactive to light.   Cardiovascular: Normal rate, regular rhythm and normal heart sounds.    Pulmonary/Chest: Effort normal and breath sounds normal.   Abdominal: Soft. Bowel sounds are normal.   Musculoskeletal: He exhibits no edema.   Left bka   Neurological: He is alert and oriented to person, place, and time.       RESULTS   Results Review:      Results from last 7 days  Lab Units 07/08/17  0725 07/06/17  0547 07/05/17  0603   SODIUM mmol/L 128* 134* 134*   POTASSIUM mmol/L 3.6 3.7 3.8   CHLORIDE mmol/L 97 91* 92*   CO2 mmol/L 25.0 33.0* 32.0*   BUN mg/dL 20 23* 21   CREATININE mg/dL 1.82* 2.17* 2.00*   CALCIUM mg/dL 8.5 8.5 8.8   BILIRUBIN mg/dL 3.1* 3.8* 4.1*   ALK PHOS U/L 193* 207* 239*   ALT (SGPT) U/L 74* 90* 109*   AST (SGOT) U/L 35 43 55   GLUCOSE mg/dL 107* 108* 127*       Estimated Creatinine Clearance: 58.2 mL/min (by C-G formula based on Cr of 1.82).                  Results from last 7 days  Lab Units 07/08/17  0725 07/06/17  0547 07/05/17  0603 07/04/17  0648 07/03/17  0544   WBC 10*3/mm3 6.82 11.48* 12.09* 9.36 13.70*   HEMOGLOBIN g/dL 8.7* 9.2* 9.3* 8.5* 8.9*   PLATELETS 10*3/mm3 26* 45* 63* 71* 37*               Imaging Results (last 24 hours)     ** No results found for the last 24 hours. **         "   MEDICATIONS      bacitracin  Topical Q24H   clotrimazole  Topical Q12H   fluconazole 200 mg Oral Daily   gabapentin 300 mg Oral TID   ipratropium-albuterol 3 mL Nebulization 4x Daily - RT   lactulose (CHRONULAC) 30 g 30 g Oral TID   levoFLOXacin 750 mg Intravenous Q48H   Linezolid 600 mg Intravenous Q12H   magic butt ointment  Topical TID   pantoprazole 40 mg Oral Daily       Pharmacy to Dose LevoFLOXacin (LEVAQUIN)     sodium chloride 75 mL/hr Last Rate: 75 mL/hr (07/08/17 0914)       Assessment/Plan   ASSESSMENT / PLAN    Active Problems:    Cellulitis of right lower extremity       1- CHRIS on CKD 3 baseline cr is close to 1.4-1.5. Cr is mildly elevated and is upto 2.1 it could be a combination of vancomycin and lasix. Both are now stopped. It is better with ivf now and may be in 24-48 hrs will stop ivf and resume some of his diuretics. Agreed with zyvox     2- hepatic encephalopathy, liver cirrhosis currently stable     3- Cellulitis of right lower extremity on antibiotics and it is improving              This document has been electronically signed by Josep Colón MD on July 8, 2017 10:38 AM            Electronically signed by Josep Colón MD at 7/8/2017 10:40 AM      Eduar Cisneros MD at 7/8/2017 12:04 PM  Version 1 of 1             [facility] Medicine Services  INPATIENT PROGRESS NOTE    Length of Stay: 7  Date of Admission: 6/30/2017  Primary Care Physician: Cj Aguero MD    Subjective   Subjective    Has pain in his leg.      Review of Systems   Constitutional: Negative for activity change.   Respiratory: Negative for shortness of breath.    Cardiovascular: Negative for chest pain.   Gastrointestinal: Negative for diarrhea.   Musculoskeletal: Positive for arthralgias.        All pertinent negatives and positives are as above. All other systems have been reviewed and are negative unless otherwise stated.     Objective    Temp:  [97.4 °F (36.3 °C)-98.4  °F (36.9 °C)] 98.2 °F (36.8 °C)  Heart Rate:  [82-92] 86  Resp:  [18] 18  BP: (113-147)/(76-89) 135/83  Physical Exam   Constitutional: He appears well-developed and well-nourished. No distress.   HENT:   Head: Normocephalic and atraumatic.   Cardiovascular: Normal rate.    Pulmonary/Chest: Effort normal. No respiratory distress.   Abdominal: Soft. He exhibits no distension.   Musculoskeletal: Normal range of motion. He exhibits no edema.   Neurological: He is alert. No cranial nerve deficit.   Skin: Skin is warm and dry. He is not diaphoretic. No erythema.   Psychiatric: He has a normal mood and affect. His behavior is normal. Judgment and thought content normal.   Vitals reviewed.          Results Review:  I have reviewed the labs, radiology results, and diagnostic studies.    Laboratory Data:   Lab Results (last 24 hours)     Procedure Component Value Units Date/Time    POC Glucose Fingerstick [[id number]]  (Normal) Collected:  07/07/17 1658    Specimen:  Blood Updated:  07/07/17 1717     Glucose 118 mg/dL       RN NotifiedMeter: [id number]: [id number] AMY GORE       POC Glucose Fingerstick [[id number]]  (Abnormal) Collected:  07/07/17 2010    Specimen:  Blood Updated:  07/07/17 2105     Glucose 177 (H) mg/dL       RN NotifiedMeter: [id number]: [id number] TYREL ADAMS       POC Glucose Fingerstick [[id number]]  (Normal) Collected:  07/08/17 0423    Specimen:  Blood Updated:  07/08/17 0512     Glucose 125 mg/dL       RN NotifiedMeter: [id number]: [id number] TYREL ADAMS       Comprehensive Metabolic Panel [[id number]]  (Abnormal) Collected:  07/08/17 0725    Specimen:  Blood Updated:  07/08/17 0823     Glucose 107 (H) mg/dL      BUN 20 mg/dL      Creatinine 1.82 (H) mg/dL      Sodium 128 (L) mmol/L      Potassium 3.6 mmol/L      Chloride 97 mmol/L      CO2 25.0 mmol/L      Calcium 8.5 mg/dL      Total Protein 5.1 (L) g/dL      Albumin 2.30 (L) g/dL      ALT (SGPT) 74 (H)  U/L      AST (SGOT) 35 U/L      Alkaline Phosphatase 193 (H) U/L      Total Bilirubin 3.1 (H) mg/dL      eGFR Non African Amer 39 (L) mL/min/1.73      Globulin 2.8 gm/dL      A/G Ratio 0.8 (L) g/dL      BUN/Creatinine Ratio 11.0     Anion Gap 6.0 mmol/L     CBC & Differential [[id number]] Collected:  07/08/17 0529    Specimen:  Blood Updated:  07/08/17 0900    Narrative:       The following orders were created for panel order CBC & Differential.  Procedure                               Abnormality         Status                     ---------                               -----------         ------                     Scan Slide[[id number]]                                                                  CBC Auto Differential[[id number]]        Abnormal            Final result                 Please view results for these tests on the individual orders.    CBC Auto Differential [[id number]]  (Abnormal) Collected:  07/08/17 0725    Specimen:  Blood Updated:  07/08/17 0900     WBC 6.82 10*3/mm3      RBC 2.54 (L) 10*6/mm3      Hemoglobin 8.7 (L) g/dL      Hematocrit 25.3 (L) %      MCV 99.6 (H) fL      MCH 34.3 (H) pg      MCHC 34.4 g/dL      RDW 20.3 (H) %      RDW-SD 72.0 (H) fl      MPV -- fL       INSTRUMENT UNABLE TO CALCULATE RESULT        Platelets 26 (L) 10*3/mm3      Neutrophil % 63.8 %      Lymphocyte % 20.2 %      Monocyte % 6.6 %      Eosinophil % 8.5 (H) %      Basophil % 0.3 %      Immature Grans % 0.6 (H) %      Neutrophils, Absolute 4.35 10*3/mm3      Lymphocytes, Absolute 1.38 10*3/mm3      Monocytes, Absolute 0.45 10*3/mm3      Eosinophils, Absolute 0.58 10*3/mm3      Basophils, Absolute 0.02 10*3/mm3      Immature Grans, Absolute 0.04 (H) 10*3/mm3      nRBC 0.0 /100 WBC     POC Glucose Fingerstick [[id number]]  (Normal) Collected:  07/08/17 0751    Specimen:  Blood Updated:  07/08/17 0903     Glucose 116 mg/dL       RN NotifiedMeter: [id number]: [id number] ELISABETH BROWN       Wound Culture  [[id number]]  (Normal) Collected:  17 1900    Specimen:  Wound from Leg Updated:  17 0906     Wound Culture No growth at 24 hours     Gram Stain Result Few (2+) WBCs seen      No organisms seen    Scan Slide [[id number]] Collected:  17 0725    Specimen:  Blood Updated:  17 0951     Anisocytosis Slight/1+     Hypochromia Slight/1+     WBC Morphology Normal     Platelet Estimate Decreased    POC Glucose Fingerstick [[id number]]  (Normal) Collected:  17 1122    Specimen:  Blood Updated:  17 1141     Glucose 126 mg/dL       RN NotifiedMeter: [id number]: [id number] ELISABETH STEPHANIE             Culture Data:   No results found for: BLOODCX  No results found for: URINECX  No results found for: RESPCX  Wound Culture   Date Value Ref Range Status   2017 No growth at 24 hours  Preliminary     No results found for: STOOLCX  No components found for: BODYFLD    Radiology Data:   Imaging Results (last 24 hours)     ** No results found for the last 24 hours. **          I have reviewed the patient current medications.     Assessment/Plan     Hospital Problem List     Cellulitis of right lower extremity          Cellulitis - right lower extremity - continue antibiotics  Acute on chronic kidney disease - Dr. Colón following, creatinine slightly improved  Thrombocytopenia - continue monitoring platelet levels, may need hematology consult                Discharge Planning: I expect patient to be discharged to NH in 2-3 days.    Eduar Cisneros MD   17   12:08 PM       Electronically signed by Eduar Cisneros MD at 2017 12:11 PM      Josep Colón MD at 2017 11:43 AM  Version 1 of 1         [facility]  [address] [place] [address]. [address]  T - [phone]  F - [phone]     Progress Note          PATIENT  DEMOGRAPHICS   PATIENT NAME: Armando Mcmullen                      PHYSICIAN: Josep Colón MD  : 1965  MRN: [id number]   LOS: 8  "days    Patient Care Team:  Cj Aguero MD as PCP - General  Shawn Cortez MD as Surgeon (Orthopedic Surgery)  Tushar Becerril DO as Consulting Physician (Gastroenterology)  Josep Colón MD as Consulting Physician (Nephrology)  Subjective   SUBJECTIVE   Feels well no soa tolerating ivf         Objective   OBJECTIVE   Vital Signs  Temp:  [96.1 °F (35.6 °C)-97.6 °F (36.4 °C)] 96.8 °F (36 °C)  Heart Rate:  [77-88] 84  Resp:  [18] 18  BP: (125-134)/(74-82) 127/82    Flowsheet Rows         First Filed Value    Admission Height  73\" (185.4 cm) Documented at 06/30/2017 1108    Admission Weight  233 lb 8 oz (106 kg) Documented at 06/30/2017 1108           I/O last 3 completed shifts:  In: 6358.3 [P.O.:2160; I.V.:2998.3; IV Piggyback:1200]  Out: 4320 [Urine:4320]    PHYSICAL EXAM    Physical Exam   Constitutional: He is oriented to person, place, and time. He appears well-developed.   HENT:   Head: Normocephalic.   Eyes: Pupils are equal, round, and reactive to light.   Cardiovascular: Normal rate, regular rhythm and normal heart sounds.    Pulmonary/Chest: Effort normal and breath sounds normal.   Abdominal: Soft. Bowel sounds are normal.   Musculoskeletal: He exhibits no edema.   Left bka   Neurological: He is alert and oriented to person, place, and time.       RESULTS   Results Review:      Results from last 7 days  Lab Units 07/09/17  0623 07/08/17  0725 07/06/17  0547   SODIUM mmol/L 131* 128* 134*   POTASSIUM mmol/L 4.0 3.6 3.7   CHLORIDE mmol/L 100 97 91*   CO2 mmol/L 24.0 25.0 33.0*   BUN mg/dL 20 20 23*   CREATININE mg/dL 1.64* 1.82* 2.17*   CALCIUM mg/dL 8.4 8.5 8.5   BILIRUBIN mg/dL 2.5* 3.1* 3.8*   ALK PHOS U/L 168* 193* 207*   ALT (SGPT) U/L 65 74* 90*   AST (SGOT) U/L 32 35 43   GLUCOSE mg/dL 95 107* 108*       Estimated Creatinine Clearance: 64.6 mL/min (by C-G formula based on Cr of 1.64).                  Results from last 7 days  Lab Units 07/09/17  0623 07/08/17  0725 07/06/17  0547 " 07/05/17  0603 07/04/17  0648   WBC 10*3/mm3 5.43 6.82 11.48* 12.09* 9.36   HEMOGLOBIN g/dL 8.3* 8.7* 9.2* 9.3* 8.5*   PLATELETS 10*3/mm3 21* 26* 45* 63* 71*               Imaging Results (last 24 hours)     ** No results found for the last 24 hours. **           MEDICATIONS      bacitracin  Topical Q24H   clotrimazole  Topical Q12H   fluconazole 200 mg Oral Daily   gabapentin 300 mg Oral TID   ipratropium-albuterol 3 mL Nebulization 4x Daily - RT   lactulose (CHRONULAC) 30 g 30 g Oral TID   levoFLOXacin 750 mg Intravenous Q48H   Linezolid 600 mg Intravenous Q12H   magic butt ointment  Topical TID   pantoprazole 40 mg Oral Daily       Pharmacy to Dose LevoFLOXacin (LEVAQUIN)     sodium chloride 75 mL/hr Last Rate: 75 mL/hr (07/09/17 0036)       Assessment/Plan   ASSESSMENT / PLAN    Active Problems:    Cellulitis of right lower extremity       1- CHRIS on CKD 3 baseline cr is close to 1.4-1.5. Cr is mildly elevated and is upto 2.1 it could be a combination of vancomycin and lasix. Both are now stopped. It is better with ivf now and i will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   CREATININE mg/dL 2.17* 2.00* 1.76*   CALCIUM mg/dL 8.5 8.8 8.6   BILIRUBIN mg/dL 3.8* 4.1* 3.3*   ALK PHOS U/L 207* 239* 251*   ALT (SGPT) U/L 90* 109* 117*   AST (SGOT) U/L 43 55 69*   GLUCOSE mg/dL 108* 127* 100       Estimated Creatinine Clearance: 48.8 mL/min (by C-G formula based on Cr of 2.17).                  Results from last 7 days  Lab Units 07/06/17  0547 07/05/17  0603 07/04/17  0648 07/03/17  0544 07/02/17  0524   WBC 10*3/mm3 11.48* 12.09* 9.36 13.70* 13.11*   HEMOGLOBIN g/dL 9.2* 9.3* 8.5* 8.9* 8.7*   PLATELETS 10*3/mm3 45* 63* 71* 37* 41*              MEDICATIONS      bacitracin  Topical Q24H   clotrimazole  Topical Q12H   fluconazole 200 mg Oral Daily   gabapentin 300 mg Oral TID   ipratropium-albuterol 3 mL Nebulization 4x Daily - RT   lactulose (CHRONULAC) 30 g 30 g Oral TID   levoFLOXacin 750 mg Intravenous Q48H   Linezolid 600 mg Intravenous Q12H   magic butt ointment  Topical TID   pantoprazole 40 mg Oral Daily       Pharmacy to Dose LevoFLOXacin (LEVAQUIN)     sodium chloride 75 mL/hr Last Rate: 75 mL/hr (07/07/17 0517)     Prescriptions Prior to Admission   Medication Sig Dispense Refill Last Dose   • Bacitracin Zinc (MAGIC BUTT OINTMENT) Apply 1 g topically 3 (Three) Times a Day. 120 g 11 6/30/2017 at Unknown time   • furosemide (LASIX) 40 MG tablet Take 1 tablet by mouth 2 (Two) Times a Day. 60 tablet 1 6/29/2017 at Unknown time   • gabapentin (NEURONTIN) 100 MG capsule Take 300 mg by mouth 3 (Three) Times a Day.   6/29/2017 at Unknown time   • Incontinence Supplies (BEDPAN) misc Bedpan (Dx occasional incontinence, weakness, AMS related to hepatic encephalopathy) 1 each 0 Taking   • lactulose (CHRONULAC) 10 GM/15ML solution Take 45 mL by mouth 4 (Four) Times a Day. 1892 mL 0 6/29/2017 at Unknown time   • lisinopril (PRINIVIL,ZESTRIL) 20 MG tablet Take 20 mg by mouth Daily.   6/29/2017 at Unknown time   • Misc. Devices (COMMODE  BEDSIDE) [facility] Large bedside commode (Dx hepatic encephalopathy, LLE amputation) 1 each 0 Taking   • Multiple Vitamins-Minerals (COMPLETE MULTIVITAMIN/MINERAL PO) Take 1 tablet by mouth Daily.   6/29/2017 at Unknown time   • nystatin (MYCOSTATIN) 054562 UNIT/GM powder Apply  topically Every 12 (Twelve) Hours. 60 g 11 6/29/2017 at Unknown time   • nystatin susp + lidocaine viscous (MAGIC MOUTHWASH) oral suspension Swish and swallow 5 mL 4 (Four) Times a Day. 120 mL 3 6/29/2017 at Unknown time   • pantoprazole (PROTONIX) 40 MG EC tablet Take 1 tablet by mouth Daily. 30 tablet 11 6/29/2017 at Unknown time   • potassium chloride (K-DUR,KLOR-CON) 20 MEQ CR tablet Take 1 tablet by mouth Daily. 30 tablet 11 6/29/2017 at Unknown time   • vitamin D (ERGOCALCIFEROL) 55340 UNITS capsule capsule Take 1 capsule by mouth Every 7 (Seven) Days. 4 capsule 11 6/29/2017 at Unknown time     Assessment/Plan   ASSESSMENT / PLAN    Active Problems:    Cellulitis of right lower extremity    1- CHRIS on CKD 3 baseline cr is close to 1.4-1.5. Cr is mildly elevated and is upto 2.1 it could be a combination of vancomycin and lasix. Both are now stopped. He has anasarca and peripheral edema before but now stable. Agreed with current plan of care and gentle ivf for now.  Agreed with zyvox    2- hepatic encephalopathy, liver cirrhosis currently stable    3- Cellulitis of right lower extremity on antibiotics and it is improving         I discussed the patients findings and my recommendations with patient         This document has been electronically signed by Josep Colón MD on July 7, 2017 2:16 PM              Electronically signed by Josep Colón MD at 7/7/2017  9:56 PM      [id number]

## 2017-07-10 NOTE — PLAN OF CARE
Problem: Patient Care Overview (Adult)  Goal: Plan of Care Review  Outcome: Ongoing (interventions implemented as appropriate)    07/10/17 0323   Coping/Psychosocial Response Interventions   Plan Of Care Reviewed With patient   Patient Care Overview   Progress improving   Outcome Evaluation   Outcome Summary/Follow up Plan Pt c/o of pain gave prn pain med, vs stable no new events overnight will continue to monitor       Goal: Adult Individualization and Mutuality  Outcome: Ongoing (interventions implemented as appropriate)  Goal: Discharge Needs Assessment  Outcome: Ongoing (interventions implemented as appropriate)    Problem: Fall Risk (Adult)  Goal: Absence of Falls  Outcome: Ongoing (interventions implemented as appropriate)    Problem: Pain, Acute (Adult)  Goal: Acceptable Pain Control/Comfort Level  Outcome: Ongoing (interventions implemented as appropriate)    Problem: Cellulitis (Adult)  Goal: Signs and Symptoms of Listed Potential Problems Will be Absent or Manageable (Cellulitis)  Outcome: Ongoing (interventions implemented as appropriate)    Problem: Pressure Ulcer Risk (Cristiano Scale) (Adult,Obstetrics,Pediatric)  Goal: Identify Related Risk Factors and Signs and Symptoms  Outcome: Ongoing (interventions implemented as appropriate)  Goal: Skin Integrity  Outcome: Ongoing (interventions implemented as appropriate)

## 2017-07-10 NOTE — PLAN OF CARE
Problem: Patient Care Overview (Adult)  Goal: Plan of Care Review  Outcome: Ongoing (interventions implemented as appropriate)    07/10/17 9873   Coping/Psychosocial Response Interventions   Plan Of Care Reviewed With patient   Patient Care Overview   Progress no change   Outcome Evaluation   Outcome Summary/Follow up Plan Pt w/good PO intake %. Encouraged to continue drinking milk and eat high protein foods for wound healing.

## 2017-07-10 NOTE — PROGRESS NOTES
MEDICINE DAILY PROGRESS NOTE  NAME: Armando Mcmullen  : 1965  MRN: [id number]     LOS: 9 days     PROVIDER OF SERVICE: Lyndon Booker MD    Chief Complaint: <principal problem not specified>    Subjective:     Interval History:  History taken from: patient  Doing okay today.  Feeling tired.    Review of Systems:   Review of Systems   Constitutional: Positive for fatigue. Negative for activity change, appetite change and fever.   HENT: Negative for ear pain and sore throat.    Eyes: Negative for pain and visual disturbance.   Respiratory: Negative for cough and shortness of breath.    Cardiovascular: Negative for chest pain and palpitations.   Gastrointestinal: Negative for abdominal pain and nausea.   Endocrine: Negative for cold intolerance and heat intolerance.   Genitourinary: Negative for difficulty urinating and dysuria.   Musculoskeletal: Positive for arthralgias. Negative for gait problem.   Skin: Negative for color change and rash.   Neurological: Positive for weakness. Negative for dizziness and headaches.   Hematological: Negative for adenopathy. Does not bruise/bleed easily.   Psychiatric/Behavioral: Negative for agitation, confusion and sleep disturbance.       Objective:     Vital Signs  Vitals:    07/10/17 0400 07/10/17 0712 07/10/17 0751 07/10/17 1051   BP: 126/72  140/95    BP Location: Left arm  Right arm    Patient Position: Lying  Lying    Pulse: 85  89 87   Resp: 18  16 18   Temp: 97 °F (36.1 °C)  96.8 °F (36 °C)    TempSrc: Temporal Artery   Axillary    SpO2: 96% 96% 94% 97%   Weight:       Height:           Physical Exam  Physical Exam   Constitutional: He is oriented to person, place, and time. He appears well-developed and well-nourished. No distress.   HENT:   Head: Normocephalic.   Right Ear: External ear normal.   Left Ear: External ear normal.   Nose: Nose normal.   Mouth/Throat: Oropharynx is clear and moist.   Eyes: Conjunctivae and EOM are normal. Pupils are equal, round, and  reactive to light. Right eye exhibits no discharge. Left eye exhibits no discharge.   Neck: Normal range of motion. Neck supple.   Cardiovascular: Normal rate, regular rhythm and normal heart sounds.    Pulmonary/Chest: Effort normal and breath sounds normal.   Abdominal: Soft. Bowel sounds are normal.   Musculoskeletal: He exhibits no edema.   Left bka   Neurological: He is alert and oriented to person, place, and time.   Skin: He is not diaphoretic.   Nursing note and vitals reviewed.      Medication Review    Current Facility-Administered Medications:   •  acetaminophen (TYLENOL) tablet 650 mg, 650 mg, Oral, Q6H PRN, Santiago Marinelli MD, 650 mg at 07/02/17 0854  •  bacitracin ointment, , Topical, Q24H, KENROY Goode  •  clotrimazole (LOTRIMIN) 1 % external solution, , Topical, Q12H, Santiago Marinelli MD  •  fluconazole (DIFLUCAN) tablet 200 mg, 200 mg, Oral, Daily, Armando Richard MD, 200 mg at 07/10/17 0924  •  furosemide (LASIX) tablet 40 mg, 40 mg, Oral, Daily, Josep Colón MD, 40 mg at 07/10/17 0924  •  gabapentin (NEURONTIN) capsule 300 mg, 300 mg, Oral, TID, Santiago Marinelli MD, 300 mg at 07/10/17 0924  •  hydrALAZINE (APRESOLINE) injection 10 mg, 10 mg, Intravenous, Q6H PRN, Santiago Marinelli MD  •  ipratropium-albuterol (DUO-NEB) nebulizer solution 3 mL, 3 mL, Nebulization, 4x Daily - RT, Santiago Marinelli MD, 3 mL at 07/10/17 1051  •  lactulose (CHRONULAC) 30 g, 30 g, Oral, TID, Santiago Marinelli MD, 30 g at 07/10/17 0924  •  levoFLOXacin (LEVAQUIN) 750 mg/150 mL D5W (premix) (LEVAQUIN) 750 mg, 750 mg, Intravenous, Q48H, KENROY Goode, 750 mg at 07/08/17 1330  •  Linezolid (ZYVOX) 600 mg 300 mL, 600 mg, Intravenous, Q12H, KENROY Goode, Last Rate: 300 mL/hr at 07/10/17 1220, 600 mg at 07/10/17 1220  •  magic butt ointment, , Topical, TID, KENROY Goode  •  Morphine sulfate (PF) injection 1 mg, 1 mg, Intravenous, Q4H PRN, Harvinder Chan MD, 1 mg at 07/10/17 0935  •  ondansetron (ZOFRAN)  injection 4 mg, 4 mg, Intravenous, Q6H PRN, Santiago Marinelli MD  •  pantoprazole (PROTONIX) EC tablet 40 mg, 40 mg, Oral, Daily, Santiago Marinelli MD, 40 mg at 07/10/17 0924  •  Pharmacy to Dose LevoFLOXacin (LEVAQUIN), , Does not apply, Continuous PRN, KENROY Goode  •  potassium chloride 10 mEq in 100 mL IVPB, 10 mEq, Intravenous, Q1H PRN, Santiago Marinelli MD, Last Rate: 100 mL/hr at 07/01/17 0312, 10 mEq at 07/01/17 0312  •  Insert peripheral IV, , , Once **AND** sodium chloride 0.9 % flush 10 mL, 10 mL, Intravenous, PRN, KALEB Flores, 10 mL at 07/08/17 1609  •  sodium chloride 0.9 % flush 10 mL, 10 mL, Intravenous, PRN, Santiago Marinelli MD, 10 mL at 07/09/17 2021  •  temazepam (RESTORIL) capsule 15 mg, 15 mg, Oral, Nightly PRN, Blanton Tyron Chan MD, 15 mg at 07/09/17 2308     Diagnostic Data    Lab Results (last 24 hours)     Procedure Component Value Units Date/Time    POC Glucose Fingerstick [[id number]]  (Abnormal) Collected:  07/09/17 1650    Specimen:  Blood Updated:  07/09/17 1711     Glucose 148 (H) mg/dL       RN NotifiedMeter: [id number]: [id number] ELISABETH BROWN       POC Glucose Fingerstick [[id number]]  (Normal) Collected:  07/09/17 1951    Specimen:  Blood Updated:  07/09/17 2025     Glucose 105 mg/dL       Meter: [id number]: [id number] PHILLIP ADAMS       Sodium, Urine, Random [[id number]]  (Normal) Collected:  07/10/17 0444    Specimen:  Urine from Urine, Clean Catch Updated:  07/10/17 0509     Sodium, Urine 33 mmol/L     Wound Culture [[id number]]  (Normal) Collected:  07/06/17 1900    Specimen:  Wound from Leg Updated:  07/10/17 0634     Wound Culture No growth at 3 days     Gram Stain Result Few (2+) WBCs seen      No organisms seen    POC Glucose Fingerstick [[id number]]  (Normal) Collected:  07/10/17 0751    Specimen:  Blood Updated:  07/10/17 0834     Glucose 124 mg/dL       RN NotifiedMeter: [id number]: [id number] ROSALINA MARISABEL       Comprehensive Metabolic  Panel [[id number]]  (Abnormal) Collected:  07/10/17 0753    Specimen:  Blood Updated:  07/10/17 1000     Glucose 103 (H) mg/dL      BUN 18 mg/dL      Creatinine 1.44 (H) mg/dL      Sodium 130 (L) mmol/L      Potassium 3.9 mmol/L      Chloride 99 mmol/L      CO2 23.0 mmol/L      Calcium 8.5 mg/dL      Total Protein 5.2 (L) g/dL      Albumin 2.30 (L) g/dL      ALT (SGPT) 59 U/L      AST (SGOT) 42 U/L      Alkaline Phosphatase 188 (H) U/L      Total Bilirubin 2.6 (H) mg/dL      eGFR Non African Amer 51 (L) mL/min/1.73      Globulin 2.9 gm/dL      A/G Ratio 0.8 (L) g/dL      BUN/Creatinine Ratio 12.5     Anion Gap 8.0 mmol/L     Urine Eosinophils, Johnson's Stain [[id number]]  (Normal) Collected:  07/10/17 0444    Specimen:  Urine from Urine, Clean Catch Updated:  07/10/17 1009     Johnson Stain Negative    CBC Auto Differential [[id number]]  (Abnormal) Collected:  07/10/17 0753    Specimen:  Blood Updated:  07/10/17 1021     WBC 4.90 10*3/mm3      RBC 2.56 (L) 10*6/mm3      Hemoglobin 8.9 (L) g/dL      Hematocrit 25.6 (L) %      .0 (H) fL      MCH 34.8 (H) pg      MCHC 34.8 g/dL      RDW 19.1 (H) %      RDW-SD 69.0 (H) fl      MPV 11.3 fL      Platelets 23 (C) 10*3/mm3       PREVIOUSLY DOCUMENTED ALERT        Neutrophil % 57.4 %      Lymphocyte % 24.7 %      Monocyte % 5.5 %      Eosinophil % 11.6 (H) %      Basophil % 0.4 %      Immature Grans % 0.4 %      Neutrophils, Absolute 2.81 10*3/mm3      Lymphocytes, Absolute 1.21 10*3/mm3      Monocytes, Absolute 0.27 10*3/mm3      Eosinophils, Absolute 0.57 10*3/mm3      Basophils, Absolute 0.02 10*3/mm3      Immature Grans, Absolute 0.02 10*3/mm3     Manual Differential [[id number]]  (Abnormal) Collected:  07/10/17 0753    Specimen:  Blood Updated:  07/10/17 1057     Neutrophil % 44.0 %      Lymphocyte % 28.0 %      Monocyte % 5.0 %      Eosinophil % 9.0 (H) %      Bands %  14.0 (H) %      Neutrophils Absolute 2.84 10*3/mm3      Lymphocytes Absolute 1.37 10*3/mm3       Monocytes Absolute 0.25 10*3/mm3      Eosinophils Absolute 0.44 10*3/mm3      Anisocytosis Slight/1+     Macrocytes Slight/1+      Rare hypochromic and polychromic cells observed. Few ovalocytes observed        WBC Morphology Normal     Platelet Estimate Decreased    CBC & Differential [[id number]] Collected:  07/10/17 0753    Specimen:  Blood Updated:  07/10/17 1057    Narrative:       The following orders were created for panel order CBC & Differential.  Procedure                               Abnormality         Status                     ---------                               -----------         ------                     Manual Differential[[id number]]          Abnormal            Final result               Scan Slide[[id number]]                                                                  CBC Auto Differential[[id number]]        Abnormal            Final result                 Please view results for these tests on the individual orders.    POC Glucose Fingerstick [[id number]]  (Normal) Collected:  07/10/17 1108    Specimen:  Blood Updated:  07/10/17 1146     Glucose 100 mg/dL       Meter: [id number]: [id number] ROSALINA PETIT I reviewed the patient's new clinical results.    Assessment/Plan:     Active Hospital Problems (** Indicates Principal Problem)    Diagnosis Date Noted   • **Cellulitis of right lower extremity [L03.115] 06/30/2017     Zyvox     • Acute renal failure superimposed on stage 3 chronic kidney disease [N17.9, N18.3] 07/10/2017     Nephrology following.  Creatinine close to baseline.  Monitor.     • Thrombocytopenia [D69.6] 07/10/2017     Monitoring platelets.  Got platelet transfusion earlier in admission.  No signs of active bleeding at this time.  Will monitor and transfuse as needed.  Heme/Onc talked with hospitalist yesterday and suggested outpatient follow up.  Will consult as needed.     • Fracture of right humerus [S42.301A] 07/10/2017     Per patient long  standing.  [facility]        Resolved Hospital Problems    Diagnosis Date Noted Date Resolved   No resolved problems to display.       Will monitor patient's hospital course and adjust treatment as hospital course dictates.    DVT prophylaxis: None.  Thrombocytopenia; left bka; right LE cellulitis.  Code status is Full Code    Plan for disposition:Where: SNF and rehab and When:  when ready      Time:           This document has been electronically signed by Lyndon Booker MD on July 10, 2017 12:56 PM

## 2017-07-10 NOTE — CONSULTS
Adult Nutrition  Assessment    Patient Name:  Armando Mcmullen  YOB: 1965  MRN: [id number]  Admit Date:  6/30/2017    Assessment Date:  7/10/2017          Reason for Assessment       07/10/17 1450    Reason for Assessment    Reason For Assessment/Visit follow up protocol    Diagnosis Diagnosis    Skin Cellulitis;Non healing wound                Nutrition/Diet History       07/10/17 1450    Nutrition/Diet History    Typical Food/Fluid Intake Pt reports good appetite.  Drinks milk but does not like yogurt.              Labs/Tests/Procedures/Meds       07/10/17 1451    Labs/Tests/Procedures/Meds    Labs/Tests Review Reviewed    Medication Review Reviewed, pertinent;Diuretic;Antibiotic            Physical Findings       07/10/17 1451    Physical Appearance    Overall Physical Appearance amputee;other (see comments)   left BKA    Skin other (see comments)   right upper arm blister, incision right shoulder, skin tear midline coxxyx, wound left amputation stump, stage 2 left lateral coxxyx, medial coccyx skin tear              Nutrition Prescription Ordered       07/10/17 1451    Nutrition Prescription PO    Current PO Diet Regular    Fluid Consistency Thin    Supplement Milk    Supplement Frequency 3 times a day    Common Modifiers Low Sodium            Evaluation of Received Nutrient/Fluid Intake       07/10/17 1452    PO Evaluation    Number of Days PO Intake Evaluated --   4 days    % PO Intake             Comments:      Pt reports good appetite w/PO intakes %.  Pt drinks milk but does not like yogurt.  RD suggested other high protein foods to promote wound healing.  No GI complaints.  Will monitor.        Electronically signed by:  Peace Segovia RD  07/10/17 2:53 PM

## 2017-07-11 LAB
ALBUMIN SERPL-MCNC: 2.2 G/DL (ref 3.4–4.8)
ALBUMIN/GLOB SERPL: 0.8 G/DL (ref 1.1–1.8)
ALP SERPL-CCNC: 190 U/L (ref 38–126)
ALT SERPL W P-5'-P-CCNC: 57 U/L (ref 21–72)
ANION GAP SERPL CALCULATED.3IONS-SCNC: 7 MMOL/L (ref 5–15)
AST SERPL-CCNC: 39 U/L (ref 17–59)
BASOPHILS # BLD AUTO: 0.05 10*3/MM3 (ref 0–0.2)
BASOPHILS NFR BLD AUTO: 1 % (ref 0–2)
BILIRUB SERPL-MCNC: 2.5 MG/DL (ref 0.2–1.3)
BUN BLD-MCNC: 18 MG/DL (ref 7–21)
BUN/CREAT SERPL: 12.1 (ref 7–25)
CALCIUM SPEC-SCNC: 8.4 MG/DL (ref 8.4–10.2)
CHLORIDE SERPL-SCNC: 100 MMOL/L (ref 95–110)
CO2 SERPL-SCNC: 23 MMOL/L (ref 22–31)
CREAT BLD-MCNC: 1.49 MG/DL (ref 0.7–1.3)
DEPRECATED RDW RBC AUTO: 70.4 FL (ref 35.1–43.9)
EOSINOPHIL # BLD AUTO: 0.46 10*3/MM3 (ref 0–0.7)
EOSINOPHIL NFR BLD AUTO: 9.6 % (ref 0–7)
ERYTHROCYTE [DISTWIDTH] IN BLOOD BY AUTOMATED COUNT: 19.7 % (ref 11.5–14.5)
GFR SERPL CREATININE-BSD FRML MDRD: 49 ML/MIN/1.73 (ref 56–130)
GLOBULIN UR ELPH-MCNC: 2.8 GM/DL (ref 2.3–3.5)
GLUCOSE BLD-MCNC: 96 MG/DL (ref 60–100)
HCT VFR BLD AUTO: 24.4 % (ref 39–49)
HGB BLD-MCNC: 8.4 G/DL (ref 13.7–17.3)
IMM GRANULOCYTES # BLD: 0.02 10*3/MM3 (ref 0–0.02)
IMM GRANULOCYTES NFR BLD: 0.4 % (ref 0–0.5)
LYMPHOCYTES # BLD AUTO: 1.14 10*3/MM3 (ref 0.6–4.2)
LYMPHOCYTES NFR BLD AUTO: 23.7 % (ref 10–50)
MCH RBC QN AUTO: 34 PG (ref 26.5–34)
MCHC RBC AUTO-ENTMCNC: 34.4 G/DL (ref 31.5–36.3)
MCV RBC AUTO: 98.8 FL (ref 80–98)
MONOCYTES # BLD AUTO: 0.39 10*3/MM3 (ref 0–0.9)
MONOCYTES NFR BLD AUTO: 8.1 % (ref 0–12)
NEUTROPHILS # BLD AUTO: 2.75 10*3/MM3 (ref 2–8.6)
NEUTROPHILS NFR BLD AUTO: 57.2 % (ref 37–80)
PLATELET # BLD AUTO: 22 10*3/MM3 (ref 150–450)
PMV BLD AUTO: ABNORMAL FL (ref 8–12)
POTASSIUM BLD-SCNC: 3.6 MMOL/L (ref 3.5–5.1)
PROT SERPL-MCNC: 5 G/DL (ref 6.3–8.6)
RBC # BLD AUTO: 2.47 10*6/MM3 (ref 4.37–5.74)
SODIUM BLD-SCNC: 130 MMOL/L (ref 137–145)
WBC NRBC COR # BLD: 4.81 10*3/MM3 (ref 3.2–9.8)

## 2017-07-11 PROCEDURE — 25010000002 LINEZOLID 600 MG/300ML SOLUTION: Performed by: PHYSICIAN ASSISTANT

## 2017-07-11 PROCEDURE — 85025 COMPLETE CBC W/AUTO DIFF WBC: CPT | Performed by: INTERNAL MEDICINE

## 2017-07-11 PROCEDURE — 94760 N-INVAS EAR/PLS OXIMETRY 1: CPT

## 2017-07-11 PROCEDURE — 94799 UNLISTED PULMONARY SVC/PX: CPT

## 2017-07-11 PROCEDURE — 80053 COMPREHEN METABOLIC PANEL: CPT | Performed by: INTERNAL MEDICINE

## 2017-07-11 PROCEDURE — 25010000002 MORPHINE SULFATE (PF) 2 MG/ML SOLUTION: Performed by: HOSPITALIST

## 2017-07-11 RX ADMIN — LINEZOLID 600 MG: 600 INJECTION, SOLUTION INTRAVENOUS at 01:08

## 2017-07-11 RX ADMIN — MORPHINE SULFATE 1 MG: 2 INJECTION, SOLUTION INTRAMUSCULAR; INTRAVENOUS at 11:08

## 2017-07-11 RX ADMIN — Medication: at 08:40

## 2017-07-11 RX ADMIN — LINEZOLID 600 MG: 600 INJECTION, SOLUTION INTRAVENOUS at 14:03

## 2017-07-11 RX ADMIN — FLUCONAZOLE 200 MG: 200 TABLET ORAL at 08:40

## 2017-07-11 RX ADMIN — FUROSEMIDE 40 MG: 40 TABLET ORAL at 08:40

## 2017-07-11 RX ADMIN — Medication 10 ML: at 01:08

## 2017-07-11 RX ADMIN — MORPHINE SULFATE 1 MG: 2 INJECTION, SOLUTION INTRAMUSCULAR; INTRAVENOUS at 16:12

## 2017-07-11 RX ADMIN — IPRATROPIUM BROMIDE AND ALBUTEROL SULFATE 3 ML: 2.5; .5 SOLUTION RESPIRATORY (INHALATION) at 19:58

## 2017-07-11 RX ADMIN — CLOTRIMAZOLE: 1 SOLUTION TOPICAL at 08:41

## 2017-07-11 RX ADMIN — BACITRACIN ZINC: 500 OINTMENT TOPICAL at 08:41

## 2017-07-11 RX ADMIN — Medication: at 16:12

## 2017-07-11 RX ADMIN — MORPHINE SULFATE 1 MG: 2 INJECTION, SOLUTION INTRAMUSCULAR; INTRAVENOUS at 04:55

## 2017-07-11 RX ADMIN — CLOTRIMAZOLE: 1 SOLUTION TOPICAL at 21:26

## 2017-07-11 RX ADMIN — GABAPENTIN 300 MG: 300 CAPSULE ORAL at 21:24

## 2017-07-11 RX ADMIN — MORPHINE SULFATE 1 MG: 2 INJECTION, SOLUTION INTRAMUSCULAR; INTRAVENOUS at 23:29

## 2017-07-11 RX ADMIN — Medication: at 21:26

## 2017-07-11 RX ADMIN — IPRATROPIUM BROMIDE AND ALBUTEROL SULFATE 3 ML: 2.5; .5 SOLUTION RESPIRATORY (INHALATION) at 15:46

## 2017-07-11 RX ADMIN — LACTULOSE 30 G: 10 SOLUTION ORAL at 21:26

## 2017-07-11 RX ADMIN — GABAPENTIN 300 MG: 300 CAPSULE ORAL at 08:40

## 2017-07-11 RX ADMIN — LACTULOSE 30 G: 10 SOLUTION ORAL at 08:41

## 2017-07-11 RX ADMIN — LACTULOSE 30 G: 10 SOLUTION ORAL at 16:12

## 2017-07-11 RX ADMIN — TEMAZEPAM 15 MG: 15 CAPSULE ORAL at 23:29

## 2017-07-11 RX ADMIN — GABAPENTIN 300 MG: 300 CAPSULE ORAL at 16:12

## 2017-07-11 RX ADMIN — PANTOPRAZOLE SODIUM 40 MG: 40 TABLET, DELAYED RELEASE ORAL at 08:40

## 2017-07-11 NOTE — DISCHARGE PLACEMENT REQUEST
"Lamont Mcmullen (52 y.o. Male)     Date of Birth Social Security Number Address Home Phone MRN    1965  [address]  [facility] [phone] [id number]    [employer] Marital Status          Other        Admission Date Admission Type Admitting Provider Attending Provider Department, Room/Bed    6/30/17 Emergency Echendu, MD Isis Dietrich Samer, MD [address], [address]/1    Discharge Date Discharge Disposition Discharge Destination                      Attending Provider: Santiago Marinelli MD     Allergies:  Aspirin, Codeine Sulfate    Isolation:  None   Infection:  None   Code Status:  FULL    Ht:  73\" (185.4 cm)   Wt:  240 lb 1.6 oz (109 kg)    Admission Cmt:  None   Principal Problem:  Cellulitis of right lower extremity [L03.115] More...                 Active Insurance as of 6/30/2017     Primary Coverage     Payor Plan Insurance Group Employer/Plan Group    [facility] [employer] [facility]      Payor Plan Address Payor Plan Phone Number Effective From Effective To    [address] [address]  1/1/2014     [employer] [email or web address] [address]       Subscriber Name Subscriber Birth Date Member ID       LAMONT MCMULLEN 1965 [id number]                 Emergency Contacts      (Rel.) Home Phone Work Phone Mobile Phone    Loco Mcmullen (Daughter) -- -- [phone]    Miguel Mcmullen (Son) -- -- [phone]    Shreyas Mcmullen (Brother) -- -- [phone]              "

## 2017-07-11 NOTE — PLAN OF CARE
Problem: Patient Care Overview (Adult)  Goal: Plan of Care Review  Outcome: Ongoing (interventions implemented as appropriate)    07/11/17 0559   Coping/Psychosocial Response Interventions   Plan Of Care Reviewed With patient   Patient Care Overview   Progress no change       Goal: Adult Individualization and Mutuality  Outcome: Ongoing (interventions implemented as appropriate)    Problem: Fall Risk (Adult)  Goal: Absence of Falls  Outcome: Outcome(s) achieved Date Met:  07/11/17    Problem: Pain, Acute (Adult)  Goal: Acceptable Pain Control/Comfort Level  Outcome: Ongoing (interventions implemented as appropriate)    Problem: Cellulitis (Adult)  Goal: Signs and Symptoms of Listed Potential Problems Will be Absent or Manageable (Cellulitis)  Outcome: Ongoing (interventions implemented as appropriate)    Problem: Pressure Ulcer Risk (Cristiano Scale) (Adult,Obstetrics,Pediatric)  Goal: Identify Related Risk Factors and Signs and Symptoms  Outcome: Ongoing (interventions implemented as appropriate)  Goal: Skin Integrity  Outcome: Ongoing (interventions implemented as appropriate)

## 2017-07-11 NOTE — PROGRESS NOTES
MEDICINE DAILY PROGRESS NOTE  NAME: Armando Mcmullen  : 1965  MRN: [id number]     LOS: 10 days     PROVIDER OF SERVICE: Lyndon Booker MD    Chief Complaint: Cellulitis of right lower extremity    Subjective:     Interval History:  History taken from: patient  Doing the same as yesterday per patient.  Fatigued.    Review of Systems:   Review of Systems   Constitutional: Positive for fatigue. Negative for activity change, appetite change and fever.   HENT: Negative for ear pain and sore throat.    Eyes: Negative for pain and visual disturbance.   Respiratory: Negative for cough and shortness of breath.    Cardiovascular: Negative for chest pain and palpitations.   Gastrointestinal: Negative for abdominal pain and nausea.   Endocrine: Negative for cold intolerance and heat intolerance.   Genitourinary: Negative for difficulty urinating and dysuria.   Musculoskeletal: Positive for arthralgias. Negative for gait problem.   Skin: Negative for color change and rash.   Neurological: Positive for weakness. Negative for dizziness and headaches.   Hematological: Negative for adenopathy. Does not bruise/bleed easily.   Psychiatric/Behavioral: Negative for agitation, confusion and sleep disturbance.       Objective:     Vital Signs  Vitals:    07/10/17 2038 07/10/17 2056 17 0444 17 0733   BP: 147/91  119/83 114/78   BP Location: Left arm  Left arm Left arm   Patient Position: Lying  Lying Lying   Pulse: 94 98 94 91   Resp: 18 20 20 18   Temp: 97.5 °F (36.4 °C)  97.9 °F (36.6 °C) 97.4 °F (36.3 °C)   TempSrc: Oral  Oral Oral   SpO2: 93% 97% 94% 98%   Weight:   240 lb 1.6 oz (109 kg)    Height:           Physical Exam  Physical Exam   Constitutional: He is oriented to person, place, and time. He appears well-developed and well-nourished. No distress.   HENT:   Head: Normocephalic.   Right Ear: External ear normal.   Left Ear: External ear normal.   Nose: Nose normal.   Mouth/Throat: Oropharynx is clear and moist.    Eyes: Conjunctivae and EOM are normal. Pupils are equal, round, and reactive to light. Right eye exhibits no discharge. Left eye exhibits no discharge.   Neck: Normal range of motion. Neck supple.   Cardiovascular: Normal rate, regular rhythm and normal heart sounds.    Pulmonary/Chest: Effort normal and breath sounds normal.   Abdominal: Soft. Bowel sounds are normal.   Musculoskeletal: He exhibits no edema.   Left bka   Neurological: He is alert and oriented to person, place, and time.   Skin: He is not diaphoretic.   Nursing note and vitals reviewed.      Medication Review    Current Facility-Administered Medications:   •  acetaminophen (TYLENOL) tablet 650 mg, 650 mg, Oral, Q6H PRN, Santiago Marinelli MD, 650 mg at 07/02/17 0854  •  bacitracin ointment, , Topical, Q24H, KENROY Goode  •  clotrimazole (LOTRIMIN) 1 % external solution, , Topical, Q12H, Santiago Marinelli MD  •  fluconazole (DIFLUCAN) tablet 200 mg, 200 mg, Oral, Daily, Armando Richard MD, 200 mg at 07/11/17 0840  •  furosemide (LASIX) tablet 40 mg, 40 mg, Oral, Daily, Josep Colón MD, 40 mg at 07/11/17 0840  •  gabapentin (NEURONTIN) capsule 300 mg, 300 mg, Oral, TID, Santiago Marinelli MD, 300 mg at 07/11/17 0840  •  hydrALAZINE (APRESOLINE) injection 10 mg, 10 mg, Intravenous, Q6H PRN, Santiago Marinelli MD  •  ipratropium-albuterol (DUO-NEB) nebulizer solution 3 mL, 3 mL, Nebulization, 4x Daily - RT, Santiago Marinelli MD, 3 mL at 07/10/17 2056  •  lactulose (CHRONULAC) 30 g, 30 g, Oral, TID, Santiago Marinelli MD, 30 g at 07/11/17 0841  •  levoFLOXacin (LEVAQUIN) 750 mg/150 mL D5W (premix) (LEVAQUIN) 750 mg, 750 mg, Intravenous, Q48H, KENROY Goode, 750 mg at 07/10/17 1316  •  Linezolid (ZYVOX) 600 mg 300 mL, 600 mg, Intravenous, Q12H, KENROY Goode, Last Rate: 300 mL/hr at 07/11/17 1403, 600 mg at 07/11/17 1403  •  magic butt ointment, , Topical, TID, KENROY Goode  •  Morphine sulfate (PF) injection 1 mg, 1 mg, Intravenous, Q4H  PRN, Harvinder Chan MD, 1 mg at 07/11/17 1108  •  ondansetron (ZOFRAN) injection 4 mg, 4 mg, Intravenous, Q6H PRN, Santiago Marinelli MD  •  pantoprazole (PROTONIX) EC tablet 40 mg, 40 mg, Oral, Daily, Santiago Marinelli MD, 40 mg at 07/11/17 0840  •  Pharmacy to Dose LevoFLOXacin (LEVAQUIN), , Does not apply, Continuous PRN, KENROY Goode  •  potassium chloride 10 mEq in 100 mL IVPB, 10 mEq, Intravenous, Q1H PRN, Santiago Marinelli MD, Last Rate: 100 mL/hr at 07/01/17 0312, 10 mEq at 07/01/17 0312  •  Insert peripheral IV, , , Once **AND** sodium chloride 0.9 % flush 10 mL, 10 mL, Intravenous, PRN, Gisell Kitchen APRN, 10 mL at 07/11/17 0108  •  sodium chloride 0.9 % flush 10 mL, 10 mL, Intravenous, PRN, Snatiago Marinelli MD, 10 mL at 07/09/17 2021  •  temazepam (RESTORIL) capsule 15 mg, 15 mg, Oral, Nightly PRN, Harvinder Chan MD, 15 mg at 07/10/17 2249     Diagnostic Data    Lab Results (last 24 hours)     Procedure Component Value Units Date/Time    Wound Culture [[id number]]  (Normal) Collected:  07/06/17 1900    Specimen:  Wound from Leg Updated:  07/11/17 0637     Wound Culture No growth at 4 days     Gram Stain Result Few (2+) WBCs seen      No organisms seen    CBC & Differential [[id number]] Collected:  07/11/17 0637    Specimen:  Blood Updated:  07/11/17 0721    Narrative:       The following orders were created for panel order CBC & Differential.  Procedure                               Abnormality         Status                     ---------                               -----------         ------                     Scan Slide[[id number]]                                                                  CBC Auto Differential[[id number]]        Abnormal            Final result                 Please view results for these tests on the individual orders.    CBC Auto Differential [[id number]]  (Abnormal) Collected:  07/11/17 0637    Specimen:  Blood Updated:  07/11/17 0721     WBC 4.81 10*3/mm3      RBC 2.47 (L)  10*6/mm3      Hemoglobin 8.4 (L) g/dL      Hematocrit 24.4 (L) %      MCV 98.8 (H) fL      MCH 34.0 pg      MCHC 34.4 g/dL      RDW 19.7 (H) %      RDW-SD 70.4 (H) fl      MPV -- fL       INSTRUMENT UNABLE TO CALCULATE RESULTS        Platelets 22 (C) 10*3/mm3      Neutrophil % 57.2 %      Lymphocyte % 23.7 %      Monocyte % 8.1 %      Eosinophil % 9.6 (H) %      Basophil % 1.0 %      Immature Grans % 0.4 %      Neutrophils, Absolute 2.75 10*3/mm3      Lymphocytes, Absolute 1.14 10*3/mm3      Monocytes, Absolute 0.39 10*3/mm3      Eosinophils, Absolute 0.46 10*3/mm3      Basophils, Absolute 0.05 10*3/mm3      Immature Grans, Absolute 0.02 10*3/mm3     Comprehensive Metabolic Panel [[id number]]  (Abnormal) Collected:  07/11/17 0637    Specimen:  Blood Updated:  07/11/17 0735     Glucose 96 mg/dL      BUN 18 mg/dL      Creatinine 1.49 (H) mg/dL      Sodium 130 (L) mmol/L      Potassium 3.6 mmol/L      Chloride 100 mmol/L      CO2 23.0 mmol/L      Calcium 8.4 mg/dL      Total Protein 5.0 (L) g/dL      Albumin 2.20 (L) g/dL      ALT (SGPT) 57 U/L      AST (SGOT) 39 U/L      Alkaline Phosphatase 190 (H) U/L      Total Bilirubin 2.5 (H) mg/dL      eGFR Non African Amer 49 (L) mL/min/1.73      Globulin 2.8 gm/dL      A/G Ratio 0.8 (L) g/dL      BUN/Creatinine Ratio 12.1     Anion Gap 7.0 mmol/L           I reviewed the patient's new clinical results.    Assessment/Plan:     Active Hospital Problems (** Indicates Principal Problem)    Diagnosis Date Noted   • **Cellulitis of right lower extremity [L03.115] 06/30/2017     [facility]     • Acute renal failure superimposed on stage 3 chronic kidney disease [N17.9, N18.3] 07/10/2017     Nephrology following.  Creatinine close to baseline.  Monitor.     • Thrombocytopenia [D69.6] 07/10/2017     7/11.  Platelets are stable.  Discussed case with Dr. Ferreira (Heme/Onc).  Recommended continuing to monitor platelets.  If stable okay to discharge, but should return to hospital if experiencing  bleeding.    7/10. Monitoring platelets.  Got platelet transfusion earlier in admission.  No signs of active bleeding at this time.  Will monitor and transfuse as needed.  Heme/Onc talked with hospitalist yesterday and suggested outpatient follow up.  Will consult as needed.     • Fracture of right humerus [S42.301A] 07/10/2017     Per patient long standing.  Injured right arm at [facility].        Resolved Hospital Problems    Diagnosis Date Noted Date Resolved   No resolved problems to display.     Will monitor patient's hospital course and adjust treatment as hospital course dictates.    DVT prophylaxis: None.  Thrombocytopenia; left bka; right LE cellulitis.    Code status is Full Code    Plan for disposition:Where: SNF and rehab and When:  when ready      Time:           This document has been electronically signed by Lyndon Booker MD on July 11, 2017 3:18 PM

## 2017-07-12 VITALS
WEIGHT: 240.1 LBS | TEMPERATURE: 97.6 F | RESPIRATION RATE: 18 BRPM | BODY MASS INDEX: 31.82 KG/M2 | HEIGHT: 73 IN | DIASTOLIC BLOOD PRESSURE: 84 MMHG | OXYGEN SATURATION: 96 % | SYSTOLIC BLOOD PRESSURE: 134 MMHG | HEART RATE: 99 BPM

## 2017-07-12 LAB
ALBUMIN SERPL-MCNC: 2.2 G/DL (ref 3.4–4.8)
ALBUMIN/GLOB SERPL: 0.8 G/DL (ref 1.1–1.8)
ALP SERPL-CCNC: 198 U/L (ref 38–126)
ALT SERPL W P-5'-P-CCNC: 51 U/L (ref 21–72)
ANION GAP SERPL CALCULATED.3IONS-SCNC: 6 MMOL/L (ref 5–15)
ANISOCYTOSIS BLD QL: NORMAL
AST SERPL-CCNC: 40 U/L (ref 17–59)
BACTERIA SPEC AEROBE CULT: NORMAL
BASOPHILS # BLD AUTO: 0.04 10*3/MM3 (ref 0–0.2)
BASOPHILS NFR BLD AUTO: 1 % (ref 0–2)
BILIRUB SERPL-MCNC: 2.6 MG/DL (ref 0.2–1.3)
BUN BLD-MCNC: 18 MG/DL (ref 7–21)
BUN/CREAT SERPL: 11.9 (ref 7–25)
CALCIUM SPEC-SCNC: 8.4 MG/DL (ref 8.4–10.2)
CHLORIDE SERPL-SCNC: 100 MMOL/L (ref 95–110)
CO2 SERPL-SCNC: 25 MMOL/L (ref 22–31)
CREAT BLD-MCNC: 1.51 MG/DL (ref 0.7–1.3)
DEPRECATED RDW RBC AUTO: 69.8 FL (ref 35.1–43.9)
EOSINOPHIL # BLD AUTO: 0.41 10*3/MM3 (ref 0–0.7)
EOSINOPHIL NFR BLD AUTO: 10.4 % (ref 0–7)
ERYTHROCYTE [DISTWIDTH] IN BLOOD BY AUTOMATED COUNT: 19.3 % (ref 11.5–14.5)
GFR SERPL CREATININE-BSD FRML MDRD: 49 ML/MIN/1.73 (ref 60–130)
GLOBULIN UR ELPH-MCNC: 2.8 GM/DL (ref 2.3–3.5)
GLUCOSE BLD-MCNC: 117 MG/DL (ref 60–100)
GRAM STN SPEC: NORMAL
GRAM STN SPEC: NORMAL
HCT VFR BLD AUTO: 24.4 % (ref 39–49)
HGB BLD-MCNC: 8.4 G/DL (ref 13.7–17.3)
IMM GRANULOCYTES # BLD: 0.01 10*3/MM3 (ref 0–0.02)
IMM GRANULOCYTES NFR BLD: 0.3 % (ref 0–0.5)
LYMPHOCYTES # BLD AUTO: 1.19 10*3/MM3 (ref 0.6–4.2)
LYMPHOCYTES NFR BLD AUTO: 30.3 % (ref 10–50)
MACROCYTES BLD QL SMEAR: NORMAL
MCH RBC QN AUTO: 34.6 PG (ref 26.5–34)
MCHC RBC AUTO-ENTMCNC: 34.4 G/DL (ref 31.5–36.3)
MCV RBC AUTO: 100.4 FL (ref 80–98)
MONOCYTES # BLD AUTO: 0.25 10*3/MM3 (ref 0–0.9)
MONOCYTES NFR BLD AUTO: 6.4 % (ref 0–12)
NEUTROPHILS # BLD AUTO: 2.03 10*3/MM3 (ref 2–8.6)
NEUTROPHILS NFR BLD AUTO: 51.6 % (ref 37–80)
PLATELET # BLD AUTO: 30 10*3/MM3 (ref 150–450)
PMV BLD AUTO: 12.3 FL (ref 8–12)
POLYCHROMASIA BLD QL SMEAR: NORMAL
POTASSIUM BLD-SCNC: 3.7 MMOL/L (ref 3.5–5.1)
PROT SERPL-MCNC: 5 G/DL (ref 6.3–8.6)
RBC # BLD AUTO: 2.43 10*6/MM3 (ref 4.37–5.74)
SMALL PLATELETS BLD QL SMEAR: NORMAL
SODIUM BLD-SCNC: 131 MMOL/L (ref 137–145)
WBC MORPH BLD: NORMAL
WBC NRBC COR # BLD: 3.93 10*3/MM3 (ref 3.2–9.8)

## 2017-07-12 PROCEDURE — 94799 UNLISTED PULMONARY SVC/PX: CPT

## 2017-07-12 PROCEDURE — 85025 COMPLETE CBC W/AUTO DIFF WBC: CPT | Performed by: INTERNAL MEDICINE

## 2017-07-12 PROCEDURE — 80053 COMPREHEN METABOLIC PANEL: CPT | Performed by: INTERNAL MEDICINE

## 2017-07-12 PROCEDURE — 85007 BL SMEAR W/DIFF WBC COUNT: CPT | Performed by: INTERNAL MEDICINE

## 2017-07-12 PROCEDURE — 25010000002 MORPHINE SULFATE (PF) 2 MG/ML SOLUTION: Performed by: HOSPITALIST

## 2017-07-12 PROCEDURE — 25010000002 LINEZOLID 600 MG/300ML SOLUTION: Performed by: PHYSICIAN ASSISTANT

## 2017-07-12 PROCEDURE — 94760 N-INVAS EAR/PLS OXIMETRY 1: CPT

## 2017-07-12 RX ORDER — HYDROCODONE BITARTRATE AND ACETAMINOPHEN 10; 325 MG/1; MG/1
1 TABLET ORAL EVERY 8 HOURS PRN
Qty: 90 TABLET | Refills: 0 | Status: SHIPPED | OUTPATIENT
Start: 2017-07-12 | End: 2017-09-06

## 2017-07-12 RX ORDER — LINEZOLID 600 MG/1
600 TABLET, FILM COATED ORAL 2 TIMES DAILY
Qty: 10 TABLET | Refills: 0 | Status: SHIPPED | OUTPATIENT
Start: 2017-07-12 | End: 2017-07-20 | Stop reason: HOSPADM

## 2017-07-12 RX ADMIN — FLUCONAZOLE 200 MG: 200 TABLET ORAL at 08:08

## 2017-07-12 RX ADMIN — LINEZOLID 600 MG: 600 INJECTION, SOLUTION INTRAVENOUS at 12:19

## 2017-07-12 RX ADMIN — MORPHINE SULFATE 1 MG: 2 INJECTION, SOLUTION INTRAMUSCULAR; INTRAVENOUS at 04:48

## 2017-07-12 RX ADMIN — FUROSEMIDE 40 MG: 40 TABLET ORAL at 08:08

## 2017-07-12 RX ADMIN — IPRATROPIUM BROMIDE AND ALBUTEROL SULFATE 3 ML: 2.5; .5 SOLUTION RESPIRATORY (INHALATION) at 08:45

## 2017-07-12 RX ADMIN — PANTOPRAZOLE SODIUM 40 MG: 40 TABLET, DELAYED RELEASE ORAL at 08:09

## 2017-07-12 RX ADMIN — BACITRACIN ZINC: 500 OINTMENT TOPICAL at 09:13

## 2017-07-12 RX ADMIN — CLOTRIMAZOLE: 1 SOLUTION TOPICAL at 08:08

## 2017-07-12 RX ADMIN — GABAPENTIN 300 MG: 300 CAPSULE ORAL at 08:09

## 2017-07-12 RX ADMIN — Medication: at 15:45

## 2017-07-12 RX ADMIN — MORPHINE SULFATE 1 MG: 2 INJECTION, SOLUTION INTRAMUSCULAR; INTRAVENOUS at 12:18

## 2017-07-12 RX ADMIN — LINEZOLID 600 MG: 600 INJECTION, SOLUTION INTRAVENOUS at 02:13

## 2017-07-12 RX ADMIN — LACTULOSE 30 G: 10 SOLUTION ORAL at 08:09

## 2017-07-12 RX ADMIN — Medication: at 08:09

## 2017-07-12 RX ADMIN — GABAPENTIN 300 MG: 300 CAPSULE ORAL at 16:10

## 2017-07-12 RX ADMIN — IPRATROPIUM BROMIDE AND ALBUTEROL SULFATE 3 ML: 2.5; .5 SOLUTION RESPIRATORY (INHALATION) at 14:31

## 2017-07-12 RX ADMIN — MORPHINE SULFATE 1 MG: 2 INJECTION, SOLUTION INTRAMUSCULAR; INTRAVENOUS at 16:10

## 2017-07-12 RX ADMIN — MORPHINE SULFATE 1 MG: 2 INJECTION, SOLUTION INTRAMUSCULAR; INTRAVENOUS at 07:34

## 2017-07-12 RX ADMIN — IPRATROPIUM BROMIDE AND ALBUTEROL SULFATE 3 ML: 2.5; .5 SOLUTION RESPIRATORY (INHALATION) at 11:52

## 2017-07-12 NOTE — PAYOR COMM NOTE
"Lamont Mcmullen (52 y.o. Male)     Date of Birth Social Security Number Address Home Phone MRN    1965  [address]  [facility] [address] [phone] [id number]    [employer] Mar[employer] Status          Other        Admission Date Admission Type Admitting Provider Attending Provider Department, Room/Bed    6/30/17 Emergency Echendu, MD Isis Dietrich Samer, MD [address], 428/1    Discharge Date Discharge Disposition Discharge Destination         Skilled Nursing Facility (DC - External)             Attending Provider: Santiago Marinelli MD     Allergies:  Aspirin, Codeine Sulfate    Isolation:  None   Infection:  None   Code Status:  FULL    Ht:  73\" (185.4 cm)   Wt:  240 lb 1.6 oz (109 kg)    Admission Cmt:  None   Principal Problem:  Cellulitis of right lower extremity [L03.115] More...                 Active Insurance as of 6/30/2017     Primary Coverage     Payor Plan Insurance Group Employer/Plan Group    [place] [employer] [place]      Payor Plan Address Payor Plan Phone Number Effective From Effective To    [address] [address]  1/1/2014     [place] [address]       Subscriber Name Subscriber Birth Date Member ID       BENEDICTLAMONT 1965 [id number]                 Emergency Contacts      (Rel.) Home Phone Work Phone Mobile Phone    Loco Mcmullen (Daughter) -- -- [phone]    BenedictMiguel (Son) -- -- [phone]    Shreyas Mcmullen (Brother) -- -- [phone]            Insurance Information                [employer] [place] Phone:     Subscriber: Lamont Mcmullen Subscriber#: [id number]    Group#:  Precert#:           Vital Signs (last 24 hours)       07/11 0700  -  07/12 0659 07/12 0700  -  07/12 1311   Most Recent    Temp (°F) 96.1 -  97.4      97.6     97.6 (36.4)    Heart Rate 88 -  94    84 -  94     94    Resp 16 -  20      18     18    /78 -  134/80      134/84     134/84    SpO2 (%) 94 -  98 "    97 -  98     98          Hospital Medications (active)       Dose Frequency Start End    acetaminophen (TYLENOL) tablet 650 mg 650 mg Every 6 Hours PRN 6/30/2017     Sig - Route: Take 2 tablets by mouth Every 6 (Six) Hours As Needed for Mild Pain (1-3). - Oral    bacitracin ointment  Every 24 Hours Scheduled 7/6/2017     Sig - Route: Apply  topically Daily. - Topical    clotrimazole (LOTRIMIN) 1 % external solution  Every 12 Hours Scheduled 7/1/2017 7/14/2017    Sig - Route: Apply  topically Every 12 (Twelve) Hours. - Topical    fluconazole (DIFLUCAN) tablet 200 mg 200 mg Daily 7/1/2017     Sig - Route: Take 1 tablet by mouth Daily. - Oral    furosemide (LASIX) tablet 40 mg 40 mg Daily 7/10/2017     Sig - Route: Take 1 tablet by mouth Daily. - Oral    gabapentin (NEURONTIN) capsule 300 mg 300 mg 3 Times Daily 6/30/2017     Sig - Route: Take 1 capsule by mouth 3 (Three) Times a Day. - Oral    hydrALAZINE (APRESOLINE) injection 10 mg 10 mg Every 6 Hours PRN 6/30/2017     Sig - Route: Infuse 0.5 mL into a venous catheter Every 6 (Six) Hours As Needed for High Blood Pressure. - Intravenous    ipratropium-albuterol (DUO-NEB) nebulizer solution 3 mL 3 mL 4 Times Daily - RT 6/30/2017     Sig - Route: Take 3 mL by nebulization 4 (Four) Times a Day. - Nebulization    lactulose (CHRONULAC) 30 g 30 g 3 Times Daily 7/1/2017     Sig - Route: Take 30 g by mouth 3 (Three) Times a Day. - Oral    levoFLOXacin (LEVAQUIN) 750 mg/150 mL D5W (premix) (LEVAQUIN) 750 mg 750 mg Every 48 Hours 7/6/2017     Sig - Route: Infuse 150 mL into a venous catheter Every Other Day. - Intravenous    Linezolid (ZYVOX) 600 mg 300 mL 600 mg Every 12 Hours 7/6/2017     Sig - Route: Infuse 300 mL into a venous catheter Every 12 (Twelve) Hours. - Intravenous    Cosign for Ordering: Accepted by Allen Hercules MD on 7/6/2017  3:25 PM    magic butt ointment  3 Times Daily 7/6/2017     Sig - Route: Apply  topically 3 (Three) Times a Day. - Topical     "Morphine sulfate (PF) injection 1 mg 1 mg Every 4 Hours PRN 7/3/2017 2017    Sig - Route: Infuse 0.5 mL into a venous catheter Every 4 (Four) Hours As Needed for Severe Pain (7-10). - Intravenous    ondansetron (ZOFRAN) injection 4 mg 4 mg Every 6 Hours PRN 2017     Sig - Route: Infuse 2 mL into a venous catheter Every 6 (Six) Hours As Needed for Nausea or Vomiting. - Intravenous    pantoprazole (PROTONIX) EC tablet 40 mg 40 mg Daily 2017     Sig - Route: Take 1 tablet by mouth Daily. - Oral    Pharmacy to Dose LevoFLOXacin (LEVAQUIN)  Continuous PRN 2017     Sig - Route: Continuous As Needed for Consult (PO per renal dosing). - Does not apply    Cosign for Ordering: Accepted by Allen Hercules MD on 2017  3:25 PM    potassium chloride 10 mEq in 100 mL IVPB 10 mEq Every 1 Hour PRN 2017     Sig - Route: Infuse 100 mL into a venous catheter Every 1 (One) Hour As Needed (See admin Instructions.). - Intravenous    sodium chloride 0.9 % flush 10 mL 10 mL As Needed 2017     Sig - Route: Infuse 10 mL into a venous catheter As Needed for Line Care. - Intravenous    Cosign for Ordering: Accepted by Roque Parker MD on 2017 12:58 PM    Linked Group 1:  \"And\" Linked Group Details        sodium chloride 0.9 % flush 10 mL 10 mL As Needed 2017     Sig - Route: Infuse 10 mL into a venous catheter As Needed for Line Care. - Intravenous    temazepam (RESTORIL) capsule 15 mg 15 mg Nightly PRN 2017    Sig - Route: Take 1 capsule by mouth At Night As Needed for Sleep. - Oral             Physician Progress Notes (last 24 hours) (Notes from 2017  1:11 PM through 2017  1:11 PM)      Lyndon Booker MD at 2017  3:18 PM  Version 1 of 1         MEDICINE DAILY PROGRESS NOTE  NAME: Armando Mcmullen  : 1965  MRN: [id number]     LOS: 10 days     PROVIDER OF SERVICE: Lyndon Booker MD    Chief Complaint: Cellulitis of right lower extremity    Subjective:     Interval " History:  History taken from: patient  Doing the same as yesterday per patient.  Fatigued.    Review of Systems:   Review of Systems   Constitutional: Positive for fatigue. Negative for activity change, appetite change and fever.   HENT: Negative for ear pain and sore throat.    Eyes: Negative for pain and visual disturbance.   Respiratory: Negative for cough and shortness of breath.    Cardiovascular: Negative for chest pain and palpitations.   Gastrointestinal: Negative for abdominal pain and nausea.   Endocrine: Negative for cold intolerance and heat intolerance.   Genitourinary: Negative for difficulty urinating and dysuria.   Musculoskeletal: Positive for arthralgias. Negative for gait problem.   Skin: Negative for color change and rash.   Neurological: Positive for weakness. Negative for dizziness and headaches.   Hematological: Negative for adenopathy. Does not bruise/bleed easily.   Psychiatric/Behavioral: Negative for agitation, confusion and sleep disturbance.       Objective:     Vital Signs  Vitals:    07/10/17 2038 07/10/17 2056 07/11/17 0444 07/11/17 0733   BP: 147/91  119/83 114/78   BP Location: Left arm  Left arm Left arm   Patient Position: Lying  Lying Lying   Pulse: 94 98 94 91   Resp: 18 20 20 18   Temp: 97.5 °F (36.4 °C)  97.9 °F (36.6 °C) 97.4 °F (36.3 °C)   TempSrc: Oral  Oral Oral   SpO2: 93% 97% 94% 98%   Weight:   240 lb 1.6 oz (109 kg)    Height:           Physical Exam  Physical Exam   Constitutional: He is oriented to person, place, and time. He appears well-developed and well-nourished. No distress.   HENT:   Head: Normocephalic.   Right Ear: External ear normal.   Left Ear: External ear normal.   Nose: Nose normal.   Mouth/Throat: Oropharynx is clear and moist.   Eyes: Conjunctivae and EOM are normal. Pupils are equal, round, and reactive to light. Right eye exhibits no discharge. Left eye exhibits no discharge.   Neck: Normal range of motion. Neck supple.   Cardiovascular: Normal  rate, regular rhythm and normal heart sounds.    Pulmonary/Chest: Effort normal and breath sounds normal.   Abdominal: Soft. Bowel sounds are normal.   Musculoskeletal: He exhibits no edema.   Left bka   Neurological: He is alert and oriented to person, place, and time.   Skin: He is not diaphoretic.   Nursing note and vitals reviewed.      Medication Review    Current Facility-Administered Medications:   •  acetaminophen (TYLENOL) tablet 650 mg, 650 mg, Oral, Q6H PRN, Santiago Marinelli MD, 650 mg at 07/02/17 0854  •  bacitracin ointment, , Topical, Q24H, KENROY Goode  •  clotrimazole (LOTRIMIN) 1 % external solution, , Topical, Q12H, Santiago Marinelli MD  •  fluconazole (DIFLUCAN) tablet 200 mg, 200 mg, Oral, Daily, Armando Richard MD, 200 mg at 07/11/17 0840  •  furosemide (LASIX) tablet 40 mg, 40 mg, Oral, Daily, Josep Colón MD, 40 mg at 07/11/17 0840  •  gabapentin (NEURONTIN) capsule 300 mg, 300 mg, Oral, TID, Santiago Marinelli MD, 300 mg at 07/11/17 0840  •  hydrALAZINE (APRESOLINE) injection 10 mg, 10 mg, Intravenous, Q6H PRN, Santiago Marinelli MD  •  ipratropium-albuterol (DUO-NEB) nebulizer solution 3 mL, 3 mL, Nebulization, 4x Daily - RT, Santiago Marinelli MD, 3 mL at 07/10/17 2056  •  lactulose (CHRONULAC) 30 g, 30 g, Oral, TID, Santiago Marinelli MD, 30 g at 07/11/17 0841  •  levoFLOXacin (LEVAQUIN) 750 mg/150 mL D5W (premix) (LEVAQUIN) 750 mg, 750 mg, Intravenous, Q48H, KENROY Goode, 750 mg at 07/10/17 1316  •  Linezolid (ZYVOX) 600 mg 300 mL, 600 mg, Intravenous, Q12H, KENROY Goode, Last Rate: 300 mL/hr at 07/11/17 1403, 600 mg at 07/11/17 1403  •  magic butt ointment, , Topical, TID, KENROY Goode  •  Morphine sulfate (PF) injection 1 mg, 1 mg, Intravenous, Q4H PRN, Harvinder Chan MD, 1 mg at 07/11/17 1108  •  ondansetron (ZOFRAN) injection 4 mg, 4 mg, Intravenous, Q6H PRN, Santiago Marinelli MD  •  pantoprazole (PROTONIX) EC tablet 40 mg, 40 mg, Oral, Daily, Santiago Marinelli MD, 40 mg at  07/11/17 0840  •  Pharmacy to Dose LevoFLOXacin (LEVAQUIN), , Does not apply, Continuous PRN, KENROY Goode  •  potassium chloride 10 mEq in 100 mL IVPB, 10 mEq, Intravenous, Q1H PRN, Santiago Marinelli MD, Last Rate: 100 mL/hr at 07/01/17 0312, 10 mEq at 07/01/17 0312  •  Insert peripheral IV, , , Once **AND** sodium chloride 0.9 % flush 10 mL, 10 mL, Intravenous, PRN, KALEB Flores, 10 mL at 07/11/17 0108  •  sodium chloride 0.9 % flush 10 mL, 10 mL, Intravenous, PRN, Santiago Marinelli MD, 10 mL at 07/09/17 2021  •  temazepam (RESTORIL) capsule 15 mg, 15 mg, Oral, Nightly PRN, Harvinder Chan MD, 15 mg at 07/10/17 2249     Diagnostic Data    Lab Results (last 24 hours)     Procedure Component Value Units Date/Time    Wound Culture [[id number]]  (Normal) Collected:  07/06/17 1900    Specimen:  Wound from Leg Updated:  07/11/17 0637     Wound Culture No growth at 4 days     Gram Stain Result Few (2+) WBCs seen      No organisms seen    CBC & Differential [[id number]] Collected:  07/11/17 0637    Specimen:  Blood Updated:  07/11/17 0721    Narrative:       The following orders were created for panel order CBC & Differential.  Procedure                               Abnormality         Status                     ---------                               -----------         ------                     Scan Slide[[id number]]                                                                  CBC Auto Differential[[id number]]        Abnormal            Final result                 Please view results for these tests on the individual orders.    CBC Auto Differential [[id number]]  (Abnormal) Collected:  07/11/17 0637    Specimen:  Blood Updated:  07/11/17 0721     WBC 4.81 10*3/mm3      RBC 2.47 (L) 10*6/mm3      Hemoglobin 8.4 (L) g/dL      Hematocrit 24.4 (L) %      MCV 98.8 (H) fL      MCH 34.0 pg      MCHC 34.4 g/dL      RDW 19.7 (H) %      RDW-SD 70.4 (H) fl      MPV -- fL       INSTRUMENT UNABLE TO CALCULATE RESULTS         Platelets 22 (C) 10*3/mm3      Neutrophil % 57.2 %      Lymphocyte % 23.7 %      Monocyte % 8.1 %      Eosinophil % 9.6 (H) %      Basophil % 1.0 %      Immature Grans % 0.4 %      Neutrophils, Absolute 2.75 10*3/mm3      Lymphocytes, Absolute 1.14 10*3/mm3      Monocytes, Absolute 0.39 10*3/mm3      Eosinophils, Absolute 0.46 10*3/mm3      Basophils, Absolute 0.05 10*3/mm3      Immature Grans, Absolute 0.02 10*3/mm3     Comprehensive Metabolic Panel [[id number]]  (Abnormal) Collected:  07/11/17 0637    Specimen:  Blood Updated:  07/11/17 0735     Glucose 96 mg/dL      BUN 18 mg/dL      Creatinine 1.49 (H) mg/dL      Sodium 130 (L) mmol/L      Potassium 3.6 mmol/L      Chloride 100 mmol/L      CO2 23.0 mmol/L      Calcium 8.4 mg/dL      Total Protein 5.0 (L) g/dL      Albumin 2.20 (L) g/dL      ALT (SGPT) 57 U/L      AST (SGOT) 39 U/L      Alkaline Phosphatase 190 (H) U/L      Total Bilirubin 2.5 (H) mg/dL      eGFR Non African Amer 49 (L) mL/min/1.73      Globulin 2.8 gm/dL      A/G Ratio 0.8 (L) g/dL      BUN/Creatinine Ratio 12.1     Anion Gap 7.0 mmol/L           I reviewed the patient's new clinical results.    Assessment/Plan:     Active Hospital Problems (** Indicates Principal Problem)    Diagnosis Date Noted   • **Cellulitis of right lower extremity [L03.115] 06/30/2017     [facility]     • Acute renal failure superimposed on stage 3 chronic kidney disease [N17.9, N18.3] 07/10/2017     Nephrology following.  Creatinine close to baseline.  Monitor.     • Thrombocytopenia [D69.6] 07/10/2017     7/11.  Platelets are stable.  Discussed case with Dr. Ferreira (Heme/Onc).  Recommended continuing to monitor platelets.  If stable okay to discharge, but should return to hospital if experiencing bleeding.    7/10. Monitoring platelets.  Got platelet transfusion earlier in admission.  No signs of active bleeding at this time.  Will monitor and transfuse as needed.  Clarissa/Onc talked with hospitalist yesterday and suggested  outpatient follow up.  Will consult as needed.     • Fracture of right humerus [S42.301A] 07/10/2017     Per patient long standing.  [facility]        Resolved Hospital Problems    Diagnosis Date Noted Date Resolved   No resolved problems to display.     Will monitor patient's hospital course and adjust treatment as hospital course dictates.    DVT prophylaxis: None.  Thrombocytopenia; left bka; right LE cellulitis.    Code status is Full Code    Plan for disposition:Where: SNF and rehab and When:  when ready      Time:           This document has been electronically signed by Lyndon Booker MD on July 11, 2017 3:18 PM           Electronically signed by Lyndon Booker MD at 7/11/2017  3:21 PM        Consult Notes (last 24 hours) (Notes from 7/11/2017  1:11 PM through 7/12/2017  1:11 PM)     No notes of this type exist for this encounter.        Physical Therapy Notes (last 24 hours) (Notes from 7/11/2017  1:11 PM through 7/12/2017  1:11 PM)     No notes of this type exist for this encounter.        Occupational Therapy Notes (last 24 hours) (Notes from 7/11/2017  1:11 PM through 7/12/2017  1:11 PM)     No notes of this type exist for this encounter.      reference number [id number]  Plan dc to rehab to home today.  Waiting on accepting snf

## 2017-07-12 NOTE — THERAPY DISCHARGE NOTE
Acute Care - Occupational Therapy Discharge Summary  [facility]     Patient Name: Armando Mcmullen  : 1965  MRN: [id number]    Today's Date: 2017  Onset of Illness/Injury or Date of Surgery Date: 17    Date of Referral to OT: 17  Referring Physician: Dr. JANE Marinelli      Admit Date: 2017        OT Recommendation and Plan    Visit Dx:    ICD-10-CM ICD-9-CM   1. Cellulitis of right lower extremity L03.115 682.6   2. Impaired functional mobility, balance, gait, and endurance Z74.09 V49.89   3. Impaired mobility and ADLs Z74.09 799.89                     OT Goals       17 1742 17 1302 17 1345    Transfer Training OT LTG    Transfer Training OT LTG, Date Goal Reviewed 17  -RM 17  -EB 17  -    Transfer Training OT LTG, Outcome goal not met  -      Transfer Training OT LTG, Reason Goal Not Met discharged from facility  -      Static Standing Balance OT STG    Static Standing Balance OT STG, Date Goal Reviewed 17  -RM 17  -EB 17  -    Static Standing Balance OT STG, Outcome goal not met  -      Static Standing Balance OT STG, Reason Goal Not Met discharged from facility  -      Bathing OT LTG    Bathing Goal OT LTG, Date Goal Reviewed 17  -RM 17  -EB 17  -    Bathing Goal OT LTG, Outcome goal not met  -RM goal not met  -     Bathing Goal OT LTG, Reason Goal Not Met discharged from facility  -      Grooming OT STG    Grooming Goal OT STG, Date Goal Reviewed 17  -RM 17  -EB 17  -    Grooming Goal OT STG, Outcome goal not met  -RM goal not met  -     Grooming Goal OT STG, Reason Goal Not Met discharged from facility  -        17 1601          Transfer Training OT LTG    Transfer Training OT LTG, Date Established 17  -RW      Transfer Training OT LTG, Time to Achieve 2 wks  -RW      Transfer Training OT LTG, Activity Type toilet  -RW      Transfer Training OT LTG,  [place] Level minimum assist (75% patient effort)  -RW      Transfer Training OT LTG, Assist Device commode, bedside  -RW      Static Standing Balance OT STG    Static Standing Balance OT STG, Date Established 07/01/17  -RW      Static Standing Balance OT STG, Time to Achieve 5 - 7 days  -RW      Static Standing Balance OT STG, [place] Level minimum assist (75% patient effort)  -RW      Static Standing Balance OT STG, Assist Device assistive device  -RW      Static Standing Balance OT STG, Additional Goal 30 sec  -RW      Bathing OT LTG    Bathing Goal OT LTG, Date Established 07/01/17  -RW      Bathing Goal OT LTG, Time to Achieve 2 wks  -RW      Bathing Goal OT LTG, Activity Type upper body bathing;lower body bathing  -RW      Bathing Goal OT LTG, [place] Level minimum assist (75% patient effort)  -RW      Grooming OT STG    Grooming Goal OT STG, Date Established 07/01/17  -RW      Grooming Goal OT STG, Time to Achieve 5 - 7 days  -RW      Grooming Goal OT STG, [place] Level independent  -RW      Grooming Goal OT STG, Position sitting, edge of bed  -RW        User Key  (r) = Recorded By, (t) = Taken By, (c) = Cosigned By    Initials Name Provider Type    RW Doris Simon OTR/L Occupational Therapist    TAVO Hickey HEARD/L Occupational Therapy Assistant    EB Lakisha Pennington HEARD/L Occupational Therapy Assistant    RM Shanique Villalobos, OT Occupational Therapist                  OT Discharge Summary  Anticipated Discharge Disposition: inpatient rehabilitation facility, skilled nursing facility  Reason for Discharge: Discharge from facility, Per MD order  Outcomes Achieved: Refer to plan of care for updates on goals achieved  Discharge Destination: SNF      Shanique Villalobos OT  7/12/2017

## 2017-07-12 NOTE — PLAN OF CARE
Problem: Inpatient Occupational Therapy  Goal: Transfer Training Goal 1 LTG- OT  Outcome: Unable to achieve outcome(s) by discharge Date Met:  07/12/17 07/01/17 1601 07/12/17 1742   Transfer Training OT LTG   Transfer Training OT LTG, Date Established 07/01/17 --    Transfer Training OT LTG, Time to Achieve 2 wks --    Transfer Training OT LTG, Activity Type toilet --    Transfer Training OT LTG, [place] Level minimum assist (75% patient effort) --    Transfer Training OT LTG, Assist Device commode, bedside --    Transfer Training OT LTG, Date Goal Reviewed --  07/12/17   Transfer Training OT LTG, Outcome --  goal not met   Transfer Training OT LTG, Reason Goal Not Met --  discharged from facility       Goal: Static Standing Balance Goal OT- STG  Outcome: Unable to achieve outcome(s) by discharge Date Met:  07/12/17 07/01/17 1601 07/12/17 1742   Static Standing Balance OT STG   Static Standing Balance OT STG, Date Established 07/01/17 --    Static Standing Balance OT STG, Time to Achieve 5 - 7 days --    Static Standing Balance OT STG, [place] Level minimum assist (75% patient effort) --    Static Standing Balance OT STG, Assist Device assistive device --    Static Standing Balance OT STG, Additional Goal 30 sec --    Static Standing Balance OT STG, Date Goal Reviewed --  07/12/17   Static Standing Balance OT STG, Outcome --  goal not met   Static Standing Balance OT STG, Reason Goal Not Met --  discharged from facility       Goal: Bathing Goal LTG- OT  Outcome: Unable to achieve outcome(s) by discharge Date Met:  07/12/17 07/01/17 1601 07/12/17 1742   Bathing OT LTG   Bathing Goal OT LTG, Date Established 07/01/17 --    Bathing Goal OT LTG, Time to Achieve 2 wks --    Bathing Goal OT LTG, Activity Type upper body bathing;lower body bathing --    Bathing Goal OT LTG, [place] Level minimum assist (75% patient effort) --    Bathing Goal OT LTG, Date Goal Reviewed --  07/12/17   Bathing Goal OT  LTG, Outcome --  goal not met   Bathing Goal OT LTG, Reason Goal Not Met --  discharged from facility       Goal: Grooming Goal STG- OT  Outcome: Unable to achieve outcome(s) by discharge Date Met:  07/12/17 07/01/17 1601 07/12/17 1742   Grooming OT STG   Grooming Goal OT STG, Date Established 07/01/17 --    Grooming Goal OT STG, Time to Achieve 5 - 7 days --    Grooming Goal OT STG, [place] Level independent --    Grooming Goal OT STG, Position sitting, edge of bed --    Grooming Goal OT STG, Date Goal Reviewed --  07/12/17   Grooming Goal OT STG, Outcome --  goal not met   Grooming Goal OT STG, Reason Goal Not Met --  discharged from facility

## 2017-07-12 NOTE — DISCHARGE SUMMARY
DISCHARGE SUMMARY    NAME: Armando Mcmullen   PHYSICIAN: Lyndon Booker MD  : 1965  MRN: [id number]    ADMITTED: 2017   DISCHARGED:  17    ADMISSION DIAGNOSES:   Present on Admission:  • Cellulitis of right lower extremity  • Acute renal failure superimposed on stage 3 chronic kidney disease    DISCHARGE DIAGNOSES:   Principal Problem:    Cellulitis of right lower extremity  Active Problems:    Acute renal failure superimposed on stage 3 chronic kidney disease    Thrombocytopenia    Fracture of right humerus      SERVICE: Medicine. Attending  Lyndon Booker MD    CONSULTS:   Consult Orders (all)     Start     Ordered    17 1554  Inpatient Consult to Orthopedic Surgery  Once     Specialty:  Orthopedic Surgery  Provider:  Shawn Vanegas MD    17 1553    17 1232  Inpatient Consult to Nephrology  Once     Specialty:  Nephrology  Provider:  Josep Colón MD    17 1239    17 1638  Inpatient Consult to Case Management   Once     Provider:  (Not yet assigned)    17 1638    17 1600  Hospitalist (on-call MD unless specified)  Once,   Status:  Canceled     Specialty:  Hospitalist  Provider:  (Not yet assigned)    17 1603    17 1600  Inpatient Consult to Pulmonology  Once,   Status:  Canceled     Specialty:  Pulmonary Disease  Provider:  (Not yet assigned)    17 1603    17 1600  Inpatient Consult to Pulmonology  Once     Specialty:  Pulmonary Disease  Provider:  Óscar Clemente MD    17 1603    17 1444  Hospitalist (on-call MD unless specified)  Once     Specialty:  Hospitalist  Provider:  Santiago Marinelli MD    17 1444          PROCEDURES:   Imaging Results (last 7 days)     Procedure Component Value Units Date/Time    CT Upper Extremity Right Without Contrast [[id number]] Collected:  17 1526     Updated:  17 1611    Narrative:       Procedure: CT UPPER EXTREMITY RIGHT WO CONTRAST    Indication:   Persistent right shoulder pain pain. History of  abscess and fistula.     Technique: Axial, coronal, sagittal . This exam was performed  according to our departmental dose-optimization program which  includes automated exposure control, adjustment of the mA and/or  kV according to patient size and/or use of iterative  reconstruction technique.      Prior Relevant Exam: Plain film examination 1/13/2017 and  2/22/2017     Again noted is severely comminuted fracture involving the region  of the surgical neck of the proximal right humerus, with medial  displacement of the distal with respect to proximal fracture  fragments and overlap of fracture fragments of approximately 2.3  cm. On the current examination, associated soft tissue changes in  the region of the fracture site likely secondary to patient's  history of abscess. No evidence of fistulous connection to the  skin surface noted. The humeral head glenoid remains intact with  complete loss of the joint space and fracture involving the  superior glenoid as well.    Osteopenia.    Coracoclavicular and acromioclavicular joints appear intact.  Chronic calcification in the region of the coracoclavicular joint  noted.         Impression:       CONCLUSION:    1. Severely comminuted fractures involving the region of the  surgical neck of the proximal right humerus as above. On the  current examination, associated soft tissue changes in the region  of the fracture site likely secondary to patient's history of  abscess. No evidence of fistulous connection to the skin surface  noted.  2. Visualized portion of lung fields on the right demonstrates  several groundglass nodular densities with differential including  pneumonitis or less likely bronchoalveolar carcinoma. Correlation  suggested.  3. Other changes as above.     Electronically signed by:  Oleg Bernal MD  7/6/2017 4:10 PM CDT  Workstation: [id number]          HISTORY OF PRESENT ILLNESS: *Copied from [facility]  Isis CASTORENA's H&P*  HPI: Patient says that he has been having more swelling in the last few days the patient is very noncompliant with the treatment has been admitted multiple times to the hospital with respiratory failure and hepatic encephalopathy and has been intubated the patient has been recently also discharged from the hospital the patient states that the swelling has been getting worse with more swelling in the right lower extremity is not complaining of chest pain at this time no vomiting  Review of systems and systems has been reviewed all negative and positive findings as per history of present illness    DIAGNOSTIC DATA:   Lab Results (last 7 days)     Procedure Component Value Units Date/Time    POC Glucose Fingerstick [[id number]]  (Abnormal) Collected:  07/05/17 1640    Specimen:  Blood Updated:  07/05/17 1729     Glucose 144 (H) mg/dL       RN NotifiedMeter: [id number]: [id number] SALOME BARCLAY       POC Glucose Fingerstick [[id number]]  (Abnormal) Collected:  07/05/17 1945    Specimen:  Blood Updated:  07/05/17 2012     Glucose 149 (H) mg/dL       RN NotifiedMeter: [id number]: [id number] JEFFREYKUN ORLANDO       Comprehensive Metabolic Panel [[id number]]  (Abnormal) Collected:  07/06/17 0547    Specimen:  Blood Updated:  07/06/17 0649     Glucose 108 (H) mg/dL      BUN 23 (H) mg/dL      Creatinine 2.17 (H) mg/dL      Sodium 134 (L) mmol/L      Potassium 3.7 mmol/L      Chloride 91 (L) mmol/L      CO2 33.0 (H) mmol/L      Calcium 8.5 mg/dL      Total Protein 5.2 (L) g/dL      Albumin 2.40 (L) g/dL      ALT (SGPT) 90 (H) U/L      AST (SGOT) 43 U/L      Alkaline Phosphatase 207 (H) U/L      Total Bilirubin 3.8 (H) mg/dL      eGFR Non African Amer 32 (L) mL/min/1.73      Globulin 2.8 gm/dL      A/G Ratio 0.9 (L) g/dL      BUN/Creatinine Ratio 10.6     Anion Gap 10.0 mmol/L     CBC Auto Differential [[id number]]  (Abnormal) Collected:  07/06/17 0547    Specimen:  Blood Updated:  07/06/17 0652      WBC 11.48 (H) 10*3/mm3      RBC 2.66 (L) 10*6/mm3      Hemoglobin 9.2 (L) g/dL      Hematocrit 27.4 (L) %      .0 (H) fL      MCH 34.6 (H) pg      MCHC 33.6 g/dL      RDW 20.8 (H) %      RDW-SD 75.1 (H) fl      MPV 10.9 fL      Platelets 45 (L) 10*3/mm3      Neutrophil % 67.0 %      Lymphocyte % 17.6 %      Monocyte % 6.4 %      Eosinophil % 7.9 (H) %      Basophil % 0.2 %      Immature Grans % 0.9 (H) %      Neutrophils, Absolute 7.70 10*3/mm3      Lymphocytes, Absolute 2.02 10*3/mm3      Monocytes, Absolute 0.73 10*3/mm3      Eosinophils, Absolute 0.91 (H) 10*3/mm3      Basophils, Absolute 0.02 10*3/mm3      Immature Grans, Absolute 0.10 (H) 10*3/mm3     Scan Slide [[id number]] Collected:  07/06/17 0547    Specimen:  Blood Updated:  07/06/17 0736     Anisocytosis Slight/1+     Macrocytes Slight/1+      Rare polychromic cell observed        WBC Morphology Normal     Platelet Estimate Decreased    CBC & Differential [[id number]] Collected:  07/06/17 0547    Specimen:  Blood Updated:  07/06/17 0736    Narrative:       The following orders were created for panel order CBC & Differential.  Procedure                               Abnormality         Status                     ---------                               -----------         ------                     Scan Slide[[id number]]                                       Final result               CBC Auto Differential[[id number]]        Abnormal            Final result                 Please view results for these tests on the individual orders.    POC Glucose Fingerstick [[id number]]  (Abnormal) Collected:  07/06/17 1043    Specimen:  Blood Updated:  07/06/17 1100     Glucose 182 (H) mg/dL       RN NotifiedMeter: [id number]: [id number] AMY GORE       Ammonia [[id number]]  (Abnormal) Collected:  07/06/17 1317    Specimen:  Blood Updated:  07/06/17 1414     Ammonia 73 (H) umol/L     POC Glucose Fingerstick [[id number]]  (Abnormal) Collected:   07/06/17 0817    Specimen:  Blood Updated:  07/06/17 1656     Glucose 132 (H) mg/dL       RN NotifiedMeter: [id number]: [id number] AMY SOFÍA       POC Glucose Fingerstick [[id number]]  (Abnormal) Collected:  07/06/17 1630    Specimen:  Blood Updated:  07/06/17 1657     Glucose 160 (H) mg/dL       RN NotifiedMeter: [id number]: [id number] AMY SOFÍA       POC Glucose Fingerstick [[id number]]  (Abnormal) Collected:  07/06/17 2003    Specimen:  Blood Updated:  07/06/17 2052     Glucose 156 (H) mg/dL       RN NotifiedMeter: [id number]: [id number] [facility]       POC Glucose Fingerstick [[id number]]  (Abnormal) Collected:  07/07/17 0444    Specimen:  Blood Updated:  07/07/17 0535     Glucose 136 (H) mg/dL       RN NotifiedMeter: [id number]: [id number] [facility]       POC Glucose Fingerstick [[id number]]  (Normal) Collected:  07/07/17 1054    Specimen:  Blood Updated:  07/07/17 1115     Glucose 128 mg/dL       RN NotifiedMeter: [id number]: [id number] AMY SOFÍA       POC Glucose Fingerstick [[id number]]  (Abnormal) Collected:  07/07/17 0755    Specimen:  Blood Updated:  07/07/17 1128     Glucose 154 (H) mg/dL       RN NotifiedMeter: [id number]: [id number] AMY SOFÍA       POC Glucose Fingerstick [[id number]]  (Normal) Collected:  07/07/17 1658    Specimen:  Blood Updated:  07/07/17 1717     Glucose 118 mg/dL       RN NotifiedMeter: [id number]: [id number] AMY SOFÍA       POC Glucose Fingerstick [[id number]]  (Abnormal) Collected:  07/07/17 2010    Specimen:  Blood Updated:  07/07/17 2105     Glucose 177 (H) mg/dL       RN NotifiedMeter: [id number]: [id number] TYREL ADAMS       POC Glucose Fingerstick [[id number]]  (Normal) Collected:  07/08/17 0423    Specimen:  Blood Updated:  07/08/17 0512     Glucose 125 mg/dL       RN NotifiedMeter: [id number]: [id number] TYREL Ambrose  Metabolic Panel [[id number]]  (Abnormal) Collected:  07/08/17 0725    Specimen:  Blood Updated:  07/08/17 0823     Glucose 107 (H) mg/dL      BUN 20 mg/dL      Creatinine 1.82 (H) mg/dL      Sodium 128 (L) mmol/L      Potassium 3.6 mmol/L      Chloride 97 mmol/L      CO2 25.0 mmol/L      Calcium 8.5 mg/dL      Total Protein 5.1 (L) g/dL      Albumin 2.30 (L) g/dL      ALT (SGPT) 74 (H) U/L      AST (SGOT) 35 U/L      Alkaline Phosphatase 193 (H) U/L      Total Bilirubin 3.1 (H) mg/dL      eGFR Non African Amer 39 (L) mL/min/1.73      Globulin 2.8 gm/dL      A/G Ratio 0.8 (L) g/dL      BUN/Creatinine Ratio 11.0     Anion Gap 6.0 mmol/L     CBC & Differential [[id number]] Collected:  07/08/17 0529    Specimen:  Blood Updated:  07/08/17 0900    Narrative:       The following orders were created for panel order CBC & Differential.  Procedure                               Abnormality         Status                     ---------                               -----------         ------                     Scan Slide[[id number]]                                                                  CBC Auto Differential[[id number]]        Abnormal            Final result                 Please view results for these tests on the individual orders.    CBC Auto Differential [[id number]]  (Abnormal) Collected:  07/08/17 0725    Specimen:  Blood Updated:  07/08/17 0900     WBC 6.82 10*3/mm3      RBC 2.54 (L) 10*6/mm3      Hemoglobin 8.7 (L) g/dL      Hematocrit 25.3 (L) %      MCV 99.6 (H) fL      MCH 34.3 (H) pg      MCHC 34.4 g/dL      RDW 20.3 (H) %      RDW-SD 72.0 (H) fl      MPV -- fL       INSTRUMENT UNABLE TO CALCULATE RESULT        Platelets 26 (L) 10*3/mm3      Neutrophil % 63.8 %      Lymphocyte % 20.2 %      Monocyte % 6.6 %      Eosinophil % 8.5 (H) %      Basophil % 0.3 %      Immature Grans % 0.6 (H) %      Neutrophils, Absolute 4.35 10*3/mm3      Lymphocytes, Absolute 1.38 10*3/mm3      Monocytes, Absolute 0.45 10*3/mm3       Eosinophils, Absolute 0.58 10*3/mm3      Basophils, Absolute 0.02 10*3/mm3      Immature Grans, Absolute 0.04 (H) 10*3/mm3      nRBC 0.0 /100 WBC     POC Glucose Fingerstick [[id number]]  (Normal) Collected:  07/08/17 0751    Specimen:  Blood Updated:  07/08/17 0903     Glucose 116 mg/dL       RN NotifiedMeter: [id number]: [id number] ELISABETH STEPHANIE       Scan Slide [[id number]] Collected:  07/08/17 0725    Specimen:  Blood Updated:  07/08/17 0951     Anisocytosis Slight/1+     Hypochromia Slight/1+     WBC Morphology Normal     Platelet Estimate Decreased    POC Glucose Fingerstick [[id number]]  (Normal) Collected:  07/08/17 1122    Specimen:  Blood Updated:  07/08/17 1141     Glucose 126 mg/dL       RN NotifiedMeter: [id number]: [id number] ELISABETH STEPHANIE       POC Glucose Fingerstick [[id number]]  (Normal) Collected:  07/08/17 2011    Specimen:  Blood Updated:  07/08/17 2045     Glucose 118 mg/dL       RN NotifiedMeter: [id number]: [id number] TYREL ADAMS       POC Glucose Fingerstick [[id number]]  (Normal) Collected:  07/08/17 1609    Specimen:  Blood Updated:  07/08/17 2046     Glucose 109 mg/dL       Meter: [id number]: [id number] ELISABETH STEPHANIE       POC Glucose Fingerstick [[id number]]  (Normal) Collected:  07/09/17 0428    Specimen:  Blood Updated:  07/09/17 0508     Glucose 114 mg/dL       RN NotifiedMeter: [id number]: [id number] TYREL ADAMS       CBC Auto Differential [[id number]]  (Abnormal) Collected:  07/09/17 0623    Specimen:  Blood Updated:  07/09/17 0648     WBC 5.43 10*3/mm3      RBC 2.43 (L) 10*6/mm3      Hemoglobin 8.3 (L) g/dL      Hematocrit 24.5 (L) %      .8 (H) fL      MCH 34.2 (H) pg      MCHC 33.9 g/dL      RDW 19.9 (H) %      RDW-SD 71.8 (H) fl      MPV -- fL       Unable to calculate         Platelets 21 (C) 10*3/mm3      Neutrophil % 60.5 %      Lymphocyte % 21.4 %      Monocyte % 7.4 %      Eosinophil % 8.7 (H) %       Basophil % 0.7 %      Immature Grans % 1.3 (H) %      Neutrophils, Absolute 3.29 10*3/mm3      Lymphocytes, Absolute 1.16 10*3/mm3      Monocytes, Absolute 0.40 10*3/mm3      Eosinophils, Absolute 0.47 10*3/mm3      Basophils, Absolute 0.04 10*3/mm3      Immature Grans, Absolute 0.07 (H) 10*3/mm3      nRBC 0.0 /100 WBC     CBC & Differential [[id number]] Collected:  07/09/17 0623    Specimen:  Blood Updated:  07/09/17 0648    Narrative:       The following orders were created for panel order CBC & Differential.  Procedure                               Abnormality         Status                     ---------                               -----------         ------                     Scan Slide[[id number]]                                                                  CBC Auto Differential[[id number]]        Abnormal            Final result                 Please view results for these tests on the individual orders.    Comprehensive Metabolic Panel [[id number]]  (Abnormal) Collected:  07/09/17 0623    Specimen:  Blood Updated:  07/09/17 0657     Glucose 95 mg/dL      BUN 20 mg/dL      Creatinine 1.64 (H) mg/dL      Sodium 131 (L) mmol/L      Potassium 4.0 mmol/L      Chloride 100 mmol/L      CO2 24.0 mmol/L      Calcium 8.4 mg/dL      Total Protein 4.7 (L) g/dL      Albumin 2.10 (L) g/dL      ALT (SGPT) 65 U/L      AST (SGOT) 32 U/L      Alkaline Phosphatase 168 (H) U/L      Total Bilirubin 2.5 (H) mg/dL      eGFR Non African Amer 44 (L) mL/min/1.73      Globulin 2.6 gm/dL      A/G Ratio 0.8 (L) g/dL      BUN/Creatinine Ratio 12.2     Anion Gap 7.0 mmol/L     POC Glucose Fingerstick [[id number]]  (Normal) Collected:  07/09/17 0814    Specimen:  Blood Updated:  07/09/17 0910     Glucose 111 mg/dL       RN NotifiedMeter: [id number]: [id number] ELISABETH BROWN       POC Glucose Fingerstick [[id number]]  (Abnormal) Collected:  07/09/17 1113    Specimen:  Blood Updated:  07/09/17 1129     Glucose 228 (H) mg/dL        RN NotifiedMeter: [id number]: [id number] ELISABETH STEPHANIE       POC Glucose Fingerstick [[id number]]  (Abnormal) Collected:  07/09/17 1650    Specimen:  Blood Updated:  07/09/17 1711     Glucose 148 (H) mg/dL       RN NotifiedMeter: [id number]: [id number] ELISABETH STEPHANIE       POC Glucose Fingerstick [[id number]]  (Normal) Collected:  07/09/17 1951    Specimen:  Blood Updated:  07/09/17 2025     Glucose 105 mg/dL       Meter: [id number]: [id number] PHILLIP ADAMS       Sodium, Urine, Random [[id number]]  (Normal) Collected:  07/10/17 0444    Specimen:  Urine from Urine, Clean Catch Updated:  07/10/17 0509     Sodium, Urine 33 mmol/L     POC Glucose Fingerstick [[id number]]  (Normal) Collected:  07/10/17 0751    Specimen:  Blood Updated:  07/10/17 0834     Glucose 124 mg/dL       RN NotifiedMeter: [id number]: [id number] ROSALINA [facility]       Comprehensive Metabolic Panel [[id number]]  (Abnormal) Collected:  07/10/17 0753    Specimen:  Blood Updated:  07/10/17 1000     Glucose 103 (H) mg/dL      BUN 18 mg/dL      Creatinine 1.44 (H) mg/dL      Sodium 130 (L) mmol/L      Potassium 3.9 mmol/L      Chloride 99 mmol/L      CO2 23.0 mmol/L      Calcium 8.5 mg/dL      Total Protein 5.2 (L) g/dL      Albumin 2.30 (L) g/dL      ALT (SGPT) 59 U/L      AST (SGOT) 42 U/L      Alkaline Phosphatase 188 (H) U/L      Total Bilirubin 2.6 (H) mg/dL      eGFR Non African Amer 51 (L) mL/min/1.73      Globulin 2.9 gm/dL      A/G Ratio 0.8 (L) g/dL      BUN/Creatinine Ratio 12.5     Anion Gap 8.0 mmol/L     Urine Eosinophils, Johnson's Stain [[id number]]  (Normal) Collected:  07/10/17 0444    Specimen:  Urine from Urine, Clean Catch Updated:  07/10/17 1009     Johnson Stain Negative    CBC Auto Differential [[id number]]  (Abnormal) Collected:  07/10/17 0753    Specimen:  Blood Updated:  07/10/17 1021     WBC 4.90 10*3/mm3      RBC 2.56 (L) 10*6/mm3      Hemoglobin 8.9 (L) g/dL      Hematocrit 25.6 (L) %      MCV  100.0 (H) fL      MCH 34.8 (H) pg      MCHC 34.8 g/dL      RDW 19.1 (H) %      RDW-SD 69.0 (H) fl      MPV 11.3 fL      Platelets 23 (C) 10*3/mm3       PREVIOUSLY DOCUMENTED ALERT        Neutrophil % 57.4 %      Lymphocyte % 24.7 %      Monocyte % 5.5 %      Eosinophil % 11.6 (H) %      Basophil % 0.4 %      Immature Grans % 0.4 %      Neutrophils, Absolute 2.81 10*3/mm3      Lymphocytes, Absolute 1.21 10*3/mm3      Monocytes, Absolute 0.27 10*3/mm3      Eosinophils, Absolute 0.57 10*3/mm3      Basophils, Absolute 0.02 10*3/mm3      Immature Grans, Absolute 0.02 10*3/mm3     Manual Differential [[id number]]  (Abnormal) Collected:  07/10/17 0753    Specimen:  Blood Updated:  07/10/17 1057     Neutrophil % 44.0 %      Lymphocyte % 28.0 %      Monocyte % 5.0 %      Eosinophil % 9.0 (H) %      Bands %  14.0 (H) %      Neutrophils Absolute 2.84 10*3/mm3      Lymphocytes Absolute 1.37 10*3/mm3      Monocytes Absolute 0.25 10*3/mm3      Eosinophils Absolute 0.44 10*3/mm3      Anisocytosis Slight/1+     Macrocytes Slight/1+      Rare hypochromic and polychromic cells observed. Few ovalocytes observed        WBC Morphology Normal     Platelet Estimate Decreased    CBC & Differential [[id number]] Collected:  07/10/17 0753    Specimen:  Blood Updated:  07/10/17 1057    Narrative:       The following orders were created for panel order CBC & Differential.  Procedure                               Abnormality         Status                     ---------                               -----------         ------                     Manual Differential[[id number]]          Abnormal            Final result               Scan Slide[[id number]]                                                                  CBC Auto Differential[[id number]]        Abnormal            Final result                 Please view results for these tests on the individual orders.    POC Glucose Fingerstick [[id number]]  (Normal) Collected:  07/10/17 1105     Specimen:  Blood Updated:  07/10/17 1146     Glucose 100 mg/dL       Meter: [id number]: [id number] ROSALINA EPTIT       CBC & Differential [[id number]] Collected:  07/11/17 0637    Specimen:  Blood Updated:  07/11/17 0721    Narrative:       The following orders were created for panel order CBC & Differential.  Procedure                               Abnormality         Status                     ---------                               -----------         ------                     Scan Slide[[id number]]                                                                  CBC Auto Differential[[id number]]        Abnormal            Final result                 Please view results for these tests on the individual orders.    CBC Auto Differential [[id number]]  (Abnormal) Collected:  07/11/17 0637    Specimen:  Blood Updated:  07/11/17 0721     WBC 4.81 10*3/mm3      RBC 2.47 (L) 10*6/mm3      Hemoglobin 8.4 (L) g/dL      Hematocrit 24.4 (L) %      MCV 98.8 (H) fL      MCH 34.0 pg      MCHC 34.4 g/dL      RDW 19.7 (H) %      RDW-SD 70.4 (H) fl      MPV -- fL       INSTRUMENT UNABLE TO CALCULATE RESULTS        Platelets 22 (C) 10*3/mm3      Neutrophil % 57.2 %      Lymphocyte % 23.7 %      Monocyte % 8.1 %      Eosinophil % 9.6 (H) %      Basophil % 1.0 %      Immature Grans % 0.4 %      Neutrophils, Absolute 2.75 10*3/mm3      Lymphocytes, Absolute 1.14 10*3/mm3      Monocytes, Absolute 0.39 10*3/mm3      Eosinophils, Absolute 0.46 10*3/mm3      Basophils, Absolute 0.05 10*3/mm3      Immature Grans, Absolute 0.02 10*3/mm3     Comprehensive Metabolic Panel [[id number]]  (Abnormal) Collected:  07/11/17 0637    Specimen:  Blood Updated:  07/11/17 0735     Glucose 96 mg/dL      BUN 18 mg/dL      Creatinine 1.49 (H) mg/dL      Sodium 130 (L) mmol/L      Potassium 3.6 mmol/L      Chloride 100 mmol/L      CO2 23.0 mmol/L      Calcium 8.4 mg/dL      Total Protein 5.0 (L) g/dL      Albumin 2.20 (L) g/dL      ALT (SGPT) 57 U/L       AST (SGOT) 39 U/L      Alkaline Phosphatase 190 (H) U/L      Total Bilirubin 2.5 (H) mg/dL      eGFR Non African Amer 49 (L) mL/min/1.73      Globulin 2.8 gm/dL      A/G Ratio 0.8 (L) g/dL      BUN/Creatinine Ratio 12.1     Anion Gap 7.0 mmol/L     Wound Culture [[id number]]  (Normal) Collected:  07/06/17 1900    Specimen:  Wound from Leg Updated:  07/12/17 0608     Wound Culture No growth at 5 days     Gram Stain Result Few (2+) WBCs seen      No organisms seen    CBC Auto Differential [[id number]]  (Abnormal) Collected:  07/12/17 0608    Specimen:  Blood Updated:  07/12/17 0709     WBC 3.93 10*3/mm3      RBC 2.43 (L) 10*6/mm3      Hemoglobin 8.4 (L) g/dL      Hematocrit 24.4 (L) %      .4 (H) fL      MCH 34.6 (H) pg      MCHC 34.4 g/dL      RDW 19.3 (H) %      RDW-SD 69.8 (H) fl      MPV 12.3 (H) fL      Platelets 30 (L) 10*3/mm3      Neutrophil % 51.6 %      Lymphocyte % 30.3 %      Monocyte % 6.4 %      Eosinophil % 10.4 (H) %      Basophil % 1.0 %      Immature Grans % 0.3 %      Neutrophils, Absolute 2.03 10*3/mm3      Lymphocytes, Absolute 1.19 10*3/mm3      Monocytes, Absolute 0.25 10*3/mm3      Eosinophils, Absolute 0.41 10*3/mm3      Basophils, Absolute 0.04 10*3/mm3      Immature Grans, Absolute 0.01 10*3/mm3     CBC & Differential [[id number]] Collected:  07/12/17 0608    Specimen:  Blood Updated:  07/12/17 0748    Narrative:       The following orders were created for panel order CBC & Differential.  Procedure                               Abnormality         Status                     ---------                               -----------         ------                     Scan Slide[[id number]]                                       Final result               CBC Auto Differential[[id number]]        Abnormal            Final result                 Please view results for these tests on the individual orders.    Scan Slide [[id number]] Collected:  07/12/17 0608    Specimen:  Blood Updated:  07/12/17  0748     Anisocytosis Slight/1+     Macrocytes Slight/1+     Polychromasia Slight/1+     WBC Morphology Normal     Platelet Estimate Decreased    Comprehensive Metabolic Panel [[id number]]  (Abnormal) Collected:  07/12/17 0746    Specimen:  Blood Updated:  07/12/17 0809     Glucose 117 (H) mg/dL      BUN 18 mg/dL      Creatinine 1.51 (H) mg/dL      Sodium 131 (L) mmol/L      Potassium 3.7 mmol/L      Chloride 100 mmol/L      CO2 25.0 mmol/L      Calcium 8.4 mg/dL      Total Protein 5.0 (L) g/dL      Albumin 2.20 (L) g/dL      ALT (SGPT) 51 U/L      AST (SGOT) 40 U/L      Alkaline Phosphatase 198 (H) U/L      Total Bilirubin 2.6 (H) mg/dL      eGFR Non African Amer 49 (L) mL/min/1.73      Globulin 2.8 gm/dL      A/G Ratio 0.8 (L) g/dL      BUN/Creatinine Ratio 11.9     Anion Gap 6.0 mmol/L           HOSPITAL COURSE:  Active Hospital Problems (** Indicates Principal Problem)    Diagnosis Date Noted   • **Cellulitis of right lower extremity [L03.115] 06/30/2017     Zyvox     • Acute renal failure superimposed on stage 3 chronic kidney disease [N17.9, N18.3] 07/10/2017     Nephrology following.  Creatinine close to baseline.  Monitor.     • Thrombocytopenia [D69.6] 07/10/2017     7/11.  Platelets are stable.  Discussed case with Dr. Ferreira (Heme/Onc).  Recommended continuing to monitor platelets.  If stable okay to discharge, but should return to hospital if experiencing bleeding.    7/10. Monitoring platelets.  Got platelet transfusion earlier in admission.  No signs of active bleeding at this time.  Will monitor and transfuse as needed.  Heme/Onc talked with hospitalist yesterday and suggested outpatient follow up.  Will consult as needed.     • Fracture of right humerus [S42.301A] 07/10/2017     Per patient long standing.  [facility]        Resolved Hospital Problems    Diagnosis Date Noted Date Resolved   No resolved problems to display.     Patient was initially admitted with Right lower  "extremity cellulitis.  He was started on vanc and his creatinine started to increase.  He was then switched to zyvox and his kidney function improved and his cellulitis continued to improve.  Patient was deconditioned secondary to his acute stay. He was accepted for nursing home placement for rehab.    Patient's platelets are chronically low from liver disease.  He did received a platelet transfusion but his platelets dropped 48 hours after his transfusion.  He was stable on his platelet level at the low 20's without any sign of active bleeding.  He did start to come up on his platelet level on day of discharge.  I discussed this with the heme/onc specialist and he said he was okay to go home but to have him follow up as an outpatient and for him to come back to the ED with any active bleeding.    Patient has a chronic long standing humeral fracture.  He will be getting PT/OT for this at the rehab facility.    PHYSICAL EXAM ON DISCHARGE:  /84 (BP Location: Left arm, Patient Position: Lying)  Pulse 94  Temp 97.6 °F (36.4 °C) (Oral)   Resp 18  Ht 73\" (185.4 cm)  Wt 240 lb 1.6 oz (109 kg)  SpO2 98%  BMI 31.68 kg/m2  Physical Exam   Constitutional: He is oriented to person, place, and time. He appears well-developed and well-nourished. No distress.   HENT:   Head: Normocephalic and atraumatic.   Right Ear: External ear normal.   Left Ear: External ear normal.   Nose: Nose normal.   Mouth/Throat: Oropharynx is clear and moist. No oropharyngeal exudate.   Eyes: Conjunctivae and EOM are normal. Pupils are equal, round, and reactive to light. Right eye exhibits no discharge. Left eye exhibits no discharge.   Neck: Normal range of motion. Neck supple.   Cardiovascular: Normal rate, regular rhythm, normal heart sounds and intact distal pulses.    Pulmonary/Chest: Effort normal and breath sounds normal. No respiratory distress.   Abdominal: Soft. Bowel sounds are normal.   Musculoskeletal:   Left bka "   Neurological: He is alert and oriented to person, place, and time.   Skin: Skin is warm and dry. He is not diaphoretic.   Psychiatric: He has a normal mood and affect. His behavior is normal.   Nursing note and vitals reviewed.      CONDITION ON DISCHARGE:   Stable  Review of Systems   Constitutional: Positive for fatigue. Negative for activity change, appetite change and fever.   HENT: Negative for ear pain and sore throat.    Eyes: Negative for pain and visual disturbance.   Respiratory: Negative for cough and shortness of breath.    Cardiovascular: Negative for chest pain and palpitations.   Gastrointestinal: Negative for abdominal pain and nausea.   Endocrine: Negative for cold intolerance and heat intolerance.   Genitourinary: Negative for difficulty urinating and dysuria.   Musculoskeletal: Positive for arthralgias. Negative for gait problem.   Skin: Negative for color change and rash.   Neurological: Positive for weakness. Negative for dizziness and headaches.   Hematological: Negative for adenopathy. Does not bruise/bleed easily.   Psychiatric/Behavioral: Negative for agitation, confusion and sleep disturbance.       DISPOSITION:  Skilled Nursing Facility (DC - External)    DISCHARGE MEDICATIONS   BenedictArmando   Home Medication Instructions MARBELLA:[id number]    Printed on:07/12/17 9864   Medication Information                      Bacitracin Zinc (MAGIC BUTT OINTMENT)  Apply 1 g topically 3 (Three) Times a Day.             furosemide (LASIX) 40 MG tablet  Take 1 tablet by mouth 2 (Two) Times a Day.             gabapentin (NEURONTIN) 100 MG capsule  Take 300 mg by mouth 3 (Three) Times a Day.             Incontinence Supplies (BEDPAN) misc  Bedpan (Dx occasional incontinence, weakness, AMS related to hepatic encephalopathy)             lactulose (CHRONULAC) 10 GM/15ML solution  Take 45 mL by mouth 4 (Four) Times a Day.             linezolid (ZYVOX) 600 MG tablet  Take 1 tablet by mouth 2 (Two) Times a  Day.             lisinopril (PRINIVIL,ZESTRIL) 20 MG tablet  Take 20 mg by mouth Daily.             Misc. Devices (COMMODE BEDSIDE) misc  Large bedside commode (Dx hepatic encephalopathy, LLE amputation)             Multiple Vitamins-Minerals (COMPLETE MULTIVITAMIN/MINERAL PO)  Take 1 tablet by mouth Daily.             nystatin (MYCOSTATIN) 706996 UNIT/GM powder  Apply  topically Every 12 (Twelve) Hours.             nystatin susp + lidocaine viscous (MAGIC MOUTHWASH) oral suspension  Swish and swallow 5 mL 4 (Four) Times a Day.             pantoprazole (PROTONIX) 40 MG EC tablet  Take 1 tablet by mouth Daily.             potassium chloride (K-DUR,KLOR-CON) 20 MEQ CR tablet  Take 1 tablet by mouth Daily.             vitamin D (ERGOCALCIFEROL) 39892 UNITS capsule capsule  Take 1 capsule by mouth Every 7 (Seven) Days.                 INSTRUCTIONS:  Activity:   Activity Instructions     Activity as Tolerated                   Diet:   Diet Instructions     Advance Diet As Tolerated                     Special instructions: Patient instructed to call MD or return to ED with worsening shortness of breath, chest pain, fever greater than 100.4 degrees F or any other medical concerns..    FOLLOW UP:   Follow-up Information     Follow up with Cj Aguero MD .    Specialty:  Family Medicine    Contact information:    [address] [facility] [place] [address] [phone]            PENDING TEST RESULTS AT DISCHARGE      Time: Discharge 35 min          This document has been electronically signed by Lyndon Booker MD on July 12, 2017 12:50 PM

## 2017-07-12 NOTE — DISCHARGE PLACEMENT REQUEST
"Lamont Mcmullen (52 y.o. Male)     Date of Birth Social Security Number Address Home Phone MRN    1965  [address]  [facility] [address] [phone] [id number]    Orthodoxy Marital Status          Other        Admission Date Admission Type Admitting Provider Attending Provider Department, Room/Bed    6/30/17 Emergency Echendu, MD Isis Dietrich Samer, MD [address], 428/1    Discharge Date Discharge Disposition Discharge Destination         Skilled Nursing Facility (DC - External)             Attending Provider: Santiago Marinelli MD     Allergies:  Aspirin, Codeine Sulfate    Isolation:  None   Infection:  None   Code Status:  FULL    Ht:  73\" (185.4 cm)   Wt:  240 lb 1.6 oz (109 kg)    Admission Cmt:  None   Principal Problem:  Cellulitis of right lower extremity [L03.115] More...                 Active Insurance as of 6/30/2017     Primary Coverage     Payor Plan Insurance Group Employer/Plan Group    [place] [place] [facility] [place]      Payor Plan Address Payor Plan Phone Number Effective From Effective To    [address] [address]  1/1/2014     [place] [address]       Subscriber Name Subscriber Birth Date Member ID       [id number] 1965 [id number]                 Emergency Contacts      (Rel.) Home Phone Work Phone Mobile Phone    Loco Mcmullen (Daughter) -- -- [phone]    BenedictMiguel (Son) -- -- [phone]    Shreyas Mcmullen (Brother) -- -- [phone]            Insurance Information                [place] [facility] [place] [place] Phone:     Subscriber: Lamont Mcmullen Subscriber#: [id number]    Group#:  Precert#:           Vital Signs (last 72 hrs)       07/09 0700  -  07/10 0659 07/10 0700  -  07/11 0659 07/11 0700  -  07/12 0659 07/12 0700  -  07/12 1523   Most Recent    Temp (°F) 96.6 -  97    96.8 -  97.9    96.1 -  97.4      97.6     97.6 (36.4)    Heart Rate 77 -  87    85 -  98    88 -  94    84 -  99     " 99    Resp   18    16 -  20    16 -  20      18     18    /72 -  134/76    119/83 -  147/91    114/78 -  134/80      134/84     134/84    SpO2 (%) 96 -  98    93 -  98    94 -  98    96 -  98     96          Hospital Medications (active)       Dose Frequency Start End    acetaminophen (TYLENOL) tablet 650 mg 650 mg Every 6 Hours PRN 6/30/2017     Sig - Route: Take 2 tablets by mouth Every 6 (Six) Hours As Needed for Mild Pain (1-3). - Oral    bacitracin ointment  Every 24 Hours Scheduled 7/6/2017     Sig - Route: Apply  topically Daily. - Topical    clotrimazole (LOTRIMIN) 1 % external solution  Every 12 Hours Scheduled 7/1/2017 7/14/2017    Sig - Route: Apply  topically Every 12 (Twelve) Hours. - Topical    fluconazole (DIFLUCAN) tablet 200 mg 200 mg Daily 7/1/2017     Sig - Route: Take 1 tablet by mouth Daily. - Oral    furosemide (LASIX) tablet 40 mg 40 mg Daily 7/10/2017     Sig - Route: Take 1 tablet by mouth Daily. - Oral    gabapentin (NEURONTIN) capsule 300 mg 300 mg 3 Times Daily 6/30/2017     Sig - Route: Take 1 capsule by mouth 3 (Three) Times a Day. - Oral    hydrALAZINE (APRESOLINE) injection 10 mg 10 mg Every 6 Hours PRN 6/30/2017     Sig - Route: Infuse 0.5 mL into a venous catheter Every 6 (Six) Hours As Needed for High Blood Pressure. - Intravenous    ipratropium-albuterol (DUO-NEB) nebulizer solution 3 mL 3 mL 4 Times Daily - RT 6/30/2017     Sig - Route: Take 3 mL by nebulization 4 (Four) Times a Day. - Nebulization    lactulose (CHRONULAC) 30 g 30 g 3 Times Daily 7/1/2017     Sig - Route: Take 30 g by mouth 3 (Three) Times a Day. - Oral    levoFLOXacin (LEVAQUIN) 750 mg/150 mL D5W (premix) (LEVAQUIN) 750 mg 750 mg Every 48 Hours 7/6/2017     Sig - Route: Infuse 150 mL into a venous catheter Every Other Day. - Intravenous    Linezolid (ZYVOX) 600 mg 300 mL 600 mg Every 12 Hours 7/6/2017     Sig - Route: Infuse 300 mL into a venous catheter Every 12 (Twelve) Hours. - Intravenous    Cosign for  "Ordering: Accepted by Allen Hercules MD on 7/6/2017  3:25 PM    magic butt ointment  3 Times Daily 7/6/2017     Sig - Route: Apply  topically 3 (Three) Times a Day. - Topical    Morphine sulfate (PF) injection 1 mg 1 mg Every 4 Hours PRN 7/3/2017 7/13/2017    Sig - Route: Infuse 0.5 mL into a venous catheter Every 4 (Four) Hours As Needed for Severe Pain (7-10). - Intravenous    ondansetron (ZOFRAN) injection 4 mg 4 mg Every 6 Hours PRN 6/30/2017     Sig - Route: Infuse 2 mL into a venous catheter Every 6 (Six) Hours As Needed for Nausea or Vomiting. - Intravenous    pantoprazole (PROTONIX) EC tablet 40 mg 40 mg Daily 7/1/2017     Sig - Route: Take 1 tablet by mouth Daily. - Oral    Pharmacy to Dose LevoFLOXacin (LEVAQUIN)  Continuous PRN 7/6/2017     Sig - Route: Continuous As Needed for Consult (PO per renal dosing). - Does not apply    Cosign for Ordering: Accepted by Allen Hercules MD on 7/6/2017  3:25 PM    potassium chloride 10 mEq in 100 mL IVPB 10 mEq Every 1 Hour PRN 6/30/2017     Sig - Route: Infuse 100 mL into a venous catheter Every 1 (One) Hour As Needed (See admin Instructions.). - Intravenous    sodium chloride 0.9 % flush 10 mL 10 mL As Needed 6/30/2017     Sig - Route: Infuse 10 mL into a venous catheter As Needed for Line Care. - Intravenous    Cosign for Ordering: Accepted by Roque Parker MD on 6/30/2017 12:58 PM    Linked Group 1:  \"And\" Linked Group Details        sodium chloride 0.9 % flush 10 mL 10 mL As Needed 6/30/2017     Sig - Route: Infuse 10 mL into a venous catheter As Needed for Line Care. - Intravenous    temazepam (RESTORIL) capsule 15 mg 15 mg Nightly PRN 7/6/2017 7/16/2017    Sig - Route: Take 1 capsule by mouth At Night As Needed for Sleep. - Oral             Physician Progress Notes (last 72 hours) (Notes from 7/9/2017  3:24 PM through 7/12/2017  3:24 PM)      Josep Colón MD at 7/10/2017 11:05 AM  Version 1 of 1         [facility]  [address] " "[place]. [address]   - [phone]  F - [phone]     Progress Note          PATIENT  DEMOGRAPHICS   PATIENT NAME: Armando Mcmullen                      PHYSICIAN: Josep Colón MD  : 1965  MRN: [id number]   LOS: 9 days    Patient Care Team:  Cj Aguero MD as PCP - General  Shawn Cortez MD as Surgeon (Orthopedic Surgery)  Tushar Becerril DO as Consulting Physician (Gastroenterology)  Josep Colón MD as Consulting Physician (Nephrology)  Subjective   SUBJECTIVE   Tired no soa         Objective   OBJECTIVE   Vital Signs  Temp:  [96.6 °F (35.9 °C)-97 °F (36.1 °C)] 96.8 °F (36 °C)  Heart Rate:  [82-89] 87  Resp:  [16-18] 18  BP: (126-140)/(72-95) 140/95    Flowsheet Rows         First Filed Value    Admission Height  73\" (185.4 cm) Documented at 2017 1108    Admission Weight  233 lb 8 oz (106 kg) Documented at 2017 1108           I/O last 3 completed shifts:  In: 4118.3 [P.O.:1620; I.V.:998.3; IV Piggyback:1500]  Out: 2000 [Urine:2000]    PHYSICAL EXAM    Physical Exam   Constitutional: He is oriented to person, place, and time. He appears well-developed.   HENT:   Head: Normocephalic.   Eyes: Pupils are equal, round, and reactive to light.   Cardiovascular: Normal rate, regular rhythm and normal heart sounds.    Pulmonary/Chest: Effort normal and breath sounds normal.   Abdominal: Soft. Bowel sounds are normal.   Musculoskeletal: He exhibits no edema.   Left bka   Neurological: He is alert and oriented to person, place, and time.       RESULTS   Results Review:      Results from last 7 days  Lab Units 07/10/17  0753 17  0623 17  0725   SODIUM mmol/L 130* 131* 128*   POTASSIUM mmol/L 3.9 4.0 3.6   CHLORIDE mmol/L 99 100 97   CO2 mmol/L 23.0 24.0 25.0   BUN mg/dL 18 20 20   CREATININE mg/dL 1.44* 1.64* 1.82*   CALCIUM mg/dL 8.5 8.4 8.5   BILIRUBIN mg/dL 2.6* 2.5* 3.1*   ALK PHOS U/L 188* 168* 193*   ALT (SGPT) U/L 59 65 74*   AST (SGOT) U/L 42 32 35 "   GLUCOSE mg/dL 103* 95 107*       Estimated Creatinine Clearance: 73.6 mL/min (by C-G formula based on Cr of 1.44).                  Results from last 7 days  Lab Units 07/10/17  0753 17  0623 17  0725 17  0547 17  0603   WBC 10*3/mm3 4.90 5.43 6.82 11.48* 12.09*   HEMOGLOBIN g/dL 8.9* 8.3* 8.7* 9.2* 9.3*   PLATELETS 10*3/mm3 23* 21* 26* 45* 63*               Imaging Results (last 24 hours)     ** No results found for the last 24 hours. **           MEDICATIONS      bacitracin  Topical Q24H   clotrimazole  Topical Q12H   fluconazole 200 mg Oral Daily   furosemide 40 mg Oral Daily   gabapentin 300 mg Oral TID   ipratropium-albuterol 3 mL Nebulization 4x Daily - RT   lactulose (CHRONULAC) 30 g 30 g Oral TID   levoFLOXacin 750 mg Intravenous Q48H   Linezolid 600 mg Intravenous Q12H   magic butt ointment  Topical TID   pantoprazole 40 mg Oral Daily       Pharmacy to Dose LevoFLOXacin (LEVAQUIN)        Assessment/Plan   ASSESSMENT / PLAN    Active Problems:    Cellulitis of right lower extremity    1- CHRIS on CKD 3 baseline cr is close to 1.4-1.5. Cr is mildly elevated and is upto 2.1 it could be a combination of vancomycin and lasix. Cr is at baseline. Now back on furosemide. Off lisinopril and we can resume in out pt setting. F/u in office in 3 weeks. Ct with zyvox     2- hepatic encephalopathy, liver cirrhosis currently stable     3- Cellulitis of right lower extremity on antibiotics and it is improving              This document has been electronically signed by Josep Colón MD on July 10, 2017 11:06 AM            Electronically signed by Josep Colón MD at 7/10/2017 11:07 AM      Lyndon Booker MD at 7/10/2017 12:56 PM  Version 1 of 1         MEDICINE DAILY PROGRESS NOTE  NAME: Armando Mcmullen  : 1965  MRN: [id number]     LOS: 9 days     PROVIDER OF SERVICE: Lyndon Booker MD    Chief Complaint: <principal problem not specified>    Subjective:     Interval History:  History taken from:  patient  Doing okay today.  Feeling tired.    Review of Systems:   Review of Systems   Constitutional: Positive for fatigue. Negative for activity change, appetite change and fever.   HENT: Negative for ear pain and sore throat.    Eyes: Negative for pain and visual disturbance.   Respiratory: Negative for cough and shortness of breath.    Cardiovascular: Negative for chest pain and palpitations.   Gastrointestinal: Negative for abdominal pain and nausea.   Endocrine: Negative for cold intolerance and heat intolerance.   Genitourinary: Negative for difficulty urinating and dysuria.   Musculoskeletal: Positive for arthralgias. Negative for gait problem.   Skin: Negative for color change and rash.   Neurological: Positive for weakness. Negative for dizziness and headaches.   Hematological: Negative for adenopathy. Does not bruise/bleed easily.   Psychiatric/Behavioral: Negative for agitation, confusion and sleep disturbance.       Objective:     Vital Signs  Vitals:    07/10/17 0400 07/10/17 0712 07/10/17 0751 07/10/17 1051   BP: 126/72  140/95    BP Location: Left arm  Right arm    Patient Position: Lying  Lying    Pulse: 85  89 87   Resp: 18  16 18   Temp: 97 °F (36.1 °C)  96.8 °F (36 °C)    TempSrc: Temporal Artery   Axillary    SpO2: 96% 96% 94% 97%   Weight:       Height:           Physical Exam  Physical Exam   Constitutional: He is oriented to person, place, and time. He appears well-developed and well-nourished. No distress.   HENT:   Head: Normocephalic.   Right Ear: External ear normal.   Left Ear: External ear normal.   Nose: Nose normal.   Mouth/Throat: Oropharynx is clear and moist.   Eyes: Conjunctivae and EOM are normal. Pupils are equal, round, and reactive to light. Right eye exhibits no discharge. Left eye exhibits no discharge.   Neck: Normal range of motion. Neck supple.   Cardiovascular: Normal rate, regular rhythm and normal heart sounds.    Pulmonary/Chest: Effort normal and breath sounds  normal.   Abdominal: Soft. Bowel sounds are normal.   Musculoskeletal: He exhibits no edema.   Left bka   Neurological: He is alert and oriented to person, place, and time.   Skin: He is not diaphoretic.   Nursing note and vitals reviewed.      Medication Review    Current Facility-Administered Medications:   •  acetaminophen (TYLENOL) tablet 650 mg, 650 mg, Oral, Q6H PRN, Santiago Marinelli MD, 650 mg at 07/02/17 0854  •  bacitracin ointment, , Topical, Q24H, KENROY Goode  •  clotrimazole (LOTRIMIN) 1 % external solution, , Topical, Q12H, Santiago Marinelli MD  •  fluconazole (DIFLUCAN) tablet 200 mg, 200 mg, Oral, Daily, Armando Richard MD, 200 mg at 07/10/17 0924  •  furosemide (LASIX) tablet 40 mg, 40 mg, Oral, Daily, Josep Colón MD, 40 mg at 07/10/17 0924  •  gabapentin (NEURONTIN) capsule 300 mg, 300 mg, Oral, TID, Santiago Marinelli MD, 300 mg at 07/10/17 0924  •  hydrALAZINE (APRESOLINE) injection 10 mg, 10 mg, Intravenous, Q6H PRN, Santiago Marinelli MD  •  ipratropium-albuterol (DUO-NEB) nebulizer solution 3 mL, 3 mL, Nebulization, 4x Daily - RT, Santiago Marinelli MD, 3 mL at 07/10/17 1051  •  lactulose (CHRONULAC) 30 g, 30 g, Oral, TID, Santiago Marinelli MD, 30 g at 07/10/17 0924  •  levoFLOXacin (LEVAQUIN) 750 mg/150 mL D5W (premix) (LEVAQUIN) 750 mg, 750 mg, Intravenous, Q48H, KENROY Goode, 750 mg at 07/08/17 1330  •  Linezolid (ZYVOX) 600 mg 300 mL, 600 mg, Intravenous, Q12H, KENROY Goode, Last Rate: 300 mL/hr at 07/10/17 1220, 600 mg at 07/10/17 1220  •  magic butt ointment, , Topical, TID, KENROY Goode  •  Morphine sulfate (PF) injection 1 mg, 1 mg, Intravenous, Q4H PRN, Harvinder Chan MD, 1 mg at 07/10/17 0935  •  ondansetron (ZOFRAN) injection 4 mg, 4 mg, Intravenous, Q6H PRN, Santiago Marinelli MD  •  pantoprazole (PROTONIX) EC tablet 40 mg, 40 mg, Oral, Daily, Santiago Marinelli MD, 40 mg at 07/10/17 0924  •  Pharmacy to Dose LevoFLOXacin (LEVAQUIN), , Does not apply, Continuous PRN,  KENROY Goode  •  potassium chloride 10 mEq in 100 mL IVPB, 10 mEq, Intravenous, Q1H PRN, Santiago Marinelli MD, Last Rate: 100 mL/hr at 07/01/17 0312, 10 mEq at 07/01/17 0312  •  Insert peripheral IV, , , Once **AND** sodium chloride 0.9 % flush 10 mL, 10 mL, Intravenous, PRN, KALEB Flores, 10 mL at 07/08/17 1609  •  sodium chloride 0.9 % flush 10 mL, 10 mL, Intravenous, PRN, Santiago Marinelli MD, 10 mL at 07/09/17 2021  •  temazepam (RESTORIL) capsule 15 mg, 15 mg, Oral, Nightly PRN, Harvinder Chan MD, 15 mg at 07/09/17 2308     Diagnostic Data    Lab Results (last 24 hours)     Procedure Component Value Units Date/Time    POC Glucose Fingerstick [[id number]]  (Abnormal) Collected:  07/09/17 1650    Specimen:  Blood Updated:  07/09/17 1711     Glucose 148 (H) mg/dL       RN NotifiedMeter: [id number]: [id number] ELISABETH BROWN       POC Glucose Fingerstick [[id number]]  (Normal) Collected:  07/09/17 1951    Specimen:  Blood Updated:  07/09/17 2025     Glucose 105 mg/dL       Meter: [id number]: [id number] PHILLIP ADAMS       Sodium, Urine, Random [[id number]]  (Normal) Collected:  07/10/17 0444    Specimen:  Urine from Urine, Clean Catch Updated:  07/10/17 0509     Sodium, Urine 33 mmol/L     Wound Culture [[id number]]  (Normal) Collected:  07/06/17 1900    Specimen:  Wound from Leg Updated:  07/10/17 0634     Wound Culture No growth at 3 days     Gram Stain Result Few (2+) WBCs seen      No organisms seen    POC Glucose Fingerstick [[id number]]  (Normal) Collected:  07/10/17 0751    Specimen:  Blood Updated:  07/10/17 0834     Glucose 124 mg/dL       RN NotifiedMeter: [id number]: [id number] ROSALINA PETIT       Comprehensive Metabolic Panel [[id number]]  (Abnormal) Collected:  07/10/17 0753    Specimen:  Blood Updated:  07/10/17 1000     Glucose 103 (H) mg/dL      BUN 18 mg/dL      Creatinine 1.44 (H) mg/dL      Sodium 130 (L) mmol/L      Potassium 3.9 mmol/L      Chloride 99 mmol/L       CO2 23.0 mmol/L      Calcium 8.5 mg/dL      Total Protein 5.2 (L) g/dL      Albumin 2.30 (L) g/dL      ALT (SGPT) 59 U/L      AST (SGOT) 42 U/L      Alkaline Phosphatase 188 (H) U/L      Total Bilirubin 2.6 (H) mg/dL      eGFR Non African Amer 51 (L) mL/min/1.73      Globulin 2.9 gm/dL      A/G Ratio 0.8 (L) g/dL      BUN/Creatinine Ratio 12.5     Anion Gap 8.0 mmol/L     Urine Eosinophils, Johnson's Stain [[id number]]  (Normal) Collected:  07/10/17 0444    Specimen:  Urine from Urine, Clean Catch Updated:  07/10/17 1009     Johnson Stain Negative    CBC Auto Differential [[id number]]  (Abnormal) Collected:  07/10/17 0753    Specimen:  Blood Updated:  07/10/17 1021     WBC 4.90 10*3/mm3      RBC 2.56 (L) 10*6/mm3      Hemoglobin 8.9 (L) g/dL      Hematocrit 25.6 (L) %      .0 (H) fL      MCH 34.8 (H) pg      MCHC 34.8 g/dL      RDW 19.1 (H) %      RDW-SD 69.0 (H) fl      MPV 11.3 fL      Platelets 23 (C) 10*3/mm3       PREVIOUSLY DOCUMENTED ALERT        Neutrophil % 57.4 %      Lymphocyte % 24.7 %      Monocyte % 5.5 %      Eosinophil % 11.6 (H) %      Basophil % 0.4 %      Immature Grans % 0.4 %      Neutrophils, Absolute 2.81 10*3/mm3      Lymphocytes, Absolute 1.21 10*3/mm3      Monocytes, Absolute 0.27 10*3/mm3      Eosinophils, Absolute 0.57 10*3/mm3      Basophils, Absolute 0.02 10*3/mm3      Immature Grans, Absolute 0.02 10*3/mm3     Manual Differential [[id number]]  (Abnormal) Collected:  07/10/17 0753    Specimen:  Blood Updated:  07/10/17 1057     Neutrophil % 44.0 %      Lymphocyte % 28.0 %      Monocyte % 5.0 %      Eosinophil % 9.0 (H) %      Bands %  14.0 (H) %      Neutrophils Absolute 2.84 10*3/mm3      Lymphocytes Absolute 1.37 10*3/mm3      Monocytes Absolute 0.25 10*3/mm3      Eosinophils Absolute 0.44 10*3/mm3      Anisocytosis Slight/1+     Macrocytes Slight/1+      Rare hypochromic and polychromic cells observed. Few ovalocytes observed        WBC Morphology Normal     Platelet Estimate  Decreased    CBC & Differential [[id number]] Collected:  07/10/17 0753    Specimen:  Blood Updated:  07/10/17 1057    Narrative:       The following orders were created for panel order CBC & Differential.  Procedure                               Abnormality         Status                     ---------                               -----------         ------                     Manual Differential[[id number]]          Abnormal            Final result               Scan Slide[[id number]]                                                                  CBC Auto Differential[[id number]]        Abnormal            Final result                 Please view results for these tests on the individual orders.    POC Glucose Fingerstick [[id number]]  (Normal) Collected:  07/10/17 1108    Specimen:  Blood Updated:  07/10/17 1146     Glucose 100 mg/dL       Meter: [id number]: [id number] ROSALINA PETIT I reviewed the patient's new clinical results.    Assessment/Plan:     Active Hospital Problems (** Indicates Principal Problem)    Diagnosis Date Noted   • **Cellulitis of right lower extremity [L03.115] 06/30/2017     Zyvox     • Acute renal failure superimposed on stage 3 chronic kidney disease [N17.9, N18.3] 07/10/2017     Nephrology following.  Creatinine close to baseline.  Monitor.     • Thrombocytopenia [D69.6] 07/10/2017     Monitoring platelets.  Got platelet transfusion earlier in admission.  No signs of active bleeding at this time.  Will monitor and transfuse as needed.  Heme/Onc talked with hospitalist yesterday and suggested outpatient follow up.  Will consult as needed.     • Fracture of right humerus [S42.301A] 07/10/2017     Per patient long standing.  Injured right arm at [facility].        Resolved Hospital Problems    Diagnosis Date Noted Date Resolved   No resolved problems to display.       Will monitor patient's hospital course and adjust treatment as hospital course dictates.    DVT  prophylaxis: None.  Thrombocytopenia; left bka; right LE cellulitis.  Code status is Full Code    Plan for disposition:Where: SNF and rehab and When:  when ready      Time:           This document has been electronically signed by Lyndon Booker MD on July 10, 2017 12:56 PM           Electronically signed by Lyndon Booker MD at 7/10/2017  1:11 PM      Lyndon Booker MD at 2017  3:18 PM  Version 1 of          MEDICINE DAILY PROGRESS NOTE  NAME: Armando Mcmullen  : 1965  MRN: [id number]     LOS: 10 days     PROVIDER OF SERVICE: Lyndon Booker MD    Chief Complaint: Cellulitis of right lower extremity    Subjective:     Interval History:  History taken from: patient  Doing the same as yesterday per patient.  Fatigued.    Review of Systems:   Review of Systems   Constitutional: Positive for fatigue. Negative for activity change, appetite change and fever.   HENT: Negative for ear pain and sore throat.    Eyes: Negative for pain and visual disturbance.   Respiratory: Negative for cough and shortness of breath.    Cardiovascular: Negative for chest pain and palpitations.   Gastrointestinal: Negative for abdominal pain and nausea.   Endocrine: Negative for cold intolerance and heat intolerance.   Genitourinary: Negative for difficulty urinating and dysuria.   Musculoskeletal: Positive for arthralgias. Negative for gait problem.   Skin: Negative for color change and rash.   Neurological: Positive for weakness. Negative for dizziness and headaches.   Hematological: Negative for adenopathy. Does not bruise/bleed easily.   Psychiatric/Behavioral: Negative for agitation, confusion and sleep disturbance.       Objective:     Vital Signs  Vitals:    07/10/17 2038 07/10/17 2056 17 0444 17 0733   BP: 147/91  119/83 114/78   BP Location: Left arm  Left arm Left arm   Patient Position: Lying  Lying Lying   Pulse: 94 98 94 91   Resp: 18 20 20 18   Temp: 97.5 °F (36.4 °C)  97.9 °F (36.6 °C) 97.4 °F (36.3 °C)    TempSrc: Oral  Oral Oral   SpO2: 93% 97% 94% 98%   Weight:   240 lb 1.6 oz (109 kg)    Height:           Physical Exam  Physical Exam   Constitutional: He is oriented to person, place, and time. He appears well-developed and well-nourished. No distress.   HENT:   Head: Normocephalic.   Right Ear: External ear normal.   Left Ear: External ear normal.   Nose: Nose normal.   Mouth/Throat: Oropharynx is clear and moist.   Eyes: Conjunctivae and EOM are normal. Pupils are equal, round, and reactive to light. Right eye exhibits no discharge. Left eye exhibits no discharge.   Neck: Normal range of motion. Neck supple.   Cardiovascular: Normal rate, regular rhythm and normal heart sounds.    Pulmonary/Chest: Effort normal and breath sounds normal.   Abdominal: Soft. Bowel sounds are normal.   Musculoskeletal: He exhibits no edema.   Left bka   Neurological: He is alert and oriented to person, place, and time.   Skin: He is not diaphoretic.   Nursing note and vitals reviewed.      Medication Review    Current Facility-Administered Medications:   •  acetaminophen (TYLENOL) tablet 650 mg, 650 mg, Oral, Q6H PRN, Santiago Marinelli MD, 650 mg at 07/02/17 0854  •  bacitracin ointment, , Topical, Q24H, KENROY Goode  •  clotrimazole (LOTRIMIN) 1 % external solution, , Topical, Q12H, Santiago Marinelli MD  •  fluconazole (DIFLUCAN) tablet 200 mg, 200 mg, Oral, Daily, Armando Richard MD, 200 mg at 07/11/17 0840  •  furosemide (LASIX) tablet 40 mg, 40 mg, Oral, Daily, Josep Colón MD, 40 mg at 07/11/17 0840  •  gabapentin (NEURONTIN) capsule 300 mg, 300 mg, Oral, TID, Santiago Marinelli MD, 300 mg at 07/11/17 0840  •  hydrALAZINE (APRESOLINE) injection 10 mg, 10 mg, Intravenous, Q6H PRN, Santiago Marinelli MD  •  ipratropium-albuterol (DUO-NEB) nebulizer solution 3 mL, 3 mL, Nebulization, 4x Daily - RT, Santaigo Marinelli MD, 3 mL at 07/10/17 2056  •  lactulose (CHRONULAC) 30 g, 30 g, Oral, TID, Jessir MD Isis, 30 g at 07/11/17 0841  •   levoFLOXacin (LEVAQUIN) 750 mg/150 mL D5W (premix) (LEVAQUIN) 750 mg, 750 mg, Intravenous, Q48H, KENROY Goode, 750 mg at 07/10/17 1316  •  Linezolid (ZYVOX) 600 mg 300 mL, 600 mg, Intravenous, Q12H, KENROY Goode, Last Rate: 300 mL/hr at 07/11/17 1403, 600 mg at 07/11/17 1403  •  magic butt ointment, , Topical, TID, KENROY Goode  •  Morphine sulfate (PF) injection 1 mg, 1 mg, Intravenous, Q4H PRN, Harvinder Chan MD, 1 mg at 07/11/17 1108  •  ondansetron (ZOFRAN) injection 4 mg, 4 mg, Intravenous, Q6H PRN, Santiago Marinelli MD  •  pantoprazole (PROTONIX) EC tablet 40 mg, 40 mg, Oral, Daily, Santiago Marinelli MD, 40 mg at 07/11/17 0840  •  Pharmacy to Dose LevoFLOXacin (LEVAQUIN), , Does not apply, Continuous PRN, KENROY Goode  •  potassium chloride 10 mEq in 100 mL IVPB, 10 mEq, Intravenous, Q1H PRN, Santiago Marinelli MD, Last Rate: 100 mL/hr at 07/01/17 0312, 10 mEq at 07/01/17 0312  •  Insert peripheral IV, , , Once **AND** sodium chloride 0.9 % flush 10 mL, 10 mL, Intravenous, PRN, KALEB Flores, 10 mL at 07/11/17 0108  •  sodium chloride 0.9 % flush 10 mL, 10 mL, Intravenous, PRN, Santiago Marinelli MD, 10 mL at 07/09/17 2021  •  temazepam (RESTORIL) capsule 15 mg, 15 mg, Oral, Nightly PRN, Harvinder Chan MD, 15 mg at 07/10/17 2249     Diagnostic Data    Lab Results (last 24 hours)     Procedure Component Value Units Date/Time    Wound Culture [[id number]]  (Normal) Collected:  07/06/17 1900    Specimen:  Wound from Leg Updated:  07/11/17 0637     Wound Culture No growth at 4 days     Gram Stain Result Few (2+) WBCs seen      No organisms seen    CBC & Differential [[id number]] Collected:  07/11/17 0637    Specimen:  Blood Updated:  07/11/17 0721    Narrative:       The following orders were created for panel order CBC & Differential.  Procedure                               Abnormality         Status                     ---------                               -----------         ------                      Scan Slide[[id number]]                                                                  CBC Auto Differential[[id number]]        Abnormal            Final result                 Please view results for these tests on the individual orders.    CBC Auto Differential [[id number]]  (Abnormal) Collected:  07/11/17 0637    Specimen:  Blood Updated:  07/11/17 0721     WBC 4.81 10*3/mm3      RBC 2.47 (L) 10*6/mm3      Hemoglobin 8.4 (L) g/dL      Hematocrit 24.4 (L) %      MCV 98.8 (H) fL      MCH 34.0 pg      MCHC 34.4 g/dL      RDW 19.7 (H) %      RDW-SD 70.4 (H) fl      MPV -- fL       INSTRUMENT UNABLE TO CALCULATE RESULTS        Platelets 22 (C) 10*3/mm3      Neutrophil % 57.2 %      Lymphocyte % 23.7 %      Monocyte % 8.1 %      Eosinophil % 9.6 (H) %      Basophil % 1.0 %      Immature Grans % 0.4 %      Neutrophils, Absolute 2.75 10*3/mm3      Lymphocytes, Absolute 1.14 10*3/mm3      Monocytes, Absolute 0.39 10*3/mm3      Eosinophils, Absolute 0.46 10*3/mm3      Basophils, Absolute 0.05 10*3/mm3      Immature Grans, Absolute 0.02 10*3/mm3     Comprehensive Metabolic Panel [[id number]]  (Abnormal) Collected:  07/11/17 0637    Specimen:  Blood Updated:  07/11/17 0735     Glucose 96 mg/dL      BUN 18 mg/dL      Creatinine 1.49 (H) mg/dL      Sodium 130 (L) mmol/L      Potassium 3.6 mmol/L      Chloride 100 mmol/L      CO2 23.0 mmol/L      Calcium 8.4 mg/dL      Total Protein 5.0 (L) g/dL      Albumin 2.20 (L) g/dL      ALT (SGPT) 57 U/L      AST (SGOT) 39 U/L      Alkaline Phosphatase 190 (H) U/L      Total Bilirubin 2.5 (H) mg/dL      eGFR Non African Amer 49 (L) mL/min/1.73      Globulin 2.8 gm/dL      A/G Ratio 0.8 (L) g/dL      BUN/Creatinine Ratio 12.1     Anion Gap 7.0 mmol/L           I reviewed the patient's new clinical results.    Assessment/Plan:     Active Hospital Problems (** Indicates Principal Problem)    Diagnosis Date Noted   • **Cellulitis of right lower extremity [L03.115] 06/30/2017      [facility]     • Acute renal failure superimposed on stage 3 chronic kidney disease [N17.9, N18.3] 07/10/2017     Nephrology following.  Creatinine close to baseline.  Monitor.     • Thrombocytopenia [D69.6] 07/10/2017     7/11.  Platelets are stable.  Discussed case with Dr. Ferreira (Heme/Onc).  Recommended continuing to monitor platelets.  If stable okay to discharge, but should return to hospital if experiencing bleeding.    7/10. Monitoring platelets.  Got platelet transfusion earlier in admission.  No signs of active bleeding at this time.  Will monitor and transfuse as needed.  Clarissa/Onc talked with hospitalist yesterday and suggested outpatient follow up.  Will consult as needed.     • Fracture of right humerus [S42.301A] 07/10/2017     Per patient long standing.  Injured right arm at [facility].        Resolved Hospital Problems    Diagnosis Date Noted Date Resolved   No resolved problems to display.     Will monitor patient's hospital course and adjust treatment as hospital course dictates.    DVT prophylaxis: None.  Thrombocytopenia; left bka; right LE cellulitis.    Code status is Full Code    Plan for disposition:Where: SNF and rehab and When:  when ready      Time:           This document has been electronically signed by Lyndon Booker MD on July 11, 2017 3:18 PM           Electronically signed by Lyndon Booker MD at 7/11/2017  3:21 PM        Physical Therapy Notes (last 24 hours) (Notes from 7/11/2017  3:24 PM through 7/12/2017  3:24 PM)     No notes of this type exist for this encounter.        Occupational Therapy Notes (last 24 hours) (Notes from 7/11/2017  3:24 PM through 7/12/2017  3:24 PM)     No notes of this type exist for this encounter.

## 2017-07-12 NOTE — PLAN OF CARE
Problem: Patient Care Overview (Adult)  Goal: Plan of Care Review  Outcome: Ongoing (interventions implemented as appropriate)    Problem: Pain, Acute (Adult)  Goal: Acceptable Pain Control/Comfort Level  Outcome: Ongoing (interventions implemented as appropriate)    Problem: Cellulitis (Adult)  Goal: Signs and Symptoms of Listed Potential Problems Will be Absent or Manageable (Cellulitis)  Outcome: Ongoing (interventions implemented as appropriate)    Problem: Pressure Ulcer Risk (Cristiano Scale) (Adult,Obstetrics,Pediatric)  Goal: Identify Related Risk Factors and Signs and Symptoms  Outcome: Ongoing (interventions implemented as appropriate)  Goal: Skin Integrity  Outcome: Ongoing (interventions implemented as appropriate)

## 2017-07-13 NOTE — PLAN OF CARE
Problem: Inpatient Physical Therapy  Goal: Bed Mobility Goal LTG- PT  Outcome: Unable to achieve outcome(s) by discharge Date Met:  07/13/17 07/01/17 1309 07/13/17 0845   Bed Mobility PT LTG   Bed Mobility PT LTG, Date Established 07/01/17 --    Bed Mobility PT LTG, Time to Achieve 2 wks --    Bed Mobility PT LTG, Activity Type roll left/roll right;supine to sit/sit to supine --    Bed Mobility PT LTG, [place] Level minimum assist (75% patient effort) --    Bed Mobility PT Goal LTG, Assist Device bed rails --    Bed Mobility PT LTG, Date Goal Reviewed --  07/13/17   Bed Mobility PT LTG, Outcome --  goal not met   Bed Mobility PT LTG, Reason Goal Not Met --  discharged from facility       Goal: Transfer Training Goal 1 LTG- PT  Outcome: Unable to achieve outcome(s) by discharge Date Met:  07/13/17 07/01/17 1309 07/13/17 0845   Transfer Training PT LTG   Transfer Training PT LTG, Date Established 07/01/17 --    Transfer Training PT LTG, Time to Achieve 2 wks --    Transfer Training PT LTG, Activity Type bed to chair /chair to bed;other (see comments)  (w/c) --    Transfer Training PT LTG, [place] Level moderate assist (50% patient effort) --    Transfer Training PT LTG, Assist Device other (see comments)  (slide board prn) --    Transfer Training PT LTG, Additional Goal scoot or squat pivot transfer --    Transfer Training PT LTG, Date Goal Reviewed --  07/13/17   Transfer Training PT LTG, Outcome --  goal not met   Transfer Training PT LTG, Reason Goal Not Met --  discharged from facility       Goal: Static Sitting Balance Goal STG- PT  Outcome: Unable to achieve outcome(s) by discharge Date Met:  07/13/17 07/01/17 1309 07/13/17 0845   Static Sitting Balance PT STG   Static Sitting Balance PT STG, Date Established 07/01/17 --    Static Sitting Balance PT STG, Time to Achieve 2 - 3 days --    Static Sitting Balance PT STG, [place] Level contact guard assist --    Static Sitting Balance PT STG,  Assist Device UE Support --    Static Sitting Balance PT STG, Date Goal Reviewed --  07/13/17   Static Sitting Balance PT STG, Outcome --  goal not met   Static Sitting Balance PT STG, Reason Goal Not Met --  discharged from facility

## 2017-07-13 NOTE — THERAPY DISCHARGE NOTE
Acute Care - Physical Therapy Discharge Summary  [facility]       Patient Name: Armando Mcmullen  : 1965  MRN: [id number]    Today's Date: 2017  Onset of Illness/Injury or Date of Surgery Date: 17    Date of Referral to PT: 17  Referring Physician: Dr. JANE Marinelli      Admit Date: 2017      PT Recommendation and Plan    Visit Dx:    ICD-10-CM ICD-9-CM   1. Cellulitis of right lower extremity L03.115 682.6   2. Impaired functional mobility, balance, gait, and endurance Z74.09 V49.89   3. Impaired mobility and ADLs Z74.09 799.89                       IP PT Goals       17 0845 17 1415 17 1309    Bed Mobility PT LTG    Bed Mobility PT LTG, Date Established   17  -MN    Bed Mobility PT LTG, Time to Achieve   2 wks  -MN    Bed Mobility PT LTG, Activity Type   roll left/roll right;supine to sit/sit to supine  -MN    Bed Mobility PT LTG, [place] Level   minimum assist (75% patient effort)  -MN    Bed Mobility PT Goal  LTG, Assist Device   bed rails  -MN    Bed Mobility PT LTG, Date Goal Reviewed 17  -MN 17  -FAMILIA     Bed Mobility PT LTG, Outcome goal not met  -MN goal ongoing  -FAMILIA     Bed Mobility PT LTG, Reason Goal Not Met discharged from facility  -MN      Transfer Training PT LTG    Transfer Training PT LTG, Date Established   17  -MN    Transfer Training PT LTG, Time to Achieve   2 wks  -MN    Transfer Training PT LTG, Activity Type   bed to chair /chair to bed;other (see comments)   w/c  -MN    Transfer Training PT LTG, [place] Level   moderate assist (50% patient effort)  -MN    Transfer Training PT LTG, Assist Device   other (see comments)   slide board prn  -MN    Transfer Training PT LTG, Additional Goal   scoot or squat pivot transfer  -MN    Transfer Training PT  LTG, Date Goal Reviewed 17  -MN 17  -FAMILIA     Transfer Training PT LTG, Outcome goal not met  -MN goal ongoing  -FAMILIA     Transfer Training PT LTG, Reason Goal Not  Met discharged from facility  -MN      Static Sitting Balance PT STG    Static Sitting Balance PT STG, Date Established   07/01/17  -MN    Static Sitting Balance PT STG, Time to Achieve   2 - 3 days  -MN    Static Sitting Balance PT STG, [place] Level   contact guard assist  -MN    Static Sitting Balance PT STG, Assist Device   UE Support  -MN    Static Sitting Balance PT STG, Date Goal Reviewed 07/13/17  -MN 07/06/17  -     Static Sitting Balance PT STG, Outcome goal not met  -MN goal ongoing  -     Static Sitting Balance PT STG, Reason Goal Not Met discharged from facility  -MN        User Key  (r) = Recorded By, (t) = Taken By, (c) = Cosigned By    Initials Name Provider Type    LEAH Salazar, PT Physical Therapist    FAMILIA Domínguez PTA Physical Therapy Assistant              PT Discharge Summary  Anticipated Discharge Disposition: skilled nursing facility  Reason for Discharge: Discharge from facility, Per MD order  Outcomes Achieved: Unable to make functional progress toward goals at this time  Discharge Destination: SNF      Tammy Salazar, PT   7/13/2017

## 2017-07-18 ENCOUNTER — HOSPITAL ENCOUNTER (OUTPATIENT)
Facility: HOSPITAL | Age: 52
Setting detail: OBSERVATION
Discharge: NURSING, REHAB OR LONG-TERM CARE | End: 2017-07-20
Attending: EMERGENCY MEDICINE | Admitting: INTERNAL MEDICINE

## 2017-07-18 DIAGNOSIS — D69.6 THROMBOCYTOPENIA (HCC): ICD-10-CM

## 2017-07-18 DIAGNOSIS — N28.9 RENAL INSUFFICIENCY: ICD-10-CM

## 2017-07-18 DIAGNOSIS — D64.9 ANEMIA, UNSPECIFIED TYPE: Primary | ICD-10-CM

## 2017-07-18 DIAGNOSIS — I95.9 HYPOTENSION, UNSPECIFIED HYPOTENSION TYPE: ICD-10-CM

## 2017-07-18 PROBLEM — R79.89 INCREASED AMMONIA LEVEL: Status: ACTIVE | Noted: 2017-07-18

## 2017-07-18 LAB
ABO GROUP BLD: NORMAL
ALBUMIN SERPL-MCNC: 2.6 G/DL (ref 3.4–4.8)
ALBUMIN/GLOB SERPL: 0.9 G/DL (ref 1.1–1.8)
ALP SERPL-CCNC: 129 U/L (ref 38–126)
ALT SERPL W P-5'-P-CCNC: 42 U/L (ref 21–72)
AMMONIA BLD-SCNC: 44 UMOL/L (ref 9–30)
ANION GAP SERPL CALCULATED.3IONS-SCNC: 8 MMOL/L (ref 5–15)
APTT PPP: 37.8 SECONDS (ref 20–40.3)
AST SERPL-CCNC: 47 U/L (ref 17–59)
BACTERIA UR QL AUTO: ABNORMAL /HPF
BASOPHILS # BLD AUTO: 0.08 10*3/MM3 (ref 0–0.2)
BASOPHILS NFR BLD AUTO: 1.7 % (ref 0–2)
BILIRUB SERPL-MCNC: 2.7 MG/DL (ref 0.2–1.3)
BILIRUB UR QL STRIP: NEGATIVE
BLD GP AB SCN SERPL QL: NEGATIVE
BUN BLD-MCNC: 34 MG/DL (ref 7–21)
BUN/CREAT SERPL: 17.6 (ref 7–25)
CALCIUM SPEC-SCNC: 8.7 MG/DL (ref 8.4–10.2)
CHLORIDE SERPL-SCNC: 98 MMOL/L (ref 95–110)
CLARITY UR: CLEAR
CO2 SERPL-SCNC: 25 MMOL/L (ref 22–31)
COLOR UR: YELLOW
CREAT BLD-MCNC: 1.93 MG/DL (ref 0.7–1.3)
DEPRECATED RDW RBC AUTO: 63.1 FL (ref 35.1–43.9)
EOSINOPHIL # BLD AUTO: 0.29 10*3/MM3 (ref 0–0.7)
EOSINOPHIL NFR BLD AUTO: 6.2 % (ref 0–7)
ERYTHROCYTE [DISTWIDTH] IN BLOOD BY AUTOMATED COUNT: 17.6 % (ref 11.5–14.5)
FERRITIN SERPL-MCNC: 971 NG/ML (ref 17.9–464)
GFR SERPL CREATININE-BSD FRML MDRD: 37 ML/MIN/1.73 (ref 56–130)
GLOBULIN UR ELPH-MCNC: 2.9 GM/DL (ref 2.3–3.5)
GLUCOSE BLD-MCNC: 87 MG/DL (ref 60–100)
GLUCOSE BLDC GLUCOMTR-MCNC: 80 MG/DL (ref 70–130)
GLUCOSE UR STRIP-MCNC: NEGATIVE MG/DL
HCT VFR BLD AUTO: 20.4 % (ref 39–49)
HGB BLD-MCNC: 7 G/DL (ref 13.7–17.3)
HGB UR QL STRIP.AUTO: ABNORMAL
HOLD SPECIMEN: NORMAL
HYALINE CASTS UR QL AUTO: ABNORMAL /LPF
IMM GRANULOCYTES # BLD: 0.02 10*3/MM3 (ref 0–0.02)
IMM GRANULOCYTES NFR BLD: 0.4 % (ref 0–0.5)
INR PPP: 1.22 (ref 0.8–1.2)
KETONES UR QL STRIP: NEGATIVE
LEUKOCYTE ESTERASE UR QL STRIP.AUTO: ABNORMAL
LIPASE SERPL-CCNC: 74 U/L (ref 23–300)
LYMPHOCYTES # BLD AUTO: 1.45 10*3/MM3 (ref 0.6–4.2)
LYMPHOCYTES NFR BLD AUTO: 31 % (ref 10–50)
Lab: NORMAL
MCH RBC QN AUTO: 33.5 PG (ref 26.5–34)
MCHC RBC AUTO-ENTMCNC: 34.3 G/DL (ref 31.5–36.3)
MCV RBC AUTO: 97.6 FL (ref 80–98)
MONOCYTES # BLD AUTO: 0.37 10*3/MM3 (ref 0–0.9)
MONOCYTES NFR BLD AUTO: 7.9 % (ref 0–12)
NEUTROPHILS # BLD AUTO: 2.46 10*3/MM3 (ref 2–8.6)
NEUTROPHILS NFR BLD AUTO: 52.8 % (ref 37–80)
NITRITE UR QL STRIP: NEGATIVE
NRBC BLD MANUAL-RTO: 0 /100 WBC (ref 0–0)
PH UR STRIP.AUTO: 6 [PH] (ref 5–9)
PLATELET # BLD AUTO: 19 10*3/MM3 (ref 150–450)
PMV BLD AUTO: ABNORMAL FL (ref 8–12)
POTASSIUM BLD-SCNC: 4.3 MMOL/L (ref 3.5–5.1)
PROT SERPL-MCNC: 5.5 G/DL (ref 6.3–8.6)
PROT UR QL STRIP: NEGATIVE
PROTHROMBIN TIME: 15.4 SECONDS (ref 11.1–15.3)
RBC # BLD AUTO: 2.09 10*6/MM3 (ref 4.37–5.74)
RBC # UR: ABNORMAL /HPF
RBC MORPH BLD: NORMAL
REF LAB TEST METHOD: ABNORMAL
RH BLD: NEGATIVE
SMALL PLATELETS BLD QL SMEAR: NORMAL
SODIUM BLD-SCNC: 131 MMOL/L (ref 137–145)
SP GR UR STRIP: 1.01 (ref 1–1.03)
SQUAMOUS #/AREA URNS HPF: ABNORMAL /HPF
UROBILINOGEN UR QL STRIP: ABNORMAL
VIT B12 BLD-MCNC: 931 PG/ML (ref 239–931)
WBC MORPH BLD: NORMAL
WBC NRBC COR # BLD: 4.67 10*3/MM3 (ref 3.2–9.8)
WBC UR QL AUTO: ABNORMAL /HPF

## 2017-07-18 PROCEDURE — 86923 COMPATIBILITY TEST ELECTRIC: CPT

## 2017-07-18 PROCEDURE — G0378 HOSPITAL OBSERVATION PER HR: HCPCS

## 2017-07-18 PROCEDURE — G0432 EIA HIV-1/HIV-2 SCREEN: HCPCS | Performed by: FAMILY MEDICINE

## 2017-07-18 PROCEDURE — 85610 PROTHROMBIN TIME: CPT | Performed by: EMERGENCY MEDICINE

## 2017-07-18 PROCEDURE — 99285 EMERGENCY DEPT VISIT HI MDM: CPT

## 2017-07-18 PROCEDURE — 85007 BL SMEAR W/DIFF WBC COUNT: CPT | Performed by: EMERGENCY MEDICINE

## 2017-07-18 PROCEDURE — 81001 URINALYSIS AUTO W/SCOPE: CPT | Performed by: EMERGENCY MEDICINE

## 2017-07-18 PROCEDURE — P9019 PLATELETS, EACH UNIT: HCPCS

## 2017-07-18 PROCEDURE — 83690 ASSAY OF LIPASE: CPT | Performed by: EMERGENCY MEDICINE

## 2017-07-18 PROCEDURE — 80053 COMPREHEN METABOLIC PANEL: CPT | Performed by: EMERGENCY MEDICINE

## 2017-07-18 PROCEDURE — 85025 COMPLETE CBC W/AUTO DIFF WBC: CPT | Performed by: EMERGENCY MEDICINE

## 2017-07-18 PROCEDURE — 96360 HYDRATION IV INFUSION INIT: CPT

## 2017-07-18 PROCEDURE — 86901 BLOOD TYPING SEROLOGIC RH(D): CPT | Performed by: EMERGENCY MEDICINE

## 2017-07-18 PROCEDURE — 36430 TRANSFUSION BLD/BLD COMPNT: CPT

## 2017-07-18 PROCEDURE — 85730 THROMBOPLASTIN TIME PARTIAL: CPT | Performed by: EMERGENCY MEDICINE

## 2017-07-18 PROCEDURE — 82607 VITAMIN B-12: CPT | Performed by: FAMILY MEDICINE

## 2017-07-18 PROCEDURE — 82140 ASSAY OF AMMONIA: CPT | Performed by: EMERGENCY MEDICINE

## 2017-07-18 PROCEDURE — 82728 ASSAY OF FERRITIN: CPT | Performed by: FAMILY MEDICINE

## 2017-07-18 PROCEDURE — 87086 URINE CULTURE/COLONY COUNT: CPT | Performed by: EMERGENCY MEDICINE

## 2017-07-18 PROCEDURE — 82962 GLUCOSE BLOOD TEST: CPT

## 2017-07-18 PROCEDURE — 86850 RBC ANTIBODY SCREEN: CPT | Performed by: EMERGENCY MEDICINE

## 2017-07-18 PROCEDURE — 86900 BLOOD TYPING SEROLOGIC ABO: CPT | Performed by: EMERGENCY MEDICINE

## 2017-07-18 RX ORDER — LISINOPRIL 20 MG/1
20 TABLET ORAL DAILY
Status: DISCONTINUED | OUTPATIENT
Start: 2017-07-18 | End: 2017-07-20 | Stop reason: HOSPADM

## 2017-07-18 RX ORDER — GABAPENTIN 300 MG/1
300 CAPSULE ORAL 3 TIMES DAILY
Status: DISCONTINUED | OUTPATIENT
Start: 2017-07-18 | End: 2017-07-20 | Stop reason: HOSPADM

## 2017-07-18 RX ORDER — SODIUM CHLORIDE 0.9 % (FLUSH) 0.9 %
10 SYRINGE (ML) INJECTION AS NEEDED
Status: DISCONTINUED | OUTPATIENT
Start: 2017-07-18 | End: 2017-07-20 | Stop reason: HOSPADM

## 2017-07-18 RX ORDER — PANTOPRAZOLE SODIUM 40 MG/1
40 TABLET, DELAYED RELEASE ORAL DAILY
Status: DISCONTINUED | OUTPATIENT
Start: 2017-07-18 | End: 2017-07-20 | Stop reason: HOSPADM

## 2017-07-18 RX ORDER — FUROSEMIDE 40 MG/1
40 TABLET ORAL 2 TIMES DAILY
Status: DISCONTINUED | OUTPATIENT
Start: 2017-07-18 | End: 2017-07-20 | Stop reason: HOSPADM

## 2017-07-18 RX ORDER — LACTULOSE 10 G/15ML
30 SOLUTION ORAL 4 TIMES DAILY
Status: DISCONTINUED | OUTPATIENT
Start: 2017-07-18 | End: 2017-07-20 | Stop reason: HOSPADM

## 2017-07-18 RX ORDER — POTASSIUM CHLORIDE 1.5 G/1.77G
20 POWDER, FOR SOLUTION ORAL DAILY
Status: DISCONTINUED | OUTPATIENT
Start: 2017-07-18 | End: 2017-07-19 | Stop reason: CLARIF

## 2017-07-18 RX ORDER — SODIUM CHLORIDE 0.9 % (FLUSH) 0.9 %
1-10 SYRINGE (ML) INJECTION AS NEEDED
Status: DISCONTINUED | OUTPATIENT
Start: 2017-07-18 | End: 2017-07-20 | Stop reason: HOSPADM

## 2017-07-18 RX ORDER — SODIUM CHLORIDE 9 MG/ML
100 INJECTION, SOLUTION INTRAVENOUS CONTINUOUS
Status: DISCONTINUED | OUTPATIENT
Start: 2017-07-18 | End: 2017-07-20 | Stop reason: HOSPADM

## 2017-07-18 RX ORDER — NYSTATIN 100000 [USP'U]/G
POWDER TOPICAL EVERY 12 HOURS SCHEDULED
Status: DISCONTINUED | OUTPATIENT
Start: 2017-07-18 | End: 2017-07-20 | Stop reason: HOSPADM

## 2017-07-18 RX ORDER — HYDROCODONE BITARTRATE AND ACETAMINOPHEN 10; 325 MG/1; MG/1
1 TABLET ORAL EVERY 8 HOURS PRN
Status: DISCONTINUED | OUTPATIENT
Start: 2017-07-18 | End: 2017-07-20 | Stop reason: HOSPADM

## 2017-07-18 RX ADMIN — GABAPENTIN 300 MG: 300 CAPSULE ORAL at 21:59

## 2017-07-18 RX ADMIN — HYDROCODONE BITARTRATE AND ACETAMINOPHEN 1 TABLET: 10; 325 TABLET ORAL at 21:59

## 2017-07-18 RX ADMIN — SODIUM CHLORIDE 1000 ML: 900 INJECTION, SOLUTION INTRAVENOUS at 17:10

## 2017-07-18 RX ADMIN — NYSTATIN: 100000 POWDER TOPICAL at 21:57

## 2017-07-18 RX ADMIN — Medication: at 21:57

## 2017-07-18 RX ADMIN — LISINOPRIL 20 MG: 20 TABLET ORAL at 21:58

## 2017-07-18 RX ADMIN — SODIUM CHLORIDE 100 ML/HR: 900 INJECTION, SOLUTION INTRAVENOUS at 20:15

## 2017-07-18 RX ADMIN — POTASSIUM CHLORIDE 20 MEQ: 1.5 POWDER, FOR SOLUTION ORAL at 21:58

## 2017-07-18 RX ADMIN — PANTOPRAZOLE SODIUM 40 MG: 40 TABLET, DELAYED RELEASE ORAL at 21:58

## 2017-07-18 RX ADMIN — FUROSEMIDE 40 MG: 40 TABLET ORAL at 21:59

## 2017-07-18 RX ADMIN — LACTULOSE 30 G: 10 SOLUTION ORAL at 21:57

## 2017-07-18 RX ADMIN — NYSTATIN 400000 UNITS: 100000 SUSPENSION ORAL at 21:59
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
## 2017-07-19 LAB
ALBUMIN SERPL-MCNC: 2.2 G/DL (ref 3.4–4.8)
ALBUMIN/GLOB SERPL: 0.8 G/DL (ref 1.1–1.8)
ALP SERPL-CCNC: 91 U/L (ref 38–126)
ALT SERPL W P-5'-P-CCNC: 39 U/L (ref 21–72)
AMMONIA BLD-SCNC: 56 UMOL/L (ref 9–30)
ANION GAP SERPL CALCULATED.3IONS-SCNC: 8 MMOL/L (ref 5–15)
AST SERPL-CCNC: 40 U/L (ref 17–59)
BASOPHILS # BLD AUTO: 0.04 10*3/MM3 (ref 0–0.2)
BASOPHILS NFR BLD AUTO: 1.1 % (ref 0–2)
BILIRUB SERPL-MCNC: 2.9 MG/DL (ref 0.2–1.3)
BUN BLD-MCNC: 29 MG/DL (ref 7–21)
BUN/CREAT SERPL: 16.9 (ref 7–25)
CALCIUM SPEC-SCNC: 8.6 MG/DL (ref 8.4–10.2)
CHLORIDE SERPL-SCNC: 103 MMOL/L (ref 95–110)
CO2 SERPL-SCNC: 24 MMOL/L (ref 22–31)
CREAT BLD-MCNC: 1.72 MG/DL (ref 0.7–1.3)
DEPRECATED RDW RBC AUTO: 60.2 FL (ref 35.1–43.9)
EOSINOPHIL # BLD AUTO: 0.2 10*3/MM3 (ref 0–0.7)
EOSINOPHIL NFR BLD AUTO: 5.3 % (ref 0–7)
ERYTHROCYTE [DISTWIDTH] IN BLOOD BY AUTOMATED COUNT: 17 % (ref 11.5–14.5)
GFR SERPL CREATININE-BSD FRML MDRD: 42 ML/MIN/1.73 (ref 60–130)
GLOBULIN UR ELPH-MCNC: 2.7 GM/DL (ref 2.3–3.5)
GLUCOSE BLD-MCNC: 87 MG/DL (ref 60–100)
HCT VFR BLD AUTO: 20.5 % (ref 39–49)
HGB BLD-MCNC: 7 G/DL (ref 13.7–17.3)
HIV1+2 AB SER QL: NEGATIVE
IMM GRANULOCYTES # BLD: 0 10*3/MM3 (ref 0–0.02)
IMM GRANULOCYTES NFR BLD: 0 % (ref 0–0.5)
IRON 24H UR-MRATE: 130 MCG/DL (ref 49–181)
IRON SATN MFR SERPL: 66 % (ref 20–55)
LYMPHOCYTES # BLD AUTO: 1.65 10*3/MM3 (ref 0.6–4.2)
LYMPHOCYTES NFR BLD AUTO: 43.9 % (ref 10–50)
MAGNESIUM SERPL-MCNC: 1.8 MG/DL (ref 1.6–2.3)
MCH RBC QN AUTO: 33.2 PG (ref 26.5–34)
MCHC RBC AUTO-ENTMCNC: 34.1 G/DL (ref 31.5–36.3)
MCV RBC AUTO: 97.2 FL (ref 80–98)
MONOCYTES # BLD AUTO: 0.4 10*3/MM3 (ref 0–0.9)
MONOCYTES NFR BLD AUTO: 10.6 % (ref 0–12)
NEUTROPHILS # BLD AUTO: 1.47 10*3/MM3 (ref 2–8.6)
NEUTROPHILS NFR BLD AUTO: 39.1 % (ref 37–80)
PLATELET # BLD AUTO: 39 10*3/MM3 (ref 150–450)
PMV BLD AUTO: 10.9 FL (ref 8–12)
POTASSIUM BLD-SCNC: 4.5 MMOL/L (ref 3.5–5.1)
PROT SERPL-MCNC: 4.9 G/DL (ref 6.3–8.6)
RBC # BLD AUTO: 2.11 10*6/MM3 (ref 4.37–5.74)
SODIUM BLD-SCNC: 135 MMOL/L (ref 137–145)
TIBC SERPL-MCNC: 197 MCG/DL (ref 261–462)
TSH SERPL DL<=0.05 MIU/L-ACNC: 1.82 MIU/ML (ref 0.46–4.68)
WBC NRBC COR # BLD: 3.76 10*3/MM3 (ref 3.2–9.8)

## 2017-07-19 PROCEDURE — P9016 RBC LEUKOCYTES REDUCED: HCPCS

## 2017-07-19 PROCEDURE — 99244 OFF/OP CNSLTJ NEW/EST MOD 40: CPT | Performed by: INTERNAL MEDICINE

## 2017-07-19 PROCEDURE — G0378 HOSPITAL OBSERVATION PER HR: HCPCS

## 2017-07-19 PROCEDURE — 83735 ASSAY OF MAGNESIUM: CPT | Performed by: FAMILY MEDICINE

## 2017-07-19 PROCEDURE — 94640 AIRWAY INHALATION TREATMENT: CPT

## 2017-07-19 PROCEDURE — 82140 ASSAY OF AMMONIA: CPT | Performed by: FAMILY MEDICINE

## 2017-07-19 PROCEDURE — 36430 TRANSFUSION BLD/BLD COMPNT: CPT

## 2017-07-19 PROCEDURE — 83540 ASSAY OF IRON: CPT | Performed by: FAMILY MEDICINE

## 2017-07-19 PROCEDURE — 94799 UNLISTED PULMONARY SVC/PX: CPT

## 2017-07-19 PROCEDURE — 80053 COMPREHEN METABOLIC PANEL: CPT | Performed by: FAMILY MEDICINE

## 2017-07-19 PROCEDURE — 86900 BLOOD TYPING SEROLOGIC ABO: CPT

## 2017-07-19 PROCEDURE — 85025 COMPLETE CBC W/AUTO DIFF WBC: CPT | Performed by: FAMILY MEDICINE

## 2017-07-19 PROCEDURE — 83550 IRON BINDING TEST: CPT | Performed by: FAMILY MEDICINE

## 2017-07-19 PROCEDURE — 94760 N-INVAS EAR/PLS OXIMETRY 1: CPT

## 2017-07-19 PROCEDURE — 84443 ASSAY THYROID STIM HORMONE: CPT | Performed by: FAMILY MEDICINE

## 2017-07-19 RX ORDER — POTASSIUM CHLORIDE 750 MG/1
20 CAPSULE, EXTENDED RELEASE ORAL DAILY
Status: DISCONTINUED | OUTPATIENT
Start: 2017-07-20 | End: 2017-07-20 | Stop reason: HOSPADM

## 2017-07-19 RX ORDER — IPRATROPIUM BROMIDE AND ALBUTEROL SULFATE 2.5; .5 MG/3ML; MG/3ML
3 SOLUTION RESPIRATORY (INHALATION)
Status: DISCONTINUED | OUTPATIENT
Start: 2017-07-19 | End: 2017-07-20 | Stop reason: HOSPADM

## 2017-07-19 RX ADMIN — LACTULOSE 30 G: 10 SOLUTION ORAL at 11:44

## 2017-07-19 RX ADMIN — Medication: at 16:00

## 2017-07-19 RX ADMIN — PANTOPRAZOLE SODIUM 40 MG: 40 TABLET, DELAYED RELEASE ORAL at 11:44

## 2017-07-19 RX ADMIN — Medication 10 ML: at 11:48

## 2017-07-19 RX ADMIN — HYDROCODONE BITARTRATE AND ACETAMINOPHEN 1 TABLET: 10; 325 TABLET ORAL at 12:15

## 2017-07-19 RX ADMIN — NYSTATIN 400000 UNITS: 100000 SUSPENSION ORAL at 11:47

## 2017-07-19 RX ADMIN — Medication: at 20:40

## 2017-07-19 RX ADMIN — POTASSIUM CHLORIDE 20 MEQ: 1.5 POWDER, FOR SOLUTION ORAL at 11:48

## 2017-07-19 RX ADMIN — Medication 10 ML: at 12:31

## 2017-07-19 RX ADMIN — IPRATROPIUM BROMIDE AND ALBUTEROL SULFATE 3 ML: 2.5; .5 SOLUTION RESPIRATORY (INHALATION) at 19:44

## 2017-07-19 RX ADMIN — Medication 10 ML: at 18:48

## 2017-07-19 RX ADMIN — FUROSEMIDE 40 MG: 40 TABLET ORAL at 18:47

## 2017-07-19 RX ADMIN — Medication 10 ML: at 16:51

## 2017-07-19 RX ADMIN — GABAPENTIN 300 MG: 300 CAPSULE ORAL at 11:45

## 2017-07-19 RX ADMIN — LISINOPRIL 20 MG: 20 TABLET ORAL at 11:45

## 2017-07-19 RX ADMIN — NYSTATIN: 100000 POWDER TOPICAL at 20:41

## 2017-07-19 RX ADMIN — GABAPENTIN 300 MG: 300 CAPSULE ORAL at 16:49

## 2017-07-19 RX ADMIN — NYSTATIN 400000 UNITS: 100000 SUSPENSION ORAL at 18:48

## 2017-07-19 RX ADMIN — NYSTATIN: 100000 POWDER TOPICAL at 11:46

## 2017-07-19 RX ADMIN — LACTULOSE 30 G: 10 SOLUTION ORAL at 18:47

## 2017-07-19 RX ADMIN — HYDROCODONE BITARTRATE AND ACETAMINOPHEN 1 TABLET: 10; 325 TABLET ORAL at 16:49

## 2017-07-19 RX ADMIN — Medication: at 11:45

## 2017-07-19 RX ADMIN — FUROSEMIDE 40 MG: 40 TABLET ORAL at 11:44

## 2017-07-19 RX ADMIN — IPRATROPIUM BROMIDE AND ALBUTEROL SULFATE 3 ML: 2.5; .5 SOLUTION RESPIRATORY (INHALATION) at 14:30

## 2017-07-19 NOTE — PAYOR COMM NOTE
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 chloride 0.9 % flush 1-10 mL 1-10 mL As Needed 7/18/2017     Sig - Route: Infuse 1-10 mL into a venous catheter As Needed for Line Care. - Intravenous    sodium chloride 0.9 % flush 10 mL 10 mL As Needed 7/18/2017     Sig - Route: Infuse 10 mL into a venous catheter As Needed for Line Care. - Intravenous    Linked Group 1:  \"And\" Linked Group Details        sodium chloride 0.9 % infusion 100 mL/hr Continuous 7/18/2017     Sig - Route: Infuse 100 mL/hr into a venous catheter Continuous. - Intravenous          Orders (last 24 hrs)     Start     Ordered    07/19/17 0600  CBC Auto Differential  Morning Draw      07/18/17 1934 07/19/17 0600  Comprehensive Metabolic Panel  Morning Draw      07/18/17 1934 07/19/17 0600  Magnesium  Morning Draw      07/18/17 1934 07/19/17 0600  TSH  Morning Draw      07/18/17 1934 07/19/17 0600  Iron Profile  Morning Draw      07/18/17 1934 07/19/17 0600  Ammonia  Morning Draw      07/18/17 1947 07/19/17 0502  Scan Slide  Once,   Status:  Canceled      07/19/17 0501    07/19/17 0045  CBC (No Diff)  Once,   Status:  Canceled      07/19/17 0013    07/18/17 2333  Verify Informed Consent " for Blood Product Administration  Once      07/18/17 2332 07/18/17 2332  Transfuse Pheresed Platelets, 1 Units  Transfusion      07/18/17 2332 07/18/17 2100  magic butt ointment  3 Times Daily      07/18/17 1932 07/18/17 2100  gabapentin (NEURONTIN) capsule 300 mg  3 Times Daily      07/18/17 1932 07/18/17 2100  lactulose (CHRONULAC) 10 GM/15ML solution 30 g  4 Times Daily      07/18/17 1932 07/18/17 2100  nystatin (MYCOSTATIN) powder  Every 12 Hours Scheduled      07/18/17 1932 07/18/17 2100  nystatin (MYCOSTATIN) 242107 UNIT/ML suspension 400,000 Units  4 Times Daily      07/18/17 1932 07/18/17 2036  POC Glucose Fingerstick  Once      07/18/17 2035 07/18/17 2015  furosemide (LASIX) tablet 40 mg  2 Times Daily      07/18/17 1932 07/18/17 2015  lisinopril (PRINIVIL,ZESTRIL) tablet 20 mg  Daily      07/18/17 1932 07/18/17 2015  pantoprazole (PROTONIX) EC tablet 40 mg  Daily      07/18/17 1932 07/18/17 2015  potassium chloride (KLOR-CON) packet 20 mEq  Daily      07/18/17 1932 07/18/17 2015  sodium chloride 0.9 % infusion  Continuous      07/18/17 1934 07/18/17 2000  Vital Signs  Every 4 Hours      07/18/17 1934 07/18/17 2000  Strict Intake and Output  Every Hour      07/18/17 1934 07/18/17 1945  Nursing Communication Please do CBC 30 mins after platelet transfusion.  Continuous     Comments:  Please do CBC 30 mins after platelet transfusion.    07/18/17 1944 07/18/17 1944  Occult Blood X 1, Stool  Once      07/18/17 1944 07/18/17 1944  CBC & Differential  Once,   Status:  Canceled     Comments:  30 mins after transfusion of Platelet.    07/18/17 1944 07/18/17 1944  CBC Auto Differential  PROCEDURE ONCE,   Status:  Canceled     Comments:  30 mins after transfusion of Platelet.    07/18/17 1944 07/18/17 1941  HIV-1 & HIV-2 Antibodies  Once      07/18/17 1940 07/18/17 1940  Prepare Platelet Pheresis, 1 Units  Blood - Once      07/18/17 1939 07/18/17 1935   Weigh Patient  Once      07/18/17 1934 07/18/17 1935  Oxygen Therapy-  Continuous      07/18/17 1934    07/18/17 1935  Insert Peripheral IV  Once      07/18/17 1934 07/18/17 1935  Saline Lock & Maintain IV Access  Continuous      07/18/17 1934 07/18/17 1935  Full Code  Continuous      07/18/17 1934 07/18/17 1935  VTE Risk Assessment - Low Risk  Once      07/18/17 1934 07/18/17 1935  Pharmacologic VTE Prophylaxis Not Indicated: Thrombocytopenia (<50K)  Once      07/18/17 1934    07/18/17 1935  Mechanical VTE Prophylaxis Not Indicated: Wounds / Ulcers / Burns on Bilateral Lower Extremities  Once      07/18/17 1934 07/18/17 1935  Diet Regular; Cardiac, Consistent Carbohydrate, Renal  Diet Effective Now      07/18/17 1934 07/18/17 1935  Inpatient Consult to Hematology and Oncology  Once     Specialty:  Hematology and Oncology  Provider:  Ortiz Ferreira MD    07/18/17 1934 07/18/17 1935  Ferritin  Once      07/18/17 1934 07/18/17 1935  Vitamin B12  Once      07/18/17 1934    07/18/17 1935  ABO / Rh  Once      07/18/17 1934 07/18/17 1935  Type & Screen  Once,   Status:  Canceled      07/18/17 1934    07/18/17 1935  Verify Informed Consent for Blood Product Administration  Once      07/18/17 1934 07/18/17 1935  Prepare RBC, 1 Units  Blood - Once      07/18/17 1934 07/18/17 1935  Prepare Platelet Pheresis, 2 Units  Blood - Once      07/18/17 1934 07/18/17 1934  sodium chloride 0.9 % flush 1-10 mL  As Needed      07/18/17 1934 07/18/17 1934  Transfuse RBC, 1 Units Infuse Each Unit Over: 4H  Transfusion      07/18/17 1934 07/18/17 1932  HYDROcodone-acetaminophen (NORCO)  MG per tablet 1 tablet  Every 8 Hours PRN      07/18/17 1932    07/18/17 1822  Urine Culture  Once      07/18/17 1821    07/18/17 1756  Urinalysis, Microscopic Only  Once      07/18/17 1755    07/18/17 1755  PREVIOUS HISTORY  Once      07/18/17 1754    07/18/17 1726  Inpatient Admission  Once      07/18/17  1726    07/18/17 1726  Tele Bed Request  Once      07/18/17 1726    07/18/17 1725  Hospitalist (on-call MD unless specified)  Once     Specialty:  Hospitalist  Provider:  Isaura Schmitt MD    07/18/17 1726    07/18/17 1657  sodium chloride 0.9 % bolus 1,000 mL  Once      07/18/17 1655    07/18/17 1656  Type & Screen  STAT      07/18/17 1655    07/18/17 1627  Scan Slide  Once      07/18/17 1626    07/18/17 1618  Extra Tubes  Once      07/18/17 1617    07/18/17 1618  Gold Top - SST  PROCEDURE ONCE      07/18/17 1617    07/18/17 1557  Ammonia  STAT      07/18/17 1556    07/18/17 1548  CBC & Differential  Once      07/18/17 1547    07/18/17 1548  Comprehensive Metabolic Panel  Once      07/18/17 1547    07/18/17 1548  Protime-INR  Once      07/18/17 1547    07/18/17 1548  aPTT  Once      07/18/17 1547    07/18/17 1548  Lipase  Once      07/18/17 1547    07/18/17 1548  Urinalysis With / Culture If Indicated  Once      07/18/17 1547    07/18/17 1548  Insert peripheral IV  Once      07/18/17 1547    07/18/17 1548  CBC Auto Differential  PROCEDURE ONCE      07/18/17 1548    07/18/17 1547  sodium chloride 0.9 % flush 10 mL  As Needed      07/18/17 1547        Tammie Werner RN [facility]  [phone]  Fax [phone]

## 2017-07-19 NOTE — CONSULTS
"Adult Nutrition  Assessment    Patient Name:  Armando Mcmullen  YOB: 1965  MRN: [id number]  Admit Date:  7/18/2017    Assessment Date:  7/19/2017          Reason for Assessment       07/19/17 1152    Reason for Assessment    Reason For Assessment/Visit identified at risk by screening criteria    Identified At Risk By Screening Criteria large or nonhealing wound, burn or pressure ulcer                Nutrition/Diet History       07/19/17 1152    Nutrition/Diet History    Typical Food/Fluid Intake Pt w/good appetite in the hospital but does not like the food at the nursing home where he lives.  States they serve all processed foods.  Pt having some nausea at the moment.            Anthropometrics       07/19/17 1152    Anthropometrics    Height 185.4 cm (73\")    Weight 111 kg (244 lb)    Anthropometrics (Special Considerations)    Height Used for Calculations 1.854 m (6' 1\")    Weight Used for Calculations 111 kg (244 lb)    Additional Documentation Amputations Present (Ideal Body Weight) (Row)    Amputations Present BKA 7%    Amputations Ideal Body Weight (IBW) Adjustment 5.897 kg (13 lb)    RD Calculated     RD Calculated %     Ideal Body Weight (IBW)    Ideal Body Weight (IBW), Male (kg) 84.86    % Ideal Body Weight 130.69    Body Mass Index (BMI)    BMI (kg/m2) 32.26    BMI Grade 30 - 34.9- obesity - grade I            Labs/Tests/Procedures/Meds       07/19/17 1154    Labs/Tests/Procedures/Meds    Labs/Tests Review Reviewed    Medication Review Reviewed, pertinent            Physical Findings       07/19/17 1154    Physical Findings/Assessment    Additional Documentation Physical Appearance (Group)    Physical Appearance    Overall Physical Appearance amputee    Gastrointestinal nausea            Estimated/Assessed Needs       07/19/17 1154    Calculation Measurements    Weight Used For Calculations 77.6 kg (171 lb)    Height Used for Calculations 1.854 m (6' 1\")    Estimated/Assessed " Energy Needs    Energy Need Method [place]- [place];Kcal/kg    kcal/kg 30    30 Kcal/Kg (kcal) 2326.95    Estimated Kcal Range  3543-5819    Estimated/Assessed Protein Needs    Weight Used for Protein Calculation 77.6 kg (171 lb)    Protein (gm/kg) 1.2    1.2 Gm Protein (gm) 93.08    Estimated Protein Range 78g    Estimated/Assessed Fluid Needs    Fluid Need Method Other (comment)   1500-2000ml (cirrhosis)            Nutrition Prescription Ordered       07/19/17 1156    Nutrition Prescription PO    Current PO Diet Regular    Common Modifiers Cardiac;Consistent Carbohydrate;Renal            Evaluation of Received Nutrient/Fluid Intake       07/19/17 1156    PO Evaluation    Number of Meals 2    % PO Intake 100            Comments:      Pt is a 52 year old male admitted for thrombocytopenia and transfusion who also has past medical hx of cirrhosis and CKD.  Pt has stage II pressure ulcer as well as BKA.  Pt reports good appetite in hospital but does not like the food where he lives stating that they serve only processed foods at nursing home.  RD suggested pt have friends or family bring food and snacks to keep in his room that are less processed.  Currently having some nausea.  Pt willing to drink milk with meals and try supplements to increase protein intake for wound healing.  Will add Ensure with meals and change diet order as pt does not have DM.  RD will monitor.        Electronically signed by:  Peace Segovia RD  07/19/17 11:57 AM

## 2017-07-19 NOTE — PROGRESS NOTES
Continued Stay Note  [facility]     Patient Name: Armando Mcmullen  MRN: [id number]  Today's Date: 7/19/2017    Admit Date: 7/18/2017          Discharge Plan       07/19/17 1541    Case Management/Social Work Plan    Additional Comments According to Elicia Segovia in [facility] they are unable to have a standing order for blood transfusions but if nursing home receives lab results that indicate pt needs to be transfused they can call her at her direct number [phone] and she will be able to schedule them in the infusion center that day or the next morning. COLLIN left sticky note for MD to include an order for d/c insturctions asking the nursing home to contact Elicia Segovia  if labs are in a certain range in order for pt to be scheduled for a blood transfusion.               Discharge Codes     None            COLLIN Vasquez

## 2017-07-19 NOTE — PROGRESS NOTES
[facility] Medicine Services  INPATIENT PROGRESS NOTE    Length of Stay: 1  Date of Admission: 7/18/2017  Primary Care Physician: No Known Provider    Subjective   Chief Complaint:   Chief Complaint   Patient presents with   • Abnormal Lab       HPI:  HPI      Review of Systems   No new complaints   All pertinent negatives and positives are as above. All other systems have been reviewed and are negative unless otherwise stated.     Objective    Temp:  [96.8 °F (36 °C)-98.9 °F (37.2 °C)] 97.6 °F (36.4 °C)  Heart Rate:  [] 87  Resp:  [18-20] 20  BP: ()/(45-77) 143/77  Physical Exam   Constitutional: He is oriented to person, place, and time.   Cardiovascular: Regular rhythm, S1 normal, S2 normal and normal heart sounds.  Exam reveals no gallop.    No murmur heard.  Pulmonary/Chest: Effort normal. He has wheezes.   Abdominal: Soft. Bowel sounds are normal. He exhibits distension. There is hepatosplenomegaly. There is no tenderness.       Vascular Status -  His exam exhibits right foot edema. His exam exhibits left foot edema.  Neurological: He is alert and oriented to person, place, and time.           Results Review:  I have reviewed the labs, radiology results, and diagnostic studies.    Laboratory Data:   Lab Results (last 24 hours)     Procedure Component Value Units Date/Time    CBC Auto Differential [[id number]]  (Abnormal) Collected:  07/18/17 1606    Specimen:  Blood Updated:  07/18/17 1632     WBC 4.67 10*3/mm3      RBC 2.09 (L) 10*6/mm3      Hemoglobin 7.0 (L) g/dL      Hematocrit 20.4 (L) %      MCV 97.6 fL      MCH 33.5 pg      MCHC 34.3 g/dL      RDW 17.6 (H) %      RDW-SD 63.1 (H) fl      MPV -- fL       UNABLE TO CALCULATE        Platelets 19 (C) 10*3/mm3      Neutrophil % 52.8 %      Lymphocyte % 31.0 %      Monocyte % 7.9 %      Eosinophil % 6.2 %      Basophil % 1.7 %      Immature Grans % 0.4 %      Neutrophils, Absolute 2.46 10*3/mm3       Lymphocytes, Absolute 1.45 10*3/mm3      Monocytes, Absolute 0.37 10*3/mm3      Eosinophils, Absolute 0.29 10*3/mm3      Basophils, Absolute 0.08 10*3/mm3      Immature Grans, Absolute 0.02 10*3/mm3      nRBC 0.0 /100 WBC     Ammonia [[id number]]  (Abnormal) Collected:  07/18/17 1606    Specimen:  Blood Updated:  07/18/17 1633     Ammonia 44 (H) umol/L     Comprehensive Metabolic Panel [[id number]]  (Abnormal) Collected:  07/18/17 1606    Specimen:  Blood Updated:  07/18/17 1634     Glucose 87 mg/dL      BUN 34 (H) mg/dL      Creatinine 1.93 (H) mg/dL      Sodium 131 (L) mmol/L      Potassium 4.3 mmol/L      Chloride 98 mmol/L      CO2 25.0 mmol/L      Calcium 8.7 mg/dL      Total Protein 5.5 (L) g/dL      Albumin 2.60 (L) g/dL      ALT (SGPT) 42 U/L      AST (SGOT) 47 U/L      Alkaline Phosphatase 129 (H) U/L      Total Bilirubin 2.7 (H) mg/dL      eGFR Non African Amer 37 (L) mL/min/1.73      Globulin 2.9 gm/dL      A/G Ratio 0.9 (L) g/dL      BUN/Creatinine Ratio 17.6     Anion Gap 8.0 mmol/L     Lipase [[id number]]  (Normal) Collected:  07/18/17 1606    Specimen:  Blood Updated:  07/18/17 1634     Lipase 74 U/L     Protime-INR [[id number]]  (Abnormal) Collected:  07/18/17 1606    Specimen:  Blood Updated:  07/18/17 1636     Protime 15.4 (H) Seconds      INR 1.22 (H)    Narrative:       Therapeutic range for most indications is 2.0-3.0 INR,  or 2.5-3.5 for mechanical heart valves.    aPTT [[id number]]  (Normal) Collected:  07/18/17 1606    Specimen:  Blood Updated:  07/18/17 1636     PTT 37.8 seconds     Narrative:       The recommended Heparin therapeutic range is 68-97 seconds.    Gold Top - SST [[id number]] Collected:  07/18/17 1406    Specimen:  Blood Updated:  07/18/17 1701     Extra Tube Hold for add-ons.      Auto resulted.       Extra Tubes [[id number]] Collected:  07/18/17 1406    Specimen:  Blood from Blood, Venous Line Updated:  07/18/17 1701    Narrative:       The following orders were created for panel  order Extra Tubes.  Procedure                               Abnormality         Status                     ---------                               -----------         ------                     Gold Top - SST[[id number]]                                   Final result                 Please view results for these tests on the individual orders.    CBC & Differential [[id number]] Collected:  07/18/17 1606    Specimen:  Blood Updated:  07/18/17 1716    Narrative:       The following orders were created for panel order CBC & Differential.  Procedure                               Abnormality         Status                     ---------                               -----------         ------                     Scan Slide[[id number]]                                       Final result               CBC Auto Differential[[id number]]        Abnormal            Final result                 Please view results for these tests on the individual orders.    Scan Slide [[id number]] Collected:  07/18/17 1606    Specimen:  Blood Updated:  07/18/17 1716     RBC Morphology Normal     WBC Morphology Normal     Platelet Estimate Decreased    Urinalysis With / Culture If Indicated [[id number]]  (Abnormal) Collected:  07/18/17 1750    Specimen:  Urine from Urine, Clean Catch Updated:  07/18/17 1755     Color, UA Yellow     Appearance, UA Clear     pH, UA 6.0     Specific Gravity, UA 1.010     Glucose, UA Negative     Ketones, UA Negative     Bilirubin, UA Negative     Blood, UA Trace (A)     Protein, UA Negative     Leuk Esterase, UA Trace (A)     Nitrite, UA Negative     Urobilinogen, UA 0.2 E.U./dL    Urinalysis, Microscopic Only [[id number]]  (Abnormal) Collected:  07/18/17 1750    Specimen:  Urine from Urine, Clean Catch Updated:  07/18/17 1821     RBC, UA 0-2 (A) /HPF      WBC, UA 13-20 (A) /HPF      Bacteria, UA None Seen /HPF      Squamous Epithelial Cells, UA 0-2 /HPF      Hyaline Casts, UA None Seen /LPF      Methodology Automated  Microscopy    Ferritin [[id number]]  (Abnormal) Collected:  07/18/17 1406    Specimen:  Blood Updated:  07/18/17 2029     Ferritin 971.00 (H) ng/mL     POC Glucose Fingerstick [[id number]]  (Normal) Collected:  07/18/17 1952    Specimen:  Blood Updated:  07/18/17 2035     Glucose 80 mg/dL       RN NotifiedMeter: [id number]: [id number] PEDRO MIRANDA       Vitamin B12 [[id number]]  (Normal) Collected:  07/18/17 1406    Specimen:  Blood Updated:  07/18/17 2042     Vitamin B-12 931 pg/mL     HIV-1 & HIV-2 Antibodies [[id number]]  (Normal) Collected:  07/18/17 1406    Specimen:  Blood Updated:  07/19/17 0421     HIV-1/ HIV-2 Negative    Ammonia [[id number]]  (Abnormal) Collected:  07/19/17 0447    Specimen:  Blood Updated:  07/19/17 0510     Ammonia 56 (H) umol/L     Comprehensive Metabolic Panel [[id number]]  (Abnormal) Collected:  07/19/17 0447    Specimen:  Blood Updated:  07/19/17 0514     Glucose 87 mg/dL      BUN 29 (H) mg/dL      Creatinine 1.72 (H) mg/dL      Sodium 135 (L) mmol/L      Potassium 4.5 mmol/L      Chloride 103 mmol/L      CO2 24.0 mmol/L      Calcium 8.6 mg/dL      Total Protein 4.9 (L) g/dL      Albumin 2.20 (L) g/dL      ALT (SGPT) 39 U/L      AST (SGOT) 40 U/L      Alkaline Phosphatase 91 U/L      Total Bilirubin 2.9 (H) mg/dL      eGFR Non African Amer 42 (L) mL/min/1.73      Globulin 2.7 gm/dL      A/G Ratio 0.8 (L) g/dL      BUN/Creatinine Ratio 16.9     Anion Gap 8.0 mmol/L     Magnesium [[id number]]  (Normal) Collected:  07/19/17 0447    Specimen:  Blood Updated:  07/19/17 0514     Magnesium 1.8 mg/dL     CBC Auto Differential [[id number]]  (Abnormal) Collected:  07/19/17 0447    Specimen:  Blood Updated:  07/19/17 0515     WBC 3.76 10*3/mm3      RBC 2.11 (L) 10*6/mm3      Hemoglobin 7.0 (L) g/dL      Hematocrit 20.5 (L) %      MCV 97.2 fL      MCH 33.2 pg      MCHC 34.1 g/dL      RDW 17.0 (H) %      RDW-SD 60.2 (H) fl      MPV 10.9 fL      Platelets 39 (L) 10*3/mm3      Neutrophil %  39.1 %      Lymphocyte % 43.9 %      Monocyte % 10.6 %      Eosinophil % 5.3 %      Basophil % 1.1 %      Immature Grans % 0.0 %      Neutrophils, Absolute 1.47 (L) 10*3/mm3      Lymphocytes, Absolute 1.65 10*3/mm3      Monocytes, Absolute 0.40 10*3/mm3      Eosinophils, Absolute 0.20 10*3/mm3      Basophils, Absolute 0.04 10*3/mm3      Immature Grans, Absolute 0.00 10*3/mm3     Iron Profile [[id number]]  (Abnormal) Collected:  07/19/17 0447    Specimen:  Blood Updated:  07/19/17 0525     Iron 130 mcg/dL      TIBC 197 (L) mcg/dL      Iron Saturation 66 (H) %     TSH [[id number]]  (Normal) Collected:  07/19/17 0447    Specimen:  Blood Updated:  07/19/17 0545     TSH 1.820 mIU/mL     Urine Culture [[id number]]  (Normal) Collected:  07/18/17 1750    Specimen:  Urine from Urine, Clean Catch Updated:  07/19/17 0620     Urine Culture Culture in progress    SCANNED - LABS [[id number]] Resulted:  07/18/17      Updated:  07/19/17 1227          Culture Data:   No results found for: BLOODCX  Urine Culture   Date Value Ref Range Status   07/18/2017 Culture in progress  Preliminary     No results found for: RESPCX  No results found for: WOUNDCX  No results found for: STOOLCX  No components found for: BODYFLD    Radiology Data:   Imaging Results (last 24 hours)     ** No results found for the last 24 hours. **          I have reviewed the patient current medications.     Assessment/Plan       Assessment/Plan          Active Hospital Problems (** Indicates Principal Problem)     Diagnosis Date Noted   • **Anemia [D64.9] 07/18/2017       Anemia of most likely chronic disease   Will obtain hemoccult  Transfuse 1 unit   Iron study  Folate and B12 evaluation    will tranfuse another unit today    • Increased ammonia level [R79.89] 07/18/2017       Secondary to liver cirrhosis   Monitor levels and give lactulose    • Thrombocytopenia [D69.6] 07/10/2017       Platelet of 19K, consult Dr. Ferreira.   Plan to transfuse 1 unit today.   Will obtain  labs 30 mins after the transfusion.  Platelets today are 65468   HIV testing. hiv negative   Most likely secondary to liver cirrhosis.       • Hepatic cirrhosis [K74.60] 05/26/2009   • Back pain [M54.9] 12/01/2008       Resolved Hospital Problems     Diagnosis Date Noted Date Resolved   No resolved problems to display.      Risk and benefit of transfusion discussed with pt and he agree with transfusion.         DVT ppx: none due to risk of bleeding and thrombocytopenia.      I discussed the patients findings and my recommendations with patient, nursing staff and consulting provider.                       Zach Torres MD   07/19/17   2:16 PM
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
## 2017-07-19 NOTE — PROGRESS NOTES
Discharge Planning Assessment  [facility]     Patient Name: Armando Mcmullen  MRN: [id number]  Today's Date: 7/19/2017    Admit Date: 7/18/2017          Discharge Needs Assessment       07/19/17 1123    Living Environment    Lives With facility resident    Living Arrangements residential facility    Transportation Available ambulance    Living Environment Comment presently at [facility] undergoing rehab to home.    Living Environment    Able to Return to Prior Living Arrangements yes    Living Arrangement Comments [facility] for rehab to home    Discharge Needs Assessment    Equipment Needed After Discharge none    Discharge Facility/Level Of Care Needs nursing facility, skilled    Current Discharge Risk physical impairment    Discharge Disposition skilled nursing facility      07/19/17 1007    Living Environment    Lives With facility resident   BCS    Living Arrangements residential facility    Transportation Available van, wheelchair accessible;public transportation   PACS    Discharge Needs Assessment    Discharge Disposition skilled nursing facility            Discharge Plan       07/19/17 1121    Case Management/Social Work Plan    Plan return rehab to home at [facility]    Additional Comments team rounding complete  pt here due to low plts  and h/h  rec 1 unit of blood  h/h did not change much  plan another unit of blood.   plt up from 19 to 39  plan consult with jada.  plan transfuse then return to nursing home.  reij cramer rn    Final Note    Final Note return rehab to home at [facility]      07/19/17 1008    Case Management/Social Work Plan    Plan [facility]    Additional Comments LSW spoke with Mavis at [facility] this date. Pt has a 14 day bedhold. Pt can return pnce medically stable.         Discharge Placement     No information found                Demographic Summary       07/19/17 1123    Referral Information    Admission Type observation    Arrived From skilled nursing facility    Referral Source high risk  screening    Reason For Consult discharge planning            Functional Status     None            Psychosocial     None            Abuse/Neglect     None            Legal     None            Substance Abuse     None            Patient Forms     None          Karley Hutchison
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
## 2017-07-19 NOTE — PLAN OF CARE
Problem: Patient Care Overview (Adult)  Goal: Plan of Care Review  Outcome: Ongoing (interventions implemented as appropriate)    07/19/17 0042   Coping/Psychosocial Response Interventions   Plan Of Care Reviewed With patient   Patient Care Overview   Progress no change   Outcome Evaluation   Outcome Summary/Follow up Plan patient was admitted on this shift. He recieved blood and platelets through the night. Rested well. Continue to monitor.       Goal: Adult Individualization and Mutuality  Outcome: Ongoing (interventions implemented as appropriate)  Goal: Discharge Needs Assessment  Outcome: Ongoing (interventions implemented as appropriate)

## 2017-07-20 VITALS
OXYGEN SATURATION: 98 % | HEIGHT: 73 IN | RESPIRATION RATE: 16 BRPM | HEART RATE: 94 BPM | SYSTOLIC BLOOD PRESSURE: 120 MMHG | BODY MASS INDEX: 32.34 KG/M2 | DIASTOLIC BLOOD PRESSURE: 68 MMHG | TEMPERATURE: 98.2 F | WEIGHT: 244 LBS

## 2017-07-20 LAB
ABO + RH BLD: NORMAL
ALBUMIN SERPL-MCNC: 2.2 G/DL (ref 3.4–4.8)
ALBUMIN/GLOB SERPL: 0.8 G/DL (ref 1.1–1.8)
ALP SERPL-CCNC: 108 U/L (ref 38–126)
ALT SERPL W P-5'-P-CCNC: 39 U/L (ref 21–72)
ANION GAP SERPL CALCULATED.3IONS-SCNC: 7 MMOL/L (ref 5–15)
ANISOCYTOSIS BLD QL: NORMAL
AST SERPL-CCNC: 31 U/L (ref 17–59)
BACTERIA SPEC AEROBE CULT: NORMAL
BASOPHILS # BLD AUTO: 0.05 10*3/MM3 (ref 0–0.2)
BASOPHILS NFR BLD AUTO: 1.4 % (ref 0–2)
BH BB BLOOD EXPIRATION DATE: NORMAL
BH BB BLOOD TYPE BARCODE: 600
BH BB BLOOD TYPE BARCODE: 600
BH BB BLOOD TYPE BARCODE: 9500
BH BB BLOOD TYPE BARCODE: NORMAL
BH BB BLOOD TYPE BARCODE: NORMAL
BH BB DISPENSE STATUS: NORMAL
BH BB PRODUCT CODE: NORMAL
BH BB UNIT NUMBER: NORMAL
BILIRUB SERPL-MCNC: 2.3 MG/DL (ref 0.2–1.3)
BUN BLD-MCNC: 25 MG/DL (ref 7–21)
BUN/CREAT SERPL: 16.2 (ref 7–25)
CALCIUM SPEC-SCNC: 8.5 MG/DL (ref 8.4–10.2)
CHLORIDE SERPL-SCNC: 99 MMOL/L (ref 95–110)
CO2 SERPL-SCNC: 28 MMOL/L (ref 22–31)
CREAT BLD-MCNC: 1.54 MG/DL (ref 0.7–1.3)
DEPRECATED RDW RBC AUTO: 62.8 FL (ref 35.1–43.9)
EOSINOPHIL # BLD AUTO: 0.17 10*3/MM3 (ref 0–0.7)
EOSINOPHIL NFR BLD AUTO: 4.9 % (ref 0–7)
ERYTHROCYTE [DISTWIDTH] IN BLOOD BY AUTOMATED COUNT: 18.1 % (ref 11.5–14.5)
FOLATE SERPL-MCNC: 9.96 NG/ML (ref 2.76–21)
GFR SERPL CREATININE-BSD FRML MDRD: 47 ML/MIN/1.73 (ref 60–130)
GLOBULIN UR ELPH-MCNC: 2.7 GM/DL (ref 2.3–3.5)
GLUCOSE BLD-MCNC: 98 MG/DL (ref 60–100)
HCT VFR BLD AUTO: 20.5 % (ref 39–49)
HGB BLD-MCNC: 7.2 G/DL (ref 13.7–17.3)
IMM GRANULOCYTES # BLD: 0.01 10*3/MM3 (ref 0–0.02)
IMM GRANULOCYTES NFR BLD: 0.3 % (ref 0–0.5)
LYMPHOCYTES # BLD AUTO: 1.34 10*3/MM3 (ref 0.6–4.2)
LYMPHOCYTES NFR BLD AUTO: 38.6 % (ref 10–50)
MCH RBC QN AUTO: 33.3 PG (ref 26.5–34)
MCHC RBC AUTO-ENTMCNC: 35.1 G/DL (ref 31.5–36.3)
MCV RBC AUTO: 94.9 FL (ref 80–98)
MONOCYTES # BLD AUTO: 0.6 10*3/MM3 (ref 0–0.9)
MONOCYTES NFR BLD AUTO: 17.3 % (ref 0–12)
NEUTROPHILS # BLD AUTO: 1.3 10*3/MM3 (ref 2–8.6)
NEUTROPHILS NFR BLD AUTO: 37.5 % (ref 37–80)
PLATELET # BLD AUTO: 27 10*3/MM3 (ref 150–450)
PMV BLD AUTO: 12.5 FL (ref 8–12)
POTASSIUM BLD-SCNC: 4.2 MMOL/L (ref 3.5–5.1)
PROT SERPL-MCNC: 4.9 G/DL (ref 6.3–8.6)
RBC # BLD AUTO: 2.16 10*6/MM3 (ref 4.37–5.74)
SMALL PLATELETS BLD QL SMEAR: NORMAL
SODIUM BLD-SCNC: 134 MMOL/L (ref 137–145)
UNIT  ABO: NORMAL
UNIT  RH: NORMAL
WBC MORPH BLD: NORMAL
WBC NRBC COR # BLD: 3.47 10*3/MM3 (ref 3.2–9.8)

## 2017-07-20 PROCEDURE — 86707 HEPATITIS BE ANTIBODY: CPT | Performed by: INTERNAL MEDICINE

## 2017-07-20 PROCEDURE — 86706 HEP B SURFACE ANTIBODY: CPT | Performed by: INTERNAL MEDICINE

## 2017-07-20 PROCEDURE — 94799 UNLISTED PULMONARY SVC/PX: CPT

## 2017-07-20 PROCEDURE — 85025 COMPLETE CBC W/AUTO DIFF WBC: CPT | Performed by: INTERNAL MEDICINE

## 2017-07-20 PROCEDURE — 82746 ASSAY OF FOLIC ACID SERUM: CPT | Performed by: INTERNAL MEDICINE

## 2017-07-20 PROCEDURE — 87350 HEPATITIS BE AG IA: CPT | Performed by: INTERNAL MEDICINE

## 2017-07-20 PROCEDURE — G0378 HOSPITAL OBSERVATION PER HR: HCPCS

## 2017-07-20 PROCEDURE — 87340 HEPATITIS B SURFACE AG IA: CPT | Performed by: INTERNAL MEDICINE

## 2017-07-20 PROCEDURE — 85007 BL SMEAR W/DIFF WBC COUNT: CPT | Performed by: INTERNAL MEDICINE

## 2017-07-20 PROCEDURE — 80053 COMPREHEN METABOLIC PANEL: CPT | Performed by: INTERNAL MEDICINE

## 2017-07-20 PROCEDURE — 94760 N-INVAS EAR/PLS OXIMETRY 1: CPT

## 2017-07-20 PROCEDURE — 86705 HEP B CORE ANTIBODY IGM: CPT | Performed by: INTERNAL MEDICINE

## 2017-07-20 PROCEDURE — 86704 HEP B CORE ANTIBODY TOTAL: CPT | Performed by: INTERNAL MEDICINE

## 2017-07-20 PROCEDURE — 86803 HEPATITIS C AB TEST: CPT | Performed by: INTERNAL MEDICINE

## 2017-07-20 RX ADMIN — FUROSEMIDE 40 MG: 40 TABLET ORAL at 08:33

## 2017-07-20 RX ADMIN — LACTULOSE 30 G: 10 SOLUTION ORAL at 08:41

## 2017-07-20 RX ADMIN — Medication: at 08:33

## 2017-07-20 RX ADMIN — LISINOPRIL 20 MG: 20 TABLET ORAL at 08:33

## 2017-07-20 RX ADMIN — GABAPENTIN 300 MG: 300 CAPSULE ORAL at 01:26

## 2017-07-20 RX ADMIN — POTASSIUM CHLORIDE 20 MEQ: 750 CAPSULE, EXTENDED RELEASE ORAL at 08:34

## 2017-07-20 RX ADMIN — NYSTATIN 400000 UNITS: 100000 SUSPENSION ORAL at 08:32

## 2017-07-20 RX ADMIN — GABAPENTIN 300 MG: 300 CAPSULE ORAL at 08:41

## 2017-07-20 RX ADMIN — NYSTATIN 400000 UNITS: 100000 SUSPENSION ORAL at 11:44

## 2017-07-20 RX ADMIN — IPRATROPIUM BROMIDE AND ALBUTEROL SULFATE 3 ML: 2.5; .5 SOLUTION RESPIRATORY (INHALATION) at 00:24

## 2017-07-20 RX ADMIN — NYSTATIN: 100000 POWDER TOPICAL at 08:34

## 2017-07-20 RX ADMIN — HYDROCODONE BITARTRATE AND ACETAMINOPHEN 1 TABLET: 10; 325 TABLET ORAL at 01:26

## 2017-07-20 RX ADMIN — IPRATROPIUM BROMIDE AND ALBUTEROL SULFATE 3 ML: 2.5; .5 SOLUTION RESPIRATORY (INHALATION) at 07:46

## 2017-07-20 RX ADMIN — GABAPENTIN 300 MG: 300 CAPSULE ORAL at 16:19

## 2017-07-20 RX ADMIN — PANTOPRAZOLE SODIUM 40 MG: 40 TABLET, DELAYED RELEASE ORAL at 08:34

## 2017-07-20 RX ADMIN — IPRATROPIUM BROMIDE AND ALBUTEROL SULFATE 3 ML: 2.5; .5 SOLUTION RESPIRATORY (INHALATION) at 12:24

## 2017-07-20 RX ADMIN — HYDROCODONE BITARTRATE AND ACETAMINOPHEN 1 TABLET: 10; 325 TABLET ORAL at 09:42

## 2017-07-20 NOTE — DISCHARGE SUMMARY
[facility] Medicine Services  DISCHARGE SUMMARY       Date of Admission: 7/18/2017  Date of Discharge:  7/20/2017  Primary Care Physician: No Known Provider    Presenting Problem/History of Present Illness:  Thrombocytopenia [D69.6]  Renal insufficiency [N28.9]  Hypotension, unspecified hypotension type [I95.9]  Anemia, unspecified type [D64.9]  Anemia, unspecified type [D64.9]   *    Final Discharge Diagnoses:  Hospital Problem List     * (Principal)Anemia    Overview Signed 7/18/2017  7:43 PM by Beau Celeste MD     Anemia of most likely chronic disease   Will obtain hemoccult  Transfuse 1 unit   Iron study  Folate and B12 evaluation            Back pain (Chronic)    Hepatic cirrhosis (Chronic)    Thrombocytopenia    Overview Addendum 7/18/2017  7:41 PM by Beau Celeste MD     Platelet of 19K, consult Dr. Ferreira.   Plan to transfuse 1 unit today.   Will obtain labs 30 mins after the transfusion.  HIV testing.   Most likely secondary to liver cirrhosis.            Increased ammonia level    Overview Signed 7/18/2017  7:47 PM by Beau Celeste MD     Secondary to liver cirrhosis   Monitor levels and give lactulose                Consults:   Consults     Date and Time Order Name Status Description    7/18/2017 1934 Inpatient Consult to Hematology and Oncology Completed     7/18/2017 1726 Hospitalist (on-call MD unless specified)      7/6/2017 1239 Inpatient Consult to Nephrology Completed           Procedures Performed: None                    Chief Complaint on Day of Discharge: None     Hospital Course:Patient is 52 y.o. male presents with severe anemia and thrombocytopenia. Pt was admitted to hospital about a week ago for cellulitis, was found to have thrombocytopenia and was transfused at that time. Currently he is presented again from the nursing home after having lab work which showed that his platelet count was 85595 and Hb was 7.0.  Currently he is not complaining of any  "symptoms other than chronic back pain.     patient was transfused  2 units of PRBC and 1 unit of platelets . He was seen by DR Ferreira who recommends transfusions when hb is less than 7 and platelets less than 38515 .  He was discharged to the SNF  With Hb of 7.2 and platelets of 27 000. No bleeding occurred  During this hospitalization   it was instructed to contact larry Segovia if  We need to transfuse him         Condition on Discharge:  Stable     Physical Exam on Discharge:  /68 (BP Location: Right arm, Patient Position: Lying)  Pulse 94  Temp 98.2 °F (36.8 °C) (Oral)   Resp 16  Ht 73\" (185.4 cm)  Wt 244 lb (111 kg)  SpO2 98%  BMI 32.19 kg/m2  Physical Exam   Constitutional: He is oriented to person, place, and time. He appears well-developed and well-nourished.   HENT:   Head: Normocephalic and atraumatic.   Cardiovascular: Regular rhythm, S1 normal, S2 normal and normal heart sounds.  Exam reveals no S3, no S4, no distant heart sounds and no friction rub.    No murmur heard.  Pulmonary/Chest: Effort normal and breath sounds normal.   Abdominal: Soft. Normal appearance. There is hepatomegaly. There is no tenderness.      Skin Integrity  -  His right foot skin is intact.     Armando 's left foot skin is intact. .  Neurological: He is alert and oriented to person, place, and time.   Skin: Skin is warm and dry.   Psychiatric: He has a normal mood and affect. His behavior is normal.         Discharge Disposition:  Skilled Nursing Facility (DC - External)    Discharge Medications:   Benedict Armando   Home Medication Instructions MARBELLA:[id number]    Printed on:07/20/17 8502   Medication Information                      Bacitracin Zinc (MAGIC BUTT OINTMENT)  Apply 1 g topically 3 (Three) Times a Day.             furosemide (LASIX) 40 MG tablet  Take 1 tablet by mouth 2 (Two) Times a Day.             gabapentin (NEURONTIN) 100 MG capsule  Take 300 mg by mouth 3 (Three) Times a Day.           "   HYDROcodone-acetaminophen (NORCO)  MG per tablet  Take 1 tablet by mouth Every 8 (Eight) Hours As Needed for Moderate Pain (4-6).             Incontinence Supplies (BEDPAN) misc  Bedpan (Dx occasional incontinence, weakness, AMS related to hepatic encephalopathy)             lactulose (CHRONULAC) 10 GM/15ML solution  Take 45 mL by mouth 4 (Four) Times a Day.             lisinopril (PRINIVIL,ZESTRIL) 20 MG tablet  Take 20 mg by mouth Daily.             Misc. Devices (COMMODE BEDSIDE) [facility]  Large bedside commode (Dx hepatic encephalopathy, LLE amputation)             nystatin (MYCOSTATIN) 394056 UNIT/GM powder  Apply  topically Every 12 (Twelve) Hours.             nystatin susp + lidocaine viscous (MAGIC MOUTHWASH) oral suspension  Swish and swallow 5 mL 4 (Four) Times a Day.             pantoprazole (PROTONIX) 40 MG EC tablet  Take 1 tablet by mouth Daily.             potassium chloride (K-DUR,KLOR-CON) 20 MEQ CR tablet  Take 1 tablet by mouth Daily.             vitamin D (ERGOCALCIFEROL) 36449 UNITS capsule capsule  Take 1 capsule by mouth Every 7 (Seven) Days.                 Discharge Diet: low salt  Diet     Activity at Discharge: as tolerated       Follow-up Appointments:   Future Appointments  Date Time Provider Department Center   9/6/2017 1:45 PM Liss Frank DO [facility] None       Test Results Pending at Discharge:  Order Current Status    ABO / Rh In process    Hepatitis B Virus Profile In process    Hepatitis C Antibody In process          Zach Torres MD  07/20/17  5:05 PM    Time: 35 min

## 2017-07-20 NOTE — PLAN OF CARE
Problem: Fall Risk (Adult)  Goal: Identify Related Risk Factors and Signs and Symptoms  Outcome: Outcome(s) achieved Date Met:  07/19/17  Goal: Absence of Falls  Outcome: Ongoing (interventions implemented as appropriate)

## 2017-07-21 LAB
HBV CORE AB SER DONR QL IA: NEGATIVE
HBV CORE IGM SERPL QL IA: NEGATIVE
HBV E AB SERPL QL IA: NEGATIVE
HBV E AG SERPL QL IA: NEGATIVE
HBV SURFACE AB SER QL: NON REACTIVE
HBV SURFACE AG SERPL QL IA: NEGATIVE
HCV AB SER DONR QL: REACTIVE

## 2017-07-21 NOTE — PLAN OF CARE
Problem: Patient Care Overview (Adult)  Goal: Adult Individualization and Mutuality  Outcome: Ongoing (interventions implemented as appropriate)  Goal: Discharge Needs Assessment  Outcome: Ongoing (interventions implemented as appropriate)    Problem: Fall Risk (Adult)  Goal: Absence of Falls  Outcome: Ongoing (interventions implemented as appropriate)

## 2017-08-26 ENCOUNTER — HOSPITAL ENCOUNTER (EMERGENCY)
Facility: HOSPITAL | Age: 52
Discharge: HOME | End: 2017-08-26
Attending: EMERGENCY MEDICINE | Admitting: EMERGENCY MEDICINE

## 2017-08-26 VITALS
OXYGEN SATURATION: 100 % | WEIGHT: 250 LBS | DIASTOLIC BLOOD PRESSURE: 81 MMHG | SYSTOLIC BLOOD PRESSURE: 126 MMHG | HEART RATE: 76 BPM | BODY MASS INDEX: 33.86 KG/M2 | HEIGHT: 72 IN | TEMPERATURE: 98 F | RESPIRATION RATE: 16 BRPM

## 2017-08-26 DIAGNOSIS — N30.01 ACUTE HEMORRHAGIC CYSTITIS: Primary | ICD-10-CM

## 2017-08-26 LAB
ALBUMIN SERPL-MCNC: 2.7 G/DL (ref 3.4–4.8)
ALBUMIN/GLOB SERPL: 0.8 G/DL (ref 1.1–1.8)
ALP SERPL-CCNC: 89 U/L (ref 38–126)
ALT SERPL W P-5'-P-CCNC: 26 U/L (ref 21–72)
AMMONIA BLD-SCNC: 42 UMOL/L (ref 9–30)
ANION GAP SERPL CALCULATED.3IONS-SCNC: 7 MMOL/L (ref 5–15)
ANISOCYTOSIS BLD QL: NORMAL
APTT PPP: 38.3 SECONDS (ref 20–40.3)
AST SERPL-CCNC: 43 U/L (ref 17–59)
BACTERIA UR QL AUTO: ABNORMAL /HPF
BASOPHILS # BLD AUTO: 0.08 10*3/MM3 (ref 0–0.2)
BASOPHILS NFR BLD AUTO: 1.1 % (ref 0–2)
BILIRUB SERPL-MCNC: 4.6 MG/DL (ref 0.2–1.3)
BILIRUB UR QL STRIP: ABNORMAL
BUN BLD-MCNC: 15 MG/DL (ref 7–21)
BUN/CREAT SERPL: 12.6 (ref 7–25)
CALCIUM SPEC-SCNC: 8.5 MG/DL (ref 8.4–10.2)
CHLORIDE SERPL-SCNC: 105 MMOL/L (ref 95–110)
CLARITY UR: ABNORMAL
CO2 SERPL-SCNC: 25 MMOL/L (ref 22–31)
COLOR UR: ABNORMAL
CREAT BLD-MCNC: 1.19 MG/DL (ref 0.7–1.3)
DEPRECATED RDW RBC AUTO: 53.6 FL (ref 35.1–43.9)
EOSINOPHIL # BLD AUTO: 0.41 10*3/MM3 (ref 0–0.7)
EOSINOPHIL NFR BLD AUTO: 5.8 % (ref 0–7)
ERYTHROCYTE [DISTWIDTH] IN BLOOD BY AUTOMATED COUNT: 15.5 % (ref 11.5–14.5)
GFR SERPL CREATININE-BSD FRML MDRD: 64 ML/MIN/1.73 (ref 56–130)
GLOBULIN UR ELPH-MCNC: 3.3 GM/DL (ref 2.3–3.5)
GLUCOSE BLD-MCNC: 80 MG/DL (ref 60–100)
GLUCOSE UR STRIP-MCNC: NEGATIVE MG/DL
HCT VFR BLD AUTO: 33.4 % (ref 39–49)
HGB BLD-MCNC: 11.2 G/DL (ref 13.7–17.3)
HGB UR QL STRIP.AUTO: ABNORMAL
HOLD SPECIMEN: NORMAL
HYALINE CASTS UR QL AUTO: ABNORMAL /LPF
HYPOCHROMIA BLD QL: NORMAL
IMM GRANULOCYTES # BLD: 0.02 10*3/MM3 (ref 0–0.02)
IMM GRANULOCYTES NFR BLD: 0.3 % (ref 0–0.5)
INR PPP: 1.37 (ref 0.8–1.2)
KETONES UR QL STRIP: ABNORMAL
LEUKOCYTE ESTERASE UR QL STRIP.AUTO: ABNORMAL
LYMPHOCYTES # BLD AUTO: 1.9 10*3/MM3 (ref 0.6–4.2)
LYMPHOCYTES NFR BLD AUTO: 26.7 % (ref 10–50)
MCH RBC QN AUTO: 31.7 PG (ref 26.5–34)
MCHC RBC AUTO-ENTMCNC: 33.5 G/DL (ref 31.5–36.3)
MCV RBC AUTO: 94.6 FL (ref 80–98)
MONOCYTES # BLD AUTO: 0.77 10*3/MM3 (ref 0–0.9)
MONOCYTES NFR BLD AUTO: 10.8 % (ref 0–12)
NEUTROPHILS # BLD AUTO: 3.94 10*3/MM3 (ref 2–8.6)
NEUTROPHILS NFR BLD AUTO: 55.3 % (ref 37–80)
NITRITE UR QL STRIP: NEGATIVE
PH UR STRIP.AUTO: 7.5 [PH] (ref 5–9)
PLATELET # BLD AUTO: 60 10*3/MM3 (ref 150–450)
PMV BLD AUTO: 10.1 FL (ref 8–12)
POIKILOCYTOSIS BLD QL SMEAR: NORMAL
POTASSIUM BLD-SCNC: 3.1 MMOL/L (ref 3.5–5.1)
PROT SERPL-MCNC: 6 G/DL (ref 6.3–8.6)
PROT UR QL STRIP: ABNORMAL
PROTHROMBIN TIME: 16.9 SECONDS (ref 11.1–15.3)
RBC # BLD AUTO: 3.53 10*6/MM3 (ref 4.37–5.74)
RBC # UR: ABNORMAL /HPF
REF LAB TEST METHOD: ABNORMAL
SMALL PLATELETS BLD QL SMEAR: NORMAL
SODIUM BLD-SCNC: 137 MMOL/L (ref 137–145)
SP GR UR STRIP: 1.01 (ref 1–1.03)
SQUAMOUS #/AREA URNS HPF: ABNORMAL /HPF
UROBILINOGEN UR QL STRIP: ABNORMAL
WBC MORPH BLD: NORMAL
WBC NRBC COR # BLD: 7.12 10*3/MM3 (ref 3.2–9.8)
WBC UR QL AUTO: ABNORMAL /HPF

## 2017-08-26 PROCEDURE — 85730 THROMBOPLASTIN TIME PARTIAL: CPT | Performed by: EMERGENCY MEDICINE

## 2017-08-26 PROCEDURE — 87077 CULTURE AEROBIC IDENTIFY: CPT | Performed by: EMERGENCY MEDICINE

## 2017-08-26 PROCEDURE — 87186 SC STD MICRODIL/AGAR DIL: CPT | Performed by: EMERGENCY MEDICINE

## 2017-08-26 PROCEDURE — 85007 BL SMEAR W/DIFF WBC COUNT: CPT | Performed by: EMERGENCY MEDICINE

## 2017-08-26 PROCEDURE — 87086 URINE CULTURE/COLONY COUNT: CPT | Performed by: EMERGENCY MEDICINE

## 2017-08-26 PROCEDURE — 80053 COMPREHEN METABOLIC PANEL: CPT | Performed by: EMERGENCY MEDICINE

## 2017-08-26 PROCEDURE — 81001 URINALYSIS AUTO W/SCOPE: CPT | Performed by: EMERGENCY MEDICINE

## 2017-08-26 PROCEDURE — 82140 ASSAY OF AMMONIA: CPT | Performed by: EMERGENCY MEDICINE

## 2017-08-26 PROCEDURE — 85025 COMPLETE CBC W/AUTO DIFF WBC: CPT | Performed by: EMERGENCY MEDICINE

## 2017-08-26 PROCEDURE — 85610 PROTHROMBIN TIME: CPT | Performed by: EMERGENCY MEDICINE

## 2017-08-26 PROCEDURE — 99283 EMERGENCY DEPT VISIT LOW MDM: CPT

## 2017-08-26 RX ORDER — SODIUM CHLORIDE 0.9 % (FLUSH) 0.9 %
10 SYRINGE (ML) INJECTION AS NEEDED
Status: DISCONTINUED | OUTPATIENT
Start: 2017-08-26 | End: 2017-08-26 | Stop reason: HOSPADM

## 2017-08-26 RX ORDER — CEPHALEXIN 500 MG/1
500 CAPSULE ORAL 2 TIMES DAILY
Qty: 20 CAPSULE | Refills: 0 | Status: SHIPPED | OUTPATIENT
Start: 2017-08-26 | End: 2017-09-05
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
## 2017-08-27 NOTE — DISCHARGE INSTRUCTIONS

## 2017-08-28 LAB — BACTERIA SPEC AEROBE CULT: ABNORMAL

## 2017-09-06 ENCOUNTER — OFFICE VISIT (OUTPATIENT)
Dept: FAMILY MEDICINE CLINIC | Facility: CLINIC | Age: 52
End: 2017-09-06

## 2017-09-06 VITALS
DIASTOLIC BLOOD PRESSURE: 98 MMHG | HEIGHT: 72 IN | WEIGHT: 250 LBS | SYSTOLIC BLOOD PRESSURE: 130 MMHG | BODY MASS INDEX: 33.86 KG/M2

## 2017-09-06 DIAGNOSIS — K74.60 CIRRHOSIS OF LIVER WITHOUT ASCITES, UNSPECIFIED HEPATIC CIRRHOSIS TYPE (HCC): Primary | Chronic | ICD-10-CM

## 2017-09-06 DIAGNOSIS — M86.611 CHRONIC OSTEOMYELITIS OF RIGHT SHOULDER REGION (HCC): ICD-10-CM

## 2017-09-06 DIAGNOSIS — D69.6 THROMBOCYTOPENIA (HCC): ICD-10-CM

## 2017-09-06 DIAGNOSIS — M15.9 OSTEOARTHRITIS OF MULTIPLE JOINTS, UNSPECIFIED OSTEOARTHRITIS TYPE: ICD-10-CM

## 2017-09-06 PROCEDURE — 99214 OFFICE O/P EST MOD 30 MIN: CPT | Performed by: FAMILY MEDICINE

## 2017-09-06 RX ORDER — LACTULOSE 10 G/15ML
30 SOLUTION ORAL 4 TIMES DAILY
Qty: 5400 ML | Refills: 2 | Status: SHIPPED | OUTPATIENT
Start: 2017-09-06 | End: 2017-09-06 | Stop reason: SDUPTHER

## 2017-09-06 RX ORDER — POTASSIUM CHLORIDE 20 MEQ/1
20 TABLET, EXTENDED RELEASE ORAL DAILY
Qty: 30 TABLET | Refills: 11
Start: 2017-09-06 | End: 2018-01-01 | Stop reason: HOSPADM

## 2017-09-06 RX ORDER — PANTOPRAZOLE SODIUM 40 MG/1
40 TABLET, DELAYED RELEASE ORAL DAILY
Qty: 30 TABLET | Refills: 5 | Status: SHIPPED | OUTPATIENT
Start: 2017-09-06 | End: 2018-01-01 | Stop reason: HOSPADM

## 2017-09-06 RX ORDER — FUROSEMIDE 40 MG/1
40 TABLET ORAL 2 TIMES DAILY
Qty: 60 TABLET | Refills: 5 | Status: SHIPPED | OUTPATIENT
Start: 2017-09-06 | End: 2018-01-01

## 2017-09-06 RX ORDER — LACTULOSE 10 G/15ML
30 SOLUTION ORAL 4 TIMES DAILY
Qty: 5400 ML | Refills: 2 | Status: SHIPPED | OUTPATIENT
Start: 2017-09-06 | End: 2017-10-06

## 2017-09-06 RX ORDER — LISINOPRIL 20 MG/1
20 TABLET ORAL DAILY
Qty: 30 TABLET | Refills: 5 | Status: SHIPPED | OUTPATIENT
Start: 2017-09-06 | End: 2018-01-01

## 2017-09-06 RX ORDER — ERGOCALCIFEROL 1.25 MG/1
50000 CAPSULE ORAL
Qty: 4 CAPSULE | Refills: 5 | Status: SHIPPED | OUTPATIENT
Start: 2017-09-06 | End: 2018-01-01 | Stop reason: SDUPTHER

## 2017-09-06 NOTE — PROGRESS NOTES
Subjective   Chief Complaint   Patient presents with   • Follow-up     3 MONTH RECHK        Armando Mcmullen is a 52 y.o. male who presents for Follow-up (3 MONTH RECHK )   History of Present Illness:   Recently went to ER for hemorrhagic cystitis. Has not had any blood in the urine since then. Had uti and was tx with abx. Denies difficulty urinating, fever, chills, nausea, vomiting, dysuria.   Has not followed up with GI  Has not followed up with ortho regarding chronic osteo. Was in nursing home for several weeks for IV abx. Now home    The following portions of the patient's history were reviewed and updated as appropriate:problem list, current medications, allergies, past family history, past medical history, past social history and past surgical history    Past Medical History:   Diagnosis Date   • Anxiety state 12/1/2008   • Back pain 12/1/2008   • Chronic hepatitis 6/18/2009   • Chronic osteomyelitis     right shoulder   • CKD (chronic kidney disease)    • Depression 12/1/2008   • Essential hypertension 12/1/2008   • Hepatic cirrhosis 5/26/2009   • Hypersplenism 6/28/2010   • Insomnia 12/1/2008   • Osteoarthritis 12/1/2008       Social History   Substance Use Topics   • Smoking status: Current Every Day Smoker     Packs/day: 0.25     Types: Cigarettes   • Smokeless tobacco: Never Used   • Alcohol use No       Medications:  Outpatient Medications Prior to Visit   Medication Sig Dispense Refill   • Bacitracin Zinc (MAGIC BUTT OINTMENT) Apply 1 g topically 3 (Three) Times a Day. 120 g 11   • gabapentin (NEURONTIN) 100 MG capsule Take 300 mg by mouth 3 (Three) Times a Day.     • Incontinence Supplies (BEDPAN) misc Bedpan (Dx occasional incontinence, weakness, AMS related to hepatic encephalopathy) 1 each 0   • Misc. Devices (COMMODE BEDSIDE) misc Large bedside commode (Dx hepatic encephalopathy, LLE amputation) 1 each 0   • nystatin susp + lidocaine viscous (MAGIC MOUTHWASH) oral suspension Swish and swallow 5 mL 4  "(Four) Times a Day. 120 mL 3   • furosemide (LASIX) 40 MG tablet Take 1 tablet by mouth 2 (Two) Times a Day. 60 tablet 1   • HYDROcodone-acetaminophen (NORCO)  MG per tablet Take 1 tablet by mouth Every 8 (Eight) Hours As Needed for Moderate Pain (4-6). 90 tablet 0   • lactulose (CHRONULAC) 10 GM/15ML solution Take 45 mL by mouth 4 (Four) Times a Day. 1892 mL 0   • lisinopril (PRINIVIL,ZESTRIL) 20 MG tablet Take 20 mg by mouth Daily.     • nystatin (MYCOSTATIN) 407513 UNIT/GM powder Apply  topically Every 12 (Twelve) Hours. 60 g 11   • pantoprazole (PROTONIX) 40 MG EC tablet Take 1 tablet by mouth Daily. 30 tablet 11   • potassium chloride (K-DUR,KLOR-CON) 20 MEQ CR tablet Take 1 tablet by mouth Daily. 30 tablet 11   • vitamin D (ERGOCALCIFEROL) 77229 UNITS capsule capsule Take 1 capsule by mouth Every 7 (Seven) Days. 4 capsule 11     No facility-administered medications prior to visit.        Allergies   Allergen Reactions   • Aspirin Other (See Comments)     D/T liver   • Codeine Sulfate        Review of Systems   Constitutional: Negative for fatigue, fever and unexpected weight change.   HENT: Negative for rhinorrhea.    Eyes: Negative for visual disturbance.   Respiratory: Negative for cough and shortness of breath.    Cardiovascular: Negative for chest pain, palpitations and leg swelling.   Gastrointestinal: Negative for abdominal pain, blood in stool, constipation and diarrhea.   Endocrine: Negative for polydipsia, polyphagia and polyuria.   Genitourinary: Negative for difficulty urinating.   Musculoskeletal: Negative for arthralgias and back pain.   Skin: Negative for rash.   Neurological: Negative for headaches.   Psychiatric/Behavioral: Negative for sleep disturbance. The patient is not nervous/anxious.        Objective   Visit Vitals   • /98   • Ht 72\" (182.9 cm)   • Wt 250 lb (113 kg)   • BMI 33.91 kg/m2       Physical Exam   Constitutional: He is oriented to person, place, and time. He " appears well-developed and well-nourished. No distress.   HENT:   Head: Normocephalic and atraumatic.   Nose: Nose normal.   Eyes: Conjunctivae and EOM are normal.   Neck: Normal range of motion.   Cardiovascular: Normal rate, regular rhythm and normal heart sounds.  Exam reveals no gallop and no friction rub.    No murmur heard.  Pulmonary/Chest: Effort normal and breath sounds normal. No respiratory distress. He has no wheezes. He has no rales.   Abdominal:   Chronic ascites   Musculoskeletal: Normal range of motion. He exhibits no edema.   Neurological: He is alert and oriented to person, place, and time. No cranial nerve deficit.   Skin: Skin is warm and dry. He is not diaphoretic.   Psychiatric: He has a normal mood and affect. His behavior is normal.   Nursing note and vitals reviewed.      Assessment/Plan   Armando Mcmullen is a 52 y.o. male seen today for the followin. Cirrhosis of liver without ascites, unspecified hepatic cirrhosis type  Follow up with GI. Continue lactulose. Ammonia level with next labs    2. Chronic osteomyelitis of right shoulder region    - Ambulatory Referral to Orthopedic Surgery    3. Osteoarthritis of multiple joints, unspecified osteoarthritis type      4. Thrombocytopenia    - Ambulatory Referral to Hematology    Follow up: Return in about 3 months (around 2017).          This document has been electronically signed by Liss Frank DO on 2017 12:49 PM

## 2017-09-06 NOTE — PATIENT INSTRUCTIONS
Please call Dr. Becerril's office to make a follow up appt.  You have been referred to a specialist-Hematology (Dr. Ferreira). You should receive a call within 7-10 days regarding an appointment with them. If you do not hear anything within 7-10 days, please call our office.

## 2017-09-20 ENCOUNTER — TELEPHONE (OUTPATIENT)
Dept: FAMILY MEDICINE CLINIC | Facility: CLINIC | Age: 52
End: 2017-09-20

## 2017-09-20 RX ORDER — NYSTATIN 100000 [USP'U]/G
POWDER TOPICAL EVERY 12 HOURS SCHEDULED
Qty: 60 G | Refills: 0 | Status: SHIPPED | OUTPATIENT
Start: 2017-09-20 | End: 2017-10-23 | Stop reason: SDUPTHER

## 2017-09-22 ENCOUNTER — LAB (OUTPATIENT)
Dept: ONCOLOGY | Facility: HOSPITAL | Age: 52
End: 2017-09-22

## 2017-09-22 ENCOUNTER — CONSULT (OUTPATIENT)
Dept: ONCOLOGY | Facility: CLINIC | Age: 52
End: 2017-09-22

## 2017-09-22 VITALS
SYSTOLIC BLOOD PRESSURE: 132 MMHG | BODY MASS INDEX: 33.91 KG/M2 | HEART RATE: 80 BPM | DIASTOLIC BLOOD PRESSURE: 84 MMHG | TEMPERATURE: 97.7 F | RESPIRATION RATE: 20 BRPM | WEIGHT: 250 LBS

## 2017-09-22 DIAGNOSIS — K70.30 ALCOHOLIC CIRRHOSIS OF LIVER WITHOUT ASCITES (HCC): ICD-10-CM

## 2017-09-22 DIAGNOSIS — D69.6 THROMBOCYTOPENIA (HCC): ICD-10-CM

## 2017-09-22 DIAGNOSIS — D64.9 ANEMIA, UNSPECIFIED TYPE: ICD-10-CM

## 2017-09-22 DIAGNOSIS — D69.6 THROMBOCYTOPENIA (HCC): Primary | ICD-10-CM

## 2017-09-22 LAB
ALBUMIN SERPL-MCNC: 2.7 G/DL (ref 3.4–4.8)
ALBUMIN/GLOB SERPL: 0.8 G/DL (ref 1.1–1.8)
ALP SERPL-CCNC: 138 U/L (ref 38–126)
ALT SERPL W P-5'-P-CCNC: 27 U/L (ref 21–72)
ANION GAP SERPL CALCULATED.3IONS-SCNC: 9 MMOL/L (ref 5–15)
APTT PPP: 38.3 SECONDS (ref 20–40.3)
AST SERPL-CCNC: 41 U/L (ref 17–59)
BASOPHILS # BLD AUTO: 0.11 10*3/MM3 (ref 0–0.2)
BASOPHILS NFR BLD AUTO: 1.8 % (ref 0–2)
BILIRUB SERPL-MCNC: 3 MG/DL (ref 0.2–1.3)
BUN BLD-MCNC: 11 MG/DL (ref 7–21)
BUN/CREAT SERPL: 7.1 (ref 7–25)
CALCIUM SPEC-SCNC: 8.7 MG/DL (ref 8.4–10.2)
CHLORIDE SERPL-SCNC: 107 MMOL/L (ref 95–110)
CO2 SERPL-SCNC: 24 MMOL/L (ref 22–31)
CREAT BLD-MCNC: 1.55 MG/DL (ref 0.7–1.3)
DEPRECATED RDW RBC AUTO: 55 FL (ref 35.1–43.9)
EOSINOPHIL # BLD AUTO: 0.3 10*3/MM3 (ref 0–0.7)
EOSINOPHIL NFR BLD AUTO: 4.9 % (ref 0–7)
ERYTHROCYTE [DISTWIDTH] IN BLOOD BY AUTOMATED COUNT: 16 % (ref 11.5–14.5)
FIBRINOGEN PPP-MCNC: 145 MG/DL (ref 228–514)
FOLATE SERPL-MCNC: 9.66 NG/ML (ref 2.76–21)
GFR SERPL CREATININE-BSD FRML MDRD: 47 ML/MIN/1.73 (ref 56–130)
GLOBULIN UR ELPH-MCNC: 3.5 GM/DL (ref 2.3–3.5)
GLUCOSE BLD-MCNC: 101 MG/DL (ref 60–100)
HCT VFR BLD AUTO: 34.5 % (ref 39–49)
HGB BLD-MCNC: 11.8 G/DL (ref 13.7–17.3)
HIV1+2 AB SER QL: NEGATIVE
IMM GRANULOCYTES # BLD: 0.02 10*3/MM3 (ref 0–0.02)
IMM GRANULOCYTES NFR BLD: 0.3 % (ref 0–0.5)
INR PPP: 1.42 (ref 0.8–1.2)
IRON 24H UR-MRATE: 47 MCG/DL (ref 49–181)
IRON SATN MFR SERPL: 26 % (ref 20–55)
LYMPHOCYTES # BLD AUTO: 2.13 10*3/MM3 (ref 0.6–4.2)
LYMPHOCYTES NFR BLD AUTO: 34.5 % (ref 10–50)
MCH RBC QN AUTO: 31.9 PG (ref 26.5–34)
MCHC RBC AUTO-ENTMCNC: 34.2 G/DL (ref 31.5–36.3)
MCV RBC AUTO: 93.2 FL (ref 80–98)
MONOCYTES # BLD AUTO: 0.64 10*3/MM3 (ref 0–0.9)
MONOCYTES NFR BLD AUTO: 10.4 % (ref 0–12)
NEUTROPHILS # BLD AUTO: 2.97 10*3/MM3 (ref 2–8.6)
NEUTROPHILS NFR BLD AUTO: 48.1 % (ref 37–80)
PLATELET # BLD AUTO: 78 10*3/MM3 (ref 150–450)
PMV BLD AUTO: 10.8 FL (ref 8–12)
POTASSIUM BLD-SCNC: 3.7 MMOL/L (ref 3.5–5.1)
PROT SERPL-MCNC: 6.2 G/DL (ref 6.3–8.6)
PROTHROMBIN TIME: 17.3 SECONDS (ref 11.1–15.3)
RBC # BLD AUTO: 3.7 10*6/MM3 (ref 4.37–5.74)
SODIUM BLD-SCNC: 140 MMOL/L (ref 137–145)
TIBC SERPL-MCNC: 183 MCG/DL (ref 261–462)
VIT B12 BLD-MCNC: 964 PG/ML (ref 239–931)
WBC NRBC COR # BLD: 6.17 10*3/MM3 (ref 3.2–9.8)

## 2017-09-22 PROCEDURE — G0463 HOSPITAL OUTPT CLINIC VISIT: HCPCS | Performed by: INTERNAL MEDICINE

## 2017-09-22 PROCEDURE — 85025 COMPLETE CBC W/AUTO DIFF WBC: CPT

## 2017-09-22 PROCEDURE — 83550 IRON BINDING TEST: CPT

## 2017-09-22 PROCEDURE — 83540 ASSAY OF IRON: CPT

## 2017-09-22 PROCEDURE — 85610 PROTHROMBIN TIME: CPT

## 2017-09-22 PROCEDURE — 87535 HIV-1 PROBE&REVERSE TRNSCRPJ: CPT

## 2017-09-22 PROCEDURE — G0432 EIA HIV-1/HIV-2 SCREEN: HCPCS

## 2017-09-22 PROCEDURE — 80053 COMPREHEN METABOLIC PANEL: CPT

## 2017-09-22 PROCEDURE — 85730 THROMBOPLASTIN TIME PARTIAL: CPT

## 2017-09-22 PROCEDURE — 82607 VITAMIN B-12: CPT

## 2017-09-22 PROCEDURE — 82746 ASSAY OF FOLIC ACID SERUM: CPT

## 2017-09-22 PROCEDURE — 99244 OFF/OP CNSLTJ NEW/EST MOD 40: CPT | Performed by: INTERNAL MEDICINE

## 2017-09-22 PROCEDURE — 85384 FIBRINOGEN ACTIVITY: CPT
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
## 2017-09-26 LAB — HIV1 RNA SERPL QL NAA+PROBE: NEGATIVE

## 2017-10-23 ENCOUNTER — TELEPHONE (OUTPATIENT)
Dept: FAMILY MEDICINE CLINIC | Facility: CLINIC | Age: 52
End: 2017-10-23

## 2017-10-23 RX ORDER — NYSTATIN 100000 [USP'U]/G
POWDER TOPICAL EVERY 12 HOURS SCHEDULED
Qty: 60 G | Refills: 0 | Status: SHIPPED | OUTPATIENT
Start: 2017-10-23 | End: 2017-11-17 | Stop reason: SDUPTHER

## 2017-11-16 ENCOUNTER — TELEPHONE (OUTPATIENT)
Dept: FAMILY MEDICINE CLINIC | Facility: CLINIC | Age: 52
End: 2017-11-16

## 2017-11-17 RX ORDER — NYSTATIN 100000 [USP'U]/G
POWDER TOPICAL EVERY 12 HOURS SCHEDULED
Qty: 60 G | Refills: 0 | Status: SHIPPED | OUTPATIENT
Start: 2017-11-17 | End: 2018-01-01 | Stop reason: HOSPADM
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
## 2017-12-17 NOTE — ED NOTES
"Pt made the comment \"I wish you'd just give me a syringe full of sleeping medicine\". Concern of possible self harm notified to clinical leader Leah CONNORS RN.     Lakisha Muller RN  12/16/17 2113    "

## 2018-01-01 ENCOUNTER — TELEPHONE (OUTPATIENT)
Dept: EMERGENCY DEPT | Facility: HOSPITAL | Age: 53
End: 2018-01-01

## 2018-01-01 ENCOUNTER — APPOINTMENT (OUTPATIENT)
Dept: CT IMAGING | Facility: HOSPITAL | Age: 53
End: 2018-01-01

## 2018-01-01 ENCOUNTER — OFFICE VISIT (OUTPATIENT)
Dept: FAMILY MEDICINE CLINIC | Facility: CLINIC | Age: 53
End: 2018-01-01

## 2018-01-01 ENCOUNTER — APPOINTMENT (OUTPATIENT)
Dept: GENERAL RADIOLOGY | Facility: HOSPITAL | Age: 53
End: 2018-01-01

## 2018-01-01 ENCOUNTER — HOSPITAL ENCOUNTER (INPATIENT)
Facility: HOSPITAL | Age: 53
LOS: 20 days | Discharge: ANOTHER HOSPITAL | End: 2018-05-01
Attending: FAMILY MEDICINE | Admitting: FAMILY MEDICINE

## 2018-01-01 ENCOUNTER — APPOINTMENT (OUTPATIENT)
Dept: INTERVENTIONAL RADIOLOGY/VASCULAR | Facility: HOSPITAL | Age: 53
End: 2018-01-01

## 2018-01-01 ENCOUNTER — APPOINTMENT (OUTPATIENT)
Dept: ULTRASOUND IMAGING | Facility: HOSPITAL | Age: 53
End: 2018-01-01

## 2018-01-01 ENCOUNTER — LAB (OUTPATIENT)
Dept: LAB | Facility: HOSPITAL | Age: 53
End: 2018-01-01

## 2018-01-01 ENCOUNTER — APPOINTMENT (OUTPATIENT)
Dept: INTERVENTIONAL RADIOLOGY/VASCULAR | Facility: HOSPITAL | Age: 53
End: 2018-01-01
Attending: INTERNAL MEDICINE

## 2018-01-01 ENCOUNTER — HOSPITAL ENCOUNTER (EMERGENCY)
Facility: HOSPITAL | Age: 53
Discharge: HOME | End: 2018-02-11
Attending: EMERGENCY MEDICINE | Admitting: EMERGENCY MEDICINE

## 2018-01-01 ENCOUNTER — LAB REQUISITION (OUTPATIENT)
Dept: LAB | Facility: HOSPITAL | Age: 53
End: 2018-01-01

## 2018-01-01 ENCOUNTER — APPOINTMENT (OUTPATIENT)
Dept: LAB | Facility: HOSPITAL | Age: 53
End: 2018-01-01

## 2018-01-01 ENCOUNTER — TRANSCRIBE ORDERS (OUTPATIENT)
Dept: LAB | Facility: HOSPITAL | Age: 53
End: 2018-01-01

## 2018-01-01 ENCOUNTER — HOSPITAL ENCOUNTER (INPATIENT)
Facility: HOSPITAL | Age: 53
LOS: 11 days | Discharge: HOME HEALTH | End: 2018-08-13
Attending: EMERGENCY MEDICINE | Admitting: FAMILY MEDICINE

## 2018-01-01 ENCOUNTER — ANESTHESIA (OUTPATIENT)
Dept: ICU | Facility: HOSPITAL | Age: 53
End: 2018-01-01

## 2018-01-01 ENCOUNTER — HOSPITAL ENCOUNTER (INPATIENT)
Facility: HOSPITAL | Age: 53
LOS: 29 days | End: 2018-11-27
Attending: EMERGENCY MEDICINE | Admitting: INTERNAL MEDICINE

## 2018-01-01 ENCOUNTER — ANESTHESIA EVENT (OUTPATIENT)
Dept: ICU | Facility: HOSPITAL | Age: 53
End: 2018-01-01

## 2018-01-01 ENCOUNTER — HOSPITAL ENCOUNTER (EMERGENCY)
Facility: HOSPITAL | Age: 53
Discharge: ANOTHER HOSPITAL | End: 2018-09-04
Attending: EMERGENCY MEDICINE | Admitting: EMERGENCY MEDICINE

## 2018-01-01 ENCOUNTER — APPOINTMENT (OUTPATIENT)
Dept: CARDIOLOGY | Facility: HOSPITAL | Age: 53
End: 2018-01-01
Attending: FAMILY MEDICINE

## 2018-01-01 ENCOUNTER — TELEPHONE (OUTPATIENT)
Dept: FAMILY MEDICINE CLINIC | Facility: CLINIC | Age: 53
End: 2018-01-01

## 2018-01-01 VITALS
WEIGHT: 216 LBS | HEIGHT: 67 IN | DIASTOLIC BLOOD PRESSURE: 80 MMHG | BODY MASS INDEX: 33.9 KG/M2 | SYSTOLIC BLOOD PRESSURE: 110 MMHG

## 2018-01-01 VITALS
SYSTOLIC BLOOD PRESSURE: 120 MMHG | BODY MASS INDEX: 35.78 KG/M2 | HEIGHT: 73 IN | WEIGHT: 270 LBS | DIASTOLIC BLOOD PRESSURE: 90 MMHG

## 2018-01-01 VITALS
DIASTOLIC BLOOD PRESSURE: 50 MMHG | TEMPERATURE: 96.7 F | OXYGEN SATURATION: 73 % | BODY MASS INDEX: 29.38 KG/M2 | HEIGHT: 72 IN | RESPIRATION RATE: 14 BRPM | SYSTOLIC BLOOD PRESSURE: 70 MMHG | WEIGHT: 216.93 LBS

## 2018-01-01 VITALS
HEIGHT: 67 IN | WEIGHT: 230 LBS | SYSTOLIC BLOOD PRESSURE: 120 MMHG | DIASTOLIC BLOOD PRESSURE: 70 MMHG | BODY MASS INDEX: 36.1 KG/M2

## 2018-01-01 VITALS — SYSTOLIC BLOOD PRESSURE: 122 MMHG | DIASTOLIC BLOOD PRESSURE: 84 MMHG | HEIGHT: 72 IN

## 2018-01-01 VITALS
RESPIRATION RATE: 16 BRPM | HEART RATE: 67 BPM | TEMPERATURE: 97.3 F | BODY MASS INDEX: 33.13 KG/M2 | SYSTOLIC BLOOD PRESSURE: 151 MMHG | OXYGEN SATURATION: 99 % | WEIGHT: 250 LBS | HEIGHT: 73 IN | DIASTOLIC BLOOD PRESSURE: 90 MMHG

## 2018-01-01 VITALS
SYSTOLIC BLOOD PRESSURE: 114 MMHG | WEIGHT: 216.05 LBS | OXYGEN SATURATION: 92 % | HEART RATE: 89 BPM | TEMPERATURE: 98.6 F | DIASTOLIC BLOOD PRESSURE: 72 MMHG | HEIGHT: 72 IN | BODY MASS INDEX: 29.26 KG/M2 | RESPIRATION RATE: 18 BRPM

## 2018-01-01 VITALS
OXYGEN SATURATION: 99 % | RESPIRATION RATE: 18 BRPM | DIASTOLIC BLOOD PRESSURE: 90 MMHG | WEIGHT: 178 LBS | HEART RATE: 88 BPM | TEMPERATURE: 97.8 F | HEIGHT: 72 IN | BODY MASS INDEX: 24.11 KG/M2 | SYSTOLIC BLOOD PRESSURE: 116 MMHG

## 2018-01-01 VITALS
HEART RATE: 72 BPM | SYSTOLIC BLOOD PRESSURE: 114 MMHG | BODY MASS INDEX: 36.6 KG/M2 | TEMPERATURE: 97.9 F | HEIGHT: 67 IN | WEIGHT: 233.2 LBS | RESPIRATION RATE: 16 BRPM | OXYGEN SATURATION: 97 % | DIASTOLIC BLOOD PRESSURE: 57 MMHG

## 2018-01-01 DIAGNOSIS — I10 ESSENTIAL HYPERTENSION: Primary | ICD-10-CM

## 2018-01-01 DIAGNOSIS — G89.29 CHRONIC BILATERAL LOW BACK PAIN WITHOUT SCIATICA: ICD-10-CM

## 2018-01-01 DIAGNOSIS — M54.50 CHRONIC BILATERAL LOW BACK PAIN WITHOUT SCIATICA: ICD-10-CM

## 2018-01-01 DIAGNOSIS — K73.9 CHRONIC HEPATITIS (HCC): Chronic | ICD-10-CM

## 2018-01-01 DIAGNOSIS — Z23 NEED FOR VACCINATION: ICD-10-CM

## 2018-01-01 DIAGNOSIS — I10 ESSENTIAL HYPERTENSION: Chronic | ICD-10-CM

## 2018-01-01 DIAGNOSIS — N18.30 CHRONIC RENAL DISEASE, STAGE III (HCC): ICD-10-CM

## 2018-01-01 DIAGNOSIS — R13.12 OROPHARYNGEAL DYSPHAGIA: ICD-10-CM

## 2018-01-01 DIAGNOSIS — R53.81 PHYSICAL DECONDITIONING: ICD-10-CM

## 2018-01-01 DIAGNOSIS — Z74.09 IMPAIRED MOBILITY AND ADLS: ICD-10-CM

## 2018-01-01 DIAGNOSIS — K74.60 DECOMPENSATED HEPATIC CIRRHOSIS (HCC): ICD-10-CM

## 2018-01-01 DIAGNOSIS — N18.30 CKD (CHRONIC KIDNEY DISEASE) STAGE 3, GFR 30-59 ML/MIN (HCC): ICD-10-CM

## 2018-01-01 DIAGNOSIS — F34.1 DYSTHYMIA: Chronic | ICD-10-CM

## 2018-01-01 DIAGNOSIS — D64.9 ANEMIA, UNSPECIFIED TYPE: Chronic | ICD-10-CM

## 2018-01-01 DIAGNOSIS — T68.XXXA HYPOTHERMIA, INITIAL ENCOUNTER: ICD-10-CM

## 2018-01-01 DIAGNOSIS — R79.89 INCREASED AMMONIA LEVEL: ICD-10-CM

## 2018-01-01 DIAGNOSIS — I10 ESSENTIAL HYPERTENSION: ICD-10-CM

## 2018-01-01 DIAGNOSIS — S34.109A CLOSED FRACTURE OF LUMBAR VERTEBRA WITH SPINAL CORD INJURY, INITIAL ENCOUNTER (HCC): Primary | ICD-10-CM

## 2018-01-01 DIAGNOSIS — K72.91 LIVER FAILURE WITH HEPATIC COMA, UNSPECIFIED CHRONICITY (HCC): Primary | ICD-10-CM

## 2018-01-01 DIAGNOSIS — R76.8 POSITIVE HEPATITIS C ANTIBODY TEST: ICD-10-CM

## 2018-01-01 DIAGNOSIS — K70.31 ALCOHOLIC CIRRHOSIS OF LIVER WITH ASCITES (HCC): ICD-10-CM

## 2018-01-01 DIAGNOSIS — M54.5 LOW BACK PAIN, UNSPECIFIED BACK PAIN LATERALITY, UNSPECIFIED CHRONICITY, WITH SCIATICA PRESENCE UNSPECIFIED: ICD-10-CM

## 2018-01-01 DIAGNOSIS — R60.1 ANASARCA: Primary | ICD-10-CM

## 2018-01-01 DIAGNOSIS — K76.82 HEPATIC ENCEPHALOPATHY (HCC): Primary | ICD-10-CM

## 2018-01-01 DIAGNOSIS — R52 INTRACTABLE PAIN: ICD-10-CM

## 2018-01-01 DIAGNOSIS — I12.9 HYPERTENSIVE CHRONIC KIDNEY DISEASE WITH STAGE 1 THROUGH STAGE 4 CHRONIC KIDNEY DISEASE, OR UNSPECIFIED CHRONIC KIDNEY DISEASE: ICD-10-CM

## 2018-01-01 DIAGNOSIS — K72.91 LIVER FAILURE WITH HEPATIC COMA, UNSPECIFIED CHRONICITY (HCC): ICD-10-CM

## 2018-01-01 DIAGNOSIS — Z74.09 IMPAIRED FUNCTIONAL MOBILITY, BALANCE, GAIT, AND ENDURANCE: ICD-10-CM

## 2018-01-01 DIAGNOSIS — Z72.0 TOBACCO USE: Chronic | ICD-10-CM

## 2018-01-01 DIAGNOSIS — Z74.2 NEED FOR HOME HEALTH CARE: Primary | ICD-10-CM

## 2018-01-01 DIAGNOSIS — R41.82 ALTERED MENTAL STATUS, UNSPECIFIED ALTERED MENTAL STATUS TYPE: Primary | ICD-10-CM

## 2018-01-01 DIAGNOSIS — M54.50 CHRONIC BILATERAL LOW BACK PAIN WITHOUT SCIATICA: Chronic | ICD-10-CM

## 2018-01-01 DIAGNOSIS — R17 ELEVATED BILIRUBIN: ICD-10-CM

## 2018-01-01 DIAGNOSIS — K76.82 HEPATIC ENCEPHALOPATHY (HCC): ICD-10-CM
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
## 2018-01-01 DIAGNOSIS — N17.9 ACUTE RENAL FAILURE, UNSPECIFIED ACUTE RENAL FAILURE TYPE (HCC): ICD-10-CM

## 2018-01-01 DIAGNOSIS — K72.90 DECOMPENSATED HEPATIC CIRRHOSIS (HCC): ICD-10-CM

## 2018-01-01 DIAGNOSIS — Z78.9 IMPAIRED MOBILITY AND ADLS: ICD-10-CM
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
## 2018-01-01 DIAGNOSIS — A49.9 UTI (URINARY TRACT INFECTION), BACTERIAL: Primary | ICD-10-CM

## 2018-01-01 DIAGNOSIS — S80.02XA CONTUSION OF LEFT KNEE, INITIAL ENCOUNTER: ICD-10-CM

## 2018-01-01 DIAGNOSIS — B18.2 CHRONIC HEPATITIS C WITHOUT HEPATIC COMA (HCC): ICD-10-CM

## 2018-01-01 DIAGNOSIS — E87.6 HYPOKALEMIA: ICD-10-CM

## 2018-01-01 DIAGNOSIS — S32.030A CLOSED COMPRESSION FRACTURE OF THIRD LUMBAR VERTEBRA, INITIAL ENCOUNTER: ICD-10-CM

## 2018-01-01 DIAGNOSIS — N39.0 UTI (URINARY TRACT INFECTION), BACTERIAL: Primary | ICD-10-CM

## 2018-01-01 DIAGNOSIS — K74.60 CIRRHOSIS OF LIVER WITHOUT ASCITES, UNSPECIFIED HEPATIC CIRRHOSIS TYPE (HCC): ICD-10-CM

## 2018-01-01 DIAGNOSIS — S32.008A CLOSED FRACTURE OF LUMBAR VERTEBRA WITH SPINAL CORD INJURY, INITIAL ENCOUNTER (HCC): Primary | ICD-10-CM

## 2018-01-01 DIAGNOSIS — N18.30 CHRONIC KIDNEY DISEASE, STAGE III (MODERATE) (HCC): ICD-10-CM

## 2018-01-01 DIAGNOSIS — Z78.9 IMPAIRED MOBILITY AND ACTIVITIES OF DAILY LIVING: ICD-10-CM

## 2018-01-01 DIAGNOSIS — D72.829 LEUKOCYTOSIS, UNSPECIFIED TYPE: ICD-10-CM

## 2018-01-01 DIAGNOSIS — S22.000A COMPRESSION FRACTURE OF BODY OF THORACIC VERTEBRA (HCC): Primary | ICD-10-CM

## 2018-01-01 DIAGNOSIS — M86.611 CHRONIC OSTEOMYELITIS OF RIGHT SHOULDER REGION (HCC): Chronic | ICD-10-CM

## 2018-01-01 DIAGNOSIS — M25.511 RIGHT SHOULDER PAIN, UNSPECIFIED CHRONICITY: Primary | ICD-10-CM

## 2018-01-01 DIAGNOSIS — Z74.09 IMPAIRED MOBILITY AND ACTIVITIES OF DAILY LIVING: ICD-10-CM

## 2018-01-01 DIAGNOSIS — R10.84 GENERALIZED ABDOMINAL PAIN: ICD-10-CM

## 2018-01-01 DIAGNOSIS — G89.29 CHRONIC BILATERAL LOW BACK PAIN WITHOUT SCIATICA: Chronic | ICD-10-CM

## 2018-01-01 DIAGNOSIS — Z74.09 IMPAIRED FUNCTIONAL MOBILITY AND ACTIVITY TOLERANCE: ICD-10-CM

## 2018-01-01 DIAGNOSIS — N17.9 ACUTE KIDNEY INJURY (HCC): ICD-10-CM

## 2018-01-01 DIAGNOSIS — R79.89 ELEVATED LACTIC ACID LEVEL: Primary | ICD-10-CM

## 2018-01-01 DIAGNOSIS — Z72.0 TOBACCO USE: ICD-10-CM

## 2018-01-01 DIAGNOSIS — N18.30 CHRONIC RENAL DISEASE, STAGE III (HCC): Primary | ICD-10-CM

## 2018-01-01 DIAGNOSIS — Z74.09 IMPAIRED PHYSICAL MOBILITY: ICD-10-CM

## 2018-01-01 DIAGNOSIS — B19.21 HEPATITIS C VIRUS INFECTION WITH HEPATIC COMA, UNSPECIFIED CHRONICITY: Primary | ICD-10-CM

## 2018-01-01 LAB
ABO + RH BLD: NORMAL
ABO GROUP BLD: NORMAL
ACANTHOCYTES BLD QL SMEAR: ABNORMAL
ADV 40+41 DNA STL QL NAA+NON-PROBE: NOT DETECTED
AFP-TM SERPL-MCNC: 1 NG/ML (ref 0–8.3)
ALBUMIN FLD-MCNC: <1 G/DL
ALBUMIN SERPL-MCNC: 1.9 G/DL (ref 3.4–4.8)
ALBUMIN SERPL-MCNC: 2.1 G/DL (ref 3.4–4.8)
ALBUMIN SERPL-MCNC: 2.1 G/DL (ref 3.4–4.8)
ALBUMIN SERPL-MCNC: 2.2 G/DL (ref 3.4–4.8)
ALBUMIN SERPL-MCNC: 2.3 G/DL (ref 3.4–4.8)
ALBUMIN SERPL-MCNC: 2.4 G/DL (ref 3.4–4.8)
ALBUMIN SERPL-MCNC: 2.5 G/DL (ref 3.4–4.8)
ALBUMIN SERPL-MCNC: 2.6 G/DL (ref 3.4–4.8)
ALBUMIN SERPL-MCNC: 2.6 G/DL (ref 3.4–4.8)
ALBUMIN SERPL-MCNC: 2.7 G/DL (ref 3.4–4.8)
ALBUMIN SERPL-MCNC: 2.7 G/DL (ref 3.4–4.8)
ALBUMIN SERPL-MCNC: 3.1 G/DL (ref 3.4–4.8)
ALBUMIN SERPL-MCNC: 3.2 G/DL (ref 3.4–4.8)
ALBUMIN SERPL-MCNC: 3.4 G/DL (ref 3.4–4.8)
ALBUMIN/GLOB SERPL: 0.6 G/DL (ref 1.1–1.8)
ALBUMIN/GLOB SERPL: 0.7 G/DL (ref 1.1–1.8)
ALBUMIN/GLOB SERPL: 0.8 G/DL (ref 1.1–1.8)
ALBUMIN/GLOB SERPL: 0.9 G/DL (ref 1.1–1.8)
ALBUMIN/GLOB SERPL: 1.1 G/DL (ref 1.1–1.8)
ALBUMIN/GLOB SERPL: 1.3 G/DL (ref 1.1–1.8)
ALBUMIN/GLOB SERPL: 1.5 G/DL (ref 1.1–1.8)
ALBUMIN/GLOB SERPL: 1.5 G/DL (ref 1.1–1.8)
ALP SERPL-CCNC: 103 U/L (ref 38–126)
ALP SERPL-CCNC: 106 U/L (ref 38–126)
ALP SERPL-CCNC: 107 U/L (ref 38–126)
ALP SERPL-CCNC: 107 U/L (ref 38–126)
ALP SERPL-CCNC: 111 U/L (ref 38–126)
ALP SERPL-CCNC: 113 U/L (ref 38–126)
ALP SERPL-CCNC: 116 U/L (ref 38–126)
ALP SERPL-CCNC: 118 U/L (ref 38–126)
ALP SERPL-CCNC: 126 U/L (ref 38–126)
ALP SERPL-CCNC: 128 U/L (ref 38–126)
ALP SERPL-CCNC: 146 U/L (ref 38–126)
ALP SERPL-CCNC: 168 U/L (ref 38–126)
ALP SERPL-CCNC: 42 U/L (ref 38–126)
ALP SERPL-CCNC: 60 U/L (ref 38–126)
ALP SERPL-CCNC: 64 U/L (ref 38–126)
ALP SERPL-CCNC: 68 U/L (ref 38–126)
ALP SERPL-CCNC: 69 U/L (ref 38–126)
ALP SERPL-CCNC: 69 U/L (ref 38–126)
ALP SERPL-CCNC: 74 U/L (ref 38–126)
ALP SERPL-CCNC: 76 U/L (ref 38–126)
ALP SERPL-CCNC: 77 U/L (ref 38–126)
ALP SERPL-CCNC: 82 U/L (ref 38–126)
ALP SERPL-CCNC: 83 U/L (ref 38–126)
ALP SERPL-CCNC: 83 U/L (ref 38–126)
ALP SERPL-CCNC: 89 U/L (ref 38–126)
ALP SERPL-CCNC: 90 U/L (ref 38–126)
ALP SERPL-CCNC: 91 U/L (ref 38–126)
ALP SERPL-CCNC: 93 U/L (ref 38–126)
ALP SERPL-CCNC: 98 U/L (ref 38–126)
ALT SERPL W P-5'-P-CCNC: 17 U/L (ref 21–72)
ALT SERPL W P-5'-P-CCNC: 20 U/L (ref 21–72)
ALT SERPL W P-5'-P-CCNC: 22 U/L (ref 21–72)
ALT SERPL W P-5'-P-CCNC: 22 U/L (ref 21–72)
ALT SERPL W P-5'-P-CCNC: 25 U/L (ref 21–72)
ALT SERPL W P-5'-P-CCNC: 26 U/L (ref 21–72)
ALT SERPL W P-5'-P-CCNC: 27 U/L (ref 21–72)
ALT SERPL W P-5'-P-CCNC: 28 U/L (ref 21–72)
ALT SERPL W P-5'-P-CCNC: 29 U/L (ref 21–72)
ALT SERPL W P-5'-P-CCNC: 29 U/L (ref 21–72)
ALT SERPL W P-5'-P-CCNC: 30 U/L (ref 21–72)
ALT SERPL W P-5'-P-CCNC: 32 U/L (ref 21–72)
ALT SERPL W P-5'-P-CCNC: 33 U/L (ref 21–72)
ALT SERPL W P-5'-P-CCNC: 34 U/L (ref 21–72)
ALT SERPL W P-5'-P-CCNC: 35 U/L (ref 21–72)
ALT SERPL W P-5'-P-CCNC: 36 U/L (ref 21–72)
ALT SERPL W P-5'-P-CCNC: 37 U/L (ref 21–72)
AMMONIA BLD-SCNC: 110 UMOL/L (ref 9–30)
AMMONIA BLD-SCNC: 12 UMOL/L (ref 9–30)
AMMONIA BLD-SCNC: 129 UMOL/L (ref 9–30)
AMMONIA BLD-SCNC: 13 UMOL/L (ref 9–30)
AMMONIA BLD-SCNC: 13 UMOL/L (ref 9–30)
AMMONIA BLD-SCNC: 141 UMOL/L (ref 9–30)
AMMONIA BLD-SCNC: 15 UMOL/L (ref 9–30)
AMMONIA BLD-SCNC: 16 UMOL/L (ref 9–30)
AMMONIA BLD-SCNC: 16 UMOL/L (ref 9–30)
AMMONIA BLD-SCNC: 17 UMOL/L (ref 9–30)
AMMONIA BLD-SCNC: 18 UMOL/L (ref 9–30)
AMMONIA BLD-SCNC: 19 UMOL/L (ref 9–30)
AMMONIA BLD-SCNC: 19 UMOL/L (ref 9–30)
AMMONIA BLD-SCNC: 20 UMOL/L (ref 9–30)
AMMONIA BLD-SCNC: 20 UMOL/L (ref 9–30)
AMMONIA BLD-SCNC: 22 UMOL/L (ref 9–30)
AMMONIA BLD-SCNC: 22 UMOL/L (ref 9–30)
AMMONIA BLD-SCNC: 24 UMOL/L (ref 9–30)
AMMONIA BLD-SCNC: 26 UMOL/L (ref 9–30)
AMMONIA BLD-SCNC: 27 UMOL/L (ref 9–30)
AMMONIA BLD-SCNC: 28 UMOL/L (ref 9–30)
AMMONIA BLD-SCNC: 30 UMOL/L (ref 9–30)
AMMONIA BLD-SCNC: 31 UMOL/L (ref 9–30)
AMMONIA BLD-SCNC: 40 UMOL/L (ref 9–30)
AMMONIA BLD-SCNC: 44 UMOL/L (ref 9–30)
AMMONIA BLD-SCNC: 46 UMOL/L (ref 9–30)
AMMONIA BLD-SCNC: 60 UMOL/L (ref 9–30)
AMMONIA BLD-SCNC: 88 UMOL/L (ref 9–30)
AMMONIA BLD-SCNC: 9 UMOL/L (ref 9–30)
AMMONIA BLD-SCNC: <9 UMOL/L (ref 9–30)
AMPHET+METHAMPHET UR QL: NEGATIVE
AMYLASE FLD-CCNC: <30 U/L
ANION GAP SERPL CALCULATED.3IONS-SCNC: 10 MMOL/L (ref 5–15)
ANION GAP SERPL CALCULATED.3IONS-SCNC: 11 MMOL/L (ref 5–15)
ANION GAP SERPL CALCULATED.3IONS-SCNC: 12 MMOL/L (ref 5–15)
ANION GAP SERPL CALCULATED.3IONS-SCNC: 13 MMOL/L (ref 5–15)
ANION GAP SERPL CALCULATED.3IONS-SCNC: 14 MMOL/L (ref 5–15)
ANION GAP SERPL CALCULATED.3IONS-SCNC: 15 MMOL/L (ref 5–15)
ANION GAP SERPL CALCULATED.3IONS-SCNC: 16 MMOL/L (ref 5–15)
ANION GAP SERPL CALCULATED.3IONS-SCNC: 18 MMOL/L (ref 5–15)
ANION GAP SERPL CALCULATED.3IONS-SCNC: 21 MMOL/L (ref 5–15)
ANION GAP SERPL CALCULATED.3IONS-SCNC: 21 MMOL/L (ref 5–15)
ANION GAP SERPL CALCULATED.3IONS-SCNC: 24 MMOL/L (ref 5–15)
ANION GAP SERPL CALCULATED.3IONS-SCNC: 28 MMOL/L (ref 5–15)
ANION GAP SERPL CALCULATED.3IONS-SCNC: 33 MMOL/L (ref 5–15)
ANION GAP SERPL CALCULATED.3IONS-SCNC: 4 MMOL/L (ref 5–15)
ANION GAP SERPL CALCULATED.3IONS-SCNC: 4 MMOL/L (ref 5–15)
ANION GAP SERPL CALCULATED.3IONS-SCNC: 5 MMOL/L (ref 5–15)
ANION GAP SERPL CALCULATED.3IONS-SCNC: 5 MMOL/L (ref 5–15)
ANION GAP SERPL CALCULATED.3IONS-SCNC: 6 MMOL/L (ref 5–15)
ANION GAP SERPL CALCULATED.3IONS-SCNC: 7 MMOL/L (ref 5–15)
ANION GAP SERPL CALCULATED.3IONS-SCNC: 8 MMOL/L (ref 5–15)
ANION GAP SERPL CALCULATED.3IONS-SCNC: 9 MMOL/L (ref 5–15)
ANISOCYTOSIS BLD QL: ABNORMAL
ANISOCYTOSIS BLD QL: NORMAL
APPEARANCE FLD: ABNORMAL
APPEARANCE FLD: ABNORMAL
APTT PPP: 37.4 SECONDS (ref 20–40.3)
APTT PPP: 38.7 SECONDS (ref 20–40.3)
APTT PPP: 41.8 SECONDS (ref 20–40.3)
APTT PPP: 44.5 SECONDS (ref 20–40.3)
ARTERIAL PATENCY WRIST A: ABNORMAL
AST SERPL-CCNC: 23 U/L (ref 17–59)
AST SERPL-CCNC: 29 U/L (ref 17–59)
AST SERPL-CCNC: 30 U/L (ref 17–59)
AST SERPL-CCNC: 34 U/L (ref 17–59)
AST SERPL-CCNC: 35 U/L (ref 17–59)
AST SERPL-CCNC: 36 U/L (ref 17–59)
AST SERPL-CCNC: 39 U/L (ref 17–59)
AST SERPL-CCNC: 41 U/L (ref 17–59)
AST SERPL-CCNC: 43 U/L (ref 17–59)
AST SERPL-CCNC: 44 U/L (ref 17–59)
AST SERPL-CCNC: 45 U/L (ref 17–59)
AST SERPL-CCNC: 46 U/L (ref 17–59)
AST SERPL-CCNC: 46 U/L (ref 17–59)
AST SERPL-CCNC: 47 U/L (ref 17–59)
AST SERPL-CCNC: 47 U/L (ref 17–59)
AST SERPL-CCNC: 48 U/L (ref 17–59)
AST SERPL-CCNC: 49 U/L (ref 17–59)
AST SERPL-CCNC: 50 U/L (ref 17–59)
AST SERPL-CCNC: 52 U/L (ref 17–59)
AST SERPL-CCNC: 52 U/L (ref 17–59)
AST SERPL-CCNC: 56 U/L (ref 17–59)
AST SERPL-CCNC: 58 U/L (ref 17–59)
AST SERPL-CCNC: 60 U/L (ref 17–59)
AST SERPL-CCNC: 61 U/L (ref 17–59)
AST SERPL-CCNC: 61 U/L (ref 17–59)
AST SERPL-CCNC: 63 U/L (ref 17–59)
AST SERPL-CCNC: 67 U/L (ref 17–59)
ASTRO TYP 1-8 RNA STL QL NAA+NON-PROBE: NOT DETECTED
ATMOSPHERIC PRESS: 736 MMHG
ATMOSPHERIC PRESS: 749 MMHG
ATMOSPHERIC PRESS: ABNORMAL MMHG
BACTERIA BLD CULT: ABNORMAL
BACTERIA FLD CULT: ABNORMAL
BACTERIA FLD CULT: ABNORMAL
BACTERIA FLD CULT: NORMAL
BACTERIA FLD CULT: NORMAL
BACTERIA SPEC AEROBE CULT: ABNORMAL
BACTERIA SPEC AEROBE CULT: NORMAL
BACTERIA SPEC ANAEROBE CULT: NORMAL
BACTERIA SPEC RESP CULT: NORMAL
BACTERIA SPEC RESP CULT: NORMAL
BACTERIA UR QL AUTO: ABNORMAL /HPF
BARBITURATES UR QL SCN: NEGATIVE
BASE EXCESS BLDA CALC-SCNC: -0.4 MMOL/L (ref -2.4–2.4)
BASE EXCESS BLDA CALC-SCNC: -21 MMOL/L (ref 0–2)
BASE EXCESS BLDA CALC-SCNC: -3.1 MMOL/L (ref 0–2)
BASE EXCESS BLDA CALC-SCNC: 0 MMOL/L (ref -2.4–2.4)
BASE EXCESS BLDA CALC-SCNC: 0.1 MMOL/L (ref -2.4–2.4)
BASE EXCESS BLDA CALC-SCNC: 2.6 MMOL/L (ref -2.4–2.4)
BASO STIPL COARSE BLD QL SMEAR: NORMAL
BASOPHILS # BLD AUTO: 0 10*3/MM3 (ref 0–0.2)
BASOPHILS # BLD AUTO: 0 10*3/MM3 (ref 0–0.2)
BASOPHILS # BLD AUTO: 0.01 10*3/MM3 (ref 0–0.2)
BASOPHILS # BLD AUTO: 0.03 10*3/MM3 (ref 0–0.2)
BASOPHILS # BLD AUTO: 0.03 10*3/MM3 (ref 0–0.2)
BASOPHILS # BLD AUTO: 0.04 10*3/MM3 (ref 0–0.2)
BASOPHILS # BLD AUTO: 0.05 10*3/MM3 (ref 0–0.2)
BASOPHILS # BLD AUTO: 0.06 10*3/MM3 (ref 0–0.2)
BASOPHILS # BLD AUTO: 0.07 10*3/MM3 (ref 0–0.2)
BASOPHILS # BLD AUTO: 0.08 10*3/MM3 (ref 0–0.2)
BASOPHILS # BLD AUTO: 0.09 10*3/MM3 (ref 0–0.2)
BASOPHILS # BLD AUTO: 0.09 10*3/MM3 (ref 0–0.2)
BASOPHILS # BLD AUTO: 0.1 10*3/MM3 (ref 0–0.2)
BASOPHILS # BLD AUTO: 0.11 10*3/MM3 (ref 0–0.2)
BASOPHILS # BLD AUTO: 0.12 10*3/MM3 (ref 0–0.2)
BASOPHILS # BLD AUTO: 0.12 10*3/MM3 (ref 0–0.2)
BASOPHILS # BLD AUTO: 0.13 10*3/MM3 (ref 0–0.2)
BASOPHILS # BLD AUTO: 0.14 10*3/MM3 (ref 0–0.2)
BASOPHILS # BLD AUTO: 0.15 10*3/MM3 (ref 0–0.2)
BASOPHILS # BLD AUTO: 0.16 10*3/MM3 (ref 0–0.2)
BASOPHILS # BLD AUTO: 0.22 10*3/MM3 (ref 0–0.2)
BASOPHILS # BLD MANUAL: 0.08 10*3/MM3 (ref 0–0.2)
BASOPHILS # BLD MANUAL: 0.09 10*3/MM3 (ref 0–0.2)
BASOPHILS # BLD MANUAL: 0.13 10*3/MM3 (ref 0–0.2)
BASOPHILS # BLD MANUAL: 0.26 10*3/MM3 (ref 0–0.2)
BASOPHILS NFR BLD AUTO: 0 % (ref 0–2)
BASOPHILS NFR BLD AUTO: 0 % (ref 0–2)
BASOPHILS NFR BLD AUTO: 0.1 % (ref 0–2)
BASOPHILS NFR BLD AUTO: 0.3 % (ref 0–2)
BASOPHILS NFR BLD AUTO: 0.4 % (ref 0–2)
BASOPHILS NFR BLD AUTO: 0.5 % (ref 0–2)
BASOPHILS NFR BLD AUTO: 0.6 % (ref 0–2)
BASOPHILS NFR BLD AUTO: 0.7 % (ref 0–2)
BASOPHILS NFR BLD AUTO: 0.8 % (ref 0–2)
BASOPHILS NFR BLD AUTO: 0.9 % (ref 0–2)
BASOPHILS NFR BLD AUTO: 1 % (ref 0–2)
BASOPHILS NFR BLD AUTO: 1.1 % (ref 0–2)
BASOPHILS NFR BLD AUTO: 1.1 % (ref 0–2)
BASOPHILS NFR BLD AUTO: 1.2 % (ref 0–2)
BASOPHILS NFR BLD AUTO: 1.2 % (ref 0–2)
BASOPHILS NFR BLD AUTO: 1.4 % (ref 0–2)
BASOPHILS NFR BLD AUTO: 1.4 % (ref 0–2)
BASOPHILS NFR BLD AUTO: 1.5 % (ref 0–2)
BASOPHILS NFR BLD AUTO: 1.6 % (ref 0–2)
BASOPHILS NFR BLD AUTO: 1.7 % (ref 0–2)
BASOPHILS NFR BLD AUTO: 1.8 % (ref 0–2)
BASOPHILS NFR BLD AUTO: 1.9 % (ref 0–2)
BASOPHILS NFR BLD AUTO: 2 % (ref 0–2)
BASOPHILS NFR BLD AUTO: 2.1 % (ref 0–2)
BASOPHILS NFR BLD AUTO: 3 % (ref 0–2)
BDY SITE: ABNORMAL
BENZODIAZ UR QL SCN: NEGATIVE
BH BB BLOOD EXPIRATION DATE: NORMAL
BH BB BLOOD TYPE BARCODE: 5100
BH BB BLOOD TYPE BARCODE: 600
BH BB BLOOD TYPE BARCODE: 6200
BH BB BLOOD TYPE BARCODE: 9500
BH BB BLOOD TYPE BARCODE: NORMAL
BH BB DISPENSE STATUS: NORMAL
BH BB PRODUCT CODE: NORMAL
BH BB UNIT NUMBER: NORMAL
BH CV ECHO MEAS - ACS: 1.2 CM
BH CV ECHO MEAS - AO MAX PG (FULL): 12.6 MMHG
BH CV ECHO MEAS - AO MAX PG: 17 MMHG
BH CV ECHO MEAS - AO MEAN PG (FULL): 7 MMHG
BH CV ECHO MEAS - AO MEAN PG: 9 MMHG
BH CV ECHO MEAS - AO ROOT AREA (BSA CORRECTED): 1.8
BH CV ECHO MEAS - AO ROOT AREA: 11.9 CM^2
BH CV ECHO MEAS - AO ROOT DIAM: 3.9 CM
BH CV ECHO MEAS - AO V2 MAX: 206 CM/SEC
BH CV ECHO MEAS - AO V2 MEAN: 137 CM/SEC
BH CV ECHO MEAS - AO V2 VTI: 50 CM
BH CV ECHO MEAS - AVA(I,A): 2 CM^2
BH CV ECHO MEAS - AVA(I,D): 2 CM^2
BH CV ECHO MEAS - AVA(V,A): 2.1 CM^2
BH CV ECHO MEAS - AVA(V,D): 2.1 CM^2
BH CV ECHO MEAS - BSA(HAYCOCK): 2.3 M^2
BH CV ECHO MEAS - BSA: 2.2 M^2
BH CV ECHO MEAS - BZI_BMI: 29.7 KILOGRAMS/M^2
BH CV ECHO MEAS - BZI_METRIC_HEIGHT: 182.9 CM
BH CV ECHO MEAS - BZI_METRIC_WEIGHT: 99.3 KG
BH CV ECHO MEAS - EDV(CUBED): 114.8 ML
BH CV ECHO MEAS - EDV(TEICH): 110.7 ML
BH CV ECHO MEAS - EF(CUBED): 75.3 %
BH CV ECHO MEAS - EF(TEICH): 67.1 %
BH CV ECHO MEAS - ESV(CUBED): 28.4 ML
BH CV ECHO MEAS - ESV(TEICH): 36.4 ML
BH CV ECHO MEAS - FS: 37.2 %
BH CV ECHO MEAS - IVS/LVPW: 0.99
BH CV ECHO MEAS - IVSD: 1.4 CM
BH CV ECHO MEAS - LA DIMENSION: 4.4 CM
BH CV ECHO MEAS - LA/AO: 1.1
BH CV ECHO MEAS - LV MASS(C)D: 274.7 GRAMS
BH CV ECHO MEAS - LV MASS(C)DI: 124 GRAMS/M^2
BH CV ECHO MEAS - LV MAX PG: 4.3 MMHG
BH CV ECHO MEAS - LV MEAN PG: 2 MMHG
BH CV ECHO MEAS - LV V1 MAX: 104 CM/SEC
BH CV ECHO MEAS - LV V1 MEAN: 69.9 CM/SEC
BH CV ECHO MEAS - LV V1 VTI: 24.3 CM
BH CV ECHO MEAS - LVIDD: 4.9 CM
BH CV ECHO MEAS - LVIDS: 3.1 CM
BH CV ECHO MEAS - LVOT AREA (M): 4.2 CM^2
BH CV ECHO MEAS - LVOT AREA: 4.2 CM^2
BH CV ECHO MEAS - LVOT DIAM: 2.3 CM
BH CV ECHO MEAS - LVPWD: 1.4 CM
BH CV ECHO MEAS - MR MAX PG: 53.6 MMHG
BH CV ECHO MEAS - MR MAX VEL: 366 CM/SEC
BH CV ECHO MEAS - MV A MAX VEL: 78.3 CM/SEC
BH CV ECHO MEAS - MV DEC SLOPE: 454 CM/SEC^2
BH CV ECHO MEAS - MV E MAX VEL: 71.8 CM/SEC
BH CV ECHO MEAS - MV E/A: 0.92
BH CV ECHO MEAS - MV MAX PG: 4.7 MMHG
BH CV ECHO MEAS - MV MEAN PG: 2 MMHG
BH CV ECHO MEAS - MV P1/2T MAX VEL: 105 CM/SEC
BH CV ECHO MEAS - MV P1/2T: 67.7 MSEC
BH CV ECHO MEAS - MV V2 MAX: 108 CM/SEC
BH CV ECHO MEAS - MV V2 MEAN: 70.6 CM/SEC
BH CV ECHO MEAS - MV V2 VTI: 31 CM
BH CV ECHO MEAS - MVA P1/2T LCG: 2.1 CM^2
BH CV ECHO MEAS - MVA(P1/2T): 3.2 CM^2
BH CV ECHO MEAS - MVA(VTI): 3.3 CM^2
BH CV ECHO MEAS - PA MAX PG: 5 MMHG
BH CV ECHO MEAS - PA V2 MAX: 112 CM/SEC
BH CV ECHO MEAS - PULM A REVS VEL: 93.6 CM/SEC
BH CV ECHO MEAS - PULM DIAS VEL: 48.7 CM/SEC
BH CV ECHO MEAS - PULM S/D: 1.7
BH CV ECHO MEAS - PULM SYS VEL: 80.6 CM/SEC
BH CV ECHO MEAS - RAP SYSTOLE: 5 MMHG
BH CV ECHO MEAS - RVSP: 38.8 MMHG
BH CV ECHO MEAS - SI(AO): 269.8 ML/M^2
BH CV ECHO MEAS - SI(CUBED): 39 ML/M^2
BH CV ECHO MEAS - SI(LVOT): 45.6 ML/M^2
BH CV ECHO MEAS - SI(TEICH): 33.5 ML/M^2
BH CV ECHO MEAS - SV(AO): 597.3 ML
BH CV ECHO MEAS - SV(CUBED): 86.4 ML
BH CV ECHO MEAS - SV(LVOT): 101 ML
BH CV ECHO MEAS - SV(TEICH): 74.2 ML
BH CV ECHO MEAS - TR MAX VEL: 316 CM/SEC
BILIRUB SERPL-MCNC: 2.4 MG/DL (ref 0.2–1.3)
BILIRUB SERPL-MCNC: 2.6 MG/DL (ref 0.2–1.3)
BILIRUB SERPL-MCNC: 2.9 MG/DL (ref 0.2–1.3)
BILIRUB SERPL-MCNC: 3.1 MG/DL (ref 0.2–1.3)
BILIRUB SERPL-MCNC: 3.2 MG/DL (ref 0.2–1.3)
BILIRUB SERPL-MCNC: 3.3 MG/DL (ref 0.2–1.3)
BILIRUB SERPL-MCNC: 3.3 MG/DL (ref 0.2–1.3)
BILIRUB SERPL-MCNC: 3.4 MG/DL (ref 0.2–1.3)
BILIRUB SERPL-MCNC: 3.4 MG/DL (ref 0.2–1.3)
BILIRUB SERPL-MCNC: 3.5 MG/DL (ref 0.2–1.3)
BILIRUB SERPL-MCNC: 3.8 MG/DL (ref 0.2–1.3)
BILIRUB SERPL-MCNC: 4 MG/DL (ref 0.2–1.3)
BILIRUB SERPL-MCNC: 4 MG/DL (ref 0.2–1.3)
BILIRUB SERPL-MCNC: 4.2 MG/DL (ref 0.2–1.3)
BILIRUB SERPL-MCNC: 4.3 MG/DL (ref 0.2–1.3)
BILIRUB SERPL-MCNC: 4.4 MG/DL (ref 0.2–1.3)
BILIRUB SERPL-MCNC: 4.6 MG/DL (ref 0.2–1.3)
BILIRUB SERPL-MCNC: 4.7 MG/DL (ref 0.2–1.3)
BILIRUB SERPL-MCNC: 5 MG/DL (ref 0.2–1.3)
BILIRUB SERPL-MCNC: 5 MG/DL (ref 0.2–1.3)
BILIRUB SERPL-MCNC: 5.7 MG/DL (ref 0.2–1.3)
BILIRUB SERPL-MCNC: 5.9 MG/DL (ref 0.2–1.3)
BILIRUB SERPL-MCNC: 6.1 MG/DL (ref 0.2–1.3)
BILIRUB SERPL-MCNC: 6.3 MG/DL (ref 0.2–1.3)
BILIRUB SERPL-MCNC: 8.1 MG/DL (ref 0.2–1.3)
BILIRUB SERPL-MCNC: 8.7 MG/DL (ref 0.2–1.3)
BILIRUB SERPL-MCNC: 9.8 MG/DL (ref 0.2–1.3)
BILIRUB UR QL STRIP: ABNORMAL
BILIRUB UR QL STRIP: ABNORMAL
BILIRUB UR QL STRIP: NEGATIVE
BLD GP AB SCN SERPL QL: NEGATIVE
BUN BLD-MCNC: 12 MG/DL (ref 7–21)
BUN BLD-MCNC: 12 MG/DL (ref 7–21)
BUN BLD-MCNC: 13 MG/DL (ref 7–21)
BUN BLD-MCNC: 13 MG/DL (ref 7–21)
BUN BLD-MCNC: 14 MG/DL (ref 7–21)
BUN BLD-MCNC: 15 MG/DL (ref 7–21)
BUN BLD-MCNC: 16 MG/DL (ref 7–21)
BUN BLD-MCNC: 16 MG/DL (ref 7–21)
BUN BLD-MCNC: 17 MG/DL (ref 7–21)
BUN BLD-MCNC: 18 MG/DL (ref 7–21)
BUN BLD-MCNC: 19 MG/DL (ref 7–21)
BUN BLD-MCNC: 19 MG/DL (ref 7–21)
BUN BLD-MCNC: 20 MG/DL (ref 7–21)
BUN BLD-MCNC: 21 MG/DL (ref 7–21)
BUN BLD-MCNC: 22 MG/DL (ref 7–21)
BUN BLD-MCNC: 23 MG/DL (ref 7–21)
BUN BLD-MCNC: 23 MG/DL (ref 7–21)
BUN BLD-MCNC: 24 MG/DL (ref 7–21)
BUN BLD-MCNC: 25 MG/DL (ref 7–21)
BUN BLD-MCNC: 26 MG/DL (ref 7–21)
BUN BLD-MCNC: 27 MG/DL (ref 7–21)
BUN BLD-MCNC: 28 MG/DL (ref 7–21)
BUN BLD-MCNC: 28 MG/DL (ref 7–21)
BUN BLD-MCNC: 29 MG/DL (ref 7–21)
BUN BLD-MCNC: 29 MG/DL (ref 7–21)
BUN BLD-MCNC: 30 MG/DL (ref 7–21)
BUN BLD-MCNC: 31 MG/DL (ref 7–21)
BUN BLD-MCNC: 33 MG/DL (ref 7–21)
BUN BLD-MCNC: 34 MG/DL (ref 7–21)
BUN BLD-MCNC: 35 MG/DL (ref 7–21)
BUN BLD-MCNC: 36 MG/DL (ref 7–21)
BUN BLD-MCNC: 36 MG/DL (ref 7–21)
BUN BLD-MCNC: 38 MG/DL (ref 7–21)
BUN BLD-MCNC: 39 MG/DL (ref 7–21)
BUN BLD-MCNC: 39 MG/DL (ref 7–21)
BUN BLD-MCNC: 42 MG/DL (ref 7–21)
BUN BLD-MCNC: 54 MG/DL (ref 7–21)
BUN BLD-MCNC: 55 MG/DL (ref 7–21)
BUN BLD-MCNC: 55 MG/DL (ref 7–21)
BUN BLD-MCNC: 58 MG/DL (ref 7–21)
BUN BLD-MCNC: 60 MG/DL (ref 7–21)
BUN BLD-MCNC: 61 MG/DL (ref 7–21)
BUN BLD-MCNC: 62 MG/DL (ref 7–21)
BUN BLD-MCNC: 64 MG/DL (ref 7–21)
BUN BLD-MCNC: 77 MG/DL (ref 7–21)
BUN/CREAT SERPL: 10 (ref 7–25)
BUN/CREAT SERPL: 10 (ref 7–25)
BUN/CREAT SERPL: 10.3 (ref 7–25)
BUN/CREAT SERPL: 10.3 (ref 7–25)
BUN/CREAT SERPL: 10.6 (ref 7–25)
BUN/CREAT SERPL: 10.8 (ref 7–25)
BUN/CREAT SERPL: 10.8 (ref 7–25)
BUN/CREAT SERPL: 11 (ref 7–25)
BUN/CREAT SERPL: 11 (ref 7–25)
BUN/CREAT SERPL: 11.2 (ref 7–25)
BUN/CREAT SERPL: 13.5 (ref 7–25)
BUN/CREAT SERPL: 13.9 (ref 7–25)
BUN/CREAT SERPL: 14.4 (ref 7–25)
BUN/CREAT SERPL: 14.5 (ref 7–25)
BUN/CREAT SERPL: 14.6 (ref 7–25)
BUN/CREAT SERPL: 15.2 (ref 7–25)
BUN/CREAT SERPL: 15.3 (ref 7–25)
BUN/CREAT SERPL: 15.5 (ref 7–25)
BUN/CREAT SERPL: 15.6 (ref 7–25)
BUN/CREAT SERPL: 15.7 (ref 7–25)
BUN/CREAT SERPL: 15.8 (ref 7–25)
BUN/CREAT SERPL: 15.8 (ref 7–25)
BUN/CREAT SERPL: 16 (ref 7–25)
BUN/CREAT SERPL: 16.1 (ref 7–25)
BUN/CREAT SERPL: 16.2 (ref 7–25)
BUN/CREAT SERPL: 16.3 (ref 7–25)
BUN/CREAT SERPL: 16.4 (ref 7–25)
BUN/CREAT SERPL: 16.5 (ref 7–25)
BUN/CREAT SERPL: 16.7 (ref 7–25)
BUN/CREAT SERPL: 17.3 (ref 7–25)
BUN/CREAT SERPL: 17.6 (ref 7–25)
BUN/CREAT SERPL: 17.6 (ref 7–25)
BUN/CREAT SERPL: 17.7 (ref 7–25)
BUN/CREAT SERPL: 18.3 (ref 7–25)
BUN/CREAT SERPL: 18.4 (ref 7–25)
BUN/CREAT SERPL: 18.4 (ref 7–25)
BUN/CREAT SERPL: 18.6 (ref 7–25)
BUN/CREAT SERPL: 19.3 (ref 7–25)
BUN/CREAT SERPL: 20 (ref 7–25)
BUN/CREAT SERPL: 20.7 (ref 7–25)
BUN/CREAT SERPL: 21 (ref 7–25)
BUN/CREAT SERPL: 22.5 (ref 7–25)
BUN/CREAT SERPL: 22.5 (ref 7–25)
BUN/CREAT SERPL: 25 (ref 7–25)
BUN/CREAT SERPL: 29 (ref 7–25)
BUN/CREAT SERPL: 32 (ref 7–25)
BUN/CREAT SERPL: 32.3 (ref 7–25)
BUN/CREAT SERPL: 32.5 (ref 7–25)
BUN/CREAT SERPL: 33.7 (ref 7–25)
BUN/CREAT SERPL: 34.2 (ref 7–25)
BUN/CREAT SERPL: 37 (ref 7–25)
BUN/CREAT SERPL: 9.1 (ref 7–25)
BUN/CREAT SERPL: 9.3 (ref 7–25)
BUN/CREAT SERPL: 9.3 (ref 7–25)
BUN/CREAT SERPL: 9.6 (ref 7–25)
BUN/CREAT SERPL: 9.8 (ref 7–25)
BUN/CREAT SERPL: 9.9 (ref 7–25)
C CAYETANENSIS DNA STL QL NAA+NON-PROBE: NOT DETECTED
C DIFF TOX GENS STL QL NAA+PROBE: NOT DETECTED
CA-I BLD-MCNC: 4.2 MG/DL (ref 4.5–4.9)
CA-I BLD-MCNC: 4.5 MG/DL (ref 4.5–4.9)
CA-I BLD-MCNC: 4.6 MG/DL (ref 4.5–4.9)
CA-I BLD-MCNC: 4.6 MG/DL (ref 4.5–4.9)
CALCIUM SPEC-SCNC: 10 MG/DL (ref 8.4–10.2)
CALCIUM SPEC-SCNC: 10.2 MG/DL (ref 8.4–10.2)
CALCIUM SPEC-SCNC: 10.3 MG/DL (ref 8.4–10.2)
CALCIUM SPEC-SCNC: 10.4 MG/DL (ref 8.4–10.2)
CALCIUM SPEC-SCNC: 10.5 MG/DL (ref 8.4–10.2)
CALCIUM SPEC-SCNC: 10.5 MG/DL (ref 8.4–10.2)
CALCIUM SPEC-SCNC: 10.6 MG/DL (ref 8.4–10.2)
CALCIUM SPEC-SCNC: 11.2 MG/DL (ref 8.4–10.2)
CALCIUM SPEC-SCNC: 7.7 MG/DL (ref 8.4–10.2)
CALCIUM SPEC-SCNC: 7.7 MG/DL (ref 8.4–10.2)
CALCIUM SPEC-SCNC: 7.8 MG/DL (ref 8.4–10.2)
CALCIUM SPEC-SCNC: 7.9 MG/DL (ref 8.4–10.2)
CALCIUM SPEC-SCNC: 8 MG/DL (ref 8.4–10.2)
CALCIUM SPEC-SCNC: 8.1 MG/DL (ref 8.4–10.2)
CALCIUM SPEC-SCNC: 8.2 MG/DL (ref 8.4–10.2)
CALCIUM SPEC-SCNC: 8.3 MG/DL (ref 8.4–10.2)
CALCIUM SPEC-SCNC: 8.3 MG/DL (ref 8.4–10.2)
CALCIUM SPEC-SCNC: 8.4 MG/DL (ref 8.4–10.2)
CALCIUM SPEC-SCNC: 8.4 MG/DL (ref 8.4–10.2)
CALCIUM SPEC-SCNC: 8.5 MG/DL (ref 8.4–10.2)
CALCIUM SPEC-SCNC: 8.5 MG/DL (ref 8.4–10.2)
CALCIUM SPEC-SCNC: 8.6 MG/DL (ref 8.4–10.2)
CALCIUM SPEC-SCNC: 8.7 MG/DL (ref 8.4–10.2)
CALCIUM SPEC-SCNC: 8.8 MG/DL (ref 8.4–10.2)
CALCIUM SPEC-SCNC: 8.9 MG/DL (ref 8.4–10.2)
CALCIUM SPEC-SCNC: 9 MG/DL (ref 8.4–10.2)
CALCIUM SPEC-SCNC: 9.1 MG/DL (ref 8.4–10.2)
CALCIUM SPEC-SCNC: 9.2 MG/DL (ref 8.4–10.2)
CALCIUM SPEC-SCNC: 9.4 MG/DL (ref 8.4–10.2)
CALCIUM SPEC-SCNC: 9.4 MG/DL (ref 8.4–10.2)
CALCIUM SPEC-SCNC: 9.7 MG/DL (ref 8.4–10.2)
CALCIUM SPEC-SCNC: 9.7 MG/DL (ref 8.4–10.2)
CAMPY SP DNA.DIARRHEA STL QL NAA+PROBE: NOT DETECTED
CANNABINOIDS SERPL QL: POSITIVE
CHLORIDE SERPL-SCNC: 100 MMOL/L (ref 95–110)
CHLORIDE SERPL-SCNC: 101 MMOL/L (ref 95–110)
CHLORIDE SERPL-SCNC: 102 MMOL/L (ref 95–110)
CHLORIDE SERPL-SCNC: 103 MMOL/L (ref 95–110)
CHLORIDE SERPL-SCNC: 105 MMOL/L (ref 95–110)
CHLORIDE SERPL-SCNC: 106 MMOL/L (ref 95–110)
CHLORIDE SERPL-SCNC: 107 MMOL/L (ref 95–110)
CHLORIDE SERPL-SCNC: 107 MMOL/L (ref 95–110)
CHLORIDE SERPL-SCNC: 108 MMOL/L (ref 95–110)
CHLORIDE SERPL-SCNC: 110 MMOL/L (ref 95–110)
CHLORIDE SERPL-SCNC: 110 MMOL/L (ref 95–110)
CHLORIDE SERPL-SCNC: 112 MMOL/L (ref 95–110)
CHLORIDE SERPL-SCNC: 116 MMOL/L (ref 95–110)
CHLORIDE SERPL-SCNC: 121 MMOL/L (ref 95–110)
CHLORIDE SERPL-SCNC: 77 MMOL/L (ref 95–110)
CHLORIDE SERPL-SCNC: 90 MMOL/L (ref 95–110)
CHLORIDE SERPL-SCNC: 91 MMOL/L (ref 95–110)
CHLORIDE SERPL-SCNC: 92 MMOL/L (ref 95–110)
CHLORIDE SERPL-SCNC: 93 MMOL/L (ref 95–110)
CHLORIDE SERPL-SCNC: 94 MMOL/L (ref 95–110)
CHLORIDE SERPL-SCNC: 95 MMOL/L (ref 95–110)
CHLORIDE SERPL-SCNC: 96 MMOL/L (ref 95–110)
CHLORIDE SERPL-SCNC: 97 MMOL/L (ref 95–110)
CHLORIDE SERPL-SCNC: 99 MMOL/L (ref 95–110)
CHOLEST SERPL-MCNC: 137 MG/DL (ref 0–199)
CK SERPL-CCNC: 160 U/L (ref 55–170)
CLARITY UR: ABNORMAL
CLARITY UR: CLEAR
CO2 BLDA-SCNC: 23.6 MMOL/L (ref 23–27)
CO2 BLDA-SCNC: 24 MMOL/L (ref 23–27)
CO2 BLDA-SCNC: 25.5 MMOL/L (ref 23–27)
CO2 BLDA-SCNC: 26.2 MMOL/L (ref 23–27)
CO2 SERPL-SCNC: 14 MMOL/L (ref 22–31)
CO2 SERPL-SCNC: 16 MMOL/L (ref 22–31)
CO2 SERPL-SCNC: 19 MMOL/L (ref 22–31)
CO2 SERPL-SCNC: 19 MMOL/L (ref 22–31)
CO2 SERPL-SCNC: 20 MMOL/L (ref 22–31)
CO2 SERPL-SCNC: 20 MMOL/L (ref 22–31)
CO2 SERPL-SCNC: 21 MMOL/L (ref 22–31)
CO2 SERPL-SCNC: 22 MMOL/L (ref 22–31)
CO2 SERPL-SCNC: 23 MMOL/L (ref 22–31)
CO2 SERPL-SCNC: 24 MMOL/L (ref 22–31)
CO2 SERPL-SCNC: 25 MMOL/L (ref 22–31)
CO2 SERPL-SCNC: 26 MMOL/L (ref 22–31)
CO2 SERPL-SCNC: 27 MMOL/L (ref 22–31)
CO2 SERPL-SCNC: 28 MMOL/L (ref 22–31)
CO2 SERPL-SCNC: 29 MMOL/L (ref 22–31)
CO2 SERPL-SCNC: 30 MMOL/L (ref 22–31)
CO2 SERPL-SCNC: 31 MMOL/L (ref 22–31)
CO2 SERPL-SCNC: 32 MMOL/L (ref 22–31)
CO2 SERPL-SCNC: 32 MMOL/L (ref 22–31)
CO2 SERPL-SCNC: 34 MMOL/L (ref 22–31)
CO2 SERPL-SCNC: 39 MMOL/L (ref 22–31)
COCAINE UR QL: NEGATIVE
COLOR FLD: ABNORMAL
COLOR FLD: YELLOW
COLOR UR: ABNORMAL
COLOR UR: YELLOW
COLOR UR: YELLOW
CREAT BLD-MCNC: 0.83 MG/DL (ref 0.7–1.3)
CREAT BLD-MCNC: 0.89 MG/DL (ref 0.7–1.3)
CREAT BLD-MCNC: 0.9 MG/DL (ref 0.7–1.3)
CREAT BLD-MCNC: 1 MG/DL (ref 0.7–1.3)
CREAT BLD-MCNC: 1.02 MG/DL (ref 0.7–1.3)
CREAT BLD-MCNC: 1.03 MG/DL (ref 0.7–1.3)
CREAT BLD-MCNC: 1.08 MG/DL (ref 0.7–1.3)
CREAT BLD-MCNC: 1.13 MG/DL (ref 0.7–1.3)
CREAT BLD-MCNC: 1.14 MG/DL (ref 0.7–1.3)
CREAT BLD-MCNC: 1.14 MG/DL (ref 0.7–1.3)
CREAT BLD-MCNC: 1.15 MG/DL (ref 0.7–1.3)
CREAT BLD-MCNC: 1.16 MG/DL (ref 0.7–1.3)
CREAT BLD-MCNC: 1.16 MG/DL (ref 0.7–1.3)
CREAT BLD-MCNC: 1.17 MG/DL (ref 0.7–1.3)
CREAT BLD-MCNC: 1.2 MG/DL (ref 0.7–1.3)
CREAT BLD-MCNC: 1.27 MG/DL (ref 0.7–1.3)
CREAT BLD-MCNC: 1.27 MG/DL (ref 0.7–1.3)
CREAT BLD-MCNC: 1.31 MG/DL (ref 0.7–1.3)
CREAT BLD-MCNC: 1.4 MG/DL (ref 0.7–1.3)
CREAT BLD-MCNC: 1.43 MG/DL (ref 0.7–1.3)
CREAT BLD-MCNC: 1.44 MG/DL (ref 0.7–1.3)
CREAT BLD-MCNC: 1.5 MG/DL (ref 0.7–1.3)
CREAT BLD-MCNC: 1.5 MG/DL (ref 0.7–1.3)
CREAT BLD-MCNC: 1.51 MG/DL (ref 0.7–1.3)
CREAT BLD-MCNC: 1.51 MG/DL (ref 0.7–1.3)
CREAT BLD-MCNC: 1.55 MG/DL (ref 0.7–1.3)
CREAT BLD-MCNC: 1.55 MG/DL (ref 0.7–1.3)
CREAT BLD-MCNC: 1.56 MG/DL (ref 0.7–1.3)
CREAT BLD-MCNC: 1.61 MG/DL (ref 0.7–1.3)
CREAT BLD-MCNC: 1.62 MG/DL (ref 0.7–1.3)
CREAT BLD-MCNC: 1.63 MG/DL (ref 0.7–1.3)
CREAT BLD-MCNC: 1.64 MG/DL (ref 0.7–1.3)
CREAT BLD-MCNC: 1.66 MG/DL (ref 0.7–1.3)
CREAT BLD-MCNC: 1.67 MG/DL (ref 0.7–1.3)
CREAT BLD-MCNC: 1.69 MG/DL (ref 0.7–1.3)
CREAT BLD-MCNC: 1.7 MG/DL (ref 0.7–1.3)
CREAT BLD-MCNC: 1.71 MG/DL (ref 0.7–1.3)
CREAT BLD-MCNC: 1.72 MG/DL (ref 0.7–1.3)
CREAT BLD-MCNC: 1.73 MG/DL (ref 0.7–1.3)
CREAT BLD-MCNC: 1.76 MG/DL (ref 0.7–1.3)
CREAT BLD-MCNC: 1.78 MG/DL (ref 0.7–1.3)
CREAT BLD-MCNC: 1.8 MG/DL (ref 0.7–1.3)
CREAT BLD-MCNC: 1.8 MG/DL (ref 0.7–1.3)
CREAT BLD-MCNC: 1.81 MG/DL (ref 0.7–1.3)
CREAT BLD-MCNC: 1.82 MG/DL (ref 0.7–1.3)
CREAT BLD-MCNC: 1.86 MG/DL (ref 0.7–1.3)
CREAT BLD-MCNC: 1.87 MG/DL (ref 0.7–1.3)
CREAT BLD-MCNC: 1.87 MG/DL (ref 0.7–1.3)
CREAT BLD-MCNC: 1.89 MG/DL (ref 0.7–1.3)
CREAT BLD-MCNC: 1.91 MG/DL (ref 0.7–1.3)
CREAT BLD-MCNC: 1.97 MG/DL (ref 0.7–1.3)
CREAT BLD-MCNC: 2 MG/DL (ref 0.7–1.3)
CREAT BLD-MCNC: 2.08 MG/DL (ref 0.7–1.3)
CREAT BLD-MCNC: 2.1 MG/DL (ref 0.7–1.3)
CREAT BLD-MCNC: 2.25 MG/DL (ref 0.7–1.3)
CREAT BLD-MCNC: 2.26 MG/DL (ref 0.7–1.3)
CREAT BLD-MCNC: 2.32 MG/DL (ref 0.7–1.3)
CREAT BLD-MCNC: 2.37 MG/DL (ref 0.7–1.3)
CREAT BLD-MCNC: 2.41 MG/DL (ref 0.7–1.3)
CREAT BLD-MCNC: 2.42 MG/DL (ref 0.7–1.3)
CREAT BLD-MCNC: 2.49 MG/DL (ref 0.7–1.3)
CREAT BLD-MCNC: 2.66 MG/DL (ref 0.7–1.3)
CREAT BLD-MCNC: 2.76 MG/DL (ref 0.7–1.3)
CREAT UR-MCNC: 28.4 MG/DL
CREAT UR-MCNC: 33.6 MG/DL
CROSSMATCH INTERPRETATION: NORMAL
CROSSMATCH INTERPRETATION: NORMAL
CRP SERPL-MCNC: 17.3 MG/DL (ref 0–1)
CRP SERPL-MCNC: 17.8 MG/DL (ref 0–1)
CRP SERPL-MCNC: 6.1 MG/DL (ref 0–1)
CRP SERPL-MCNC: 7.1 MG/DL (ref 0–1)
CRP SERPL-MCNC: 8 MG/DL (ref 0–1)
CRP SERPL-MCNC: 8.5 MG/DL (ref 0–1)
CRYPTOSP STL CULT: NOT DETECTED
CYTO UR: NORMAL
CYTOLOGIST CVX/VAG CYTO: NORMAL
D-LACTATE SERPL-SCNC: 2.2 MMOL/L (ref 0.5–2)
D-LACTATE SERPL-SCNC: 2.2 MMOL/L (ref 0.5–2)
D-LACTATE SERPL-SCNC: 2.4 MMOL/L (ref 0.5–2)
D-LACTATE SERPL-SCNC: 2.7 MMOL/L (ref 0.5–2)
D-LACTATE SERPL-SCNC: 3 MMOL/L (ref 0.5–2)
D-LACTATE SERPL-SCNC: 4.1 MMOL/L (ref 0.5–2)
D-LACTATE SERPL-SCNC: 4.5 MMOL/L (ref 0.5–2)
DEPRECATED RDW RBC AUTO: 52.3 FL (ref 35.1–43.9)
DEPRECATED RDW RBC AUTO: 52.7 FL (ref 35.1–43.9)
DEPRECATED RDW RBC AUTO: 52.7 FL (ref 35.1–43.9)
DEPRECATED RDW RBC AUTO: 53 FL (ref 35.1–43.9)
DEPRECATED RDW RBC AUTO: 53.1 FL (ref 35.1–43.9)
DEPRECATED RDW RBC AUTO: 53.4 FL (ref 35.1–43.9)
DEPRECATED RDW RBC AUTO: 53.4 FL (ref 35.1–43.9)
DEPRECATED RDW RBC AUTO: 53.6 FL (ref 35.1–43.9)
DEPRECATED RDW RBC AUTO: 53.7 FL (ref 35.1–43.9)
DEPRECATED RDW RBC AUTO: 54 FL (ref 35.1–43.9)
DEPRECATED RDW RBC AUTO: 54.7 FL (ref 35.1–43.9)
DEPRECATED RDW RBC AUTO: 54.7 FL (ref 35.1–43.9)
DEPRECATED RDW RBC AUTO: 54.8 FL (ref 35.1–43.9)
DEPRECATED RDW RBC AUTO: 55 FL (ref 35.1–43.9)
DEPRECATED RDW RBC AUTO: 55.1 FL (ref 35.1–43.9)
DEPRECATED RDW RBC AUTO: 55.2 FL (ref 35.1–43.9)
DEPRECATED RDW RBC AUTO: 56.4 FL (ref 35.1–43.9)
DEPRECATED RDW RBC AUTO: 56.6 FL (ref 35.1–43.9)
DEPRECATED RDW RBC AUTO: 56.7 FL (ref 35.1–43.9)
DEPRECATED RDW RBC AUTO: 56.9 FL (ref 35.1–43.9)
DEPRECATED RDW RBC AUTO: 57.1 FL (ref 35.1–43.9)
DEPRECATED RDW RBC AUTO: 57.2 FL (ref 35.1–43.9)
DEPRECATED RDW RBC AUTO: 57.9 FL (ref 35.1–43.9)
DEPRECATED RDW RBC AUTO: 58.1 FL (ref 35.1–43.9)
DEPRECATED RDW RBC AUTO: 58.6 FL (ref 35.1–43.9)
DEPRECATED RDW RBC AUTO: 58.9 FL (ref 35.1–43.9)
DEPRECATED RDW RBC AUTO: 58.9 FL (ref 35.1–43.9)
DEPRECATED RDW RBC AUTO: 59.2 FL (ref 35.1–43.9)
DEPRECATED RDW RBC AUTO: 59.4 FL (ref 35.1–43.9)
DEPRECATED RDW RBC AUTO: 59.5 FL (ref 35.1–43.9)
DEPRECATED RDW RBC AUTO: 59.6 FL (ref 35.1–43.9)
DEPRECATED RDW RBC AUTO: 59.7 FL (ref 35.1–43.9)
DEPRECATED RDW RBC AUTO: 60 FL (ref 35.1–43.9)
DEPRECATED RDW RBC AUTO: 60.6 FL (ref 35.1–43.9)
DEPRECATED RDW RBC AUTO: 60.6 FL (ref 35.1–43.9)
DEPRECATED RDW RBC AUTO: 62 FL (ref 35.1–43.9)
DEPRECATED RDW RBC AUTO: 63.1 FL (ref 35.1–43.9)
DEPRECATED RDW RBC AUTO: 63.8 FL (ref 35.1–43.9)
DEPRECATED RDW RBC AUTO: 65.4 FL (ref 35.1–43.9)
DEPRECATED RDW RBC AUTO: 66.5 FL (ref 35.1–43.9)
DEPRECATED RDW RBC AUTO: 67 FL (ref 35.1–43.9)
DEPRECATED RDW RBC AUTO: 67.5 FL (ref 35.1–43.9)
DEPRECATED RDW RBC AUTO: 67.7 FL (ref 35.1–43.9)
DEPRECATED RDW RBC AUTO: 68.1 FL (ref 35.1–43.9)
DEPRECATED RDW RBC AUTO: 68.2 FL (ref 35.1–43.9)
DEPRECATED RDW RBC AUTO: 68.8 FL (ref 35.1–43.9)
DEPRECATED RDW RBC AUTO: 69.1 FL (ref 35.1–43.9)
DEPRECATED RDW RBC AUTO: 69.7 FL (ref 35.1–43.9)
DEPRECATED RDW RBC AUTO: 69.8 FL (ref 35.1–43.9)
DEPRECATED RDW RBC AUTO: 70.2 FL (ref 35.1–43.9)
DEPRECATED RDW RBC AUTO: 71.2 FL (ref 35.1–43.9)
DEPRECATED RDW RBC AUTO: 72.5 FL (ref 35.1–43.9)
DEPRECATED RDW RBC AUTO: 72.5 FL (ref 35.1–43.9)
DEPRECATED RDW RBC AUTO: 73.4 FL (ref 35.1–43.9)
DEPRECATED RDW RBC AUTO: 75.9 FL (ref 35.1–43.9)
DEPRECATED RDW RBC AUTO: 76.5 FL (ref 35.1–43.9)
E COLI DNA SPEC QL NAA+PROBE: NOT DETECTED
E HISTOLYT AG STL-ACNC: NOT DETECTED
EAEC PAA PLAS AGGR+AATA ST NAA+NON-PRB: NOT DETECTED
EC STX1 + STX2 GENES STL NAA+PROBE: NOT DETECTED
EOSINOPHIL # BLD AUTO: 0 10*3/MM3 (ref 0–0.7)
EOSINOPHIL # BLD AUTO: 0 10*3/MM3 (ref 0–0.7)
EOSINOPHIL # BLD AUTO: 0.05 10*3/MM3 (ref 0–0.7)
EOSINOPHIL # BLD AUTO: 0.06 10*3/MM3 (ref 0–0.7)
EOSINOPHIL # BLD AUTO: 0.08 10*3/MM3 (ref 0–0.7)
EOSINOPHIL # BLD AUTO: 0.11 10*3/MM3 (ref 0–0.7)
EOSINOPHIL # BLD AUTO: 0.11 10*3/MM3 (ref 0–0.7)
EOSINOPHIL # BLD AUTO: 0.14 10*3/MM3 (ref 0–0.7)
EOSINOPHIL # BLD AUTO: 0.18 10*3/MM3 (ref 0–0.7)
EOSINOPHIL # BLD AUTO: 0.2 10*3/MM3 (ref 0–0.7)
EOSINOPHIL # BLD AUTO: 0.21 10*3/MM3 (ref 0–0.7)
EOSINOPHIL # BLD AUTO: 0.21 10*3/MM3 (ref 0–0.7)
EOSINOPHIL # BLD AUTO: 0.23 10*3/MM3 (ref 0–0.7)
EOSINOPHIL # BLD AUTO: 0.25 10*3/MM3 (ref 0–0.7)
EOSINOPHIL # BLD AUTO: 0.26 10*3/MM3 (ref 0–0.7)
EOSINOPHIL # BLD AUTO: 0.26 10*3/MM3 (ref 0–0.7)
EOSINOPHIL # BLD AUTO: 0.27 10*3/MM3 (ref 0–0.7)
EOSINOPHIL # BLD AUTO: 0.29 10*3/MM3 (ref 0–0.7)
EOSINOPHIL # BLD AUTO: 0.31 10*3/MM3 (ref 0–0.7)
EOSINOPHIL # BLD AUTO: 0.31 10*3/MM3 (ref 0–0.7)
EOSINOPHIL # BLD AUTO: 0.32 10*3/MM3 (ref 0–0.7)
EOSINOPHIL # BLD AUTO: 0.32 10*3/MM3 (ref 0–0.7)
EOSINOPHIL # BLD AUTO: 0.33 10*3/MM3 (ref 0–0.7)
EOSINOPHIL # BLD AUTO: 0.33 10*3/MM3 (ref 0–0.7)
EOSINOPHIL # BLD AUTO: 0.35 10*3/MM3 (ref 0–0.7)
EOSINOPHIL # BLD AUTO: 0.36 10*3/MM3 (ref 0–0.7)
EOSINOPHIL # BLD AUTO: 0.37 10*3/MM3 (ref 0–0.7)
EOSINOPHIL # BLD AUTO: 0.41 10*3/MM3 (ref 0–0.7)
EOSINOPHIL # BLD AUTO: 0.43 10*3/MM3 (ref 0–0.7)
EOSINOPHIL # BLD AUTO: 0.44 10*3/MM3 (ref 0–0.7)
EOSINOPHIL # BLD AUTO: 0.45 10*3/MM3 (ref 0–0.7)
EOSINOPHIL # BLD AUTO: 0.46 10*3/MM3 (ref 0–0.7)
EOSINOPHIL # BLD AUTO: 0.46 10*3/MM3 (ref 0–0.7)
EOSINOPHIL # BLD AUTO: 0.47 10*3/MM3 (ref 0–0.7)
EOSINOPHIL # BLD AUTO: 0.48 10*3/MM3 (ref 0–0.7)
EOSINOPHIL # BLD AUTO: 0.51 10*3/MM3 (ref 0–0.7)
EOSINOPHIL # BLD AUTO: 0.52 10*3/MM3 (ref 0–0.7)
EOSINOPHIL # BLD AUTO: 0.54 10*3/MM3 (ref 0–0.7)
EOSINOPHIL # BLD AUTO: 0.58 10*3/MM3 (ref 0–0.7)
EOSINOPHIL # BLD AUTO: 0.58 10*3/MM3 (ref 0–0.7)
EOSINOPHIL # BLD AUTO: 0.64 10*3/MM3 (ref 0–0.7)
EOSINOPHIL # BLD AUTO: 0.73 10*3/MM3 (ref 0–0.7)
EOSINOPHIL # BLD AUTO: 0.77 10*3/MM3 (ref 0–0.7)
EOSINOPHIL # BLD MANUAL: 0.07 10*3/MM3 (ref 0–0.7)
EOSINOPHIL # BLD MANUAL: 0.09 10*3/MM3 (ref 0–0.7)
EOSINOPHIL # BLD MANUAL: 0.24 10*3/MM3 (ref 0–0.7)
EOSINOPHIL # BLD MANUAL: 0.34 10*3/MM3 (ref 0–0.7)
EOSINOPHIL NFR BLD AUTO: 0 % (ref 0–7)
EOSINOPHIL NFR BLD AUTO: 0 % (ref 0–7)
EOSINOPHIL NFR BLD AUTO: 0.4 % (ref 0–7)
EOSINOPHIL NFR BLD AUTO: 1 % (ref 0–7)
EOSINOPHIL NFR BLD AUTO: 1.1 % (ref 0–7)
EOSINOPHIL NFR BLD AUTO: 1.4 % (ref 0–7)
EOSINOPHIL NFR BLD AUTO: 1.6 % (ref 0–7)
EOSINOPHIL NFR BLD AUTO: 1.9 % (ref 0–7)
EOSINOPHIL NFR BLD AUTO: 1.9 % (ref 0–7)
EOSINOPHIL NFR BLD AUTO: 2.1 % (ref 0–7)
EOSINOPHIL NFR BLD AUTO: 2.6 % (ref 0–7)
EOSINOPHIL NFR BLD AUTO: 2.7 % (ref 0–7)
EOSINOPHIL NFR BLD AUTO: 3.1 % (ref 0–7)
EOSINOPHIL NFR BLD AUTO: 3.3 % (ref 0–7)
EOSINOPHIL NFR BLD AUTO: 3.3 % (ref 0–7)
EOSINOPHIL NFR BLD AUTO: 3.4 % (ref 0–7)
EOSINOPHIL NFR BLD AUTO: 3.5 % (ref 0–7)
EOSINOPHIL NFR BLD AUTO: 3.8 % (ref 0–7)
EOSINOPHIL NFR BLD AUTO: 4 % (ref 0–7)
EOSINOPHIL NFR BLD AUTO: 4.1 % (ref 0–7)
EOSINOPHIL NFR BLD AUTO: 4.1 % (ref 0–7)
EOSINOPHIL NFR BLD AUTO: 4.3 % (ref 0–7)
EOSINOPHIL NFR BLD AUTO: 4.7 % (ref 0–7)
EOSINOPHIL NFR BLD AUTO: 4.7 % (ref 0–7)
EOSINOPHIL NFR BLD AUTO: 4.8 % (ref 0–7)
EOSINOPHIL NFR BLD AUTO: 4.9 % (ref 0–7)
EOSINOPHIL NFR BLD AUTO: 5.1 % (ref 0–7)
EOSINOPHIL NFR BLD AUTO: 5.1 % (ref 0–7)
EOSINOPHIL NFR BLD AUTO: 5.2 % (ref 0–7)
EOSINOPHIL NFR BLD AUTO: 5.3 % (ref 0–7)
EOSINOPHIL NFR BLD AUTO: 5.3 % (ref 0–7)
EOSINOPHIL NFR BLD AUTO: 5.9 % (ref 0–7)
EOSINOPHIL NFR BLD AUTO: 6.1 % (ref 0–7)
EOSINOPHIL NFR BLD AUTO: 6.2 % (ref 0–7)
EOSINOPHIL NFR BLD AUTO: 6.5 % (ref 0–7)
EOSINOPHIL NFR BLD AUTO: 6.5 % (ref 0–7)
EOSINOPHIL NFR BLD AUTO: 6.6 % (ref 0–7)
EOSINOPHIL NFR BLD AUTO: 7.2 % (ref 0–7)
EOSINOPHIL NFR BLD AUTO: 7.3 % (ref 0–7)
EOSINOPHIL NFR BLD AUTO: 7.3 % (ref 0–7)
EOSINOPHIL NFR BLD AUTO: 7.6 % (ref 0–7)
EOSINOPHIL NFR BLD AUTO: 7.9 % (ref 0–7)
EOSINOPHIL NFR BLD AUTO: 8.4 % (ref 0–7)
EOSINOPHIL NFR BLD AUTO: 8.8 % (ref 0–7)
EOSINOPHIL NFR BLD AUTO: 9.2 % (ref 0–7)
EOSINOPHIL NFR BLD MANUAL: 1 % (ref 0–7)
EOSINOPHIL NFR BLD MANUAL: 1 % (ref 0–7)
EOSINOPHIL NFR BLD MANUAL: 3 % (ref 0–7)
EOSINOPHIL NFR BLD MANUAL: 4 % (ref 0–7)
EPEC EAE GENE STL QL NAA+NON-PROBE: NOT DETECTED
ERYTHROCYTE [DISTWIDTH] IN BLOOD BY AUTOMATED COUNT: 14.1 % (ref 11.5–14.5)
ERYTHROCYTE [DISTWIDTH] IN BLOOD BY AUTOMATED COUNT: 14.2 % (ref 11.5–14.5)
ERYTHROCYTE [DISTWIDTH] IN BLOOD BY AUTOMATED COUNT: 14.2 % (ref 11.5–14.5)
ERYTHROCYTE [DISTWIDTH] IN BLOOD BY AUTOMATED COUNT: 14.3 % (ref 11.5–14.5)
ERYTHROCYTE [DISTWIDTH] IN BLOOD BY AUTOMATED COUNT: 14.4 % (ref 11.5–14.5)
ERYTHROCYTE [DISTWIDTH] IN BLOOD BY AUTOMATED COUNT: 14.5 % (ref 11.5–14.5)
ERYTHROCYTE [DISTWIDTH] IN BLOOD BY AUTOMATED COUNT: 14.6 % (ref 11.5–14.5)
ERYTHROCYTE [DISTWIDTH] IN BLOOD BY AUTOMATED COUNT: 14.8 % (ref 11.5–14.5)
ERYTHROCYTE [DISTWIDTH] IN BLOOD BY AUTOMATED COUNT: 14.9 % (ref 11.5–14.5)
ERYTHROCYTE [DISTWIDTH] IN BLOOD BY AUTOMATED COUNT: 15 % (ref 11.5–14.5)
ERYTHROCYTE [DISTWIDTH] IN BLOOD BY AUTOMATED COUNT: 15.1 % (ref 11.5–14.5)
ERYTHROCYTE [DISTWIDTH] IN BLOOD BY AUTOMATED COUNT: 15.4 % (ref 11.5–14.5)
ERYTHROCYTE [DISTWIDTH] IN BLOOD BY AUTOMATED COUNT: 15.5 % (ref 11.5–14.5)
ERYTHROCYTE [DISTWIDTH] IN BLOOD BY AUTOMATED COUNT: 15.6 % (ref 11.5–14.5)
ERYTHROCYTE [DISTWIDTH] IN BLOOD BY AUTOMATED COUNT: 15.6 % (ref 11.5–14.5)
ERYTHROCYTE [DISTWIDTH] IN BLOOD BY AUTOMATED COUNT: 15.7 % (ref 11.5–14.5)
ERYTHROCYTE [DISTWIDTH] IN BLOOD BY AUTOMATED COUNT: 15.8 % (ref 11.5–14.5)
ERYTHROCYTE [DISTWIDTH] IN BLOOD BY AUTOMATED COUNT: 15.9 % (ref 11.5–14.5)
ERYTHROCYTE [DISTWIDTH] IN BLOOD BY AUTOMATED COUNT: 15.9 % (ref 11.5–14.5)
ERYTHROCYTE [DISTWIDTH] IN BLOOD BY AUTOMATED COUNT: 16 % (ref 11.5–14.5)
ERYTHROCYTE [DISTWIDTH] IN BLOOD BY AUTOMATED COUNT: 16 % (ref 11.5–14.5)
ERYTHROCYTE [DISTWIDTH] IN BLOOD BY AUTOMATED COUNT: 16.1 % (ref 11.5–14.5)
ERYTHROCYTE [DISTWIDTH] IN BLOOD BY AUTOMATED COUNT: 16.2 % (ref 11.5–14.5)
ERYTHROCYTE [DISTWIDTH] IN BLOOD BY AUTOMATED COUNT: 16.3 % (ref 11.5–14.5)
ERYTHROCYTE [DISTWIDTH] IN BLOOD BY AUTOMATED COUNT: 16.7 % (ref 11.5–14.5)
ERYTHROCYTE [DISTWIDTH] IN BLOOD BY AUTOMATED COUNT: 16.8 % (ref 11.5–14.5)
ERYTHROCYTE [DISTWIDTH] IN BLOOD BY AUTOMATED COUNT: 17 % (ref 11.5–14.5)
ERYTHROCYTE [DISTWIDTH] IN BLOOD BY AUTOMATED COUNT: 17.2 % (ref 11.5–14.5)
ERYTHROCYTE [DISTWIDTH] IN BLOOD BY AUTOMATED COUNT: 17.3 % (ref 11.5–14.5)
ERYTHROCYTE [DISTWIDTH] IN BLOOD BY AUTOMATED COUNT: 17.9 % (ref 11.5–14.5)
ERYTHROCYTE [DISTWIDTH] IN BLOOD BY AUTOMATED COUNT: 18.2 % (ref 11.5–14.5)
ERYTHROCYTE [DISTWIDTH] IN BLOOD BY AUTOMATED COUNT: 18.4 % (ref 11.5–14.5)
ERYTHROCYTE [DISTWIDTH] IN BLOOD BY AUTOMATED COUNT: 18.4 % (ref 11.5–14.5)
ERYTHROCYTE [DISTWIDTH] IN BLOOD BY AUTOMATED COUNT: 18.5 % (ref 11.5–14.5)
ERYTHROCYTE [DISTWIDTH] IN BLOOD BY AUTOMATED COUNT: 19.2 % (ref 11.5–14.5)
ERYTHROCYTE [DISTWIDTH] IN BLOOD BY AUTOMATED COUNT: 19.2 % (ref 11.5–14.5)
ERYTHROCYTE [DISTWIDTH] IN BLOOD BY AUTOMATED COUNT: 19.3 % (ref 11.5–14.5)
ERYTHROCYTE [DISTWIDTH] IN BLOOD BY AUTOMATED COUNT: 19.5 % (ref 11.5–14.5)
ERYTHROCYTE [DISTWIDTH] IN BLOOD BY AUTOMATED COUNT: 19.7 % (ref 11.5–14.5)
ERYTHROCYTE [DISTWIDTH] IN BLOOD BY AUTOMATED COUNT: 19.9 % (ref 11.5–14.5)
ERYTHROCYTE [DISTWIDTH] IN BLOOD BY AUTOMATED COUNT: 20.3 % (ref 11.5–14.5)
ERYTHROCYTE [DISTWIDTH] IN BLOOD BY AUTOMATED COUNT: 20.4 % (ref 11.5–14.5)
ERYTHROCYTE [DISTWIDTH] IN BLOOD BY AUTOMATED COUNT: 20.9 % (ref 11.5–14.5)
ERYTHROCYTE [DISTWIDTH] IN BLOOD BY AUTOMATED COUNT: 21 % (ref 11.5–14.5)
ERYTHROCYTE [DISTWIDTH] IN BLOOD BY AUTOMATED COUNT: 21.1 % (ref 11.5–14.5)
ERYTHROCYTE [DISTWIDTH] IN BLOOD BY AUTOMATED COUNT: 21.3 % (ref 11.5–14.5)
ERYTHROCYTE [DISTWIDTH] IN BLOOD BY AUTOMATED COUNT: 21.4 % (ref 11.5–14.5)
ERYTHROCYTE [DISTWIDTH] IN BLOOD BY AUTOMATED COUNT: 21.5 % (ref 11.5–14.5)
ERYTHROCYTE [DISTWIDTH] IN BLOOD BY AUTOMATED COUNT: 21.7 % (ref 11.5–14.5)
ETEC LTA+ST1A+ST1B TOX ST NAA+NON-PROBE: NOT DETECTED
ETHANOL BLD-MCNC: <10 MG/DL (ref 0–10)
ETHANOL UR QL: <0.01 %
FERRITIN SERPL-MCNC: 454 NG/ML (ref 17.9–464)
FERRITIN SERPL-MCNC: 496 NG/ML (ref 17.9–464)
FERRITIN SERPL-MCNC: 530 NG/ML (ref 17.9–464)
FERRITIN SERPL-MCNC: 972 NG/ML (ref 17.9–464)
FIBRINOGEN PPP-MCNC: 104 MG/DL (ref 228–514)
FIBRINOGEN PPP-MCNC: 105 MG/DL (ref 228–514)
FIBRINOGEN PPP-MCNC: 108 MG/DL (ref 228–514)
FIBRINOGEN PPP-MCNC: 123 MG/DL (ref 228–514)
FIBRINOGEN PPP-MCNC: 95 MG/DL (ref 228–514)
FLUAV AG NPH QL: NEGATIVE
FLUBV AG NPH QL IA: NEGATIVE
FOLATE SERPL-MCNC: 16.2 NG/ML (ref 2.76–21)
FOLATE SERPL-MCNC: 8.74 NG/ML (ref 2.76–21)
FOLATE SERPL-MCNC: 8.76 NG/ML (ref 2.76–21)
FUNGUS WND CULT: ABNORMAL
G LAMBLIA DNA SPEC QL NAA+PROBE: NOT DETECTED
GFR SERPL CREATININE-BSD FRML MDRD: 24 ML/MIN/1.73 (ref 56–130)
GFR SERPL CREATININE-BSD FRML MDRD: 25 ML/MIN/1.73 (ref 56–130)
GFR SERPL CREATININE-BSD FRML MDRD: 27 ML/MIN/1.73 (ref 56–130)
GFR SERPL CREATININE-BSD FRML MDRD: 28 ML/MIN/1.73 (ref 56–130)
GFR SERPL CREATININE-BSD FRML MDRD: 28 ML/MIN/1.73 (ref 56–130)
GFR SERPL CREATININE-BSD FRML MDRD: 29 ML/MIN/1.73 (ref 56–130)
GFR SERPL CREATININE-BSD FRML MDRD: 29 ML/MIN/1.73 (ref 56–130)
GFR SERPL CREATININE-BSD FRML MDRD: 30 ML/MIN/1.73 (ref 56–130)
GFR SERPL CREATININE-BSD FRML MDRD: 31 ML/MIN/1.73 (ref 56–130)
GFR SERPL CREATININE-BSD FRML MDRD: 33 ML/MIN/1.73 (ref 56–130)
GFR SERPL CREATININE-BSD FRML MDRD: 33 ML/MIN/1.73 (ref 56–130)
GFR SERPL CREATININE-BSD FRML MDRD: 35 ML/MIN/1.73 (ref 56–130)
GFR SERPL CREATININE-BSD FRML MDRD: 36 ML/MIN/1.73 (ref 56–130)
GFR SERPL CREATININE-BSD FRML MDRD: 37 ML/MIN/1.73 (ref 56–130)
GFR SERPL CREATININE-BSD FRML MDRD: 37 ML/MIN/1.73 (ref 56–130)
GFR SERPL CREATININE-BSD FRML MDRD: 38 ML/MIN/1.73 (ref 56–130)
GFR SERPL CREATININE-BSD FRML MDRD: 38 ML/MIN/1.73 (ref 56–130)
GFR SERPL CREATININE-BSD FRML MDRD: 38 ML/MIN/1.73 (ref 60–130)
GFR SERPL CREATININE-BSD FRML MDRD: 39 ML/MIN/1.73 (ref 56–130)
GFR SERPL CREATININE-BSD FRML MDRD: 39 ML/MIN/1.73 (ref 60–130)
GFR SERPL CREATININE-BSD FRML MDRD: 40 ML/MIN/1.73 (ref 56–130)
GFR SERPL CREATININE-BSD FRML MDRD: 41 ML/MIN/1.73 (ref 56–130)
GFR SERPL CREATININE-BSD FRML MDRD: 41 ML/MIN/1.73 (ref 60–130)
GFR SERPL CREATININE-BSD FRML MDRD: 42 ML/MIN/1.73 (ref 56–130)
GFR SERPL CREATININE-BSD FRML MDRD: 43 ML/MIN/1.73 (ref 56–130)
GFR SERPL CREATININE-BSD FRML MDRD: 43 ML/MIN/1.73 (ref 56–130)
GFR SERPL CREATININE-BSD FRML MDRD: 44 ML/MIN/1.73 (ref 56–130)
GFR SERPL CREATININE-BSD FRML MDRD: 44 ML/MIN/1.73 (ref 56–130)
GFR SERPL CREATININE-BSD FRML MDRD: 45 ML/MIN/1.73 (ref 56–130)
GFR SERPL CREATININE-BSD FRML MDRD: 45 ML/MIN/1.73 (ref 56–130)
GFR SERPL CREATININE-BSD FRML MDRD: 47 ML/MIN/1.73 (ref 56–130)
GFR SERPL CREATININE-BSD FRML MDRD: 47 ML/MIN/1.73 (ref 56–130)
GFR SERPL CREATININE-BSD FRML MDRD: 47 ML/MIN/1.73 (ref 60–130)
GFR SERPL CREATININE-BSD FRML MDRD: 48 ML/MIN/1.73 (ref 56–130)
GFR SERPL CREATININE-BSD FRML MDRD: 48 ML/MIN/1.73 (ref 60–130)
GFR SERPL CREATININE-BSD FRML MDRD: 49 ML/MIN/1.73 (ref 56–130)
GFR SERPL CREATININE-BSD FRML MDRD: 49 ML/MIN/1.73 (ref 60–130)
GFR SERPL CREATININE-BSD FRML MDRD: 51 ML/MIN/1.73 (ref 56–130)
GFR SERPL CREATININE-BSD FRML MDRD: 52 ML/MIN/1.73 (ref 56–130)
GFR SERPL CREATININE-BSD FRML MDRD: 53 ML/MIN/1.73 (ref 56–130)
GFR SERPL CREATININE-BSD FRML MDRD: 57 ML/MIN/1.73 (ref 56–130)
GFR SERPL CREATININE-BSD FRML MDRD: 59 ML/MIN/1.73 (ref 56–130)
GFR SERPL CREATININE-BSD FRML MDRD: 59 ML/MIN/1.73 (ref 56–130)
GFR SERPL CREATININE-BSD FRML MDRD: 63 ML/MIN/1.73 (ref 56–130)
GFR SERPL CREATININE-BSD FRML MDRD: 65 ML/MIN/1.73 (ref 56–130)
GFR SERPL CREATININE-BSD FRML MDRD: 66 ML/MIN/1.73 (ref 56–130)
GFR SERPL CREATININE-BSD FRML MDRD: 67 ML/MIN/1.73 (ref 56–130)
GFR SERPL CREATININE-BSD FRML MDRD: 67 ML/MIN/1.73 (ref 56–130)
GFR SERPL CREATININE-BSD FRML MDRD: 68 ML/MIN/1.73 (ref 56–130)
GFR SERPL CREATININE-BSD FRML MDRD: 71 ML/MIN/1.73 (ref 56–130)
GFR SERPL CREATININE-BSD FRML MDRD: 75 ML/MIN/1.73 (ref 56–130)
GFR SERPL CREATININE-BSD FRML MDRD: 76 ML/MIN/1.73 (ref 56–130)
GFR SERPL CREATININE-BSD FRML MDRD: 78 ML/MIN/1.73 (ref 56–130)
GFR SERPL CREATININE-BSD FRML MDRD: 88 ML/MIN/1.73 (ref 56–130)
GFR SERPL CREATININE-BSD FRML MDRD: 89 ML/MIN/1.73 (ref 56–130)
GFR SERPL CREATININE-BSD FRML MDRD: 97 ML/MIN/1.73 (ref 56–130)
GLOBULIN UR ELPH-MCNC: 2.2 GM/DL (ref 2.3–3.5)
GLOBULIN UR ELPH-MCNC: 2.3 GM/DL (ref 2.3–3.5)
GLOBULIN UR ELPH-MCNC: 2.4 GM/DL (ref 2.3–3.5)
GLOBULIN UR ELPH-MCNC: 2.7 GM/DL (ref 2.3–3.5)
GLOBULIN UR ELPH-MCNC: 2.8 GM/DL (ref 2.3–3.5)
GLOBULIN UR ELPH-MCNC: 2.8 GM/DL (ref 2.3–3.5)
GLOBULIN UR ELPH-MCNC: 2.9 GM/DL (ref 2.3–3.5)
GLOBULIN UR ELPH-MCNC: 3 GM/DL (ref 2.3–3.5)
GLOBULIN UR ELPH-MCNC: 3.1 GM/DL (ref 2.3–3.5)
GLOBULIN UR ELPH-MCNC: 3.1 GM/DL (ref 2.3–3.5)
GLOBULIN UR ELPH-MCNC: 3.2 GM/DL (ref 2.3–3.5)
GLOBULIN UR ELPH-MCNC: 3.3 GM/DL (ref 2.3–3.5)
GLOBULIN UR ELPH-MCNC: 3.3 GM/DL (ref 2.3–3.5)
GLOBULIN UR ELPH-MCNC: 3.4 GM/DL (ref 2.3–3.5)
GLOBULIN UR ELPH-MCNC: 3.8 GM/DL (ref 2.3–3.5)
GLOBULIN UR ELPH-MCNC: 3.8 GM/DL (ref 2.3–3.5)
GLOBULIN UR ELPH-MCNC: 3.9 GM/DL (ref 2.3–3.5)
GLOBULIN UR ELPH-MCNC: 3.9 GM/DL (ref 2.3–3.5)
GLOBULIN UR ELPH-MCNC: 4 GM/DL (ref 2.3–3.5)
GLOBULIN UR ELPH-MCNC: 4 GM/DL (ref 2.3–3.5)
GLOBULIN UR ELPH-MCNC: 4.1 GM/DL (ref 2.3–3.5)
GLOBULIN UR ELPH-MCNC: 4.2 GM/DL (ref 2.3–3.5)
GLUCOSE BLD-MCNC: 110 MG/DL (ref 60–100)
GLUCOSE BLD-MCNC: 119 MG/DL (ref 60–100)
GLUCOSE BLD-MCNC: 122 MG/DL (ref 60–100)
GLUCOSE BLD-MCNC: 127 MG/DL (ref 60–100)
GLUCOSE BLD-MCNC: 135 MG/DL (ref 60–100)
GLUCOSE BLD-MCNC: 142 MG/DL (ref 60–100)
GLUCOSE BLD-MCNC: 149 MG/DL (ref 60–100)
GLUCOSE BLD-MCNC: 65 MG/DL (ref 60–100)
GLUCOSE BLD-MCNC: 65 MG/DL (ref 60–100)
GLUCOSE BLD-MCNC: 66 MG/DL (ref 60–100)
GLUCOSE BLD-MCNC: 68 MG/DL (ref 60–100)
GLUCOSE BLD-MCNC: 69 MG/DL (ref 60–100)
GLUCOSE BLD-MCNC: 70 MG/DL (ref 60–100)
GLUCOSE BLD-MCNC: 71 MG/DL (ref 60–100)
GLUCOSE BLD-MCNC: 71 MG/DL (ref 60–100)
GLUCOSE BLD-MCNC: 72 MG/DL (ref 60–100)
GLUCOSE BLD-MCNC: 73 MG/DL (ref 60–100)
GLUCOSE BLD-MCNC: 73 MG/DL (ref 60–100)
GLUCOSE BLD-MCNC: 74 MG/DL (ref 60–100)
GLUCOSE BLD-MCNC: 75 MG/DL (ref 60–100)
GLUCOSE BLD-MCNC: 76 MG/DL (ref 60–100)
GLUCOSE BLD-MCNC: 76 MG/DL (ref 60–100)
GLUCOSE BLD-MCNC: 77 MG/DL (ref 60–100)
GLUCOSE BLD-MCNC: 78 MG/DL (ref 60–100)
GLUCOSE BLD-MCNC: 79 MG/DL (ref 60–100)
GLUCOSE BLD-MCNC: 79 MG/DL (ref 60–100)
GLUCOSE BLD-MCNC: 80 MG/DL (ref 60–100)
GLUCOSE BLD-MCNC: 80 MG/DL (ref 60–100)
GLUCOSE BLD-MCNC: 81 MG/DL (ref 60–100)
GLUCOSE BLD-MCNC: 83 MG/DL (ref 60–100)
GLUCOSE BLD-MCNC: 84 MG/DL (ref 60–100)
GLUCOSE BLD-MCNC: 85 MG/DL (ref 60–100)
GLUCOSE BLD-MCNC: 86 MG/DL (ref 60–100)
GLUCOSE BLD-MCNC: 87 MG/DL (ref 60–100)
GLUCOSE BLD-MCNC: 87 MG/DL (ref 60–100)
GLUCOSE BLD-MCNC: 89 MG/DL (ref 60–100)
GLUCOSE BLD-MCNC: 90 MG/DL (ref 60–100)
GLUCOSE BLD-MCNC: 91 MG/DL (ref 60–100)
GLUCOSE BLD-MCNC: 91 MG/DL (ref 60–100)
GLUCOSE BLD-MCNC: 92 MG/DL (ref 60–100)
GLUCOSE BLD-MCNC: 93 MG/DL (ref 60–100)
GLUCOSE BLD-MCNC: 95 MG/DL (ref 60–100)
GLUCOSE BLD-MCNC: 98 MG/DL (ref 60–100)
GLUCOSE BLDA-MCNC: 72 MMOL/L
GLUCOSE BLDA-MCNC: 79 MMOL/L
GLUCOSE BLDA-MCNC: 85 MMOL/L
GLUCOSE BLDA-MCNC: 88 MMOL/L
GLUCOSE BLDC GLUCOMTR-MCNC: 115 MG/DL (ref 70–130)
GLUCOSE BLDC GLUCOMTR-MCNC: 127 MG/DL (ref 70–130)
GLUCOSE BLDC GLUCOMTR-MCNC: 128 MG/DL (ref 70–130)
GLUCOSE BLDC GLUCOMTR-MCNC: 142 MG/DL (ref 70–130)
GLUCOSE BLDC GLUCOMTR-MCNC: 166 MG/DL (ref 70–130)
GLUCOSE BLDC GLUCOMTR-MCNC: 88 MG/DL (ref 70–130)
GLUCOSE FLD-MCNC: 101 MG/DL
GLUCOSE UR STRIP-MCNC: NEGATIVE MG/DL
GRAM STN SPEC: ABNORMAL
GRAM STN SPEC: NORMAL
HANSEL STAIN: NEGATIVE
HCO3 BLDA-SCNC: 21.1 MMOL/L (ref 20–26)
HCO3 BLDA-SCNC: 22.6 MMOL/L (ref 22–26)
HCO3 BLDA-SCNC: 23 MMOL/L (ref 22–26)
HCO3 BLDA-SCNC: 24.4 MMOL/L (ref 22–26)
HCO3 BLDA-SCNC: 25.3 MMOL/L (ref 22–26)
HCO3 BLDA-SCNC: 9 MMOL/L (ref 20–26)
HCT VFR BLD AUTO: 17.6 % (ref 39–49)
HCT VFR BLD AUTO: 18.2 % (ref 39–49)
HCT VFR BLD AUTO: 18.9 % (ref 39–49)
HCT VFR BLD AUTO: 19.3 % (ref 39–49)
HCT VFR BLD AUTO: 19.4 % (ref 39–49)
HCT VFR BLD AUTO: 19.7 % (ref 39–49)
HCT VFR BLD AUTO: 20 % (ref 39–49)
HCT VFR BLD AUTO: 20.1 % (ref 39–49)
HCT VFR BLD AUTO: 20.3 % (ref 39–49)
HCT VFR BLD AUTO: 20.3 % (ref 39–49)
HCT VFR BLD AUTO: 20.6 % (ref 39–49)
HCT VFR BLD AUTO: 20.7 % (ref 39–49)
HCT VFR BLD AUTO: 20.8 % (ref 39–49)
HCT VFR BLD AUTO: 21 % (ref 39–49)
HCT VFR BLD AUTO: 21.1 % (ref 39–49)
HCT VFR BLD AUTO: 21.1 % (ref 39–49)
HCT VFR BLD AUTO: 21.2 % (ref 39–49)
HCT VFR BLD AUTO: 21.6 % (ref 39–49)
HCT VFR BLD AUTO: 21.8 % (ref 39–49)
HCT VFR BLD AUTO: 22 % (ref 39–49)
HCT VFR BLD AUTO: 22.2 % (ref 39–49)
HCT VFR BLD AUTO: 22.3 % (ref 39–49)
HCT VFR BLD AUTO: 22.3 % (ref 39–49)
HCT VFR BLD AUTO: 22.4 % (ref 39–49)
HCT VFR BLD AUTO: 22.5 % (ref 39–49)
HCT VFR BLD AUTO: 22.7 % (ref 39–49)
HCT VFR BLD AUTO: 22.8 % (ref 39–49)
HCT VFR BLD AUTO: 22.9 % (ref 39–49)
HCT VFR BLD AUTO: 23.3 % (ref 39–49)
HCT VFR BLD AUTO: 23.4 % (ref 39–49)
HCT VFR BLD AUTO: 23.5 % (ref 39–49)
HCT VFR BLD AUTO: 23.6 % (ref 39–49)
HCT VFR BLD AUTO: 23.8 % (ref 39–49)
HCT VFR BLD AUTO: 23.9 % (ref 39–49)
HCT VFR BLD AUTO: 24.2 % (ref 39–49)
HCT VFR BLD AUTO: 24.3 % (ref 39–49)
HCT VFR BLD AUTO: 24.7 % (ref 39–49)
HCT VFR BLD AUTO: 24.8 % (ref 39–49)
HCT VFR BLD AUTO: 24.9 % (ref 39–49)
HCT VFR BLD AUTO: 25 % (ref 39–49)
HCT VFR BLD AUTO: 25 % (ref 39–49)
HCT VFR BLD AUTO: 25.1 % (ref 39–49)
HCT VFR BLD AUTO: 25.2 % (ref 39–49)
HCT VFR BLD AUTO: 25.5 % (ref 39–49)
HCT VFR BLD AUTO: 26 % (ref 39–49)
HCT VFR BLD AUTO: 26.1 % (ref 39–49)
HCT VFR BLD AUTO: 26.2 % (ref 39–49)
HCT VFR BLD AUTO: 26.5 % (ref 39–49)
HCT VFR BLD AUTO: 27 % (ref 39–49)
HCT VFR BLD AUTO: 27 % (ref 39–49)
HCT VFR BLD AUTO: 27.6 % (ref 39–49)
HCT VFR BLD AUTO: 27.8 % (ref 39–49)
HCT VFR BLD AUTO: 28.3 % (ref 39–49)
HCT VFR BLD AUTO: 28.5 % (ref 39–49)
HCT VFR BLD AUTO: 30.1 % (ref 39–49)
HCT VFR BLD AUTO: 31.7 % (ref 39–49)
HCT VFR BLD AUTO: 31.8 % (ref 39–49)
HCT VFR BLD AUTO: 31.9 % (ref 39–49)
HCT VFR BLD AUTO: 32.9 % (ref 39–49)
HCT VFR BLD AUTO: 33 % (ref 39–49)
HCT VFR BLD CALC: 28 % (ref 40–54)
HCT VFR BLD CALC: 29 % (ref 40–54)
HCT VFR BLD CALC: 32 % (ref 40–54)
HCT VFR BLD CALC: 32 % (ref 40–54)
HDLC SERPL-MCNC: 28 MG/DL (ref 60–200)
HEMOCCULT STL QL: NEGATIVE
HGB BLD-MCNC: 10.3 G/DL (ref 13.7–17.3)
HGB BLD-MCNC: 10.9 G/DL (ref 13.7–17.3)
HGB BLD-MCNC: 11 G/DL (ref 13.7–17.3)
HGB BLD-MCNC: 11.1 G/DL (ref 13.7–17.3)
HGB BLD-MCNC: 11.3 G/DL (ref 13.7–17.3)
HGB BLD-MCNC: 11.5 G/DL (ref 13.7–17.3)
HGB BLD-MCNC: 6.1 G/DL (ref 13.7–17.3)
HGB BLD-MCNC: 6.2 G/DL (ref 13.7–17.3)
HGB BLD-MCNC: 6.4 G/DL (ref 13.7–17.3)
HGB BLD-MCNC: 6.8 G/DL (ref 13.7–17.3)
HGB BLD-MCNC: 6.9 G/DL (ref 13.7–17.3)
HGB BLD-MCNC: 6.9 G/DL (ref 13.7–17.3)
HGB BLD-MCNC: 7.1 G/DL (ref 13.7–17.3)
HGB BLD-MCNC: 7.2 G/DL (ref 13.7–17.3)
HGB BLD-MCNC: 7.2 G/DL (ref 13.7–17.3)
HGB BLD-MCNC: 7.3 G/DL (ref 13.7–17.3)
HGB BLD-MCNC: 7.5 G/DL (ref 13.7–17.3)
HGB BLD-MCNC: 7.5 G/DL (ref 13.7–17.3)
HGB BLD-MCNC: 7.6 G/DL (ref 13.7–17.3)
HGB BLD-MCNC: 7.7 G/DL (ref 13.7–17.3)
HGB BLD-MCNC: 7.7 G/DL (ref 13.7–17.3)
HGB BLD-MCNC: 7.8 G/DL (ref 13.7–17.3)
HGB BLD-MCNC: 7.8 G/DL (ref 13.7–17.3)
HGB BLD-MCNC: 7.9 G/DL (ref 13.7–17.3)
HGB BLD-MCNC: 7.9 G/DL (ref 13.7–17.3)
HGB BLD-MCNC: 8 G/DL (ref 13.7–17.3)
HGB BLD-MCNC: 8 G/DL (ref 13.7–17.3)
HGB BLD-MCNC: 8.1 G/DL (ref 13.7–17.3)
HGB BLD-MCNC: 8.2 G/DL (ref 13.7–17.3)
HGB BLD-MCNC: 8.2 G/DL (ref 13.7–17.3)
HGB BLD-MCNC: 8.4 G/DL (ref 13.7–17.3)
HGB BLD-MCNC: 8.5 G/DL (ref 13.7–17.3)
HGB BLD-MCNC: 8.6 G/DL (ref 13.7–17.3)
HGB BLD-MCNC: 8.7 G/DL (ref 13.7–17.3)
HGB BLD-MCNC: 8.8 G/DL (ref 13.7–17.3)
HGB BLD-MCNC: 8.8 G/DL (ref 13.7–17.3)
HGB BLD-MCNC: 8.9 G/DL (ref 13.7–17.3)
HGB BLD-MCNC: 9 G/DL (ref 13.7–17.3)
HGB BLD-MCNC: 9 G/DL (ref 13.7–17.3)
HGB BLD-MCNC: 9.2 G/DL (ref 13.7–17.3)
HGB BLD-MCNC: 9.3 G/DL (ref 13.7–17.3)
HGB BLD-MCNC: 9.3 G/DL (ref 13.7–17.3)
HGB BLD-MCNC: 9.6 G/DL (ref 13.7–17.3)
HGB BLD-MCNC: 9.7 G/DL (ref 13.7–17.3)
HGB BLDA-MCNC: 10.9 G/DL (ref 14–18)
HGB BLDA-MCNC: 11 G/DL (ref 14–18)
HGB BLDA-MCNC: 9.4 G/DL (ref 14–18)
HGB BLDA-MCNC: 9.9 G/DL (ref 14–18)
HGB RETIC QN: 39.4 PG (ref 30–38)
HGB UR QL STRIP.AUTO: ABNORMAL
HGB UR QL STRIP.AUTO: NEGATIVE
HOLD SPECIMEN: NORMAL
HOROWITZ INDEX BLD+IHG-RTO: 100 %
HOROWITZ INDEX BLD+IHG-RTO: 100 %
HYALINE CASTS UR QL AUTO: ABNORMAL /LPF
HYPOCHROMIA BLD QL: ABNORMAL
HYPOCHROMIA BLD QL: ABNORMAL
HYPOCHROMIA BLD QL: NORMAL
IMM GRANULOCYTES # BLD: 0.01 10*3/MM3 (ref 0–0.02)
IMM GRANULOCYTES # BLD: 0.02 10*3/MM3 (ref 0–0.02)
IMM GRANULOCYTES # BLD: 0.03 10*3/MM3 (ref 0–0.02)
IMM GRANULOCYTES # BLD: 0.04 10*3/MM3 (ref 0–0.02)
IMM GRANULOCYTES # BLD: 0.05 10*3/MM3 (ref 0–0.02)
IMM GRANULOCYTES # BLD: 0.05 10*3/MM3 (ref 0–0.02)
IMM GRANULOCYTES # BLD: 0.06 10*3/MM3 (ref 0–0.02)
IMM GRANULOCYTES # BLD: 0.07 10*3/MM3 (ref 0–0.02)
IMM GRANULOCYTES # BLD: 0.07 10*3/MM3 (ref 0–0.02)
IMM GRANULOCYTES # BLD: 0.08 10*3/MM3 (ref 0–0.02)
IMM GRANULOCYTES # BLD: 0.08 10*3/MM3 (ref 0–0.02)
IMM GRANULOCYTES # BLD: 0.09 10*3/MM3 (ref 0–0.02)
IMM GRANULOCYTES # BLD: 0.12 10*3/MM3 (ref 0–0.02)
IMM GRANULOCYTES # BLD: 0.13 10*3/MM3 (ref 0–0.02)
IMM GRANULOCYTES # BLD: 0.15 10*3/MM3 (ref 0–0.02)
IMM GRANULOCYTES # BLD: 0.16 10*3/MM3 (ref 0–0.02)
IMM GRANULOCYTES # BLD: 0.16 10*3/MM3 (ref 0–0.02)
IMM GRANULOCYTES NFR BLD: 0.2 % (ref 0–0.5)
IMM GRANULOCYTES NFR BLD: 0.3 % (ref 0–0.5)
IMM GRANULOCYTES NFR BLD: 0.4 % (ref 0–0.5)
IMM GRANULOCYTES NFR BLD: 0.5 % (ref 0–0.5)
IMM GRANULOCYTES NFR BLD: 0.6 % (ref 0–0.5)
IMM GRANULOCYTES NFR BLD: 0.7 % (ref 0–0.5)
IMM GRANULOCYTES NFR BLD: 0.8 % (ref 0–0.5)
IMM GRANULOCYTES NFR BLD: 0.9 % (ref 0–0.5)
IMM GRANULOCYTES NFR BLD: 1 % (ref 0–0.5)
IMM GRANULOCYTES NFR BLD: 1.1 % (ref 0–0.5)
IMM GRANULOCYTES NFR BLD: 1.1 % (ref 0–0.5)
IMM GRANULOCYTES NFR BLD: 1.2 % (ref 0–0.5)
IMM RETICS NFR: 10.7 % (ref 3–15.9)
INR PPP: 1.3 (ref 0.8–1.2)
INR PPP: 1.3 (ref 0.8–1.2)
INR PPP: 1.33 (ref 0.8–1.2)
INR PPP: 1.5 (ref 0.8–1.2)
INR PPP: 1.71 (ref 0.8–1.2)
INR PPP: 1.73 (ref 0.8–1.2)
INR PPP: 1.74 (ref 0.8–1.2)
INR PPP: 1.82 (ref 0.8–1.2)
INR PPP: 1.86 (ref 0.8–1.2)
INR PPP: 1.9 (ref 0.8–1.2)
INR PPP: 1.94 (ref 0.8–1.2)
INR PPP: 1.96 (ref 0.8–1.2)
INR PPP: 1.98 (ref 0.8–1.2)
INR PPP: 2.04 (ref 0.8–1.2)
INR PPP: 2.12 (ref 0.8–1.2)
INR PPP: 2.12 (ref 0.8–1.2)
INR PPP: 2.14 (ref 0.8–1.2)
INR PPP: 2.22 (ref 0.8–1.2)
INR PPP: 2.32 (ref 0.8–1.2)
INR PPP: 2.34 (ref 0.8–1.2)
INR PPP: 2.4 (ref 0.8–1.2)
INR PPP: 2.45 (ref 0.8–1.2)
INR PPP: 2.49 (ref 0.8–1.2)
INR PPP: 2.51 (ref 0.8–1.2)
INR PPP: 2.51 (ref 0.8–1.2)
INR PPP: 2.55 (ref 0.8–1.2)
INR PPP: 2.57 (ref 0.8–1.2)
INR PPP: 2.64 (ref 0.8–1.2)
INR PPP: 2.66 (ref 0.8–1.2)
INR PPP: 2.67 (ref 0.8–1.2)
INR PPP: 2.67 (ref 0.8–1.2)
INR PPP: 2.75 (ref 0.8–1.2)
INR PPP: 2.88 (ref 0.8–1.2)
INR PPP: 2.97 (ref 0.8–1.2)
INR PPP: 2.97 (ref 0.8–1.2)
INR PPP: 3 (ref 0.8–1.2)
INR PPP: 3 (ref 0.8–1.2)
INR PPP: 3.1 (ref 0.8–1.2)
INR PPP: 3.14 (ref 0.8–1.2)
INR PPP: 3.36 (ref 0.8–1.2)
INR PPP: 3.38 (ref 0.8–1.2)
IRON 24H UR-MRATE: 35 MCG/DL (ref 49–181)
IRON 24H UR-MRATE: 50 MCG/DL (ref 49–181)
IRON 24H UR-MRATE: 52 MCG/DL (ref 49–181)
IRON 24H UR-MRATE: 64 MCG/DL (ref 49–181)
IRON SATN MFR SERPL: 29 % (ref 20–55)
IRON SATN MFR SERPL: 36 % (ref 20–55)
IRON SATN MFR SERPL: 44 % (ref 20–55)
IRON SATN MFR SERPL: 47 % (ref 20–55)
KETONES UR QL STRIP: NEGATIVE
KOH PREP NAIL: NORMAL
LAB AP CASE REPORT: NORMAL
LDH FLD-CCNC: 160 U/L
LDLC SERPL CALC-MCNC: 93 MG/DL (ref 0–129)
LDLC/HDLC SERPL: 3.34 {RATIO} (ref 0–3.55)
LEUKOCYTE ESTERASE UR QL STRIP.AUTO: ABNORMAL
LIPASE SERPL-CCNC: 85 U/L (ref 23–300)
LV EF 2D ECHO EST: 63 %
LYMPHOCYTES # BLD AUTO: 0.51 10*3/MM3 (ref 0.6–4.2)
LYMPHOCYTES # BLD AUTO: 0.58 10*3/MM3 (ref 0.6–4.2)
LYMPHOCYTES # BLD AUTO: 0.7 10*3/MM3 (ref 0.6–4.2)
LYMPHOCYTES # BLD AUTO: 0.74 10*3/MM3 (ref 0.6–4.2)
LYMPHOCYTES # BLD AUTO: 0.96 10*3/MM3 (ref 0.6–4.2)
LYMPHOCYTES # BLD AUTO: 0.98 10*3/MM3 (ref 0.6–4.2)
LYMPHOCYTES # BLD AUTO: 0.99 10*3/MM3 (ref 0.6–4.2)
LYMPHOCYTES # BLD AUTO: 1.01 10*3/MM3 (ref 0.6–4.2)
LYMPHOCYTES # BLD AUTO: 1.22 10*3/MM3 (ref 0.6–4.2)
LYMPHOCYTES # BLD AUTO: 1.23 10*3/MM3 (ref 0.6–4.2)
LYMPHOCYTES # BLD AUTO: 1.3 10*3/MM3 (ref 0.6–4.2)
LYMPHOCYTES # BLD AUTO: 1.31 10*3/MM3 (ref 0.6–4.2)
LYMPHOCYTES # BLD AUTO: 1.33 10*3/MM3 (ref 0.6–4.2)
LYMPHOCYTES # BLD AUTO: 1.34 10*3/MM3 (ref 0.6–4.2)
LYMPHOCYTES # BLD AUTO: 1.34 10*3/MM3 (ref 0.6–4.2)
LYMPHOCYTES # BLD AUTO: 1.36 10*3/MM3 (ref 0.6–4.2)
LYMPHOCYTES # BLD AUTO: 1.38 10*3/MM3 (ref 0.6–4.2)
LYMPHOCYTES # BLD AUTO: 1.41 10*3/MM3 (ref 0.6–4.2)
LYMPHOCYTES # BLD AUTO: 1.42 10*3/MM3 (ref 0.6–4.2)
LYMPHOCYTES # BLD AUTO: 1.44 10*3/MM3 (ref 0.6–4.2)
LYMPHOCYTES # BLD AUTO: 1.44 10*3/MM3 (ref 0.6–4.2)
LYMPHOCYTES # BLD AUTO: 1.49 10*3/MM3 (ref 0.6–4.2)
LYMPHOCYTES # BLD AUTO: 1.52 10*3/MM3 (ref 0.6–4.2)
LYMPHOCYTES # BLD AUTO: 1.53 10*3/MM3 (ref 0.6–4.2)
LYMPHOCYTES # BLD AUTO: 1.53 10*3/MM3 (ref 0.6–4.2)
LYMPHOCYTES # BLD AUTO: 1.55 10*3/MM3 (ref 0.6–4.2)
LYMPHOCYTES # BLD AUTO: 1.55 10*3/MM3 (ref 0.6–4.2)
LYMPHOCYTES # BLD AUTO: 1.56 10*3/MM3 (ref 0.6–4.2)
LYMPHOCYTES # BLD AUTO: 1.57 10*3/MM3 (ref 0.6–4.2)
LYMPHOCYTES # BLD AUTO: 1.57 10*3/MM3 (ref 0.6–4.2)
LYMPHOCYTES # BLD AUTO: 1.58 10*3/MM3 (ref 0.6–4.2)
LYMPHOCYTES # BLD AUTO: 1.6 10*3/MM3 (ref 0.6–4.2)
LYMPHOCYTES # BLD AUTO: 1.64 10*3/MM3 (ref 0.6–4.2)
LYMPHOCYTES # BLD AUTO: 1.64 10*3/MM3 (ref 0.6–4.2)
LYMPHOCYTES # BLD AUTO: 1.65 10*3/MM3 (ref 0.6–4.2)
LYMPHOCYTES # BLD AUTO: 1.68 10*3/MM3 (ref 0.6–4.2)
LYMPHOCYTES # BLD AUTO: 1.75 10*3/MM3 (ref 0.6–4.2)
LYMPHOCYTES # BLD AUTO: 1.81 10*3/MM3 (ref 0.6–4.2)
LYMPHOCYTES # BLD AUTO: 1.92 10*3/MM3 (ref 0.6–4.2)
LYMPHOCYTES # BLD AUTO: 1.94 10*3/MM3 (ref 0.6–4.2)
LYMPHOCYTES # BLD AUTO: 1.99 10*3/MM3 (ref 0.6–4.2)
LYMPHOCYTES # BLD AUTO: 2.11 10*3/MM3 (ref 0.6–4.2)
LYMPHOCYTES # BLD AUTO: 2.33 10*3/MM3 (ref 0.6–4.2)
LYMPHOCYTES # BLD AUTO: 2.5 10*3/MM3 (ref 0.6–4.2)
LYMPHOCYTES # BLD AUTO: 2.56 10*3/MM3 (ref 0.6–4.2)
LYMPHOCYTES # BLD MANUAL: 0.44 10*3/MM3 (ref 0.6–4.2)
LYMPHOCYTES # BLD MANUAL: 0.91 10*3/MM3 (ref 0.6–4.2)
LYMPHOCYTES # BLD MANUAL: 1.11 10*3/MM3 (ref 0.6–4.2)
LYMPHOCYTES # BLD MANUAL: 1.12 10*3/MM3 (ref 0.6–4.2)
LYMPHOCYTES # BLD MANUAL: 1.92 10*3/MM3 (ref 0.6–4.2)
LYMPHOCYTES NFR BLD AUTO: 10.6 % (ref 10–50)
LYMPHOCYTES NFR BLD AUTO: 11.1 % (ref 10–50)
LYMPHOCYTES NFR BLD AUTO: 11.3 % (ref 10–50)
LYMPHOCYTES NFR BLD AUTO: 12 % (ref 10–50)
LYMPHOCYTES NFR BLD AUTO: 12.2 % (ref 10–50)
LYMPHOCYTES NFR BLD AUTO: 13.6 % (ref 10–50)
LYMPHOCYTES NFR BLD AUTO: 13.7 % (ref 10–50)
LYMPHOCYTES NFR BLD AUTO: 14.1 % (ref 10–50)
LYMPHOCYTES NFR BLD AUTO: 14.6 % (ref 10–50)
LYMPHOCYTES NFR BLD AUTO: 14.7 % (ref 10–50)
LYMPHOCYTES NFR BLD AUTO: 16.4 % (ref 10–50)
LYMPHOCYTES NFR BLD AUTO: 16.7 % (ref 10–50)
LYMPHOCYTES NFR BLD AUTO: 16.8 % (ref 10–50)
LYMPHOCYTES NFR BLD AUTO: 16.9 % (ref 10–50)
LYMPHOCYTES NFR BLD AUTO: 17.4 % (ref 10–50)
LYMPHOCYTES NFR BLD AUTO: 17.6 % (ref 10–50)
LYMPHOCYTES NFR BLD AUTO: 17.9 % (ref 10–50)
LYMPHOCYTES NFR BLD AUTO: 18.3 % (ref 10–50)
LYMPHOCYTES NFR BLD AUTO: 18.9 % (ref 10–50)
LYMPHOCYTES NFR BLD AUTO: 19.5 % (ref 10–50)
LYMPHOCYTES NFR BLD AUTO: 19.6 % (ref 10–50)
LYMPHOCYTES NFR BLD AUTO: 20 % (ref 10–50)
LYMPHOCYTES NFR BLD AUTO: 20.6 % (ref 10–50)
LYMPHOCYTES NFR BLD AUTO: 20.6 % (ref 10–50)
LYMPHOCYTES NFR BLD AUTO: 20.9 % (ref 10–50)
LYMPHOCYTES NFR BLD AUTO: 21.5 % (ref 10–50)
LYMPHOCYTES NFR BLD AUTO: 21.5 % (ref 10–50)
LYMPHOCYTES NFR BLD AUTO: 21.6 % (ref 10–50)
LYMPHOCYTES NFR BLD AUTO: 22.9 % (ref 10–50)
LYMPHOCYTES NFR BLD AUTO: 23 % (ref 10–50)
LYMPHOCYTES NFR BLD AUTO: 24.5 % (ref 10–50)
LYMPHOCYTES NFR BLD AUTO: 26.6 % (ref 10–50)
LYMPHOCYTES NFR BLD AUTO: 27.2 % (ref 10–50)
LYMPHOCYTES NFR BLD AUTO: 27.9 % (ref 10–50)
LYMPHOCYTES NFR BLD AUTO: 28.1 % (ref 10–50)
LYMPHOCYTES NFR BLD AUTO: 28.5 % (ref 10–50)
LYMPHOCYTES NFR BLD AUTO: 29.8 % (ref 10–50)
LYMPHOCYTES NFR BLD AUTO: 30.5 % (ref 10–50)
LYMPHOCYTES NFR BLD AUTO: 30.6 % (ref 10–50)
LYMPHOCYTES NFR BLD AUTO: 31.7 % (ref 10–50)
LYMPHOCYTES NFR BLD AUTO: 32.1 % (ref 10–50)
LYMPHOCYTES NFR BLD AUTO: 34.5 % (ref 10–50)
LYMPHOCYTES NFR BLD AUTO: 6.3 % (ref 10–50)
LYMPHOCYTES NFR BLD AUTO: 6.4 % (ref 10–50)
LYMPHOCYTES NFR BLD AUTO: 9.6 % (ref 10–50)
LYMPHOCYTES NFR BLD MANUAL: 13 % (ref 10–50)
LYMPHOCYTES NFR BLD MANUAL: 13 % (ref 10–50)
LYMPHOCYTES NFR BLD MANUAL: 14 % (ref 10–50)
LYMPHOCYTES NFR BLD MANUAL: 24 % (ref 10–50)
LYMPHOCYTES NFR BLD MANUAL: 4 % (ref 0–12)
LYMPHOCYTES NFR BLD MANUAL: 5 % (ref 10–50)
LYMPHOCYTES NFR BLD MANUAL: 6 % (ref 0–12)
LYMPHOCYTES NFR BLD MANUAL: 6 % (ref 0–12)
LYMPHOCYTES NFR BLD MANUAL: 7 % (ref 0–12)
LYMPHOCYTES NFR BLD MANUAL: 7 % (ref 0–12)
Lab: ABNORMAL
Lab: ABNORMAL
Lab: NORMAL
MACROCYTES BLD QL SMEAR: ABNORMAL
MACROCYTES BLD QL SMEAR: ABNORMAL
MACROCYTES BLD QL SMEAR: NORMAL
MAGNESIUM SERPL-MCNC: 1.6 MG/DL (ref 1.6–2.3)
MAGNESIUM SERPL-MCNC: 1.7 MG/DL (ref 1.6–2.3)
MAGNESIUM SERPL-MCNC: 1.9 MG/DL (ref 1.6–2.3)
MAGNESIUM SERPL-MCNC: 2 MG/DL (ref 1.6–2.3)
MAGNESIUM SERPL-MCNC: 2 MG/DL (ref 1.6–2.3)
MAGNESIUM SERPL-MCNC: 2.3 MG/DL (ref 1.6–2.3)
MAGNESIUM SERPL-MCNC: 2.4 MG/DL (ref 1.6–2.3)
MAXIMAL PREDICTED HEART RATE: 166 BPM
MCH RBC QN AUTO: 32 PG (ref 26.5–34)
MCH RBC QN AUTO: 32.2 PG (ref 26.5–34)
MCH RBC QN AUTO: 32.3 PG (ref 26.5–34)
MCH RBC QN AUTO: 32.3 PG (ref 26.5–34)
MCH RBC QN AUTO: 32.5 PG (ref 26.5–34)
MCH RBC QN AUTO: 32.7 PG (ref 26.5–34)
MCH RBC QN AUTO: 32.7 PG (ref 26.5–34)
MCH RBC QN AUTO: 32.8 PG (ref 26.5–34)
MCH RBC QN AUTO: 32.9 PG (ref 26.5–34)
MCH RBC QN AUTO: 33 PG (ref 26.5–34)
MCH RBC QN AUTO: 33.1 PG (ref 26.5–34)
MCH RBC QN AUTO: 33.2 PG (ref 26.5–34)
MCH RBC QN AUTO: 33.3 PG (ref 26.5–34)
MCH RBC QN AUTO: 33.5 PG (ref 26.5–34)
MCH RBC QN AUTO: 33.5 PG (ref 26.5–34)
MCH RBC QN AUTO: 33.6 PG (ref 26.5–34)
MCH RBC QN AUTO: 33.8 PG (ref 26.5–34)
MCH RBC QN AUTO: 33.8 PG (ref 26.5–34)
MCH RBC QN AUTO: 33.9 PG (ref 26.5–34)
MCH RBC QN AUTO: 34 PG (ref 26.5–34)
MCH RBC QN AUTO: 34.1 PG (ref 26.5–34)
MCH RBC QN AUTO: 34.2 PG (ref 26.5–34)
MCH RBC QN AUTO: 34.3 PG (ref 26.5–34)
MCH RBC QN AUTO: 34.3 PG (ref 26.5–34)
MCH RBC QN AUTO: 34.4 PG (ref 26.5–34)
MCH RBC QN AUTO: 34.7 PG (ref 26.5–34)
MCH RBC QN AUTO: 34.8 PG (ref 26.5–34)
MCH RBC QN AUTO: 34.9 PG (ref 26.5–34)
MCH RBC QN AUTO: 35 PG (ref 26.5–34)
MCH RBC QN AUTO: 35.1 PG (ref 26.5–34)
MCH RBC QN AUTO: 35.2 PG (ref 26.5–34)
MCH RBC QN AUTO: 35.4 PG (ref 26.5–34)
MCH RBC QN AUTO: 35.5 PG (ref 26.5–34)
MCH RBC QN AUTO: 35.6 PG (ref 26.5–34)
MCH RBC QN AUTO: 35.9 PG (ref 26.5–34)
MCH RBC QN AUTO: 36.1 PG (ref 26.5–34)
MCH RBC QN AUTO: 36.2 PG (ref 26.5–34)
MCH RBC QN AUTO: 36.3 PG (ref 26.5–34)
MCH RBC QN AUTO: 36.4 PG (ref 26.5–34)
MCH RBC QN AUTO: 36.6 PG (ref 26.5–34)
MCH RBC QN AUTO: 36.7 PG (ref 26.5–34)
MCH RBC QN AUTO: 36.8 PG (ref 26.5–34)
MCH RBC QN AUTO: 36.8 PG (ref 26.5–34)
MCH RBC QN AUTO: 36.9 PG (ref 26.5–34)
MCH RBC QN AUTO: 36.9 PG (ref 26.5–34)
MCH RBC QN AUTO: 37 PG (ref 26.5–34)
MCH RBC QN AUTO: 37.1 PG (ref 26.5–34)
MCH RBC QN AUTO: 37.3 PG (ref 26.5–34)
MCH RBC QN AUTO: 37.4 PG (ref 26.5–34)
MCH RBC QN AUTO: 37.7 PG (ref 26.5–34)
MCH RBC QN AUTO: 37.8 PG (ref 26.5–34)
MCHC RBC AUTO-ENTMCNC: 32.9 G/DL (ref 31.5–36.3)
MCHC RBC AUTO-ENTMCNC: 32.9 G/DL (ref 31.5–36.3)
MCHC RBC AUTO-ENTMCNC: 33.3 G/DL (ref 31.5–36.3)
MCHC RBC AUTO-ENTMCNC: 33.4 G/DL (ref 31.5–36.3)
MCHC RBC AUTO-ENTMCNC: 33.5 G/DL (ref 31.5–36.3)
MCHC RBC AUTO-ENTMCNC: 33.6 G/DL (ref 31.5–36.3)
MCHC RBC AUTO-ENTMCNC: 33.6 G/DL (ref 31.5–36.3)
MCHC RBC AUTO-ENTMCNC: 33.7 G/DL (ref 31.5–36.3)
MCHC RBC AUTO-ENTMCNC: 33.8 G/DL (ref 31.5–36.3)
MCHC RBC AUTO-ENTMCNC: 33.9 G/DL (ref 31.5–36.3)
MCHC RBC AUTO-ENTMCNC: 34 G/DL (ref 31.5–36.3)
MCHC RBC AUTO-ENTMCNC: 34.1 G/DL (ref 31.5–36.3)
MCHC RBC AUTO-ENTMCNC: 34.1 G/DL (ref 31.5–36.3)
MCHC RBC AUTO-ENTMCNC: 34.2 G/DL (ref 31.5–36.3)
MCHC RBC AUTO-ENTMCNC: 34.3 G/DL (ref 31.5–36.3)
MCHC RBC AUTO-ENTMCNC: 34.3 G/DL (ref 31.5–36.3)
MCHC RBC AUTO-ENTMCNC: 34.5 G/DL (ref 31.5–36.3)
MCHC RBC AUTO-ENTMCNC: 34.6 G/DL (ref 31.5–36.3)
MCHC RBC AUTO-ENTMCNC: 34.7 G/DL (ref 31.5–36.3)
MCHC RBC AUTO-ENTMCNC: 34.8 G/DL (ref 31.5–36.3)
MCHC RBC AUTO-ENTMCNC: 34.8 G/DL (ref 31.5–36.3)
MCHC RBC AUTO-ENTMCNC: 34.9 G/DL (ref 31.5–36.3)
MCHC RBC AUTO-ENTMCNC: 35.1 G/DL (ref 31.5–36.3)
MCHC RBC AUTO-ENTMCNC: 35.2 G/DL (ref 31.5–36.3)
MCHC RBC AUTO-ENTMCNC: 35.3 G/DL (ref 31.5–36.3)
MCHC RBC AUTO-ENTMCNC: 35.3 G/DL (ref 31.5–36.3)
MCHC RBC AUTO-ENTMCNC: 35.4 G/DL (ref 31.5–36.3)
MCHC RBC AUTO-ENTMCNC: 35.4 G/DL (ref 31.5–36.3)
MCHC RBC AUTO-ENTMCNC: 35.5 G/DL (ref 31.5–36.3)
MCHC RBC AUTO-ENTMCNC: 35.6 G/DL (ref 31.5–36.3)
MCHC RBC AUTO-ENTMCNC: 35.6 G/DL (ref 31.5–36.3)
MCHC RBC AUTO-ENTMCNC: 35.7 G/DL (ref 31.5–36.3)
MCHC RBC AUTO-ENTMCNC: 35.9 G/DL (ref 31.5–36.3)
MCHC RBC AUTO-ENTMCNC: 36.1 G/DL (ref 31.5–36.3)
MCV RBC AUTO: 100 FL (ref 80–98)
MCV RBC AUTO: 100 FL (ref 80–98)
MCV RBC AUTO: 100.4 FL (ref 80–98)
MCV RBC AUTO: 100.8 FL (ref 80–98)
MCV RBC AUTO: 100.8 FL (ref 80–98)
MCV RBC AUTO: 101.4 FL (ref 80–98)
MCV RBC AUTO: 101.5 FL (ref 80–98)
MCV RBC AUTO: 101.7 FL (ref 80–98)
MCV RBC AUTO: 102 FL (ref 80–98)
MCV RBC AUTO: 102 FL (ref 80–98)
MCV RBC AUTO: 102.5 FL (ref 80–98)
MCV RBC AUTO: 102.8 FL (ref 80–98)
MCV RBC AUTO: 103.4 FL (ref 80–98)
MCV RBC AUTO: 103.6 FL (ref 80–98)
MCV RBC AUTO: 104.4 FL (ref 80–98)
MCV RBC AUTO: 104.6 FL (ref 80–98)
MCV RBC AUTO: 104.6 FL (ref 80–98)
MCV RBC AUTO: 105.2 FL (ref 80–98)
MCV RBC AUTO: 105.3 FL (ref 80–98)
MCV RBC AUTO: 105.3 FL (ref 80–98)
MCV RBC AUTO: 106.3 FL (ref 80–98)
MCV RBC AUTO: 106.6 FL (ref 80–98)
MCV RBC AUTO: 107.8 FL (ref 80–98)
MCV RBC AUTO: 108.9 FL (ref 80–98)
MCV RBC AUTO: 109 FL (ref 80–98)
MCV RBC AUTO: 110 FL (ref 80–98)
MCV RBC AUTO: 110 FL (ref 80–98)
MCV RBC AUTO: 110.7 FL (ref 80–98)
MCV RBC AUTO: 110.9 FL (ref 80–98)
MCV RBC AUTO: 91.5 FL (ref 80–98)
MCV RBC AUTO: 92.1 FL (ref 80–98)
MCV RBC AUTO: 92.6 FL (ref 80–98)
MCV RBC AUTO: 93.3 FL (ref 80–98)
MCV RBC AUTO: 94 FL (ref 80–98)
MCV RBC AUTO: 95 FL (ref 80–98)
MCV RBC AUTO: 95.2 FL (ref 80–98)
MCV RBC AUTO: 95.7 FL (ref 80–98)
MCV RBC AUTO: 95.8 FL (ref 80–98)
MCV RBC AUTO: 95.9 FL (ref 80–98)
MCV RBC AUTO: 95.9 FL (ref 80–98)
MCV RBC AUTO: 96 FL (ref 80–98)
MCV RBC AUTO: 96.4 FL (ref 80–98)
MCV RBC AUTO: 96.5 FL (ref 80–98)
MCV RBC AUTO: 96.7 FL (ref 80–98)
MCV RBC AUTO: 96.9 FL (ref 80–98)
MCV RBC AUTO: 97 FL (ref 80–98)
MCV RBC AUTO: 97.2 FL (ref 80–98)
MCV RBC AUTO: 97.4 FL (ref 80–98)
MCV RBC AUTO: 97.5 FL (ref 80–98)
MCV RBC AUTO: 97.8 FL (ref 80–98)
MCV RBC AUTO: 98.2 FL (ref 80–98)
MCV RBC AUTO: 98.4 FL (ref 80–98)
MCV RBC AUTO: 98.5 FL (ref 80–98)
MCV RBC AUTO: 98.5 FL (ref 80–98)
MCV RBC AUTO: 98.7 FL (ref 80–98)
MCV RBC AUTO: 99.1 FL (ref 80–98)
METAMYELOCYTES NFR BLD MANUAL: 1 % (ref 0–0)
METHADONE UR QL SCN: NEGATIVE
MODALITY: ABNORMAL
MONOCYTES # BLD AUTO: 0.24 10*3/MM3 (ref 0–0.9)
MONOCYTES # BLD AUTO: 0.29 10*3/MM3 (ref 0–0.9)
MONOCYTES # BLD AUTO: 0.32 10*3/MM3 (ref 0–0.9)
MONOCYTES # BLD AUTO: 0.34 10*3/MM3 (ref 0–0.9)
MONOCYTES # BLD AUTO: 0.39 10*3/MM3 (ref 0–0.9)
MONOCYTES # BLD AUTO: 0.39 10*3/MM3 (ref 0–0.9)
MONOCYTES # BLD AUTO: 0.4 10*3/MM3 (ref 0–0.9)
MONOCYTES # BLD AUTO: 0.42 10*3/MM3 (ref 0–0.9)
MONOCYTES # BLD AUTO: 0.43 10*3/MM3 (ref 0–0.9)
MONOCYTES # BLD AUTO: 0.48 10*3/MM3 (ref 0–0.9)
MONOCYTES # BLD AUTO: 0.53 10*3/MM3 (ref 0–0.9)
MONOCYTES # BLD AUTO: 0.54 10*3/MM3 (ref 0–0.9)
MONOCYTES # BLD AUTO: 0.54 10*3/MM3 (ref 0–0.9)
MONOCYTES # BLD AUTO: 0.55 10*3/MM3 (ref 0–0.9)
MONOCYTES # BLD AUTO: 0.58 10*3/MM3 (ref 0–0.9)
MONOCYTES # BLD AUTO: 0.59 10*3/MM3 (ref 0–0.9)
MONOCYTES # BLD AUTO: 0.59 10*3/MM3 (ref 0–0.9)
MONOCYTES # BLD AUTO: 0.6 10*3/MM3 (ref 0–0.9)
MONOCYTES # BLD AUTO: 0.6 10*3/MM3 (ref 0–0.9)
MONOCYTES # BLD AUTO: 0.61 10*3/MM3 (ref 0–0.9)
MONOCYTES # BLD AUTO: 0.62 10*3/MM3 (ref 0–0.9)
MONOCYTES # BLD AUTO: 0.65 10*3/MM3 (ref 0–0.9)
MONOCYTES # BLD AUTO: 0.76 10*3/MM3 (ref 0–0.9)
MONOCYTES # BLD AUTO: 0.77 10*3/MM3 (ref 0–0.9)
MONOCYTES # BLD AUTO: 0.78 10*3/MM3 (ref 0–0.9)
MONOCYTES # BLD AUTO: 0.8 10*3/MM3 (ref 0–0.9)
MONOCYTES # BLD AUTO: 0.81 10*3/MM3 (ref 0–0.9)
MONOCYTES # BLD AUTO: 0.82 10*3/MM3 (ref 0–0.9)
MONOCYTES # BLD AUTO: 0.85 10*3/MM3 (ref 0–0.9)
MONOCYTES # BLD AUTO: 0.86 10*3/MM3 (ref 0–0.9)
MONOCYTES # BLD AUTO: 0.92 10*3/MM3 (ref 0–0.9)
MONOCYTES # BLD AUTO: 0.93 10*3/MM3 (ref 0–0.9)
MONOCYTES # BLD AUTO: 0.97 10*3/MM3 (ref 0–0.9)
MONOCYTES # BLD AUTO: 0.99 10*3/MM3 (ref 0–0.9)
MONOCYTES # BLD AUTO: 0.99 10*3/MM3 (ref 0–0.9)
MONOCYTES # BLD AUTO: 1.06 10*3/MM3 (ref 0–0.9)
MONOCYTES # BLD AUTO: 1.15 10*3/MM3 (ref 0–0.9)
MONOCYTES # BLD AUTO: 1.16 10*3/MM3 (ref 0–0.9)
MONOCYTES # BLD AUTO: 1.2 10*3/MM3 (ref 0–0.9)
MONOCYTES # BLD AUTO: 1.21 10*3/MM3 (ref 0–0.9)
MONOCYTES # BLD AUTO: 1.28 10*3/MM3 (ref 0–0.9)
MONOCYTES # BLD AUTO: 1.29 10*3/MM3 (ref 0–0.9)
MONOCYTES # BLD AUTO: 1.34 10*3/MM3 (ref 0–0.9)
MONOCYTES # BLD AUTO: 1.44 10*3/MM3 (ref 0–0.9)
MONOCYTES # BLD AUTO: 1.52 10*3/MM3 (ref 0–0.9)
MONOCYTES # BLD AUTO: 1.54 10*3/MM3 (ref 0–0.9)
MONOCYTES # BLD AUTO: 1.57 10*3/MM3 (ref 0–0.9)
MONOCYTES NFR BLD AUTO: 10.2 % (ref 0–12)
MONOCYTES NFR BLD AUTO: 10.3 % (ref 0–12)
MONOCYTES NFR BLD AUTO: 10.5 % (ref 0–12)
MONOCYTES NFR BLD AUTO: 10.5 % (ref 0–12)
MONOCYTES NFR BLD AUTO: 10.7 % (ref 0–12)
MONOCYTES NFR BLD AUTO: 11 % (ref 0–12)
MONOCYTES NFR BLD AUTO: 11.3 % (ref 0–12)
MONOCYTES NFR BLD AUTO: 11.6 % (ref 0–12)
MONOCYTES NFR BLD AUTO: 11.6 % (ref 0–12)
MONOCYTES NFR BLD AUTO: 11.8 % (ref 0–12)
MONOCYTES NFR BLD AUTO: 12 % (ref 0–12)
MONOCYTES NFR BLD AUTO: 12.1 % (ref 0–12)
MONOCYTES NFR BLD AUTO: 12.4 % (ref 0–12)
MONOCYTES NFR BLD AUTO: 12.5 % (ref 0–12)
MONOCYTES NFR BLD AUTO: 13 % (ref 0–12)
MONOCYTES NFR BLD AUTO: 13.1 % (ref 0–12)
MONOCYTES NFR BLD AUTO: 13.6 % (ref 0–12)
MONOCYTES NFR BLD AUTO: 14.3 % (ref 0–12)
MONOCYTES NFR BLD AUTO: 14.3 % (ref 0–12)
MONOCYTES NFR BLD AUTO: 14.4 % (ref 0–12)
MONOCYTES NFR BLD AUTO: 2.9 % (ref 0–12)
MONOCYTES NFR BLD AUTO: 3.5 % (ref 0–12)
MONOCYTES NFR BLD AUTO: 5.4 % (ref 0–12)
MONOCYTES NFR BLD AUTO: 5.7 % (ref 0–12)
MONOCYTES NFR BLD AUTO: 7 % (ref 0–12)
MONOCYTES NFR BLD AUTO: 7 % (ref 0–12)
MONOCYTES NFR BLD AUTO: 7.8 % (ref 0–12)
MONOCYTES NFR BLD AUTO: 7.9 % (ref 0–12)
MONOCYTES NFR BLD AUTO: 8.1 % (ref 0–12)
MONOCYTES NFR BLD AUTO: 8.2 % (ref 0–12)
MONOCYTES NFR BLD AUTO: 8.2 % (ref 0–12)
MONOCYTES NFR BLD AUTO: 8.9 % (ref 0–12)
MONOCYTES NFR BLD AUTO: 9.1 % (ref 0–12)
MONOCYTES NFR BLD AUTO: 9.2 % (ref 0–12)
MONOCYTES NFR BLD AUTO: 9.7 % (ref 0–12)
MONOCYTES NFR BLD AUTO: 9.7 % (ref 0–12)
MONOCYTES NFR BLD AUTO: 9.8 % (ref 0–12)
MONOCYTES NFR BLD AUTO: 9.8 % (ref 0–12)
MONOCYTES NFR BLD AUTO: 9.9 % (ref 0–12)
MONOS+MACROS NFR FLD: 34 %
MONOS+MACROS NFR FLD: 51 %
NEUTROPHILS # BLD AUTO: 10.12 10*3/MM3 (ref 2–8.6)
NEUTROPHILS # BLD AUTO: 10.24 10*3/MM3 (ref 2–8.6)
NEUTROPHILS # BLD AUTO: 10.49 10*3/MM3 (ref 2–8.6)
NEUTROPHILS # BLD AUTO: 2.08 10*3/MM3 (ref 2–8.6)
NEUTROPHILS # BLD AUTO: 2.13 10*3/MM3 (ref 2–8.6)
NEUTROPHILS # BLD AUTO: 2.42 10*3/MM3 (ref 2–8.6)
NEUTROPHILS # BLD AUTO: 2.58 10*3/MM3 (ref 2–8.6)
NEUTROPHILS # BLD AUTO: 2.59 10*3/MM3 (ref 2–8.6)
NEUTROPHILS # BLD AUTO: 2.91 10*3/MM3 (ref 2–8.6)
NEUTROPHILS # BLD AUTO: 2.91 10*3/MM3 (ref 2–8.6)
NEUTROPHILS # BLD AUTO: 3.04 10*3/MM3 (ref 2–8.6)
NEUTROPHILS # BLD AUTO: 3.1 10*3/MM3 (ref 2–8.6)
NEUTROPHILS # BLD AUTO: 3.12 10*3/MM3 (ref 2–8.6)
NEUTROPHILS # BLD AUTO: 3.14 10*3/MM3 (ref 2–8.6)
NEUTROPHILS # BLD AUTO: 3.21 10*3/MM3 (ref 2–8.6)
NEUTROPHILS # BLD AUTO: 3.26 10*3/MM3 (ref 2–8.6)
NEUTROPHILS # BLD AUTO: 3.45 10*3/MM3 (ref 2–8.6)
NEUTROPHILS # BLD AUTO: 3.47 10*3/MM3 (ref 2–8.6)
NEUTROPHILS # BLD AUTO: 3.78 10*3/MM3 (ref 2–8.6)
NEUTROPHILS # BLD AUTO: 3.79 10*3/MM3 (ref 2–8.6)
NEUTROPHILS # BLD AUTO: 4 10*3/MM3 (ref 2–8.6)
NEUTROPHILS # BLD AUTO: 4.07 10*3/MM3 (ref 2–8.6)
NEUTROPHILS # BLD AUTO: 4.08 10*3/MM3 (ref 2–8.6)
NEUTROPHILS # BLD AUTO: 4.33 10*3/MM3 (ref 2–8.6)
NEUTROPHILS # BLD AUTO: 4.87 10*3/MM3 (ref 2–8.6)
NEUTROPHILS # BLD AUTO: 4.97 10*3/MM3 (ref 2–8.6)
NEUTROPHILS # BLD AUTO: 5.01 10*3/MM3 (ref 2–8.6)
NEUTROPHILS # BLD AUTO: 5.09 10*3/MM3 (ref 2–8.6)
NEUTROPHILS # BLD AUTO: 5.17 10*3/MM3 (ref 2–8.6)
NEUTROPHILS # BLD AUTO: 5.25 10*3/MM3 (ref 2–8.6)
NEUTROPHILS # BLD AUTO: 5.28 10*3/MM3 (ref 2–8.6)
NEUTROPHILS # BLD AUTO: 5.35 10*3/MM3 (ref 2–8.6)
NEUTROPHILS # BLD AUTO: 5.57 10*3/MM3 (ref 2–8.6)
NEUTROPHILS # BLD AUTO: 5.76 10*3/MM3 (ref 2–8.6)
NEUTROPHILS # BLD AUTO: 5.86 10*3/MM3 (ref 2–8.6)
NEUTROPHILS # BLD AUTO: 6.21 10*3/MM3 (ref 2–8.6)
NEUTROPHILS # BLD AUTO: 6.4 10*3/MM3 (ref 2–8.6)
NEUTROPHILS # BLD AUTO: 6.48 10*3/MM3 (ref 2–8.6)
NEUTROPHILS # BLD AUTO: 6.62 10*3/MM3 (ref 2–8.6)
NEUTROPHILS # BLD AUTO: 6.63 10*3/MM3 (ref 2–8.6)
NEUTROPHILS # BLD AUTO: 7.24 10*3/MM3 (ref 2–8.6)
NEUTROPHILS # BLD AUTO: 7.34 10*3/MM3 (ref 2–8.6)
NEUTROPHILS # BLD AUTO: 7.59 10*3/MM3 (ref 2–8.6)
NEUTROPHILS # BLD AUTO: 7.78 10*3/MM3 (ref 2–8.6)
NEUTROPHILS # BLD AUTO: 8.17 10*3/MM3 (ref 2–8.6)
NEUTROPHILS # BLD AUTO: 8.42 10*3/MM3 (ref 2–8.6)
NEUTROPHILS # BLD AUTO: 9.02 10*3/MM3 (ref 2–8.6)
NEUTROPHILS # BLD AUTO: 9.13 10*3/MM3 (ref 2–8.6)
NEUTROPHILS # BLD AUTO: 9.14 10*3/MM3 (ref 2–8.6)
NEUTROPHILS # BLD AUTO: 9.95 10*3/MM3 (ref 2–8.6)
NEUTROPHILS NFR BLD AUTO: 47.5 % (ref 37–80)
NEUTROPHILS NFR BLD AUTO: 47.6 % (ref 37–80)
NEUTROPHILS NFR BLD AUTO: 48.4 % (ref 37–80)
NEUTROPHILS NFR BLD AUTO: 48.4 % (ref 37–80)
NEUTROPHILS NFR BLD AUTO: 50.1 % (ref 37–80)
NEUTROPHILS NFR BLD AUTO: 50.8 % (ref 37–80)
NEUTROPHILS NFR BLD AUTO: 51 % (ref 37–80)
NEUTROPHILS NFR BLD AUTO: 52.4 % (ref 37–80)
NEUTROPHILS NFR BLD AUTO: 52.5 % (ref 37–80)
NEUTROPHILS NFR BLD AUTO: 53.5 % (ref 37–80)
NEUTROPHILS NFR BLD AUTO: 54.7 % (ref 37–80)
NEUTROPHILS NFR BLD AUTO: 56.1 % (ref 37–80)
NEUTROPHILS NFR BLD AUTO: 56.2 % (ref 37–80)
NEUTROPHILS NFR BLD AUTO: 60.4 % (ref 37–80)
NEUTROPHILS NFR BLD AUTO: 61.2 % (ref 37–80)
NEUTROPHILS NFR BLD AUTO: 61.2 % (ref 37–80)
NEUTROPHILS NFR BLD AUTO: 61.3 % (ref 37–80)
NEUTROPHILS NFR BLD AUTO: 62 % (ref 37–80)
NEUTROPHILS NFR BLD AUTO: 62.1 % (ref 37–80)
NEUTROPHILS NFR BLD AUTO: 62.3 % (ref 37–80)
NEUTROPHILS NFR BLD AUTO: 62.4 % (ref 37–80)
NEUTROPHILS NFR BLD AUTO: 63 % (ref 37–80)
NEUTROPHILS NFR BLD AUTO: 63.6 % (ref 37–80)
NEUTROPHILS NFR BLD AUTO: 64.3 % (ref 37–80)
NEUTROPHILS NFR BLD AUTO: 64.5 % (ref 37–80)
NEUTROPHILS NFR BLD AUTO: 64.7 % (ref 37–80)
NEUTROPHILS NFR BLD AUTO: 65.1 % (ref 37–80)
NEUTROPHILS NFR BLD AUTO: 65.4 % (ref 37–80)
NEUTROPHILS NFR BLD AUTO: 65.7 % (ref 37–80)
NEUTROPHILS NFR BLD AUTO: 67.8 % (ref 37–80)
NEUTROPHILS NFR BLD AUTO: 68 % (ref 37–80)
NEUTROPHILS NFR BLD AUTO: 68 % (ref 37–80)
NEUTROPHILS NFR BLD AUTO: 68.6 % (ref 37–80)
NEUTROPHILS NFR BLD AUTO: 69.9 % (ref 37–80)
NEUTROPHILS NFR BLD AUTO: 71.1 % (ref 37–80)
NEUTROPHILS NFR BLD AUTO: 73 % (ref 37–80)
NEUTROPHILS NFR BLD AUTO: 73.6 % (ref 37–80)
NEUTROPHILS NFR BLD AUTO: 73.9 % (ref 37–80)
NEUTROPHILS NFR BLD AUTO: 74.9 % (ref 37–80)
NEUTROPHILS NFR BLD AUTO: 75.7 % (ref 37–80)
NEUTROPHILS NFR BLD AUTO: 76 % (ref 37–80)
NEUTROPHILS NFR BLD AUTO: 77.5 % (ref 37–80)
NEUTROPHILS NFR BLD AUTO: 82.7 % (ref 37–80)
NEUTROPHILS NFR BLD AUTO: 89.7 % (ref 37–80)
NEUTROPHILS NFR BLD AUTO: 90.1 % (ref 37–80)
NEUTROPHILS NFR BLD MANUAL: 63 % (ref 37–80)
NEUTROPHILS NFR BLD MANUAL: 66 % (ref 37–80)
NEUTROPHILS NFR BLD MANUAL: 73 % (ref 37–80)
NEUTROPHILS NFR BLD MANUAL: 77 % (ref 37–80)
NEUTROPHILS NFR BLD MANUAL: 87 % (ref 37–80)
NEUTROPHILS NFR FLD MANUAL: 49 %
NEUTROPHILS NFR FLD MANUAL: 66 %
NEUTS BAND NFR BLD MANUAL: 1 % (ref 0–5)
NEUTS BAND NFR BLD MANUAL: 13 % (ref 0–5)
NEUTS BAND NFR BLD MANUAL: 4 % (ref 0–5)
NITRITE UR QL STRIP: NEGATIVE
NITRITE UR QL STRIP: POSITIVE
NOROVIRUS GI+II RNA STL QL NAA+NON-PROBE: NOT DETECTED
NRBC BLD MANUAL-RTO: 0 /100 WBC (ref 0–0)
NT-PROBNP SERPL-MCNC: 2120 PG/ML (ref 0–900)
OPIATES UR QL: NEGATIVE
OXYCODONE UR QL SCN: POSITIVE
P SHIGELLOIDES DNA STL QL NAA+NON-PROBE: NOT DETECTED
PATH INTERP BLD-IMP: NORMAL
PATH REPORT.FINAL DX SPEC: NORMAL
PATH REPORT.GROSS SPEC: NORMAL
PCO2 BLDA: 31 MM HG (ref 35–45)
PCO2 BLDA: 31.8 MM HG (ref 35–45)
PCO2 BLDA: 31.9 MM HG (ref 35–45)
PCO2 BLDA: 33.3 MM HG (ref 35–45)
PCO2 BLDA: 37.9 MM HG (ref 35–45)
PCO2 BLDA: 39.1 MM HG (ref 35–45)
PEEP RESPIRATORY: 10 CM[H2O]
PH BLDA: 6.97 PH UNITS (ref 7.35–7.45)
PH BLDA: 7.41 PH UNITS (ref 7.35–7.45)
PH BLDA: 7.43 PH UNITS (ref 7.35–7.45)
PH BLDA: 7.48 PH UNITS (ref 7.35–7.45)
PH BLDA: 7.48 PH UNITS (ref 7.35–7.45)
PH BLDA: 7.52 PH UNITS (ref 7.35–7.45)
PH UR STRIP.AUTO: 5.5 [PH] (ref 5–9)
PH UR STRIP.AUTO: 5.5 [PH] (ref 5–9)
PH UR STRIP.AUTO: 6 [PH] (ref 5–9)
PH UR STRIP.AUTO: 6 [PH] (ref 5–9)
PH UR STRIP.AUTO: 6.5 [PH] (ref 5–9)
PH UR STRIP.AUTO: 7.5 [PH] (ref 5–9)
PH UR STRIP.AUTO: 7.5 [PH] (ref 5–9)
PH UR STRIP.AUTO: <=5 [PH] (ref 5–9)
PHOSPHATE SERPL-MCNC: 3.4 MG/DL (ref 2.4–4.4)
PHOSPHATE SERPL-MCNC: 3.4 MG/DL (ref 2.4–4.4)
PLATELET # BLD AUTO: 15 10*3/MM3 (ref 150–450)
PLATELET # BLD AUTO: 16 10*3/MM3 (ref 150–450)
PLATELET # BLD AUTO: 20 10*3/MM3 (ref 150–450)
PLATELET # BLD AUTO: 26 10*3/MM3 (ref 150–450)
PLATELET # BLD AUTO: 29 10*3/MM3 (ref 150–450)
PLATELET # BLD AUTO: 31 10*3/MM3 (ref 150–450)
PLATELET # BLD AUTO: 32 10*3/MM3 (ref 150–450)
PLATELET # BLD AUTO: 35 10*3/MM3 (ref 150–450)
PLATELET # BLD AUTO: 38 10*3/MM3 (ref 150–450)
PLATELET # BLD AUTO: 39 10*3/MM3 (ref 150–450)
PLATELET # BLD AUTO: 40 10*3/MM3 (ref 150–450)
PLATELET # BLD AUTO: 41 10*3/MM3 (ref 150–450)
PLATELET # BLD AUTO: 42 10*3/MM3 (ref 150–450)
PLATELET # BLD AUTO: 43 10*3/MM3 (ref 150–450)
PLATELET # BLD AUTO: 44 10*3/MM3 (ref 150–450)
PLATELET # BLD AUTO: 45 10*3/MM3 (ref 150–450)
PLATELET # BLD AUTO: 45 10*3/MM3 (ref 150–450)
PLATELET # BLD AUTO: 46 10*3/MM3 (ref 150–450)
PLATELET # BLD AUTO: 47 10*3/MM3 (ref 150–450)
PLATELET # BLD AUTO: 48 10*3/MM3 (ref 150–450)
PLATELET # BLD AUTO: 48 10*3/MM3 (ref 150–450)
PLATELET # BLD AUTO: 49 10*3/MM3 (ref 150–450)
PLATELET # BLD AUTO: 50 10*3/MM3 (ref 150–450)
PLATELET # BLD AUTO: 50 10*3/MM3 (ref 150–450)
PLATELET # BLD AUTO: 51 10*3/MM3 (ref 150–450)
PLATELET # BLD AUTO: 51 10*3/MM3 (ref 150–450)
PLATELET # BLD AUTO: 53 10*3/MM3 (ref 150–450)
PLATELET # BLD AUTO: 53 10*3/MM3 (ref 150–450)
PLATELET # BLD AUTO: 54 10*3/MM3 (ref 150–450)
PLATELET # BLD AUTO: 54 10*3/MM3 (ref 150–450)
PLATELET # BLD AUTO: 56 10*3/MM3 (ref 150–450)
PLATELET # BLD AUTO: 56 10*3/MM3 (ref 150–450)
PLATELET # BLD AUTO: 57 10*3/MM3 (ref 150–450)
PLATELET # BLD AUTO: 57 10*3/MM3 (ref 150–450)
PLATELET # BLD AUTO: 58 10*3/MM3 (ref 150–450)
PLATELET # BLD AUTO: 59 10*3/MM3 (ref 150–450)
PLATELET # BLD AUTO: 63 10*3/MM3 (ref 150–450)
PLATELET # BLD AUTO: 64 10*3/MM3 (ref 150–450)
PLATELET # BLD AUTO: 64 10*3/MM3 (ref 150–450)
PLATELET # BLD AUTO: 67 10*3/MM3 (ref 150–450)
PLATELET # BLD AUTO: 67 10*3/MM3 (ref 150–450)
PLATELET # BLD AUTO: 69 10*3/MM3 (ref 150–450)
PLATELET # BLD AUTO: 72 10*3/MM3 (ref 150–450)
PLATELET # BLD AUTO: 75 10*3/MM3 (ref 150–450)
PLATELET # BLD AUTO: 76 10*3/MM3 (ref 150–450)
PLATELET # BLD AUTO: 77 10*3/MM3 (ref 150–450)
PLATELET # BLD AUTO: 79 10*3/MM3 (ref 150–450)
PLATELET # BLD AUTO: 8 10*3/MM3 (ref 150–450)
PLATELET # BLD AUTO: 80 10*3/MM3 (ref 150–450)
PMV BLD AUTO: 10 FL (ref 8–12)
PMV BLD AUTO: 10.1 FL (ref 8–12)
PMV BLD AUTO: 10.1 FL (ref 8–12)
PMV BLD AUTO: 10.2 FL (ref 8–12)
PMV BLD AUTO: 10.3 FL (ref 8–12)
PMV BLD AUTO: 10.3 FL (ref 8–12)
PMV BLD AUTO: 10.4 FL (ref 8–12)
PMV BLD AUTO: 10.5 FL (ref 8–12)
PMV BLD AUTO: 10.6 FL (ref 8–12)
PMV BLD AUTO: 10.7 FL (ref 8–12)
PMV BLD AUTO: 10.7 FL (ref 8–12)
PMV BLD AUTO: 10.8 FL (ref 8–12)
PMV BLD AUTO: 10.8 FL (ref 8–12)
PMV BLD AUTO: 10.9 FL (ref 8–12)
PMV BLD AUTO: 11 FL (ref 8–12)
PMV BLD AUTO: 11.1 FL (ref 8–12)
PMV BLD AUTO: 11.5 FL (ref 8–12)
PMV BLD AUTO: 9.3 FL (ref 8–12)
PMV BLD AUTO: 9.4 FL (ref 8–12)
PMV BLD AUTO: 9.4 FL (ref 8–12)
PMV BLD AUTO: 9.5 FL (ref 8–12)
PMV BLD AUTO: 9.6 FL (ref 8–12)
PMV BLD AUTO: 9.6 FL (ref 8–12)
PMV BLD AUTO: 9.7 FL (ref 8–12)
PMV BLD AUTO: 9.8 FL (ref 8–12)
PMV BLD AUTO: 9.9 FL (ref 8–12)
PMV BLD AUTO: ABNORMAL FL (ref 8–12)
PO2 BLDA: 101.8 MM HG (ref 80–105)
PO2 BLDA: 105.2 MM HG (ref 80–105)
PO2 BLDA: 108.6 MM HG (ref 80–105)
PO2 BLDA: 64.1 MM HG (ref 83–108)
PO2 BLDA: 95.1 MM HG (ref 83–108)
PO2 BLDA: 97.8 MM HG (ref 80–105)
POIKILOCYTOSIS BLD QL SMEAR: NORMAL
POIKILOCYTOSIS BLD QL SMEAR: NORMAL
POLYCHROMASIA BLD QL SMEAR: ABNORMAL
POLYCHROMASIA BLD QL SMEAR: NORMAL
POLYCHROMASIA BLD QL SMEAR: NORMAL
POTASSIUM BLD-SCNC: 2.4 MMOL/L (ref 3.5–5.1)
POTASSIUM BLD-SCNC: 2.8 MMOL/L (ref 3.5–5.1)
POTASSIUM BLD-SCNC: 2.9 MMOL/L (ref 3.5–5.1)
POTASSIUM BLD-SCNC: 3 MMOL/L (ref 3.5–5.1)
POTASSIUM BLD-SCNC: 3.1 MMOL/L (ref 3.5–5.1)
POTASSIUM BLD-SCNC: 3.2 MMOL/L (ref 3.5–5.1)
POTASSIUM BLD-SCNC: 3.3 MMOL/L (ref 3.5–5.1)
POTASSIUM BLD-SCNC: 3.4 MMOL/L (ref 3.5–5.1)
POTASSIUM BLD-SCNC: 3.5 MMOL/L (ref 3.5–5.1)
POTASSIUM BLD-SCNC: 3.6 MMOL/L (ref 3.5–5.1)
POTASSIUM BLD-SCNC: 3.7 MMOL/L (ref 3.5–5.1)
POTASSIUM BLD-SCNC: 3.8 MMOL/L (ref 3.5–5.1)
POTASSIUM BLD-SCNC: 3.9 MMOL/L (ref 3.5–5.1)
POTASSIUM BLD-SCNC: 4 MMOL/L (ref 3.5–5.1)
POTASSIUM BLD-SCNC: 4.1 MMOL/L (ref 3.5–5.1)
POTASSIUM BLD-SCNC: 4.2 MMOL/L (ref 3.5–5.1)
POTASSIUM BLD-SCNC: 4.2 MMOL/L (ref 3.5–5.1)
POTASSIUM BLD-SCNC: 4.3 MMOL/L (ref 3.5–5.1)
POTASSIUM BLD-SCNC: 4.4 MMOL/L (ref 3.5–5.1)
POTASSIUM BLD-SCNC: 4.4 MMOL/L (ref 3.5–5.1)
POTASSIUM BLD-SCNC: 4.5 MMOL/L (ref 3.5–5.1)
POTASSIUM BLD-SCNC: 5 MMOL/L (ref 3.5–5.1)
POTASSIUM BLD-SCNC: 5.1 MMOL/L (ref 3.5–5.1)
POTASSIUM BLD-SCNC: 5.2 MMOL/L (ref 3.5–5.1)
POTASSIUM BLD-SCNC: 5.5 MMOL/L (ref 3.5–5.1)
POTASSIUM BLDA-SCNC: 2.72 MMOL/L (ref 3.6–4.9)
POTASSIUM BLDA-SCNC: 2.77 MMOL/L (ref 3.6–4.9)
POTASSIUM BLDA-SCNC: 3.38 MMOL/L (ref 3.6–4.9)
POTASSIUM BLDA-SCNC: 3.74 MMOL/L (ref 3.6–4.9)
PROT FLD-MCNC: <2 G/DL
PROT SERPL-MCNC: 4.9 G/DL (ref 6.3–8.6)
PROT SERPL-MCNC: 5.1 G/DL (ref 6.3–8.6)
PROT SERPL-MCNC: 5.1 G/DL (ref 6.3–8.6)
PROT SERPL-MCNC: 5.2 G/DL (ref 6.3–8.6)
PROT SERPL-MCNC: 5.3 G/DL (ref 6.3–8.6)
PROT SERPL-MCNC: 5.4 G/DL (ref 6.3–8.6)
PROT SERPL-MCNC: 5.5 G/DL (ref 6.3–8.6)
PROT SERPL-MCNC: 5.6 G/DL (ref 6.3–8.6)
PROT SERPL-MCNC: 5.6 G/DL (ref 6.3–8.6)
PROT SERPL-MCNC: 5.7 G/DL (ref 6.3–8.6)
PROT SERPL-MCNC: 5.7 G/DL (ref 6.3–8.6)
PROT SERPL-MCNC: 5.8 G/DL (ref 6.3–8.6)
PROT SERPL-MCNC: 5.8 G/DL (ref 6.3–8.6)
PROT SERPL-MCNC: 5.9 G/DL (ref 6.3–8.6)
PROT SERPL-MCNC: 6.4 G/DL (ref 6.3–8.6)
PROT SERPL-MCNC: 6.4 G/DL (ref 6.3–8.6)
PROT SERPL-MCNC: 6.5 G/DL (ref 6.3–8.6)
PROT SERPL-MCNC: 6.5 G/DL (ref 6.3–8.6)
PROT SERPL-MCNC: 6.6 G/DL (ref 6.3–8.6)
PROT SERPL-MCNC: 6.6 G/DL (ref 6.3–8.6)
PROT SERPL-MCNC: 7.3 G/DL (ref 6.3–8.6)
PROT UR QL STRIP: ABNORMAL
PROT UR QL STRIP: NEGATIVE
PROTHROMBIN TIME: 16.5 SECONDS (ref 11.1–15.3)
PROTHROMBIN TIME: 19.5 SECONDS (ref 11.1–15.3)
PROTHROMBIN TIME: 19.6 SECONDS (ref 11.1–15.3)
PROTHROMBIN TIME: 19.7 SECONDS (ref 11.1–15.3)
PROTHROMBIN TIME: 20.4 SECONDS (ref 11.1–15.3)
PROTHROMBIN TIME: 20.7 SECONDS (ref 11.1–15.3)
PROTHROMBIN TIME: 21.1 SECONDS (ref 11.1–15.3)
PROTHROMBIN TIME: 21.4 SECONDS (ref 11.1–15.3)
PROTHROMBIN TIME: 21.6 SECONDS (ref 11.1–15.3)
PROTHROMBIN TIME: 21.7 SECONDS (ref 11.1–15.3)
PROTHROMBIN TIME: 22.2 SECONDS (ref 11.1–15.3)
PROTHROMBIN TIME: 22.9 SECONDS (ref 11.1–15.3)
PROTHROMBIN TIME: 22.9 SECONDS (ref 11.1–15.3)
PROTHROMBIN TIME: 23 SECONDS (ref 11.1–15.3)
PROTHROMBIN TIME: 23.7 SECONDS (ref 11.1–15.3)
PROTHROMBIN TIME: 24.5 SECONDS (ref 11.1–15.3)
PROTHROMBIN TIME: 24.6 SECONDS (ref 11.1–15.3)
PROTHROMBIN TIME: 25.5 SECONDS (ref 11.1–15.3)
PROTHROMBIN TIME: 25.8 SECONDS (ref 11.1–15.3)
PROTHROMBIN TIME: 26 SECONDS (ref 11.1–15.3)
PROTHROMBIN TIME: 26 SECONDS (ref 11.1–15.3)
PROTHROMBIN TIME: 26.3 SECONDS (ref 11.1–15.3)
PROTHROMBIN TIME: 26.4 SECONDS (ref 11.1–15.3)
PROTHROMBIN TIME: 27 SECONDS (ref 11.1–15.3)
PROTHROMBIN TIME: 27.1 SECONDS (ref 11.1–15.3)
PROTHROMBIN TIME: 27.2 SECONDS (ref 11.1–15.3)
PROTHROMBIN TIME: 27.2 SECONDS (ref 11.1–15.3)
PROTHROMBIN TIME: 27.8 SECONDS (ref 11.1–15.3)
PROTHROMBIN TIME: 28.8 SECONDS (ref 11.1–15.3)
PROTHROMBIN TIME: 29.5 SECONDS (ref 11.1–15.3)
PROTHROMBIN TIME: 29.5 SECONDS (ref 11.1–15.3)
PROTHROMBIN TIME: 29.7 SECONDS (ref 11.1–15.3)
PROTHROMBIN TIME: 29.7 SECONDS (ref 11.1–15.3)
PROTHROMBIN TIME: 30.4 SECONDS (ref 11.1–15.3)
PROTHROMBIN TIME: 30.7 SECONDS (ref 11.1–15.3)
PROTHROMBIN TIME: 32.3 SECONDS (ref 11.1–15.3)
PROTHROMBIN TIME: 32.5 SECONDS (ref 11.1–15.3)
PROTHROMBIN TIME: ABNORMAL SECONDS (ref 11.1–15.3)
PROTHROMBIN TIME: ABNORMAL SECONDS (ref 11–15)
RBC # BLD AUTO: 1.82 10*6/MM3 (ref 4.37–5.74)
RBC # BLD AUTO: 1.9 10*6/MM3 (ref 4.37–5.74)
RBC # BLD AUTO: 1.91 10*6/MM3 (ref 4.37–5.74)
RBC # BLD AUTO: 1.94 10*6/MM3 (ref 4.37–5.74)
RBC # BLD AUTO: 1.96 10*6/MM3 (ref 4.37–5.74)
RBC # BLD AUTO: 2 10*6/MM3 (ref 4.37–5.74)
RBC # BLD AUTO: 2.04 10*6/MM3 (ref 4.37–5.74)
RBC # BLD AUTO: 2.05 10*6/MM3 (ref 4.37–5.74)
RBC # BLD AUTO: 2.1 10*6/MM3 (ref 4.37–5.74)
RBC # BLD AUTO: 2.11 10*6/MM3 (ref 4.37–5.74)
RBC # BLD AUTO: 2.11 10*6/MM3 (ref 4.37–5.74)
RBC # BLD AUTO: 2.12 10*6/MM3 (ref 4.37–5.74)
RBC # BLD AUTO: 2.13 10*6/MM3 (ref 4.37–5.74)
RBC # BLD AUTO: 2.14 10*6/MM3 (ref 4.37–5.74)
RBC # BLD AUTO: 2.14 10*6/MM3 (ref 4.37–5.74)
RBC # BLD AUTO: 2.17 10*6/MM3 (ref 4.37–5.74)
RBC # BLD AUTO: 2.17 10*6/MM3 (ref 4.37–5.74)
RBC # BLD AUTO: 2.19 10*6/MM3 (ref 4.37–5.74)
RBC # BLD AUTO: 2.2 10*6/MM3 (ref 4.37–5.74)
RBC # BLD AUTO: 2.21 10*6/MM3 (ref 4.37–5.74)
RBC # BLD AUTO: 2.21 10*6/MM3 (ref 4.37–5.74)
RBC # BLD AUTO: 2.22 10*6/MM3 (ref 4.37–5.74)
RBC # BLD AUTO: 2.24 10*6/MM3 (ref 4.37–5.74)
RBC # BLD AUTO: 2.25 10*6/MM3 (ref 4.37–5.74)
RBC # BLD AUTO: 2.28 10*6/MM3 (ref 4.37–5.74)
RBC # BLD AUTO: 2.3 10*6/MM3 (ref 4.37–5.74)
RBC # BLD AUTO: 2.3 10*6/MM3 (ref 4.37–5.74)
RBC # BLD AUTO: 2.35 10*6/MM3 (ref 4.37–5.74)
RBC # BLD AUTO: 2.37 10*6/MM3 (ref 4.37–5.74)
RBC # BLD AUTO: 2.37 10*6/MM3 (ref 4.37–5.74)
RBC # BLD AUTO: 2.38 10*6/MM3 (ref 4.37–5.74)
RBC # BLD AUTO: 2.38 10*6/MM3 (ref 4.37–5.74)
RBC # BLD AUTO: 2.39 10*6/MM3 (ref 4.37–5.74)
RBC # BLD AUTO: 2.41 10*6/MM3 (ref 4.37–5.74)
RBC # BLD AUTO: 2.42 10*6/MM3 (ref 4.37–5.74)
RBC # BLD AUTO: 2.44 10*6/MM3 (ref 4.37–5.74)
RBC # BLD AUTO: 2.44 10*6/MM3 (ref 4.37–5.74)
RBC # BLD AUTO: 2.47 10*6/MM3 (ref 4.37–5.74)
RBC # BLD AUTO: 2.48 10*6/MM3 (ref 4.37–5.74)
RBC # BLD AUTO: 2.53 10*6/MM3 (ref 4.37–5.74)
RBC # BLD AUTO: 2.55 10*6/MM3 (ref 4.37–5.74)
RBC # BLD AUTO: 2.56 10*6/MM3 (ref 4.37–5.74)
RBC # BLD AUTO: 2.59 10*6/MM3 (ref 4.37–5.74)
RBC # BLD AUTO: 2.64 10*6/MM3 (ref 4.37–5.74)
RBC # BLD AUTO: 2.66 10*6/MM3 (ref 4.37–5.74)
RBC # BLD AUTO: 2.69 10*6/MM3 (ref 4.37–5.74)
RBC # BLD AUTO: 2.75 10*6/MM3 (ref 4.37–5.74)
RBC # BLD AUTO: 2.92 10*6/MM3 (ref 4.37–5.74)
RBC # BLD AUTO: 2.99 10*6/MM3 (ref 4.37–5.74)
RBC # BLD AUTO: 3.01 10*6/MM3 (ref 4.37–5.74)
RBC # BLD AUTO: 3.26 10*6/MM3 (ref 4.37–5.74)
RBC # BLD AUTO: 3.35 10*6/MM3 (ref 4.37–5.74)
RBC # BLD AUTO: 3.41 10*6/MM3 (ref 4.37–5.74)
RBC # BLD AUTO: 3.51 10*6/MM3 (ref 4.37–5.74)
RBC # FLD AUTO: 6000 /MM3 (ref 0–0)
RBC # FLD AUTO: 719 /MM3 (ref 0–0)
RBC # UR: ABNORMAL /HPF
RBC MORPH BLD: NORMAL
REF LAB TEST METHOD: ABNORMAL
RETICS #: 0.11 10*6/MM3 (ref 0.03–0.12)
RETICS/RBC NFR AUTO: 5.92 % (ref 0.64–2.26)
RETICULOCYTE PRODUCTION INDEX: 1.14 % (ref 0.64–2.26)
RH BLD: NEGATIVE
RV RNA STL NAA+PROBE: NOT DETECTED
SALMONELLA DNA SPEC QL NAA+PROBE: NOT DETECTED
SAO2 % BLDCOA: 78.5 % (ref 94–99)
SAO2 % BLDCOA: 97.1 %
SAO2 % BLDCOA: 97.2 %
SAO2 % BLDCOA: 97.6 %
SAO2 % BLDCOA: 97.7 %
SAO2 % BLDCOA: 98.5 % (ref 94–99)
SAPO I+II+IV+V RNA STL QL NAA+NON-PROBE: NOT DETECTED
SET MECH RESP RATE: 14
SHIGELLA SP+EIEC IPAH STL QL NAA+PROBE: NOT DETECTED
SMALL PLATELETS BLD QL SMEAR: ABNORMAL
SMALL PLATELETS BLD QL SMEAR: NORMAL
SODIUM BLD-SCNC: 129 MMOL/L (ref 137–145)
SODIUM BLD-SCNC: 130 MMOL/L (ref 137–145)
SODIUM BLD-SCNC: 130 MMOL/L (ref 137–145)
SODIUM BLD-SCNC: 131 MMOL/L (ref 137–145)
SODIUM BLD-SCNC: 132 MMOL/L (ref 137–145)
SODIUM BLD-SCNC: 133 MMOL/L (ref 137–145)
SODIUM BLD-SCNC: 134 MMOL/L (ref 137–145)
SODIUM BLD-SCNC: 135 MMOL/L (ref 137–145)
SODIUM BLD-SCNC: 136 MMOL/L (ref 137–145)
SODIUM BLD-SCNC: 137 MMOL/L (ref 137–145)
SODIUM BLD-SCNC: 138 MMOL/L (ref 137–145)
SODIUM BLD-SCNC: 139 MMOL/L (ref 137–145)
SODIUM BLD-SCNC: 140 MMOL/L (ref 137–145)
SODIUM BLD-SCNC: 142 MMOL/L (ref 137–145)
SODIUM BLD-SCNC: 143 MMOL/L (ref 137–145)
SODIUM BLD-SCNC: 146 MMOL/L (ref 137–145)
SODIUM BLD-SCNC: 150 MMOL/L (ref 137–145)
SODIUM BLD-SCNC: 151 MMOL/L (ref 137–145)
SODIUM BLDA-SCNC: 137.5 MMOL/L (ref 138–146)
SODIUM BLDA-SCNC: 137.5 MMOL/L (ref 138–146)
SODIUM BLDA-SCNC: 141.4 MMOL/L (ref 138–146)
SODIUM BLDA-SCNC: 143.4 MMOL/L (ref 138–146)
SP GR UR STRIP: 1.01 (ref 1–1.03)
SP GR UR STRIP: 1.02 (ref 1–1.03)
SPECIMEN VOL FLD: 2000 ML
SPECIMEN VOL FLD: 40 ML
SPHEROCYTES BLD QL SMEAR: ABNORMAL
SQUAMOUS #/AREA URNS HPF: ABNORMAL /HPF
STAT OF ADQ CVX/VAG CYTO-IMP: NORMAL
STRESS TARGET HR: 141 BPM
T&S EXPIRATION DATE: NORMAL
TARGETS BLD QL SMEAR: NORMAL
TIBC SERPL-MCNC: 119 MCG/DL (ref 261–462)
TIBC SERPL-MCNC: 121 MCG/DL (ref 261–462)
TIBC SERPL-MCNC: 136 MCG/DL (ref 261–462)
TIBC SERPL-MCNC: 138 MCG/DL (ref 261–462)
TRANS CELLS #/AREA URNS HPF: ABNORMAL /HPF
TRANSFUSION REACTION INTERPRETATION 1: NORMAL
TRIGL SERPL-MCNC: 78 MG/DL (ref 20–199)
TROPONIN I SERPL-MCNC: 0.02 NG/ML
TROPONIN I SERPL-MCNC: <0.012 NG/ML
UNIT  ABO: NORMAL
UNIT  RH: NORMAL
UROBILINOGEN UR QL STRIP: ABNORMAL
UUN 24H UR-MCNC: 130 MG/DL
UUN 24H UR-MCNC: 290 MG/DL
V CHOLERAE DNA SPEC QL NAA+PROBE: NOT DETECTED
VANCOMYCIN PEAK SERPL-MCNC: 37.73 MCG/ML (ref 30–40)
VANCOMYCIN PEAK SERPL-MCNC: 48.4 MCG/ML (ref 30–40)
VANCOMYCIN SERPL-MCNC: 18.86 MCG/ML
VANCOMYCIN TROUGH SERPL-MCNC: 27.1 MCG/ML (ref 10–15)
VANCOMYCIN TROUGH SERPL-MCNC: 27.17 MCG/ML (ref 10–15)
VENTILATOR MODE: ABNORMAL
VENTILATOR MODE: AC
VIBRIO DNA SPEC NAA+PROBE: NOT DETECTED
VIT B12 BLD-MCNC: 921 PG/ML (ref 239–931)
VIT B12 BLD-MCNC: 930 PG/ML (ref 239–931)
VIT B12 BLD-MCNC: >1000 PG/ML (ref 239–931)
VLDLC SERPL-MCNC: 15.6 MG/DL
VT ON VENT VENT: 600 ML
WBC # FLD: 325.5 /MM3 (ref 0–5)
WBC # FLD: 405 /MM3 (ref 0–5)
WBC MORPH BLD: NORMAL
WBC NRBC COR # BLD: 10.08 10*3/MM3 (ref 3.2–9.8)
WBC NRBC COR # BLD: 10.67 10*3/MM3 (ref 3.2–9.8)
WBC NRBC COR # BLD: 11.16 10*3/MM3 (ref 3.2–9.8)
WBC NRBC COR # BLD: 11.64 10*3/MM3 (ref 3.2–9.8)
WBC NRBC COR # BLD: 12.38 10*3/MM3 (ref 3.2–9.8)
WBC NRBC COR # BLD: 12.52 10*3/MM3 (ref 3.2–9.8)
WBC NRBC COR # BLD: 13.08 10*3/MM3 (ref 3.2–9.8)
WBC NRBC COR # BLD: 13.76 10*3/MM3 (ref 3.2–9.8)
WBC NRBC COR # BLD: 14.01 10*3/MM3 (ref 3.2–9.8)
WBC NRBC COR # BLD: 14.22 10*3/MM3 (ref 3.2–9.8)
WBC NRBC COR # BLD: 14.65 10*3/MM3 (ref 3.2–9.8)
WBC NRBC COR # BLD: 4.39 10*3/MM3 (ref 3.2–9.8)
WBC NRBC COR # BLD: 4.47 10*3/MM3 (ref 3.2–9.8)
WBC NRBC COR # BLD: 4.79 10*3/MM3 (ref 3.2–9.8)
WBC NRBC COR # BLD: 4.92 10*3/MM3 (ref 3.2–9.8)
WBC NRBC COR # BLD: 5 10*3/MM3 (ref 3.2–9.8)
WBC NRBC COR # BLD: 5.06 10*3/MM3 (ref 3.2–9.8)
WBC NRBC COR # BLD: 5.12 10*3/MM3 (ref 3.2–9.8)
WBC NRBC COR # BLD: 5.13 10*3/MM3 (ref 3.2–9.8)
WBC NRBC COR # BLD: 5.17 10*3/MM3 (ref 3.2–9.8)
WBC NRBC COR # BLD: 5.31 10*3/MM3 (ref 3.2–9.8)
WBC NRBC COR # BLD: 5.44 10*3/MM3 (ref 3.2–9.8)
WBC NRBC COR # BLD: 5.51 10*3/MM3 (ref 3.2–9.8)
WBC NRBC COR # BLD: 5.73 10*3/MM3 (ref 3.2–9.8)
WBC NRBC COR # BLD: 5.87 10*3/MM3 (ref 3.2–9.8)
WBC NRBC COR # BLD: 6.12 10*3/MM3 (ref 3.2–9.8)
WBC NRBC COR # BLD: 6.19 10*3/MM3 (ref 3.2–9.8)
WBC NRBC COR # BLD: 6.51 10*3/MM3 (ref 3.2–9.8)
WBC NRBC COR # BLD: 6.52 10*3/MM3 (ref 3.2–9.8)
WBC NRBC COR # BLD: 6.58 10*3/MM3 (ref 3.2–9.8)
WBC NRBC COR # BLD: 6.62 10*3/MM3 (ref 3.2–9.8)
WBC NRBC COR # BLD: 6.83 10*3/MM3 (ref 3.2–9.8)
WBC NRBC COR # BLD: 6.94 10*3/MM3 (ref 3.2–9.8)
WBC NRBC COR # BLD: 7.26 10*3/MM3 (ref 3.2–9.8)
WBC NRBC COR # BLD: 7.33 10*3/MM3 (ref 3.2–9.8)
WBC NRBC COR # BLD: 7.37 10*3/MM3 (ref 3.2–9.8)
WBC NRBC COR # BLD: 7.46 10*3/MM3 (ref 3.2–9.8)
WBC NRBC COR # BLD: 7.51 10*3/MM3 (ref 3.2–9.8)
WBC NRBC COR # BLD: 7.53 10*3/MM3 (ref 3.2–9.8)
WBC NRBC COR # BLD: 7.71 10*3/MM3 (ref 3.2–9.8)
WBC NRBC COR # BLD: 7.74 10*3/MM3 (ref 3.2–9.8)
WBC NRBC COR # BLD: 7.78 10*3/MM3 (ref 3.2–9.8)
WBC NRBC COR # BLD: 8 10*3/MM3 (ref 3.2–9.8)
WBC NRBC COR # BLD: 8.03 10*3/MM3 (ref 3.2–9.8)
WBC NRBC COR # BLD: 8.09 10*3/MM3 (ref 3.2–9.8)
WBC NRBC COR # BLD: 8.15 10*3/MM3 (ref 3.2–9.8)
WBC NRBC COR # BLD: 8.19 10*3/MM3 (ref 3.2–9.8)
WBC NRBC COR # BLD: 8.21 10*3/MM3 (ref 3.2–9.8)
WBC NRBC COR # BLD: 8.29 10*3/MM3 (ref 3.2–9.8)
WBC NRBC COR # BLD: 8.36 10*3/MM3 (ref 3.2–9.8)
WBC NRBC COR # BLD: 8.4 10*3/MM3 (ref 3.2–9.8)
WBC NRBC COR # BLD: 8.46 10*3/MM3 (ref 3.2–9.8)
WBC NRBC COR # BLD: 8.53 10*3/MM3 (ref 3.2–9.8)
WBC NRBC COR # BLD: 8.61 10*3/MM3 (ref 3.2–9.8)
WBC NRBC COR # BLD: 8.83 10*3/MM3 (ref 3.2–9.8)
WBC NRBC COR # BLD: 8.84 10*3/MM3 (ref 3.2–9.8)
WBC NRBC COR # BLD: 8.84 10*3/MM3 (ref 3.2–9.8)
WBC NRBC COR # BLD: 8.95 10*3/MM3 (ref 3.2–9.8)
WBC NRBC COR # BLD: 9.11 10*3/MM3 (ref 3.2–9.8)
WBC NRBC COR # BLD: 9.4 10*3/MM3 (ref 3.2–9.8)
WBC UR QL AUTO: ABNORMAL /HPF
WHOLE BLOOD HOLD SPECIMEN: NORMAL
YEAST URNS QL MICRO: ABNORMAL /HPF
YERSINIA STL CULT: NOT DETECTED

## 2018-01-01 PROCEDURE — 85025 COMPLETE CBC W/AUTO DIFF WBC: CPT | Performed by: FAMILY MEDICINE

## 2018-01-01 PROCEDURE — 85027 COMPLETE CBC AUTOMATED: CPT | Performed by: FAMILY MEDICINE

## 2018-01-01 PROCEDURE — 83735 ASSAY OF MAGNESIUM: CPT | Performed by: FAMILY MEDICINE

## 2018-01-01 PROCEDURE — 87116 MYCOBACTERIA CULTURE: CPT | Performed by: UROLOGY

## 2018-01-01 PROCEDURE — 97535 SELF CARE MNGMENT TRAINING: CPT

## 2018-01-01 PROCEDURE — 25010000002 HYDROMORPHONE PER 4 MG: Performed by: INTERNAL MEDICINE

## 2018-01-01 PROCEDURE — 82140 ASSAY OF AMMONIA: CPT | Performed by: INTERNAL MEDICINE

## 2018-01-01 PROCEDURE — 76705 ECHO EXAM OF ABDOMEN: CPT

## 2018-01-01 PROCEDURE — 85610 PROTHROMBIN TIME: CPT | Performed by: EMERGENCY MEDICINE

## 2018-01-01 PROCEDURE — 25010000002 CEFTRIAXONE PER 250 MG: Performed by: STUDENT IN AN ORGANIZED HEALTH CARE EDUCATION/TRAINING PROGRAM

## 2018-01-01 PROCEDURE — 72170 X-RAY EXAM OF PELVIS: CPT

## 2018-01-01 PROCEDURE — 97110 THERAPEUTIC EXERCISES: CPT

## 2018-01-01 PROCEDURE — P9041 ALBUMIN (HUMAN),5%, 50ML: HCPCS | Performed by: PHYSICIAN ASSISTANT

## 2018-01-01 PROCEDURE — G8987 SELF CARE CURRENT STATUS: HCPCS

## 2018-01-01 PROCEDURE — 82962 GLUCOSE BLOOD TEST: CPT

## 2018-01-01 PROCEDURE — 74177 CT ABD & PELVIS W/CONTRAST: CPT

## 2018-01-01 PROCEDURE — 94760 N-INVAS EAR/PLS OXIMETRY 1: CPT

## 2018-01-01 PROCEDURE — 80053 COMPREHEN METABOLIC PANEL: CPT | Performed by: INTERNAL MEDICINE

## 2018-01-01 PROCEDURE — 86850 RBC ANTIBODY SCREEN: CPT | Performed by: INTERNAL MEDICINE

## 2018-01-01 PROCEDURE — 87804 INFLUENZA ASSAY W/OPTIC: CPT | Performed by: EMERGENCY MEDICINE

## 2018-01-01 PROCEDURE — 85027 COMPLETE CBC AUTOMATED: CPT | Performed by: INTERNAL MEDICINE

## 2018-01-01 PROCEDURE — P9046 ALBUMIN (HUMAN), 25%, 20 ML: HCPCS | Performed by: STUDENT IN AN ORGANIZED HEALTH CARE EDUCATION/TRAINING PROGRAM

## 2018-01-01 PROCEDURE — 80053 COMPREHEN METABOLIC PANEL: CPT

## 2018-01-01 PROCEDURE — 25010000002 VANCOMYCIN 1 G RECONSTITUTED SOLUTION: Performed by: NURSE PRACTITIONER

## 2018-01-01 PROCEDURE — 85610 PROTHROMBIN TIME: CPT | Performed by: INTERNAL MEDICINE

## 2018-01-01 PROCEDURE — 82140 ASSAY OF AMMONIA: CPT | Performed by: EMERGENCY MEDICINE

## 2018-01-01 PROCEDURE — 80307 DRUG TEST PRSMV CHEM ANLYZR: CPT | Performed by: EMERGENCY MEDICINE

## 2018-01-01 PROCEDURE — 80048 BASIC METABOLIC PNL TOTAL CA: CPT | Performed by: FAMILY MEDICINE

## 2018-01-01 PROCEDURE — 25010000002 FUROSEMIDE PER 20 MG: Performed by: INTERNAL MEDICINE

## 2018-01-01 PROCEDURE — 94640 AIRWAY INHALATION TREATMENT: CPT

## 2018-01-01 PROCEDURE — 25010000002 FUROSEMIDE PER 20 MG: Performed by: FAMILY MEDICINE

## 2018-01-01 PROCEDURE — 85046 RETICYTE/HGB CONCENTRATE: CPT | Performed by: INTERNAL MEDICINE

## 2018-01-01 PROCEDURE — B548ZZA ULTRASONOGRAPHY OF SUPERIOR VENA CAVA, GUIDANCE: ICD-10-PCS | Performed by: RADIOLOGY

## 2018-01-01 PROCEDURE — 97530 THERAPEUTIC ACTIVITIES: CPT

## 2018-01-01 PROCEDURE — 82570 ASSAY OF URINE CREATININE: CPT | Performed by: INTERNAL MEDICINE

## 2018-01-01 PROCEDURE — 87015 SPECIMEN INFECT AGNT CONCNTJ: CPT | Performed by: HOSPITALIST

## 2018-01-01 PROCEDURE — 82728 ASSAY OF FERRITIN: CPT | Performed by: INTERNAL MEDICINE

## 2018-01-01 PROCEDURE — 86900 BLOOD TYPING SEROLOGIC ABO: CPT

## 2018-01-01 PROCEDURE — 99232 SBSQ HOSP IP/OBS MODERATE 35: CPT | Performed by: STUDENT IN AN ORGANIZED HEALTH CARE EDUCATION/TRAINING PROGRAM

## 2018-01-01 PROCEDURE — 25010000002 MORPHINE PER 10 MG: Performed by: STUDENT IN AN ORGANIZED HEALTH CARE EDUCATION/TRAINING PROGRAM

## 2018-01-01 PROCEDURE — 25010000002 ALBUMIN HUMAN 25% PER 50 ML: Performed by: FAMILY MEDICINE

## 2018-01-01 PROCEDURE — 88305 TISSUE EXAM BY PATHOLOGIST: CPT | Performed by: PATHOLOGY

## 2018-01-01 PROCEDURE — 25010000002 CEFTRIAXONE PER 250 MG: Performed by: PHYSICIAN ASSISTANT

## 2018-01-01 PROCEDURE — 73030 X-RAY EXAM OF SHOULDER: CPT

## 2018-01-01 PROCEDURE — 86850 RBC ANTIBODY SCREEN: CPT | Performed by: STUDENT IN AN ORGANIZED HEALTH CARE EDUCATION/TRAINING PROGRAM

## 2018-01-01 PROCEDURE — 85007 BL SMEAR W/DIFF WBC COUNT: CPT | Performed by: FAMILY MEDICINE

## 2018-01-01 PROCEDURE — 82140 ASSAY OF AMMONIA: CPT | Performed by: FAMILY MEDICINE

## 2018-01-01 PROCEDURE — 25010000002 CEFTRIAXONE PER 250 MG: Performed by: INTERNAL MEDICINE

## 2018-01-01 PROCEDURE — 87086 URINE CULTURE/COLONY COUNT: CPT | Performed by: FAMILY MEDICINE

## 2018-01-01 PROCEDURE — 97162 PT EVAL MOD COMPLEX 30 MIN: CPT | Performed by: PHYSICAL THERAPIST

## 2018-01-01 PROCEDURE — 94799 UNLISTED PULMONARY SVC/PX: CPT

## 2018-01-01 PROCEDURE — 25010000002 METHYLPREDNISOLONE PER 125 MG: Performed by: NURSE PRACTITIONER

## 2018-01-01 PROCEDURE — 82803 BLOOD GASES ANY COMBINATION: CPT | Performed by: INTERNAL MEDICINE

## 2018-01-01 PROCEDURE — 25010000002 FUROSEMIDE PER 20 MG: Performed by: HOSPITALIST

## 2018-01-01 PROCEDURE — 25010000002 FLUCONAZOLE PER 200 MG: Performed by: HOSPITALIST

## 2018-01-01 PROCEDURE — P9047 ALBUMIN (HUMAN), 25%, 50ML: HCPCS | Performed by: FAMILY MEDICINE

## 2018-01-01 PROCEDURE — 99284 EMERGENCY DEPT VISIT MOD MDM: CPT

## 2018-01-01 PROCEDURE — 94002 VENT MGMT INPAT INIT DAY: CPT

## 2018-01-01 PROCEDURE — 88112 CYTOPATH CELL ENHANCE TECH: CPT | Performed by: INTERNAL MEDICINE

## 2018-01-01 PROCEDURE — 87086 URINE CULTURE/COLONY COUNT: CPT | Performed by: INTERNAL MEDICINE

## 2018-01-01 PROCEDURE — 93010 ELECTROCARDIOGRAM REPORT: CPT | Performed by: INTERNAL MEDICINE

## 2018-01-01 PROCEDURE — 25010000002 ONDANSETRON PER 1 MG: Performed by: FAMILY MEDICINE

## 2018-01-01 PROCEDURE — 85610 PROTHROMBIN TIME: CPT | Performed by: PHYSICIAN ASSISTANT

## 2018-01-01 PROCEDURE — G8988 SELF CARE GOAL STATUS: HCPCS

## 2018-01-01 PROCEDURE — 81001 URINALYSIS AUTO W/SCOPE: CPT | Performed by: FAMILY MEDICINE

## 2018-01-01 PROCEDURE — 25010000002 ALBUMIN HUMAN 5% PER 50 ML: Performed by: PHYSICIAN ASSISTANT

## 2018-01-01 PROCEDURE — 25010000002 AZITHROMYCIN PER 500 MG: Performed by: STUDENT IN AN ORGANIZED HEALTH CARE EDUCATION/TRAINING PROGRAM

## 2018-01-01 PROCEDURE — 87186 SC STD MICRODIL/AGAR DIL: CPT | Performed by: INTERNAL MEDICINE

## 2018-01-01 PROCEDURE — 85014 HEMATOCRIT: CPT | Performed by: INTERNAL MEDICINE

## 2018-01-01 PROCEDURE — 87077 CULTURE AEROBIC IDENTIFY: CPT | Performed by: EMERGENCY MEDICINE

## 2018-01-01 PROCEDURE — 87205 SMEAR GRAM STAIN: CPT | Performed by: INTERNAL MEDICINE

## 2018-01-01 PROCEDURE — 36430 TRANSFUSION BLD/BLD COMPNT: CPT

## 2018-01-01 PROCEDURE — 25010000002 ALBUMIN HUMAN 25% PER 50 ML: Performed by: STUDENT IN AN ORGANIZED HEALTH CARE EDUCATION/TRAINING PROGRAM

## 2018-01-01 PROCEDURE — 86900 BLOOD TYPING SEROLOGIC ABO: CPT | Performed by: PHYSICIAN ASSISTANT

## 2018-01-01 PROCEDURE — 87015 SPECIMEN INFECT AGNT CONCNTJ: CPT | Performed by: UROLOGY

## 2018-01-01 PROCEDURE — 80053 COMPREHEN METABOLIC PANEL: CPT | Performed by: HOSPITALIST

## 2018-01-01 PROCEDURE — 87070 CULTURE OTHR SPECIMN AEROBIC: CPT | Performed by: INTERNAL MEDICINE

## 2018-01-01 PROCEDURE — 25010000002 IOPAMIDOL 61 % SOLUTION: Performed by: FAMILY MEDICINE

## 2018-01-01 PROCEDURE — 76870 US EXAM SCROTUM: CPT

## 2018-01-01 PROCEDURE — 80053 COMPREHEN METABOLIC PANEL: CPT | Performed by: FAMILY MEDICINE

## 2018-01-01 PROCEDURE — 82607 VITAMIN B-12: CPT | Performed by: PHYSICIAN ASSISTANT

## 2018-01-01 PROCEDURE — 25010000002 PROPOFOL 1000 MG/ML EMULSION: Performed by: INTERNAL MEDICINE

## 2018-01-01 PROCEDURE — 87205 SMEAR GRAM STAIN: CPT | Performed by: HOSPITALIST

## 2018-01-01 PROCEDURE — 25010000002 ALBUMIN HUMAN 25% PER 50 ML: Performed by: INTERNAL MEDICINE

## 2018-01-01 PROCEDURE — 25010000002 HYDROMORPHONE 1 MG/ML SOLUTION: Performed by: INTERNAL MEDICINE

## 2018-01-01 PROCEDURE — 87077 CULTURE AEROBIC IDENTIFY: CPT | Performed by: INTERNAL MEDICINE

## 2018-01-01 PROCEDURE — 99214 OFFICE O/P EST MOD 30 MIN: CPT | Performed by: FAMILY MEDICINE

## 2018-01-01 PROCEDURE — 83605 ASSAY OF LACTIC ACID: CPT | Performed by: EMERGENCY MEDICINE

## 2018-01-01 PROCEDURE — 90732 PPSV23 VACC 2 YRS+ SUBQ/IM: CPT | Performed by: FAMILY MEDICINE

## 2018-01-01 PROCEDURE — 86923 COMPATIBILITY TEST ELECTRIC: CPT

## 2018-01-01 PROCEDURE — 85007 BL SMEAR W/DIFF WBC COUNT: CPT | Performed by: EMERGENCY MEDICINE

## 2018-01-01 PROCEDURE — 25010000002 PROMETHAZINE PER 50 MG: Performed by: INTERNAL MEDICINE

## 2018-01-01 PROCEDURE — 25010000002 MORPHINE PER 10 MG: Performed by: HOSPITALIST

## 2018-01-01 PROCEDURE — 74176 CT ABD & PELVIS W/O CONTRAST: CPT

## 2018-01-01 PROCEDURE — P9016 RBC LEUKOCYTES REDUCED: HCPCS

## 2018-01-01 PROCEDURE — 82105 ALPHA-FETOPROTEIN SERUM: CPT | Performed by: STUDENT IN AN ORGANIZED HEALTH CARE EDUCATION/TRAINING PROGRAM

## 2018-01-01 PROCEDURE — 25010000002 VANCOMYCIN 5 G RECONSTITUTED SOLUTION: Performed by: NURSE PRACTITIONER

## 2018-01-01 PROCEDURE — 99232 SBSQ HOSP IP/OBS MODERATE 35: CPT | Performed by: FAMILY MEDICINE

## 2018-01-01 PROCEDURE — 70450 CT HEAD/BRAIN W/O DYE: CPT

## 2018-01-01 PROCEDURE — P9019 PLATELETS, EACH UNIT: HCPCS

## 2018-01-01 PROCEDURE — 25010000002 MORPHINE PER 10 MG: Performed by: FAMILY MEDICINE

## 2018-01-01 PROCEDURE — 93005 ELECTROCARDIOGRAM TRACING: CPT

## 2018-01-01 PROCEDURE — 92526 ORAL FUNCTION THERAPY: CPT | Performed by: SPEECH-LANGUAGE PATHOLOGIST

## 2018-01-01 PROCEDURE — 83735 ASSAY OF MAGNESIUM: CPT | Performed by: HOSPITALIST

## 2018-01-01 PROCEDURE — 84157 ASSAY OF PROTEIN OTHER: CPT | Performed by: STUDENT IN AN ORGANIZED HEALTH CARE EDUCATION/TRAINING PROGRAM

## 2018-01-01 PROCEDURE — 84132 ASSAY OF SERUM POTASSIUM: CPT | Performed by: FAMILY MEDICINE

## 2018-01-01 PROCEDURE — 99285 EMERGENCY DEPT VISIT HI MDM: CPT

## 2018-01-01 PROCEDURE — 25010000002 FUROSEMIDE PER 20 MG: Performed by: PHYSICIAN ASSISTANT

## 2018-01-01 PROCEDURE — 87040 BLOOD CULTURE FOR BACTERIA: CPT | Performed by: FAMILY MEDICINE

## 2018-01-01 PROCEDURE — 82746 ASSAY OF FOLIC ACID SERUM: CPT | Performed by: PHYSICIAN ASSISTANT

## 2018-01-01 PROCEDURE — P9017 PLASMA 1 DONOR FRZ W/IN 8 HR: HCPCS

## 2018-01-01 PROCEDURE — 71045 X-RAY EXAM CHEST 1 VIEW: CPT

## 2018-01-01 PROCEDURE — 87086 URINE CULTURE/COLONY COUNT: CPT | Performed by: NURSE PRACTITIONER

## 2018-01-01 PROCEDURE — 86850 RBC ANTIBODY SCREEN: CPT | Performed by: HOSPITALIST

## 2018-01-01 PROCEDURE — 82945 GLUCOSE OTHER FLUID: CPT | Performed by: STUDENT IN AN ORGANIZED HEALTH CARE EDUCATION/TRAINING PROGRAM

## 2018-01-01 PROCEDURE — 85730 THROMBOPLASTIN TIME PARTIAL: CPT | Performed by: EMERGENCY MEDICINE

## 2018-01-01 PROCEDURE — 25010000002 PROPOFOL 10 MG/ML EMULSION: Performed by: ANESTHESIOLOGY

## 2018-01-01 PROCEDURE — 83615 LACTATE (LD) (LDH) ENZYME: CPT | Performed by: STUDENT IN AN ORGANIZED HEALTH CARE EDUCATION/TRAINING PROGRAM

## 2018-01-01 PROCEDURE — 87086 URINE CULTURE/COLONY COUNT: CPT | Performed by: STUDENT IN AN ORGANIZED HEALTH CARE EDUCATION/TRAINING PROGRAM

## 2018-01-01 PROCEDURE — 85384 FIBRINOGEN ACTIVITY: CPT | Performed by: INTERNAL MEDICINE

## 2018-01-01 PROCEDURE — 25010000002 PIPERACILLIN SOD-TAZOBACTAM PER 1 G: Performed by: EMERGENCY MEDICINE

## 2018-01-01 PROCEDURE — 87075 CULTR BACTERIA EXCEPT BLOOD: CPT | Performed by: UROLOGY

## 2018-01-01 PROCEDURE — 0W9G3ZZ DRAINAGE OF PERITONEAL CAVITY, PERCUTANEOUS APPROACH: ICD-10-PCS | Performed by: FAMILY MEDICINE

## 2018-01-01 PROCEDURE — 25010000002 HYDROMORPHONE PER 4 MG: Performed by: HOSPITALIST

## 2018-01-01 PROCEDURE — 71046 X-RAY EXAM CHEST 2 VIEWS: CPT

## 2018-01-01 PROCEDURE — 25010000002 MORPHINE PER 10 MG: Performed by: INTERNAL MEDICINE

## 2018-01-01 PROCEDURE — 93005 ELECTROCARDIOGRAM TRACING: CPT | Performed by: PHYSICIAN ASSISTANT

## 2018-01-01 PROCEDURE — 36600 WITHDRAWAL OF ARTERIAL BLOOD: CPT

## 2018-01-01 PROCEDURE — 84132 ASSAY OF SERUM POTASSIUM: CPT | Performed by: INTERNAL MEDICINE

## 2018-01-01 PROCEDURE — 99232 SBSQ HOSP IP/OBS MODERATE 35: CPT | Performed by: INTERNAL MEDICINE

## 2018-01-01 PROCEDURE — 86850 RBC ANTIBODY SCREEN: CPT | Performed by: FAMILY MEDICINE

## 2018-01-01 PROCEDURE — 86900 BLOOD TYPING SEROLOGIC ABO: CPT | Performed by: FAMILY MEDICINE

## 2018-01-01 PROCEDURE — 82570 ASSAY OF URINE CREATININE: CPT | Performed by: NURSE PRACTITIONER

## 2018-01-01 PROCEDURE — 25010000002 CEFEPIME PER 500 MG: Performed by: FAMILY MEDICINE

## 2018-01-01 PROCEDURE — 36415 COLL VENOUS BLD VENIPUNCTURE: CPT | Performed by: EMERGENCY MEDICINE

## 2018-01-01 PROCEDURE — 93931 UPPER EXTREMITY STUDY: CPT

## 2018-01-01 PROCEDURE — 72131 CT LUMBAR SPINE W/O DYE: CPT

## 2018-01-01 PROCEDURE — 93005 ELECTROCARDIOGRAM TRACING: CPT | Performed by: EMERGENCY MEDICINE

## 2018-01-01 PROCEDURE — 80048 BASIC METABOLIC PNL TOTAL CA: CPT | Performed by: INTERNAL MEDICINE

## 2018-01-01 PROCEDURE — 25010000002 CEFTRIAXONE PER 250 MG: Performed by: FAMILY MEDICINE

## 2018-01-01 PROCEDURE — 25010000002 VANCOMYCIN 5 G RECONSTITUTED SOLUTION: Performed by: INTERNAL MEDICINE

## 2018-01-01 PROCEDURE — P9021 RED BLOOD CELLS UNIT: HCPCS

## 2018-01-01 PROCEDURE — 88112 CYTOPATH CELL ENHANCE TECH: CPT | Performed by: PATHOLOGY

## 2018-01-01 PROCEDURE — P9047 ALBUMIN (HUMAN), 25%, 50ML: HCPCS | Performed by: STUDENT IN AN ORGANIZED HEALTH CARE EDUCATION/TRAINING PROGRAM

## 2018-01-01 PROCEDURE — 25010000002 ALBUMIN HUMAN 25% PER 50 ML: Performed by: NURSE PRACTITIONER

## 2018-01-01 PROCEDURE — 25010000002 METHYLPREDNISOLONE PER 125 MG: Performed by: FAMILY MEDICINE

## 2018-01-01 PROCEDURE — 25010000002 SUCCINYLCHOLINE PER 20 MG: Performed by: FAMILY MEDICINE

## 2018-01-01 PROCEDURE — 87102 FUNGUS ISOLATION CULTURE: CPT | Performed by: UROLOGY

## 2018-01-01 PROCEDURE — 80053 COMPREHEN METABOLIC PANEL: CPT | Performed by: EMERGENCY MEDICINE

## 2018-01-01 PROCEDURE — 86901 BLOOD TYPING SEROLOGIC RH(D): CPT | Performed by: FAMILY MEDICINE

## 2018-01-01 PROCEDURE — 86900 BLOOD TYPING SEROLOGIC ABO: CPT | Performed by: INTERNAL MEDICINE

## 2018-01-01 PROCEDURE — 76937 US GUIDE VASCULAR ACCESS: CPT

## 2018-01-01 PROCEDURE — 82746 ASSAY OF FOLIC ACID SERUM: CPT | Performed by: INTERNAL MEDICINE

## 2018-01-01 PROCEDURE — 77012 CT SCAN FOR NEEDLE BIOPSY: CPT

## 2018-01-01 PROCEDURE — 25010000003 POTASSIUM CHLORIDE 10 MEQ/100ML SOLUTION: Performed by: PHYSICIAN ASSISTANT

## 2018-01-01 PROCEDURE — 86850 RBC ANTIBODY SCREEN: CPT | Performed by: PHYSICIAN ASSISTANT

## 2018-01-01 PROCEDURE — 84540 ASSAY OF URINE/UREA-N: CPT | Performed by: NURSE PRACTITIONER

## 2018-01-01 PROCEDURE — 86140 C-REACTIVE PROTEIN: CPT | Performed by: STUDENT IN AN ORGANIZED HEALTH CARE EDUCATION/TRAINING PROGRAM

## 2018-01-01 PROCEDURE — 82042 OTHER SOURCE ALBUMIN QUAN EA: CPT | Performed by: STUDENT IN AN ORGANIZED HEALTH CARE EDUCATION/TRAINING PROGRAM

## 2018-01-01 PROCEDURE — 81001 URINALYSIS AUTO W/SCOPE: CPT | Performed by: EMERGENCY MEDICINE

## 2018-01-01 PROCEDURE — 83550 IRON BINDING TEST: CPT | Performed by: PHYSICIAN ASSISTANT

## 2018-01-01 PROCEDURE — 82550 ASSAY OF CK (CPK): CPT | Performed by: FAMILY MEDICINE

## 2018-01-01 PROCEDURE — G8978 MOBILITY CURRENT STATUS: HCPCS

## 2018-01-01 PROCEDURE — 85060 BLOOD SMEAR INTERPRETATION: CPT | Performed by: STUDENT IN AN ORGANIZED HEALTH CARE EDUCATION/TRAINING PROGRAM

## 2018-01-01 PROCEDURE — 88305 TISSUE EXAM BY PATHOLOGIST: CPT | Performed by: INTERNAL MEDICINE

## 2018-01-01 PROCEDURE — 31500 INSERT EMERGENCY AIRWAY: CPT | Performed by: ANESTHESIOLOGY

## 2018-01-01 PROCEDURE — 97162 PT EVAL MOD COMPLEX 30 MIN: CPT

## 2018-01-01 PROCEDURE — 73552 X-RAY EXAM OF FEMUR 2/>: CPT

## 2018-01-01 PROCEDURE — 84484 ASSAY OF TROPONIN QUANT: CPT | Performed by: EMERGENCY MEDICINE

## 2018-01-01 PROCEDURE — 81001 URINALYSIS AUTO W/SCOPE: CPT | Performed by: NURSE PRACTITIONER

## 2018-01-01 PROCEDURE — 02HV33Z INSERTION OF INFUSION DEVICE INTO SUPERIOR VENA CAVA, PERCUTANEOUS APPROACH: ICD-10-PCS | Performed by: RADIOLOGY

## 2018-01-01 PROCEDURE — 86880 COOMBS TEST DIRECT: CPT

## 2018-01-01 PROCEDURE — 85007 BL SMEAR W/DIFF WBC COUNT: CPT | Performed by: INTERNAL MEDICINE

## 2018-01-01 PROCEDURE — 86901 BLOOD TYPING SEROLOGIC RH(D): CPT | Performed by: STUDENT IN AN ORGANIZED HEALTH CARE EDUCATION/TRAINING PROGRAM

## 2018-01-01 PROCEDURE — 96376 TX/PRO/DX INJ SAME DRUG ADON: CPT

## 2018-01-01 PROCEDURE — 05HC33Z INSERTION OF INFUSION DEVICE INTO LEFT BASILIC VEIN, PERCUTANEOUS APPROACH: ICD-10-PCS | Performed by: INTERNAL MEDICINE

## 2018-01-01 PROCEDURE — P9046 ALBUMIN (HUMAN), 25%, 20 ML: HCPCS | Performed by: FAMILY MEDICINE

## 2018-01-01 PROCEDURE — 87220 TISSUE EXAM FOR FUNGI: CPT | Performed by: UROLOGY

## 2018-01-01 PROCEDURE — 83550 IRON BINDING TEST: CPT | Performed by: INTERNAL MEDICINE

## 2018-01-01 PROCEDURE — P9035 PLATELET PHERES LEUKOREDUCED: HCPCS

## 2018-01-01 PROCEDURE — 25010000002 MORPHINE PER 10 MG: Performed by: EMERGENCY MEDICINE

## 2018-01-01 PROCEDURE — 87206 SMEAR FLUORESCENT/ACID STAI: CPT | Performed by: UROLOGY

## 2018-01-01 PROCEDURE — 97166 OT EVAL MOD COMPLEX 45 MIN: CPT

## 2018-01-01 PROCEDURE — 25010000002 PIPERACILLIN SOD-TAZOBACTAM PER 1 G: Performed by: INTERNAL MEDICINE

## 2018-01-01 PROCEDURE — 80202 ASSAY OF VANCOMYCIN: CPT | Performed by: NURSE PRACTITIONER

## 2018-01-01 PROCEDURE — 84100 ASSAY OF PHOSPHORUS: CPT | Performed by: INTERNAL MEDICINE

## 2018-01-01 PROCEDURE — 5A1945Z RESPIRATORY VENTILATION, 24-96 CONSECUTIVE HOURS: ICD-10-PCS | Performed by: FAMILY MEDICINE

## 2018-01-01 PROCEDURE — P9046 ALBUMIN (HUMAN), 25%, 20 ML: HCPCS | Performed by: INTERNAL MEDICINE

## 2018-01-01 PROCEDURE — 80048 BASIC METABOLIC PNL TOTAL CA: CPT | Performed by: HOSPITALIST

## 2018-01-01 PROCEDURE — 87186 SC STD MICRODIL/AGAR DIL: CPT | Performed by: EMERGENCY MEDICINE

## 2018-01-01 PROCEDURE — 84100 ASSAY OF PHOSPHORUS: CPT | Performed by: EMERGENCY MEDICINE

## 2018-01-01 PROCEDURE — 25010000002 POTASSIUM CHLORIDE PER 2 MEQ: Performed by: INTERNAL MEDICINE

## 2018-01-01 PROCEDURE — 0W9G3ZZ DRAINAGE OF PERITONEAL CAVITY, PERCUTANEOUS APPROACH: ICD-10-PCS | Performed by: RADIOLOGY

## 2018-01-01 PROCEDURE — 0BH17EZ INSERTION OF ENDOTRACHEAL AIRWAY INTO TRACHEA, VIA NATURAL OR ARTIFICIAL OPENING: ICD-10-PCS | Performed by: INTERNAL MEDICINE

## 2018-01-01 PROCEDURE — 87150 DNA/RNA AMPLIFIED PROBE: CPT | Performed by: FAMILY MEDICINE

## 2018-01-01 PROCEDURE — P9046 ALBUMIN (HUMAN), 25%, 20 ML: HCPCS | Performed by: NURSE PRACTITIONER

## 2018-01-01 PROCEDURE — 25010000002 HALOPERIDOL LACTATE PER 5 MG: Performed by: FAMILY MEDICINE

## 2018-01-01 PROCEDURE — 89051 BODY FLUID CELL COUNT: CPT | Performed by: HOSPITALIST

## 2018-01-01 PROCEDURE — 0W9M3ZX DRAINAGE OF MALE PERINEUM, PERCUTANEOUS APPROACH, DIAGNOSTIC: ICD-10-PCS | Performed by: RADIOLOGY

## 2018-01-01 PROCEDURE — 72128 CT CHEST SPINE W/O DYE: CPT

## 2018-01-01 PROCEDURE — 86901 BLOOD TYPING SEROLOGIC RH(D): CPT

## 2018-01-01 PROCEDURE — 87015 SPECIMEN INFECT AGNT CONCNTJ: CPT | Performed by: STUDENT IN AN ORGANIZED HEALTH CARE EDUCATION/TRAINING PROGRAM

## 2018-01-01 PROCEDURE — 25010000002 ZIPRASIDONE MESYLATE PER 10 MG: Performed by: INTERNAL MEDICINE

## 2018-01-01 PROCEDURE — 25010000002 VITAMIN K1 1 MG/1 ML SOLUTION: Performed by: INTERNAL MEDICINE

## 2018-01-01 PROCEDURE — 90670 PCV13 VACCINE IM: CPT | Performed by: FAMILY MEDICINE

## 2018-01-01 PROCEDURE — B54NZZA ULTRASONOGRAPHY OF LEFT UPPER EXTREMITY VEINS, GUIDANCE: ICD-10-PCS | Performed by: INTERNAL MEDICINE

## 2018-01-01 PROCEDURE — 76775 US EXAM ABDO BACK WALL LIM: CPT

## 2018-01-01 PROCEDURE — 82150 ASSAY OF AMYLASE: CPT | Performed by: STUDENT IN AN ORGANIZED HEALTH CARE EDUCATION/TRAINING PROGRAM

## 2018-01-01 PROCEDURE — 90471 IMMUNIZATION ADMIN: CPT | Performed by: FAMILY MEDICINE

## 2018-01-01 PROCEDURE — 86901 BLOOD TYPING SEROLOGIC RH(D): CPT | Performed by: INTERNAL MEDICINE

## 2018-01-01 PROCEDURE — 82272 OCCULT BLD FECES 1-3 TESTS: CPT | Performed by: FAMILY MEDICINE

## 2018-01-01 PROCEDURE — 85610 PROTHROMBIN TIME: CPT | Performed by: NURSE PRACTITIONER

## 2018-01-01 PROCEDURE — 85610 PROTHROMBIN TIME: CPT | Performed by: FAMILY MEDICINE

## 2018-01-01 PROCEDURE — 94660 CPAP INITIATION&MGMT: CPT

## 2018-01-01 PROCEDURE — 85049 AUTOMATED PLATELET COUNT: CPT | Performed by: STUDENT IN AN ORGANIZED HEALTH CARE EDUCATION/TRAINING PROGRAM

## 2018-01-01 PROCEDURE — 25010000002 CEFTRIAXONE PER 250 MG: Performed by: NURSE PRACTITIONER

## 2018-01-01 PROCEDURE — 97164 PT RE-EVAL EST PLAN CARE: CPT

## 2018-01-01 PROCEDURE — 87186 SC STD MICRODIL/AGAR DIL: CPT | Performed by: UROLOGY

## 2018-01-01 PROCEDURE — 25010000002 MIDAZOLAM PER 1 MG

## 2018-01-01 PROCEDURE — 86901 BLOOD TYPING SEROLOGIC RH(D): CPT | Performed by: PHYSICIAN ASSISTANT

## 2018-01-01 PROCEDURE — 87077 CULTURE AEROBIC IDENTIFY: CPT | Performed by: UROLOGY

## 2018-01-01 PROCEDURE — 87205 SMEAR GRAM STAIN: CPT | Performed by: STUDENT IN AN ORGANIZED HEALTH CARE EDUCATION/TRAINING PROGRAM

## 2018-01-01 PROCEDURE — 93976 VASCULAR STUDY: CPT

## 2018-01-01 PROCEDURE — 25010000002 DIPHENHYDRAMINE PER 50 MG: Performed by: FAMILY MEDICINE

## 2018-01-01 PROCEDURE — G8979 MOBILITY GOAL STATUS: HCPCS

## 2018-01-01 PROCEDURE — 83605 ASSAY OF LACTIC ACID: CPT | Performed by: INTERNAL MEDICINE

## 2018-01-01 PROCEDURE — 96374 THER/PROPH/DIAG INJ IV PUSH: CPT

## 2018-01-01 PROCEDURE — 25010000002 LORAZEPAM PER 2 MG: Performed by: FAMILY MEDICINE

## 2018-01-01 PROCEDURE — 82607 VITAMIN B-12: CPT | Performed by: INTERNAL MEDICINE

## 2018-01-01 PROCEDURE — 73564 X-RAY EXAM KNEE 4 OR MORE: CPT

## 2018-01-01 PROCEDURE — 93005 ELECTROCARDIOGRAM TRACING: CPT | Performed by: FAMILY MEDICINE

## 2018-01-01 PROCEDURE — 81001 URINALYSIS AUTO W/SCOPE: CPT | Performed by: STUDENT IN AN ORGANIZED HEALTH CARE EDUCATION/TRAINING PROGRAM

## 2018-01-01 PROCEDURE — 87070 CULTURE OTHR SPECIMN AEROBIC: CPT | Performed by: STUDENT IN AN ORGANIZED HEALTH CARE EDUCATION/TRAINING PROGRAM

## 2018-01-01 PROCEDURE — 84484 ASSAY OF TROPONIN QUANT: CPT | Performed by: FAMILY MEDICINE

## 2018-01-01 PROCEDURE — 85027 COMPLETE CBC AUTOMATED: CPT | Performed by: HOSPITALIST

## 2018-01-01 PROCEDURE — 86927 PLASMA FRESH FROZEN: CPT

## 2018-01-01 PROCEDURE — 25010000002 CEFEPIME PER 500 MG: Performed by: EMERGENCY MEDICINE

## 2018-01-01 PROCEDURE — 86920 COMPATIBILITY TEST SPIN: CPT

## 2018-01-01 PROCEDURE — 85025 COMPLETE CBC W/AUTO DIFF WBC: CPT | Performed by: HOSPITALIST

## 2018-01-01 PROCEDURE — 86900 BLOOD TYPING SEROLOGIC ABO: CPT | Performed by: HOSPITALIST

## 2018-01-01 PROCEDURE — 84540 ASSAY OF URINE/UREA-N: CPT | Performed by: INTERNAL MEDICINE

## 2018-01-01 PROCEDURE — 83605 ASSAY OF LACTIC ACID: CPT | Performed by: FAMILY MEDICINE

## 2018-01-01 PROCEDURE — 25010000003 POTASSIUM CHLORIDE 10 MEQ/100ML SOLUTION: Performed by: FAMILY MEDICINE

## 2018-01-01 PROCEDURE — 86922 COMPATIBILITY TEST ANTIGLOB: CPT

## 2018-01-01 PROCEDURE — G8978 MOBILITY CURRENT STATUS: HCPCS | Performed by: PHYSICAL THERAPIST

## 2018-01-01 PROCEDURE — 85014 HEMATOCRIT: CPT | Performed by: FAMILY MEDICINE

## 2018-01-01 PROCEDURE — 72125 CT NECK SPINE W/O DYE: CPT

## 2018-01-01 PROCEDURE — 85025 COMPLETE CBC W/AUTO DIFF WBC: CPT | Performed by: EMERGENCY MEDICINE

## 2018-01-01 PROCEDURE — 80202 ASSAY OF VANCOMYCIN: CPT | Performed by: INTERNAL MEDICINE

## 2018-01-01 PROCEDURE — 25010000002 PROPOFOL 1000 MG/ML EMULSION: Performed by: ANESTHESIOLOGY

## 2018-01-01 PROCEDURE — 81001 URINALYSIS AUTO W/SCOPE: CPT | Performed by: INTERNAL MEDICINE

## 2018-01-01 PROCEDURE — 93306 TTE W/DOPPLER COMPLETE: CPT | Performed by: INTERNAL MEDICINE

## 2018-01-01 PROCEDURE — 25010000002 VANCOMYCIN: Performed by: EMERGENCY MEDICINE

## 2018-01-01 PROCEDURE — 25010000002 DIPHENHYDRAMINE PER 50 MG: Performed by: NURSE PRACTITIONER

## 2018-01-01 PROCEDURE — 83540 ASSAY OF IRON: CPT | Performed by: INTERNAL MEDICINE

## 2018-01-01 PROCEDURE — 73523 X-RAY EXAM HIPS BI 5/> VIEWS: CPT

## 2018-01-01 PROCEDURE — 99233 SBSQ HOSP IP/OBS HIGH 50: CPT | Performed by: INTERNAL MEDICINE

## 2018-01-01 PROCEDURE — 87186 SC STD MICRODIL/AGAR DIL: CPT | Performed by: FAMILY MEDICINE

## 2018-01-01 PROCEDURE — 86850 RBC ANTIBODY SCREEN: CPT

## 2018-01-01 PROCEDURE — 25010000002 CHLOROTHIAZIDE PER 500 MG: Performed by: INTERNAL MEDICINE

## 2018-01-01 PROCEDURE — 92610 EVALUATE SWALLOWING FUNCTION: CPT | Performed by: SPEECH-LANGUAGE PATHOLOGIST

## 2018-01-01 PROCEDURE — 85025 COMPLETE CBC W/AUTO DIFF WBC: CPT

## 2018-01-01 PROCEDURE — 80061 LIPID PANEL: CPT | Performed by: FAMILY MEDICINE

## 2018-01-01 PROCEDURE — 25010000002 FUROSEMIDE PER 20 MG: Performed by: STUDENT IN AN ORGANIZED HEALTH CARE EDUCATION/TRAINING PROGRAM

## 2018-01-01 PROCEDURE — 85018 HEMOGLOBIN: CPT | Performed by: INTERNAL MEDICINE

## 2018-01-01 PROCEDURE — 83735 ASSAY OF MAGNESIUM: CPT | Performed by: INTERNAL MEDICINE

## 2018-01-01 PROCEDURE — 25010000002 FUROSEMIDE PER 20 MG

## 2018-01-01 PROCEDURE — C1751 CATH, INF, PER/CENT/MIDLINE: HCPCS

## 2018-01-01 PROCEDURE — 25010000002 CEFTRIAXONE: Performed by: PHYSICIAN ASSISTANT

## 2018-01-01 PROCEDURE — 84132 ASSAY OF SERUM POTASSIUM: CPT | Performed by: PHYSICIAN ASSISTANT

## 2018-01-01 PROCEDURE — 89051 BODY FLUID CELL COUNT: CPT | Performed by: STUDENT IN AN ORGANIZED HEALTH CARE EDUCATION/TRAINING PROGRAM

## 2018-01-01 PROCEDURE — 96375 TX/PRO/DX INJ NEW DRUG ADDON: CPT

## 2018-01-01 PROCEDURE — 85025 COMPLETE CBC W/AUTO DIFF WBC: CPT | Performed by: INTERNAL MEDICINE

## 2018-01-01 PROCEDURE — G8979 MOBILITY GOAL STATUS: HCPCS | Performed by: PHYSICAL THERAPIST

## 2018-01-01 PROCEDURE — 83735 ASSAY OF MAGNESIUM: CPT | Performed by: PHYSICIAN ASSISTANT

## 2018-01-01 PROCEDURE — 83540 ASSAY OF IRON: CPT | Performed by: PHYSICIAN ASSISTANT

## 2018-01-01 PROCEDURE — 85018 HEMOGLOBIN: CPT | Performed by: FAMILY MEDICINE

## 2018-01-01 PROCEDURE — 25010000002 VANCOMYCIN 5 G RECONSTITUTED SOLUTION: Performed by: FAMILY MEDICINE

## 2018-01-01 PROCEDURE — 83690 ASSAY OF LIPASE: CPT | Performed by: EMERGENCY MEDICINE

## 2018-01-01 PROCEDURE — 87205 SMEAR GRAM STAIN: CPT | Performed by: UROLOGY

## 2018-01-01 PROCEDURE — 84132 ASSAY OF SERUM POTASSIUM: CPT | Performed by: HOSPITALIST

## 2018-01-01 PROCEDURE — 36415 COLL VENOUS BLD VENIPUNCTURE: CPT

## 2018-01-01 PROCEDURE — 94003 VENT MGMT INPAT SUBQ DAY: CPT

## 2018-01-01 PROCEDURE — 5A1935Z RESPIRATORY VENTILATION, LESS THAN 24 CONSECUTIVE HOURS: ICD-10-PCS | Performed by: INTERNAL MEDICINE

## 2018-01-01 PROCEDURE — 76942 ECHO GUIDE FOR BIOPSY: CPT

## 2018-01-01 PROCEDURE — 82803 BLOOD GASES ANY COMBINATION: CPT

## 2018-01-01 PROCEDURE — 0BH17EZ INSERTION OF ENDOTRACHEAL AIRWAY INTO TRACHEA, VIA NATURAL OR ARTIFICIAL OPENING: ICD-10-PCS | Performed by: FAMILY MEDICINE

## 2018-01-01 PROCEDURE — 87077 CULTURE AEROBIC IDENTIFY: CPT | Performed by: FAMILY MEDICINE

## 2018-01-01 PROCEDURE — 99255 IP/OBS CONSLTJ NEW/EST HI 80: CPT | Performed by: INTERNAL MEDICINE

## 2018-01-01 PROCEDURE — 93971 EXTREMITY STUDY: CPT

## 2018-01-01 PROCEDURE — 87070 CULTURE OTHR SPECIMN AEROBIC: CPT | Performed by: HOSPITALIST

## 2018-01-01 PROCEDURE — 99254 IP/OBS CNSLTJ NEW/EST MOD 60: CPT | Performed by: INTERNAL MEDICINE

## 2018-01-01 PROCEDURE — 63510000001 EPOETIN ALFA PER 1000 UNITS: Performed by: INTERNAL MEDICINE

## 2018-01-01 PROCEDURE — 94770: CPT

## 2018-01-01 PROCEDURE — 85007 BL SMEAR W/DIFF WBC COUNT: CPT

## 2018-01-01 PROCEDURE — 25010000002 MIDAZOLAM 50 MG/10ML SOLUTION 10 ML VIAL: Performed by: INTERNAL MEDICINE

## 2018-01-01 PROCEDURE — 93306 TTE W/DOPPLER COMPLETE: CPT

## 2018-01-01 PROCEDURE — 87086 URINE CULTURE/COLONY COUNT: CPT | Performed by: EMERGENCY MEDICINE

## 2018-01-01 PROCEDURE — 87040 BLOOD CULTURE FOR BACTERIA: CPT | Performed by: STUDENT IN AN ORGANIZED HEALTH CARE EDUCATION/TRAINING PROGRAM

## 2018-01-01 PROCEDURE — 25010000002 CEFTRIAXONE: Performed by: INTERNAL MEDICINE

## 2018-01-01 PROCEDURE — 96360 HYDRATION IV INFUSION INIT: CPT

## 2018-01-01 PROCEDURE — 82728 ASSAY OF FERRITIN: CPT | Performed by: PHYSICIAN ASSISTANT

## 2018-01-01 PROCEDURE — 87507 IADNA-DNA/RNA PROBE TQ 12-25: CPT | Performed by: FAMILY MEDICINE

## 2018-01-01 PROCEDURE — 87040 BLOOD CULTURE FOR BACTERIA: CPT | Performed by: EMERGENCY MEDICINE

## 2018-01-01 PROCEDURE — 86900 BLOOD TYPING SEROLOGIC ABO: CPT | Performed by: STUDENT IN AN ORGANIZED HEALTH CARE EDUCATION/TRAINING PROGRAM

## 2018-01-01 PROCEDURE — 86901 BLOOD TYPING SEROLOGIC RH(D): CPT | Performed by: HOSPITALIST

## 2018-01-01 PROCEDURE — 83880 ASSAY OF NATRIURETIC PEPTIDE: CPT | Performed by: EMERGENCY MEDICINE

## 2018-01-01 PROCEDURE — 36415 COLL VENOUS BLD VENIPUNCTURE: CPT | Performed by: FAMILY MEDICINE

## 2018-01-01 PROCEDURE — 99222 1ST HOSP IP/OBS MODERATE 55: CPT | Performed by: FAMILY MEDICINE

## 2018-01-01 PROCEDURE — 25010000002 ONDANSETRON PER 1 MG: Performed by: EMERGENCY MEDICINE

## 2018-01-01 PROCEDURE — P9012 CRYOPRECIPITATE EACH UNIT: HCPCS

## 2018-01-01 PROCEDURE — 97168 OT RE-EVAL EST PLAN CARE: CPT

## 2018-01-01 PROCEDURE — 31500 INSERT EMERGENCY AIRWAY: CPT | Performed by: FAMILY MEDICINE

## 2018-01-01 PROCEDURE — 25010000002 VITAMIN K1 PER 1 MG: Performed by: INTERNAL MEDICINE

## 2018-01-01 PROCEDURE — 83735 ASSAY OF MAGNESIUM: CPT | Performed by: EMERGENCY MEDICINE

## 2018-01-01 PROCEDURE — 87070 CULTURE OTHR SPECIMN AEROBIC: CPT | Performed by: UROLOGY

## 2018-01-01 RX ORDER — FLUCONAZOLE, SODIUM CHLORIDE 2 MG/ML
100 INJECTION INTRAVENOUS DAILY
Qty: 150 ML | Refills: 0
Start: 2018-01-01 | End: 2018-01-01

## 2018-01-01 RX ORDER — AMLODIPINE BESYLATE 5 MG/1
5 TABLET ORAL
Status: DISCONTINUED | OUTPATIENT
Start: 2018-01-01 | End: 2018-01-01

## 2018-01-01 RX ORDER — LACTULOSE 10 G/15ML
30 SOLUTION ORAL 3 TIMES DAILY
Status: DISCONTINUED | OUTPATIENT
Start: 2018-01-01 | End: 2018-01-01 | Stop reason: HOSPADM

## 2018-01-01 RX ORDER — MORPHINE SULFATE 2 MG/ML
1 INJECTION, SOLUTION INTRAMUSCULAR; INTRAVENOUS EVERY 4 HOURS PRN
Status: DISCONTINUED | OUTPATIENT
Start: 2018-01-01 | End: 2018-01-01

## 2018-01-01 RX ORDER — FUROSEMIDE 40 MG/1
40 TABLET ORAL DAILY
Status: DISCONTINUED | OUTPATIENT
Start: 2018-01-01 | End: 2018-01-01

## 2018-01-01 RX ORDER — IBUPROFEN 200 MG
200 TABLET ORAL EVERY 8 HOURS PRN
Status: DISCONTINUED | OUTPATIENT
Start: 2018-01-01 | End: 2018-01-01 | Stop reason: HOSPADM

## 2018-01-01 RX ORDER — MAGNESIUM SULFATE HEPTAHYDRATE 40 MG/ML
4 INJECTION, SOLUTION INTRAVENOUS AS NEEDED
Status: DISCONTINUED | OUTPATIENT
Start: 2018-01-01 | End: 2018-01-01 | Stop reason: HOSPADM

## 2018-01-01 RX ORDER — SIMETHICONE 80 MG
80 TABLET,CHEWABLE ORAL 4 TIMES DAILY PRN
Status: DISCONTINUED | OUTPATIENT
Start: 2018-01-01 | End: 2018-01-01 | Stop reason: HOSPADM

## 2018-01-01 RX ORDER — SPIRONOLACTONE 25 MG/1
25 TABLET ORAL DAILY
Qty: 30 TABLET | Refills: 4 | Status: SHIPPED | OUTPATIENT
Start: 2018-01-01 | End: 2018-01-01 | Stop reason: SDUPTHER

## 2018-01-01 RX ORDER — SODIUM CHLORIDE 9 MG/ML
INJECTION, SOLUTION INTRAVENOUS
Status: DISPENSED
Start: 2018-01-01 | End: 2018-01-01

## 2018-01-01 RX ORDER — POTASSIUM CHLORIDE 750 MG/1
40 CAPSULE, EXTENDED RELEASE ORAL ONCE
Status: COMPLETED | OUTPATIENT
Start: 2018-01-01 | End: 2018-01-01

## 2018-01-01 RX ORDER — NALOXONE HCL 0.4 MG/ML
0.4 VIAL (ML) INJECTION
Status: DISCONTINUED | OUTPATIENT
Start: 2018-01-01 | End: 2018-01-01 | Stop reason: HOSPADM

## 2018-01-01 RX ORDER — FLUCONAZOLE, SODIUM CHLORIDE 2 MG/ML
100 INJECTION INTRAVENOUS DAILY
Status: DISCONTINUED | OUTPATIENT
Start: 2018-01-01 | End: 2018-01-01 | Stop reason: HOSPADM

## 2018-01-01 RX ORDER — HYDROMORPHONE HCL 110MG/55ML
0.25 PATIENT CONTROLLED ANALGESIA SYRINGE INTRAVENOUS
Status: DISCONTINUED | OUTPATIENT
Start: 2018-01-01 | End: 2018-01-01

## 2018-01-01 RX ORDER — METHYLPREDNISOLONE SODIUM SUCCINATE 125 MG/2ML
125 INJECTION, POWDER, LYOPHILIZED, FOR SOLUTION INTRAMUSCULAR; INTRAVENOUS ONCE
Status: COMPLETED | OUTPATIENT
Start: 2018-01-01 | End: 2018-01-01

## 2018-01-01 RX ORDER — SPIRONOLACTONE 25 MG/1
TABLET ORAL
Status: ON HOLD | COMMUNITY
Start: 2018-01-01 | End: 2018-01-01

## 2018-01-01 RX ORDER — ZIPRASIDONE MESYLATE 20 MG/ML
20 INJECTION, POWDER, LYOPHILIZED, FOR SOLUTION INTRAMUSCULAR ONCE
Status: COMPLETED | OUTPATIENT
Start: 2018-01-01 | End: 2018-01-01

## 2018-01-01 RX ORDER — SODIUM CHLORIDE 0.9 % (FLUSH) 0.9 %
10 SYRINGE (ML) INJECTION AS NEEDED
Status: DISCONTINUED | OUTPATIENT
Start: 2018-01-01 | End: 2018-01-01 | Stop reason: HOSPADM

## 2018-01-01 RX ORDER — SPIRONOLACTONE 25 MG/1
25 TABLET ORAL DAILY
Status: DISCONTINUED | OUTPATIENT
Start: 2018-01-01 | End: 2018-01-01

## 2018-01-01 RX ORDER — OXYCODONE HYDROCHLORIDE 5 MG/1
5 TABLET ORAL EVERY 6 HOURS PRN
Status: DISPENSED | OUTPATIENT
Start: 2018-01-01 | End: 2018-01-01

## 2018-01-01 RX ORDER — ALBUMIN (HUMAN) 12.5 G/50ML
12.5 SOLUTION INTRAVENOUS EVERY 12 HOURS
Status: DISCONTINUED | OUTPATIENT
Start: 2018-01-01 | End: 2018-01-01

## 2018-01-01 RX ORDER — SODIUM CHLORIDE 0.9 % (FLUSH) 0.9 %
1-10 SYRINGE (ML) INJECTION AS NEEDED
Status: DISCONTINUED | OUTPATIENT
Start: 2018-01-01 | End: 2018-01-01 | Stop reason: HOSPADM

## 2018-01-01 RX ORDER — POTASSIUM CHLORIDE 14.9 MG/ML
20 INJECTION INTRAVENOUS ONCE
Status: COMPLETED | OUTPATIENT
Start: 2018-01-01 | End: 2018-01-01

## 2018-01-01 RX ORDER — FLUCONAZOLE 200 MG/1
400 TABLET ORAL ONCE
Status: COMPLETED | OUTPATIENT
Start: 2018-01-01 | End: 2018-01-01

## 2018-01-01 RX ORDER — MIDODRINE HYDROCHLORIDE 5 MG/1
TABLET ORAL
COMMUNITY
Start: 2018-01-01 | End: 2018-01-01 | Stop reason: HOSPADM

## 2018-01-01 RX ORDER — LACTULOSE 10 G/15ML
30 SOLUTION ORAL ONCE
Status: DISCONTINUED | OUTPATIENT
Start: 2018-01-01 | End: 2018-01-01

## 2018-01-01 RX ORDER — POTASSIUM CHLORIDE 14.9 MG/ML
20 INJECTION INTRAVENOUS
Status: DISPENSED | OUTPATIENT
Start: 2018-01-01 | End: 2018-01-01

## 2018-01-01 RX ORDER — ONDANSETRON 4 MG/1
4 TABLET, FILM COATED ORAL EVERY 6 HOURS PRN
Status: DISCONTINUED | OUTPATIENT
Start: 2018-01-01 | End: 2018-01-01 | Stop reason: HOSPADM

## 2018-01-01 RX ORDER — POTASSIUM CHLORIDE 20 MEQ/1
20 TABLET, EXTENDED RELEASE ORAL
COMMUNITY
Start: 2018-01-01 | End: 2018-01-01 | Stop reason: SDUPTHER

## 2018-01-01 RX ORDER — NICOTINE 21 MG/24HR
1 PATCH, TRANSDERMAL 24 HOURS TRANSDERMAL
Status: DISCONTINUED | OUTPATIENT
Start: 2018-01-01 | End: 2018-01-01

## 2018-01-01 RX ORDER — POTASSIUM CHLORIDE 1.5 G/1.77G
40 POWDER, FOR SOLUTION ORAL AS NEEDED
Status: DISCONTINUED | OUTPATIENT
Start: 2018-01-01 | End: 2018-01-01 | Stop reason: SDUPTHER

## 2018-01-01 RX ORDER — FUROSEMIDE 10 MG/ML
20 INJECTION INTRAMUSCULAR; INTRAVENOUS EVERY 12 HOURS
Status: DISCONTINUED | OUTPATIENT
Start: 2018-01-01 | End: 2018-01-01

## 2018-01-01 RX ORDER — FERROUS SULFATE TAB EC 324 MG (65 MG FE EQUIVALENT) 324 (65 FE) MG
324 TABLET DELAYED RESPONSE ORAL
Status: DISCONTINUED | OUTPATIENT
Start: 2018-01-01 | End: 2018-01-01 | Stop reason: HOSPADM

## 2018-01-01 RX ORDER — NICOTINE 21 MG/24HR
1 PATCH, TRANSDERMAL 24 HOURS TRANSDERMAL EVERY 24 HOURS
Status: DISCONTINUED | OUTPATIENT
Start: 2018-01-01 | End: 2018-01-01 | Stop reason: HOSPADM

## 2018-01-01 RX ORDER — NYSTATIN 100000 [USP'U]/G
POWDER TOPICAL EVERY 12 HOURS SCHEDULED
Status: DISCONTINUED | OUTPATIENT
Start: 2018-01-01 | End: 2018-01-01 | Stop reason: HOSPADM

## 2018-01-01 RX ORDER — ALBUMIN (HUMAN) 12.5 G/50ML
12.5 SOLUTION INTRAVENOUS ONCE
Status: COMPLETED | OUTPATIENT
Start: 2018-01-01 | End: 2018-01-01

## 2018-01-01 RX ORDER — DIPHENHYDRAMINE HYDROCHLORIDE 50 MG/ML
25 INJECTION INTRAMUSCULAR; INTRAVENOUS EVERY 6 HOURS PRN
Status: DISCONTINUED | OUTPATIENT
Start: 2018-01-01 | End: 2018-01-01 | Stop reason: HOSPADM

## 2018-01-01 RX ORDER — ETOMIDATE 2 MG/ML
20 INJECTION INTRAVENOUS ONCE
Status: COMPLETED | OUTPATIENT
Start: 2018-01-01 | End: 2018-01-01

## 2018-01-01 RX ORDER — SULFAMETHOXAZOLE AND TRIMETHOPRIM 800; 160 MG/1; MG/1
1 TABLET ORAL EVERY 12 HOURS SCHEDULED
Status: DISCONTINUED | OUTPATIENT
Start: 2018-01-01 | End: 2018-01-01

## 2018-01-01 RX ORDER — PANTOPRAZOLE SODIUM 40 MG/1
40 TABLET, DELAYED RELEASE ORAL
Status: DISCONTINUED | OUTPATIENT
Start: 2018-01-01 | End: 2018-01-01

## 2018-01-01 RX ORDER — FUROSEMIDE 10 MG/ML
40 INJECTION INTRAMUSCULAR; INTRAVENOUS DAILY
Status: DISCONTINUED | OUTPATIENT
Start: 2018-01-01 | End: 2018-01-01

## 2018-01-01 RX ORDER — LACTULOSE 10 G/15ML
30 SOLUTION ORAL 2 TIMES DAILY
Status: DISCONTINUED | OUTPATIENT
Start: 2018-01-01 | End: 2018-01-01

## 2018-01-01 RX ORDER — LACTULOSE 10 G/15ML
30 SOLUTION ORAL 3 TIMES DAILY
Status: DISCONTINUED | OUTPATIENT
Start: 2018-01-01 | End: 2018-01-01 | Stop reason: SDUPTHER

## 2018-01-01 RX ORDER — MAGNESIUM SULFATE HEPTAHYDRATE 40 MG/ML
2 INJECTION, SOLUTION INTRAVENOUS AS NEEDED
Status: DISCONTINUED | OUTPATIENT
Start: 2018-01-01 | End: 2018-01-01 | Stop reason: HOSPADM

## 2018-01-01 RX ORDER — LACTULOSE 10 G/15ML
30 SOLUTION ORAL 4 TIMES DAILY
Status: DISCONTINUED | OUTPATIENT
Start: 2018-01-01 | End: 2018-01-01

## 2018-01-01 RX ORDER — SODIUM CHLORIDE 0.9 % (FLUSH) 0.9 %
20 SYRINGE (ML) INJECTION AS NEEDED
Status: DISCONTINUED | OUTPATIENT
Start: 2018-01-01 | End: 2018-01-01 | Stop reason: HOSPADM

## 2018-01-01 RX ORDER — SODIUM CHLORIDE 0.9 % (FLUSH) 0.9 %
3 SYRINGE (ML) INJECTION EVERY 12 HOURS SCHEDULED
Status: DISCONTINUED | OUTPATIENT
Start: 2018-01-01 | End: 2018-01-01 | Stop reason: HOSPADM

## 2018-01-01 RX ORDER — HYDROCODONE BITARTRATE AND ACETAMINOPHEN 7.5; 325 MG/1; MG/1
1 TABLET ORAL ONCE
Status: COMPLETED | OUTPATIENT
Start: 2018-01-01 | End: 2018-01-01

## 2018-01-01 RX ORDER — ONDANSETRON 4 MG/1
4 TABLET, FILM COATED ORAL EVERY 8 HOURS PRN
Qty: 10 TABLET | Refills: 0 | Status: SHIPPED | OUTPATIENT
Start: 2018-01-01 | End: 2018-01-01 | Stop reason: HOSPADM

## 2018-01-01 RX ORDER — FAMOTIDINE 10 MG/ML
20 INJECTION, SOLUTION INTRAVENOUS DAILY
Status: DISCONTINUED | OUTPATIENT
Start: 2018-01-01 | End: 2018-01-01

## 2018-01-01 RX ORDER — POTASSIUM CHLORIDE 750 MG/1
40 CAPSULE, EXTENDED RELEASE ORAL DAILY
Status: DISCONTINUED | OUTPATIENT
Start: 2018-01-01 | End: 2018-01-01

## 2018-01-01 RX ORDER — METOPROLOL TARTRATE 5 MG/5ML
INJECTION INTRAVENOUS
Status: COMPLETED
Start: 2018-01-01 | End: 2018-01-01

## 2018-01-01 RX ORDER — FUROSEMIDE 10 MG/ML
20 INJECTION INTRAMUSCULAR; INTRAVENOUS EVERY 12 HOURS
Status: DISCONTINUED | OUTPATIENT
Start: 2018-01-01 | End: 2018-01-01 | Stop reason: HOSPADM

## 2018-01-01 RX ORDER — FUROSEMIDE 40 MG/1
40 TABLET ORAL
Status: DISCONTINUED | OUTPATIENT
Start: 2018-01-01 | End: 2018-01-01 | Stop reason: HOSPADM

## 2018-01-01 RX ORDER — PHENAZOPYRIDINE HYDROCHLORIDE 100 MG/1
100 TABLET, FILM COATED ORAL 3 TIMES DAILY PRN
Qty: 6 TABLET | Refills: 0 | Status: SHIPPED | OUTPATIENT
Start: 2018-01-01 | End: 2018-01-01 | Stop reason: HOSPADM

## 2018-01-01 RX ORDER — SODIUM CHLORIDE 0.9 % (FLUSH) 0.9 %
10 SYRINGE (ML) INJECTION EVERY 12 HOURS SCHEDULED
Status: DISCONTINUED | OUTPATIENT
Start: 2018-01-01 | End: 2018-01-01 | Stop reason: HOSPADM

## 2018-01-01 RX ORDER — NICOTINE 21 MG/24HR
1 PATCH, TRANSDERMAL 24 HOURS TRANSDERMAL EVERY 24 HOURS
Start: 2018-01-01 | End: 2018-01-01

## 2018-01-01 RX ORDER — ECHINACEA PURPUREA EXTRACT 125 MG
1 TABLET ORAL AS NEEDED
Status: DISCONTINUED | OUTPATIENT
Start: 2018-01-01 | End: 2018-01-01 | Stop reason: HOSPADM

## 2018-01-01 RX ORDER — FAMOTIDINE 20 MG/1
20 TABLET, FILM COATED ORAL EVERY 12 HOURS
Status: DISCONTINUED | OUTPATIENT
Start: 2018-01-01 | End: 2018-01-01

## 2018-01-01 RX ORDER — POTASSIUM CHLORIDE 750 MG/1
40 CAPSULE, EXTENDED RELEASE ORAL 2 TIMES DAILY WITH MEALS
Status: COMPLETED | OUTPATIENT
Start: 2018-01-01 | End: 2018-01-01

## 2018-01-01 RX ORDER — METOLAZONE 2.5 MG/1
2.5 TABLET ORAL DAILY
Qty: 30 TABLET | Refills: 3 | Status: SHIPPED | OUTPATIENT
Start: 2018-01-01 | End: 2018-01-01

## 2018-01-01 RX ORDER — FUROSEMIDE 10 MG/ML
40 INJECTION INTRAMUSCULAR; INTRAVENOUS EVERY 12 HOURS
Status: DISCONTINUED | OUTPATIENT
Start: 2018-01-01 | End: 2018-01-01

## 2018-01-01 RX ORDER — MIDAZOLAM HYDROCHLORIDE 1 MG/ML
INJECTION INTRAMUSCULAR; INTRAVENOUS
Status: COMPLETED
Start: 2018-01-01 | End: 2018-01-01

## 2018-01-01 RX ORDER — HYDRALAZINE HYDROCHLORIDE 10 MG/1
10 TABLET, FILM COATED ORAL EVERY 8 HOURS SCHEDULED
Status: DISCONTINUED | OUTPATIENT
Start: 2018-01-01 | End: 2018-01-01

## 2018-01-01 RX ORDER — MELATONIN
50000 2 TIMES WEEKLY
Status: DISCONTINUED | OUTPATIENT
Start: 2018-01-01 | End: 2018-01-01

## 2018-01-01 RX ORDER — SENNA AND DOCUSATE SODIUM 50; 8.6 MG/1; MG/1
2 TABLET, FILM COATED ORAL NIGHTLY PRN
Status: DISCONTINUED | OUTPATIENT
Start: 2018-01-01 | End: 2018-01-01 | Stop reason: HOSPADM

## 2018-01-01 RX ORDER — HYDROMORPHONE HCL 110MG/55ML
0.25 PATIENT CONTROLLED ANALGESIA SYRINGE INTRAVENOUS
Status: DISPENSED | OUTPATIENT
Start: 2018-01-01 | End: 2018-01-01

## 2018-01-01 RX ORDER — FUROSEMIDE 10 MG/ML
40 INJECTION INTRAMUSCULAR; INTRAVENOUS ONCE
Status: COMPLETED | OUTPATIENT
Start: 2018-01-01 | End: 2018-01-01

## 2018-01-01 RX ORDER — CEFUROXIME AXETIL 250 MG/1
TABLET ORAL
Qty: 20 TABLET | Refills: 0 | Status: SHIPPED | OUTPATIENT
Start: 2018-01-01 | End: 2018-01-01

## 2018-01-01 RX ORDER — FUROSEMIDE 20 MG/1
20 TABLET ORAL DAILY
Qty: 90 TABLET | Refills: 3 | Status: SHIPPED | OUTPATIENT
Start: 2018-01-01

## 2018-01-01 RX ORDER — PHYTONADIONE 2 MG/ML
5 INJECTION, EMULSION INTRAMUSCULAR; INTRAVENOUS; SUBCUTANEOUS DAILY
Status: COMPLETED | OUTPATIENT
Start: 2018-01-01 | End: 2018-01-01

## 2018-01-01 RX ORDER — POTASSIUM CHLORIDE 750 MG/1
40 CAPSULE, EXTENDED RELEASE ORAL AS NEEDED
Status: DISCONTINUED | OUTPATIENT
Start: 2018-01-01 | End: 2018-01-01 | Stop reason: SDUPTHER

## 2018-01-01 RX ORDER — ONDANSETRON 2 MG/ML
4 INJECTION INTRAMUSCULAR; INTRAVENOUS EVERY 6 HOURS PRN
Status: DISCONTINUED | OUTPATIENT
Start: 2018-01-01 | End: 2018-01-01 | Stop reason: HOSPADM

## 2018-01-01 RX ORDER — FUROSEMIDE 10 MG/ML
80 INJECTION INTRAMUSCULAR; INTRAVENOUS EVERY 12 HOURS
Status: DISCONTINUED | OUTPATIENT
Start: 2018-01-01 | End: 2018-01-01

## 2018-01-01 RX ORDER — ONDANSETRON 2 MG/ML
4 INJECTION INTRAMUSCULAR; INTRAVENOUS ONCE
Status: COMPLETED | OUTPATIENT
Start: 2018-01-01 | End: 2018-01-01

## 2018-01-01 RX ORDER — LACTULOSE 10 G/15ML
30 SOLUTION ORAL ONCE
Status: DISCONTINUED | OUTPATIENT
Start: 2018-01-01 | End: 2018-01-01 | Stop reason: ALTCHOICE

## 2018-01-01 RX ORDER — FUROSEMIDE 20 MG/1
20 TABLET ORAL DAILY
COMMUNITY
End: 2018-01-01 | Stop reason: SDUPTHER

## 2018-01-01 RX ORDER — POTASSIUM CHLORIDE 750 MG/1
40 CAPSULE, EXTENDED RELEASE ORAL DAILY
Status: DISCONTINUED | OUTPATIENT
Start: 2018-01-01 | End: 2018-01-01 | Stop reason: HOSPADM

## 2018-01-01 RX ORDER — IBUPROFEN 400 MG/1
400 TABLET ORAL EVERY 4 HOURS PRN
Status: DISCONTINUED | OUTPATIENT
Start: 2018-01-01 | End: 2018-01-01

## 2018-01-01 RX ORDER — LORAZEPAM 0.5 MG/1
0.5 TABLET ORAL NIGHTLY PRN
Status: DISCONTINUED | OUTPATIENT
Start: 2018-01-01 | End: 2018-01-01 | Stop reason: HOSPADM

## 2018-01-01 RX ORDER — OXYCODONE HYDROCHLORIDE 5 MG/1
5 TABLET ORAL EVERY 4 HOURS PRN
Qty: 42 TABLET | Refills: 0 | Status: SHIPPED | OUTPATIENT
Start: 2018-01-01 | End: 2018-01-01

## 2018-01-01 RX ORDER — POTASSIUM CHLORIDE 7.45 MG/ML
10 INJECTION INTRAVENOUS
Status: DISCONTINUED | OUTPATIENT
Start: 2018-01-01 | End: 2018-01-01 | Stop reason: HOSPADM

## 2018-01-01 RX ORDER — SODIUM CHLORIDE 9 MG/ML
100 INJECTION, SOLUTION INTRAVENOUS CONTINUOUS
Status: DISCONTINUED | OUTPATIENT
Start: 2018-01-01 | End: 2018-01-01

## 2018-01-01 RX ORDER — POTASSIUM CHLORIDE 750 MG/1
30 CAPSULE, EXTENDED RELEASE ORAL
Status: DISCONTINUED | OUTPATIENT
Start: 2018-01-01 | End: 2018-01-01

## 2018-01-01 RX ORDER — FUROSEMIDE 20 MG/1
20 TABLET ORAL
Status: DISCONTINUED | OUTPATIENT
Start: 2018-01-01 | End: 2018-01-01

## 2018-01-01 RX ORDER — FUROSEMIDE 10 MG/ML
20 INJECTION INTRAMUSCULAR; INTRAVENOUS ONCE
Status: COMPLETED | OUTPATIENT
Start: 2018-01-01 | End: 2018-01-01

## 2018-01-01 RX ORDER — PROPOFOL 10 MG/ML
VIAL (ML) INTRAVENOUS AS NEEDED
Status: DISCONTINUED | OUTPATIENT
Start: 2018-01-01 | End: 2018-11-30 | Stop reason: HOSPADM

## 2018-01-01 RX ORDER — ALBUMIN (HUMAN) 12.5 G/50ML
12.5 SOLUTION INTRAVENOUS 2 TIMES DAILY
Status: DISCONTINUED | OUTPATIENT
Start: 2018-01-01 | End: 2018-01-01 | Stop reason: HOSPADM

## 2018-01-01 RX ORDER — DIPHENHYDRAMINE HYDROCHLORIDE 50 MG/ML
50 INJECTION INTRAMUSCULAR; INTRAVENOUS ONCE
Status: COMPLETED | OUTPATIENT
Start: 2018-01-01 | End: 2018-01-01

## 2018-01-01 RX ORDER — MORPHINE SULFATE 2 MG/ML
1 INJECTION, SOLUTION INTRAMUSCULAR; INTRAVENOUS
Status: DISCONTINUED | OUTPATIENT
Start: 2018-01-01 | End: 2018-01-01 | Stop reason: CLARIF

## 2018-01-01 RX ORDER — CIPROFLOXACIN 500 MG/1
500 TABLET, FILM COATED ORAL 2 TIMES DAILY
Qty: 20 TABLET | Refills: 0 | Status: SHIPPED | OUTPATIENT
Start: 2018-01-01 | End: 2018-01-01

## 2018-01-01 RX ORDER — TADALAFIL 20 MG/1
TABLET ORAL
COMMUNITY
Start: 2018-01-01 | End: 2018-01-01 | Stop reason: HOSPADM

## 2018-01-01 RX ORDER — ONDANSETRON 4 MG/1
4 TABLET, ORALLY DISINTEGRATING ORAL EVERY 6 HOURS PRN
Status: DISCONTINUED | OUTPATIENT
Start: 2018-01-01 | End: 2018-01-01 | Stop reason: HOSPADM

## 2018-01-01 RX ORDER — SODIUM CHLORIDE 0.9 % (FLUSH) 0.9 %
3-10 SYRINGE (ML) INJECTION AS NEEDED
Status: DISCONTINUED | OUTPATIENT
Start: 2018-01-01 | End: 2018-01-01 | Stop reason: HOSPADM

## 2018-01-01 RX ORDER — LINEZOLID 600 MG/1
600 TABLET, FILM COATED ORAL EVERY 12 HOURS SCHEDULED
Status: DISCONTINUED | OUTPATIENT
Start: 2018-01-01 | End: 2018-01-01

## 2018-01-01 RX ORDER — FAMOTIDINE 20 MG/50ML
20 INJECTION, SOLUTION INTRAVENOUS EVERY 12 HOURS SCHEDULED
Status: DISCONTINUED | OUTPATIENT
Start: 2018-01-01 | End: 2018-01-01

## 2018-01-01 RX ORDER — SPIRONOLACTONE 25 MG/1
25 TABLET ORAL DAILY
Status: DISCONTINUED | OUTPATIENT
Start: 2018-01-01 | End: 2018-01-01 | Stop reason: HOSPADM

## 2018-01-01 RX ORDER — POTASSIUM CHLORIDE 750 MG/1
40 CAPSULE, EXTENDED RELEASE ORAL AS NEEDED
Status: DISCONTINUED | OUTPATIENT
Start: 2018-01-01 | End: 2018-01-01 | Stop reason: HOSPADM

## 2018-01-01 RX ORDER — SODIUM CHLORIDE 9 MG/ML
INJECTION, SOLUTION INTRAVENOUS
Status: COMPLETED
Start: 2018-01-01 | End: 2018-01-01

## 2018-01-01 RX ORDER — METOLAZONE 2.5 MG/1
2.5 TABLET ORAL ONCE
Status: COMPLETED | OUTPATIENT
Start: 2018-01-01 | End: 2018-01-01

## 2018-01-01 RX ORDER — IPRATROPIUM BROMIDE AND ALBUTEROL SULFATE 2.5; .5 MG/3ML; MG/3ML
3 SOLUTION RESPIRATORY (INHALATION)
Status: DISCONTINUED | OUTPATIENT
Start: 2018-01-01 | End: 2018-01-01 | Stop reason: HOSPADM

## 2018-01-01 RX ORDER — SPIRONOLACTONE 25 MG/1
TABLET ORAL
Qty: 180 TABLET | Refills: 3 | Status: SHIPPED | OUTPATIENT
Start: 2018-01-01

## 2018-01-01 RX ORDER — POTASSIUM CHLORIDE 20 MEQ/1
20 TABLET, EXTENDED RELEASE ORAL DAILY
Qty: 90 TABLET | Refills: 3 | Status: SHIPPED | OUTPATIENT
Start: 2018-01-01 | End: 2018-01-01

## 2018-01-01 RX ORDER — MIDODRINE HYDROCHLORIDE 10 MG/1
10 TABLET ORAL 3 TIMES DAILY
Qty: 270 TABLET | Refills: 3 | COMMUNITY
Start: 2018-01-01

## 2018-01-01 RX ORDER — FUROSEMIDE 20 MG/1
40 TABLET ORAL
COMMUNITY
Start: 2018-01-01 | End: 2018-01-01

## 2018-01-01 RX ORDER — MIDAZOLAM HYDROCHLORIDE 1 MG/ML
2 INJECTION INTRAMUSCULAR; INTRAVENOUS ONCE
Status: COMPLETED | OUTPATIENT
Start: 2018-01-01 | End: 2018-01-01

## 2018-01-01 RX ORDER — POTASSIUM CHLORIDE 1.5 G/1.77G
40 POWDER, FOR SOLUTION ORAL AS NEEDED
Status: DISCONTINUED | OUTPATIENT
Start: 2018-01-01 | End: 2018-01-01 | Stop reason: HOSPADM

## 2018-01-01 RX ORDER — DOXYCYCLINE 50 MG/1
100 CAPSULE ORAL EVERY 12 HOURS SCHEDULED
Qty: 8 CAPSULE | Refills: 0 | Status: SHIPPED | OUTPATIENT
Start: 2018-01-01 | End: 2018-01-01

## 2018-01-01 RX ORDER — POLYETHYLENE GLYCOL 3350 17 G/17G
POWDER, FOR SOLUTION ORAL
Qty: 255 G | Refills: 0 | OUTPATIENT
Start: 2018-01-01

## 2018-01-01 RX ORDER — TRAZODONE HYDROCHLORIDE 50 MG/1
TABLET ORAL
Qty: 40 TABLET | Refills: 1 | Status: SHIPPED | OUTPATIENT
Start: 2018-01-01 | End: 2018-01-01 | Stop reason: HOSPADM

## 2018-01-01 RX ORDER — METOLAZONE 2.5 MG/1
2.5 TABLET ORAL ONCE
Status: DISCONTINUED | OUTPATIENT
Start: 2018-01-01 | End: 2018-01-01

## 2018-01-01 RX ORDER — IPRATROPIUM BROMIDE AND ALBUTEROL SULFATE 2.5; .5 MG/3ML; MG/3ML
3 SOLUTION RESPIRATORY (INHALATION)
Status: DISCONTINUED | OUTPATIENT
Start: 2018-01-01 | End: 2018-01-01

## 2018-01-01 RX ORDER — ALBUTEROL SULFATE 2.5 MG/3ML
2.5 SOLUTION RESPIRATORY (INHALATION)
Status: DISCONTINUED | OUTPATIENT
Start: 2018-01-01 | End: 2018-01-01

## 2018-01-01 RX ORDER — LACTULOSE 10 G/15ML
30 SOLUTION ORAL EVERY 12 HOURS
Status: DISCONTINUED | OUTPATIENT
Start: 2018-01-01 | End: 2018-01-01 | Stop reason: HOSPADM

## 2018-01-01 RX ORDER — FOLIC ACID 1 MG/1
TABLET ORAL
COMMUNITY
Start: 2018-01-01 | End: 2018-01-01 | Stop reason: HOSPADM

## 2018-01-01 RX ORDER — LACTULOSE 10 G/15ML
30 SOLUTION ORAL 3 TIMES DAILY
Qty: 1892 ML | Refills: 3 | Status: SHIPPED | OUTPATIENT
Start: 2018-01-01

## 2018-01-01 RX ORDER — IPRATROPIUM BROMIDE AND ALBUTEROL SULFATE 2.5; .5 MG/3ML; MG/3ML
3 SOLUTION RESPIRATORY (INHALATION) EVERY 4 HOURS PRN
Status: DISCONTINUED | OUTPATIENT
Start: 2018-01-01 | End: 2018-01-01

## 2018-01-01 RX ORDER — CEPHALEXIN 500 MG/1
500 CAPSULE ORAL 2 TIMES DAILY
Qty: 14 CAPSULE | Refills: 0 | Status: SHIPPED | OUTPATIENT
Start: 2018-01-01 | End: 2018-01-01

## 2018-01-01 RX ORDER — LACTULOSE 10 G/15ML
30 SOLUTION ORAL 3 TIMES DAILY
Start: 2018-01-01 | End: 2018-01-01

## 2018-01-01 RX ORDER — FUROSEMIDE 10 MG/ML
40 INJECTION INTRAMUSCULAR; INTRAVENOUS 2 TIMES DAILY
Status: DISCONTINUED | OUTPATIENT
Start: 2018-01-01 | End: 2018-01-01

## 2018-01-01 RX ORDER — FLUCONAZOLE 2 MG/ML
400 INJECTION, SOLUTION INTRAVENOUS EVERY 24 HOURS
Status: COMPLETED | OUTPATIENT
Start: 2018-01-01 | End: 2018-01-01

## 2018-01-01 RX ORDER — SODIUM CHLORIDE 9 MG/ML
125 INJECTION, SOLUTION INTRAVENOUS CONTINUOUS
Status: DISCONTINUED | OUTPATIENT
Start: 2018-01-01 | End: 2018-01-01 | Stop reason: HOSPADM

## 2018-01-01 RX ORDER — ACETYLCYSTEINE 100 MG/ML
2 SOLUTION ORAL; RESPIRATORY (INHALATION)
Status: DISCONTINUED | OUTPATIENT
Start: 2018-01-01 | End: 2018-01-01 | Stop reason: HOSPADM

## 2018-01-01 RX ORDER — ALBUTEROL SULFATE 90 UG/1
2 AEROSOL, METERED RESPIRATORY (INHALATION)
Status: DISCONTINUED | OUTPATIENT
Start: 2018-01-01 | End: 2018-01-01 | Stop reason: HOSPADM

## 2018-01-01 RX ORDER — CALCIUM ACETATE 667 MG/1
CAPSULE ORAL
COMMUNITY
Start: 2018-01-01 | End: 2018-01-01 | Stop reason: HOSPADM

## 2018-01-01 RX ORDER — LORAZEPAM 2 MG/ML
0.5 INJECTION INTRAMUSCULAR ONCE
Status: COMPLETED | OUTPATIENT
Start: 2018-01-01 | End: 2018-01-01

## 2018-01-01 RX ORDER — RIFAXIMIN 550 MG/1
TABLET ORAL
Qty: 180 TABLET | Refills: 3 | Status: SHIPPED | OUTPATIENT
Start: 2018-01-01

## 2018-01-01 RX ORDER — IPRATROPIUM BROMIDE AND ALBUTEROL SULFATE 2.5; .5 MG/3ML; MG/3ML
3 SOLUTION RESPIRATORY (INHALATION) EVERY 6 HOURS PRN
Status: DISCONTINUED | OUTPATIENT
Start: 2018-01-01 | End: 2018-01-01

## 2018-01-01 RX ORDER — BUDESONIDE AND FORMOTEROL FUMARATE DIHYDRATE 160; 4.5 UG/1; UG/1
2 AEROSOL RESPIRATORY (INHALATION)
Status: DISCONTINUED | OUTPATIENT
Start: 2018-01-01 | End: 2018-01-01 | Stop reason: HOSPADM

## 2018-01-01 RX ORDER — MIDODRINE HYDROCHLORIDE 5 MG/1
10 TABLET ORAL 3 TIMES DAILY
Status: DISCONTINUED | OUTPATIENT
Start: 2018-01-01 | End: 2018-01-01

## 2018-01-01 RX ORDER — FUROSEMIDE 10 MG/ML
INJECTION INTRAMUSCULAR; INTRAVENOUS
Status: COMPLETED
Start: 2018-01-01 | End: 2018-01-01

## 2018-01-01 RX ORDER — DICYCLOMINE HYDROCHLORIDE 10 MG/1
10 CAPSULE ORAL 4 TIMES DAILY PRN
Status: DISCONTINUED | OUTPATIENT
Start: 2018-01-01 | End: 2018-01-01 | Stop reason: HOSPADM

## 2018-01-01 RX ORDER — HYDROMORPHONE HCL 110MG/55ML
0.5 PATIENT CONTROLLED ANALGESIA SYRINGE INTRAVENOUS
Status: DISCONTINUED | OUTPATIENT
Start: 2018-01-01 | End: 2018-01-01

## 2018-01-01 RX ORDER — PANTOPRAZOLE SODIUM 40 MG/10ML
40 INJECTION, POWDER, LYOPHILIZED, FOR SOLUTION INTRAVENOUS
Status: DISCONTINUED | OUTPATIENT
Start: 2018-01-01 | End: 2018-01-01 | Stop reason: HOSPADM

## 2018-01-01 RX ORDER — LACTULOSE 10 G/15ML
30 SOLUTION ORAL
Status: DISCONTINUED | OUTPATIENT
Start: 2018-01-01 | End: 2018-01-01

## 2018-01-01 RX ORDER — ALBUMIN, HUMAN INJ 5% 5 %
25 SOLUTION INTRAVENOUS DAILY
Status: DISCONTINUED | OUTPATIENT
Start: 2018-11-28 | End: 2018-01-01 | Stop reason: HOSPADM

## 2018-01-01 RX ORDER — SODIUM BICARBONATE 650 MG/1
650 TABLET ORAL 4 TIMES DAILY
Status: DISCONTINUED | OUTPATIENT
Start: 2018-01-01 | End: 2018-01-01

## 2018-01-01 RX ORDER — RIFAXIMIN 550 MG/1
550 TABLET ORAL EVERY 8 HOURS SCHEDULED
COMMUNITY
Start: 2018-01-01 | End: 2018-01-01 | Stop reason: SDUPTHER

## 2018-01-01 RX ORDER — FLUCONAZOLE 2 MG/ML
400 INJECTION, SOLUTION INTRAVENOUS DAILY
Status: DISCONTINUED | OUTPATIENT
Start: 2018-01-01 | End: 2018-01-01

## 2018-01-01 RX ORDER — HALOPERIDOL 5 MG/ML
2 INJECTION INTRAMUSCULAR ONCE
Status: COMPLETED | OUTPATIENT
Start: 2018-01-01 | End: 2018-01-01

## 2018-01-01 RX ORDER — OXYCODONE HYDROCHLORIDE 5 MG/1
5 TABLET ORAL EVERY 6 HOURS PRN
Status: DISCONTINUED | OUTPATIENT
Start: 2018-01-01 | End: 2018-01-01

## 2018-01-01 RX ORDER — SIMETHICONE 80 MG
80 TABLET,CHEWABLE ORAL EVERY 6 HOURS PRN
Qty: 120 TABLET | Refills: 3 | Status: SHIPPED | OUTPATIENT
Start: 2018-01-01

## 2018-01-01 RX ORDER — SUCCINYLCHOLINE CHLORIDE 20 MG/ML
100 INJECTION INTRAMUSCULAR; INTRAVENOUS ONCE
Status: COMPLETED | OUTPATIENT
Start: 2018-01-01 | End: 2018-01-01

## 2018-01-01 RX ORDER — POTASSIUM CHLORIDE 750 MG/1
40 CAPSULE, EXTENDED RELEASE ORAL DAILY
Status: DISCONTINUED | OUTPATIENT
Start: 2018-01-01 | End: 2018-01-01 | Stop reason: SDUPTHER

## 2018-01-01 RX ORDER — SIMETHICONE 80 MG
80 TABLET,CHEWABLE ORAL EVERY 6 HOURS PRN
Status: DISCONTINUED | OUTPATIENT
Start: 2018-01-01 | End: 2018-01-01 | Stop reason: HOSPADM

## 2018-01-01 RX ORDER — PROMETHAZINE HYDROCHLORIDE 25 MG/ML
12.5 INJECTION, SOLUTION INTRAMUSCULAR; INTRAVENOUS EVERY 6 HOURS PRN
Status: DISCONTINUED | OUTPATIENT
Start: 2018-01-01 | End: 2018-01-01 | Stop reason: HOSPADM

## 2018-01-01 RX ORDER — POTASSIUM CHLORIDE 750 MG/1
40 CAPSULE, EXTENDED RELEASE ORAL 2 TIMES DAILY WITH MEALS
Status: DISCONTINUED | OUTPATIENT
Start: 2018-01-01 | End: 2018-01-01

## 2018-01-01 RX ORDER — LACTULOSE 10 G/15ML
30 SOLUTION ORAL EVERY 4 HOURS
Status: DISCONTINUED | OUTPATIENT
Start: 2018-01-01 | End: 2018-01-01

## 2018-01-01 RX ORDER — DICYCLOMINE HCL 20 MG
10 TABLET ORAL 4 TIMES DAILY PRN
Status: DISCONTINUED | OUTPATIENT
Start: 2018-01-01 | End: 2018-01-01

## 2018-01-01 RX ORDER — FUROSEMIDE 80 MG
80 TABLET ORAL ONCE
Status: COMPLETED | OUTPATIENT
Start: 2018-01-01 | End: 2018-01-01

## 2018-01-01 RX ORDER — BUMETANIDE 0.25 MG/ML
0.5 INJECTION INTRAMUSCULAR; INTRAVENOUS ONCE
Status: COMPLETED | OUTPATIENT
Start: 2018-01-01 | End: 2018-01-01

## 2018-01-01 RX ORDER — ALBUMIN, HUMAN INJ 5% 5 %
25 SOLUTION INTRAVENOUS EVERY 12 HOURS
Status: DISCONTINUED | OUTPATIENT
Start: 2018-01-01 | End: 2018-01-01

## 2018-01-01 RX ORDER — LORAZEPAM 2 MG/ML
1 INJECTION INTRAMUSCULAR ONCE
Status: COMPLETED | OUTPATIENT
Start: 2018-01-01 | End: 2018-01-01

## 2018-01-01 RX ORDER — SODIUM CHLORIDE 9 MG/ML
50 INJECTION, SOLUTION INTRAVENOUS CONTINUOUS
Status: DISCONTINUED | OUTPATIENT
Start: 2018-01-01 | End: 2018-01-01

## 2018-01-01 RX ORDER — METOLAZONE 2.5 MG/1
2.5 TABLET ORAL DAILY
Status: DISCONTINUED | OUTPATIENT
Start: 2018-01-01 | End: 2018-01-01 | Stop reason: HOSPADM

## 2018-01-01 RX ORDER — MIDODRINE HYDROCHLORIDE 10 MG/1
10 TABLET ORAL
COMMUNITY
End: 2018-01-01

## 2018-01-01 RX ADMIN — ALBUMIN (HUMAN) 5 ML/HR: 0.25 INJECTION, SOLUTION INTRAVENOUS at 02:00

## 2018-01-01 RX ADMIN — NYSTATIN: 100000 POWDER TOPICAL at 09:00

## 2018-01-01 RX ADMIN — FLUCONAZOLE IN SODIUM CHLORIDE 400 MG: 2 INJECTION, SOLUTION INTRAVENOUS at 09:27

## 2018-01-01 RX ADMIN — LACTULOSE 30 G: 10 SOLUTION ORAL at 08:37

## 2018-01-01 RX ADMIN — NICOTINE 1 PATCH: 21 PATCH TRANSDERMAL at 09:50

## 2018-01-01 RX ADMIN — FUROSEMIDE 20 MG: 10 INJECTION, SOLUTION INTRAMUSCULAR; INTRAVENOUS at 21:07

## 2018-01-01 RX ADMIN — FAMOTIDINE 20 MG: 20 TABLET ORAL at 10:15

## 2018-01-01 RX ADMIN — POTASSIUM CHLORIDE 10 MEQ: 7.46 INJECTION, SOLUTION INTRAVENOUS at 13:30

## 2018-01-01 RX ADMIN — LACTULOSE 30 G: 20 SOLUTION ORAL at 15:28

## 2018-01-01 RX ADMIN — HYDROMORPHONE HYDROCHLORIDE 0.25 MG: 2 INJECTION INTRAMUSCULAR; INTRAVENOUS; SUBCUTANEOUS at 17:11

## 2018-01-01 RX ADMIN — SODIUM CHLORIDE, PRESERVATIVE FREE 10 ML: 5 INJECTION INTRAVENOUS at 09:18

## 2018-01-01 RX ADMIN — HYDRALAZINE HYDROCHLORIDE 10 MG: 10 TABLET, FILM COATED ORAL at 14:30

## 2018-01-01 RX ADMIN — IPRATROPIUM BROMIDE AND ALBUTEROL SULFATE 3 ML: 2.5; .5 SOLUTION RESPIRATORY (INHALATION) at 19:26

## 2018-01-01 RX ADMIN — NYSTATIN: 100000 CREAM TOPICAL at 20:32

## 2018-01-01 RX ADMIN — SODIUM CHLORIDE, PRESERVATIVE FREE 10 ML: 5 INJECTION INTRAVENOUS at 20:52

## 2018-01-01 RX ADMIN — POTASSIUM CHLORIDE 20 MEQ: 200 INJECTION, SOLUTION INTRAVENOUS at 17:07

## 2018-01-01 RX ADMIN — CEFTRIAXONE 1 G: 1 INJECTION, POWDER, FOR SOLUTION INTRAMUSCULAR; INTRAVENOUS at 20:12

## 2018-01-01 RX ADMIN — HYDROMORPHONE HYDROCHLORIDE 0.25 MG: 2 INJECTION INTRAMUSCULAR; INTRAVENOUS; SUBCUTANEOUS at 17:06

## 2018-01-01 RX ADMIN — MORPHINE SULFATE 4 MG: 4 INJECTION INTRAVENOUS at 13:15

## 2018-01-01 RX ADMIN — ALBUMIN HUMAN 25 G: 0.05 INJECTION, SOLUTION INTRAVENOUS at 19:01

## 2018-01-01 RX ADMIN — SODIUM CHLORIDE, PRESERVATIVE FREE 3 ML: 5 INJECTION INTRAVENOUS at 08:38

## 2018-01-01 RX ADMIN — FAMOTIDINE 20 MG: 20 INJECTION, SOLUTION INTRAVENOUS at 21:14

## 2018-01-01 RX ADMIN — RIFAXIMIN 275 MG: 550 TABLET ORAL at 20:29

## 2018-01-01 RX ADMIN — RIFAXIMIN 275 MG: 550 TABLET ORAL at 21:58

## 2018-01-01 RX ADMIN — NYSTATIN: 100000 POWDER TOPICAL at 20:04

## 2018-01-01 RX ADMIN — AZITHROMYCIN MONOHYDRATE 500 MG: 500 INJECTION, POWDER, LYOPHILIZED, FOR SOLUTION INTRAVENOUS at 18:33

## 2018-01-01 RX ADMIN — MORPHINE SULFATE 4 MG: 4 INJECTION INTRAVENOUS at 05:22

## 2018-01-01 RX ADMIN — IPRATROPIUM BROMIDE AND ALBUTEROL SULFATE 3 ML: 2.5; .5 SOLUTION RESPIRATORY (INHALATION) at 08:40

## 2018-01-01 RX ADMIN — HYDROCORTISONE 1 G: 1 CREAM TOPICAL at 08:41

## 2018-01-01 RX ADMIN — LACTULOSE 30 G: 10 SOLUTION ORAL at 17:11

## 2018-01-01 RX ADMIN — NYSTATIN: 100000 POWDER TOPICAL at 08:10

## 2018-01-01 RX ADMIN — IPRATROPIUM BROMIDE AND ALBUTEROL SULFATE 3 ML: 2.5; .5 SOLUTION RESPIRATORY (INHALATION) at 07:08

## 2018-01-01 RX ADMIN — HYDROMORPHONE HYDROCHLORIDE 0.25 MG: 2 INJECTION INTRAMUSCULAR; INTRAVENOUS; SUBCUTANEOUS at 04:52

## 2018-01-01 RX ADMIN — LEVOFLOXACIN 750 MG: 500 TABLET, FILM COATED ORAL at 06:09

## 2018-01-01 RX ADMIN — HYDROCORTISONE 1 G: 1 CREAM TOPICAL at 21:07

## 2018-01-01 RX ADMIN — FUROSEMIDE 40 MG: 40 TABLET ORAL at 09:21

## 2018-01-01 RX ADMIN — ONDANSETRON 4 MG: 2 INJECTION INTRAMUSCULAR; INTRAVENOUS at 09:12

## 2018-01-01 RX ADMIN — RIFAXIMIN 275 MG: 550 TABLET ORAL at 20:31

## 2018-01-01 RX ADMIN — VANCOMYCIN HYDROCHLORIDE 1250 MG: 500 INJECTION, POWDER, LYOPHILIZED, FOR SOLUTION INTRAVENOUS at 11:25

## 2018-01-01 RX ADMIN — SODIUM CHLORIDE 100 ML/HR: 900 INJECTION, SOLUTION INTRAVENOUS at 06:00

## 2018-01-01 RX ADMIN — PHYTONADIONE 5 MG: 10 INJECTION, EMULSION INTRAMUSCULAR; INTRAVENOUS; SUBCUTANEOUS at 20:11

## 2018-01-01 RX ADMIN — FUROSEMIDE 20 MG: 10 INJECTION, SOLUTION INTRAMUSCULAR; INTRAVENOUS at 09:17

## 2018-01-01 RX ADMIN — HYDROCORTISONE 1 G: 1 CREAM TOPICAL at 16:00

## 2018-01-01 RX ADMIN — FUROSEMIDE 5 MG/HR: 10 INJECTION, SOLUTION INTRAVENOUS at 10:23

## 2018-01-01 RX ADMIN — MORPHINE SULFATE 4 MG: 4 INJECTION INTRAVENOUS at 09:08

## 2018-01-01 RX ADMIN — HYDROCORTISONE 1 G: 1 CREAM TOPICAL at 18:07

## 2018-01-01 RX ADMIN — LACTULOSE 30 G: 20 SOLUTION ORAL at 18:14

## 2018-01-01 RX ADMIN — RIFAXIMIN 275 MG: 550 TABLET ORAL at 08:24

## 2018-01-01 RX ADMIN — POTASSIUM CHLORIDE 40 MEQ: 1.5 POWDER, FOR SOLUTION ORAL at 17:19

## 2018-01-01 RX ADMIN — HYDROMORPHONE HYDROCHLORIDE 0.25 MG: 1 INJECTION, SOLUTION INTRAMUSCULAR; INTRAVENOUS; SUBCUTANEOUS at 22:44

## 2018-01-01 RX ADMIN — PROMETHAZINE HYDROCHLORIDE 12.5 MG: 25 INJECTION, SOLUTION INTRAMUSCULAR; INTRAVENOUS at 18:04

## 2018-01-01 RX ADMIN — SODIUM CHLORIDE 1000 ML: 9 INJECTION, SOLUTION INTRAVENOUS at 16:10

## 2018-01-01 RX ADMIN — HYDROCORTISONE 1 G: 1 CREAM TOPICAL at 09:35

## 2018-01-01 RX ADMIN — SODIUM CHLORIDE 125 ML/HR: 9 INJECTION, SOLUTION INTRAVENOUS at 04:45

## 2018-01-01 RX ADMIN — MORPHINE SULFATE 4 MG: 4 INJECTION INTRAVENOUS at 02:13

## 2018-01-01 RX ADMIN — ALBUMIN (HUMAN) 10 ML/HR: 0.25 INJECTION, SOLUTION INTRAVENOUS at 00:35

## 2018-01-01 RX ADMIN — ALBUTEROL SULFATE 2.5 MG: 2.5 SOLUTION RESPIRATORY (INHALATION) at 07:18

## 2018-01-01 RX ADMIN — IPRATROPIUM BROMIDE AND ALBUTEROL SULFATE 3 ML: 2.5; .5 SOLUTION RESPIRATORY (INHALATION) at 12:19

## 2018-01-01 RX ADMIN — LACTULOSE 30 G: 20 SOLUTION ORAL at 20:29

## 2018-01-01 RX ADMIN — METRONIDAZOLE 500 MG: 500 INJECTION, SOLUTION INTRAVENOUS at 01:31

## 2018-01-01 RX ADMIN — NICOTINE 1 PATCH: 14 PATCH, EXTENDED RELEASE TRANSDERMAL at 22:00

## 2018-01-01 RX ADMIN — MIDODRINE HYDROCHLORIDE 10 MG: 5 TABLET ORAL at 20:10

## 2018-01-01 RX ADMIN — BUDESONIDE AND FORMOTEROL FUMARATE DIHYDRATE 2 PUFF: 160; 4.5 AEROSOL RESPIRATORY (INHALATION) at 08:49

## 2018-01-01 RX ADMIN — NYSTATIN: 100000 CREAM TOPICAL at 09:07

## 2018-01-01 RX ADMIN — IPRATROPIUM BROMIDE AND ALBUTEROL SULFATE 3 ML: 2.5; .5 SOLUTION RESPIRATORY (INHALATION) at 18:43

## 2018-01-01 RX ADMIN — PANTOPRAZOLE SODIUM 8 MG/HR: 40 INJECTION, POWDER, FOR SOLUTION INTRAVENOUS at 01:48

## 2018-01-01 RX ADMIN — OXYCODONE HYDROCHLORIDE 5 MG: 5 TABLET ORAL at 09:13

## 2018-01-01 RX ADMIN — POTASSIUM CHLORIDE 40 MEQ: 750 CAPSULE, EXTENDED RELEASE ORAL at 20:12

## 2018-01-01 RX ADMIN — OXYCODONE HYDROCHLORIDE 5 MG: 5 TABLET ORAL at 11:54

## 2018-01-01 RX ADMIN — FAMOTIDINE 20 MG: 20 TABLET ORAL at 21:36

## 2018-01-01 RX ADMIN — PANTOPRAZOLE SODIUM 8 MG/HR: 40 INJECTION, POWDER, FOR SOLUTION INTRAVENOUS at 11:20

## 2018-01-01 RX ADMIN — NICOTINE 1 PATCH: 21 PATCH TRANSDERMAL at 08:49

## 2018-01-01 RX ADMIN — MIDAZOLAM HYDROCHLORIDE 2 MG: 2 INJECTION, SOLUTION INTRAMUSCULAR; INTRAVENOUS at 13:09

## 2018-01-01 RX ADMIN — HYDROMORPHONE HYDROCHLORIDE 0.25 MG: 2 INJECTION INTRAMUSCULAR; INTRAVENOUS; SUBCUTANEOUS at 08:10

## 2018-01-01 RX ADMIN — NICOTINE 1 PATCH: 14 PATCH, EXTENDED RELEASE TRANSDERMAL at 21:36

## 2018-01-01 RX ADMIN — HYDROCORTISONE 1 G: 1 CREAM TOPICAL at 17:06

## 2018-01-01 RX ADMIN — OXYCODONE HYDROCHLORIDE 5 MG: 5 TABLET ORAL at 21:30

## 2018-01-01 RX ADMIN — RIFAXIMIN 275 MG: 550 TABLET ORAL at 08:20

## 2018-01-01 RX ADMIN — IPRATROPIUM BROMIDE AND ALBUTEROL SULFATE 3 ML: 2.5; .5 SOLUTION RESPIRATORY (INHALATION) at 12:24

## 2018-01-01 RX ADMIN — VANCOMYCIN HYDROCHLORIDE 2000 MG: 500 INJECTION, POWDER, LYOPHILIZED, FOR SOLUTION INTRAVENOUS at 10:18

## 2018-01-01 RX ADMIN — ALBUMIN HUMAN 25 G: 0.05 INJECTION, SOLUTION INTRAVENOUS at 06:18

## 2018-01-01 RX ADMIN — LORAZEPAM 0.5 MG: 0.5 TABLET ORAL at 21:15

## 2018-01-01 RX ADMIN — ALBUMIN (HUMAN) 12.5 G: 0.25 INJECTION, SOLUTION INTRAVENOUS at 08:53

## 2018-01-01 RX ADMIN — FUROSEMIDE 40 MG: 40 TABLET ORAL at 11:27

## 2018-01-01 RX ADMIN — ALBUMIN (HUMAN) 12.5 G: 0.25 INJECTION, SOLUTION INTRAVENOUS at 20:55

## 2018-01-01 RX ADMIN — IPRATROPIUM BROMIDE AND ALBUTEROL SULFATE 3 ML: 2.5; .5 SOLUTION RESPIRATORY (INHALATION) at 11:21

## 2018-01-01 RX ADMIN — RIFAXIMIN 275 MG: 550 TABLET ORAL at 20:20

## 2018-01-01 RX ADMIN — NYSTATIN: 100000 POWDER TOPICAL at 20:13

## 2018-01-01 RX ADMIN — HYDROCORTISONE 1 G: 1 CREAM TOPICAL at 17:32

## 2018-01-01 RX ADMIN — CEFTRIAXONE 2 G: 2 INJECTION, POWDER, FOR SOLUTION INTRAMUSCULAR; INTRAVENOUS at 21:10

## 2018-01-01 RX ADMIN — NYSTATIN: 100000 CREAM TOPICAL at 20:38

## 2018-01-01 RX ADMIN — CEFTRIAXONE 1 G: 1 INJECTION, POWDER, FOR SOLUTION INTRAMUSCULAR; INTRAVENOUS at 20:34

## 2018-01-01 RX ADMIN — ONDANSETRON 4 MG: 2 INJECTION INTRAMUSCULAR; INTRAVENOUS at 12:38

## 2018-01-01 RX ADMIN — HYDROCORTISONE 1 G: 1 CREAM TOPICAL at 17:03

## 2018-01-01 RX ADMIN — ALBUMIN (HUMAN) 10 ML/HR: 0.25 INJECTION, SOLUTION INTRAVENOUS at 18:11

## 2018-01-01 RX ADMIN — RIFAXIMIN 275 MG: 550 TABLET ORAL at 20:13

## 2018-01-01 RX ADMIN — DOXYCYCLINE 100 MG: 100 INJECTION, POWDER, LYOPHILIZED, FOR SOLUTION INTRAVENOUS at 05:41

## 2018-01-01 RX ADMIN — RIFAXIMIN 550 MG: 550 TABLET ORAL at 21:07

## 2018-01-01 RX ADMIN — HYDRALAZINE HYDROCHLORIDE 10 MG: 10 TABLET, FILM COATED ORAL at 21:58

## 2018-01-01 RX ADMIN — NYSTATIN: 100000 POWDER TOPICAL at 21:30

## 2018-01-01 RX ADMIN — NICOTINE 1 PATCH: 14 PATCH, EXTENDED RELEASE TRANSDERMAL at 20:49

## 2018-01-01 RX ADMIN — SODIUM CHLORIDE, PRESERVATIVE FREE 3 ML: 5 INJECTION INTRAVENOUS at 08:53

## 2018-01-01 RX ADMIN — POTASSIUM CHLORIDE 40 MEQ: 750 CAPSULE, EXTENDED RELEASE ORAL at 08:28

## 2018-01-01 RX ADMIN — SODIUM CHLORIDE, PRESERVATIVE FREE 10 ML: 5 INJECTION INTRAVENOUS at 09:24

## 2018-01-01 RX ADMIN — POTASSIUM CHLORIDE 40 MEQ: 750 CAPSULE, EXTENDED RELEASE ORAL at 22:05

## 2018-01-01 RX ADMIN — POTASSIUM CHLORIDE 40 MEQ: 1.5 POWDER, FOR SOLUTION ORAL at 18:03

## 2018-01-01 RX ADMIN — Medication 10 ML: at 06:37

## 2018-01-01 RX ADMIN — MORPHINE SULFATE 2 MG: 4 INJECTION INTRAVENOUS at 00:16

## 2018-01-01 RX ADMIN — RIFAXIMIN 550 MG: 550 TABLET ORAL at 21:24

## 2018-01-01 RX ADMIN — HYDROMORPHONE HYDROCHLORIDE 0.25 MG: 2 INJECTION INTRAMUSCULAR; INTRAVENOUS; SUBCUTANEOUS at 09:34

## 2018-01-01 RX ADMIN — HYDROCODONE BITARTRATE AND ACETAMINOPHEN 1 TABLET: 7.5; 325 TABLET ORAL at 06:15

## 2018-01-01 RX ADMIN — FLUCONAZOLE 400 MG: 2 INJECTION INTRAVENOUS at 17:11

## 2018-01-01 RX ADMIN — OXYCODONE HYDROCHLORIDE 5 MG: 5 TABLET ORAL at 21:01

## 2018-01-01 RX ADMIN — ALBUMIN HUMAN 25 G: 0.05 INJECTION, SOLUTION INTRAVENOUS at 18:07

## 2018-01-01 RX ADMIN — FAMOTIDINE 20 MG: 20 TABLET ORAL at 09:22

## 2018-01-01 RX ADMIN — SODIUM BICARBONATE 650 MG TABLET 650 MG: at 18:18

## 2018-01-01 RX ADMIN — HYDROMORPHONE HYDROCHLORIDE 0.25 MG: 2 INJECTION INTRAMUSCULAR; INTRAVENOUS; SUBCUTANEOUS at 00:29

## 2018-01-01 RX ADMIN — LACTULOSE 30 G: 10 SOLUTION ORAL at 20:44

## 2018-01-01 RX ADMIN — NYSTATIN: 100000 POWDER TOPICAL at 20:35

## 2018-01-01 RX ADMIN — METRONIDAZOLE 500 MG: 500 INJECTION, SOLUTION INTRAVENOUS at 05:19

## 2018-01-01 RX ADMIN — NICOTINE 1 PATCH: 21 PATCH TRANSDERMAL at 10:39

## 2018-01-01 RX ADMIN — CEFTRIAXONE 1 G: 1 INJECTION, POWDER, FOR SOLUTION INTRAMUSCULAR; INTRAVENOUS at 22:23

## 2018-01-01 RX ADMIN — MORPHINE SULFATE 2 MG: 4 INJECTION INTRAVENOUS at 02:18

## 2018-01-01 RX ADMIN — IPRATROPIUM BROMIDE AND ALBUTEROL SULFATE 3 ML: 2.5; .5 SOLUTION RESPIRATORY (INHALATION) at 11:02

## 2018-01-01 RX ADMIN — NICOTINE 1 PATCH: 21 PATCH TRANSDERMAL at 10:03

## 2018-01-01 RX ADMIN — FAMOTIDINE 20 MG: 20 INJECTION, SOLUTION INTRAVENOUS at 09:08

## 2018-01-01 RX ADMIN — ALBUTEROL SULFATE 2.5 MG: 2.5 SOLUTION RESPIRATORY (INHALATION) at 15:47

## 2018-01-01 RX ADMIN — LACTULOSE 30 G: 20 SOLUTION ORAL at 09:32

## 2018-01-01 RX ADMIN — SODIUM CHLORIDE, PRESERVATIVE FREE 10 ML: 5 INJECTION INTRAVENOUS at 09:43

## 2018-01-01 RX ADMIN — SODIUM CHLORIDE, PRESERVATIVE FREE 3 ML: 5 INJECTION INTRAVENOUS at 08:21

## 2018-01-01 RX ADMIN — POTASSIUM CHLORIDE 10 MEQ: 7.46 INJECTION, SOLUTION INTRAVENOUS at 12:17

## 2018-01-01 RX ADMIN — MIDODRINE HYDROCHLORIDE 10 MG: 5 TABLET ORAL at 16:21

## 2018-01-01 RX ADMIN — HYDROMORPHONE HYDROCHLORIDE 0.25 MG: 1 INJECTION, SOLUTION INTRAMUSCULAR; INTRAVENOUS; SUBCUTANEOUS at 00:03

## 2018-01-01 RX ADMIN — FUROSEMIDE 40 MG: 10 INJECTION INTRAMUSCULAR; INTRAVENOUS at 22:09

## 2018-01-01 RX ADMIN — HYDRALAZINE HYDROCHLORIDE 10 MG: 10 TABLET, FILM COATED ORAL at 05:35

## 2018-01-01 RX ADMIN — BUDESONIDE AND FORMOTEROL FUMARATE DIHYDRATE 2 PUFF: 160; 4.5 AEROSOL RESPIRATORY (INHALATION) at 19:40

## 2018-01-01 RX ADMIN — ALBUMIN HUMAN 25 G: 0.05 INJECTION, SOLUTION INTRAVENOUS at 09:27

## 2018-01-01 RX ADMIN — CEFTRIAXONE 2 G: 2 INJECTION, POWDER, FOR SOLUTION INTRAMUSCULAR; INTRAVENOUS at 21:49

## 2018-01-01 RX ADMIN — POTASSIUM CHLORIDE 40 MEQ: 750 CAPSULE, EXTENDED RELEASE ORAL at 10:25

## 2018-01-01 RX ADMIN — SODIUM CHLORIDE, PRESERVATIVE FREE 3 ML: 5 INJECTION INTRAVENOUS at 21:04

## 2018-01-01 RX ADMIN — IPRATROPIUM BROMIDE AND ALBUTEROL SULFATE 3 ML: 2.5; .5 SOLUTION RESPIRATORY (INHALATION) at 12:05

## 2018-01-01 RX ADMIN — FUROSEMIDE 40 MG: 40 TABLET ORAL at 08:19

## 2018-01-01 RX ADMIN — NYSTATIN: 100000 POWDER TOPICAL at 08:43

## 2018-01-01 RX ADMIN — RIFAXIMIN 275 MG: 550 TABLET ORAL at 08:22

## 2018-01-01 RX ADMIN — MORPHINE SULFATE 1 MG: 2 INJECTION, SOLUTION INTRAMUSCULAR; INTRAVENOUS at 20:12

## 2018-01-01 RX ADMIN — NICOTINE 1 PATCH: 14 PATCH, EXTENDED RELEASE TRANSDERMAL at 22:06

## 2018-01-01 RX ADMIN — Medication 10 ML: at 05:11

## 2018-01-01 RX ADMIN — ALBUMIN HUMAN 25 G: 0.05 INJECTION, SOLUTION INTRAVENOUS at 06:02

## 2018-01-01 RX ADMIN — SODIUM CHLORIDE 50 ML/HR: 9 INJECTION, SOLUTION INTRAVENOUS at 09:34

## 2018-01-01 RX ADMIN — LACTULOSE 30 G: 10 SOLUTION ORAL at 20:22

## 2018-01-01 RX ADMIN — NYSTATIN: 100000 CREAM TOPICAL at 20:31

## 2018-01-01 RX ADMIN — NYSTATIN: 100000 CREAM TOPICAL at 10:34

## 2018-01-01 RX ADMIN — NYSTATIN: 100000 POWDER TOPICAL at 10:07

## 2018-01-01 RX ADMIN — SODIUM CHLORIDE, PRESERVATIVE FREE 3 ML: 5 INJECTION INTRAVENOUS at 22:34

## 2018-01-01 RX ADMIN — RIFAXIMIN 550 MG: 550 TABLET ORAL at 21:36

## 2018-01-01 RX ADMIN — NICOTINE 1 PATCH: 21 PATCH TRANSDERMAL at 08:04

## 2018-01-01 RX ADMIN — MORPHINE SULFATE 4 MG: 4 INJECTION INTRAVENOUS at 10:21

## 2018-01-01 RX ADMIN — SODIUM BICARBONATE 650 MG TABLET 650 MG: at 21:00

## 2018-01-01 RX ADMIN — MORPHINE SULFATE 1 MG: 2 INJECTION, SOLUTION INTRAMUSCULAR; INTRAVENOUS at 14:21

## 2018-01-01 RX ADMIN — HYDROMORPHONE HYDROCHLORIDE 0.25 MG: 2 INJECTION INTRAMUSCULAR; INTRAVENOUS; SUBCUTANEOUS at 05:19

## 2018-01-01 RX ADMIN — RIFAXIMIN 275 MG: 550 TABLET ORAL at 20:49

## 2018-01-01 RX ADMIN — OXYCODONE HYDROCHLORIDE 5 MG: 5 TABLET ORAL at 08:18

## 2018-01-01 RX ADMIN — LINEZOLID 600 MG: 600 TABLET, FILM COATED ORAL at 08:20

## 2018-01-01 RX ADMIN — MIDODRINE HYDROCHLORIDE 10 MG: 5 TABLET ORAL at 09:37

## 2018-01-01 RX ADMIN — ALBUMIN (HUMAN) 12.5 G: 0.25 INJECTION, SOLUTION INTRAVENOUS at 22:01

## 2018-01-01 RX ADMIN — LACTULOSE 30 G: 10 SOLUTION ORAL at 08:02

## 2018-01-01 RX ADMIN — MORPHINE SULFATE 4 MG: 4 INJECTION INTRAVENOUS at 23:00

## 2018-01-01 RX ADMIN — LACTULOSE 30 G: 10 SOLUTION ORAL at 20:11

## 2018-01-01 RX ADMIN — CEFEPIME HYDROCHLORIDE 2 G: 2 INJECTION, POWDER, FOR SOLUTION INTRAVENOUS at 19:52

## 2018-01-01 RX ADMIN — FUROSEMIDE 5 MG/HR: 10 INJECTION, SOLUTION INTRAVENOUS at 03:33

## 2018-01-01 RX ADMIN — OXYCODONE HYDROCHLORIDE 5 MG: 5 TABLET ORAL at 04:54

## 2018-01-01 RX ADMIN — HYDROMORPHONE HYDROCHLORIDE 0.25 MG: 2 INJECTION INTRAMUSCULAR; INTRAVENOUS; SUBCUTANEOUS at 00:26

## 2018-01-01 RX ADMIN — MAGNESIUM SULFATE HEPTAHYDRATE 4 G: 40 INJECTION, SOLUTION INTRAVENOUS at 18:34

## 2018-01-01 RX ADMIN — SODIUM CHLORIDE, PRESERVATIVE FREE 10 ML: 5 INJECTION INTRAVENOUS at 08:20

## 2018-01-01 RX ADMIN — METRONIDAZOLE 500 MG: 500 INJECTION, SOLUTION INTRAVENOUS at 11:01

## 2018-01-01 RX ADMIN — MIDODRINE HYDROCHLORIDE 10 MG: 5 TABLET ORAL at 20:50

## 2018-01-01 RX ADMIN — NYSTATIN: 100000 POWDER TOPICAL at 21:48

## 2018-01-01 RX ADMIN — HYDROMORPHONE HYDROCHLORIDE 0.25 MG: 1 INJECTION, SOLUTION INTRAMUSCULAR; INTRAVENOUS; SUBCUTANEOUS at 01:31

## 2018-01-01 RX ADMIN — NYSTATIN: 100000 CREAM TOPICAL at 20:21

## 2018-01-01 RX ADMIN — MIDODRINE HYDROCHLORIDE 10 MG: 5 TABLET ORAL at 09:13

## 2018-01-01 RX ADMIN — Medication 20 ML: at 20:49

## 2018-01-01 RX ADMIN — NICOTINE 1 PATCH: 14 PATCH, EXTENDED RELEASE TRANSDERMAL at 20:07

## 2018-01-01 RX ADMIN — SODIUM CHLORIDE 100 ML/HR: 900 INJECTION, SOLUTION INTRAVENOUS at 06:17

## 2018-01-01 RX ADMIN — IPRATROPIUM BROMIDE AND ALBUTEROL SULFATE 3 ML: 2.5; .5 SOLUTION RESPIRATORY (INHALATION) at 13:41

## 2018-01-01 RX ADMIN — MORPHINE SULFATE 1 MG: 4 INJECTION, SOLUTION INTRAMUSCULAR; INTRAVENOUS at 06:20

## 2018-01-01 RX ADMIN — LACTULOSE 30 G: 20 SOLUTION ORAL at 17:45

## 2018-01-01 RX ADMIN — ALBUMIN HUMAN 25 G: 0.05 INJECTION, SOLUTION INTRAVENOUS at 18:44

## 2018-01-01 RX ADMIN — LACTULOSE 30 G: 20 SOLUTION ORAL at 20:47

## 2018-01-01 RX ADMIN — LACTULOSE 30 G: 10 SOLUTION ORAL at 08:10

## 2018-01-01 RX ADMIN — ALBUMIN (HUMAN) 5 ML/HR: 0.25 INJECTION, SOLUTION INTRAVENOUS at 15:45

## 2018-01-01 RX ADMIN — FAMOTIDINE 20 MG: 20 INJECTION, SOLUTION INTRAVENOUS at 21:56

## 2018-01-01 RX ADMIN — MIDAZOLAM 1 MG/HR: 5 INJECTION INTRAMUSCULAR; INTRAVENOUS at 13:32

## 2018-01-01 RX ADMIN — HYDROMORPHONE HYDROCHLORIDE 0.25 MG: 2 INJECTION INTRAMUSCULAR; INTRAVENOUS; SUBCUTANEOUS at 15:42

## 2018-01-01 RX ADMIN — FAMOTIDINE 20 MG: 20 INJECTION, SOLUTION INTRAVENOUS at 20:12

## 2018-01-01 RX ADMIN — SODIUM CHLORIDE, PRESERVATIVE FREE 10 ML: 5 INJECTION INTRAVENOUS at 09:05

## 2018-01-01 RX ADMIN — POTASSIUM CHLORIDE 10 MEQ: 7.46 INJECTION, SOLUTION INTRAVENOUS at 17:11

## 2018-01-01 RX ADMIN — FUROSEMIDE 20 MG: 10 INJECTION, SOLUTION INTRAMUSCULAR; INTRAVENOUS at 17:35

## 2018-01-01 RX ADMIN — BUDESONIDE AND FORMOTEROL FUMARATE DIHYDRATE 2 PUFF: 160; 4.5 AEROSOL RESPIRATORY (INHALATION) at 20:29

## 2018-01-01 RX ADMIN — RIFAXIMIN 275 MG: 550 TABLET ORAL at 20:52

## 2018-01-01 RX ADMIN — POTASSIUM CHLORIDE 40 MEQ: 750 CAPSULE, EXTENDED RELEASE ORAL at 18:02

## 2018-01-01 RX ADMIN — SODIUM CHLORIDE, PRESERVATIVE FREE 3 ML: 5 INJECTION INTRAVENOUS at 20:11

## 2018-01-01 RX ADMIN — LACTULOSE 30 G: 10 SOLUTION ORAL at 01:30

## 2018-01-01 RX ADMIN — SODIUM CHLORIDE 1000 ML: 900 INJECTION, SOLUTION INTRAVENOUS at 16:16

## 2018-01-01 RX ADMIN — BUDESONIDE AND FORMOTEROL FUMARATE DIHYDRATE 2 PUFF: 160; 4.5 AEROSOL RESPIRATORY (INHALATION) at 19:17

## 2018-01-01 RX ADMIN — HYDROMORPHONE HYDROCHLORIDE 0.25 MG: 2 INJECTION INTRAMUSCULAR; INTRAVENOUS; SUBCUTANEOUS at 20:30

## 2018-01-01 RX ADMIN — IPRATROPIUM BROMIDE AND ALBUTEROL SULFATE 3 ML: 2.5; .5 SOLUTION RESPIRATORY (INHALATION) at 11:10

## 2018-01-01 RX ADMIN — FUROSEMIDE 20 MG: 10 INJECTION, SOLUTION INTRAMUSCULAR; INTRAVENOUS at 18:10

## 2018-01-01 RX ADMIN — IPRATROPIUM BROMIDE AND ALBUTEROL SULFATE 3 ML: 2.5; .5 SOLUTION RESPIRATORY (INHALATION) at 20:09

## 2018-01-01 RX ADMIN — NICOTINE 1 PATCH: 21 PATCH TRANSDERMAL at 08:33

## 2018-01-01 RX ADMIN — NICOTINE 1 PATCH: 21 PATCH TRANSDERMAL at 09:04

## 2018-01-01 RX ADMIN — FAMOTIDINE 20 MG: 20 TABLET ORAL at 10:28

## 2018-01-01 RX ADMIN — Medication: at 22:25

## 2018-01-01 RX ADMIN — FAMOTIDINE 20 MG: 20 TABLET ORAL at 21:24

## 2018-01-01 RX ADMIN — HYDROCORTISONE 1 G: 1 CREAM TOPICAL at 21:00

## 2018-01-01 RX ADMIN — HYDROMORPHONE HYDROCHLORIDE 0.25 MG: 2 INJECTION INTRAMUSCULAR; INTRAVENOUS; SUBCUTANEOUS at 03:38

## 2018-01-01 RX ADMIN — FAMOTIDINE 20 MG: 20 TABLET ORAL at 21:44

## 2018-01-01 RX ADMIN — NYSTATIN: 100000 CREAM TOPICAL at 21:45

## 2018-01-01 RX ADMIN — IPRATROPIUM BROMIDE AND ALBUTEROL SULFATE 3 ML: 2.5; .5 SOLUTION RESPIRATORY (INHALATION) at 15:53

## 2018-01-01 RX ADMIN — ALBUMIN HUMAN 25 G: 0.05 INJECTION, SOLUTION INTRAVENOUS at 02:17

## 2018-01-01 RX ADMIN — IPRATROPIUM BROMIDE AND ALBUTEROL SULFATE 3 ML: 2.5; .5 SOLUTION RESPIRATORY (INHALATION) at 18:50

## 2018-01-01 RX ADMIN — FLUCONAZOLE 400 MG: 2 INJECTION INTRAVENOUS at 17:26

## 2018-01-01 RX ADMIN — ALBUMIN HUMAN 25 G: 0.05 INJECTION, SOLUTION INTRAVENOUS at 06:10

## 2018-01-01 RX ADMIN — SODIUM CHLORIDE 50 ML/HR: 900 INJECTION, SOLUTION INTRAVENOUS at 20:00

## 2018-01-01 RX ADMIN — ALBUMIN (HUMAN) 5 ML/HR: 0.25 INJECTION, SOLUTION INTRAVENOUS at 17:12

## 2018-01-01 RX ADMIN — POTASSIUM CHLORIDE 20 MEQ: 200 INJECTION, SOLUTION INTRAVENOUS at 06:29

## 2018-01-01 RX ADMIN — LACTULOSE 30 G: 10 SOLUTION ORAL at 20:47

## 2018-01-01 RX ADMIN — MORPHINE SULFATE 4 MG: 4 INJECTION INTRAVENOUS at 15:14

## 2018-01-01 RX ADMIN — NICOTINE 1 PATCH: 21 PATCH TRANSDERMAL at 08:22

## 2018-01-01 RX ADMIN — HYDROMORPHONE HYDROCHLORIDE 0.25 MG: 1 INJECTION, SOLUTION INTRAMUSCULAR; INTRAVENOUS; SUBCUTANEOUS at 22:13

## 2018-01-01 RX ADMIN — RIFAXIMIN 275 MG: 550 TABLET ORAL at 08:46

## 2018-01-01 RX ADMIN — LORAZEPAM 0.5 MG: 0.5 TABLET ORAL at 21:44

## 2018-01-01 RX ADMIN — LACTULOSE 30 G: 20 SOLUTION ORAL at 09:50

## 2018-01-01 RX ADMIN — SPIRONOLACTONE 25 MG: 25 TABLET ORAL at 09:34

## 2018-01-01 RX ADMIN — FAMOTIDINE 20 MG: 20 TABLET ORAL at 11:15

## 2018-01-01 RX ADMIN — SODIUM CHLORIDE, PRESERVATIVE FREE 3 ML: 5 INJECTION INTRAVENOUS at 10:34

## 2018-01-01 RX ADMIN — CEFTRIAXONE 1 G: 1 INJECTION, POWDER, FOR SOLUTION INTRAMUSCULAR; INTRAVENOUS at 11:36

## 2018-01-01 RX ADMIN — TAZOBACTAM SODIUM AND PIPERACILLIN SODIUM 3.38 G: 375; 3 INJECTION, SOLUTION INTRAVENOUS at 15:06

## 2018-01-01 RX ADMIN — LACTULOSE 30 G: 20 SOLUTION ORAL at 20:11

## 2018-01-01 RX ADMIN — RIFAXIMIN 275 MG: 550 TABLET ORAL at 09:03

## 2018-01-01 RX ADMIN — SODIUM CHLORIDE, PRESERVATIVE FREE 3 ML: 5 INJECTION INTRAVENOUS at 10:00

## 2018-01-01 RX ADMIN — MORPHINE SULFATE 1 MG: 4 INJECTION, SOLUTION INTRAMUSCULAR; INTRAVENOUS at 21:50

## 2018-01-01 RX ADMIN — ONDANSETRON 4 MG: 2 INJECTION INTRAMUSCULAR; INTRAVENOUS at 15:35

## 2018-01-01 RX ADMIN — PANTOPRAZOLE SODIUM 8 MG/HR: 40 INJECTION, POWDER, FOR SOLUTION INTRAVENOUS at 15:51

## 2018-01-01 RX ADMIN — LACTULOSE 30 G: 10 SOLUTION ORAL at 20:12

## 2018-01-01 RX ADMIN — MORPHINE SULFATE 2 MG: 4 INJECTION INTRAVENOUS at 04:41

## 2018-01-01 RX ADMIN — SODIUM CHLORIDE, PRESERVATIVE FREE 10 ML: 5 INJECTION INTRAVENOUS at 20:43

## 2018-01-01 RX ADMIN — RIFAXIMIN 275 MG: 550 TABLET ORAL at 08:13

## 2018-01-01 RX ADMIN — RIFAXIMIN 275 MG: 550 TABLET ORAL at 08:30

## 2018-01-01 RX ADMIN — DIPHENHYDRAMINE HYDROCHLORIDE 50 MG: 50 INJECTION, SOLUTION INTRAMUSCULAR; INTRAVENOUS at 09:07

## 2018-01-01 RX ADMIN — NICOTINE 1 PATCH: 14 PATCH, EXTENDED RELEASE TRANSDERMAL at 21:52

## 2018-01-01 RX ADMIN — CEFTRIAXONE 2 G: 2 INJECTION, POWDER, FOR SOLUTION INTRAMUSCULAR; INTRAVENOUS at 21:44

## 2018-01-01 RX ADMIN — RIFAXIMIN 550 MG: 550 TABLET ORAL at 08:10

## 2018-01-01 RX ADMIN — SODIUM CHLORIDE, PRESERVATIVE FREE 3 ML: 5 INJECTION INTRAVENOUS at 09:00

## 2018-01-01 RX ADMIN — POTASSIUM CHLORIDE 10 MEQ: 7.46 INJECTION, SOLUTION INTRAVENOUS at 11:56

## 2018-01-01 RX ADMIN — SODIUM CHLORIDE, PRESERVATIVE FREE 3 ML: 5 INJECTION INTRAVENOUS at 20:24

## 2018-01-01 RX ADMIN — OXYCODONE HYDROCHLORIDE 5 MG: 5 TABLET ORAL at 20:35

## 2018-01-01 RX ADMIN — HYDRALAZINE HYDROCHLORIDE 10 MG: 10 TABLET, FILM COATED ORAL at 05:38

## 2018-01-01 RX ADMIN — SODIUM CHLORIDE, PRESERVATIVE FREE 10 ML: 5 INJECTION INTRAVENOUS at 09:07

## 2018-01-01 RX ADMIN — TAZOBACTAM SODIUM AND PIPERACILLIN SODIUM 3.38 G: 375; 3 INJECTION, SOLUTION INTRAVENOUS at 06:04

## 2018-01-01 RX ADMIN — CEFTRIAXONE SODIUM 2 G: 2 INJECTION, POWDER, FOR SOLUTION INTRAMUSCULAR; INTRAVENOUS at 14:23

## 2018-01-01 RX ADMIN — AMLODIPINE BESYLATE 5 MG: 5 TABLET ORAL at 09:14

## 2018-01-01 RX ADMIN — POTASSIUM CHLORIDE 40 MEQ: 750 CAPSULE, EXTENDED RELEASE ORAL at 08:23

## 2018-01-01 RX ADMIN — LACTULOSE 30 G: 10 SOLUTION ORAL at 07:07

## 2018-01-01 RX ADMIN — CEFTRIAXONE 1 G: 1 INJECTION, POWDER, FOR SOLUTION INTRAMUSCULAR; INTRAVENOUS at 11:30

## 2018-01-01 RX ADMIN — HYDROMORPHONE HYDROCHLORIDE 0.25 MG: 1 INJECTION, SOLUTION INTRAMUSCULAR; INTRAVENOUS; SUBCUTANEOUS at 03:57

## 2018-01-01 RX ADMIN — PANTOPRAZOLE SODIUM 40 MG: 40 INJECTION, POWDER, FOR SOLUTION INTRAVENOUS at 06:18

## 2018-01-01 RX ADMIN — RIFAXIMIN 550 MG: 550 TABLET ORAL at 20:36

## 2018-01-01 RX ADMIN — POTASSIUM CHLORIDE 40 MEQ: 1.5 POWDER, FOR SOLUTION ORAL at 14:48

## 2018-01-01 RX ADMIN — PROMETHAZINE HYDROCHLORIDE 12.5 MG: 25 INJECTION, SOLUTION INTRAMUSCULAR; INTRAVENOUS at 09:17

## 2018-01-01 RX ADMIN — Medication 10 ML: at 11:17

## 2018-01-01 RX ADMIN — POTASSIUM CHLORIDE 40 MEQ: 1.5 POWDER, FOR SOLUTION ORAL at 06:21

## 2018-01-01 RX ADMIN — ONDANSETRON 4 MG: 2 INJECTION INTRAMUSCULAR; INTRAVENOUS at 16:30

## 2018-01-01 RX ADMIN — NYSTATIN: 100000 POWDER TOPICAL at 00:04

## 2018-01-01 RX ADMIN — SODIUM CHLORIDE 50 ML/HR: 900 INJECTION, SOLUTION INTRAVENOUS at 19:14

## 2018-01-01 RX ADMIN — FUROSEMIDE 40 MG: 40 TABLET ORAL at 09:34

## 2018-01-01 RX ADMIN — Medication 10 ML: at 17:09

## 2018-01-01 RX ADMIN — Medication 10 ML: at 22:01

## 2018-01-01 RX ADMIN — POTASSIUM CHLORIDE 40 MEQ: 1.5 POWDER, FOR SOLUTION ORAL at 21:34

## 2018-01-01 RX ADMIN — IPRATROPIUM BROMIDE AND ALBUTEROL SULFATE 3 ML: 2.5; .5 SOLUTION RESPIRATORY (INHALATION) at 15:49

## 2018-01-01 RX ADMIN — ONDANSETRON 4 MG: 2 INJECTION INTRAMUSCULAR; INTRAVENOUS at 10:36

## 2018-01-01 RX ADMIN — MORPHINE SULFATE 1 MG: 4 INJECTION, SOLUTION INTRAMUSCULAR; INTRAVENOUS at 14:54

## 2018-01-01 RX ADMIN — OXYCODONE HYDROCHLORIDE 5 MG: 5 TABLET ORAL at 22:44

## 2018-01-01 RX ADMIN — FUROSEMIDE 40 MG: 10 INJECTION, SOLUTION INTRAMUSCULAR; INTRAVENOUS at 12:16

## 2018-01-01 RX ADMIN — VANCOMYCIN HYDROCHLORIDE 1500 MG: 1 INJECTION, POWDER, LYOPHILIZED, FOR SOLUTION INTRAVENOUS at 11:58

## 2018-01-01 RX ADMIN — FUROSEMIDE 5 MG/HR: 10 INJECTION, SOLUTION INTRAVENOUS at 06:02

## 2018-01-01 RX ADMIN — IPRATROPIUM BROMIDE AND ALBUTEROL SULFATE 3 ML: 2.5; .5 SOLUTION RESPIRATORY (INHALATION) at 10:46

## 2018-01-01 RX ADMIN — NYSTATIN: 100000 POWDER TOPICAL at 08:55

## 2018-01-01 RX ADMIN — NYSTATIN: 100000 CREAM TOPICAL at 09:19

## 2018-01-01 RX ADMIN — METRONIDAZOLE 500 MG: 500 INJECTION, SOLUTION INTRAVENOUS at 13:07

## 2018-01-01 RX ADMIN — NYSTATIN: 100000 POWDER TOPICAL at 21:38

## 2018-01-01 RX ADMIN — LACTULOSE 30 G: 10 SOLUTION ORAL at 16:57

## 2018-01-01 RX ADMIN — HYDROCORTISONE 1 G: 1 CREAM TOPICAL at 22:25

## 2018-01-01 RX ADMIN — FLUCONAZOLE 400 MG: 2 INJECTION INTRAVENOUS at 17:35

## 2018-01-01 RX ADMIN — MORPHINE SULFATE 1 MG: 2 INJECTION, SOLUTION INTRAMUSCULAR; INTRAVENOUS at 05:10

## 2018-01-01 RX ADMIN — HYDROCORTISONE 1 G: 1 CREAM TOPICAL at 08:37

## 2018-01-01 RX ADMIN — HYDROMORPHONE HYDROCHLORIDE 0.25 MG: 1 INJECTION, SOLUTION INTRAMUSCULAR; INTRAVENOUS; SUBCUTANEOUS at 20:33

## 2018-01-01 RX ADMIN — HYDROMORPHONE HYDROCHLORIDE 0.25 MG: 2 INJECTION INTRAMUSCULAR; INTRAVENOUS; SUBCUTANEOUS at 19:18

## 2018-01-01 RX ADMIN — MIDODRINE HYDROCHLORIDE 10 MG: 5 TABLET ORAL at 21:26

## 2018-01-01 RX ADMIN — MORPHINE SULFATE 2 MG: 4 INJECTION INTRAVENOUS at 06:45

## 2018-01-01 RX ADMIN — SPIRONOLACTONE 25 MG: 25 TABLET ORAL at 08:19

## 2018-01-01 RX ADMIN — NYSTATIN: 100000 CREAM TOPICAL at 08:37

## 2018-01-01 RX ADMIN — ALBUTEROL SULFATE 2.5 MG: 2.5 SOLUTION RESPIRATORY (INHALATION) at 07:22

## 2018-01-01 RX ADMIN — NYSTATIN: 100000 CREAM TOPICAL at 09:12

## 2018-01-01 RX ADMIN — PHYTONADIONE 5 MG: 1 INJECTION, EMULSION INTRAMUSCULAR; INTRAVENOUS; SUBCUTANEOUS at 11:52

## 2018-01-01 RX ADMIN — LACTULOSE 30 G: 20 SOLUTION ORAL at 16:01

## 2018-01-01 RX ADMIN — OXYCODONE HYDROCHLORIDE 5 MG: 5 TABLET ORAL at 15:20

## 2018-01-01 RX ADMIN — FUROSEMIDE 40 MG: 40 TABLET ORAL at 09:03

## 2018-01-01 RX ADMIN — HYDROMORPHONE HYDROCHLORIDE 0.25 MG: 2 INJECTION INTRAMUSCULAR; INTRAVENOUS; SUBCUTANEOUS at 05:53

## 2018-01-01 RX ADMIN — CEFTRIAXONE 1 G: 1 INJECTION, POWDER, FOR SOLUTION INTRAMUSCULAR; INTRAVENOUS at 20:25

## 2018-01-01 RX ADMIN — POTASSIUM CHLORIDE 40 MEQ: 750 CAPSULE, EXTENDED RELEASE ORAL at 09:18

## 2018-01-01 RX ADMIN — FUROSEMIDE 5 MG/HR: 10 INJECTION, SOLUTION INTRAVENOUS at 12:48

## 2018-01-01 RX ADMIN — FUROSEMIDE 20 MG: 10 INJECTION, SOLUTION INTRAMUSCULAR; INTRAVENOUS at 17:27

## 2018-01-01 RX ADMIN — POTASSIUM CHLORIDE 40 MEQ: 750 CAPSULE, EXTENDED RELEASE ORAL at 17:24

## 2018-01-01 RX ADMIN — POTASSIUM CHLORIDE 40 MEQ: 750 CAPSULE, EXTENDED RELEASE ORAL at 18:26

## 2018-01-01 RX ADMIN — RIFAXIMIN 275 MG: 550 TABLET ORAL at 20:47

## 2018-01-01 RX ADMIN — NICOTINE 1 PATCH: 21 PATCH TRANSDERMAL at 08:20

## 2018-01-01 RX ADMIN — NYSTATIN: 100000 CREAM TOPICAL at 08:25

## 2018-01-01 RX ADMIN — LACTULOSE 30 G: 10 SOLUTION ORAL at 12:19

## 2018-01-01 RX ADMIN — NYSTATIN: 100000 CREAM TOPICAL at 22:13

## 2018-01-01 RX ADMIN — OXYCODONE HYDROCHLORIDE 5 MG: 5 TABLET ORAL at 10:54

## 2018-01-01 RX ADMIN — HYDROCORTISONE 1 G: 1 CREAM TOPICAL at 16:45

## 2018-01-01 RX ADMIN — LACTULOSE 30 G: 20 SOLUTION ORAL at 08:23

## 2018-01-01 RX ADMIN — MIDODRINE HYDROCHLORIDE 10 MG: 5 TABLET ORAL at 08:30

## 2018-01-01 RX ADMIN — LACTULOSE 30 G: 20 SOLUTION ORAL at 10:54

## 2018-01-01 RX ADMIN — SODIUM CHLORIDE, PRESERVATIVE FREE 10 ML: 5 INJECTION INTRAVENOUS at 20:22

## 2018-01-01 RX ADMIN — HYDROCORTISONE 1 G: 1 CREAM TOPICAL at 20:13

## 2018-01-01 RX ADMIN — SODIUM CHLORIDE, PRESERVATIVE FREE 3 ML: 5 INJECTION INTRAVENOUS at 20:33

## 2018-01-01 RX ADMIN — NYSTATIN: 100000 CREAM TOPICAL at 08:22

## 2018-01-01 RX ADMIN — Medication 10 ML: at 02:19

## 2018-01-01 RX ADMIN — LACTULOSE 30 G: 10 SOLUTION ORAL at 18:55

## 2018-01-01 RX ADMIN — HYDROMORPHONE HYDROCHLORIDE: 2 INJECTION INTRAMUSCULAR; INTRAVENOUS; SUBCUTANEOUS at 06:36

## 2018-01-01 RX ADMIN — HYDROCORTISONE 1 G: 1 CREAM TOPICAL at 16:22

## 2018-01-01 RX ADMIN — IPRATROPIUM BROMIDE AND ALBUTEROL SULFATE 3 ML: 2.5; .5 SOLUTION RESPIRATORY (INHALATION) at 22:32

## 2018-01-01 RX ADMIN — METOLAZONE 2.5 MG: 2.5 TABLET ORAL at 09:18

## 2018-01-01 RX ADMIN — IPRATROPIUM BROMIDE AND ALBUTEROL SULFATE 3 ML: 2.5; .5 SOLUTION RESPIRATORY (INHALATION) at 14:11

## 2018-01-01 RX ADMIN — SODIUM CHLORIDE, PRESERVATIVE FREE 10 ML: 5 INJECTION INTRAVENOUS at 20:21

## 2018-01-01 RX ADMIN — LORAZEPAM 1 MG: 2 INJECTION INTRAMUSCULAR; INTRAVENOUS at 16:05

## 2018-01-01 RX ADMIN — METOLAZONE 2.5 MG: 2.5 TABLET ORAL at 14:47

## 2018-01-01 RX ADMIN — LACTULOSE 30 G: 10 SOLUTION ORAL at 10:24

## 2018-01-01 RX ADMIN — LINEZOLID 600 MG: 600 TABLET, FILM COATED ORAL at 20:32

## 2018-01-01 RX ADMIN — HYDROMORPHONE HYDROCHLORIDE 0.25 MG: 1 INJECTION, SOLUTION INTRAMUSCULAR; INTRAVENOUS; SUBCUTANEOUS at 10:45

## 2018-01-01 RX ADMIN — MIDODRINE HYDROCHLORIDE 10 MG: 5 TABLET ORAL at 21:04

## 2018-01-01 RX ADMIN — HYDROCORTISONE 1 G: 1 CREAM TOPICAL at 20:03

## 2018-01-01 RX ADMIN — RIFAXIMIN 550 MG: 550 TABLET ORAL at 08:48

## 2018-01-01 RX ADMIN — RIFAXIMIN 550 MG: 550 TABLET ORAL at 20:45

## 2018-01-01 RX ADMIN — NYSTATIN: 100000 CREAM TOPICAL at 09:20

## 2018-01-01 RX ADMIN — RIFAXIMIN 550 MG: 550 TABLET ORAL at 08:37

## 2018-01-01 RX ADMIN — BUDESONIDE AND FORMOTEROL FUMARATE DIHYDRATE 2 PUFF: 160; 4.5 AEROSOL RESPIRATORY (INHALATION) at 20:16

## 2018-01-01 RX ADMIN — POTASSIUM CHLORIDE 10 MEQ: 7.46 INJECTION, SOLUTION INTRAVENOUS at 10:21

## 2018-01-01 RX ADMIN — Medication 10 ML: at 05:50

## 2018-01-01 RX ADMIN — PANTOPRAZOLE SODIUM 8 MG/HR: 40 INJECTION, POWDER, FOR SOLUTION INTRAVENOUS at 12:19

## 2018-01-01 RX ADMIN — METRONIDAZOLE 500 MG: 500 INJECTION, SOLUTION INTRAVENOUS at 02:00

## 2018-01-01 RX ADMIN — LACTULOSE 30 G: 20 SOLUTION ORAL at 20:13

## 2018-01-01 RX ADMIN — IPRATROPIUM BROMIDE AND ALBUTEROL SULFATE 3 ML: 2.5; .5 SOLUTION RESPIRATORY (INHALATION) at 20:29

## 2018-01-01 RX ADMIN — HYDROCORTISONE 1 G: 1 CREAM TOPICAL at 16:32

## 2018-01-01 RX ADMIN — HYDROMORPHONE HYDROCHLORIDE 0.25 MG: 2 INJECTION INTRAMUSCULAR; INTRAVENOUS; SUBCUTANEOUS at 11:17

## 2018-01-01 RX ADMIN — POTASSIUM CHLORIDE 10 MEQ: 7.46 INJECTION, SOLUTION INTRAVENOUS at 03:29

## 2018-01-01 RX ADMIN — MORPHINE SULFATE 4 MG: 4 INJECTION INTRAVENOUS at 11:59

## 2018-01-01 RX ADMIN — LACTULOSE 30 G: 20 SOLUTION ORAL at 08:30

## 2018-01-01 RX ADMIN — RIFAXIMIN 275 MG: 550 TABLET ORAL at 09:10

## 2018-01-01 RX ADMIN — FAMOTIDINE 20 MG: 20 TABLET ORAL at 22:25

## 2018-01-01 RX ADMIN — IPRATROPIUM BROMIDE AND ALBUTEROL SULFATE 3 ML: 2.5; .5 SOLUTION RESPIRATORY (INHALATION) at 11:06

## 2018-01-01 RX ADMIN — CEFTRIAXONE 1 G: 1 INJECTION, POWDER, FOR SOLUTION INTRAMUSCULAR; INTRAVENOUS at 12:54

## 2018-01-01 RX ADMIN — RIFAXIMIN 550 MG: 550 TABLET ORAL at 20:21

## 2018-01-01 RX ADMIN — FAMOTIDINE 20 MG: 20 TABLET ORAL at 21:34

## 2018-01-01 RX ADMIN — Medication 10 ML: at 09:53

## 2018-01-01 RX ADMIN — LACTULOSE 30 G: 20 SOLUTION ORAL at 08:12

## 2018-01-01 RX ADMIN — FUROSEMIDE 40 MG: 40 TABLET ORAL at 16:58

## 2018-01-01 RX ADMIN — FLUCONAZOLE IN SODIUM CHLORIDE 400 MG: 2 INJECTION, SOLUTION INTRAVENOUS at 09:04

## 2018-01-01 RX ADMIN — AZITHROMYCIN MONOHYDRATE 500 MG: 500 INJECTION, POWDER, LYOPHILIZED, FOR SOLUTION INTRAVENOUS at 16:43

## 2018-01-01 RX ADMIN — LINEZOLID 600 MG: 600 TABLET, FILM COATED ORAL at 21:29

## 2018-01-01 RX ADMIN — LACTULOSE 30 G: 10 SOLUTION ORAL at 17:19

## 2018-01-01 RX ADMIN — MORPHINE SULFATE 1 MG: 2 INJECTION, SOLUTION INTRAMUSCULAR; INTRAVENOUS at 21:50

## 2018-01-01 RX ADMIN — OXYCODONE HYDROCHLORIDE 5 MG: 5 TABLET ORAL at 20:28

## 2018-01-01 RX ADMIN — SODIUM CHLORIDE 50 ML/HR: 9 INJECTION, SOLUTION INTRAVENOUS at 11:48

## 2018-01-01 RX ADMIN — HYDROMORPHONE HYDROCHLORIDE 0.25 MG: 2 INJECTION INTRAMUSCULAR; INTRAVENOUS; SUBCUTANEOUS at 05:11

## 2018-01-01 RX ADMIN — POTASSIUM CHLORIDE 40 MEQ: 750 CAPSULE, EXTENDED RELEASE ORAL at 13:49

## 2018-01-01 RX ADMIN — HYDROCORTISONE 1 G: 1 CREAM TOPICAL at 20:04

## 2018-01-01 RX ADMIN — HYDROCORTISONE 1 G: 1 CREAM TOPICAL at 08:33

## 2018-01-01 RX ADMIN — FUROSEMIDE 40 MG: 40 TABLET ORAL at 09:13

## 2018-01-01 RX ADMIN — LACTULOSE 30 G: 10 SOLUTION ORAL at 21:45

## 2018-01-01 RX ADMIN — IPRATROPIUM BROMIDE AND ALBUTEROL SULFATE 3 ML: 2.5; .5 SOLUTION RESPIRATORY (INHALATION) at 11:29

## 2018-01-01 RX ADMIN — RIFAXIMIN 550 MG: 550 TABLET ORAL at 21:49

## 2018-01-01 RX ADMIN — MORPHINE SULFATE 4 MG: 4 INJECTION INTRAVENOUS at 08:51

## 2018-01-01 RX ADMIN — FAMOTIDINE 20 MG: 20 INJECTION, SOLUTION INTRAVENOUS at 08:55

## 2018-01-01 RX ADMIN — CEFTRIAXONE SODIUM 1 G: 1 INJECTION, POWDER, FOR SOLUTION INTRAMUSCULAR; INTRAVENOUS at 14:59

## 2018-01-01 RX ADMIN — IOPAMIDOL 90 ML: 612 INJECTION, SOLUTION INTRAVENOUS at 19:15

## 2018-01-01 RX ADMIN — NICOTINE 1 PATCH: 21 PATCH TRANSDERMAL at 08:24

## 2018-01-01 RX ADMIN — LACTULOSE 30 G: 20 SOLUTION ORAL at 22:05

## 2018-01-01 RX ADMIN — IPRATROPIUM BROMIDE AND ALBUTEROL SULFATE 3 ML: 2.5; .5 SOLUTION RESPIRATORY (INHALATION) at 14:32

## 2018-01-01 RX ADMIN — OXYCODONE HYDROCHLORIDE 5 MG: 5 TABLET ORAL at 08:20

## 2018-01-01 RX ADMIN — ACETYLCYSTEINE 2 ML: 100 INHALANT RESPIRATORY (INHALATION) at 15:15

## 2018-01-01 RX ADMIN — ALBUMIN (HUMAN) 5 ML/HR: 0.25 INJECTION, SOLUTION INTRAVENOUS at 12:38

## 2018-01-01 RX ADMIN — HYDROMORPHONE HYDROCHLORIDE 0.25 MG: 2 INJECTION INTRAMUSCULAR; INTRAVENOUS; SUBCUTANEOUS at 09:16

## 2018-01-01 RX ADMIN — HYDROCORTISONE 1 G: 1 CREAM TOPICAL at 20:00

## 2018-01-01 RX ADMIN — POTASSIUM CHLORIDE 40 MEQ: 750 CAPSULE, EXTENDED RELEASE ORAL at 13:35

## 2018-01-01 RX ADMIN — ALBUMIN (HUMAN) 5 ML/HR: 0.25 INJECTION, SOLUTION INTRAVENOUS at 21:39

## 2018-01-01 RX ADMIN — NYSTATIN: 100000 CREAM TOPICAL at 20:29

## 2018-01-01 RX ADMIN — HYDROMORPHONE HYDROCHLORIDE 0.25 MG: 2 INJECTION INTRAMUSCULAR; INTRAVENOUS; SUBCUTANEOUS at 22:15

## 2018-01-01 RX ADMIN — FLUCONAZOLE 400 MG: 200 TABLET ORAL at 09:13

## 2018-01-01 RX ADMIN — RIFAXIMIN 275 MG: 550 TABLET ORAL at 20:22

## 2018-01-01 RX ADMIN — HYDRALAZINE HYDROCHLORIDE 10 MG: 10 TABLET, FILM COATED ORAL at 22:03

## 2018-01-01 RX ADMIN — RIFAXIMIN 275 MG: 550 TABLET ORAL at 10:36

## 2018-01-01 RX ADMIN — FLUCONAZOLE 400 MG: 2 INJECTION INTRAVENOUS at 17:25

## 2018-01-01 RX ADMIN — SODIUM CHLORIDE, PRESERVATIVE FREE 3 ML: 5 INJECTION INTRAVENOUS at 09:11

## 2018-01-01 RX ADMIN — POTASSIUM CHLORIDE 40 MEQ: 750 CAPSULE, EXTENDED RELEASE ORAL at 09:13

## 2018-01-01 RX ADMIN — TAZOBACTAM SODIUM AND PIPERACILLIN SODIUM 3.38 G: 375; 3 INJECTION, SOLUTION INTRAVENOUS at 19:18

## 2018-01-01 RX ADMIN — IPRATROPIUM BROMIDE AND ALBUTEROL SULFATE 3 ML: 2.5; .5 SOLUTION RESPIRATORY (INHALATION) at 20:08

## 2018-01-01 RX ADMIN — HYDROMORPHONE HYDROCHLORIDE 0.25 MG: 1 INJECTION, SOLUTION INTRAMUSCULAR; INTRAVENOUS; SUBCUTANEOUS at 22:29

## 2018-01-01 RX ADMIN — BUDESONIDE AND FORMOTEROL FUMARATE DIHYDRATE 2 PUFF: 160; 4.5 AEROSOL RESPIRATORY (INHALATION) at 07:17

## 2018-01-01 RX ADMIN — MIDODRINE HYDROCHLORIDE 10 MG: 5 TABLET ORAL at 16:26

## 2018-01-01 RX ADMIN — IPRATROPIUM BROMIDE AND ALBUTEROL SULFATE 3 ML: 2.5; .5 SOLUTION RESPIRATORY (INHALATION) at 19:06

## 2018-01-01 RX ADMIN — DIPHENHYDRAMINE HYDROCHLORIDE 50 MG: 50 INJECTION, SOLUTION INTRAMUSCULAR; INTRAVENOUS at 10:42

## 2018-01-01 RX ADMIN — HYDROMORPHONE HYDROCHLORIDE 0.25 MG: 1 INJECTION, SOLUTION INTRAMUSCULAR; INTRAVENOUS; SUBCUTANEOUS at 15:14

## 2018-01-01 RX ADMIN — METRONIDAZOLE 500 MG: 500 INJECTION, SOLUTION INTRAVENOUS at 13:26

## 2018-01-01 RX ADMIN — LACTULOSE 30 G: 20 SOLUTION ORAL at 17:21

## 2018-01-01 RX ADMIN — SODIUM CHLORIDE, PRESERVATIVE FREE 10 ML: 5 INJECTION INTRAVENOUS at 21:17

## 2018-01-01 RX ADMIN — NICOTINE 1 PATCH: 14 PATCH, EXTENDED RELEASE TRANSDERMAL at 22:01

## 2018-01-01 RX ADMIN — NYSTATIN: 100000 CREAM TOPICAL at 08:04

## 2018-01-01 RX ADMIN — ALBUMIN HUMAN 25 G: 0.05 INJECTION, SOLUTION INTRAVENOUS at 05:38

## 2018-01-01 RX ADMIN — POTASSIUM CHLORIDE 40 MEQ: 750 CAPSULE, EXTENDED RELEASE ORAL at 10:35

## 2018-01-01 RX ADMIN — RIFAXIMIN 550 MG: 550 TABLET ORAL at 20:50

## 2018-01-01 RX ADMIN — SODIUM CHLORIDE 100 ML/HR: 900 INJECTION, SOLUTION INTRAVENOUS at 02:01

## 2018-01-01 RX ADMIN — SODIUM CHLORIDE, PRESERVATIVE FREE 10 ML: 5 INJECTION INTRAVENOUS at 21:02

## 2018-01-01 RX ADMIN — SODIUM CHLORIDE, PRESERVATIVE FREE 10 ML: 5 INJECTION INTRAVENOUS at 08:50

## 2018-01-01 RX ADMIN — FAMOTIDINE 20 MG: 20 INJECTION, SOLUTION INTRAVENOUS at 20:03

## 2018-01-01 RX ADMIN — MAGNESIUM SULFATE HEPTAHYDRATE 4 G: 40 INJECTION, SOLUTION INTRAVENOUS at 21:57

## 2018-01-01 RX ADMIN — MORPHINE SULFATE 1 MG: 4 INJECTION, SOLUTION INTRAMUSCULAR; INTRAVENOUS at 11:15

## 2018-01-01 RX ADMIN — RIFAXIMIN 550 MG: 550 TABLET ORAL at 21:42

## 2018-01-01 RX ADMIN — RIFAXIMIN 275 MG: 550 TABLET ORAL at 09:47

## 2018-01-01 RX ADMIN — CEFTRIAXONE 1 G: 1 INJECTION, POWDER, FOR SOLUTION INTRAMUSCULAR; INTRAVENOUS at 11:48

## 2018-01-01 RX ADMIN — NICOTINE 1 PATCH: 14 PATCH, EXTENDED RELEASE TRANSDERMAL at 22:55

## 2018-01-01 RX ADMIN — RIFAXIMIN 275 MG: 550 TABLET ORAL at 08:47

## 2018-01-01 RX ADMIN — MORPHINE SULFATE 1 MG: 2 INJECTION, SOLUTION INTRAMUSCULAR; INTRAVENOUS at 17:26

## 2018-01-01 RX ADMIN — NYSTATIN: 100000 CREAM TOPICAL at 20:28

## 2018-01-01 RX ADMIN — BUDESONIDE AND FORMOTEROL FUMARATE DIHYDRATE 2 PUFF: 160; 4.5 AEROSOL RESPIRATORY (INHALATION) at 08:25

## 2018-01-01 RX ADMIN — MORPHINE SULFATE 1 MG: 2 INJECTION, SOLUTION INTRAMUSCULAR; INTRAVENOUS at 12:49

## 2018-01-01 RX ADMIN — FAMOTIDINE 20 MG: 20 TABLET ORAL at 21:15

## 2018-01-01 RX ADMIN — LACTULOSE 30 G: 20 SOLUTION ORAL at 20:24

## 2018-01-01 RX ADMIN — NICOTINE 1 PATCH: 21 PATCH TRANSDERMAL at 09:35

## 2018-01-01 RX ADMIN — NICOTINE 1 PATCH: 21 PATCH TRANSDERMAL at 09:37

## 2018-01-01 RX ADMIN — POTASSIUM CHLORIDE 40 MEQ: 750 CAPSULE, EXTENDED RELEASE ORAL at 08:43

## 2018-01-01 RX ADMIN — NICOTINE 1 PATCH: 14 PATCH, EXTENDED RELEASE TRANSDERMAL at 21:25

## 2018-01-01 RX ADMIN — LACTULOSE 30 G: 20 SOLUTION ORAL at 20:30

## 2018-01-01 RX ADMIN — LACTULOSE 30 G: 20 SOLUTION ORAL at 09:10

## 2018-01-01 RX ADMIN — LINEZOLID 600 MG: 600 TABLET, FILM COATED ORAL at 10:35

## 2018-01-01 RX ADMIN — ALBUMIN (HUMAN) 12.5 G: 0.25 INJECTION, SOLUTION INTRAVENOUS at 18:24

## 2018-01-01 RX ADMIN — TAZOBACTAM SODIUM AND PIPERACILLIN SODIUM 3.38 G: 375; 3 INJECTION, SOLUTION INTRAVENOUS at 15:46

## 2018-01-01 RX ADMIN — LACTULOSE 30 G: 20 SOLUTION ORAL at 16:55

## 2018-01-01 RX ADMIN — MIDODRINE HYDROCHLORIDE 10 MG: 5 TABLET ORAL at 17:45

## 2018-01-01 RX ADMIN — LACTULOSE 30 G: 20 SOLUTION ORAL at 20:33

## 2018-01-01 RX ADMIN — LACTULOSE 30 G: 10 SOLUTION ORAL at 11:27

## 2018-01-01 RX ADMIN — POTASSIUM CHLORIDE 10 MEQ: 7.46 INJECTION, SOLUTION INTRAVENOUS at 08:01

## 2018-01-01 RX ADMIN — OXYCODONE HYDROCHLORIDE 5 MG: 5 TABLET ORAL at 13:14

## 2018-01-01 RX ADMIN — AMLODIPINE BESYLATE 5 MG: 5 TABLET ORAL at 09:47

## 2018-01-01 RX ADMIN — POTASSIUM CHLORIDE 40 MEQ: 750 CAPSULE, EXTENDED RELEASE ORAL at 09:17

## 2018-01-01 RX ADMIN — IPRATROPIUM BROMIDE AND ALBUTEROL SULFATE 3 ML: 2.5; .5 SOLUTION RESPIRATORY (INHALATION) at 10:31

## 2018-01-01 RX ADMIN — RIFAXIMIN 275 MG: 550 TABLET ORAL at 09:48

## 2018-01-01 RX ADMIN — IPRATROPIUM BROMIDE AND ALBUTEROL SULFATE 3 ML: 2.5; .5 SOLUTION RESPIRATORY (INHALATION) at 15:06

## 2018-01-01 RX ADMIN — HYDROCORTISONE 1 G: 1 CREAM TOPICAL at 17:51

## 2018-01-01 RX ADMIN — FUROSEMIDE 5 MG/HR: 10 INJECTION, SOLUTION INTRAVENOUS at 02:28

## 2018-01-01 RX ADMIN — SPIRONOLACTONE 25 MG: 25 TABLET ORAL at 09:31

## 2018-01-01 RX ADMIN — IPRATROPIUM BROMIDE AND ALBUTEROL SULFATE 3 ML: 2.5; .5 SOLUTION RESPIRATORY (INHALATION) at 07:09

## 2018-01-01 RX ADMIN — POTASSIUM CHLORIDE 40 MEQ: 750 CAPSULE, EXTENDED RELEASE ORAL at 09:20

## 2018-01-01 RX ADMIN — ONDANSETRON 4 MG: 2 INJECTION INTRAMUSCULAR; INTRAVENOUS at 09:01

## 2018-01-01 RX ADMIN — IBUPROFEN 400 MG: 400 TABLET ORAL at 03:59

## 2018-01-01 RX ADMIN — LACTULOSE 30 G: 10 SOLUTION ORAL at 08:46

## 2018-01-01 RX ADMIN — SODIUM CHLORIDE, PRESERVATIVE FREE 3 ML: 5 INJECTION INTRAVENOUS at 20:30

## 2018-01-01 RX ADMIN — HYDROCORTISONE 1 G: 1 CREAM TOPICAL at 17:14

## 2018-01-01 RX ADMIN — FUROSEMIDE 10 MG/HR: 10 INJECTION, SOLUTION INTRAVENOUS at 00:11

## 2018-01-01 RX ADMIN — MORPHINE SULFATE 1 MG: 4 INJECTION, SOLUTION INTRAMUSCULAR; INTRAVENOUS at 22:13

## 2018-01-01 RX ADMIN — VANCOMYCIN HYDROCHLORIDE 1250 MG: 5 INJECTION, POWDER, LYOPHILIZED, FOR SOLUTION INTRAVENOUS at 10:28

## 2018-01-01 RX ADMIN — HYDROCORTISONE 1 G: 1 CREAM TOPICAL at 22:24

## 2018-01-01 RX ADMIN — SODIUM CHLORIDE, PRESERVATIVE FREE 3 ML: 5 INJECTION INTRAVENOUS at 09:20

## 2018-01-01 RX ADMIN — HYDROMORPHONE HYDROCHLORIDE 0.25 MG: 1 INJECTION, SOLUTION INTRAMUSCULAR; INTRAVENOUS; SUBCUTANEOUS at 10:42

## 2018-01-01 RX ADMIN — RIFAXIMIN 275 MG: 550 TABLET ORAL at 21:33

## 2018-01-01 RX ADMIN — LACTULOSE 30 G: 10 SOLUTION ORAL at 13:15

## 2018-01-01 RX ADMIN — NYSTATIN: 100000 CREAM TOPICAL at 09:51

## 2018-01-01 RX ADMIN — SODIUM CHLORIDE, PRESERVATIVE FREE 3 ML: 5 INJECTION INTRAVENOUS at 20:28

## 2018-01-01 RX ADMIN — NYSTATIN: 100000 CREAM TOPICAL at 10:09

## 2018-01-01 RX ADMIN — POLYETHYLENE GLYCOL 3350 17 G: 17 POWDER, FOR SOLUTION ORAL at 08:20

## 2018-01-01 RX ADMIN — RIFAXIMIN 550 MG: 550 TABLET ORAL at 08:02

## 2018-01-01 RX ADMIN — NYSTATIN: 100000 POWDER TOPICAL at 08:32

## 2018-01-01 RX ADMIN — FUROSEMIDE 40 MG: 10 INJECTION, SOLUTION INTRAMUSCULAR; INTRAVENOUS at 21:24

## 2018-01-01 RX ADMIN — NYSTATIN: 100000 CREAM TOPICAL at 09:33

## 2018-01-01 RX ADMIN — LACTULOSE 30 G: 10 SOLUTION ORAL at 20:01

## 2018-01-01 RX ADMIN — ALBUMIN HUMAN 25 G: 0.05 INJECTION, SOLUTION INTRAVENOUS at 17:57

## 2018-01-01 RX ADMIN — METOPROLOL TARTRATE 5 MG: 1 INJECTION, SOLUTION INTRAVENOUS at 21:59

## 2018-01-01 RX ADMIN — HYDROCORTISONE 1 G: 1 CREAM TOPICAL at 21:47

## 2018-01-01 RX ADMIN — FUROSEMIDE 40 MG: 10 INJECTION, SOLUTION INTRAMUSCULAR; INTRAVENOUS at 11:48

## 2018-01-01 RX ADMIN — RIFAXIMIN 550 MG: 550 TABLET ORAL at 08:49

## 2018-01-01 RX ADMIN — RIFAXIMIN 550 MG: 550 TABLET ORAL at 20:03

## 2018-01-01 RX ADMIN — ALBUTEROL SULFATE 2.5 MG: 2.5 SOLUTION RESPIRATORY (INHALATION) at 18:57

## 2018-01-01 RX ADMIN — METRONIDAZOLE 500 MG: 500 INJECTION, SOLUTION INTRAVENOUS at 02:28

## 2018-01-01 RX ADMIN — FUROSEMIDE 40 MG: 40 TABLET ORAL at 09:18

## 2018-01-01 RX ADMIN — METRONIDAZOLE 500 MG: 500 INJECTION, SOLUTION INTRAVENOUS at 17:06

## 2018-01-01 RX ADMIN — ALBUMIN (HUMAN) 12.5 G: 0.25 INJECTION, SOLUTION INTRAVENOUS at 20:46

## 2018-01-01 RX ADMIN — METRONIDAZOLE 500 MG: 500 INJECTION, SOLUTION INTRAVENOUS at 18:00

## 2018-01-01 RX ADMIN — SIMETHICONE CHEW TAB 80 MG 80 MG: 80 TABLET ORAL at 12:21

## 2018-01-01 RX ADMIN — RIFAXIMIN 275 MG: 550 TABLET ORAL at 08:38

## 2018-01-01 RX ADMIN — AZITHROMYCIN MONOHYDRATE 500 MG: 500 INJECTION, POWDER, LYOPHILIZED, FOR SOLUTION INTRAVENOUS at 15:58

## 2018-01-01 RX ADMIN — MORPHINE SULFATE 2 MG: 4 INJECTION INTRAVENOUS at 00:29

## 2018-01-01 RX ADMIN — ALBUMIN HUMAN 25 G: 0.05 INJECTION, SOLUTION INTRAVENOUS at 06:13

## 2018-01-01 RX ADMIN — FAMOTIDINE 20 MG: 20 TABLET ORAL at 10:05

## 2018-01-01 RX ADMIN — LACTULOSE 30 G: 10 SOLUTION ORAL at 08:43

## 2018-01-01 RX ADMIN — LACTULOSE 30 G: 10 SOLUTION ORAL at 09:17

## 2018-01-01 RX ADMIN — IOPAMIDOL 90 ML: 612 INJECTION, SOLUTION INTRAVENOUS at 14:30

## 2018-01-01 RX ADMIN — OXYCODONE HYDROCHLORIDE 5 MG: 5 TABLET ORAL at 19:19

## 2018-01-01 RX ADMIN — LACTULOSE 30 G: 20 SOLUTION ORAL at 17:09

## 2018-01-01 RX ADMIN — SPIRONOLACTONE 25 MG: 25 TABLET ORAL at 15:20

## 2018-01-01 RX ADMIN — METRONIDAZOLE 500 MG: 500 INJECTION, SOLUTION INTRAVENOUS at 05:53

## 2018-01-01 RX ADMIN — RIFAXIMIN 275 MG: 550 TABLET ORAL at 09:14

## 2018-01-01 RX ADMIN — CEFTRIAXONE SODIUM 1 G: 1 INJECTION, POWDER, FOR SOLUTION INTRAMUSCULAR; INTRAVENOUS at 16:54

## 2018-01-01 RX ADMIN — IPRATROPIUM BROMIDE AND ALBUTEROL SULFATE 3 ML: 2.5; .5 SOLUTION RESPIRATORY (INHALATION) at 19:41

## 2018-01-01 RX ADMIN — ALBUMIN HUMAN 25 G: 0.05 INJECTION, SOLUTION INTRAVENOUS at 02:25

## 2018-01-01 RX ADMIN — ALBUMIN (HUMAN) 10 ML/HR: 0.25 INJECTION, SOLUTION INTRAVENOUS at 14:09

## 2018-01-01 RX ADMIN — POTASSIUM CHLORIDE 10 MEQ: 7.46 INJECTION, SOLUTION INTRAVENOUS at 11:09

## 2018-01-01 RX ADMIN — FUROSEMIDE 40 MG: 40 TABLET ORAL at 17:42

## 2018-01-01 RX ADMIN — SODIUM CHLORIDE, PRESERVATIVE FREE 3 ML: 5 INJECTION INTRAVENOUS at 20:26

## 2018-01-01 RX ADMIN — RIFAXIMIN 275 MG: 550 TABLET ORAL at 10:13

## 2018-01-01 RX ADMIN — LACTULOSE 30 G: 10 SOLUTION ORAL at 09:14

## 2018-01-01 RX ADMIN — LACTULOSE 30 G: 20 SOLUTION ORAL at 08:19

## 2018-01-01 RX ADMIN — VANCOMYCIN HYDROCHLORIDE 2000 MG: 1 INJECTION, POWDER, LYOPHILIZED, FOR SOLUTION INTRAVENOUS at 19:33

## 2018-01-01 RX ADMIN — AMLODIPINE BESYLATE 5 MG: 5 TABLET ORAL at 08:20

## 2018-01-01 RX ADMIN — ETOMIDATE 20 MG: 2 INJECTION, SOLUTION INTRAVENOUS at 18:12

## 2018-01-01 RX ADMIN — MIDODRINE HYDROCHLORIDE 10 MG: 5 TABLET ORAL at 20:22

## 2018-01-01 RX ADMIN — HYDROCORTISONE 1 G: 1 CREAM TOPICAL at 08:55

## 2018-01-01 RX ADMIN — ALBUMIN HUMAN 25 G: 0.05 INJECTION, SOLUTION INTRAVENOUS at 14:40

## 2018-01-01 RX ADMIN — SODIUM CHLORIDE, PRESERVATIVE FREE 10 ML: 5 INJECTION INTRAVENOUS at 20:28

## 2018-01-01 RX ADMIN — MIDODRINE HYDROCHLORIDE 10 MG: 5 TABLET ORAL at 21:46

## 2018-01-01 RX ADMIN — MORPHINE SULFATE 1 MG: 2 INJECTION, SOLUTION INTRAMUSCULAR; INTRAVENOUS at 00:04

## 2018-01-01 RX ADMIN — ALBUMIN HUMAN 25 G: 0.05 INJECTION, SOLUTION INTRAVENOUS at 15:33

## 2018-01-01 RX ADMIN — SODIUM CHLORIDE 50 ML/HR: 900 INJECTION, SOLUTION INTRAVENOUS at 20:04

## 2018-01-01 RX ADMIN — NYSTATIN: 100000 CREAM TOPICAL at 08:20

## 2018-01-01 RX ADMIN — ALBUTEROL SULFATE 2.5 MG: 2.5 SOLUTION RESPIRATORY (INHALATION) at 18:34

## 2018-01-01 RX ADMIN — NYSTATIN: 100000 CREAM TOPICAL at 20:11

## 2018-01-01 RX ADMIN — NYSTATIN: 100000 CREAM TOPICAL at 09:48

## 2018-01-01 RX ADMIN — POTASSIUM CHLORIDE 10 MEQ: 7.46 INJECTION, SOLUTION INTRAVENOUS at 15:53

## 2018-01-01 RX ADMIN — FUROSEMIDE 20 MG: 10 INJECTION, SOLUTION INTRAMUSCULAR; INTRAVENOUS at 21:15

## 2018-01-01 RX ADMIN — FUROSEMIDE 40 MG: 40 TABLET ORAL at 10:25

## 2018-01-01 RX ADMIN — HYDRALAZINE HYDROCHLORIDE 10 MG: 10 TABLET, FILM COATED ORAL at 15:42

## 2018-01-01 RX ADMIN — LACTULOSE 30 G: 20 SOLUTION ORAL at 08:46

## 2018-01-01 RX ADMIN — FUROSEMIDE 40 MG: 40 TABLET ORAL at 18:29

## 2018-01-01 RX ADMIN — PROMETHAZINE HYDROCHLORIDE 12.5 MG: 25 INJECTION, SOLUTION INTRAMUSCULAR; INTRAVENOUS at 23:00

## 2018-01-01 RX ADMIN — LACTULOSE 30 G: 20 SOLUTION ORAL at 08:24

## 2018-01-01 RX ADMIN — FUROSEMIDE 20 MG: 10 INJECTION, SOLUTION INTRAMUSCULAR; INTRAVENOUS at 20:25

## 2018-01-01 RX ADMIN — PROPOFOL 20 MCG/KG/MIN: 10 INJECTION, EMULSION INTRAVENOUS at 10:10

## 2018-01-01 RX ADMIN — CEFTRIAXONE SODIUM 1 G: 1 INJECTION, POWDER, FOR SOLUTION INTRAMUSCULAR; INTRAVENOUS at 16:55

## 2018-01-01 RX ADMIN — HYDROMORPHONE HYDROCHLORIDE 0.25 MG: 2 INJECTION INTRAMUSCULAR; INTRAVENOUS; SUBCUTANEOUS at 18:42

## 2018-01-01 RX ADMIN — RIFAXIMIN 275 MG: 550 TABLET ORAL at 21:00

## 2018-01-01 RX ADMIN — FUROSEMIDE 20 MG: 20 TABLET ORAL at 08:20

## 2018-01-01 RX ADMIN — MORPHINE SULFATE 4 MG: 4 INJECTION INTRAVENOUS at 23:05

## 2018-01-01 RX ADMIN — SODIUM CHLORIDE, PRESERVATIVE FREE 10 ML: 5 INJECTION INTRAVENOUS at 21:00

## 2018-01-01 RX ADMIN — MIDODRINE HYDROCHLORIDE 10 MG: 5 TABLET ORAL at 21:40

## 2018-01-01 RX ADMIN — LORAZEPAM 0.5 MG: 0.5 TABLET ORAL at 22:49

## 2018-01-01 RX ADMIN — POTASSIUM CHLORIDE 40 MEQ: 750 CAPSULE, EXTENDED RELEASE ORAL at 08:53

## 2018-01-01 RX ADMIN — NYSTATIN: 100000 CREAM TOPICAL at 08:45

## 2018-01-01 RX ADMIN — FUROSEMIDE 40 MG: 10 INJECTION, SOLUTION INTRAMUSCULAR; INTRAVENOUS at 22:09

## 2018-01-01 RX ADMIN — IPRATROPIUM BROMIDE AND ALBUTEROL SULFATE 3 ML: 2.5; .5 SOLUTION RESPIRATORY (INHALATION) at 19:40

## 2018-01-01 RX ADMIN — HYDROMORPHONE HYDROCHLORIDE 0.5 MG: 1 INJECTION, SOLUTION INTRAMUSCULAR; INTRAVENOUS; SUBCUTANEOUS at 18:56

## 2018-01-01 RX ADMIN — LACTULOSE 30 G: 10 SOLUTION ORAL at 17:51

## 2018-01-01 RX ADMIN — FUROSEMIDE 20 MG: 20 TABLET ORAL at 18:44

## 2018-01-01 RX ADMIN — RIFAXIMIN 550 MG: 550 TABLET ORAL at 20:00

## 2018-01-01 RX ADMIN — ALBUMIN HUMAN 25 G: 0.05 INJECTION, SOLUTION INTRAVENOUS at 18:56

## 2018-01-01 RX ADMIN — AMLODIPINE BESYLATE 5 MG: 5 TABLET ORAL at 12:34

## 2018-01-01 RX ADMIN — DOXYCYCLINE 100 MG: 100 INJECTION, POWDER, LYOPHILIZED, FOR SOLUTION INTRAVENOUS at 05:21

## 2018-01-01 RX ADMIN — MORPHINE SULFATE 1 MG: 2 INJECTION, SOLUTION INTRAMUSCULAR; INTRAVENOUS at 09:42

## 2018-01-01 RX ADMIN — SODIUM CHLORIDE, PRESERVATIVE FREE 10 ML: 5 INJECTION INTRAVENOUS at 08:38

## 2018-01-01 RX ADMIN — HYDROCODONE BITARTRATE AND ACETAMINOPHEN 1 TABLET: 7.5; 325 TABLET ORAL at 04:45

## 2018-01-01 RX ADMIN — HYDROCORTISONE 1 G: 1 CREAM TOPICAL at 20:22

## 2018-01-01 RX ADMIN — IPRATROPIUM BROMIDE AND ALBUTEROL SULFATE 3 ML: 2.5; .5 SOLUTION RESPIRATORY (INHALATION) at 11:28

## 2018-01-01 RX ADMIN — IBUPROFEN 400 MG: 400 TABLET ORAL at 00:28

## 2018-01-01 RX ADMIN — HYDROCORTISONE 1 G: 1 CREAM TOPICAL at 08:17

## 2018-01-01 RX ADMIN — POTASSIUM CHLORIDE 40 MEQ: 750 CAPSULE, EXTENDED RELEASE ORAL at 08:22

## 2018-01-01 RX ADMIN — FUROSEMIDE 40 MG: 40 TABLET ORAL at 09:15

## 2018-01-01 RX ADMIN — METRONIDAZOLE 500 MG: 500 INJECTION, SOLUTION INTRAVENOUS at 10:00

## 2018-01-01 RX ADMIN — NYSTATIN: 100000 CREAM TOPICAL at 20:51

## 2018-01-01 RX ADMIN — SODIUM CHLORIDE 50 ML/HR: 9 INJECTION, SOLUTION INTRAVENOUS at 15:58

## 2018-01-01 RX ADMIN — HYDROMORPHONE HYDROCHLORIDE 0.25 MG: 1 INJECTION, SOLUTION INTRAMUSCULAR; INTRAVENOUS; SUBCUTANEOUS at 16:01

## 2018-01-01 RX ADMIN — HYDROMORPHONE HYDROCHLORIDE 0.25 MG: 2 INJECTION INTRAMUSCULAR; INTRAVENOUS; SUBCUTANEOUS at 00:19

## 2018-01-01 RX ADMIN — NICOTINE 1 PATCH: 21 PATCH TRANSDERMAL at 09:11

## 2018-01-01 RX ADMIN — PROPOFOL 40 MCG/KG/MIN: 10 INJECTION, EMULSION INTRAVENOUS at 17:13

## 2018-01-01 RX ADMIN — FUROSEMIDE 10 MG/HR: 10 INJECTION, SOLUTION INTRAVENOUS at 02:52

## 2018-01-01 RX ADMIN — CEFTRIAXONE 1 G: 1 INJECTION, POWDER, FOR SOLUTION INTRAMUSCULAR; INTRAVENOUS at 18:16

## 2018-01-01 RX ADMIN — HYDRALAZINE HYDROCHLORIDE 10 MG: 10 TABLET, FILM COATED ORAL at 13:57

## 2018-01-01 RX ADMIN — RIFAXIMIN 550 MG: 550 TABLET ORAL at 21:15

## 2018-01-01 RX ADMIN — ALBUMIN HUMAN 25 G: 0.05 INJECTION, SOLUTION INTRAVENOUS at 05:37

## 2018-01-01 RX ADMIN — Medication 10 ML: at 11:48

## 2018-01-01 RX ADMIN — MIDODRINE HYDROCHLORIDE 10 MG: 5 TABLET ORAL at 09:03

## 2018-01-01 RX ADMIN — VANCOMYCIN HYDROCHLORIDE 1500 MG: 1 INJECTION, POWDER, LYOPHILIZED, FOR SOLUTION INTRAVENOUS at 12:27

## 2018-01-01 RX ADMIN — NYSTATIN: 100000 POWDER TOPICAL at 10:23

## 2018-01-01 RX ADMIN — Medication 10 ML: at 16:43

## 2018-01-01 RX ADMIN — ALBUTEROL SULFATE 2 PUFF: 90 AEROSOL, METERED RESPIRATORY (INHALATION) at 15:15

## 2018-01-01 RX ADMIN — MORPHINE SULFATE 1 MG: 2 INJECTION, SOLUTION INTRAMUSCULAR; INTRAVENOUS at 12:26

## 2018-01-01 RX ADMIN — MIDODRINE HYDROCHLORIDE 10 MG: 5 TABLET ORAL at 09:51

## 2018-01-01 RX ADMIN — ALBUMIN (HUMAN) 12.5 G: 0.25 INJECTION, SOLUTION INTRAVENOUS at 10:42

## 2018-01-01 RX ADMIN — LACTULOSE 30 G: 20 SOLUTION ORAL at 20:28

## 2018-01-01 RX ADMIN — TAZOBACTAM SODIUM AND PIPERACILLIN SODIUM 3.38 G: 375; 3 INJECTION, SOLUTION INTRAVENOUS at 16:28

## 2018-01-01 RX ADMIN — HYDROMORPHONE HYDROCHLORIDE 0.25 MG: 2 INJECTION INTRAMUSCULAR; INTRAVENOUS; SUBCUTANEOUS at 16:29

## 2018-01-01 RX ADMIN — NICOTINE 1 PATCH: 21 PATCH TRANSDERMAL at 08:37

## 2018-01-01 RX ADMIN — SODIUM CHLORIDE 50 ML/HR: 9 INJECTION, SOLUTION INTRAVENOUS at 11:43

## 2018-01-01 RX ADMIN — FAMOTIDINE 20 MG: 20 TABLET ORAL at 22:15

## 2018-01-01 RX ADMIN — ALBUMIN HUMAN 25 G: 0.05 INJECTION, SOLUTION INTRAVENOUS at 19:30

## 2018-01-01 RX ADMIN — NICOTINE 1 PATCH: 21 PATCH TRANSDERMAL at 09:49

## 2018-01-01 RX ADMIN — ONDANSETRON 4 MG: 2 INJECTION INTRAMUSCULAR; INTRAVENOUS at 00:30

## 2018-01-01 RX ADMIN — HALOPERIDOL LACTATE 2 MG: 5 INJECTION, SOLUTION INTRAMUSCULAR at 21:11

## 2018-01-01 RX ADMIN — PANTOPRAZOLE SODIUM 40 MG: 40 INJECTION, POWDER, FOR SOLUTION INTRAVENOUS at 05:46

## 2018-01-01 RX ADMIN — NYSTATIN: 100000 CREAM TOPICAL at 22:09

## 2018-01-01 RX ADMIN — MORPHINE SULFATE 4 MG: 4 INJECTION INTRAVENOUS at 23:16

## 2018-01-01 RX ADMIN — ALBUMIN HUMAN 25 G: 0.05 INJECTION, SOLUTION INTRAVENOUS at 01:37

## 2018-01-01 RX ADMIN — MIDODRINE HYDROCHLORIDE 10 MG: 5 TABLET ORAL at 20:44

## 2018-01-01 RX ADMIN — MORPHINE SULFATE 1 MG: 2 INJECTION, SOLUTION INTRAMUSCULAR; INTRAVENOUS at 23:57

## 2018-01-01 RX ADMIN — FAMOTIDINE 20 MG: 20 INJECTION, SOLUTION INTRAVENOUS at 20:18

## 2018-01-01 RX ADMIN — IBUPROFEN 200 MG: 200 TABLET, FILM COATED ORAL at 23:12

## 2018-01-01 RX ADMIN — SODIUM BICARBONATE 50 ML/HR: 84 INJECTION, SOLUTION INTRAVENOUS at 15:09

## 2018-01-01 RX ADMIN — FAMOTIDINE 20 MG: 20 INJECTION, SOLUTION INTRAVENOUS at 09:34

## 2018-01-01 RX ADMIN — LORAZEPAM 0.5 MG: 2 INJECTION INTRAMUSCULAR; INTRAVENOUS at 22:10

## 2018-01-01 RX ADMIN — MIDODRINE HYDROCHLORIDE 10 MG: 5 TABLET ORAL at 08:13

## 2018-01-01 RX ADMIN — ALBUMIN (HUMAN) 5 ML/HR: 0.25 INJECTION, SOLUTION INTRAVENOUS at 02:35

## 2018-01-01 RX ADMIN — SODIUM CHLORIDE, PRESERVATIVE FREE 3 ML: 5 INJECTION INTRAVENOUS at 20:52

## 2018-01-01 RX ADMIN — RIFAXIMIN 550 MG: 550 TABLET ORAL at 08:16

## 2018-01-01 RX ADMIN — NYSTATIN: 100000 POWDER TOPICAL at 20:22

## 2018-01-01 RX ADMIN — POTASSIUM CHLORIDE 40 MEQ: 750 CAPSULE, EXTENDED RELEASE ORAL at 08:30

## 2018-01-01 RX ADMIN — LACTULOSE 30 G: 20 SOLUTION ORAL at 17:54

## 2018-01-01 RX ADMIN — MIDODRINE HYDROCHLORIDE 10 MG: 5 TABLET ORAL at 09:48

## 2018-01-01 RX ADMIN — MIDODRINE HYDROCHLORIDE 10 MG: 5 TABLET ORAL at 16:18

## 2018-01-01 RX ADMIN — RIFAXIMIN 550 MG: 550 TABLET ORAL at 22:23

## 2018-01-01 RX ADMIN — HYDROMORPHONE HYDROCHLORIDE 0.25 MG: 2 INJECTION INTRAMUSCULAR; INTRAVENOUS; SUBCUTANEOUS at 21:16

## 2018-01-01 RX ADMIN — NICOTINE 1 PATCH: 21 PATCH TRANSDERMAL at 11:51

## 2018-01-01 RX ADMIN — SODIUM CHLORIDE, PRESERVATIVE FREE 20 ML: 5 INJECTION INTRAVENOUS at 09:49

## 2018-01-01 RX ADMIN — HYDROMORPHONE HYDROCHLORIDE 0.25 MG: 2 INJECTION INTRAMUSCULAR; INTRAVENOUS; SUBCUTANEOUS at 10:18

## 2018-01-01 RX ADMIN — FUROSEMIDE 10 MG/HR: 10 INJECTION, SOLUTION INTRAVENOUS at 12:56

## 2018-01-01 RX ADMIN — RIFAXIMIN 275 MG: 550 TABLET ORAL at 20:57

## 2018-01-01 RX ADMIN — HYDROMORPHONE HYDROCHLORIDE 0.25 MG: 2 INJECTION INTRAMUSCULAR; INTRAVENOUS; SUBCUTANEOUS at 04:51

## 2018-01-01 RX ADMIN — FAMOTIDINE 20 MG: 20 TABLET ORAL at 11:00

## 2018-01-01 RX ADMIN — LACTULOSE 30 G: 10 SOLUTION ORAL at 20:34

## 2018-01-01 RX ADMIN — OXYCODONE HYDROCHLORIDE 5 MG: 5 TABLET ORAL at 02:50

## 2018-01-01 RX ADMIN — ALBUMIN HUMAN 25 G: 0.05 INJECTION, SOLUTION INTRAVENOUS at 17:23

## 2018-01-01 RX ADMIN — NICOTINE 1 PATCH: 21 PATCH TRANSDERMAL at 09:17

## 2018-01-01 RX ADMIN — SODIUM CHLORIDE, PRESERVATIVE FREE 3 ML: 5 INJECTION INTRAVENOUS at 22:10

## 2018-01-01 RX ADMIN — CEFTRIAXONE 2 G: 2 INJECTION, POWDER, FOR SOLUTION INTRAMUSCULAR; INTRAVENOUS at 15:59

## 2018-01-01 RX ADMIN — FLUCONAZOLE IN SODIUM CHLORIDE 400 MG: 2 INJECTION, SOLUTION INTRAVENOUS at 09:23

## 2018-01-01 RX ADMIN — POTASSIUM CHLORIDE 10 MEQ: 7.46 INJECTION, SOLUTION INTRAVENOUS at 04:53

## 2018-01-01 RX ADMIN — ONDANSETRON 4 MG: 2 INJECTION INTRAMUSCULAR; INTRAVENOUS at 11:57

## 2018-01-01 RX ADMIN — MIDODRINE HYDROCHLORIDE 10 MG: 5 TABLET ORAL at 08:24

## 2018-01-01 RX ADMIN — PANTOPRAZOLE SODIUM 40 MG: 40 TABLET, DELAYED RELEASE ORAL at 06:24

## 2018-01-01 RX ADMIN — SODIUM CHLORIDE, PRESERVATIVE FREE 3 ML: 5 INJECTION INTRAVENOUS at 20:38

## 2018-01-01 RX ADMIN — RIFAXIMIN 275 MG: 550 TABLET ORAL at 20:44

## 2018-01-01 RX ADMIN — HYDROMORPHONE HYDROCHLORIDE 0.25 MG: 1 INJECTION, SOLUTION INTRAMUSCULAR; INTRAVENOUS; SUBCUTANEOUS at 05:20

## 2018-01-01 RX ADMIN — RIFAXIMIN 275 MG: 550 TABLET ORAL at 09:50

## 2018-01-01 RX ADMIN — METRONIDAZOLE 500 MG: 500 INJECTION, SOLUTION INTRAVENOUS at 21:14

## 2018-01-01 RX ADMIN — POTASSIUM CHLORIDE 40 MEQ: 750 CAPSULE, EXTENDED RELEASE ORAL at 17:27

## 2018-01-01 RX ADMIN — METOLAZONE 2.5 MG: 2.5 TABLET ORAL at 08:19

## 2018-01-01 RX ADMIN — HYDROMORPHONE HYDROCHLORIDE 0.25 MG: 1 INJECTION, SOLUTION INTRAMUSCULAR; INTRAVENOUS; SUBCUTANEOUS at 02:16

## 2018-01-01 RX ADMIN — IPRATROPIUM BROMIDE AND ALBUTEROL SULFATE 3 ML: 2.5; .5 SOLUTION RESPIRATORY (INHALATION) at 13:37

## 2018-01-01 RX ADMIN — PANTOPRAZOLE SODIUM 8 MG/HR: 40 INJECTION, POWDER, FOR SOLUTION INTRAVENOUS at 06:27

## 2018-01-01 RX ADMIN — POTASSIUM CHLORIDE 40 MEQ: 750 CAPSULE, EXTENDED RELEASE ORAL at 08:32

## 2018-01-01 RX ADMIN — FUROSEMIDE 40 MG: 10 INJECTION, SOLUTION INTRAMUSCULAR; INTRAVENOUS at 09:20

## 2018-01-01 RX ADMIN — SODIUM CHLORIDE 50 ML/HR: 9 INJECTION, SOLUTION INTRAVENOUS at 16:09

## 2018-01-01 RX ADMIN — HYDROMORPHONE HYDROCHLORIDE 0.25 MG: 2 INJECTION INTRAMUSCULAR; INTRAVENOUS; SUBCUTANEOUS at 14:19

## 2018-01-01 RX ADMIN — RIFAXIMIN 275 MG: 550 TABLET ORAL at 20:40

## 2018-01-01 RX ADMIN — PROPOFOL 30 MCG/KG/MIN: 10 INJECTION, EMULSION INTRAVENOUS at 22:10

## 2018-01-01 RX ADMIN — NYSTATIN: 100000 POWDER TOPICAL at 10:21

## 2018-01-01 RX ADMIN — HYDRALAZINE HYDROCHLORIDE 10 MG: 10 TABLET, FILM COATED ORAL at 21:16

## 2018-01-01 RX ADMIN — ALBUMIN HUMAN 25 G: 0.05 INJECTION, SOLUTION INTRAVENOUS at 16:04

## 2018-01-01 RX ADMIN — MORPHINE SULFATE 1 MG: 2 INJECTION, SOLUTION INTRAMUSCULAR; INTRAVENOUS at 18:39

## 2018-01-01 RX ADMIN — ALBUMIN HUMAN 25 G: 0.05 INJECTION, SOLUTION INTRAVENOUS at 06:08

## 2018-01-01 RX ADMIN — NYSTATIN: 100000 CREAM TOPICAL at 20:57

## 2018-01-01 RX ADMIN — MORPHINE SULFATE 2 MG: 4 INJECTION INTRAVENOUS at 09:16

## 2018-01-01 RX ADMIN — LACTULOSE 30 G: 10 SOLUTION ORAL at 09:24

## 2018-01-01 RX ADMIN — FLUCONAZOLE 400 MG: 2 INJECTION INTRAVENOUS at 17:20

## 2018-01-01 RX ADMIN — METRONIDAZOLE 500 MG: 500 INJECTION, SOLUTION INTRAVENOUS at 18:07

## 2018-01-01 RX ADMIN — DOXYCYCLINE 100 MG: 100 INJECTION, POWDER, LYOPHILIZED, FOR SOLUTION INTRAVENOUS at 06:13

## 2018-01-01 RX ADMIN — ALBUMIN (HUMAN) 5 ML/HR: 0.25 INJECTION, SOLUTION INTRAVENOUS at 19:28

## 2018-01-01 RX ADMIN — DOXYCYCLINE 100 MG: 100 INJECTION, POWDER, LYOPHILIZED, FOR SOLUTION INTRAVENOUS at 18:29

## 2018-01-01 RX ADMIN — SODIUM CHLORIDE, PRESERVATIVE FREE 3 ML: 5 INJECTION INTRAVENOUS at 08:25

## 2018-01-01 RX ADMIN — LACTULOSE 30 G: 20 SOLUTION ORAL at 15:20

## 2018-01-01 RX ADMIN — HYDROCORTISONE 1 G: 1 CREAM TOPICAL at 08:32

## 2018-01-01 RX ADMIN — RIFAXIMIN 275 MG: 550 TABLET ORAL at 10:20

## 2018-01-01 RX ADMIN — LACTULOSE 30 G: 10 SOLUTION ORAL at 08:16

## 2018-01-01 RX ADMIN — LACTULOSE 30 G: 20 SOLUTION ORAL at 09:19

## 2018-01-01 RX ADMIN — HYDROCORTISONE 1 G: 1 CREAM TOPICAL at 21:33

## 2018-01-01 RX ADMIN — LACTULOSE 30 G: 20 SOLUTION ORAL at 08:37

## 2018-01-01 RX ADMIN — TAZOBACTAM SODIUM AND PIPERACILLIN SODIUM 3.38 G: 375; 3 INJECTION, SOLUTION INTRAVENOUS at 06:20

## 2018-01-01 RX ADMIN — FAMOTIDINE 20 MG: 10 INJECTION INTRAVENOUS at 10:43

## 2018-01-01 RX ADMIN — CEFTRIAXONE 1 G: 1 INJECTION, POWDER, FOR SOLUTION INTRAMUSCULAR; INTRAVENOUS at 12:28

## 2018-01-01 RX ADMIN — SODIUM CHLORIDE, PRESERVATIVE FREE 10 ML: 5 INJECTION INTRAVENOUS at 09:32

## 2018-01-01 RX ADMIN — FAMOTIDINE 20 MG: 20 TABLET ORAL at 10:07

## 2018-01-01 RX ADMIN — PROPOFOL 50 MG: 10 INJECTION, EMULSION INTRAVENOUS at 12:42

## 2018-01-01 RX ADMIN — HYDROCORTISONE 1 G: 1 CREAM TOPICAL at 15:58

## 2018-01-01 RX ADMIN — OXYCODONE HYDROCHLORIDE 5 MG: 5 TABLET ORAL at 19:38

## 2018-01-01 RX ADMIN — LACTULOSE 30 G: 10 SOLUTION ORAL at 17:18

## 2018-01-01 RX ADMIN — HYDROCORTISONE 1 G: 1 CREAM TOPICAL at 00:04

## 2018-01-01 RX ADMIN — ALBUMIN (HUMAN) 12.5 G: 0.25 INJECTION, SOLUTION INTRAVENOUS at 20:57

## 2018-01-01 RX ADMIN — POTASSIUM CHLORIDE 40 MEQ: 750 CAPSULE, EXTENDED RELEASE ORAL at 08:47

## 2018-01-01 RX ADMIN — NYSTATIN: 100000 CREAM TOPICAL at 21:23

## 2018-01-01 RX ADMIN — NYSTATIN: 100000 POWDER TOPICAL at 21:33

## 2018-01-01 RX ADMIN — Medication 10 ML: at 08:54

## 2018-01-01 RX ADMIN — ALBUMIN (HUMAN) 12.5 G: 0.25 INJECTION, SOLUTION INTRAVENOUS at 08:38

## 2018-01-01 RX ADMIN — RIFAXIMIN 275 MG: 550 TABLET ORAL at 20:10

## 2018-01-01 RX ADMIN — AMLODIPINE BESYLATE 5 MG: 5 TABLET ORAL at 08:19

## 2018-01-01 RX ADMIN — MIDODRINE HYDROCHLORIDE 10 MG: 5 TABLET ORAL at 17:21

## 2018-01-01 RX ADMIN — HYDROMORPHONE HYDROCHLORIDE 0.25 MG: 2 INJECTION INTRAMUSCULAR; INTRAVENOUS; SUBCUTANEOUS at 09:27

## 2018-01-01 RX ADMIN — NYSTATIN: 100000 CREAM TOPICAL at 21:00

## 2018-01-01 RX ADMIN — ALBUMIN (HUMAN) 10 ML/HR: 0.25 INJECTION, SOLUTION INTRAVENOUS at 00:18

## 2018-01-01 RX ADMIN — CEFTRIAXONE 1 G: 1 INJECTION, POWDER, FOR SOLUTION INTRAMUSCULAR; INTRAVENOUS at 20:42

## 2018-01-01 RX ADMIN — Medication 10 ML: at 13:16

## 2018-01-01 RX ADMIN — LACTULOSE 30 G: 10 SOLUTION ORAL at 06:03

## 2018-01-01 RX ADMIN — RIFAXIMIN 550 MG: 550 TABLET ORAL at 09:09

## 2018-01-01 RX ADMIN — PROPOFOL 40 MCG/KG/MIN: 10 INJECTION, EMULSION INTRAVENOUS at 13:03

## 2018-01-01 RX ADMIN — MORPHINE SULFATE 4 MG: 4 INJECTION INTRAVENOUS at 16:56

## 2018-01-01 RX ADMIN — VANCOMYCIN HYDROCHLORIDE 1250 MG: 5 INJECTION, POWDER, LYOPHILIZED, FOR SOLUTION INTRAVENOUS at 22:37

## 2018-01-01 RX ADMIN — ALBUMIN (HUMAN) 12.5 G: 0.25 INJECTION, SOLUTION INTRAVENOUS at 20:02

## 2018-01-01 RX ADMIN — SODIUM BICARBONATE 650 MG TABLET 650 MG: at 08:45

## 2018-01-01 RX ADMIN — SODIUM CHLORIDE 1000 ML: 900 INJECTION, SOLUTION INTRAVENOUS at 17:28

## 2018-01-01 RX ADMIN — MORPHINE SULFATE 2 MG: 4 INJECTION INTRAVENOUS at 20:49

## 2018-01-01 RX ADMIN — FUROSEMIDE 5 MG/HR: 10 INJECTION, SOLUTION INTRAVENOUS at 17:11

## 2018-01-01 RX ADMIN — MORPHINE SULFATE 1 MG: 2 INJECTION, SOLUTION INTRAMUSCULAR; INTRAVENOUS at 16:01

## 2018-01-01 RX ADMIN — IPRATROPIUM BROMIDE AND ALBUTEROL SULFATE 3 ML: 2.5; .5 SOLUTION RESPIRATORY (INHALATION) at 07:05

## 2018-01-01 RX ADMIN — LACTULOSE 30 G: 20 SOLUTION ORAL at 21:50

## 2018-01-01 RX ADMIN — HYDROCORTISONE 1 G: 1 CREAM TOPICAL at 20:42

## 2018-01-01 RX ADMIN — METHYLPREDNISOLONE SODIUM SUCCINATE 125 MG: 125 INJECTION, POWDER, FOR SOLUTION INTRAMUSCULAR; INTRAVENOUS at 10:42

## 2018-01-01 RX ADMIN — HYDROMORPHONE HYDROCHLORIDE 0.25 MG: 1 INJECTION, SOLUTION INTRAMUSCULAR; INTRAVENOUS; SUBCUTANEOUS at 09:12

## 2018-01-01 RX ADMIN — PROPOFOL 20 MCG/KG/MIN: 10 INJECTION, EMULSION INTRAVENOUS at 18:43

## 2018-01-01 RX ADMIN — CEFEPIME HYDROCHLORIDE 1 G: 1 INJECTION, POWDER, FOR SOLUTION INTRAMUSCULAR; INTRAVENOUS at 09:32

## 2018-01-01 RX ADMIN — RIFAXIMIN 550 MG: 550 TABLET ORAL at 12:14

## 2018-01-01 RX ADMIN — HYDROMORPHONE HYDROCHLORIDE 0.25 MG: 1 INJECTION, SOLUTION INTRAMUSCULAR; INTRAVENOUS; SUBCUTANEOUS at 03:26

## 2018-01-01 RX ADMIN — FUROSEMIDE 20 MG: 10 INJECTION, SOLUTION INTRAMUSCULAR; INTRAVENOUS at 06:22

## 2018-01-01 RX ADMIN — POTASSIUM CHLORIDE 40 MEQ: 750 CAPSULE, EXTENDED RELEASE ORAL at 08:24

## 2018-01-01 RX ADMIN — LACTULOSE 30 G: 10 SOLUTION ORAL at 18:07

## 2018-01-01 RX ADMIN — HYDROCORTISONE 1 G: 1 CREAM TOPICAL at 21:30

## 2018-01-01 RX ADMIN — CEFTRIAXONE 1 G: 1 INJECTION, POWDER, FOR SOLUTION INTRAMUSCULAR; INTRAVENOUS at 21:24

## 2018-01-01 RX ADMIN — LACTULOSE 30 G: 20 SOLUTION ORAL at 09:44

## 2018-01-01 RX ADMIN — MORPHINE SULFATE 1 MG: 2 INJECTION, SOLUTION INTRAMUSCULAR; INTRAVENOUS at 16:09

## 2018-01-01 RX ADMIN — NYSTATIN: 100000 CREAM TOPICAL at 22:41

## 2018-01-01 RX ADMIN — PANTOPRAZOLE SODIUM 40 MG: 40 TABLET, DELAYED RELEASE ORAL at 05:44

## 2018-01-01 RX ADMIN — HYDROMORPHONE HYDROCHLORIDE 0.25 MG: 1 INJECTION, SOLUTION INTRAMUSCULAR; INTRAVENOUS; SUBCUTANEOUS at 03:05

## 2018-01-01 RX ADMIN — RIFAXIMIN 550 MG: 550 TABLET ORAL at 21:56

## 2018-01-01 RX ADMIN — SODIUM CHLORIDE, PRESERVATIVE FREE 10 ML: 5 INJECTION INTRAVENOUS at 08:37

## 2018-01-01 RX ADMIN — HYDRALAZINE HYDROCHLORIDE 10 MG: 10 TABLET, FILM COATED ORAL at 21:01

## 2018-01-01 RX ADMIN — FAMOTIDINE 20 MG: 20 INJECTION, SOLUTION INTRAVENOUS at 10:20

## 2018-01-01 RX ADMIN — HYDROMORPHONE HYDROCHLORIDE 0.25 MG: 2 INJECTION INTRAMUSCULAR; INTRAVENOUS; SUBCUTANEOUS at 22:32

## 2018-01-01 RX ADMIN — Medication 10 ML: at 19:31

## 2018-01-01 RX ADMIN — ALBUMIN HUMAN 25 G: 0.05 INJECTION, SOLUTION INTRAVENOUS at 19:46

## 2018-01-01 RX ADMIN — LACTULOSE 30 G: 10 SOLUTION ORAL at 08:48

## 2018-01-01 RX ADMIN — ALBUMIN (HUMAN) 12.5 G: 0.25 INJECTION, SOLUTION INTRAVENOUS at 10:24

## 2018-01-01 RX ADMIN — NYSTATIN: 100000 CREAM TOPICAL at 09:27

## 2018-01-01 RX ADMIN — LINEZOLID 600 MG: 600 TABLET, FILM COATED ORAL at 21:47

## 2018-01-01 RX ADMIN — MIDODRINE HYDROCHLORIDE 10 MG: 5 TABLET ORAL at 20:14

## 2018-01-01 RX ADMIN — HYDROCORTISONE 1 G: 1 CREAM TOPICAL at 08:49

## 2018-01-01 RX ADMIN — FUROSEMIDE 40 MG: 40 TABLET ORAL at 08:30

## 2018-01-01 RX ADMIN — METRONIDAZOLE 500 MG: 500 INJECTION, SOLUTION INTRAVENOUS at 21:03

## 2018-01-01 RX ADMIN — MIDODRINE HYDROCHLORIDE 10 MG: 5 TABLET ORAL at 20:48

## 2018-01-01 RX ADMIN — Medication 10 ML: at 15:57

## 2018-01-01 RX ADMIN — OXYCODONE HYDROCHLORIDE 5 MG: 5 TABLET ORAL at 18:46

## 2018-01-01 RX ADMIN — IPRATROPIUM BROMIDE AND ALBUTEROL SULFATE 3 ML: 2.5; .5 SOLUTION RESPIRATORY (INHALATION) at 07:15

## 2018-01-01 RX ADMIN — Medication 10 ML: at 16:00

## 2018-01-01 RX ADMIN — NYSTATIN 1 EACH: 100000 CREAM TOPICAL at 20:34

## 2018-01-01 RX ADMIN — PANTOPRAZOLE SODIUM 40 MG: 40 TABLET, DELAYED RELEASE ORAL at 05:35

## 2018-01-01 RX ADMIN — LINEZOLID 600 MG: 600 TABLET, FILM COATED ORAL at 09:35

## 2018-01-01 RX ADMIN — Medication 10 ML: at 21:34

## 2018-01-01 RX ADMIN — SODIUM CHLORIDE, PRESERVATIVE FREE 10 ML: 5 INJECTION INTRAVENOUS at 09:33

## 2018-01-01 RX ADMIN — MIDODRINE HYDROCHLORIDE 10 MG: 5 TABLET ORAL at 08:23

## 2018-01-01 RX ADMIN — FUROSEMIDE 20 MG: 10 INJECTION, SOLUTION INTRAMUSCULAR; INTRAVENOUS at 09:47

## 2018-01-01 RX ADMIN — RIFAXIMIN 550 MG: 550 TABLET ORAL at 08:31

## 2018-01-01 RX ADMIN — ALBUMIN (HUMAN) 12.5 G: 0.25 INJECTION, SOLUTION INTRAVENOUS at 08:20

## 2018-01-01 RX ADMIN — ALBUMIN HUMAN 12.5 G: 0.25 SOLUTION INTRAVENOUS at 14:41

## 2018-01-01 RX ADMIN — VANCOMYCIN HYDROCHLORIDE 1500 MG: 1 INJECTION, POWDER, LYOPHILIZED, FOR SOLUTION INTRAVENOUS at 13:19

## 2018-01-01 RX ADMIN — SODIUM CHLORIDE 100 ML/HR: 900 INJECTION, SOLUTION INTRAVENOUS at 16:45

## 2018-01-01 RX ADMIN — HYDROCORTISONE 1 G: 1 CREAM TOPICAL at 17:19

## 2018-01-01 RX ADMIN — SODIUM CHLORIDE, PRESERVATIVE FREE 3 ML: 5 INJECTION INTRAVENOUS at 09:43

## 2018-01-01 RX ADMIN — HYDRALAZINE HYDROCHLORIDE 10 MG: 10 TABLET, FILM COATED ORAL at 12:34

## 2018-01-01 RX ADMIN — CEFTRIAXONE SODIUM 1 G: 1 INJECTION, POWDER, FOR SOLUTION INTRAMUSCULAR; INTRAVENOUS at 15:51

## 2018-01-01 RX ADMIN — EPOETIN ALFA 10000 UNITS: 10000 SOLUTION INTRAVENOUS; SUBCUTANEOUS at 14:23

## 2018-01-01 RX ADMIN — ALBUMIN HUMAN 25 G: 0.05 INJECTION, SOLUTION INTRAVENOUS at 15:07

## 2018-01-01 RX ADMIN — CEFTRIAXONE 1 G: 1 INJECTION, POWDER, FOR SOLUTION INTRAMUSCULAR; INTRAVENOUS at 12:16

## 2018-01-01 RX ADMIN — CEFTRIAXONE 1 G: 1 INJECTION, POWDER, FOR SOLUTION INTRAMUSCULAR; INTRAVENOUS at 11:15

## 2018-01-01 RX ADMIN — FAMOTIDINE 20 MG: 20 INJECTION, SOLUTION INTRAVENOUS at 12:14

## 2018-01-01 RX ADMIN — ALBUMIN HUMAN 25 G: 0.05 INJECTION, SOLUTION INTRAVENOUS at 06:22

## 2018-01-01 RX ADMIN — RIFAXIMIN 550 MG: 550 TABLET ORAL at 21:34

## 2018-01-01 RX ADMIN — BUDESONIDE AND FORMOTEROL FUMARATE DIHYDRATE 2 PUFF: 160; 4.5 AEROSOL RESPIRATORY (INHALATION) at 06:45

## 2018-01-01 RX ADMIN — NICOTINE 1 PATCH: 14 PATCH, EXTENDED RELEASE TRANSDERMAL at 20:55

## 2018-01-01 RX ADMIN — POTASSIUM CHLORIDE 40 MEQ: 750 CAPSULE, EXTENDED RELEASE ORAL at 10:42

## 2018-01-01 RX ADMIN — Medication 10 ML: at 08:38

## 2018-01-01 RX ADMIN — CEFTRIAXONE SODIUM 2 G: 2 INJECTION, POWDER, FOR SOLUTION INTRAMUSCULAR; INTRAVENOUS at 15:08

## 2018-01-01 RX ADMIN — CEFTRIAXONE SODIUM 1 G: 1 INJECTION, POWDER, FOR SOLUTION INTRAMUSCULAR; INTRAVENOUS at 14:46

## 2018-01-01 RX ADMIN — HYDRALAZINE HYDROCHLORIDE 10 MG: 10 TABLET, FILM COATED ORAL at 16:10

## 2018-01-01 RX ADMIN — MORPHINE SULFATE 4 MG: 4 INJECTION INTRAVENOUS at 00:33

## 2018-01-01 RX ADMIN — SODIUM CHLORIDE, PRESERVATIVE FREE 3 ML: 5 INJECTION INTRAVENOUS at 08:23

## 2018-01-01 RX ADMIN — MORPHINE SULFATE 4 MG: 4 INJECTION INTRAVENOUS at 14:10

## 2018-01-01 RX ADMIN — MIDODRINE HYDROCHLORIDE 10 MG: 5 TABLET ORAL at 15:28

## 2018-01-01 RX ADMIN — CEFTRIAXONE 2 G: 2 INJECTION, POWDER, FOR SOLUTION INTRAMUSCULAR; INTRAVENOUS at 20:57

## 2018-01-01 RX ADMIN — METRONIDAZOLE 500 MG: 500 INJECTION, SOLUTION INTRAVENOUS at 02:06

## 2018-01-01 RX ADMIN — ALBUMIN HUMAN 25 G: 0.05 INJECTION, SOLUTION INTRAVENOUS at 17:58

## 2018-01-01 RX ADMIN — FLUCONAZOLE IN SODIUM CHLORIDE 400 MG: 2 INJECTION, SOLUTION INTRAVENOUS at 08:36

## 2018-01-01 RX ADMIN — SODIUM CHLORIDE, PRESERVATIVE FREE 3 ML: 5 INJECTION INTRAVENOUS at 20:31

## 2018-01-01 RX ADMIN — HYDROMORPHONE HYDROCHLORIDE 0.25 MG: 2 INJECTION INTRAMUSCULAR; INTRAVENOUS; SUBCUTANEOUS at 13:15

## 2018-01-01 RX ADMIN — SPIRONOLACTONE 25 MG: 25 TABLET ORAL at 09:16

## 2018-01-01 RX ADMIN — FAMOTIDINE 20 MG: 20 TABLET ORAL at 10:34

## 2018-01-01 RX ADMIN — FLUCONAZOLE IN SODIUM CHLORIDE 400 MG: 2 INJECTION, SOLUTION INTRAVENOUS at 08:57

## 2018-01-01 RX ADMIN — PROPOFOL 50 MCG/KG/MIN: 10 INJECTION, EMULSION INTRAVENOUS at 12:42

## 2018-01-01 RX ADMIN — FAMOTIDINE 20 MG: 20 INJECTION, SOLUTION INTRAVENOUS at 10:19

## 2018-01-01 RX ADMIN — NYSTATIN: 100000 POWDER TOPICAL at 22:25

## 2018-01-01 RX ADMIN — NICOTINE 1 PATCH: 21 PATCH TRANSDERMAL at 08:47

## 2018-01-01 RX ADMIN — MIDODRINE HYDROCHLORIDE 10 MG: 5 TABLET ORAL at 16:55

## 2018-01-01 RX ADMIN — SODIUM CHLORIDE, PRESERVATIVE FREE 3 ML: 5 INJECTION INTRAVENOUS at 09:52

## 2018-01-01 RX ADMIN — POTASSIUM CHLORIDE 10 MEQ: 7.46 INJECTION, SOLUTION INTRAVENOUS at 17:06

## 2018-01-01 RX ADMIN — AZITHROMYCIN MONOHYDRATE 500 MG: 500 INJECTION, POWDER, LYOPHILIZED, FOR SOLUTION INTRAVENOUS at 16:56

## 2018-01-01 RX ADMIN — SODIUM CHLORIDE, PRESERVATIVE FREE 10 ML: 5 INJECTION INTRAVENOUS at 08:25

## 2018-01-01 RX ADMIN — RIFAXIMIN 275 MG: 550 TABLET ORAL at 21:16

## 2018-01-01 RX ADMIN — OXYCODONE HYDROCHLORIDE 5 MG: 5 TABLET ORAL at 21:31

## 2018-01-01 RX ADMIN — ALBUMIN HUMAN 25 G: 0.05 INJECTION, SOLUTION INTRAVENOUS at 18:23

## 2018-01-01 RX ADMIN — Medication 10 ML: at 15:14

## 2018-01-01 RX ADMIN — SODIUM CHLORIDE 100 ML/HR: 900 INJECTION, SOLUTION INTRAVENOUS at 20:42

## 2018-01-01 RX ADMIN — IPRATROPIUM BROMIDE AND ALBUTEROL SULFATE 3 ML: 2.5; .5 SOLUTION RESPIRATORY (INHALATION) at 20:26

## 2018-01-01 RX ADMIN — ALBUMIN HUMAN 25 G: 0.05 INJECTION, SOLUTION INTRAVENOUS at 05:21

## 2018-01-01 RX ADMIN — NYSTATIN: 100000 POWDER TOPICAL at 21:24

## 2018-01-01 RX ADMIN — RIFAXIMIN 550 MG: 550 TABLET ORAL at 08:32

## 2018-01-01 RX ADMIN — ALBUMIN HUMAN 12.5 G: 0.25 SOLUTION INTRAVENOUS at 23:50

## 2018-01-01 RX ADMIN — NICOTINE 1 PATCH: 21 PATCH TRANSDERMAL at 09:08

## 2018-01-01 RX ADMIN — IPRATROPIUM BROMIDE AND ALBUTEROL SULFATE 3 ML: 2.5; .5 SOLUTION RESPIRATORY (INHALATION) at 10:42

## 2018-01-01 RX ADMIN — SODIUM CHLORIDE, PRESERVATIVE FREE 10 ML: 5 INJECTION INTRAVENOUS at 09:20

## 2018-01-01 RX ADMIN — RIFAXIMIN 275 MG: 550 TABLET ORAL at 09:22

## 2018-01-01 RX ADMIN — FUROSEMIDE 20 MG: 10 INJECTION, SOLUTION INTRAMUSCULAR; INTRAVENOUS at 08:43

## 2018-01-01 RX ADMIN — LACTULOSE 30 G: 10 SOLUTION ORAL at 20:57

## 2018-01-01 RX ADMIN — RIFAXIMIN 550 MG: 550 TABLET ORAL at 10:19

## 2018-01-01 RX ADMIN — FAMOTIDINE 20 MG: 20 INJECTION, SOLUTION INTRAVENOUS at 08:37

## 2018-01-01 RX ADMIN — Medication 10 ML: at 08:04

## 2018-01-01 RX ADMIN — SPIRONOLACTONE 25 MG: 25 TABLET ORAL at 08:30

## 2018-01-01 RX ADMIN — SODIUM CHLORIDE, PRESERVATIVE FREE 10 ML: 5 INJECTION INTRAVENOUS at 09:50

## 2018-01-01 RX ADMIN — PANTOPRAZOLE SODIUM 8 MG/HR: 40 INJECTION, POWDER, FOR SOLUTION INTRAVENOUS at 20:55

## 2018-01-01 RX ADMIN — FAMOTIDINE 20 MG: 20 TABLET ORAL at 21:49

## 2018-01-01 RX ADMIN — RIFAXIMIN 275 MG: 550 TABLET ORAL at 21:40

## 2018-01-01 RX ADMIN — FUROSEMIDE 10 MG/HR: 10 INJECTION, SOLUTION INTRAVENOUS at 13:47

## 2018-01-01 RX ADMIN — POTASSIUM CHLORIDE 40 MEQ: 750 CAPSULE, EXTENDED RELEASE ORAL at 09:52

## 2018-01-01 RX ADMIN — LACTULOSE 30 G: 20 SOLUTION ORAL at 16:18

## 2018-01-01 RX ADMIN — MORPHINE SULFATE 4 MG: 4 INJECTION INTRAVENOUS at 21:58

## 2018-01-01 RX ADMIN — ALBUMIN (HUMAN) 12.5 G: 0.25 INJECTION, SOLUTION INTRAVENOUS at 08:28

## 2018-01-01 RX ADMIN — SENNOSIDES AND DOCUSATE SODIUM 2 TABLET: 8.6; 5 TABLET ORAL at 20:23

## 2018-01-01 RX ADMIN — NYSTATIN: 100000 POWDER TOPICAL at 08:09

## 2018-01-01 RX ADMIN — RIFAXIMIN 550 MG: 550 TABLET ORAL at 20:11

## 2018-01-01 RX ADMIN — SODIUM CHLORIDE, PRESERVATIVE FREE 3 ML: 5 INJECTION INTRAVENOUS at 20:51

## 2018-01-01 RX ADMIN — FUROSEMIDE 5 MG/HR: 10 INJECTION, SOLUTION INTRAVENOUS at 13:07

## 2018-01-01 RX ADMIN — METRONIDAZOLE 500 MG: 500 INJECTION, SOLUTION INTRAVENOUS at 17:54

## 2018-01-01 RX ADMIN — CEFTRIAXONE SODIUM 2 G: 2 INJECTION, POWDER, FOR SOLUTION INTRAMUSCULAR; INTRAVENOUS at 14:56

## 2018-01-01 RX ADMIN — ALBUTEROL SULFATE 2.5 MG: 2.5 SOLUTION RESPIRATORY (INHALATION) at 09:02

## 2018-01-01 RX ADMIN — ALBUMIN (HUMAN) 12.5 G: 0.25 INJECTION, SOLUTION INTRAVENOUS at 22:55

## 2018-01-01 RX ADMIN — LACTULOSE 30 G: 10 SOLUTION ORAL at 22:55

## 2018-01-01 RX ADMIN — NYSTATIN: 100000 CREAM TOPICAL at 08:47

## 2018-01-01 RX ADMIN — SPIRONOLACTONE 25 MG: 25 TABLET ORAL at 09:22

## 2018-01-01 RX ADMIN — HYDROMORPHONE HYDROCHLORIDE 0.25 MG: 1 INJECTION, SOLUTION INTRAMUSCULAR; INTRAVENOUS; SUBCUTANEOUS at 22:03

## 2018-01-01 RX ADMIN — BUDESONIDE AND FORMOTEROL FUMARATE DIHYDRATE 2 PUFF: 160; 4.5 AEROSOL RESPIRATORY (INHALATION) at 20:27

## 2018-01-01 RX ADMIN — HYDROMORPHONE HYDROCHLORIDE 0.25 MG: 1 INJECTION, SOLUTION INTRAMUSCULAR; INTRAVENOUS; SUBCUTANEOUS at 10:55

## 2018-01-01 RX ADMIN — MORPHINE SULFATE 4 MG: 4 INJECTION INTRAVENOUS at 10:01

## 2018-01-01 RX ADMIN — BUDESONIDE AND FORMOTEROL FUMARATE DIHYDRATE 2 PUFF: 160; 4.5 AEROSOL RESPIRATORY (INHALATION) at 07:26

## 2018-01-01 RX ADMIN — LACTULOSE 30 G: 10 SOLUTION ORAL at 17:21

## 2018-01-01 RX ADMIN — NYSTATIN: 100000 POWDER TOPICAL at 08:49

## 2018-01-01 RX ADMIN — SULFAMETHOXAZOLE AND TRIMETHOPRIM 160 MG: 800; 160 TABLET ORAL at 15:09

## 2018-01-01 RX ADMIN — ALBUMIN (HUMAN) 12.5 G: 0.25 INJECTION, SOLUTION INTRAVENOUS at 09:18

## 2018-01-01 RX ADMIN — NYSTATIN: 100000 CREAM TOPICAL at 21:09

## 2018-01-01 RX ADMIN — FUROSEMIDE 10 MG/HR: 10 INJECTION, SOLUTION INTRAVENOUS at 17:02

## 2018-01-01 RX ADMIN — MORPHINE SULFATE 4 MG: 4 INJECTION INTRAVENOUS at 03:36

## 2018-01-01 RX ADMIN — LACTULOSE 30 G: 20 SOLUTION ORAL at 20:45

## 2018-01-01 RX ADMIN — IBUPROFEN 200 MG: 200 TABLET, FILM COATED ORAL at 23:00

## 2018-01-01 RX ADMIN — SPIRONOLACTONE 25 MG: 25 TABLET ORAL at 09:03

## 2018-01-01 RX ADMIN — LACTULOSE 30 G: 10 SOLUTION ORAL at 21:24

## 2018-01-01 RX ADMIN — MORPHINE SULFATE 1 MG: 2 INJECTION, SOLUTION INTRAMUSCULAR; INTRAVENOUS at 05:01

## 2018-01-01 RX ADMIN — POTASSIUM CHLORIDE 10 MEQ: 7.46 INJECTION, SOLUTION INTRAVENOUS at 10:05

## 2018-01-01 RX ADMIN — LACTULOSE 30 G: 10 SOLUTION ORAL at 16:45

## 2018-01-01 RX ADMIN — DOXYCYCLINE 100 MG: 100 INJECTION, POWDER, LYOPHILIZED, FOR SOLUTION INTRAVENOUS at 17:16

## 2018-01-01 RX ADMIN — MIDODRINE HYDROCHLORIDE 10 MG: 5 TABLET ORAL at 17:06

## 2018-01-01 RX ADMIN — IPRATROPIUM BROMIDE AND ALBUTEROL SULFATE 3 ML: 2.5; .5 SOLUTION RESPIRATORY (INHALATION) at 15:21

## 2018-01-01 RX ADMIN — RIFAXIMIN 550 MG: 550 TABLET ORAL at 09:14

## 2018-01-01 RX ADMIN — ALBUMIN HUMAN 25 G: 0.05 INJECTION, SOLUTION INTRAVENOUS at 05:20

## 2018-01-01 RX ADMIN — RIFAXIMIN 550 MG: 550 TABLET ORAL at 08:43

## 2018-01-01 RX ADMIN — NYSTATIN: 100000 POWDER TOPICAL at 09:08

## 2018-01-01 RX ADMIN — ALBUMIN HUMAN 25 G: 0.05 INJECTION, SOLUTION INTRAVENOUS at 06:37

## 2018-01-01 RX ADMIN — ALBUMIN HUMAN 25 G: 0.05 INJECTION, SOLUTION INTRAVENOUS at 06:11

## 2018-01-01 RX ADMIN — FUROSEMIDE 40 MG: 40 TABLET ORAL at 17:16

## 2018-01-01 RX ADMIN — Medication 10 ML: at 04:42

## 2018-01-01 RX ADMIN — SODIUM CHLORIDE, PRESERVATIVE FREE 3 ML: 5 INJECTION INTRAVENOUS at 09:07

## 2018-01-01 RX ADMIN — LACTULOSE 30 G: 20 SOLUTION ORAL at 09:47

## 2018-01-01 RX ADMIN — SODIUM CHLORIDE 50 ML/HR: 9 INJECTION, SOLUTION INTRAVENOUS at 21:11

## 2018-01-01 RX ADMIN — OXYCODONE HYDROCHLORIDE 5 MG: 5 TABLET ORAL at 20:30

## 2018-01-01 RX ADMIN — ALBUMIN HUMAN 25 G: 0.05 INJECTION, SOLUTION INTRAVENOUS at 17:10

## 2018-01-01 RX ADMIN — POTASSIUM CHLORIDE 40 MEQ: 750 CAPSULE, EXTENDED RELEASE ORAL at 10:02

## 2018-01-01 RX ADMIN — ALBUTEROL SULFATE 2.5 MG: 2.5 SOLUTION RESPIRATORY (INHALATION) at 22:38

## 2018-01-01 RX ADMIN — FUROSEMIDE 20 MG: 10 INJECTION, SOLUTION INTRAMUSCULAR; INTRAVENOUS at 05:21

## 2018-01-01 RX ADMIN — HYDROMORPHONE HYDROCHLORIDE 0.25 MG: 2 INJECTION INTRAMUSCULAR; INTRAVENOUS; SUBCUTANEOUS at 18:20

## 2018-01-01 RX ADMIN — FUROSEMIDE 20 MG: 10 INJECTION, SOLUTION INTRAMUSCULAR; INTRAVENOUS at 20:35

## 2018-01-01 RX ADMIN — ALBUMIN HUMAN 25 G: 0.05 INJECTION, SOLUTION INTRAVENOUS at 14:25

## 2018-01-01 RX ADMIN — SPIRONOLACTONE 25 MG: 25 TABLET ORAL at 10:34

## 2018-01-01 RX ADMIN — POTASSIUM CHLORIDE 10 MEQ: 7.46 INJECTION, SOLUTION INTRAVENOUS at 00:28

## 2018-01-01 RX ADMIN — POTASSIUM CHLORIDE 10 MEQ: 7.46 INJECTION, SOLUTION INTRAVENOUS at 22:02

## 2018-01-01 RX ADMIN — SODIUM CHLORIDE, PRESERVATIVE FREE 3 ML: 5 INJECTION INTRAVENOUS at 08:14

## 2018-01-01 RX ADMIN — SODIUM CHLORIDE 100 ML/HR: 900 INJECTION, SOLUTION INTRAVENOUS at 18:07

## 2018-01-01 RX ADMIN — NICOTINE 1 PATCH: 21 PATCH TRANSDERMAL at 08:19

## 2018-01-01 RX ADMIN — HYDROCORTISONE 1 G: 1 CREAM TOPICAL at 08:43

## 2018-01-01 RX ADMIN — FUROSEMIDE 5 MG/HR: 10 INJECTION, SOLUTION INTRAVENOUS at 11:01

## 2018-01-01 RX ADMIN — ZIPRASIDONE MESYLATE 20 MG: 20 INJECTION, POWDER, LYOPHILIZED, FOR SOLUTION INTRAMUSCULAR at 12:15

## 2018-01-01 RX ADMIN — PHYTONADIONE 10 MG: 10 INJECTION, EMULSION INTRAMUSCULAR; INTRAVENOUS; SUBCUTANEOUS at 14:46

## 2018-01-01 RX ADMIN — LACTULOSE 30 G: 10 SOLUTION ORAL at 08:49

## 2018-01-01 RX ADMIN — IPRATROPIUM BROMIDE AND ALBUTEROL SULFATE 3 ML: 2.5; .5 SOLUTION RESPIRATORY (INHALATION) at 19:34

## 2018-01-01 RX ADMIN — NICOTINE 1 PATCH: 14 PATCH, EXTENDED RELEASE TRANSDERMAL at 18:43

## 2018-01-01 RX ADMIN — LINEZOLID 600 MG: 600 TABLET, FILM COATED ORAL at 09:14

## 2018-01-01 RX ADMIN — HYDROCORTISONE 1 G: 1 CREAM TOPICAL at 09:08

## 2018-01-01 RX ADMIN — SPIRONOLACTONE 25 MG: 25 TABLET ORAL at 09:18

## 2018-01-01 RX ADMIN — Medication 10 ML: at 04:29

## 2018-01-01 RX ADMIN — IPRATROPIUM BROMIDE AND ALBUTEROL SULFATE 3 ML: 2.5; .5 SOLUTION RESPIRATORY (INHALATION) at 07:19

## 2018-01-01 RX ADMIN — Medication 10 ML: at 21:50

## 2018-01-01 RX ADMIN — HYDROCORTISONE 1 G: 1 CREAM TOPICAL at 17:50

## 2018-01-01 RX ADMIN — POTASSIUM CHLORIDE 40 MEQ: 750 CAPSULE, EXTENDED RELEASE ORAL at 09:03

## 2018-01-01 RX ADMIN — OXYCODONE HYDROCHLORIDE 5 MG: 5 TABLET ORAL at 20:50

## 2018-01-01 RX ADMIN — NICOTINE 1 PATCH: 21 PATCH TRANSDERMAL at 09:51

## 2018-01-01 RX ADMIN — LACTULOSE 30 G: 20 SOLUTION ORAL at 20:32

## 2018-01-01 RX ADMIN — HYDROMORPHONE HYDROCHLORIDE 0.25 MG: 2 INJECTION INTRAMUSCULAR; INTRAVENOUS; SUBCUTANEOUS at 21:03

## 2018-01-01 RX ADMIN — PROPOFOL 40 MCG/KG/MIN: 10 INJECTION, EMULSION INTRAVENOUS at 08:27

## 2018-01-01 RX ADMIN — SODIUM CHLORIDE: 9 INJECTION, SOLUTION INTRAVENOUS at 13:33

## 2018-01-01 RX ADMIN — NYSTATIN: 100000 CREAM TOPICAL at 21:02

## 2018-01-01 RX ADMIN — LACTULOSE 30 G: 10 SOLUTION ORAL at 20:36

## 2018-01-01 RX ADMIN — HYDROCORTISONE 1 G: 1 CREAM TOPICAL at 10:20

## 2018-01-01 RX ADMIN — RIFAXIMIN 275 MG: 550 TABLET ORAL at 20:34

## 2018-01-01 RX ADMIN — OXYCODONE HYDROCHLORIDE 5 MG: 5 TABLET ORAL at 20:33

## 2018-01-01 RX ADMIN — SODIUM CHLORIDE 50 ML/HR: 9 INJECTION, SOLUTION INTRAVENOUS at 17:50

## 2018-01-01 RX ADMIN — IPRATROPIUM BROMIDE AND ALBUTEROL SULFATE 3 ML: 2.5; .5 SOLUTION RESPIRATORY (INHALATION) at 18:51

## 2018-01-01 RX ADMIN — ALBUMIN (HUMAN) 5 ML/HR: 0.25 INJECTION, SOLUTION INTRAVENOUS at 13:40

## 2018-01-01 RX ADMIN — LACTULOSE 30 G: 20 SOLUTION ORAL at 17:44

## 2018-01-01 RX ADMIN — LORAZEPAM 0.5 MG: 0.5 TABLET ORAL at 21:34

## 2018-01-01 RX ADMIN — METHYLPREDNISOLONE SODIUM SUCCINATE 125 MG: 125 INJECTION, POWDER, FOR SOLUTION INTRAMUSCULAR; INTRAVENOUS at 09:07

## 2018-01-01 RX ADMIN — FUROSEMIDE 40 MG: 40 TABLET ORAL at 08:28

## 2018-01-01 RX ADMIN — SODIUM CHLORIDE, PRESERVATIVE FREE 3 ML: 5 INJECTION INTRAVENOUS at 09:47

## 2018-01-01 RX ADMIN — HYDROMORPHONE HYDROCHLORIDE 0.25 MG: 1 INJECTION, SOLUTION INTRAMUSCULAR; INTRAVENOUS; SUBCUTANEOUS at 21:15

## 2018-01-01 RX ADMIN — HYDRALAZINE HYDROCHLORIDE 10 MG: 10 TABLET, FILM COATED ORAL at 06:24

## 2018-01-01 RX ADMIN — HYDROCORTISONE 1 G: 1 CREAM TOPICAL at 09:16

## 2018-01-01 RX ADMIN — ALBUMIN (HUMAN) 10 ML/HR: 0.25 INJECTION, SOLUTION INTRAVENOUS at 05:14

## 2018-01-01 RX ADMIN — ALBUMIN HUMAN 25 G: 0.05 INJECTION, SOLUTION INTRAVENOUS at 05:07

## 2018-01-01 RX ADMIN — RIFAXIMIN 550 MG: 550 TABLET ORAL at 20:12

## 2018-01-01 RX ADMIN — NYSTATIN: 100000 POWDER TOPICAL at 08:37

## 2018-01-01 RX ADMIN — MORPHINE SULFATE 2 MG: 4 INJECTION INTRAVENOUS at 06:08

## 2018-01-01 RX ADMIN — IPRATROPIUM BROMIDE AND ALBUTEROL SULFATE 3 ML: 2.5; .5 SOLUTION RESPIRATORY (INHALATION) at 13:56

## 2018-01-01 RX ADMIN — MIDODRINE HYDROCHLORIDE 10 MG: 5 TABLET ORAL at 20:29

## 2018-01-01 RX ADMIN — POTASSIUM CHLORIDE 10 MEQ: 7.46 INJECTION, SOLUTION INTRAVENOUS at 18:34

## 2018-01-01 RX ADMIN — FUROSEMIDE 20 MG: 10 INJECTION, SOLUTION INTRAMUSCULAR; INTRAVENOUS at 06:19

## 2018-01-01 RX ADMIN — SODIUM CHLORIDE, PRESERVATIVE FREE 3 ML: 5 INJECTION INTRAVENOUS at 20:14

## 2018-01-01 RX ADMIN — HYDROMORPHONE HYDROCHLORIDE 0.25 MG: 2 INJECTION INTRAMUSCULAR; INTRAVENOUS; SUBCUTANEOUS at 13:00

## 2018-01-01 RX ADMIN — RIFAXIMIN 275 MG: 550 TABLET ORAL at 09:34

## 2018-01-01 RX ADMIN — CEFEPIME HYDROCHLORIDE 1 G: 1 INJECTION, POWDER, FOR SOLUTION INTRAMUSCULAR; INTRAVENOUS at 09:51

## 2018-01-01 RX ADMIN — FUROSEMIDE 20 MG: 20 TABLET ORAL at 08:19

## 2018-01-01 RX ADMIN — FLUCONAZOLE IN SODIUM CHLORIDE 400 MG: 2 INJECTION, SOLUTION INTRAVENOUS at 09:46

## 2018-01-01 RX ADMIN — LACTULOSE 30 G: 20 SOLUTION ORAL at 16:09

## 2018-01-01 RX ADMIN — MORPHINE SULFATE 1 MG: 2 INJECTION, SOLUTION INTRAMUSCULAR; INTRAVENOUS at 19:30

## 2018-01-01 RX ADMIN — IPRATROPIUM BROMIDE AND ALBUTEROL SULFATE 3 ML: 2.5; .5 SOLUTION RESPIRATORY (INHALATION) at 08:20

## 2018-01-01 RX ADMIN — ALBUMIN (HUMAN) 12.5 G: 0.25 INJECTION, SOLUTION INTRAVENOUS at 08:43

## 2018-01-01 RX ADMIN — RIFAXIMIN 275 MG: 550 TABLET ORAL at 21:46

## 2018-01-01 RX ADMIN — METOLAZONE 2.5 MG: 2.5 TABLET ORAL at 10:25

## 2018-01-01 RX ADMIN — MIDODRINE HYDROCHLORIDE 10 MG: 5 TABLET ORAL at 09:29

## 2018-01-01 RX ADMIN — LACTULOSE 30 G: 10 SOLUTION ORAL at 08:19

## 2018-01-01 RX ADMIN — RIFAXIMIN 275 MG: 550 TABLET ORAL at 21:01

## 2018-01-01 RX ADMIN — NICOTINE 1 PATCH: 21 PATCH TRANSDERMAL at 09:18

## 2018-01-01 RX ADMIN — HYDROMORPHONE HYDROCHLORIDE 1 MG: 1 INJECTION, SOLUTION INTRAMUSCULAR; INTRAVENOUS; SUBCUTANEOUS at 15:23

## 2018-01-01 RX ADMIN — NYSTATIN: 100000 CREAM TOPICAL at 20:15

## 2018-01-01 RX ADMIN — METRONIDAZOLE 500 MG: 500 INJECTION, SOLUTION INTRAVENOUS at 18:34

## 2018-01-01 RX ADMIN — SODIUM CHLORIDE 1000 ML: 9 INJECTION, SOLUTION INTRAVENOUS at 19:52

## 2018-01-01 RX ADMIN — HYDROMORPHONE HYDROCHLORIDE 0.25 MG: 2 INJECTION INTRAMUSCULAR; INTRAVENOUS; SUBCUTANEOUS at 21:15

## 2018-01-01 RX ADMIN — PROPOFOL 30 MCG/KG/MIN: 10 INJECTION, EMULSION INTRAVENOUS at 02:54

## 2018-01-01 RX ADMIN — RIFAXIMIN 275 MG: 550 TABLET ORAL at 21:26

## 2018-01-01 RX ADMIN — SODIUM CHLORIDE 100 ML/HR: 900 INJECTION, SOLUTION INTRAVENOUS at 13:09

## 2018-01-01 RX ADMIN — LINEZOLID 600 MG: 600 TABLET, FILM COATED ORAL at 21:59

## 2018-01-01 RX ADMIN — LACTULOSE 30 G: 20 SOLUTION ORAL at 08:50

## 2018-01-01 RX ADMIN — FUROSEMIDE 40 MG: 40 TABLET ORAL at 09:31

## 2018-01-01 RX ADMIN — IPRATROPIUM BROMIDE AND ALBUTEROL SULFATE 3 ML: 2.5; .5 SOLUTION RESPIRATORY (INHALATION) at 06:36

## 2018-01-01 RX ADMIN — HYDROMORPHONE HYDROCHLORIDE 0.25 MG: 2 INJECTION INTRAMUSCULAR; INTRAVENOUS; SUBCUTANEOUS at 11:07

## 2018-01-01 RX ADMIN — HYDROMORPHONE HYDROCHLORIDE 0.25 MG: 1 INJECTION, SOLUTION INTRAMUSCULAR; INTRAVENOUS; SUBCUTANEOUS at 22:05

## 2018-01-01 RX ADMIN — ALBUMIN (HUMAN) 5 ML/HR: 0.25 INJECTION, SOLUTION INTRAVENOUS at 10:33

## 2018-01-01 RX ADMIN — RIFAXIMIN 275 MG: 550 TABLET ORAL at 20:35

## 2018-01-01 RX ADMIN — MORPHINE SULFATE 1 MG: 2 INJECTION, SOLUTION INTRAMUSCULAR; INTRAVENOUS at 21:51

## 2018-01-01 RX ADMIN — SODIUM CHLORIDE, PRESERVATIVE FREE 10 ML: 5 INJECTION INTRAVENOUS at 08:51

## 2018-01-01 RX ADMIN — NYSTATIN: 100000 CREAM TOPICAL at 20:34

## 2018-01-01 RX ADMIN — RIFAXIMIN 275 MG: 550 TABLET ORAL at 20:32

## 2018-01-01 RX ADMIN — MORPHINE SULFATE 1 MG: 2 INJECTION, SOLUTION INTRAMUSCULAR; INTRAVENOUS at 05:49

## 2018-01-01 RX ADMIN — NYSTATIN: 100000 CREAM TOPICAL at 20:24

## 2018-01-01 RX ADMIN — MORPHINE SULFATE 4 MG: 4 INJECTION INTRAVENOUS at 06:13

## 2018-01-01 RX ADMIN — HYDROCORTISONE 1 G: 1 CREAM TOPICAL at 20:35

## 2018-01-01 RX ADMIN — MIDAZOLAM HYDROCHLORIDE 2 MG: 1 INJECTION INTRAMUSCULAR; INTRAVENOUS at 13:09

## 2018-01-01 RX ADMIN — POTASSIUM CHLORIDE 40 MEQ: 1.5 POWDER, FOR SOLUTION ORAL at 10:28

## 2018-01-01 RX ADMIN — HYDROMORPHONE HYDROCHLORIDE 0.5 MG: 1 INJECTION, SOLUTION INTRAMUSCULAR; INTRAVENOUS; SUBCUTANEOUS at 21:43

## 2018-01-01 RX ADMIN — RIFAXIMIN 275 MG: 550 TABLET ORAL at 09:37

## 2018-01-01 RX ADMIN — LACTULOSE 30 G: 10 SOLUTION ORAL at 09:08

## 2018-01-01 RX ADMIN — LACTULOSE 30 G: 10 SOLUTION ORAL at 20:55

## 2018-01-01 RX ADMIN — ALBUMIN HUMAN 25 G: 0.05 INJECTION, SOLUTION INTRAVENOUS at 01:14

## 2018-01-01 RX ADMIN — NYSTATIN: 100000 POWDER TOPICAL at 21:07

## 2018-01-01 RX ADMIN — MIDODRINE HYDROCHLORIDE 10 MG: 5 TABLET ORAL at 08:38

## 2018-01-01 RX ADMIN — BUDESONIDE AND FORMOTEROL FUMARATE DIHYDRATE 2 PUFF: 160; 4.5 AEROSOL RESPIRATORY (INHALATION) at 19:08

## 2018-01-01 RX ADMIN — NICOTINE 1 PATCH: 21 PATCH TRANSDERMAL at 10:14

## 2018-01-01 RX ADMIN — ALBUMIN (HUMAN) 12.5 G: 0.25 INJECTION, SOLUTION INTRAVENOUS at 21:33

## 2018-01-01 RX ADMIN — BUDESONIDE AND FORMOTEROL FUMARATE DIHYDRATE 2 PUFF: 160; 4.5 AEROSOL RESPIRATORY (INHALATION) at 19:26

## 2018-01-01 RX ADMIN — FUROSEMIDE 5 MG/HR: 10 INJECTION, SOLUTION INTRAVENOUS at 06:56

## 2018-01-01 RX ADMIN — NYSTATIN: 100000 CREAM TOPICAL at 21:30

## 2018-01-01 RX ADMIN — HYDROMORPHONE HYDROCHLORIDE 0.25 MG: 2 INJECTION INTRAMUSCULAR; INTRAVENOUS; SUBCUTANEOUS at 22:01

## 2018-01-01 RX ADMIN — Medication 10 ML: at 08:25

## 2018-01-01 RX ADMIN — LACTULOSE 30 G: 10 SOLUTION ORAL at 16:31

## 2018-01-01 RX ADMIN — MIDODRINE HYDROCHLORIDE 10 MG: 5 TABLET ORAL at 20:30

## 2018-01-01 RX ADMIN — SODIUM CHLORIDE, PRESERVATIVE FREE 10 ML: 5 INJECTION INTRAVENOUS at 09:19

## 2018-01-01 RX ADMIN — LACTULOSE 30 G: 10 SOLUTION ORAL at 13:53

## 2018-01-01 RX ADMIN — LACTULOSE 30 G: 10 SOLUTION ORAL at 19:00

## 2018-01-01 RX ADMIN — ALBUMIN (HUMAN) 10 ML/HR: 0.25 INJECTION, SOLUTION INTRAVENOUS at 10:11

## 2018-01-01 RX ADMIN — HYDROMORPHONE HYDROCHLORIDE 0.5 MG: 1 INJECTION, SOLUTION INTRAMUSCULAR; INTRAVENOUS; SUBCUTANEOUS at 16:29

## 2018-01-01 RX ADMIN — LACTULOSE 30 G: 20 SOLUTION ORAL at 20:54

## 2018-01-01 RX ADMIN — HYDRALAZINE HYDROCHLORIDE 10 MG: 10 TABLET, FILM COATED ORAL at 21:29

## 2018-01-01 RX ADMIN — ALBUMIN HUMAN 25 G: 0.05 INJECTION, SOLUTION INTRAVENOUS at 13:07

## 2018-01-01 RX ADMIN — IPRATROPIUM BROMIDE AND ALBUTEROL SULFATE 3 ML: 2.5; .5 SOLUTION RESPIRATORY (INHALATION) at 11:57

## 2018-01-01 RX ADMIN — HYDROCORTISONE 1 G: 1 CREAM TOPICAL at 21:38

## 2018-01-01 RX ADMIN — RIFAXIMIN 550 MG: 550 TABLET ORAL at 20:35

## 2018-01-01 RX ADMIN — RIFAXIMIN 275 MG: 550 TABLET ORAL at 21:04

## 2018-01-01 RX ADMIN — OXYCODONE HYDROCHLORIDE 5 MG: 5 TABLET ORAL at 20:53

## 2018-01-01 RX ADMIN — HYDROMORPHONE HYDROCHLORIDE 0.25 MG: 1 INJECTION, SOLUTION INTRAMUSCULAR; INTRAVENOUS; SUBCUTANEOUS at 21:34

## 2018-01-01 RX ADMIN — ALBUMIN HUMAN 25 G: 0.05 INJECTION, SOLUTION INTRAVENOUS at 06:29

## 2018-01-01 RX ADMIN — SODIUM CHLORIDE, PRESERVATIVE FREE 20 ML: 5 INJECTION INTRAVENOUS at 08:20

## 2018-01-01 RX ADMIN — FUROSEMIDE 40 MG: 10 INJECTION, SOLUTION INTRAMUSCULAR; INTRAVENOUS at 22:56

## 2018-01-01 RX ADMIN — NYSTATIN: 100000 CREAM TOPICAL at 20:45

## 2018-01-01 RX ADMIN — NYSTATIN: 100000 POWDER TOPICAL at 21:00

## 2018-01-01 RX ADMIN — BUDESONIDE AND FORMOTEROL FUMARATE DIHYDRATE 2 PUFF: 160; 4.5 AEROSOL RESPIRATORY (INHALATION) at 07:20

## 2018-01-01 RX ADMIN — HYDROMORPHONE HYDROCHLORIDE 0.25 MG: 1 INJECTION, SOLUTION INTRAMUSCULAR; INTRAVENOUS; SUBCUTANEOUS at 21:47

## 2018-01-01 RX ADMIN — MIDODRINE HYDROCHLORIDE 10 MG: 5 TABLET ORAL at 20:36

## 2018-01-01 RX ADMIN — LACTULOSE 30 G: 10 SOLUTION ORAL at 06:20

## 2018-01-01 RX ADMIN — SUCCINYLCHOLINE CHLORIDE 100 MG: 20 INJECTION, SOLUTION INTRAMUSCULAR; INTRAVENOUS at 18:13

## 2018-01-01 RX ADMIN — CEFEPIME HYDROCHLORIDE 1 G: 1 INJECTION, POWDER, FOR SOLUTION INTRAMUSCULAR; INTRAVENOUS at 20:45

## 2018-01-01 RX ADMIN — NYSTATIN: 100000 CREAM TOPICAL at 09:38

## 2018-01-01 RX ADMIN — LACTULOSE 30 G: 10 SOLUTION ORAL at 18:36

## 2018-01-01 RX ADMIN — NYSTATIN: 100000 CREAM TOPICAL at 21:16

## 2018-01-01 RX ADMIN — MORPHINE SULFATE 4 MG: 4 INJECTION INTRAVENOUS at 20:48

## 2018-01-01 RX ADMIN — ALBUTEROL SULFATE 2.5 MG: 2.5 SOLUTION RESPIRATORY (INHALATION) at 10:48

## 2018-01-01 RX ADMIN — HYDROMORPHONE HYDROCHLORIDE 0.25 MG: 2 INJECTION INTRAMUSCULAR; INTRAVENOUS; SUBCUTANEOUS at 01:15

## 2018-01-01 RX ADMIN — IPRATROPIUM BROMIDE 2 PUFF: 17 AEROSOL, METERED RESPIRATORY (INHALATION) at 15:16

## 2018-01-01 RX ADMIN — RIFAXIMIN 275 MG: 550 TABLET ORAL at 09:21

## 2018-01-01 RX ADMIN — ALBUTEROL SULFATE 2.5 MG: 2.5 SOLUTION RESPIRATORY (INHALATION) at 15:26

## 2018-01-01 RX ADMIN — ALBUMIN HUMAN 25 G: 0.05 INJECTION, SOLUTION INTRAVENOUS at 19:06

## 2018-01-01 RX ADMIN — CHLOROTHIAZIDE SODIUM 500 MG: 500 INJECTION, POWDER, LYOPHILIZED, FOR SOLUTION INTRAVENOUS at 13:23

## 2018-01-01 RX ADMIN — CEFTRIAXONE SODIUM 1 G: 1 INJECTION, POWDER, FOR SOLUTION INTRAMUSCULAR; INTRAVENOUS at 14:31

## 2018-01-01 RX ADMIN — NYSTATIN: 100000 POWDER TOPICAL at 09:16

## 2018-01-01 RX ADMIN — MORPHINE SULFATE 4 MG: 4 INJECTION, SOLUTION INTRAMUSCULAR; INTRAVENOUS at 04:29

## 2018-01-01 RX ADMIN — SODIUM CHLORIDE, PRESERVATIVE FREE 3 ML: 5 INJECTION INTRAVENOUS at 09:50

## 2018-01-01 RX ADMIN — BUDESONIDE AND FORMOTEROL FUMARATE DIHYDRATE 2 PUFF: 160; 4.5 AEROSOL RESPIRATORY (INHALATION) at 07:09

## 2018-01-01 RX ADMIN — LACTULOSE 30 G: 20 SOLUTION ORAL at 21:42

## 2018-01-01 RX ADMIN — METOLAZONE 2.5 MG: 2.5 TABLET ORAL at 11:27

## 2018-01-01 RX ADMIN — SODIUM CHLORIDE, PRESERVATIVE FREE 3 ML: 5 INJECTION INTRAVENOUS at 08:46

## 2018-01-01 RX ADMIN — POTASSIUM CHLORIDE 10 MEQ: 7.46 INJECTION, SOLUTION INTRAVENOUS at 18:16

## 2018-01-01 RX ADMIN — POTASSIUM CHLORIDE 40 MEQ: 750 CAPSULE, EXTENDED RELEASE ORAL at 08:37

## 2018-01-01 RX ADMIN — SODIUM CHLORIDE, PRESERVATIVE FREE 10 ML: 5 INJECTION INTRAVENOUS at 21:31

## 2018-01-01 RX ADMIN — FUROSEMIDE 40 MG: 10 INJECTION, SOLUTION INTRAMUSCULAR; INTRAVENOUS at 15:14

## 2018-01-01 RX ADMIN — MORPHINE SULFATE 1 MG: 2 INJECTION, SOLUTION INTRAMUSCULAR; INTRAVENOUS at 01:34

## 2018-01-01 RX ADMIN — METOLAZONE 2.5 MG: 2.5 TABLET ORAL at 08:28

## 2018-01-01 RX ADMIN — ALBUMIN (HUMAN) 5 ML/HR: 0.25 INJECTION, SOLUTION INTRAVENOUS at 03:17

## 2018-01-01 RX ADMIN — HYDROMORPHONE HYDROCHLORIDE 0.25 MG: 1 INJECTION, SOLUTION INTRAMUSCULAR; INTRAVENOUS; SUBCUTANEOUS at 21:27

## 2018-01-01 RX ADMIN — ALBUMIN HUMAN 25 G: 0.05 INJECTION, SOLUTION INTRAVENOUS at 04:17

## 2018-01-01 RX ADMIN — SODIUM CHLORIDE, PRESERVATIVE FREE 10 ML: 5 INJECTION INTRAVENOUS at 21:01

## 2018-01-01 RX ADMIN — LACTULOSE 30 G: 10 SOLUTION ORAL at 08:31

## 2018-01-01 RX ADMIN — HYDROCORTISONE 1 G: 1 CREAM TOPICAL at 09:00

## 2018-01-01 RX ADMIN — ALBUMIN HUMAN 25 G: 0.05 INJECTION, SOLUTION INTRAVENOUS at 05:35

## 2018-01-01 RX ADMIN — MORPHINE SULFATE 4 MG: 4 INJECTION, SOLUTION INTRAMUSCULAR; INTRAVENOUS at 02:44

## 2018-01-01 RX ADMIN — DOXYCYCLINE 100 MG: 100 INJECTION, POWDER, LYOPHILIZED, FOR SOLUTION INTRAVENOUS at 17:30

## 2018-01-01 RX ADMIN — HYDROCORTISONE 1 G: 1 CREAM TOPICAL at 16:19

## 2018-01-01 RX ADMIN — PANTOPRAZOLE SODIUM 40 MG: 40 TABLET, DELAYED RELEASE ORAL at 05:38

## 2018-01-01 RX ADMIN — SPIRONOLACTONE 25 MG: 25 TABLET ORAL at 14:57

## 2018-01-01 RX ADMIN — LACTULOSE 30 G: 10 SOLUTION ORAL at 17:33

## 2018-01-01 RX ADMIN — SODIUM CHLORIDE, PRESERVATIVE FREE 3 ML: 5 INJECTION INTRAVENOUS at 21:11

## 2018-01-01 RX ADMIN — MIDODRINE HYDROCHLORIDE 10 MG: 5 TABLET ORAL at 15:34

## 2018-01-01 RX ADMIN — RIFAXIMIN 275 MG: 550 TABLET ORAL at 08:19

## 2018-01-01 RX ADMIN — FLUCONAZOLE 400 MG: 2 INJECTION INTRAVENOUS at 19:33

## 2018-01-01 RX ADMIN — HYDROMORPHONE HYDROCHLORIDE 0.25 MG: 1 INJECTION, SOLUTION INTRAMUSCULAR; INTRAVENOUS; SUBCUTANEOUS at 10:02

## 2018-01-01 RX ADMIN — MORPHINE SULFATE 4 MG: 4 INJECTION INTRAVENOUS at 14:26

## 2018-01-01 RX ADMIN — LORAZEPAM 0.5 MG: 0.5 TABLET ORAL at 21:42

## 2018-01-01 RX ADMIN — NYSTATIN: 100000 POWDER TOPICAL at 08:41

## 2018-01-01 RX ADMIN — SIMETHICONE CHEW TAB 80 MG 80 MG: 80 TABLET ORAL at 13:09

## 2018-01-01 RX ADMIN — NICOTINE 1 PATCH: 21 PATCH TRANSDERMAL at 09:33

## 2018-01-01 RX ADMIN — NYSTATIN: 100000 CREAM TOPICAL at 08:23

## 2018-01-01 RX ADMIN — HYDROMORPHONE HYDROCHLORIDE 0.25 MG: 2 INJECTION INTRAMUSCULAR; INTRAVENOUS; SUBCUTANEOUS at 09:44

## 2018-01-01 RX ADMIN — FUROSEMIDE 80 MG: 80 TABLET ORAL at 03:15

## 2018-01-01 RX ADMIN — MORPHINE SULFATE 4 MG: 4 INJECTION INTRAVENOUS at 20:44

## 2018-01-01 RX ADMIN — ALBUMIN HUMAN 25 G: 0.05 INJECTION, SOLUTION INTRAVENOUS at 18:13

## 2018-01-01 RX ADMIN — LACTULOSE 30 G: 20 SOLUTION ORAL at 17:05

## 2018-01-01 RX ADMIN — NYSTATIN: 100000 POWDER TOPICAL at 20:00

## 2018-01-01 RX ADMIN — SODIUM CHLORIDE, PRESERVATIVE FREE 3 ML: 5 INJECTION INTRAVENOUS at 20:55

## 2018-01-01 RX ADMIN — TAZOBACTAM SODIUM AND PIPERACILLIN SODIUM 3.38 G: 375; 3 INJECTION, SOLUTION INTRAVENOUS at 22:33

## 2018-01-01 RX ADMIN — RIFAXIMIN 275 MG: 550 TABLET ORAL at 09:32

## 2018-01-01 RX ADMIN — IPRATROPIUM BROMIDE AND ALBUTEROL SULFATE 3 ML: 2.5; .5 SOLUTION RESPIRATORY (INHALATION) at 15:43

## 2018-01-01 RX ADMIN — RIFAXIMIN 550 MG: 550 TABLET ORAL at 08:55

## 2018-01-01 RX ADMIN — HYDROCORTISONE 1 G: 1 CREAM TOPICAL at 08:11

## 2018-01-01 RX ADMIN — ALBUTEROL SULFATE 2.5 MG: 2.5 SOLUTION RESPIRATORY (INHALATION) at 12:20

## 2018-01-01 RX ADMIN — MIDODRINE HYDROCHLORIDE 10 MG: 5 TABLET ORAL at 20:52

## 2018-01-01 RX ADMIN — RIFAXIMIN 275 MG: 550 TABLET ORAL at 20:53

## 2018-01-01 RX ADMIN — FAMOTIDINE 20 MG: 20 INJECTION, SOLUTION INTRAVENOUS at 22:24

## 2018-01-01 RX ADMIN — POTASSIUM CHLORIDE 40 MEQ: 750 CAPSULE, EXTENDED RELEASE ORAL at 09:34

## 2018-01-01 RX ADMIN — IPRATROPIUM BROMIDE AND ALBUTEROL SULFATE 3 ML: 2.5; .5 SOLUTION RESPIRATORY (INHALATION) at 06:42

## 2018-01-01 RX ADMIN — Medication 10 ML: at 03:36

## 2018-01-01 RX ADMIN — RIFAXIMIN 275 MG: 550 TABLET ORAL at 22:07

## 2018-01-01 RX ADMIN — PROMETHAZINE HYDROCHLORIDE 12.5 MG: 25 INJECTION, SOLUTION INTRAMUSCULAR; INTRAVENOUS at 15:32

## 2018-01-01 RX ADMIN — NYSTATIN: 100000 CREAM TOPICAL at 20:49

## 2018-01-01 RX ADMIN — HYDROMORPHONE HYDROCHLORIDE 0.5 MG: 1 INJECTION, SOLUTION INTRAMUSCULAR; INTRAVENOUS; SUBCUTANEOUS at 05:20

## 2018-01-01 RX ADMIN — NYSTATIN: 100000 CREAM TOPICAL at 21:31

## 2018-01-01 RX ADMIN — POTASSIUM CHLORIDE 40 MEQ: 750 CAPSULE, EXTENDED RELEASE ORAL at 12:45

## 2018-01-01 RX ADMIN — ALBUMIN HUMAN 25 G: 0.05 INJECTION, SOLUTION INTRAVENOUS at 21:07

## 2018-01-01 RX ADMIN — MIDODRINE HYDROCHLORIDE 10 MG: 5 TABLET ORAL at 17:05

## 2018-01-01 RX ADMIN — CEFTRIAXONE 1 G: 1 INJECTION, POWDER, FOR SOLUTION INTRAMUSCULAR; INTRAVENOUS at 12:41

## 2018-01-01 RX ADMIN — ALBUMIN HUMAN 25 G: 0.05 INJECTION, SOLUTION INTRAVENOUS at 20:35

## 2018-01-01 RX ADMIN — RIFAXIMIN 275 MG: 550 TABLET ORAL at 09:30

## 2018-01-01 RX ADMIN — OXYCODONE HYDROCHLORIDE 5 MG: 5 TABLET ORAL at 05:36

## 2018-01-01 RX ADMIN — SODIUM CHLORIDE, PRESERVATIVE FREE 10 ML: 5 INJECTION INTRAVENOUS at 22:35

## 2018-01-01 RX ADMIN — HYDROMORPHONE HYDROCHLORIDE 0.25 MG: 2 INJECTION INTRAMUSCULAR; INTRAVENOUS; SUBCUTANEOUS at 06:56

## 2018-01-01 RX ADMIN — MORPHINE SULFATE 1 MG: 2 INJECTION, SOLUTION INTRAMUSCULAR; INTRAVENOUS at 09:15

## 2018-01-01 RX ADMIN — MIDODRINE HYDROCHLORIDE 10 MG: 5 TABLET ORAL at 21:33

## 2018-01-01 RX ADMIN — CEFTRIAXONE 2 G: 2 INJECTION, POWDER, FOR SOLUTION INTRAMUSCULAR; INTRAVENOUS at 21:15

## 2018-01-01 RX ADMIN — FLUCONAZOLE 400 MG: 2 INJECTION INTRAVENOUS at 17:04

## 2018-01-01 RX ADMIN — Medication 10 ML: at 23:05

## 2018-01-01 RX ADMIN — LACTULOSE 30 G: 10 SOLUTION ORAL at 13:09

## 2018-01-01 RX ADMIN — FAMOTIDINE 20 MG: 20 INJECTION, SOLUTION INTRAVENOUS at 20:09

## 2018-01-01 RX ADMIN — BUDESONIDE AND FORMOTEROL FUMARATE DIHYDRATE 2 PUFF: 160; 4.5 AEROSOL RESPIRATORY (INHALATION) at 20:10

## 2018-01-01 RX ADMIN — NYSTATIN: 100000 CREAM TOPICAL at 20:50

## 2018-01-01 RX ADMIN — HYDROCORTISONE 1 G: 1 CREAM TOPICAL at 21:24

## 2018-01-01 RX ADMIN — NYSTATIN: 100000 POWDER TOPICAL at 08:17

## 2018-01-01 RX ADMIN — HYDROMORPHONE HYDROCHLORIDE 0.25 MG: 1 INJECTION, SOLUTION INTRAMUSCULAR; INTRAVENOUS; SUBCUTANEOUS at 08:32

## 2018-01-01 RX ADMIN — LACTULOSE 30 G: 10 SOLUTION ORAL at 22:27

## 2018-01-01 RX ADMIN — HYDROCORTISONE 1 G: 1 CREAM TOPICAL at 20:57

## 2018-01-01 RX ADMIN — OXYCODONE HYDROCHLORIDE 5 MG: 5 TABLET ORAL at 09:10

## 2018-01-01 RX ADMIN — ALBUMIN (HUMAN) 12.5 G: 0.25 INJECTION, SOLUTION INTRAVENOUS at 08:24

## 2018-01-01 RX ADMIN — LACTULOSE 30 G: 10 SOLUTION ORAL at 17:50

## 2018-01-01 RX ADMIN — MIDODRINE HYDROCHLORIDE 10 MG: 5 TABLET ORAL at 09:32

## 2018-01-01 RX ADMIN — FUROSEMIDE 40 MG: 40 TABLET ORAL at 17:30

## 2018-01-01 RX ADMIN — BUMETANIDE 0.5 MG: 0.25 INJECTION INTRAMUSCULAR; INTRAVENOUS at 00:29

## 2018-01-01 RX ADMIN — Medication: at 08:09

## 2018-01-01 RX ADMIN — NYSTATIN: 100000 CREAM TOPICAL at 09:36

## 2018-01-01 RX ADMIN — NYSTATIN: 100000 CREAM TOPICAL at 20:54

## 2018-01-01 RX ADMIN — IPRATROPIUM BROMIDE AND ALBUTEROL SULFATE 3 ML: 2.5; .5 SOLUTION RESPIRATORY (INHALATION) at 20:00

## 2018-01-01 RX ADMIN — SODIUM CHLORIDE 1000 ML: 900 INJECTION, SOLUTION INTRAVENOUS at 17:14

## 2018-01-01 RX ADMIN — POTASSIUM CHLORIDE 40 MEQ: 750 CAPSULE, EXTENDED RELEASE ORAL at 20:50

## 2018-01-01 RX ADMIN — MORPHINE SULFATE 2 MG: 4 INJECTION INTRAVENOUS at 02:59

## 2018-01-01 RX ADMIN — LACTULOSE 30 G: 10 SOLUTION ORAL at 08:28

## 2018-01-01 RX ADMIN — NYSTATIN: 100000 POWDER TOPICAL at 20:03

## 2018-01-01 RX ADMIN — SODIUM CHLORIDE 1000 ML: 9 INJECTION, SOLUTION INTRAVENOUS at 18:16

## 2018-01-01 RX ADMIN — LACTULOSE 30 G: 10 SOLUTION ORAL at 21:49

## 2018-01-01 RX ADMIN — MORPHINE SULFATE 1 MG: 4 INJECTION, SOLUTION INTRAMUSCULAR; INTRAVENOUS at 19:47

## 2018-01-01 RX ADMIN — PHYTONADIONE 5 MG: 1 INJECTION, EMULSION INTRAMUSCULAR; INTRAVENOUS; SUBCUTANEOUS at 10:37

## 2018-01-01 RX ADMIN — ONDANSETRON HYDROCHLORIDE 4 MG: 2 INJECTION, SOLUTION INTRAMUSCULAR; INTRAVENOUS at 02:44

## 2018-01-01 RX ADMIN — PROPOFOL 40 MCG/KG/MIN: 10 INJECTION, EMULSION INTRAVENOUS at 01:10

## 2018-01-01 RX ADMIN — SODIUM CHLORIDE, PRESERVATIVE FREE 20 ML: 5 INJECTION INTRAVENOUS at 20:50

## 2018-01-01 RX ADMIN — LACTULOSE 30 G: 20 SOLUTION ORAL at 09:48

## 2018-01-01 RX ADMIN — CEFTRIAXONE 2 G: 2 INJECTION, POWDER, FOR SOLUTION INTRAMUSCULAR; INTRAVENOUS at 15:14

## 2018-01-01 RX ADMIN — LACTULOSE 30 G: 10 SOLUTION ORAL at 21:02

## 2018-01-01 RX ADMIN — ALBUMIN (HUMAN) 12.5 G: 0.25 INJECTION, SOLUTION INTRAVENOUS at 20:36

## 2018-01-01 RX ADMIN — NYSTATIN: 100000 POWDER TOPICAL at 08:33

## 2018-01-01 RX ADMIN — ALBUMIN (HUMAN) 12.5 G: 0.25 INJECTION, SOLUTION INTRAVENOUS at 12:22

## 2018-01-01 RX ADMIN — HYDROMORPHONE HYDROCHLORIDE 0.5 MG: 1 INJECTION, SOLUTION INTRAMUSCULAR; INTRAVENOUS; SUBCUTANEOUS at 08:26

## 2018-01-01 RX ADMIN — MIDODRINE HYDROCHLORIDE 10 MG: 5 TABLET ORAL at 08:47

## 2018-01-01 RX ADMIN — SODIUM CHLORIDE, PRESERVATIVE FREE 10 ML: 5 INJECTION INTRAVENOUS at 08:26

## 2018-01-01 RX ADMIN — IPRATROPIUM BROMIDE AND ALBUTEROL SULFATE 3 ML: 2.5; .5 SOLUTION RESPIRATORY (INHALATION) at 10:00

## 2018-01-01 RX ADMIN — LACTULOSE 30 G: 10 SOLUTION ORAL at 08:33

## 2018-01-01 RX ADMIN — PROPOFOL 40 MCG/KG/MIN: 10 INJECTION, EMULSION INTRAVENOUS at 20:00

## 2018-01-01 RX ADMIN — CEFTRIAXONE 1 G: 1 INJECTION, POWDER, FOR SOLUTION INTRAMUSCULAR; INTRAVENOUS at 11:16

## 2018-01-01 RX ADMIN — NYSTATIN: 100000 POWDER TOPICAL at 20:57

## 2018-01-01 RX ADMIN — NYSTATIN: 100000 CREAM TOPICAL at 09:23

## 2018-01-01 RX ADMIN — POTASSIUM CHLORIDE 40 MEQ: 750 CAPSULE, EXTENDED RELEASE ORAL at 15:15

## 2018-01-01 RX ADMIN — HYDRALAZINE HYDROCHLORIDE 10 MG: 10 TABLET, FILM COATED ORAL at 05:44

## 2018-01-01 RX ADMIN — SODIUM CHLORIDE, PRESERVATIVE FREE 10 ML: 5 INJECTION INTRAVENOUS at 20:47

## 2018-01-01 RX ADMIN — FUROSEMIDE 80 MG: 10 INJECTION, SOLUTION INTRAMUSCULAR; INTRAVENOUS at 15:20

## 2018-01-01 RX ADMIN — IPRATROPIUM BROMIDE AND ALBUTEROL SULFATE 3 ML: 2.5; .5 SOLUTION RESPIRATORY (INHALATION) at 07:40

## 2018-01-01 RX ADMIN — PROMETHAZINE HYDROCHLORIDE 12.5 MG: 25 INJECTION, SOLUTION INTRAMUSCULAR; INTRAVENOUS at 16:02

## 2018-01-01 RX ADMIN — LACTULOSE 30 G: 20 SOLUTION ORAL at 00:40

## 2018-01-01 RX ADMIN — ALBUMIN (HUMAN) 12.5 G: 0.25 INJECTION, SOLUTION INTRAVENOUS at 21:34

## 2018-01-01 RX ADMIN — TAZOBACTAM SODIUM AND PIPERACILLIN SODIUM 3.38 G: 375; 3 INJECTION, SOLUTION INTRAVENOUS at 22:11

## 2018-01-01 RX ADMIN — RIFAXIMIN 275 MG: 550 TABLET ORAL at 21:29

## 2018-01-01 RX ADMIN — HYDROMORPHONE HYDROCHLORIDE 0.25 MG: 2 INJECTION INTRAMUSCULAR; INTRAVENOUS; SUBCUTANEOUS at 22:14

## 2018-01-01 RX ADMIN — FUROSEMIDE 20 MG: 10 INJECTION, SOLUTION INTRAMUSCULAR; INTRAVENOUS at 08:48

## 2018-01-01 RX ADMIN — FAMOTIDINE 20 MG: 20 TABLET ORAL at 21:42

## 2018-01-01 RX ADMIN — ALBUMIN HUMAN 25 G: 0.05 INJECTION, SOLUTION INTRAVENOUS at 15:14

## 2018-01-01 RX ADMIN — IPRATROPIUM BROMIDE AND ALBUTEROL SULFATE 3 ML: 2.5; .5 SOLUTION RESPIRATORY (INHALATION) at 07:25

## 2018-01-01 RX ADMIN — ALBUMIN (HUMAN) 10 ML/HR: 0.25 INJECTION, SOLUTION INTRAVENOUS at 08:38

## 2018-01-01 RX ADMIN — NICOTINE 1 PATCH: 21 PATCH TRANSDERMAL at 08:25

## 2018-01-01 RX ADMIN — POTASSIUM CHLORIDE 10 MEQ: 7.46 INJECTION, SOLUTION INTRAVENOUS at 09:09

## 2018-01-01 RX ADMIN — HYDROCORTISONE 1 G: 1 CREAM TOPICAL at 10:28

## 2018-01-01 RX ADMIN — ALBUMIN (HUMAN) 12.5 G: 0.25 INJECTION, SOLUTION INTRAVENOUS at 20:22

## 2018-01-01 RX ADMIN — MORPHINE SULFATE 4 MG: 4 INJECTION INTRAVENOUS at 21:22

## 2018-01-01 RX ADMIN — PHYTONADIONE 5 MG: 1 INJECTION, EMULSION INTRAMUSCULAR; INTRAVENOUS; SUBCUTANEOUS at 08:24

## 2018-01-01 RX ADMIN — LACTULOSE 30 G: 10 SOLUTION ORAL at 08:55

## 2018-01-31 PROBLEM — R40.2430 GLASGOW COMA SCALE TOTAL SCORE 3-8 (HCC): Status: RESOLVED | Noted: 2017-06-14 | Resolved: 2018-01-01

## 2018-01-31 PROBLEM — N18.9 ACUTE ON CHRONIC RENAL FAILURE (HCC): Chronic | Status: RESOLVED | Noted: 2017-05-05 | Resolved: 2018-01-01

## 2018-01-31 PROBLEM — N18.30 ACUTE RENAL FAILURE SUPERIMPOSED ON STAGE 3 CHRONIC KIDNEY DISEASE (HCC): Chronic | Status: RESOLVED | Noted: 2017-07-10 | Resolved: 2018-01-01

## 2018-01-31 PROBLEM — D64.9 ANEMIA: Chronic | Status: ACTIVE | Noted: 2017-07-18

## 2018-01-31 PROBLEM — N17.9 ACUTE RENAL FAILURE SUPERIMPOSED ON STAGE 3 CHRONIC KIDNEY DISEASE (HCC): Chronic | Status: RESOLVED | Noted: 2017-07-10 | Resolved: 2018-01-01

## 2018-01-31 PROBLEM — S42.301A FRACTURE OF RIGHT HUMERUS: Status: RESOLVED | Noted: 2017-07-10 | Resolved: 2018-01-01

## 2018-01-31 PROBLEM — R60.1 ANASARCA: Status: RESOLVED | Noted: 2017-03-06 | Resolved: 2018-01-01

## 2018-01-31 PROBLEM — R76.8 POSITIVE HEPATITIS C ANTIBODY TEST: Chronic | Status: ACTIVE | Noted: 2018-01-01

## 2018-01-31 PROBLEM — Z72.0 TOBACCO USE: Status: ACTIVE | Noted: 2018-01-01

## 2018-01-31 PROBLEM — R76.8 POSITIVE HEPATITIS C ANTIBODY TEST: Status: ACTIVE | Noted: 2018-01-01

## 2018-01-31 PROBLEM — M86.611: Chronic | Status: ACTIVE | Noted: 2017-06-05

## 2018-01-31 PROBLEM — D69.6 THROMBOCYTOPENIA (HCC): Chronic | Status: ACTIVE | Noted: 2017-07-10

## 2018-01-31 PROBLEM — R79.89 INCREASED AMMONIA LEVEL: Status: RESOLVED | Noted: 2017-07-18 | Resolved: 2018-01-01

## 2018-01-31 PROBLEM — N17.9 ACUTE ON CHRONIC RENAL FAILURE (HCC): Chronic | Status: RESOLVED | Noted: 2017-05-05 | Resolved: 2018-01-01

## 2018-01-31 PROBLEM — Z72.0 TOBACCO USE: Chronic | Status: ACTIVE | Noted: 2018-01-01

## 2018-01-31 PROBLEM — K72.91 LIVER FAILURE WITH HEPATIC COMA (HCC): Chronic | Status: RESOLVED | Noted: 2017-05-05 | Resolved: 2018-01-01

## 2018-01-31 PROBLEM — K76.82 ACUTE HEPATIC ENCEPHALOPATHY (HCC): Status: RESOLVED | Noted: 2017-05-05 | Resolved: 2018-01-01

## 2018-01-31 PROBLEM — E87.6 HYPOKALEMIA: Status: RESOLVED | Noted: 2017-03-02 | Resolved: 2018-01-01

## 2018-01-31 PROBLEM — L03.115 CELLULITIS OF RIGHT LOWER EXTREMITY: Status: RESOLVED | Noted: 2017-06-30 | Resolved: 2018-01-01

## 2018-02-11 NOTE — DISCHARGE INSTRUCTIONS
Return ED fever, vomiting, abdominal pain, worse condition, other concerns    Hypertension  Hypertension, commonly called high blood pressure, is when the force of blood pumping through your arteries is too strong. Your arteries are the blood vessels that carry blood from your heart throughout your body. A blood pressure reading consists of a higher number over a lower number, such as 110/72. The higher number (systolic) is the pressure inside your arteries when your heart pumps. The lower number (diastolic) is the pressure inside your arteries when your heart relaxes. Ideally you want your blood pressure below 120/80.  Hypertension forces your heart to work harder to pump blood. Your arteries may become narrow or stiff. Having untreated or uncontrolled hypertension can cause heart attack, stroke, kidney disease, and other problems.  What increases the risk?  Some risk factors for high blood pressure are controllable. Others are not.  Risk factors you cannot control include:  · Race. You may be at higher risk if you are .  · Age. Risk increases with age.  · Gender. Men are at higher risk than women before age 45 years. After age 65, women are at higher risk than men.  Risk factors you can control include:  · Not getting enough exercise or physical activity.  · Being overweight.  · Getting too much fat, sugar, calories, or salt in your diet.  · Drinking too much alcohol.  What are the signs or symptoms?  Hypertension does not usually cause signs or symptoms. Extremely high blood pressure (hypertensive crisis) may cause headache, anxiety, shortness of breath, and nosebleed.  How is this diagnosed?  To check if you have hypertension, your health care provider will measure your blood pressure while you are seated, with your arm held at the level of your heart. It should be measured at least twice using the same arm. Certain conditions can cause a difference in blood pressure between your right and left  arms. A blood pressure reading that is higher than normal on one occasion does not mean that you need treatment. If it is not clear whether you have high blood pressure, you may be asked to return on a different day to have your blood pressure checked again. Or, you may be asked to monitor your blood pressure at home for 1 or more weeks.  How is this treated?  Treating high blood pressure includes making lifestyle changes and possibly taking medicine. Living a healthy lifestyle can help lower high blood pressure. You may need to change some of your habits.  Lifestyle changes may include:  · Following the DASH diet. This diet is high in fruits, vegetables, and whole grains. It is low in salt, red meat, and added sugars.  · Keep your sodium intake below 2,300 mg per day.  · Getting at least 30-45 minutes of aerobic exercise at least 4 times per week.  · Losing weight if necessary.  · Not smoking.  · Limiting alcoholic beverages.  · Learning ways to reduce stress.  Your health care provider may prescribe medicine if lifestyle changes are not enough to get your blood pressure under control, and if one of the following is true:  · You are 18-59 years of age and your systolic blood pressure is above 140.  · You are 60 years of age or older, and your systolic blood pressure is above 150.  · Your diastolic blood pressure is above 90.  · You have diabetes, and your systolic blood pressure is over 140 or your diastolic blood pressure is over 90.  · You have kidney disease and your blood pressure is above 140/90.  · You have heart disease and your blood pressure is above 140/90.  Your personal target blood pressure may vary depending on your medical conditions, your age, and other factors.  Follow these instructions at home:  · Have your blood pressure rechecked as directed by your health care provider.  · Take medicines only as directed by your health care provider. Follow the directions carefully. Blood pressure medicines  must be taken as prescribed. The medicine does not work as well when you skip doses. Skipping doses also puts you at risk for problems.  · Do not smoke.  · Monitor your blood pressure at home as directed by your health care provider.  Contact a health care provider if:  · You think you are having a reaction to medicines taken.  · You have recurrent headaches or feel dizzy.  · You have swelling in your ankles.  · You have trouble with your vision.  Get help right away if:  · You develop a severe headache or confusion.  · You have unusual weakness, numbness, or feel faint.  · You have severe chest or abdominal pain.  · You vomit repeatedly.  · You have trouble breathing.  This information is not intended to replace advice given to you by your health care provider. Make sure you discuss any questions you have with your health care provider.  Document Released: 12/18/2006 Document Revised: 05/25/2017 Document Reviewed: 10/10/2014  [employer] Interactive Patient Education © 2017 [employer]
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
## 2018-04-11 PROBLEM — K76.7 HEPATORENAL SYNDROME (HCC): Chronic | Status: ACTIVE | Noted: 2017-05-05

## 2018-04-11 PROBLEM — K76.82 HEPATIC ENCEPHALOPATHY (HCC): Status: ACTIVE | Noted: 2018-01-01

## 2018-04-11 PROBLEM — K72.91 LIVER FAILURE WITH HEPATIC COMA (HCC): Status: ACTIVE | Noted: 2017-05-05

## 2018-04-11 PROBLEM — K72.90 LIVER FAILURE (HCC): Chronic | Status: ACTIVE | Noted: 2017-05-05

## 2018-04-11 PROBLEM — D72.829 LEUKOCYTOSIS: Status: ACTIVE | Noted: 2018-01-01

## 2018-04-11 PROBLEM — R41.82 ALTERED MENTAL STATUS: Status: ACTIVE | Noted: 2018-01-01

## 2018-04-11 PROBLEM — T68.XXXA HYPOTHERMIA: Status: ACTIVE | Noted: 2018-01-01

## 2018-04-27 PROBLEM — R60.1 ANASARCA: Status: ACTIVE | Noted: 2018-01-01

## 2018-04-27 PROBLEM — N17.9 ACUTE KIDNEY INJURY (HCC): Status: ACTIVE | Noted: 2018-01-01

## 2018-05-01 NOTE — DISCHARGE SUMMARY
[facility] Medicine Services  DISCHARGE SUMMARY       Date of Admission: 4/11/2018  Date of Discharge:  5/1/2018  Primary Care Physician: Liss Frank DO    Presenting Problem/History of Present Illness:  Hepatic encephalopathy [K72.90]  Altered mental status [R41.82]  Increased ammonia level [R79.89]  Hypothermia, initial encounter [T68.XXXA]     Final Discharge Diagnoses:  Hospital Problem List     Liver failure with hepatic coma    Altered mental status    Hypothermia    Leukocytosis    Hepatic encephalopathy    Acute kidney injury    Anasarca          Consults:   Consults     Date and Time Order Name Status Description    4/27/2018 0856 Inpatient Nephrology Consult Completed     4/18/2018 1853 Inpatient Urology Consult      4/11/2018 1615 Inpatient Gastroenterology Consult Completed     4/11/2018 1607 Hospitalist (on-call MD unless specified)            Procedures Performed:                 Pertinent Test Results:    Laboratory Data:     Results from last 7 days  Lab Units 05/01/18  0259 04/30/18  0455 04/29/18  1756 04/29/18  0531   SODIUM mmol/L 135* 134*  --  137   POTASSIUM mmol/L 3.0* 4.2 3.5 3.3*   CHLORIDE mmol/L 92* 93*  --  95   CO2 mmol/L 34.0* 28.0  --  30.0   BUN mg/dL 21 19  --  18   CREATININE mg/dL 1.87* 1.73*  --  1.70*   GLUCOSE mg/dL 81 91  --  70   CALCIUM mg/dL 9.1 8.9  --  8.7   BILIRUBIN mg/dL 6.3* 5.0*  --  3.4*   ALK PHOS U/L 42 60  --  77   ALT (SGPT) U/L 27 30  --  33   AST (SGOT) U/L 23 29  --  35   ANION GAP mmol/L 9.0 13.0  --  12.0     Estimated Creatinine Clearance: 52.4 mL/min (by C-G formula based on SCr of 1.87 mg/dL (H)).    Results from last 7 days  Lab Units 04/29/18  0531 04/24/18  1657   MAGNESIUM mg/dL  --  2.3   PHOSPHORUS mg/dL 3.4  --            Results from last 7 days  Lab Units 05/01/18  0258 04/30/18  0705 04/29/18  1756 04/29/18  0708 04/28/18  0433 04/27/18  0848   WBC 10*3/mm3 8.83 8.53  --  8.36 8.29 7.78   HEMOGLOBIN  g/dL 6.8* 6.2* 7.2* 6.1* 7.6* 7.8*   HEMATOCRIT % 19.3* 17.6* 21.8* 18.2* 22.5* 22.9*   PLATELETS 10*3/mm3 44* 47*  --  64* 69* 67*           Culture Data:   No results found for: BLOODCX  Urine Culture   Date Value Ref Range Status   04/30/2018 >100,000 CFU/mL Yeast isolated (A)  Preliminary     No results found for: RESPCX  No results found for: WOUNDCX  No results found for: STOOLCX  No components found for: BODYFLD    Micobiology                    Urine Culture   Date Value Ref Range Status   04/30/2018 >100,000 CFU/mL Yeast isolated (A)  Preliminary          Radiology Data:   Imaging Results (all)     Procedure Component Value Units Date/Time    CT Abdomen Pelvis Without Contrast [[id number]] Collected:  04/30/18 0128     Updated:  04/30/18 0204    Narrative:         CT abdomen and pelvis without contrast on 4/30/2018     CLINICAL INDICATION: Abdominal distention, liver failure    TECHNIQUE: Multiple axial images are obtained throughout the  abdomen and pelvis without the administration of contrast. This  exam was performed according to our departmental  dose-optimization program, which includes automated exposure  control, adjustment of the mA and/or kV according to patient size  and/or use of iterative reconstruction technique.   Total DLP is 1070.3 mGy*cm.    COMPARISON: 4/18/2018    FINDINGS:  Abdomen: There are small bilateral pleural effusions. NG tube  extends into the distal body of the stomach. There is mild  bibasilar atelectasis. There is an irregular contour of a small  liver consistent with changes of cirrhosis. TIPS shunt is noted  in place. There are nonobstructing bilateral renal stones. There  are no ureteral stones and no hydronephrosis. Gallstones are  noted within a distended gallbladder. The unenhanced solid  abdominal organs are otherwise unremarkable. There is small to  moderate amount of ascites in the abdomen. There is no free air.  Diffuse anasarca is noted in the subcutaneous tissues.  There is  no pelvic adenopathy. The pelvic portion of the GI tract is  unremarkable.    Pelvis: Moderate ascites is noted in the pelvis. Sheppard catheter  is noted within the bladder. There is no pelvic adenopathy.  Hardware is noted in the left hip. Anasarca is noted in the  subcutaneous tissues.  There is diverticulosis. The pelvic  portion of the GI tract is otherwise unremarkable. Multiple old  rib fractures are noted. Old pelvic fractures are noted. No acute  bony abnormality is noted.      Impression:       1. Evidence of cirrhosis with likely portal hypertension with  ascites, anasarca and pleural effusions suggesting some volume  overload and/or third spacing.  2. Cholelithiasis with a distended gallbladder.  3. Bilateral nephrolithiasis.    Electronically signed by:  Haroldo Henriquez  4/30/2018 2:03 AM  CDT Workstation: [id number]     Paracentesis [[id number]] Collected:  04/26/18 1315    Specimen:  Body Fluid Updated:  04/26/18 1424    Narrative:         PROCEDURE: Ultrasound-guided paracentesis    COMPARISON: None    HISTORY: Ascites    TECHNIQUE:  The risks, benefits and alternatives were explained to the  patient who had ample opportunity to ask questions. The patient  agreed to proceed and informed consent was obtained.    An adequate fluid pocket was identified in the right upper  quadrant quadrant. The site was marked then prepped and draped in  sterile fashion. Approximately 5 mL of 2% lidocaine solution was  used for local superficial and deep anesthesia. A 5 Gren[place] Yueh  needle was inserted into the peritoneal cavity under continuous  sonographic guidance.    FINDINGS:   Approximately 40 mL of dark bernardino colored fluid was removed.  There were no immediate post procedure complications.      Impression:       CONCLUSION:    Successful ultrasound-guided paracentesis, as described above.    Electronically signed by:  Jose Boudreaux MD  4/26/2018 2:14 PM CDT  Workstation: [id number]    US Abdomen  Limited [[id number]] Collected:  04/21/18 1400     Updated:  04/21/18 1522    Narrative:         EXAMINATION:  ultrasound abdomen, limited four-quadrant  assessment  CLINICAL INDICATION / HISTORY:   ascitis, K72.90 Hepatic failure,  unspecified without coma R79.89 Other specified abnormal findings  of blood chemistry T68.XXXA Hypothermia, initial encounter R13.12  Dysphagia, oropharyngeal phase Z74.09 Other reduced mobility    DATE:  4/21/2018 3:20 PM CDT  COMPARISON:  none  TECHNIQUE:  Limited scanning      FINDINGS:        Limited scanning was performed of the abdomen for assessment of  the presence of ascites. There is a small to moderate amount of  fluid in the upper quadrants, and a moderate amount of fluid in  the lower quadrants.            Impression:       CONCLUSION:          1.  Mild to moderate amount of free fluid in the abdomen.                Electronically signed by:  PRISCILLA Bustillo MD  4/21/2018 3:21  PM CDT Workstation: [id number]    CT Abdomen Pelvis Without Contrast [[id number]] Collected:  04/18/18 2231     Updated:  04/20/18 0307    Narrative:       Exam: CT abdomen pelvis without contrast    INDICATION: Ascites, hepatic failure    TECHNIQUE: Routine CT abdomen pelvis without contrast. Sagittal  coronal reconstructions were obtained. CT technique performed  using ALARA.    COMPARISON: 6/14/2017    FINDINGS: There are small bilateral perfusion right greater left  with mild lower lobe compressive atelectasis. Heart size normal.    Evaluation of visceral organs and bowel is limited by the lack of  intravenous and oral contrast. Liver is small with a nodular  contour compatible with cirrhosis. A TIPS the shunt is in place.  Mild splenic enlargement. There are varices seen in the upper  abdomen. Mild to moderate ascites is present. The pancreas  adrenal glands and gallbladder are unremarkable. There is oral  bilateral nonobstructing renal calculi. No hydronephrosis or  ureteral calculi.    Plaque is  present in the abdominal aorta. No significant  adenopathy. No bowel obstruction or abnormal bowel wall  thickening. There is fluid filled small bowel and colon.  Diverticular disease. No evidence of diverticulitis.    Sheppard catheter is present in the bladder.    There is diffuse soft tissue edema.    There are compression fractures present at the T11 T12 L3 and L4  levels without significant change.      Impression:       1. Hepatic cirrhosis with oral hypertension  2. Ascites effusions, pleural effusions and diffuse soft tissue  edema edema compatible with anasarca.  3. Bilateral nephrolithiasis  4. Diverticulosis    Electronically signed by:  Regino Simms MD  4/19/2018 6:36 AM  CDT Workstation: [id number]    US Evaluation Shunt [[id number]] Collected:  04/13/18 1436     Updated:  04/13/18 1636    Narrative:         PROCEDURE: US UPPER EXTREMITY ARTERIES  LIMITED/UNILATERAL/FOLLOW-UP     TECHNIQUE:  Grayscale and Doppler ultrasound imaging of the right  upper quadrant of the abdomen.    COMPARISON: CT abdomen and pelvis June 14, 2017    HISTORY: dopplers of the tips, K72.90 Hepatic failure,  unspecified without coma R79.89 Other specified abnormal findings  of blood chemistry T68.XXXA Hypothermia, initial encounter    FINDINGS: Limited exam performed in the ICU on a portable  ultrasound are seen. Patient is intubated.  Realtime ultrasound imaging was performed of the right upper  quadrant of the abdomen demonstrates a cirrhotic liver. There is  a moderate amount of surrounding ascites. There is a TIPS  catheter present in the liver which shows no flow on color  Doppler imaging. Visualized aspects of the right kidney show no  hydronephrosis.      Impression:       CONCLUSION:   TIPS catheter present in the cirrhotic liver which shows no flow  on color Doppler imaging, concerning for occlusion. Visualized  aspects of the right kidney show no hydronephrosis.  A moderate amount of ascites surrounds the  liver.    Findings and recommendations  discussed with Dr. Becerril on  4/13/2018 4:30 PM CDT    Electronically signed by:  Jose Boudreaux MD  4/13/2018 4:30 PM CDT  Workstation: [id number]    XR Chest Post CVA Port [[id number]] Collected:  04/13/18 1415     Updated:  04/13/18 1435    Narrative:       Chest single view.    CLINICAL INDICATION: Shortness of breath. Hepatic failure. PICC  line placement.    COMPARISON: April 12, 2018.     FINDINGS: Cardiomegaly. Left-sided pleural effusion and increased  basilar markings. Prominence of the right hilum probably  pulmonary arterial prominence. Old healed right rib fractures.    Endotracheal tube identified with tip 5.2 cm above the  bifurcation the philip.      Impression:       CONCLUSION: Right arm PICC line catheter with catheter tip in  superior vena cava, in satisfactory position.    Electronically signed by:  Conner Hernadez MD  4/13/2018 2:33 PM CDT  Workstation: [id number]    IR PICC wo fluoro guidance [[id number]] Resulted:  04/13/18 1424     Updated:  04/13/18 1424    Narrative:       This procedure was auto-finalized with no dictation required.    US Guided Vascular Access [[id number]] Resulted:  04/13/18 1245     Updated:  04/13/18 1245    Narrative:       This procedure was auto-finalized with no dictation required.    XR Chest 1 View [[id number]] Collected:  04/12/18 0518     Updated:  04/12/18 0611    Narrative:         Chest single view on  4/12/2018     CLINICAL INDICATION: Status post intubation    COMPARISON: 4/11/2018    FINDINGS: ET tube tip is in the mid to lower thoracic trachea. NG  tube extends below the diaphragm. Heart is within normal limits  for size. There are developing bibasilar opacities consistent  with atelectasis or pneumonia and small pleural effusions. There  is an old ununited right proximal humerus fracture. There is an  old ununited left clavicle fracture.      Impression:       Developing bibasilar atelectasis or pneumonia.    Electronically  signed by:  Haroldo Henriquez  4/12/2018 6:10 AM  CDT Workstation: [id number]    XR Chest 1 View [[id number]] Collected:  04/11/18 1834     Updated:  04/11/18 1908    Narrative:       Chest single view.       CLINICAL INDICATION: Shortness of breath. Intubated.    COMPARISON: April 11, 2018, at 12:24 PM.    FINDINGS: Cardiomegaly.  Prominence of pulmonary vasculature,  more pronounced than on prior exam. Endotracheal tube identified  with tip 2 cm above the bifurcation of the philip, in  satisfactory position. Nasogastric tube in stomach.      Impression:       CONCLUSION: As above.    Electronically signed by:  Conner Hernadez MD  4/11/2018 6:52 PM CDT  Workstation: [id number]    XR Chest 1 View [[id number]] Collected:  04/11/18 1225     Updated:  04/11/18 1248    Narrative:         PORTABLE CHEST    HISTORY: Altered mental status    Portable AP semierect film of the chest was obtained at 12:24 PM.  COMPARISON: February 11, 2018    EKG leads.  The lungs are clear of an acute process.  The heart is not enlarged.  The pulmonary vasculature is not increased.  No pleural effusion.  No pneumothorax.  No acute osseous abnormality.  Old right rib fracture.  Old displaced ununited fracture proximal shaft right humerus.  Internally fixated healed fracture proximal shaft left humerus.  Old displaced overriding ununited fracture left clavicle.  Ossification right coracoclavicular ligament.      Impression:       CONCLUSION:  No Acute Disease    [id number]    Electronically signed by:  Claude Whatley MD  4/11/2018 12:47 PM  CDT Workstation: [id number]          Chief Complaint on Day of Discharge: none    HPI:Condition started a few days ago as per the son is since her father is more sleepy and more lethargic progressively getting worse for which they called 911 today.to bring the patient to the  hospital from home  Hospital Course: patient admitted for  Liver failure with hepatic coma, Nasogastric tube for lactulose and  rifaximin.had also hypothermia.Anasarca with recurrent  Ascites  Likely secondary to Liver Cirrhosis secondary to alcohol use with chronic hepatic failure  patient has chronically occluded Tipps and not a good candida for surgery .had Culture negative spontaneous bacterial peritonitis, anemia required multiple transfusions and thrombocytopaenia .patient had candida UTI treated with diflucan . Patient treated with lasix drip. Albumin , and rocephin . Had GI consulted and nephrology .patient condition didn't improve and eventually transferred to [facility] in [place]. At time of transfer patient was stable .    Condition on Discharge:  Improved and Stable    Physical Exam on Discharge:  Temp:  [97.1 °F (36.2 °C)-98.4 °F (36.9 °C)] 97.9 °F (36.6 °C)  Heart Rate:  [] 84  Resp:  [16-20] 16  BP: (101-143)/(56-88) 108/56    Constitutional: Patient is AAO x 3. Patient appears well-developed and well-nourished.   Cardiovascular: Normal rate, regular rhythm, normal heart sounds and intact distal pulses.  Exam reveals no gallop and no friction rub.  No murmur heard.  Pulmonary/Chest: Effort normal and breath sounds normal. No respiratory distress. No wheezes, rales or ronchi.  Abdominal: Soft. Mild distention and tenderness   Musculoskeletal: No Cyanosis clubbing . edmea and anasarca.    Discharge Disposition:  Short Term Hospital (DC - External)    Discharge Medications:   Armando Mcmullen   Home Medication Instructions MARBELLA:[id number]    Printed on:05/01/18 112   Medication Information                      Bacitracin Zinc (MAGIC BUTT OINTMENT)  Apply 1 g topically 3 (Three) Times a Day.             Bacitracin Zinc (MAGIC BUTT OINTMENT)  Apply  topically.             cefTRIAXone (ROCEPHIN) 2 g/100 mL 0.9% NS VTB (LUCY)  Infuse 100 mL into a venous catheter Daily for 2 doses.             Ergocalciferol (VITAMIN D2 PO)  Take 50,000 Units by mouth.             fluconazole (DIFLUCAN) 100-0.9 MG/50ML-% solution in  sodium chloride 0.9% IVBP  Infuse 50 mL into a venous catheter Daily for 3 doses.             furosemide (LASIX) 40 MG tablet  Take 1 tablet by mouth 2 (Two) Times a Day.             Incontinence Supplies (BEDPAN) misc  Bedpan (Dx occasional incontinence, weakness, AMS related to hepatic encephalopathy)             lactulose (CHRONULAC) 10 GM/15ML solution  Take 45 mL by mouth 3 (Three) Times a Day.             lisinopril (PRINIVIL,ZESTRIL) 20 MG tablet  Take 1 tablet by mouth Daily.             Misc. Devices (COMMODE BEDSIDE) [facility] [place]  Large bedside commode (Dx hepatic encephalopathy, LLE amputation)             nicotine (NICODERM CQ) 14 MG/24HR patch  Place 1 patch on the skin Daily.             nystatin (MYCOSTATIN) 870146 UNIT/GM powder  Apply  topically Every 12 (Twelve) Hours.             nystatin susp + lidocaine viscous (MAGIC MOUTHWASH) oral suspension  Swish and swallow 5 mL 4 (Four) Times a Day.             ondansetron (ZOFRAN) 4 MG tablet  Take 1 tablet by mouth Every 8 (Eight) Hours As Needed for Nausea or Vomiting.             pantoprazole (PROTONIX) 40 mg/100 mL (0.4 mg/mL) in 0.9% NS IVPB  Infuse 8 mg/hr into a venous catheter Continuous.             phenazopyridine (PYRIDIUM) 100 MG tablet  Take 1 tablet by mouth 3 (Three) Times a Day As Needed for bladder spasms.             potassium chloride (K-DUR,KLOR-CON) 20 MEQ CR tablet  Take 1 tablet by mouth Daily.             rifaximin (XIFAXAN) 550 MG tablet  Take 1 tablet by mouth Every 12 (Twelve) Hours.             vitamin D (ERGOCALCIFEROL) 02692 units capsule capsule  Take 1 capsule by mouth Every 7 (Seven) Days.                 Discharge Diet:   Diet Instructions     Diet: Nothing By Mouth       Discharge Diet:  Nothing By Mouth          Activity at Discharge:   Activity Instructions     Bedrest With Bathroom Privileges       Bedrest With Bathroom Privileges       Other Instructions (Specify)       continue lasix drip .  Lasix 100 mg, albumin human 25%  100ml, 110 ml infusion , rate 10 ml/hr IV continuous            Discharge Care Plan/Instructions: transfer     Follow-up Appointments:   No future appointments.    Test Results Pending at Discharge:    Order Current Status    Body Fluid Culture - Body Fluid, Peritoneum Preliminary result    Urine Culture - Urine, Urine, Clean Catch Preliminary result           This document has been electronically signed by Vj Marinelli MD on May 1, 2018 11:23 AM

## 2018-05-01 NOTE — THERAPY DISCHARGE NOTE
Acute Care - Occupational Therapy Discharge Summary  [facility]     Patient Name: Armando Mcmullen  : 1965  MRN: [id number]    Today's Date: 2018  Onset of Illness/Injury or Date of Surgery: 18    Date of Referral to OT: 18  Referring Physician: Dr. Ed Franco      Admit Date: 2018        OT Recommendation and Plan    Visit Dx:    ICD-10-CM ICD-9-CM   1. Hepatic encephalopathy K72.90 572.2   2. Increased ammonia level R79.89 790.6   3. Hypothermia, initial encounter T68.XXXA 991.6   4. Oropharyngeal dysphagia R13.12 787.22   5. Impaired physical mobility Z74.09 781.99   6. Impaired mobility and ADLs Z74.09 799.89                     OT Rehab Goals     Row Name 18 1509             Transfer Goal 1 (OT)    Activity/Assistive Device (Transfer Goal 1, OT) toilet  -      [place] Level/Cues Needed (Transfer Goal 1, OT) minimum assist (75% or more patient effort);verbal cues required;tactile cues required;set-up required  -      Time Frame (Transfer Goal 1, OT) long term goal (LTG);by discharge  -      Progress/Outcome (Transfer Goal 1, OT) goal not met  -         Bathing Goal 1 (OT)    Activity/Assistive Device (Bathing Goal 1, OT) bathing skills, all  -BH      [place] Level/Cues Needed (Bathing Goal 1, OT) minimum assist (75% or more patient effort);verbal cues required;set-up required  -      Time Frame (Bathing Goal 1, OT) long term goal (LTG);by discharge  -      Progress/Outcomes (Bathing Goal 1, OT) goal not met  -         Dressing Goal 1 (OT)    Activity/Assistive Device (Dressing Goal 1, OT) dressing skills, all  -BH      [place]/Cues Needed (Dressing Goal 1, OT) minimum assist (75% or more patient effort);verbal cues required;set-up required  -      Time Frame (Dressing Goal 1, OT) long term goal (LTG);by discharge  -      Progress/Outcome (Dressing Goal 1, OT) goal not met  -         Toileting Goal 1 (OT)    Activity/Device (Toileting Goal  1, OT) toileting skills, all  -      [place] Level/Cues Needed (Toileting Goal 1, OT) minimum assist (75% or more patient effort);verbal cues required;set-up required  -      Time Frame (Toileting Goal 1, OT) long term goal (LTG);by discharge  -      Progress/Outcome (Toileting Goal 1, OT) goal not met  -         Patient Education Goal (OT)    Activity (Patient Education Goal, OT) Pt will be independent with home safety, ADL safety, and energy conservation.   -      [place]/Cues/Accuracy (Memory Goal 2, OT) demonstrates adequately;verbalizes understanding  -      Time Frame (Patient Education Goal, OT) long term goal (LTG);by discharge  -      Progress/Outcome (Patient Education Goal, OT) goal not met  -        User Key  (r) = Recorded By, (t) = Taken By, (c) = Cosigned By    Initials Name Provider Type Discipline     Dasia Langley, OTR/L Occupational Therapist OT                  OT Discharge Summary  Anticipated Discharge Disposition (OT): skilled nursing facility (SNF), nursing facility (24/7)  Reason for Discharge: Discharge from facility, Per MD order  Outcomes Achieved: Refer to plan of care for updates on goals achieved  Discharge Destination:  (short term hospital)      Dasia Langley OTR/L  5/1/2018

## 2018-05-01 NOTE — SIGNIFICANT NOTE
05/01/18 0830   Rehab Treatment   Discipline physical therapy assistant   Reason Treatment Not Performed other (see comments)  (pt's hemoglobin 6.8, nsg states no tx today, possible t/[place] to [employer] )

## 2018-05-01 NOTE — THERAPY DISCHARGE NOTE
Acute Care - Physical Therapy Discharge Summary  [facility]       Patient Name: Armando Mcmullen  : 1965  MRN: [id number]    Today's Date: 2018  Onset of Illness/Injury or Date of Surgery: 18    Date of Referral to PT: 18  Referring Physician: Dr. Ed Franco      Admit Date: 2018      PT Recommendation and Plan    Visit Dx:    ICD-10-CM ICD-9-CM   1. Hepatic encephalopathy K72.90 572.2   2. Increased ammonia level R79.89 790.6   3. Hypothermia, initial encounter T68.XXXA 991.6   4. Oropharyngeal dysphagia R13.12 787.22   5. Impaired physical mobility Z74.09 781.99   6. Impaired mobility and ADLs Z74.09 799.89                         PT Discharge Summary  Anticipated Discharge Disposition (PT): skilled nursing facility (SNF)  Reason for Discharge: Discharge from facility, Per MD order  Outcomes Achieved: Unable to make functional progress toward goals at this time  Discharge Destination: other (comment) ([facility] for further medical management)      Tammy Salazar, PT   2018

## 2018-05-01 NOTE — PROGRESS NOTES
"[facility]  [address] [place] [address]. [address]  T - [phone]  F - [phone]     Progress Note          PATIENT  DEMOGRAPHICS   PATIENT NAME: Armando Mcmullen                      PHYSICIAN: Vel EstesKALEB  : 1965  MRN: [id number]   LOS: 20 days    Patient Care Team:  Liss Frank DO as PCP - General (Family Medicine)  Shawn Cortez MD (Inactive) as Surgeon (Orthopedic Surgery)  Tushar Becerril DO as Consulting Physician (Gastroenterology)  Josep Colón MD as Consulting Physician (Nephrology)  Ortiz Ferreira MD as Consulting Physician (Hematology and Oncology)  Subjective   SUBJECTIVE   Had a BM, feeling a little better today.         Objective   OBJECTIVE   Vital Signs  Temp:  [97.1 °F (36.2 °C)-98.4 °F (36.9 °C)] 98 °F (36.7 °C)  Heart Rate:  [] 88  Resp:  [16-20] 18  BP: (101-143)/(57-88) 120/64    Flowsheet Rows    Flowsheet Row First Filed Value   Admission Height 185.4 cm (73\") Documented at 2018 1138   Admission Weight 94.8 kg (209 lb 1.6 oz) Documented at 2018 1138           I/O last 3 completed shifts:  In: 1803.5 [P.O.:150; I.V.:339; Blood:558.5; Other:90; IV Piggyback:666]  Out: 5450 [Urine:5350; Emesis/NG output:100]    PHYSICAL EXAM    Physical Exam   Constitutional: He is oriented to person, place, and time. He appears well-developed.   Moderately nourished   HENT:   Head: Normocephalic and atraumatic.   Eyes: Conjunctivae and EOM are normal. Pupils are equal, round, and reactive to light.   Cardiovascular: Normal rate and regular rhythm.    Pulmonary/Chest: Effort normal and breath sounds normal. No respiratory distress. He has no wheezes.   Abdominal: Soft. He exhibits distension.   Musculoskeletal: He exhibits edema (Penile/scrotal, BLE).   Neurological: He is alert and oriented to person, place, and time.   Skin: Skin is warm and dry.   Psychiatric: He has a normal mood and affect. His behavior is normal. Judgment and thought " content normal.       RESULTS   Results Review:      Results from last 7 days  Lab Units 05/01/18  0259 04/30/18  0455 04/29/18  1756 04/29/18  0531   SODIUM mmol/L 135* 134*  --  137   POTASSIUM mmol/L 3.0* 4.2 3.5 3.3*   CHLORIDE mmol/L 92* 93*  --  95   CO2 mmol/L 34.0* 28.0  --  30.0   BUN mg/dL 21 19  --  18   CREATININE mg/dL 1.87* 1.73*  --  1.70*   CALCIUM mg/dL 9.1 8.9  --  8.7   BILIRUBIN mg/dL 6.3* 5.0*  --  3.4*   ALK PHOS U/L 42 60  --  77   ALT (SGPT) U/L 27 30  --  33   AST (SGOT) U/L 23 29  --  35   GLUCOSE mg/dL 81 91  --  70       Estimated Creatinine Clearance: 52.4 mL/min (by C-G formula based on SCr of 1.87 mg/dL (H)).      Results from last 7 days  Lab Units 04/29/18  0531 04/24/18  1657   MAGNESIUM mg/dL  --  2.3   PHOSPHORUS mg/dL 3.4  --                Results from last 7 days  Lab Units 05/01/18  0258 04/30/18  0705 04/29/18  1756 04/29/18  0708 04/28/18  0433 04/27/18  0848   WBC 10*3/mm3 8.83 8.53  --  8.36 8.29 7.78   HEMOGLOBIN g/dL 6.8* 6.2* 7.2* 6.1* 7.6* 7.8*   PLATELETS 10*3/mm3 44* 47*  --  64* 69* 67*               Imaging Results (last 24 hours)     ** No results found for the last 24 hours. **           MEDICATIONS      budesonide-formoterol 2 puff Inhalation BID - RT   ceftriaxone 2 g Intravenous Q24H   ferrous sulfate 324 mg Oral TID With Meals   ipratropium-albuterol 3 mL Nebulization 4x Daily - RT   lactulose 30 g Oral TID   magic butt ointment 1 g Topical TID   nicotine 1 patch Transdermal Q24H   nystatin  Topical Q12H   potassium chloride 20 mEq Intravenous Q2H   rifaximin 550 mg Oral Q12H       furosemide-albumin infusion 10 mL/hr Last Rate: 10 mL/hr (05/01/18 0838)   pantoprazole 8 mg/hr Last Rate: 8 mg/hr (05/01/18 0627)       Assessment/Plan   ASSESSMENT / PLAN    Active Problems:    Liver failure with hepatic coma    Altered mental status    Hypothermia    Leukocytosis    Hepatic encephalopathy    Acute kidney injury    Anasarca    1. CHRIS on CKD3- Mr. Mcmullen has a  baseline creatinine of 1.5-1.6, now 1.7.  This is likely due to aggressive diuresis and probably some hypoperfusion due to low hemoglobin.      -Lasix/albumin gtt at 10 mg/hr now, tolerated increased rate well.     -CT abdomen and pelvis negative for any hydronephrosis or ureteral stones, it does show bilateral nonobstructing nephrolithiasis    -FEUrea >35%, suggestive of intrinsic renal disease.     -The creatinine is overall stable from yesterday     -Albumin is low but acceptable     2.  Liver failure- At this point this is representative of end-stage liver disease, Dr. Becerril is in contact with [facility] regarding possible transplantation     3.  Hepatic encephalopathy- This appears to be mostly resolved at this point as the patient is alert and oriented and able to answer questions appropriately, Pt has been accepted for transfer to [facility].     4.  Anemia- This is most likely related to #2, hemoglobin is currently 6.8, pt to receive 1U PRBC today.    5.  Hypokalemia- Potassium replacement protocol in place. Receiving replacement now.     6.  UTI- The patient is being treated empirically for a Candida urinary tract infection, unable to exchange the [place] because of severe penile edema.           This document has been electronically signed by KALEB Tinsley on May 1, 2018 9:48 AM

## 2018-05-01 NOTE — SIGNIFICANT NOTE
05/01/18 0745   Rehab Treatment   Discipline occupational therapy assistant   Reason Treatment Not Performed other (see comments)  (Pt h/h 6.8-19.3. No tx this date)

## 2018-05-01 NOTE — PLAN OF CARE
Problem: Fall Risk (Adult)  Goal: Identify Related Risk Factors and Signs and Symptoms  Outcome: Ongoing (interventions implemented as appropriate)   05/01/18 0537   Fall Risk (Adult)   Related Risk Factors (Fall Risk) bladder function altered;gait/mobility problems;environment unfamiliar   Signs and Symptoms (Fall Risk) presence of risk factors     Goal: Absence of Fall  Outcome: Ongoing (interventions implemented as appropriate)   05/01/18 0537   Fall Risk (Adult)   Absence of Fall making progress toward outcome       Problem: Patient Care Overview  Goal: Plan of Care Review   05/01/18 0537   Plan of Care Review   Progress no change   OTHER   Outcome Summary remains npo with ng to liws. vss, 02 2l per nc. plan transfer to [facility]. continue to monitor   Coping/Psychosocial   Plan of Care Reviewed With patient     Goal: Individualization and Mutuality  Outcome: Ongoing (interventions implemented as appropriate)   05/01/18 0537   Individualization   Patient Specific Interventions sling to scrotum     Goal: Discharge Needs Assessment   05/01/18 0537   Discharge Needs Assessment   Discharge Facility/Level of Care Needs [facility]       Problem: Pain, Acute (Adult)  Goal: Identify Related Risk Factors and Signs and Symptoms  Outcome: Ongoing (interventions implemented as appropriate)   05/01/18 0537   Pain, Acute (Adult)   Related Risk Factors (Acute Pain) disease process;persistent pain;fear   Signs and Symptoms (Acute Pain) verbalization of pain descriptors     Goal: Acceptable Pain Control/Comfort Level  Outcome: Ongoing (interventions implemented as appropriate)   05/01/18 0537   Pain, Acute (Adult)   Acceptable Pain Control/Comfort Level making progress toward outcome       Problem: Urinary Tract Infection (Adult)  Goal: Signs and Symptoms of Listed Potential Problems Will be Absent, Minimized or Managed (Urinary Tract Infection)   05/01/18 0537   Goal/Outcome Evaluation   Problems Assessed (Urinary  Tract Infection) all   Problems Present (UTI) pain

## 2018-06-29 PROBLEM — D72.829 LEUKOCYTOSIS: Status: RESOLVED | Noted: 2018-01-01 | Resolved: 2018-01-01

## 2018-06-29 PROBLEM — R41.82 ALTERED MENTAL STATUS: Status: RESOLVED | Noted: 2018-01-01 | Resolved: 2018-01-01

## 2018-06-29 PROBLEM — R60.1 ANASARCA: Status: RESOLVED | Noted: 2017-03-06 | Resolved: 2018-01-01

## 2018-06-29 PROBLEM — R79.89 INCREASED AMMONIA LEVEL: Status: RESOLVED | Noted: 2017-07-18 | Resolved: 2018-01-01

## 2018-06-29 PROBLEM — K76.82 HEPATIC ENCEPHALOPATHY (HCC): Status: RESOLVED | Noted: 2018-01-01 | Resolved: 2018-01-01

## 2018-06-29 PROBLEM — R60.1 ANASARCA: Status: RESOLVED | Noted: 2018-01-01 | Resolved: 2018-01-01

## 2018-06-29 PROBLEM — N17.9 ACUTE KIDNEY INJURY (HCC): Status: RESOLVED | Noted: 2018-01-01 | Resolved: 2018-01-01

## 2018-06-29 PROBLEM — T68.XXXA HYPOTHERMIA: Status: RESOLVED | Noted: 2018-01-01 | Resolved: 2018-01-01

## 2018-06-29 NOTE — PROGRESS NOTES
Subjective   Armando Mcmullen is a 53 y.o. male.     History of Present Illness   this today requesting refill medications and check for urinalysis for UTI.  Unfortunately patient's had a rather prolonged hospitalization a for hepatorenal syndrome.  Unfortunately there are absolutely no records.  Patient is unaware of any lab work.  Through the chart and noticed he is seeing nephrology here.  States she's to see gastroenterology next month and low-fat.  History and transitions very disjointed.  Patient states she is eating well.  Complaining of back pain wanting urinalysis.  We've counseled frankly we really can't do much to get records in regards to follow-up short and long-term.  We will check a urine today for urinalysis.  Nephrologist checking lab work.    The following portions of the patient's history were reviewed and updated as appropriate: allergies, current medications, past family history, past medical history, past social history, past surgical history and problem list.    Review of Systems   Constitutional: Positive for fatigue. Negative for activity change, appetite change and unexpected weight change.   HENT: Negative for trouble swallowing and voice change.    Eyes: Negative for redness and visual disturbance.   Respiratory: Negative for cough and wheezing.    Cardiovascular: Negative for chest pain and palpitations.   Gastrointestinal: Negative for abdominal pain, constipation, diarrhea, nausea and vomiting.   Genitourinary: Negative for urgency.   Musculoskeletal: Positive for back pain. Negative for joint swelling.   Neurological: Negative for syncope and headaches.   Hematological: Negative for adenopathy.   Psychiatric/Behavioral: Negative for sleep disturbance.       Objective   Physical Exam   Constitutional: He is oriented to person, place, and time. He appears well-developed. He has a sickly appearance.   HENT:   Head: Normocephalic.   Right Ear: External ear normal.   Nose: Nose normal.    Mouth/Throat: Oropharynx is clear and moist.   Scleral icterus   Eyes: Pupils are equal, round, and reactive to light.   Neck: Normal range of motion. No thyromegaly present.   Cardiovascular: Normal rate, regular rhythm, normal heart sounds and intact distal pulses.  Exam reveals no gallop and no friction rub.    No murmur heard.  Pulmonary/Chest: Breath sounds normal.   Abdominal: Soft. He exhibits no distension and no mass. There is no tenderness.   Musculoskeletal: Normal range of motion.   Left BKA.  Scar healed.   Neurological: He is alert and oriented to person, place, and time. He has normal reflexes.   Skin: Skin is warm and dry.   Sallow mild jaundiced   Psychiatric: His affect is blunt. His speech is delayed. He is slowed. Cognition and memory are impaired.       Assessment/Plan   Armando was seen today for follow-up.    Diagnoses and all orders for this visit:    Liver failure with hepatic coma, unspecified chronicity    Essential hypertension    Dysthymia    Chronic bilateral low back pain without sciatica  -     Urinalysis With Culture If Indicated - Urine, Clean Catch    Chronic hepatitis      Urinalysis for now we'll have to wait for records and update chart since then.  Follow-up based on same

## 2018-08-02 PROBLEM — R79.89 ELEVATED LACTIC ACID LEVEL: Status: ACTIVE | Noted: 2018-01-01

## 2018-08-02 PROBLEM — R79.89 ELEVATED BRAIN NATRIURETIC PEPTIDE (BNP) LEVEL: Status: ACTIVE | Noted: 2018-01-01

## 2018-08-02 PROBLEM — R19.7 DIARRHEA: Status: ACTIVE | Noted: 2018-01-01

## 2018-08-02 PROBLEM — F32.A ANXIETY AND DEPRESSION: Status: ACTIVE | Noted: 2018-01-01

## 2018-08-02 PROBLEM — N18.30 CKD (CHRONIC KIDNEY DISEASE) STAGE 3, GFR 30-59 ML/MIN (HCC): Status: ACTIVE | Noted: 2018-01-01

## 2018-08-02 PROBLEM — R18.8 ASCITES: Status: ACTIVE | Noted: 2018-01-01

## 2018-08-02 PROBLEM — F41.9 ANXIETY AND DEPRESSION: Status: ACTIVE | Noted: 2018-01-01
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
## 2018-08-03 NOTE — PROGRESS NOTES
FAMILY MEDICINE DAILY PROGRESS NOTE  NAME: Armando Mcmullen Sr.  : 1965  MRN: [id number]     LOS: 1 day     PROVIDER OF SERVICE: Jose Luis III, MD    Chief Complaint: Anasarca    Subjective:     Interval History:  History taken from: patient RN  Patient Complaints: Abdominal pain and swelling in distal extremities  Patient Denies:  Fever chills shortness of breath, chest pain, nausea, vomiting.    Review of Systems:   Review of Systems   Constitutional: Positive for activity change and appetite change.   HENT: Negative.    Eyes: Negative.    Respiratory: Positive for shortness of breath.    Cardiovascular: Positive for leg swelling. Negative for chest pain.   Gastrointestinal: Positive for abdominal distention and abdominal pain.   Endocrine: Negative.    Genitourinary: Negative.    Musculoskeletal: Negative.    Skin: Negative.    Allergic/Immunologic: Negative.    Neurological: Negative.    Hematological: Negative.    Psychiatric/Behavioral: Negative.        Objective:     Vital Signs  Temp:  [97.4 °F (36.3 °C)-98.3 °F (36.8 °C)] 97.4 °F (36.3 °C)  Heart Rate:  [75-95] 75  Resp:  [16-18] 18  BP: (125-158)/() 125/75    Physical Exam  Physical Exam   Constitutional: He is oriented to person, place, and time. He appears well-developed and well-nourished.   HENT:   Head: Normocephalic and atraumatic.   Right Ear: External ear normal.   Left Ear: External ear normal.   Neck: Neck supple.   Cardiovascular: Normal rate and regular rhythm.    Pulmonary/Chest: He has wheezes.   Abdominal: Soft. Bowel sounds are normal.   Musculoskeletal: Normal range of motion.   Neurological: He is alert and oriented to person, place, and time.   Skin: Skin is warm and dry.   Psychiatric: He has a normal mood and affect. His behavior is normal. Judgment and thought content normal.       Medication Review    Current Facility-Administered Medications:   •  furosemide (LASIX) injection 40 mg, 40 mg, Intravenous, Q12H,  Momo Hammer MD, 40 mg at 08/02/18 2256  •  ibuprofen (ADVIL,MOTRIN) tablet 400 mg, 400 mg, Oral, Q4H PRN, Momo Hammer MD, 400 mg at 08/03/18 0028  •  lactulose (CHRONULAC) 10 GM/15ML solution 30 g, 30 g, Oral, BID, Momo Hammer MD, 30 g at 08/02/18 2255  •  nicotine (NICODERM CQ) 14 MG/24HR patch 1 patch, 1 patch, Transdermal, Q24H, Momo Hammer MD, 1 patch at 08/02/18 2255  •  ondansetron (ZOFRAN) tablet 4 mg, 4 mg, Oral, Q6H PRN **OR** ondansetron ODT (ZOFRAN-ODT) disintegrating tablet 4 mg, 4 mg, Oral, Q6H PRN **OR** ondansetron (ZOFRAN) injection 4 mg, 4 mg, Intravenous, Q6H PRN, Momo Hammer MD  •  sodium chloride 0.9 % flush 1-10 mL, 1-10 mL, Intravenous, PRN, Momo Hammer MD  •  Insert peripheral IV, , , Once **AND** sodium chloride 0.9 % flush 10 mL, 10 mL, Intravenous, PRN, Mike Bergman MD  •  sodium chloride 0.9 % infusion  - ADS Override Pull, , , ,      Diagnostic Data    Lab Results (last 24 hours)     Procedure Component Value Units Date/Time    Comprehensive Metabolic Panel [[id number]]  (Abnormal) Collected:  08/03/18 0627    Specimen:  Blood Updated:  08/03/18 0656     Glucose 92 mg/dL      BUN 31 (H) mg/dL      Creatinine 1.86 (H) mg/dL      Sodium 134 (L) mmol/L      Potassium 3.2 (L) mmol/L      Chloride 103 mmol/L      CO2 25.0 mmol/L      Calcium 7.8 (L) mg/dL      Total Protein 5.6 (L) g/dL      Albumin 2.20 (L) g/dL      ALT (SGPT) 29 U/L      AST (SGOT) 39 U/L      Alkaline Phosphatase 83 U/L      Total Bilirubin 4.0 (H) mg/dL      eGFR Non African Amer 38 (L) mL/min/1.73      Globulin 3.4 gm/dL      A/G Ratio 0.6 (L) g/dL      BUN/Creatinine Ratio 16.7     Anion Gap 6.0 mmol/L     CBC & Differential [[id number]] Collected:  08/03/18 0627    Specimen:  Blood Updated:  08/03/18 0647    Narrative:       The following orders were created for panel order CBC & Differential.  Procedure                                Abnormality         Status                     ---------                               -----------         ------                     Scan Slide[[id number]]                                                                  CBC Auto Differential[[id number]]        Abnormal            Final result                 Please view results for these tests on the individual orders.    CBC Auto Differential [[id number]]  (Abnormal) Collected:  08/03/18 0627    Specimen:  Blood Updated:  08/03/18 0643     WBC 8.46 10*3/mm3      RBC 2.66 (L) 10*6/mm3      Hemoglobin 9.3 (L) g/dL      Hematocrit 26.2 (L) %      MCV 98.5 (H) fL      MCH 35.0 (H) pg      MCHC 35.5 g/dL      RDW 14.8 (H) %      RDW-SD 53.0 (H) fl      MPV 10.3 fL      Platelets 43 (L) 10*3/mm3      Neutrophil % 61.2 %      Lymphocyte % 19.5 %      Monocyte % 14.3 (H) %      Eosinophil % 4.1 %      Basophil % 0.7 %      Immature Grans % 0.2 %      Neutrophils, Absolute 5.17 10*3/mm3      Lymphocytes, Absolute 1.65 10*3/mm3      Monocytes, Absolute 1.21 (H) 10*3/mm3      Eosinophils, Absolute 0.35 10*3/mm3      Basophils, Absolute 0.06 10*3/mm3      Immature Grans, Absolute 0.02 10*3/mm3     Gastrointestinal Panel, PCR - Stool, Per Rectum [[id number]] Collected:  08/03/18 0602    Specimen:  Stool from Per Rectum Updated:  08/03/18 0602    Lactic Acid, Reflex [[id number]]  (Abnormal) Collected:  08/02/18 2148    Specimen:  Blood Updated:  08/02/18 2211     Lactate 2.2 (C) mmol/L     Lactic Acid, Reflex Timer (This will reflex a repeat order 3-3:15 hours after ordered.) [[id number]] Collected:  08/02/18 1747    Specimen:  Blood Updated:  08/02/18 2131     Extra Tube Hold for add-ons.     Comment: Auto resulted.       CBC & Differential [[id number]] Collected:  08/02/18 1747    Specimen:  Blood Updated:  08/02/18 1944    Narrative:       The following orders were created for panel order CBC & Differential.  Procedure                               Abnormality         Status                      ---------                               -----------         ------                     Scan Slide[[id number]]                                       Final result               CBC Auto Differential[[id number]]        Abnormal            Final result                 Please view results for these tests on the individual orders.    Scan Slide [[id number]] Collected:  08/02/18 1747    Specimen:  Blood Updated:  08/02/18 1944     RBC Morphology Normal     WBC Morphology Normal     Platelet Estimate Decreased     Comment: Significant reduction of platelets confirmed with slide review.       Urinalysis With Microscopic If Indicated (No Culture) - Urine, Clean Catch [[id number]]  (Abnormal) Collected:  08/02/18 1819    Specimen:  Urine from Urine, Clean Catch Updated:  08/02/18 1911     Color, UA Dark Yellow     Appearance, UA Cloudy (A)     pH, UA <=5.0     Specific Gravity, UA 1.011     Glucose, UA Negative     Ketones, UA Negative     Bilirubin, UA Small (1+) (A)     Blood, UA Large (3+) (A)     Protein, UA Trace (A)     Leuk Esterase, UA Large (3+) (A)     Nitrite, UA Negative     Urobilinogen, UA 1.0 E.U./dL    Urinalysis, Microscopic Only - Urine, Clean Catch [[id number]]  (Abnormal) Collected:  08/02/18 1819    Specimen:  Urine from Urine, Clean Catch Updated:  08/02/18 1910     RBC, UA 21-30 (A) /HPF      WBC, UA Too Numerous to Count (A) /HPF      Bacteria, UA 3+ (A) /HPF      Squamous Epithelial Cells, UA 6-12 (A) /HPF      Transitional Epithelial Cells, UA 0-2 /HPF      Yeast, UA       Moderate/2+ Budding Yeast w/Hyphae     /HPF     Hyaline Casts, UA None Seen /LPF      Methodology Manual Light Microscopy    [place] Draw [[id number]] Collected:  08/02/18 1747    Specimen:  Blood Updated:  08/02/18 1900    Narrative:       The following orders were created for panel order [place] Draw.  Procedure                               Abnormality         Status                     ---------                                -----------         ------                     Light Blue Top[[id number]]                                   Final result               Green Top (Gel)[[id number]]                                  Final result               Lavender Top[[id number]]                                     Final result               Gold Top - SST[[id number]]                                   Final result                 Please view results for these tests on the individual orders.    Light Blue Top [[id number]] Collected:  08/02/18 1747    Specimen:  Blood Updated:  08/02/18 1900     Extra Tube hold for add-on     Comment: Auto resulted       Green Top (Gel) [[id number]] Collected:  08/02/18 1747    Specimen:  Blood Updated:  08/02/18 1900     Extra Tube Hold for add-ons.     Comment: Auto resulted.       Lavender Top [[id number]] Collected:  08/02/18 1747    Specimen:  Blood Updated:  08/02/18 1900     Extra Tube hold for add-on     Comment: Auto resulted       Gold Top - SST [[id number]] Collected:  08/02/18 1747    Specimen:  Blood Updated:  08/02/18 1900     Extra Tube Hold for add-ons.     Comment: Auto resulted.       Protime-INR [[id number]]  (Abnormal) Collected:  08/02/18 1747    Specimen:  Blood Updated:  08/02/18 1844     Protime 19.6 (H) Seconds      INR 1.73 (H)    Narrative:       Therapeutic range for most indications is 2.0-3.0 INR,  or 2.5-3.5 for mechanical heart valves.    aPTT [[id number]]  (Abnormal) Collected:  08/02/18 1747    Specimen:  Blood Updated:  08/02/18 1844     PTT 44.5 (H) seconds     Narrative:       The recommended Heparin therapeutic range is 68-97 seconds.    BNP [[id number]]  (Abnormal) Collected:  08/02/18 1747    Specimen:  Blood Updated:  08/02/18 1828     proBNP 2,120.0 (H) pg/mL     Troponin [[id number]]  (Normal) Collected:  08/02/18 1747    Specimen:  Blood Updated:  08/02/18 1828     Troponin I <0.012 ng/mL     Lactic Acid, Plasma [[id number]]  (Abnormal) Collected:  08/02/18 1747    Specimen:   Blood Updated:  08/02/18 1822     Lactate 3.0 (C) mmol/L     Comprehensive Metabolic Panel [[id number]]  (Abnormal) Collected:  08/02/18 1747    Specimen:  Blood Updated:  08/02/18 1817     Glucose 122 (H) mg/dL      BUN 29 (H) mg/dL      Creatinine 1.64 (H) mg/dL      Sodium 135 (L) mmol/L      Potassium 3.4 (L) mmol/L      Chloride 102 mmol/L      CO2 25.0 mmol/L      Calcium 8.1 (L) mg/dL      Total Protein 6.4 g/dL      Albumin 2.50 (L) g/dL      ALT (SGPT) 22 U/L      AST (SGOT) 44 U/L      Alkaline Phosphatase 116 U/L      Total Bilirubin 4.6 (H) mg/dL      eGFR Non African Amer 44 (L) mL/min/1.73      Globulin 3.9 (H) gm/dL      A/G Ratio 0.6 (L) g/dL      BUN/Creatinine Ratio 17.7     Anion Gap 8.0 mmol/L     Magnesium [[id number]]  (Abnormal) Collected:  08/02/18 1747    Specimen:  Blood Updated:  08/02/18 1817     Magnesium 2.4 (H) mg/dL     Phosphorus [[id number]]  (Normal) Collected:  08/02/18 1747    Specimen:  Blood Updated:  08/02/18 1817     Phosphorus 3.4 mg/dL     Ammonia [[id number]]  (Normal) Collected:  08/02/18 1747    Specimen:  Blood Updated:  08/02/18 1816     Ammonia 30 umol/L     CBC Auto Differential [[id number]]  (Abnormal) Collected:  08/02/18 1747    Specimen:  Blood Updated:  08/02/18 1758     WBC 10.67 (H) 10*3/mm3      RBC 3.01 (L) 10*6/mm3      Hemoglobin 10.3 (L) g/dL      Hematocrit 30.1 (L) %      .0 (H) fL      MCH 34.2 (H) pg      MCHC 34.2 g/dL      RDW 15.1 (H) %      RDW-SD 55.2 (H) fl      MPV 9.9 fL      Platelets 51 (L) 10*3/mm3      Neutrophil % 67.8 %      Lymphocyte % 14.7 %      Monocyte % 14.4 (H) %      Eosinophil % 1.9 %      Basophil % 0.6 %      Immature Grans % 0.6 (H) %      Neutrophils, Absolute 7.24 10*3/mm3      Lymphocytes, Absolute 1.57 10*3/mm3      Monocytes, Absolute 1.54 (H) 10*3/mm3      Eosinophils, Absolute 0.20 10*3/mm3      Basophils, Absolute 0.06 10*3/mm3      Immature Grans, Absolute 0.06 (H) 10*3/mm3             I reviewed the patient's new  clinical results.  I reviewed the patient's new imaging results and agree with the interpretation.    Assessment/Plan:     Active Hospital Problems    Diagnosis Date Noted   • **Anasarca [R60.1] 04/27/2018     Lasix 40mg IV BID  Albumin  Urine Output poor 20 cc/hr with only 1 dose of IV lasix will watch urine output with IV lasix     • Elevated lactic acid level [R79.89] 08/02/2018     3.0 on admission trending down to 2.2   No clinical signs of SIRS currently and no active source will monitor for possibility of SBP but unlikely at this time     • Anxiety and depression [F41.8] 08/02/2018     Pt has not been taking any medications     • CKD (chronic kidney disease) stage 3, GFR 30-59 ml/min [N18.3] 08/02/2018     Baseline Cr 1.4, admission 1.64, now 1.86  Monitoring will watch for signs of prerenal azotemia with lasix administration     • Elevated brain natriuretic peptide (BNP) level [R79.89] 08/02/2018     2,120 on admission  F/u TTE     • Diarrhea [R19.7] 08/02/2018     F/u GI PCR     • Anemia [D64.9] 07/18/2017     Anemia of chronic disease        • Thrombocytopenia (CMS/HCC) [D69.6] 07/10/2017     Platelet of 51K will monitor  Most likely secondary to liver cirrhosis.        • Alcoholic cirrhosis (CMS/HCC) [K70.30] 05/26/2009      Dr. Becerril is Gastroenterologist  Continue home lactulose  Abdominal ultrasound for free fluid asictes for paracentesis       • Essential hypertension [I10] 12/01/2008     Pt has not been taking his meds at home  Will hold restarting antihypertensives while he gets fluids off with IV lasix and will need BP monitoring if paracentesis required            DVT prophylaxis: Holding anticoagulation until decision about paracentesis is done  Code status is   Code Status and Medical Interventions:   Ordered at: 08/02/18 2011     Code Status:    CPR     Medical Interventions (Level of Support Prior to Arrest):    Full       Plan for disposition:Observation inpatient admisssion day 1
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
## 2018-08-03 NOTE — PAYOR COMM NOTE
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 0359 08/03/18 0123       Height and Weight    Weight  --  -- 99.4 kg (219 lb 1.6 oz)  --  --    Weight Method  --  -- Bed scale  --  --       Vitals    Temp 96.3 °F (35.7 °C)  --  -- 97.4 °F (36.3 °C)  --    Temp src Oral  --  -- Oral  --    Pulse 74 73  -- 75 78    Heart Rate Source Monitor Monitor  -- Monitor Monitor    Resp 16  --  -- 18  --    Resp Rate Source Visual  --  --  --  --    /83  --  -- 125/75  --    BP Location  --  --  -- Left arm  --    BP Method  --  --  -- Automatic  --    Patient Position  --  --  -- Lying  --       Oxygen Therapy    SpO2 95 %  --  -- 95 %  --    Device (Oxygen Therapy) room air  --  --  --  --    Row Name 08/02/18 2321 08/02/18 2310 08/02/18 2255 08/02/18 2036 08/02/18 19:33:54       Vitals    Temp 97.9 °F (36.6 °C)  --  -- 97.7 °F (36.5 °C)  --    Temp src Oral  --  -- Oral  --    Pulse 76 81  -- 75 77    Heart Rate Source Monitor Monitor  --  "Monitor Monitor    Resp 18  --  -- 18 16    Resp Rate Source Visual  --  -- Visual Visual    /96  --  -- 158/92 134/68    BP Location Left arm  --  -- Right arm Right arm    BP Method Automatic  --  -- Automatic Automatic    Patient Position Lying  --  -- Lying Lying       Oxygen Therapy    SpO2  --  --  -- 98 % 97 %    Pulse Oximetry Type  --  --  --  -- Continuous    Device (Oxygen Therapy)  --  -- room air  -- room air    Row Name 08/02/18 1821 08/02/18 1722 08/02/18 1631             Height and Weight    Height  --  -- 182.9 cm (72\")      Height Method  --  -- Stated      Weight  --  -- 104 kg (230 lb)      Weight Method  --  -- Stated      Ideal Body Weight (IBW) (kg)  --  -- 82.07      BSA (Calculated - sq m)  --  -- 2.26 sq meters      BMI (Calculated)  --  -- 31.2      Weight in (lb) to have BMI = 25  --  -- 183.9         Vitals    Temp  --  -- 98.3 °F (36.8 °C)      Temp src  --  -- Tympanic      Pulse 80 82 95      Heart Rate Source  --  -- Monitor      Resp  --  -- 18      /85 147/81 (!)  155/106      Noninvasive MAP (mmHg) 101 105  --      BP Location  --  -- Right arm      BP Method  --  -- Automatic      Patient Position  --  -- Sitting         Oxygen Therapy    SpO2 97 % 97 % 98 %      Pulse Oximetry Type  --  -- Intermittent      Device (Oxygen Therapy)  --  -- room air          [facility] Medications (active)       Dose Frequency Start End    albumin human 25 % IV SOLN 12.5 g 12.5 g Once 8/2/2018 8/2/2018    Sig - Route: Infuse 50 mL into a venous catheter 1 (One) Time. - Intravenous    furosemide (LASIX) injection 40 mg 40 mg Every 12 Hours 8/2/2018     Sig - Route: Infuse 4 mL into a venous catheter Every 12 (Twelve) Hours. - Intravenous    ibuprofen (ADVIL,MOTRIN) tablet 400 mg 400 mg Every 4 Hours PRN 8/3/2018     Sig - Route: Take 1 tablet by mouth Every 4 (Four) Hours As Needed for Mild Pain . - Oral    lactulose (CHRONULAC) 10 GM/15ML solution 30 g 30 g 2 Times Daily 8/2/2018     Sig " "- Route: Take 45 mL by mouth 2 (Two) Times a Day. - Oral    nicotine (NICODERM CQ) 14 MG/24HR patch 1 patch 1 patch Every 24 Hours 8/2/2018     Sig - Route: Place 1 patch on the skin as directed by provider Daily. - Transdermal    ondansetron (ZOFRAN) injection 4 mg 4 mg Every 6 Hours PRN 8/2/2018     Sig - Route: Infuse 2 mL into a venous catheter Every 6 (Six) Hours As Needed for Nausea or Vomiting. - Intravenous    Linked Group 1:  \"Or\" Linked Group Details        ondansetron (ZOFRAN) tablet 4 mg 4 mg Every 6 Hours PRN 8/2/2018     Sig - Route: Take 1 tablet by mouth Every 6 (Six) Hours As Needed for Nausea or Vomiting. - Oral    Linked Group 1:  \"Or\" Linked Group Details        ondansetron ODT (ZOFRAN-ODT) disintegrating tablet 4 mg 4 mg Every 6 Hours PRN 8/2/2018     Sig - Route: Take 1 tablet by mouth Every 6 (Six) Hours As Needed for Nausea or Vomiting. - Oral    Linked Group 1:  \"Or\" Linked Group Details        piperacillin-tazobactam (ZOSYN) 3.375 g in iso-osmotic dextrose 50 ml (premix) 3.375 g Once 8/2/2018 8/2/2018    Sig - Route: Infuse 50 mL into a venous catheter 1 (One) Time. - Intravenous    sodium chloride 0.9 % flush 1-10 mL 1-10 mL As Needed 8/2/2018     Sig - Route: Infuse 1-10 mL into a venous catheter As Needed for Line Care. - Intravenous    sodium chloride 0.9 % flush 10 mL 10 mL As Needed 8/2/2018     Sig - Route: Infuse 10 mL into a venous catheter As Needed for Line Care. - Intravenous    Linked Group 2:  \"And\" Linked Group Details        sodium chloride 0.9 % infusion  - ADS Override Pull   8/2/2018 8/3/2018    Notes to Pharmacy: JOANNA BECKWITH: cabinet override    vancomycin 2000 mg/500 mL 0.9% NS IVPB ([facility]) 20 mg/kg × 104 kg Once 8/2/2018 8/2/2018    Sig - Route: Infuse 500 mL into a venous catheter 1 (One) Time. - Intravenous             Physician Progress Notes (last 24 hours) (Notes from 8/2/2018 11:33 AM through 8/3/2018 11:33 AM)      Jose Luis III, MD at 8/3/2018  7:19 AM "          FAMILY MEDICINE DAILY PROGRESS NOTE  NAME: Armando Mcmullen Sr.  : 1965  MRN: [id number]     LOS: 1 day     PROVIDER OF SERVICE: Jose Luis III, MD    Chief Complaint: Anasarca    Subjective:     Interval History:  History taken from: patient RN  Patient Complaints: Abdominal pain and swelling in distal extremities  Patient Denies:  Fever chills shortness of breath, chest pain, nausea, vomiting.    Review of Systems:   Review of Systems   Constitutional: Positive for activity change and appetite change.   HENT: Negative.    Eyes: Negative.    Respiratory: Positive for shortness of breath.    Cardiovascular: Positive for leg swelling. Negative for chest pain.   Gastrointestinal: Positive for abdominal distention and abdominal pain.   Endocrine: Negative.    Genitourinary: Negative.    Musculoskeletal: Negative.    Skin: Negative.    Allergic/Immunologic: Negative.    Neurological: Negative.    Hematological: Negative.    Psychiatric/Behavioral: Negative.        Objective:     Vital Signs  Temp:  [97.4 °F (36.3 °C)-98.3 °F (36.8 °C)] 97.4 °F (36.3 °C)  Heart Rate:  [75-95] 75  Resp:  [16-18] 18  BP: (125-158)/() 125/75    Physical Exam  Physical Exam   Constitutional: He is oriented to person, place, and time. He appears well-developed and well-nourished.   HENT:   Head: Normocephalic and atraumatic.   Right Ear: External ear normal.   Left Ear: External ear normal.   Neck: Neck supple.   Cardiovascular: Normal rate and regular rhythm.    Pulmonary/Chest: He has wheezes.   Abdominal: Soft. Bowel sounds are normal.   Musculoskeletal: Normal range of motion.   Neurological: He is alert and oriented to person, place, and time.   Skin: Skin is warm and dry.   Psychiatric: He has a normal mood and affect. His behavior is normal. Judgment and thought content normal.       Medication Review    Current Facility-Administered Medications:   •  furosemide (LASIX) injection 40 mg, 40 mg, Intravenous,  Q12H, Momo Hammer MD, 40 mg at 08/02/18 2256  •  ibuprofen (ADVIL,MOTRIN) tablet 400 mg, 400 mg, Oral, Q4H PRN, Moom Hammer MD, 400 mg at 08/03/18 0028  •  lactulose (CHRONULAC) 10 GM/15ML solution 30 g, 30 g, Oral, BID, Momo Hammer MD, 30 g at 08/02/18 2255  •  nicotine (NICODERM CQ) 14 MG/24HR patch 1 patch, 1 patch, Transdermal, Q24H, Momo Hammer MD, 1 patch at 08/02/18 2255  •  ondansetron (ZOFRAN) tablet 4 mg, 4 mg, Oral, Q6H PRN **OR** ondansetron ODT (ZOFRAN-ODT) disintegrating tablet 4 mg, 4 mg, Oral, Q6H PRN **OR** ondansetron (ZOFRAN) injection 4 mg, 4 mg, Intravenous, Q6H PRN, Momo Hammer MD  •  sodium chloride 0.9 % flush 1-10 mL, 1-10 mL, Intravenous, PRN, Momo Hammer MD  •  Insert peripheral IV, , , Once **AND** sodium chloride 0.9 % flush 10 mL, 10 mL, Intravenous, PRN, Mike Bergman MD  •  sodium chloride 0.9 % infusion  - ADS Override Pull, , , ,      Diagnostic Data    Lab Results (last 24 hours)     Procedure Component Value Units Date/Time    Comprehensive Metabolic Panel [[id number]]  (Abnormal) Collected:  08/03/18 0627    Specimen:  Blood Updated:  08/03/18 0656     Glucose 92 mg/dL      BUN 31 (H) mg/dL      Creatinine 1.86 (H) mg/dL      Sodium 134 (L) mmol/L      Potassium 3.2 (L) mmol/L      Chloride 103 mmol/L      CO2 25.0 mmol/L      Calcium 7.8 (L) mg/dL      Total Protein 5.6 (L) g/dL      Albumin 2.20 (L) g/dL      ALT (SGPT) 29 U/L      AST (SGOT) 39 U/L      Alkaline Phosphatase 83 U/L      Total Bilirubin 4.0 (H) mg/dL      eGFR Non African Amer 38 (L) mL/min/1.73      Globulin 3.4 gm/dL      A/G Ratio 0.6 (L) g/dL      BUN/Creatinine Ratio 16.7     Anion Gap 6.0 mmol/L     CBC & Differential [[id number]] Collected:  08/03/18 0627    Specimen:  Blood Updated:  08/03/18 0647    Narrative:       The following orders were created for panel order CBC & Differential.  Procedure                                Abnormality         Status                     ---------                               -----------         ------                     Scan Slide[[id number]]                                                                  CBC Auto Differential[[id number]]        Abnormal            Final result                 Please view results for these tests on the individual orders.    CBC Auto Differential [[id number]]  (Abnormal) Collected:  08/03/18 0627    Specimen:  Blood Updated:  08/03/18 0643     WBC 8.46 10*3/mm3      RBC 2.66 (L) 10*6/mm3      Hemoglobin 9.3 (L) g/dL      Hematocrit 26.2 (L) %      MCV 98.5 (H) fL      MCH 35.0 (H) pg      MCHC 35.5 g/dL      RDW 14.8 (H) %      RDW-SD 53.0 (H) fl      MPV 10.3 fL      Platelets 43 (L) 10*3/mm3      Neutrophil % 61.2 %      Lymphocyte % 19.5 %      Monocyte % 14.3 (H) %      Eosinophil % 4.1 %      Basophil % 0.7 %      Immature Grans % 0.2 %      Neutrophils, Absolute 5.17 10*3/mm3      Lymphocytes, Absolute 1.65 10*3/mm3      Monocytes, Absolute 1.21 (H) 10*3/mm3      Eosinophils, Absolute 0.35 10*3/mm3      Basophils, Absolute 0.06 10*3/mm3      Immature Grans, Absolute 0.02 10*3/mm3     Gastrointestinal Panel, PCR - Stool, Per Rectum [[id number]] Collected:  08/03/18 0602    Specimen:  Stool from Per Rectum Updated:  08/03/18 0602    Lactic Acid, Reflex [[id number]]  (Abnormal) Collected:  08/02/18 2148    Specimen:  Blood Updated:  08/02/18 2211     Lactate 2.2 (C) mmol/L     Lactic Acid, Reflex Timer (This will reflex a repeat order 3-3:15 hours after ordered.) [[id number]] Collected:  08/02/18 1747    Specimen:  Blood Updated:  08/02/18 2131     Extra Tube Hold for add-ons.     Comment: Auto resulted.       CBC & Differential [[id number]] Collected:  08/02/18 1747    Specimen:  Blood Updated:  08/02/18 1944    Narrative:       The following orders were created for panel order CBC & Differential.  Procedure                               Abnormality          Status                     ---------                               -----------         ------                     Scan Slide[[id number]]                                       Final result               CBC Auto Differential[[id number]]        Abnormal            Final result                 Please view results for these tests on the individual orders.    Scan Slide [[id number]] Collected:  08/02/18 1747    Specimen:  Blood Updated:  08/02/18 1944     RBC Morphology Normal     WBC Morphology Normal     Platelet Estimate Decreased     Comment: Significant reduction of platelets confirmed with slide review.       Urinalysis With Microscopic If Indicated (No Culture) - Urine, Clean Catch [[id number]]  (Abnormal) Collected:  08/02/18 1819    Specimen:  Urine from Urine, Clean Catch Updated:  08/02/18 1911     Color, UA Dark Yellow     Appearance, UA Cloudy (A)     pH, UA <=5.0     Specific Gravity, UA 1.011     Glucose, UA Negative     Ketones, UA Negative     Bilirubin, UA Small (1+) (A)     Blood, UA Large (3+) (A)     Protein, UA Trace (A)     Leuk Esterase, UA Large (3+) (A)     Nitrite, UA Negative     Urobilinogen, UA 1.0 E.U./dL    Urinalysis, Microscopic Only - Urine, Clean Catch [[id number]]  (Abnormal) Collected:  08/02/18 1819    Specimen:  Urine from Urine, Clean Catch Updated:  08/02/18 1910     RBC, UA 21-30 (A) /HPF      WBC, UA Too Numerous to Count (A) /HPF      Bacteria, UA 3+ (A) /HPF      Squamous Epithelial Cells, UA 6-12 (A) /HPF      Transitional Epithelial Cells, UA 0-2 /HPF      Yeast, UA       Moderate/2+ Budding Yeast w/Hyphae     /HPF     Hyaline Casts, UA None Seen /LPF      Methodology Manual Light Microscopy    [place] Draw [[id number]] Collected:  08/02/18 1747    Specimen:  Blood Updated:  08/02/18 1900    Narrative:       The following orders were created for panel order [place] Draw.  Procedure                               Abnormality         Status                     ---------                                -----------         ------                     Light Blue Top[[id number]]                                   Final result               Green Top (Gel)[[id number]]                                  Final result               Lavender Top[[id number]]                                     Final result               Gold Top - SST[[id number]]                                   Final result                 Please view results for these tests on the individual orders.    Light Blue Top [[id number]] Collected:  08/02/18 1747    Specimen:  Blood Updated:  08/02/18 1900     Extra Tube hold for add-on     Comment: Auto resulted       Green Top (Gel) [[id number]] Collected:  08/02/18 1747    Specimen:  Blood Updated:  08/02/18 1900     Extra Tube Hold for add-ons.     Comment: Auto resulted.       Lavender Top [[id number]] Collected:  08/02/18 1747    Specimen:  Blood Updated:  08/02/18 1900     Extra Tube hold for add-on     Comment: Auto resulted       Gold Top - SST [[id number]] Collected:  08/02/18 1747    Specimen:  Blood Updated:  08/02/18 1900     Extra Tube Hold for add-ons.     Comment: Auto resulted.       Protime-INR [[id number]]  (Abnormal) Collected:  08/02/18 1747    Specimen:  Blood Updated:  08/02/18 1844     Protime 19.6 (H) Seconds      INR 1.73 (H)    Narrative:       Therapeutic range for most indications is 2.0-3.0 INR,  or 2.5-3.5 for mechanical heart valves.    aPTT [[id number]]  (Abnormal) Collected:  08/02/18 1747    Specimen:  Blood Updated:  08/02/18 1844     PTT 44.5 (H) seconds     Narrative:       The recommended Heparin therapeutic range is 68-97 seconds.    BNP [[id number]]  (Abnormal) Collected:  08/02/18 1747    Specimen:  Blood Updated:  08/02/18 1828     proBNP 2,120.0 (H) pg/mL     Troponin [[id number]]  (Normal) Collected:  08/02/18 1747    Specimen:  Blood Updated:  08/02/18 1828     Troponin I <0.012 ng/mL     Lactic Acid, Plasma [[id number]]  (Abnormal) Collected:  08/02/18 1747     Specimen:  Blood Updated:  08/02/18 1822     Lactate 3.0 (C) mmol/L     Comprehensive Metabolic Panel [[id number]]  (Abnormal) Collected:  08/02/18 1747    Specimen:  Blood Updated:  08/02/18 1817     Glucose 122 (H) mg/dL      BUN 29 (H) mg/dL      Creatinine 1.64 (H) mg/dL      Sodium 135 (L) mmol/L      Potassium 3.4 (L) mmol/L      Chloride 102 mmol/L      CO2 25.0 mmol/L      Calcium 8.1 (L) mg/dL      Total Protein 6.4 g/dL      Albumin 2.50 (L) g/dL      ALT (SGPT) 22 U/L      AST (SGOT) 44 U/L      Alkaline Phosphatase 116 U/L      Total Bilirubin 4.6 (H) mg/dL      eGFR Non African Amer 44 (L) mL/min/1.73      Globulin 3.9 (H) gm/dL      A/G Ratio 0.6 (L) g/dL      BUN/Creatinine Ratio 17.7     Anion Gap 8.0 mmol/L     Magnesium [[id number]]  (Abnormal) Collected:  08/02/18 1747    Specimen:  Blood Updated:  08/02/18 1817     Magnesium 2.4 (H) mg/dL     Phosphorus [[id number]]  (Normal) Collected:  08/02/18 1747    Specimen:  Blood Updated:  08/02/18 1817     Phosphorus 3.4 mg/dL     Ammonia [[id number]]  (Normal) Collected:  08/02/18 1747    Specimen:  Blood Updated:  08/02/18 1816     Ammonia 30 umol/L     CBC Auto Differential [[id number]]  (Abnormal) Collected:  08/02/18 1747    Specimen:  Blood Updated:  08/02/18 1758     WBC 10.67 (H) 10*3/mm3      RBC 3.01 (L) 10*6/mm3      Hemoglobin 10.3 (L) g/dL      Hematocrit 30.1 (L) %      .0 (H) fL      MCH 34.2 (H) pg      MCHC 34.2 g/dL      RDW 15.1 (H) %      RDW-SD 55.2 (H) fl      MPV 9.9 fL      Platelets 51 (L) 10*3/mm3      Neutrophil % 67.8 %      Lymphocyte % 14.7 %      Monocyte % 14.4 (H) %      Eosinophil % 1.9 %      Basophil % 0.6 %      Immature Grans % 0.6 (H) %      Neutrophils, Absolute 7.24 10*3/mm3      Lymphocytes, Absolute 1.57 10*3/mm3      Monocytes, Absolute 1.54 (H) 10*3/mm3      Eosinophils, Absolute 0.20 10*3/mm3      Basophils, Absolute 0.06 10*3/mm3      Immature Grans, Absolute 0.06 (H) 10*3/mm3             I reviewed the  patient's new clinical results.  I reviewed the patient's new imaging results and agree with the interpretation.    Assessment/Plan:     Active Hospital Problems    Diagnosis Date Noted   • **Anasarca [R60.1] 04/27/2018     Lasix 40mg IV BID  Albumin  Urine Output poor 20 cc/hr with only 1 dose of IV lasix will watch urine output with IV lasix     • Elevated lactic acid level [R79.89] 08/02/2018     3.0 on admission trending down to 2.2   No clinical signs of SIRS currently and no active source will monitor for possibility of SBP but unlikely at this time     • Anxiety and depression [F41.8] 08/02/2018     Pt has not been taking any medications     • CKD (chronic kidney disease) stage 3, GFR 30-59 ml/min [N18.3] 08/02/2018     Baseline Cr 1.4, admission 1.64, now 1.86  Monitoring will watch for signs of prerenal azotemia with lasix administration     • Elevated brain natriuretic peptide (BNP) level [R79.89] 08/02/2018     2,120 on admission  F/u TTE     • Diarrhea [R19.7] 08/02/2018     F/u GI PCR     • Anemia [D64.9] 07/18/2017     Anemia of chronic disease        • Thrombocytopenia (CMS/HCC) [D69.6] 07/10/2017     Platelet of 51K will monitor  Most likely secondary to liver cirrhosis.        • Alcoholic cirrhosis (CMS/HCC) [K70.30] 05/26/2009      Dr. Becerril is Gastroenterologist  Continue home lactulose  Abdominal ultrasound for free fluid asictes for paracentesis       • Essential hypertension [I10] 12/01/2008     Pt has not been taking his meds at home  Will hold restarting antihypertensives while he gets fluids off with IV lasix and will need BP monitoring if paracentesis required            DVT prophylaxis: Holding anticoagulation until decision about paracentesis is done  Code status is   Code Status and Medical Interventions:   Ordered at: 08/02/18 2011     Code Status:    CPR     Medical Interventions (Level of Support Prior to Arrest):    Full       Plan for disposition:Observation inpatient admisssion  day 1            Electronically signed by Jose Luis III, MD at 8/3/2018  7:28 AM

## 2018-08-04 PROBLEM — R79.89 ELEVATED LACTIC ACID LEVEL: Status: RESOLVED | Noted: 2018-01-01 | Resolved: 2018-01-01

## 2018-08-04 NOTE — SIGNIFICANT NOTE
"Risks and benefits of blood products transfusion discussed with patient. Risks included transfusion reaction, and infection. Benefits discussed included elevation of platelet levels compatible with procedures. Pt verbally agreed to consent for blood products and said, \"Whatever it takes.\"      This document has been electronically signed by Jose Luis III, MD on August 4, 2018 12:44 PM        "

## 2018-08-04 NOTE — PROGRESS NOTES
FAMILY MEDICINE DAILY PROGRESS NOTE  NAME: Armando Mcmullen Sr.  : 1965  MRN: [id number]     LOS: 2 days     PROVIDER OF SERVICE: Jose Luis III, MD    Chief Complaint: Anasarca    Subjective:     Interval History:  History taken from: patient RN  Patient Complaints: Abdominal pain and swelling in distal extremities  Patient Denies:  Fever chills shortness of breath, chest pain, nausea, vomiting.    Review of Systems:   Review of Systems   Constitutional: Positive for activity change.   HENT: Negative.    Eyes: Negative.    Respiratory: Positive for shortness of breath.    Cardiovascular: Positive for leg swelling. Negative for chest pain.   Gastrointestinal: Positive for abdominal distention and abdominal pain.   Endocrine: Negative.    Genitourinary: Negative.    Musculoskeletal: Negative.    Skin: Negative.    Allergic/Immunologic: Negative.    Neurological: Negative.    Hematological: Negative.    Psychiatric/Behavioral: Negative.        Objective:     Vital Signs  Temp:  [96.7 °F (35.9 °C)-97.8 °F (36.6 °C)] 97.8 °F (36.6 °C)  Heart Rate:  [66-89] 87  Resp:  [16-18] 18  BP: (119-128)/(70-84) 119/70    Physical Exam  Physical Exam   Constitutional: He is oriented to person, place, and time. He appears well-developed and well-nourished.   HENT:   Head: Normocephalic and atraumatic.   Right Ear: External ear normal.   Left Ear: External ear normal.   Neck: Neck supple.   Cardiovascular: Normal rate and regular rhythm.    Pulmonary/Chest: He has decreased breath sounds in the right upper field, the right middle field, the left upper field and the left middle field. He has wheezes in the right upper field, the right middle field, the left upper field and the left middle field.   Abdominal: Soft. Bowel sounds are normal.   Musculoskeletal: Normal range of motion.   Neurological: He is alert and oriented to person, place, and time.   Skin: Skin is warm and dry.   Psychiatric: He has a normal mood and  affect. His behavior is normal. Judgment and thought content normal.       Medication Review    Current Facility-Administered Medications:   •  albumin human 25 % IV SOLN 12.5 g, 12.5 g, Intravenous, BID, Jose Luis III, MD  •  albuterol (PROVENTIL) nebulizer solution 0.083% 2.5 mg/3mL, 2.5 mg, Nebulization, Q4H - RT, Jose Luis III, MD  •  diphenhydrAMINE (BENADRYL) injection 25 mg, 25 mg, Intravenous, Q6H PRN, Jose Luis III, MD  •  ibuprofen (ADVIL,MOTRIN) tablet 400 mg, 400 mg, Oral, Q4H PRN, Momo Hammer MD, 400 mg at 08/04/18 0359  •  lactulose (CHRONULAC) 10 GM/15ML solution 30 g, 30 g, Oral, BID, Momo Hammer MD, 30 g at 08/03/18 2124  •  morphine injection 4 mg, 4 mg, Intravenous, Q4H PRN, Jose Luis III, MD  •  nicotine (NICODERM CQ) 14 MG/24HR patch 1 patch, 1 patch, Transdermal, Q24H, Momo Hammer MD, 1 patch at 08/03/18 2125  •  ondansetron (ZOFRAN) tablet 4 mg, 4 mg, Oral, Q6H PRN **OR** ondansetron ODT (ZOFRAN-ODT) disintegrating tablet 4 mg, 4 mg, Oral, Q6H PRN **OR** ondansetron (ZOFRAN) injection 4 mg, 4 mg, Intravenous, Q6H PRN, Momo Hammer MD  •  potassium chloride (MICRO-K) CR capsule 40 mEq, 40 mEq, Oral, Daily, Jose Luis III, MD, 40 mEq at 08/03/18 1802  •  sodium chloride 0.9 % flush 1-10 mL, 1-10 mL, Intravenous, PRN, Momo Hammer MD  •  Insert peripheral IV, , , Once **AND** sodium chloride 0.9 % flush 10 mL, 10 mL, Intravenous, PRN, Mike Bergman MD     Diagnostic Data    Lab Results (last 24 hours)     Procedure Component Value Units Date/Time    Comprehensive Metabolic Panel [[id number]]  (Abnormal) Collected:  08/04/18 0544    Specimen:  Blood Updated:  08/04/18 0631     Glucose 89 mg/dL      BUN 35 (H) mg/dL      Creatinine 2.25 (H) mg/dL      Sodium 133 (L) mmol/L      Potassium 3.5 mmol/L      Chloride 102 mmol/L      CO2 25.0 mmol/L      Calcium 7.9 (L) mg/dL      Total  Protein 5.8 (L) g/dL      Albumin 2.40 (L) g/dL      ALT (SGPT) 27 U/L      AST (SGOT) 46 U/L      Alkaline Phosphatase 106 U/L      Total Bilirubin 4.3 (H) mg/dL      eGFR Non African Amer 31 (L) mL/min/1.73      Globulin 3.4 gm/dL      A/G Ratio 0.7 (L) g/dL      BUN/Creatinine Ratio 15.6     Anion Gap 6.0 mmol/L     CBC & Differential [[id number]] Collected:  08/04/18 0544    Specimen:  Blood Updated:  08/04/18 0617    Narrative:       The following orders were created for panel order CBC & Differential.  Procedure                               Abnormality         Status                     ---------                               -----------         ------                     Scan Slide[[id number]]                                                                  CBC Auto Differential[[id number]]        Abnormal            Final result                 Please view results for these tests on the individual orders.    CBC Auto Differential [[id number]]  (Abnormal) Collected:  08/04/18 0544    Specimen:  Blood Updated:  08/04/18 0617     WBC 13.08 (H) 10*3/mm3      RBC 2.75 (L) 10*6/mm3      Hemoglobin 9.6 (L) g/dL      Hematocrit 27.0 (L) %      MCV 98.2 (H) fL      MCH 34.9 (H) pg      MCHC 35.6 g/dL      RDW 14.9 (H) %      RDW-SD 53.4 (H) fl      MPV 10.7 fL      Platelets 42 (L) 10*3/mm3      Neutrophil % 76.0 %      Lymphocyte % 10.6 %      Monocyte % 9.8 %      Eosinophil % 2.7 %      Basophil % 0.4 %      Immature Grans % 0.5 %      Neutrophils, Absolute 9.95 (H) 10*3/mm3      Lymphocytes, Absolute 1.38 10*3/mm3      Monocytes, Absolute 1.28 (H) 10*3/mm3      Eosinophils, Absolute 0.35 10*3/mm3      Basophils, Absolute 0.05 10*3/mm3      Immature Grans, Absolute 0.07 (H) 10*3/mm3     Peripheral Blood Smear [[id number]] Collected:  08/03/18 0627    Specimen:  Blood Updated:  08/03/18 1907    Urinalysis With Culture If Indicated - Urine, Clean Catch [[id number]]  (Abnormal) Collected:  08/03/18 3217    Specimen:   Urine from Urine, Clean Catch Updated:  08/03/18 1700     Color, UA Dark Yellow     Appearance, UA Cloudy (A)     pH, UA <=5.0     Specific Gravity, UA 1.009     Glucose, UA Negative     Ketones, UA Negative     Bilirubin, UA Negative     Blood, UA Large (3+) (A)     Protein, UA Negative     Leuk Esterase, UA Large (3+) (A)     Nitrite, UA Negative     Urobilinogen, UA 0.2 E.U./dL    Urinalysis, Microscopic Only - Urine, Clean Catch [[id number]]  (Abnormal) Collected:  08/03/18 1653    Specimen:  Urine from Urine, Clean Catch Updated:  08/03/18 1700     RBC, UA 21-30 (A) /HPF      WBC, UA Too Numerous to Count (A) /HPF      Bacteria, UA None Seen /HPF      Squamous Epithelial Cells, UA None Seen /HPF      Hyaline Casts, UA 3-6 /LPF      Methodology Automated Microscopy            I reviewed the patient's new clinical results.  I reviewed the patient's new imaging results and agree with the interpretation.    Assessment/Plan:     Active Hospital Problems    Diagnosis Date Noted   • **Anasarca [R60.1] 04/27/2018     Lasix 40mg IV BID, holding AM Lasix due to creatinine bump and will follow  Albumin     • Leukocytosis [D72.829] 04/11/2018     Priority: Medium     Jump from 13 from 8.5  R/o SBP after thrombocytopenia reoslution for diagnostic or therapeutic paracentesis  Pt is wheezing on exam will do stat CXR and determine etiology       • Anxiety and depression [F41.8] 08/02/2018     Pt has not been taking any medications     • CKD (chronic kidney disease) stage 3, GFR 30-59 ml/min [N18.3] 08/02/2018     Baseline Cr 1.4, admission 1.64, now 1.86 now up to 2.25 holding AM lasix for Albumin to return fluid to intravascular space  Monitoring will watch for signs of prerenal azotemia with lasix administration     • Elevated brain natriuretic peptide (BNP) level [R79.89] 08/02/2018     2,120 on admission  F/u TTE     • Diarrhea [R19.7] 08/02/2018     F/u GI PCR     • Anemia [D64.9] 07/18/2017     Anemia of chronic disease         • Thrombocytopenia (CMS/HCC) [D69.6] 07/10/2017     Platelet of 51K down to 43k and now 42k will continue monitor  Most likely secondary to liver cirrhosis.   Pending Peripheral smear to rule out Autoimmune and destructive etiologies       • Hypokalemia [E87.6] 03/02/2017     Repleting     • Alcoholic cirrhosis (CMS/HCC) [K70.30] 05/26/2009      Dr. Becerril is Gastroenterologist  Continue home lactulose  Abdominal ultrasound positive for moderate asictes   Paracentesis pending resolution of thrombocytopenia       • Essential hypertension [I10] 12/01/2008     Pt has not been taking his meds at home  Will hold restarting antihypertensives while he gets fluids off with IV lasix and will need BP monitoring if paracentesis required            DVT prophylaxis: Holding anticoagulation until decision about paracentesis is done  Code status is   Code Status and Medical Interventions:   Ordered at: 08/02/18 2011     Code Status:    CPR     Medical Interventions (Level of Support Prior to Arrest):    Full       Plan for disposition:Observation inpatient admisssion day 1
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
## 2018-08-05 PROBLEM — J18.9 PNEUMONIA DUE TO INFECTIOUS ORGANISM: Status: ACTIVE | Noted: 2018-01-01

## 2018-08-05 NOTE — SIGNIFICANT NOTE
08/05/18 1258   Rehab Time/Intention   Evaluation Not Performed unable to evaluate, medical status change;other (see comments)  (No eval this date due to HR in 30s.  Nursing says hold today.)   Rehab Treatment   Discipline occupational therapist   Recommendation   OT - Next Appointment 08/06/18

## 2018-08-05 NOTE — SIGNIFICANT NOTE
08/05/18 1509   Rehab Time/Intention   Evaluation Not Performed (Ns reports VSS this pm and okay to work with pt. Pt deferred when checked on reporting pain 10/10 )   Rehab Treatment   Discipline physical therapy assistant   Reason Treatment Not Performed patient/family declined treatment, not feeling well  (Ns reports VSS this pm and okay to work with pt. Pt deferred when checked on reporting pain 10/10 )   Recommendation   PT - Next Appointment 08/06/18

## 2018-08-05 NOTE — PROGRESS NOTES
FAMILY MEDICINE DAILY PROGRESS NOTE  NAME: Armando Mcmullen Sr.  : 1965  MRN: [id number]     LOS: 3 days     PROVIDER OF SERVICE: Tushar Meyer MD    Chief Complaint: Anasarca    Subjective:     Interval History:  History taken from: patient RN  Patient's fatigue is a little bit better today.  The swelling is about the same.  He received platelets yesterday.  Anticipate paracentesis today.    Review of Systems:   Review of Systems   Constitutional: Positive for activity change.   HENT: Negative.    Eyes: Negative.    Respiratory: Positive for shortness of breath.    Cardiovascular: Positive for leg swelling. Negative for chest pain.   Gastrointestinal: Positive for abdominal distention and abdominal pain.   Endocrine: Negative.    Genitourinary: Negative.    Musculoskeletal: Negative.    Skin: Negative.    Allergic/Immunologic: Negative.    Neurological: Negative.    Hematological: Negative.    Psychiatric/Behavioral: Negative.        Objective:     Vital Signs  Temp:  [96.7 °F (35.9 °C)-98.7 °F (37.1 °C)] 98.7 °F (37.1 °C)  Heart Rate:  [] 71  Resp:  [14-19] 16  BP: (104-122)/(62-85) 104/62    Physical Exam  Physical Exam   Constitutional: He is oriented to person, place, and time. He appears well-developed and well-nourished.   HENT:   Head: Normocephalic and atraumatic.   Right Ear: External ear normal.   Left Ear: External ear normal.   Neck: Neck supple.   Cardiovascular: Normal rate and regular rhythm.    Pulmonary/Chest: He has decreased breath sounds in the right upper field, the right middle field, the left upper field and the left middle field. He has wheezes in the right upper field, the right middle field, the left upper field and the left middle field.   Abdominal: Soft. Bowel sounds are normal. He exhibits distension. There is tenderness (mild generalized).   Musculoskeletal: Normal range of motion. He exhibits edema (2+ to knee on R, 1+ L).   Neurological: He is alert and oriented  to person, place, and time.   Skin: Skin is warm and dry.   Psychiatric: He has a normal mood and affect. His behavior is normal. Judgment and thought content normal.       Medication Review    Current Facility-Administered Medications:   •  albumin human 25 % IV SOLN 12.5 g, 12.5 g, Intravenous, BID, Jose Luis III, MD, 12.5 g at 08/05/18 0843  •  albuterol (PROVENTIL) nebulizer solution 0.083% 2.5 mg/3mL, 2.5 mg, Nebulization, Q4H - RT, Jose Luis III, MD, 2.5 mg at 08/05/18 0722  •  AZITHROMYCIN 500 MG/250 ML 0.9% NS IVPB (vial-mate), 500 mg, Intravenous, Q24H, Jose Luis III, MD, 500 mg at 08/04/18 1833  •  cefTRIAXone (ROCEPHIN) 1 g/100 mL 0.9% NS (MBP), 1 g, Intravenous, Q24H, Jose Luis III, MD, 1 g at 08/04/18 1655  •  diphenhydrAMINE (BENADRYL) injection 25 mg, 25 mg, Intravenous, Q6H PRN, Jose Luis III, MD  •  ibuprofen (ADVIL,MOTRIN) tablet 400 mg, 400 mg, Oral, Q4H PRN, Momo Hammer MD, 400 mg at 08/04/18 0359  •  lactulose (CHRONULAC) 10 GM/15ML solution 30 g, 30 g, Oral, BID, Momo Hammer MD, 30 g at 08/05/18 0707  •  morphine injection 4 mg, 4 mg, Intravenous, Q4H PRN, Jose Luis III, MD, 4 mg at 08/04/18 2158  •  nicotine (NICODERM CQ) 14 MG/24HR patch 1 patch, 1 patch, Transdermal, Q24H, Momo Hammer MD, 1 patch at 08/04/18 2136  •  ondansetron (ZOFRAN) tablet 4 mg, 4 mg, Oral, Q6H PRN **OR** ondansetron ODT (ZOFRAN-ODT) disintegrating tablet 4 mg, 4 mg, Oral, Q6H PRN **OR** ondansetron (ZOFRAN) injection 4 mg, 4 mg, Intravenous, Q6H PRN, Momo Hammer MD  •  potassium chloride (MICRO-K) CR capsule 40 mEq, 40 mEq, Oral, Daily, Jose Luis III, MD, 40 mEq at 08/05/18 0843  •  sodium chloride 0.9 % flush 1-10 mL, 1-10 mL, Intravenous, PRN, Momo Hammer MD  •  Insert peripheral IV, , , Once **AND** sodium chloride 0.9 % flush 10 mL, 10 mL, Intravenous, PRN, Mike Bergman MD, 10  mL at 08/04/18 2134     Diagnostic Data    Lab Results (last 24 hours)     Procedure Component Value Units Date/Time    CBC & Differential [[id number]] Collected:  08/05/18 0637    Specimen:  Blood Updated:  08/05/18 0727    Narrative:       The following orders were created for panel order CBC & Differential.  Procedure                               Abnormality         Status                     ---------                               -----------         ------                     CBC Auto Differential[[id number]]        Abnormal            Final result                 Please view results for these tests on the individual orders.    CBC Auto Differential [[id number]]  (Abnormal) Collected:  08/05/18 0637    Specimen:  Blood Updated:  08/05/18 0727     WBC 12.52 (H) 10*3/mm3      RBC 2.55 (L) 10*6/mm3      Hemoglobin 8.7 (L) g/dL      Hematocrit 25.1 (L) %      MCV 98.4 (H) fL      MCH 34.1 (H) pg      MCHC 34.7 g/dL      RDW 15.1 (H) %      RDW-SD 54.0 (H) fl      MPV 9.9 fL      Platelets 54 (L) 10*3/mm3      Neutrophil % 73.0 %      Lymphocyte % 12.2 %      Monocyte % 10.3 %      Eosinophil % 3.3 %      Basophil % 0.6 %      Immature Grans % 0.6 (H) %      Neutrophils, Absolute 9.14 (H) 10*3/mm3      Lymphocytes, Absolute 1.53 10*3/mm3      Monocytes, Absolute 1.29 (H) 10*3/mm3      Eosinophils, Absolute 0.41 10*3/mm3      Basophils, Absolute 0.07 10*3/mm3      Immature Grans, Absolute 0.08 (H) 10*3/mm3     Comprehensive Metabolic Panel [[id number]]  (Abnormal) Collected:  08/05/18 0637    Specimen:  Blood Updated:  08/05/18 0727     Glucose 68 mg/dL      BUN 39 (H) mg/dL      Creatinine 2.49 (H) mg/dL      Sodium 133 (L) mmol/L      Potassium 3.7 mmol/L      Chloride 103 mmol/L      CO2 22.0 mmol/L      Calcium 8.2 (L) mg/dL      Total Protein 5.7 (L) g/dL      Albumin 2.40 (L) g/dL      ALT (SGPT) 26 U/L      AST (SGOT) 36 U/L      Alkaline Phosphatase 89 U/L      Total Bilirubin 4.3 (H) mg/dL      eGFR Non   Amer 27 (L) mL/min/1.73      Globulin 3.3 gm/dL      A/G Ratio 0.7 (L) g/dL      BUN/Creatinine Ratio 15.7     Anion Gap 8.0 mmol/L     Respiratory Culture - Sputum, Cough [[id number]] Collected:  08/04/18 1534    Specimen:  Sputum from Cough Updated:  08/05/18 0703     Respiratory Culture Culture in progress     Gram Stain Result Many (4+) WBCs per low power field      Few (2+) Epithelial cells per low power field      Mixed bacterial renetta    Blood Culture - Blood, [[id number]]  (Normal) Collected:  08/04/18 0915    Specimen:  Blood from Hand, Right Updated:  08/04/18 2145     Blood Culture No growth at less than 24 hours    Blood Culture - Blood, [[id number]]  (Normal) Collected:  08/04/18 0930    Specimen:  Blood from Arm, Left Updated:  08/04/18 2145     Blood Culture No growth at less than 24 hours    Platelet Count [[id number]]  (Abnormal) Collected:  08/04/18 1958    Specimen:  Blood Updated:  08/04/18 2017     Platelets 58 (L) 10*3/mm3     Basic Metabolic Panel [[id number]]  (Abnormal) Collected:  08/04/18 1530    Specimen:  Blood Updated:  08/04/18 1622     Glucose 87 mg/dL      BUN 38 (H) mg/dL      Creatinine 2.41 (H) mg/dL      Sodium 133 (L) mmol/L      Potassium 4.2 mmol/L      Chloride 103 mmol/L      CO2 21.0 (L) mmol/L      Calcium 7.9 (L) mg/dL      eGFR Non African Amer 28 (L) mL/min/1.73      BUN/Creatinine Ratio 15.8     Anion Gap 9.0 mmol/L     Peripheral Blood Smear [[id number]] Collected:  08/04/18 0544    Specimen:  Blood Updated:  08/04/18 0933            I reviewed the patient's new clinical results.  I reviewed the patient's new imaging results and agree with the interpretation.    Assessment/Plan:     Active Hospital Problems    Diagnosis Date Noted   • **Anasarca [R60.1] 04/27/2018     Priority: High     S/p platelet transfusion  Will get paracentesis     • Alcoholic cirrhosis (CMS/HCC) [K70.30] 05/26/2009     Priority: High      Dr. Becerril is Gastroenterologist  Continue home  lactulose  Abdominal ultrasound positive for moderate asictes   Paracentesis pending resolution of thrombocytopenia       • Pneumonia due to infectious organism [J18.9] 08/05/2018     Priority: Medium     Rocephin and azithromycin d2/3  Follow wbc  Afebrile     • Anxiety and depression [F41.8] 08/02/2018     Pt has not been taking any medications     • CKD (chronic kidney disease) stage 3, GFR 30-59 ml/min [N18.3] 08/02/2018     Baseline Cr 1.4, admission 1.64, now 1.86, up to 2.25, and 2.41, then 2.5       • Elevated brain natriuretic peptide (BNP) level [R79.89] 08/02/2018     2,120 on admission       • Diarrhea [R19.7] 08/02/2018     F/u GI PCR     • Acute kidney injury (CMS/HCC) [N17.9] 04/27/2018     Creatinine slowly increasing, will monitor as ascites is treated     • Leukocytosis [D72.829] 04/11/2018     Jump from 13 from 8.5  R/o SBP after thrombocytopenia reoslution for diagnostic or therapeutic paracentesis  Pt is wheezing on exam will do stat CXR and determine etiology       • Anemia [D64.9] 07/18/2017     Anemia of chronic disease        • Thrombocytopenia (CMS/HCC) [D69.6] 07/10/2017     Most likely secondary to liver cirrhosis.   Pending Peripheral smear to rule out Autoimmune and destructive etiologies  Received platelets 8/4       • Hypokalemia [E87.6] 03/02/2017     Repleting     • Essential hypertension [I10] 12/01/2008     Pt has not been taking his meds at home  Will hold restarting antihypertensives while he gets fluids off with IV lasix and will need BP monitoring if paracentesis required            DVT prophylaxis: Holding anticoagulation until decision about paracentesis is done  Code status is   Code Status and Medical Interventions:   Ordered at: 08/02/18 2011     Code Status:    CPR     Medical Interventions (Level of Support Prior to Arrest):    Full       Plan for disposition:home after ascites improved          This document has been electronically signed by Tushar Meyer MD  on August 5, 2018 8:53 AM

## 2018-08-06 PROBLEM — R19.7 DIARRHEA: Status: RESOLVED | Noted: 2018-01-01 | Resolved: 2018-01-01

## 2018-08-06 PROBLEM — E87.6 HYPOKALEMIA: Status: RESOLVED | Noted: 2017-03-02 | Resolved: 2018-01-01
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
## 2018-08-06 NOTE — PAYOR COMM NOTE
"Lamont Mcmullen  (53 y.o. Male)     Date of Birth Social Security Number Address Home Phone MRN    1965  [address]  [place] [address] [phone] [id number]    Sikhism Marital Status          None Legally        Admission Date Admission Type Admitting Provider Attending Provider Department, Room/Bed    8/2/18 Emergency Marifer Carr MD O'Hearn, William S III, MD [address], 423/1    Discharge Date Discharge Disposition Discharge Destination                       Attending Provider:  Jose Luis III, MD    Allergies:  Aspirin, Codeine Sulfate    Isolation:  None   Infection:  None   Code Status:  CPR    Ht:  182.9 cm (72\")   Wt:  103 kg (226 lb 9 oz)    Admission Cmt:  None   Principal Problem:  Anasarca [R60.1] More...                 Active Insurance as of 8/2/2018     Primary Coverage     Payor Plan Insurance Group Employer/Plan Group    [place]      Payor Plan Address Payor Plan Phone Number Effective From Effective To    [address] [address]  1/1/2014     [place] [address]       Subscriber Name Subscriber Birth Date Member ID       LAMONT MCMULLEN SR. 1965 [id number]                 Emergency Contacts      (Rel.) Home Phone Work Phone Mobile Phone    Miguel Mcmullen (Son) [phone] -- [phone]    Loco Mcmullen (Daughter) [phone] -- [phone]    Shreyas Mcmullen (Brother) [phone] -- [phone]      Initial clinical was faxed on Friday, Aug 3rd  Insurance Information                [place] Phone:     Subscriber: Lamont Mcmullen Sr. Subscriber#: [id number]    Group#:  Precert#:                 ICU Vital Signs     Row Name 08/06/18 0851 08/06/18 0747 08/06/18 0724 08/06/18 0718 08/06/18 0535       Height and Weight    Weight  --  --  --  -- 103 kg (226 lb 9 oz)    Weight Method  --  --  --  -- Bed scale       Vitals    Temp 99.4 °F (37.4 " °C)  --  --  --  --    Temp src Oral  --  --  --  --    Pulse 102 108 101 104  --    Heart Rate Source Monitor Monitor  --  --  --    Resp 20  --  -- 20  --    Resp Rate Source Visual  --  --  --  --    /82  --  --  --  --    BP Location Right arm  --  --  --  --    BP Method Manual  --  --  --  --    Patient Position Lying  --  --  --  --       Oxygen Therapy    SpO2 91 %  --  -- 92 %  --    Pulse Oximetry Type  --  --  -- Intermittent  --    Device (Oxygen Therapy) room air  --  -- room air  --    Row Name 08/06/18 0314 08/06/18 0042 08/06/18 0002 08/05/18 2238 08/05/18 2026       Vitals    Temp 97.6 °F (36.4 °C)  --  --  -- 98.6 °F (37 °C)    Temp src Axillary  --  --  -- Oral    Pulse 97 99 100 81 92    Heart Rate Source Monitor Monitor Monitor Monitor Monitor    Resp 16 18  -- 18 16    Resp Rate Source Visual Visual  -- Visual Visual    /92 142/84  --  -- 128/80    BP Location Right arm Right arm  --  -- Right arm    BP Method Manual Manual  --  -- Manual    Patient Position Lying Lying  --  -- Lying       Oxygen Therapy    SpO2 92 % 93 %  -- 95 % 95 %    Pulse Oximetry Type  -- Intermittent  -- Intermittent Intermittent    Device (Oxygen Therapy) room air room air  -- room air room air    Row Name 08/05/18 1834 08/05/18 1600 08/05/18 1554 08/05/18 1547 08/05/18 1539       Vitals    Temp  -- 98.4 °F (36.9 °C)  --  --  --    Temp src  -- Oral  --  --  --    Pulse 91 88 87 87 91    Heart Rate Source Monitor Monitor  --  -- Monitor    Resp 18 16  -- 16  --    Resp Rate Source Visual Visual  --  --  --    BP  -- 120/72  --  --  --    BP Location  -- Right arm  --  --  --    BP Method  -- Manual  --  --  --    Patient Position  -- Lying  --  --  --       Oxygen Therapy    SpO2 94 % 96 %  -- 97 %  --    Pulse Oximetry Type Intermittent  --  --  --  --    Device (Oxygen Therapy) room air room air room air room air  --    Row Name 08/05/18 1530 08/05/18 1522 08/05/18 1500 08/05/18 1345 08/05/18 1330        Vitals    Temp 98.3 °F (36.8 °C)  -- 98.6 °F (37 °C) 98.4 °F (36.9 °C) 98.5 °F (36.9 °C)    Temp src Oral  -- Temporal Artery  Temporal Artery  Oral    Pulse 92 88 95 96 91    Heart Rate Source Monitor Monitor Monitor Monitor Monitor    Resp 20  -- 20 18 18    Resp Rate Source Visual  -- Visual Visual Visual    /76  -- 112/78 120/68 114/70    BP Location Right arm  -- Right arm Right arm Right arm    BP Method Manual  -- Manual Manual Manual    Patient Position Lying  -- Lying Lying Lying       Oxygen Therapy    SpO2 95 %  -- 94 % 95 % 91 %    Device (Oxygen Therapy) room air  -- room air room air room air    Row Name 08/05/18 1315 08/05/18 1055 08/05/18 1048 08/05/18 1040 08/05/18 0853       Vitals    Temp  --  --  -- 98.4 °F (36.9 °C) 97.7 °F (36.5 °C)    Temp src  --  --  -- Oral Axillary    Pulse 90 90 92 91 92    Heart Rate Source  --  --  -- Monitor Monitor    Resp 18  -- 16 18 18    Resp Rate Source  --  --  -- Visual Visual    /80  --  -- 122/86 118/80    BP Location Right arm  --  -- Right arm Right arm    BP Method Manual  --  -- Manual Manual    Patient Position Lying  --  -- Lying Sitting       Oxygen Therapy    SpO2 92 %  -- 97 % 90 % 91 %    Device (Oxygen Therapy)  -- room air room air room air room air    Row Name 08/05/18 0738 08/05/18 0729 08/05/18 0722 08/05/18 0616 08/05/18 0308       Height and Weight    Weight  --  --  -- 104 kg (229 lb)  --    Weight Method  --  --  -- Bed scale   pt not able to stand  --       Vitals    Temp  --  --  --  -- 98.7 °F (37.1 °C)    Temp src  --  --  --  -- Oral    Pulse 71 83 91  -- 91    Heart Rate Source Monitor  -- Other (Comment)   pulse ox  -- Monitor    Resp  --  -- 16  -- 16    Resp Rate Source  --  -- Visual  -- Visual    BP  --  --  --  -- 104/62    BP Location  --  --  --  -- Right arm    BP Method  --  --  --  -- Manual    Patient Position  --  --  --  -- Lying       Oxygen Therapy    SpO2  --  -- 97 %  -- 91 %    Device (Oxygen Therapy)   -- room air room air  -- room air       Patient Observation    Observations  --  --  --  --      Row Name 08/05/18 0303 08/05/18 0048 08/05/18 0024 08/04/18 2133 08/04/18 1932       Vitals    Temp  --  -- 98.3 °F (36.8 °C)  -- 96.7 °F (35.9 °C)    Temp src  --  -- Oral  -- Oral    Pulse 90 94 92  -- 97    Heart Rate Source Monitor Monitor Monitor  -- Monitor    Resp 16  -- 19  -- 18    Resp Rate Source Visual  -- Visual  -- Visual    BP  --  -- 110/68  -- 122/74    BP Location  --  -- Right arm  -- Right arm    BP Method  --  -- Manual  -- Manual    Patient Position  --  -- Lying  -- Lying       Oxygen Therapy    SpO2 91 %  -- 94 %  -- 95 %    Pulse Oximetry Type Intermittent  --  --  --  --    Device (Oxygen Therapy) room air  -- room air room air room air    Row Name 08/04/18 1857 08/04/18 1717 08/04/18 1647 08/04/18 1641 08/04/18 1636       Vitals    Temp  --  -- 98 °F (36.7 °C) 98.1 °F (36.7 °C) 97.9 °F (36.6 °C)    Temp src  --  -- Oral Oral Oral    Pulse 94 104 99 98 98    Heart Rate Source Monitor Monitor Monitor Monitor  --    Resp 16  -- 18 16 18    Resp Rate Source Visual  -- Visual Visual  --    BP  --  -- 118/68 114/71 120/74       Oxygen Therapy    SpO2 96 %  -- 95 % 95 % 93 %    Pulse Oximetry Type Intermittent  --  --  --  --    Device (Oxygen Therapy) room air  -- room air room air room air    Row Name 08/04/18 1526 08/04/18 1227 08/04/18 1220 08/04/18 0909 08/04/18 0902       Vitals    Temp 97.6 °F (36.4 °C)  --  --  --  --    Temp src Temporal Artery   --  --  --  --    Pulse 94   heart rate of 94 after neb. 89 90 88 86    Heart Rate Source Monitor  --  -- Monitor Other (Comment)   pulse ox    Resp 18  -- 14 16 16    Resp Rate Source Visual  --  -- Visual Visual    /64  --  -- 118/85  --       Oxygen Therapy    SpO2 93 %  -- 95 % 93 % 98 %    Pulse Oximetry Type Intermittent  --  --  --  --    Device (Oxygen Therapy) room air room air room air room air room air    Row Name 08/04/18 0800  08/04/18 0735 08/04/18 0500 08/04/18 0326 08/04/18 0130       Height and Weight    Weight  --  -- 102 kg (225 lb 8 oz)  --  --    Weight Method  --  -- Bed scale  --  --       Vitals    Temp  --  --  -- 97.8 °F (36.6 °C)  --    Temp src  --  --  -- Oral  --    Pulse  -- 87  -- 86 89    Heart Rate Source  -- Monitor  -- Monitor Monitor    Resp  --  --  -- 18  --    Resp Rate Source  --  --  -- Visual  --    BP  --  --  -- 119/70  --    BP Location  --  --  -- Left arm  --    BP Method  --  --  -- Automatic  --    Patient Position  --  --  -- Lying  --       Oxygen Therapy    SpO2  --  --  -- 92 %  --    Pulse Oximetry Type  --  --  -- Continuous  --    Device (Oxygen Therapy) room air  --  -- room air  --    Row Name 08/03/18 2232 08/03/18 1919 08/03/18 1645 08/03/18 1500          Vitals    Temp  -- 97.3 °F (36.3 °C)  -- 96.7 °F (35.9 °C)     Temp src  -- Oral  -- Oral     Pulse  -- 67 66 72     Heart Rate Source  -- Monitor Monitor Monitor     Resp  -- 18  -- 16     Resp Rate Source  -- Visual  -- Visual     BP  -- 121/74  -- 128/84     BP Location  -- Left arm  -- Left arm     BP Method  -- Automatic  -- Automatic     Patient Position  -- Lying  -- Lying        Oxygen Therapy    SpO2  -- 94 %  -- 96 %     Pulse Oximetry Type  -- Continuous  --  --     Device (Oxygen Therapy) room air room air  -- room air         Hospital Medications (active)       Dose Frequency Start End    albumin human 25 % IV SOLN 12.5 g 12.5 g 2 Times Daily 8/4/2018     Sig - Route: Infuse 50 mL into a venous catheter 2 (Two) Times a Day. - Intravenous    Cosign for Ordering: Accepted by Tushar Meyer MD on 8/4/2018  9:53 AM    AZITHROMYCIN 500 MG/250 ML 0.9% NS IVPB (vial-mate) 500 mg Every 24 Hours 8/4/2018 8/11/2018    Sig - Route: Infuse 500 mg into a venous catheter Daily. - Intravenous    Cosign for Ordering: Accepted by Tushar Meyer MD on 8/4/2018  3:32 PM    cefTRIAXone (ROCEPHIN) 1 g/100 mL 0.9% NS (MBP) 1 g  "Every 24 Hours 8/4/2018 8/11/2018    Sig - Route: Infuse 100 mL into a venous catheter Daily. - Intravenous    Cosign for Ordering: Accepted by Tushar Meyer MD on 8/4/2018  3:32 PM    diphenhydrAMINE (BENADRYL) injection 25 mg 25 mg Every 6 Hours PRN 8/4/2018     Sig - Route: Infuse 0.5 mL into a venous catheter Every 6 (Six) Hours As Needed for Itching. - Intravenous    Cosign for Ordering: Accepted by Tushar Meyer MD on 8/4/2018  9:53 AM    ibuprofen (ADVIL,MOTRIN) tablet 400 mg 400 mg Every 4 Hours PRN 8/3/2018     Sig - Route: Take 1 tablet by mouth Every 4 (Four) Hours As Needed for Mild Pain . - Oral    ipratropium-albuterol (DUO-NEB) nebulizer solution 3 mL 3 mL 4 Times Daily - RT 8/6/2018     Sig - Route: Take 3 mL by nebulization 4 (Four) Times a Day. - Nebulization    Cosign for Ordering: Required by Tushar Meyer MD    lactulose (CHRONULAC) 10 GM/15ML solution 30 g 30 g 2 Times Daily 8/2/2018     Sig - Route: Take 45 mL by mouth 2 (Two) Times a Day. - Oral    morphine injection 4 mg 4 mg Every 4 Hours PRN 8/4/2018 8/14/2018    Sig - Route: Infuse 1 mL into a venous catheter Every 4 (Four) Hours As Needed for Severe Pain . - Intravenous    Cosign for Ordering: Accepted by Tushar Meyer MD on 8/4/2018  9:53 AM    nicotine (NICODERM CQ) 14 MG/24HR patch 1 patch 1 patch Every 24 Hours 8/2/2018     Sig - Route: Place 1 patch on the skin as directed by provider Daily. - Transdermal    ondansetron (ZOFRAN) injection 4 mg 4 mg Every 6 Hours PRN 8/2/2018     Sig - Route: Infuse 2 mL into a venous catheter Every 6 (Six) Hours As Needed for Nausea or Vomiting. - Intravenous    Linked Group 1:  \"Or\" Linked Group Details        ondansetron (ZOFRAN) tablet 4 mg 4 mg Every 6 Hours PRN 8/2/2018     Sig - Route: Take 1 tablet by mouth Every 6 (Six) Hours As Needed for Nausea or Vomiting. - Oral    Linked Group 1:  \"Or\" Linked Group Details        ondansetron ODT (ZOFRAN-ODT) " "disintegrating tablet 4 mg 4 mg Every 6 Hours PRN 2018     Sig - Route: Take 1 tablet by mouth Every 6 (Six) Hours As Needed for Nausea or Vomiting. - Oral    Linked Group 1:  \"Or\" Linked Group Details        potassium chloride (MICRO-K) CR capsule 40 mEq 40 mEq Daily 8/3/2018     Sig - Route: Take 4 capsules by mouth Daily. - Oral    Cosign for Ordering: Accepted by Tushar Meyer MD on 8/3/2018  3:27 PM    sodium chloride 0.9 % flush 1-10 mL 1-10 mL As Needed 2018     Sig - Route: Infuse 1-10 mL into a venous catheter As Needed for Line Care. - Intravenous    sodium chloride 0.9 % flush 10 mL 10 mL As Needed 2018     Sig - Route: Infuse 10 mL into a venous catheter As Needed for Line Care. - Intravenous    Linked Group 2:  \"And\" Linked Group Details        albuterol (PROVENTIL) nebulizer solution 0.083% 2.5 mg/3mL (Discontinued) 2.5 mg Every 4 Hours - RT 2018    Sig - Route: Take 2.5 mg by nebulization Every 4 (Four) Hours. - Nebulization    Cosign for Ordering: Accepted by Tushar Meyer MD on 2018  9:53 AM             Physician Progress Notes (last 72 hours) (Notes from 8/3/2018 11:35 AM through 2018 11:35 AM)      Jose Luis III, MD at 2018  8:28 AM          FAMILY MEDICINE DAILY PROGRESS NOTE  NAME: Armando Mcmullen .  : 1965  MRN: [id number]     LOS: 4 days     PROVIDER OF SERVICE: Jose Luis III, MD    Chief Complaint: Anasarca    Subjective:     Interval History:  History taken from: patient RN  No acute overnight events, patient resting comfortably in bed.  Shortness of breath noticed when sitting up.  Patient reports lactulose doing well with bowel movements.  He complains of abdominal pain after his paracentesis consistent with extra loose fluid that was unable to be removed by radiology via paracentesis.    Review of Systems:   Review of Systems   Constitutional: Positive for activity change.   HENT: Negative.    Eyes: " Negative.    Respiratory: Positive for shortness of breath.    Cardiovascular: Positive for leg swelling. Negative for chest pain.   Gastrointestinal: Positive for abdominal distention and abdominal pain.   Endocrine: Negative.    Genitourinary: Negative.    Musculoskeletal: Negative.    Skin: Negative.    Allergic/Immunologic: Negative.    Neurological: Negative.    Hematological: Negative.    Psychiatric/Behavioral: Negative.        Objective:     Vital Signs  Temp:  [97.6 °F (36.4 °C)-98.6 °F (37 °C)] 97.6 °F (36.4 °C)  Heart Rate:  [] 108  Resp:  [16-20] 20  BP: (112-142)/(68-92) 136/92    Physical Exam  Physical Exam   Constitutional: He is oriented to person, place, and time. He appears well-developed and well-nourished.   HENT:   Head: Normocephalic and atraumatic.   Right Ear: External ear normal.   Left Ear: External ear normal.   Neck: Neck supple.   Cardiovascular: Normal rate and regular rhythm.    Pulmonary/Chest: He has decreased breath sounds in the right upper field, the right middle field, the left upper field and the left middle field. He has wheezes in the right upper field, the right middle field, the left upper field and the left middle field.   Abdominal: Soft. Bowel sounds are normal.   Musculoskeletal: Normal range of motion.   Neurological: He is alert and oriented to person, place, and time.   Skin: Skin is warm and dry.   Psychiatric: He has a normal mood and affect. His behavior is normal. Judgment and thought content normal.       Medication Review    Current Facility-Administered Medications:   •  albumin human 25 % IV SOLN 12.5 g, 12.5 g, Intravenous, BID, Jose Luis III, MD, Stopped at 08/05/18 2302  •  albuterol (PROVENTIL) nebulizer solution 0.083% 2.5 mg/3mL, 2.5 mg, Nebulization, Q4H - RT, Jose Luis III, MD, 2.5 mg at 08/06/18 0718  •  AZITHROMYCIN 500 MG/250 ML 0.9% NS IVPB (vial-mate), 500 mg, Intravenous, Q24H, Jose Luis III, MD, 500 mg at  08/05/18 1656  •  cefTRIAXone (ROCEPHIN) 1 g/100 mL 0.9% NS (MBP), 1 g, Intravenous, Q24H, Jose Luis III, MD, 1 g at 08/05/18 1459  •  diphenhydrAMINE (BENADRYL) injection 25 mg, 25 mg, Intravenous, Q6H PRN, Jose Luis III, MD  •  ibuprofen (ADVIL,MOTRIN) tablet 400 mg, 400 mg, Oral, Q4H PRN, Momo Hammer MD, 400 mg at 08/04/18 0359  •  lactulose (CHRONULAC) 10 GM/15ML solution 30 g, 30 g, Oral, BID, Momo Hammer MD, 30 g at 08/05/18 1900  •  morphine injection 4 mg, 4 mg, Intravenous, Q4H PRN, Jose Luis III, MD, 4 mg at 08/06/18 0336  •  nicotine (NICODERM CQ) 14 MG/24HR patch 1 patch, 1 patch, Transdermal, Q24H, Momo Hammer MD, 1 patch at 08/05/18 2206  •  ondansetron (ZOFRAN) tablet 4 mg, 4 mg, Oral, Q6H PRN **OR** ondansetron ODT (ZOFRAN-ODT) disintegrating tablet 4 mg, 4 mg, Oral, Q6H PRN **OR** ondansetron (ZOFRAN) injection 4 mg, 4 mg, Intravenous, Q6H PRN, Momo Hammer MD  •  potassium chloride (MICRO-K) CR capsule 40 mEq, 40 mEq, Oral, Daily, Jose Luis III, MD, 40 mEq at 08/05/18 0843  •  sodium chloride 0.9 % flush 1-10 mL, 1-10 mL, Intravenous, PRN, Momo Hammer MD  •  Insert peripheral IV, , , Once **AND** sodium chloride 0.9 % flush 10 mL, 10 mL, Intravenous, PRN, Mike Bergman MD, 10 mL at 08/06/18 0336     Diagnostic Data    Lab Results (last 24 hours)     Procedure Component Value Units Date/Time    CBC & Differential [[id number]] Collected:  08/06/18 0516    Specimen:  Blood Updated:  08/06/18 0705    Narrative:       The following orders were created for panel order CBC & Differential.  Procedure                               Abnormality         Status                     ---------                               -----------         ------                     Scan Slide[[id number]]                                       Final result               CBC Auto Differential[[id number]]        Abnormal             Final result                 Please view results for these tests on the individual orders.    Scan Slide [[id number]] Collected:  08/06/18 0516    Specimen:  Blood Updated:  08/06/18 0705     Anisocytosis Slight/1+     Macrocytes Slight/1+     WBC Morphology Normal     Platelet Estimate Decreased    Respiratory Culture - Sputum, Cough [[id number]] Collected:  08/04/18 1534    Specimen:  Sputum from Cough Updated:  08/06/18 0636     Respiratory Culture Normal Respiratory Renetta     Gram Stain Result Many (4+) WBCs per low power field      Few (2+) Epithelial cells per low power field      Mixed bacterial renetta    Body Fluid Culture - Body Fluid, Peritoneum [[id number]]  (Normal) Collected:  08/05/18 1423    Specimen:  Body Fluid from Peritoneum Updated:  08/06/18 0625     BF Culture No growth at less than 24 hours     Gram Stain Result Many (4+) WBCs seen      No organisms seen    Comprehensive Metabolic Panel [[id number]]  (Abnormal) Collected:  08/06/18 0516    Specimen:  Blood Updated:  08/06/18 0603     Glucose 72 mg/dL      BUN 39 (H) mg/dL      Creatinine 2.42 (H) mg/dL      Sodium 135 (L) mmol/L      Potassium 4.3 mmol/L      Chloride 105 mmol/L      CO2 22.0 mmol/L      Calcium 8.1 (L) mg/dL      Total Protein 5.5 (L) g/dL      Albumin 2.30 (L) g/dL      ALT (SGPT) 26 U/L      AST (SGOT) 30 U/L      Alkaline Phosphatase 76 U/L      Total Bilirubin 4.7 (H) mg/dL      eGFR Non African Amer 28 (L) mL/min/1.73      Globulin 3.2 gm/dL      A/G Ratio 0.7 (L) g/dL      BUN/Creatinine Ratio 16.1     Anion Gap 8.0 mmol/L     CBC Auto Differential [[id number]]  (Abnormal) Collected:  08/06/18 0516    Specimen:  Blood Updated:  08/06/18 0553     WBC 13.76 (H) 10*3/mm3      RBC 2.44 (L) 10*6/mm3      Hemoglobin 8.4 (L) g/dL      Hematocrit 24.9 (L) %      .0 (H) fL      MCH 34.4 (H) pg      MCHC 33.7 g/dL      RDW 15.5 (H) %      RDW-SD 58.1 (H) fl      MPV 9.5 fL      Platelets 49 (L) 10*3/mm3      Neutrophil %  73.6 %      Lymphocyte % 11.3 %      Monocyte % 9.7 %      Eosinophil % 3.3 %      Basophil % 0.9 %      Immature Grans % 1.2 (H) %      Neutrophils, Absolute 10.12 (H) 10*3/mm3      Lymphocytes, Absolute 1.56 10*3/mm3      Monocytes, Absolute 1.34 (H) 10*3/mm3      Eosinophils, Absolute 0.46 10*3/mm3      Basophils, Absolute 0.12 10*3/mm3      Immature Grans, Absolute 0.16 (H) 10*3/mm3      nRBC 0.0 /100 WBC     Body Fluid Cell Count With Differential - Body Fluid, Peritoneum [[id number]] Collected:  08/03/18 1633    Specimen:  Body Fluid from Peritoneum Updated:  08/05/18 1415    Narrative:       The following orders were created for panel order Body Fluid Cell Count With Differential - Body Fluid, Peritoneum.  Procedure                               Abnormality         Status                     ---------                               -----------         ------                     Body fluid cell count - ...[[id number]]  Abnormal            Final result               Body fluid differential ...[[id number]]                      Final result                 Please view results for these tests on the individual orders.    Body fluid differential - Body Fluid, [[id number]] Collected:  08/03/18 1633    Specimen:  Body Fluid from Peritoneum Updated:  08/05/18 1415     Neutrophils, Fluid 49 %      Mononuclear, Fluid 51 %     Body fluid cell count - Body Fluid, [[id number]]  (Abnormal) Collected:  08/03/18 1633    Specimen:  Body Fluid from Peritoneum Updated:  08/05/18 1342     WBC, Fluid 325.5 (H) /mm3      RBC, Fluid 719 (H) /mm3      Color, Fluid Yellow     Appearance, Fluid Slightly Cloudy (A)     Volume, Fluid 2,000.0 mL     Albumin, Fluid - Body Fluid, Peritoneum [[id number]] Collected:  08/03/18 1633    Specimen:  Body Fluid from Peritoneum Updated:  08/05/18 1333     Albumin, Fluid <1.00 g/dL     Amylase, Body Fluid - Body Fluid, Peritoneum [[id number]] Collected:  08/03/18 1633    Specimen:  Body Fluid from Peritoneum  Updated:  08/05/18 1332     Amylase, Fluid <30 U/L     Protein, Body Fluid - Body Fluid, Peritoneum [[id number]] Collected:  08/03/18 1633    Specimen:  Body Fluid from Peritoneum Updated:  08/05/18 1332     Protein, Total, Fluid <2.0 g/dL     Glucose, Body Fluid - Body Fluid, Peritoneum [[id number]] Collected:  08/03/18 1633    Specimen:  Body Fluid from Peritoneum Updated:  08/05/18 1327     Glucose, Fluid 101 mg/dL     Lactate Dehydrogenase, Body Fluid - Body Fluid, Peritoneum [[id number]] Collected:  08/03/18 1633    Specimen:  Body Fluid from Peritoneum Updated:  08/05/18 1327     Lactate Dehydrogenase (LD), Fluid 160 U/L     Blood Culture - Blood, [[id number]]  (Normal) Collected:  08/04/18 0915    Specimen:  Blood from Hand, Right Updated:  08/05/18 0945     Blood Culture No growth at 24 hours    Blood Culture - Blood, [[id number]]  (Normal) Collected:  08/04/18 0930    Specimen:  Blood from Arm, Left Updated:  08/05/18 0945     Blood Culture No growth at 24 hours            I reviewed the patient's new clinical results.  I reviewed the patient's new imaging results and agree with the interpretation.    Assessment/Plan:     Active Hospital Problems    Diagnosis Date Noted   • **Anasarca [R60.1] 04/27/2018     2.4 L removed by paracentesis yesterday.  Positive for numerous white blood cell counts in the setting of SBP trending urine culture with no organisms seen on Gram stain.  Nephrology consult as above.     • Leukocytosis [D72.829] 04/11/2018     Priority: Medium     Jump from 13 from 8.5  Says score low due to presence of white cells and ascitic fluid.  Pending culture with no active organisms seen on Gram stain.  Community acquired pneumonia with ceftriaxone and azithromycin.       • Pneumonia due to infectious organism [J18.9] 08/05/2018     Rocephin and azithromycin 3/3  Follow wbc  Afebrile     • Anxiety and depression [F41.8] 08/02/2018     Pt has not been taking any medications     • CKD (chronic kidney  disease) stage 3, GFR 30-59 ml/min [N18.3] 08/02/2018     Baseline Cr 1.4, admission 1.64, now 1.86, up to 2.25, and 2.41, then 2.5, now 2.4  Nephrology consult for diuretic use and sitting of acute kidney injuryversus chronic kidney disease       • Elevated brain natriuretic peptide (BNP) level [R79.89] 08/02/2018     2,120 on admission       • Acute kidney injury (CMS/HCC) [N17.9] 04/27/2018     Creatinine slowly increasing, will monitor as ascites is treated  Consult Nephrology for fluid balance versus diuretics and setting of a [facility] patient is currently fluid overloaded with 2+  edema to the right knee     • Anemia [D64.9] 07/18/2017     Anemia of chronic disease        • Thrombocytopenia (CMS/HCC) [D69.6] 07/10/2017     Most likely secondary to liver cirrhosis.   Pending Peripheral smear benign for causes other than hepatic sequestration versus decreased production.  Received platelets 8/4  Platelets dipped under 50,000 after paracentesis yesterday.  We'll trend     • Alcoholic cirrhosis (CMS/HCC) [K70.30] 05/26/2009      Dr. Becerril is Gastroenterologist consult in today with treatment of SBP.  Continue home lactulose  Abdominal ultrasound positive for moderate asictes 2.4 L taken out by radiology positive for white blood cells and no bacteria seen awaiting culture.          • Essential hypertension [I10] 12/01/2008     Pt has not been taking his meds at home  Currently normotensive at hospital stay.           DVT prophylaxis: Holding anticoagulation until decision about paracentesis is done  Code status is   Code Status and Medical Interventions:   Ordered at: 08/02/18 2011     Code Status:    CPR     Medical Interventions (Level of Support Prior to Arrest):    Full       Plan for disposition: consulting GI for spontaneous bacterial peritonitis requiring 2 more days of inpatient admission.            Electronically signed by Jose Luis III, MD at 8/6/2018  8:34 AM     Tushar Meyer MD at  2018  8:53 AM     Attestation signed by Bandar Maya MD at 2018  3:39 PM    I have seen and evaluated the patient.  I have discussed the case with the resident. I have reviewed the notes, assessment and plan, and/or procedures performed by the resident. I concur with the resident’s documentation.      No overnight events. He does complain of abdominal soreness. Platelets improved s/p transfusion. No fevers/chills reported.     Physical Exam:  General: AOX3, scleral icterus present.   CV: S1 and S2 normal. RRR  Pulmonary: Diminished breath sounds with rhonchi present.   Abdomen: Bowel sounds present and normal.  Abdomen is soft, distended, mild generalized tenderness to palpation.  Extremities: Left BKA noted. +1 edema on RLE.     Plan: Paracentesis today. May need albumin replacement s/p procedure. Creatinine still elevated. Monitor renal function. Continue lactulose. Continue Rocephin.       This document has been electronically signed by Bandar Maya MD on 2018 3:39 PM                     FAMILY MEDICINE DAILY PROGRESS NOTE  NAME: Armando Mcmullen .  : 1965  MRN: [id number]     LOS: 3 days     PROVIDER OF SERVICE: Tushar Meyer MD    Chief Complaint: Anasarca    Subjective:     Interval History:  History taken from: patient RN  Patient's fatigue is a little bit better today.  The swelling is about the same.  He received platelets yesterday.  Anticipate paracentesis today.    Review of Systems:   Review of Systems   Constitutional: Positive for activity change.   HENT: Negative.    Eyes: Negative.    Respiratory: Positive for shortness of breath.    Cardiovascular: Positive for leg swelling. Negative for chest pain.   Gastrointestinal: Positive for abdominal distention and abdominal pain.   Endocrine: Negative.    Genitourinary: Negative.    Musculoskeletal: Negative.    Skin: Negative.    Allergic/Immunologic: Negative.    Neurological: Negative.    Hematological:  Negative.    Psychiatric/Behavioral: Negative.        Objective:     Vital Signs  Temp:  [96.7 °F (35.9 °C)-98.7 °F (37.1 °C)] 98.7 °F (37.1 °C)  Heart Rate:  [] 71  Resp:  [14-19] 16  BP: (104-122)/(62-85) 104/62    Physical Exam  Physical Exam   Constitutional: He is oriented to person, place, and time. He appears well-developed and well-nourished.   HENT:   Head: Normocephalic and atraumatic.   Right Ear: External ear normal.   Left Ear: External ear normal.   Neck: Neck supple.   Cardiovascular: Normal rate and regular rhythm.    Pulmonary/Chest: He has decreased breath sounds in the right upper field, the right middle field, the left upper field and the left middle field. He has wheezes in the right upper field, the right middle field, the left upper field and the left middle field.   Abdominal: Soft. Bowel sounds are normal. He exhibits distension. There is tenderness (mild generalized).   Musculoskeletal: Normal range of motion. He exhibits edema (2+ to knee on R, 1+ L).   Neurological: He is alert and oriented to person, place, and time.   Skin: Skin is warm and dry.   Psychiatric: He has a normal mood and affect. His behavior is normal. Judgment and thought content normal.       Medication Review    Current Facility-Administered Medications:   •  albumin human 25 % IV SOLN 12.5 g, 12.5 g, Intravenous, BID, Jose Luis III, MD, 12.5 g at 08/05/18 0843  •  albuterol (PROVENTIL) nebulizer solution 0.083% 2.5 mg/3mL, 2.5 mg, Nebulization, Q4H - RT, Jose Luis III, MD, 2.5 mg at 08/05/18 0722  •  AZITHROMYCIN 500 MG/250 ML 0.9% NS IVPB (vial-mate), 500 mg, Intravenous, Q24H, Jose Luis III, MD, 500 mg at 08/04/18 5107  •  cefTRIAXone (ROCEPHIN) 1 g/100 mL 0.9% NS (MBP), 1 g, Intravenous, Q24H, Jose Luis III, MD, 1 g at 08/04/18 6866  •  diphenhydrAMINE (BENADRYL) injection 25 mg, 25 mg, Intravenous, Q6H PRN, O'Farnaz, Jose HAY III, MD  •  ibuprofen (ADVIL,MOTRIN) tablet 400  mg, 400 mg, Oral, Q4H PRN, Momo Hammer MD, 400 mg at 08/04/18 0359  •  lactulose (CHRONULAC) 10 GM/15ML solution 30 g, 30 g, Oral, BID, Momo Hammer MD, 30 g at 08/05/18 0707  •  morphine injection 4 mg, 4 mg, Intravenous, Q4H PRN, Jose Luis III, MD, 4 mg at 08/04/18 2158  •  nicotine (NICODERM CQ) 14 MG/24HR patch 1 patch, 1 patch, Transdermal, Q24H, Momo Hammer MD, 1 patch at 08/04/18 2136  •  ondansetron (ZOFRAN) tablet 4 mg, 4 mg, Oral, Q6H PRN **OR** ondansetron ODT (ZOFRAN-ODT) disintegrating tablet 4 mg, 4 mg, Oral, Q6H PRN **OR** ondansetron (ZOFRAN) injection 4 mg, 4 mg, Intravenous, Q6H PRN, Momo Hammer MD  •  potassium chloride (MICRO-K) CR capsule 40 mEq, 40 mEq, Oral, Daily, Jose Luis III, MD, 40 mEq at 08/05/18 0843  •  sodium chloride 0.9 % flush 1-10 mL, 1-10 mL, Intravenous, PRN, Momo Hammer MD  •  Insert peripheral IV, , , Once **AND** sodium chloride 0.9 % flush 10 mL, 10 mL, Intravenous, PRN, Mike Bergman MD, 10 mL at 08/04/18 2134     Diagnostic Data    Lab Results (last 24 hours)     Procedure Component Value Units Date/Time    CBC & Differential [[id number]] Collected:  08/05/18 0637    Specimen:  Blood Updated:  08/05/18 0727    Narrative:       The following orders were created for panel order CBC & Differential.  Procedure                               Abnormality         Status                     ---------                               -----------         ------                     CBC Auto Differential[[id number]]        Abnormal            Final result                 Please view results for these tests on the individual orders.    CBC Auto Differential [[id number]]  (Abnormal) Collected:  08/05/18 0637    Specimen:  Blood Updated:  08/05/18 0727     WBC 12.52 (H) 10*3/mm3      RBC 2.55 (L) 10*6/mm3      Hemoglobin 8.7 (L) g/dL      Hematocrit 25.1 (L) %      MCV 98.4 (H) fL      MCH 34.1 (H) pg       MCHC 34.7 g/dL      RDW 15.1 (H) %      RDW-SD 54.0 (H) fl      MPV 9.9 fL      Platelets 54 (L) 10*3/mm3      Neutrophil % 73.0 %      Lymphocyte % 12.2 %      Monocyte % 10.3 %      Eosinophil % 3.3 %      Basophil % 0.6 %      Immature Grans % 0.6 (H) %      Neutrophils, Absolute 9.14 (H) 10*3/mm3      Lymphocytes, Absolute 1.53 10*3/mm3      Monocytes, Absolute 1.29 (H) 10*3/mm3      Eosinophils, Absolute 0.41 10*3/mm3      Basophils, Absolute 0.07 10*3/mm3      Immature Grans, Absolute 0.08 (H) 10*3/mm3     Comprehensive Metabolic Panel [[id number]]  (Abnormal) Collected:  08/05/18 0637    Specimen:  Blood Updated:  08/05/18 0727     Glucose 68 mg/dL      BUN 39 (H) mg/dL      Creatinine 2.49 (H) mg/dL      Sodium 133 (L) mmol/L      Potassium 3.7 mmol/L      Chloride 103 mmol/L      CO2 22.0 mmol/L      Calcium 8.2 (L) mg/dL      Total Protein 5.7 (L) g/dL      Albumin 2.40 (L) g/dL      ALT (SGPT) 26 U/L      AST (SGOT) 36 U/L      Alkaline Phosphatase 89 U/L      Total Bilirubin 4.3 (H) mg/dL      eGFR Non African Amer 27 (L) mL/min/1.73      Globulin 3.3 gm/dL      A/G Ratio 0.7 (L) g/dL      BUN/Creatinine Ratio 15.7     Anion Gap 8.0 mmol/L     Respiratory Culture - Sputum, Cough [[id number]] Collected:  08/04/18 1534    Specimen:  Sputum from Cough Updated:  08/05/18 0703     Respiratory Culture Culture in progress     Gram Stain Result Many (4+) WBCs per low power field      Few (2+) Epithelial cells per low power field      Mixed bacterial renetta    Blood Culture - Blood, [[id number]]  (Normal) Collected:  08/04/18 0915    Specimen:  Blood from Hand, Right Updated:  08/04/18 2145     Blood Culture No growth at less than 24 hours    Blood Culture - Blood, [[id number]]  (Normal) Collected:  08/04/18 0930    Specimen:  Blood from Arm, Left Updated:  08/04/18 2145     Blood Culture No growth at less than 24 hours    Platelet Count [[id number]]  (Abnormal) Collected:  08/04/18 1958    Specimen:  Blood  Updated:  08/04/18 2017     Platelets 58 (L) 10*3/mm3     Basic Metabolic Panel [[id number]]  (Abnormal) Collected:  08/04/18 1530    Specimen:  Blood Updated:  08/04/18 1622     Glucose 87 mg/dL      BUN 38 (H) mg/dL      Creatinine 2.41 (H) mg/dL      Sodium 133 (L) mmol/L      Potassium 4.2 mmol/L      Chloride 103 mmol/L      CO2 21.0 (L) mmol/L      Calcium 7.9 (L) mg/dL      eGFR Non African Amer 28 (L) mL/min/1.73      BUN/Creatinine Ratio 15.8     Anion Gap 9.0 mmol/L     Peripheral Blood Smear [[id number]] Collected:  08/04/18 0544    Specimen:  Blood Updated:  08/04/18 0988            I reviewed the patient's new clinical results.  I reviewed the patient's new imaging results and agree with the interpretation.    Assessment/Plan:     Active Hospital Problems    Diagnosis Date Noted   • **Anasarca [R60.1] 04/27/2018     Priority: High     S/p platelet transfusion  Will get paracentesis     • Alcoholic cirrhosis (CMS/HCC) [K70.30] 05/26/2009     Priority: High      Dr. Becerril is Gastroenterologist  Continue home lactulose  Abdominal ultrasound positive for moderate asictes   Paracentesis pending resolution of thrombocytopenia       • Pneumonia due to infectious organism [J18.9] 08/05/2018     Priority: Medium     Rocephin and azithromycin d2/3  Follow wbc  Afebrile     • Anxiety and depression [F41.8] 08/02/2018     Pt has not been taking any medications     • CKD (chronic kidney disease) stage 3, GFR 30-59 ml/min [N18.3] 08/02/2018     Baseline Cr 1.4, admission 1.64, now 1.86, up to 2.25, and 2.41, then 2.5       • Elevated brain natriuretic peptide (BNP) level [R79.89] 08/02/2018     2,120 on admission       • Diarrhea [R19.7] 08/02/2018     F/u GI PCR     • Acute kidney injury (CMS/HCC) [N17.9] 04/27/2018     Creatinine slowly increasing, will monitor as ascites is treated     • Leukocytosis [D72.829] 04/11/2018     Jump from 13 from 8.5  R/o SBP after thrombocytopenia reoslution for diagnostic or  therapeutic paracentesis  Pt is wheezing on exam will do stat CXR and determine etiology       • Anemia [D64.9] 07/18/2017     Anemia of chronic disease        • Thrombocytopenia (CMS/HCC) [D69.6] 07/10/2017     Most likely secondary to liver cirrhosis.   Pending Peripheral smear to rule out Autoimmune and destructive etiologies  Received platelets 8/4       • Hypokalemia [E87.6] 03/02/2017     Repleting     • Essential hypertension [I10] 12/01/2008     Pt has not been taking his meds at home  Will hold restarting antihypertensives while he gets fluids off with IV lasix and will need BP monitoring if paracentesis required            DVT prophylaxis: Holding anticoagulation until decision about paracentesis is done  Code status is   Code Status and Medical Interventions:   Ordered at: 08/02/18 2011     Code Status:    CPR     Medical Interventions (Level of Support Prior to Arrest):    Full       Plan for disposition:home after ascites improved          This document has been electronically signed by Tushar Meyer MD on August 5, 2018 8:53 AM               Electronically signed by Bandar Maya MD at 8/5/2018  3:39 PM     OJose Franklin III, MD at 8/4/2018  8:48 AM     Attestation signed by Bandar Maya MD at 8/4/2018 12:12 PM    I have seen and evaluated the patient.  I have discussed the case with the resident. I have reviewed the notes, assessment and plan, and/or procedures performed by the resident. I concur with the resident’s documentation.      No overnight events. Patient states that he is having pain and swelling of his abdomen. Earlier, he states that he was having some trouble breathing. He is having some swelling of his right leg. Has a history of CKD3 and cirrhosis.     Physical Exam:  General: AOX3, scleral icterus present.   CV: S1 and S2 normal. RRR  Pulmonary: Diminished breath sounds with rhonchi present.   Abdomen: Bowel sounds present and normal.  Abdomen is soft,  distended, mild generalized tenderness to palpation.  Extremities: Left BKA noted. +1 edema on RLE.     Plan: Albumin, hold AM Lasix due to worsening renal function. Will recheck platelet count, may benefit from transfusion. If unable to manage volume overload with medical management, may need therapeutic paracentesis. Monitor hepatic functions. Continue home dosage of Lactulose.       This document has been electronically signed by Bandar Maya MD on 2018 12:12 PM                     FAMILY MEDICINE DAILY PROGRESS NOTE  NAME: Armando Mcmullen Sr.  : 1965  MRN: [id number]     LOS: 2 days     PROVIDER OF SERVICE: Jose Luis III, MD    Chief Complaint: Anasarca    Subjective:     Interval History:  History taken from: patient RN  Patient Complaints: Abdominal pain and swelling in distal extremities  Patient Denies:  Fever chills shortness of breath, chest pain, nausea, vomiting.    Review of Systems:   Review of Systems   Constitutional: Positive for activity change.   HENT: Negative.    Eyes: Negative.    Respiratory: Positive for shortness of breath.    Cardiovascular: Positive for leg swelling. Negative for chest pain.   Gastrointestinal: Positive for abdominal distention and abdominal pain.   Endocrine: Negative.    Genitourinary: Negative.    Musculoskeletal: Negative.    Skin: Negative.    Allergic/Immunologic: Negative.    Neurological: Negative.    Hematological: Negative.    Psychiatric/Behavioral: Negative.        Objective:     Vital Signs  Temp:  [96.7 °F (35.9 °C)-97.8 °F (36.6 °C)] 97.8 °F (36.6 °C)  Heart Rate:  [66-89] 87  Resp:  [16-18] 18  BP: (119-128)/(70-84) 119/70    Physical Exam  Physical Exam   Constitutional: He is oriented to person, place, and time. He appears well-developed and well-nourished.   HENT:   Head: Normocephalic and atraumatic.   Right Ear: External ear normal.   Left Ear: External ear normal.   Neck: Neck supple.   Cardiovascular: Normal rate and  regular rhythm.    Pulmonary/Chest: He has decreased breath sounds in the right upper field, the right middle field, the left upper field and the left middle field. He has wheezes in the right upper field, the right middle field, the left upper field and the left middle field.   Abdominal: Soft. Bowel sounds are normal.   Musculoskeletal: Normal range of motion.   Neurological: He is alert and oriented to person, place, and time.   Skin: Skin is warm and dry.   Psychiatric: He has a normal mood and affect. His behavior is normal. Judgment and thought content normal.       Medication Review    Current Facility-Administered Medications:   •  albumin human 25 % IV SOLN 12.5 g, 12.5 g, Intravenous, BID, Jose Luis III, MD  •  albuterol (PROVENTIL) nebulizer solution 0.083% 2.5 mg/3mL, 2.5 mg, Nebulization, Q4H - RT, Jose Luis III, MD  •  diphenhydrAMINE (BENADRYL) injection 25 mg, 25 mg, Intravenous, Q6H PRN, Jose Luis III, MD  •  ibuprofen (ADVIL,MOTRIN) tablet 400 mg, 400 mg, Oral, Q4H PRN, Momo Hammer MD, 400 mg at 08/04/18 0359  •  lactulose (CHRONULAC) 10 GM/15ML solution 30 g, 30 g, Oral, BID, Momo Hammer MD, 30 g at 08/03/18 2124  •  morphine injection 4 mg, 4 mg, Intravenous, Q4H PRN, Jose Luis III, MD  •  nicotine (NICODERM CQ) 14 MG/24HR patch 1 patch, 1 patch, Transdermal, Q24H, Momo Hammer MD, 1 patch at 08/03/18 2125  •  ondansetron (ZOFRAN) tablet 4 mg, 4 mg, Oral, Q6H PRN **OR** ondansetron ODT (ZOFRAN-ODT) disintegrating tablet 4 mg, 4 mg, Oral, Q6H PRN **OR** ondansetron (ZOFRAN) injection 4 mg, 4 mg, Intravenous, Q6H PRN, Momo Hammer MD  •  potassium chloride (MICRO-K) CR capsule 40 mEq, 40 mEq, Oral, Daily, Jose Luis III, MD, 40 mEq at 08/03/18 1802  •  sodium chloride 0.9 % flush 1-10 mL, 1-10 mL, Intravenous, PRN, Momo Hammer MD  •  Insert peripheral IV, , , Once **AND** sodium chloride 0.9 %  flush 10 mL, 10 mL, Intravenous, PRN, Mike Bergman MD     Diagnostic Data    Lab Results (last 24 hours)     Procedure Component Value Units Date/Time    Comprehensive Metabolic Panel [[id number]]  (Abnormal) Collected:  08/04/18 0544    Specimen:  Blood Updated:  08/04/18 0631     Glucose 89 mg/dL      BUN 35 (H) mg/dL      Creatinine 2.25 (H) mg/dL      Sodium 133 (L) mmol/L      Potassium 3.5 mmol/L      Chloride 102 mmol/L      CO2 25.0 mmol/L      Calcium 7.9 (L) mg/dL      Total Protein 5.8 (L) g/dL      Albumin 2.40 (L) g/dL      ALT (SGPT) 27 U/L      AST (SGOT) 46 U/L      Alkaline Phosphatase 106 U/L      Total Bilirubin 4.3 (H) mg/dL      eGFR Non African Amer 31 (L) mL/min/1.73      Globulin 3.4 gm/dL      A/G Ratio 0.7 (L) g/dL      BUN/Creatinine Ratio 15.6     Anion Gap 6.0 mmol/L     CBC & Differential [[id number]] Collected:  08/04/18 0544    Specimen:  Blood Updated:  08/04/18 0617    Narrative:       The following orders were created for panel order CBC & Differential.  Procedure                               Abnormality         Status                     ---------                               -----------         ------                     Scan Slide[[id number]]                                                                  CBC Auto Differential[[id number]]        Abnormal            Final result                 Please view results for these tests on the individual orders.    CBC Auto Differential [[id number]]  (Abnormal) Collected:  08/04/18 0544    Specimen:  Blood Updated:  08/04/18 0617     WBC 13.08 (H) 10*3/mm3      RBC 2.75 (L) 10*6/mm3      Hemoglobin 9.6 (L) g/dL      Hematocrit 27.0 (L) %      MCV 98.2 (H) fL      MCH 34.9 (H) pg      MCHC 35.6 g/dL      RDW 14.9 (H) %      RDW-SD 53.4 (H) fl      MPV 10.7 fL      Platelets 42 (L) 10*3/mm3      Neutrophil % 76.0 %      Lymphocyte % 10.6 %      Monocyte % 9.8 %      Eosinophil % 2.7 %      Basophil % 0.4 %      Immature Grans %  0.5 %      Neutrophils, Absolute 9.95 (H) 10*3/mm3      Lymphocytes, Absolute 1.38 10*3/mm3      Monocytes, Absolute 1.28 (H) 10*3/mm3      Eosinophils, Absolute 0.35 10*3/mm3      Basophils, Absolute 0.05 10*3/mm3      Immature Grans, Absolute 0.07 (H) 10*3/mm3     Peripheral Blood Smear [[id number]] Collected:  08/03/18 0627    Specimen:  Blood Updated:  08/03/18 1907    Urinalysis With Culture If Indicated - Urine, Clean Catch [[id number]]  (Abnormal) Collected:  08/03/18 1653    Specimen:  Urine from Urine, Clean Catch Updated:  08/03/18 1700     Color, UA Dark Yellow     Appearance, UA Cloudy (A)     pH, UA <=5.0     Specific Gravity, UA 1.009     Glucose, UA Negative     Ketones, UA Negative     Bilirubin, UA Negative     Blood, UA Large (3+) (A)     Protein, UA Negative     Leuk Esterase, UA Large (3+) (A)     Nitrite, UA Negative     Urobilinogen, UA 0.2 E.U./dL    Urinalysis, Microscopic Only - Urine, Clean Catch [[id number]]  (Abnormal) Collected:  08/03/18 1653    Specimen:  Urine from Urine, Clean Catch Updated:  08/03/18 1700     RBC, UA 21-30 (A) /HPF      WBC, UA Too Numerous to Count (A) /HPF      Bacteria, UA None Seen /HPF      Squamous Epithelial Cells, UA None Seen /HPF      Hyaline Casts, UA 3-6 /LPF      Methodology Automated Microscopy            I reviewed the patient's new clinical results.  I reviewed the patient's new imaging results and agree with the interpretation.    Assessment/Plan:     Active Hospital Problems    Diagnosis Date Noted   • **Anasarca [R60.1] 04/27/2018     Lasix 40mg IV BID, holding AM Lasix due to creatinine bump and will follow  Albumin     • Leukocytosis [D72.829] 04/11/2018     Priority: Medium     Jump from 13 from 8.5  R/o SBP after thrombocytopenia reoslution for diagnostic or therapeutic paracentesis  Pt is wheezing on exam will do stat CXR and determine etiology       • Anxiety and depression [F41.8] 08/02/2018     Pt has not been taking any medications     • CKD  (chronic kidney disease) stage 3, GFR 30-59 ml/min [N18.3] 08/02/2018     Baseline Cr 1.4, admission 1.64, now 1.86 now up to 2.25 holding AM lasix for Albumin to return fluid to intravascular space  Monitoring will watch for signs of prerenal azotemia with lasix administration     • Elevated brain natriuretic peptide (BNP) level [R79.89] 08/02/2018     2,120 on admission  F/u TTE     • Diarrhea [R19.7] 08/02/2018     F/u GI PCR     • Anemia [D64.9] 07/18/2017     Anemia of chronic disease        • Thrombocytopenia (CMS/HCC) [D69.6] 07/10/2017     Platelet of 51K down to 43k and now 42k will continue monitor  Most likely secondary to liver cirrhosis.   Pending Peripheral smear to rule out Autoimmune and destructive etiologies       • Hypokalemia [E87.6] 03/02/2017     Repleting     • Alcoholic cirrhosis (CMS/HCC) [K70.30] 05/26/2009      Dr. Becerril is Gastroenterologist  Continue home lactulose  Abdominal ultrasound positive for moderate asictes   Paracentesis pending resolution of thrombocytopenia       • Essential hypertension [I10] 12/01/2008     Pt has not been taking his meds at home  Will hold restarting antihypertensives while he gets fluids off with IV lasix and will need BP monitoring if paracentesis required            DVT prophylaxis: Holding anticoagulation until decision about paracentesis is done  Code status is   Code Status and Medical Interventions:   Ordered at: 08/02/18 2011     Code Status:    CPR     Medical Interventions (Level of Support Prior to Arrest):    Full       Plan for disposition:Observation inpatient admisssion day 1            Electronically signed by Bandar Maya MD at 8/4/2018 12:12 PM

## 2018-08-06 NOTE — THERAPY TREATMENT NOTE
"Acute Care - Physical Therapy Treatment Note  [facility]     Patient Name: Armando Mcmullen Sr.  : 1965  MRN: [id number]  Today's Date: 2018  Onset of Illness/Injury or Date of Surgery: 18  Date of Referral to PT: 18  Referring Physician: Dr. DORY Luis    Admit Date: 2018    Visit Dx:    ICD-10-CM ICD-9-CM   1. Elevated lactic acid level R79.89 276.2   2. Elevated bilirubin R17 277.4   3. Leukocytosis, unspecified type D72.829 288.60   4. Generalized abdominal pain R10.84 789.07   5. Alcoholic cirrhosis of liver with ascites (CMS/HCC) K70.31 571.2   6. Impaired functional mobility and activity tolerance Z74.09 V49.89     Patient Active Problem List   Diagnosis   • Anxiety state   • Back pain   • Chronic hepatitis (CMS/HCC)   • Alcoholic cirrhosis (CMS/HCC)   • Depression   • Essential hypertension   • Substance abuse   • Liver failure with hepatic coma (CMS/HCC)   • Hypersplenism   • Chronic osteomyelitis of right shoulder region (CMS/HCC)   • Thrombocytopenia (CMS/HCC)   • Anemia   • Positive hepatitis C antibody test   • BMI 35.0-35.9,adult   • Tobacco use   • Leukocytosis   • Acute kidney injury (CMS/HCC)   • Anasarca   • Anxiety and depression   • CKD (chronic kidney disease) stage 3, GFR 30-59 ml/min   • Ascites   • Elevated brain natriuretic peptide (BNP) level   • Pneumonia due to infectious organism       Therapy Treatment          Rehabilitation Treatment Summary     Row Name 18 1030             Treatment Time/Intention    Discipline physical therapy assistant  -LN      Document Type therapy note (daily note)  -LN      Subjective Information complains of;pain   just finished with OT-\"I can do some ex\"  -LN      Mode of Treatment physical therapy  -LN      Existing Precautions/Restrictions fall;other (see comments)   h/o l bka  -LN      Recorded by [DYLAN] Stephanie Du PTA 18 1237      Row Name 18 1030             Vital Signs    Pre Systolic BP Rehab 130  -LN   "    Pre Treatment Diastolic BP 80  -LN      Post Systolic BP Rehab 130  -LN      Post Treatment Diastolic BP 82  -LN      Intratreatment Heart Rate (beats/min) 99  -LN      Posttreatment Heart Rate (beats/min) 95  -LN      O2 Delivery Pre Treatment room air  -LN      Intra SpO2 (%) 95  -LN      Post SpO2 (%) 98  -LN      Pre Patient Position Supine  -LN      Intra Patient Position Supine  -LN      Post Patient Position Supine  -LN      Recorded by [LN] Stephnaie Du, [facility] 08/06/18 1237      Row Name 08/06/18 1030             Cognitive Assessment/Intervention- PT/OT    Orientation Status (Cognition) oriented x 3  -LN      Recorded by [LN] Stephanie Du, [facility] 08/06/18 1237      Row Name 08/06/18 1030             Sit-Stand Transfer    Sit-Stand [place] (Transfers) not tested  -LN      Recorded by [LN] Stephanie Du, [facility] 08/06/18 1237      Row Name 08/06/18 1030             Gait/Stairs Assessment/Training    [place] Level (Gait) not tested  -LN      Recorded by [LN] Stephanie Du, [facility] 08/06/18 1237      Row Name 08/06/18 1030             Lower Extremity Supine Therapeutic Exercise    Performed, Supine Lower Extremity (Therapeutic Exercise) hip abduction/adduction;SLR (straight leg raise)   r le-aap,saq,heelslides  -LN      Exercise Type, Supine Lower Extremity (Therapeutic Exercise) AROM (active range of motion)  -LN      Sets/Reps Detail, Supine Lower Extremity (Therapeutic Exercise) 2/10  -LN      Recorded by [LN] Stephanie Du, [facility] 08/06/18 1237      Row Name 08/06/18 1030             Positioning and Restraints    Post Treatment Position bed  -LN      In Bed supine;call light within reach;encouraged to call for assist;exit alarm on  -LN      Recorded by [LN] Stephanie Du, PTA 08/06/18 1237      Row Name 08/06/18 1030             Pain Scale: Numbers Pre/Post-Treatment    Pain Scale: Numbers, Pretreatment 9/10  -LN      Pain Scale: Numbers, Post-Treatment 9/10  -LN      Pain Location abdomen  -LN      Pain  Intervention(s) --   meds not due  -LN      Recorded by [LN] Stephanie Du, PTA 08/06/18 1237      Row Name                Wound 08/05/18 1315 Right abdomen puncture    Wound - Properties Group Date first assessed: 08/05/18 [SM] Time first assessed: 1315 [SM] Present On Admission : no [SM] Side: Right [SM] Location: abdomen [SM] Type: puncture [SM] Additional Comments: covered by dressing; small amount of serosang fluid [SM] Recorded by:  [SM] Lyudmila Tran RN 08/05/18 1608    Row Name 08/06/18 1030             Plan of Care Review    Plan of Care Reviewed With patient  -LN      Recorded by [LN] Stephanie Du PTA 08/06/18 1237      Row Name 08/06/18 1030             Outcome Summary/Treatment Plan (PT)    Plan for Continued Treatment (PT) cont-oob  -LN      Recorded by [LN] Stephanie Du, PTA 08/06/18 1237        User Key  (r) = Recorded By, (t) = Taken By, (c) = Cosigned By    Initials Name Effective Dates Discipline    LN Stephanie Du, PTA 03/07/18 -  PT    SM Lyudmila Tran RN 10/17/16 -  Nurse          Wound 08/05/18 1315 Right abdomen puncture (Active)   Dressing Appearance area marked;intact 8/5/2018 11:08 PM   Drainage Amount small 8/5/2018 11:08 PM               PT Rehab Goals     Row Name 08/06/18 1030             Bed Mobility Goal 1 (PT)    Activity/Assistive Device (Bed Mobility Goal 1, PT) supine to sit;rolling to left  -LN      [place] Level/Cues Needed (Bed Mobility Goal 1, PT) standby assist  -LN      Time Frame (Bed Mobility Goal 1, PT) 5 days  -LN      Progress/Outcomes (Bed Mobility Goal 1, PT) goal not met  -LN         Transfer Goal 1 (PT)    Activity/Assistive Device (Transfer Goal 1, PT) bed-to-chair/chair-to-bed  -LN      [place] Level/Cues Needed (Transfer Goal 1, PT) minimum assist (75% or more patient effort)  -LN      Time Frame (Transfer Goal 1, PT) 5 days  -LN      Progress/Outcome (Transfer Goal 1, PT) goal not met  -LN         Strength Goal 1 (PT)    Strength Goal 1  (PT) Improve R LE MMT by 1/2 to 1 muscle grade  -LN      Time Frame (Strength Goal 1, PT) 5 days  -LN      Progress/Outcome (Strength Goal 1, PT) goal not met  -LN        User Key  (r) = Recorded By, (t) = Taken By, (c) = Cosigned By    Initials Name Provider Type Discipline    LN Stephanie Du PTA Physical Therapy Assistant PT          Physical Therapy Education     Title: PT OT SLP Therapies (Active)     Topic: Physical Therapy (Active)     Point: Home exercise program (Done)    Learning Progress Summary     Learner Status Readiness Method Response Comment Documented by    Patient Done Acceptance E,TB   LN 08/06/18 1238          Point: Precautions (Done)    Learning Progress Summary     Learner Status Readiness Method Response Comment Documented by    Patient Done Acceptance E,TB   LN 08/06/18 1238                      User Key     Initials Effective Dates Name Provider Type Discipline    LN 03/07/18 -  Stephanie Du PTA Physical Therapy Assistant PT                    PT Recommendation and Plan     Outcome Summary/Treatment Plan (PT)  Plan for Continued Treatment (PT): cont-oob  Plan of Care Reviewed With: patient  Progress: improving  Outcome Summary: declined eob/oob;preston le ex 2/10 w/o difficulty-no goals met-if d/c today would benefit from 24/7 assist and hh pt/ot          Outcome Measures     Row Name 08/06/18 1030 08/04/18 0943          How much help from another person do you currently need...    Turning from your back to your side while in flat bed without using bedrails? 3  -LN 3  -BS     Moving from lying on back to sitting on the side of a flat bed without bedrails? 3  -LN 3  -BS     Moving to and from a bed to a chair (including a wheelchair)? 1  -LN 1  -BS     Standing up from a chair using your arms (e.g., wheelchair, bedside chair)? 1  -LN 1  -BS     Climbing 3-5 steps with a railing? 1  -LN 1  -BS     To walk in hospital room? 1  -LN 1  -BS     AM-PAC 6 Clicks Score 10  -LN 10  -BS         Functional Assessment    Outcome Measure Options AM-PAC 6 Clicks Basic Mobility (PT)  -LN AM-PAC 6 Clicks Basic Mobility (PT)  -BS       User Key  (r) = Recorded By, (t) = Taken By, (c) = Cosigned By    Initials Name Provider Type    Stephanie Marion PTA Physical Therapy Assistant    BS Eduard Aparicio, PT Physical Therapist           Time Calculation:         PT Charges     Row Name 08/06/18 1030             Time Calculation    Start Time 1030  -LN      Stop Time 1100  -LN      Time Calculation (min) 30 min  -LN      PT Received On 08/06/18  -LN         Time Calculation- PT    Total Timed Code Minutes- PT 30 minute(s)  -LN        User Key  (r) = Recorded By, (t) = Taken By, (c) = Cosigned By    Initials Name Provider Type    Stephanie Marion PTA Physical Therapy Assistant        Therapy Suggested Charges     Code   Minutes Charges    None           Therapy Charges for Today     Code Description Service Date Service Provider Modifiers Qty    79807586808 HC PT THER PROC EA 15 MIN 8/6/2018 Stephanie Du PTA GP 1    72821993943 HC PT THERAPEUTIC ACT EA 15 MIN 8/6/2018 Stephanie Du PTA GP 1          PT G-Codes  Outcome Measure Options: AM-PAC 6 Clicks Basic Mobility (PT)  Score: 10  Functional Limitation: Mobility: Walking and moving around  Mobility: Walking and Moving Around Current Status (): At least 60 percent but less than 80 percent impaired, limited or restricted  Mobility: Walking and Moving Around Goal Status (): At least 40 percent but less than 60 percent impaired, limited or restricted    Stephanie Du PTA  8/6/2018

## 2018-08-06 NOTE — PROGRESS NOTES
FAMILY MEDICINE DAILY PROGRESS NOTE  NAME: Armando Mcmullen Sr.  : 1965  MRN: [id number]     LOS: 4 days     PROVIDER OF SERVICE: Jose Luis III, MD    Chief Complaint: Anasarca    Subjective:     Interval History:  History taken from: patient RN  No acute overnight events, patient resting comfortably in bed.  Shortness of breath noticed when sitting up.  Patient reports lactulose doing well with bowel movements.  He complains of abdominal pain after his paracentesis consistent with extra loose fluid that was unable to be removed by radiology via paracentesis.    Review of Systems:   Review of Systems   Constitutional: Positive for activity change.   HENT: Negative.    Eyes: Negative.    Respiratory: Positive for shortness of breath.    Cardiovascular: Positive for leg swelling. Negative for chest pain.   Gastrointestinal: Positive for abdominal distention and abdominal pain.   Endocrine: Negative.    Genitourinary: Negative.    Musculoskeletal: Negative.    Skin: Negative.    Allergic/Immunologic: Negative.    Neurological: Negative.    Hematological: Negative.    Psychiatric/Behavioral: Negative.        Objective:     Vital Signs  Temp:  [97.6 °F (36.4 °C)-98.6 °F (37 °C)] 97.6 °F (36.4 °C)  Heart Rate:  [] 108  Resp:  [16-20] 20  BP: (112-142)/(68-92) 136/92    Physical Exam  Physical Exam   Constitutional: He is oriented to person, place, and time. He appears well-developed and well-nourished.   HENT:   Head: Normocephalic and atraumatic.   Right Ear: External ear normal.   Left Ear: External ear normal.   Neck: Neck supple.   Cardiovascular: Normal rate and regular rhythm.    Pulmonary/Chest: He has decreased breath sounds in the right upper field, the right middle field, the left upper field and the left middle field. He has wheezes in the right upper field, the right middle field, the left upper field and the left middle field.   Abdominal: Soft. Bowel sounds are normal.   Musculoskeletal:  Normal range of motion.   Neurological: He is alert and oriented to person, place, and time.   Skin: Skin is warm and dry.   Psychiatric: He has a normal mood and affect. His behavior is normal. Judgment and thought content normal.       Medication Review    Current Facility-Administered Medications:   •  albumin human 25 % IV SOLN 12.5 g, 12.5 g, Intravenous, BID, Jose Luis III, MD, Stopped at 08/05/18 2304  •  albuterol (PROVENTIL) nebulizer solution 0.083% 2.5 mg/3mL, 2.5 mg, Nebulization, Q4H - RT, Jose Luis III, MD, 2.5 mg at 08/06/18 0718  •  AZITHROMYCIN 500 MG/250 ML 0.9% NS IVPB (vial-mate), 500 mg, Intravenous, Q24H, Jose Luis III, MD, 500 mg at 08/05/18 1656  •  cefTRIAXone (ROCEPHIN) 1 g/100 mL 0.9% NS (MBP), 1 g, Intravenous, Q24H, Jose Luis III, MD, 1 g at 08/05/18 1459  •  diphenhydrAMINE (BENADRYL) injection 25 mg, 25 mg, Intravenous, Q6H PRN, Jose Luis III, MD  •  ibuprofen (ADVIL,MOTRIN) tablet 400 mg, 400 mg, Oral, Q4H PRN, Momo Hammer MD, 400 mg at 08/04/18 0359  •  lactulose (CHRONULAC) 10 GM/15ML solution 30 g, 30 g, Oral, BID, Momo Hammer MD, 30 g at 08/05/18 1900  •  morphine injection 4 mg, 4 mg, Intravenous, Q4H PRN, Jose Luis III, MD, 4 mg at 08/06/18 0336  •  nicotine (NICODERM CQ) 14 MG/24HR patch 1 patch, 1 patch, Transdermal, Q24H, Momo Hammer MD, 1 patch at 08/05/18 2206  •  ondansetron (ZOFRAN) tablet 4 mg, 4 mg, Oral, Q6H PRN **OR** ondansetron ODT (ZOFRAN-ODT) disintegrating tablet 4 mg, 4 mg, Oral, Q6H PRN **OR** ondansetron (ZOFRAN) injection 4 mg, 4 mg, Intravenous, Q6H PRN, oMmo Hammer MD  •  potassium chloride (MICRO-K) CR capsule 40 mEq, 40 mEq, Oral, Daily, Jose Luis III, MD, 40 mEq at 08/05/18 0843  •  sodium chloride 0.9 % flush 1-10 mL, 1-10 mL, Intravenous, PRN, Momo Hammer MD  •  Insert peripheral IV, , , Once **AND** sodium chloride 0.9 %  flush 10 mL, 10 mL, Intravenous, PRN, Mike Bergman MD, 10 mL at 08/06/18 0336     Diagnostic Data    Lab Results (last 24 hours)     Procedure Component Value Units Date/Time    CBC & Differential [[id number]] Collected:  08/06/18 0516    Specimen:  Blood Updated:  08/06/18 0705    Narrative:       The following orders were created for panel order CBC & Differential.  Procedure                               Abnormality         Status                     ---------                               -----------         ------                     Scan Slide[[id number]]                                       Final result               CBC Auto Differential[[id number]]        Abnormal            Final result                 Please view results for these tests on the individual orders.    Scan Slide [[id number]] Collected:  08/06/18 0516    Specimen:  Blood Updated:  08/06/18 0705     Anisocytosis Slight/1+     Macrocytes Slight/1+     WBC Morphology Normal     Platelet Estimate Decreased    Respiratory Culture - Sputum, Cough [[id number]] Collected:  08/04/18 1534    Specimen:  Sputum from Cough Updated:  08/06/18 0636     Respiratory Culture Normal Respiratory Renetta     Gram Stain Result Many (4+) WBCs per low power field      Few (2+) Epithelial cells per low power field      Mixed bacterial renetta    Body Fluid Culture - Body Fluid, Peritoneum [[id number]]  (Normal) Collected:  08/05/18 1423    Specimen:  Body Fluid from Peritoneum Updated:  08/06/18 0625     BF Culture No growth at less than 24 hours     Gram Stain Result Many (4+) WBCs seen      No organisms seen    Comprehensive Metabolic Panel [[id number]]  (Abnormal) Collected:  08/06/18 0516    Specimen:  Blood Updated:  08/06/18 0603     Glucose 72 mg/dL      BUN 39 (H) mg/dL      Creatinine 2.42 (H) mg/dL      Sodium 135 (L) mmol/L      Potassium 4.3 mmol/L      Chloride 105 mmol/L      CO2 22.0 mmol/L      Calcium 8.1 (L) mg/dL      Total Protein 5.5 (L) g/dL       Albumin 2.30 (L) g/dL      ALT (SGPT) 26 U/L      AST (SGOT) 30 U/L      Alkaline Phosphatase 76 U/L      Total Bilirubin 4.7 (H) mg/dL      eGFR Non African Amer 28 (L) mL/min/1.73      Globulin 3.2 gm/dL      A/G Ratio 0.7 (L) g/dL      BUN/Creatinine Ratio 16.1     Anion Gap 8.0 mmol/L     CBC Auto Differential [[id number]]  (Abnormal) Collected:  08/06/18 0516    Specimen:  Blood Updated:  08/06/18 0553     WBC 13.76 (H) 10*3/mm3      RBC 2.44 (L) 10*6/mm3      Hemoglobin 8.4 (L) g/dL      Hematocrit 24.9 (L) %      .0 (H) fL      MCH 34.4 (H) pg      MCHC 33.7 g/dL      RDW 15.5 (H) %      RDW-SD 58.1 (H) fl      MPV 9.5 fL      Platelets 49 (L) 10*3/mm3      Neutrophil % 73.6 %      Lymphocyte % 11.3 %      Monocyte % 9.7 %      Eosinophil % 3.3 %      Basophil % 0.9 %      Immature Grans % 1.2 (H) %      Neutrophils, Absolute 10.12 (H) 10*3/mm3      Lymphocytes, Absolute 1.56 10*3/mm3      Monocytes, Absolute 1.34 (H) 10*3/mm3      Eosinophils, Absolute 0.46 10*3/mm3      Basophils, Absolute 0.12 10*3/mm3      Immature Grans, Absolute 0.16 (H) 10*3/mm3      nRBC 0.0 /100 WBC     Body Fluid Cell Count With Differential - Body Fluid, Peritoneum [[id number]] Collected:  08/03/18 1633    Specimen:  Body Fluid from Peritoneum Updated:  08/05/18 1415    Narrative:       The following orders were created for panel order Body Fluid Cell Count With Differential - Body Fluid, Peritoneum.  Procedure                               Abnormality         Status                     ---------                               -----------         ------                     Body fluid cell count - ...[[id number]]  Abnormal            Final result               Body fluid differential ...[[id number]]                      Final result                 Please view results for these tests on the individual orders.    Body fluid differential - Body Fluid, [[id number]] Collected:  08/03/18 1633    Specimen:  Body Fluid from Peritoneum  Updated:  08/05/18 1415     Neutrophils, Fluid 49 %      Mononuclear, Fluid 51 %     Body fluid cell count - Body Fluid, [[id number]]  (Abnormal) Collected:  08/03/18 1633    Specimen:  Body Fluid from Peritoneum Updated:  08/05/18 1342     WBC, Fluid 325.5 (H) /mm3      RBC, Fluid 719 (H) /mm3      Color, Fluid Yellow     Appearance, Fluid Slightly Cloudy (A)     Volume, Fluid 2,000.0 mL     Albumin, Fluid - Body Fluid, Peritoneum [[id number]] Collected:  08/03/18 1633    Specimen:  Body Fluid from Peritoneum Updated:  08/05/18 1333     Albumin, Fluid <1.00 g/dL     Amylase, Body Fluid - Body Fluid, Peritoneum [[id number]] Collected:  08/03/18 1633    Specimen:  Body Fluid from Peritoneum Updated:  08/05/18 1332     Amylase, Fluid <30 U/L     Protein, Body Fluid - Body Fluid, Peritoneum [[id number]] Collected:  08/03/18 1633    Specimen:  Body Fluid from Peritoneum Updated:  08/05/18 1332     Protein, Total, Fluid <2.0 g/dL     Glucose, Body Fluid - Body Fluid, Peritoneum [[id number]] Collected:  08/03/18 1633    Specimen:  Body Fluid from Peritoneum Updated:  08/05/18 1327     Glucose, Fluid 101 mg/dL     Lactate Dehydrogenase, Body Fluid - Body Fluid, Peritoneum [[id number]] Collected:  08/03/18 1633    Specimen:  Body Fluid from Peritoneum Updated:  08/05/18 1327     Lactate Dehydrogenase (LD), Fluid 160 U/L     Blood Culture - Blood, [[id number]]  (Normal) Collected:  08/04/18 0915    Specimen:  Blood from Hand, Right Updated:  08/05/18 0945     Blood Culture No growth at 24 hours    Blood Culture - Blood, [[id number]]  (Normal) Collected:  08/04/18 0930    Specimen:  Blood from Arm, Left Updated:  08/05/18 0945     Blood Culture No growth at 24 hours            I reviewed the patient's new clinical results.  I reviewed the patient's new imaging results and agree with the interpretation.    Assessment/Plan:     Active Hospital Problems    Diagnosis Date Noted   • **Anasarca [R60.1] 04/27/2018     2.4 L removed by  paracentesis yesterday.  Positive for numerous white blood cell counts in the setting of SBP trending urine culture with no organisms seen on Gram stain.  Nephrology consult as above.     • Leukocytosis [D72.829] 04/11/2018     Priority: Medium     Jump from 13 from 8.5  Says score low due to presence of white cells and ascitic fluid.  Pending culture with no active organisms seen on Gram stain.  Community acquired pneumonia with ceftriaxone and azithromycin.       • Pneumonia due to infectious organism [J18.9] 08/05/2018     Rocephin and azithromycin 3/3  Follow wbc  Afebrile     • Anxiety and depression [F41.8] 08/02/2018     Pt has not been taking any medications     • CKD (chronic kidney disease) stage 3, GFR 30-59 ml/min [N18.3] 08/02/2018     Baseline Cr 1.4, admission 1.64, now 1.86, up to 2.25, and 2.41, then 2.5, now 2.4  Nephrology consult for diuretic use and sitting of acute kidney injuryversus chronic kidney disease       • Elevated brain natriuretic peptide (BNP) level [R79.89] 08/02/2018     2,120 on admission       • Acute kidney injury (CMS/HCC) [N17.9] 04/27/2018     Creatinine slowly increasing, will monitor as ascites is treated  Consult Nephrology for fluid balance versus diuretics and setting of a [facility] patient is currently fluid overloaded with 2+  edema to the right knee     • Anemia [D64.9] 07/18/2017     Anemia of chronic disease        • Thrombocytopenia (CMS/HCC) [D69.6] 07/10/2017     Most likely secondary to liver cirrhosis.   Pending Peripheral smear benign for causes other than hepatic sequestration versus decreased production.  Received platelets 8/4  Platelets dipped under 50,000 after paracentesis yesterday.  We'll trend     • Alcoholic cirrhosis (CMS/HCC) [K70.30] 05/26/2009      Dr. Becerril is Gastroenterologist consult in today with treatment of SBP.  Continue home lactulose  Abdominal ultrasound positive for moderate asictes 2.4 L taken out by radiology positive for white blood  cells and no bacteria seen awaiting culture.          • Essential hypertension [I10] 12/01/2008     Pt has not been taking his meds at home  Currently normotensive at hospital stay.           DVT prophylaxis: Holding anticoagulation until decision about paracentesis is done  Code status is   Code Status and Medical Interventions:   Ordered at: 08/02/18 2011     Code Status:    CPR     Medical Interventions (Level of Support Prior to Arrest):    Full       Plan for disposition: consulting GI for spontaneous bacterial peritonitis requiring 2 more days of inpatient admission.
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
## 2018-08-07 NOTE — PROGRESS NOTES
"[facility]  [address] [place] [address]. [address]  T - [phone]   - [phone]     Progress Note          PATIENT  DEMOGRAPHICS   PATIENT NAME: Armando Mcmullen Sr.                      PHYSICIAN: Josep Colón MD  : 1965  MRN: [id number]   LOS: 5 days    Patient Care Team:  Reddy Grant MD as PCP - General (Family Medicine)  Shawn Cortez MD (Inactive) as Surgeon (Orthopedic Surgery)  Tushar Becerril DO as Consulting Physician (Gastroenterology)  Josep Colón MD as Consulting Physician (Nephrology)  Ortiz Ferreira MD as Consulting Physician (Hematology and Oncology)  Subjective   SUBJECTIVE   Some back pain 6/10 no marked soa         Objective   OBJECTIVE   Vital Signs  Temp:  [96.8 °F (36 °C)-98.8 °F (37.1 °C)] 97.7 °F (36.5 °C)  Heart Rate:  [] 101  Resp:  [18-20] 20  BP: (112-142)/(72-82) 114/79    Flowsheet Rows      First Filed Value   Admission Height  182.9 cm (72\") Documented at 2018 1631   Admission Weight  104 kg (230 lb) Documented at 2018 1631           I/O last 3 completed shifts:  In: 1852.5 [P.O.:1390; IV Piggyback:462.5]  Out: 2275 [Urine:2275]    PHYSICAL EXAM    Physical Exam   Constitutional: He is oriented to person, place, and time. He appears well-developed.   HENT:   Head: Normocephalic.   Eyes: Pupils are equal, round, and reactive to light.   Cardiovascular: Normal rate, regular rhythm and normal heart sounds.    Pulmonary/Chest: Effort normal and breath sounds normal.   Abdominal: Soft. Bowel sounds are normal.   Neurological: He is alert and oriented to person, place, and time.       RESULTS   Results Review:      Results from last 7 days  Lab Units 18  0717 18  0516 18  0637   SODIUM mmol/L 133* 135* 133*   POTASSIUM mmol/L 4.3 4.3 3.7   CHLORIDE mmol/L 103 105 103   CO2 mmol/L 23.0 22.0 22.0   BUN mg/dL 36* 39* 39*   CREATININE mg/dL 2.32* 2.42* 2.49*   CALCIUM mg/dL 7.9* 8.1* 8.2*   BILIRUBIN mg/dL " 3.8* 4.7* 4.3*   ALK PHOS U/L 98 76 89   ALT (SGPT) U/L 25 26 26   AST (SGOT) U/L 34 30 36   GLUCOSE mg/dL 90 72 68       Estimated Creatinine Clearance: 44.5 mL/min (A) (by C-G formula based on SCr of 2.32 mg/dL (H)).      Results from last 7 days  Lab Units 08/02/18  1747   MAGNESIUM mg/dL 2.4*   PHOSPHORUS mg/dL 3.4               Results from last 7 days  Lab Units 08/07/18  0717 08/06/18  0516 08/05/18  0637 08/04/18  1958 08/04/18  0544 08/03/18  0627   WBC 10*3/mm3 14.01* 13.76* 12.52*  --  13.08* 8.46   HEMOGLOBIN g/dL 8.7* 8.4* 8.7*  --  9.6* 9.3*   PLATELETS 10*3/mm3 48* 49* 54* 58* 42* 43*         Results from last 7 days  Lab Units 08/02/18  1747   INR  1.73*         Imaging Results (last 24 hours)     ** No results found for the last 24 hours. **           MEDICATIONS      albumin human 12.5 g Intravenous BID   azithromycin 500 mg Intravenous Q24H   ceftriaxone 2 g Intravenous Q24H   ipratropium-albuterol 3 mL Nebulization 4x Daily - RT   lactulose 30 g Oral BID   nicotine 1 patch Transdermal Q24H   potassium chloride 40 mEq Oral Daily          Assessment/Plan   ASSESSMENT / PLAN    Principal Problem:    Anasarca  Active Problems:    Alcoholic cirrhosis (CMS/HCC)    Essential hypertension    Thrombocytopenia (CMS/HCC)    Anemia    Leukocytosis    Acute kidney injury (CMS/HCC)    Anxiety and depression    CKD (chronic kidney disease) stage 3, GFR 30-59 ml/min    Elevated brain natriuretic peptide (BNP) level    Pneumonia due to infectious organism    1- chronic kidney disease stage III baseline creatinine close to 1.5.  He has creatinine of 1.4 in the last office visit.  He was doing well on Lasix and Aldactone and now out of his diuretic and came in with with increasing edema with anasarca.  Patient has paracentesis done over the weekend.  Did well with one dose of lasix and will give him again today.    Continue with daily weight.  Continue with fluid restriction at 2 L per day.  Avoid any  NSAIDs.     2.history of liver cirrhosis status post paracentesis     3.history of chronic osteomyelitis of the right shoulder     4.history of left BKA     5.leukocytosis possible pneumonia patient is currently on ceftriaxone and azithromycin as                This document has been electronically signed by Josep Colón MD on August 7, 2018 11:09 AM

## 2018-08-07 NOTE — PROGRESS NOTES
FAMILY MEDICINE DAILY PROGRESS NOTE  NAME: Armando Mcmullen Sr.  : 1965  MRN: [id number]     LOS: 5 days     PROVIDER OF SERVICE: Jose Luis III, MD    Chief Complaint: Anasarca    Subjective:     Interval History:  History taken from: patient RN  No acute overnight events, patient resting comfortably in bed.  Shortness of breath noticed when sitting up.  Patient reports lactulose doing well with bowel movements.  He complains of abdominal pain after his paracentesis consistent with extra loose fluid that was unable to be removed by radiology via paracentesis.    Review of Systems:   Review of Systems   Constitutional: Negative for activity change.   HENT: Negative.    Eyes: Negative.    Respiratory: Positive for shortness of breath.    Cardiovascular: Positive for leg swelling. Negative for chest pain.   Gastrointestinal: Positive for abdominal distention and abdominal pain.   Endocrine: Negative.    Genitourinary: Negative.    Musculoskeletal: Negative.    Skin: Negative.    Allergic/Immunologic: Negative.    Neurological: Negative.    Hematological: Negative.    Psychiatric/Behavioral: Negative.        Objective:     Vital Signs  Temp:  [96.8 °F (36 °C)-99.4 °F (37.4 °C)] 98.8 °F (37.1 °C)  Heart Rate:  [] 97  Resp:  [18-20] 20  BP: (112-142)/(72-82) 112/72    Physical Exam  Physical Exam   Constitutional: He is oriented to person, place, and time. He appears well-developed and well-nourished.   HENT:   Head: Normocephalic and atraumatic.   Right Ear: External ear normal.   Left Ear: External ear normal.   Neck: Neck supple.   Cardiovascular: Normal rate and regular rhythm.    Pulmonary/Chest: He has decreased breath sounds in the right upper field, the right middle field, the left upper field and the left middle field. He has wheezes.   Abdominal: Soft. Bowel sounds are normal.   Musculoskeletal: Normal range of motion.   Neurological: He is alert and oriented to person, place, and time.    Skin: Skin is warm and dry.   Psychiatric: He has a normal mood and affect. His behavior is normal. Judgment and thought content normal.       Medication Review    Current Facility-Administered Medications:   •  albumin human 25 % IV SOLN 12.5 g, 12.5 g, Intravenous, BID, Jose Luis III, MD, Last Rate: 0 mL/hr at 08/05/18 2304, 12.5 g at 08/06/18 2133  •  AZITHROMYCIN 500 MG/250 ML 0.9% NS IVPB (vial-mate), 500 mg, Intravenous, Q24H, Jose Luis III, MD, 500 mg at 08/06/18 1558  •  cefTRIAXone (ROCEPHIN) 2 g/100 mL 0.9% NS VTB (LUCY), 2 g, Intravenous, Q24H, Jose Luis III, MD, 2 g at 08/06/18 1514  •  diphenhydrAMINE (BENADRYL) injection 25 mg, 25 mg, Intravenous, Q6H PRN, Jose Luis III, MD  •  ipratropium-albuterol (DUO-NEB) nebulizer solution 3 mL, 3 mL, Nebulization, 4x Daily - RT, Jose Luis III, MD, 3 mL at 08/06/18 1941  •  lactulose (CHRONULAC) 10 GM/15ML solution 30 g, 30 g, Oral, BID, Momo Hammer MD, 30 g at 08/06/18 2047  •  morphine injection 4 mg, 4 mg, Intravenous, Q4H PRN, Jose Luis III, MD, 4 mg at 08/06/18 2048  •  nicotine (NICODERM CQ) 14 MG/24HR patch 1 patch, 1 patch, Transdermal, Q24H, Momo Hammer MD, 1 patch at 08/06/18 2152  •  ondansetron (ZOFRAN) tablet 4 mg, 4 mg, Oral, Q6H PRN **OR** ondansetron ODT (ZOFRAN-ODT) disintegrating tablet 4 mg, 4 mg, Oral, Q6H PRN **OR** ondansetron (ZOFRAN) injection 4 mg, 4 mg, Intravenous, Q6H PRN, Momo Hammer MD  •  potassium chloride (MICRO-K) CR capsule 40 mEq, 40 mEq, Oral, Daily, Jose Luis III, MD, 40 mEq at 08/06/18 0853  •  sodium chloride 0.9 % flush 1-10 mL, 1-10 mL, Intravenous, PRN, Momo Hammer MD  •  Insert peripheral IV, , , Once **AND** sodium chloride 0.9 % flush 10 mL, 10 mL, Intravenous, PRN, Mike Bergman MD, 10 mL at 08/06/18 3803     Diagnostic Data    Lab Results (last 24 hours)     Procedure Component Value Units  Date/Time    Body Fluid Cell Count With Differential - Body Fluid, Peritoneum [[id number]] Collected:  08/03/18 1633    Specimen:  Body Fluid from Peritoneum Updated:  08/07/18 0808    Narrative:       The following orders were created for panel order Body Fluid Cell Count With Differential - Body Fluid, Peritoneum.  Procedure                               Abnormality         Status                     ---------                               -----------         ------                     Body fluid cell count - ...[[id number]]  Abnormal            Final result               Body fluid differential ...[[id number]]                      Edited Result - FINAL        Please view results for these tests on the individual orders.    Body fluid differential - Body Fluid, [[id number]] Collected:  08/03/18 1633    Specimen:  Body Fluid from Peritoneum Updated:  08/07/18 0808     Neutrophils, Fluid 49 %      Mononuclear, Fluid 51 %     Narrative:       Benign mesothelial cells and macrophages present / Reviewed by Cytologist by cytologist LINDA Harmon    Comprehensive Metabolic Panel [[id number]]  (Abnormal) Collected:  08/07/18 0717    Specimen:  Blood Updated:  08/07/18 0753     Glucose 90 mg/dL      BUN 36 (H) mg/dL      Creatinine 2.32 (H) mg/dL      Sodium 133 (L) mmol/L      Potassium 4.3 mmol/L      Chloride 103 mmol/L      CO2 23.0 mmol/L      Calcium 7.9 (L) mg/dL      Total Protein 5.6 (L) g/dL      Albumin 2.40 (L) g/dL      ALT (SGPT) 25 U/L      AST (SGOT) 34 U/L      Alkaline Phosphatase 98 U/L      Total Bilirubin 3.8 (H) mg/dL      eGFR Non African Amer 29 (L) mL/min/1.73      Globulin 3.2 gm/dL      A/G Ratio 0.8 (L) g/dL      BUN/Creatinine Ratio 15.5     Anion Gap 7.0 mmol/L     CBC & Differential [[id number]] Collected:  08/07/18 0717    Specimen:  Blood Updated:  08/07/18 0735    Narrative:       The following orders were created for panel order CBC & Differential.  Procedure                                Abnormality         Status                     ---------                               -----------         ------                     Scan Slide[[id number]]                                                                  CBC Auto Differential[[id number]]        Abnormal            Final result                 Please view results for these tests on the individual orders.    CBC Auto Differential [[id number]]  (Abnormal) Collected:  08/07/18 0717    Specimen:  Blood Updated:  08/07/18 0735     WBC 14.01 (H) 10*3/mm3      RBC 2.59 (L) 10*6/mm3      Hemoglobin 8.7 (L) g/dL      Hematocrit 25.5 (L) %      MCV 98.5 (H) fL      MCH 33.6 pg      MCHC 34.1 g/dL      RDW 15.1 (H) %      RDW-SD 54.8 (H) fl      MPV 10.2 fL      Platelets 48 (L) 10*3/mm3      Neutrophil % 74.9 %      Lymphocyte % 11.1 %      Monocyte % 8.2 %      Eosinophil % 4.1 %      Basophil % 0.6 %      Immature Grans % 1.1 (H) %      Neutrophils, Absolute 10.49 (H) 10*3/mm3      Lymphocytes, Absolute 1.55 10*3/mm3      Monocytes, Absolute 1.15 (H) 10*3/mm3      Eosinophils, Absolute 0.58 10*3/mm3      Basophils, Absolute 0.08 10*3/mm3      Immature Grans, Absolute 0.16 (H) 10*3/mm3     Urine Eosinophils, Johnson's Stain - Urine, Clean Catch [[id number]]  (Normal) Collected:  08/06/18 1554    Specimen:  Urine from Urine, Clean Catch Updated:  08/07/18 0646     Johnson Stain Negative    Body Fluid Culture - Body Fluid, Peritoneum [[id number]]  (Normal) Collected:  08/05/18 1423    Specimen:  Body Fluid from Peritoneum Updated:  08/07/18 0613     BF Culture No growth at 2 days     Gram Stain Result Many (4+) WBCs seen      No organisms seen    Respiratory Culture - Sputum, Cough [[id number]] Collected:  08/04/18 1534    Specimen:  Sputum from Cough Updated:  08/07/18 0612     Respiratory Culture Normal Respiratory Renetta     Gram Stain Result Many (4+) WBCs per low power field      Few (2+) Epithelial cells per low power field      Mixed bacterial renetta    Urea  Nitrogen, Urine - Urine, Clean Catch [[id number]] Collected:  08/06/18 1554    Specimen:  Urine from Urine, Clean Catch Updated:  08/06/18 1646     Urea Nitrogen, Urine 290 mg/dL     Creatinine, Urine, Random - Urine, Clean Catch [[id number]] Collected:  08/06/18 1554    Specimen:  Urine from Urine, Clean Catch Updated:  08/06/18 1646     Creatinine, Urine 33.6 mg/dL     C-reactive Protein [[id number]]  (Abnormal) Collected:  08/06/18 0516    Specimen:  Blood Updated:  08/06/18 1355     C-Reactive Protein 17.80 (H) mg/dL     Blood Culture - Blood, [[id number]]  (Normal) Collected:  08/04/18 0915    Specimen:  Blood from Hand, Right Updated:  08/06/18 0945     Blood Culture No growth at 2 days    Blood Culture - Blood, [[id number]]  (Normal) Collected:  08/04/18 0930    Specimen:  Blood from Arm, Left Updated:  08/06/18 0945     Blood Culture No growth at 2 days            I reviewed the patient's new clinical results.  I reviewed the patient's new imaging results and agree with the interpretation.    Assessment/Plan:     Active Hospital Problems    Diagnosis Date Noted   • **Anasarca [R60.1] 04/27/2018     Nephro and GI consults appreciated.  Lasix IV 40 daily  2L fluid restriction  Likely Hepatorenal type II       • Leukocytosis [D72.829] 04/11/2018     Priority: Medium     Jump from 13 sustained  Not SBP treat for Community acquired pneumonia with ceftriaxone d3/7 and azithromycin d 3/7.  Blood Cx x2     • Pneumonia due to infectious organism [J18.9] 08/05/2018     Rocephin and azithromycin 3/3  Follow wbc  Afebrile     • Anxiety and depression [F41.8] 08/02/2018     Pt has not been taking any medications     • CKD (chronic kidney disease) stage 3, GFR 30-59 ml/min [N18.3] 08/02/2018     Baseline Cr 1.4, admission 1.64, now 1.86, up to 2.25, and 2.41, then 2.5, now 2.4  Nephrology consult for diuretic use and sitting of acute kidney injuryversus chronic kidney disease       • Elevated brain natriuretic peptide (BNP)  level [R79.89] 08/02/2018     2,120 on admission       • Acute kidney injury (CMS/HCC) [N17.9] 04/27/2018     Creatinine slowly increasing, will monitor as ascites is treated  Consult Nephrology appreciated Dr. Colón following.   Lasix 40 IV Daily  2L Fluid restriction  No Nsaids     • Anemia [D64.9] 07/18/2017     Anemia of chronic disease        • Thrombocytopenia (CMS/HCC) [D69.6] 07/10/2017     Most likely secondary to liver cirrhosis.   Spleno-hepatic sequestration versus decreased production.  Received platelets 8/4  Platelets   We'll trend     • Alcoholic cirrhosis (CMS/HCC) [K70.30] 05/26/2009      Dr. Becerril is Gastroenterologist consulted recommendations appreciated  - Ascitic fluid without infectious organism not SBP .  Continue home lactulose  Abdominal ultrasound  For recurrent ascites   AFP for concern of Hepatic Carcinoma  Suspected type Ii hepatorenal sydnrome       • Essential hypertension [I10] 12/01/2008     Pt has not been taking his meds at home  Currently normotensive at hospital stay.           DVT prophylaxis: Follow up PT, PTT  Code status is   Code Status and Medical Interventions:   Ordered at: 08/02/18 2011     Code Status:    CPR     Medical Interventions (Level of Support Prior to Arrest):    Full       Plan for disposition continued inpatient admission for leukocytosis probably another day or 2            This document has been electronically signed by Jose Luis III, MD on August 7, 2018 6:49 PM
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
## 2018-08-07 NOTE — SIGNIFICANT NOTE
08/07/18 1340   Rehab Treatment   Discipline physical therapy assistant   Reason Treatment Not Performed patient/family declined treatment, not feeling well

## 2018-08-08 PROBLEM — R79.89 ELEVATED BRAIN NATRIURETIC PEPTIDE (BNP) LEVEL: Status: RESOLVED | Noted: 2018-01-01 | Resolved: 2018-01-01
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
## 2018-08-08 NOTE — PROGRESS NOTES
"[facility]  [address] [place] [address]. [address]  T - [phone]  F - [phone]     Progress Note          PATIENT  DEMOGRAPHICS   PATIENT NAME: Armando Mcmullen Sr.                      PHYSICIAN: Josep Colón MD  : 1965  MRN: [id number]   LOS: 6 days    Patient Care Team:  Reddy Grant MD as PCP - General (Family Medicine)  Shawn Cortez MD (Inactive) as Surgeon (Orthopedic Surgery)  Tushar Becerril DO as Consulting Physician (Gastroenterology)  Josep Colón MD as Consulting Physician (Nephrology)  Ortiz Ferreira MD as Consulting Physician (Hematology and Oncology)  Subjective   SUBJECTIVE   No marked soa but still has abdo distension         Objective   OBJECTIVE   Vital Signs  Temp:  [98 °F (36.7 °C)-99.2 °F (37.3 °C)] 98.2 °F (36.8 °C)  Heart Rate:  [] 88  Resp:  [18] 18  BP: (125-144)/(58-82) 130/75    Flowsheet Rows      First Filed Value   Admission Height  182.9 cm (72\") Documented at 2018 1631   Admission Weight  104 kg (230 lb) Documented at 2018 1631           I/O last 3 completed shifts:  In: 2192.5 [P.O.:1780; IV Piggyback:412.5]  Out:  [Urine:]    PHYSICAL EXAM    Physical Exam   Constitutional: He is oriented to person, place, and time. He appears well-developed.   HENT:   Head: Normocephalic.   Eyes: Pupils are equal, round, and reactive to light.   Cardiovascular: Normal rate, regular rhythm and normal heart sounds.    Pulmonary/Chest: Effort normal and breath sounds normal.   Abdominal: Soft. Bowel sounds are normal.   Neurological: He is alert and oriented to person, place, and time.       RESULTS   Results Review:      Results from last 7 days  Lab Units 18  0537 18  0717 18  0516   SODIUM mmol/L 135* 133* 135*   POTASSIUM mmol/L 4.4 4.3 4.3   CHLORIDE mmol/L 107 103 105   CO2 mmol/L 21.0* 23.0 22.0   BUN mg/dL 36* 36* 39*   CREATININE mg/dL 2.37* 2.32* 2.42*   CALCIUM mg/dL 7.9* 7.9* 8.1*   BILIRUBIN " mg/dL 3.2* 3.8* 4.7*   ALK PHOS U/L 90 98 76   ALT (SGPT) U/L 22 25 26   AST (SGOT) U/L 44 34 30   GLUCOSE mg/dL 83 90 72       Estimated Creatinine Clearance: 44 mL/min (A) (by C-G formula based on SCr of 2.37 mg/dL (H)).      Results from last 7 days  Lab Units 08/02/18  1747   MAGNESIUM mg/dL 2.4*   PHOSPHORUS mg/dL 3.4               Results from last 7 days  Lab Units 08/08/18  0537 08/07/18  0717 08/06/18  0516 08/05/18  0637 08/04/18  1958 08/04/18  0544   WBC 10*3/mm3 12.38* 14.01* 13.76* 12.52*  --  13.08*   HEMOGLOBIN g/dL 8.1* 8.7* 8.4* 8.7*  --  9.6*   PLATELETS 10*3/mm3 39* 48* 49* 54* 58* 42*         Results from last 7 days  Lab Units 08/02/18  1747   INR  1.73*         Imaging Results (last 24 hours)     Procedure Component Value Units Date/Time    US Venous Doppler Lower Extremity Right (duplex) [[id number]] Collected:  08/08/18 1138     Updated:  08/08/18 1222    Narrative:         Ultrasound venous duplex right lower extremity    HISTORY: Right lower extremity swelling or edema.    Duplex ultrasound of the deep venous system of the right lower  extremity was performed    Real-time images demonstrate normal compressibility without  evidence of intraluminal thrombus.  Doppler shows phasic flow and augmentation.  Color Doppler also reveals venous patency.    Subcutaneous edema in the popliteal fossa and minimal  subcutaneous edema in the thigh and calf.      Impression:       CONCLUSION:  No ultrasound evidence of deep venous thrombosis of the right  lower extremity.  Subcutaneous edema in the popliteal fossa and minimal  subcutaneous edema in the thigh and calf.    [id number]    Electronically signed by:  Claude Whatley MD  8/8/2018 12:21 PM CDT  Workstation: Synapse    US Abdomen Limited [[id number]] Collected:  08/07/18 1500     Updated:  08/07/18 1605    Narrative:         Ultrasound abdomen limited, ascites survey    HISTORY: Follow-up paracentesis    Ultrasound examination of the four quadrants  was performed.    COMPARISON: August 3, 2018    Small amount of residual or reaccumulated ascites in the right  upper quadrant, right lower quadrant and left lower quadrant.  Largest pocket 3.7 cm.      Impression:       CONCLUSION:  Small amount of residual or reaccumulated ascites in the right  upper quadrant, right lower quadrant and left lower quadrant.    [id number]    Electronically signed by:  Claude Whatley MD  8/7/2018 4:04 PM CDT  Workstation: [id number]           MEDICATIONS      albumin human 12.5 g Intravenous BID   ceftriaxone 1 g Intravenous Q24H   furosemide 40 mg Intravenous Daily   ipratropium-albuterol 3 mL Nebulization 4x Daily - RT   lactulose 30 g Oral Q12H   nicotine 1 patch Transdermal Q24H   potassium chloride 40 mEq Oral Daily          Assessment/Plan   ASSESSMENT / PLAN    Principal Problem:    Anasarca  Active Problems:    Alcoholic cirrhosis (CMS/HCC)    Essential hypertension    Thrombocytopenia (CMS/HCC)    Anemia    Leukocytosis    Acute kidney injury (CMS/HCC)    Anxiety and depression    CKD (chronic kidney disease) stage 3, GFR 30-59 ml/min    Pneumonia due to infectious organism    1- chronic kidney disease stage III baseline creatinine close to 1.5.  He has creatinine of 1.4 in the last office visit.  He was doing well on Lasix and Aldactone and now out of his diuretic and came in with with increasing edema with anasarca.  Patient has paracentesis done over the weekend.  Keep lasix daily and add metolazone for today. Continue with daily weight.  Continue with fluid restriction at 2 L per day.  Avoid any NSAIDs.     2.history of liver cirrhosis status post paracentesis. No marked fluid recollection on repeat u/s     3.history of chronic osteomyelitis of the right shoulder     4.history of left BKA     5.leukocytosis possible pneumonia patient is currently on ceftriaxone and azithromycin as                This document has been electronically signed by Josep Colón MD on August 8,  2018 1:02 PM

## 2018-08-08 NOTE — PROGRESS NOTES
FAMILY MEDICINE DAILY PROGRESS NOTE  NAME: Armando Mcmullen Sr.  : 1965  MRN: [id number]     LOS: 6 days     PROVIDER OF SERVICE: Jose Luis III, MD    Chief Complaint: Anasarca    Subjective:     Interval History:  History taken from: patient RN  No acute overnight events, patient resting comfortably in bed.  Shortness of breath noticed when sitting up.  Patient reports lactulose  Ineffective with bowel movements.  At home takes it 90 minutes after meals, changed order and notified nursing staff of timing of administration.     Review of Systems:   Review of Systems   Constitutional: Negative for activity change.   HENT: Negative.    Eyes: Negative.    Respiratory: Negative for shortness of breath.    Cardiovascular: Positive for leg swelling. Negative for chest pain.   Gastrointestinal: Positive for abdominal distention and abdominal pain.   Endocrine: Negative.    Genitourinary: Negative.    Musculoskeletal: Negative.    Skin: Negative.    Allergic/Immunologic: Negative.    Neurological: Negative.    Hematological: Negative.    Psychiatric/Behavioral: Negative.        Objective:     Vital Signs  Temp:  [97.7 °F (36.5 °C)-99.2 °F (37.3 °C)] 98.2 °F (36.8 °C)  Heart Rate:  [] 88  Resp:  [18-20] 18  BP: (114-144)/(58-82) 130/75    Physical Exam  Physical Exam   Constitutional: He is oriented to person, place, and time. He appears well-developed and well-nourished.   HENT:   Head: Normocephalic and atraumatic.   Right Ear: External ear normal.   Left Ear: External ear normal.   Neck: Neck supple.   Cardiovascular: Normal rate and regular rhythm.    Pulmonary/Chest: He has decreased breath sounds in the right upper field, the right middle field, the left upper field and the left middle field. He has wheezes.   Abdominal: Soft. Bowel sounds are normal.   Musculoskeletal: Normal range of motion.   Neurological: He is alert and oriented to person, place, and time.   Skin: Skin is warm and dry.   2+  pitting edema on the right lower extremity.  Without signs of ecchymosis   Psychiatric: He has a normal mood and affect. His behavior is normal. Judgment and thought content normal.       Medication Review    Current Facility-Administered Medications:   •  albumin human 25 % IV SOLN 12.5 g, 12.5 g, Intravenous, BID, Jose Luis III, MD, Last Rate: 0 mL/hr at 08/05/18 2304, 12.5 g at 08/07/18 2055  •  cefTRIAXone (ROCEPHIN) 1 g/100 mL 0.9% NS (MBP), 1 g, Intravenous, Q24H, Jose Luis III, MD  •  diphenhydrAMINE (BENADRYL) injection 25 mg, 25 mg, Intravenous, Q6H PRN, Jose Luis III, MD  •  furosemide (LASIX) injection 40 mg, 40 mg, Intravenous, Daily, Jose Luis III, MD  •  ipratropium-albuterol (DUO-NEB) nebulizer solution 3 mL, 3 mL, Nebulization, 4x Daily - RT, Jose Luis III, MD, 3 mL at 08/07/18 1553  •  lactulose (CHRONULAC) 10 GM/15ML solution 30 g, 30 g, Oral, Q12H, Jose Luis III, MD  •  morphine injection 4 mg, 4 mg, Intravenous, Q4H PRN, Jose Luis III, MD, 4 mg at 08/07/18 0851  •  nicotine (NICODERM CQ) 14 MG/24HR patch 1 patch, 1 patch, Transdermal, Q24H, Momo Hammer MD, 1 patch at 08/07/18 2200  •  ondansetron (ZOFRAN) tablet 4 mg, 4 mg, Oral, Q6H PRN **OR** ondansetron ODT (ZOFRAN-ODT) disintegrating tablet 4 mg, 4 mg, Oral, Q6H PRN **OR** ondansetron (ZOFRAN) injection 4 mg, 4 mg, Intravenous, Q6H PRN, Momo Hammer MD  •  potassium chloride (MICRO-K) CR capsule 40 mEq, 40 mEq, Oral, Daily, Jose Luis III, MD, 40 mEq at 08/07/18 0837  •  simethicone (MYLICON) chewable tablet 80 mg, 80 mg, Oral, 4x Daily PRN, Jose Luis III, MD  •  sodium chloride 0.9 % flush 1-10 mL, 1-10 mL, Intravenous, PRN, Momo Hammer MD  •  Insert peripheral IV, , , Once **AND** sodium chloride 0.9 % flush 10 mL, 10 mL, Intravenous, PRN, Mike Bergman MD, 10 mL at 08/07/18 7533     Diagnostic Data    Lab  Results (last 24 hours)     Procedure Component Value Units Date/Time    Comprehensive Metabolic Panel [[id number]]  (Abnormal) Collected:  08/08/18 0537    Specimen:  Blood Updated:  08/08/18 0630     Glucose 83 mg/dL      BUN 36 (H) mg/dL      Creatinine 2.37 (H) mg/dL      Sodium 135 (L) mmol/L      Potassium 4.4 mmol/L      Chloride 107 mmol/L      CO2 21.0 (L) mmol/L      Calcium 7.9 (L) mg/dL      Total Protein 5.2 (L) g/dL      Albumin 2.20 (L) g/dL      ALT (SGPT) 22 U/L      AST (SGOT) 44 U/L      Alkaline Phosphatase 90 U/L      Total Bilirubin 3.2 (H) mg/dL      eGFR Non African Amer 29 (L) mL/min/1.73      Globulin 3.0 gm/dL      A/G Ratio 0.7 (L) g/dL      BUN/Creatinine Ratio 15.2     Anion Gap 7.0 mmol/L     CBC & Differential [[id number]] Collected:  08/08/18 0537    Specimen:  Blood Updated:  08/08/18 0617    Narrative:       The following orders were created for panel order CBC & Differential.  Procedure                               Abnormality         Status                     ---------                               -----------         ------                     Scan Slide[[id number]]                                                                  CBC Auto Differential[[id number]]        Abnormal            Final result                 Please view results for these tests on the individual orders.    CBC Auto Differential [[id number]]  (Abnormal) Collected:  08/08/18 0537    Specimen:  Blood Updated:  08/08/18 0617     WBC 12.38 (H) 10*3/mm3      RBC 2.38 (L) 10*6/mm3      Hemoglobin 8.1 (L) g/dL      Hematocrit 23.9 (L) %      .4 (H) fL      MCH 34.0 pg      MCHC 33.9 g/dL      RDW 15.5 (H) %      RDW-SD 56.7 (H) fl      MPV 9.8 fL      Platelets 39 (L) 10*3/mm3      Neutrophil % 68.0 %      Lymphocyte % 14.6 %      Monocyte % 11.6 %      Eosinophil % 3.8 %      Basophil % 0.9 %      Immature Grans % 1.1 (H) %      Neutrophils, Absolute 8.42 10*3/mm3      Lymphocytes, Absolute 1.81 10*3/mm3       Monocytes, Absolute 1.44 (H) 10*3/mm3      Eosinophils, Absolute 0.47 10*3/mm3      Basophils, Absolute 0.11 10*3/mm3      Immature Grans, Absolute 0.13 (H) 10*3/mm3      nRBC 0.0 /100 WBC     Body Fluid Culture - Body Fluid, Peritoneum [[id number]]  (Normal) Collected:  08/05/18 1423    Specimen:  Body Fluid from Peritoneum Updated:  08/08/18 0609     BF Culture No growth at 3 days     Gram Stain Result Many (4+) WBCs seen      No organisms seen    POC Glucose Once [[id number]]  (Normal) Collected:  08/07/18 1713    Specimen:  Blood Updated:  08/07/18 1726     Glucose 115 mg/dL      Comment: RN NotifiedMeter: [id number]: [id number] FRITZ MONTEIRO       AFP Tumor Marker [[id number]] Collected:  08/07/18 1131    Specimen:  Blood Updated:  08/07/18 1457    Folate [[id number]]  (Normal) Collected:  08/07/18 1131    Specimen:  Blood Updated:  08/07/18 1315     Folate 16.20 ng/mL     Vitamin B12 [[id number]]  (Abnormal) Collected:  08/07/18 1131    Specimen:  Blood Updated:  08/07/18 1315     Vitamin B-12 >1,000 (H) pg/mL     Ferritin [[id number]]  (Normal) Collected:  08/07/18 1131    Specimen:  Blood Updated:  08/07/18 1245     Ferritin 454.00 ng/mL     Iron Profile [[id number]]  (Abnormal) Collected:  08/07/18 1131    Specimen:  Blood Updated:  08/07/18 1216     Iron 35 (L) mcg/dL      TIBC 121 (L) mcg/dL      Iron Saturation 29 %     Blood Culture - Blood, [[id number]]  (Normal) Collected:  08/04/18 0915    Specimen:  Blood from Hand, Right Updated:  08/07/18 0946     Blood Culture No growth at 3 days    Blood Culture - Blood, [[id number]]  (Normal) Collected:  08/04/18 0930    Specimen:  Blood from Arm, Left Updated:  08/07/18 0946     Blood Culture No growth at 3 days    Body Fluid Cell Count With Differential - Body Fluid, Peritoneum [[id number]] Collected:  08/03/18 1633    Specimen:  Body Fluid from Peritoneum Updated:  08/07/18 0808    Narrative:       The following orders were created for panel order  Body Fluid Cell Count With Differential - Body Fluid, Peritoneum.  Procedure                               Abnormality         Status                     ---------                               -----------         ------                     Body fluid cell count - ...[[id number]]  Abnormal            Final result               Body fluid differential ...[[id number]]                      Edited Result - FINAL        Please view results for these tests on the individual orders.    Body fluid differential - Body Fluid, [[id number]] Collected:  08/03/18 1633    Specimen:  Body Fluid from Peritoneum Updated:  08/07/18 0808     Neutrophils, Fluid 49 %      Mononuclear, Fluid 51 %     Narrative:       Benign mesothelial cells and macrophages present / Reviewed by Cytologist by cytologist LINDA Harmon            I reviewed the patient's new clinical results.  I reviewed the patient's new imaging results and agree with the interpretation.    Assessment/Plan:     Active Hospital Problems    Diagnosis Date Noted   • **Anasarca [R60.1] 04/27/2018     Nephro and GI consults appreciated.  Lasix IV 40 daily  2L fluid restriction  Likely Hepatorenal type II  Checking for RLE DVT by Ultrasound       • Leukocytosis [D72.829] 04/11/2018     Priority: Medium     Jump from 13 trending down  Not SBP treat for Community acquired pneumonia with ceftriaxone d4/7 and azithromycin d/c  Blood Cx negative     • Pneumonia due to infectious organism [J18.9] 08/05/2018     Rocephin d4/7  Afebrile     • Anxiety and depression [F41.8] 08/02/2018     Pt has not been taking any medications     • CKD (chronic kidney disease) stage 3, GFR 30-59 ml/min [N18.3] 08/02/2018     Baseline Cr 1.4, admission 1.64, now 1.86, up to 2.25, and 2.41, then 2.5, now 2.4, stable  Nephrology Dr. Eldon milton       • Acute kidney injury (CMS/HCC) [N17.9] 04/27/2018     Creatinine slowly increasing, will monitor as ascites is treated  Consult Nephrology appreciated   Eldon following.   Lasix 40 IV Daily  2L Fluid restriction  No Nsaids     • Anemia [D64.9] 07/18/2017     Anemia of chronic disease        • Thrombocytopenia (CMS/HCC) [D69.6] 07/10/2017     Most likely secondary to liver cirrhosis.   Spleno-hepatic sequestration versus decreased production.  Received platelets 8/4  Platelets   We'll trend, trending down to 39k     • Alcoholic cirrhosis (CMS/HCC) [K70.30] 05/26/2009      Dr. Becerril is Gastroenterologist consulted recommendations appreciated  - Ascitic fluid without infectious organism not SBP .  Continue home lactulose  Abdominal ultrasound  For recurrent ascites   AFP for concern of Hepatic Carcinoma, pending  Suspected type II hepatorenal sydnrome  Ultrasound negative for paracentesis possible fluid       • Essential hypertension [I10] 12/01/2008     Pt has not been taking his meds at home  Currently normotensive at hospital stay.           DVT prophylaxis: Follow up PT, PTT  Code status is   Code Status and Medical Interventions:   Ordered at: 08/02/18 2011     Code Status:    CPR     Medical Interventions (Level of Support Prior to Arrest):    Full       Plan for disposition Following of ultrasound of leg possible discharge today.        This document has been electronically signed by Jose Luis III, MD on August 8, 2018 7:58 AM

## 2018-08-08 NOTE — CONSULTS
Adult Nutrition  Assessment    Patient Name:  Armando Mcmullen Sr.  YOB: 1965  MRN: [id number]  Admit Date:  8/2/2018    Assessment Date:  8/8/2018    Comments:  Pt is a 53 year old man with hx of cirrhosis, HT, and CKD admitted with nasuea and swelling.  Pt had run out of some medications prior to admit, including lasix.  Pt screened for LOS and reports appetite is improving since admit.  Wt down 6lb since admit s/p paracentesis.  Pt also has hx of BKA.  Continues to have nausea at times and frequent BM related to lactulose.  PO intakes usually % of renal low sodium diet over past few days.  Fluid restriction in place.  RD made pt aware of menu options and snacks available.  Will monitor.          Reason for Assessment     Row Name 08/08/18 1620          Reason for Assessment    Reason For Assessment per organizational policy   LOS               Nutrition/Diet History     Row Name 08/08/18 1621          Nutrition/Diet History    Typical Food/Fluid Intake Pt reports appetite improving since being in hospital.               Labs/Tests/Procedures/Meds     Row Name 08/08/18 1621          Labs/Procedures/Meds    Lab Results Reviewed reviewed, pertinent        Medications    Pertinent Medications Reviewed reviewed, pertinent             Physical Findings     Row Name 08/08/18 1621          Physical Findings    Overall Physical Appearance amputee     Gastrointestinal abdominal distension;ascites     Skin edema;jaundice             Estimated/Assessed Needs     Row Name 08/08/18 1621          Calculation Measurements    Weight Used For Calculations 102 kg (224 lb 13.9 oz)        Estimated/Assessed Needs    Additional Documentation KCAL/KG (Group);Fluid Requirements (Group);Protein Requirements (Group)        KCAL/KG    14 Kcal/Kg (kcal) 1428     15 Kcal/Kg (kcal) 1530     18 Kcal/Kg (kcal) 1836     20 Kcal/Kg (kcal) 2040     25 Kcal/Kg (kcal) 2550     30 Kcal/Kg (kcal) 3060     35 Kcal/Kg (kcal) 3570      40 Kcal/Kg (kcal) 4080     45 Kcal/Kg (kcal) 4590     50 Kcal/Kg (kcal) 5100     kcal/kg (Specify) 20        Newberry-St. Jeor Equation    RMR (Newberry-St. Jeor Equation) 1898        Protein Requirements    Est Protein Requirement Amount (gms/kg) 0.6 gm protein     Estimated Protein Requirements (gms/day) 61.2        Fluid Requirements    Chelsie-Sandoval Method (over 20 kg) 3540             Nutrition Prescription Ordered     Row Name 08/08/18 1622          Nutrition Prescription PO    Common Modifiers Fluid Restriction;Renal;Low Sodium     Fluid Restriction mL per Day Other (comment)   2000     Low Sodium Details 2,000 mg Sodium             Evaluation of Received Nutrient/Fluid Intake     Row Name 08/08/18 1623 08/08/18 1621       Calculation Measurements    Weight Used For Calculations  -- 102 kg (224 lb 13.9 oz)       PO Evaluation    Number of Days PO Intake Evaluated --   6 days  --    % PO Intake , 50 x 4  --            Evaluation of Prescribed Nutrient/Fluid Intake     Row Name 08/08/18 1621          Calculation Measurements    Weight Used For Calculations 102 kg (224 lb 13.9 oz)           Electronically signed by:  Peace Segovia RD  08/08/18 4:23 PM

## 2018-08-08 NOTE — THERAPY TREATMENT NOTE
Acute Care - Physical Therapy Treatment Note  [facility]     Patient Name: Armando Mcmullen Sr.  : 1965  MRN: [id number]  Today's Date: 2018  Onset of Illness/Injury or Date of Surgery: 18  Date of Referral to PT: 18  Referring Physician: MARIETTA Bain MD.    Admit Date: 2018    Visit Dx:    ICD-10-CM ICD-9-CM   1. Elevated lactic acid level R79.89 276.2   2. Elevated bilirubin R17 277.4   3. Leukocytosis, unspecified type D72.829 288.60   4. Generalized abdominal pain R10.84 789.07   5. Alcoholic cirrhosis of liver with ascites (CMS/HCC) K70.31 571.2   6. Impaired functional mobility and activity tolerance Z74.09 V49.89   7. Impaired mobility and activities of daily living Z74.09 799.89     Patient Active Problem List   Diagnosis   • Anxiety state   • Back pain   • Chronic hepatitis (CMS/HCC)   • Alcoholic cirrhosis (CMS/HCC)   • Depression   • Essential hypertension   • Substance abuse   • Liver failure with hepatic coma (CMS/HCC)   • Hypersplenism   • Chronic osteomyelitis of right shoulder region (CMS/HCC)   • Thrombocytopenia (CMS/HCC)   • Anemia   • Positive hepatitis C antibody test   • BMI 35.0-35.9,adult   • Tobacco use   • Leukocytosis   • Acute kidney injury (CMS/HCC)   • Anasarca   • Anxiety and depression   • CKD (chronic kidney disease) stage 3, GFR 30-59 ml/min   • Ascites   • Pneumonia due to infectious organism       Therapy Treatment          Rehabilitation Treatment Summary     Row Name 18 1430 18 1353          Treatment Time/Intention    Discipline occupational therapy assistant  - physical therapy assistant  -TA     Document Type therapy note (daily note)  - therapy note (daily note)  -TA     Subjective Information complains of;pain   To R [facility]   - complains of;pain  -TA     Mode of Treatment individual therapy;occupational therapy  - physical therapy  -TA     Therapy Frequency (PT Clinical Impression)  -- daily  -TA     Therapy Frequency (OT Eval)  other (see comments)   5-7 days a wk  -  --     Patient Effort good  -  --     Existing Precautions/Restrictions  -- fall;other (see comments)   L BKA  -TA     Recorded by [] Juanita Hickey COTA/MARV 08/08/18 1534 [TA] Yojana Ngo, PTA 08/08/18 1549     Row Name 08/08/18 1430 08/08/18 1353          Vital Signs    Pre Systolic BP Rehab  -- 119  -TA     Pre Treatment Diastolic BP  -- 76  -TA     Post Systolic BP Rehab  -- 133  -TA     Post Treatment Diastolic BP  -- 78  -TA     Pretreatment Heart Rate (beats/min)  -- 93  -TA     Intratreatment Heart Rate (beats/min)  -- 100  -TA     Posttreatment Heart Rate (beats/min)  -- 105  -TA     Pre SpO2 (%) 99  - 93  -TA     O2 Delivery Pre Treatment room air  - room air  -TA     Intra SpO2 (%)  -- 91  -TA     Post SpO2 (%)  -- 91  -TA     Pre Patient Position  -- Supine  -TA     Post Patient Position  -- Supine  -TA     Recorded by [] Juanita Hickey COTA/MARV 08/08/18 1534 [TA] Yojana Ngo, PTA 08/08/18 1549     Row Name 08/08/18 1430 08/08/18 1353          Cognitive Assessment/Intervention- PT/OT    Orientation Status (Cognition) oriented x 3  - oriented x 3  -TA     Follows Commands (Cognition) WNL  - WNL  -TA     Recorded by [] Juanita Hickey COTA/MARV 08/08/18 1534 [TA] Yojana Ngo, PTA 08/08/18 1549     Row Name 08/08/18 1430 08/08/18 1353          Bed Mobility Assessment/Treatment    Bed Mobility Assessment/Treatment rolling right  -  --     Rolling Left [place] (Bed Mobility) contact guard  -  --     Rolling Right [place] (Bed Mobility)  -- supervision  -TA     Supine-Sit [place] (Bed Mobility)  -- supervision  -TA     Sit-Supine [place] (Bed Mobility)  -- supervision  -TA     Bed Mobility, Safety Issues  -- decreased use of legs for bridging/pushing  -     Assistive Device (Bed Mobility)  -- bed rails;head of bed elevated  -TA     Recorded by [] Juanita Hickey HEARD/MARV 08/08/18 1534 [TA] Yojana Ngo, PTA  08/08/18 1549     Row Name 08/08/18 1353             Functional Mobility    Functional Mobility- Ind. Level minimum assist (75% patient effort)  -TA      Functional Mobility- Device rolling walker  -TA      Functional Mobility-Distance (Feet) 3   >HOB  -TA      Functional Mobility- Comment pt performed sit<>stand x 3  -TA      Recorded by [TA] Yojana Ngo, PTA 08/08/18 1549      Row Name 08/08/18 1353             Sit-Stand Transfer    Sit-Stand [place] (Transfers) contact guard  -TA      Assistive Device (Sit-Stand Transfers) walker, front-wheeled  -TA      Recorded by [TA] Yojana Ngo, PTA 08/08/18 1549      Row Name 08/08/18 1353             Stand-Sit Transfer    Stand-Sit [place] (Transfers) contact guard  -TA      Assistive Device (Stand-Sit Transfers) walker, front-wheeled  -TA      Recorded by [TA] Yojana Ngo, PTA 08/08/18 1549      Row Name 08/08/18 1353             Gait/Stairs Assessment/Training    [place] Level (Gait) not tested  -TA      Recorded by [TA] Yojana Ngo, PTA 08/08/18 1549      Row Name 08/08/18 1430             Bathing Assessment/Intervention    Comment (Bathing) Pt declined adl 2 he has already done a bath   -JH      Recorded by [JH] Juanita Hickey COTA/L 08/08/18 1534      Row Name 08/08/18 1430             Grooming Assessment/Training    [place] Level (Grooming) hair care, combing/brushing;set up;supervision  -      Recorded by [JH] Juanita Hickey COTA/L 08/08/18 1534      Row Name 08/08/18 1430 08/08/18 1353          Therapeutic Exercise    Upper Extremity Range of Motion (Therapeutic Exercise) shoulder abduction/adduction, left;shoulder flexion/extension, left;shoulder horizontal abduction/adduction, left;elbow flexion/extension, bilateral;forearm supination/pronation, bilateral  -JH  --     Hand (Therapeutic Exercise) finger flexion/extension, bilateral  -JH  --     Lower Extremity (Therapeutic Exercise)  -- gluteal sets;LAQ (long arc quad),  bilateral;marching while seated  -TA     Lower Extremity Range of Motion (Therapeutic Exercise)  -- hip abduction/adduction, bilateral;ankle dorsiflexion/plantar flexion, bilateral  -TA     Weight/Resistance (Therapeutic Exercise) 2 pounds  -  --     Position (Therapeutic Exercise) seated;supine  - seated  -TA     Sets/Reps (Therapeutic Exercise) 2/20  - 20  -TA     Expected Outcome (Therapeutic Exercise) improve functional tolerance, self-care activity  -  --     Recorded by [] Juanita Hickey COTA/MARV 08/08/18 1534 [TA] Yojana Nog, PTA 08/08/18 1549     Row Name 08/08/18 1430 08/08/18 1353          Positioning and Restraints    Pre-Treatment Position in bed  - in bed  -     Post Treatment Position bed  - bed  -     In Bed supine;fowlers;call light within reach;encouraged to call for assist;exit alarm on  - supine;call light within reach  -TA     Recorded by [] Juanita Hickey COTA/MARV 08/08/18 1534 [TA] Yojana Ngo, PTA 08/08/18 1549     Row Name 08/08/18 1430 08/08/18 1353          Pain Scale: Numbers Pre/Post-Treatment    Pain Scale: Numbers, Pretreatment 0/10 - no pain  - 6/10  -TA     Pain Scale: Numbers, Post-Treatment 0/10 - no pain  - 7/10  -TA     Recorded by [] Juanita Hickey COTA/MARV 08/08/18 1534 [TA] Yojana Ngo, PTA 08/08/18 1549     Row Name                Wound 08/05/18 1315 Right abdomen puncture    Wound - Properties Group Date first assessed: 08/05/18 [SM] Time first assessed: 1315 [SM] Present On Admission : no [SM] Side: Right [SM] Location: abdomen [SM] Type: puncture [SM] Additional Comments: covered by dressing; small amount of serosang fluid [SM] Recorded by:  [SM] Lyudmila Tran RN 08/05/18 1608    Row Name 08/08/18 1430 08/08/18 1353          Outcome Summary/Treatment Plan (OT)    Daily Summary of Progress (OT) progress towards functional goals is fair  - progress towards functional goals is fair  -TA     Plan for Continued Treatment (OT)  Continue per POC  - continue  -TA     Anticipated Discharge Disposition (OT) home with 24/7 care;skilled nursing facility  - home with 24/7 care;home with home health  -TA     Recorded by [JH] Juanita Hickey, HEARD/L 08/08/18 1534 [TA] Yojana Ngo, PTA 08/08/18 1549       User Key  (r) = Recorded By, (t) = Taken By, (c) = Cosigned By    Initials Name Effective Dates Discipline    Yojana Buchanan, PTA 03/07/18 -  PT     Juanita Hickey, HEARD/L 03/07/18 -  OT    Lyudmila Shipley RN 10/17/16 -  Nurse          Wound 08/05/18 1315 Right abdomen puncture (Active)   Dressing Appearance dry;intact;area marked 8/8/2018  8:53 AM   Drainage Amount none 8/8/2018  8:53 AM               PT Rehab Goals     Row Name 08/08/18 1353             Bed Mobility Goal 1 (PT)    Activity/Assistive Device (Bed Mobility Goal 1, PT) supine to sit;rolling to left  -TA      [place] Level/Cues Needed (Bed Mobility Goal 1, PT) standby assist  -TA      Time Frame (Bed Mobility Goal 1, PT) 5 days  -TA      Progress/Outcomes (Bed Mobility Goal 1, PT) goal not met  -TA         Transfer Goal 1 (PT)    Activity/Assistive Device (Transfer Goal 1, PT) bed-to-chair/chair-to-bed  -TA      [place] Level/Cues Needed (Transfer Goal 1, PT) minimum assist (75% or more patient effort)  -TA      Time Frame (Transfer Goal 1, PT) 5 days  -TA      Progress/Outcome (Transfer Goal 1, PT) goal not met  -TA         Strength Goal 1 (PT)    Strength Goal 1 (PT) Improve R LE MMT by 1/2 to 1 muscle grade  -TA      Time Frame (Strength Goal 1, PT) 5 days  -TA      Progress/Outcome (Strength Goal 1, PT) goal not met  -TA        User Key  (r) = Recorded By, (t) = Taken By, (c) = Cosigned By    Initials Name Provider Type Discipline    Yojana Buchanan, PTA Physical Therapy Assistant PT          Physical Therapy Education     Title: PT OT SLP Therapies (Active)     Topic: Physical Therapy (Active)     Point: Home exercise program (Done)    Learning  Progress Summary     Learner Status Readiness Method Response Comment Documented by    Patient Done Acceptance CORI MILLER  LN 08/06/18 1238          Point: Precautions (Done)    Learning Progress Summary     Learner Status Readiness Method Response Comment Documented by    Patient Done Acceptance CORI MILLER  LN 08/06/18 1238                      User Key     Initials Effective Dates Name Provider Type Discipline    LN 03/07/18 -  Stephanie Du, PTA Physical Therapy Assistant PT                    PT Recommendation and Plan  Therapy Frequency (PT Clinical Impression): daily  Outcome Summary: pt performed sit<.stand x 3 with RW & CGA, pt will require 24/7 care & continued  PT services @ D/C          Outcome Measures     Row Name 08/08/18 1500 08/08/18 1430 08/07/18 1045       How much help from another person do you currently need...    Turning from your back to your side while in flat bed without using bedrails? 3  -TA  --  --    Moving from lying on back to sitting on the side of a flat bed without bedrails? 3  -TA  --  --    Moving to and from a bed to a chair (including a wheelchair)? 1  -TA  --  --    Standing up from a chair using your arms (e.g., wheelchair, bedside chair)? 3  -TA  --  --    Climbing 3-5 steps with a railing? 1  -TA  --  --    To walk in hospital room? 1  -TA  --  --    AM-PAC 6 Clicks Score 12  -TA  --  --       How much help from another is currently needed...    Putting on and taking off regular lower body clothing?  -- 2  -JH 2  -BB    Bathing (including washing, rinsing, and drying)  -- 2  -JH 2  -BB    Toileting (which includes using toilet bed pan or urinal)  -- 2  -JH 2  -BB    Putting on and taking off regular upper body clothing  -- 3  -JH 3  -BB    Taking care of personal grooming (such as brushing teeth)  -- 3  -JH 4  -BB    Eating meals  -- 3  -JH 4  -BB    Score  -- 15  -JH 17  -BB       Functional Assessment    Outcome Measure Options AM-PAC 6 Clicks Basic Mobility (PT)  -TA  --  --     Row Name 08/06/18 1030 08/06/18 0959          How much help from another person do you currently need...    Turning from your back to your side while in flat bed without using bedrails? 3  -LN  --     Moving from lying on back to sitting on the side of a flat bed without bedrails? 3  -LN  --     Moving to and from a bed to a chair (including a wheelchair)? 1  -LN  --     Standing up from a chair using your arms (e.g., wheelchair, bedside chair)? 1  -LN  --     Climbing 3-5 steps with a railing? 1  -LN  --     To walk in hospital room? 1  -LN  --     AM-PAC 6 Clicks Score 10  -LN  --        How much help from another is currently needed...    Putting on and taking off regular lower body clothing?  -- 2  -RB     Bathing (including washing, rinsing, and drying)  -- 2  -RB     Toileting (which includes using toilet bed pan or urinal)  -- 2  -RB     Putting on and taking off regular upper body clothing  -- 3  -RB     Taking care of personal grooming (such as brushing teeth)  -- 3  -RB     Eating meals  -- 4  -RB     Score  -- 16  -RB        Functional Assessment    Outcome Measure Options AM-PAC 6 Clicks Basic Mobility (PT)  -LN AM-PAC 6 Clicks Daily Activity (OT)  -RB       User Key  (r) = Recorded By, (t) = Taken By, (c) = Cosigned By    Initials Name Provider Type    RB Naseem Landry OT Occupational Therapist    Yojana Buchanan PTA Physical Therapy Assistant    Stephanie Marion PTA Physical Therapy Assistant    Maya Jackman, HEARD/L Occupational Therapy Assistant     Juanita Hickey, HEARD/L Occupational Therapy Assistant           Time Calculation:         PT Charges     Row Name 08/08/18 1553             Time Calculation    Start Time 1353  -TA      Stop Time 1423  -TA      Time Calculation (min) 30 min  -TA         Time Calculation- PT    Total Timed Code Minutes- PT 30 minute(s)  -TA        User Key  (r) = Recorded By, (t) = Taken By, (c) = Cosigned By    Initials Name Provider Type    MALLORY Ngo  Yojana CARRILLO PTA Physical Therapy Assistant        Therapy Suggested Charges     Code   Minutes Charges    None           Therapy Charges for Today     Code Description Service Date Service Provider Modifiers Qty    72092009353 HC PT THERAPEUTIC ACT EA 15 MIN 8/8/2018 Yojana Ngo PTA GP 1    17553000666 HC PT THER PROC EA 15 MIN 8/8/2018 Yojana Ngo PTA GP 1          PT G-Codes  Outcome Measure Options: AM-PAC 6 Clicks Basic Mobility (PT)  Score: 10  Functional Limitation: Mobility: Walking and moving around  Mobility: Walking and Moving Around Current Status (): At least 60 percent but less than 80 percent impaired, limited or restricted  Mobility: Walking and Moving Around Goal Status (): At least 40 percent but less than 60 percent impaired, limited or restricted    Yojana Ngo PTA  8/8/2018

## 2018-08-08 NOTE — PAYOR COMM NOTE
"Lamont Mcmullen  (53 y.o. Male)     Date of Birth Social Security Number Address Home Phone MRN    1965  [address]  [facility] [facility] [address] [phone] [id number]    Buddhist Marital Status          None Legally        Admission Date Admission Type Admitting Provider Attending Provider Department, Room/Bed    8/2/18 Emergency Marifer Carr MD O'Hearn, William S III, MD [address], 423/1    Discharge Date Discharge Disposition Discharge Destination                       Attending Provider:  Jose Luis III, MD    Allergies:  Aspirin, Codeine Sulfate    Isolation:  None   Infection:  None   Code Status:  CPR    Ht:  182.9 cm (72\")   Wt:  102 kg (224 lb 6.4 oz)    Admission Cmt:  None   Principal Problem:  Anasarca [R60.1] More...                 Active Insurance as of 8/2/2018     Primary Coverage     Payor Plan Insurance Group Employer/Plan Group    [place]      Payor Plan Address Payor Plan Phone Number Effective From Effective To    [address] [address]  1/1/2014     [place] [address]       Subscriber Name Subscriber Birth Date Member ID       LAMONT MCMULLEN SR. 1965 [id number]                 Emergency Contacts      (Rel.) Home Phone Work Phone Mobile Phone    Miguel Mcmullen (Son) [phone] -- [phone]    Loco Mcmullen (Daughter) [phone] -- [phone]    Shreyas Mcmullen (Brother) [phone] -- [phone]        Have faxed on both 8/3 and 8/6    Insurance Information                [place] Phone:     Subscriber: Lamont Mcmullen Sr. Subscriber#: [id number]    Group#:  Precert#:                 ICU Vital Signs     Row Name 08/08/18 0853 08/08/18 0756 08/08/18 0725 08/08/18 0500 08/08/18 0336       Height and Weight    Weight  --  --  -- 102 kg (224 lb 6.4 oz)  --    Weight Method  --  --  -- Bed scale  --       Vitals    Temp  --  -- 98.2 °F (36.8 " °C)  -- 98.7 °F (37.1 °C)    Temp src  --  -- Oral  -- Oral    Pulse  -- 88 93  -- 96    Heart Rate Source  -- Monitor Monitor  -- Monitor    Resp  --  -- 18  -- 18    Resp Rate Source  --  -- Visual  -- Visual    BP  --  -- 130/75  -- 144/58    BP Location  --  -- Left arm  -- Left arm    BP Method  --  -- Manual  -- Manual    Patient Position  --  -- Lying  -- Lying       Oxygen Therapy    SpO2  --  -- 94 %  -- 92 %    Pulse Oximetry Type  --  -- Intermittent  -- Intermittent    Device (Oxygen Therapy) room air  -- room air  -- room air    Row Name 08/08/18 0155 08/07/18 2000 08/07/18 1654 08/07/18 1604 08/07/18 1553       Vitals    Temp  -- 99.2 °F (37.3 °C)  --  --  --    Temp src  -- Oral  --  --  --    Pulse 98 (!)  125 (!)  128   RN Tyron aware of patient's rate 92 88    Heart Rate Source Monitor Monitor Monitor  --  --    Resp  -- 18  --  -- 18    Resp Rate Source  -- Visual  --  --  --    BP  -- 130/64  --  --  --    BP Location  -- Left arm  --  --  --    BP Method  -- Manual  --  --  --    Patient Position  -- Lying  --  --  --       Oxygen Therapy    SpO2  -- 93 %  --  -- 96 %    Pulse Oximetry Type  -- Intermittent  --  -- Intermittent    Device (Oxygen Therapy)  -- room air  -- room air room air    Row Name 08/07/18 1506 08/07/18 1500 08/07/18 0853 08/07/18 0834 08/07/18 0500       Height and Weight    Weight  --  --  --  -- 99.9 kg (220 lb 4.8 oz)    Weight Method  --  --  --  -- Bed scale       Vitals    Temp  -- 98 °F (36.7 °C)  -- 97.7 °F (36.5 °C)  --    Temp src  -- Oral  -- Oral  --    Pulse 87 88 101 97  --    Heart Rate Source Monitor Monitor Monitor Monitor  --    Resp  -- 18  -- 20  --    Resp Rate Source  -- Visual  -- Visual  --    BP  -- 125/82  -- 114/79  --    BP Location  -- Left arm  -- Right arm  --    BP Method  -- Manual  -- Automatic  --    Patient Position  -- Lying  -- Lying  --       Oxygen Therapy    SpO2  -- 96 %  -- 94 %  --    Device (Oxygen Therapy)  -- room air  -- room air   --    Row Name 08/07/18 0331 08/07/18 0137 08/06/18 2256 08/06/18 2006 08/06/18 1941       Vitals    Temp 98.8 °F (37.1 °C)  --  -- 97.2 °F (36.2 °C)  --    Temp src Oral  --  -- Axillary  --    Pulse 97 96  -- 99 87    Heart Rate Source Monitor Monitor  -- Monitor Monitor    Resp 20  --  -- 20 20    Resp Rate Source Visual  --  -- Visual Visual    /72  --  -- 128/76  --    BP Location Right arm  --  -- Right arm  --    BP Method Manual  --  -- Manual  --    Patient Position Lying  --  -- Lying  --       Oxygen Therapy    SpO2 93 %  --  -- 95 % 94 %    Pulse Oximetry Type Intermittent  --  --  -- Intermittent    Device (Oxygen Therapy)  --  -- room air room air room air    Row Name 08/06/18 1531 08/06/18 1530 08/06/18 1521 08/06/18 1200 08/06/18 0851       Vitals    Temp 96.8 °F (36 °C)  --  -- 98.8 °F (37.1 °C) 99.4 °F (37.4 °C)    Temp src Oral  --  -- Oral Oral    Pulse 89 93 89 99 102    Heart Rate Source Monitor Monitor Monitor Monitor Monitor    Resp 20 20 20 18 20    Resp Rate Source Visual Visual Visual Visual Visual    /78  --  -- 142/82 140/82    BP Location Right arm  --  -- Right arm Right arm    BP Method Manual  --  -- Manual Manual    Patient Position Lying  --  -- Lying Lying       Oxygen Therapy    SpO2 95 %  -- 95 % 95 % 91 %    Pulse Oximetry Type  -- Intermittent Intermittent  --  --    Device (Oxygen Therapy) room air room air room air room air room air    Row Name 08/06/18 0747 08/06/18 0724 08/06/18 0718 08/06/18 0535 08/06/18 0314       Height and Weight    Weight  --  --  -- 103 kg (226 lb 9 oz)  --    Weight Method  --  --  -- Bed scale  --       Vitals    Temp  --  --  --  -- 97.6 °F (36.4 °C)    Temp src  --  --  --  -- Axillary    Pulse 108 101 104  -- 97    Heart Rate Source Monitor  --  --  -- Monitor    Resp  --  -- 20  -- 16    Resp Rate Source  --  --  --  -- Visual    BP  --  --  --  -- 136/92    BP Location  --  --  --  -- Right arm    BP Method  --  --  --  -- Manual     Patient Position  --  --  --  -- Lying       Oxygen Therapy    SpO2  --  -- 92 %  -- 92 %    Pulse Oximetry Type  --  -- Intermittent  --  --    Device (Oxygen Therapy)  --  -- room air  -- room air    Row Name 08/06/18 0042 08/06/18 0002 08/05/18 2238 08/05/18 2026 08/05/18 1834       Vitals    Temp  --  --  -- 98.6 °F (37 °C)  --    Temp src  --  --  -- Oral  --    Pulse 99 100 81 92 91    Heart Rate Source Monitor Monitor Monitor Monitor Monitor    Resp 18  -- 18 16 18    Resp Rate Source Visual  -- Visual Visual Visual    /84  --  -- 128/80  --    BP Location Right arm  --  -- Right arm  --    BP Method Manual  --  -- Manual  --    Patient Position Lying  --  -- Lying  --       Oxygen Therapy    SpO2 93 %  -- 95 % 95 % 94 %    Pulse Oximetry Type Intermittent  -- Intermittent Intermittent Intermittent    Device (Oxygen Therapy) room air  -- room air room air room air    Row Name 08/05/18 1600 08/05/18 1554 08/05/18 1547 08/05/18 1539 08/05/18 1530       Vitals    Temp 98.4 °F (36.9 °C)  --  --  -- 98.3 °F (36.8 °C)    Temp src Oral  --  --  -- Oral    Pulse 88 87 87 91 92    Heart Rate Source Monitor  --  -- Monitor Monitor    Resp 16  -- 16  -- 20    Resp Rate Source Visual  --  --  -- Visual    /72  --  --  -- 122/76    BP Location Right arm  --  --  -- Right arm    BP Method Manual  --  --  -- Manual    Patient Position Lying  --  --  -- Lying       Oxygen Therapy    SpO2 96 %  -- 97 %  -- 95 %    Device (Oxygen Therapy) room air room air room air  -- room air    Row Name 08/05/18 1522 08/05/18 1500                Vitals    Temp  -- 98.6 °F (37 °C)       Temp src  -- Temporal Artery        Pulse 88 95       Heart Rate Source Monitor Monitor       Resp  -- 20       Resp Rate Source  -- Visual       BP  -- 112/78       BP Location  -- Right arm       BP Method  -- Manual       Patient Position  -- Lying          Oxygen Therapy    SpO2  -- 94 %       Device (Oxygen Therapy)  -- room air            Hospital Medications (active)       Dose Frequency Start End    albumin human 25 % IV SOLN 12.5 g 12.5 g 2 Times Daily 8/4/2018     Sig - Route: Infuse 50 mL into a venous catheter 2 (Two) Times a Day. - Intravenous    Cosign for Ordering: Accepted by Tushar Meyer MD on 8/4/2018  9:53 AM    cefTRIAXone (ROCEPHIN) 1 g/100 mL 0.9% NS (MBP) 1 g Every 24 Hours 8/8/2018 8/18/2018    Sig - Route: Infuse 100 mL into a venous catheter Daily. - Intravenous    Cosign for Ordering: Required by Tushar Meyer MD    diphenhydrAMINE (BENADRYL) injection 25 mg 25 mg Every 6 Hours PRN 8/4/2018     Sig - Route: Infuse 0.5 mL into a venous catheter Every 6 (Six) Hours As Needed for Itching. - Intravenous    Cosign for Ordering: Accepted by Tushar Meyer MD on 8/4/2018  9:53 AM    furosemide (LASIX) injection 20 mg 20 mg Once 8/8/2018     Sig - Route: Infuse 2 mL into a venous catheter 1 (One) Time. - Intravenous    furosemide (LASIX) injection 40 mg 40 mg Daily 8/8/2018     Sig - Route: Infuse 4 mL into a venous catheter Daily. - Intravenous    Cosign for Ordering: Accepted by Tushar Meyer MD on 8/7/2018  6:07 PM    ipratropium-albuterol (DUO-NEB) nebulizer solution 3 mL 3 mL 4 Times Daily - RT 8/6/2018     Sig - Route: Take 3 mL by nebulization 4 (Four) Times a Day. - Nebulization    Cosign for Ordering: Accepted by Tushar Meyer MD on 8/6/2018  6:23 PM    lactulose (CHRONULAC) 10 GM/15ML solution 30 g 30 g Every 12 Hours 8/8/2018     Sig - Route: Take 45 mL by mouth Every 12 (Twelve) Hours. - Oral    Notes to Pharmacy: !!!!Please administer 90 Minutes after Meals!!!!    Cosign for Ordering: Required by Tushar Meyer MD    metOLazone (ZAROXOLYN) tablet 2.5 mg 2.5 mg Once 8/8/2018     Sig - Route: Take 1 tablet by mouth 1 (One) Time. - Oral    morphine injection 4 mg 4 mg Every 4 Hours PRN 8/4/2018 8/14/2018    Sig - Route: Infuse 1 mL into a venous catheter Every 4  "(Four) Hours As Needed for Severe Pain . - Intravenous    Cosign for Ordering: Accepted by Tushar Meyer MD on 8/4/2018  9:53 AM    nicotine (NICODERM CQ) 14 MG/24HR patch 1 patch 1 patch Every 24 Hours 8/2/2018     Sig - Route: Place 1 patch on the skin as directed by provider Daily. - Transdermal    ondansetron (ZOFRAN) injection 4 mg 4 mg Every 6 Hours PRN 8/2/2018     Sig - Route: Infuse 2 mL into a venous catheter Every 6 (Six) Hours As Needed for Nausea or Vomiting. - Intravenous    Linked Group 1:  \"Or\" Linked Group Details        ondansetron (ZOFRAN) tablet 4 mg 4 mg Every 6 Hours PRN 8/2/2018     Sig - Route: Take 1 tablet by mouth Every 6 (Six) Hours As Needed for Nausea or Vomiting. - Oral    Linked Group 1:  \"Or\" Linked Group Details        ondansetron ODT (ZOFRAN-ODT) disintegrating tablet 4 mg 4 mg Every 6 Hours PRN 8/2/2018     Sig - Route: Take 1 tablet by mouth Every 6 (Six) Hours As Needed for Nausea or Vomiting. - Oral    Linked Group 1:  \"Or\" Linked Group Details        potassium chloride (MICRO-K) CR capsule 40 mEq 40 mEq Daily 8/3/2018     Sig - Route: Take 4 capsules by mouth Daily. - Oral    Cosign for Ordering: Accepted by Tushar Meyer MD on 8/3/2018  3:27 PM    simethicone (MYLICON) chewable tablet 80 mg 80 mg 4 Times Daily PRN 8/8/2018     Sig - Route: Chew 1 tablet 4 (Four) Times a Day As Needed for Flatulence. - Oral    Cosign for Ordering: Required by Tushar Meyer MD    sodium chloride 0.9 % flush 1-10 mL 1-10 mL As Needed 8/2/2018     Sig - Route: Infuse 1-10 mL into a venous catheter As Needed for Line Care. - Intravenous    sodium chloride 0.9 % flush 10 mL 10 mL As Needed 8/2/2018     Sig - Route: Infuse 10 mL into a venous catheter As Needed for Line Care. - Intravenous    Linked Group 2:  \"And\" Linked Group Details        AZITHROMYCIN 500 MG/250 ML 0.9% NS IVPB (vial-mate) (Discontinued) 500 mg Every 24 Hours 8/4/2018 8/8/2018    Sig - Route: Infuse " "500 mg into a venous catheter Daily. - Intravenous    Cosign for Ordering: Accepted by Tushar Meyer MD on 2018  3:32 PM    cefTRIAXone (ROCEPHIN) 2 g/100 mL 0.9% NS VTB (LUCY) (Discontinued) 2 g Every 24 Hours 2018    Sig - Route: Infuse 100 mL into a venous catheter Daily. - Intravenous    Cosign for Ordering: Accepted by Tushar Meyer MD on 2018  6:23 PM    furosemide (LASIX) injection 40 mg (Discontinued) 40 mg 2 Times Daily 2018    Sig - Route: Infuse 4 mL into a venous catheter 2 (Two) Times a Day. - Intravenous    lactulose (CHRONULAC) 10 GM/15ML solution 30 g (Discontinued) 30 g 2 Times Daily 2018    Sig - Route: Take 45 mL by mouth 2 (Two) Times a Day. - Oral             Physician Progress Notes (last 24 hours) (Notes from 2018  2:16 PM through 2018  2:16 PM)      Josep Colón MD at 2018  1:02 PM          [facility]  [address] [place] [address]. [address]  T - [phone]  F - [phone]     Progress Note          PATIENT  DEMOGRAPHICS   PATIENT NAME: Armando Mcmullen Sr.                      PHYSICIAN: Josep Colón MD  : 1965  MRN: [id number]   LOS: 6 days    Patient Care Team:  Reddy Grant MD as PCP - General (Family Medicine)  Shawn Cortez MD (Inactive) as Surgeon (Orthopedic Surgery)  Tushar Becerril DO as Consulting Physician (Gastroenterology)  Josep Colón MD as Consulting Physician (Nephrology)  Ortiz Ferreira MD as Consulting Physician (Hematology and Oncology)  Subjective   SUBJECTIVE   No marked soa but still has abdo distension         Objective   OBJECTIVE   Vital Signs  Temp:  [98 °F (36.7 °C)-99.2 °F (37.3 °C)] 98.2 °F (36.8 °C)  Heart Rate:  [] 88  Resp:  [18] 18  BP: (125-144)/(58-82) 130/75    Flowsheet Rows      First Filed Value   Admission Height  182.9 cm (72\") Documented at 2018 1631   Admission Weight  104 kg (230 lb) Documented at 2018 " 1631           I/O last 3 completed shifts:  In: 2192.5 [P.O.:1780; IV Piggyback:412.5]  Out: 2050 [Urine:2050]    PHYSICAL EXAM    Physical Exam   Constitutional: He is oriented to person, place, and time. He appears well-developed.   HENT:   Head: Normocephalic.   Eyes: Pupils are equal, round, and reactive to light.   Cardiovascular: Normal rate, regular rhythm and normal heart sounds.    Pulmonary/Chest: Effort normal and breath sounds normal.   Abdominal: Soft. Bowel sounds are normal.   Neurological: He is alert and oriented to person, place, and time.       RESULTS   Results Review:      Results from last 7 days  Lab Units 08/08/18  0537 08/07/18  0717 08/06/18  0516   SODIUM mmol/L 135* 133* 135*   POTASSIUM mmol/L 4.4 4.3 4.3   CHLORIDE mmol/L 107 103 105   CO2 mmol/L 21.0* 23.0 22.0   BUN mg/dL 36* 36* 39*   CREATININE mg/dL 2.37* 2.32* 2.42*   CALCIUM mg/dL 7.9* 7.9* 8.1*   BILIRUBIN mg/dL 3.2* 3.8* 4.7*   ALK PHOS U/L 90 98 76   ALT (SGPT) U/L 22 25 26   AST (SGOT) U/L 44 34 30   GLUCOSE mg/dL 83 90 72       Estimated Creatinine Clearance: 44 mL/min (A) (by C-G formula based on SCr of 2.37 mg/dL (H)).      Results from last 7 days  Lab Units 08/02/18  1747   MAGNESIUM mg/dL 2.4*   PHOSPHORUS mg/dL 3.4               Results from last 7 days  Lab Units 08/08/18  0537 08/07/18  0717 08/06/18  0516 08/05/18  0637 08/04/18 1958 08/04/18  0544   WBC 10*3/mm3 12.38* 14.01* 13.76* 12.52*  --  13.08*   HEMOGLOBIN g/dL 8.1* 8.7* 8.4* 8.7*  --  9.6*   PLATELETS 10*3/mm3 39* 48* 49* 54* 58* 42*         Results from last 7 days  Lab Units 08/02/18  1747   INR  1.73*         Imaging Results (last 24 hours)     Procedure Component Value Units Date/Time    US Venous Doppler Lower Extremity Right (duplex) [[id number]] Collected:  08/08/18 1138     Updated:  08/08/18 1222    Narrative:         Ultrasound venous duplex right lower extremity    HISTORY: Right lower extremity swelling or edema.    Duplex ultrasound of the  deep venous system of the right lower  extremity was performed    Real-time images demonstrate normal compressibility without  evidence of intraluminal thrombus.  Doppler shows phasic flow and augmentation.  Color Doppler also reveals venous patency.    Subcutaneous edema in the popliteal fossa and minimal  subcutaneous edema in the thigh and calf.      Impression:       CONCLUSION:  No ultrasound evidence of deep venous thrombosis of the right  lower extremity.  Subcutaneous edema in the popliteal fossa and minimal  subcutaneous edema in the thigh and calf.    02315    Electronically signed by:  Claude Whatley MD  8/8/2018 12:21 PM CDT  Workstation: [employer]    US Abdomen Limited [[id number]] Collected:  08/07/18 1500     Updated:  08/07/18 1605    Narrative:         Ultrasound abdomen limited, ascites survey    HISTORY: Follow-up paracentesis    Ultrasound examination of the four quadrants was performed.    COMPARISON: August 3, 2018    Small amount of residual or reaccumulated ascites in the right  upper quadrant, right lower quadrant and left lower quadrant.  Largest pocket 3.7 cm.      Impression:       CONCLUSION:  Small amount of residual or reaccumulated ascites in the right  upper quadrant, right lower quadrant and left lower quadrant.    14096    Electronically signed by:  Claude Whatley MD  8/7/2018 4:04 PM CDT  Workstation: [employer]           MEDICATIONS      albumin human 12.5 g Intravenous BID   ceftriaxone 1 g Intravenous Q24H   furosemide 40 mg Intravenous Daily   ipratropium-albuterol 3 mL Nebulization 4x Daily - RT   lactulose 30 g Oral Q12H   nicotine 1 patch Transdermal Q24H   potassium chloride 40 mEq Oral Daily          Assessment/Plan   ASSESSMENT / PLAN    Principal Problem:    Anasarca  Active Problems:    Alcoholic cirrhosis (CMS/HCC)    Essential hypertension    Thrombocytopenia (CMS/HCC)    Anemia    Leukocytosis    Acute kidney injury (CMS/HCC)    Anxiety and depression    CKD  (chronic kidney disease) stage 3, GFR 30-59 ml/min    Pneumonia due to infectious organism    1- chronic kidney disease stage III baseline creatinine close to 1.5.  He has creatinine of 1.4 in the last office visit.  He was doing well on Lasix and Aldactone and now out of his diuretic and came in with with increasing edema with anasarca.  Patient has paracentesis done over the weekend.  Keep lasix daily and add metolazone for today. Continue with daily weight.  Continue with fluid restriction at 2 L per day.  Avoid any NSAIDs.     2.history of liver cirrhosis status post paracentesis. No marked fluid recollection on repeat u/s     3.history of chronic osteomyelitis of the right shoulder     4.history of left BKA     5.leukocytosis possible pneumonia patient is currently on ceftriaxone and azithromycin as                This document has been electronically signed by Josep Colón MD on 2018 1:02 PM           Electronically signed by Josep Colón MD at 2018  1:04 PM     Jose Luis III, MD at 2018  7:58 AM          FAMILY MEDICINE DAILY PROGRESS NOTE  NAME: Armando Mcmullen .  : 1965  MRN: [id number]     LOS: 6 days     PROVIDER OF SERVICE: Jose Luis III, MD    Chief Complaint: Anasarca    Subjective:     Interval History:  History taken from: patient RN  No acute overnight events, patient resting comfortably in bed.  Shortness of breath noticed when sitting up.  Patient reports lactulose  Ineffective with bowel movements.  At home takes it 90 minutes after meals, changed order and notified nursing staff of timing of administration.     Review of Systems:   Review of Systems   Constitutional: Negative for activity change.   HENT: Negative.    Eyes: Negative.    Respiratory: Negative for shortness of breath.    Cardiovascular: Positive for leg swelling. Negative for chest pain.   Gastrointestinal: Positive for abdominal distention and abdominal pain.   Endocrine: Negative.     Genitourinary: Negative.    Musculoskeletal: Negative.    Skin: Negative.    Allergic/Immunologic: Negative.    Neurological: Negative.    Hematological: Negative.    Psychiatric/Behavioral: Negative.        Objective:     Vital Signs  Temp:  [97.7 °F (36.5 °C)-99.2 °F (37.3 °C)] 98.2 °F (36.8 °C)  Heart Rate:  [] 88  Resp:  [18-20] 18  BP: (114-144)/(58-82) 130/75    Physical Exam  Physical Exam   Constitutional: He is oriented to person, place, and time. He appears well-developed and well-nourished.   HENT:   Head: Normocephalic and atraumatic.   Right Ear: External ear normal.   Left Ear: External ear normal.   Neck: Neck supple.   Cardiovascular: Normal rate and regular rhythm.    Pulmonary/Chest: He has decreased breath sounds in the right upper field, the right middle field, the left upper field and the left middle field. He has wheezes.   Abdominal: Soft. Bowel sounds are normal.   Musculoskeletal: Normal range of motion.   Neurological: He is alert and oriented to person, place, and time.   Skin: Skin is warm and dry.   2+ pitting edema on the right lower extremity.  Without signs of ecchymosis   Psychiatric: He has a normal mood and affect. His behavior is normal. Judgment and thought content normal.       Medication Review    Current Facility-Administered Medications:   •  albumin human 25 % IV SOLN 12.5 g, 12.5 g, Intravenous, BID, Jose Luis III, MD, Last Rate: 0 mL/hr at 08/05/18 2304, 12.5 g at 08/07/18 2055  •  cefTRIAXone (ROCEPHIN) 1 g/100 mL 0.9% NS (MBP), 1 g, Intravenous, Q24H, Jose Luis III, MD  •  diphenhydrAMINE (BENADRYL) injection 25 mg, 25 mg, Intravenous, Q6H PRN, Jose Luis III, MD  •  furosemide (LASIX) injection 40 mg, 40 mg, Intravenous, Daily, Jose Luis III, MD  •  ipratropium-albuterol (DUO-NEB) nebulizer solution 3 mL, 3 mL, Nebulization, 4x Daily - RT, Jose Luis III, MD, 3 mL at 08/07/18 1553  •  lactulose (CHRONULAC) 10 GM/15ML  solution 30 g, 30 g, Oral, Q12H, Jose Luis III, MD  •  morphine injection 4 mg, 4 mg, Intravenous, Q4H PRN, Jose Luis III, MD, 4 mg at 08/07/18 0851  •  nicotine (NICODERM CQ) 14 MG/24HR patch 1 patch, 1 patch, Transdermal, Q24H, Momo Hammer MD, 1 patch at 08/07/18 2200  •  ondansetron (ZOFRAN) tablet 4 mg, 4 mg, Oral, Q6H PRN **OR** ondansetron ODT (ZOFRAN-ODT) disintegrating tablet 4 mg, 4 mg, Oral, Q6H PRN **OR** ondansetron (ZOFRAN) injection 4 mg, 4 mg, Intravenous, Q6H PRN, Momo Hammer MD  •  potassium chloride (MICRO-K) CR capsule 40 mEq, 40 mEq, Oral, Daily, Jose Luis III, MD, 40 mEq at 08/07/18 0837  •  simethicone (MYLICON) chewable tablet 80 mg, 80 mg, Oral, 4x Daily PRN, Jose Luis III, MD  •  sodium chloride 0.9 % flush 1-10 mL, 1-10 mL, Intravenous, PRN, Momo Hammer MD  •  Insert peripheral IV, , , Once **AND** sodium chloride 0.9 % flush 10 mL, 10 mL, Intravenous, PRN, Mike Bergman MD, 10 mL at 08/07/18 1643     Diagnostic Data    Lab Results (last 24 hours)     Procedure Component Value Units Date/Time    Comprehensive Metabolic Panel [[id number]]  (Abnormal) Collected:  08/08/18 0537    Specimen:  Blood Updated:  08/08/18 0630     Glucose 83 mg/dL      BUN 36 (H) mg/dL      Creatinine 2.37 (H) mg/dL      Sodium 135 (L) mmol/L      Potassium 4.4 mmol/L      Chloride 107 mmol/L      CO2 21.0 (L) mmol/L      Calcium 7.9 (L) mg/dL      Total Protein 5.2 (L) g/dL      Albumin 2.20 (L) g/dL      ALT (SGPT) 22 U/L      AST (SGOT) 44 U/L      Alkaline Phosphatase 90 U/L      Total Bilirubin 3.2 (H) mg/dL      eGFR Non African Amer 29 (L) mL/min/1.73      Globulin 3.0 gm/dL      A/G Ratio 0.7 (L) g/dL      BUN/Creatinine Ratio 15.2     Anion Gap 7.0 mmol/L     CBC & Differential [[id number]] Collected:  08/08/18 0537    Specimen:  Blood Updated:  08/08/18 0617    Narrative:       The following orders were created for panel  order CBC & Differential.  Procedure                               Abnormality         Status                     ---------                               -----------         ------                     Scan Slide[[id number]]                                                                  CBC Auto Differential[[id number]]        Abnormal            Final result                 Please view results for these tests on the individual orders.    CBC Auto Differential [[id number]]  (Abnormal) Collected:  08/08/18 0537    Specimen:  Blood Updated:  08/08/18 0617     WBC 12.38 (H) 10*3/mm3      RBC 2.38 (L) 10*6/mm3      Hemoglobin 8.1 (L) g/dL      Hematocrit 23.9 (L) %      .4 (H) fL      MCH 34.0 pg      MCHC 33.9 g/dL      RDW 15.5 (H) %      RDW-SD 56.7 (H) fl      MPV 9.8 fL      Platelets 39 (L) 10*3/mm3      Neutrophil % 68.0 %      Lymphocyte % 14.6 %      Monocyte % 11.6 %      Eosinophil % 3.8 %      Basophil % 0.9 %      Immature Grans % 1.1 (H) %      Neutrophils, Absolute 8.42 10*3/mm3      Lymphocytes, Absolute 1.81 10*3/mm3      Monocytes, Absolute 1.44 (H) 10*3/mm3      Eosinophils, Absolute 0.47 10*3/mm3      Basophils, Absolute 0.11 10*3/mm3      Immature Grans, Absolute 0.13 (H) 10*3/mm3      nRBC 0.0 /100 WBC     Body Fluid Culture - Body Fluid, Peritoneum [[id number]]  (Normal) Collected:  08/05/18 1423    Specimen:  Body Fluid from Peritoneum Updated:  08/08/18 0609     BF Culture No growth at 3 days     Gram Stain Result Many (4+) WBCs seen      No organisms seen    POC Glucose Once [[id number]]  (Normal) Collected:  08/07/18 1713    Specimen:  Blood Updated:  08/07/18 1726     Glucose 115 mg/dL      Comment: RN NotifiedMeter: [id number]: [id number] FRITZ MONTEIRO       AFP Tumor Marker [[id number]] Collected:  08/07/18 1131    Specimen:  Blood Updated:  08/07/18 1457    Folate [[id number]]  (Normal) Collected:  08/07/18 1131    Specimen:  Blood Updated:  08/07/18 1315     Folate 16.20  ng/mL     Vitamin B12 [[id number]]  (Abnormal) Collected:  08/07/18 1131    Specimen:  Blood Updated:  08/07/18 1315     Vitamin B-12 >1,000 (H) pg/mL     Ferritin [[id number]]  (Normal) Collected:  08/07/18 1131    Specimen:  Blood Updated:  08/07/18 1245     Ferritin 454.00 ng/mL     Iron Profile [[id number]]  (Abnormal) Collected:  08/07/18 1131    Specimen:  Blood Updated:  08/07/18 1216     Iron 35 (L) mcg/dL      TIBC 121 (L) mcg/dL      Iron Saturation 29 %     Blood Culture - Blood, [[id number]]  (Normal) Collected:  08/04/18 0915    Specimen:  Blood from Hand, Right Updated:  08/07/18 0946     Blood Culture No growth at 3 days    Blood Culture - Blood, [[id number]]  (Normal) Collected:  08/04/18 0930    Specimen:  Blood from Arm, Left Updated:  08/07/18 0946     Blood Culture No growth at 3 days    Body Fluid Cell Count With Differential - Body Fluid, Peritoneum [[id number]] Collected:  08/03/18 1633    Specimen:  Body Fluid from Peritoneum Updated:  08/07/18 0808    Narrative:       The following orders were created for panel order Body Fluid Cell Count With Differential - Body Fluid, Peritoneum.  Procedure                               Abnormality         Status                     ---------                               -----------         ------                     Body fluid cell count - ...[[id number]]  Abnormal            Final result               Body fluid differential ...[[id number]]                      Edited Result - FINAL        Please view results for these tests on the individual orders.    Body fluid differential - Body Fluid, [[id number]] Collected:  08/03/18 1633    Specimen:  Body Fluid from Peritoneum Updated:  08/07/18 0808     Neutrophils, Fluid 49 %      Mononuclear, Fluid 51 %     Narrative:       Benign mesothelial cells and macrophages present / Reviewed by Cytologist by cytologist LINDA Harmon            I reviewed the patient's new clinical results.  I reviewed the patient's new imaging  results and agree with the interpretation.    Assessment/Plan:     Active Hospital Problems    Diagnosis Date Noted   • **Anasarca [R60.1] 04/27/2018     Nephro and GI consults appreciated.  Lasix IV 40 daily  2L fluid restriction  Likely Hepatorenal type II  Checking for RLE DVT by Ultrasound       • Leukocytosis [D72.829] 04/11/2018     Priority: Medium     Jump from 13 trending down  Not SBP treat for Community acquired pneumonia with ceftriaxone d4/7 and azithromycin d/c  Blood Cx negative     • Pneumonia due to infectious organism [J18.9] 08/05/2018     Rocephin d4/7  Afebrile     • Anxiety and depression [F41.8] 08/02/2018     Pt has not been taking any medications     • CKD (chronic kidney disease) stage 3, GFR 30-59 ml/min [N18.3] 08/02/2018     Baseline Cr 1.4, admission 1.64, now 1.86, up to 2.25, and 2.41, then 2.5, now 2.4, stable  Nephrology Dr. Eldon milton       • Acute kidney injury (CMS/HCC) [N17.9] 04/27/2018     Creatinine slowly increasing, will monitor as ascites is treated  Consult Nephrology appreciated Dr. Colón following.   Lasix 40 IV Daily  2L Fluid restriction  No Nsaids     • Anemia [D64.9] 07/18/2017     Anemia of chronic disease        • Thrombocytopenia (CMS/HCC) [D69.6] 07/10/2017     Most likely secondary to liver cirrhosis.   Spleno-hepatic sequestration versus decreased production.  Received platelets 8/4  Platelets   We'll trend, trending down to 39k     • Alcoholic cirrhosis (CMS/HCC) [K70.30] 05/26/2009      Dr. Becerril is Gastroenterologist consulted recommendations appreciated  - Ascitic fluid without infectious organism not SBP .  Continue home lactulose  Abdominal ultrasound  For recurrent ascites   AFP for concern of Hepatic Carcinoma, pending  Suspected type II hepatorenal sydnrome  Ultrasound negative for paracentesis possible fluid       • Essential hypertension [I10] 12/01/2008     Pt has not been taking his meds at home  Currently normotensive at hospital stay.            DVT prophylaxis: Follow up PT, PTT  Code status is   Code Status and Medical Interventions:   Ordered at: 08/02/18 2011     Code Status:    CPR     Medical Interventions (Level of Support Prior to Arrest):    Full       Plan for disposition Following of ultrasound of leg possible discharge today.        This document has been electronically signed by Jose Luis III, MD on August 8, 2018 7:58 AM          Electronically signed by Jose Luis III, MD at 8/8/2018  8:03 AM

## 2018-08-09 PROBLEM — D63.8 ANEMIA OF CHRONIC DISEASE: Status: ACTIVE | Noted: 2017-07-18

## 2018-08-09 PROBLEM — L72.3 SEBACEOUS CYST: Status: ACTIVE | Noted: 2018-01-01

## 2018-08-09 PROBLEM — J18.9 CAP (COMMUNITY ACQUIRED PNEUMONIA): Status: ACTIVE | Noted: 2018-01-01

## 2018-08-09 NOTE — PROGRESS NOTES
"[facility]  [address] [place] [address]. [address]  T - [phone]   - [phone]     Progress Note          PATIENT  DEMOGRAPHICS   PATIENT NAME: Armando Mcmullen SrJuanita                      PHYSICIAN: KALEB Tinsley  : 1965  MRN: [id number]   LOS: 7 days    Patient Care Team:  Reddy Grant MD as PCP - General (Family Medicine)  Shawn Cortez MD (Inactive) as Surgeon (Orthopedic Surgery)  Tushar Becerril DO as Consulting Physician (Gastroenterology)  Josep Colón MD as Consulting Physician (Nephrology)  Ortiz Ferreira MD as Consulting Physician (Hematology and Oncology)  Subjective   SUBJECTIVE   No marked soa but still has abdominal distension, complaining of pain today         Objective   OBJECTIVE   Vital Signs  Temp:  [98.2 °F (36.8 °C)-99.4 °F (37.4 °C)] 98.2 °F (36.8 °C)  Heart Rate:  [] 103  Resp:  [18] 18  BP: (122-129)/(70-90) 122/70    Flowsheet Rows      First Filed Value   Admission Height  182.9 cm (72\") Documented at 2018 1631   Admission Weight  104 kg (230 lb) Documented at 2018 1631           I/O last 3 completed shifts:  In: 1520 [P.O.:1420; IV Piggyback:100]  Out: 2725 [Urine:2725]    PHYSICAL EXAM    Physical Exam   Constitutional: He is oriented to person, place, and time. He appears well-developed.   HENT:   Head: Normocephalic.   Eyes: Pupils are equal, round, and reactive to light.   Cardiovascular: Normal rate, regular rhythm and normal heart sounds.    Pulmonary/Chest: Effort normal and breath sounds normal.   Abdominal: Soft. Bowel sounds are normal.   Neurological: He is alert and oriented to person, place, and time.       RESULTS   Results Review:      Results from last 7 days  Lab Units 18  0611 18  0537 18  0717   SODIUM mmol/L 133* 135* 133*   POTASSIUM mmol/L 3.7 4.4 4.3   CHLORIDE mmol/L 101 107 103   CO2 mmol/L 25.0 21.0* 23.0   BUN mg/dL 35* 36* 36*   CREATININE mg/dL 2.26* 2.37* 2.32*   CALCIUM " mg/dL 7.9* 7.9* 7.9*   BILIRUBIN mg/dL 3.4* 3.2* 3.8*   ALK PHOS U/L 103 90 98   ALT (SGPT) U/L 27 22 25   AST (SGOT) U/L 41 44 34   GLUCOSE mg/dL 90 83 90       Estimated Creatinine Clearance: 45.8 mL/min (A) (by C-G formula based on SCr of 2.26 mg/dL (H)).      Results from last 7 days  Lab Units 08/02/18  1747   MAGNESIUM mg/dL 2.4*   PHOSPHORUS mg/dL 3.4               Results from last 7 days  Lab Units 08/09/18  0611 08/08/18  0537 08/07/18  0717 08/06/18  0516 08/05/18  0637   WBC 10*3/mm3 14.65* 12.38* 14.01* 13.76* 12.52*   HEMOGLOBIN g/dL 8.2* 8.1* 8.7* 8.4* 8.7*   PLATELETS 10*3/mm3 41* 39* 48* 49* 54*         Results from last 7 days  Lab Units 08/02/18  1747   INR  1.73*         Imaging Results (last 24 hours)     ** No results found for the last 24 hours. **           MEDICATIONS      albumin human 12.5 g Intravenous BID   ceftriaxone 1 g Intravenous Q24H   furosemide 40 mg Oral BID   lactulose 30 g Oral Q12H   metOLazone 2.5 mg Oral Daily   nicotine 1 patch Transdermal Q24H   potassium chloride 40 mEq Oral Daily          Assessment/Plan   ASSESSMENT / PLAN    Principal Problem:    Anasarca  Active Problems:    Alcoholic cirrhosis (CMS/HCC)    Essential hypertension    Thrombocytopenia (CMS/HCC)    Anemia    Leukocytosis    Acute kidney injury (CMS/HCC)    Anxiety and depression    CKD (chronic kidney disease) stage 3, GFR 30-59 ml/min    Pneumonia due to infectious organism    1. CKD stage III- baseline creatinine close to 1.5.  He has creatinine of 1.4 in the last office visit.  He was doing well on Lasix and Aldactone and now out of his diuretic and came in with with increasing edema with anasarca.  Patient has paracentesis done over the weekend.  Continue with daily weight.  Continue with fluid restriction at 2 L per day.  Avoid any NSAIDs.  Keep lasix 40 mg PO BID. Keep metolazone for now. Wt is down to 220 lbs     2. History of liver cirrhosis- status post paracentesis. No marked fluid  recollection on repeat u/s     3. History of chronic osteomyelitis of the right shoulder     4. History of left BKA     5. Leukocytosis- possible pneumonia patient is currently on ceftriaxone and azithromycin as           This document has been electronically signed by KALEB Tinsley on August 9, 2018 2:05 PM

## 2018-08-09 NOTE — THERAPY TREATMENT NOTE
Acute Care - Physical Therapy Treatment Note  [facility]     Patient Name: Armando Mcmullen Sr.  : 1965  MRN: [id number]  Today's Date: 2018  Onset of Illness/Injury or Date of Surgery: 18  Date of Referral to PT: 18  Referring Physician: MARIETTA Bain MD.    Admit Date: 2018    Visit Dx:    ICD-10-CM ICD-9-CM   1. Elevated lactic acid level R79.89 276.2   2. Elevated bilirubin R17 277.4   3. Leukocytosis, unspecified type D72.829 288.60   4. Generalized abdominal pain R10.84 789.07   5. Alcoholic cirrhosis of liver with ascites (CMS/HCC) K70.31 571.2   6. Impaired functional mobility and activity tolerance Z74.09 V49.89   7. Impaired mobility and activities of daily living Z74.09 799.89     Patient Active Problem List   Diagnosis   • Anxiety state   • Back pain   • Chronic hepatitis (CMS/HCC)   • Alcoholic cirrhosis (CMS/HCC)   • Depression   • Essential hypertension   • Substance abuse   • Liver failure with hepatic coma (CMS/HCC)   • Hypersplenism   • Chronic osteomyelitis of right shoulder region (CMS/HCC)   • Thrombocytopenia (CMS/HCC)   • Anemia   • Positive hepatitis C antibody test   • BMI 35.0-35.9,adult   • Tobacco use   • Leukocytosis   • Acute kidney injury (CMS/HCC)   • Anasarca   • Anxiety and depression   • CKD (chronic kidney disease) stage 3, GFR 30-59 ml/min   • Ascites   • Pneumonia due to infectious organism   • Sebaceous cyst       Therapy Treatment          Rehabilitation Treatment Summary     Row Name 18 1311 18 1054          Treatment Time/Intention    Discipline physical therapy assistant  -TA occupational therapy assistant  -BB     Document Type therapy note (daily note)  -TA therapy note (daily note)  -BB     Subjective Information complains of;pain  -TA complains of;pain  -BB     Mode of Treatment physical therapy  -TA individual therapy;occupational therapy  -BB     Therapy Frequency (PT Clinical Impression) daily  -TA  --     Total Minutes,  Occupational Therapy Treatment  -- 41  -BB     Therapy Frequency (OT Eval)  -- other (see comments)   5-7 days/wk  -BB     Patient Effort good  -TA good  -BB     Existing Precautions/Restrictions fall;other (see comments)   L BKA  -TA fall;other (see comments)   L BKA  -BB     Patient Response to Treatment pt defered standing this date  -TA  --     Recorded by [TA] Yojana Ngo, PTA 08/09/18 1556 [BB] Maya Perez HEARD/L 08/09/18 1531     Row Name 08/09/18 1311 08/09/18 1054          Vital Signs    Pre Systolic BP Rehab 128  -TA  --     Pre Treatment Diastolic BP 52  -TA  --     Pretreatment Heart Rate (beats/min) 90  -  -BB     Intratreatment Heart Rate (beats/min) 98  -TA  --     Posttreatment Heart Rate (beats/min) 102  -  -BB     Pre SpO2 (%) 93  -TA 94  -BB     O2 Delivery Pre Treatment room air  -TA room air  -BB     Intra SpO2 (%) 90  -TA  --     O2 Delivery Intra Treatment room air  -TA  --     Post SpO2 (%) 90  -TA 93  -BB     O2 Delivery Post Treatment  -- room air  -BB     Pre Patient Position Supine  -TA Supine  -BB     Post Patient Position Supine  -TA --   long sitting  -BB     Recorded by [TA] Yojana Ngo, PTA 08/09/18 1556 [BB] Maya Perez COTA/L 08/09/18 1531     Row Name 08/09/18 1311 08/09/18 1054          Cognitive Assessment/Intervention- PT/OT    Orientation Status (Cognition) oriented x 3  -TA oriented x 3  -BB     Follows Commands (Cognition) WFL  -TA WNL  -BB     Recorded by [TA] Yojana Ngo, PTA 08/09/18 1556 [BB] Maya Perez COTA/L 08/09/18 1531     Row Name 08/09/18 1311 08/09/18 1054          Bed Mobility Assessment/Treatment    Rolling Left [place] (Bed Mobility) supervision  -TA supervision  -BB     Rolling Right [place] (Bed Mobility) supervision  -TA  --     Supine-Sit [place] (Bed Mobility) supervision  -TA  --     Sit-Supine [place] (Bed Mobility) supervision  -TA  --     Bed Mobility, Safety Issues decreased use  of legs for bridging/pushing  -TA decreased use of legs for bridging/pushing  -BB     Assistive Device (Bed Mobility) bed rails;draw sheet;head of bed elevated  -TA bed rails;head of bed elevated  -BB     Comment (Bed Mobility) pt sat @ EOB ~3 minutes & requested to return to supine, pt dep x 2 with drawsheet >HOB  -TA  --     Recorded by [TA] Yojana Ngo, PTA 08/09/18 1556 [BB] Maya Perez HEARD/L 08/09/18 1531     Row Name 08/09/18 1311             Functional Mobility    Functional Mobility- Ind. Level not tested  -TA      Recorded by [TA] Yojana Ngo, PTA 08/09/18 1556      Row Name 08/09/18 1054             Bathing Assessment/Intervention    Bathing [place] Level lower body;upper body;supervision;set up  -BB      Bathing Position long sitting  -BB      Recorded by [BB] Maya Perez COTA/L 08/09/18 1531      Row Name 08/09/18 1054             Upper Body Dressing Assessment/Training    Upper Body Dressing [place] Level doff;don;set up;supervision   [facility] gown  -BB      Upper Body Dressing Position long sitting  -BB      Recorded by [BB] Maya Perez HEARD/L 08/09/18 1531      Row Name 08/09/18 1054             Grooming Assessment/Training    [place] Level (Grooming) hair care, combing/brushing;oral care regimen;wash face, hands;set up;conditional independence  -BB      Grooming Position long sitting  -BB      Recorded by [BB] Maya Perez HEARD/L 08/09/18 1531      Row Name 08/09/18 1311             Therapeutic Exercise    Lower Extremity (Therapeutic Exercise) gluteal sets;quad sets, bilateral;heel slides, right  -TA      Lower Extremity Range of Motion (Therapeutic Exercise) hip flexion/extension, left;knee flexion/extension, left;ankle dorsiflexion/plantar flexion, bilateral  -TA      Position (Therapeutic Exercise) supine  -TA      Sets/Reps (Therapeutic Exercise) 15  -TA      Recorded by [TA] Yojana Ngo, PTA 08/09/18 1556      Row Name 08/09/18 1311  08/09/18 1054          Positioning and Restraints    Pre-Treatment Position in bed  -TA in bed  -BB     Post Treatment Position bed  -TA bed  -BB     In Bed supine;call light within reach;exit alarm on  -TA supine;call light within reach;encouraged to call for assist;exit alarm on;with nsg  -BB     Recorded by [TA] Yojana Ngo, PTA 08/09/18 1556 [BB] Maya Perez COTA/L 08/09/18 1531     Row Name 08/09/18 1311 08/09/18 1054          Pain Scale: Numbers Pre/Post-Treatment    Pain Scale: Numbers, Pretreatment 9/10  -TA 6/10  -BB     Pain Scale: Numbers, Post-Treatment 9/10  -TA 7/10  -BB     Pain Location back  -TA back  -BB     Pain Intervention(s)  -- --   nsg in room to administer meds  -BB     Recorded by [TA] Yojana Ngo, PTA 08/09/18 1556 [BB] Maya Perez COTA/L 08/09/18 1531     Row Name                Wound 08/05/18 1315 Right abdomen puncture    Wound - Properties Group Date first assessed: 08/05/18 [SM] Time first assessed: 1315 [SM] Present On Admission : no [SM] Side: Right [SM] Location: abdomen [SM] Type: puncture [SM] Additional Comments: covered by dressing; small amount of serosang fluid [SM] Recorded by:  [SM] Lyudmila Tran RN 08/05/18 1608    Row Name 08/09/18 1054             Plan of Care Review    Plan of Care Reviewed With patient  -BB      Recorded by [BB] Maya Perez COTA/L 08/09/18 1531      Row Name 08/09/18 1054             Outcome Summary/Treatment Plan (OT)    Daily Summary of Progress (OT) progress toward functional goals is good  -BB      Plan for Continued Treatment (OT) continue POC  -BB      Anticipated Discharge Disposition (OT) home with 24/7 care;skilled nursing facility  -BB      Recorded by [BB] Maya Perez COTA/L 08/09/18 1531      Row Name 08/09/18 1311             Outcome Summary/Treatment Plan (PT)    Daily Summary of Progress (PT) progress toward functional goals is gradual  -TA      Plan for Continued Treatment (PT)  continue-OOB  -TA      Anticipated Discharge Disposition (PT) skilled nursing facility  -TA      Recorded by [TA] Yojana Ngo, PTA 08/09/18 1556        User Key  (r) = Recorded By, (t) = Taken By, (c) = Cosigned By    Initials Name Effective Dates Discipline    Yojana Buchanan, PTA 03/07/18 -  PT    BB Maya Perez, HEARD/L 03/07/18 -  OT    Lyudmila Shipley RN 10/17/16 -  Nurse          Wound 08/05/18 1315 Right abdomen puncture (Active)   Dressing Appearance dry;intact;area marked 8/9/2018  8:20 AM   Drainage Amount none 8/9/2018  8:20 AM               PT Rehab Goals     Row Name 08/09/18 1311             Bed Mobility Goal 1 (PT)    Activity/Assistive Device (Bed Mobility Goal 1, PT) supine to sit;rolling to left  -TA      [place] Level/Cues Needed (Bed Mobility Goal 1, PT) standby assist  -TA      Time Frame (Bed Mobility Goal 1, PT) 5 days  -TA      Progress/Outcomes (Bed Mobility Goal 1, PT) goal met  -TA         Transfer Goal 1 (PT)    Activity/Assistive Device (Transfer Goal 1, PT) bed-to-chair/chair-to-bed  -TA      [place] Level/Cues Needed (Transfer Goal 1, PT) minimum assist (75% or more patient effort)  -TA      Time Frame (Transfer Goal 1, PT) 5 days  -TA      Progress/Outcome (Transfer Goal 1, PT) goal not met  -TA         Strength Goal 1 (PT)    Strength Goal 1 (PT) Improve R LE MMT by 1/2 to 1 muscle grade  -TA      Time Frame (Strength Goal 1, PT) 5 days  -TA      Progress/Outcome (Strength Goal 1, PT) goal not met  -TA        User Key  (r) = Recorded By, (t) = Taken By, (c) = Cosigned By    Initials Name Provider Type Discipline    Yojana Buchanan, PTA Physical Therapy Assistant PT          Physical Therapy Education     Title: PT OT SLP Therapies (Active)     Topic: Physical Therapy (Active)     Point: Home exercise program (Done)    Learning Progress Summary     Learner Status Readiness Method Response Comment Documented by    Patient Done Acceptance E,TB VU  LN  08/06/18 1238          Point: Precautions (Done)    Learning Progress Summary     Learner Status Readiness Method Response Comment Documented by    Patient Done Acceptance E,TB VU  LN 08/06/18 1238                      User Key     Initials Effective Dates Name Provider Type Discipline    LN 03/07/18 -  Stephanie Du PTA Physical Therapy Assistant PT                    PT Recommendation and Plan  Anticipated Discharge Disposition (PT): skilled nursing facility  Therapy Frequency (PT Clinical Impression): daily  Outcome Summary/Treatment Plan (PT)  Daily Summary of Progress (PT): progress toward functional goals is gradual  Plan for Continued Treatment (PT): continue-OOB  Anticipated Discharge Disposition (PT): skilled nursing facility  Outcome Summary: pt sup<>sit with SBA, pt sat @ EOB ~3 minutes self supported, pt fatigued & RETURNED TO SUPINE,  pt will require 24/7 care @ D/C          Outcome Measures     Row Name 08/09/18 1500 08/09/18 1054 08/08/18 1500       How much help from another person do you currently need...    Turning from your back to your side while in flat bed without using bedrails? 4  -TA  -- 3  -TA    Moving from lying on back to sitting on the side of a flat bed without bedrails? 4  -TA  -- 3  -TA    Moving to and from a bed to a chair (including a wheelchair)? 1  -TA  -- 1  -TA    Standing up from a chair using your arms (e.g., wheelchair, bedside chair)? 3  -TA  -- 3  -TA    Climbing 3-5 steps with a railing? 1  -TA  -- 1  -TA    To walk in hospital room? 1  -TA  -- 1  -TA    AM-PAC 6 Clicks Score 14  -TA  -- 12  -TA       How much help from another is currently needed...    Putting on and taking off regular lower body clothing?  -- 2  -BB  --    Bathing (including washing, rinsing, and drying)  -- 3  -BB  --    Toileting (which includes using toilet bed pan or urinal)  -- 2  -BB  --    Putting on and taking off regular upper body clothing  -- 3  -BB  --    Taking care of personal grooming  (such as brushing teeth)  -- 4  -BB  --    Eating meals  -- 4  -BB  --    Score  -- 18  -BB  --       Functional Assessment    Outcome Measure Options AM-PAC 6 Clicks Basic Mobility (PT)  -TA  -- AM-PAC 6 Clicks Basic Mobility (PT)  -TA    Row Name 08/08/18 1430 08/07/18 1045          How much help from another is currently needed...    Putting on and taking off regular lower body clothing? 2  -JH 2  -BB     Bathing (including washing, rinsing, and drying) 2  -JH 2  -BB     Toileting (which includes using toilet bed pan or urinal) 2  -JH 2  -BB     Putting on and taking off regular upper body clothing 3  -JH 3  -BB     Taking care of personal grooming (such as brushing teeth) 3  -JH 4  -BB     Eating meals 3  -JH 4  -BB     Score 15  -JH 17  -BB       User Key  (r) = Recorded By, (t) = Taken By, (c) = Cosigned By    Initials Name Provider Type    Yojana Buchanan PTA Physical Therapy Assistant    Maya Jackman, HEARD/L Occupational Therapy Assistant     Juanita Hickey, HEARD/L Occupational Therapy Assistant           Time Calculation:         PT Charges     Row Name 08/09/18 1601             Time Calculation    Start Time 1311  -TA      Stop Time 1337  -TA      Time Calculation (min) 26 min  -TA         Time Calculation- PT    Total Timed Code Minutes- PT 26 minute(s)  -TA        User Key  (r) = Recorded By, (t) = Taken By, (c) = Cosigned By    Initials Name Provider Type    Yojana Buchanan PTA Physical Therapy Assistant        Therapy Suggested Charges     Code   Minutes Charges    None           Therapy Charges for Today     Code Description Service Date Service Provider Modifiers Qty    24824230444 HC PT THERAPEUTIC ACT EA 15 MIN 8/8/2018 Yojana Ngo, PTA GP 1    14292947786 HC PT THER PROC EA 15 MIN 8/8/2018 Yojana Ngo, PTA GP 1    35875844224 HC PT THER PROC EA 15 MIN 8/9/2018 Yojana Ngo, PTA GP 1    87118825831 HC PT THERAPEUTIC ACT EA 15 MIN 8/9/2018 Yojana Ngo, PTA GP 1           PT G-Codes  Outcome Measure Options: AM-PAC 6 Clicks Basic Mobility (PT)  Score: 10  Functional Limitation: Mobility: Walking and moving around  Mobility: Walking and Moving Around Current Status (): At least 60 percent but less than 80 percent impaired, limited or restricted  Mobility: Walking and Moving Around Goal Status (): At least 40 percent but less than 60 percent impaired, limited or restricted    Yojana Ngo, PTA  8/9/2018

## 2018-08-09 NOTE — NURSING NOTE
Took patient some pain medicine around 1430.  Pt stated that he had been asking for it for close to two hours.  Every time I walked passed the patients room, he was asleep.

## 2018-08-09 NOTE — PROGRESS NOTES
FAMILY MEDICINE DAILY PROGRESS NOTE  NAME: Armando Mcmullen Sr.  : 1965  MRN: [id number]     LOS: 7 days     PROVIDER OF SERVICE: Jose Luis III, MD    Chief Complaint: Anasarca    Subjective:     Interval History:  History taken from: patient RN  No acute overnight events, patient resting comfortably in bed.  Patient responding well to IV diuretics. Patient complains of cyst on right upper back will be examined in size during rounds today.  That could be a possible nidus for leukocytosis. Patient reports bowel movements better.  Improvement of peripheral leg edema.Patient denies chest pain shortness of breath fever chills.    Review of Systems:   Review of Systems   Constitutional: Negative for activity change.   HENT: Negative.    Eyes: Negative.    Respiratory: Negative for shortness of breath.    Cardiovascular: Positive for leg swelling. Negative for chest pain.   Gastrointestinal: Negative for abdominal distention and abdominal pain.   Endocrine: Negative.    Genitourinary: Negative.    Musculoskeletal: Negative.    Skin: Positive for wound.   Allergic/Immunologic: Negative.    Neurological: Negative.    Hematological: Negative.    Psychiatric/Behavioral: Negative.        Objective:     Vital Signs  Temp:  [98.2 °F (36.8 °C)-99.4 °F (37.4 °C)] 98.2 °F (36.8 °C)  Heart Rate:  [] 103  Resp:  [18] 18  BP: (122-129)/(70-90) 122/70    Physical Exam  Physical Exam   Constitutional: He is oriented to person, place, and time. He appears well-developed and well-nourished.   HENT:   Head: Normocephalic and atraumatic.   Right Ear: External ear normal.   Left Ear: External ear normal.   Neck: Neck supple.   Cardiovascular: Normal rate and regular rhythm.    Pulmonary/Chest: He has decreased breath sounds in the right upper field, the right middle field, the left upper field and the left middle field. He has wheezes.   Abdominal: Soft. Bowel sounds are normal.   Musculoskeletal: Normal range of motion.    Neurological: He is alert and oriented to person, place, and time.   Skin: Skin is warm and dry.        2+ pitting edema on the right lower extremity.  Without signs of ecchymosis   Psychiatric: He has a normal mood and affect. His behavior is normal. Judgment and thought content normal.       Medication Review    Current Facility-Administered Medications:   •  albumin human 25 % IV SOLN 12.5 g, 12.5 g, Intravenous, BID, Jose Luis III, MD, Last Rate: 0 mL/hr at 08/05/18 2304, 12.5 g at 08/08/18 2057  •  cefTRIAXone (ROCEPHIN) 1 g/100 mL 0.9% NS (MBP), 1 g, Intravenous, Q24H, Jose Luis III, MD, 1 g at 08/08/18 1446  •  diphenhydrAMINE (BENADRYL) injection 25 mg, 25 mg, Intravenous, Q6H PRN, Jose Luis III, MD  •  furosemide (LASIX) tablet 40 mg, 40 mg, Oral, BID, Jose Luis III, MD  •  lactulose (CHRONULAC) 10 GM/15ML solution 30 g, 30 g, Oral, Q12H, Jose Luis III, MD, 30 g at 08/08/18 2057  •  metOLazone (ZAROXOLYN) tablet 2.5 mg, 2.5 mg, Oral, Daily, Jose Luis III, MD  •  morphine injection 4 mg, 4 mg, Intravenous, Q4H PRN, Jose Luis III, MD, 4 mg at 08/08/18 2316  •  nicotine (NICODERM CQ) 14 MG/24HR patch 1 patch, 1 patch, Transdermal, Q24H, Momo Hammer MD, 1 patch at 08/08/18 2201  •  ondansetron (ZOFRAN) tablet 4 mg, 4 mg, Oral, Q6H PRN **OR** ondansetron ODT (ZOFRAN-ODT) disintegrating tablet 4 mg, 4 mg, Oral, Q6H PRN **OR** ondansetron (ZOFRAN) injection 4 mg, 4 mg, Intravenous, Q6H PRN, Momo Hammer MD  •  potassium chloride (MICRO-K) CR capsule 40 mEq, 40 mEq, Oral, Daily, Jose Luis III, MD, 40 mEq at 08/08/18 0920  •  simethicone (MYLICON) chewable tablet 80 mg, 80 mg, Oral, 4x Daily PRN, Jose Luis III, MD  •  sodium chloride 0.9 % flush 1-10 mL, 1-10 mL, Intravenous, PRN, Momo Hammer MD  •  Insert peripheral IV, , , Once **AND** sodium chloride 0.9 % flush 10 mL, 10 mL, Intravenous, PRN,  Mike Bergman MD, 10 mL at 08/07/18 1643     Diagnostic Data    Lab Results (last 24 hours)     Procedure Component Value Units Date/Time    Body Fluid Culture - Body Fluid, Peritoneum [[id number]]  (Normal) Collected:  08/05/18 1423    Specimen:  Body Fluid from Peritoneum Updated:  08/09/18 0716     BF Culture No growth at 4 days     Gram Stain Result Many (4+) WBCs seen      No organisms seen    Comprehensive Metabolic Panel [[id number]]  (Abnormal) Collected:  08/09/18 0611    Specimen:  Blood Updated:  08/09/18 0657     Glucose 90 mg/dL      BUN 35 (H) mg/dL      Creatinine 2.26 (H) mg/dL      Sodium 133 (L) mmol/L      Potassium 3.7 mmol/L      Chloride 101 mmol/L      CO2 25.0 mmol/L      Calcium 7.9 (L) mg/dL      Total Protein 5.5 (L) g/dL      Albumin 2.40 (L) g/dL      ALT (SGPT) 27 U/L      AST (SGOT) 41 U/L      Alkaline Phosphatase 103 U/L      Total Bilirubin 3.4 (H) mg/dL      eGFR Non African Amer 30 (L) mL/min/1.73      Globulin 3.1 gm/dL      A/G Ratio 0.8 (L) g/dL      BUN/Creatinine Ratio 15.5     Anion Gap 7.0 mmol/L     CBC & Differential [[id number]] Collected:  08/09/18 0611    Specimen:  Blood Updated:  08/09/18 0646    Narrative:       The following orders were created for panel order CBC & Differential.  Procedure                               Abnormality         Status                     ---------                               -----------         ------                     Scan Slide[[id number]]                                                                  CBC Auto Differential[[id number]]        Abnormal            Final result                 Please view results for these tests on the individual orders.    CBC Auto Differential [[id number]]  (Abnormal) Collected:  08/09/18 0611    Specimen:  Blood Updated:  08/09/18 0643     WBC 14.65 (H) 10*3/mm3      RBC 2.42 (L) 10*6/mm3      Hemoglobin 8.2 (L) g/dL      Hematocrit 23.6 (L) %      MCV 97.5 fL      MCH 33.9 pg      MCHC  34.7 g/dL      RDW 15.0 (H) %      RDW-SD 53.6 (H) fl      MPV 10.6 fL      Platelets 41 (L) 10*3/mm3      Neutrophil % 69.9 %      Lymphocyte % 13.6 %      Monocyte % 10.7 %      Eosinophil % 4.0 %      Basophil % 0.8 %      Immature Grans % 1.0 (H) %      Neutrophils, Absolute 10.24 (H) 10*3/mm3      Lymphocytes, Absolute 1.99 10*3/mm3      Monocytes, Absolute 1.57 (H) 10*3/mm3      Eosinophils, Absolute 0.58 10*3/mm3      Basophils, Absolute 0.12 10*3/mm3      Immature Grans, Absolute 0.15 (H) 10*3/mm3     Blood Culture - Blood, [[id number]]  (Normal) Collected:  08/04/18 0915    Specimen:  Blood from Hand, Right Updated:  08/08/18 0945     Blood Culture No growth at 4 days    Blood Culture - Blood, [[id number]]  (Normal) Collected:  08/04/18 0930    Specimen:  Blood from Arm, Left Updated:  08/08/18 0945     Blood Culture No growth at 4 days    AFP Tumor Marker [[id number]] Collected:  08/07/18 1131    Specimen:  Blood Updated:  08/08/18 0820     AFP Tumor Marker 1.0 ng/mL      Comment: [employer] ECLIA methodology       Narrative:       Performed at:  [address] [place] [address]  [id number]  : Glenn Velazquez PhD, Phone:  [phone]            I reviewed the patient's new clinical results.  I reviewed the patient's new imaging results and agree with the interpretation.    Assessment/Plan:     Active Hospital Problems    Diagnosis Date Noted   • **Anasarca [R60.1] 04/27/2018     Nephro and GI consults appreciated.  Lasix IV 40 daily  2L fluid restriction  Likely Hepatorenal type II  Checking for RLE DVT by Ultrasound       • Leukocytosis [D72.829] 04/11/2018     Priority: Medium     Jump To 14.6  Not SBP treat for Community acquired pneumonia with ceftriaxone d4/7 and azithromycin d/c  Blood Cx negative  Patient has new abscess on superior aspect of right back proximal to the medial border of the right scapula on the back we'll incise today     • Pneumonia due to infectious organism  [J18.9] 08/05/2018     Rocephin d5/7  Afebrile     • Anxiety and depression [F41.8] 08/02/2018     Pt has not been taking any medications     • CKD (chronic kidney disease) stage 3, GFR 30-59 ml/min [N18.3] 08/02/2018     Baseline Cr 1.4, admission 1.64, now 1.86, up to 2.25, and 2.41, then 2.5, now 2.4, stable  Nephrology Dr. Eldon milton       • Acute kidney injury (CMS/HCC) [N17.9] 04/27/2018     Creatinine slowly increasing, will monitor as ascites is treated  Consult Nephrology appreciated Dr. Colón following.   Lasix 40 IV Daily  2L Fluid restriction  No Nsaids  Responded well to addition of mental metolazone, and had added 20 IV of Lasix.  We'll transition to 40 by mouth twice a day Lasix     • Anemia [D64.9] 07/18/2017     Anemia of chronic disease        • Thrombocytopenia (CMS/HCC) [D69.6] 07/10/2017     Most likely secondary to liver cirrhosis.   Spleno-hepatic sequestration versus decreased production.  Received platelets 8/4  Platelets   We'll trend,Stable currently     • Alcoholic cirrhosis (CMS/HCC) [K70.30] 05/26/2009      Dr. Becerril is Gastroenterologist consulted recommendations appreciated  - Ascitic fluid without infectious organism not SBP .  Continue home lactulose  AFP for concern of Hepatic Carcinoma, Negative  Suspected type II hepatorenal sydnrome  Ultrasound negative for paracentesis possible fluid       • Essential hypertension [I10] 12/01/2008     Pt has not been taking his meds at home  Currently normotensive at hospital stay.           DVT prophylaxis: Chronic Liver Failure  Code status is   Code Status and Medical Interventions:   Ordered at: 08/02/18 2011     Code Status:    CPR     Medical Interventions (Level of Support Prior to Arrest):    Full       Plan for disposition Transition to by mouth diuretics, anticipating discharge in the next day or 2        This document has been electronically signed by Jose Luis III, MD on August 9, 2018 7:52 AM

## 2018-08-09 NOTE — DISCHARGE PLACEMENT REQUEST
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 pneumonia.  2. Fracture nonunion, right humerus.                                               Electronically signed by:  PRISCILLA Bustillo MD  8/2/2018 5:34 PM  CDT Workstation: [id number]            I reviewed the patient's new clinical results.    ASSESSMENT AND PLAN: This is a 53 y.o. male with:    Active Hospital Problems    Diagnosis Date Noted   • **Anasarca [R60.1] 04/27/2018     Lasix 40mg IV BID  Albumin     • Elevated lactic acid level [R79.89] 08/02/2018     3.0 on admission     • Anxiety and depression [F41.8] 08/02/2018     Pt has not been taking any medications     • CKD (chronic kidney disease) stage 3, GFR 30-59 ml/min [N18.3] 08/02/2018     Baseline Cr 1.4, admission 1.64  monitor     • Elevated brain natriuretic peptide (BNP) level [R79.89] 08/02/2018     2,120 on admission  F/u TTE     • Diarrhea [R19.7] 08/02/2018     F/u GI PCR     • Anemia [D64.9] 07/18/2017     Anemia of chronic disease        • Thrombocytopenia (CMS/HCC) [D69.6] 07/10/2017     Platelet of 51K will monitor  Most likely secondary to liver cirrhosis.        • Alcoholic cirrhosis (CMS/HCC)  [K70.30] 05/26/2009     Seen by Dr. Becerril  Continue home lactulose     • Essential hypertension [I10] 12/01/2008     Pt has not been taking his med         DVT prophylaxis: SCDs/TEDs     PAULIE # [id number], reviewed and consistent with patient reported medications.    Expected Length of Stay: Where: home and When:  2-4days    I discussed the patients findings and my recommendations with patient.     Dr. Carr is the attending on record at time of admission, She is aware of the patient's status and agrees with the above history and physical.    Momo Hammer MD PGY3  [place] [address]  Office: [phone]      This document has been electronically signed by Momo Hammer MD on August 2, 2018 9:01 PM       Electronically signed by Marifer Carr MD at 8/4/2018  6:28 AM
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
## 2018-08-09 NOTE — PROGRESS NOTES
FAMILY MEDICINE PROGRESS NOTE  NAME: Armando Mcmullen Sr.  : 1965  MRN: [id number]     LOS: 7 days   Code Status and Medical Interventions:   Ordered at: 18     Code Status:    CPR     Medical Interventions (Level of Support Prior to Arrest):    Full     PROVIDER OF SERVICE:     Chief Complaint:  Anasarca    Subjective     Interval History:  History taken from: patient  Subjective: Patient is a 52 yo  male who was admitted with anasarca.  He also had pneumonia.  He is feeling better.    Review of Systems:   Review of Systems   Constitutional: Positive for fatigue. Negative for activity change and appetite change.   Respiratory: Positive for cough.    Cardiovascular: Positive for leg swelling.   Gastrointestinal: Negative for abdominal pain, constipation, diarrhea, nausea and vomiting.   Genitourinary: Negative for difficulty urinating and dysuria.   Musculoskeletal: Positive for arthralgias.       Objective     Vital Signs  Temp:  [98.1 °F (36.7 °C)-98.7 °F (37.1 °C)] 98.1 °F (36.7 °C)  Heart Rate:  [] 92  Resp:  [18] 18  BP: (118-129)/(70-76) 118/72    Physical Exam  Physical Exam   Constitutional: He is oriented to person, place, and time. He appears well-developed and well-nourished. No distress.   Chronically ill   Cardiovascular: Normal rate and regular rhythm.  Exam reveals no gallop and no friction rub.    Murmur heard.  2/6 systolic murmur   Pulmonary/Chest: Effort normal. No respiratory distress. He has wheezes. He has no rales.   Scattered wheezes and rhonchi   Abdominal: Soft. Bowel sounds are normal. He exhibits no distension. There is no tenderness. There is no rebound and no guarding.   Neurological: He is alert and oriented to person, place, and time.   Skin: He is not diaphoretic.   Large sebaceous cyst right upper back   Psychiatric: He has a normal mood and affect. His behavior is normal. Judgment and thought content normal.   Nursing note and vitals  reviewed.      Medication Review    Current Facility-Administered Medications:   •  albumin human 25 % IV SOLN 12.5 g, 12.5 g, Intravenous, BID, Jose Luis III, MD, Last Rate: 0 mL/hr at 08/05/18 2304, 12.5 g at 08/09/18 0824  •  cefTRIAXone (ROCEPHIN) 1 g/100 mL 0.9% NS (MBP), 1 g, Intravenous, Q24H, Jose Luis III, MD, 1 g at 08/09/18 1431  •  diphenhydrAMINE (BENADRYL) injection 25 mg, 25 mg, Intravenous, Q6H PRN, Jose Luis III, MD  •  furosemide (LASIX) tablet 40 mg, 40 mg, Oral, BID, Jose Luis III, MD, 40 mg at 08/09/18 1127  •  lactulose (CHRONULAC) 10 GM/15ML solution 30 g, 30 g, Oral, Q12H, Jose Luis III, MD, 30 g at 08/09/18 1127  •  metOLazone (ZAROXOLYN) tablet 2.5 mg, 2.5 mg, Oral, Daily, Jose Luis III, MD, 2.5 mg at 08/09/18 1127  •  morphine injection 4 mg, 4 mg, Intravenous, Q4H PRN, Jose Luis III, MD, 4 mg at 08/09/18 1426  •  nicotine (NICODERM CQ) 14 MG/24HR patch 1 patch, 1 patch, Transdermal, Q24H, Momo Hammer MD, 1 patch at 08/08/18 2201  •  ondansetron (ZOFRAN) tablet 4 mg, 4 mg, Oral, Q6H PRN **OR** ondansetron ODT (ZOFRAN-ODT) disintegrating tablet 4 mg, 4 mg, Oral, Q6H PRN **OR** ondansetron (ZOFRAN) injection 4 mg, 4 mg, Intravenous, Q6H PRN, Momo Hammer MD  •  potassium chloride (MICRO-K) CR capsule 40 mEq, 40 mEq, Oral, Daily, Jose Luis III, MD, 40 mEq at 08/09/18 0824  •  simethicone (MYLICON) chewable tablet 80 mg, 80 mg, Oral, 4x Daily PRN, Joes Luis III, MD  •  sodium chloride 0.9 % flush 1-10 mL, 1-10 mL, Intravenous, PRN, Momo Hammer MD  •  Insert peripheral IV, , , Once **AND** sodium chloride 0.9 % flush 10 mL, 10 mL, Intravenous, PRN, Mike Bergman MD, 10 mL at 08/07/18 1643     Diagnostic Data      I reviewed the patient's new clinical results and imaging.      Assessment/Plan     Active Hospital Problems    Diagnosis Date Noted   • **Bo [R60.1]  04/27/2018     Nephro and GI consults appreciated.  Lasix IV 40 daily  2L fluid restriction  Likely Hepatorenal type II  Checking for RLE DVT by Ultrasound       • Sebaceous cyst [L72.3] 08/09/2018     Right shoulder blade region     • Pneumonia due to infectious organism [J18.9] 08/05/2018     Rocephin d5/7  Afebrile     • Anxiety and depression [F41.8] 08/02/2018     Pt has not been taking any medications     • CKD (chronic kidney disease) stage 3, GFR 30-59 ml/min [N18.3] 08/02/2018     Baseline Cr 1.4, admission 1.64, now 1.86, up to 2.25, and 2.41, then 2.5, now 2.4, stable  Nephrology Dr. Eldon milton       • Acute kidney injury (CMS/HCC) [N17.9] 04/27/2018     Creatinine slowly increasing, will monitor as ascites is treated  Consult Nephrology appreciated Dr. Colón following.   Lasix 40 IV Daily  2L Fluid restriction  No Nsaids  Responded well to addition of mental metolazone, and had added 20 IV of Lasix.  We'll transition to 40 by mouth twice a day Lasix     • Leukocytosis [D72.829] 04/11/2018     Jump To 14.6  Not SBP treat for Community acquired pneumonia with ceftriaxone d4/7 and azithromycin d/c  Blood Cx negative     • Anemia [D64.9] 07/18/2017     Anemia of chronic disease        • Thrombocytopenia (CMS/HCC) [D69.6] 07/10/2017     Most likely secondary to liver cirrhosis.   Spleno-hepatic sequestration versus decreased production.  Received platelets 8/4  Platelets   We'll trend,Stable currently     • Alcoholic cirrhosis (CMS/HCC) [K70.30] 05/26/2009      Dr. Becerril is Gastroenterologist consulted recommendations appreciated  - Ascitic fluid without infectious organism not SBP .  Continue home lactulose  AFP for concern of Hepatic Carcinoma, Negative  Suspected type II hepatorenal sydnrome  Ultrasound negative for paracentesis possible fluid       • Essential hypertension [I10] 12/01/2008     Pt has not been taking his meds at home  Currently normotensive at hospital stay.           DVT  prophylaxis: Contraindicated due to swelling, amputation, and chronic thrombocytopenia      Disposition:Home with home health    I have reviewed the notes, assessments, and/or procedures performed by Dr. Luis, I concur with her/his documentation of Armando Mcmullen Sr..        This document has been electronically signed by Marifer Carr MD on August 9, 2018 3:18 PM

## 2018-08-10 PROBLEM — N50.89 SCROTAL SWELLING: Status: ACTIVE | Noted: 2018-01-01

## 2018-08-10 NOTE — THERAPY TREATMENT NOTE
Acute Care - Physical Therapy Treatment Note  [facility]     Patient Name: Armando Mcmullen Sr.  : 1965  MRN: [id number]  Today's Date: 8/10/2018  Onset of Illness/Injury or Date of Surgery: 18  Date of Referral to PT: 18  Referring Physician: MARIETTA Bain MD.    Admit Date: 2018    Visit Dx:    ICD-10-CM ICD-9-CM   1. Elevated lactic acid level R79.89 276.2   2. Elevated bilirubin R17 277.4   3. Leukocytosis, unspecified type D72.829 288.60   4. Generalized abdominal pain R10.84 789.07   5. Alcoholic cirrhosis of liver with ascites (CMS/HCC) K70.31 571.2   6. Impaired functional mobility and activity tolerance Z74.09 V49.89   7. Impaired mobility and activities of daily living Z74.09 799.89     Patient Active Problem List   Diagnosis   • Anxiety state   • Back pain   • Chronic hepatitis (CMS/HCC)   • Decompensated hepatic cirrhosis (CMS/HCC)   • Depression   • Essential hypertension   • Substance abuse   • Liver failure with hepatic coma (CMS/HCC)   • Hypersplenism   • Chronic osteomyelitis of right shoulder region (CMS/HCC)   • Thrombocytopenia (CMS/HCC)   • Anemia of chronic disease   • Positive hepatitis C antibody test   • BMI 35.0-35.9,adult   • Tobacco use   • CAP (community acquired pneumonia)   • Acute kidney injury (CMS/HCC)   • Anasarca   • Anxiety and depression   • CKD (chronic kidney disease) stage 3, GFR 30-59 ml/min   • Ascites   • Pneumonia due to infectious organism   • Sebaceous cyst   • Scrotal swelling       Therapy Treatment          Rehabilitation Treatment Summary     Row Name 08/10/18 1521 08/10/18 1054          Treatment Time/Intention    Discipline physical therapy assistant  -EM occupational therapy assistant  -BB     Document Type therapy note (daily note)  -EM therapy note (daily note)  -BB     Subjective Information complains of;pain  -EM complains of;pain  -BB     Mode of Treatment physical therapy;individual therapy  -EM individual therapy;occupational therapy   -BB     Total Minutes, Occupational Therapy Treatment  -- 24  -BB     Therapy Frequency (OT Eval)  -- other (see comments)   5-7 days/wk  -BB     Patient Effort good  -EM good  -BB     Comment Pt picked scab on R knee and was bleeding. PTA put bandaid on it per [facility] approval.   -EM  --     Existing Precautions/Restrictions fall;other (see comments)   bleeding precautions-man BP only; L BKA  -EM fall;other (see comments)   L BKA  -BB     Recorded by [EM] Jimbo Whatley, JULITA 08/10/18 1621 [BB] Maya Perez COTA/L 08/10/18 1504     Row Name 08/10/18 1521 08/10/18 1054          Vital Signs    Pre Systolic BP Rehab 114   manual  -EM  --     Pre Treatment Diastolic BP 78  -EM  --     Post Systolic BP Rehab 118   manual  -EM  --     Post Treatment Diastolic BP 74  -EM  --     Pretreatment Heart Rate (beats/min) 91  -EM 92  -BB     Pre SpO2 (%) 92  -EM 93  -BB     O2 Delivery Pre Treatment room air  -EM room air  -BB     Pre Patient Position Supine  -EM Supine  -BB     Intra Patient Position Sitting  -EM  --     Post Patient Position Sitting  -EM  --     Recorded by [EM] Jimbo Whatley, PTA 08/10/18 1621 [BB] Maya Perez HEARD/L 08/10/18 1504     Row Name 08/10/18 1521 08/10/18 1054          Cognitive Assessment/Intervention- PT/OT    Orientation Status (Cognition) oriented x 3  -EM oriented x 3  -BB     Follows Commands (Cognition) WFL  -EM WFL  -BB     Recorded by [EM] Jimbo Whatley, JULITA 08/10/18 1621 [BB] Maya Perez COTA/L 08/10/18 1504     Row Name 08/10/18 1521             Bed Mobility Assessment/Treatment    Supine-Sit [place] (Bed Mobility) supervision  -EM      Sit-Supine [place] (Bed Mobility) supervision  -EM      Assistive Device (Bed Mobility) bed rails;head of bed elevated  -EM      Recorded by [EM] iJmbo Whatley, JULITA 08/10/18 1621      Row Name 08/10/18 1521             Transfer Assessment/Treatment    Transfer Assessment/Treatment stand pivot/stand step transfer  -EM       Recorded by [EM] Jimbo Whatley, PTA 08/10/18 1621      Row Name 08/10/18 1521             Sit-Stand Transfer    Sit-Stand [place] (Transfers) contact guard  -EM      Assistive Device (Sit-Stand Transfers) walker, front-wheeled  -EM      Recorded by [EM] Jimbo Whatley, PTA 08/10/18 1621      Row Name 08/10/18 1521             Stand-Sit Transfer    Stand-Sit [place] (Transfers) contact guard  -EM      Assistive Device (Stand-Sit Transfers) walker, front-wheeled  -EM      Recorded by [EM] Jimbo Whatley, PTA 08/10/18 1621      Row Name 08/10/18 1521             Stand Pivot/Stand Step Transfer    Stand Pivot/Stand Step [place] minimum assist (75% patient effort)   to R  EOB to recliner  -EM      Assistive Device (Stand Pivot Stand Step Transfer) walker, front-wheeled  -EM      Recorded by [EM] Jimbo Whatley, PTA 08/10/18 1621      Row Name 08/10/18 1521             Gait/Stairs Assessment/Training    [place] Level (Gait) --  -EM      Assistive Device (Gait) --  -EM      Recorded by [EM] Jimbo Whatley, PTA 08/10/18 1621      Row Name 08/10/18 1054             Bathing Assessment/Intervention    Bathing [place] Level upper body;conditional independence  -BB      Bathing Position long sitting  -BB      Recorded by [BB] Maya Perez COTA/L 08/10/18 1504      Row Name 08/10/18 1054             Upper Body Dressing Assessment/Training    Upper Body Dressing [place] Level doff;don;conditional independence   [facility] gown  -BB      Upper Body Dressing Position long sitting  -BB      Recorded by [BB] Maya Perez COTA/L 08/10/18 1504      Row Name 08/10/18 1054             Grooming Assessment/Training    [place] Level (Grooming) oral care regimen;wash face, hands;conditional independence  -BB      Grooming Position long sitting  -BB      Recorded by [BB] Maya Perez COTA/L 08/10/18 1504      Row Name 08/10/18 1521 08/10/18 1054          Positioning and Restraints     Pre-Treatment Position in bed  -EM in bed  -BB     Post Treatment Position chair  -EM bed  -BB     In Bed  -- supine;call light within reach;encouraged to call for assist  -BB     In Chair notified nsg;sitting;call light within reach;encouraged to call for assist;with family/caregiver  -EM  --     Recorded by [EM] Jimbo Whatley, PTA 08/10/18 1621 [BB] Maya Perez COTA/L 08/10/18 1504     Row Name 08/10/18 1521 08/10/18 1054          Pain Scale: Numbers Pre/Post-Treatment    Pain Scale: Numbers, Pretreatment 5/10  -EM 8/10  -BB     Pain Scale: Numbers, Post-Treatment 4/10  -EM 8/10  -BB     Pain Location - Orientation lower  -EM  --     Pain Location back  -EM back  -BB     Pain Intervention(s)  -- Declines  -BB     Recorded by [EM] Jimbo Whatley, PTA 08/10/18 1621 [BB] Maya Perez COTA/L 08/10/18 1504     Row Name                Wound 08/05/18 1315 Right abdomen puncture    Wound - Properties Group Date first assessed: 08/05/18 [SM] Time first assessed: 1315 [SM] Present On Admission : no [SM] Side: Right [SM] Location: abdomen [SM] Type: puncture [SM] Additional Comments: covered by dressing; small amount of serosang fluid [SM] Recorded by:  [SM] Lyudmila Tran RN 08/05/18 1608    Row Name 08/10/18 1054             Plan of Care Review    Plan of Care Reviewed With patient  -BB      Recorded by [BB] Maya Perez COTA/L 08/10/18 1504      Row Name 08/10/18 1054             Outcome Summary/Treatment Plan (OT)    Daily Summary of Progress (OT) progress toward functional goals is good  -BB      Plan for Continued Treatment (OT) continue POC  -BB      Anticipated Discharge Disposition (OT) home with 24/7 care;skilled nursing facility  -BB      Recorded by [BB] Maya Perez COTA/L 08/10/18 1504      Row Name 08/10/18 1521             Outcome Summary/Treatment Plan (PT)    Plan for Continued Treatment (PT) Continue.   -EM      Anticipated Discharge Disposition (PT) skilled nursing  facility  -EM      Recorded by [EM] Jimbo Whatley, PTA 08/10/18 1621        User Key  (r) = Recorded By, (t) = Taken By, (c) = Cosigned By    Initials Name Effective Dates Discipline    EM Jimbo Whatley, PTA 08/11/15 -  PT    BB Maya Perez, HEARD/L 03/07/18 -  OT    Lyudmila Shipley RN 10/17/16 -  Nurse          Wound 08/05/18 1315 Right abdomen puncture (Active)   Dressing Appearance dry;intact;area marked 8/9/2018 10:05 PM   Drainage Amount none 8/9/2018 10:05 PM               PT Rehab Goals     Row Name 08/10/18 1521             Bed Mobility Goal 1 (PT)    Activity/Assistive Device (Bed Mobility Goal 1, PT) supine to sit;rolling to left  -EM      [place] Level/Cues Needed (Bed Mobility Goal 1, PT) standby assist  -EM      Time Frame (Bed Mobility Goal 1, PT) 5 days  -EM      Progress/Outcomes (Bed Mobility Goal 1, PT) goal met  -EM         Transfer Goal 1 (PT)    Activity/Assistive Device (Transfer Goal 1, PT) bed-to-chair/chair-to-bed  -EM      [place] Level/Cues Needed (Transfer Goal 1, PT) minimum assist (75% or more patient effort)  -EM      Time Frame (Transfer Goal 1, PT) 5 days  -EM      Progress/Outcome (Transfer Goal 1, PT) goal partially met  -EM         Strength Goal 1 (PT)    Strength Goal 1 (PT) Improve R LE MMT by 1/2 to 1 muscle grade  -EM      Time Frame (Strength Goal 1, PT) 5 days  -EM      Progress/Outcome (Strength Goal 1, PT) goal not met  -EM        User Key  (r) = Recorded By, (t) = Taken By, (c) = Cosigned By    Initials Name Provider Type Discipline    EM Jimbo Whatley, PTA Physical Therapy Assistant PT          Physical Therapy Education     Title: PT OT SLP Therapies (Active)     Topic: Physical Therapy (Active)     Point: Home exercise program (Done)    Learning Progress Summary     Learner Status Readiness Method Response Comment Documented by    Patient Done Acceptance E,TB OSCAR  LN 08/06/18 7323          Point: Precautions (Done)    Learning Progress  Summary     Learner Status Readiness Method Response Comment Documented by    Patient Done Acceptance E,TB VU  LN 08/06/18 1238                      User Key     Initials Effective Dates Name Provider Type Discipline    LN 03/07/18 -  Stephanie Du, JULITA Physical Therapy Assistant PT                    PT Recommendation and Plan  Anticipated Discharge Disposition (PT): skilled nursing facility  Outcome Summary/Treatment Plan (PT)  Plan for Continued Treatment (PT): Continue.   Anticipated Discharge Disposition (PT): skilled nursing facility  Outcome Summary: Pt goldie tx well with good participation. Pt t/f sup-sit with SBAx1, sit-stand-sit w/ CGAx1, t/f EOB to recliner with Min Ax1 with FWRW. 1 goal partially met this tx. Recommend SNF at this time upon discharge.           Outcome Measures     Row Name 08/10/18 1521 08/10/18 1054 08/09/18 1500       How much help from another person do you currently need...    Turning from your back to your side while in flat bed without using bedrails? 4  -EM  -- 4  -TA    Moving from lying on back to sitting on the side of a flat bed without bedrails? 4  -EM  -- 4  -TA    Moving to and from a bed to a chair (including a wheelchair)? 3  -EM  -- 1  -TA    Standing up from a chair using your arms (e.g., wheelchair, bedside chair)? 3  -EM  -- 3  -TA    Climbing 3-5 steps with a railing? 1  -EM  -- 1  -TA    To walk in hospital room? 1  -EM  -- 1  -TA    AM-PAC 6 Clicks Score 16  -EM  -- 14  -TA       How much help from another is currently needed...    Putting on and taking off regular lower body clothing?  -- 2  -BB  --    Bathing (including washing, rinsing, and drying)  -- 3  -BB  --    Toileting (which includes using toilet bed pan or urinal)  -- 2  -BB  --    Putting on and taking off regular upper body clothing  -- 3  -BB  --    Taking care of personal grooming (such as brushing teeth)  -- 4  -BB  --    Eating meals  -- 4  -BB  --    Score  -- 18  -BB  --       Functional  Assessment    Outcome Measure Options AM-[facility] 6 Clicks Basic Mobility (PT)  -EM  -- AM-[facility] 6 Clicks Basic Mobility (PT)  -TA    Row Name 08/09/18 1054 08/08/18 1500 08/08/18 1430       How much help from another person do you currently need...    Turning from your back to your side while in flat bed without using bedrails?  -- 3  -TA  --    Moving from lying on back to sitting on the side of a flat bed without bedrails?  -- 3  -TA  --    Moving to and from a bed to a chair (including a wheelchair)?  -- 1  -TA  --    Standing up from a chair using your arms (e.g., wheelchair, bedside chair)?  -- 3  -TA  --    Climbing 3-5 steps with a railing?  -- 1  -TA  --    To walk in hospital room?  -- 1  -TA  --    AM-[facility] 6 Clicks Score  -- 12  -TA  --       How much help from another is currently needed...    Putting on and taking off regular lower body clothing? 2  -BB  -- 2  -JH    Bathing (including washing, rinsing, and drying) 3  -BB  -- 2  -JH    Toileting (which includes using toilet bed pan or urinal) 2  -BB  -- 2  -JH    Putting on and taking off regular upper body clothing 3  -BB  -- 3  -JH    Taking care of personal grooming (such as brushing teeth) 4  -BB  -- 3  -JH    Eating meals 4  -BB  -- 3  -JH    Score 18  -BB  -- 15  -JH       Functional Assessment    Outcome Measure Options  -- AM-[facility] 6 Clicks Basic Mobility (PT)  -TA  --      User Key  (r) = Recorded By, (t) = Taken By, (c) = Cosigned By    Initials Name Provider Type    EM Jimbo Whatley, PTA Physical Therapy Assistant    TA Yojana Ngo, JULITA Physical Therapy Assistant    BB Maya Perez, HEARD/L Occupational Therapy Assistant    Juanita Archer, HEARD/L Occupational Therapy Assistant           Time Calculation:         PT Charges     Row Name 08/10/18 1624             Time Calculation    Start Time 1521  -EM      Stop Time 1550  -EM      Time Calculation (min) 29 min  -EM         Time Calculation- PT    Total Timed Code Minutes- PT 29 minute(s)   -EM        User Key  (r) = Recorded By, (t) = Taken By, (c) = Cosigned By    Initials Name Provider Type    EM Jimbo Whatley PTA Physical Therapy Assistant        Therapy Suggested Charges     Code   Minutes Charges    None           Therapy Charges for Today     Code Description Service Date Service Provider Modifiers Qty    [id number]  PT THERAPEUTIC ACT EA 15 MIN 8/10/2018 Jimbo Whatley PTA GP 2          PT G-Codes  Outcome Measure Options: AM-PAC 6 Clicks Basic Mobility (PT)  Score: 10  Functional Limitation: Mobility: Walking and moving around  Mobility: Walking and Moving Around Current Status (): At least 60 percent but less than 80 percent impaired, limited or restricted  Mobility: Walking and Moving Around Goal Status (): At least 40 percent but less than 60 percent impaired, limited or restricted    Jimbo Whatley PTA  8/10/2018

## 2018-08-10 NOTE — PROGRESS NOTES
FAMILY MEDICINE PROGRESS NOTE  NAME: Armando Mcmullen Sr.  : 1965  MRN: [id number]     LOS: 8 days   Code Status and Medical Interventions:   Ordered at: 18     Code Status:    CPR     Medical Interventions (Level of Support Prior to Arrest):    Full     PROVIDER OF SERVICE:     Chief Complaint:  Decompensated hepatic cirrhosis (CMS/HCC)    Subjective     Interval History:  History taken from: patient  Subjective: Patient is a 52 yo  male who was admitted with anasarca.  He also had pneumonia.  He feels bad.  His bowels aren't acting right so he isn't hungry.  He is complaining of dysuria when he finishes.    Review of Systems:   Review of Systems   Constitutional: Positive for activity change, appetite change and fatigue.   Respiratory: Positive for cough.    Cardiovascular: Positive for leg swelling.   Gastrointestinal: Negative for abdominal pain, constipation, diarrhea, nausea and vomiting.   Genitourinary: Positive for dysuria. Negative for difficulty urinating.   Musculoskeletal: Positive for arthralgias.       Objective     Vital Signs  Temp:  [97.9 °F (36.6 °C)-98.8 °F (37.1 °C)] 97.9 °F (36.6 °C)  Heart Rate:  [85-92] 87  Resp:  [18-20] 20  BP: (112-142)/(72-88) 142/88    Physical Exam  Physical Exam   Constitutional: He is oriented to person, place, and time. He appears well-developed and well-nourished. No distress.   Chronically ill   Cardiovascular: Normal rate and regular rhythm.  Exam reveals no gallop and no friction rub.    Murmur heard.  2/6 systolic murmur   Pulmonary/Chest: Effort normal. No respiratory distress. He has wheezes. He has no rales.   Scattered wheezes and rhonchi   Abdominal: Soft. Bowel sounds are normal. He exhibits no distension. There is no tenderness. There is no rebound and no guarding.   Neurological: He is alert and oriented to person, place, and time.   Skin: He is not diaphoretic.   Large sebaceous cyst right upper back   Psychiatric: He has a  normal mood and affect. His behavior is normal. Judgment and thought content normal.   Nursing note and vitals reviewed.      Medication Review    Current Facility-Administered Medications:   •  albumin human 25 % IV SOLN 12.5 g, 12.5 g, Intravenous, BID, Jose Luis III, MD, Last Rate: 0 mL/hr at 08/05/18 2304, 12.5 g at 08/10/18 1024  •  cefTRIAXone (ROCEPHIN) 2 g/100 mL 0.9% NS VTB (LUCY), 2 g, Intravenous, Q24H, Jose Luis III, MD  •  diphenhydrAMINE (BENADRYL) injection 25 mg, 25 mg, Intravenous, Q6H PRN, Jose Luis III, MD  •  furosemide (LASIX) tablet 40 mg, 40 mg, Oral, BID, Jose Luis III, MD, 40 mg at 08/10/18 1025  •  ipratropium-albuterol (DUO-NEB) nebulizer solution 3 mL, 3 mL, Nebulization, Q6H - RT, Jose Luis III, MD, 3 mL at 08/10/18 1219  •  lactulose (CHRONULAC) 10 GM/15ML solution 30 g, 30 g, Oral, Q12H, Jose Luis III, MD, 30 g at 08/10/18 1024  •  metOLazone (ZAROXOLYN) tablet 2.5 mg, 2.5 mg, Oral, Daily, Jose Luis III, MD, 2.5 mg at 08/10/18 1025  •  morphine injection 4 mg, 4 mg, Intravenous, Q4H PRN, Jose Luis III, MD, 4 mg at 08/10/18 0213  •  nicotine (NICODERM CQ) 14 MG/24HR patch 1 patch, 1 patch, Transdermal, Q24H, Momo Hammer MD, 1 patch at 08/09/18 2201  •  ondansetron (ZOFRAN) tablet 4 mg, 4 mg, Oral, Q6H PRN **OR** ondansetron ODT (ZOFRAN-ODT) disintegrating tablet 4 mg, 4 mg, Oral, Q6H PRN **OR** ondansetron (ZOFRAN) injection 4 mg, 4 mg, Intravenous, Q6H PRN, Momo Hammer MD  •  potassium chloride (MICRO-K) CR capsule 40 mEq, 40 mEq, Oral, Daily, Jose Luis III, MD, 40 mEq at 08/10/18 1025  •  simethicone (MYLICON) chewable tablet 80 mg, 80 mg, Oral, 4x Daily PRN, Jose Luis III, MD  •  sodium chloride 0.9 % flush 1-10 mL, 1-10 mL, Intravenous, PRN, Momo Hammer MD  •  Insert peripheral IV, , , Once **AND** sodium chloride 0.9 % flush 10 mL, 10 mL, Intravenous, PRN,  Mike Bergman MD, 10 mL at 08/07/18 1643     Diagnostic Data      I reviewed the patient's new clinical results and imaging.      Assessment/Plan     Active Hospital Problems    Diagnosis Date Noted   • **Decompensated hepatic cirrhosis (CMS/HCC) [K72.90] 05/26/2009      Dr. Becerril is Gastroenterologist consulted recommendations appreciated  - Ascitic fluid without infectious organism not SBP .  Continue home lactulose  AFP for concern of Hepatic Carcinoma, Negative  Suspected type II hepatorenal sydnrome  Ultrasound negative for paracentesis possible fluid       • Scrotal swelling [N50.89] 08/10/2018     New onset swelling and edema of scrotum.  Also painful suprapubic pressure.  Ultrasound of scrotum, Urine analysis with urine culture     • Sebaceous cyst [L72.3] 08/09/2018     Right shoulder blade region  Will follow with outpatient surgery of causing issues.     • Pneumonia due to infectious organism [J18.9] 08/05/2018     Rocephin d67  Afebrile     • Anxiety and depression [F41.8] 08/02/2018     Pt has not been taking any medications     • CKD (chronic kidney disease) stage 3, GFR 30-59 ml/min [N18.3] 08/02/2018     Baseline Cr 1.4, admission 1.64, now 1.86, up to 2.25, and 2.41, then 2.5, now 2.4, stable  Nephrology Dr. Eldon mitlon       • Anasarca [R60.1] 04/27/2018     Nephro and GI consults appreciated.  Lasix PO 40 BID  2L fluid restriction  Likely Hepatorenal type II  RLE DVT by Ultrasound Negative       • Acute kidney injury (CMS/HCC) [N17.9] 04/27/2018     Consult Nephrology appreciated Dr. Colón following.   Lasix 40 PO BID  2L Fluid restriction  No Nsaids  Responded well to addition of mental metolazone  Diureses well on oral diuretic     • CAP (community acquired pneumonia) [J18.9] 04/11/2018     Jump To 14.6  Not SBP treat for Community acquired pneumonia with ceftriaxone d6/7 and azithromycin d/c  Blood Cx negative     • Anemia of chronic disease [D63.8] 07/18/2017     Anemia of  chronic disease        • Thrombocytopenia (CMS/HCC) [D69.6] 07/10/2017     Most likely secondary to liver cirrhosis.   Spleno-hepatic sequestration versus decreased production.  Received platelets 8/4  Platelets   We'll trend,Stable currently     • Essential hypertension [I10] 12/01/2008     Pt has not been taking his meds at home  Currently normotensive at hospital stay.           DVT prophylaxis: Contraindicated due to swelling, amputation, and chronic thrombocytopenia      Disposition:Home with home health    I have reviewed the notes, assessments, and/or procedures performed by Dr. Luis, I concur with her/his documentation of Armando Mcmullen Sr..        This document has been electronically signed by Marifer Carr MD on August 10, 2018 12:44 PM

## 2018-08-10 NOTE — PROGRESS NOTES
FAMILY MEDICINE DAILY PROGRESS NOTE  NAME: Armando Mcmullen Sr.  : 1965  MRN: [id number]     LOS: 8 days     PROVIDER OF SERVICE: Jose Luis III, MD    Chief Complaint: Decompensated hepatic cirrhosis (CMS/HCC)    Subjective:     Interval History:  History taken from: patient RN  No acute overnight events. Patient reports increased scrotal swelling and pain.  Mild increase in frequency and suprapubic pressure. Patient reports increased back pain at baseline.  Patient tolerated by mouth diuresis well yesterday.  Patient denies fever chills shortness of breath chest pain nausea vomiting diarrhea    Review of Systems:   Review of Systems   Constitutional: Negative for activity change.   HENT: Negative.    Eyes: Negative.    Respiratory: Negative for shortness of breath.    Cardiovascular: Positive for leg swelling. Negative for chest pain.   Gastrointestinal: Negative for abdominal distention and abdominal pain.   Endocrine: Negative.    Genitourinary: Positive for dysuria, frequency and scrotal swelling.   Musculoskeletal: Negative.    Skin: Positive for wound.   Allergic/Immunologic: Negative.    Neurological: Negative.    Hematological: Negative.    Psychiatric/Behavioral: Negative.        Objective:     Vital Signs  Temp:  [98.1 °F (36.7 °C)-98.8 °F (37.1 °C)] 98.4 °F (36.9 °C)  Heart Rate:  [87-92] 87  Resp:  [18] 18  BP: (112-123)/(72-84) 112/84    Physical Exam  Physical Exam   Constitutional: He is oriented to person, place, and time. He appears well-developed and well-nourished.   HENT:   Head: Normocephalic and atraumatic.   Right Ear: External ear normal.   Left Ear: External ear normal.   Neck: Neck supple.   Cardiovascular: Normal rate and regular rhythm.    Pulmonary/Chest: He has decreased breath sounds in the right upper field, the right middle field, the left upper field and the left middle field. He has wheezes.   Abdominal: Soft. Bowel sounds are normal. There is tenderness in the  suprapubic area.   Musculoskeletal: Normal range of motion.   Neurological: He is alert and oriented to person, place, and time.   Skin: Skin is warm and dry.        2+ pitting edema on the right lower extremity.  Without signs of ecchymosis   Psychiatric: He has a normal mood and affect. His behavior is normal. Judgment and thought content normal.       Medication Review    Current Facility-Administered Medications:   •  albumin human 25 % IV SOLN 12.5 g, 12.5 g, Intravenous, BID, Jose Luis III, MD, Last Rate: 0 mL/hr at 08/05/18 2304, 12.5 g at 08/09/18 2036  •  cefTRIAXone (ROCEPHIN) 2 g/100 mL 0.9% NS VTB (LUCY), 2 g, Intravenous, Q24H, Jose Luis III, MD  •  diphenhydrAMINE (BENADRYL) injection 25 mg, 25 mg, Intravenous, Q6H PRN, Jose Luis III, MD  •  furosemide (LASIX) tablet 40 mg, 40 mg, Oral, BID, Jose Luis III, MD, 40 mg at 08/09/18 1658  •  ipratropium-albuterol (DUO-NEB) nebulizer solution 3 mL, 3 mL, Nebulization, Q6H - RT, Jose Luis III, MD  •  lactulose (CHRONULAC) 10 GM/15ML solution 30 g, 30 g, Oral, Q12H, Jose Luis III, MD, 30 g at 08/09/18 2036  •  metOLazone (ZAROXOLYN) tablet 2.5 mg, 2.5 mg, Oral, Daily, Jose Luis III, MD, 2.5 mg at 08/09/18 1127  •  morphine injection 4 mg, 4 mg, Intravenous, Q4H PRN, Jose Luis III, MD, 4 mg at 08/10/18 0213  •  nicotine (NICODERM CQ) 14 MG/24HR patch 1 patch, 1 patch, Transdermal, Q24H, Momo Hammer MD, 1 patch at 08/09/18 2201  •  ondansetron (ZOFRAN) tablet 4 mg, 4 mg, Oral, Q6H PRN **OR** ondansetron ODT (ZOFRAN-ODT) disintegrating tablet 4 mg, 4 mg, Oral, Q6H PRN **OR** ondansetron (ZOFRAN) injection 4 mg, 4 mg, Intravenous, Q6H PRN, Momo Hammer MD  •  potassium chloride (MICRO-K) CR capsule 40 mEq, 40 mEq, Oral, Daily, Jose Luis III, MD, 40 mEq at 08/09/18 0824  •  simethicone (MYLICON) chewable tablet 80 mg, 80 mg, Oral, 4x Daily PRN, Jose Luis  S III, MD  •  sodium chloride 0.9 % flush 1-10 mL, 1-10 mL, Intravenous, PRN, Momo Hammer MD  •  Insert peripheral IV, , , Once **AND** sodium chloride 0.9 % flush 10 mL, 10 mL, Intravenous, PRN, Mike Bergman MD, 10 mL at 08/07/18 1643     Diagnostic Data    Lab Results (last 24 hours)     Procedure Component Value Units Date/Time    CBC & Differential [[id number]] Collected:  08/10/18 0802    Specimen:  Blood Updated:  08/10/18 0825    Narrative:       The following orders were created for panel order CBC & Differential.  Procedure                               Abnormality         Status                     ---------                               -----------         ------                     Scan Slide[[id number]]                                                                  CBC Auto Differential[[id number]]        Abnormal            Final result                 Please view results for these tests on the individual orders.    CBC Auto Differential [[id number]]  (Abnormal) Collected:  08/10/18 0802    Specimen:  Blood Updated:  08/10/18 0825     WBC 14.22 (H) 10*3/mm3      RBC 2.48 (L) 10*6/mm3      Hemoglobin 8.5 (L) g/dL      Hematocrit 25.0 (L) %      .8 (H) fL      MCH 34.3 (H) pg      MCHC 34.0 g/dL      RDW 15.6 (H) %      RDW-SD 56.9 (H) fl      MPV 10.4 fL      Platelets 51 (L) 10*3/mm3      Neutrophil % 64.3 %      Lymphocyte % 17.6 %      Monocyte % 10.7 %      Eosinophil % 5.1 %      Basophil % 1.5 %      Immature Grans % 0.8 (H) %      Neutrophils, Absolute 9.13 (H) 10*3/mm3      Lymphocytes, Absolute 2.50 10*3/mm3      Monocytes, Absolute 1.52 (H) 10*3/mm3      Eosinophils, Absolute 0.73 (H) 10*3/mm3      Basophils, Absolute 0.22 (H) 10*3/mm3      Immature Grans, Absolute 0.12 (H) 10*3/mm3      nRBC 0.0 /100 WBC     Comprehensive Metabolic Panel [[id number]] Collected:  08/10/18 0802    Specimen:  Blood Updated:  08/10/18 0808    C-reactive Protein [[id number]] Collected:   08/10/18 0802    Specimen:  Blood Updated:  08/10/18 0808    Body Fluid Culture - Body Fluid, Peritoneum [[id number]]  (Normal) Collected:  08/05/18 1423    Specimen:  Body Fluid from Peritoneum Updated:  08/10/18 0559     BF Culture No growth at 5 days     Gram Stain Result Many (4+) WBCs seen      No organisms seen    Blood Culture - Blood, [[id number]]  (Normal) Collected:  08/04/18 0915    Specimen:  Blood from Hand, Right Updated:  08/09/18 0946     Blood Culture No growth at 5 days    Blood Culture - Blood, [[id number]]  (Normal) Collected:  08/04/18 0930    Specimen:  Blood from Arm, Left Updated:  08/09/18 0946     Blood Culture No growth at 5 days    C-reactive Protein [[id number]]  (Abnormal) Collected:  08/09/18 0611    Specimen:  Blood Updated:  08/09/18 0920     C-Reactive Protein 17.30 (H) mg/dL             I reviewed the patient's new clinical results.  I reviewed the patient's new imaging results and agree with the interpretation.    Assessment/Plan:     Active Hospital Problems    Diagnosis Date Noted   • **Decompensated hepatic cirrhosis (CMS/HCC) [K72.90] 05/26/2009      Dr. Becerril is Gastroenterologist consulted recommendations appreciated  - Ascitic fluid without infectious organism not SBP .  Continue home lactulose  AFP for concern of Hepatic Carcinoma, Negative  Suspected type II hepatorenal sydnrome  Ultrasound negative for paracentesis possible fluid       • CAP (community acquired pneumonia) [J18.9] 04/11/2018     Priority: Medium     Jump To 14.6  Not SBP treat for Community acquired pneumonia with ceftriaxone d6/7 and azithromycin d/c  Blood Cx negative     • Scrotal swelling [N50.89] 08/10/2018     New onset swelling and edema of scrotum.  Also painful suprapubic pressure.  Ultrasound of scrotum, Urine analysis with urine culture     • Sebaceous cyst [L72.3] 08/09/2018     Right shoulder blade region  Will follow with outpatient surgery of causing issues.     • Pneumonia due to infectious  organism [J18.9] 08/05/2018     Rocephin d67  Afebrile     • Anxiety and depression [F41.8] 08/02/2018     Pt has not been taking any medications     • CKD (chronic kidney disease) stage 3, GFR 30-59 ml/min [N18.3] 08/02/2018     Baseline Cr 1.4, admission 1.64, now 1.86, up to 2.25, and 2.41, then 2.5, now 2.4, stable  Nephrology Dr. Eldon milton       • Anasarca [R60.1] 04/27/2018     Nephro and GI consults appreciated.  Lasix PO 40 BID  2L fluid restriction  Likely Hepatorenal type II  RLE DVT by Ultrasound Negative       • Acute kidney injury (CMS/HCC) [N17.9] 04/27/2018     Consult Nephrology appreciated Dr. Colón following.   Lasix 40 PO BID  2L Fluid restriction  No Nsaids  Responded well to addition of mental metolazone  Diureses well on oral diuretic     • Anemia of chronic disease [D63.8] 07/18/2017     Anemia of chronic disease        • Thrombocytopenia (CMS/HCC) [D69.6] 07/10/2017     Most likely secondary to liver cirrhosis.   Spleno-hepatic sequestration versus decreased production.  Received platelets 8/4  Platelets   We'll trend,Stable currently     • Essential hypertension [I10] 12/01/2008     Pt has not been taking his meds at home  Currently normotensive at hospital stay.           DVT prophylaxis: Chronic Liver Failure  Code status is   Code Status and Medical Interventions:   Ordered at: 08/02/18 2011     Code Status:    CPR     Medical Interventions (Level of Support Prior to Arrest):    Full       Plan for disposition Transition to by mouth diuretics, anticipating discharge in the next day or 2        This document has been electronically signed by Jose Luis III, MD on August 10, 2018 8:35 AM
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
## 2018-08-10 NOTE — PROGRESS NOTES
"[facility]  [address] [place] [address]. [address]  T - [phone]  F - [phone]     Progress Note          PATIENT  DEMOGRAPHICS   PATIENT NAME: Armando Mcmullen Sr.                      PHYSICIAN: Josep Colón MD  : 1965  MRN: [id number]   LOS: 8 days    Patient Care Team:  Reddy Grant MD as PCP - General (Family Medicine)  Shawn Cortez MD (Inactive) as Surgeon (Orthopedic Surgery)  Tushar Becerril DO as Consulting Physician (Gastroenterology)  Josep Colón MD as Consulting Physician (Nephrology)  Ortiz Ferreira MD as Consulting Physician (Hematology and Oncology)  Subjective   SUBJECTIVE   Back pain , UO lot better         Objective   OBJECTIVE   Vital Signs  Temp:  [97.9 °F (36.6 °C)-98.8 °F (37.1 °C)] 97.9 °F (36.6 °C)  Heart Rate:  [87-92] 87  Resp:  [18-20] 20  BP: (112-142)/(72-88) 142/88    Flowsheet Rows      First Filed Value   Admission Height  182.9 cm (72\") Documented at 2018 1631   Admission Weight  104 kg (230 lb) Documented at 2018 1631           I/O last 3 completed shifts:  In: 530 [P.O.:480; IV Piggyback:50]  Out: 4625 [Urine:4625]    PHYSICAL EXAM    Physical Exam   Constitutional: He is oriented to person, place, and time. He appears well-developed.   HENT:   Head: Normocephalic.   Eyes: Pupils are equal, round, and reactive to light.   Cardiovascular: Normal rate, regular rhythm and normal heart sounds.    Pulmonary/Chest: Effort normal and breath sounds normal.   Abdominal: Soft. Bowel sounds are normal.   Neurological: He is alert and oriented to person, place, and time.       RESULTS   Results Review:      Results from last 7 days  Lab Units 08/10/18  0802 18  0611 18  0537   SODIUM mmol/L 132* 133* 135*   POTASSIUM mmol/L 3.6 3.7 4.4   CHLORIDE mmol/L 99 101 107   CO2 mmol/L 23.0 25.0 21.0*   BUN mg/dL 34* 35* 36*   CREATININE mg/dL 2.10* 2.26* 2.37*   CALCIUM mg/dL 8.0* 7.9* 7.9*   BILIRUBIN mg/dL 3.3* 3.4* 3.2* "   ALK PHOS U/L 107 103 90   ALT (SGPT) U/L 30 27 22   AST (SGOT) U/L 52 41 44   GLUCOSE mg/dL 80 90 83       Estimated Creatinine Clearance: 49.5 mL/min (A) (by C-G formula based on SCr of 2.1 mg/dL (H)).                  Results from last 7 days  Lab Units 08/10/18  0802 08/09/18  0611 08/08/18  0537 08/07/18  0717 08/06/18  0516   WBC 10*3/mm3 14.22* 14.65* 12.38* 14.01* 13.76*   HEMOGLOBIN g/dL 8.5* 8.2* 8.1* 8.7* 8.4*   PLATELETS 10*3/mm3 51* 41* 39* 48* 49*               Imaging Results (last 24 hours)     Procedure Component Value Units Date/Time    US Scrotum & Testicles [[id number]] Collected:  08/10/18 0850     Updated:  08/10/18 1011    Narrative:       EXAM: ULTRASOUND SCROTUM/TESTICLES    HISTORY: 53-year-old male with history given for scrotal  swelling.    COMPARISON: None    TECHNIQUE: Transverse and longitudinal imaging through the  scrotum including the testicles and epididymides was performed.  Color flow and duplex Doppler evaluation of the testicles with  color-flow evaluation of the epididymides.    FINDINGS:   The right testicle measures 2.7 x 1.5 x 1.6 cm.  The left testicle measures 2.6 x 1.5 x 1.8 cm.    The echo textures of both appear homogeneous and relatively  symmetrical. No evidence of suspicious intratesticular mass.  There is normal and relatively symmetrical blood flow  demonstrated on color Doppler examination. No findings of  torsion.    No evidence of suspicious epididymal mass.    No varicocele or significant hydrocele.    There is evidence of scrotal wall thickening/edema particularly  on the left measuring 2 cm compared to 0.8 cm on the right. No  evidence of suspicious fluid collection on the images provided.      Impression:       There is scrotal wall thickening/edema greater on the left  measuring 2 cm thickness. No suspicious fluid collection.    No evidence of suspicious testicular mass or torsion.     Electronically signed by:  Tho Chavez MD  8/10/2018 10:10 AM  CDT  Workstation: [id number]     Testicular or Ovarian Vascular Limited [[id number]] Collected:  08/10/18 0850     Updated:  08/10/18 1011    Narrative:       EXAM: ULTRASOUND SCROTUM/TESTICLES    HISTORY: 53-year-old male with history given for scrotal  swelling.    COMPARISON: None    TECHNIQUE: Transverse and longitudinal imaging through the  scrotum including the testicles and epididymides was performed.  Color flow and duplex Doppler evaluation of the testicles with  color-flow evaluation of the epididymides.    FINDINGS:   The right testicle measures 2.7 x 1.5 x 1.6 cm.  The left testicle measures 2.6 x 1.5 x 1.8 cm.    The echo textures of both appear homogeneous and relatively  symmetrical. No evidence of suspicious intratesticular mass.  There is normal and relatively symmetrical blood flow  demonstrated on color Doppler examination. No findings of  torsion.    No evidence of suspicious epididymal mass.    No varicocele or significant hydrocele.    There is evidence of scrotal wall thickening/edema particularly  on the left measuring 2 cm compared to 0.8 cm on the right. No  evidence of suspicious fluid collection on the images provided.      Impression:       There is scrotal wall thickening/edema greater on the left  measuring 2 cm thickness. No suspicious fluid collection.    No evidence of suspicious testicular mass or torsion.     Electronically signed by:  Tho Chavez MD  8/10/2018 10:10 AM  CDT Workstation: [id number]           MEDICATIONS      albumin human 12.5 g Intravenous BID   ceftriaxone 2 g Intravenous Q24H   furosemide 40 mg Oral BID   ipratropium-albuterol 3 mL Nebulization Q6H - RT   lactulose 30 g Oral Q12H   metOLazone 2.5 mg Oral Daily   nicotine 1 patch Transdermal Q24H   potassium chloride 40 mEq Oral Daily          Assessment/Plan   ASSESSMENT / PLAN    Principal Problem:    Decompensated hepatic cirrhosis (CMS/HCC)  Active Problems:    Essential hypertension    Thrombocytopenia  (CMS/HCC)    Anemia of chronic disease    CAP (community acquired pneumonia)    Acute kidney injury (CMS/HCC)    Anasarca    Anxiety and depression    CKD (chronic kidney disease) stage 3, GFR 30-59 ml/min    Pneumonia due to infectious organism    Sebaceous cyst    Scrotal swelling    1. CKD stage III- baseline creatinine close to 1.5.  He has creatinine of 1.4 in the last office visit.  now myra could be hepatorenal. bp is stable. He was doing well on Lasix and Aldactone and now out of his diuretic and came in with with increasing edema with anasarca.  Patient has paracentesis done over the weekend.  Continue with daily weight.  Continue with fluid restriction at 2 L per day.  Avoid any NSAIDs.  Keep lasix 40 mg PO BID. Keep metolazone for now. Wt readings are unclear?     2. History of liver cirrhosis- status post paracentesis. No marked fluid recollection on repeat u/s. No evidence of sbp on peritoneal fluid     3. History of chronic osteomyelitis of the right shoulder     4. History of left BKA     5. Leukocytosis- possible pneumonia patient is currently on ceftriaxone and azithromycin as           This document has been electronically signed by Josep Colón MD on August 10, 2018 11:46 AM

## 2018-08-11 PROBLEM — D17.1 LIPOMA OF BACK: Status: ACTIVE | Noted: 2018-01-01

## 2018-08-11 NOTE — PROGRESS NOTES
FAMILY MEDICINE PROGRESS NOTE  NAME: Armando Mcmullen Sr.  : 1965  MRN: [id number]     LOS: 9 days   Code Status and Medical Interventions:   Ordered at: 18     Code Status:    CPR     Medical Interventions (Level of Support Prior to Arrest):    Full     PROVIDER OF SERVICE:     Chief Complaint:  Decompensated hepatic cirrhosis (CMS/HCC)    Subjective     Interval History:  History taken from: patient  Subjective: Patient is a 54 yo  male who was admitted with anasarca.  He had a large bowel movement last night but still isn't right.  He has been eating things he doesn't normally eat.    Review of Systems:   Review of Systems   Constitutional: Positive for activity change, appetite change and fatigue.   Respiratory: Positive for cough.    Cardiovascular: Positive for leg swelling.   Gastrointestinal: Negative for abdominal pain, constipation, diarrhea, nausea and vomiting.   Genitourinary: Positive for dysuria. Negative for difficulty urinating.   Musculoskeletal: Positive for arthralgias.       Objective     Vital Signs  Temp:  [96.2 °F (35.7 °C)-97.7 °F (36.5 °C)] 97.3 °F (36.3 °C)  Heart Rate:  [] 84  Resp:  [16-20] 16  BP: (102-124)/(59-86) 124/86    Physical Exam  Physical Exam   Constitutional: He is oriented to person, place, and time. He appears well-developed and well-nourished. No distress.   Chronically ill   Eyes: Scleral icterus is present.   Cardiovascular: Normal rate and regular rhythm.  Exam reveals no gallop and no friction rub.    Murmur heard.  2/6 systolic murmur   Pulmonary/Chest: Effort normal. No respiratory distress. He has wheezes. He has no rales.   Scattered wheezes and rhonchi   Abdominal: Soft. Bowel sounds are normal. He exhibits no distension. There is tenderness. There is no rebound and no guarding.   Diffusely tender   Neurological: He is alert and oriented to person, place, and time.   Skin: He is not diaphoretic.   Large sebaceous cyst right upper  back   Psychiatric: He has a normal mood and affect. His behavior is normal. Judgment and thought content normal.   Nursing note and vitals reviewed.      Medication Review    Current Facility-Administered Medications:   •  albumin human 25 % IV SOLN 12.5 g, 12.5 g, Intravenous, BID, Jose Luis III, MD, Last Rate: 0 mL/hr at 08/05/18 2304, 12.5 g at 08/11/18 0828  •  cefTRIAXone (ROCEPHIN) 2 g/100 mL 0.9% NS VTB (LUCY), 2 g, Intravenous, Q24H, Jose Luis III, MD, 2 g at 08/10/18 1508  •  diphenhydrAMINE (BENADRYL) injection 25 mg, 25 mg, Intravenous, Q6H PRN, Jose Luis III, MD  •  doxycycline (VIBRAMYCIN) 100 mg/250 mL 0.9% NS VTB, 100 mg, Intravenous, Q12H, Jose Luis III, MD, 100 mg at 08/11/18 0613  •  furosemide (LASIX) tablet 40 mg, 40 mg, Oral, BID, Jose Luis III, MD, 40 mg at 08/11/18 0828  •  ipratropium-albuterol (DUO-NEB) nebulizer solution 3 mL, 3 mL, Nebulization, Q6H - RT, Jose Luis III, MD, 3 mL at 08/11/18 1128  •  lactulose (CHRONULAC) 10 GM/15ML solution 30 g, 30 g, Oral, Q12H, Jose Luis III, MD, 30 g at 08/11/18 0828  •  metOLazone (ZAROXOLYN) tablet 2.5 mg, 2.5 mg, Oral, Daily, Jose Luis III, MD, 2.5 mg at 08/11/18 0828  •  morphine injection 4 mg, 4 mg, Intravenous, Q4H PRN, Jose Luis III, MD, 4 mg at 08/11/18 0613  •  nicotine (NICODERM CQ) 14 MG/24HR patch 1 patch, 1 patch, Transdermal, Q24H, Momo Hammer MD, 1 patch at 08/10/18 2055  •  ondansetron (ZOFRAN) tablet 4 mg, 4 mg, Oral, Q6H PRN **OR** ondansetron ODT (ZOFRAN-ODT) disintegrating tablet 4 mg, 4 mg, Oral, Q6H PRN **OR** ondansetron (ZOFRAN) injection 4 mg, 4 mg, Intravenous, Q6H PRN, Momo Hammer MD  •  potassium chloride (MICRO-K) CR capsule 40 mEq, 40 mEq, Oral, Daily, Jose Luis III, MD, 40 mEq at 08/11/18 0828  •  simethicone (MYLICON) chewable tablet 80 mg, 80 mg, Oral, 4x Daily PRN, Jose Luis III, MD  •  sodium  chloride 0.9 % flush 1-10 mL, 1-10 mL, Intravenous, PRN, Momo Hammer MD  •  Insert peripheral IV, , , Once **AND** sodium chloride 0.9 % flush 10 mL, 10 mL, Intravenous, PRN, Mike Bergman MD, 10 mL at 08/07/18 1643  •  spironolactone (ALDACTONE) tablet 25 mg, 25 mg, Oral, Daily, Dilan Crawford MD     Diagnostic Data      I reviewed the patient's new clinical results and imaging.      Assessment/Plan     Active Hospital Problems    Diagnosis Date Noted   • **Decompensated hepatic cirrhosis (CMS/HCC) [K72.90] 05/26/2009      Dr. Becerril is Gastroenterologist consulted recommendations appreciated  - Ascitic fluid without infectious organism not SBP .  Continue home lactulose  AFP for concern of Hepatic Carcinoma, Negative  Suspected type II hepatorenal sydnrome  Ultrasound negative for paracentesis possible fluid       • Scrotal swelling [N50.89] 08/10/2018     New onset swelling and edema of scrotum and painful suprapubic pressure.  Ultrasound of scrotum revealed mild edema  Urine culture pending       • Lipoma of back [D17.1] 08/09/2018     Right upper back  Recommend outpatient follow up     • Pneumonia due to infectious organism [J18.9] 08/05/2018     Rocephin day 7 of 7     • Anxiety and depression [F41.8] 08/02/2018     Pt has not been taking any medications     • CKD (chronic kidney disease) stage 3, GFR 30-59 ml/min [N18.3] 08/02/2018     Baseline Cr 1.4, admission 1.64  Nephrology Dr. Colón follwing  Monitoring with daily CMP     • Anasarca [R60.1] 04/27/2018     Nephro and GI consulted; appreciate recommendations  Lasix PO 40 BID  2L fluid restriction  Likely Hepatorenal type II       • Acute kidney injury (CMS/HCC) [N17.9] 04/27/2018     Consult Nephrology appreciated Dr. Colón following.   Lasix 40 PO BID and metolazone  2L Fluid restriction  No Nsaids     • CAP (community acquired pneumonia) [J18.9] 04/11/2018     Monitoring WBC and CRP  Treating with ceftriaxone (day 7 of  7)     • Anemia of chronic disease [D63.8] 07/18/2017     Monitoring H&H with daily CBC  Will discuss risks and benefits of transfusion  May benefit from erythropoietin; will discuss with nephrology     • Thrombocytopenia (CMS/HCC) [D69.6] 07/10/2017     Most likely secondary to liver cirrhosis.   Spleno-hepatic sequestration versus decreased production.  Received platelets 8/4  Platelets   We'll trend,Stable currently     • Essential hypertension [I10] 12/01/2008     Pt has not been taking his meds at home  Currently normotensive           DVT prophylaxis: Contraindicated due to swelling, amputation, and chronic thrombocytopenia      Disposition:Home with home health    I have reviewed the notes, assessments, and/or procedures performed by Dr. Gonzalez, I concur with her/his documentation of Armando Mcmullen Sr..        This document has been electronically signed by Marifer Carr MD on August 11, 2018 2:18 PM

## 2018-08-11 NOTE — PROGRESS NOTES
"[facility]  [address] [place] [address]. [address]  T - [phone]   - [phone]     Progress Note          PATIENT  DEMOGRAPHICS   PATIENT NAME: Armando Mcmullen SrJuanita                      PHYSICIAN: KALEB Tinsley  : 1965  MRN: [id number]   LOS: 9 days    Patient Care Team:  Reddy Grant MD as PCP - General (Family Medicine)  Shawn Cortez MD (Inactive) as Surgeon (Orthopedic Surgery)  Tushar Becerril DO as Consulting Physician (Gastroenterology)  Josep Colón MD as Consulting Physician (Nephrology)  Ortiz Ferreira MD as Consulting Physician (Hematology and Oncology)  Subjective   SUBJECTIVE   Back pain , UO lot better         Objective   OBJECTIVE   Vital Signs  Temp:  [96.2 °F (35.7 °C)-97.9 °F (36.6 °C)] 97.3 °F (36.3 °C)  Heart Rate:  [] 83  Resp:  [18-20] 18  BP: (102-142)/(59-88) 124/86    Flowsheet Rows      First Filed Value   Admission Height  182.9 cm (72\") Documented at 2018 1631   Admission Weight  104 kg (230 lb) Documented at 2018 1631           I/O last 3 completed shifts:  In: 1010 [P.O.:910; IV Piggyback:100]  Out: 6600 [Urine:6600]    PHYSICAL EXAM    Physical Exam   Constitutional: He is oriented to person, place, and time. He appears well-developed.   HENT:   Head: Normocephalic.   Eyes: Pupils are equal, round, and reactive to light.   Cardiovascular: Normal rate, regular rhythm and normal heart sounds.    Pulmonary/Chest: Effort normal and breath sounds normal.   Abdominal: Soft. Bowel sounds are normal.   Musculoskeletal: He exhibits edema.   Neurological: He is alert and oriented to person, place, and time.       RESULTS   Results Review:      Results from last 7 days  Lab Units 18  0643 08/10/18  0802 18  0611   SODIUM mmol/L 133* 132* 133*   POTASSIUM mmol/L 3.1* 3.6 3.7   CHLORIDE mmol/L 96 99 101   CO2 mmol/L 27.0 23.0 25.0   BUN mg/dL 33* 34* 35*   CREATININE mg/dL 1.91* 2.10* 2.26*   CALCIUM mg/dL 7.8* " 8.0* 7.9*   BILIRUBIN mg/dL 2.6* 3.3* 3.4*   ALK PHOS U/L 111 107 103   ALT (SGPT) U/L 25 30 27   AST (SGOT) U/L 49 52 41   GLUCOSE mg/dL 79 80 90       Estimated Creatinine Clearance: 53.7 mL/min (A) (by C-G formula based on SCr of 1.91 mg/dL (H)).      Results from last 7 days  Lab Units 08/11/18  0643   MAGNESIUM mg/dL 1.9               Results from last 7 days  Lab Units 08/11/18  0643 08/10/18  0802 08/09/18  0611 08/08/18  0537 08/07/18  0717   WBC 10*3/mm3 9.40 14.22* 14.65* 12.38* 14.01*   HEMOGLOBIN g/dL 7.6* 8.5* 8.2* 8.1* 8.7*   PLATELETS 10*3/mm3 47* 51* 41* 39* 48*               Imaging Results (last 24 hours)     Procedure Component Value Units Date/Time    US Scrotum & Testicles [[id number]] Collected:  08/10/18 0850     Updated:  08/10/18 1011    Narrative:       EXAM: ULTRASOUND SCROTUM/TESTICLES    HISTORY: 53-year-old male with history given for scrotal  swelling.    COMPARISON: None    TECHNIQUE: Transverse and longitudinal imaging through the  scrotum including the testicles and epididymides was performed.  Color flow and duplex Doppler evaluation of the testicles with  color-flow evaluation of the epididymides.    FINDINGS:   The right testicle measures 2.7 x 1.5 x 1.6 cm.  The left testicle measures 2.6 x 1.5 x 1.8 cm.    The echo textures of both appear homogeneous and relatively  symmetrical. No evidence of suspicious intratesticular mass.  There is normal and relatively symmetrical blood flow  demonstrated on color Doppler examination. No findings of  torsion.    No evidence of suspicious epididymal mass.    No varicocele or significant hydrocele.    There is evidence of scrotal wall thickening/edema particularly  on the left measuring 2 cm compared to 0.8 cm on the right. No  evidence of suspicious fluid collection on the images provided.      Impression:       There is scrotal wall thickening/edema greater on the left  measuring 2 cm thickness. No suspicious fluid collection.    No  evidence of suspicious testicular mass or torsion.     Electronically signed by:  Tho Chavez MD  8/10/2018 10:10 AM  CDT Workstation: [id number]     [facility] [[id number]] Collected:  08/10/18 0850     Updated:  08/10/18 1011    Narrative:       EXAM: ULTRASOUND SCROTUM/TESTICLES    HISTORY: 53-year-old male with history given for scrotal  swelling.    COMPARISON: None    TECHNIQUE: Transverse and longitudinal imaging through the  scrotum including the testicles and epididymides was performed.  Color flow and duplex Doppler evaluation of the testicles with  color-flow evaluation of the epididymides.    FINDINGS:   The right testicle measures 2.7 x 1.5 x 1.6 cm.  The left testicle measures 2.6 x 1.5 x 1.8 cm.    The echo textures of both appear homogeneous and relatively  symmetrical. No evidence of suspicious intratesticular mass.  There is normal and relatively symmetrical blood flow  demonstrated on color Doppler examination. No findings of  torsion.    No evidence of suspicious epididymal mass.    No varicocele or significant hydrocele.    There is evidence of scrotal wall thickening/edema particularly  on the left measuring 2 cm compared to 0.8 cm on the right. No  evidence of suspicious fluid collection on the images provided.      Impression:       There is scrotal wall thickening/edema greater on the left  measuring 2 cm thickness. No suspicious fluid collection.    No evidence of suspicious testicular mass or torsion.     Electronically signed by:  Tho Chavez MD  8/10/2018 10:10 AM  CDT Workstation: [id number]           MEDICATIONS      albumin human 12.5 g Intravenous BID   ceftriaxone 2 g Intravenous Q24H   doxycycline 100 mg Intravenous Q12H   furosemide 40 mg Oral BID   ipratropium-albuterol 3 mL Nebulization Q6H - RT   lactulose 30 g Oral Q12H   metOLazone 2.5 mg Oral Daily   nicotine 1 patch Transdermal Q24H   potassium chloride 40 mEq Oral Daily          Assessment/Plan    ASSESSMENT / PLAN    Principal Problem:    Decompensated hepatic cirrhosis (CMS/HCC)  Active Problems:    Essential hypertension    Thrombocytopenia (CMS/HCC)    Anemia of chronic disease    CAP (community acquired pneumonia)    Acute kidney injury (CMS/HCC)    Anasarca    Anxiety and depression    CKD (chronic kidney disease) stage 3, GFR 30-59 ml/min    Pneumonia due to infectious organism    Sebaceous cyst    Scrotal swelling    1. CKD stage III- baseline creatinine close to 1.5.  He had a creatinine of 1.4 in the last office visit, now has CHRIS that could be hepatorenal. BP is stable. He was doing well on Lasix and Aldactone and ran out of his diuretic and came in with with increasing edema with anasarca.  Patient has paracentesis done over the weekend.  Continue with daily weight.  Continue with fluid restriction at 2 L per day.  Avoid any NSAIDs.  Keep lasix 40 mg PO BID. Keep metolazone for now. Wt readings are unclear/inconsistent. Pt's clinical presentation is improved. Cr improving, excellent UO.     2. History of liver cirrhosis- status post paracentesis. No marked fluid recollection on repeat u/s. No evidence of sbp on peritoneal fluid     3. History of chronic osteomyelitis of the right shoulder     4. History of left BKA     5. Leukocytosis- possible pneumonia patient is currently on ceftriaxone and azithromycin is d/c'd. UA consistent with UTI as well.    6. Anemia- Hgb down to 7.6           This document has been electronically signed by KALEB Tinsley on August 11, 2018 9:49 AM

## 2018-08-11 NOTE — PROGRESS NOTES
"    FAMILY MEDICINE DAILY PROGRESS NOTE    NAME: Armando Mcmullen Sr.  : 1965  MRN: [id number]      LOS: 9 days     PROVIDER OF SERVICE: Traci Gonzalez MD    Chief Complaint: Decompensated hepatic cirrhosis (CMS/HCC)    Subjective:     Interval History:  History taken from: patient chart    Patient reports improvement in his generalized swelling. He has been able to tolerate diuresis. Reports that he likes ice so that he doesn't feel \"dried out\". Continues to complain of dysuria and pressure. Urine cultures are currently pending.     Review of Systems:   Review of Systems   Constitutional: Negative for chills, diaphoresis, fatigue and fever.   HENT: Negative for congestion, rhinorrhea, sneezing and sore throat.    Respiratory: Negative for cough and shortness of breath.    Cardiovascular: Negative for chest pain and leg swelling.   Gastrointestinal: Positive for abdominal distention and abdominal pain. Negative for constipation, diarrhea, nausea and vomiting.   Genitourinary: Positive for dysuria and scrotal swelling. Negative for difficulty urinating and hematuria.   Musculoskeletal: Positive for gait problem (left BKA). Negative for joint swelling.   Skin: Positive for color change. Negative for rash and wound.   Neurological: Negative for seizures, syncope and headaches.   Psychiatric/Behavioral: Negative for confusion and sleep disturbance.       Objective:     Vital Signs  Temp:  [96.2 °F (35.7 °C)-97.9 °F (36.6 °C)] 97.3 °F (36.3 °C)  Heart Rate:  [] 83  Resp:  [18-20] 18  BP: (102-142)/(59-88) 124/86  Body mass index is 29.31 kg/m².    Physical Exam  Physical Exam   Constitutional: He is oriented to person, place, and time. He appears well-developed and well-nourished. No distress.   HENT:   Head: Normocephalic and atraumatic.   Mouth/Throat: Oropharynx is clear and moist.   Eyes: Pupils are equal, round, and reactive to light. Conjunctivae and EOM are normal. Scleral icterus is present. "   Neck: Normal range of motion. Neck supple. No tracheal deviation present. No thyromegaly present.   Cardiovascular: Normal rate, regular rhythm, normal heart sounds and intact distal pulses.    Pulmonary/Chest: Effort normal. He has no wheezes. He has rales.   Abdominal: Soft. Bowel sounds are normal. He exhibits distension. There is hepatomegaly. There is tenderness in the right upper quadrant and left upper quadrant.   Musculoskeletal: Normal range of motion. He exhibits edema (trace right lower extremity) and deformity (left BKA). He exhibits no tenderness.   Lymphadenopathy:     He has no cervical adenopathy.   Neurological: He is alert and oriented to person, place, and time.   Skin: Skin is warm and dry. No rash noted. He is not diaphoretic.   Jaundice   Psychiatric: He has a normal mood and affect. His behavior is normal. Judgment and thought content normal.   Nursing note and vitals reviewed.      Medication Review    Current Facility-Administered Medications:   •  albumin human 25 % IV SOLN 12.5 g, 12.5 g, Intravenous, BID, Jose Luis III, MD, Last Rate: 0 mL/hr at 08/05/18 2304, 12.5 g at 08/11/18 0828  •  cefTRIAXone (ROCEPHIN) 2 g/100 mL 0.9% NS VTB (LUCY), 2 g, Intravenous, Q24H, Jose Luis III, MD, 2 g at 08/10/18 1508  •  diphenhydrAMINE (BENADRYL) injection 25 mg, 25 mg, Intravenous, Q6H PRN, Jose Luis III, MD  •  doxycycline (VIBRAMYCIN) 100 mg/250 mL 0.9% NS VTB, 100 mg, Intravenous, Q12H, Jose Luis III, MD, 100 mg at 08/11/18 0613  •  furosemide (LASIX) tablet 40 mg, 40 mg, Oral, BID, Jose Luis III, MD, 40 mg at 08/11/18 0828  •  ipratropium-albuterol (DUO-NEB) nebulizer solution 3 mL, 3 mL, Nebulization, Q6H - RT, Jose Luis III, MD, 3 mL at 08/10/18 1843  •  lactulose (CHRONULAC) 10 GM/15ML solution 30 g, 30 g, Oral, Q12H, Jose Luis III, MD, 30 g at 08/11/18 0828  •  metOLazone (ZAROXOLYN) tablet 2.5 mg, 2.5 mg, Oral, Daily, Toby,  Jose HAY III, MD, 2.5 mg at 08/11/18 0828  •  morphine injection 4 mg, 4 mg, Intravenous, Q4H PRN, Jose Luis III, MD, 4 mg at 08/11/18 0613  •  nicotine (NICODERM CQ) 14 MG/24HR patch 1 patch, 1 patch, Transdermal, Q24H, Momo Hammer MD, 1 patch at 08/10/18 2055  •  ondansetron (ZOFRAN) tablet 4 mg, 4 mg, Oral, Q6H PRN **OR** ondansetron ODT (ZOFRAN-ODT) disintegrating tablet 4 mg, 4 mg, Oral, Q6H PRN **OR** ondansetron (ZOFRAN) injection 4 mg, 4 mg, Intravenous, Q6H PRN, Momo Hammer MD  •  potassium chloride (MICRO-K) CR capsule 40 mEq, 40 mEq, Oral, Daily, Jose Luis III, MD, 40 mEq at 08/11/18 0828  •  simethicone (MYLICON) chewable tablet 80 mg, 80 mg, Oral, 4x Daily PRN, Jose Luis III, MD  •  sodium chloride 0.9 % flush 1-10 mL, 1-10 mL, Intravenous, PRN, Momo Hammer MD  •  Insert peripheral IV, , , Once **AND** sodium chloride 0.9 % flush 10 mL, 10 mL, Intravenous, PRN, Mike Bergman MD, 10 mL at 08/07/18 1643     Diagnostic Data    Lab Results (last 24 hours)     Procedure Component Value Units Date/Time    Magnesium [[id number]]  (Normal) Collected:  08/11/18 0643    Specimen:  Blood Updated:  08/11/18 0939     Magnesium 1.9 mg/dL     CBC & Differential [[id number]] Collected:  08/11/18 0643    Specimen:  Blood Updated:  08/11/18 0824    Narrative:       The following orders were created for panel order CBC & Differential.  Procedure                               Abnormality         Status                     ---------                               -----------         ------                     Scan Slide[[id number]]                                       Final result               CBC Auto Differential[[id number]]        Abnormal            Final result                 Please view results for these tests on the individual orders.    Scan Slide [[id number]] Collected:  08/11/18 0643    Specimen:  Blood Updated:  08/11/18 0824     RBC  Morphology Normal     WBC Morphology Normal     Platelet Estimate Decreased    Comprehensive Metabolic Panel [[id number]]  (Abnormal) Collected:  08/11/18 0643    Specimen:  Blood Updated:  08/11/18 0722     Glucose 79 mg/dL      BUN 33 (H) mg/dL      Creatinine 1.91 (H) mg/dL      Sodium 133 (L) mmol/L      Potassium 3.1 (L) mmol/L      Chloride 96 mmol/L      CO2 27.0 mmol/L      Calcium 7.8 (L) mg/dL      Total Protein 5.3 (L) g/dL      Albumin 2.30 (L) g/dL      ALT (SGPT) 25 U/L      AST (SGOT) 49 U/L      Alkaline Phosphatase 111 U/L      Total Bilirubin 2.6 (H) mg/dL      eGFR Non African Amer 37 (L) mL/min/1.73      Globulin 3.0 gm/dL      A/G Ratio 0.8 (L) g/dL      BUN/Creatinine Ratio 17.3     Anion Gap 10.0 mmol/L     C-reactive Protein [[id number]]  (Abnormal) Collected:  08/11/18 0643    Specimen:  Blood Updated:  08/11/18 0722     C-Reactive Protein 8.00 (H) mg/dL     CBC Auto Differential [[id number]]  (Abnormal) Collected:  08/11/18 0643    Specimen:  Blood Updated:  08/11/18 0702     WBC 9.40 10*3/mm3      RBC 2.25 (L) 10*6/mm3      Hemoglobin 7.6 (L) g/dL      Hematocrit 21.6 (L) %      MCV 96.0 fL      MCH 33.8 pg      MCHC 35.2 g/dL      RDW 15.0 (H) %      RDW-SD 52.3 (H) fl      MPV 10.4 fL      Platelets 47 (L) 10*3/mm3      Neutrophil % 62.3 %      Lymphocyte % 20.6 %      Monocyte % 10.5 %      Eosinophil % 4.7 %      Basophil % 0.9 %      Immature Grans % 1.0 (H) %      Neutrophils, Absolute 5.86 10*3/mm3      Lymphocytes, Absolute 1.94 10*3/mm3      Monocytes, Absolute 0.99 (H) 10*3/mm3      Eosinophils, Absolute 0.44 10*3/mm3      Basophils, Absolute 0.08 10*3/mm3      Immature Grans, Absolute 0.09 (H) 10*3/mm3     Urine Culture - Urine, [[id number]]  (Normal) Collected:  08/10/18 1031    Specimen:  Urine from Urine, Clean Catch Updated:  08/11/18 0632     Urine Culture Culture in progress    Urinalysis, Microscopic Only - Urine, Clean Catch [[id number]]  (Abnormal) Collected:  08/10/18 1031     Specimen:  Urine from Urine, Clean Catch Updated:  08/10/18 1132     RBC, UA Too Numerous to Count (A) /HPF      WBC, UA Too Numerous to Count (A) /HPF      Bacteria, UA 2+ (A) /HPF      Squamous Epithelial Cells, UA 0-2 /HPF      Hyaline Casts, UA       Unable to determine due to loaded field     /LPF     Methodology Manual Light Microscopy    Urinalysis With Culture If Indicated - Urine, Clean Catch [[id number]]  (Abnormal) Collected:  08/10/18 1031    Specimen:  Urine from Urine, Clean Catch Updated:  08/10/18 1056     Color, UA Yellow     Appearance, UA Cloudy (A)     pH, UA <=5.0     Specific Gravity, UA 1.007     Glucose, UA Negative     Ketones, UA Negative     Bilirubin, UA Negative     Blood, UA Large (3+) (A)     Protein, UA Trace (A)     Leuk Esterase, UA Large (3+) (A)     Nitrite, UA Negative     Urobilinogen, UA 0.2 E.U./dL        I reviewed the patient's new clinical results.    Assessment/Plan:     Active Hospital Problems    Diagnosis Date Noted   • **Decompensated hepatic cirrhosis (CMS/HCC) [K72.90] 05/26/2009      Dr. Becerril is Gastroenterologist consulted recommendations appreciated  - Ascitic fluid without infectious organism not SBP .  Continue home lactulose  AFP for concern of Hepatic Carcinoma, Negative  Suspected type II hepatorenal sydnrome  Ultrasound negative for paracentesis possible fluid       • Scrotal swelling [N50.89] 08/10/2018     New onset swelling and edema of scrotum and painful suprapubic pressure.  Ultrasound of scrotum revealed mild edema  Urine culture pending       • Lipoma of back [D17.1] 08/09/2018     Right upper back  Recommend outpatient follow up     • Pneumonia due to infectious organism [J18.9] 08/05/2018     Rocephin day 7 of 7     • Anxiety and depression [F41.8] 08/02/2018     Pt has not been taking any medications     • CKD (chronic kidney disease) stage 3, GFR 30-59 ml/min [N18.3] 08/02/2018     Baseline Cr 1.4, admission 1.64  Nephrology Dr. Colón  follwing  Monitoring with daily CMP     • Anasarca [R60.1] 04/27/2018     Nephro and GI consulted; appreciate recommendations  Lasix PO 40 BID  2L fluid restriction  Likely Hepatorenal type II       • Acute kidney injury (CMS/HCC) [N17.9] 04/27/2018     Consult Nephrology appreciated Dr. Colón following.   Lasix 40 PO BID and metolazone  2L Fluid restriction  No Nsaids     • CAP (community acquired pneumonia) [J18.9] 04/11/2018     Monitoring WBC and CRP  Treating with ceftriaxone (day 7 of 7)     • Anemia of chronic disease [D63.8] 07/18/2017     Monitoring H&H with daily CBC  Will discuss risks and benefits of transfusion  May benefit from erythropoietin; will discuss with nephrology     • Thrombocytopenia (CMS/HCC) [D69.6] 07/10/2017     Most likely secondary to liver cirrhosis.   Spleno-hepatic sequestration versus decreased production.  Received platelets 8/4  Platelets   We'll trend,Stable currently     • Essential hypertension [I10] 12/01/2008     Pt has not been taking his meds at home  Currently normotensive         DVT prophylaxis: SCDs/TEDs and BKA     Code status is   Code Status and Medical Interventions:   Ordered at: 08/02/18 2011     Code Status:    CPR     Medical Interventions (Level of Support Prior to Arrest):    Full       Plan for disposition:Where: home and When:  with clinical improvement and stability      Anticipated length of stay: 1-2 days    Signature  Traci Gonzalez MD  [facility] Family Medicine Resident, PGY III        This document has been electronically signed by Traci Gonzalez MD on August 11, 2018 9:56 AM

## 2018-08-12 PROBLEM — R53.81 PHYSICAL DECONDITIONING: Status: ACTIVE | Noted: 2018-01-01

## 2018-08-12 NOTE — THERAPY TREATMENT NOTE
Acute Care - Physical Therapy Treatment Note  [facility]     Patient Name: Armando Mcmullen Sr.  : 1965  MRN: [id number]  Today's Date: 2018  Onset of Illness/Injury or Date of Surgery: 18  Date of Referral to PT: 18  Referring Physician: MARIETTA Bain MD.    Admit Date: 2018    Visit Dx:    ICD-10-CM ICD-9-CM   1. Elevated lactic acid level R79.89 276.2   2. Elevated bilirubin R17 277.4   3. Leukocytosis, unspecified type D72.829 288.60   4. Generalized abdominal pain R10.84 789.07   5. Alcoholic cirrhosis of liver with ascites (CMS/HCC) K70.31 571.2   6. Impaired functional mobility and activity tolerance Z74.09 V49.89   7. Impaired mobility and activities of daily living Z74.09 799.89     Patient Active Problem List   Diagnosis   • Anxiety state   • Back pain   • Chronic hepatitis (CMS/HCC)   • Decompensated hepatic cirrhosis (CMS/HCC)   • Depression   • Essential hypertension   • Hypokalemia   • Substance abuse   • Liver failure with hepatic coma (CMS/HCC)   • Hypersplenism   • Chronic osteomyelitis of right shoulder region (CMS/HCC)   • Thrombocytopenia (CMS/HCC)   • Anemia of chronic disease   • Positive hepatitis C antibody test   • BMI 35.0-35.9,adult   • Tobacco use   • CAP (community acquired pneumonia)   • Hepatic encephalopathy (CMS/HCC)   • Acute kidney injury (CMS/HCC)   • Anasarca   • Anxiety and depression   • CKD (chronic kidney disease) stage 3, GFR 30-59 ml/min   • Ascites   • Pneumonia due to infectious organism   • Lipoma of back   • Scrotal swelling   • Physical deconditioning       Therapy Treatment          Rehabilitation Treatment Summary     Row Name 18 0919             Treatment Time/Intention    Discipline physical therapy assistant  -CA      Document Type therapy note (daily note)  -CA      Subjective Information complains of;pain;fatigue  -CA      Mode of Treatment individual therapy;physical therapy  -CA      Therapy Frequency (PT Clinical Impression)  daily  -CA      Patient Effort adequate  -CA2      Existing Precautions/Restrictions fall  -CA2      Recorded by [CA] Nikolai Flores, PTA 08/12/18 0925  [CA2] Nikolai Flores, PTA 08/12/18 1110      Row Name 08/12/18 0919             Cognitive Assessment/Intervention- PT/OT    Orientation Status (Cognition) oriented x 3  -CA      Follows Commands (Cognition) WFL  -CA      Recorded by [CA] Nikolai Flores, PTA 08/12/18 1110      Row Name 08/12/18 0919             Bed Mobility Assessment/Treatment    Comment (Bed Mobility) pt. deferred EOB due to pain  -CA      Recorded by [CA] Nikolai Flores, PTA 08/12/18 1110      Row Name 08/12/18 0919             Sit-Stand Transfer    Sit-Stand [place] (Transfers) not tested  -CA      Recorded by [CA] Nikolai Flores, PTA 08/12/18 1110      Row Name 08/12/18 0919             Stand-Sit Transfer    Stand-Sit [place] (Transfers) not tested  -CA      Recorded by [CA] Nikolai Flores, PTA 08/12/18 1110      Row Name 08/12/18 0919             Therapeutic Exercise    Therapeutic Exercise supine, lower extremities  -CA      Recorded by [CA] Nikolai Flores, PTA 08/12/18 1110      Row Name 08/12/18 0919             Lower Extremity Supine Therapeutic Exercise    Performed, Supine Lower Extremity (Therapeutic Exercise) hip abduction/adduction;heel slides;ankle pumps;quadriceps sets  -CA      Exercise Type, Supine Lower Extremity (Therapeutic Exercise) AROM (active range of motion)  -CA      Sets/Reps Detail, Supine Lower Extremity (Therapeutic Exercise) 1x20  -CA      Recorded by [CA] Nikolai Flores, PTA 08/12/18 1110      Row Name 08/12/18 0919             Positioning and Restraints    Pre-Treatment Position in bed  -CA      Post Treatment Position bed  -CA      In Bed fowlers;call light within reach;exit alarm on  -CA      Recorded by [CA] Nikolai Flores, PTA 08/12/18 1110      Row Name 08/12/18 0919             Pain Scale: Numbers Pre/Post-Treatment    Pain Scale: Numbers, Pretreatment 6/10   -CA      Pain Scale: Numbers, Post-Treatment 6/10  -CA      Pain Location - Orientation lower  -CA      Pain Location back  -CA      Recorded by [CA] Nikolai Flores PTA 08/12/18 1110      Row Name                Wound 08/05/18 1315 Right abdomen puncture    Wound - Properties Group Date first assessed: 08/05/18 [SM] Time first assessed: 1315 [SM] Present On Admission : no [SM] Side: Right [SM] Location: abdomen [SM] Type: puncture [SM] Additional Comments: covered by dressing; small amount of serosang fluid [SM] Recorded by:  [SM] Lyudmila Tran RN 08/05/18 1608      User Key  (r) = Recorded By, (t) = Taken By, (c) = Cosigned By    Initials Name Effective Dates Discipline    CA Nikolai Flores PTA 03/07/18 -  PT    SM Lyudmila Tran RN 10/17/16 -  Nurse          Wound 08/05/18 1315 Right abdomen puncture (Active)   Drainage Amount none 8/11/2018 10:10 PM               PT Rehab Goals     Row Name 08/12/18 0919             Bed Mobility Goal 1 (PT)    Activity/Assistive Device (Bed Mobility Goal 1, PT) supine to sit;rolling to left  -CA      [place] Level/Cues Needed (Bed Mobility Goal 1, PT) standby assist  -CA      Time Frame (Bed Mobility Goal 1, PT) 5 days  -CA      Progress/Outcomes (Bed Mobility Goal 1, PT) goal met  -CA         Transfer Goal 1 (PT)    Activity/Assistive Device (Transfer Goal 1, PT) bed-to-chair/chair-to-bed  -CA      [place] Level/Cues Needed (Transfer Goal 1, PT) minimum assist (75% or more patient effort)  -CA      Time Frame (Transfer Goal 1, PT) 5 days  -CA      Progress/Outcome (Transfer Goal 1, PT) goal partially met  -CA         Strength Goal 1 (PT)    Strength Goal 1 (PT) Improve R LE MMT by 1/2 to 1 muscle grade  -CA      Time Frame (Strength Goal 1, PT) 5 days  -CA      Progress/Outcome (Strength Goal 1, PT) goal not met  -CA        User Key  (r) = Recorded By, (t) = Taken By, (c) = Cosigned By    Initials Name Provider Type Discipline    CA Nikolai Flores PTA Physical  Therapy Assistant PT          Physical Therapy Education     Title: PT OT SLP Therapies (Active)     Topic: Physical Therapy (Active)     Point: Home exercise program (Done)    Learning Progress Summary     Learner Status Readiness Method Response Comment Documented by    Patient Done Acceptance ECORI VU  LN 08/06/18 1238          Point: Precautions (Done)    Learning Progress Summary     Learner Status Readiness Method Response Comment Documented by    Patient Done Acceptance ECORI  LN 08/06/18 1238                      User Key     Initials Effective Dates Name Provider Type Discipline    LN 03/07/18 -  Stephanie Du, PTA Physical Therapy Assistant PT                    PT Recommendation and Plan  Therapy Frequency (PT Clinical Impression): daily  Plan of Care Reviewed With: patient  Progress: no change  Outcome Summary: pt. was limited with treatment today 2* to increased pain in the back and pt. deferred any EOB/OOB          Outcome Measures     Row Name 08/12/18 0919 08/10/18 1521 08/10/18 1054       How much help from another person do you currently need...    Turning from your back to your side while in flat bed without using bedrails? 4  -CA 4  -EM  --    Moving from lying on back to sitting on the side of a flat bed without bedrails? 4  -CA 4  -EM  --    Moving to and from a bed to a chair (including a wheelchair)? 3  -CA 3  -EM  --    Standing up from a chair using your arms (e.g., wheelchair, bedside chair)? 3  -CA 3  -EM  --    Climbing 3-5 steps with a railing? 1  -CA 1  -EM  --    To walk in hospital room? 1  -CA 1  -EM  --    AM-PAC 6 Clicks Score 16  -CA 16  -EM  --       How much help from another is currently needed...    Putting on and taking off regular lower body clothing?  --  -- 2  -BB    Bathing (including washing, rinsing, and drying)  --  -- 3  -BB    Toileting (which includes using toilet bed pan or urinal)  --  -- 2  -BB    Putting on and taking off regular upper body clothing  --  -- 3   -BB    Taking care of personal grooming (such as brushing teeth)  --  -- 4  -BB    Eating meals  --  -- 4  -BB    Score  --  -- 18  -BB       Functional Assessment    Outcome Measure Options AM-PAC 6 Clicks Basic Mobility (PT)  -[facility] 6 Clicks Basic Mobility (PT)  -  --    Row Name 08/09/18 1500             How much help from another person do you currently need...    Turning from your back to your side while in flat bed without using bedrails? 4  -TA      Moving from lying on back to sitting on the side of a flat bed without bedrails? 4  -TA      Moving to and from a bed to a chair (including a wheelchair)? 1  -TA      Standing up from a chair using your arms (e.g., wheelchair, bedside chair)? 3  -TA      Climbing 3-5 steps with a railing? 1  -TA      To walk in hospital room? 1  -TA      AM-PAC 6 Clicks Score 14  -TA         Functional Assessment    Outcome Measure Options AM-[facility] 6 Clicks Basic Mobility (PT)  -TA        User Key  (r) = Recorded By, (t) = Taken By, (c) = Cosigned By    Initials Name Provider Type    EM Jimbo Whatley, PTA Physical Therapy Assistant    TA Yojana Ngo, PTA Physical Therapy Assistant    CA Nikolai Flores, PTA Physical Therapy Assistant    BB Maya Perez, FÉLIX/L Occupational Therapy Assistant           Time Calculation:         PT Charges     Row Name 08/12/18 1112             Time Calculation    Start Time 0919  -CA      Stop Time 0942  -CA      Time Calculation (min) 23 min  -CA      PT Received On 08/12/18  -CA         Time Calculation- PT    Total Timed Code Minutes- PT 23 minute(s)  -CA        User Key  (r) = Recorded By, (t) = Taken By, (c) = Cosigned By    Initials Name Provider Type    Nikolai Paul, PTA Physical Therapy Assistant        Therapy Suggested Charges     Code   Minutes Charges    None           Therapy Charges for Today     Code Description Service Date Service Provider Modifiers Qty    [id number]  PT THER PROC EA 15 MIN 8/12/2018 Nikolai Flores  T, PTA GP 2          PT G-Codes  Outcome Measure Options: AM-PAC 6 Clicks Basic Mobility (PT)  Score: 10  Functional Limitation: Mobility: Walking and moving around  Mobility: Walking and Moving Around Current Status (): At least 60 percent but less than 80 percent impaired, limited or restricted  Mobility: Walking and Moving Around Goal Status (): At least 40 percent but less than 60 percent impaired, limited or restricted    Nikolai Flores, PTA  8/12/2018

## 2018-08-12 NOTE — PROGRESS NOTES
FAMILY MEDICINE PROGRESS NOTE  NAME: Armando Mcmullen Sr.  : 1965  MRN: [id number]     LOS: 10 days   Code Status and Medical Interventions:   Ordered at: 18     Code Status:    CPR     Medical Interventions (Level of Support Prior to Arrest):    Full     PROVIDER OF SERVICE:     Chief Complaint:  Decompensated hepatic cirrhosis (CMS/HCC)    Subjective     Interval History:  History taken from: patient  Subjective: Patient is a 52 yo  male who was admitted with anasarca.  He states his bowels are not moving right.  He doesn't feel good.    Review of Systems:   Review of Systems   Constitutional: Positive for activity change, appetite change and fatigue.   Respiratory: Positive for cough.    Cardiovascular: Positive for leg swelling.   Gastrointestinal: Positive for abdominal pain and constipation. Negative for diarrhea, nausea and vomiting.   Genitourinary: Negative for difficulty urinating and dysuria.   Musculoskeletal: Positive for arthralgias.       Objective     Vital Signs  Temp:  [96.5 °F (35.8 °C)-98.4 °F (36.9 °C)] 97.9 °F (36.6 °C)  Heart Rate:  [75-94] 84  Resp:  [16-20] 18  BP: (100-120)/(59-79) 118/79    Physical Exam  Physical Exam   Constitutional: He is oriented to person, place, and time. He appears well-developed and well-nourished. No distress.   Chronically ill   Eyes: Scleral icterus is present.   Cardiovascular: Normal rate and regular rhythm.  Exam reveals no gallop and no friction rub.    Murmur heard.  2/6 systolic murmur   Pulmonary/Chest: Effort normal. No respiratory distress. He has wheezes. He has no rales.   Scattered wheezes and rhonchi   Abdominal: Soft. Bowel sounds are normal. He exhibits no distension. There is tenderness. There is no rebound and no guarding.   Diffusely tender   Neurological: He is alert and oriented to person, place, and time.   Skin: He is not diaphoretic.   Large sebaceous cyst right upper back   Psychiatric: He has a normal mood and  affect. His behavior is normal. Judgment and thought content normal.   Nursing note and vitals reviewed.      Medication Review    Current Facility-Administered Medications:   •  albumin human 25 % IV SOLN 12.5 g, 12.5 g, Intravenous, BID, Jose Luis III, MD, Last Rate: 0 mL/hr at 08/05/18 2304, 12.5 g at 08/12/18 0918  •  cefTRIAXone (ROCEPHIN) 2 g/100 mL 0.9% NS VTB (LUCY), 2 g, Intravenous, Q24H, Jose Luis III, MD, 2 g at 08/12/18 1423  •  diphenhydrAMINE (BENADRYL) injection 25 mg, 25 mg, Intravenous, Q6H PRN, Jose Luis III, MD  •  doxycycline (VIBRAMYCIN) 100 mg/250 mL 0.9% NS VTB, 100 mg, Intravenous, Q12H, Jose Luis III, MD, 100 mg at 08/12/18 0521  •  furosemide (LASIX) tablet 40 mg, 40 mg, Oral, BID, Jose Luis III, MD, 40 mg at 08/12/18 0918  •  ipratropium-albuterol (DUO-NEB) nebulizer solution 3 mL, 3 mL, Nebulization, Q6H - RT, Jose Luis III, MD, 3 mL at 08/12/18 1341  •  lactulose (CHRONULAC) 10 GM/15ML solution 30 g, 30 g, Oral, Q12H, Jose Luis III, MD, 30 g at 08/12/18 0917  •  metOLazone (ZAROXOLYN) tablet 2.5 mg, 2.5 mg, Oral, Daily, Jose Luis III, MD, 2.5 mg at 08/12/18 0918  •  morphine injection 4 mg, 4 mg, Intravenous, Q4H PRN, Jose Luis III, MD, 4 mg at 08/12/18 0522  •  nicotine (NICODERM CQ) 14 MG/24HR patch 1 patch, 1 patch, Transdermal, Q24H, Momo Hammer MD, 1 patch at 08/11/18 2007  •  ondansetron (ZOFRAN) tablet 4 mg, 4 mg, Oral, Q6H PRN **OR** ondansetron ODT (ZOFRAN-ODT) disintegrating tablet 4 mg, 4 mg, Oral, Q6H PRN **OR** ondansetron (ZOFRAN) injection 4 mg, 4 mg, Intravenous, Q6H PRN, Momo Hammer MD  •  simethicone (MYLICON) chewable tablet 80 mg, 80 mg, Oral, 4x Daily PRN, Jose Luis III, MD  •  sodium chloride 0.9 % flush 1-10 mL, 1-10 mL, Intravenous, PRN, Momo Hammer MD  •  Insert peripheral IV, , , Once **AND** sodium chloride 0.9 % flush 10 mL, 10 mL,  Intravenous, PRN, Mike Bergman MD, 10 mL at 08/07/18 1643  •  spironolactone (ALDACTONE) tablet 25 mg, 25 mg, Oral, Daily, Dilan Crawford MD, 25 mg at 08/12/18 0918     Diagnostic Data      I reviewed the patient's new clinical results and imaging.      Assessment/Plan     Active Hospital Problems    Diagnosis Date Noted   • **Decompensated hepatic cirrhosis (CMS/HCC) [K72.90] 05/26/2009     -Secondary to alcohol and hepatitis C. Complicated by esophageal variceal bleeding status post TIPS shunt that now is occluded. Not a transplant candidate.  -Dr. Becerril, gastroenterologist, consulted; appreciate recommendations  -Status post paracentesis; ascitic fluid without infectious organism (SBP)  -Continue home lactulose for encephalopathy  -Suspected type II hepatorenal syndrome  -Started spironolactone per nephrology     • Physical deconditioning [R53.81] 08/12/2018     -Secondary to decompensated hepatic encephalopathy and cirrhosis.   -PT/OT ordered with plans to continue therapy outpatient.     • Scrotal swelling [N50.89] 08/10/2018     New onset swelling and edema of scrotum and painful suprapubic pressure.  Ultrasound of scrotum revealed mild edema  Urine culture: NGTD     • Lipoma of back [D17.1] 08/09/2018     Right upper back  Recommend outpatient follow up     • Pneumonia due to infectious organism [J18.9] 08/05/2018     Rocephin day 7 of 7     • Anxiety and depression [F41.8] 08/02/2018     Pt has not been taking any medications     • CKD (chronic kidney disease) stage 3, GFR 30-59 ml/min [N18.3] 08/02/2018     Baseline Cr 1.4, admission 1.64  Nephrology Dr. Colón follwing  Monitoring with daily CMP     • Anasarca [R60.1] 04/27/2018     Nephro and GI consulted; appreciate recommendations  Lasix PO 40 BID  2L fluid restriction  Likely Hepatorenal type II     • Acute kidney injury (CMS/HCC) [N17.9] 04/27/2018     Consult Nephrology appreciated Dr. Colón following.   Diuresing with Lasix 40 PO  BID and metolazone  Initiated spironolactone  2L fluid restriction  No NSAIDs     • CAP (community acquired pneumonia) [J18.9] 04/11/2018     Monitoring WBC and CRP  Treating with ceftriaxone (day 7 of 7)     • Hepatic encephalopathy (CMS/HCC) [K72.90] 04/11/2018     -Cirrhosis secondary to alcohol and hepatitis C. Complicated by esophageal variceal bleeding status post TIPS shunt that now is occluded.  -Not a surgical candidate. MELD score: 28  -Continue lactulose      • Anemia of chronic disease [D63.8] 07/18/2017     Monitoring H&H with daily CBC  Will discuss risks and benefits of transfusion  May benefit from erythropoietin; will discuss with nephrology     • Thrombocytopenia (CMS/HCC) [D69.6] 07/10/2017     Most likely secondary to liver cirrhosis.   Spleno-hepatic sequestration versus decreased production.  Received platelets 8/4  Monitoring levels daily     • Hypokalemia [E87.6] 03/02/2017     -Monitoring levels daily  -Replacing daily  -Initiated spironolactone therapy      • Essential hypertension [I10] 12/01/2008     Pt has not been taking his meds at home  Currently normotensive           DVT prophylaxis: Contraindicated due to swelling, amputation, and chronic thrombocytopenia      Disposition:Home with home health    I have reviewed the notes, assessments, and/or procedures performed by Dr. Gonzalez, I concur with her/his documentation of Armando Mcmullen Sr..        This document has been electronically signed by Marifer Carr MD on August 12, 2018 4:28 PM
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
## 2018-08-12 NOTE — PROGRESS NOTES
"[facility]  [address] [place] [address]. [phone]  T - [phone]  F - [phone]     Progress Note          PATIENT  DEMOGRAPHICS   PATIENT NAME: Armando Mcmullen SrJuanita                      PHYSICIAN: KALEB Tinsley  : 1965  MRN: [id number]   LOS: 10 days    Patient Care Team:  Reddy Grant MD as PCP - General (Family Medicine)  Shawn Cortez MD (Inactive) as Surgeon (Orthopedic Surgery)  Tushar Becerril DO as Consulting Physician (Gastroenterology)  Josep Colón MD as Consulting Physician (Nephrology)  Ortiz Ferreira MD as Consulting Physician (Hematology and Oncology)  Subjective   SUBJECTIVE   Back pain , UO excellent         Objective   OBJECTIVE   Vital Signs  Temp:  [96.5 °F (35.8 °C)-98.4 °F (36.9 °C)] 97.2 °F (36.2 °C)  Heart Rate:  [84-94] 90  Resp:  [16-20] 20  BP: (100-120)/(59-88) 112/62    Flowsheet Rows      First Filed Value   Admission Height  182.9 cm (72\") Documented at 2018 1631   Admission Weight  104 kg (230 lb) Documented at 2018 1631           I/O last 3 completed shifts:  In: 910 [P.O.:910]  Out: 5520 [Urine:5520]    PHYSICAL EXAM    Physical Exam   Constitutional: He is oriented to person, place, and time. He appears well-developed.   HENT:   Head: Normocephalic.   Eyes: Pupils are equal, round, and reactive to light.   Cardiovascular: Normal rate, regular rhythm and normal heart sounds.    Pulmonary/Chest: Effort normal and breath sounds normal.   Abdominal: Soft. Bowel sounds are normal.   Musculoskeletal: He exhibits edema.   Neurological: He is alert and oriented to person, place, and time.       RESULTS   Results Review:      Results from last 7 days  Lab Units 18  0649 18  0643 08/10/18  0802   SODIUM mmol/L 133* 133* 132*   POTASSIUM mmol/L 3.2* 3.1* 3.6   CHLORIDE mmol/L 94* 96 99   CO2 mmol/L 28.0 27.0 23.0   BUN mg/dL 30* 33* 34*   CREATININE mg/dL 1.80* 1.91* 2.10*   CALCIUM mg/dL 8.0* 7.8* 8.0*   BILIRUBIN " mg/dL 2.4* 2.6* 3.3*   ALK PHOS U/L 113 111 107   ALT (SGPT) U/L 30 25 30   AST (SGOT) U/L 60* 49 52   GLUCOSE mg/dL 91 79 80       Estimated Creatinine Clearance: 56.7 mL/min (A) (by C-G formula based on SCr of 1.8 mg/dL (H)).      Results from last 7 days  Lab Units 08/11/18  0643   MAGNESIUM mg/dL 1.9               Results from last 7 days  Lab Units 08/12/18  0649 08/11/18  0643 08/10/18  0802 08/09/18  0611 08/08/18  0537   WBC 10*3/mm3 8.95 9.40 14.22* 14.65* 12.38*   HEMOGLOBIN g/dL 7.6* 7.6* 8.5* 8.2* 8.1*   PLATELETS 10*3/mm3 57* 47* 51* 41* 39*               Imaging Results (last 24 hours)     ** No results found for the last 24 hours. **           MEDICATIONS      albumin human 12.5 g Intravenous BID   ceftriaxone 2 g Intravenous Q24H   doxycycline 100 mg Intravenous Q12H   furosemide 40 mg Oral BID   ipratropium-albuterol 3 mL Nebulization Q6H - RT   lactulose 30 g Oral Q12H   metOLazone 2.5 mg Oral Daily   nicotine 1 patch Transdermal Q24H   potassium chloride 40 mEq Oral BID With Meals   spironolactone 25 mg Oral Daily          Assessment/Plan   ASSESSMENT / PLAN    Principal Problem:    Decompensated hepatic cirrhosis (CMS/HCC)  Active Problems:    Essential hypertension    Hypokalemia    Thrombocytopenia (CMS/HCC)    Anemia of chronic disease    CAP (community acquired pneumonia)    Hepatic encephalopathy (CMS/HCC)    Acute kidney injury (CMS/HCC)    Anasarca    Anxiety and depression    CKD (chronic kidney disease) stage 3, GFR 30-59 ml/min    Pneumonia due to infectious organism    Lipoma of back    Scrotal swelling    Physical deconditioning    1. CKD stage III- baseline creatinine close to 1.5.  He had a creatinine of 1.4 in the last office visit, now has CHRIS that could be hepatorenal. BP is stable. He was doing well on Lasix and Aldactone and ran out of his diuretic and came in with with increasing edema with anasarca.  Patient has paracentesis done over the weekend.  Continue with daily weight.   Continue with fluid restriction at 2 L per day.  Avoid any NSAIDs.  Keep lasix 40 mg PO BID. Keep metolazone for now. Back on aldactone. Wt readings are unclear/inconsistent. Pt's clinical presentation is improved. Cr improving, excellent UO.     2. History of liver cirrhosis- status post paracentesis. No marked fluid recollection on repeat u/s. No evidence of sbp on peritoneal fluid     3. History of chronic osteomyelitis of the right shoulder     4. History of left BKA     5. Leukocytosis- possible pneumonia patient is currently on ceftriaxone and azithromycin is d/c'd. UA consistent with UTI as well.    6. Anemia- Hgb down to 7.6, consider JOAN as he is iron replete           This document has been electronically signed by KALEB Tinsley on August 12, 2018 9:52 AM

## 2018-08-12 NOTE — PROGRESS NOTES
FAMILY MEDICINE DAILY PROGRESS NOTE    NAME: Armando Mcmullen Sr.  : 1965  MRN: [id number]      LOS: 10 days     PROVIDER OF SERVICE: Traci Gonzalez MD    Chief Complaint: Decompensated hepatic cirrhosis (CMS/HCC)    Subjective:     Interval History:  History taken from: patient chart    Patient reports improvement in his generalized swelling and breathing. Continues to complain of generalized weakness and interested in outpatient physical therapy in order to return to ADLs. Consulted case management to review therapy options.     Review of Systems:   Review of Systems   Constitutional: Negative for chills, diaphoresis, fatigue and fever.   HENT: Negative for congestion, rhinorrhea, sneezing and sore throat.    Respiratory: Negative for cough and shortness of breath.    Cardiovascular: Negative for chest pain and leg swelling.   Gastrointestinal: Positive for abdominal distention, abdominal pain and constipation. Negative for diarrhea, nausea and vomiting.   Genitourinary: Positive for dysuria and scrotal swelling. Negative for difficulty urinating and hematuria.   Musculoskeletal: Positive for arthralgias and gait problem (left BKA). Negative for joint swelling.   Skin: Positive for color change. Negative for rash and wound.   Neurological: Positive for weakness. Negative for seizures, syncope and headaches.   Psychiatric/Behavioral: Negative for confusion and sleep disturbance.       Objective:     Vital Signs  Temp:  [96.5 °F (35.8 °C)-98.4 °F (36.9 °C)] 98.4 °F (36.9 °C)  Heart Rate:  [84-94] 86  Resp:  [16-18] 16  BP: (100-120)/(59-88) 100/59  Body mass index is 29.01 kg/m².      Intake/Output Summary (Last 24 hours) at 18 0931  Last data filed at 18 2321   Gross per 24 hour   Intake              240 ml   Output             2195 ml   Net            -1955 ml       Physical Exam  Physical Exam   Constitutional: He is oriented to person, place, and time. He appears well-developed and  well-nourished. No distress.   HENT:   Head: Normocephalic and atraumatic.   Mouth/Throat: Oropharynx is clear and moist.   Eyes: Pupils are equal, round, and reactive to light. Conjunctivae and EOM are normal. Scleral icterus is present.   Neck: Normal range of motion. Neck supple. No tracheal deviation present. No thyromegaly present.   Cardiovascular: Normal rate, regular rhythm, normal heart sounds and intact distal pulses.    Pulmonary/Chest: Effort normal. He has no wheezes. He has rales.   Abdominal: Soft. Bowel sounds are normal. He exhibits distension. There is hepatomegaly. There is tenderness in the right upper quadrant and left upper quadrant.   Musculoskeletal: Normal range of motion. He exhibits edema (trace right lower extremity) and deformity (left BKA). He exhibits no tenderness.   Lymphadenopathy:     He has no cervical adenopathy.   Neurological: He is alert and oriented to person, place, and time.   Skin: Skin is warm and dry. No rash noted. He is not diaphoretic.   Jaundice   Psychiatric: He has a normal mood and affect. His behavior is normal. Judgment and thought content normal.   Nursing note and vitals reviewed.      Medication Review    Current Facility-Administered Medications:   •  albumin human 25 % IV SOLN 12.5 g, 12.5 g, Intravenous, BID, Jose Luis III, MD, Last Rate: 0 mL/hr at 08/05/18 2304, 12.5 g at 08/11/18 2002  •  cefTRIAXone (ROCEPHIN) 2 g/100 mL 0.9% NS VTB (LUCY), 2 g, Intravenous, Q24H, Jose Luis III, MD, 2 g at 08/11/18 1456  •  diphenhydrAMINE (BENADRYL) injection 25 mg, 25 mg, Intravenous, Q6H PRN, Jose Luis III, MD  •  doxycycline (VIBRAMYCIN) 100 mg/250 mL 0.9% NS VTB, 100 mg, Intravenous, Q12H, Jose Luis III, MD, 100 mg at 08/12/18 0521  •  furosemide (LASIX) tablet 40 mg, 40 mg, Oral, BID, Jose Luis III, MD, 40 mg at 08/11/18 1730  •  ipratropium-albuterol (DUO-NEB) nebulizer solution 3 mL, 3 mL, Nebulization, Q6H - RT,  Jose Luis III, MD, 3 mL at 08/12/18 0636  •  lactulose (CHRONULAC) 10 GM/15ML solution 30 g, 30 g, Oral, Q12H, Jose Luis III, MD, 30 g at 08/11/18 2001  •  metOLazone (ZAROXOLYN) tablet 2.5 mg, 2.5 mg, Oral, Daily, Jose Luis III, MD, 2.5 mg at 08/11/18 0828  •  morphine injection 4 mg, 4 mg, Intravenous, Q4H PRN, Jose Luis III, MD, 4 mg at 08/12/18 0522  •  nicotine (NICODERM CQ) 14 MG/24HR patch 1 patch, 1 patch, Transdermal, Q24H, Momo Hammer MD, 1 patch at 08/11/18 2007  •  ondansetron (ZOFRAN) tablet 4 mg, 4 mg, Oral, Q6H PRN **OR** ondansetron ODT (ZOFRAN-ODT) disintegrating tablet 4 mg, 4 mg, Oral, Q6H PRN **OR** ondansetron (ZOFRAN) injection 4 mg, 4 mg, Intravenous, Q6H PRN, Momo Hammer MD  •  potassium chloride (MICRO-K) CR capsule 40 mEq, 40 mEq, Oral, BID With Meals, Traci Gonzalez MD  •  simethicone (MYLICON) chewable tablet 80 mg, 80 mg, Oral, 4x Daily PRN, Jose Luis III, MD  •  sodium chloride 0.9 % flush 1-10 mL, 1-10 mL, Intravenous, PRN, Momo Hammer MD  •  Insert peripheral IV, , , Once **AND** sodium chloride 0.9 % flush 10 mL, 10 mL, Intravenous, PRN, Mike Bergman MD, 10 mL at 08/07/18 1643  •  spironolactone (ALDACTONE) tablet 25 mg, 25 mg, Oral, Daily, Dilan Crawford MD, 25 mg at 08/11/18 1457     Diagnostic Data    Lab Results (last 24 hours)     Procedure Component Value Units Date/Time    Comprehensive Metabolic Panel [[id number]]  (Abnormal) Collected:  08/12/18 0649    Specimen:  Blood Updated:  08/12/18 0727     Glucose 91 mg/dL      BUN 30 (H) mg/dL      Creatinine 1.80 (H) mg/dL      Sodium 133 (L) mmol/L      Potassium 3.2 (L) mmol/L      Chloride 94 (L) mmol/L      CO2 28.0 mmol/L      Calcium 8.0 (L) mg/dL      Total Protein 5.5 (L) g/dL      Albumin 2.40 (L) g/dL      ALT (SGPT) 30 U/L      AST (SGOT) 60 (H) U/L      Alkaline Phosphatase 113 U/L      Total Bilirubin 2.4 (H)  mg/dL      eGFR Non African Amer 40 (L) mL/min/1.73      Globulin 3.1 gm/dL      A/G Ratio 0.8 (L) g/dL      BUN/Creatinine Ratio 16.7     Anion Gap 11.0 mmol/L     C-reactive Protein [[id number]]  (Abnormal) Collected:  08/12/18 0649    Specimen:  Blood Updated:  08/12/18 0725     C-Reactive Protein 7.10 (H) mg/dL     CBC & Differential [[id number]] Collected:  08/12/18 0649    Specimen:  Blood Updated:  08/12/18 0709    Narrative:       The following orders were created for panel order CBC & Differential.  Procedure                               Abnormality         Status                     ---------                               -----------         ------                     CBC Auto Differential[[id number]]        Abnormal            Final result                 Please view results for these tests on the individual orders.    CBC Auto Differential [[id number]]  (Abnormal) Collected:  08/12/18 0649    Specimen:  Blood Updated:  08/12/18 0709     WBC 8.95 10*3/mm3      RBC 2.24 (L) 10*6/mm3      Hemoglobin 7.6 (L) g/dL      Hematocrit 22.2 (L) %      MCV 99.1 (H) fL      MCH 33.9 pg      MCHC 34.2 g/dL      RDW 15.8 (H) %      RDW-SD 56.9 (H) fl      MPV 10.4 fL      Platelets 57 (L) 10*3/mm3      Neutrophil % 62.1 %      Lymphocyte % 21.5 %      Monocyte % 9.1 %      Eosinophil % 5.1 %      Basophil % 1.6 %      Immature Grans % 0.6 (H) %      Neutrophils, Absolute 5.57 10*3/mm3      Lymphocytes, Absolute 1.92 10*3/mm3      Monocytes, Absolute 0.81 10*3/mm3      Eosinophils, Absolute 0.46 10*3/mm3      Basophils, Absolute 0.14 10*3/mm3      Immature Grans, Absolute 0.05 (H) 10*3/mm3      nRBC 0.0 /100 WBC     Urine Culture - Urine, [[id number]]  (Normal) Collected:  08/10/18 1031    Specimen:  Urine from Urine, Clean Catch Updated:  08/11/18 1245     Urine Culture No growth at 24 hours    Magnesium [[id number]]  (Normal) Collected:  08/11/18 0643    Specimen:  Blood Updated:  08/11/18 0939     Magnesium 1.9 mg/dL          I reviewed the patient's new clinical results.    Assessment/Plan:     Active Hospital Problems    Diagnosis Date Noted   • **Decompensated hepatic cirrhosis (CMS/HCC) [K72.90] 05/26/2009     -Secondary to alcohol and hepatitis C. Complicated by esophageal variceal bleeding status post TIPS shunt that now is occluded. Not a transplant candidate.  -Dr. Becerril, gastroenterologist, consulted; appreciate recommendations  -Status post paracentesis; ascitic fluid without infectious organism (SBP)  -Continue home lactulose for encephalopathy  -Suspected type II hepatorenal syndrome  -Started spironolactone per nephrology     • Physical deconditioning [R53.81] 08/12/2018     -Secondary to decompensated hepatic encephalopathy and cirrhosis.   -PT/OT ordered with plans to continue therapy outpatient.     • Scrotal swelling [N50.89] 08/10/2018     New onset swelling and edema of scrotum and painful suprapubic pressure.  Ultrasound of scrotum revealed mild edema  Urine culture: NGTD     • Lipoma of back [D17.1] 08/09/2018     Right upper back  Recommend outpatient follow up     • Pneumonia due to infectious organism [J18.9] 08/05/2018     Rocephin day 7 of 7     • Anxiety and depression [F41.8] 08/02/2018     Pt has not been taking any medications     • CKD (chronic kidney disease) stage 3, GFR 30-59 ml/min [N18.3] 08/02/2018     Baseline Cr 1.4, admission 1.64  Nephrology Dr. Colón follwing  Monitoring with daily CMP     • Anasarca [R60.1] 04/27/2018     Nephro and GI consulted; appreciate recommendations  Lasix PO 40 BID  2L fluid restriction  Likely Hepatorenal type II     • Acute kidney injury (CMS/HCC) [N17.9] 04/27/2018     Consult Nephrology appreciated Dr. Colón following.   Diuresing with Lasix 40 PO BID and metolazone  Initiated spironolactone  2L fluid restriction  No NSAIDs     • CAP (community acquired pneumonia) [J18.9] 04/11/2018     Monitoring WBC and CRP  Treating with ceftriaxone (day 7 of 7)     •  Hepatic encephalopathy (CMS/HCC) [K72.90] 04/11/2018     -Cirrhosis secondary to alcohol and hepatitis C. Complicated by esophageal variceal bleeding status post TIPS shunt that now is occluded.  -Not a surgical candidate. MELD score: 28  -Continue lactulose      • Anemia of chronic disease [D63.8] 07/18/2017     Monitoring H&H with daily CBC  Will discuss risks and benefits of transfusion  May benefit from erythropoietin; will discuss with nephrology     • Thrombocytopenia (CMS/HCC) [D69.6] 07/10/2017     Most likely secondary to liver cirrhosis.   Spleno-hepatic sequestration versus decreased production.  Received platelets 8/4  Monitoring levels daily     • Hypokalemia [E87.6] 03/02/2017     -Monitoring levels daily  -Replacing daily  -Initiated spironolactone therapy      • Essential hypertension [I10] 12/01/2008     Pt has not been taking his meds at home  Currently normotensive         DVT prophylaxis: SCDs/TEDs and BKA     Code status is   Code Status and Medical Interventions:   Ordered at: 08/02/18 2011     Code Status:    CPR     Medical Interventions (Level of Support Prior to Arrest):    Full       Plan for disposition:Where: SNF and When:  with clinical improvement and stability      Anticipated length of stay: 1-2 days    Signature  Traci Gonzalez MD  [facility] Family Medicine Resident, PGY III        This document has been electronically signed by Traci Gonzalez MD on August 12, 2018 8:49 AM

## 2018-08-13 PROBLEM — J18.9 CAP (COMMUNITY ACQUIRED PNEUMONIA): Status: RESOLVED | Noted: 2018-01-01 | Resolved: 2018-01-01

## 2018-08-13 PROBLEM — J18.9 PNEUMONIA DUE TO INFECTIOUS ORGANISM: Status: RESOLVED | Noted: 2018-01-01 | Resolved: 2018-01-01

## 2018-08-13 PROBLEM — N30.90 CYSTITIS: Status: ACTIVE | Noted: 2018-01-01

## 2018-08-13 NOTE — PLAN OF CARE
Problem: Patient Care Overview  Goal: Plan of Care Review   08/06/18 1030   Coping/Psychosocial   Plan of Care Reviewed With patient   Plan of Care Review   Progress improving   OTHER   Outcome Summary declined eob/oob;preston le ex 2/10 w/o difficulty-no goals met-if d/c today would benefit from 24/7 assist and hh pt/ot     Goal: Discharge Needs Assessment   08/03/18 1217   Discharge Needs Assessment   Readmission Within the Last 30 Days no previous admission in last 30 days   Concerns to be Addressed discharge planning   Patient/Family Anticipates Transition to home with help/services   Patient/Family Anticipated Services at Transition home health care   Transportation Concerns rides, unreliable from others  (utilizes PACS)   Transportation Anticipated family or friend will provide;public transportation  (travels via PACS or PVA)   Anticipated Changes Related to Illness none   Equipment Needed After Discharge none   Outpatient/Agency/Support Group Needs homecare agency   Discharge Facility/Level of Care Needs home with home health  (if recommended. also agreeable to transition visit)   Offered/Gave Vendor List yes   Patient's Choice of Community Agency(s) [facility]   Current Discharge Risk chronically ill   Discharge Coordination/Progress Pending medical stability.   Disability   Equipment Currently Used at Home commode;walker, susan;crutches;shower chair         
Problem: Patient Care Overview  Goal: Plan of Care Review  Outcome: Ongoing (interventions implemented as appropriate)        
Problem: Patient Care Overview  Goal: Plan of Care Review  Outcome: Ongoing (interventions implemented as appropriate)    Goal: Individualization and Mutuality  Outcome: Ongoing (interventions implemented as appropriate)    Goal: Discharge Needs Assessment  Outcome: Ongoing (interventions implemented as appropriate)      Problem: Fall Risk (Adult)  Goal: Absence of Fall  Outcome: Ongoing (interventions implemented as appropriate)      Problem: Skin Injury Risk (Adult)  Goal: Identify Related Risk Factors and Signs and Symptoms  Outcome: Outcome(s) achieved Date Met: 08/07/18    Goal: Skin Health and Integrity  Outcome: Ongoing (interventions implemented as appropriate)      Problem: Pain, Acute (Adult)  Goal: Identify Related Risk Factors and Signs and Symptoms  Outcome: Outcome(s) achieved Date Met: 08/07/18    Goal: Acceptable Pain Control/Comfort Level  Outcome: Ongoing (interventions implemented as appropriate)      Problem: Paracentesis, Abdominal (Adult)  Goal: Signs and Symptoms of Listed Potential Problems Will be Absent, Minimized or Managed (Paracentesis, Abdominal)  Outcome: Outcome(s) achieved Date Met: 08/07/18        
Problem: Patient Care Overview  Goal: Plan of Care Review  Outcome: Ongoing (interventions implemented as appropriate)   08/03/18 0314   Coping/Psychosocial   Plan of Care Reviewed With patient   Plan of Care Review   Progress no change   OTHER   Outcome Summary c/o of pain prn pain medication administered. VS stable. Will continue to monitor        Problem: Fall Risk (Adult)  Goal: Identify Related Risk Factors and Signs and Symptoms  Outcome: Outcome(s) achieved Date Met: 08/03/18    Goal: Absence of Fall  Outcome: Ongoing (interventions implemented as appropriate)        
Problem: Patient Care Overview  Goal: Plan of Care Review  Outcome: Ongoing (interventions implemented as appropriate)   08/04/18 0603   Coping/Psychosocial   Plan of Care Reviewed With patient   Plan of Care Review   Progress no change   OTHER   Outcome Summary pt vs stable, In and out cath 125cc out, gave painmed, will continue to monitor.     Goal: Individualization and Mutuality  Outcome: Ongoing (interventions implemented as appropriate)    Goal: Discharge Needs Assessment  Outcome: Ongoing (interventions implemented as appropriate)    Goal: Interprofessional Rounds/Family Conf  Outcome: Outcome(s) achieved Date Met: 08/04/18      Problem: Fall Risk (Adult)  Goal: Absence of Fall  Outcome: Ongoing (interventions implemented as appropriate)        
Problem: Patient Care Overview  Goal: Plan of Care Review  Outcome: Ongoing (interventions implemented as appropriate)   08/05/18 0501   Plan of Care Review   Progress no change   OTHER   Outcome Summary Pt slept during the night, voided in urinal, requested pain med, given, relief noted, vital signs stable, monitoring      Goal: Individualization and Mutuality  Outcome: Ongoing (interventions implemented as appropriate)    Goal: Discharge Needs Assessment  Outcome: Ongoing (interventions implemented as appropriate)      Problem: Fall Risk (Adult)  Goal: Absence of Fall  Outcome: Ongoing (interventions implemented as appropriate)      Problem: Skin Injury Risk (Adult)  Goal: Skin Health and Integrity  Outcome: Ongoing (interventions implemented as appropriate)        
Problem: Patient Care Overview  Goal: Plan of Care Review  Outcome: Ongoing (interventions implemented as appropriate)   08/06/18 6484   Plan of Care Review   Progress improving   OTHER   Outcome Summary Pt rested overnight after pain med was given, voiding in urinal, no SOA noted, oxygen saturation in 90's; vital signs stable      Goal: Individualization and Mutuality  Outcome: Ongoing (interventions implemented as appropriate)    Goal: Discharge Needs Assessment  Outcome: Ongoing (interventions implemented as appropriate)      Problem: Fall Risk (Adult)  Goal: Absence of Fall  Outcome: Ongoing (interventions implemented as appropriate)      Problem: Skin Injury Risk (Adult)  Goal: Skin Health and Integrity  Outcome: Ongoing (interventions implemented as appropriate)      Problem: Pain, Acute (Adult)  Goal: Acceptable Pain Control/Comfort Level  Outcome: Ongoing (interventions implemented as appropriate)        
Problem: Patient Care Overview  Goal: Plan of Care Review  Outcome: Ongoing (interventions implemented as appropriate)   08/06/18 7250   Coping/Psychosocial   Plan of Care Reviewed With patient   OTHER   Outcome Summary OT eval complete on this date. Pt able to come sup to sit with CGA and sit to stand with min A. Supervision to don sock while lying in bed. Pt states he was independent prior with ADLs and toileting. He could benefit from OT services to increase independence with ADLs and functional transfers.          
Problem: Patient Care Overview  Goal: Plan of Care Review  Outcome: Ongoing (interventions implemented as appropriate)   08/07/18 0459   Coping/Psychosocial   Plan of Care Reviewed With patient   Plan of Care Review   Progress no change       Problem: Fall Risk (Adult)  Goal: Absence of Fall  Outcome: Ongoing (interventions implemented as appropriate)   08/07/18 0459   Fall Risk (Adult)   Absence of Fall achieves outcome       Problem: Skin Injury Risk (Adult)  Goal: Skin Health and Integrity  Outcome: Ongoing (interventions implemented as appropriate)   08/07/18 0459   Skin Injury Risk (Adult)   Skin Health and Integrity making progress toward outcome       Problem: Pain, Acute (Adult)  Goal: Acceptable Pain Control/Comfort Level  Outcome: Ongoing (interventions implemented as appropriate)   08/07/18 0459   Pain, Acute (Adult)   Acceptable Pain Control/Comfort Level making progress toward outcome       Problem: Paracentesis, Abdominal (Adult)  Goal: Signs and Symptoms of Listed Potential Problems Will be Absent, Minimized or Managed (Paracentesis, Abdominal)  Outcome: Ongoing (interventions implemented as appropriate)   08/07/18 0459   Goal/Outcome Evaluation   Problems Assessed (Paracentesis) all   Problems Present (Paracentesis) situational response         
Problem: Patient Care Overview  Goal: Plan of Care Review  Outcome: Ongoing (interventions implemented as appropriate)   08/07/18 2307 08/08/18 2468   Coping/Psychosocial   Plan of Care Reviewed With patient --    Plan of Care Review   Progress no change --    OTHER   Outcome Summary --  pt performed sit<.stand x 3 with RW & CGA, pt will require 24/7 care & continued PT services @ D/C         
Problem: Patient Care Overview  Goal: Plan of Care Review  Outcome: Ongoing (interventions implemented as appropriate)   08/07/18 5557 08/08/18 0140   Coping/Psychosocial   Plan of Care Reviewed With patient --    Plan of Care Review   Progress no change --    OTHER   Outcome Summary --  Pt tolerated therapy well, engaged in B UE ther/ex, and simple grooming task prior to ther/ex, pt is making progress toward OT goals, 24/7 care-SNF upon d/c          
Problem: Patient Care Overview  Goal: Plan of Care Review  Outcome: Ongoing (interventions implemented as appropriate)   08/07/18 6263   Coping/Psychosocial   Plan of Care Reviewed With patient   Plan of Care Review   Progress no change   OTHER   Outcome Summary Pt SBA with grooming task. Pt states he had a bath last night and prefers to wait until later for another one. Pt performed L UE exercises and declined R UE 2' to pain. No goals met this tx.         
Problem: Patient Care Overview  Goal: Plan of Care Review  Outcome: Ongoing (interventions implemented as appropriate)   08/08/18 4162   Coping/Psychosocial   Plan of Care Reviewed With patient   Plan of Care Review   Progress no change   OTHER   Outcome Summary worked with therapy today, vss, gave one dose of PRN pain meds - effective, pt does not like fluid restriction - I explained the reasoning for this about his edema, US of right leg negative, pt has had multiple loose stools      Goal: Individualization and Mutuality  Outcome: Ongoing (interventions implemented as appropriate)      Problem: Fall Risk (Adult)  Goal: Absence of Fall  Outcome: Ongoing (interventions implemented as appropriate)      Problem: Skin Injury Risk (Adult)  Goal: Skin Health and Integrity  Outcome: Ongoing (interventions implemented as appropriate)      Problem: Pain, Acute (Adult)  Goal: Acceptable Pain Control/Comfort Level  Outcome: Ongoing (interventions implemented as appropriate)        
Problem: Patient Care Overview  Goal: Plan of Care Review  Outcome: Ongoing (interventions implemented as appropriate)   08/09/18 0457   Coping/Psychosocial   Plan of Care Reviewed With patient   Plan of Care Review   Progress no change   OTHER   Outcome Summary c/o of pain during night prn medication administered. vs stable. will continue to monitor       Problem: Fall Risk (Adult)  Goal: Absence of Fall  Outcome: Ongoing (interventions implemented as appropriate)      Problem: Skin Injury Risk (Adult)  Goal: Skin Health and Integrity  Outcome: Ongoing (interventions implemented as appropriate)        
Problem: Patient Care Overview  Goal: Plan of Care Review  Outcome: Ongoing (interventions implemented as appropriate)   08/09/18 1532   Coping/Psychosocial   Plan of Care Reviewed With patient   Plan of Care Review   Progress no change   OTHER   Outcome Summary Pt SBA for ADL while long sitting. Pt met 1 goal this tx.         
Problem: Patient Care Overview  Goal: Plan of Care Review  Outcome: Ongoing (interventions implemented as appropriate)   08/10/18 0302   Coping/Psychosocial   Plan of Care Reviewed With patient   Plan of Care Review   Progress no change   OTHER   Outcome Summary prn pain medication administered during the night. VS stable. Pt resting at this time will continue to monitor       Problem: Fall Risk (Adult)  Goal: Absence of Fall  Outcome: Ongoing (interventions implemented as appropriate)      Problem: Skin Injury Risk (Adult)  Goal: Skin Health and Integrity  Outcome: Ongoing (interventions implemented as appropriate)      Problem: Pain, Acute (Adult)  Goal: Acceptable Pain Control/Comfort Level  Outcome: Ongoing (interventions implemented as appropriate)        
Problem: Patient Care Overview  Goal: Plan of Care Review  Outcome: Ongoing (interventions implemented as appropriate)   08/10/18 4404   OTHER   Outcome Summary Pt goldie tx well with good participation. Pt t/f sup-sit with SBAx1, sit-stand-sit w/ CGAx1, t/f EOB to recliner with Min Ax1 with FWRW. 1 goal partially met this tx. Recommend SNF at this time upon discharge.          
Problem: Patient Care Overview  Goal: Plan of Care Review  Outcome: Ongoing (interventions implemented as appropriate)   08/10/18 6488   Coping/Psychosocial   Plan of Care Reviewed With patient   Plan of Care Review   Progress no change   OTHER   Outcome Summary Patient has complained of back pain today. PRN medications given. Vitals stable .Will continue to monitor.       Problem: Fall Risk (Adult)  Goal: Absence of Fall  Outcome: Ongoing (interventions implemented as appropriate)      Problem: Skin Injury Risk (Adult)  Goal: Skin Health and Integrity  Outcome: Ongoing (interventions implemented as appropriate)      Problem: Pain, Acute (Adult)  Goal: Acceptable Pain Control/Comfort Level  Outcome: Ongoing (interventions implemented as appropriate)        
Problem: Patient Care Overview  Goal: Plan of Care Review  Outcome: Ongoing (interventions implemented as appropriate)   08/10/18 7926   Coping/Psychosocial   Plan of Care Reviewed With patient   Plan of Care Review   Progress no change   OTHER   Outcome Summary Pt Mod I for UB ADL. Pt did not meet any goals this tx.         
Problem: Patient Care Overview  Goal: Plan of Care Review  Outcome: Ongoing (interventions implemented as appropriate)   08/11/18 0407   Coping/Psychosocial   Plan of Care Reviewed With patient   Plan of Care Review   Progress no change   OTHER   Outcome Summary pt coplaining of pain giving prn pain med, vs stable, no new events will continue to monitor.     Goal: Individualization and Mutuality  Outcome: Ongoing (interventions implemented as appropriate)    Goal: Discharge Needs Assessment  Outcome: Ongoing (interventions implemented as appropriate)      Problem: Fall Risk (Adult)  Goal: Absence of Fall  Outcome: Ongoing (interventions implemented as appropriate)      Problem: Skin Injury Risk (Adult)  Goal: Skin Health and Integrity  Outcome: Ongoing (interventions implemented as appropriate)      Problem: Pain, Acute (Adult)  Goal: Acceptable Pain Control/Comfort Level  Outcome: Ongoing (interventions implemented as appropriate)        
Problem: Patient Care Overview  Goal: Plan of Care Review  Outcome: Ongoing (interventions implemented as appropriate)   08/11/18 1018   Coping/Psychosocial   Plan of Care Reviewed With patient   Plan of Care Review   Progress no change   OTHER   Outcome Summary Patient has potassium of 3.1 and 40meq of PO Potassium this morning. Vitals are stable. Will continue to monitor.       Problem: Fall Risk (Adult)  Goal: Absence of Fall  Outcome: Ongoing (interventions implemented as appropriate)      Problem: Skin Injury Risk (Adult)  Goal: Skin Health and Integrity  Outcome: Ongoing (interventions implemented as appropriate)      Problem: Pain, Acute (Adult)  Goal: Acceptable Pain Control/Comfort Level  Outcome: Ongoing (interventions implemented as appropriate)        
Problem: Patient Care Overview  Goal: Plan of Care Review  Outcome: Ongoing (interventions implemented as appropriate)   08/12/18 0256   Coping/Psychosocial   Plan of Care Reviewed With patient   Plan of Care Review   Progress no change   OTHER   Outcome Summary pts vs stable, no new events overnight will continue to monitor.     Goal: Individualization and Mutuality  Outcome: Ongoing (interventions implemented as appropriate)    Goal: Discharge Needs Assessment  Outcome: Ongoing (interventions implemented as appropriate)      Problem: Fall Risk (Adult)  Goal: Absence of Fall  Outcome: Ongoing (interventions implemented as appropriate)      Problem: Skin Injury Risk (Adult)  Goal: Skin Health and Integrity  Outcome: Ongoing (interventions implemented as appropriate)      Problem: Pain, Acute (Adult)  Goal: Acceptable Pain Control/Comfort Level  Outcome: Ongoing (interventions implemented as appropriate)        
Problem: Patient Care Overview  Goal: Plan of Care Review  Outcome: Ongoing (interventions implemented as appropriate)   08/12/18 1038   Coping/Psychosocial   Plan of Care Reviewed With patient   Plan of Care Review   Progress no change   OTHER   Outcome Summary Patient has worked some with therapy today. Still complaining of pain occassionally. Reinforced teaching about the importance of following fluid restrictions, taking prescribed medications, and working with therapy. Will continue to monitor.       Problem: Fall Risk (Adult)  Goal: Absence of Fall  Outcome: Ongoing (interventions implemented as appropriate)      Problem: Skin Injury Risk (Adult)  Goal: Skin Health and Integrity  Outcome: Ongoing (interventions implemented as appropriate)      Problem: Pain, Acute (Adult)  Goal: Acceptable Pain Control/Comfort Level  Outcome: Ongoing (interventions implemented as appropriate)        
Problem: Patient Care Overview  Goal: Plan of Care Review  Outcome: Ongoing (interventions implemented as appropriate)   08/12/18 1111   Coping/Psychosocial   Plan of Care Reviewed With patient   Plan of Care Review   Progress no change   OTHER   Outcome Summary pt. was limited with treatment today 2* to increased pain in the back and pt. deferred any EOB/OOB         
Problem: Patient Care Overview  Goal: Plan of Care Review  Outcome: Ongoing (interventions implemented as appropriate)   08/12/18 1111 08/12/18 1147   Coping/Psychosocial   Plan of Care Reviewed With --  patient   Plan of Care Review   Progress no change --    OTHER   Outcome Summary --  Pt completed UE ther ex with fair tolerance using 2lb HW in LUE. No HW on RUE. No new goals met this date. Cont OT POC.         
Problem: Patient Care Overview  Goal: Plan of Care Review  Outcome: Ongoing (interventions implemented as appropriate)   08/13/18 0111   Coping/Psychosocial   Plan of Care Reviewed With patient   Plan of Care Review   Progress no change   OTHER   Outcome Summary Pt has rested most of this shift. C/O constipation. VSS at this time. No new concerns.          
Problem: Patient Care Overview  Goal: Plan of Care Review  Outcome: Ongoing (interventions implemented as appropriate)   08/13/18 1349 08/13/18 4569   Coping/Psychosocial   Plan of Care Reviewed With --  patient   Plan of Care Review   Progress --  no change   OTHER   Outcome Summary pt stood x 1 with RW & CGA, pt would benefit from 24/7 care & continued PT services --          
Problem: Patient Care Overview  Goal: Plan of Care Review  Outcome: Ongoing (interventions implemented as appropriate)   08/13/18 1537   Coping/Psychosocial   Plan of Care Reviewed With patient   Plan of Care Review   Progress no change   OTHER   Outcome Summary Pt was able to have a BM this afternoon. Pt has been in pain; medicated with morphine; will continue to monitor.      Goal: Individualization and Mutuality  Outcome: Ongoing (interventions implemented as appropriate)      Problem: Fall Risk (Adult)  Goal: Absence of Fall  Outcome: Ongoing (interventions implemented as appropriate)      Problem: Skin Injury Risk (Adult)  Goal: Skin Health and Integrity  Outcome: Ongoing (interventions implemented as appropriate)      Problem: Pain, Acute (Adult)  Goal: Acceptable Pain Control/Comfort Level  Outcome: Ongoing (interventions implemented as appropriate)        
normal...
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
## 2018-08-13 NOTE — THERAPY TREATMENT NOTE
Acute Care - Physical Therapy Treatment Note  [facility]     Patient Name: Armando Mcmullen Sr.  : 1965  MRN: [id number]  Today's Date: 2018  Onset of Illness/Injury or Date of Surgery: 18  Date of Referral to PT: 18  Referring Physician: MARIETTA Bain MD.    Admit Date: 2018    Visit Dx:    ICD-10-CM ICD-9-CM   1. Elevated lactic acid level R79.89 276.2   2. Elevated bilirubin R17 277.4   3. Leukocytosis, unspecified type D72.829 288.60   4. Generalized abdominal pain R10.84 789.07   5. Alcoholic cirrhosis of liver with ascites (CMS/HCC) K70.31 571.2   6. Impaired functional mobility and activity tolerance Z74.09 V49.89   7. Impaired mobility and activities of daily living Z74.09 799.89     Patient Active Problem List   Diagnosis   • Anxiety state   • Back pain   • Chronic hepatitis (CMS/HCC)   • Decompensated hepatic cirrhosis (CMS/HCC)   • Depression   • Essential hypertension   • Hypokalemia   • Substance abuse   • Liver failure with hepatic coma (CMS/HCC)   • Hypersplenism   • Chronic osteomyelitis of right shoulder region (CMS/HCC)   • Thrombocytopenia (CMS/HCC)   • Anemia of chronic disease   • Positive hepatitis C antibody test   • BMI 35.0-35.9,adult   • Tobacco use   • Hepatic encephalopathy (CMS/HCC)   • Acute kidney injury (CMS/HCC)   • Anasarca   • Anxiety and depression   • CKD (chronic kidney disease) stage 3, GFR 30-59 ml/min   • Ascites   • Lipoma of back   • Scrotal swelling   • Physical deconditioning   • Cystitis       Therapy Treatment          Rehabilitation Treatment Summary     Row Name 18 1349 18 0850          Treatment Time/Intention    Discipline physical therapy assistant  -TA occupational therapy assistant  -     Document Type therapy note (daily note)  -TA therapy note (daily note)  -     Subjective Information  -- complains of;pain  -     Mode of Treatment individual therapy;physical therapy  -TA individual therapy;occupational therapy  -      Therapy Frequency (PT Clinical Impression) daily  -TA  --     Therapy Frequency (OT Eval)  -- other (see comments)   5-7 days a wk  -     Patient Effort adequate  -TA adequate  -     Existing Precautions/Restrictions fall  -TA  --     Recorded by [TA] Yojana Ngo, PTA 08/13/18 1532 [JH] Juanita Hickey HEARD/L 08/13/18 1333     Row Name 08/13/18 1349 08/13/18 0850          Vital Signs    Pre Systolic BP Rehab 128  -TA  --     Pre Treatment Diastolic BP 52  -TA  --     Pretreatment Heart Rate (beats/min) 82  -TA  --     Intratreatment Heart Rate (beats/min) 88  -TA  --     Posttreatment Heart Rate (beats/min) 82  -TA  --     Pre SpO2 (%) 92  -TA 96  -JH     O2 Delivery Pre Treatment room air  -TA room air  -JH     Intra SpO2 (%) 90  -TA  --     O2 Delivery Intra Treatment room air  -TA  --     Post SpO2 (%) 93  -TA  --     Pre Patient Position Supine  -TA  --     Post Patient Position Supine  -TA  --     Recorded by [TA] Yojana Ngo, PTA 08/13/18 1532 [JH] Juanita Hickey HEARD/L 08/13/18 1333     Row Name 08/13/18 1349 08/13/18 0850          Cognitive Assessment/Intervention- PT/OT    Orientation Status (Cognition) oriented x 3  -TA oriented x 3  -     Follows Commands (Cognition) WFL  -TA WFL  -JH     Recorded by [TA] Yojana Ngo, PTA 08/13/18 1532 [JH] Juanita Hickey HEARD/L 08/13/18 1333     Row Name 08/13/18 1349             Bed Mobility Assessment/Treatment    Rolling Left [place] (Bed Mobility) supervision  -TA      Rolling Right [place] (Bed Mobility) supervision  -TA      Supine-Sit [place] (Bed Mobility) supervision  -TA      Sit-Supine [place] (Bed Mobility) supervision  -TA      Recorded by [TA] Yojana Ngo, PTA 08/13/18 1532      Row Name 08/13/18 1349             Functional Mobility    Functional Mobility- Comment pt stood x 1, pt pivoted  RLE with RW, pt unable to take steps this date  -TA      Recorded by [TA] Yojana Ngo, PTA 08/13/18 1532      Row  Name 08/13/18 1349             Sit-Stand Transfer    Sit-Stand [place] (Transfers) contact guard  -TA      Assistive Device (Sit-Stand Transfers) walker, front-wheeled  -TA      Recorded by [TA] Yojana Ngo, PTA 08/13/18 1532      Row Name 08/13/18 1349             Stand-Sit Transfer    Stand-Sit [place] (Transfers) contact guard  -TA      Assistive Device (Stand-Sit Transfers) walker, front-wheeled  -TA      Recorded by [TA] Yojana Ngo, PTA 08/13/18 1532      Row Name 08/13/18 0850             Bathing Assessment/Intervention    Comment (Bathing) Pt declined   -JH      Recorded by [JH] Juanita Hickey COTA/L 08/13/18 1335      Row Name 08/13/18 0850             Grooming Assessment/Training    [place] Level (Grooming) wash face, hands  -JH      Grooming Position long sitting;supine  -JH      Recorded by [JH] Juanita Hickey HEARD/L 08/13/18 1333      Row Name 08/13/18 0850             BADL Safety/Performance    Skilled BADL Treatment/Intervention adaptive equipment training;energy conservation  -JH      Recorded by [JH] Juanita Hickey HEARD/L 08/13/18 1333      Row Name 08/13/18 1349             Therapeutic Exercise    Lower Extremity (Therapeutic Exercise) quad sets, bilateral  -TA      Lower Extremity Range of Motion (Therapeutic Exercise) hip flexion/extension, bilateral;hip abduction/adduction, bilateral;ankle dorsiflexion/plantar flexion, right  -TA      Exercise Type (Therapeutic Exercise) AROM (active range of motion)  -TA      Position (Therapeutic Exercise) supine  -TA      Sets/Reps (Therapeutic Exercise) 20  -TA      Recorded by [TA] Yojana Ngo, PTA 08/13/18 1532      Row Name 08/13/18 1349             Positioning and Restraints    Pre-Treatment Position in bed  -TA      Post Treatment Position bed  -TA      In Bed supine;call light within reach;exit alarm on  -TA      Recorded by [TA] Yojana Ngo, PTA 08/13/18 1532      Row Name 08/13/18 1349 08/13/18 0850          Pain  Scale: Numbers Pre/Post-Treatment    Pain Scale: Numbers, Pretreatment 6/10  -TA 4/10  -JH     Pain Scale: Numbers, Post-Treatment 7/10  -TA 8/10  -     Pain Location - Side Bilateral  -TA Bilateral  -     Pain Location - Orientation lower  -TA generalized  -     Pain Location abdomen  -TA abdomen  -     Pre/Post Treatment Pain Comment  -- due to constipation per pt   -     Pain Intervention(s)  -- Repositioned  -     Recorded by [TA] Yojana Ngo, PTA 08/13/18 1532 [JH] Juanita Hickey HEARD/L 08/13/18 1333     Row Name                Wound 08/05/18 1315 Right abdomen puncture    Wound - Properties Group Date first assessed: 08/05/18 [SM] Time first assessed: 1315 [SM] Present On Admission : no [SM] Side: Right [SM] Location: abdomen [SM] Type: puncture [SM] Additional Comments: covered by dressing; small amount of serosang fluid [SM] Recorded by:  [SM] Lyudmila Tran RN 08/05/18 1608    Row Name 08/13/18 0850             Outcome Summary/Treatment Plan (OT)    Daily Summary of Progress (OT) progress towards functional goals is fair  -      Plan for Continued Treatment (OT) Continue per POC  -      Anticipated Discharge Disposition (OT) home with 24/7 care  -      Recorded by [JH] Juanita Hickey HEARD/L 08/13/18 1333      Row Name 08/13/18 1349             Outcome Summary/Treatment Plan (PT)    Daily Summary of Progress (PT) progress toward functional goals is gradual  -TA      Plan for Continued Treatment (PT) continue  -TA      Anticipated Discharge Disposition (PT) skilled nursing facility;home with 24/7 care;home with home health  -TA      Recorded by [TA] Yojana Ngo, PTA 08/13/18 1532        User Key  (r) = Recorded By, (t) = Taken By, (c) = Cosigned By    Initials Name Effective Dates Discipline    TA Yojana Ngo, PTA 03/07/18 -  PT     Juanita Hickey HEARD/L 03/07/18 -  OT    SM Lyudmila Tran RN 10/17/16 -  Nurse          Wound 08/05/18 1315 Right abdomen puncture  (Active)   Dressing Appearance open to air 8/12/2018  7:14 PM   Drainage Amount none 8/12/2018  7:14 PM               PT Rehab Goals     Row Name 08/13/18 1349             Bed Mobility Goal 1 (PT)    Activity/Assistive Device (Bed Mobility Goal 1, PT) supine to sit;rolling to left  -TA      [place] Level/Cues Needed (Bed Mobility Goal 1, PT) standby assist  -TA      Time Frame (Bed Mobility Goal 1, PT) 5 days  -TA      Progress/Outcomes (Bed Mobility Goal 1, PT) goal met  -TA         Transfer Goal 1 (PT)    Activity/Assistive Device (Transfer Goal 1, PT) bed-to-chair/chair-to-bed  -TA      [place] Level/Cues Needed (Transfer Goal 1, PT) minimum assist (75% or more patient effort)  -TA      Time Frame (Transfer Goal 1, PT) 5 days  -TA      Progress/Outcome (Transfer Goal 1, PT) goal partially met  -TA         Strength Goal 1 (PT)    Strength Goal 1 (PT) Improve R LE MMT by 1/2 to 1 muscle grade  -TA      Time Frame (Strength Goal 1, PT) 5 days  -TA      Progress/Outcome (Strength Goal 1, PT) goal not met  -TA        User Key  (r) = Recorded By, (t) = Taken By, (c) = Cosigned By    Initials Name Provider Type Discipline    TA Yojana Ngo PTA Physical Therapy Assistant PT          Physical Therapy Education     Title: PT OT SLP Therapies (Active)     Topic: Physical Therapy (Active)     Point: Home exercise program (Done)    Learning Progress Summary     Learner Status Readiness Method Response Comment Documented by    Patient Done Acceptance E,TB VU  LN 08/06/18 1238          Point: Precautions (Done)    Learning Progress Summary     Learner Status Readiness Method Response Comment Documented by    Patient Done Acceptance E,TB VU  LN 08/06/18 1238                      User Key     Initials Effective Dates Name Provider Type Discipline    LN 03/07/18 -  Stephanie Du PTA Physical Therapy Assistant PT                    PT Recommendation and Plan  Anticipated Discharge Disposition (PT): skilled nursing  facility, home with 24/7 care, home with home health  Therapy Frequency (PT Clinical Impression): daily  Outcome Summary/Treatment Plan (PT)  Daily Summary of Progress (PT): progress toward functional goals is gradual  Plan for Continued Treatment (PT): continue  Anticipated Discharge Disposition (PT): skilled nursing facility, home with 24/7 care, home with home health  Outcome Summary: pt stood x 1 with RW & CGA, pt would benefit from 24/7 care & continued PT services          Outcome Measures     Row Name 08/13/18 1500 08/13/18 0850 08/12/18 0919       How much help from another person do you currently need...    Turning from your back to your side while in flat bed without using bedrails? 4  -TA  -- 4  -CA    Moving from lying on back to sitting on the side of a flat bed without bedrails? 4  -TA  -- 4  -CA    Moving to and from a bed to a chair (including a wheelchair)? 3  -TA  -- 3  -CA    Standing up from a chair using your arms (e.g., wheelchair, bedside chair)? 3  -TA  -- 3  -CA    Climbing 3-5 steps with a railing? 1  -TA  -- 1  -CA    To walk in hospital room? 1  -TA  -- 1  -CA    AM-PAC 6 Clicks Score 16  -TA  -- 16  -CA       How much help from another is currently needed...    Putting on and taking off regular lower body clothing?  -- 1  -JH  --    Bathing (including washing, rinsing, and drying)  -- 2  -JH  --    Toileting (which includes using toilet bed pan or urinal)  -- 3  -JH  --    Putting on and taking off regular upper body clothing  -- 3  -JH  --    Taking care of personal grooming (such as brushing teeth)  -- 3  -JH  --    Eating meals  -- 4  -JH  --    Score  -- 16  -JH  --       Functional Assessment    Outcome Measure Options AM-PAC 6 Clicks Basic Mobility (PT)  -TA  -- AM-PAC 6 Clicks Basic Mobility (PT)  -CA    Row Name 08/12/18 0830             How much help from another is currently needed...    Putting on and taking off regular lower body clothing? 2  -KD      Bathing (including  washing, rinsing, and drying) 3  -KD      Toileting (which includes using toilet bed pan or urinal) 2  -KD      Putting on and taking off regular upper body clothing 3  -KD      Taking care of personal grooming (such as brushing teeth) 4  -KD      Eating meals 4  -KD      Score 18  -KD        User Key  (r) = Recorded By, (t) = Taken By, (c) = Cosigned By    Initials Name Provider Type    Yojana Buchanan PTA Physical Therapy Assistant    Nikolai Paul PTA Physical Therapy Assistant    Isa Garcia, HEARD/L Occupational Therapy Assistant    Juanita Archer, HEARD/L Occupational Therapy Assistant           Time Calculation:         PT Charges     Row Name 08/13/18 1535             Time Calculation    Start Time 1349  -TA      Stop Time 1412  -TA      Time Calculation (min) 23 min  -TA         Time Calculation- PT    Total Timed Code Minutes- PT 23 minute(s)  -TA        User Key  (r) = Recorded By, (t) = Taken By, (c) = Cosigned By    Initials Name Provider Type    Yojana Buchanan PTA Physical Therapy Assistant        Therapy Suggested Charges     Code   Minutes Charges    None           Therapy Charges for Today     Code Description Service Date Service Provider Modifiers Qty    49715967569 HC PT THERAPEUTIC ACT EA 15 MIN 8/13/2018 Yojana Ngo PTA GP 1    32433162426 HC PT THER PROC EA 15 MIN 8/13/2018 Yojana Ngo PTA GP 1          PT G-Codes  Outcome Measure Options: AM-PAC 6 Clicks Basic Mobility (PT)  Score: 10  Functional Limitation: Mobility: Walking and moving around  Mobility: Walking and Moving Around Current Status (): At least 60 percent but less than 80 percent impaired, limited or restricted  Mobility: Walking and Moving Around Goal Status (): At least 40 percent but less than 60 percent impaired, limited or restricted    Yojana Ngo PTA  8/13/2018

## 2018-08-13 NOTE — PROGRESS NOTES
FAMILY MEDICINE DAILY PROGRESS NOTE  NAME: Armando Mcmullen Sr.  : 1965  MRN: [id number]     LOS: 11 days     PROVIDER OF SERVICE: Jose Luis III, MD    Chief Complaint: Decompensated hepatic cirrhosis (CMS/HCC)    Subjective:     Interval History:  History taken from: patient RN  No acute overnight events.  Patient continues to diurese well on by mouth diuretics. Patient complains of new onset constipation, lactulose being ineffective.  Addition of MiraLAX as warranted for constipation relief. Patient reports working well with physical therapy, wishes to do outpatient rehabilitation. Patient denies fever chills shortness of breath chest pain nausea vomiting diarrhea    Review of Systems:   Review of Systems   Constitutional: Negative for activity change.   HENT: Negative.    Eyes: Negative.    Respiratory: Negative for shortness of breath.    Cardiovascular: Positive for leg swelling. Negative for chest pain.   Gastrointestinal: Positive for constipation. Negative for abdominal distention, abdominal pain, diarrhea, nausea and vomiting.   Endocrine: Negative.    Genitourinary: Positive for dysuria and frequency. Negative for scrotal swelling.   Musculoskeletal: Negative.    Skin: Negative for wound.   Allergic/Immunologic: Negative.    Neurological: Negative.    Hematological: Negative.    Psychiatric/Behavioral: Negative.        Objective:     Vital Signs  Temp:  [97.2 °F (36.2 °C)-100.1 °F (37.8 °C)] 97.6 °F (36.4 °C)  Heart Rate:  [75-95] 80  Resp:  [16-20] 16  BP: (112-128)/(62-79) 112/78    Physical Exam  Physical Exam   Constitutional: He is oriented to person, place, and time. He appears well-developed and well-nourished.   HENT:   Head: Normocephalic and atraumatic.   Right Ear: External ear normal.   Left Ear: External ear normal.   Neck: Neck supple.   Cardiovascular: Normal rate and regular rhythm.    Pulmonary/Chest: He has decreased breath sounds in the right upper field, the right middle  field, the left upper field and the left middle field. He has no wheezes.   Abdominal: Soft. Bowel sounds are normal. There is tenderness in the suprapubic area.   Musculoskeletal: Normal range of motion.   Neurological: He is alert and oriented to person, place, and time.   Skin: Skin is warm and dry.        1+ pitting edema on the right lower extremity.  Without signs of ecchymosis   Psychiatric: He has a normal mood and affect. His behavior is normal. Judgment and thought content normal.       Medication Review    Current Facility-Administered Medications:   •  albumin human 25 % IV SOLN 12.5 g, 12.5 g, Intravenous, BID, Jose Luis III, MD, Last Rate: 0 mL/hr at 08/05/18 2304, 12.5 g at 08/12/18 2022  •  cefTRIAXone (ROCEPHIN) 2 g/100 mL 0.9% NS VTB (LUCY), 2 g, Intravenous, Q24H, Jose Luis III, MD, 2 g at 08/12/18 1423  •  diphenhydrAMINE (BENADRYL) injection 25 mg, 25 mg, Intravenous, Q6H PRN, Jose Luis III, MD  •  doxycycline (VIBRAMYCIN) 100 mg/250 mL 0.9% NS VTB, 100 mg, Intravenous, Q12H, Jose Luis III, MD, 100 mg at 08/13/18 0541  •  furosemide (LASIX) tablet 40 mg, 40 mg, Oral, BID, Jose Luis III, MD, 40 mg at 08/12/18 1716  •  ipratropium-albuterol (DUO-NEB) nebulizer solution 3 mL, 3 mL, Nebulization, Q6H - RT, Jose Luis III, MD, 3 mL at 08/13/18 0740  •  lactulose (CHRONULAC) 10 GM/15ML solution 30 g, 30 g, Oral, Q12H, Jose Luis III, MD, 30 g at 08/12/18 2022  •  metOLazone (ZAROXOLYN) tablet 2.5 mg, 2.5 mg, Oral, Daily, Jose Luis III, MD, 2.5 mg at 08/12/18 0918  •  morphine injection 4 mg, 4 mg, Intravenous, Q4H PRN, Jose Luis III, MD, 4 mg at 08/12/18 0522  •  nicotine (NICODERM CQ) 14 MG/24HR patch 1 patch, 1 patch, Transdermal, Q24H, Momo Hammer MD, 1 patch at 08/11/18 2007  •  ondansetron (ZOFRAN) tablet 4 mg, 4 mg, Oral, Q6H PRN **OR** ondansetron ODT (ZOFRAN-ODT) disintegrating tablet 4 mg, 4 mg, Oral,  Q6H PRN **OR** ondansetron (ZOFRAN) injection 4 mg, 4 mg, Intravenous, Q6H PRN, Momo Hammer MD  •  polyethylene glycol 3350 powder (packet), 17 g, Oral, Daily, Jose Luis III, MD  •  sennosides-docusate sodium (SENOKOT-S) 8.6-50 MG tablet 2 tablet, 2 tablet, Oral, Nightly PRN, Momo Hammer MD, 2 tablet at 08/12/18 2023  •  simethicone (MYLICON) chewable tablet 80 mg, 80 mg, Oral, 4x Daily PRN, Jose Luis III, MD  •  sodium chloride 0.9 % flush 1-10 mL, 1-10 mL, Intravenous, PRN, Momo Hammer MD  •  Insert peripheral IV, , , Once **AND** sodium chloride 0.9 % flush 10 mL, 10 mL, Intravenous, PRN, Mike Bergman MD, 10 mL at 08/07/18 1643  •  spironolactone (ALDACTONE) tablet 25 mg, 25 mg, Oral, Daily, Dilan Crawford MD, 25 mg at 08/12/18 0918     Diagnostic Data    Lab Results (last 24 hours)     Procedure Component Value Units Date/Time    Comprehensive Metabolic Panel [[id number]]  (Abnormal) Collected:  08/13/18 0556    Specimen:  Blood Updated:  08/13/18 0637     Glucose 71 mg/dL      BUN 28 (H) mg/dL      Creatinine 1.81 (H) mg/dL      Sodium 132 (L) mmol/L      Potassium 3.2 (L) mmol/L      Chloride 90 (L) mmol/L      CO2 32.0 (H) mmol/L      Calcium 8.4 mg/dL      Total Protein 5.9 (L) g/dL      Albumin 2.70 (L) g/dL      ALT (SGPT) 34 U/L      AST (SGOT) 52 U/L      Alkaline Phosphatase 107 U/L      Total Bilirubin 3.1 (H) mg/dL      eGFR Non African Amer 39 (L) mL/min/1.73      Globulin 3.2 gm/dL      A/G Ratio 0.8 (L) g/dL      BUN/Creatinine Ratio 15.5     Anion Gap 10.0 mmol/L     C-reactive Protein [[id number]]  (Abnormal) Collected:  08/13/18 0556    Specimen:  Blood Updated:  08/13/18 0637     C-Reactive Protein 6.10 (H) mg/dL     Magnesium [[id number]]  (Normal) Collected:  08/12/18 0649    Specimen:  Blood Updated:  08/12/18 0958     Magnesium 1.7 mg/dL             I reviewed the patient's new clinical results.  I reviewed the patient's  new imaging results and agree with the interpretation.    Assessment/Plan:     Active Hospital Problems    Diagnosis Date Noted   • **Decompensated hepatic cirrhosis (CMS/HCC) [K72.90] 05/26/2009     -Secondary to alcohol and hepatitis C. Complicated by esophageal variceal bleeding status post TIPS shunt that now is occluded. Not a transplant candidate.  -Dr. Becerril, gastroenterologist, consulted; appreciate recommendations  -Status post paracentesis; ascitic fluid without infectious organism (SBP)  -Continue home lactulose for encephalopathy  -Suspected type II hepatorenal syndrome  -Started spironolactone per nephrology     • Cystitis [N30.90] 08/13/2018     Suprapubic pain, and dysuria  Doxy D4/7  Afebrile     • Physical deconditioning [R53.81] 08/12/2018     -Secondary to decompensated hepatic encephalopathy and cirrhosis.   -PT/OT ordered with plans to continue therapy outpatient.     • Scrotal swelling [N50.89] 08/10/2018     New onset swelling and edema of scrotum and painful suprapubic pressure.  Ultrasound of scrotum revealed mild edema  Urine culture: NGTD     • Lipoma of back [D17.1] 08/09/2018     Right upper back  Recommend outpatient follow up     • Anxiety and depression [F41.8] 08/02/2018     Pt has not been taking any medications     • CKD (chronic kidney disease) stage 3, GFR 30-59 ml/min [N18.3] 08/02/2018     Baseline Cr 1.4, admission 1.64  Nephrology Dr. Colón follwing  Monitoring with daily CMP     • Anasarca [R60.1] 04/27/2018     Nephro and GI consulted; appreciate recommendations  Lasix PO 40 BID  Metolazone  2L fluid restriction  Likely Hepatorenal type II     • Acute kidney injury (CMS/HCC) [N17.9] 04/27/2018     Consult Nephrology appreciated Dr. Colón following.   Diuresing with Lasix 40 PO BID and metolazone  Initiated spironolactone  2L fluid restriction  No NSAIDs     • Hepatic encephalopathy (CMS/HCC) [K72.90] 04/11/2018     -Cirrhosis secondary to alcohol and hepatitis C.  Complicated by esophageal variceal bleeding status post TIPS shunt that now is occluded.  -Not a surgical candidate. MELD score: 28  -Continue lactulose      • Anemia of chronic disease [D63.8] 07/18/2017     Monitoring H&H with daily CBC  Will discuss risks and benefits of transfusion  May benefit from erythropoietin; will discuss with nephrology     • Thrombocytopenia (CMS/HCC) [D69.6] 07/10/2017     Most likely secondary to liver cirrhosis.   Spleno-hepatic sequestration versus decreased production.  Received platelets 8/4  Monitoring levels daily     • Hypokalemia [E87.6] 03/02/2017     -Monitoring levels daily  -Replacing daily  -Initiated spironolactone therapy      • Essential hypertension [I10] 12/01/2008     Pt has not been taking his meds at home  Currently normotensive           DVT prophylaxis: Chronic Liver Failure  Code status is   Code Status and Medical Interventions:   Ordered at: 08/02/18 2011     Code Status:    CPR     Medical Interventions (Level of Support Prior to Arrest):    Full       Plan for disposition Anticipation of discharge in next day or 2.      This document has been electronically signed by Jose Luis III, MD on August 13, 2018 7:51 AM

## 2018-08-13 NOTE — DISCHARGE SUMMARY
[address] [address]  T - [phone]     DISCHARGE SUMMARY         PATIENT  DEMOGRAPHICS   PATIENT NAME: Armando Mcmullen SrJuanita                      PHYSICIAN: Jose Luis III, MD  : 1965  MRN: [id number]    ADMISSION/DISCHARGE INFO   ADMISSION DATE: 2018   DISCHARGE DATE: 18    ADMISSION DIAGNOSES: Generalized abdominal pain [R10.84]  Alcoholic cirrhosis of liver with ascites (CMS/HCC) [K70.31]  Elevated bilirubin [R17]  Elevated lactic acid level [R79.89]  Leukocytosis, unspecified type [D72.829]  DISCHARGE DIAGNOSES:     Principal Problem:    Decompensated hepatic cirrhosis (CMS/HCC)  Active Problems:    Essential hypertension    Hypokalemia    Thrombocytopenia (CMS/HCC)    Anemia of chronic disease    Hepatic encephalopathy (CMS/HCC)    Acute kidney injury (CMS/HCC)    Anasarca    Anxiety and depression    CKD (chronic kidney disease) stage 3, GFR 30-59 ml/min    Lipoma of back    Scrotal swelling    Physical deconditioning    Cystitis      SERVICE: Family Medicine  ATTENDING PROVIDER: Dr. Carr  RESIDENT: Jose Luis III, MD     CONSULTS   Consult Orders (all)     Start     Ordered    18 0849  Inpatient Case Management  Consult  Once     Comments:  Patient is interested in OP physical therapy at a SNF, reporting he does not have the resources to care for himself at home at this time.   Provider:  (Not yet assigned)    18 0848    18 1300  Inpatient Gastroenterology Consult  Once     Specialty:  Gastroenterology  Provider:  Tushar Becerril DO    18 1300    18 1243  Inpatient Nephrology Consult  Once     Specialty:  Nephrology  Provider:  Josep Colón MD    18 1247          PROCEDURES     Imaging Results (last 24 hours)     ** No results found for the last 24 hours. **          Us Scrotum & Testicles    Result Date: 8/10/2018  Narrative: EXAM: ULTRASOUND SCROTUM/TESTICLES  HISTORY: 53-year-old male with history given for scrotal swelling. COMPARISON: None TECHNIQUE: Transverse and longitudinal imaging through the scrotum including the testicles and epididymides was performed. Color flow and duplex Doppler evaluation of the testicles with color-flow evaluation of the epididymides. FINDINGS: The right testicle measures 2.7 x 1.5 x 1.6 cm. The left testicle measures 2.6 x 1.5 x 1.8 cm. The echo textures of both appear homogeneous and relatively symmetrical. No evidence of suspicious intratesticular mass. There is normal and relatively symmetrical blood flow demonstrated on color Doppler examination. No findings of torsion. No evidence of suspicious epididymal mass. No varicocele or significant hydrocele. There is evidence of scrotal wall thickening/edema particularly on the left measuring 2 cm compared to 0.8 cm on the right. No evidence of suspicious fluid collection on the images provided.     Impression: There is scrotal wall thickening/edema greater on the left measuring 2 cm thickness. No suspicious fluid collection. No evidence of suspicious testicular mass or torsion. Electronically signed by:  Tho Chavez MD  8/10/2018 10:10 AM CDT Workstation: [id number]    Xr Chest 1 View    Result Date: 8/4/2018  Narrative: [facility] [place] Patient Name: LAMONT FISHER  ORDERING: MITZI STEPHENS III ATTENDING: MITZI STEPHENS III REFERRING: MITZI STEPHENS III ----------------------- PROCEDURE: Portable chest x-ray TECHNIQUE: Single AP view of the chest COMPARISON: August 2, 2018 HISTORY: Wheezing with leukocytosis R?O Pneumonia, R79.89 Other specified abnormal findings of blood chemistry R17 Unspecified jaundice D72.829 Elevated white blood cell count, unspecified R10.84 Generalized abdominal pain K70.31 Alcoholic cirrhosis of liver with ascites FINDINGS:  Life-support devices: None Lungs/pleura: Airspace opacity in the left lung base, suspicious for  either atelectasis, pneumonia, or a small pleural effusion. Pulmonary vascular congestion. Heart, hilar and mediastinal structures: Cardiac silhouette is upper normal in size.     Impression: CONCLUSION: Airspace opacity in the left lung base, suspicious for either atelectasis, pneumonia, or a small pleural effusion. Pulmonary vascular congestion. Electronically signed by:  Jose Boudreaux MD  8/4/2018 9:32 AM CDT Workstation: [id number]    Xr Chest 1 View    Result Date: 8/2/2018  Narrative: EXAM:         Radiograph(s), Chest VIEWS:   Portable ; 1     DATE/TIME:  8/2/2018 5:33 PM CDT            INDICATION:   cp, sob  COMPARISON:  CXR: 4/13/18         FINDINGS:         - lines/tubes:    none   - cardiac:         size within normal limits       - mediastinum: contour within normal limits       - lungs:         Scattered airspace changes in the right base.          - pleura:         no evidence of  fluid                - osseous:         Fracture nonunion, right humerus               - misc.:        Impression: CONCLUSION:    1. Scattered airspace changes in the right base possibly associated with pneumonia. 2. Fracture nonunion, right humerus.                                   Electronically signed by:  PRISCILLA Bustillo MD  8/2/2018 5:34 PM CDT Workstation: [id number]    Us Abdomen Limited    Result Date: 8/7/2018  Narrative: Ultrasound abdomen limited, ascites survey HISTORY: Follow-up paracentesis Ultrasound examination of the four quadrants was performed. COMPARISON: August 3, 2018 Small amount of residual or reaccumulated ascites in the right upper quadrant, right lower quadrant and left lower quadrant. Largest pocket 3.7 cm.     Impression: CONCLUSION: Small amount of residual or reaccumulated ascites in the right upper quadrant, right lower quadrant and left lower quadrant. [id number] Electronically signed by:  Claude Whatley MD  8/7/2018 4:04 PM CDT Workstation: [id number]    Us Abdomen Limited    Result Date:  8/3/2018  Narrative: EXAMINATION:  ultrasound abdomen, limited   CLINICAL INDICATION / HISTORY:   Quantify Ascites, Detect if ascitif fluid is present or sufficient for paracentesis, R79.89 Other specified abnormal findings of blood chemistry R17 Unspecified jaundice D72.829 Elevated white blood cell count, unspecified R10.84 Generalized abdominal pain K70.31 Alcoholic cirrhosis of liver with ascites  DATE:  8/3/2018 1:31 PM CDT COMPARISON:  none TECHNIQUE:  standard protocol   FINDINGS:    Limited scanning was performed in the abdomen for assessment of quantity of ascites, scanning was performed in all 4 quadrants and there is a moderate amount of free fluid in the abdomen.     Impression: CONCLUSION:      1.  Moderate amount of ascites.        Electronically signed by:  PRISCILLA Bustillo MD  8/3/2018 1:32 PM CDT Workstation: [id number]    Us Paracentesis    Result Date: 8/5/2018  Narrative: PROCEDURE: Ultrasound-guided paracentesis COMPARISON: None HISTORY: Ascites, fever TECHNIQUE: The risks, benefits and alternatives were explained to the patient who had ample opportunity to ask questions. The patient agreed to proceed and informed consent was obtained. An adequate fluid pocket was identified in the right lower quadrant. The site was marked then prepped and draped in sterile fashion. Approximately 10 mL of 2% lidocaine solution was used for local superficial and deep anesthesia. A 5 Y[place] Yueh needle was inserted into the peritoneal cavity under continuous sonographic guidance. FINDINGS: Approximately 2400 mL of clear, serous fluid was removed. There were no immediate post procedure complications.     Impression: CONCLUSION:  Successful ultrasound-guided paracentesis, as described above. Electronically signed by:  Jose Boudreaux MD  8/5/2018 4:49 PM CDT Workstation: [id number]    Us Testicular Or Ovarian Vascular Limited    Result Date: 8/10/2018  Narrative: EXAM: ULTRASOUND SCROTUM/TESTICLES HISTORY: 53-year-old  male with history given for scrotal swelling. COMPARISON: None TECHNIQUE: Transverse and longitudinal imaging through the scrotum including the testicles and epididymides was performed. Color flow and duplex Doppler evaluation of the testicles with color-flow evaluation of the epididymides. FINDINGS: The right testicle measures 2.7 x 1.5 x 1.6 cm. The left testicle measures 2.6 x 1.5 x 1.8 cm. The echo textures of both appear homogeneous and relatively symmetrical. No evidence of suspicious intratesticular mass. There is normal and relatively symmetrical blood flow demonstrated on color Doppler examination. No findings of torsion. No evidence of suspicious epididymal mass. No varicocele or significant hydrocele. There is evidence of scrotal wall thickening/edema particularly on the left measuring 2 cm compared to 0.8 cm on the right. No evidence of suspicious fluid collection on the images provided.     Impression: There is scrotal wall thickening/edema greater on the left measuring 2 cm thickness. No suspicious fluid collection. No evidence of suspicious testicular mass or torsion. Electronically signed by:  Tho Chavez MD  8/10/2018 10:10 AM CDT Workstation: [id number]    Us Venous Doppler Lower Extremity Right (duplex)    Result Date: 8/8/2018  Narrative: Ultrasound venous duplex right lower extremity HISTORY: Right lower extremity swelling or edema. Duplex ultrasound of the deep venous system of the right lower extremity was performed Real-time images demonstrate normal compressibility without evidence of intraluminal thrombus. Doppler shows phasic flow and augmentation. Color Doppler also reveals venous patency. Subcutaneous edema in the popliteal fossa and minimal subcutaneous edema in the thigh and calf.     Impression: CONCLUSION: No ultrasound evidence of deep venous thrombosis of the right lower extremity. Subcutaneous edema in the popliteal fossa and minimal subcutaneous edema in the thigh and calf. [id number]  Electronically signed by:  Claude Whatley MD  8/8/2018 12:21 PM CDT Workstation: [email or web address]      HISTORY OF PRESENT ILLNESS   Armando Mcmullen Sr. is a 53 y.o. male with a CMH of anxiety, depression, alcoholic cirrhosis followed by Dr. Becerril, HTN and CKD III followed by Dr. Colón who presents complaining of a 2-3 day history of generalized abdominal pain and swelling. Pt reports swelling in right leg and bilateral upper extremities, nausea without vomiting and diarrhea. Pt denies fever, dyspnea, dysuria, chest pain. Pt reports he has not been taking the majority of his medications because he does not have them. He thinks he has been taking his lactulose and lasix. Lasix ran out 3 days ago shortly before the swelling started. Pt is a poor historian.     In the ED pt was found to have an elevated BNP of 2120, lactate of 3.0, potassium of 3.4, BUN/Cr of 29/1.64, plateletts of 51, albumin of 2.50 and Tbili of 4.6. Pts CXR showed RLL infiltrate possibly PNA. UA had epithelials.    Taken from Dr. Hammer  DIAGNOSTIC DATA     Lab Results (last 7 days)     Procedure Component Value Units Date/Time    CBC & Differential [[id number]] Collected:  08/13/18 0921    Specimen:  Blood Updated:  08/13/18 0947    Narrative:       The following orders were created for panel order CBC & Differential.  Procedure                               Abnormality         Status                     ---------                               -----------         ------                     CBC Auto Differential[[id number]]        Abnormal            Final result                 Please view results for these tests on the individual orders.    CBC Auto Differential [[id number]]  (Abnormal) Collected:  08/13/18 0921    Specimen:  Blood Updated:  08/13/18 0947     WBC 7.51 10*3/mm3      RBC 2.30 (L) 10*6/mm3      Hemoglobin 7.7 (L) g/dL      Hematocrit 22.2 (L) %      MCV 96.5 fL      MCH 33.5 pg      MCHC 34.7 g/dL      RDW 15.5 (H) %      RDW-SD 53.6 (H)  fl      MPV 10.4 fL      Platelets 56 (L) 10*3/mm3      Neutrophil % 71.1 %      Lymphocyte % 16.4 %      Monocyte % 7.9 %      Eosinophil % 3.1 %      Basophil % 1.1 %      Immature Grans % 0.4 %      Neutrophils, Absolute 5.35 10*3/mm3      Lymphocytes, Absolute 1.23 10*3/mm3      Monocytes, Absolute 0.59 10*3/mm3      Eosinophils, Absolute 0.23 10*3/mm3      Basophils, Absolute 0.08 10*3/mm3      Immature Grans, Absolute 0.03 (H) 10*3/mm3     Comprehensive Metabolic Panel [[id number]]  (Abnormal) Collected:  08/13/18 0556    Specimen:  Blood Updated:  08/13/18 0637     Glucose 71 mg/dL      BUN 28 (H) mg/dL      Creatinine 1.81 (H) mg/dL      Sodium 132 (L) mmol/L      Potassium 3.2 (L) mmol/L      Chloride 90 (L) mmol/L      CO2 32.0 (H) mmol/L      Calcium 8.4 mg/dL      Total Protein 5.9 (L) g/dL      Albumin 2.70 (L) g/dL      ALT (SGPT) 34 U/L      AST (SGOT) 52 U/L      Alkaline Phosphatase 107 U/L      Total Bilirubin 3.1 (H) mg/dL      eGFR Non African Amer 39 (L) mL/min/1.73      Globulin 3.2 gm/dL      A/G Ratio 0.8 (L) g/dL      BUN/Creatinine Ratio 15.5     Anion Gap 10.0 mmol/L     C-reactive Protein [[id number]]  (Abnormal) Collected:  08/13/18 0556    Specimen:  Blood Updated:  08/13/18 0637     C-Reactive Protein 6.10 (H) mg/dL     Magnesium [[id number]]  (Normal) Collected:  08/12/18 0649    Specimen:  Blood Updated:  08/12/18 0958     Magnesium 1.7 mg/dL     Comprehensive Metabolic Panel [[id number]]  (Abnormal) Collected:  08/12/18 0649    Specimen:  Blood Updated:  08/12/18 0727     Glucose 91 mg/dL      BUN 30 (H) mg/dL      Creatinine 1.80 (H) mg/dL      Sodium 133 (L) mmol/L      Potassium 3.2 (L) mmol/L      Chloride 94 (L) mmol/L      CO2 28.0 mmol/L      Calcium 8.0 (L) mg/dL      Total Protein 5.5 (L) g/dL      Albumin 2.40 (L) g/dL      ALT (SGPT) 30 U/L      AST (SGOT) 60 (H) U/L      Alkaline Phosphatase 113 U/L      Total Bilirubin 2.4 (H) mg/dL      eGFR Non  Amer 40 (L)  mL/min/1.73      Globulin 3.1 gm/dL      A/G Ratio 0.8 (L) g/dL      BUN/Creatinine Ratio 16.7     Anion Gap 11.0 mmol/L     C-reactive Protein [[id number]]  (Abnormal) Collected:  08/12/18 0649    Specimen:  Blood Updated:  08/12/18 0725     C-Reactive Protein 7.10 (H) mg/dL     CBC & Differential [[id number]] Collected:  08/12/18 0649    Specimen:  Blood Updated:  08/12/18 0709    Narrative:       The following orders were created for panel order CBC & Differential.  Procedure                               Abnormality         Status                     ---------                               -----------         ------                     CBC Auto Differential[[id number]]        Abnormal            Final result                 Please view results for these tests on the individual orders.    CBC Auto Differential [[id number]]  (Abnormal) Collected:  08/12/18 0649    Specimen:  Blood Updated:  08/12/18 0709     WBC 8.95 10*3/mm3      RBC 2.24 (L) 10*6/mm3      Hemoglobin 7.6 (L) g/dL      Hematocrit 22.2 (L) %      MCV 99.1 (H) fL      MCH 33.9 pg      MCHC 34.2 g/dL      RDW 15.8 (H) %      RDW-SD 56.9 (H) fl      MPV 10.4 fL      Platelets 57 (L) 10*3/mm3      Neutrophil % 62.1 %      Lymphocyte % 21.5 %      Monocyte % 9.1 %      Eosinophil % 5.1 %      Basophil % 1.6 %      Immature Grans % 0.6 (H) %      Neutrophils, Absolute 5.57 10*3/mm3      Lymphocytes, Absolute 1.92 10*3/mm3      Monocytes, Absolute 0.81 10*3/mm3      Eosinophils, Absolute 0.46 10*3/mm3      Basophils, Absolute 0.14 10*3/mm3      Immature Grans, Absolute 0.05 (H) 10*3/mm3      nRBC 0.0 /100 WBC     Urine Culture - Urine, [[id number]]  (Normal) Collected:  08/10/18 1031    Specimen:  Urine from Urine, Clean Catch Updated:  08/11/18 1245     Urine Culture No growth at 24 hours    Magnesium [[id number]]  (Normal) Collected:  08/11/18 0643    Specimen:  Blood Updated:  08/11/18 0939     Magnesium 1.9 mg/dL     CBC & Differential [[id number]]  Collected:  08/11/18 0643    Specimen:  Blood Updated:  08/11/18 0824    Narrative:       The following orders were created for panel order CBC & Differential.  Procedure                               Abnormality         Status                     ---------                               -----------         ------                     Scan Slide[[id number]]                                       Final result               CBC Auto Differential[[id number]]        Abnormal            Final result                 Please view results for these tests on the individual orders.    Scan Slide [[id number]] Collected:  08/11/18 0643    Specimen:  Blood Updated:  08/11/18 0824     RBC Morphology Normal     WBC Morphology Normal     Platelet Estimate Decreased    Comprehensive Metabolic Panel [[id number]]  (Abnormal) Collected:  08/11/18 0643    Specimen:  Blood Updated:  08/11/18 0722     Glucose 79 mg/dL      BUN 33 (H) mg/dL      Creatinine 1.91 (H) mg/dL      Sodium 133 (L) mmol/L      Potassium 3.1 (L) mmol/L      Chloride 96 mmol/L      CO2 27.0 mmol/L      Calcium 7.8 (L) mg/dL      Total Protein 5.3 (L) g/dL      Albumin 2.30 (L) g/dL      ALT (SGPT) 25 U/L      AST (SGOT) 49 U/L      Alkaline Phosphatase 111 U/L      Total Bilirubin 2.6 (H) mg/dL      eGFR Non African Amer 37 (L) mL/min/1.73      Globulin 3.0 gm/dL      A/G Ratio 0.8 (L) g/dL      BUN/Creatinine Ratio 17.3     Anion Gap 10.0 mmol/L     C-reactive Protein [[id number]]  (Abnormal) Collected:  08/11/18 0643    Specimen:  Blood Updated:  08/11/18 0722     C-Reactive Protein 8.00 (H) mg/dL     CBC Auto Differential [[id number]]  (Abnormal) Collected:  08/11/18 0643    Specimen:  Blood Updated:  08/11/18 0702     WBC 9.40 10*3/mm3      RBC 2.25 (L) 10*6/mm3      Hemoglobin 7.6 (L) g/dL      Hematocrit 21.6 (L) %      MCV 96.0 fL      MCH 33.8 pg      MCHC 35.2 g/dL      RDW 15.0 (H) %      RDW-SD 52.3 (H) fl      MPV 10.4 fL      Platelets 47 (L) 10*3/mm3       Neutrophil % 62.3 %      Lymphocyte % 20.6 %      Monocyte % 10.5 %      Eosinophil % 4.7 %      Basophil % 0.9 %      Immature Grans % 1.0 (H) %      Neutrophils, Absolute 5.86 10*3/mm3      Lymphocytes, Absolute 1.94 10*3/mm3      Monocytes, Absolute 0.99 (H) 10*3/mm3      Eosinophils, Absolute 0.44 10*3/mm3      Basophils, Absolute 0.08 10*3/mm3      Immature Grans, Absolute 0.09 (H) 10*3/mm3     Urinalysis, Microscopic Only - Urine, Clean Catch [[id number]]  (Abnormal) Collected:  08/10/18 1031    Specimen:  Urine from Urine, Clean Catch Updated:  08/10/18 1132     RBC, UA Too Numerous to Count (A) /HPF      WBC, UA Too Numerous to Count (A) /HPF      Bacteria, UA 2+ (A) /HPF      Squamous Epithelial Cells, UA 0-2 /HPF      Hyaline Casts, UA       Unable to determine due to loaded field     /LPF     Methodology Manual Light Microscopy    Urinalysis With Culture If Indicated - Urine, Clean Catch [[id number]]  (Abnormal) Collected:  08/10/18 1031    Specimen:  Urine from Urine, Clean Catch Updated:  08/10/18 1056     Color, UA Yellow     Appearance, UA Cloudy (A)     pH, UA <=5.0     Specific Gravity, UA 1.007     Glucose, UA Negative     Ketones, UA Negative     Bilirubin, UA Negative     Blood, UA Large (3+) (A)     Protein, UA Trace (A)     Leuk Esterase, UA Large (3+) (A)     Nitrite, UA Negative     Urobilinogen, UA 0.2 E.U./dL    Comprehensive Metabolic Panel [[id number]]  (Abnormal) Collected:  08/10/18 0802    Specimen:  Blood Updated:  08/10/18 0836     Glucose 80 mg/dL      BUN 34 (H) mg/dL      Creatinine 2.10 (H) mg/dL      Sodium 132 (L) mmol/L      Potassium 3.6 mmol/L      Chloride 99 mmol/L      CO2 23.0 mmol/L      Calcium 8.0 (L) mg/dL      Total Protein 5.8 (L) g/dL      Albumin 2.50 (L) g/dL      ALT (SGPT) 30 U/L      AST (SGOT) 52 U/L      Alkaline Phosphatase 107 U/L      Total Bilirubin 3.3 (H) mg/dL      eGFR Non African Amer 33 (L) mL/min/1.73      Globulin 3.3 gm/dL      A/G Ratio 0.8 (L)  g/dL      BUN/Creatinine Ratio 16.2     Anion Gap 10.0 mmol/L     C-reactive Protein [[id number]]  (Abnormal) Collected:  08/10/18 0802    Specimen:  Blood Updated:  08/10/18 0836     C-Reactive Protein 8.50 (H) mg/dL     CBC & Differential [[id number]] Collected:  08/10/18 0802    Specimen:  Blood Updated:  08/10/18 0825    Narrative:       The following orders were created for panel order CBC & Differential.  Procedure                               Abnormality         Status                     ---------                               -----------         ------                     Scan Slide[[id number]]                                                                  CBC Auto Differential[[id number]]        Abnormal            Final result                 Please view results for these tests on the individual orders.    CBC Auto Differential [[id number]]  (Abnormal) Collected:  08/10/18 0802    Specimen:  Blood Updated:  08/10/18 0825     WBC 14.22 (H) 10*3/mm3      RBC 2.48 (L) 10*6/mm3      Hemoglobin 8.5 (L) g/dL      Hematocrit 25.0 (L) %      .8 (H) fL      MCH 34.3 (H) pg      MCHC 34.0 g/dL      RDW 15.6 (H) %      RDW-SD 56.9 (H) fl      MPV 10.4 fL      Platelets 51 (L) 10*3/mm3      Neutrophil % 64.3 %      Lymphocyte % 17.6 %      Monocyte % 10.7 %      Eosinophil % 5.1 %      Basophil % 1.5 %      Immature Grans % 0.8 (H) %      Neutrophils, Absolute 9.13 (H) 10*3/mm3      Lymphocytes, Absolute 2.50 10*3/mm3      Monocytes, Absolute 1.52 (H) 10*3/mm3      Eosinophils, Absolute 0.73 (H) 10*3/mm3      Basophils, Absolute 0.22 (H) 10*3/mm3      Immature Grans, Absolute 0.12 (H) 10*3/mm3      nRBC 0.0 /100 WBC     Body Fluid Culture - Body Fluid, Peritoneum [[id number]]  (Normal) Collected:  08/05/18 1423    Specimen:  Body Fluid from Peritoneum Updated:  08/10/18 0559     BF Culture No growth at 5 days     Gram Stain Result Many (4+) WBCs seen      No organisms seen    Blood Culture - Blood, [[id number]]   (Normal) Collected:  08/04/18 0915    Specimen:  Blood from Hand, Right Updated:  08/09/18 0946     Blood Culture No growth at 5 days    Blood Culture - Blood, [[id number]]  (Normal) Collected:  08/04/18 0930    Specimen:  Blood from Arm, Left Updated:  08/09/18 0946     Blood Culture No growth at 5 days    C-reactive Protein [[id number]]  (Abnormal) Collected:  08/09/18 0611    Specimen:  Blood Updated:  08/09/18 0920     C-Reactive Protein 17.30 (H) mg/dL     Comprehensive Metabolic Panel [[id number]]  (Abnormal) Collected:  08/09/18 0611    Specimen:  Blood Updated:  08/09/18 0657     Glucose 90 mg/dL      BUN 35 (H) mg/dL      Creatinine 2.26 (H) mg/dL      Sodium 133 (L) mmol/L      Potassium 3.7 mmol/L      Chloride 101 mmol/L      CO2 25.0 mmol/L      Calcium 7.9 (L) mg/dL      Total Protein 5.5 (L) g/dL      Albumin 2.40 (L) g/dL      ALT (SGPT) 27 U/L      AST (SGOT) 41 U/L      Alkaline Phosphatase 103 U/L      Total Bilirubin 3.4 (H) mg/dL      eGFR Non African Amer 30 (L) mL/min/1.73      Globulin 3.1 gm/dL      A/G Ratio 0.8 (L) g/dL      BUN/Creatinine Ratio 15.5     Anion Gap 7.0 mmol/L     CBC & Differential [[id number]] Collected:  08/09/18 0611    Specimen:  Blood Updated:  08/09/18 0646    Narrative:       The following orders were created for panel order CBC & Differential.  Procedure                               Abnormality         Status                     ---------                               -----------         ------                     Scan Slide[[id number]]                                                                  CBC Auto Differential[[id number]]        Abnormal            Final result                 Please view results for these tests on the individual orders.    CBC Auto Differential [[id number]]  (Abnormal) Collected:  08/09/18 0611    Specimen:  Blood Updated:  08/09/18 0643     WBC 14.65 (H) 10*3/mm3      RBC 2.42 (L) 10*6/mm3      Hemoglobin 8.2 (L) g/dL      Hematocrit 23.6  (L) %      MCV 97.5 fL      MCH 33.9 pg      MCHC 34.7 g/dL      RDW 15.0 (H) %      RDW-SD 53.6 (H) fl      MPV 10.6 fL      Platelets 41 (L) 10*3/mm3      Neutrophil % 69.9 %      Lymphocyte % 13.6 %      Monocyte % 10.7 %      Eosinophil % 4.0 %      Basophil % 0.8 %      Immature Grans % 1.0 (H) %      Neutrophils, Absolute 10.24 (H) 10*3/mm3      Lymphocytes, Absolute 1.99 10*3/mm3      Monocytes, Absolute 1.57 (H) 10*3/mm3      Eosinophils, Absolute 0.58 10*3/mm3      Basophils, Absolute 0.12 10*3/mm3      Immature Grans, Absolute 0.15 (H) 10*3/mm3     AFP Tumor Marker [[id number]] Collected:  08/07/18 1131    Specimen:  Blood Updated:  08/08/18 0820     AFP Tumor Marker 1.0 ng/mL      Comment: [employer] ECLIA methodology       Narrative:       Performed at:  [address] [place] [address]  [id number]  : Glenn Velazquez PhD, Phone:  [phone]    Comprehensive Metabolic Panel [[id number]]  (Abnormal) Collected:  08/08/18 0537    Specimen:  Blood Updated:  08/08/18 0630     Glucose 83 mg/dL      BUN 36 (H) mg/dL      Creatinine 2.37 (H) mg/dL      Sodium 135 (L) mmol/L      Potassium 4.4 mmol/L      Chloride 107 mmol/L      CO2 21.0 (L) mmol/L      Calcium 7.9 (L) mg/dL      Total Protein 5.2 (L) g/dL      Albumin 2.20 (L) g/dL      ALT (SGPT) 22 U/L      AST (SGOT) 44 U/L      Alkaline Phosphatase 90 U/L      Total Bilirubin 3.2 (H) mg/dL      eGFR Non African Amer 29 (L) mL/min/1.73      Globulin 3.0 gm/dL      A/G Ratio 0.7 (L) g/dL      BUN/Creatinine Ratio 15.2     Anion Gap 7.0 mmol/L     CBC & Differential [[id number]] Collected:  08/08/18 0537    Specimen:  Blood Updated:  08/08/18 0617    Narrative:       The following orders were created for panel order CBC & Differential.  Procedure                               Abnormality         Status                     ---------                               -----------         ------                     Scan Slide[[id number]]                                                                   CBC Auto Differential[[id number]]        Abnormal            Final result                 Please view results for these tests on the individual orders.    CBC Auto Differential [[id number]]  (Abnormal) Collected:  08/08/18 0537    Specimen:  Blood Updated:  08/08/18 0617     WBC 12.38 (H) 10*3/mm3      RBC 2.38 (L) 10*6/mm3      Hemoglobin 8.1 (L) g/dL      Hematocrit 23.9 (L) %      .4 (H) fL      MCH 34.0 pg      MCHC 33.9 g/dL      RDW 15.5 (H) %      RDW-SD 56.7 (H) fl      MPV 9.8 fL      Platelets 39 (L) 10*3/mm3      Neutrophil % 68.0 %      Lymphocyte % 14.6 %      Monocyte % 11.6 %      Eosinophil % 3.8 %      Basophil % 0.9 %      Immature Grans % 1.1 (H) %      Neutrophils, Absolute 8.42 10*3/mm3      Lymphocytes, Absolute 1.81 10*3/mm3      Monocytes, Absolute 1.44 (H) 10*3/mm3      Eosinophils, Absolute 0.47 10*3/mm3      Basophils, Absolute 0.11 10*3/mm3      Immature Grans, Absolute 0.13 (H) 10*3/mm3      nRBC 0.0 /100 WBC     POC Glucose Once [[id number]]  (Normal) Collected:  08/07/18 1713    Specimen:  Blood Updated:  08/07/18 1726     Glucose 115 mg/dL      Comment: RN NotifiedMeter: [id number]: [id number] FRITZ ROSEMARIEELIDIA       Folate [[id number]]  (Normal) Collected:  08/07/18 1131    Specimen:  Blood Updated:  08/07/18 1315     Folate 16.20 ng/mL     Vitamin B12 [[id number]]  (Abnormal) Collected:  08/07/18 1131    Specimen:  Blood Updated:  08/07/18 1315     Vitamin B-12 >1,000 (H) pg/mL     Ferritin [[id number]]  (Normal) Collected:  08/07/18 1131    Specimen:  Blood Updated:  08/07/18 1245     Ferritin 454.00 ng/mL     Iron Profile [[id number]]  (Abnormal) Collected:  08/07/18 1131    Specimen:  Blood Updated:  08/07/18 1216     Iron 35 (L) mcg/dL      TIBC 121 (L) mcg/dL      Iron Saturation 29 %     Body Fluid Cell Count With Differential - Body Fluid, Peritoneum [[id number]] Collected:  08/03/18 1633    Specimen:  Body Fluid  from Peritoneum Updated:  08/07/18 0808    Narrative:       The following orders were created for panel order Body Fluid Cell Count With Differential - Body Fluid, Peritoneum.  Procedure                               Abnormality         Status                     ---------                               -----------         ------                     Body fluid cell count - ...[[id number]]  Abnormal            Final result               Body fluid differential ...[[id number]]                      Edited Result - FINAL        Please view results for these tests on the individual orders.    Body fluid differential - Body Fluid, [[id number]] Collected:  08/03/18 1633    Specimen:  Body Fluid from Peritoneum Updated:  08/07/18 0808     Neutrophils, Fluid 49 %      Mononuclear, Fluid 51 %     Narrative:       Benign mesothelial cells and macrophages present / Reviewed by Cytologist by cytologist LINDA Harmon    Comprehensive Metabolic Panel [[id number]]  (Abnormal) Collected:  08/07/18 0717    Specimen:  Blood Updated:  08/07/18 0753     Glucose 90 mg/dL      BUN 36 (H) mg/dL      Creatinine 2.32 (H) mg/dL      Sodium 133 (L) mmol/L      Potassium 4.3 mmol/L      Chloride 103 mmol/L      CO2 23.0 mmol/L      Calcium 7.9 (L) mg/dL      Total Protein 5.6 (L) g/dL      Albumin 2.40 (L) g/dL      ALT (SGPT) 25 U/L      AST (SGOT) 34 U/L      Alkaline Phosphatase 98 U/L      Total Bilirubin 3.8 (H) mg/dL      eGFR Non African Amer 29 (L) mL/min/1.73      Globulin 3.2 gm/dL      A/G Ratio 0.8 (L) g/dL      BUN/Creatinine Ratio 15.5     Anion Gap 7.0 mmol/L     CBC & Differential [[id number]] Collected:  08/07/18 0717    Specimen:  Blood Updated:  08/07/18 0735    Narrative:       The following orders were created for panel order CBC & Differential.  Procedure                               Abnormality         Status                     ---------                               -----------         ------                     Scan  Slide[[id number]]                                                                  CBC Auto Differential[[id number]]        Abnormal            Final result                 Please view results for these tests on the individual orders.    CBC Auto Differential [[id number]]  (Abnormal) Collected:  08/07/18 0717    Specimen:  Blood Updated:  08/07/18 0735     WBC 14.01 (H) 10*3/mm3      RBC 2.59 (L) 10*6/mm3      Hemoglobin 8.7 (L) g/dL      Hematocrit 25.5 (L) %      MCV 98.5 (H) fL      MCH 33.6 pg      MCHC 34.1 g/dL      RDW 15.1 (H) %      RDW-SD 54.8 (H) fl      MPV 10.2 fL      Platelets 48 (L) 10*3/mm3      Neutrophil % 74.9 %      Lymphocyte % 11.1 %      Monocyte % 8.2 %      Eosinophil % 4.1 %      Basophil % 0.6 %      Immature Grans % 1.1 (H) %      Neutrophils, Absolute 10.49 (H) 10*3/mm3      Lymphocytes, Absolute 1.55 10*3/mm3      Monocytes, Absolute 1.15 (H) 10*3/mm3      Eosinophils, Absolute 0.58 10*3/mm3      Basophils, Absolute 0.08 10*3/mm3      Immature Grans, Absolute 0.16 (H) 10*3/mm3     Urine Eosinophils, Johnson's Stain - Urine, Clean Catch [[id number]]  (Normal) Collected:  08/06/18 1554    Specimen:  Urine from Urine, Clean Catch Updated:  08/07/18 0646     Johnson Stain Negative    Respiratory Culture - Sputum, Cough [[id number]] Collected:  08/04/18 1534    Specimen:  Sputum from Cough Updated:  08/07/18 0612     Respiratory Culture Normal Respiratory Renetta     Gram Stain Result Many (4+) WBCs per low power field      Few (2+) Epithelial cells per low power field      Mixed bacterial renetta        Imaging Results (last 7 days)     Procedure Component Value Units Date/Time    US Scrotum & Testicles [[id number]] Collected:  08/10/18 0850     Updated:  08/10/18 1011    Narrative:       EXAM: ULTRASOUND SCROTUM/TESTICLES    HISTORY: 53-year-old male with history given for scrotal  swelling.    COMPARISON: None    TECHNIQUE: Transverse and longitudinal imaging through the  scrotum including the  testicles and epididymides was performed.  Color flow and duplex Doppler evaluation of the testicles with  color-flow evaluation of the epididymides.    FINDINGS:   The right testicle measures 2.7 x 1.5 x 1.6 cm.  The left testicle measures 2.6 x 1.5 x 1.8 cm.    The echo textures of both appear homogeneous and relatively  symmetrical. No evidence of suspicious intratesticular mass.  There is normal and relatively symmetrical blood flow  demonstrated on color Doppler examination. No findings of  torsion.    No evidence of suspicious epididymal mass.    No varicocele or significant hydrocele.    There is evidence of scrotal wall thickening/edema particularly  on the left measuring 2 cm compared to 0.8 cm on the right. No  evidence of suspicious fluid collection on the images provided.      Impression:       There is scrotal wall thickening/edema greater on the left  measuring 2 cm thickness. No suspicious fluid collection.    No evidence of suspicious testicular mass or torsion.     Electronically signed by:  Tho Chavez MD  8/10/2018 10:10 AM  CDT Workstation: [id number]    US Testicular or Ovarian Vascular Limited [[id number]] Collected:  08/10/18 0850     Updated:  08/10/18 1011    Narrative:       EXAM: ULTRASOUND SCROTUM/TESTICLES    HISTORY: 53-year-old male with history given for scrotal  swelling.    COMPARISON: None    TECHNIQUE: Transverse and longitudinal imaging through the  scrotum including the testicles and epididymides was performed.  Color flow and duplex Doppler evaluation of the testicles with  color-flow evaluation of the epididymides.    FINDINGS:   The right testicle measures 2.7 x 1.5 x 1.6 cm.  The left testicle measures 2.6 x 1.5 x 1.8 cm.    The echo textures of both appear homogeneous and relatively  symmetrical. No evidence of suspicious intratesticular mass.  There is normal and relatively symmetrical blood flow  demonstrated on color Doppler examination. No findings of  torsion.    No  evidence of suspicious epididymal mass.    No varicocele or significant hydrocele.    There is evidence of scrotal wall thickening/edema particularly  on the left measuring 2 cm compared to 0.8 cm on the right. No  evidence of suspicious fluid collection on the images provided.      Impression:       There is scrotal wall thickening/edema greater on the left  measuring 2 cm thickness. No suspicious fluid collection.    No evidence of suspicious testicular mass or torsion.     Electronically signed by:  Tho Chavez MD  8/10/2018 10:10 AM  CDT Workstation: [id number]    US Venous Doppler Lower Extremity Right (duplex) [[id number]] Collected:  08/08/18 1138     Updated:  08/08/18 1222    Narrative:         Ultrasound venous duplex right lower extremity    HISTORY: Right lower extremity swelling or edema.    Duplex ultrasound of the deep venous system of the right lower  extremity was performed    Real-time images demonstrate normal compressibility without  evidence of intraluminal thrombus.  Doppler shows phasic flow and augmentation.  Color Doppler also reveals venous patency.    Subcutaneous edema in the popliteal fossa and minimal  subcutaneous edema in the thigh and calf.      Impression:       CONCLUSION:  No ultrasound evidence of deep venous thrombosis of the right  lower extremity.  Subcutaneous edema in the popliteal fossa and minimal  subcutaneous edema in the thigh and calf.    [id number]    Electronically signed by:  Claude Whatley MD  8/8/2018 12:21 PM CDT  Workstation: [id number]    US Abdomen Limited [[id number]] Collected:  08/07/18 1500     Updated:  08/07/18 1605    Narrative:         Ultrasound abdomen limited, ascites survey    HISTORY: Follow-up paracentesis    Ultrasound examination of the four quadrants was performed.    COMPARISON: August 3, 2018    Small amount of residual or reaccumulated ascites in the right  upper quadrant, right lower quadrant and left lower quadrant.  Largest pocket 3.7 cm.       Impression:       CONCLUSION:  Small amount of residual or reaccumulated ascites in the right  upper quadrant, right lower quadrant and left lower quadrant.    [id number]    Electronically signed by:  Claude Whatley MD  8/7/2018 4:04 PM CDT  Workstation: [facility] COURSE   Armando Mcmullen Sr. is a 53 y.o. male presented for evaluation of increased edema throughout the body, commiserate with anasarca.  Patient ran out of his prescribed Lasix diuretic and developed this profound edema.  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  This continued to exhibit mild ecchymosis, however was also edematous matching the rest of his fluid state.  Patient also presented with shortness of breath requiring nebulizer treatments.  This was presumed to be fluid overloaded based on completions clinical status.  Chest x-ray revealed community-acquired pneumonia during the course treatment.  Patient received 7 days of ceftriaxone and azithromycin for community-acquired pneumonia.           Patient was also evaluated for accumulation of ascites.  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  A peripheral blood smear was done to rule out autoimmune and other destructive pathologies for thrombocytopenia.  Patient was transfused 1 unit of platelets to reorder to be able to allow for a safe paracentesis.  A shunt was paracentesed and removed 2.4 L of ascitic fluid.  The ascitic fluid was positive for numerous polymorphic neutrophils and white blood cells above the threshold for spontaneous bacterial peritonitis.  Treatment was begun for SBP with ceftriaxone 2 g as opposed to the normal 1 g for Acquired pneumonia.  At this point Dr. 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  Dr. Becerril recommended no treatment necessary for culture negative ascitic fluid only with white blood cells and PMNs.   At that time the ceftriaxone was reduced back down to 1 g and finish the 7 day course as is the standard of care for community acquired pneumonia.                Patient also began to develop resistance to oral diuretics.  Patient was instituted on IV Lasix 40 mg and responded well.  Patient transitioned to by mouth diuretics of 40 mg of Lasix twice a day in addition with metolazone and spironolactone.  Patient continued to progress well with diuresis prior to discharge.           Towards the end of his hospital course, patient developed suprapubic pain, frequency, urgency, and dysuria.  A urinalysis and urine culture was done and patient was covered with doxycycline.  Doxycycline was chosen due to the development of UTI while already being on ceftriaxone for a full 7 day course.  Patient is on day 5 of 7 of doxycycline to complete course for complicated cystitis due to presence in male.           Patient reported decompensation, physical in nature and wished to receive physical therapy at nursing home.  Patient declined nursing home admission upon discovery of nursing home taking social security disability income is form of payment.  Patient was then redirected back to  being discharged to home with PT and OT to visit him at his house.          Patient still complains of abdominal fullness, crampiness, and decreased satisfaction with bowel emptying.  Patient was added on MiraLAX and simethicone to aid with gas dispersion, and increased bowel habit regularity, to increase comfort.  Patient is suspected to have hepatorenal syndrome type II, which carries a prognosis of 6 months, in light of this patient was given oxycodone for pain management to be followed up by gastroenterologist and primary care provider.      Physical Exam   Constitutional: He is oriented to person, place, and time. He appears well-developed and well-nourished.   HENT:   Head: Normocephalic and atraumatic.   Eyes: Pupils are equal, round, and  reactive to light. EOM are normal.   Neck: Normal range of motion. Neck supple.   Cardiovascular: Normal rate, regular rhythm, normal heart sounds and intact distal pulses.  Exam reveals no gallop and no friction rub.    No murmur heard.  Pulmonary/Chest: Effort normal and breath sounds normal. No respiratory distress. He has no wheezes.   Abdominal: Soft. Bowel sounds are normal. He exhibits distension. There is tenderness.   Musculoskeletal: Normal range of motion. He exhibits edema and deformity.   Neurological: He is alert and oriented to person, place, and time.   Skin: Skin is warm and dry. Capillary refill takes 2 to 3 seconds.   Psychiatric: He has a normal mood and affect. His behavior is normal. Judgment and thought content normal.      DISCHARGE CONDITION   Stable    DISPOSITION   To home with home health      DISCHARGE MEDICATIONS        Your medication list      START taking these medications      Instructions Last Dose Given Next Dose Due   metOLazone 2.5 MG tablet  Commonly known as:  ZAROXOLYN  Notes to patient:  08/14/2018 at 9AM      Take 1 tablet by mouth Daily for 30 days.       oxyCODONE 5 MG immediate release tablet  Commonly known as:  ROXICODONE      Take 1 tablet by mouth Every 4 (Four) Hours As Needed for Moderate Pain  for up to 7 days.       polyethylene glycol pack packet  Commonly known as:  MIRALAX  Notes to patient:  08/14/2018 at 9AM      Take 17 g by mouth Daily for 14 days.       simethicone 80 MG chewable tablet  Commonly known as:  GAS-X      Chew 1 tablet Every 6 (Six) Hours As Needed for Flatulence.          CHANGE how you take these medications      Instructions Last Dose Given Next Dose Due   lactulose 10 GM/15ML solution  Commonly known as:  CHRONULAC  What changed:  when to take this  Notes to patient:  08/13/2018 at 3PM      Take 45 mL by mouth 3 (Three) Times a Day.       spironolactone 25 MG tablet  Commonly known as:  ALDACTONE  What changed:  · how much to take  · how  to take this  · when to take this      Take 1 tablet by mouth Daily.          CONTINUE taking these medications      Instructions Last Dose Given Next Dose Due   Bedpan misc      Bedpan (Dx occasional incontinence, weakness, AMS related to hepatic encephalopathy)       Commode Bedside misc      Large bedside commode (Dx hepatic encephalopathy, LLE amputation)       furosemide 40 MG tablet  Commonly known as:  LASIX  Notes to patient:  08/13/2018 at 8PM      Take 1 tablet by mouth 2 (Two) Times a Day.       lisinopril 20 MG tablet  Commonly known as:  PRINIVILZESTRIL  Notes to patient:  08/14/2018 at 9AM      Take 1 tablet by mouth Daily.       nicotine 14 MG/24HR patch  Commonly known as:  NICODERM CQ  Notes to patient:  08/13/2018 at 9PM      Place 1 patch on the skin Daily.          STOP taking these medications    ADCIRCA 20 MG tablet tablet  Generic drug:  tadalafil        calcium acetate 667 MG capsule capsule  Commonly known as:  PHOS BINDER)        folic acid 1 MG tablet  Commonly known as:  FOLVITE        magic butt ointment        magnesium oxide 400 (241.3 Mg) MG tablet tablet  Commonly known as:  MAGOX        midodrine 5 MG tablet  Commonly known as:  PROAMATINE        nystatin 834116 UNIT/GM powder  Commonly known as:  MYCOSTATIN        nystatin susp + lidocaine viscous oral suspension  Commonly known as:  MAGIC MOUTHWASH        ondansetron 4 MG tablet  Commonly known as:  ZOFRAN        pantoprazole  Commonly known as:  PROTONIX        phenazopyridine 100 MG tablet  Commonly known as:  PYRIDIUM        potassium chloride 20 MEQ CR tablet  Commonly known as:  K-DUR,KLOR-CON        rifaximin 550 MG tablet  Commonly known as:  XIFAXAN        VITAMIN D2 PO              Where to Get Your Medications      These medications were sent to [facility] - [place], [place] - [address] [facility] - [phone] PH - [phone]   [address] [address]    Phone:  [phone] ·   metOLazone 2.5 MG  tablet  · polyethylene glycol pack packet  · simethicone 80 MG chewable tablet  · spironolactone 25 MG tablet     You can get these medications from any pharmacy    Bring a paper prescription for each of these medications  · oxyCODONE 5 MG immediate release tablet         INSTRUCTIONS   Activity:   Activity Instructions     Activity as Tolerated       Activity as Tolerated       Measure Weight       Measure Weight             Diet:   Diet Instructions     Diet: Cardiac, Renal       Discharge Diet:   Cardiac  Renal       Diet: Renal       Discharge Diet:  Renal          Special Instructions: Patient instructed to call M.D. or return to ED with worsening shortness of breath, chest pain, fever greater than 100.4°F or any other medical concerns.    FOLLOW UP   Additional Instructions for the Follow-ups that You Need to Schedule     Call MD With Problems / Concerns    As directed      Instructions: Dizziness, low blood pressure, kidney injury    Order Comments:  Instructions: Dizziness, low blood pressure, kidney injury          Call MD With Problems / Concerns    As directed      Instructions: Medication completion with no more pil;s, swelling in many areas, fevers, chills    Order Comments:  Instructions: Medication completion with no more pil;s, swelling in many areas, fevers, chills          Discharge Follow-up with PCP    As directed      Currently Documented PCP:  Reddy Grant MD  PCP Phone Number:  [phone]    Follow Up Details:  1 Week            Contact information for follow-up providers     Reddy Grant MD Follow up.    Specialty:  Family Medicine  Contact information:  [address] [place] [address] [phone]             Reddy Grant MD .    Specialty:  Family Medicine  Why:  1 Week  Contact information:  [address] [place] [address] [phone]                   Contact information for after-discharge care     Home Medical Care     [facility] .     Specialty:  Home Health Services  Contact information:  [address]   [place] [address]  [phone]                            PENDING TEST RESULTS AT DISCHARGE    Order Current Status    Peripheral Blood Smear In process         TIME   Time: 30 minutes were spent planning this discharge.          Dr. Carr is the attending at time of discharge, She is aware of the patient's status and agrees with the above discharge summary.       This document has been electronically signed by Jose Luis III, MD on August 13, 2018 5:58 PM

## 2018-08-13 NOTE — PROGRESS NOTES
"[facility]  [address] [place] [address]. [address]  T - [phone]   - [phone]     Progress Note          PATIENT  DEMOGRAPHICS   PATIENT NAME: Armando Mcmullen SrJuanita                      PHYSICIAN: KALEB Tinsley  : 1965  MRN: [id number]   LOS: 11 days    Patient Care Team:  Reddy Gratn MD as PCP - General (Family Medicine)  Shawn Cortez MD (Inactive) as Surgeon (Orthopedic Surgery)  Tushar Becerril DO as Consulting Physician (Gastroenterology)  Josep Colón MD as Consulting Physician (Nephrology)  Ortiz Ferreira MD as Consulting Physician (Hematology and Oncology)  Subjective   SUBJECTIVE   Back pain , UO excellent         Objective   OBJECTIVE   Vital Signs  Temp:  [97.6 °F (36.4 °C)-100.1 °F (37.8 °C)] 98.6 °F (37 °C)  Heart Rate:  [80-95] 89  Resp:  [16-18] 18  BP: (112-128)/(64-78) 114/72    Flowsheet Rows      First Filed Value   Admission Height  182.9 cm (72\") Documented at 2018 1631   Admission Weight  104 kg (230 lb) Documented at 2018 1631           I/O last 3 completed shifts:  In: 840 [P.O.:840]  Out: 5345 [Urine:5345]    PHYSICAL EXAM    Physical Exam   Constitutional: He is oriented to person, place, and time. He appears well-developed.   HENT:   Head: Normocephalic.   Eyes: Pupils are equal, round, and reactive to light.   Cardiovascular: Normal rate, regular rhythm and normal heart sounds.    Pulmonary/Chest: Effort normal and breath sounds normal.   Abdominal: Soft. Bowel sounds are normal.   Musculoskeletal: He exhibits edema.   Neurological: He is alert and oriented to person, place, and time.       RESULTS   Results Review:      Results from last 7 days  Lab Units 18  0556 18  0649 18  0643   SODIUM mmol/L 132* 133* 133*   POTASSIUM mmol/L 3.2* 3.2* 3.1*   CHLORIDE mmol/L 90* 94* 96   CO2 mmol/L 32.0* 28.0 27.0   BUN mg/dL 28* 30* 33*   CREATININE mg/dL 1.81* 1.80* 1.91*   CALCIUM mg/dL 8.4 8.0* 7.8*   BILIRUBIN " mg/dL 3.1* 2.4* 2.6*   ALK PHOS U/L 107 113 111   ALT (SGPT) U/L 34 30 25   AST (SGOT) U/L 52 60* 49   GLUCOSE mg/dL 71 91 79       Estimated Creatinine Clearance: 56.6 mL/min (A) (by C-G formula based on SCr of 1.81 mg/dL (H)).      Results from last 7 days  Lab Units 08/12/18  0649 08/11/18  0643   MAGNESIUM mg/dL 1.7 1.9               Results from last 7 days  Lab Units 08/13/18  0921 08/12/18  0649 08/11/18  0643 08/10/18  0802 08/09/18  0611   WBC 10*3/mm3 7.51 8.95 9.40 14.22* 14.65*   HEMOGLOBIN g/dL 7.7* 7.6* 7.6* 8.5* 8.2*   PLATELETS 10*3/mm3 56* 57* 47* 51* 41*               Imaging Results (last 24 hours)     ** No results found for the last 24 hours. **           MEDICATIONS      albumin human 12.5 g Intravenous BID   ceftriaxone 2 g Intravenous Q24H   doxycycline 100 mg Intravenous Q12H   furosemide 40 mg Oral BID   ipratropium-albuterol 3 mL Nebulization Q6H - RT   lactulose 30 g Oral Q12H   metOLazone 2.5 mg Oral Daily   nicotine 1 patch Transdermal Q24H   polyethylene glycol 17 g Oral Daily   potassium chloride 40 mEq Oral Daily   spironolactone 25 mg Oral Daily          Assessment/Plan   ASSESSMENT / PLAN    Principal Problem:    Decompensated hepatic cirrhosis (CMS/HCC)  Active Problems:    Essential hypertension    Hypokalemia    Thrombocytopenia (CMS/HCC)    Anemia of chronic disease    Hepatic encephalopathy (CMS/HCC)    Acute kidney injury (CMS/HCC)    Anasarca    Anxiety and depression    CKD (chronic kidney disease) stage 3, GFR 30-59 ml/min    Lipoma of back    Scrotal swelling    Physical deconditioning    Cystitis    1. CKD stage III- baseline creatinine close to 1.5.  He had a creatinine of 1.4 in the last office visit, now has CHRIS that could be hepatorenal. BP is stable. He was doing well on Lasix and Aldactone and ran out of his diuretic and came in with with increasing edema with anasarca.  Patient has paracentesis done over the weekend.  Continue with daily weight.  Continue with  fluid restriction at 2 L per day.  Avoid any NSAIDs.  Keep lasix 40 mg PO BID. Keep metolazone for now. Back on aldactone. Wt readings are unclear/inconsistent. Pt's clinical presentation is improved. Cr stable, excellent UO.     2. History of liver cirrhosis- status post paracentesis. No marked fluid recollection on repeat u/s. No evidence of sbp on peritoneal fluid     3. History of chronic osteomyelitis of the right shoulder     4. History of left BKA     5. Leukocytosis- possible pneumonia patient is currently on ceftriaxone and azithromycin is d/c'd. UA consistent with UTI as well.    6. Anemia- Hgb down to 7.7, received epogen 10,000 x1 yesterday    7. Hypokalemia- Received replacement last several days, may improve now that he is on aldactone           This document has been electronically signed by KALEB Tinsley on August 13, 2018 3:45 PM

## 2018-08-13 NOTE — PROGRESS NOTES
FAMILY MEDICINE PROGRESS NOTE  NAME: Armando Mcmullen Sr.  : 1965  MRN: [id number]     LOS: 11 days   Code Status and Medical Interventions:   Ordered at: 18     Code Status:    CPR     Medical Interventions (Level of Support Prior to Arrest):    Full     PROVIDER OF SERVICE:     Chief Complaint:  Decompensated hepatic cirrhosis (CMS/HCC)    Subjective     Interval History:  History taken from: patient  Subjective: Patient is a 54 yo  male who was admitted with anasarca.  He is still having problems with constipation.  He is wanting to go to [place] for therapy.    Review of Systems:   Review of Systems   Constitutional: Positive for activity change, appetite change and fatigue.   Respiratory: Positive for cough.    Cardiovascular: Positive for leg swelling.   Gastrointestinal: Positive for abdominal pain and constipation. Negative for diarrhea, nausea and vomiting.   Genitourinary: Negative for difficulty urinating and dysuria.   Musculoskeletal: Positive for arthralgias.       Objective     Vital Signs  Temp:  [97.6 °F (36.4 °C)-100.1 °F (37.8 °C)] 98.5 °F (36.9 °C)  Heart Rate:  [80-95] 83  Resp:  [16-18] 16  BP: (112-128)/(64-79) 112/68    Physical Exam  Physical Exam   Constitutional: He is oriented to person, place, and time. He appears well-developed and well-nourished. No distress.   Chronically ill   Eyes: Scleral icterus is present.   Cardiovascular: Normal rate and regular rhythm.  Exam reveals no gallop and no friction rub.    Murmur heard.  2/6 systolic murmur   Pulmonary/Chest: Effort normal. No respiratory distress. He has no wheezes. He has no rales.   Abdominal: Soft. Bowel sounds are normal. He exhibits no distension. There is tenderness. There is no rebound and no guarding.   Diffusely tender   Neurological: He is alert and oriented to person, place, and time.   Skin: He is not diaphoretic.   Large sebaceous cyst right upper back   Psychiatric: He has a normal mood and  affect. His behavior is normal. Judgment and thought content normal.   Nursing note and vitals reviewed.      Medication Review    Current Facility-Administered Medications:   •  albumin human 25 % IV SOLN 12.5 g, 12.5 g, Intravenous, BID, Jose Luis III, MD, Last Rate: 0 mL/hr at 08/05/18 2304, 12.5 g at 08/13/18 0820  •  cefTRIAXone (ROCEPHIN) 2 g/100 mL 0.9% NS VTB (LUCY), 2 g, Intravenous, Q24H, Jose Luis III, MD, 2 g at 08/12/18 1423  •  diphenhydrAMINE (BENADRYL) injection 25 mg, 25 mg, Intravenous, Q6H PRN, Jose Luis III, MD  •  doxycycline (VIBRAMYCIN) 100 mg/250 mL 0.9% NS VTB, 100 mg, Intravenous, Q12H, Jose Luis III, MD, 100 mg at 08/13/18 0541  •  furosemide (LASIX) tablet 40 mg, 40 mg, Oral, BID, Jose Luis III, MD, 40 mg at 08/13/18 0819  •  ipratropium-albuterol (DUO-NEB) nebulizer solution 3 mL, 3 mL, Nebulization, Q6H - RT, Jose Luis III, MD, 3 mL at 08/13/18 0740  •  lactulose (CHRONULAC) 10 GM/15ML solution 30 g, 30 g, Oral, Q12H, Jose Luis III, MD, 30 g at 08/13/18 0819  •  metOLazone (ZAROXOLYN) tablet 2.5 mg, 2.5 mg, Oral, Daily, Jose Luis III, MD, 2.5 mg at 08/13/18 0819  •  morphine injection 4 mg, 4 mg, Intravenous, Q4H PRN, Jose Luis III, MD, 4 mg at 08/13/18 1315  •  nicotine (NICODERM CQ) 14 MG/24HR patch 1 patch, 1 patch, Transdermal, Q24H, Momo Hammer MD, 1 patch at 08/11/18 2007  •  ondansetron (ZOFRAN) tablet 4 mg, 4 mg, Oral, Q6H PRN **OR** ondansetron ODT (ZOFRAN-ODT) disintegrating tablet 4 mg, 4 mg, Oral, Q6H PRN **OR** ondansetron (ZOFRAN) injection 4 mg, 4 mg, Intravenous, Q6H PRN, Momo Hammer MD  •  polyethylene glycol 3350 powder (packet), 17 g, Oral, Daily, Jose Luis III, MD, 17 g at 08/13/18 0820  •  potassium chloride (MICRO-K) CR capsule 40 mEq, 40 mEq, Oral, Daily, Marifer Carr MD, 40 mEq at 08/13/18 0952  •  sennosides-docusate sodium (SENOKOT-S) 8.6-50 MG  tablet 2 tablet, 2 tablet, Oral, Nightly PRN, Momo Hammer MD, 2 tablet at 08/12/18 2023  •  simethicone (MYLICON) chewable tablet 80 mg, 80 mg, Oral, 4x Daily PRN, Jose Luis III, MD  •  sodium chloride 0.9 % flush 1-10 mL, 1-10 mL, Intravenous, PRN, Momo Hammer MD  •  Insert peripheral IV, , , Once **AND** sodium chloride 0.9 % flush 10 mL, 10 mL, Intravenous, PRN, Mike Bergman MD, 10 mL at 08/07/18 1643  •  spironolactone (ALDACTONE) tablet 25 mg, 25 mg, Oral, Daily, Dilan Crawford MD, 25 mg at 08/13/18 0819     Diagnostic Data      I reviewed the patient's new clinical results and imaging.      Assessment/Plan     Active Hospital Problems    Diagnosis Date Noted   • **Decompensated hepatic cirrhosis (CMS/HCC) [K72.90] 05/26/2009     -Secondary to alcohol and hepatitis C. Complicated by esophageal variceal bleeding status post TIPS shunt that now is occluded. Not a transplant candidate.  -Dr. Becerril, gastroenterologist, consulted; appreciate recommendations  -Status post paracentesis; ascitic fluid without infectious organism (SBP)  -Continue home lactulose for encephalopathy  -Suspected type II hepatorenal syndrome  -Started spironolactone per nephrology     • Cystitis [N30.90] 08/13/2018     Suprapubic pain, and dysuria  Doxy D4/7  Afebrile     • Physical deconditioning [R53.81] 08/12/2018     -Secondary to decompensated hepatic encephalopathy and cirrhosis.   -PT/OT ordered with plans to continue therapy outpatient.     • Scrotal swelling [N50.89] 08/10/2018     New onset swelling and edema of scrotum and painful suprapubic pressure.  Ultrasound of scrotum revealed mild edema  Urine culture: NGTD     • Lipoma of back [D17.1] 08/09/2018     Right upper back  Recommend outpatient follow up     • Anxiety and depression [F41.8] 08/02/2018     Pt has not been taking any medications     • CKD (chronic kidney disease) stage 3, GFR 30-59 ml/min [N18.3] 08/02/2018      Baseline Cr 1.4, admission 1.64  Nephrology Dr. Colón follwing  Monitoring with daily CMP     • Anasarca [R60.1] 04/27/2018     Nephro and GI consulted; appreciate recommendations  Lasix PO 40 BID  Metolazone  2L fluid restriction  Likely Hepatorenal type II     • Acute kidney injury (CMS/HCC) [N17.9] 04/27/2018     Consult Nephrology appreciated Dr. Colón following.   Diuresing with Lasix 40 PO BID and metolazone  Initiated spironolactone  2L fluid restriction  No NSAIDs     • Hepatic encephalopathy (CMS/HCC) [K72.90] 04/11/2018     -Cirrhosis secondary to alcohol and hepatitis C. Complicated by esophageal variceal bleeding status post TIPS shunt that now is occluded.  -Not a surgical candidate. MELD score: 28  -Continue lactulose      • Anemia of chronic disease [D63.8] 07/18/2017     Monitoring H&H with daily CBC  Will discuss risks and benefits of transfusion  May benefit from erythropoietin; will discuss with nephrology     • Thrombocytopenia (CMS/HCC) [D69.6] 07/10/2017     Most likely secondary to liver cirrhosis.   Spleno-hepatic sequestration versus decreased production.  Received platelets 8/4  Monitoring levels daily     • Hypokalemia [E87.6] 03/02/2017     -Monitoring levels daily  -Replacing daily  -Initiated spironolactone therapy      • Essential hypertension [I10] 12/01/2008     Pt has not been taking his meds at home  Currently normotensive           DVT prophylaxis: Contraindicated due to swelling, amputation, and chronic thrombocytopenia      Disposition:Home with home health    I have reviewed the notes, assessments, and/or procedures performed by Dr. Luis, I concur with her/his documentation of Armando Mcmullen Sr..        This document has been electronically signed by Marifer Carr MD on August 13, 2018 2:05 PM

## 2018-08-13 NOTE — SIGNIFICANT NOTE
08/13/18 [facility]   Rehab Treatment   Discipline physical therapy assistant   Reason Treatment Not Performed patient/family declined treatment, not feeling well  (check in pm)

## 2018-08-14 NOTE — THERAPY DISCHARGE NOTE
Acute Care - Occupational Therapy Discharge Summary  [facility]     Patient Name: Armando Mcmullen Sr.  : 1965  MRN: [id number]    Today's Date: 2018  Onset of Illness/Injury or Date of Surgery: 18    Date of Referral to OT: 18  Referring Physician: MARIETTA Bain MD.      Admit Date: 2018        OT Recommendation and Plan    Visit Dx:    ICD-10-CM ICD-9-CM   1. Elevated lactic acid level R79.89 276.2   2. Elevated bilirubin R17 277.4   3. Leukocytosis, unspecified type D72.829 288.60   4. Generalized abdominal pain R10.84 789.07   5. Alcoholic cirrhosis of liver with ascites (CMS/HCC) K70.31 571.2   6. Impaired functional mobility and activity tolerance Z74.09 V49.89   7. Impaired mobility and activities of daily living Z74.09 799.89   8. Physical deconditioning R53.81 799.3   9. Decompensated hepatic cirrhosis (CMS/HCC) K72.90 572.8   10. Hypokalemia E87.6 276.8   11. Acute kidney injury (CMS/HCC) N17.9 584.9                     OT Rehab Goals     Row Name 18 0715             Bed Mobility Goal 1 (OT)    Activity/Assistive Device (Bed Mobility Goal 1, OT) bed mobility activities, all  -      [place] Level/Cues Needed (Bed Mobility Goal 1, OT) supervision required  -      Time Frame (Bed Mobility Goal 1, OT) long term goal (LTG);by discharge  -      Progress/Outcomes (Bed Mobility Goal 1, OT) goal not met  -         Transfer Goal 1 (OT)    Activity/Assistive Device (Transfer Goal 1, OT) commode, bedside without drop arms  -      [place] Level/Cues Needed (Transfer Goal 1, OT) contact guard assist  -      Time Frame (Transfer Goal 1, OT) long term goal (LTG);by discharge  -      Progress/Outcome (Transfer Goal 1, OT) goal not met  -         Bathing Goal 1 (OT)    Activity/Assistive Device (Bathing Goal 1, OT) bathing skills, all;other (see comments)   Sponge bath  -      [place] Level/Cues Needed (Bathing Goal 1, OT) supervision required  -      Time  Frame (Bathing Goal 1, OT) long term goal (LTG);by discharge  -      Progress/Outcomes (Bathing Goal 1, OT) goal met  -         Dressing Goal 1 (OT)    Activity/Assistive Device (Dressing Goal 1, OT) dressing skills, all  -      [place]/Cues Needed (Dressing Goal 1, OT) supervision required  -      Time Frame (Dressing Goal 1, OT) long term goal (LTG);by discharge  -      Progress/Outcome (Dressing Goal 1, OT) goal not met  -         Patient Education Goal (OT)    Activity (Patient Education Goal, OT) B UE HEP with no resistance to R shoulder , EC/WS and home safety/fall prev.  -      [place]/Cues/Accuracy (Memory Goal 2, OT) demonstrates adequately;verbalizes understanding  -      Time Frame (Patient Education Goal, OT) long term goal (LTG);by discharge  -      Progress/Outcome (Patient Education Goal, OT) goal not met  -        User Key  (r) = Recorded By, (t) = Taken By, (c) = Cosigned By    Initials Name Provider Type Discipline     Dasia Langley, OTR/L Occupational Therapist OT                Outcome Measures     Row Name 08/13/18 1500 08/13/18 0850 08/12/18 0919       How much help from another person do you currently need...    Turning from your back to your side while in flat bed without using bedrails? 4  -TA  -- 4  -CA    Moving from lying on back to sitting on the side of a flat bed without bedrails? 4  -TA  -- 4  -CA    Moving to and from a bed to a chair (including a wheelchair)? 3  -TA  -- 3  -CA    Standing up from a chair using your arms (e.g., wheelchair, bedside chair)? 3  -TA  -- 3  -CA    Climbing 3-5 steps with a railing? 1  -TA  -- 1  -CA    To walk in hospital room? 1  -TA  -- 1  -CA    AM-PAC 6 Clicks Score 16  -TA  -- 16  -CA       How much help from another is currently needed...    Putting on and taking off regular lower body clothing?  -- 1  -JH  --    Bathing (including washing, rinsing, and drying)  -- 2  -JH  --    Toileting (which includes using  toilet bed pan or urinal)  -- 3  -JH  --    Putting on and taking off regular upper body clothing  -- 3  -JH  --    Taking care of personal grooming (such as brushing teeth)  -- 3  -JH  --    Eating meals  -- 4  -JH  --    Score  -- 16  -JH  --       Functional Assessment    Outcome Measure Options AM-PAC 6 Clicks Basic Mobility (PT)  -TA  -- AM-PAC 6 Clicks Basic Mobility (PT)  -CA    Row Name 08/12/18 0830             How much help from another is currently needed...    Putting on and taking off regular lower body clothing? 2  -KD      Bathing (including washing, rinsing, and drying) 3  -KD      Toileting (which includes using toilet bed pan or urinal) 2  -KD      Putting on and taking off regular upper body clothing 3  -KD      Taking care of personal grooming (such as brushing teeth) 4  -KD      Eating meals 4  -KD      Score 18  -KD        User Key  (r) = Recorded By, (t) = Taken By, (c) = Cosigned By    Initials Name Provider Type    TA Yojana Ngo, PTA Physical Therapy Assistant    Nikolai Paul, PTA Physical Therapy Assistant    Isa Garcia, HEARD/L Occupational Therapy Assistant    Juanita Archer, HEARD/L Occupational Therapy Assistant          Therapy Suggested Charges     Code   Minutes Charges    None                 OT Discharge Summary  Anticipated Discharge Disposition (OT): home with 24/7 care  Reason for Discharge: Discharge from facility  Outcomes Achieved: Refer to plan of care for updates on goals achieved, Patient able to partially acheive established goals  Discharge Destination: Home with home health      Dasia Langley, OTR/L  8/14/2018

## 2018-08-14 NOTE — THERAPY DISCHARGE NOTE
Acute Care - Physical Therapy Discharge Summary  [facility]       Patient Name: Armando Mcmullen Sr.  : 1965  MRN: [id number]    Today's Date: 2018  Onset of Illness/Injury or Date of Surgery: 18    Date of Referral to PT: 18  Referring Physician: MARIETTA Bain MD.      Admit Date: 2018      PT Recommendation and Plan    Visit Dx:    ICD-10-CM ICD-9-CM   1. Elevated lactic acid level R79.89 276.2   2. Elevated bilirubin R17 277.4   3. Leukocytosis, unspecified type D72.829 288.60   4. Generalized abdominal pain R10.84 789.07   5. Alcoholic cirrhosis of liver with ascites (CMS/HCC) K70.31 571.2   6. Impaired functional mobility and activity tolerance Z74.09 V49.89   7. Impaired mobility and activities of daily living Z74.09 799.89   8. Physical deconditioning R53.81 799.3   9. Decompensated hepatic cirrhosis (CMS/HCC) K72.90 572.8   10. Hypokalemia E87.6 276.8   11. Acute kidney injury (CMS/HCC) N17.9 584.9             Outcome Measures     Row Name 18 1500 18 0850 18 0919       How much help from another person do you currently need...    Turning from your back to your side while in flat bed without using bedrails? 4  -TA  -- 4  -CA    Moving from lying on back to sitting on the side of a flat bed without bedrails? 4  -TA  -- 4  -CA    Moving to and from a bed to a chair (including a wheelchair)? 3  -TA  -- 3  -CA    Standing up from a chair using your arms (e.g., wheelchair, bedside chair)? 3  -TA  -- 3  -CA    Climbing 3-5 steps with a railing? 1  -TA  -- 1  -CA    To walk in hospital room? 1  -TA  -- 1  -CA    AM-PAC 6 Clicks Score 16  -TA  -- 16  -CA       How much help from another is currently needed...    Putting on and taking off regular lower body clothing?  -- 1  -JH  --    Bathing (including washing, rinsing, and drying)  -- 2  -JH  --    Toileting (which includes using toilet bed pan or urinal)  -- 3  -JH  --    Putting on and taking off regular upper body  clothing  -- 3  -JH  --    Taking care of personal grooming (such as brushing teeth)  -- 3  -JH  --    Eating meals  -- 4  -JH  --    Score  -- 16  -JH  --       Functional Assessment    Outcome Measure Options AM-PAC 6 Clicks Basic Mobility (PT)  -TA  -- AM-PAC 6 Clicks Basic Mobility (PT)  -CA    Row Name 08/12/18 0830             How much help from another is currently needed...    Putting on and taking off regular lower body clothing? 2  -KD      Bathing (including washing, rinsing, and drying) 3  -KD      Toileting (which includes using toilet bed pan or urinal) 2  -KD      Putting on and taking off regular upper body clothing 3  -KD      Taking care of personal grooming (such as brushing teeth) 4  -KD      Eating meals 4  -KD      Score 18  -KD        User Key  (r) = Recorded By, (t) = Taken By, (c) = Cosigned By    Initials Name Provider Type    Yojana Buchanan, PTA Physical Therapy Assistant    Nikolai Paul, PTA Physical Therapy Assistant    Isa Garcia, HEARD/L Occupational Therapy Assistant    Juanita Archer, HEARD/L Occupational Therapy Assistant            Therapy Suggested Charges     Code   Minutes Charges    None                     PT Discharge Summary  Anticipated Discharge Disposition (PT): home with home health, home with assist, anticipate therapy at next level of care  Reason for Discharge: Discharge from facility, Per MD order  Outcomes Achieved: Patient able to partially acheive established goals (1 out of 3 goals met)  Discharge Destination: Home with home health      Irina Alas, PT   8/14/2018

## 2018-08-14 NOTE — TELEPHONE ENCOUNTER
[facility] nurse called, said that pt was discharged from hospital and orders were sent in for PT/OT.  She said that when they went out she feels that the pt would benefit from Med Education and skilled nursing.  She wanted to know if could get those orders in [facility] so that they could go tomorrow, said pt has not gotten meds as of today that were given at check out      Please call Teresita as soon as can [phone]

## 2018-08-29 PROBLEM — N50.89 SCROTAL SWELLING: Status: RESOLVED | Noted: 2018-01-01 | Resolved: 2018-01-01

## 2018-08-29 PROBLEM — N17.9 ACUTE KIDNEY INJURY (HCC): Status: RESOLVED | Noted: 2018-01-01 | Resolved: 2018-01-01

## 2018-08-29 PROBLEM — N30.90 CYSTITIS: Status: RESOLVED | Noted: 2018-01-01 | Resolved: 2018-01-01

## 2018-08-29 NOTE — PROGRESS NOTES
Subjective   Armando Mcmullen Sr. is a 53 y.o. male.     History of Present Illness   follow-up hospitalization anasarca hepatorenal syndrome prolonged.  Also questionable pneumonia treated.  Since being at home is trying to maintain weight.  Of counseled extensively today on diet and hydration.  Have counseled the need to keep nutrition level..  Is seeing both gastroenterology as well as nephrology.  Also needs second pneumonia vaccine today.  Having some mild chronic back pain but overall is stable.  Weight is remaining stable.    The following portions of the patient's history were reviewed and updated as appropriate: allergies, current medications, past family history, past medical history, past social history, past surgical history and problem list.    Review of Systems   Constitutional: Negative for activity change, appetite change, fatigue and unexpected weight change.   HENT: Negative for trouble swallowing and voice change.    Eyes: Negative for redness and visual disturbance.   Respiratory: Negative for cough and wheezing.    Cardiovascular: Negative for chest pain and palpitations.   Gastrointestinal: Negative for abdominal pain, constipation, diarrhea, nausea and vomiting.   Genitourinary: Negative for urgency.   Musculoskeletal: Positive for back pain. Negative for joint swelling.   Neurological: Negative for syncope and headaches.   Hematological: Negative for adenopathy.   Psychiatric/Behavioral: Negative for sleep disturbance.       Objective   Physical Exam   Constitutional: He is oriented to person, place, and time. He appears well-developed.   HENT:   Head: Normocephalic.   Eyes: Pupils are equal, round, and reactive to light.   Neck: Normal range of motion.   Cardiovascular: Normal rate and regular rhythm.    Murmur (1/6 systolic) heard.  Pulmonary/Chest: Effort normal and breath sounds normal.   Abdominal: Soft. Bowel sounds are normal. He exhibits no distension. There is no tenderness.    Musculoskeletal: He exhibits tenderness (Back to full range of motion).   Neurological: He is alert and oriented to person, place, and time.   Skin:   Minimal jaundice   Psychiatric: His speech is normal. His affect is blunt. He is slowed.     Results for orders placed or performed in visit on 08/28/18   Basic Metabolic Panel   Result Value Ref Range    Glucose 142 (H) 60 - 100 mg/dL    BUN 55 (H) 7 - 21 mg/dL    Creatinine 2.66 (H) 0.70 - 1.30 mg/dL    Sodium 129 (L) 137 - 145 mmol/L    Potassium 2.4 (C) 3.5 - 5.1 mmol/L    Chloride 77 (L) 95 - 110 mmol/L    CO2 39.0 (H) 22.0 - 31.0 mmol/L    Calcium 8.9 8.4 - 10.2 mg/dL    eGFR Non  Amer 25 (L) 56 - 130 mL/min/1.73    BUN/Creatinine Ratio 20.7 7.0 - 25.0    Anion Gap 13.0 5.0 - 15.0 mmol/L   Magnesium   Result Value Ref Range    Magnesium 2.3 1.6 - 2.3 mg/dL         Assessment/Plan   Armando was seen today for follow-up.    Diagnoses and all orders for this visit:    Anasarca    Essential hypertension    Chronic hepatitis (CMS/HCC)    Liver failure with hepatic coma, unspecified chronicity (CMS/HCC)    Hepatic encephalopathy (CMS/HCC)    CKD (chronic kidney disease) stage 3, GFR 30-59 ml/min    Decompensated hepatic cirrhosis (CMS/HCC)    Physical deconditioning    Need for vaccination  -     Pneumococcal Polysaccharide Vaccine 23-Valent Greater Than or Equal To 3yo Subcutaneous / IM      Deferring to gastroenterology and nephrology for components of same.  Start update immunizations.  Counseled again on nutrition and being dehydrated.  Counseled infection prevention.  Recheck 3 months betime for flu vaccine  Current outpatient and discharge medications have been reconciled for the patient.  Reviewed by: Reddy Grant MD
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
## 2018-10-26 PROBLEM — S34.109A CLOSED FRACTURE OF LUMBAR VERTEBRA WITH SPINAL CORD INJURY (HCC): Status: ACTIVE | Noted: 2018-01-01

## 2018-10-26 PROBLEM — E87.6 HYPOKALEMIA: Status: RESOLVED | Noted: 2017-03-02 | Resolved: 2018-01-01

## 2018-10-26 PROBLEM — S32.008A CLOSED FRACTURE OF LUMBAR VERTEBRA WITH SPINAL CORD INJURY (HCC): Status: ACTIVE | Noted: 2018-01-01

## 2018-10-26 PROBLEM — R18.8 ASCITES: Chronic | Status: ACTIVE | Noted: 2018-01-01

## 2018-10-26 PROBLEM — R60.1 ANASARCA: Status: RESOLVED | Noted: 2018-01-01 | Resolved: 2018-01-01

## 2018-10-26 PROBLEM — D17.1 LIPOMA OF BACK: Status: RESOLVED | Noted: 2018-01-01 | Resolved: 2018-01-01

## 2018-10-26 PROBLEM — R53.81 PHYSICAL DECONDITIONING: Chronic | Status: ACTIVE | Noted: 2018-01-01

## 2018-10-26 PROBLEM — D63.8 ANEMIA OF CHRONIC DISEASE: Chronic | Status: ACTIVE | Noted: 2017-07-18

## 2018-10-26 PROBLEM — F32.A ANXIETY AND DEPRESSION: Chronic | Status: ACTIVE | Noted: 2018-01-01

## 2018-10-26 PROBLEM — N18.30 CKD (CHRONIC KIDNEY DISEASE) STAGE 3, GFR 30-59 ML/MIN (HCC): Chronic | Status: ACTIVE | Noted: 2018-01-01

## 2018-10-26 PROBLEM — F41.9 ANXIETY AND DEPRESSION: Chronic | Status: ACTIVE | Noted: 2018-01-01

## 2018-10-26 NOTE — PROGRESS NOTES
Subjective   Armando Mcmullen Sr. is a 53 y.o. male.     History of Present Illness   follow-up prolonged hospitalization.  Patient with fall with resultant lumbar fracture.  His been long time in Hospital in [place] as well as rehabilitation hospital.  Sent home with medications as outlined.  Had chronic issue with volumes patient in volume overload and hypotension.  Continuing a lot of chronic back pain has not been had a physical therapy or home health since being at home.  Gone through medicines today as well as last discharge summary.  Continues on his chronic illnesses as before.  [place] is extremely poor long-term.    The following portions of the patient's history were reviewed and updated as appropriate: allergies, current medications, past family history, past medical history, past social history, past surgical history and problem list.    Review of Systems   Constitutional: Negative for activity change, appetite change, fatigue and unexpected weight change.   HENT: Negative for trouble swallowing and voice change.    Eyes: Negative for redness and visual disturbance.   Respiratory: Negative for cough and wheezing.    Cardiovascular: Positive for leg swelling. Negative for chest pain and palpitations.   Gastrointestinal: Negative for abdominal pain, constipation, diarrhea, nausea and vomiting.   Genitourinary: Negative for urgency.   Musculoskeletal: Positive for arthralgias, back pain, gait problem and joint swelling.   Neurological: Negative for syncope and headaches.   Hematological: Negative for adenopathy.   Psychiatric/Behavioral: Positive for decreased concentration and dysphoric mood. Negative for sleep disturbance.       Objective   Physical Exam   Constitutional: He is oriented to person, place, and time. He appears well-developed. He has a sickly appearance. He appears ill.   HENT:   Head: Normocephalic.   Right Ear: External ear normal.   Nose: Nose normal.   Mouth/Throat: Oropharynx is clear  and moist.   Eyes: Pupils are equal, round, and reactive to light.   Neck: Normal range of motion. No thyromegaly present.   Cardiovascular: Normal rate, regular rhythm, normal heart sounds and intact distal pulses.  Exam reveals no gallop and no friction rub.    No murmur heard.  Pulmonary/Chest: Breath sounds normal.   Abdominal: Soft. He exhibits no distension and no mass. There is no tenderness.   Musculoskeletal: Normal range of motion.   2+ pitting edema right lower extremity from inactivity.  Ascites is actually less.   Neurological: He is alert and oriented to person, place, and time. He has normal reflexes.   Skin: Skin is warm and dry.   Chronic jaundice.   Psychiatric: His affect is blunt. His speech is delayed. He is slowed.       Assessment/Plan   Armando was seen today for follow-up.    Diagnoses and all orders for this visit:    Closed fracture of lumbar vertebra with spinal cord injury, initial encounter (CMS/[facility])  -     Ambulatory Referral to Home Health    Chronic hepatitis (CMS/[facility])  -     potassium chloride (K-DUR,KLOR-CON) 20 MEQ CR tablet; Take 1 tablet by mouth Daily for 31 days.  -     furosemide (LASIX) 20 MG tablet; Take 1 tablet by mouth Daily.  -     spironolactone (ALDACTONE) 25 MG tablet; 2qd  -     XIFAXAN 550 MG tablet; 1 bid  -     Ambulatory Referral to Home Health  -     lactulose (CHRONULAC) 10 GM/15ML solution; Take 45 mL by mouth 3 (Three) Times a Day.    Liver failure with hepatic coma, unspecified chronicity (CMS/[facility])  -     Ambulatory Referral to Home Health  -     lactulose (CHRONULAC) 10 GM/15ML solution; Take 45 mL by mouth 3 (Three) Times a Day.    Chronic bilateral low back pain without sciatica  -     Ambulatory Referral to Home Health    Tobacco use  -     Ambulatory Referral to Home Health    Physical deconditioning  -     Ambulatory Referral to Home Health      Home health to evaluate home situation.  Redone medicines best can.  Have counseled the need for increased  activity recheck 4 weeks  30 minutes or less spent reviewing old records prior to an after visit  Current outpatient and discharge medications have been reconciled for the patient.  Reviewed by: Reddy Grant MD

## 2018-10-29 PROBLEM — R41.82 ALTERED MENTAL STATUS: Status: ACTIVE | Noted: 2018-01-01

## 2018-11-01 NOTE — DISCHARGE PLACEMENT REQUEST
"Lamont Mcmullen  (53 y.o. Male)     Date of Birth Social Security Number Address Home Phone MRN    1965  [id number]  [place] [address] [phone] [id number]    Jain Marital Status          None Legally        Admission Date Admission Type Admitting Provider Attending Provider Department, Room/Bed    10/29/18 Emergency Eduar Cisneros MD Iqbal, Syed Javed, MD [address], [address]    Discharge Date Discharge Disposition Discharge Destination                       Attending Provider:  Eduar Cisneros MD    Allergies:  Aspirin, Penicillins, Codeine Sulfate    Isolation:  None   Infection:  None   Code Status:  CPR    Ht:  182.9 cm (72\")   Wt:  95.1 kg (209 lb 11.2 oz)    Admission Cmt:  None   Principal Problem:  None                Active Insurance as of 10/29/2018     Primary Coverage     Payor Plan Insurance Group Employer/Plan Group    [facility] [employer] [facility] [employer] [facility]      Payor Plan Address Payor Plan Phone Number Effective From Effective To    [address] [address]  1/1/2014     [place] [address]       Subscriber Name Subscriber Birth Date Member ID       LAMONT MCMULLEN SR. 1965 [id number]                 Emergency Contacts      (Rel.) Home Phone Work Phone Mobile Phone    BenedictMiguel (Son) [phone] -- [phone]    Loco Mcmullen (Daughter) [phone] -- [phone]    Shreyas Mcmullen (Brother) [phone] -- [phone]              "

## 2018-11-01 NOTE — PROGRESS NOTES
[facility] Medicine Services  INPATIENT PROGRESS NOTE    Length of Stay: 3  Date of Admission: 10/29/2018  Primary Care Physician: Reddy Grant MD    Subjective   Chief Complaint/HPI:  This 53-year-old male reported to the emergency part with complaints of altered mental status.  Patient had an elevated ammonia of 129.  Ammonia has trended downward daily and is now 15.  At this time patient complains of fatigue, abdominal pain, and right shoulder pain.  Patient also being seen by urology for retention of urine.    Review of Systems   Constitutional: Positive for fatigue. Negative for chills and fever.   Respiratory: Negative for shortness of breath.    Cardiovascular: Negative for chest pain.   Gastrointestinal: Positive for abdominal pain. Negative for constipation, nausea and vomiting.   Musculoskeletal: Positive for arthralgias.      All pertinent negatives and positives are as above. All other systems have been reviewed and are negative unless otherwise stated.     Objective    Temp:  [97 °F (36.1 °C)-98.4 °F (36.9 °C)] 97.7 °F (36.5 °C)  Heart Rate:  [67-80] 70  Resp:  [18-20] 18  BP: (117-144)/(68-79) 144/79    Physical Exam   Constitutional: He is oriented to person, place, and time. No distress.   HENT:   Head: Normocephalic and atraumatic.   Cardiovascular: Normal rate and regular rhythm.    Pulmonary/Chest: Effort normal. No respiratory distress.   Abdominal: Soft. Bowel sounds are normal. He exhibits no distension. There is tenderness.   Neurological: He is alert and oriented to person, place, and time.   Skin: Skin is warm and dry. He is not diaphoretic.     Results Review:  I have reviewed the labs, radiology results, and diagnostic studies.    Laboratory Data:     Results from last 7 days  Lab Units 11/01/18  0552 10/31/18  0541 10/30/18  0658 10/29/18  1813   SODIUM mmol/L 140 137  137 139 135*   POTASSIUM mmol/L 3.5 4.0  4.0 4.3 3.9   CHLORIDE mmol/L 110  106  106 106 102   CO2 mmol/L 24.0 23.0  24.0 29.0 27.0   BUN mg/dL 24* 26*  25* 28* 27*   CREATININE mg/dL 1.44* 1.62*  1.63* 1.72* 1.71*   GLUCOSE mg/dL 89 80  76 73 75   CALCIUM mg/dL 8.4 8.3*  8.2* 8.6 8.7   BILIRUBIN mg/dL  --  8.7*  --  8.1*   ALK PHOS U/L  --  126  --  168*   ALT (SGPT) U/L  --  26  --  17*   AST (SGOT) U/L  --  47  --  46   ANION GAP mmol/L 6.0 8.0  7.0 4.0* 6.0     Estimated Creatinine Clearance: 70.2 mL/min (A) (by C-G formula based on SCr of 1.44 mg/dL (H)).            Results from last 7 days  Lab Units 11/01/18  0552 10/31/18  0541 10/30/18  0658 10/29/18  1813   WBC 10*3/mm3 5.31 8.84 5.13 6.51   HEMOGLOBIN g/dL 9.0* 9.0* 9.6* 11.0*   HEMATOCRIT % 26.5* 27.0* 28.5* 32.9*   PLATELETS 10*3/mm3 53* 75* 53* 63*       Results from last 7 days  Lab Units 10/31/18  1144 10/29/18  1813   INR  2.22* 1.71*       Culture Data:   Blood Culture   Date Value Ref Range Status   10/29/2018 No growth at 2 days  Preliminary   10/29/2018 No growth at 2 days  Preliminary     Urine Culture   Date Value Ref Range Status   10/30/2018 No growth at 24 hours  Final     No results found for: RESPCX  No results found for: WOUNDCX  No results found for: STOOLCX  No components found for: BODYFLD    Radiology Data:   Imaging Results (last 24 hours)     ** No results found for the last 24 hours. **          I have reviewed the patient's current medications.     Assessment/Plan     Active Hospital Problems    Diagnosis   • Altered mental status   • CKD (chronic kidney disease) stage 3, GFR 30-59 ml/min (CMS/HCC)   • Hepatic encephalopathy (CMS/HCC)   • Thrombocytopenia (CMS/HCC)   • Chronic hepatitis (CMS/HCC)       Plan:  Per urology, abdominal CT scan, shoulder x-ray, EKG, continue lactulose and Xifaxan.  Continue as hospital course dictates.                  This document has been electronically signed by KENROY Goode on November 1, 2018 1:06 PM

## 2018-11-01 NOTE — PLAN OF CARE
Problem: Patient Care Overview  Goal: Plan of Care Review  Outcome: Ongoing (interventions implemented as appropriate)   11/01/18 6649   Coping/Psychosocial   Plan of Care Reviewed With patient   Plan of Care Review   Progress no change   OTHER   Outcome Summary pt defers all mobility, goldie light LE ther ex fair, recommend SNF at d/c

## 2018-11-01 NOTE — THERAPY TREATMENT NOTE
Acute Care - Physical Therapy Treatment Note  [facility]     Patient Name: Armando Mcmullen Sr.  : 1965  MRN: [id number]  Today's Date: 2018  Onset of Illness/Injury or Date of Surgery: 10/29/18  Date of Referral to PT: 10/31/18  Referring Physician: KALEB Jones    Admit Date: 10/29/2018    Visit Dx:    ICD-10-CM ICD-9-CM   1. Altered mental status, unspecified altered mental status type R41.82 780.97   2. Hepatic encephalopathy (CMS/HCC) K72.90 572.2   3. Impaired functional mobility, balance, gait, and endurance Z74.09 V49.89     Patient Active Problem List   Diagnosis   • Anxiety state   • Back pain   • Chronic hepatitis (CMS/HCC)   • Depression   • Substance abuse (CMS/HCC)   • Liver failure with hepatic coma (CMS/HCC)   • Hypersplenism   • Chronic osteomyelitis of right shoulder region (CMS/HCC)   • Thrombocytopenia (CMS/HCC)   • Anemia of chronic disease   • Positive hepatitis C antibody test   • BMI 35.0-35.9,adult   • Tobacco use   • Hepatic encephalopathy (CMS/HCC)   • Anxiety and depression   • CKD (chronic kidney disease) stage 3, GFR 30-59 ml/min (CMS/HCC)   • Ascites   • Physical deconditioning   • Closed fracture of lumbar vertebra with spinal cord injury (CMS/HCC)   • Altered mental status       Therapy Treatment          Rehabilitation Treatment Summary     Row Name 18 1245             Treatment Time/Intention    Discipline physical therapy assistant  -JW      Document Type therapy note (daily note)  -JW      Subjective Information complains of;pain  -JW      Mode of Treatment individual therapy;physical therapy  -JW      Patient Effort poor  -JW      Existing Precautions/Restrictions fall  -JW      Recorded by [JW] Casie Emmaneul, PTA 18 1321      Row Name 18 1245             Vital Signs    Pre Patient Position Supine  -JW      Post Patient Position Supine  -JW      Recorded by [JW] Casie Emmanuel, PTA 18 1321      Row Name 18 1245        "      Cognitive Assessment/Intervention- PT/OT    Orientation Status (Cognition) oriented to;person     -JW      Recorded by [JW] Casie Emmanuel, PTA 18 1321      Row Name 18 1245             Bed Mobility Assessment/Treatment    Comment (Bed Mobility) defers mobility even w/ encouragement  -JW      Recorded by [JW] Casie Emmanuel, PTA 18 1321      Row Name 18 1245             Therapeutic Exercise    Therapeutic Exercise supine, lower extremities  -JW      Recorded by [JW] Casie Emmanuel, PTA 18 1321      Row Name 18 1245             Lower Extremity Supine Therapeutic Exercise    Performed, Supine Lower Extremity (Therapeutic Exercise) ankle pumps;heel slides;SAQ (short arc quad) over bolster;hip external/internal rotation   AP and IR/ER on R LE only  -JW      Exercise Type, Supine Lower Extremity (Therapeutic Exercise) AROM (active range of motion);AAROM (active assistive range of motion)   AAROM for HS and IR/ER  -JW      Sets/Reps Detail, Supine Lower Extremity (Therapeutic Exercise) 10, 15 AP and QS  -JW      Recorded by [JW] Casie Emmanuel, PTA 18 1321      Row Name 18 1245             Positioning and Restraints    Pre-Treatment Position in bed  -JW      Post Treatment Position bed  -JW      In Bed supine;call light within reach;encouraged to call for assist;exit alarm on  -JW      Recorded by [JW] Casie Emmanuel, PTA 18 1321      Row Name 18 1245             Pain Scale: Numbers Pre/Post-Treatment    Pain Scale: Numbers, Pretreatment 810  -      Pain Scale: Numbers, Post-Treatment 8/10  -JW      Pre/Post Treatment Pain Comment \"all over\"  -JW      Recorded by [JW] Casie Emmanuel, PTA 18 1321      Row Name 18 1245             Vision Assessment/Intervention    Visual Impairment/Limitations --  -JW      Recorded by [JW] Casie Emmanuel, PTA 18 1321      Row Name                Wound " 10/30/18 1020 coccyx    Wound - Properties Group Date first assessed: 10/30/18 [AS] Time first assessed: 1020 [AS] Present On Admission : picture taken [AS] Location: coccyx [AS] Recorded by:  [AS] Yok[facility], RN 10/30/18 1021    Row Name 11/01/18 1245             Outcome Summary/Treatment Plan (PT)    Plan for Continued Treatment (PT) EOB as able  -JW      Anticipated Discharge Disposition (PT) skilled nursing facility  -JW      Recorded by [JW] Casie Emmanuel, PTA 11/01/18 1321        User Key  (r) = Recorded By, (t) = Taken By, (c) = Cosigned By    Initials Name Effective Dates Discipline    JW Casie Emmanuel, PTA 08/11/15 -  PT    AS [facility], RN 12/13/16 -  Nurse          Wound 10/30/18 1020 coccyx (Active)   Dressing Appearance open to air 11/1/2018  9:42 AM   Closure None 10/31/2018 10:52 PM   Base red/granulating 11/1/2018  9:42 AM   Periwound Temperature warm 10/31/2018 10:52 PM   Drainage Amount none 10/31/2018 10:52 PM   Care, Wound barrier applied 11/1/2018  9:42 AM               PT Rehab Goals     Row Name 11/01/18 1245             Bed Mobility Goal 1 (PT)    Activity/Assistive Device (Bed Mobility Goal 1, PT) rolling to left;rolling to right;scooting;sit to supine;supine to sit  -      [place] Level/Cues Needed (Bed Mobility Goal 1, PT) minimum assist (75% or more patient effort)  -      Time Frame (Bed Mobility Goal 1, PT) 3 days  -      Progress/Outcomes (Bed Mobility Goal 1, PT) goal not met  -         Transfer Goal 1 (PT)    Activity/Assistive Device (Transfer Goal 1, PT) sit-to-stand/stand-to-sit;bed-to-chair/chair-to-bed;walker, rolling  -      [place] Level/Cues Needed (Transfer Goal 1, PT) moderate assist (50-74% patient effort)  -      Time Frame (Transfer Goal 1, PT) 3 days  -      Progress/Outcome (Transfer Goal 1, PT) goal not met  -         Gait Training Goal 1 (PT)    Activity/Assistive Device (Gait Training Goal 1, PT) gait  (walking locomotion);assistive device use;increase endurance/gait distance;increase energy conservation;walker, rolling  -JW      [place] Level (Gait Training Goal 1, PT) moderate assist (50-74% patient effort)  -JW      Distance (Gait Goal 1, PT) 3 ft   -JW      Time Frame (Gait Training Goal 1, PT) by discharge  -JW      Progress/Outcome (Gait Training Goal 1, PT) goal not met  -JW         Strength Goal 1 (PT)    Strength Goal 1 (PT) Pt will demonstrate 2x15 reps of at least 4 different LE exercises to demonstrate increased strength for improved transfers  -JW      Time Frame (Strength Goal 1, PT) 3 days  -JW      Progress/Outcome (Strength Goal 1, PT) goal not met  -JW         Wheelchair Locomotion Goal 1 (PT)    Activity (Wheelchair Locomotion Goal 1, PT) wheelchair mobility skills, all  -JW      [place] Level/Cues Needed (Wheelchair Locomotion Goal 1, PT) supervision required  -JW      Distance Goal 1 (Wheelchair Locomotion, PT) 25 ft or more  -JW      Time Frame (Wheelchair Locomotion Goal 1, PT) 5 days  -JW      Progress/Outcome (Wheelchair Locomotion Goal 1, PT) goal not met  -JW        User Key  (r) = Recorded By, (t) = Taken By, (c) = Cosigned By    Initials Name Provider Type Discipline    JW Casie Emmanuel, PTA Physical Therapy Assistant PT          Physical Therapy Education     Title: PT OT SLP Therapies (Active)     Topic: Physical Therapy (Active)     Point: Mobility training (Done)    Learning Progress Summary     Learner Status Readiness Method Response Comment Documented by    Patient Done Acceptance E VU Role of PT, POC, discharge recommendations to SNF for further rehab  10/31/18 1601                      User Key     Initials Effective Dates Name Provider Type Discipline     07/23/18 -  Sepideh Maya, PT Physical Therapist PT                    PT Recommendation and Plan  Anticipated Discharge Disposition (PT): skilled nursing facility  Outcome Summary/Treatment Plan  (PT)  Plan for Continued Treatment (PT): EOB as able  Anticipated Discharge Disposition (PT): skilled nursing facility  Plan of Care Reviewed With: patient  Progress: no change  Outcome Summary: pt defers all mobility, goldie light LE ther ex fair, recommend SNF at d/c          Outcome Measures     Row Name 11/01/18 1245 10/31/18 1401          How much help from another person do you currently need...    Turning from your back to your side while in flat bed without using bedrails? 3  -JW 3  -KW     Moving from lying on back to sitting on the side of a flat bed without bedrails? 2  -JW 2  -KW     Moving to and from a bed to a chair (including a wheelchair)? 1  -JW 1  -KW     Standing up from a chair using your arms (e.g., wheelchair, bedside chair)? 2  -JW 2  -KW     Climbing 3-5 steps with a railing? 1  -JW 1  -KW     To walk in hospital room? 1  -JW 1  -KW     AM-PAC 6 Clicks Score 10  - 10  -        Functional Assessment    Outcome Measure Options AM-PAC 6 Clicks Basic Mobility (PT)  - AM-PAC 6 Clicks Basic Mobility (PT)  -       User Key  (r) = Recorded By, (t) = Taken By, (c) = Cosigned By    Initials Name Provider Type    Casie Fernández PTA Physical Therapy Assistant    KW Sepideh Maya, PT Physical Therapist           Time Calculation:         PT Charges     Row Name 11/01/18 1245             Time Calculation    Start Time 1245  -      Stop Time 1300  -      Time Calculation (min) 15 min  -      PT Received On 11/01/18  -         Time Calculation- PT    Total Timed Code Minutes- PT 15 minute(s)  -        User Key  (r) = Recorded By, (t) = Taken By, (c) = Cosigned By    Initials Name Provider Type    Casie Fernández PTA Physical Therapy Assistant        Therapy Suggested Charges     Code   Minutes Charges    None           Therapy Charges for Today     Code Description Service Date Service Provider Modifiers Qty    [id number] HC PT THER PROC EA 15 MIN 11/1/2018 [place]  Casie CARRILLO, PTA GP 1          PT G-Codes  Outcome Measure Options: AM-PAC 6 Clicks Basic Mobility (PT)  AM-PAC 6 Clicks Score: 10  Functional Limitation: Mobility: Walking and moving around  Mobility: Walking and Moving Around Current Status (): At least 60 percent but less than 80 percent impaired, limited or restricted  Mobility: Walking and Moving Around Goal Status (): At least 40 percent but less than 60 percent impaired, limited or restricted    Casie Emmanuel, PTA  11/1/2018

## 2018-11-01 NOTE — SIGNIFICANT NOTE
11/01/18 0929   Rehab Time/Intention   Evaluation Not Performed patient/family decline, not feeling well  (Attempted OT eval x2 in a.m.; pt unable to participate 2/2 significant abd pain. RN notified. OT will f/u as pain is controlled and time permits. )   Rehab Treatment   Discipline occupational therapist

## 2018-11-01 NOTE — SIGNIFICANT NOTE
"   11/01/18 1432   Rehab Time/Intention   Evaluation Not Performed patient/family decline, not feeling well  (Attempted OT eval in afternoon; Pt sleepy and unable to sustain arousal. Pt noted to grimace and when asked if he is in pain states \" I don't know.\" OT will f/u 11/2/18 when pt able to participate. )   Rehab Treatment   Discipline occupational therapist     "

## 2018-11-01 NOTE — PLAN OF CARE
Problem: Patient Care Overview  Goal: Plan of Care Review  Outcome: Ongoing (interventions implemented as appropriate)      Problem: Thought Process Alteration (Adult)  Goal: Improved Thought Process  Outcome: Ongoing (interventions implemented as appropriate)      Problem: Fall Risk (Adult)  Goal: Absence of Fall  Outcome: Ongoing (interventions implemented as appropriate)      Problem: Skin Injury Risk (Adult)  Goal: Skin Health and Integrity  Outcome: Ongoing (interventions implemented as appropriate)

## 2018-11-02 NOTE — PLAN OF CARE
Problem: Patient Care Overview  Goal: Plan of Care Review  Outcome: Ongoing (interventions implemented as appropriate)   11/02/18 0462   Coping/Psychosocial   Plan of Care Reviewed With patient   OTHER   Outcome Summary OT eval completed; Pt agreeable to OT however largely limited by fatigue. Prior to admission, pt was functioning at w/c level (performing squat pivot t/fs) and ADLs with mod I. Pt A&O x3. Pt able to follow simple commands/directions with increased processing speed. Pt performed supine<>sit with CGA and scooted towards HOB with SBA. Pt tolerated sitting EOB x 10 min with SBA. Pt presents with increased pain and decreased strength, activity tolerance, and mobility limiting independence in ADLs. Pt would benefit from continued skilled OT services to promote highest level of functioning. Rec SNF at d/c for continued therapy prior to return home.

## 2018-11-02 NOTE — SIGNIFICANT NOTE
11/02/18 1118   Rehab Treatment   Discipline physical therapy assistant   Reason Treatment Not Performed patient/family declined treatment  (Pt reports he had OT tx this Am and fatigued. Pt request PTA check back in PM)   Recommendation   PT - Next Appointment 11/02/18

## 2018-11-02 NOTE — PLAN OF CARE
Problem: Skin Injury Risk (Adult)  Intervention: Promote/Optimize Nutrition   11/02/18 1514   Nutrition Interventions   Oral Nutrition Promotion nutritional therapy counseling provided

## 2018-11-02 NOTE — PLAN OF CARE
Problem: Patient Care Overview  Goal: Plan of Care Review  Outcome: Ongoing (interventions implemented as appropriate)   11/01/18 5195   Coping/Psychosocial   Plan of Care Reviewed With patient   Plan of Care Review   Progress no change   OTHER   Outcome Summary Pt resting. VSS. Pt A&O x3. Received prn pain meds as ordered. No new issues noted. Continue to monitor.      Goal: Individualization and Mutuality  Outcome: Ongoing (interventions implemented as appropriate)    Goal: Discharge Needs Assessment  Outcome: Ongoing (interventions implemented as appropriate)      Problem: Thought Process Alteration (Adult)  Goal: Identify Related Risk Factors and Signs and Symptoms  Outcome: Outcome(s) achieved Date Met: 11/01/18    Goal: Improved Thought Process  Outcome: Ongoing (interventions implemented as appropriate)      Problem: Fall Risk (Adult)  Goal: Absence of Fall  Outcome: Ongoing (interventions implemented as appropriate)      Problem: Skin Injury Risk (Adult)  Goal: Skin Health and Integrity  Outcome: Ongoing (interventions implemented as appropriate)      Problem: Restraint, Nonbehavioral (Nonviolent)  Goal: Rationale and Justification  Outcome: Outcome(s) achieved Date Met: 11/01/18    Goal: Nonbehavioral (Nonviolent) Restraint: Absence of Injury/Harm  Outcome: Outcome(s) achieved Date Met: 11/01/18    Goal: Nonbehavioral (Nonviolent) Restraint: Achievement of Discontinuation Criteria  Outcome: Outcome(s) achieved Date Met: 11/01/18    Goal: Nonbehavioral (Nonviolent) Restraint: Preservation of Dignity and Wellbeing  Outcome: Outcome(s) achieved Date Met: 11/01/18

## 2018-11-02 NOTE — PROGRESS NOTES
[facility]  INPATIENT PROGRESS NOTE    Length of Stay: 4  Date of Admission: 10/29/2018  Primary Care Physician: Reddy Grant MD    Subjective   Please note that all previous progress notes, lab findings, radiograph chronic, medication changes, and physical exam findings have been noted and updated as appropriate.    11/2/2018: Patient continues to have mild abdominal pain and vague complaints of shoulder pain.  CT scan of the abdomen and pelvis was originally ordered without contrast secondary to the fact the patient had an elevated creatinine.  This demonstrated a possible renal mass and recommendation that patient undergo contrasted CT scan.  Renal function has returned to normal as has GFR.  Patient will undergo contrasted scan today as well as a chest x-ray of the gallbladder.  decrease in hemoglobin which is likely secondary to chronic disease due to his cirrhosis, however iron deficiency anemia has been diagnosed in the past and an anemia profile is pending.    Chief Complaint/HPI:  This 53-year-old male reported to the emergency part with complaints of altered mental status.  Patient had an elevated ammonia of 129.  Ammonia has trended downward daily and is now 15.  At this time patient complains of fatigue, abdominal pain, and right shoulder pain.  Patient also being seen by urology for retention of urine.    Review of Systems   Constitutional: Positive for fatigue. Negative for chills and fever.   Respiratory: Negative for shortness of breath.    Cardiovascular: Negative for chest pain.   Gastrointestinal: Positive for abdominal pain. Negative for constipation, nausea and vomiting.   Musculoskeletal: Positive for arthralgias.      All pertinent negatives and positives are as above. All other systems have been reviewed and are negative unless otherwise stated.     Objective    Temp:  [96.5 °F (35.8 °C)-97.9 °F (36.6 °C)] 97 °F (36.1 °C)  Heart  Rate:  [62-71] 66  Resp:  [18-20] 18  BP: (118-136)/(64-88) 126/88    Physical Exam   Constitutional: He is oriented to person, place, and time. No distress.   HENT:   Head: Normocephalic and atraumatic.   Cardiovascular: Normal rate and regular rhythm.    Pulmonary/Chest: Effort normal. No respiratory distress.   Abdominal: Soft. Bowel sounds are normal. He exhibits no distension. There is tenderness.   Neurological: He is alert and oriented to person, place, and time.   Skin: Skin is warm and dry. He is not diaphoretic.     Results Review:  I have reviewed the labs, radiology results, and diagnostic studies.    Laboratory Data:     Results from last 7 days  Lab Units 11/02/18  0535 11/01/18  0552 10/31/18  0541  10/29/18  1813   SODIUM mmol/L 135* 140 137  137  < > 135*   POTASSIUM mmol/L 3.2* 3.5 4.0  4.0  < > 3.9   CHLORIDE mmol/L 108 110 106  106  < > 102   CO2 mmol/L 22.0 24.0 23.0  24.0  < > 27.0   BUN mg/dL 19 24* 26*  25*  < > 27*   CREATININE mg/dL 1.16 1.44* 1.62*  1.63*  < > 1.71*   GLUCOSE mg/dL 66 89 80  76  < > 75   CALCIUM mg/dL 8.0* 8.4 8.3*  8.2*  < > 8.7   BILIRUBIN mg/dL  --   --  8.7*  --  8.1*   ALK PHOS U/L  --   --  126  --  168*   ALT (SGPT) U/L  --   --  26  --  17*   AST (SGOT) U/L  --   --  47  --  46   ANION GAP mmol/L 5.0 6.0 8.0  7.0  < > 6.0   < > = values in this interval not displayed.  Estimated Creatinine Clearance: 88.5 mL/min (by C-G formula based on SCr of 1.16 mg/dL).            Results from last 7 days  Lab Units 11/02/18 0535 11/01/18  0552 10/31/18  0541 10/30/18  0658 10/29/18  1813   WBC 10*3/mm3 5.44 5.31 8.84 5.13 6.51   HEMOGLOBIN g/dL 8.0* 9.0* 9.0* 9.6* 11.0*   HEMATOCRIT % 23.3* 26.5* 27.0* 28.5* 32.9*   PLATELETS 10*3/mm3 49* 53* 75* 53* 63*       Results from last 7 days  Lab Units 10/31/18  1144 10/29/18  1813   INR  2.22* 1.71*       Culture Data:   No results found for: BLOODCX  Urine Culture   Date Value Ref Range Status   10/30/2018 No growth at 24  hours  Final     No results found for: RESPCX  No results found for: WOUNDCX  No results found for: STOOLCX  No components found for: BODYFLD    Radiology Data:   Imaging Results (last 24 hours)     Procedure Component Value Units Date/Time    CT Abdomen Pelvis Without Contrast [[id number]] Collected:  11/01/18 1453     Updated:  11/01/18 1559    Narrative:         PROCEDURE: Ct abdomen and pelvis without contrast    REASON FOR EXAM: Abdominal pain, R41.82 Altered mental status,  unspecified K72.90 Hepatic failure, unspecified without coma  Z74.09 Other reduced mobility    FINDINGS: Comparison study dated  April 30, 2018. Axial computer  tomography sequential imaging was performed from the diaphragms  through the symphysis pubis without IV contrast administration.  Sagittal and coronal reformates was performed. This exam was  performed according to our departmental dose optimization  program, which includes automated exposure control, adjustment of  the mA and/or KV according to patient size and/or use of  iterative reconstruction technique.    Imaging through lung bases reveals small bilateral pleural  effusions left worse than right. Associated left lower lobe  posterior basilar crescent shaped patchy opacities suspicious for  passive atelectasis. Otherwise lung bases are unremarkable.    The liver appears small and has a diffuse peripheral nodular  contour suggesting changes from cirrhosis. TIPS shunt is noted in  place. Gallstones are noted within distended gallbladder. The  pancreas is normal. The spleen is normal. Bilateral adrenal  glands are normal. Right kidney mid zone, lower pole, and renal  pelvis nonobstructive renal calculi with the largest within the  renal pelvis which measures 8 mm in greatest diameter. Otherwise  right kidney and ureter are normal. Left kidney mild questionable  soft tissue prominence in the upper pole. Left kidney mid zone  and lower pole small nonobstructive renal calculi.  Otherwise left  kidney and ureter are normal. The bladder is normal. The prostate  is normal. Small to moderate amount of abdominal ascites in the  abdomen and lower pelvis. No lymphadenopathy in the abdomen or  pelvis. The hollow viscera appears normal. Stable multiple  bilateral lower old rib fractures. Old L2 and L3 vertebral body  compression fractures which have undergone prior vertebroplasty.  Stable T11 old anterior vertebral body wedge compression fracture  with loss of anterior vertebral body height by 25%. Old healed  right inferior pubic ramus fracture. Compression screw as well as  cortical screw fixation of left hip fracture. No acute osseous  abnormality.      Impression:       1. Small bilateral pleural effusions left worse than right.  Associated left lower lobe posterior basilar segment passive  atelectasis..  2. Liver appearance suggests changes from cirrhosis.TIPS shunt is  noted in place.  3. Cholelithiasis with distended gallbladder..  4. Multiple right kidney nonobstructive renal calculi..  5.Left kidney mild questionable soft tissue prominence in the  upper pole. Cannot exclude mass lesion in this region. Recommend  clinical correlation and if clinically indicated enhanced CT  abdomen pelvis study to further evaluate.  6. Small to moderate amount of abdominal ascites.  7. Remainder CT abdomen pelvis study is unremarkable as described  above.    Electronically signed by:  Eduard Vigil MD  11/1/2018 3:58 PM CDT  Workstation: [id number]    XR Shoulder 2+ View Right [[id number]] Collected:  11/01/18 1507     Updated:  11/01/18 1538    Narrative:         Three view right shoulder    HISTORY: Right shoulder pain    AP films with the humerus in internal and external rotation and  scapular Y view were obtained.    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  Disuse osteoporosis humeral head.  Ossification coracoclavicular ligament, compatible with prior  trauma.  Old  right rib fractures.  Prior vertebroplasty thoracic spine.      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  Disuse osteoporosis humeral head.  Ossification coracoclavicular ligament, compatible with prior  trauma.  Old right rib fractures.    [id number]    Electronically signed by:  Claude Whatley MD  11/1/2018 3:37 PM CDT  Workstation: [id number]          I have reviewed the patient's current medications.     Assessment/Plan     Active Hospital Problems    Diagnosis   • Altered mental status   • CKD (chronic kidney disease) stage 3, GFR 30-59 ml/min (CMS/HCC)   • Hepatic encephalopathy (CMS/HCC)   • Thrombocytopenia (CMS/HCC)   • Chronic hepatitis (CMS/HCC)       Plan:  As noted above, replete potassium, check magnesium.              This document has been electronically signed by KENROY Goode on November 2, 2018 1:40 PM
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
## 2018-11-02 NOTE — PLAN OF CARE
Problem: Patient Care Overview  Goal: Plan of Care Review  Outcome: Ongoing (interventions implemented as appropriate)   11/02/18 5088   Coping/Psychosocial   Plan of Care Reviewed With patient   Plan of Care Review   Progress improving   OTHER   Outcome Summary Pt tolerated tx well this pm. Pt alert and oriented and processing commands well. Pt t/f sup-sit with CGA and tolerated EOB ~ 15 min SBA. Pt also adressed BLE ther ex and dynamic sitting activities with no LOB. No goals met this date but pt progressing towards unmet goals. 24/7 care and SNF placement recommended upon d/c for rehab prior to return home.

## 2018-11-02 NOTE — THERAPY TREATMENT NOTE
Acute Care - Physical Therapy Treatment Note  [facility]     Patient Name: Armando Mcmullen Sr.  : 1965  MRN: [id number]  Today's Date: 2018  Onset of Illness/Injury or Date of Surgery: 10/29/18  Date of Referral to PT: 10/31/18  Referring Physician: KALEB Jacobsen    Admit Date: 10/29/2018    Visit Dx:    ICD-10-CM ICD-9-CM   1. Altered mental status, unspecified altered mental status type R41.82 780.97   2. Hepatic encephalopathy (CMS/HCC) K72.90 572.2   3. Impaired functional mobility, balance, gait, and endurance Z74.09 V49.89   4. Impaired mobility and ADLs Z74.09 799.89     Patient Active Problem List   Diagnosis   • Anxiety state   • Back pain   • Chronic hepatitis (CMS/HCC)   • Depression   • Substance abuse (CMS/HCC)   • Liver failure with hepatic coma (CMS/HCC)   • Hypersplenism   • Chronic osteomyelitis of right shoulder region (CMS/HCC)   • Thrombocytopenia (CMS/HCC)   • Anemia of chronic disease   • Positive hepatitis C antibody test   • BMI 35.0-35.9,adult   • Tobacco use   • Hepatic encephalopathy (CMS/HCC)   • Anxiety and depression   • CKD (chronic kidney disease) stage 3, GFR 30-59 ml/min (CMS/HCC)   • Ascites   • Physical deconditioning   • Closed fracture of lumbar vertebra with spinal cord injury (CMS/HCC)   • Altered mental status       Therapy Treatment          Rehabilitation Treatment Summary     Row Name 18 1556             Treatment Time/Intention    Discipline physical therapy assistant  -SM      Document Type therapy note (daily note)  -SM      Subjective Information complains of;pain  -SM      Mode of Treatment physical therapy  -SM      Patient/Family Observations no family present  -SM      Therapy Frequency (PT Clinical Impression) other (see comments)   5-7x/week  -SM      Patient Effort adequate  -SM      Existing Precautions/Restrictions fall  -SM      Recorded by [SM] Rosaline Marks, PTA 18 1650      Row Name 18 1558             Vital Signs     Pre Systolic BP Rehab 123  -SM      Pre Treatment Diastolic BP 76  -SM      Pretreatment Heart Rate (beats/min) 96  -SM      Posttreatment Heart Rate (beats/min) 92  -SM      Pre SpO2 (%) 93  -SM      O2 Delivery Pre Treatment room air  -SM      Post SpO2 (%) 95  -SM      O2 Delivery Post Treatment room air  -SM      Pre Patient Position Supine  -SM      Intra Patient Position Sitting  -SM      Post Patient Position Supine  -SM      Recorded by [SM] Rosaline Marks, [facility] 11/02/18 1650      Row Name 11/02/18 1556             Cognitive Assessment/Intervention- PT/OT    Affect/Mental Status (Cognitive) WFL  -SM      Orientation Status (Cognition) oriented x 4  -SM      Follows Commands (Cognition) WFL  -SM      Cognitive Function (Cognitive) memory deficit;safety deficit  -SM      Memory Deficit (Cognitive) mild deficit  -SM      Safety Deficit (Cognitive) mild deficit;insight into deficits/self awareness;impulsivity  -SM      Personal Safety Interventions muscle strengthening facilitated;supervised activity;fall prevention program maintained  -SM      Recorded by [SM] Rosaline Marks, [facility] 11/02/18 1650      Row Name 11/02/18 1556             Safety Issues, Functional Mobility    Safety Issues Affecting Function (Mobility) problem solving;insight into deficits/self awareness;judgment  -SM      Impairments Affecting Function (Mobility) balance;endurance/activity tolerance;strength;pain;sensation/sensory awareness  -SM      Recorded by [SM] Rosaline Marks, [facility] 11/02/18 1650      Row Name 11/02/18 1556             Bed Mobility Assessment/Treatment    Bed Mobility Assessment/Treatment supine-sit;sit-supine;rolling left;scooting/bridging  -SM      Rolling Left [place] (Bed Mobility) minimum assist (75% patient effort)  -SM      Rolling Right [place] (Bed Mobility) not tested  -SM      Scooting/Bridging [place] (Bed Mobility) minimum assist (75% patient effort)   to bridge and scoot to HOB using  unilateral LE/UE   -SM      Supine-Sit [place] (Bed Mobility) contact guard  -SM      Sit-Supine [place] (Bed Mobility) contact guard  -SM      Bed Mobility, Safety Issues decreased use of arms for pushing/pulling;decreased use of legs for bridging/pushing  -SM      Assistive Device (Bed Mobility) bed rails;head of bed elevated  -SM      Comment (Bed Mobility) While seated EOB pt given purtubations to challenge sitting balance in multiple directions and no LOB occured. pushing and pulling limited due to pain and decreased ROM with RUE   -SM      Recorded by [SM] Rosaline Marks, PTA 11/02/18 1650      Row Name 11/02/18 1556             Transfer Assessment/Treatment    Transfer Assessment/Treatment other (see comments)  -      Comment (Transfers) Pt did not complete full stand but able to push off of bed and weight bear through RLE to unweight bottom and shift towards HOB   -SM      Recorded by [SM] Rosaline Marks, PTA 11/02/18 1650      Row Name 11/02/18 1556             Gait/Stairs Assessment/Training    Comment (Gait/Stairs) not tested this date  -SM      Recorded by [SM] Rosaline Marks, PTA 11/02/18 1650      Row Name 11/02/18 1556             Therapeutic Exercise    Therapeutic Exercise seated, lower extremities  -SM      Recorded by [] Rosaline Marks, PTA 11/02/18 1650      Row Name 11/02/18 1556             Lower Extremity Seated Therapeutic Exercise    Performed, Seated Lower Extremity (Therapeutic Exercise) hip flexion/extension;ankle dorsiflexion/plantarflexion;LAQ (long arc quad), knee extension;knee flexion/extension;hamstring stretch  -      Exercise Type, Seated Lower Extremity (Therapeutic Exercise) AROM (active range of motion)   passive stretch provided for hamstrings   -SM      Sets/Reps Detail, Seated Lower Extremity (Therapeutic Exercise) 10x1  -SM      Comment, Seated Lower Extremity (Therapeutic Exercise) Pt able to perform knee ext and hip flexion with LLE, limited by  edema   -SM      Recorded by [SM] Rosaline Marks, PTA 11/02/18 1650      Row Name 11/02/18 1556             Static Sitting Balance    Level of [place] (Unsupported Sitting, Static Balance) standby assist  -SM      Sitting Position (Unsupported Sitting, Static Balance) sitting on edge of bed  -SM      Time Able to Maintain Position (Unsupported Sitting, Static Balance) more than 5 minutes  -SM      Level of [place] (Supported Sitting, Static Balance) standby assist  -SM      Sitting Position (Supported Sitting, Static Balance) sitting on edge of bed  -SM      Time Able to Maintain Position (Supported Sitting, Static Balance) more than 5 minutes  -SM      Comment (Supported Sitting, Static Balance) Pt tolerated EOB ~ 15 minutes and some dynamic aspects introduced and pt had no LOB  -SM      Recorded by [SM] Rosaline Marks, PTA 11/02/18 1650      Row Name 11/02/18 1556             Positioning and Restraints    Pre-Treatment Position in bed  -SM      Post Treatment Position bed  -SM      In Bed side lying left;call light within reach;encouraged to call for assist;exit alarm on  -SM      Recorded by [SM] Rosaline Marks, PTA 11/02/18 1650      Row Name 11/02/18 1556             Pain Scale: Numbers Pre/Post-Treatment    Pain Scale: Numbers, Pretreatment 8/10  -SM      Pain Scale: Numbers, Post-Treatment 8/10  -SM      Pre/Post Treatment Pain Comment R shld pain   -SM      Pain Intervention(s) Repositioned  -SM      Recorded by [SM] Rosaline Marks, PTA 11/02/18 1650      Row Name                Wound 10/30/18 1020 coccyx    Wound - Properties Group Date first assessed: 10/30/18 [AS] Time first assessed: 1020 [AS] Present On Admission : picture taken [AS] Location: coccyx [AS] Recorded by:  [AS] Yokasta Heart, RN 10/30/18 1021    Row Name 11/02/18 1556             Plan of Care Review    Plan of Care Reviewed With patient  -SM      Recorded by [SM] Rosaline Marks, PTA 11/02/18 1650      Row Name  11/02/18 1556             Outcome Summary/Treatment Plan (PT)    Daily Summary of Progress (PT) progress toward functional goals is gradual  -SM      Barriers to Overall Progress (PT) pain   -SM      Plan for Continued Treatment (PT) EOB and standing   -SM      Patient/Family Concerns, Equipment Needs at Discharge (PT) Pt reports w/c at home in poor condition   -      Anticipated Discharge Disposition (PT) skilled nursing facility  -SM      Recorded by [] Rosaline Marks, PTA 11/02/18 1650        User Key  (r) = Recorded By, (t) = Taken By, (c) = Cosigned By    Initials Name Effective Dates Discipline    SM Rosaline Marks, PTA 03/07/18 -  PT    AS Yokasta Heart, RN 12/13/16 -  Nurse          Wound 10/30/18 1020 coccyx (Active)   Dressing Appearance open to air 11/2/2018  8:15 AM   Base red/granulating 11/2/2018  8:15 AM   Care, Wound barrier applied 11/1/2018  8:13 PM               PT Rehab Goals     Row Name 11/02/18 1556             Bed Mobility Goal 1 (PT)    Activity/Assistive Device (Bed Mobility Goal 1, PT) rolling to left;rolling to right;scooting;sit to supine;supine to sit  -      [place] Level/Cues Needed (Bed Mobility Goal 1, PT) minimum assist (75% or more patient effort)  -SM      Time Frame (Bed Mobility Goal 1, PT) 3 days  -SM      Progress/Outcomes (Bed Mobility Goal 1, PT) goal partially met;goal not met  -SM         Transfer Goal 1 (PT)    Activity/Assistive Device (Transfer Goal 1, PT) sit-to-stand/stand-to-sit;bed-to-chair/chair-to-bed;walker, rolling  -SM      [place] Level/Cues Needed (Transfer Goal 1, PT) moderate assist (50-74% patient effort)  -SM      Time Frame (Transfer Goal 1, PT) 3 days  -SM      Progress/Outcome (Transfer Goal 1, PT) goal not met  -SM         Gait Training Goal 1 (PT)    Activity/Assistive Device (Gait Training Goal 1, PT) gait (walking locomotion);assistive device use;increase endurance/gait distance;increase energy conservation;walker,  rolling  -SM      [place] Level (Gait Training Goal 1, PT) moderate assist (50-74% patient effort)  -SM      Distance (Gait Goal 1, PT) 3 ft   -SM      Time Frame (Gait Training Goal 1, PT) by discharge  -SM      Progress/Outcome (Gait Training Goal 1, PT) goal not met  -SM         Strength Goal 1 (PT)    Strength Goal 1 (PT) Pt will demonstrate 2x15 reps of at least 4 different LE exercises to demonstrate increased strength for improved transfers  -SM      Time Frame (Strength Goal 1, PT) 3 days  -SM      Progress/Outcome (Strength Goal 1, PT) goal not met  -SM         Wheelchair Locomotion Goal 1 (PT)    Activity (Wheelchair Locomotion Goal 1, PT) wheelchair mobility skills, all  -SM      [place] Level/Cues Needed (Wheelchair Locomotion Goal 1, PT) supervision required  -SM      Distance Goal 1 (Wheelchair Locomotion, PT) 25 ft or more  -SM      Time Frame (Wheelchair Locomotion Goal 1, PT) 5 days  -SM      Progress/Outcome (Wheelchair Locomotion Goal 1, PT) goal not met  -SM        User Key  (r) = Recorded By, (t) = Taken By, (c) = Cosigned By    Initials Name Provider Type Discipline    SM Rosaline Marks, PTA Physical Therapy Assistant PT          Physical Therapy Education     Title: PT OT SLP Therapies (Active)     Topic: Physical Therapy (Active)     Point: Mobility training (Done)    Learning Progress Summary     Learner Status Readiness Method Response Comment Documented by    Patient Done Acceptance E VU Role of PT, POC, discharge recommendations to SNF for further rehab  10/31/18 1601                      User Key     Initials Effective Dates Name Provider Type Discipline     07/23/18 -  Sepideh Maya, PT Physical Therapist PT                    PT Recommendation and Plan  Anticipated Discharge Disposition (PT): skilled nursing facility  Therapy Frequency (PT Clinical Impression): other (see comments) (5-7x/week)  Outcome Summary/Treatment Plan (PT)  Daily Summary of Progress (PT):  progress toward functional goals is gradual  Barriers to Overall Progress (PT): pain   Plan for Continued Treatment (PT): EOB and standing   Patient/Family Concerns, Equipment Needs at Discharge (PT): Pt reports w/c at home in poor condition   Anticipated Discharge Disposition (PT): skilled nursing facility  Plan of Care Reviewed With: patient  Progress: improving  Outcome Summary: Pt tolerated tx well this pm. Pt alert and oriented and processing commands well. Pt t/f sup-sit with CGA  and tolerated EOB ~ 15 min SBA. Pt also adressed BLE ther ex and dynamic sitting activities with no LOB. No goals met this date but pt progressing towards unmet goals. 24/7 care and SNF placement recommended upon d/c for rehab prior to return home.           Outcome Measures     Row Name 11/02/18 1556 11/02/18 1015 11/01/18 1245       How much help from another person do you currently need...    Turning from your back to your side while in flat bed without using bedrails? 3  -SM  -- 3  -JW    Moving from lying on back to sitting on the side of a flat bed without bedrails? 3  -SM  -- 2  -JW    Moving to and from a bed to a chair (including a wheelchair)? 2  -SM  -- 1  -JW    Standing up from a chair using your arms (e.g., wheelchair, bedside chair)? 2  -SM  -- 2  -JW    Climbing 3-5 steps with a railing? 1  -SM  -- 1  -JW    To walk in hospital room? 1  -SM  -- 1  -JW    AM-PAC 6 Clicks Score 12  -SM  -- 10  -JW       How much help from another is currently needed...    Putting on and taking off regular lower body clothing?  -- 2  -AS  --    Bathing (including washing, rinsing, and drying)  -- 2  -AS  --    Toileting (which includes using toilet bed pan or urinal)  -- 1  -AS  --    Putting on and taking off regular upper body clothing  -- 2  -AS  --    Taking care of personal grooming (such as brushing teeth)  -- 3  -AS  --    Eating meals  -- 3  -AS  --    Score  -- 13  -AS  --       Functional Assessment    Outcome Measure Options  AM-[facility] 6 Clicks Basic Mobility (PT)  - AM-[facility] 6 Clicks Daily Activity (OT)  -AS AM-[facility] 6 Clicks Basic Mobility (PT)  -    Row Name 10/31/18 1401             How much help from another person do you currently need...    Turning from your back to your side while in flat bed without using bedrails? 3  -KW      Moving from lying on back to sitting on the side of a flat bed without bedrails? 2  -KW      Moving to and from a bed to a chair (including a wheelchair)? 1  -KW      Standing up from a chair using your arms (e.g., wheelchair, bedside chair)? 2  -KW      Climbing 3-5 steps with a railing? 1  -KW      To walk in hospital room? 1  -KW      AM-[facility] 6 Clicks Score 10  -KW         Functional Assessment    Outcome Measure Options AM-[facility] 6 Clicks Basic Mobility (PT)  -        User Key  (r) = Recorded By, (t) = Taken By, (c) = Cosigned By    Initials Name Provider Type     Casie Emmanuel, PTA Physical Therapy Assistant    Rosaline Bishop, PTA Physical Therapy Assistant    AS Gardenia Reyes, OT Occupational Therapist    KW Sepideh Maya, PT Physical Therapist           Time Calculation:         PT Charges     Row Name 11/02/18 1633 11/02/18 1118          Time Calculation    Start Time 1556  -  --     Stop Time 1622  -SM  --     Time Calculation (min) 26 min  -  --     PT Received On 11/02/18  -  --     PT - Next Appointment  -- 11/02/18  -        Time Calculation- PT    Total Timed Code Minutes- PT 26 minute(s)  -  --       User Key  (r) = Recorded By, (t) = Taken By, (c) = Cosigned By    Initials Name Provider Type     Rosaline Marks PTA Physical Therapy Assistant        Therapy Suggested Charges     Code   Minutes Charges    None           Therapy Charges for Today     Code Description Service Date Service Provider Modifiers Qty    77488030931 HC PT THERAPEUTIC ACT EA 15 MIN 11/2/2018 Rosaline Marks PTA GP 1    30185813839 HC PT THER PROC EA 15 MIN 11/2/2018 Rosaline Marks  PTA GP 1          PT G-Codes  Outcome Measure Options: AM-PAC 6 Clicks Basic Mobility (PT)  AM-PAC 6 Clicks Score: 12  Score: 13  Functional Limitation: Mobility: Walking and moving around  Mobility: Walking and Moving Around Current Status (): At least 60 percent but less than 80 percent impaired, limited or restricted  Mobility: Walking and Moving Around Goal Status (): At least 40 percent but less than 60 percent impaired, limited or restricted    Rosaline Marks, PTA  11/2/2018

## 2018-11-02 NOTE — PROGRESS NOTES
"   LOS: 3 days   Patient Care Team:  Reddy Grant MD as PCP - General (Family Medicine)  Shawn Cortez MD (Inactive) as Surgeon (Orthopedic Surgery)  Tushar Becerril DO as Consulting Physician (Gastroenterology)  Josep Colón MD as Consulting Physician (Nephrology)  Ortiz Ferreira MD as Consulting Physician (Hematology and Oncology)    Subjective     Subjective:  Symptoms:  Improved.  (Voiding at times is incontinent, nursing reports hematuria is improving and appears more bernardino. ).    Diet:  Adequate intake.  No nausea or vomiting.        History taken from: patient chart RN    Objective     Vital Signs  Temp:  [96.5 °F (35.8 °C)-98.4 °F (36.9 °C)] 96.5 °F (35.8 °C)  Heart Rate:  [67-76] 71  Resp:  [18-20] 18  BP: (124-144)/(64-79) 129/64    Objective:  General Appearance:  In no acute distress.    Vital signs: (most recent): Blood pressure 129/64, pulse 71, temperature 96.5 °F (35.8 °C), temperature source Oral, resp. rate 18, height 182.9 cm (72\"), weight 95.1 kg (209 lb 11.2 oz), SpO2 95 %.  Vital signs are normal.  No fever.    Output: Producing urine (Urine clearing, bernardino) and producing stool.    HEENT: Normal HEENT exam.  (Scleral icterus is present)    Lungs:  Normal effort and normal respiratory rate.  Breath sounds clear to auscultation.  He is not in respiratory distress.  No stridor.  No decreased breath sounds.    Heart: Normal rate.  Regular rhythm.  S1 normal and S2 normal.  No murmur, gallop or friction rub.   Chest: Symmetric chest wall expansion.   Abdomen: Abdomen is soft and non-distended.  Bowel sounds are normal.   There is no abdominal tenderness.     Neurological: Patient is alert and oriented to person, place and time.    Pupils:  Pupils are equal, round, and reactive to light.    Skin:  Warm and dry.  No rash or cyanosis. (Excessively dry, jaundice)            Results Review:    Lab Results (last 24 hours)     Procedure Component Value Units Date/Time    Blood Culture - Blood, " [[id number]]  (Normal) Collected:  10/29/18 1813    Specimen:  Blood from Arm, Left Updated:  11/01/18 1830     Blood Culture No growth at 3 days    CBC & Differential [[id number]] Collected:  11/01/18 0552    Specimen:  Blood Updated:  11/01/18 0634    Narrative:       The following orders were created for panel order CBC & Differential.  Procedure                               Abnormality         Status                     ---------                               -----------         ------                     Scan Slide[[id number]]                                       Final result               CBC Auto Differential[[id number]]        Abnormal            Final result                 Please view results for these tests on the individual orders.    Scan Slide [[id number]] Collected:  11/01/18 0552    Specimen:  Blood Updated:  11/01/18 0634    Ammonia [[id number]]  (Normal) Collected:  11/01/18 0552    Specimen:  Blood Updated:  11/01/18 0630     Ammonia 15 umol/L     Basic Metabolic Panel [[id number]]  (Abnormal) Collected:  11/01/18 0552    Specimen:  Blood Updated:  11/01/18 0630     Glucose 89 mg/dL      BUN 24 (H) mg/dL      Creatinine 1.44 (H) mg/dL      Sodium 140 mmol/L      Potassium 3.5 mmol/L      Chloride 110 mmol/L      CO2 24.0 mmol/L      Calcium 8.4 mg/dL      eGFR Non African Amer 51 (L) mL/min/1.73      BUN/Creatinine Ratio 16.7     Anion Gap 6.0 mmol/L     Urine Culture - Urine, [[id number]]  (Normal) Collected:  10/30/18 2256    Specimen:  Urine from Urine, Clean Catch Updated:  11/01/18 0630     Urine Culture No growth at 24 hours    CBC Auto Differential [[id number]]  (Abnormal) Collected:  11/01/18 0552    Specimen:  Blood Updated:  11/01/18 0624     WBC 5.31 10*3/mm3      RBC 2.39 (L) 10*6/mm3      Hemoglobin 9.0 (L) g/dL      Hematocrit 26.5 (L) %      .9 (H) fL      MCH 37.7 (H) pg      MCHC 34.0 g/dL      RDW 14.6 (H) %      RDW-SD 58.6 (H) fl      MPV 9.9 fL      Platelets 53 (L) 10*3/mm3       Neutrophil % 61.3 %      Lymphocyte % 23.0 %      Monocyte % 10.2 %      Eosinophil % 3.4 %      Basophil % 1.7 %      Immature Grans % 0.4 %      Neutrophils, Absolute 3.26 10*3/mm3      Lymphocytes, Absolute 1.22 10*3/mm3      Monocytes, Absolute 0.54 10*3/mm3      Eosinophils, Absolute 0.18 10*3/mm3      Basophils, Absolute 0.09 10*3/mm3      Immature Grans, Absolute 0.02 10*3/mm3     Blood Culture - Blood, [[id number]]  (Normal) Collected:  10/29/18 1924    Specimen:  Blood from Hand, Right Updated:  10/31/18 1945     Blood Culture No growth at 2 days         Imaging Results (last 24 hours)     Procedure Component Value Units Date/Time    CT Abdomen Pelvis Without Contrast [[id number]] Collected:  11/01/18 1453     Updated:  11/01/18 1559    Narrative:         PROCEDURE: Ct abdomen and pelvis without contrast    REASON FOR EXAM: Abdominal pain, R41.82 Altered mental status,  unspecified K72.90 Hepatic failure, unspecified without coma  Z74.09 Other reduced mobility    FINDINGS: Comparison study dated  April 30, 2018. Axial computer  tomography sequential imaging was performed from the diaphragms  through the symphysis pubis without IV contrast administration.  Sagittal and coronal reformates was performed. This exam was  performed according to our departmental dose optimization  program, which includes automated exposure control, adjustment of  the mA and/or KV according to patient size and/or use of  iterative reconstruction technique.    Imaging through lung bases reveals small bilateral pleural  effusions left worse than right. Associated left lower lobe  posterior basilar crescent shaped patchy opacities suspicious for  passive atelectasis. Otherwise lung bases are unremarkable.    The liver appears small and has a diffuse peripheral nodular  contour suggesting changes from cirrhosis. TIPS shunt is noted in  place. Gallstones are noted within distended gallbladder. The  pancreas is normal. The spleen is  normal. Bilateral adrenal  glands are normal. Right kidney mid zone, lower pole, and renal  pelvis nonobstructive renal calculi with the largest within the  renal pelvis which measures 8 mm in greatest diameter. Otherwise  right kidney and ureter are normal. Left kidney mild questionable  soft tissue prominence in the upper pole. Left kidney mid zone  and lower pole small nonobstructive renal calculi. Otherwise left  kidney and ureter are normal. The bladder is normal. The prostate  is normal. Small to moderate amount of abdominal ascites in the  abdomen and lower pelvis. No lymphadenopathy in the abdomen or  pelvis. The hollow viscera appears normal. Stable multiple  bilateral lower old rib fractures. Old L2 and L3 vertebral body  compression fractures which have undergone prior vertebroplasty.  Stable T11 old anterior vertebral body wedge compression fracture  with loss of anterior vertebral body height by 25%. Old healed  right inferior pubic ramus fracture. Compression screw as well as  cortical screw fixation of left hip fracture. No acute osseous  abnormality.      Impression:       1. Small bilateral pleural effusions left worse than right.  Associated left lower lobe posterior basilar segment passive  atelectasis..  2. Liver appearance suggests changes from cirrhosis.TIPS shunt is  noted in place.  3. Cholelithiasis with distended gallbladder..  4. Multiple right kidney nonobstructive renal calculi..  5.Left kidney mild questionable soft tissue prominence in the  upper pole. Cannot exclude mass lesion in this region. Recommend  clinical correlation and if clinically indicated enhanced CT  abdomen pelvis study to further evaluate.  6. Small to moderate amount of abdominal ascites.  7. Remainder CT abdomen pelvis study is unremarkable as described  above.    Electronically signed by:  Eduard Vigil MD  11/1/2018 3:58 PM CDT  Workstation: [id number]    XR Shoulder 2+ View Right [[id number]] Collected:  11/01/18 2690      Updated:  11/01/18 1538    Narrative:         Three view right shoulder    HISTORY: Right shoulder pain    AP films with the humerus in internal and external rotation and  scapular Y view were obtained.    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  Disuse osteoporosis humeral head.  Ossification coracoclavicular ligament, compatible with prior  trauma.  Old right rib fractures.  Prior vertebroplasty thoracic spine.      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  Disuse osteoporosis humeral head.  Ossification coracoclavicular ligament, compatible with prior  trauma.  Old right rib fractures.    [id number]    Electronically signed by:  Claude Whatley MD  11/1/2018 3:37 PM CDT  Workstation: [id number]           I reviewed the patient's new clinical results.  I reviewed the patient's new imaging results and agree with the interpretation.  I reviewed the patient's other test results and agree with the interpretation      Assessment/Plan       Chronic hepatitis (CMS/HCC)    Thrombocytopenia (CMS/HCC)    Hepatic encephalopathy (CMS/HCC)    CKD (chronic kidney disease) stage 3, GFR 30-59 ml/min (CMS/HCC)    Altered mental status      Assessment & Plan    1. Difficulty urinating with hematuria, likely related to UTI hemorrhagic cystitis without clots  -Estimated Creatinine Clearance: 70.2 mL/min (A) (by C-G formula based on SCr of 1.44 mg/dL (H)).  -Cr 1.71-->1.72-->1.62-->1.44  -WBC 6.51-->5.13-->8.84-->5.31  -UA + 10/30/18  -Hgb/Hct 11.3/31.7-->11.0/32.9-->9.0/27.0-->9.0/26.5, stable  -Platelets 63,000-->53,000-->75,000-->53,000  -Renal ultrasound today shows limited exam, especially of the left kidney, due to the presence of bowel gas and the patient's body habitus. No hydronephrosis is visualized. Unremarkable bladder.   -10/30/18 Urine culture NEGATIVE, UA grossly positive.   -Antibiotic day #4, currently on Rocephin     Plan: Monitor CBC,  strict I&O renal function. Continue Rocephin.     KALEB Alexander  11/01/18  7:13 PM

## 2018-11-02 NOTE — CONSULTS
Adult Nutrition  Assessment    Patient Name:  Armando Mcmullen Sr.  YOB: 1965  MRN: [id number]  Admit Date:  10/29/2018    Assessment Date:  11/2/2018    Comments:  Patient continues to have some abdominal pain. Patient has 0% intake of foods for the last 2 days. Patient expressed interest in trying solid foods. Diet is being advanced to regular, low-sodium, low-protein diet. Boost breeze will be d/c due to high-protein in supplement, and patient's high ammonia levels. RDN staff will monitor tolerance of diet.            Reason for Assessment     Row Name 11/02/18 1456          Reason for Assessment    Reason For Assessment (P)  per organizational policy     Identified At Risk by Screening Criteria (P)  large or nonhealing wound, burn or pressure injury               Nutrition/Diet History     Row Name 11/02/18 1457          Nutrition/Diet History    Typical Food/Fluid Intake (P)  Patient stated that he was willing to try solid foods. DIet will be advanced to a regular diet today.                Labs/Tests/Procedures/Meds     Row Name 11/02/18 1458          Labs/Procedures/Meds    Lab Results Reviewed (P)  reviewed, pertinent     Lab Results Comments (P)  Na+135, Glucose 66, Ammonia-17        Diagnostic Tests/Procedures    Diagnostic Test/Procedure Reviewed (P)  reviewed        Medications    Pertinent Medications Reviewed (P)  reviewed             Physical Findings     Row Name 11/02/18 1459          Physical Findings    Overall Physical Appearance (P)  overweight;amputee     Skin (P)  pressure injury               Nutrition Prescription Ordered     Row Name 11/02/18 1500          Nutrition Prescription PO    Current PO Diet (P)  Regular     Common Modifiers (P)  Low Sodium;Low Protein   2 oz. meat for lunch and dinner, 1 dairy or egg at breakfast      Low Protein Details (P)  Other (comment)   2 oz. meat for lunch and dinner, 1 dairy or egg at breakfast                Electronically signed  by:  Aida Aviles  11/02/18 3:03 PM

## 2018-11-02 NOTE — PROGRESS NOTES
"   LOS: 4 days   Patient Care Team:  Reddy Grant MD as PCP - General (Family Medicine)  Shawn Cortez MD (Inactive) as Surgeon (Orthopedic Surgery)  Tushar Becerril DO as Consulting Physician (Gastroenterology)  Josep Colón MD as Consulting Physician (Nephrology)  Ortiz Ferreira MD as Consulting Physician (Hematology and Oncology)    Subjective     Subjective:  Symptoms:  Stable.  (Some abdominal pain but mild. No dysuria. No fever. ).    Diet:  Adequate intake.  No nausea or vomiting.        History taken from: patient chart RN    Objective     Vital Signs  Temp:  [96 °F (35.6 °C)-97.9 °F (36.6 °C)] 96 °F (35.6 °C)  Heart Rate:  [62-71] 66  Resp:  [18-20] 18  BP: (118-136)/(68-88) 134/75    Objective:  General Appearance:  In no acute distress.    Vital signs: (most recent): Blood pressure 134/75, pulse 66, temperature 96 °F (35.6 °C), temperature source Oral, resp. rate 18, height 182.9 cm (72\"), weight 98.3 kg (216 lb 11.2 oz), SpO2 97 %.  Vital signs are normal.  No fever.    Output: Producing urine (Urine bernardino) and producing stool.    HEENT: Normal HEENT exam.  (Scleral icterus is present)    Lungs:  Normal effort and normal respiratory rate.  Breath sounds clear to auscultation.  He is not in respiratory distress.  No stridor.  No decreased breath sounds.    Heart: Normal rate.  Regular rhythm.  S1 normal and S2 normal.  No murmur, gallop or friction rub.   Chest: Symmetric chest wall expansion.   Abdomen: Abdomen is soft and non-distended.  Bowel sounds are normal.   There is no abdominal tenderness.     Neurological: Patient is alert and oriented to person, place and time.    Pupils:  Pupils are equal, round, and reactive to light.    Skin:  Warm and dry.  No rash or cyanosis. (Excessively dry, jaundice)          Results Review:    Lab Results (last 24 hours)     Procedure Component Value Units Date/Time    Folate [[id number]]  (Normal) Collected:  11/02/18 1407    Specimen:  Blood Updated:  " 11/02/18 1547     Folate 8.74 ng/mL     Vitamin B12 [[id number]]  (Normal) Collected:  11/02/18 1407    Specimen:  Blood Updated:  11/02/18 1530     Vitamin B-12 921 pg/mL     Ferritin [[id number]]  (Abnormal) Collected:  11/02/18 1407    Specimen:  Blood Updated:  11/02/18 1500     Ferritin 972.00 (H) ng/mL     Magnesium [[id number]]  (Normal) Collected:  11/02/18 1407    Specimen:  Blood Updated:  11/02/18 1443     Magnesium 1.9 mg/dL     Iron Profile [[id number]]  (Abnormal) Collected:  11/02/18 1407    Specimen:  Blood Updated:  11/02/18 1432     Iron 50 mcg/dL      TIBC 138 (L) mcg/dL      Iron Saturation 36 %     CBC & Differential [[id number]] Collected:  11/02/18 0535    Specimen:  Blood Updated:  11/02/18 0638    Narrative:       The following orders were created for panel order CBC & Differential.  Procedure                               Abnormality         Status                     ---------                               -----------         ------                     Scan Slide[[id number]]                                                                  CBC Auto Differential[[id number]]        Abnormal            Final result                 Please view results for these tests on the individual orders.    CBC Auto Differential [[id number]]  (Abnormal) Collected:  11/02/18 0535    Specimen:  Blood Updated:  11/02/18 0638     WBC 5.44 10*3/mm3      RBC 2.14 (L) 10*6/mm3      Hemoglobin 8.0 (L) g/dL      Hematocrit 23.3 (L) %      .9 (H) fL      MCH 37.4 (H) pg      MCHC 34.3 g/dL      RDW 14.4 %      RDW-SD 57.1 (H) fl      MPV 9.8 fL      Platelets 49 (L) 10*3/mm3      Neutrophil % 53.5 %      Lymphocyte % 28.5 %      Monocyte % 9.7 %      Eosinophil % 6.1 %      Basophil % 1.8 %      Immature Grans % 0.4 %      Neutrophils, Absolute 2.91 10*3/mm3      Lymphocytes, Absolute 1.55 10*3/mm3      Monocytes, Absolute 0.53 10*3/mm3      Eosinophils, Absolute 0.33 10*3/mm3      Basophils, Absolute 0.10 10*3/mm3       Immature Grans, Absolute 0.02 10*3/mm3     Basic Metabolic Panel [[id number]]  (Abnormal) Collected:  11/02/18 0535    Specimen:  Blood Updated:  11/02/18 0629     Glucose 66 mg/dL      BUN 19 mg/dL      Creatinine 1.16 mg/dL      Sodium 135 (L) mmol/L      Potassium 3.2 (L) mmol/L      Chloride 108 mmol/L      CO2 22.0 mmol/L      Calcium 8.0 (L) mg/dL      eGFR Non African Amer 66 mL/min/1.73      BUN/Creatinine Ratio 16.4     Anion Gap 5.0 mmol/L     Ammonia [[id number]]  (Normal) Collected:  11/02/18 0536    Specimen:  Blood Updated:  11/02/18 0621     Ammonia 17 umol/L     Blood Culture - Blood, [[id number]]  (Normal) Collected:  10/29/18 1924    Specimen:  Blood from Hand, Right Updated:  11/01/18 1945     Blood Culture No growth at 3 days    Blood Culture - Blood, [[id number]]  (Normal) Collected:  10/29/18 1813    Specimen:  Blood from Arm, Left Updated:  11/01/18 1830     Blood Culture No growth at 3 days         Imaging Results (last 24 hours)     Procedure Component Value Units Date/Time    CT Abdomen Pelvis With Contrast [[id number]] Collected:  11/02/18 1420     Updated:  11/02/18 1543    Addenda:         ADDENDUM   ADDENDUM #1       Addendum: Referring clinician notified of findings by phone at  3:35 PM on 11/2/2018.    Electronically signed by:  Eduard Vigil MD  11/2/2018 3:42 PM CDT  Workstation: [id number]    Signed:  11/02/18 1542 by Hiren Vigil MD    Narrative:         PROCEDURE: Ct abdomen and pelvis with contrast    REASON FOR EXAM: Follow up abnormal scan, possible renal mass?,  R41.82 Altered mental status, unspecified K72.90 Hepatic failure,  unspecified without coma Z74.09 Other reduced mobility Z74.09  Other reduced mobility    FINDINGS: Comparison study dated  November 1, 2018. Axial  computer tomography sequential imaging was performed from the  diaphragms through the symphysis pubis with IV contrast  administration. Sagittal and coronal reformates was performed.  This exam was  performed according to our departmental dose  optimization program, which includes automated exposure control,  adjustment of the mA and/or KV according to patient size and/or  use of iterative reconstruction technique.     Imaging through lung bases reveals small bilateral pleural  effusions left worse than right. Bibasilar posterior basilar  small crescent-shaped opacities. Lungs are otherwise clear.    The liver is diffusely small and has a nodular peripheral contour  suggesting changes from cirrhosis. TIPS shunt is noted in place.  This shunt appears patent. Small gallstones are seen dependently  within distended gallbladder. The biliary system appears normal.  The pancreas is normal. The spleen is normal. Bilateral adrenal  glands are normal. Right kidney renal pelvis and mid zone  nonobstructive renal calculi with the largest within the renal  pelvis which measures 7.9 mm in greatest diameter. Otherwise  right kidney and ureter are normal. Left kidney mid zone and  lower pole nonobstructive renal calculi with the largest within  the mid zone which measures 5 mm in greatest diameter. There is  no evidence of left kidney renal mass. Small fluid and air  collection along the lateral border of the left kidney with  enhancing mild surrounding rind with this fluid and air  collection measuring 2.3 x 3.7 x 2.2 cm. Otherwise left kidney  and ureter are unremarkable. The bladder is normal. The prostate  appears normal. Moderate amount of abdominal ascites is seen  tracking around the liver and spleen in the left and right  paracolic gutter and in the lower pelvis. Multiple colonic  diverticula. The hollow viscera is otherwise unremarkable. No  lymphadenopathy in the abdomen or the pelvis. Atherosclerotic  vascular calcifications of the abdominal aorta and bilateral  common iliac arteries. No acute osseous abnormality. Stable  multiple bilateral lower old rib fractures. Old L2 and L3  vertebral body compression  fractures which have undergone prior  vertebroplasty. Stable old T11 anterior vertebral body wedge  compression fracture with loss of anterior vertebral body height  by 25%. Old healed right inferior pubic ramus fracture.  Compression screw as well as cortical screw fixation of left hip  fracture.      Impression:       1. Enhanced study reveals no evidence of left renal mass..  2. 2.3 x 3.7 x 2.2 cm fluid and air collection with mildly  enhancing surrounding rim adjacent to the lateral left kidney may  represent infected subcapsular hematoma versus abscess..  3. Bilateral kidney nonobstructive renal calculi..  4. Stable liver appearance consistent with cirrhosis.TIPS shunt  is noted in place. .  5. Cholelithiasis with distended gallbladder..  6. Moderate amount of abdominal ascites.  7. Colonic diverticulosis.    Electronically signed by:  Eduard Vigil MD  11/2/2018 3:31 PM CDT  Workstation: [id number]    US Gallbladder [[id number]] Collected:  11/02/18 1428     Updated:  11/02/18 1533    Narrative:         Ultrasound gallbladder    HISTORY: Abdominal pain. CT demonstrated a distended gallbladder.  Hepatic failure.    Ultrasound examination of the right upper quadrant was performed.    COMPARISON: None. Correlation CT 11/2/2018.    Cirrhosis.  Ascites.  No focal intrahepatic lesion.  Gallbladder distended to 13.1 cm in length.  A few small calculi within the gallbladder.  Dilated intrahepatic dilated ducts within the right lobe of the  liver.  Common bile duct obscured by bowel gas.  Increased echogenicity of the right kidney, compatible with  medical renal disease.  Right kidney 9.19 cm in length.  Pancreas obscured by bowel gas.      Impression:       CONCLUSION:  Cirrhosis.  Ascites.  Gallbladder distended to 13.1 cm in length.  Cholelithiasis.  Dilated intrahepatic dilated ducts within the right lobe of the  liver.  Common bile duct obscured by bowel gas.  Increased echogenicity of the right kidney, compatible  with  medical renal disease.    [id number]    Electronically signed by:  Claude Whatley MD  11/2/2018 3:32 PM CDT  Workstation: [email or web address]    XR Chest PA & Lateral [[id number]] Collected:  11/02/18 1414     Updated:  11/02/18 1436    Narrative:         TWO VIEW CHEST    HISTORY: Follow-up infiltrates and effusions.    Frontal and lateral films of the chest were obtained.  COMPARISON: September 4, 2018    EKG leads.  Cardiomegaly.  Small bilateral pleural effusions, larger on the left.  Associated minimal compressive atelectasis left lower lobe.  The pulmonary vasculature is not increased.  No pneumothorax.  Osteoporosis.  Old rib fractures.  Old ununited fracture left clavicle.  Vertebroplasty mid thoracic spine.  Old ununited fracture right humeral neck.  Postoperative changes proximal left humerus.      Impression:       CONCLUSION:  Cardiomegaly.  Small bilateral pleural effusions, larger on the left.  Associated minimal compressive atelectasis left lower lobe.    [id number]    Electronically signed by:  Claude Whatley MD  11/2/2018 2:35 PM CDT  Workstation: [employer]           I reviewed the patient's new clinical results.  I reviewed the patient's new imaging results and agree with the interpretation.  I reviewed the patient's other test results and agree with the interpretation      Assessment/Plan       Chronic hepatitis (CMS/HCC)    Thrombocytopenia (CMS/HCC)    Hepatic encephalopathy (CMS/HCC)    CKD (chronic kidney disease) stage 3, GFR 30-59 ml/min (CMS/HCC)    Altered mental status      Assessment & Plan    1. Difficulty urinating with hematuria, likely related to UTI hemorrhagic cystitis without clots  -Estimated Creatinine Clearance: 88.5 mL/min (by C-G formula based on SCr of 1.16 mg/dL).  -Cr 1.71-->1.72-->1.62-->1.44-->1.16  -WBC 6.51-->5.13-->8.84-->5.31-->5.44  -UA + 10/30/18  -Hgb/Hct 8.0/23.3  -Platelets 63,000-->53,000-->75,000-->53,000-->49,000  -Renal ultrasound 10/30/18 limited exam, especially  of the left kidney, due to the presence of bowel gas and the patient's body habitus. No hydronephrosis is visualized. Unremarkable bladder.   -11/2/18 CT abdomen/pelvis 2.3 x 3.7 x 2.2 cm fluid and air collection infected subcapsular hematoma versus abscess.  -10/30/18 Urine culture NEGATIVE, UA grossly positive.   -Antibiotic day #5, currently on Rocephin.     Plan:   Discussed with ELLIOTT Carbajal, consider radiology guided drainage of abscess with consideration of his thrombocytopenia.  Continue Rocephin.     KALEB Alexander  11/02/18  5:01 PM

## 2018-11-03 NOTE — PROGRESS NOTES
[facility] Medicine Services  INPATIENT PROGRESS NOTE    Length of Stay: 5  Date of Admission: 10/29/2018  Primary Care Physician: Reddy Grant MD    Subjective   Please note that all previous progress notes, lab findings, radiograph chronic, medication changes, and physical exam findings have been noted and updated as appropriate.    11/3/2018: CT scan demonstrated either subcapsular hematoma or possible abscess.  Have discussed with Dr. Vigil of radiology, who has determined would be most appropriate to optimize coagulation labs and possibly wait for an increase in platelets prior to any type of needle drainage or culture collection.  Secondary to absence of leukocytosis, Dr. Vigil has determined that the more likely diagnosis is subcapsular hematoma.  Patient is on antibiotic treatment.  No fever or chills.    11/2/2018: Patient continues to have mild abdominal pain and vague complaints of shoulder pain.  CT scan of the abdomen and pelvis was originally ordered without contrast secondary to the fact the patient had an elevated creatinine.  This demonstrated a possible renal mass and recommendation that patient undergo contrasted CT scan.  Renal function has returned to normal as has GFR.  Patient will undergo contrasted scan today as well as a chest x-ray of the gallbladder.  decrease in hemoglobin which is likely secondary to chronic disease due to his cirrhosis, however iron deficiency anemia has been diagnosed in the past and an anemia profile is pending.    Chief Complaint/HPI:  This 53-year-old male reported to the emergency part with complaints of altered mental status.  Patient had an elevated ammonia of 129.  Ammonia has trended downward daily and is now 15.  At this time patient complains of fatigue, abdominal pain, and right shoulder pain.  Patient also being seen by urology for retention of urine.    Review of Systems   Constitutional: Positive for fatigue.  Negative for chills and fever.   Respiratory: Negative for shortness of breath.    Cardiovascular: Negative for chest pain.   Gastrointestinal: Positive for abdominal pain. Negative for constipation, nausea and vomiting.   Musculoskeletal: Positive for arthralgias.      All pertinent negatives and positives are as above. All other systems have been reviewed and are negative unless otherwise stated.     Objective    Temp:  [96 °F (35.6 °C)-98.9 °F (37.2 °C)] 98.9 °F (37.2 °C)  Heart Rate:  [62-85] 85  Resp:  [16-18] 18  BP: (126-148)/(75-88) 142/84    Physical Exam   Constitutional: He is oriented to person, place, and time. No distress.   HENT:   Head: Normocephalic and atraumatic.   Cardiovascular: Normal rate and regular rhythm.    Pulmonary/Chest: Effort normal. No respiratory distress.   Abdominal: Soft. Bowel sounds are normal. He exhibits no distension. There is tenderness.   Neurological: He is alert and oriented to person, place, and time.   Skin: Skin is warm and dry. He is not diaphoretic.     Results Review:  I have reviewed the labs, radiology results, and diagnostic studies.    Laboratory Data:     Results from last 7 days  Lab Units 11/03/18  0549 11/02/18  0535 11/01/18  0552 10/31/18  0541  10/29/18  1813   SODIUM mmol/L 131* 135* 140 137  137  < > 135*   POTASSIUM mmol/L 3.9 3.2* 3.5 4.0  4.0  < > 3.9   CHLORIDE mmol/L 105 108 110 106  106  < > 102   CO2 mmol/L 22.0 22.0 24.0 23.0  24.0  < > 27.0   BUN mg/dL 17 19 24* 26*  25*  < > 27*   CREATININE mg/dL 1.17 1.16 1.44* 1.62*  1.63*  < > 1.71*   GLUCOSE mg/dL 69 66 89 80  76  < > 75   CALCIUM mg/dL 7.9* 8.0* 8.4 8.3*  8.2*  < > 8.7   BILIRUBIN mg/dL  --   --   --  8.7*  --  8.1*   ALK PHOS U/L  --   --   --  126  --  168*   ALT (SGPT) U/L  --   --   --  26  --  17*   AST (SGOT) U/L  --   --   --  47  --  46   ANION GAP mmol/L 4.0* 5.0 6.0 8.0  7.0  < > 6.0   < > = values in this interval not displayed.  Estimated Creatinine Clearance: 87.7  mL/min (by C-G formula based on SCr of 1.17 mg/dL).    Results from last 7 days  Lab Units 11/02/18  1407   MAGNESIUM mg/dL 1.9           Results from last 7 days  Lab Units 11/03/18  0549 11/02/18  0535 11/01/18  0552 10/31/18  0541 10/30/18  0658   WBC 10*3/mm3 5.73 5.44 5.31 8.84 5.13   HEMOGLOBIN g/dL 8.4* 8.0* 9.0* 9.0* 9.6*   HEMATOCRIT % 24.2* 23.3* 26.5* 27.0* 28.5*   PLATELETS 10*3/mm3 54* 49* 53* 75* 53*       Results from last 7 days  Lab Units 11/03/18  0847 10/31/18  1144 10/29/18  1813   INR  2.04* 2.22* 1.71*       Culture Data:   No results found for: BLOODCX  No results found for: URINECX  No results found for: RESPCX  No results found for: WOUNDCX  No results found for: STOOLCX  No components found for: BODYFLD    Radiology Data:   Imaging Results (last 24 hours)     Procedure Component Value Units Date/Time    CT Abdomen Pelvis With Contrast [[id number]] Collected:  11/02/18 1420     Updated:  11/02/18 1543    Addenda:         ADDENDUM   ADDENDUM #1       Addendum: Referring clinician notified of findings by phone at  3:35 PM on 11/2/2018.    Electronically signed by:  Eduard Vigil MD  11/2/2018 3:42 PM CDT  Workstation: [id number]    Signed:  11/02/18 1542 by Hiren Vigil MD    Narrative:         PROCEDURE: Ct abdomen and pelvis with contrast    REASON FOR EXAM: Follow up abnormal scan, possible renal mass?,  R41.82 Altered mental status, unspecified K72.90 Hepatic failure,  unspecified without coma Z74.09 Other reduced mobility Z74.09  Other reduced mobility    FINDINGS: Comparison study dated  November 1, 2018. Axial  computer tomography sequential imaging was performed from the  diaphragms through the symphysis pubis with IV contrast  administration. Sagittal and coronal reformates was performed.  This exam was performed according to our departmental dose  optimization program, which includes automated exposure control,  adjustment of the mA and/or KV according to patient size and/or  use of  iterative reconstruction technique.     Imaging through lung bases reveals small bilateral pleural  effusions left worse than right. Bibasilar posterior basilar  small crescent-shaped opacities. Lungs are otherwise clear.    The liver is diffusely small and has a nodular peripheral contour  suggesting changes from cirrhosis. TIPS shunt is noted in place.  This shunt appears patent. Small gallstones are seen dependently  within distended gallbladder. The biliary system appears normal.  The pancreas is normal. The spleen is normal. Bilateral adrenal  glands are normal. Right kidney renal pelvis and mid zone  nonobstructive renal calculi with the largest within the renal  pelvis which measures 7.9 mm in greatest diameter. Otherwise  right kidney and ureter are normal. Left kidney mid zone and  lower pole nonobstructive renal calculi with the largest within  the mid zone which measures 5 mm in greatest diameter. There is  no evidence of left kidney renal mass. Small fluid and air  collection along the lateral border of the left kidney with  enhancing mild surrounding rind with this fluid and air  collection measuring 2.3 x 3.7 x 2.2 cm. Otherwise left kidney  and ureter are unremarkable. The bladder is normal. The prostate  appears normal. Moderate amount of abdominal ascites is seen  tracking around the liver and spleen in the left and right  paracolic gutter and in the lower pelvis. Multiple colonic  diverticula. The hollow viscera is otherwise unremarkable. No  lymphadenopathy in the abdomen or the pelvis. Atherosclerotic  vascular calcifications of the abdominal aorta and bilateral  common iliac arteries. No acute osseous abnormality. Stable  multiple bilateral lower old rib fractures. Old L2 and L3  vertebral body compression fractures which have undergone prior  vertebroplasty. Stable old T11 anterior vertebral body wedge  compression fracture with loss of anterior vertebral body height  by 25%. Old healed right  inferior pubic ramus fracture.  Compression screw as well as cortical screw fixation of left hip  fracture.      Impression:       1. Enhanced study reveals no evidence of left renal mass..  2. 2.3 x 3.7 x 2.2 cm fluid and air collection with mildly  enhancing surrounding rim adjacent to the lateral left kidney may  represent infected subcapsular hematoma versus abscess..  3. Bilateral kidney nonobstructive renal calculi..  4. Stable liver appearance consistent with cirrhosis.TIPS shunt  is noted in place. .  5. Cholelithiasis with distended gallbladder..  6. Moderate amount of abdominal ascites.  7. Colonic diverticulosis.    Electronically signed by:  Eduard Vigil MD  11/2/2018 3:31 PM CDT  Workstation: [id number]    US Gallbladder [[id number]] Collected:  11/02/18 1428     Updated:  11/02/18 1533    Narrative:         Ultrasound gallbladder    HISTORY: Abdominal pain. CT demonstrated a distended gallbladder.  Hepatic failure.    Ultrasound examination of the right upper quadrant was performed.    COMPARISON: None. Correlation CT 11/2/2018.    Cirrhosis.  Ascites.  No focal intrahepatic lesion.  Gallbladder distended to 13.1 cm in length.  A few small calculi within the gallbladder.  Dilated intrahepatic dilated ducts within the right lobe of the  liver.  Common bile duct obscured by bowel gas.  Increased echogenicity of the right kidney, compatible with  medical renal disease.  Right kidney 9.19 cm in length.  Pancreas obscured by bowel gas.      Impression:       CONCLUSION:  Cirrhosis.  Ascites.  Gallbladder distended to 13.1 cm in length.  Cholelithiasis.  Dilated intrahepatic dilated ducts within the right lobe of the  liver.  Common bile duct obscured by bowel gas.  Increased echogenicity of the right kidney, compatible with  medical renal disease.    [id number]    Electronically signed by:  Claude Whatley MD  11/2/2018 3:32 PM CDT  Workstation: [email or web address] Chest PA & Lateral [[id number]] Collected:  11/02/18  1414     Updated:  11/02/18 1436    Narrative:         TWO VIEW CHEST    HISTORY: Follow-up infiltrates and effusions.    Frontal and lateral films of the chest were obtained.  COMPARISON: September 4, 2018    EKG leads.  Cardiomegaly.  Small bilateral pleural effusions, larger on the left.  Associated minimal compressive atelectasis left lower lobe.  The pulmonary vasculature is not increased.  No pneumothorax.  Osteoporosis.  Old rib fractures.  Old ununited fracture left clavicle.  Vertebroplasty mid thoracic spine.  Old ununited fracture right humeral neck.  Postoperative changes proximal left humerus.      Impression:       CONCLUSION:  Cardiomegaly.  Small bilateral pleural effusions, larger on the left.  Associated minimal compressive atelectasis left lower lobe.    [id number]    Electronically signed by:  Claude Whatley MD  11/2/2018 2:35 PM CDT  Workstation: [employer]          I have reviewed the patient's current medications.     Assessment/Plan     Active Hospital Problems    Diagnosis   • Altered mental status   • CKD (chronic kidney disease) stage 3, GFR 30-59 ml/min (CMS/HCC)   • Hepatic encephalopathy (CMS/HCC)   • Thrombocytopenia (CMS/HCC)   • Chronic hepatitis (CMS/HCC)       Plan:  As noted above.              This document has been electronically signed by KENROY Goode on November 3, 2018 10:32 AM
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
## 2018-11-03 NOTE — PLAN OF CARE
Problem: Patient Care Overview  Goal: Plan of Care Review  Outcome: Ongoing (interventions implemented as appropriate)  Pt complains of high level of chronic pain - prn dilaudid seems more effective than oral analgesic; Antibiotic regiment continued; Truns and Magic Butt Cream for coccyx pressure wound; vital signs stable.    Problem: Thought Process Alteration (Adult)  Goal: Improved Thought Process  Outcome: Ongoing (interventions implemented as appropriate)      Problem: Fall Risk (Adult)  Goal: Absence of Fall  Outcome: Ongoing (interventions implemented as appropriate)      Problem: Skin Injury Risk (Adult)  Goal: Skin Health and Integrity  Outcome: Ongoing (interventions implemented as appropriate)

## 2018-11-03 NOTE — PROGRESS NOTES
LOS: 5 days     Patient Care Team:  Reddy Grant MD as PCP - General (Family Medicine)  Shawn Cortez MD (Inactive) as Surgeon (Orthopedic Surgery)  Tushar Becerril DO as Consulting Physician (Gastroenterology)  Josep Colón MD as Consulting Physician (Nephrology)  Ortiz Ferreira MD as Consulting Physician (Hematology and Oncology)      Subjective     Left perirenal abscess fluid collection    Objective       Vital Signs  Temp:  [96 °F (35.6 °C)-98.9 °F (37.2 °C)] 98 °F (36.7 °C)  Heart Rate:  [62-85] 73  Resp:  [16-18] 18  BP: (130-148)/(73-84) 135/73    Physical Exam:        General Appearance:    Up eating     Respiratory:    UNLABORED RESPIRATIONS.     Abdomen:     SOFT.       Genitourinary:   Voiding urine clear     Rectal:     DEFERRED       Results Review:       Imaging Results (last 24 hours)     Procedure Component Value Units Date/Time    CT Abdomen Pelvis With Contrast [[id number]] Collected:  11/02/18 1420     Updated:  11/02/18 1543    Addenda:         ADDENDUM   ADDENDUM #1       Addendum: Referring clinician notified of findings by phone at  3:35 PM on 11/2/2018.    Electronically signed by:  Eduard Vigil MD  11/2/2018 3:42 PM CDT  Workstation: [id number]    Signed:  11/02/18 1542 by Hiren Vigil MD    Narrative:         PROCEDURE: Ct abdomen and pelvis with contrast    REASON FOR EXAM: Follow up abnormal scan, possible renal mass?,  R41.82 Altered mental status, unspecified K72.90 Hepatic failure,  unspecified without coma Z74.09 Other reduced mobility Z74.09  Other reduced mobility    FINDINGS: Comparison study dated  November 1, 2018. Axial  computer tomography sequential imaging was performed from the  diaphragms through the symphysis pubis with IV contrast  administration. Sagittal and coronal reformates was performed.  This exam was performed according to our departmental dose  optimization program, which includes automated exposure control,  adjustment of the mA and/or KV according  to patient size and/or  use of iterative reconstruction technique.     Imaging through lung bases reveals small bilateral pleural  effusions left worse than right. Bibasilar posterior basilar  small crescent-shaped opacities. Lungs are otherwise clear.    The liver is diffusely small and has a nodular peripheral contour  suggesting changes from cirrhosis. TIPS shunt is noted in place.  This shunt appears patent. Small gallstones are seen dependently  within distended gallbladder. The biliary system appears normal.  The pancreas is normal. The spleen is normal. Bilateral adrenal  glands are normal. Right kidney renal pelvis and mid zone  nonobstructive renal calculi with the largest within the renal  pelvis which measures 7.9 mm in greatest diameter. Otherwise  right kidney and ureter are normal. Left kidney mid zone and  lower pole nonobstructive renal calculi with the largest within  the mid zone which measures 5 mm in greatest diameter. There is  no evidence of left kidney renal mass. Small fluid and air  collection along the lateral border of the left kidney with  enhancing mild surrounding rind with this fluid and air  collection measuring 2.3 x 3.7 x 2.2 cm. Otherwise left kidney  and ureter are unremarkable. The bladder is normal. The prostate  appears normal. Moderate amount of abdominal ascites is seen  tracking around the liver and spleen in the left and right  paracolic gutter and in the lower pelvis. Multiple colonic  diverticula. The hollow viscera is otherwise unremarkable. No  lymphadenopathy in the abdomen or the pelvis. Atherosclerotic  vascular calcifications of the abdominal aorta and bilateral  common iliac arteries. No acute osseous abnormality. Stable  multiple bilateral lower old rib fractures. Old L2 and L3  vertebral body compression fractures which have undergone prior  vertebroplasty. Stable old T11 anterior vertebral body wedge  compression fracture with loss of anterior vertebral body  height  by 25%. Old healed right inferior pubic ramus fracture.  Compression screw as well as cortical screw fixation of left hip  fracture.      Impression:       1. Enhanced study reveals no evidence of left renal mass..  2. 2.3 x 3.7 x 2.2 cm fluid and air collection with mildly  enhancing surrounding rim adjacent to the lateral left kidney may  represent infected subcapsular hematoma versus abscess..  3. Bilateral kidney nonobstructive renal calculi..  4. Stable liver appearance consistent with cirrhosis.TIPS shunt  is noted in place. .  5. Cholelithiasis with distended gallbladder..  6. Moderate amount of abdominal ascites.  7. Colonic diverticulosis.    Electronically signed by:  Eduard Vigil MD  11/2/2018 3:31 PM CDT  Workstation: [id number]    US Gallbladder [[id number]] Collected:  11/02/18 1428     Updated:  11/02/18 1533    Narrative:         Ultrasound gallbladder    HISTORY: Abdominal pain. CT demonstrated a distended gallbladder.  Hepatic failure.    Ultrasound examination of the right upper quadrant was performed.    COMPARISON: None. Correlation CT 11/2/2018.    Cirrhosis.  Ascites.  No focal intrahepatic lesion.  Gallbladder distended to 13.1 cm in length.  A few small calculi within the gallbladder.  Dilated intrahepatic dilated ducts within the right lobe of the  liver.  Common bile duct obscured by bowel gas.  Increased echogenicity of the right kidney, compatible with  medical renal disease.  Right kidney 9.19 cm in length.  Pancreas obscured by bowel gas.      Impression:       CONCLUSION:  Cirrhosis.  Ascites.  Gallbladder distended to 13.1 cm in length.  Cholelithiasis.  Dilated intrahepatic dilated ducts within the right lobe of the  liver.  Common bile duct obscured by bowel gas.  Increased echogenicity of the right kidney, compatible with  medical renal disease.    [id number]    Electronically signed by:  Claude Whatley MD  11/2/2018 3:32 PM CDT  Workstation: [email or web address] Chest PA & Lateral  [[id number]] Collected:  11/02/18 1414     Updated:  11/02/18 1436    Narrative:         TWO VIEW CHEST    HISTORY: Follow-up infiltrates and effusions.    Frontal and lateral films of the chest were obtained.  COMPARISON: September 4, 2018    EKG leads.  Cardiomegaly.  Small bilateral pleural effusions, larger on the left.  Associated minimal compressive atelectasis left lower lobe.  The pulmonary vasculature is not increased.  No pneumothorax.  Osteoporosis.  Old rib fractures.  Old ununited fracture left clavicle.  Vertebroplasty mid thoracic spine.  Old ununited fracture right humeral neck.  Postoperative changes proximal left humerus.      Impression:       CONCLUSION:  Cardiomegaly.  Small bilateral pleural effusions, larger on the left.  Associated minimal compressive atelectasis left lower lobe.    [id number]    Electronically signed by:  Claude Whatley MD  11/2/2018 2:35 PM CDT  Workstation: [id number]        Lab Results (last 24 hours)     Procedure Component Value Units Date/Time    Protime-INR [[id number]]  (Abnormal) Collected:  11/03/18 0847    Specimen:  Blood Updated:  11/03/18 0934     Protime 22.2 (H) Seconds      INR 2.04 (H)    Narrative:       Therapeutic range for most indications is 2.0-3.0 INR,  or 2.5-3.5 for mechanical heart valves.    CBC & Differential [[id number]] Collected:  11/03/18 0549    Specimen:  Blood Updated:  11/03/18 0620    Narrative:       The following orders were created for panel order CBC & Differential.  Procedure                               Abnormality         Status                     ---------                               -----------         ------                     Scan Slide[[id number]]                                       Final result               CBC Auto Differential[[id number]]        Abnormal            Final result                 Please view results for these tests on the individual orders.    Scan Slide [[id number]] Collected:  11/03/18 0549    Specimen:   Blood Updated:  11/03/18 0620     Macrocytes Slight/1+     WBC Morphology Normal     Platelet Estimate Decreased    Basic Metabolic Panel [[id number]]  (Abnormal) Collected:  11/03/18 0549    Specimen:  Blood Updated:  11/03/18 0613     Glucose 69 mg/dL      BUN 17 mg/dL      Creatinine 1.17 mg/dL      Sodium 131 (L) mmol/L      Potassium 3.9 mmol/L      Chloride 105 mmol/L      CO2 22.0 mmol/L      Calcium 7.9 (L) mg/dL      eGFR Non African Amer 65 mL/min/1.73      BUN/Creatinine Ratio 14.5     Anion Gap 4.0 (L) mmol/L     Ammonia [[id number]]  (Normal) Collected:  11/03/18 0549    Specimen:  Blood Updated:  11/03/18 0607     Ammonia 28 umol/L     CBC Auto Differential [[id number]]  (Abnormal) Collected:  11/03/18 0549    Specimen:  Blood Updated:  11/03/18 0600     WBC 5.73 10*3/mm3      RBC 2.22 (L) 10*6/mm3      Hemoglobin 8.4 (L) g/dL      Hematocrit 24.2 (L) %      .0 (H) fL      MCH 37.8 (H) pg      MCHC 34.7 g/dL      RDW 14.1 %      RDW-SD 55.1 (H) fl      MPV 9.8 fL      Platelets 54 (L) 10*3/mm3      Neutrophil % 50.8 %      Lymphocyte % 27.9 %      Monocyte % 10.5 %      Eosinophil % 8.4 (H) %      Basophil % 1.9 %      Immature Grans % 0.5 %      Neutrophils, Absolute 2.91 10*3/mm3      Lymphocytes, Absolute 1.60 10*3/mm3      Monocytes, Absolute 0.60 10*3/mm3      Eosinophils, Absolute 0.48 10*3/mm3      Basophils, Absolute 0.11 10*3/mm3      Immature Grans, Absolute 0.03 (H) 10*3/mm3     Blood Culture - Blood, [[id number]]  (Normal) Collected:  10/29/18 1924    Specimen:  Blood from Hand, Right Updated:  11/02/18 1945     Blood Culture No growth at 4 days    Blood Culture - Blood, [[id number]]  (Normal) Collected:  10/29/18 1813    Specimen:  Blood from Arm, Left Updated:  11/02/18 1830     Blood Culture No growth at 4 days    Folate [[id number]]  (Normal) Collected:  11/02/18 1407    Specimen:  Blood Updated:  11/02/18 1547     Folate 8.74 ng/mL     Vitamin B12 [[id number]]  (Normal) Collected:   11/02/18 1407    Specimen:  Blood Updated:  11/02/18 1530     Vitamin B-12 921 pg/mL     Ferritin [[id number]]  (Abnormal) Collected:  11/02/18 1407    Specimen:  Blood Updated:  11/02/18 1500     Ferritin 972.00 (H) ng/mL     Magnesium [[id number]]  (Normal) Collected:  11/02/18 1407    Specimen:  Blood Updated:  11/02/18 1443     Magnesium 1.9 mg/dL     Iron Profile [[id number]]  (Abnormal) Collected:  11/02/18 1407    Specimen:  Blood Updated:  11/02/18 1432     Iron 50 mcg/dL      TIBC 138 (L) mcg/dL      Iron Saturation 36 %             I reviewed the patient's new clinical results.  I reviewed the patient's new imaging results and agree with the interpretation.  I reviewed the patient's other test results and agree with the interpretation        Assessment/Plan       Chronic hepatitis (CMS/HCC)    Thrombocytopenia (CMS/HCC)    Hepatic encephalopathy (CMS/HCC)    CKD (chronic kidney disease) stage 3, GFR 30-59 ml/min (CMS/HCC)    Altered mental status      Needle aspiration of abscess fluid collection in radiology      Shawn Ledbetter MD  11/03/18  1:27 PM

## 2018-11-03 NOTE — PLAN OF CARE
Problem: Patient Care Overview  Goal: Plan of Care Review  Outcome: Ongoing (interventions implemented as appropriate)   11/03/18 0116   Coping/Psychosocial   Plan of Care Reviewed With patient   Plan of Care Review   Progress no change   OTHER   Outcome Summary Pt has rested well much of this shift. VSS. NO new concerns.

## 2018-11-04 NOTE — PROGRESS NOTES
[facility] Medicine Services  INPATIENT PROGRESS NOTE    Length of Stay: 6  Date of Admission: 10/29/2018  Primary Care Physician: Reddy Grant MD    Subjective   Please note that all previous progress notes, lab findings, radiograph chronic, medication changes, and physical exam findings have been noted and updated as appropriate.    11/4/2018:  Patient feeling fluid overloaded, states he feels he may need a paracentesis.  No shortness of breath or fever.  Still has abdominal discomfort.    11/3/2018: CT scan demonstrated either subcapsular hematoma or possible abscess.  Have discussed with Dr. Vigil of radiology, who has determined would be most appropriate to optimize coagulation labs and possibly wait for an increase in platelets prior to any type of needle drainage or culture collection.  Secondary to absence of leukocytosis, Dr. Vigil has determined that the more likely diagnosis is subcapsular hematoma.  Patient is on antibiotic treatment.  No fever or chills.    11/2/2018: Patient continues to have mild abdominal pain and vague complaints of shoulder pain.  CT scan of the abdomen and pelvis was originally ordered without contrast secondary to the fact the patient had an elevated creatinine.  This demonstrated a possible renal mass and recommendation that patient undergo contrasted CT scan.  Renal function has returned to normal as has GFR.  Patient will undergo contrasted scan today as well as a chest x-ray of the gallbladder.  decrease in hemoglobin which is likely secondary to chronic disease due to his cirrhosis, however iron deficiency anemia has been diagnosed in the past and an anemia profile is pending.    Chief Complaint/HPI:  This 53-year-old male reported to the emergency part with complaints of altered mental status.  Patient had an elevated ammonia of 129.  Ammonia has trended downward daily and is now 15.  At this time patient complains of fatigue,  abdominal pain, and right shoulder pain.  Patient also being seen by urology for retention of urine.    Review of Systems   Constitutional: Positive for fatigue. Negative for chills and fever.   Respiratory: Negative for shortness of breath.    Cardiovascular: Negative for chest pain.   Gastrointestinal: Positive for abdominal pain. Negative for constipation, nausea and vomiting.   Musculoskeletal: Positive for arthralgias.      All pertinent negatives and positives are as above. All other systems have been reviewed and are negative unless otherwise stated.     Objective    Temp:  [96.6 °F (35.9 °C)-98.8 °F (37.1 °C)] 98.8 °F (37.1 °C)  Heart Rate:  [74-82] 82  Resp:  [18] 18  BP: (121-144)/(70-88) 144/88    Physical Exam   Constitutional: He is oriented to person, place, and time. No distress.   HENT:   Head: Normocephalic and atraumatic.   Cardiovascular: Normal rate and regular rhythm.    Pulmonary/Chest: Effort normal. No respiratory distress.   Abdominal: Soft. Bowel sounds are normal. He exhibits no distension. There is tenderness.   Neurological: He is alert and oriented to person, place, and time.   Skin: Skin is warm and dry. He is not diaphoretic.     Results Review:  I have reviewed the labs, radiology results, and diagnostic studies.    Laboratory Data:     Results from last 7 days  Lab Units 11/04/18  0529 11/03/18  0549 11/02/18  0535  10/31/18  0541  10/29/18  1813   SODIUM mmol/L 130* 131* 135*  < > 137  137  < > 135*   POTASSIUM mmol/L 4.1 3.9 3.2*  < > 4.0  4.0  < > 3.9   CHLORIDE mmol/L 105 105 108  < > 106  106  < > 102   CO2 mmol/L 19.0* 22.0 22.0  < > 23.0  24.0  < > 27.0   BUN mg/dL 15 17 19  < > 26*  25*  < > 27*   CREATININE mg/dL 1.08 1.17 1.16  < > 1.62*  1.63*  < > 1.71*   GLUCOSE mg/dL 66 69 66  < > 80  76  < > 75   CALCIUM mg/dL 7.9* 7.9* 8.0*  < > 8.3*  8.2*  < > 8.7   BILIRUBIN mg/dL  --   --   --   --  8.7*  --  8.1*   ALK PHOS U/L  --   --   --   --  126  --  168*   ALT  (SGPT) U/L  --   --   --   --  26  --  17*   AST (SGOT) U/L  --   --   --   --  47  --  46   ANION GAP mmol/L 6.0 4.0* 5.0  < > 8.0  7.0  < > 6.0   < > = values in this interval not displayed.  Estimated Creatinine Clearance: 95 mL/min (by C-G formula based on SCr of 1.08 mg/dL).    Results from last 7 days  Lab Units 11/02/18  1407   MAGNESIUM mg/dL 1.9           Results from last 7 days  Lab Units 11/04/18  0529 11/03/18  0549 11/02/18  0535 11/01/18  0552 10/31/18  0541   WBC 10*3/mm3 4.92 5.73 5.44 5.31 8.84   HEMOGLOBIN g/dL 8.5* 8.4* 8.0* 9.0* 9.0*   HEMATOCRIT % 23.8* 24.2* 23.3* 26.5* 27.0*   PLATELETS 10*3/mm3 48* 54* 49* 53* 75*       Results from last 7 days  Lab Units 11/03/18  0847 10/31/18  1144 10/29/18  1813   INR  2.04* 2.22* 1.71*       Culture Data:   No results found for: BLOODCX  No results found for: URINECX  No results found for: RESPCX  No results found for: WOUNDCX  No results found for: STOOLCX  No components found for: BODYFLD    Radiology Data:   Imaging Results (last 24 hours)     ** No results found for the last 24 hours. **          I have reviewed the patient's current medications.     Assessment/Plan     Active Hospital Problems    Diagnosis   • Altered mental status   • CKD (chronic kidney disease) stage 3, GFR 30-59 ml/min (CMS/HCC)   • Hepatic encephalopathy (CMS/HCC)   • Thrombocytopenia (CMS/HCC)   • Chronic hepatitis (CMS/HCC)       Plan:  Lasix albumin drip, GI consult to help optimize patient condition for paracentesis if needed, per urology RE:  Aspiration and culture of renal cyst v. Abscess, PT/OT, discharge planning as appropriate.  If abdominal pain continues, consider surgical consult, though patient may be poor surgical candidate.              This document has been electronically signed by KENROY Goode on November 4, 2018 12:31 PM

## 2018-11-04 NOTE — PROGRESS NOTES
LOS: 6 days     Patient Care Team:  Reddy Grant MD as PCP - General (Family Medicine)  Shawn Cortez MD (Inactive) as Surgeon (Orthopedic Surgery)  Tushar Becerril DO as Consulting Physician (Gastroenterology)  Josep Colón MD as Consulting Physician (Nephrology)  Ortiz Ferreira MD as Consulting Physician (Hematology and Oncology)      Subjective     Left perirenal fluid collection    Objective       Vital Signs  Temp:  [96.8 °F (36 °C)-98.8 °F (37.1 °C)] 98.4 °F (36.9 °C)  Heart Rate:  [74-83] 83  Resp:  [18-20] 18  BP: (121-145)/(76-94) 131/94    Physical Exam:        General Appearance:    Setting up in bed     Respiratory:    UNLABORED RESPIRATIONS.     Abdomen:     SOFT.       Genitourinary:   Urine clear     Rectal:     DEFERRED       Results Review:       Imaging Results (last 24 hours)     ** No results found for the last 24 hours. **        Lab Results (last 24 hours)     Procedure Component Value Units Date/Time    Protime-INR [[id number]]  (Abnormal) Collected:  11/04/18 1251    Specimen:  Blood Updated:  11/04/18 1324     Protime 22.9 (H) Seconds      INR 2.12 (H)    Narrative:       Therapeutic range for most indications is 2.0-3.0 INR,  or 2.5-3.5 for mechanical heart valves.    POC Glucose Once [[id number]]  (Abnormal) Collected:  11/04/18 1045    Specimen:  Blood Updated:  11/04/18 1100     Glucose 142 (H) mg/dL      Comment: RN NotifiedOperator: [id number] DAGMAR ARNDTTWANMetez ID: [id number]       POC Glucose Once [[id number]]  (Normal) Collected:  11/04/18 0718    Specimen:  Blood Updated:  11/04/18 0748     Glucose 88 mg/dL      Comment: : [id number] DAGMAR ARNDTIntradigm CorporationMetez ID: [id number]       CBC & Differential [[id number]] Collected:  11/04/18 0529    Specimen:  Blood Updated:  11/04/18 0558    Narrative:       The following orders were created for panel order CBC & Differential.  Procedure                               Abnormality         Status                     ---------                                -----------         ------                     Scan Slide[[id number]]                                       Final result               CBC Auto Differential[[id number]]        Abnormal            Final result                 Please view results for these tests on the individual orders.    Scan Slide [[id number]] Collected:  11/04/18 0529    Specimen:  Blood Updated:  11/04/18 0558     Macrocytes Slight/1+     WBC Morphology Normal     Platelet Estimate Decreased    Basic Metabolic Panel [[id number]]  (Abnormal) Collected:  11/04/18 0529    Specimen:  Blood Updated:  11/04/18 0548     Glucose 66 mg/dL      BUN 15 mg/dL      Creatinine 1.08 mg/dL      Sodium 130 (L) mmol/L      Potassium 4.1 mmol/L      Chloride 105 mmol/L      CO2 19.0 (L) mmol/L      Calcium 7.9 (L) mg/dL      eGFR Non African Amer 71 mL/min/1.73      BUN/Creatinine Ratio 13.9     Anion Gap 6.0 mmol/L     Ammonia [[id number]]  (Normal) Collected:  11/04/18 0529    Specimen:  Blood Updated:  11/04/18 0547     Ammonia 24 umol/L     CBC Auto Differential [[id number]]  (Abnormal) Collected:  11/04/18 0529    Specimen:  Blood Updated:  11/04/18 0536     WBC 4.92 10*3/mm3      RBC 2.28 (L) 10*6/mm3      Hemoglobin 8.5 (L) g/dL      Hematocrit 23.8 (L) %      .4 (H) fL      MCH 37.3 (H) pg      MCHC 35.7 g/dL      RDW 14.1 %      RDW-SD 53.1 (H) fl      MPV 9.8 fL      Platelets 48 (L) 10*3/mm3      Neutrophil % 52.5 %      Lymphocyte % 27.2 %      Monocyte % 12.0 %      Eosinophil % 6.5 %      Basophil % 1.6 %      Immature Grans % 0.2 %      Neutrophils, Absolute 2.58 10*3/mm3      Lymphocytes, Absolute 1.34 10*3/mm3      Monocytes, Absolute 0.59 10*3/mm3      Eosinophils, Absolute 0.32 10*3/mm3      Basophils, Absolute 0.08 10*3/mm3      Immature Grans, Absolute 0.01 10*3/mm3     Blood Culture - Blood, [[id number]]  (Normal) Collected:  10/29/18 1924    Specimen:  Blood from Hand, Right Updated:  11/03/18 1946     Blood Culture  No growth at 5 days    Blood Culture - Blood, [[id number]]  (Normal) Collected:  10/29/18 1813    Specimen:  Blood from Arm, Left Updated:  11/03/18 1830     Blood Culture No growth at 5 days            I reviewed the patient's new clinical results.  I reviewed the patient's new imaging results and agree with the interpretation.  I reviewed the patient's other test results and agree with the interpretation        Assessment/Plan       Chronic hepatitis (CMS/HCC)    Thrombocytopenia (CMS/HCC)    Hepatic encephalopathy (CMS/HCC)    CKD (chronic kidney disease) stage 3, GFR 30-59 ml/min (CMS/HCC)    Altered mental status      Tried to do percutaneous drainage of left perirenal fluid collection tomorrow      Shawn Ledbetter MD  11/04/18  3:32 PM

## 2018-11-04 NOTE — PLAN OF CARE
Problem: Patient Care Overview  Goal: Plan of Care Review  Outcome: Ongoing (interventions implemented as appropriate)  Pt still reporting bloated feeling from fluid retention. Albumin and lasix drip implemented. Vital signs stable.    Problem: Thought Process Alteration (Adult)  Goal: Improved Thought Process  Outcome: Ongoing (interventions implemented as appropriate)      Problem: Fall Risk (Adult)  Goal: Absence of Fall  Outcome: Ongoing (interventions implemented as appropriate)      Problem: Skin Injury Risk (Adult)  Goal: Skin Health and Integrity  Outcome: Ongoing (interventions implemented as appropriate)

## 2018-11-04 NOTE — PLAN OF CARE
Problem: Patient Care Overview  Goal: Plan of Care Review  Outcome: Ongoing (interventions implemented as appropriate)   11/04/18 0045   Coping/Psychosocial   Plan of Care Reviewed With patient   Plan of Care Review   Progress no change   OTHER   Outcome Summary pt vs stable, no new events overnight will continue to monitor.     Goal: Individualization and Mutuality  Outcome: Ongoing (interventions implemented as appropriate)    Goal: Discharge Needs Assessment  Outcome: Ongoing (interventions implemented as appropriate)    Goal: Interprofessional Rounds/Family Conf  Outcome: Ongoing (interventions implemented as appropriate)      Problem: Thought Process Alteration (Adult)  Goal: Improved Thought Process  Outcome: Ongoing (interventions implemented as appropriate)      Problem: Fall Risk (Adult)  Goal: Absence of Fall  Outcome: Ongoing (interventions implemented as appropriate)      Problem: Skin Injury Risk (Adult)  Goal: Skin Health and Integrity  Outcome: Ongoing (interventions implemented as appropriate)

## 2018-11-04 NOTE — THERAPY TREATMENT NOTE
Acute Care - Physical Therapy Treatment Note  [facility]     Patient Name: Armando Mcmullen Sr.  : 1965  MRN: [id number]  Today's Date: 2018  Onset of Illness/Injury or Date of Surgery: 10/29/18  Date of Referral to PT: 10/31/18  Referring Physician: KALEB Jacobsen    Admit Date: 10/29/2018    Visit Dx:    ICD-10-CM ICD-9-CM   1. Altered mental status, unspecified altered mental status type R41.82 780.97   2. Hepatic encephalopathy (CMS/HCC) K72.90 572.2   3. Impaired functional mobility, balance, gait, and endurance Z74.09 V49.89   4. Impaired mobility and ADLs Z74.09 799.89     Patient Active Problem List   Diagnosis   • Anxiety state   • Back pain   • Chronic hepatitis (CMS/HCC)   • Depression   • Substance abuse (CMS/HCC)   • Liver failure with hepatic coma (CMS/HCC)   • Hypersplenism   • Chronic osteomyelitis of right shoulder region (CMS/HCC)   • Thrombocytopenia (CMS/HCC)   • Anemia of chronic disease   • Positive hepatitis C antibody test   • BMI 35.0-35.9,adult   • Tobacco use   • Hepatic encephalopathy (CMS/HCC)   • Anxiety and depression   • CKD (chronic kidney disease) stage 3, GFR 30-59 ml/min (CMS/HCC)   • Ascites   • Physical deconditioning   • Closed fracture of lumbar vertebra with spinal cord injury (CMS/HCC)   • Altered mental status       Therapy Treatment          Rehabilitation Treatment Summary     Row Name 18 0950             Treatment Time/Intention    Discipline physical therapy assistant  -RW      Document Type therapy note (daily note)  -RW      Subjective Information complains of   edema in abdomen  -RW      Mode of Treatment physical therapy  -RW      Patient/Family Observations in bed  -RW      Total Minutes, Physical Therapy Treatment 30  -RW      Therapy Frequency (PT Clinical Impression) other (see comments)   5-7x/week  -RW      Patient Effort good  -RW      Existing Precautions/Restrictions fall  -RW      Recorded by [RW] Cristobal Linares, PTA 18 1776       Row Name 11/04/18 0950             Vital Signs    Pre Systolic BP Rehab 133  -RW      Pre Treatment Diastolic BP 70  -RW      Post Systolic BP Rehab 141  -RW      Post Treatment Diastolic BP 94  -RW      Pretreatment Heart Rate (beats/min) 72  -RW      Posttreatment Heart Rate (beats/min) 79  -RW      Pre SpO2 (%) 98  -RW      O2 Delivery Pre Treatment room air  -RW      Post SpO2 (%) 98  -RW      O2 Delivery Post Treatment room air  -RW      Recorded by [RW] Cristobal Linares, PTA 11/04/18 1053      Row Name 11/04/18 0950             Cognitive Assessment/Intervention- PT/OT    Affect/Mental Status (Cognitive) WFL  -RW      Orientation Status (Cognition) oriented x 4  -RW      Follows Commands (Cognition) WFL  -RW      Cognitive Function (Cognitive) WFL  -RW      Memory Deficit (Cognitive) mild deficit  -RW      Recorded by [RW] Cristobal Linares, PTA 11/04/18 1053      Row Name 11/04/18 0950             Safety Issues, Functional Mobility    Impairments Affecting Function (Mobility) balance;endurance/activity tolerance;strength;pain;sensation/sensory awareness  -RW      Recorded by [RW] Cristobal Linares PTA 11/04/18 1053      Row Name 11/04/18 0950             Bed Mobility Assessment/Treatment    Bed Mobility Assessment/Treatment supine-sit;sit-supine;rolling left;scooting/bridging  -RW      Rolling Left [place] (Bed Mobility) minimum assist (75% patient effort)  -RW      Rolling Right [place] (Bed Mobility) supervision  -RW      Scooting/Bridging [place] (Bed Mobility) supervision   put bed in trendelenburg  -RW      Supine-Sit [place] (Bed Mobility) minimum assist (75% patient effort)  -RW2      Sit-Supine [place] (Bed Mobility) contact guard  -RW      Bed Mobility, Safety Issues decreased use of arms for pushing/pulling;decreased use of legs for bridging/pushing  -RW      Assistive Device (Bed Mobility) bed rails;head of bed elevated  -RW      Recorded by [RW] Cristobal Linares, PTA  11/04/18 1053  [RW2] Cristobal Linares, PTA 11/04/18 1056      Row Name 11/04/18 0950             Transfer Assessment/Treatment    Comment (Transfers) pt fearful of attempting to stand this visit  -RW      Recorded by [RW] Cristobal Linares, PTA 11/04/18 1053      Row Name 11/04/18 0950             Sit-Stand Transfer    Sit-Stand [place] (Transfers) not tested  -RW      Recorded by [RW] Cristobal Linares, PTA 11/04/18 1053      Row Name 11/04/18 0950             Lower Extremity Seated Therapeutic Exercise    Performed, Seated Lower Extremity (Therapeutic Exercise) knee flexion/extension;ankle dorsiflexion/plantarflexion;LAQ (long arc quad), knee extension  -RW      Exercise Type, Seated Lower Extremity (Therapeutic Exercise) AROM (active range of motion);resistive exercise  -RW      Expected Outcomes, Seated Lower Extremity (Therapeutic Exercise) improve functional tolerance, single extremity activity;improve functional stability;improve postural control  -RW      Sets/Reps Detail, Seated Lower Extremity (Therapeutic Exercise) 1/15  -RW      Recorded by [RW] Cristobal Linares, PTA 11/04/18 1053      Row Name 11/04/18 0950             Static Sitting Balance    Level of [place] (Unsupported Sitting, Static Balance) supervision;conditional independence  -RW      Sitting Position (Unsupported Sitting, Static Balance) sitting on edge of bed  -RW      Time Able to Maintain Position (Unsupported Sitting, Static Balance) other (see comments)    12-15 min  -RW      Recorded by [RW] Cristobal Linares, PTA 11/04/18 1053      Row Name 11/04/18 0950             Positioning and Restraints    Pre-Treatment Position in bed  -RW      Post Treatment Position bed  -RW      In Bed exit alarm on;call light within reach  -RW      Recorded by [RW] Cristobal Linares, PTA 11/04/18 1053      Row Name 11/04/18 0950             Pain Scale: Numbers Pre/Post-Treatment    Pain Scale: Numbers, Pretreatment --   c/o right shoulder pain but gave no  rating  -RW      Recorded by [RW] Cristobal Linares PTA 11/04/18 1053      Row Name                Wound 10/30/18 1020 coccyx    Wound - Properties Group Date first assessed: 10/30/18 [AS] Time first assessed: 1020 [AS] Present On Admission : picture taken [AS] Location: coccyx [AS] Recorded by:  [AS] [facility], RN 10/30/18 1021    Row Name 11/04/18 0950             Outcome Summary/Treatment Plan (PT)    Daily Summary of Progress (PT) progress toward functional goals as expected  -RW      Barriers to Overall Progress (PT) deconditioned and holding alot of fluid  -RW      Plan for Continued Treatment (PT) stance eob. 2 person  -RW      Anticipated Discharge Disposition (PT) skilled nursing facility  -RW      Recorded by [RW] Cristobal Linares PTA 11/04/18 1053        User Key  (r) = Recorded By, (t) = Taken By, (c) = Cosigned By    Initials Name Effective Dates Discipline    RW Cristobal Linares PTA 03/07/18 -  PT    AS [facility], RN 12/13/16 -  Nurse          Wound 10/30/18 1020 coccyx (Active)   Dressing Appearance open to air 11/3/2018  8:35 PM   Closure None 11/3/2018  8:35 PM   Base red/granulating 11/3/2018  8:35 PM   Periwound Temperature warm 11/3/2018  8:35 PM   Drainage Amount none 11/3/2018  8:35 PM   Care, Wound barrier applied 11/3/2018  8:35 PM               PT Rehab Goals     Row Name 11/04/18 1053             Bed Mobility Goal 1 (PT)    Activity/Assistive Device (Bed Mobility Goal 1, PT) rolling to left;rolling to right;scooting;sit to supine;supine to sit  -RW      [place] Level/Cues Needed (Bed Mobility Goal 1, PT) minimum assist (75% or more patient effort)  -RW      Time Frame (Bed Mobility Goal 1, PT) 3 days  -RW      Progress/Outcomes (Bed Mobility Goal 1, PT) goal partially met  -RW         Transfer Goal 1 (PT)    Activity/Assistive Device (Transfer Goal 1, PT) sit-to-stand/stand-to-sit;bed-to-chair/chair-to-bed;walker, rolling  -RW      [place] Level/Cues Needed  (Transfer Goal 1, PT) moderate assist (50-74% patient effort)  -RW      Time Frame (Transfer Goal 1, PT) 3 days  -RW      Progress/Outcome (Transfer Goal 1, PT) goal not met  -RW         Gait Training Goal 1 (PT)    Activity/Assistive Device (Gait Training Goal 1, PT) gait (walking locomotion);assistive device use;increase endurance/gait distance;increase energy conservation;walker, rolling  -RW      [place] Level (Gait Training Goal 1, PT) moderate assist (50-74% patient effort)  -RW      Distance (Gait Goal 1, PT) 3 ft   -RW      Time Frame (Gait Training Goal 1, PT) by discharge  -RW      Progress/Outcome (Gait Training Goal 1, PT) goal not met  -RW         Strength Goal 1 (PT)    Strength Goal 1 (PT) Pt will demonstrate 2x15 reps of at least 4 different LE exercises to demonstrate increased strength for improved transfers  -RW      Time Frame (Strength Goal 1, PT) 3 days  -RW      Progress/Outcome (Strength Goal 1, PT) goal not met;goal ongoing  -RW         Wheelchair Locomotion Goal 1 (PT)    Activity (Wheelchair Locomotion Goal 1, PT) wheelchair mobility skills, all  -RW      [place] Level/Cues Needed (Wheelchair Locomotion Goal 1, PT) supervision required  -RW      Distance Goal 1 (Wheelchair Locomotion, PT) 25 ft or more  -RW      Time Frame (Wheelchair Locomotion Goal 1, PT) 5 days  -RW      Progress/Outcome (Wheelchair Locomotion Goal 1, PT) goal not met  -RW        User Key  (r) = Recorded By, (t) = Taken By, (c) = Cosigned By    Initials Name Provider Type Discipline    Cristobal Palmer PTA Physical Therapy Assistant PT          Physical Therapy Education     Title: PT OT SLP Therapies (Active)     Topic: Physical Therapy (Active)     Point: Mobility training (Done)    Learning Progress Summary     Learner Status Readiness Method Response Comment Documented by    Patient Done Acceptance E VU Role of PT, POC, discharge recommendations to SNF for further rehab KW 10/31/18 160                       User Key     Initials Effective Dates Name Provider Type Discipline    KW 07/23/18 -  Sepideh Maya, PT Physical Therapist PT                    PT Recommendation and Plan  Anticipated Discharge Disposition (PT): skilled nursing facility  Therapy Frequency (PT Clinical Impression): other (see comments) (5-7x/week)  Outcome Summary/Treatment Plan (PT)  Daily Summary of Progress (PT): progress toward functional goals as expected  Barriers to Overall Progress (PT): deconditioned and holding alot of fluid  Plan for Continued Treatment (PT): stance eob. 2 person  Anticipated Discharge Disposition (PT): skilled nursing facility  Plan of Care Reviewed With: patient  Progress: improving  Outcome Summary: pt gave good effort with activity. right shoulder not allowing use for reaching to assist bed mob and may hinder standing attempts. did get eob with min assist and back with [facility]. cont rehab.          Outcome Measures     Row Name 11/03/18 0730 11/02/18 1556 11/02/18 1015       How much help from another person do you currently need...    Turning from your back to your side while in flat bed without using bedrails?  -- 3  -SM  --    Moving from lying on back to sitting on the side of a flat bed without bedrails?  -- 3  -SM  --    Moving to and from a bed to a chair (including a wheelchair)?  -- 2  -SM  --    Standing up from a chair using your arms (e.g., wheelchair, bedside chair)?  -- 2  -SM  --    Climbing 3-5 steps with a railing?  -- 1  -SM  --    To walk in hospital room?  -- 1  -SM  --    AM-PAC 6 Clicks Score  -- 12  -SM  --       How much help from another is currently needed...    Putting on and taking off regular lower body clothing? 1  -JH  -- 2  -AS    Bathing (including washing, rinsing, and drying) 1  -JH  -- 2  -AS    Toileting (which includes using toilet bed pan or urinal) 1  -JH  -- 1  -AS    Putting on and taking off regular upper body clothing 2  -JH  -- 2  -AS    Taking care of personal grooming  (such as brushing teeth) 2  -  -- 3  -AS    Eating meals 3  -JH  -- 3  -AS    Score 10  -  -- 13  -AS       Functional Assessment    Outcome Measure Options  -- AM-PAC 6 Clicks Basic Mobility (PT)  - AM-[facility] 6 Clicks Daily Activity (OT)  -AS    Row Name 11/01/18 1245             How much help from another person do you currently need...    Turning from your back to your side while in flat bed without using bedrails? 3  -JW      Moving from lying on back to sitting on the side of a flat bed without bedrails? 2  -JW      Moving to and from a bed to a chair (including a wheelchair)? 1  -JW      Standing up from a chair using your arms (e.g., wheelchair, bedside chair)? 2  -JW      Climbing 3-5 steps with a railing? 1  -JW      To walk in hospital room? 1  -JW      AM-PAC 6 Clicks Score 10  -         Functional Assessment    Outcome Measure Options AM-[facility] 6 Clicks Basic Mobility (PT)  -        User Key  (r) = Recorded By, (t) = Taken By, (c) = Cosigned By    Initials Name Provider Type     Casie Emmanuel, JULITA Physical Therapy Assistant     Rosaline Marks, JULITA Physical Therapy Assistant    JH Juanita Hickey, HEARD/L Occupational Therapy Assistant    AS Gardenia Reyes, OT Occupational Therapist           Time Calculation:         PT Charges     Row Name 11/04/18 1056             Time Calculation    Start Time 0950  -RW      Stop Time 1020  -RW      Time Calculation (min) 30 min  -RW         Time Calculation- PT    Total Timed Code Minutes- PT 30 minute(s)  -RW        User Key  (r) = Recorded By, (t) = Taken By, (c) = Cosigned By    Initials Name Provider Type    RW Cristobal Linares PTA Physical Therapy Assistant        Therapy Suggested Charges     Code   Minutes Charges    None           Therapy Charges for Today     Code Description Service Date Service Provider Modifiers Qty    81097138917 HC PT THER PROC EA 15 MIN 11/4/2018 Cristobal Linares PTA GP 1    [id number]  PT THERAPEUTIC ACT EA 15  MIN 11/4/2018 Cristobal Linares, PTA GP 1          PT G-Codes  Outcome Measure Options: AM-PAC 6 Clicks Basic Mobility (PT)  AM-PAC 6 Clicks Score: 12  Score: 10  Functional Limitation: Mobility: Walking and moving around  Mobility: Walking and Moving Around Current Status (): At least 60 percent but less than 80 percent impaired, limited or restricted  Mobility: Walking and Moving Around Goal Status (): At least 40 percent but less than 60 percent impaired, limited or restricted    Cristobal Linares PTA  11/4/2018

## 2018-11-04 NOTE — PLAN OF CARE
Problem: Patient Care Overview  Goal: Plan of Care Review  Outcome: Ongoing (interventions implemented as appropriate)   11/04/18 0640 11/04/18 1054   Coping/Psychosocial   Plan of Care Reviewed With --  patient   Plan of Care Review   Progress --  improving   OTHER   Outcome Summary Pt engage in adl activities requiring set up/sba to min A, pt also worked on ue strengthening , pt will need continued OT/PT at d/c  --
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
## 2018-11-04 NOTE — PLAN OF CARE
Problem: Patient Care Overview  Goal: Plan of Care Review  Outcome: Ongoing (interventions implemented as appropriate)   11/04/18 1055   Coping/Psychosocial   Plan of Care Reviewed With patient   Plan of Care Review   Progress improving   OTHER   Outcome Summary pt gave good effort with activity. right shoulder not allowing use for reaching to assist bed mob and may hinder standing attempts. did get eob with min assist and back with cga. cont rehab.

## 2018-11-05 NOTE — PROGRESS NOTES
"Discharge Planning Assessment  [facility]     Patient Name: Armando Mcmullen Sr.  MRN: [id number]  Today's Date: 11/5/2018    Admit Date: 10/29/2018          Discharge Needs Assessment    No documentation.             Discharge Plan     Row Name 11/05/18 1248       Plan    Plan SNF vs Swing Bed    Patient/Family in Agreement with Plan yes    Plan Comments SW f/u with patient this PM re: d/c planning. SW discussed [facility] and [facility] not having contract with pt's insurance. Informed him that [facility] has denied this date. [facility] did not have any male rehab to home beds available last week. SW discussed other SNF locations. Also discussed swing-bed options. Patient wants to speak with son and daughter before making decision. Patient is still agreeable for referrals to be sent to swing beds in [facility] and [facility] while he speaks with family. DARNELL also called and spoke with Jen @ [facility] this date. They will have male rehab to home bed available as of tomorrow. She is not sure if they accept pt's insurance but per Robby Liica will be available tomorrow. DARNELL informed Jen that SW will place referral in-basket to f/u on 11/6. DARNELL also placed referrals to [facility] Swing bed and [facility] this date. Will f/u with patient and facilities in the AM...COLLIN Carlisle    Row Name 11/05/18 1000       Plan    Plan Comments Team rounding completed. INR 2.7 today. Platelets 42. Awaiting on Dr. Becerril for possible paracentesis. Patient to continue lasix and albumin drip. Patient on day 7 of anbx. PA to consult urology re: possible plasma. Patient states that he is still agreeable to SNF for rehab to home. DARNELL called [facility] and spoke with Nany. Per Nany, [facility] not going to be able to accept due to patient \"having to go back and forth to the hospital\". DARNELL will f/u with patient re: other SNF choices...COLLIN Carlisle        Destination     Service Request Status " Selected Specialties Address Phone Number Fax Number    [facility] - [facility] N[facility]A [address] [address] [phone]    [facility] Pending - Request Sent N/A [address] [facility] [address] [phone]    [facility] BED Pending - Request Sent N/A [address] [facility] [address] [phone]    [facility] Declined  No [employer] contract N/A [address] [facility] [address] [phone]    [facility] Declined N/A [address] [facility] [address] [phone]    [facility] Declined  not able to meet patients needs. N/A [address] [facility] [address] [phone]      Durable Medical Equipment     No service coordination in this encounter.      Dialysis/Infusion     No service coordination in this encounter.      Home Medical Care     Service Request Status Selected Specialties Address Phone Number Fax Number    [facility] [place] Pending - No Request Sent N/A [address] [facility] [address] [phone]      Social Care     No service coordination in this encounter.        Expected Discharge Date and Time     Expected Discharge Date Expected Discharge Time    Nov 5, 2018               Demographic Summary    No documentation.           Functional Status    No documentation.           Psychosocial    No documentation.           Abuse/Neglect    No documentation.           Legal    No documentation.           Substance Abuse    No documentation.           Patient Forms    No documentation.         Lakisha Dean

## 2018-11-05 NOTE — SIGNIFICANT NOTE
11/05/18 1400   Rehab Treatment   Discipline physical therapy assistant   Reason Treatment Not Performed patient/family declined treatment  (Pt. request to rest at this time due to fatigue)

## 2018-11-05 NOTE — PROGRESS NOTES
[facility] Medicine Services  INPATIENT PROGRESS NOTE    Length of Stay: 7  Date of Admission: 10/29/2018  Primary Care Physician: Reddy Grant MD    Subjective   Please note that all previous progress notes, lab findings, radiograph chronic, medication changes, and physical exam findings have been noted and updated as appropriate.    11/5/2018:  Patient continues to complain of fluid overload, lower extremity edema.  INR is increased at greater than 2 today.  Less than 50,000 platelets.  Dr. Becerril of gastroenterology will see.  Have discussed gallbladder with attending, at this time exam appears stable and patient does not appear septic or toxic.  No indication of cholecystitis.  At this time patient is not a viable surgical candidate and therefore will monitor gallbladder.  Will discuss possibility for fresh frozen plasma prior to aspiration of cyst/abscess on kidney.    11/4/2018:  Patient feeling fluid overloaded, states he feels he may need a paracentesis.  No shortness of breath or fever.  Still has abdominal discomfort.    11/3/2018: CT scan demonstrated either subcapsular hematoma or possible abscess.  Have discussed with Dr. Vigil of radiology, who has determined would be most appropriate to optimize coagulation labs and possibly wait for an increase in platelets prior to any type of needle drainage or culture collection.  Secondary to absence of leukocytosis, Dr. Vigil has determined that the more likely diagnosis is subcapsular hematoma.  Patient is on antibiotic treatment.  No fever or chills.    11/2/2018: Patient continues to have mild abdominal pain and vague complaints of shoulder pain.  CT scan of the abdomen and pelvis was originally ordered without contrast secondary to the fact the patient had an elevated creatinine.  This demonstrated a possible renal mass and recommendation that patient undergo contrasted CT scan.  Renal function has returned to normal  as has GFR.  Patient will undergo contrasted scan today as well as a chest x-ray of the gallbladder.  decrease in hemoglobin which is likely secondary to chronic disease due to his cirrhosis, however iron deficiency anemia has been diagnosed in the past and an anemia profile is pending.    Chief Complaint/HPI:  This 53-year-old male reported to the emergency part with complaints of altered mental status.  Patient had an elevated ammonia of 129.  Ammonia has trended downward daily and is now 15.  At this time patient complains of fatigue, abdominal pain, and right shoulder pain.  Patient also being seen by urology for retention of urine.    Review of Systems   Constitutional: Positive for fatigue. Negative for chills and fever.   Respiratory: Negative for shortness of breath.    Cardiovascular: Negative for chest pain.   Gastrointestinal: Positive for abdominal pain. Negative for constipation, nausea and vomiting.   Genitourinary: Negative for difficulty urinating and dysuria.   Musculoskeletal: Positive for arthralgias.      All pertinent negatives and positives are as above. All other systems have been reviewed and are negative unless otherwise stated.     Objective    Temp:  [97.2 °F (36.2 °C)-98.4 °F (36.9 °C)] 97.6 °F (36.4 °C)  Heart Rate:  [80-91] 80  Resp:  [16-20] 16  BP: (119-145)/(77-94) 124/86    Physical Exam   Constitutional: He is oriented to person, place, and time. No distress.   HENT:   Head: Normocephalic and atraumatic.   Cardiovascular: Normal rate and regular rhythm.    Pulmonary/Chest: Effort normal. No respiratory distress.   Abdominal: Soft. Bowel sounds are normal. He exhibits no distension. There is tenderness.   Musculoskeletal: He exhibits edema.   Neurological: He is alert and oriented to person, place, and time.   Skin: Skin is warm and dry. He is not diaphoretic.     Results Review:  I have reviewed the labs, radiology results, and diagnostic studies.    Laboratory Data:     Results  from last 7 days  Lab Units 11/05/18  0608 11/04/18  0529 11/03/18  0549  10/31/18  0541  10/29/18  1813   SODIUM mmol/L 132* 130* 131*  < > 137  137  < > 135*   POTASSIUM mmol/L 3.2* 4.1 3.9  < > 4.0  4.0  < > 3.9   CHLORIDE mmol/L 102 105 105  < > 106  106  < > 102   CO2 mmol/L 21.0* 19.0* 22.0  < > 23.0  24.0  < > 27.0   BUN mg/dL 15 15 17  < > 26*  25*  < > 27*   CREATININE mg/dL 1.03 1.08 1.17  < > 1.62*  1.63*  < > 1.71*   GLUCOSE mg/dL 72 66 69  < > 80  76  < > 75   CALCIUM mg/dL 7.8* 7.9* 7.9*  < > 8.3*  8.2*  < > 8.7   BILIRUBIN mg/dL  --   --   --   --  8.7*  --  8.1*   ALK PHOS U/L  --   --   --   --  126  --  168*   ALT (SGPT) U/L  --   --   --   --  26  --  17*   AST (SGOT) U/L  --   --   --   --  47  --  46   ANION GAP mmol/L 9.0 6.0 4.0*  < > 8.0  7.0  < > 6.0   < > = values in this interval not displayed.  Estimated Creatinine Clearance: 100 mL/min (by C-G formula based on SCr of 1.03 mg/dL).    Results from last 7 days  Lab Units 11/02/18  1407   MAGNESIUM mg/dL 1.9           Results from last 7 days  Lab Units 11/05/18  0608 11/04/18  0529 11/03/18  0549 11/02/18  0535 11/01/18  0552   WBC 10*3/mm3 4.47 4.92 5.73 5.44 5.31   HEMOGLOBIN g/dL 7.5* 8.5* 8.4* 8.0* 9.0*   HEMATOCRIT % 21.2* 23.8* 24.2* 23.3* 26.5*   PLATELETS 10*3/mm3 42* 48* 54* 49* 53*       Results from last 7 days  Lab Units 11/05/18  0608 11/04/18  1251 11/03/18  0847 10/31/18  1144 10/29/18  1813   INR  2.49* 2.12* 2.04* 2.22* 1.71*       Culture Data:   No results found for: BLOODCX  No results found for: URINECX  No results found for: RESPCX  No results found for: WOUNDCX  No results found for: STOOLCX  No components found for: BODYFLD    Radiology Data:   Imaging Results (last 24 hours)     ** No results found for the last 24 hours. **          I have reviewed the patient's current medications.     Assessment/Plan     Active Hospital Problems    Diagnosis   • Altered mental status   • CKD (chronic kidney disease) stage  3, GFR 30-59 ml/min (CMS/HCC)   • Hepatic encephalopathy (CMS/HCC)   • Thrombocytopenia (CMS/HCC)   • Chronic hepatitis (CMS/HCC)       Plan:  GI consult appreciated, will discuss renal mass with urology, continue abx.              This document has been electronically signed by KENROY Goode on November 5, 2018 10:47 AM

## 2018-11-05 NOTE — PLAN OF CARE
Problem: Patient Care Overview  Goal: Plan of Care Review  Outcome: Ongoing (interventions implemented as appropriate)   11/05/18 1150   Coping/Psychosocial   Plan of Care Reviewed With patient   Plan of Care Review   Progress no change   OTHER   Outcome Summary Pt SBA for UB ADL and Mod A for LB ADL while long sitting. Pt required increased time and RBs as needed to complete ADL. Pt with all needs in reach at end of tx.

## 2018-11-05 NOTE — CONSULTS
Adult Nutrition  Assessment    Patient Name:  Armando Mcmullen Sr.  YOB: 1965  MRN: [id number]  Admit Date:  10/29/2018    Assessment Date:  11/5/2018    Comments:  Patient was advanced to a regular, low Na+, low protein diet on Friday afternoon. Patient is tolerating the diet well. Patient has had 100% intakes of 6 meals. Patient reports having an increased appetite. Patient's ammonia levels are decreased at 20. RDN staff will continue to monitor.           Reason for Assessment     Row Name 11/05/18 1602          Reason for Assessment    Reason For Assessment (P)  follow-up protocol     Diagnosis (P)  liver disease     Identified At Risk by Screening Criteria (P)  large or nonhealing wound, burn or pressure injury               Nutrition/Diet History     Row Name 11/05/18 1602          Nutrition/Diet History    Typical Food/Fluid Intake (P)  Patient stated that he has a good appetite. Patient is tolerating regular/solid foods well. Patient is currently on a low Na+, low protein, regular diet. Patient has had 100% intake of 6 meals.                Labs/Tests/Procedures/Meds     Row Name 11/05/18 1603          Labs/Procedures/Meds    Lab Results Reviewed (P)  reviewed, pertinent     Lab Results Comments (P)  Ammonia-20, K+-3.2, Cl-102, Glucose-128, Na+132, Ca+-7.8         Diagnostic Tests/Procedures    Diagnostic Test/Procedure Reviewed (P)  reviewed        Medications    Pertinent Medications Reviewed (P)  reviewed             Physical Findings     Row Name 11/05/18 1605          Physical Findings    Overall Physical Appearance (P)  amputee;overweight     Skin (P)  pressure injury             Estimated/Assessed Needs     Row Name 11/05/18 1601          Calculation Measurements    Weight Used For Calculations (P)  99 kg (218 lb 4.1 oz)        KCAL/KG    14 Kcal/Kg (kcal) (P)  1386     15 Kcal/Kg (kcal) (P)  1485     18 Kcal/Kg (kcal) (P)  1782     20 Kcal/Kg (kcal) (P)  1980     25 Kcal/Kg (kcal) (P)   2475     30 Kcal/Kg (kcal) (P)  2970     35 Kcal/Kg (kcal) (P)  3465     40 Kcal/Kg (kcal) (P)  3960     45 Kcal/Kg (kcal) (P)  4455     50 Kcal/Kg (kcal) (P)  4950        St. James-St. Jeor Equation    RMR (St. James-St. Jeor Equation) (P)  1868        Protein Requirements    Weight Used For Protein Calculations (P)  99 kg (218 lb 4.1 oz)     Est Protein Requirement Amount (gms/kg) (P)  0.6 gm protein     Estimated Protein Requirements (gms/day) (P)  59.4        Fluid Requirements    Chelsie-Sandoval Method (over 20 kg) (P)  3480               Evaluation of Received Nutrient/Fluid Intake     Row Name 11/05/18 1606 11/05/18 1605       Calculation Measurements    Weight Used For Calculations  -- (P)  99 kg (218 lb 4.1 oz)       PO Evaluation    Number of Days PO Intake Evaluated (P)  2 days  --    Number of Meals (P)  6  --    % PO Intake (P)  100%  --            Evaluation of Prescribed Nutrient/Fluid Intake     Row Name 11/05/18 1605          Calculation Measurements    Weight Used For Calculations (P)  99 kg (218 lb 4.1 oz)           Electronically signed by:  Aida Aviles  11/05/18 4:07 PM

## 2018-11-05 NOTE — PLAN OF CARE
Problem: Patient Care Overview  Goal: Plan of Care Review   11/05/18 3724   Coping/Psychosocial   Plan of Care Reviewed With patient;caregiver   Plan of Care Review   Progress improving   OTHER   Outcome Summary Patient has an increased appetite. Patient's diet was advanced from clear liquid to regular, low Na+, low-protein. Patient is tolerating the advanced diet well. Patient has had 100% intake of 6 meals. RDN staff will continue to monitor.

## 2018-11-05 NOTE — DISCHARGE PLACEMENT REQUEST
"Lamont Mcmullen  (53 y.o. Male)     Date of Birth Social Security Number Address Home Phone MRN    [id number]  [address]  [facility] [address] [phone] [id number]    [employer] Status          None Legally        Admission Date Admission Type Admitting Provider Attending Provider Department, Room/Bed    10/29/18 Emergency Eduar Cisneros MD Iqbal, Syed Javed, MD [address], 403/1    Discharge Date Discharge Disposition Discharge Destination                       Attending Provider:  Eduar Cisneros MD    Allergies:  Aspirin, Penicillins, Codeine Sulfate    Isolation:  None   Infection:  None   Code Status:  CPR    Ht:  182.9 cm (72\")   Wt:  99 kg (218 lb 3 oz)    Admission Cmt:  None   Principal Problem:  None                Active Insurance as of 10/29/2018     Primary Coverage     Payor Plan Insurance Group Employer/Plan Group    [facility] [employer] [facility]      Payor Plan Address Payor Plan Phone Number Effective From Effective To    [address] [address]  1/1/2014     [place] [address]       Subscriber Name Subscriber Birth Date Member ID       LAMONT MCMULLEN SR. 1965 [id number]                 Emergency Contacts      (Rel.) Home Phone Work Phone Mobile Phone    Miguel Mcmullen (Son) [phone] -- [phone]    Loco Mcmullen (Daughter) [phone] -- [phone]    BenedictShreyas (Brother) [phone] -- [phone]               History & Physical      Eduar Cisneros MD at 10/29/2018  8:13 PM                [facility] Medicine Admission      Date of Admission: 10/29/2018      Primary Care Physician: Reddy Grant MD      Chief Complaint: altered mental status    HPI:    53 yr old male with CMH of hepatitis presents with altered mental status.  History is taken from chart as patient has altered mental status.  He was found to have elevated ammonia .  He takes " lactulose at home.     Past Medical History:  has a past medical history of Allergic rhinitis; Anxiety state (12/1/2008); Back pain (12/1/2008); Chronic hepatitis (CMS/HCC) (6/18/2009); Chronic osteomyelitis (CMS/HCC); CKD (chronic kidney disease); Confusional arousals; Depression (12/1/2008); Essential hypertension (12/1/2008); Hepatic cirrhosis (CMS/HCC) (5/26/2009); Hypersplenism (6/28/2010); Insomnia (12/1/2008); Liver cirrhosis (CMS/HCC) (5/26/2009); Osteoarthritis (12/1/2008); Osteoarthritis; and Pneumonia.    Past Surgical History:  has a past surgical history that includes Shoulder surgery; Hip surgery; Leg amputation; incision and drainage leg (Right, 2/27/2017); and TIPS procedure.    Family History: family history includes Alzheimer's disease in his mother; Coronary artery disease in his other; Diabetes type II in his maternal aunt; Heart disease in his father and other; Hypertension in his father and other; No Known Problems in his brother, daughter, sister, and son.    Social History:  reports that he has been smoking Cigarettes.  He has been smoking about 0.25 packs per day. He has never used smokeless tobacco. He reports that he does not drink alcohol or use drugs.    Allergies:   Allergies   Allergen Reactions   • Aspirin Other (See Comments)     D/T liver   • Penicillins Unknown (See Comments)     Unknown     • Codeine Sulfate Hives and Itching       Medications: Scheduled Meds:  cefepime 2 g Intravenous Once   lactulose 30 g Oral Once   sodium chloride 1,000 mL Intravenous Once     Continuous Infusions:   PRN Meds:.  No current facility-administered medications on file prior to encounter.      Current Outpatient Prescriptions on File Prior to Encounter   Medication Sig Dispense Refill   • furosemide (LASIX) 20 MG tablet Take 1 tablet by mouth Daily. 90 tablet 3   • Incontinence Supplies (BEDPAN) misc Bedpan (Dx occasional incontinence, weakness, AMS related to hepatic encephalopathy) 1 each 0   •  lactulose (CHRONULAC) 10 GM/15ML solution Take 45 mL by mouth 3 (Three) Times a Day. 1892 mL 3   • midodrine (PROAMATINE) 10 MG tablet Take 1 tablet by mouth 3 (Three) Times a Day. 270 tablet 3   • Misc. Devices (COMMODE BEDSIDE) misc Large bedside commode (Dx hepatic encephalopathy, LLE amputation) 1 each 0   • potassium chloride (K-DUR,KLOR-CON) 20 MEQ CR tablet Take 1 tablet by mouth Daily for 31 days. 90 tablet 3   • simethicone (GAS-X) 80 MG chewable tablet Chew 1 tablet Every 6 (Six) Hours As Needed for Flatulence. 120 tablet 3   • spironolactone (ALDACTONE) 25 MG tablet 2qd 180 tablet 3   • XIFAXAN 550 MG tablet 1 bid 180 tablet 3       Review of Systems:  Review of Systems   Unable to perform ROS: Mental status change      Otherwise complete ROS is negative except as mentioned above.    Physical Exam:   Temp:  [97.2 °F (36.2 °C)] 97.2 °F (36.2 °C)  Heart Rate:  [72] 72  Resp:  [16] 16  BP: (123-131)/(58-65) 123/58  Physical Exam   Constitutional: He appears well-developed and well-nourished. No distress.   HENT:   Head: Normocephalic and atraumatic.   Cardiovascular: Normal rate.    Pulmonary/Chest: Effort normal. No respiratory distress. He has no wheezes.   Abdominal: Soft. He exhibits no distension.   Musculoskeletal: Normal range of motion. He exhibits no edema.   Neurological: He is alert. No cranial nerve deficit.   Skin: Skin is warm and dry. He is not diaphoretic.   Vitals reviewed.        Results Reviewed:  I have personally reviewed current lab, radiology, and data and agree with results.  Lab Results (last 24 hours)     Procedure Component Value Units Date/Time    CBC & Differential [[id number]] Collected:  10/29/18 1813    Specimen:  Blood Updated:  10/29/18 2002    Narrative:       The following orders were created for panel order CBC & Differential.  Procedure                               Abnormality         Status                     ---------                               -----------          ------                     Manual Differential[[id number]]                              Final result               Scan Slide[[id number]]                                                                  CBC Auto Differential[[id number]]        Abnormal            Final result                 Please view results for these tests on the individual orders.    Manual Differential [[id number]] Collected:  10/29/18 1813    Specimen:  Blood from Arm, Left Updated:  10/29/18 2002     Neutrophil % 73.0 %      Lymphocyte % 14.0 %      Monocyte % 6.0 %      Eosinophil % 1.0 %      Basophil % 2.0 %      Bands %  4.0 %      Neutrophils Absolute 5.01 10*3/mm3      Lymphocytes Absolute 0.91 10*3/mm3      Monocytes Absolute 0.39 10*3/mm3      Eosinophils Absolute 0.07 10*3/mm3      Basophils Absolute 0.13 10*3/mm3      Anisocytosis Slight/1+     Hypochromia Slight/1+     Macrocytes Slight/1+     WBC Morphology Normal     Platelet Estimate Decreased    CBC Auto Differential [[id number]]  (Abnormal) Collected:  10/29/18 1813    Specimen:  Blood from Arm, Left Updated:  10/29/18 2001     WBC 6.51 10*3/mm3      RBC 2.99 (L) 10*6/mm3      Hemoglobin 11.0 (L) g/dL      Hematocrit 32.9 (L) %      .0 (H) fL      MCH 36.8 (H) pg      MCHC 33.4 g/dL      RDW 15.0 (H) %      RDW-SD 60.0 (H) fl      MPV 9.8 fL      Platelets 63 (L) 10*3/mm3     Blood Culture - Blood, [[id number]] Collected:  10/29/18 1924    Specimen:  Blood from Hand, Right Updated:  10/29/18 1931    Lactic Acid, Plasma [[id number]]  (Abnormal) Collected:  10/29/18 1813    Specimen:  Blood from Arm, Left Updated:  10/29/18 1845     Lactate 2.7 (C) mmol/L     Lactic Acid, Reflex Timer (This will reflex a repeat order 3-3:15 hours after ordered.) [[id number]] Collected:  10/29/18 1813    Specimen:  Blood from Arm, Left Updated:  10/29/18 1844    aPTT [[id number]]  (Abnormal) Collected:  10/29/18 1813    Specimen:  Blood from Arm, Left Updated:  10/29/18 1843     PTT 41.8 (H)  seconds     Narrative:       The recommended Heparin therapeutic range is 68-97 seconds.    Protime-INR [[id number]]  (Abnormal) Collected:  10/29/18 1813    Specimen:  Blood from Arm, Left Updated:  10/29/18 1843     Protime 19.5 (H) Seconds      INR 1.71 (H)    Narrative:       Therapeutic range for most indications is 2.0-3.0 INR,  or 2.5-3.5 for mechanical heart valves.    Ammonia [[id number]]  (Abnormal) Collected:  10/29/18 1813    Specimen:  Blood from Arm, Left Updated:  10/29/18 1835     Ammonia 129 (H) umol/L     Comprehensive Metabolic Panel [[id number]]  (Abnormal) Collected:  10/29/18 1813    Specimen:  Blood from Arm, Left Updated:  10/29/18 1834     Glucose 75 mg/dL      BUN 27 (H) mg/dL      Creatinine 1.71 (H) mg/dL      Sodium 135 (L) mmol/L      Potassium 3.9 mmol/L      Chloride 102 mmol/L      CO2 27.0 mmol/L      Calcium 8.7 mg/dL      Total Protein 6.5 g/dL      Albumin 2.40 (L) g/dL      ALT (SGPT) 17 (L) U/L      AST (SGOT) 46 U/L      Alkaline Phosphatase 168 (H) U/L      Total Bilirubin 8.1 (H) mg/dL      eGFR Non African Amer 42 (L) mL/min/1.73      Globulin 4.1 (H) gm/dL      A/G Ratio 0.6 (L) g/dL      BUN/Creatinine Ratio 15.8     Anion Gap 6.0 mmol/L     Lipase [[id number]]  (Normal) Collected:  10/29/18 1813    Specimen:  Blood from Arm, Left Updated:  10/29/18 1834     Lipase 85 U/L     Ethanol [[id number]] Collected:  10/29/18 1813    Specimen:  Blood from Arm, Left Updated:  10/29/18 1834     Ethanol <10 mg/dL      Ethanol % <0.010 %     Influenza Antigen, Rapid - Swab, Nasopharynx [[id number]]  (Normal) Collected:  10/29/18 1813    Specimen:  Swab from Nasopharynx Updated:  10/29/18 1830     Influenza A Ag, EIA Negative     Influenza B Ag, EIA Negative    Blood Culture - Blood, [[id number]] Collected:  10/29/18 1813    Specimen:  Blood from Arm, Left Updated:  10/29/18 1820        Imaging Results (last 24 hours)     Procedure Component Value Units Date/Time    CT Head Without Contrast  [[id number]] Collected:  10/29/18 1825     Updated:  10/29/18 1903    Narrative:       EXAM DESCRIPTION: CT HEAD WO CONTRAST    CLINICAL HISTORY:  Confusion/delirium, altered LOC, unexplained,  R41.82 Altered mental status, unspecified .      COMPARISON: 9/4/2018    DOSE LENGTH PRODUCT: 939.7    CONTRAST: None    TECHNIQUE:    Axial images from skull base to vertex.    This exam was performed according to our departmental dose  optimization program, which includes automated exposure control,  adjustment of the mA and/or KV according to patient size and/or  use of iterative reconstruction technique.    FINDINGS:  No Chiari malformation.  Extra-axial spaces: Appropriate for the mild degree of volume  loss present.    Intracranial hemorrhage: No evidence of intracranial hemorrhage  or suspicious extra-axial fluid collection.  Ventricular system: No hydrocephalus.   Basal cisterns: Unremarkable.   Cerebral parenchyma: No edema, midline shift, or mass effect.  Gray-white differentiation is maintained.    Midline shift: None.    Cerebellum: No acute or suspicious interval change.   Brainstem : Unremarkable CT appearance.   Calvarium: No acute or suspicious calvarial lesion.    Vascular system: Calcification, otherwise unremarkable  noncontrast appearance.    Paranasal sinuses and mastoid air cells: Clear.    Visualized orbits: No acute finding.    Visualized upper cervical spine: Limited, unremarkable.   Sella: Unremarkable.    Skull base: Unremarkable.     ADDITIONAL FINDINGS: None      Impression:       No CT evidence of intracranial hemorrhage, mass effect, or acute  large vessel distribution infarct.    Electronically signed by:  Tho Chavez MD  10/29/2018 7:02 PM  CDT Workstation: [id number]            Assessment:    Active Hospital Problems    Diagnosis   • Altered mental status       Hepatic Encephalopathy - will start on lactulose.  Will reevaluate ammonia in AM.  CT head is negative    Chronic hepatitis      Thrombocytopenia - chronic    DVT prophylaxis - SCD AIDA            Eduar Cisneros MD  10/29/18  8:14 PM                  Electronically signed by Eduar Cisneros MD at 10/29/2018  8:17 PM       Hospital Medications (active)       Dose Frequency Start End    albumin human 5 % bottle 25 g 25 g Every 12 Hours 11/4/2018     Sig - Route: Infuse 500 mL into a venous catheter Every 12 (Twelve) Hours. - Intravenous    Cosign for Ordering: Accepted by Beau Celeste MD on 11/4/2018  7:10 PM    cefTRIAXone (ROCEPHIN) 1 g/100 mL 0.9% NS (MBP) 1 g Every 24 Hours 10/31/2018 11/8/2018    Sig - Route: Infuse 100 mL into a venous catheter Daily. - Intravenous    furosemide (LASIX) 100 mg in sodium chloride 0.9 % 100 mL (1 mg/mL) infusion 5 mg/hr Continuous 11/4/2018     Sig - Route: Infuse 5 mg/hr into a venous catheter Continuous. - Intravenous    Cosign for Ordering: Accepted by Beau Celeste MD on 11/4/2018  7:10 PM    HYDROmorphone (DILAUDID) injection 0.25 mg 0.25 mg Every 3 Hours PRN 11/1/2018 11/11/2018    Sig - Route: Infuse 0.125 mL into a venous catheter Every 3 (Three) Hours As Needed for Severe Pain . - Intravenous    ipratropium-albuterol (DUO-NEB) nebulizer solution 3 mL 3 mL Every 6 Hours PRN 10/30/2018     Sig - Route: Take 3 mL by nebulization Every 6 (Six) Hours As Needed for Wheezing. - Nebulization    lactulose (CHRONULAC) 10 GM/15ML solution 30 g 30 g 3 Times Daily 10/29/2018     Sig - Route: Take 45 mL by mouth 3 (Three) Times a Day. - Oral    magic butt ointment  2 Times Daily 10/30/2018     Sig - Route: Apply  topically to the appropriate area as directed 2 (Two) Times a Day. - Topical    metroNIDAZOLE (FLAGYL) IVPB 500 mg 500 mg Every 8 Hours 11/4/2018 11/11/2018    Sig - Route: Infuse 100 mL into a venous catheter Every 8 (Eight) Hours. - Intravenous    Cosign for Ordering: Accepted by Beau Celeste MD on 11/4/2018  7:10 PM    midodrine (PROAMATINE) tablet 10 mg 10 mg 3 Times Daily 10/29/2018   "   Sig - Route: Take 2 tablets by mouth 3 (Three) Times a Day. - Oral    naloxone (NARCAN) injection 0.4 mg 0.4 mg Every 5 Minutes PRN 10/29/2018     Sig - Route: Infuse 1 mL into a venous catheter Every 5 (Five) Minutes As Needed for Respiratory Depression. - Intravenous    Linked Group 1:  \"And\" Linked Group Details        nicotine (NICODERM CQ) 21 MG/24HR patch 1 patch 1 patch Every 24 Hours Scheduled 10/30/2018     Sig - Route: Place 1 patch on the skin as directed by provider Daily. - Transdermal    ondansetron (ZOFRAN) injection 4 mg 4 mg Every 6 Hours PRN 10/29/2018     Sig - Route: Infuse 2 mL into a venous catheter Every 6 (Six) Hours As Needed for Nausea or Vomiting. - Intravenous    oxyCODONE (ROXICODONE) immediate release tablet 5 mg 5 mg Every 6 Hours PRN 11/1/2018 11/11/2018    Sig - Route: Take 1 tablet by mouth Every 6 (Six) Hours As Needed for Moderate Pain . - Oral    potassium chloride (MICRO-K) CR capsule 40 mEq 40 mEq Once 11/5/2018 11/5/2018    Sig - Route: Take 4 capsules by mouth 1 (One) Time. - Oral    Cosign for Ordering: Required by Eduar Cisneros MD    rifaximin (XIFAXAN) tablet 275 mg 275 mg Every 12 Hours Scheduled 10/29/2018     Sig - Route: Take 0.5 tablets by mouth Every 12 (Twelve) Hours. - Oral    simethicone (MYLICON) chewable tablet 80 mg 80 mg Every 6 Hours PRN 10/29/2018     Sig - Route: Chew 1 tablet Every 6 (Six) Hours As Needed for Flatulence. - Oral    sodium chloride 0.9 % flush 3 mL 3 mL Every 12 Hours Scheduled 10/29/2018     Sig - Route: Infuse 3 mL into a venous catheter Every 12 (Twelve) Hours. - Intravenous    sodium chloride 0.9 % flush 3-10 mL 3-10 mL As Needed 10/29/2018     Sig - Route: Infuse 3-10 mL into a venous catheter As Needed for Line Care. - Intravenous             Physician Progress Notes (last 24 hours) (Notes from 11/4/2018 12:57 PM through 11/5/2018 12:57 PM)      Solomon Gomez PA at 11/5/2018 10:47 AM              [facility] " [facility] Medicine Services  INPATIENT PROGRESS NOTE    Length of Stay: 7  Date of Admission: 10/29/2018  Primary Care Physician: Reddy Grant MD    Subjective   Please note that all previous progress notes, lab findings, radiograph chronic, medication changes, and physical exam findings have been noted and updated as appropriate.    11/5/2018:  Patient continues to complain of fluid overload, lower extremity edema.  INR is increased at greater than 2 today.  Less than 50,000 platelets.  Dr. Becerril of gastroenterology will see.  Have discussed gallbladder with attending, at this time exam appears stable and patient does not appear septic or toxic.  No indication of cholecystitis.  At this time patient is not a viable surgical candidate and therefore will monitor gallbladder.  Will discuss possibility for fresh frozen plasma prior to aspiration of cyst/abscess on kidney.    11/4/2018:  Patient feeling fluid overloaded, states he feels he may need a paracentesis.  No shortness of breath or fever.  Still has abdominal discomfort.    11/3/2018: CT scan demonstrated either subcapsular hematoma or possible abscess.  Have discussed with Dr. Vigil of radiology, who has determined would be most appropriate to optimize coagulation labs and possibly wait for an increase in platelets prior to any type of needle drainage or culture collection.  Secondary to absence of leukocytosis, Dr. Vigil has determined that the more likely diagnosis is subcapsular hematoma.  Patient is on antibiotic treatment.  No fever or chills.    11/2/2018: Patient continues to have mild abdominal pain and vague complaints of shoulder pain.  CT scan of the abdomen and pelvis was originally ordered without contrast secondary to the fact the patient had an elevated creatinine.  This demonstrated a possible renal mass and recommendation that patient undergo contrasted CT scan.  Renal function has returned to normal as has GFR.   Patient will undergo contrasted scan today as well as a chest x-ray of the gallbladder.  decrease in hemoglobin which is likely secondary to chronic disease due to his cirrhosis, however iron deficiency anemia has been diagnosed in the past and an anemia profile is pending.    Chief Complaint/HPI:  This 53-year-old male reported to the emergency part with complaints of altered mental status.  Patient had an elevated ammonia of 129.  Ammonia has trended downward daily and is now 15.  At this time patient complains of fatigue, abdominal pain, and right shoulder pain.  Patient also being seen by urology for retention of urine.    Review of Systems   Constitutional: Positive for fatigue. Negative for chills and fever.   Respiratory: Negative for shortness of breath.    Cardiovascular: Negative for chest pain.   Gastrointestinal: Positive for abdominal pain. Negative for constipation, nausea and vomiting.   Genitourinary: Negative for difficulty urinating and dysuria.   Musculoskeletal: Positive for arthralgias.      All pertinent negatives and positives are as above. All other systems have been reviewed and are negative unless otherwise stated.     Objective    Temp:  [97.2 °F (36.2 °C)-98.4 °F (36.9 °C)] 97.6 °F (36.4 °C)  Heart Rate:  [80-91] 80  Resp:  [16-20] 16  BP: (119-145)/(77-94) 124/86    Physical Exam   Constitutional: He is oriented to person, place, and time. No distress.   HENT:   Head: Normocephalic and atraumatic.   Cardiovascular: Normal rate and regular rhythm.    Pulmonary/Chest: Effort normal. No respiratory distress.   Abdominal: Soft. Bowel sounds are normal. He exhibits no distension. There is tenderness.   Musculoskeletal: He exhibits edema.   Neurological: He is alert and oriented to person, place, and time.   Skin: Skin is warm and dry. He is not diaphoretic.     Results Review:  I have reviewed the labs, radiology results, and diagnostic studies.    Laboratory Data:     Results from last 7  days  Lab Units 11/05/18  0608 11/04/18  0529 11/03/18  0549  10/31/18  0541  10/29/18  1813   SODIUM mmol/L 132* 130* 131*  < > 137  137  < > 135*   POTASSIUM mmol/L 3.2* 4.1 3.9  < > 4.0  4.0  < > 3.9   CHLORIDE mmol/L 102 105 105  < > 106  106  < > 102   CO2 mmol/L 21.0* 19.0* 22.0  < > 23.0  24.0  < > 27.0   BUN mg/dL 15 15 17  < > 26*  25*  < > 27*   CREATININE mg/dL 1.03 1.08 1.17  < > 1.62*  1.63*  < > 1.71*   GLUCOSE mg/dL 72 66 69  < > 80  76  < > 75   CALCIUM mg/dL 7.8* 7.9* 7.9*  < > 8.3*  8.2*  < > 8.7   BILIRUBIN mg/dL  --   --   --   --  8.7*  --  8.1*   ALK PHOS U/L  --   --   --   --  126  --  168*   ALT (SGPT) U/L  --   --   --   --  26  --  17*   AST (SGOT) U/L  --   --   --   --  47  --  46   ANION GAP mmol/L 9.0 6.0 4.0*  < > 8.0  7.0  < > 6.0   < > = values in this interval not displayed.  Estimated Creatinine Clearance: 100 mL/min (by C-G formula based on SCr of 1.03 mg/dL).    Results from last 7 days  Lab Units 11/02/18  1407   MAGNESIUM mg/dL 1.9           Results from last 7 days  Lab Units 11/05/18  0608 11/04/18  0529 11/03/18  0549 11/02/18  0535 11/01/18  0552   WBC 10*3/mm3 4.47 4.92 5.73 5.44 5.31   HEMOGLOBIN g/dL 7.5* 8.5* 8.4* 8.0* 9.0*   HEMATOCRIT % 21.2* 23.8* 24.2* 23.3* 26.5*   PLATELETS 10*3/mm3 42* 48* 54* 49* 53*       Results from last 7 days  Lab Units 11/05/18  0608 11/04/18  1251 11/03/18  0847 10/31/18  1144 10/29/18  1813   INR  2.49* 2.12* 2.04* 2.22* 1.71*       Culture Data:   No results found for: BLOODCX  No results found for: URINECX  No results found for: RESPCX  No results found for: WOUNDCX  No results found for: STOOLCX  No components found for: BODYFLD    Radiology Data:   Imaging Results (last 24 hours)     ** No results found for the last 24 hours. **          I have reviewed the patient's current medications.     Assessment/Plan     Active Hospital Problems    Diagnosis   • Altered mental status   • CKD (chronic kidney disease) stage 3, GFR  30-59 ml/min (CMS/HCC)   • Hepatic encephalopathy (CMS/HCC)   • Thrombocytopenia (CMS/HCC)   • Chronic hepatitis (CMS/HCC)       Plan:  GI consult appreciated, will discuss renal mass with urology, continue abx.              This document has been electronically signed by KENROY Goode on November 5, 2018 10:47 AM        Electronically signed by Solomon Gomez PA at 11/5/2018 10:53 AM     Shawn Ledbetter MD at 11/4/2018  3:32 PM           LOS: 6 days     Patient Care Team:  Reddy Grant MD as PCP - General (Family Medicine)  Shawn Cortez MD (Inactive) as Surgeon (Orthopedic Surgery)  Tushar Becerril DO as Consulting Physician (Gastroenterology)  Josep Colón MD as Consulting Physician (Nephrology)  Ortiz Ferreira MD as Consulting Physician (Hematology and Oncology)      Subjective     Left perirenal fluid collection    Objective       Vital Signs  Temp:  [96.8 °F (36 °C)-98.8 °F (37.1 °C)] 98.4 °F (36.9 °C)  Heart Rate:  [74-83] 83  Resp:  [18-20] 18  BP: (121-145)/(76-94) 131/94    Physical Exam:        General Appearance:    Setting up in bed     Respiratory:    UNLABORED RESPIRATIONS.     Abdomen:     SOFT.       Genitourinary:   Urine clear     Rectal:     DEFERRED       Results Review:       Imaging Results (last 24 hours)     ** No results found for the last 24 hours. **        Lab Results (last 24 hours)     Procedure Component Value Units Date/Time    Protime-INR [[id number]]  (Abnormal) Collected:  11/04/18 1251    Specimen:  Blood Updated:  11/04/18 1324     Protime 22.9 (H) Seconds      INR 2.12 (H)    Narrative:       Therapeutic range for most indications is 2.0-3.0 INR,  or 2.5-3.5 for mechanical heart valves.    POC Glucose Once [[id number]]  (Abnormal) Collected:  11/04/18 1045    Specimen:  Blood Updated:  11/04/18 1100     Glucose 142 (H) mg/dL      Comment: RN NotifiedOperator: [id number] DAGMAR ARNDTTWANLeora ID: [id number]       POC Glucose Once [[id number]]  (Normal)  Collected:  11/04/18 0718    Specimen:  Blood Updated:  11/04/18 0748     Glucose 88 mg/dL      Comment: : [id number] DAGMAR Reddy ID: [id number]       CBC & Differential [[id number]] Collected:  11/04/18 0529    Specimen:  Blood Updated:  11/04/18 0558    Narrative:       The following orders were created for panel order CBC & Differential.  Procedure                               Abnormality         Status                     ---------                               -----------         ------                     Scan Slide[[id number]]                                       Final result               CBC Auto Differential[[id number]]        Abnormal            Final result                 Please view results for these tests on the individual orders.    Scan Slide [[id number]] Collected:  11/04/18 0529    Specimen:  Blood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

## 2018-11-05 NOTE — PROGRESS NOTES
"   LOS: 7 days   Patient Care Team:  Reddy Grant MD as PCP - General (Family Medicine)  Shawn Cortez MD (Inactive) as Surgeon (Orthopedic Surgery)  Tushar Becerril DO as Consulting Physician (Gastroenterology)  Josep Colón MD as Consulting Physician (Nephrology)  Ortiz Ferreira MD as Consulting Physician (Hematology and Oncology)    Subjective     Subjective:  Symptoms:  Stable.  (Generalized abdominal pain, complains of chronic shoulder pain, no dysuria. Reports his hematuria has fully resolved. ).    Diet:  Adequate intake.  No nausea or vomiting.        History taken from: patient chart RN    Objective     Vital Signs  Temp:  [97.2 °F (36.2 °C)-98 °F (36.7 °C)] 98 °F (36.7 °C)  Heart Rate:  [72-91] 72  Resp:  [16-18] 18  BP: (119-139)/(77-92) 122/78    Objective:  General Appearance:  In no acute distress.    Vital signs: (most recent): Blood pressure 122/78, pulse 72, temperature 98 °F (36.7 °C), temperature source Temporal Artery , resp. rate 18, height 182.9 cm (72\"), weight 99 kg (218 lb 3 oz), SpO2 95 %.  Vital signs are normal.  No fever.    Output: Producing urine (Urine clear yellow) and producing stool.    HEENT: Normal HEENT exam.  (Scleral icterus is present)    Lungs:  Normal effort and normal respiratory rate.  Breath sounds clear to auscultation.  He is not in respiratory distress.  No stridor.  No decreased breath sounds.    Heart: Normal rate.  Regular rhythm.  S1 normal and S2 normal.  No murmur, gallop or friction rub.   Chest: Symmetric chest wall expansion.   Abdomen: Abdomen is soft and non-distended.  Bowel sounds are normal.   There is generalized tenderness.     Neurological: Patient is alert and oriented to person, place and time.  GCS score is 15.    Pupils:  Pupils are equal, round, and reactive to light.    Skin:  Warm and dry.  No rash or cyanosis. (Excessively dry, jaundice)        Results Review:    Lab Results (last 24 hours)     Procedure Component Value Units " Date/Time    POC Glucose Once [[id number]]  (Normal) Collected:  11/05/18 0816    Specimen:  Blood Updated:  11/05/18 0829     Glucose 128 mg/dL      Comment: RN NotifiedOperator: [id number] ROBERTO CRYSTALMeter ID: [id number]       CBC & Differential [[id number]] Collected:  11/05/18 0608    Specimen:  Blood Updated:  11/05/18 0704    Narrative:       The following orders were created for panel order CBC & Differential.  Procedure                               Abnormality         Status                     ---------                               -----------         ------                     Scan Slide[[id number]]                                       Final result               CBC Auto Differential[[id number]]        Abnormal            Final result                 Please view results for these tests on the individual orders.    Scan Slide [[id number]] Collected:  11/05/18 0608    Specimen:  Blood Updated:  11/05/18 0704     RBC Morphology Normal     WBC Morphology Normal     Platelet Estimate Decreased    Ammonia [[id number]]  (Normal) Collected:  11/05/18 0608    Specimen:  Blood Updated:  11/05/18 0649     Ammonia 20 umol/L     Basic Metabolic Panel [[id number]]  (Abnormal) Collected:  11/05/18 0608    Specimen:  Blood Updated:  11/05/18 0646     Glucose 72 mg/dL      BUN 15 mg/dL      Creatinine 1.03 mg/dL      Sodium 132 (L) mmol/L      Potassium 3.2 (L) mmol/L      Chloride 102 mmol/L      CO2 21.0 (L) mmol/L      Calcium 7.8 (L) mg/dL      eGFR Non African Amer 75 mL/min/1.73      BUN/Creatinine Ratio 14.6     Anion Gap 9.0 mmol/L     Protime-INR [[id number]]  (Abnormal) Collected:  11/05/18 0608    Specimen:  Blood Updated:  11/05/18 0645     Protime 25.8 (H) Seconds      INR 2.49 (H)    Narrative:       Therapeutic range for most indications is 2.0-3.0 INR,  or 2.5-3.5 for mechanical heart valves.    CBC Auto Differential [[id number]]  (Abnormal) Collected:  11/05/18 0608    Specimen:  Blood Updated:  11/05/18  0638     WBC 4.47 10*3/mm3      RBC 2.05 (L) 10*6/mm3      Hemoglobin 7.5 (L) g/dL      Hematocrit 21.2 (L) %      .4 (H) fL      MCH 36.6 (H) pg      MCHC 35.4 g/dL      RDW 14.2 %      RDW-SD 53.6 (H) fl      MPV 9.7 fL      Platelets 42 (L) 10*3/mm3      Neutrophil % 47.6 %      Lymphocyte % 29.8 %      Monocyte % 13.6 (H) %      Eosinophil % 7.2 (H) %      Basophil % 1.6 %      Immature Grans % 0.2 %      Neutrophils, Absolute 2.13 10*3/mm3      Lymphocytes, Absolute 1.33 10*3/mm3      Monocytes, Absolute 0.61 10*3/mm3      Eosinophils, Absolute 0.32 10*3/mm3      Basophils, Absolute 0.07 10*3/mm3      Immature Grans, Absolute 0.01 10*3/mm3     POC Glucose Once [[id number]]  (Abnormal) Collected:  11/04/18 1905    Specimen:  Blood Updated:  11/04/18 2107     Glucose 166 (H) mg/dL      Comment: RN NotifiedOperator: [id number] SHALINI PRITCHARDNAMeter ID: [id number]            Imaging Results (last 24 hours)     ** No results found for the last 24 hours. **           I reviewed the patient's new clinical results.  I reviewed the patient's new imaging results and agree with the interpretation.  I reviewed the patient's other test results and agree with the interpretation      Assessment/Plan       Chronic hepatitis (CMS/HCC)    Thrombocytopenia (CMS/HCC)    Hepatic encephalopathy (CMS/HCC)    CKD (chronic kidney disease) stage 3, GFR 30-59 ml/min (CMS/HCC)    Altered mental status      Assessment & Plan    1. Difficulty urinating with hematuria, likely related to UTI hemorrhagic cystitis without clots  -Estimated Creatinine Clearance: 100 mL/min (by C-G formula based on SCr of 1.03 mg/dL).  -Cr 1.71-->1.72-->1.62-->1.44-->1.16-->1.03  -WBC 6.51-->5.13-->8.84-->5.31-->5.44-->4.47  -UA + 10/30/18  -Hgb/Hct 8.0/23.3-->7.5/21/2  -Platelets 63,000-->53,000-->75,000-->53,000-->49,000-->42,000  -Renal ultrasound 10/30/18 limited exam, especially of the left kidney, due to the presence of bowel gas and the patient's body  habitus. No hydronephrosis is visualized. Unremarkable bladder.   -11/2/18 CT abdomen/pelvis 2.3 x 3.7 x 2.2 cm fluid and air collection infected subcapsular hematoma versus abscess.  -10/30/18 Urine culture NEGATIVE, UA grossly positive.   -Antibiotic day #8, currently on Rocephin.     Plan:   Discussed with Solomon GALLAGHER, radiology guided drainage of abscess with consideration of his thrombocytopenia, consider CT abdomen with contrast to evaluate any change in fluid collection. Continue Rocephin.    KALEB Alexander  11/05/18  5:20 PM

## 2018-11-05 NOTE — PLAN OF CARE
Problem: Patient Care Overview  Goal: Plan of Care Review  Outcome: Ongoing (interventions implemented as appropriate)   11/05/18 0156   Coping/Psychosocial   Plan of Care Reviewed With patient   Plan of Care Review   Progress no change   OTHER   Outcome Summary Pt continues to c/o pain that is well controlled by current regimine. VSS
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
## 2018-11-06 NOTE — PROGRESS NOTES
[facility] Medicine Services  INPATIENT PROGRESS NOTE    Length of Stay: 8  Date of Admission: 10/29/2018  Primary Care Physician: Reddy Grant MD    Subjective   Please note that all previous progress notes, lab findings, radiograph chronic, medication changes, and physical exam findings have been noted and updated as appropriate.    11/6/2018: Edema decreased from lower extremity, still has ascites.  Dr. Becerril agrees with Lasix albumin drip.  At this time Dr. Ledbetter has ordered aspiration of abscess and left kidney.  Have ordered all appropriate labs.  We will transfuse 2 units of fresh frozen plasma prior to procedure given the patient's elevated INR.  Patient understands risks and benefits including the possibility of fluid overload.    11/5/2018:  Patient continues to complain of fluid overload, lower extremity edema.  INR is increased at greater than 2 today.  Less than 50,000 platelets.  Dr. Becerril of gastroenterology will see.  Have discussed gallbladder with attending, at this time exam appears stable and patient does not appear septic or toxic.  No indication of cholecystitis.  At this time patient is not a viable surgical candidate and therefore will monitor gallbladder.  Will discuss possibility for fresh frozen plasma prior to aspiration of cyst/abscess on kidney.    11/4/2018:  Patient feeling fluid overloaded, states he feels he may need a paracentesis.  No shortness of breath or fever.  Still has abdominal discomfort.    11/3/2018: CT scan demonstrated either subcapsular hematoma or possible abscess.  Have discussed with Dr. Vigil of radiology, who has determined would be most appropriate to optimize coagulation labs and possibly wait for an increase in platelets prior to any type of needle drainage or culture collection.  Secondary to absence of leukocytosis, Dr. Vigil has determined that the more likely diagnosis is subcapsular hematoma.  Patient is on  antibiotic treatment.  No fever or chills.    11/2/2018: Patient continues to have mild abdominal pain and vague complaints of shoulder pain.  CT scan of the abdomen and pelvis was originally ordered without contrast secondary to the fact the patient had an elevated creatinine.  This demonstrated a possible renal mass and recommendation that patient undergo contrasted CT scan.  Renal function has returned to normal as has GFR.  Patient will undergo contrasted scan today as well as a chest x-ray of the gallbladder.  decrease in hemoglobin which is likely secondary to chronic disease due to his cirrhosis, however iron deficiency anemia has been diagnosed in the past and an anemia profile is pending.    Chief Complaint/HPI:  This 53-year-old male reported to the emergency part with complaints of altered mental status.  Patient had an elevated ammonia of 129.  Ammonia has trended downward daily and is now 15.  At this time patient complains of fatigue, abdominal pain, and right shoulder pain.  Patient also being seen by urology for retention of urine.    Review of Systems   Constitutional: Positive for fatigue. Negative for chills and fever.   Respiratory: Positive for shortness of breath.    Cardiovascular: Negative for chest pain.   Gastrointestinal: Positive for abdominal distention and abdominal pain. Negative for constipation, nausea and vomiting.   Genitourinary: Negative for difficulty urinating and dysuria.   Musculoskeletal: Positive for arthralgias.      All pertinent negatives and positives are as above. All other systems have been reviewed and are negative unless otherwise stated.     Objective    Temp:  [96.6 °F (35.9 °C)-98.7 °F (37.1 °C)] 96.6 °F (35.9 °C)  Heart Rate:  [72-80] 75  Resp:  [16-18] 16  BP: (110-138)/(64-82) 110/70    Physical Exam   Constitutional: He is oriented to person, place, and time. No distress.   HENT:   Head: Normocephalic and atraumatic.   Cardiovascular: Normal rate and regular  rhythm.    Pulmonary/Chest: Effort normal. No respiratory distress.   Abdominal: Soft. Bowel sounds are normal. He exhibits no distension. There is tenderness.   Musculoskeletal: He exhibits edema.   Neurological: He is alert and oriented to person, place, and time.   Skin: Skin is warm and dry. He is not diaphoretic.     Results Review:  I have reviewed the labs, radiology results, and diagnostic studies.    Laboratory Data:     Results from last 7 days  Lab Units 11/06/18 0552 11/05/18  0608 11/04/18  0529  10/31/18  0541   SODIUM mmol/L 134* 132* 130*  < > 137  137   POTASSIUM mmol/L 3.0* 3.2* 4.1  < > 4.0  4.0   CHLORIDE mmol/L 100 102 105  < > 106  106   CO2 mmol/L 25.0 21.0* 19.0*  < > 23.0  24.0   BUN mg/dL 13 15 15  < > 26*  25*   CREATININE mg/dL 0.83 1.03 1.08  < > 1.62*  1.63*   GLUCOSE mg/dL 73 72 66  < > 80  76   CALCIUM mg/dL 7.7* 7.8* 7.9*  < > 8.3*  8.2*   BILIRUBIN mg/dL  --   --   --   --  8.7*   ALK PHOS U/L  --   --   --   --  126   ALT (SGPT) U/L  --   --   --   --  26   AST (SGOT) U/L  --   --   --   --  47   ANION GAP mmol/L 9.0 9.0 6.0  < > 8.0  7.0   < > = values in this interval not displayed.  Estimated Creatinine Clearance: 124.6 mL/min (by C-G formula based on SCr of 0.83 mg/dL).    Results from last 7 days  Lab Units 11/02/18  1407   MAGNESIUM mg/dL 1.9           Results from last 7 days  Lab Units 11/06/18  0552 11/05/18  0608 11/04/18  0529 11/03/18  0549 11/02/18  0535   WBC 10*3/mm3 5.17 4.47 4.92 5.73 5.44   HEMOGLOBIN g/dL 7.9* 7.5* 8.5* 8.4* 8.0*   HEMATOCRIT % 22.0* 21.2* 23.8* 24.2* 23.3*   PLATELETS 10*3/mm3 46* 42* 48* 54* 49*       Results from last 7 days  Lab Units 11/06/18  0552 11/05/18  0608 11/04/18  1251 11/03/18  0847 10/31/18  1144   INR  2.51* 2.49* 2.12* 2.04* 2.22*       Culture Data:   No results found for: BLOODCX  No results found for: URINECX  No results found for: RESPCX  No results found for: WOUNDCX  No results found for: STOOLCX  No components  found for: BODYFLD    Radiology Data:   Imaging Results (last 24 hours)     Procedure Component Value Units Date/Time    CT Abdomen Pelvis With Contrast [[id number]] Collected:  11/05/18 1815     Updated:  11/05/18 1857    Narrative:         EXAMINATION:  Computed Tomography           REGION:    Abdomen / Pelvis                     INDICATION:   Repeat to evaluate hematoma/abscess of kidney per  urology, R41.82 Altered mental status, unspecified K72.90 Hepatic  failure, unspecified without coma Z74.09 Other reduced mobility  Z74.09 Other reduced mobility    HISTORY:  LISA. IMAGING:    CT A/P 11/2/18            TECHNIQUE:      - reconstructions:    axial, coronal, sagittal         - contrast:      oral:  No ;   intravenous: Isovue 300, 100 mL  This exam was performed according to the departmental  dose-optimization program which includes automated exposure  control, adjustment of the mA and/or kV according to patient size  and/or use of iterative reconstruction technique.           COMMENTS:            - - - CT ABDOMEN - - -          THORAX (INFERIOR):      - LUNG BASES:  clear      - PLEURA:    no fluid or mass      - HEART:    normal size, no pericardial fluid     - MISC:      n/a          ABDOMEN:     - LIVER:    Diminished size with nodular contour containing a  tips shunt   - GB:      Cholelithiasis without evidence of wall thickening.    - CBD:      grossly negative   - SPLEEN:    Upper normal/mildly enlarged   - PANCREAS:    normal in size, contour, no focal mass    - VISCERA:    normal caliber, no wall thickening     - MESENTERY:  no mesenteric mass   - CAVITY:    Small amount of free fluid   - BODY WALL:  wnl   - OSSEOUS:    grossly negative for age   - MISC:                 Extensive gastric varices                      RETROPERITONEUM:   - KIDNEYS:    Renal examination again displays overall normal  size and symmetric nephrograms. Again noted is a left pararenal  enhancing focal fluid collection containing  air which is  unchanged in size measuring 2.5 x 3.6 x 2.0 cm   - URETERS:    normal course, caliber   - ADRENALS:    normal size, contour   - MISC:      no sig retroperitoneal adenopathy or mass   - VASCULAR:    aorta / iliacs: wnl for age     - - - CT PELVIS - - -      - VISCERA:    normal caliber small/large bowel, no focal  thickening/mass        - APPENDIX:          wnl   - MESENTERY:  no mass   - VASCULAR:    wnl for age   - CAVITY:    Moderate amount of free fluid   - BLADDER:    unremarkable   - OSSEOUS:    Status post left hip repair    - MISC:     .       Impression:        CONCLUSION:  1. Unchanged left pararenal focal fluid collection with  peripheral enhancement containing air, likely representing an  abscess.  2. Multiple additional findings demonstrating the presence of  cirrhosis status post TIPS shunt, with spleen size at the upper  limits of normal/mildly enlarged, and extensive gastric varices,  as above, unchanged.        Electronically signed by:  PRISCILLA Bustillo MD  11/5/2018 6:56  PM CST Workstation: [id number]          I have reviewed the patient's current medications.     Assessment/Plan     Active Hospital Problems    Diagnosis   • Altered mental status   • CKD (chronic kidney disease) stage 3, GFR 30-59 ml/min (CMS/HCC)   • Hepatic encephalopathy (CMS/HCC)   • Thrombocytopenia (CMS/HCC)   • Chronic hepatitis (CMS/HCC)       Plan:  Continue antibiotics, aspiration of renal mass and all appropriate labs, follow cultures, fresh frozen plasma, consider peritoneal drainage within the next 24-48 hours if lab stay stable and renal aspiration is successful.  Replete potassium.              This document has been electronically signed by KENROY Goode on November 6, 2018 12:00 PM

## 2018-11-06 NOTE — SIGNIFICANT NOTE
Risks and benefits of transfusion discussed, including fluid overload and respiratory distress.        This document has been electronically signed by KENROY Goode on November 6, 2018 11:59 AM
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
## 2018-11-06 NOTE — PLAN OF CARE
Problem: Patient Care Overview  Goal: Plan of Care Review  Outcome: Ongoing (interventions implemented as appropriate)   11/06/18 1002   Coping/Psychosocial   Plan of Care Reviewed With patient   Plan of Care Review   Progress no change   OTHER   Outcome Summary Pt CGA for UB ADL and Max/Dep for LB ADL. No new goals met this tx.

## 2018-11-06 NOTE — PROGRESS NOTES
"   LOS: 8 days   Patient Care Team:  Reddy Grant MD as PCP - General (Family Medicine)  Shawn Cortez MD (Inactive) as Surgeon (Orthopedic Surgery)  Tushar Becerril DO as Consulting Physician (Gastroenterology)  Josep Colón MD as Consulting Physician (Nephrology)  Ortiz Ferreira MD as Consulting Physician (Hematology and Oncology)    Subjective     Subjective:  Symptoms:  Stable.  (Smiling, eating dinner without nausea or vomiting, minimal abdominal pain. ).    Diet:  Adequate intake.  No nausea or vomiting.    Pain:  He reports pain is improving.        History taken from: patient chart RN    Objective     Vital Signs  Temp:  [96.6 °F (35.9 °C)-98.7 °F (37.1 °C)] 97.2 °F (36.2 °C)  Heart Rate:  [73-80] 74  Resp:  [16-18] 18  BP: (110-143)/(64-82) 135/76    Objective:  General Appearance:  In no acute distress.    Vital signs: (most recent): Blood pressure 135/76, pulse 74, temperature 97.2 °F (36.2 °C), temperature source Oral, resp. rate 18, height 182.9 cm (72\"), weight 100 kg (220 lb 9.6 oz), SpO2 97 %.  Vital signs are normal.  No fever.    Output: Producing urine (Urine clear yellow) and producing stool.    HEENT: Normal HEENT exam.  (Scleral icterus is present)    Lungs:  Normal effort and normal respiratory rate.  Breath sounds clear to auscultation.  He is not in respiratory distress.  No stridor.  No decreased breath sounds.    Heart: Normal rate.  Regular rhythm.  S1 normal and S2 normal.  No murmur, gallop or friction rub.   Chest: Symmetric chest wall expansion.   Abdomen: Abdomen is soft and non-distended.  Bowel sounds are normal.   There is generalized tenderness.     Neurological: Patient is alert and oriented to person, place and time.  GCS score is 15.    Pupils:  Pupils are equal, round, and reactive to light.    Skin:  Warm and dry.  No rash or cyanosis. (Excessively dry, jaundice)          Results Review:    Lab Results (last 24 hours)     Procedure Component Value Units Date/Time "    Protime-INR [[id number]]  (Abnormal) Collected:  11/06/18 0552    Specimen:  Blood Updated:  11/06/18 0627     Protime 26.0 (H) Seconds      INR 2.51 (H)    Narrative:       Therapeutic range for most indications is 2.0-3.0 INR,  or 2.5-3.5 for mechanical heart valves.    Basic Metabolic Panel [[id number]]  (Abnormal) Collected:  11/06/18 0552    Specimen:  Blood Updated:  11/06/18 0627     Glucose 73 mg/dL      BUN 13 mg/dL      Creatinine 0.83 mg/dL      Sodium 134 (L) mmol/L      Potassium 3.0 (L) mmol/L      Chloride 100 mmol/L      CO2 25.0 mmol/L      Calcium 7.7 (L) mg/dL      eGFR Non African Amer 97 mL/min/1.73      BUN/Creatinine Ratio 15.7     Anion Gap 9.0 mmol/L     Ammonia [[id number]]  (Normal) Collected:  11/06/18 0552    Specimen:  Blood Updated:  11/06/18 0615     Ammonia 18 umol/L     CBC & Differential [[id number]] Collected:  11/06/18 0552    Specimen:  Blood Updated:  11/06/18 0611    Narrative:       The following orders were created for panel order CBC & Differential.  Procedure                               Abnormality         Status                     ---------                               -----------         ------                     Scan Slide[[id number]]                                                                  CBC Auto Differential[[id number]]        Abnormal            Final result                 Please view results for these tests on the individual orders.    CBC Auto Differential [[id number]]  (Abnormal) Collected:  11/06/18 0552    Specimen:  Blood Updated:  11/06/18 0611     WBC 5.17 10*3/mm3      RBC 2.14 (L) 10*6/mm3      Hemoglobin 7.9 (L) g/dL      Hematocrit 22.0 (L) %      .8 (H) fL      MCH 36.9 (H) pg      MCHC 35.9 g/dL      RDW 14.3 %      RDW-SD 53.7 (H) fl      MPV 9.7 fL      Platelets 46 (L) 10*3/mm3      Neutrophil % 50.1 %      Lymphocyte % 31.7 %      Monocyte % 12.0 %      Eosinophil % 5.2 %      Basophil % 0.8 %      Immature Grans % 0.2 %       Neutrophils, Absolute 2.59 10*3/mm3      Lymphocytes, Absolute 1.64 10*3/mm3      Monocytes, Absolute 0.62 10*3/mm3      Eosinophils, Absolute 0.27 10*3/mm3      Basophils, Absolute 0.04 10*3/mm3      Immature Grans, Absolute 0.01 10*3/mm3          Imaging Results (last 24 hours)     Procedure Component Value Units Date/Time    CT Abdomen Pelvis With Contrast [[id number]] Collected:  11/05/18 1815     Updated:  11/05/18 1857    Narrative:         EXAMINATION:  Computed Tomography           REGION:    Abdomen / Pelvis                     INDICATION:   Repeat to evaluate hematoma/abscess of kidney per  urology, R41.82 Altered mental status, unspecified K72.90 Hepatic  failure, unspecified without coma Z74.09 Other reduced mobility  Z74.09 Other reduced mobility    HISTORY:  LISA. IMAGING:    CT A/P 11/2/18            TECHNIQUE:      - reconstructions:    axial, coronal, sagittal         - contrast:      oral:  No ;   intravenous: Isovue 300, 100 mL  This exam was performed according to the departmental  dose-optimization program which includes automated exposure  control, adjustment of the mA and/or kV according to patient size  and/or use of iterative reconstruction technique.           COMMENTS:            - - - CT ABDOMEN - - -          THORAX (INFERIOR):      - LUNG BASES:  clear      - PLEURA:    no fluid or mass      - HEART:    normal size, no pericardial fluid     - MISC:      n/a          ABDOMEN:     - LIVER:    Diminished size with nodular contour containing a  tips shunt   - GB:      Cholelithiasis without evidence of wall thickening.    - CBD:      grossly negative   - SPLEEN:    Upper normal/mildly enlarged   - PANCREAS:    normal in size, contour, no focal mass    - VISCERA:    normal caliber, no wall thickening     - MESENTERY:  no mesenteric mass   - CAVITY:    Small amount of free fluid   - BODY WALL:  wnl   - OSSEOUS:    grossly negative for age   - MISC:                 Extensive gastric varices                       RETROPERITONEUM:   - KIDNEYS:    Renal examination again displays overall normal  size and symmetric nephrograms. Again noted is a left pararenal  enhancing focal fluid collection containing air which is  unchanged in size measuring 2.5 x 3.6 x 2.0 cm   - URETERS:    normal course, caliber   - ADRENALS:    normal size, contour   - MISC:      no sig retroperitoneal adenopathy or mass   - VASCULAR:    aorta / iliacs: wnl for age     - - - CT PELVIS - - -      - VISCERA:    normal caliber small/large bowel, no focal  thickening/mass        - APPENDIX:          wnl   - MESENTERY:  no mass   - VASCULAR:    wnl for age   - CAVITY:    Moderate amount of free fluid   - BLADDER:    unremarkable   - OSSEOUS:    Status post left hip repair    - MISC:     .       Impression:        CONCLUSION:  1. Unchanged left pararenal focal fluid collection with  peripheral enhancement containing air, likely representing an  abscess.  2. Multiple additional findings demonstrating the presence of  cirrhosis status post TIPS shunt, with spleen size at the upper  limits of normal/mildly enlarged, and extensive gastric varices,  as above, unchanged.        Electronically signed by:  PRISCILLA Bustillo MD  11/5/2018 6:56  PM CST Workstation: [id number]           I reviewed the patient's new clinical results.  I reviewed the patient's new imaging results and agree with the interpretation.  I reviewed the patient's other test results and agree with the interpretation      Assessment/Plan       Chronic hepatitis (CMS/HCC)    Thrombocytopenia (CMS/HCC)    Hepatic encephalopathy (CMS/HCC)    CKD (chronic kidney disease) stage 3, GFR 30-59 ml/min (CMS/HCC)    Altered mental status      Assessment & Plan    1. Difficulty urinating with hematuria, likely related to UTI hemorrhagic cystitis without clots  -Estimated Creatinine Clearance: 124.6 mL/min (by C-G formula based on SCr of 0.83 mg/dL).  -Cr  1.71-->1.72-->1.62-->1.44-->1.16-->1.03-->0.83   -WBC 6.51-->5.13-->8.84-->5.31-->5.44-->4.47-->5.17  -UA + 10/30/18  -Hgb/Hct 8.0/23.3-->7.5/21/2-->7.9/22.0  -Platelets 63,000-->53,000-->75,000-->53,000-->49,000-->42,000-->46,000  -Renal ultrasound 10/30/18 limited exam, especially of the left kidney, due to the presence of bowel gas and the patient's body habitus. No hydronephrosis is visualized. Unremarkable bladder.   -11/2/18 CT abdomen/pelvis LEFT 2.3 x 3.7 x 2.2 cm fluid and air collection infected subcapsular hematoma versus abscess.  -11/5/18 CT abdomen/pelvis showed LEFT renal abscess unchanged  -10/30/18 Urine culture NEGATIVE, UA grossly positive.   -Antibiotic day #9, currently on Rocephin.     Plan:   Radiology with perform guided drainage of abscess with consideration of his elevated INR, continue Rocephin.    KALEB Alexander  11/06/18  5:56 PM
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
## 2018-11-06 NOTE — PLAN OF CARE
Problem: Patient Care Overview  Goal: Plan of Care Review  Outcome: Ongoing (interventions implemented as appropriate)   11/06/18 0120   Coping/Psychosocial   Plan of Care Reviewed With patient   Plan of Care Review   Progress no change   OTHER   Outcome Summary No new complaints at this time; pt resting well; will continue to monitor.        Problem: Thought Process Alteration (Adult)  Goal: Improved Thought Process  Outcome: Ongoing (interventions implemented as appropriate)      Problem: Fall Risk (Adult)  Goal: Absence of Fall  Outcome: Ongoing (interventions implemented as appropriate)      Problem: Skin Injury Risk (Adult)  Goal: Skin Health and Integrity  Outcome: Ongoing (interventions implemented as appropriate)

## 2018-11-06 NOTE — SIGNIFICANT NOTE
11/06/18 1411   Rehab Treatment   Discipline physical therapy assistant   Reason Treatment Not Performed patient/family declined treatment, not feeling well

## 2018-11-07 NOTE — PLAN OF CARE
Problem: Patient Care Overview  Goal: Plan of Care Review  Outcome: Ongoing (interventions implemented as appropriate)   11/07/18 6459   Coping/Psychosocial   Plan of Care Reviewed With patient   Plan of Care Review   Progress improving   OTHER   Outcome Summary Patient has increased appetite. Patient continues to have increased food intake with 100% intake of meals for the last several days.
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
## 2018-11-07 NOTE — SIGNIFICANT NOTE
11/07/18 1059   Rehab Treatment   Discipline physical therapy assistant   Reason Treatment Not Performed patient/family declined treatment  (pt requested that PTA check back in pm)

## 2018-11-07 NOTE — PLAN OF CARE
Problem: Patient Care Overview  Goal: Plan of Care Review  Outcome: Ongoing (interventions implemented as appropriate)   11/07/18 1252   Coping/Psychosocial   Plan of Care Reviewed With patient   Plan of Care Review   Progress no change   OTHER   Outcome Summary Pt performed L UE exercises while long sitting and 3 reps AAROM with R shoulder. No new goals met this tx.

## 2018-11-07 NOTE — SIGNIFICANT NOTE
Discussed with Dr. Glass of hematology/oncology.  He states that patient will need 3-4 units of fresh frozen plasma as well as vitamin K and a platelet transfusion.  He will coordinate with radiology in the a.m. to hang platelets during the procedure as well.  Hematology/oncology will manage all blood products and hospitalist team will put in orders for CT-guided biopsy/aspiration as well as applicable labs.  Will discuss with urology        This document has been electronically signed by KENROY Goode on November 7, 2018 3:09 PM

## 2018-11-07 NOTE — PLAN OF CARE
Problem: Patient Care Overview  Goal: Plan of Care Review  Outcome: Ongoing (interventions implemented as appropriate)   11/07/18 1506   Coping/Psychosocial   Plan of Care Reviewed With patient   Plan of Care Review   Progress no change   OTHER   Outcome Summary Pt has been in pain; medicated. Potassium is being adminstered will continue to monitor.     Goal: Individualization and Mutuality  Outcome: Ongoing (interventions implemented as appropriate)      Problem: Thought Process Alteration (Adult)  Goal: Improved Thought Process  Outcome: Ongoing (interventions implemented as appropriate)      Problem: Fall Risk (Adult)  Goal: Absence of Fall  Outcome: Ongoing (interventions implemented as appropriate)      Problem: Skin Injury Risk (Adult)  Goal: Skin Health and Integrity  Outcome: Ongoing (interventions implemented as appropriate)

## 2018-11-07 NOTE — SIGNIFICANT NOTE
11/07/18 1350   Rehab Treatment   Discipline physical therapy assistant   Reason Treatment Not Performed patient/family declined treatment        11/07/18 1350   Rehab Treatment   Discipline physical therapy assistant   Reason Treatment Not Performed patient/family declined treatment

## 2018-11-07 NOTE — PROGRESS NOTES
[facility] Medicine Services  INPATIENT PROGRESS NOTE    Length of Stay: 9  Date of Admission: 10/29/2018  Primary Care Physician: Reddy Grant MD    Subjective   Please note that all previous progress notes, lab findings, radiograph chronic, medication changes, and physical exam findings have been noted and updated as appropriate.    11/7/2018: Continued decrease in edema of lower extremity, nearly resolved.  Despite deliverance of 2 units of fresh frozen plasma, INR remains elevated.  Urology has determined that aspiration of abscess still necessary.  Patient will likely need paracentesis at a later date as well.  At this time have asked hematology/oncology to evaluate the patient for possible optimization.    11/6/2018: Edema decreased from lower extremity, still has ascites.  Dr. Becerril agrees with Lasix albumin drip.  At this time Dr. Ledbetter has ordered aspiration of abscess and left kidney.  Have ordered all appropriate labs.  We will transfuse 2 units of fresh frozen plasma prior to procedure given the patient's elevated INR.  Patient understands risks and benefits including the possibility of fluid overload.    11/5/2018:  Patient continues to complain of fluid overload, lower extremity edema.  INR is increased at greater than 2 today.  Less than 50,000 platelets.  Dr. Becerril of gastroenterology will see.  Have discussed gallbladder with attending, at this time exam appears stable and patient does not appear septic or toxic.  No indication of cholecystitis.  At this time patient is not a viable surgical candidate and therefore will monitor gallbladder.  Will discuss possibility for fresh frozen plasma prior to aspiration of cyst/abscess on kidney.    11/4/2018:  Patient feeling fluid overloaded, states he feels he may need a paracentesis.  No shortness of breath or fever.  Still has abdominal discomfort.    11/3/2018: CT scan demonstrated either subcapsular  hematoma or possible abscess.  Have discussed with Dr. Vigil of radiology, who has determined would be most appropriate to optimize coagulation labs and possibly wait for an increase in platelets prior to any type of needle drainage or culture collection.  Secondary to absence of leukocytosis, Dr. Vigil has determined that the more likely diagnosis is subcapsular hematoma.  Patient is on antibiotic treatment.  No fever or chills.    11/2/2018: Patient continues to have mild abdominal pain and vague complaints of shoulder pain.  CT scan of the abdomen and pelvis was originally ordered without contrast secondary to the fact the patient had an elevated creatinine.  This demonstrated a possible renal mass and recommendation that patient undergo contrasted CT scan.  Renal function has returned to normal as has GFR.  Patient will undergo contrasted scan today as well as a chest x-ray of the gallbladder.  decrease in hemoglobin which is likely secondary to chronic disease due to his cirrhosis, however iron deficiency anemia has been diagnosed in the past and an anemia profile is pending.    Chief Complaint/HPI:  This 53-year-old male reported to the emergency part with complaints of altered mental status.  Patient had an elevated ammonia of 129.  Ammonia has trended downward daily and is now 15.  At this time patient complains of fatigue, abdominal pain, and right shoulder pain.  Patient also being seen by urology for retention of urine.    Review of Systems   Constitutional: Positive for fatigue. Negative for chills and fever.   Respiratory: Positive for shortness of breath.    Cardiovascular: Negative for chest pain.   Gastrointestinal: Positive for abdominal distention and abdominal pain. Negative for constipation, nausea and vomiting.   Genitourinary: Negative for difficulty urinating and dysuria.   Musculoskeletal: Positive for arthralgias.      All pertinent negatives and positives are as above. All other systems have  been reviewed and are negative unless otherwise stated.     Objective    Temp:  [96.7 °F (35.9 °C)-98.2 °F (36.8 °C)] 96.7 °F (35.9 °C)  Heart Rate:  [69-82] 76  Resp:  [16-18] 18  BP: (110-143)/(68-90) 120/90    Physical Exam   Constitutional: He is oriented to person, place, and time. No distress.   HENT:   Head: Normocephalic and atraumatic.   Cardiovascular: Normal rate and regular rhythm.    Pulmonary/Chest: Effort normal. No respiratory distress.   Abdominal: Soft. Bowel sounds are normal. He exhibits no distension. There is tenderness.   Musculoskeletal: He exhibits edema.   Neurological: He is alert and oriented to person, place, and time.   Skin: Skin is warm and dry. He is not diaphoretic.     Results Review:  I have reviewed the labs, radiology results, and diagnostic studies.    Laboratory Data:     Results from last 7 days  Lab Units 11/07/18 0526 11/06/18 0552 11/05/18  0608   SODIUM mmol/L 133* 134* 132*   POTASSIUM mmol/L 2.9* 3.0* 3.2*   CHLORIDE mmol/L 97 100 102   CO2 mmol/L 28.0 25.0 21.0*   BUN mg/dL 13 13 15   CREATININE mg/dL 0.90 0.83 1.03   GLUCOSE mg/dL 76 73 72   CALCIUM mg/dL 7.7* 7.7* 7.8*   ANION GAP mmol/L 8.0 9.0 9.0     Estimated Creatinine Clearance: 114.9 mL/min (by C-G formula based on SCr of 0.9 mg/dL).    Results from last 7 days  Lab Units 11/02/18  1407   MAGNESIUM mg/dL 1.9           Results from last 7 days  Lab Units 11/07/18  0526 11/06/18  0552 11/05/18  0608 11/04/18  0529 11/03/18  0549   WBC 10*3/mm3 4.39 5.17 4.47 4.92 5.73   HEMOGLOBIN g/dL 7.8* 7.9* 7.5* 8.5* 8.4*   HEMATOCRIT % 22.2* 22.0* 21.2* 23.8* 24.2*   PLATELETS 10*3/mm3 43* 46* 42* 48* 54*       Results from last 7 days  Lab Units 11/07/18  0526 11/06/18  0552 11/05/18  0608 11/04/18  1251 11/03/18  0847   INR  2.34* 2.51* 2.49* 2.12* 2.04*       Culture Data:   No results found for: BLOODCX  No results found for: URINECX  No results found for: RESPCX  No results found for: WOUNDCX  No results found for:  STOOLCX  No components found for: BODYFLD    Radiology Data:   Imaging Results (last 24 hours)     ** No results found for the last 24 hours. **          I have reviewed the patient's current medications.     Assessment/Plan     Active Hospital Problems    Diagnosis   • Altered mental status   • CKD (chronic kidney disease) stage 3, GFR 30-59 ml/min (CMS/HCC)   • Hepatic encephalopathy (CMS/HCC)   • Thrombocytopenia (CMS/HCC)   • Chronic hepatitis (CMS/HCC)       Plan:  Hematology/oncology to see, replete potassium and magnesium, continue per urology, patient will need paracentesis and aspiration of abscess.              This document has been electronically signed by KENROY Goode on November 7, 2018 10:29 AM

## 2018-11-07 NOTE — PLAN OF CARE
Problem: Patient Care Overview  Goal: Plan of Care Review  Outcome: Ongoing (interventions implemented as appropriate)   11/07/18 0041   Coping/Psychosocial   Plan of Care Reviewed With patient   Plan of Care Review   Progress no change   OTHER   Outcome Summary No new complaints at this time; Will continue to monitor.        Problem: Thought Process Alteration (Adult)  Goal: Improved Thought Process  Outcome: Ongoing (interventions implemented as appropriate)      Problem: Fall Risk (Adult)  Goal: Absence of Fall  Outcome: Ongoing (interventions implemented as appropriate)      Problem: Skin Injury Risk (Adult)  Goal: Skin Health and Integrity  Outcome: Ongoing (interventions implemented as appropriate)

## 2018-11-07 NOTE — CONSULTS
Adult Nutrition  Assessment    Patient Name:  Armando Mcmullen Sr.  YOB: 1965  MRN: [id number]  Admit Date:  10/29/2018    Assessment Date:  11/7/2018    Comments:  Patient continues to have a good appetite and good intake. Patient has had 100% intake of meals for the last several days. RDN staff will continue to monitor.           Reason for Assessment     Row Name 11/07/18 1414          Reason for Assessment    Reason For Assessment (P)  follow-up protocol     Identified At Risk by Screening Criteria (P)  large or nonhealing wound, burn or pressure injury               Nutrition/Diet History     Row Name 11/07/18 1414          Nutrition/Diet History    Typical Food/Fluid Intake (P)  Patient is currenly tolerating diet. Patient has a good appetite and good intake.                Labs/Tests/Procedures/Meds     Row Name 11/07/18 1415          Labs/Procedures/Meds    Lab Results Reviewed (P)  reviewed, pertinent     Lab Results Comments (P)  Ammonia 27, Na+ 133, K+ 2.9        Diagnostic Tests/Procedures    Diagnostic Test/Procedure Reviewed (P)  reviewed        Medications    Pertinent Medications Reviewed (P)  reviewed             Physical Findings     Row Name 11/07/18 1416          Physical Findings    Overall Physical Appearance (P)  amputee     Skin (P)  pressure injury                   Electronically signed by:  Aida Aviles  11/07/18 2:17 PM

## 2018-11-07 NOTE — PROGRESS NOTES
Tushar Becerril DO,[facility]  Gastroenterology  Hepatology  Endoscopy  Board Certified in Internal Medicine and gastroenterology  [address], [address]  [place]. [address]  T- [phone]   F - [phone]     GASTROENTEROLOGY PROGRESS NOTE   TUSHAR BECERRIL DO.         SUBJECTIVE:   11/6/2018  Chief Complaint:     Subjective  : Good mobilization of fluid with the diuretics.  No evidence of any hepatic encephalopathy.       CURRENT MEDICATIONS/OBJECTIVE/VS/PE:     Current Medications:     Current Facility-Administered Medications   Medication Dose Route Frequency Provider Last Rate Last Dose   • albumin human 5 % bottle 25 g  25 g Intravenous Q12H Solomon Gomez  mL/hr at 11/05/18 0114 25 g at 11/06/18 1906   • cefTRIAXone (ROCEPHIN) 1 g/100 mL 0.9% NS (MBP)  1 g Intravenous Q24H Solomon Gomez PA   1 g at 11/06/18 1115   • furosemide (LASIX) 100 mg in sodium chloride 0.9 % 100 mL (1 mg/mL) infusion  5 mg/hr Intravenous Continuous Solomon Gomez PA 5 mL/hr at 11/06/18 0333 5 mg/hr at 11/06/18 0333   • HYDROmorphone (DILAUDID) injection 0.25 mg  0.25 mg Intravenous Q3H PRN Anderson Justice DO   0.25 mg at 11/06/18 1300   • ipratropium-albuterol (DUO-NEB) nebulizer solution 3 mL  3 mL Nebulization Q6H PRN Armando Richard MD       • lactulose (CHRONULAC) 10 GM/15ML solution 30 g  30 g Oral TID Eduar Cisneros MD   30 g at 11/06/18 1721   • magic butt ointment   Topical BID Armando Richard MD       • metroNIDAZOLE (FLAGYL) IVPB 500 mg  500 mg Intravenous Q8H Solomon Gomez PA   500 mg at 11/06/18 1807   • midodrine (PROAMATINE) tablet 10 mg  10 mg Oral TID Eduar Cisneros MD   10 mg at 11/06/18 1721   • naloxone (NARCAN) injection 0.4 mg  0.4 mg Intravenous Q5 Min PRN Eduar Cisneros MD       • nicotine (NICODERM CQ) 21 MG/24HR patch 1 patch  1 patch Transdermal Q24H Armando Richard MD   1 patch at 11/06/18 0837   • ondansetron (ZOFRAN) injection 4 mg  4 mg  Intravenous Q6H PRN Eduar Cisneros MD       • oxyCODONE (ROXICODONE) immediate release tablet 5 mg  5 mg Oral Q6H PRN Anderson Justice DO   5 mg at 11/03/18 0913   • potassium chloride (MICRO-K) CR capsule 40 mEq  40 mEq Oral BID With Meals Solomon Gomez PA   40 mEq at 11/06/18 1724   • rifaximin (XIFAXAN) tablet 275 mg  275 mg Oral Q12H Eduar Cisneros MD   275 mg at 11/06/18 0838   • simethicone (MYLICON) chewable tablet 80 mg  80 mg Oral Q6H PRN Eduar Cisneros MD       • sodium chloride 0.9 % flush 3 mL  3 mL Intravenous Q12H Eduar Cisneros MD   3 mL at 11/06/18 0838   • sodium chloride 0.9 % flush 3-10 mL  3-10 mL Intravenous PRN Eduar Cisneros MD   10 mL at 11/04/18 1316       Objective     Physical Exam:   Temp:  [96.6 °F (35.9 °C)-98.7 °F (37.1 °C)] 97.9 °F (36.6 °C)  Heart Rate:  [73-82] 82  Resp:  [16-18] 16  BP: (110-143)/(64-82) 137/71     Physical Exam:  General Appearance:    Alert, cooperative, in no acute distress   Head:    Normocephalic, without obvious abnormality, atraumatic   Eyes:            Lids and lashes normal, conjunctivae and sclerae normal, no   icterus, no pallor, corneas clear, PERRLA   Ears:    Ears appear intact with no abnormalities noted   Throat:   No oral lesions, no thrush, oral mucosa moist   Neck:   No adenopathy, supple, trachea midline, no thyromegaly, no     carotid bruit, no JVD   Back:     No kyphosis present, no scoliosis present, no skin lesions,       erythema or scars, no tenderness to percussion or                   palpation,   range of motion normal   Lungs:     Clear to auscultation,respirations regular, even and                   unlabored    Heart:    Regular rhythm and normal rate, normal S1 and S2, no            murmur, no gallop, no rub, no click   Breast Exam:    Deferred   Abdomen:     Normal bowel sounds, no masses, no organomegaly, soft        non-tender, non-distended, no guarding, no rebound                 tenderness    Genitalia:    Deferred   Extremities:   Moves all extremities well, no edema, no cyanosis, no              redness   Pulses:   Pulses palpable and equal bilaterally   Skin:   No bleeding, bruising or rash   Lymph nodes:   No palpable adenopathy   Neurologic:   Cranial nerves 2 - 12 grossly intact, sensation intact, DTR        present and equal bilaterally      Results Review:     Lab Results (last 24 hours)     Procedure Component Value Units Date/Time    Protime-INR [[id number]]  (Abnormal) Collected:  11/06/18 0552    Specimen:  Blood Updated:  11/06/18 0627     Protime 26.0 (H) Seconds      INR 2.51 (H)    Narrative:       Therapeutic range for most indications is 2.0-3.0 INR,  or 2.5-3.5 for mechanical heart valves.    Basic Metabolic Panel [[id number]]  (Abnormal) Collected:  11/06/18 0552    Specimen:  Blood Updated:  11/06/18 0627     Glucose 73 mg/dL      BUN 13 mg/dL      Creatinine 0.83 mg/dL      Sodium 134 (L) mmol/L      Potassium 3.0 (L) mmol/L      Chloride 100 mmol/L      CO2 25.0 mmol/L      Calcium 7.7 (L) mg/dL      eGFR Non African Amer 97 mL/min/1.73      BUN/Creatinine Ratio 15.7     Anion Gap 9.0 mmol/L     Ammonia [[id number]]  (Normal) Collected:  11/06/18 0552    Specimen:  Blood Updated:  11/06/18 0615     Ammonia 18 umol/L     CBC & Differential [[id number]] Collected:  11/06/18 0552    Specimen:  Blood Updated:  11/06/18 0611    Narrative:       The following orders were created for panel order CBC & Differential.  Procedure                               Abnormality         Status                     ---------                               -----------         ------                     Scan Slide[[id number]]                                                                  CBC Auto Differential[[id number]]        Abnormal            Final result                 Please view results for these tests on the individual orders.    CBC Auto Differential [[id number]]  (Abnormal) Collected:  11/06/18  0552    Specimen:  Blood Updated:  11/06/18 0611     WBC 5.17 10*3/mm3      RBC 2.14 (L) 10*6/mm3      Hemoglobin 7.9 (L) g/dL      Hematocrit 22.0 (L) %      .8 (H) fL      MCH 36.9 (H) pg      MCHC 35.9 g/dL      RDW 14.3 %      RDW-SD 53.7 (H) fl      MPV 9.7 fL      Platelets 46 (L) 10*3/mm3      Neutrophil % 50.1 %      Lymphocyte % 31.7 %      Monocyte % 12.0 %      Eosinophil % 5.2 %      Basophil % 0.8 %      Immature Grans % 0.2 %      Neutrophils, Absolute 2.59 10*3/mm3      Lymphocytes, Absolute 1.64 10*3/mm3      Monocytes, Absolute 0.62 10*3/mm3      Eosinophils, Absolute 0.27 10*3/mm3      Basophils, Absolute 0.04 10*3/mm3      Immature Grans, Absolute 0.01 10*3/mm3            I reviewed the patient's new clinical results.  I reviewed the patient's new imaging results and agree with the interpretation.     ASSESSMENT/PLAN:   ASSESSMENT:   1.  Anasarca secondary to advanced cirrhosis of liver  2.  Hepatic encephalopathy secondary to cirrhosis  3.  Cirrhosis of liver, complicated by ascites, esophageal varices, encephalopathy status post tip shunt  4.  Tip shunt partially thrombosed    PLAN:   1.  Continue diuretics.  2.  Should be able to reduce or stop the diuretics tomorrow based upon the patient's weight      Tushar Becerril DO  11/06/18  7:14 PM

## 2018-11-08 NOTE — PLAN OF CARE
Problem: Patient Care Overview  Goal: Plan of Care Review  Outcome: Ongoing (interventions implemented as appropriate)   11/08/18 0046   Coping/Psychosocial   Plan of Care Reviewed With patient   Plan of Care Review   Progress no change   OTHER   Outcome Summary Patient complaining of back and shoulder pain. PRN pain meds given and effective. Potassium and magnesium replaced. VSS. Will continue to monitor.      Goal: Individualization and Mutuality  Outcome: Ongoing (interventions implemented as appropriate)    Goal: Discharge Needs Assessment  Outcome: Ongoing (interventions implemented as appropriate)    Goal: Interprofessional Rounds/Family Conf  Outcome: Ongoing (interventions implemented as appropriate)      Problem: Thought Process Alteration (Adult)  Goal: Improved Thought Process  Outcome: Ongoing (interventions implemented as appropriate)      Problem: Fall Risk (Adult)  Goal: Absence of Fall  Outcome: Outcome(s) achieved Date Met: 11/08/18      Problem: Skin Injury Risk (Adult)  Goal: Skin Health and Integrity  Outcome: Ongoing (interventions implemented as appropriate)
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
## 2018-11-08 NOTE — PROGRESS NOTES
"   LOS: 9 days   Patient Care Team:  Reddy Grant MD as PCP - General (Family Medicine)  Shawn Cortez MD (Inactive) as Surgeon (Orthopedic Surgery)  Tushar Becerril DO as Consulting Physician (Gastroenterology)  Josep Colón MD as Consulting Physician (Nephrology)  Ortiz Ferreira MD as Consulting Physician (Hematology and Oncology)    Subjective     Subjective:  Symptoms:  Stable.  (Some shoulder and abdominal pain, no change. Urine clear and yellow. ).    Diet:  Adequate intake.  No nausea or vomiting.    Pain:  He reports pain is improving.        History taken from: patient chart RN    Objective     Vital Signs  Temp:  [96.7 °F (35.9 °C)-98.2 °F (36.8 °C)] 98 °F (36.7 °C)  Heart Rate:  [69-81] 73  Resp:  [18] 18  BP: (110-138)/(68-90) 120/77    Objective:  General Appearance:  In no acute distress.    Vital signs: (most recent): Blood pressure 120/77, pulse 73, temperature 98 °F (36.7 °C), temperature source Temporal Artery , resp. rate 18, height 182.9 cm (72\"), weight 100 kg (221 lb), SpO2 95 %.  Vital signs are normal.  No fever.    Output: Producing urine (Urine clear yellow) and producing stool.    HEENT: Normal HEENT exam.  (Scleral icterus is present)    Lungs:  Normal effort and normal respiratory rate.  Breath sounds clear to auscultation.  He is not in respiratory distress.  No stridor.  No decreased breath sounds.    Heart: Normal rate.  Regular rhythm.  S1 normal and S2 normal.  No murmur, gallop or friction rub.   Chest: Symmetric chest wall expansion.   Abdomen: Abdomen is soft and non-distended.  Bowel sounds are normal.   There is generalized tenderness.     Neurological: Patient is alert and oriented to person, place and time.  GCS score is 15.    Pupils:  Pupils are equal, round, and reactive to light.    Skin:  Warm and dry.  No rash or cyanosis. (Excessively dry, jaundice)          Results Review:    Lab Results (last 24 hours)     Procedure Component Value Units Date/Time    Extra " Tubes [[id number]] Collected:  11/07/18 1545    Specimen:  Blood from Blood, Venous Line Updated:  11/07/18 1645    Narrative:       The following orders were created for panel order Extra Tubes.  Procedure                               Abnormality         Status                     ---------                               -----------         ------                     Gold Top - SST[[id number]]                                   Final result                 Please view results for these tests on the individual orders.    Gold Top - SST [[id number]] Collected:  11/07/18 1545    Specimen:  Blood Updated:  11/07/18 1645     Extra Tube Hold for add-ons.     Comment: Auto resulted.       Fibrinogen [[id number]]  (Abnormal) Collected:  11/07/18 1540    Specimen:  Blood Updated:  11/07/18 1617     Fibrinogen 104 (L) mg/dL     Potassium [[id number]]  (Abnormal) Collected:  11/07/18 1113    Specimen:  Blood Updated:  11/07/18 1220     Potassium 3.0 (L) mmol/L     Magnesium [[id number]]  (Normal) Collected:  11/07/18 1113    Specimen:  Blood Updated:  11/07/18 1220     Magnesium 1.6 mg/dL     Protime-INR [[id number]]  (Abnormal) Collected:  11/07/18 0526    Specimen:  Blood Updated:  11/07/18 0610     Protime 24.6 (H) Seconds      INR 2.34 (H)    Narrative:       Therapeutic range for most indications is 2.0-3.0 INR,  or 2.5-3.5 for mechanical heart valves.    Basic Metabolic Panel [[id number]]  (Abnormal) Collected:  11/07/18 0526    Specimen:  Blood Updated:  11/07/18 0600     Glucose 76 mg/dL      BUN 13 mg/dL      Creatinine 0.90 mg/dL      Sodium 133 (L) mmol/L      Potassium 2.9 (L) mmol/L      Chloride 97 mmol/L      CO2 28.0 mmol/L      Calcium 7.7 (L) mg/dL      eGFR Non African Amer 88 mL/min/1.73      BUN/Creatinine Ratio 14.4     Anion Gap 8.0 mmol/L     Ammonia [[id number]]  (Normal) Collected:  11/07/18 0526    Specimen:  Blood Updated:  11/07/18 0544     Ammonia 27 umol/L     CBC & Differential [[id number]] Collected:   11/07/18 0526    Specimen:  Blood Updated:  11/07/18 0541    Narrative:       The following orders were created for panel order CBC & Differential.  Procedure                               Abnormality         Status                     ---------                               -----------         ------                     Scan Slide[[id number]]                                                                  CBC Auto Differential[[id number]]        Abnormal            Final result                 Please view results for these tests on the individual orders.    CBC Auto Differential [[id number]]  (Abnormal) Collected:  11/07/18 0526    Specimen:  Blood Updated:  11/07/18 0541     WBC 4.39 10*3/mm3      RBC 2.11 (L) 10*6/mm3      Hemoglobin 7.8 (L) g/dL      Hematocrit 22.2 (L) %      .2 (H) fL      MCH 37.0 (H) pg      MCHC 35.1 g/dL      RDW 14.8 (H) %      RDW-SD 56.9 (H) fl      MPV 9.3 fL      Platelets 43 (L) 10*3/mm3      Neutrophil % 47.5 %      Lymphocyte % 32.1 %      Monocyte % 12.5 (H) %      Eosinophil % 5.9 %      Basophil % 1.8 %      Immature Grans % 0.2 %      Neutrophils, Absolute 2.08 10*3/mm3      Lymphocytes, Absolute 1.41 10*3/mm3      Monocytes, Absolute 0.55 10*3/mm3      Eosinophils, Absolute 0.26 10*3/mm3      Basophils, Absolute 0.08 10*3/mm3      Immature Grans, Absolute 0.01 10*3/mm3          Imaging Results (last 24 hours)     Procedure Component Value Units Date/Time    CT Abdomen Pelvis With Contrast [[id number]] Collected:  11/05/18 1815     Updated:  11/05/18 1857    Narrative:         EXAMINATION:  Computed Tomography           REGION:    Abdomen / Pelvis                     INDICATION:   Repeat to evaluate hematoma/abscess of kidney per  urology, R41.82 Altered mental status, unspecified K72.90 Hepatic  failure, unspecified without coma Z74.09 Other reduced mobility  Z74.09 Other reduced mobility    HISTORY:  LISA. IMAGING:    CT A/P 11/2/18            TECHNIQUE:      -  reconstructions:    axial, coronal, sagittal         - contrast:      oral:  No ;   intravenous: Isovue 300, 100 mL  This exam was performed according to the departmental  dose-optimization program which includes automated exposure  control, adjustment of the mA and/or kV according to patient size  and/or use of iterative reconstruction technique.           COMMENTS:            - - - CT ABDOMEN - - -          THORAX (INFERIOR):      - LUNG BASES:  clear      - PLEURA:    no fluid or mass      - HEART:    normal size, no pericardial fluid     - MISC:      n/a          ABDOMEN:     - LIVER:    Diminished size with nodular contour containing a  tips shunt   - GB:      Cholelithiasis without evidence of wall thickening.    - CBD:      grossly negative   - SPLEEN:    Upper normal/mildly enlarged   - PANCREAS:    normal in size, contour, no focal mass    - VISCERA:    normal caliber, no wall thickening     - MESENTERY:  no mesenteric mass   - CAVITY:    Small amount of free fluid   - BODY WALL:  wnl   - OSSEOUS:    grossly negative for age   - MISC:                 Extensive gastric varices                      RETROPERITONEUM:   - KIDNEYS:    Renal examination again displays overall normal  size and symmetric nephrograms. Again noted is a left pararenal  enhancing focal fluid collection containing air which is  unchanged in size measuring 2.5 x 3.6 x 2.0 cm   - URETERS:    normal course, caliber   - ADRENALS:    normal size, contour   - MISC:      no sig retroperitoneal adenopathy or mass   - VASCULAR:    aorta / iliacs: wnl for age     - - - CT PELVIS - - -      - VISCERA:    normal caliber small/large bowel, no focal  thickening/mass        - APPENDIX:          wnl   - MESENTERY:  no mass   - VASCULAR:    wnl for age   - CAVITY:    Moderate amount of free fluid   - BLADDER:    unremarkable   - OSSEOUS:    Status post left hip repair    - MISC:     .       Impression:        CONCLUSION:  1. Unchanged left pararenal  focal fluid collection with  peripheral enhancement containing air, likely representing an  abscess.  2. Multiple additional findings demonstrating the presence of  cirrhosis status post TIPS shunt, with spleen size at the upper  limits of normal/mildly enlarged, and extensive gastric varices,  as above, unchanged.        Electronically signed by:  PRISCILLA Bustillo MD  11/5/2018 6:56  PM CST Workstation: [id number]           I reviewed the patient's new clinical results.  I reviewed the patient's new imaging results and agree with the interpretation.  I reviewed the patient's other test results and agree with the interpretation      Assessment/Plan       Chronic hepatitis (CMS/HCC)    Thrombocytopenia (CMS/HCC)    Hepatic encephalopathy (CMS/HCC)    CKD (chronic kidney disease) stage 3, GFR 30-59 ml/min (CMS/HCC)    Altered mental status      Assessment & Plan    1. Difficulty urinating with hematuria, likely related to UTI hemorrhagic cystitis without clots-->hematuria resolved  2. Perinephric abscess  -Estimated Creatinine Clearance: 114.9 mL/min (by C-G formula based on SCr of 0.9 mg/dL).  -Cr 0.90  -WBC 4.39  -UA + 10/30/18  -Hgb/Hct 7.8/22.0  -Platelets 43,000  -INR 2.34  -Renal ultrasound 10/30/18 limited exam, especially of the left kidney, due to the presence of bowel gas and the patient's body habitus. No hydronephrosis is visualized. Unremarkable bladder.   -11/2/18 CT abdomen/pelvis LEFT 2.3 x 3.7 x 2.2 cm fluid and air collection infected subcapsular hematoma versus abscess.  -11/5/18 CT abdomen/pelvis showed LEFT renal abscess unchanged  -10/30/18 Urine culture NEGATIVE, UA grossly positive.   -Antibiotic day #10, currently on Rocephin.     Plan:   Radiology will perform guided drainage of abscess with consideration of his thrombocytopenia, elevated INR, continue Rocephin. Dr. Glass with Hematology consulted to manage his coagulapathy as we address his perinephric abscess.     Deondre Virk,  KALEB  11/07/18  6:02 PM

## 2018-11-08 NOTE — PLAN OF CARE
"Problem: Patient Care Overview  Goal: Plan of Care Review  Outcome: Ongoing (interventions implemented as appropriate)   11/08/18 9611   Coping/Psychosocial   Plan of Care Reviewed With patient   Plan of Care Review   Progress no change   OTHER   Outcome Summary Pt has received 2 units of FFP today. During the first transfusion of FFP pt states \"My throat feels tight and I have a headache.\" MD was notified along with KALEB Calvillo. Benadryl and Solu-medrol was administered. Pt reports \"I feel better now.\" Will continue to monitor. Pt has been in pain; medicated.     Goal: Individualization and Mutuality  Outcome: Ongoing (interventions implemented as appropriate)      Problem: Fall Risk (Adult)  Goal: Absence of Fall  Outcome: Ongoing (interventions implemented as appropriate)      Problem: Skin Injury Risk (Adult)  Goal: Skin Health and Integrity  Outcome: Ongoing (interventions implemented as appropriate)        "

## 2018-11-08 NOTE — SIGNIFICANT NOTE
11/08/18 1117   Rehab Treatment   Discipline physical therapy assistant   Reason Treatment Not Performed patient/family declined treatment  (pt states he has a lot going on today and doesn't feel up to trying to work w/ therapy, check back tomorrow)
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
## 2018-11-08 NOTE — PLAN OF CARE
Problem: Patient Care Overview  Goal: Plan of Care Review  Outcome: Ongoing (interventions implemented as appropriate)   11/08/18 1431   Coping/Psychosocial   Plan of Care Reviewed With patient   Plan of Care Review   Progress no change   OTHER   Outcome Summary Pt Mod I for grooming tasks. Pt already had a bath this morning. No new goals met at this time.

## 2018-11-08 NOTE — NURSING NOTE
During report, day shift nurse informed me of unacknowledged and signed and held orders for patient. I inquired if these orders were specifically for the procedure and to be released by radiology or if they were orders I should complete. She stated that they were orders that she must complete in the morning prior to the patient's procedure. I reviewed unacknowledged and signed and held orders for patient's CT needle biopsy of abdomen. One of the orders called for the patient to receive 3 units of FFP 4 hours prior to the procedure. Thinking that the procedure may be performed early in the morning, I was concerned that I may need to give the FFP during my shift. I called CT and explained the situation. I was informed that I should hold the FFP and allow the day shift nurse to administer it prior to the procedure.

## 2018-11-08 NOTE — PROGRESS NOTES
[facility]  INPATIENT PROGRESS NOTE    Length of Stay: 10  Date of Admission: 10/29/2018  Primary Care Physician: Reddy Grant MD    Subjective   Chief Complaint: No complaints    HPI:      11/8/2018:  INR remains elevated today at 2.55 despite IV vitamin K yesterday.  The patient was ordered to receive 3 units of FFP and 1 unit of platelets this am prior to aspiration of kidney.  The patient was noted by nursing staff to have a reaction of trouble swallowing and the feeling of swelling of the throat.  The patient was given Benadryl and Solumedrol and states his symptoms immediately improved.   Transfusion reaction testing started by lab.     This is a 53 year old  male with PMH of chronic hepatitis C complicated by cirrhosis of the liver that presented to [facility] on 10/29/2018 secondary to altered mental status and was found to have an elevated ammonia level of 129.  Lactulose was given and his levels returned to normal in a couple of days. Urology was consulted due to urine retention.   Ct of the abdomen and pelvis indicated a possible abscess of the left kidney.  Radiology is scheduled to perform guided drainage of the abscess.  Hematology is following due to thrombocytopenia and elevated INR.       Review of Systems   Constitutional: Positive for fatigue.   Cardiovascular: Positive for leg swelling.      All pertinent negatives and positives are as above. All other systems have been reviewed and are negative unless otherwise stated.     Objective    Temp:  [96.4 °F (35.8 °C)-98.5 °F (36.9 °C)] 97.8 °F (36.6 °C)  Heart Rate:  [71-79] 79  Resp:  [18] 18  BP: (103-123)/(61-77) 117/69    Physical Exam   Constitutional: He is oriented to person, place, and time. He appears well-developed and well-nourished.   HENT:   Head: Normocephalic and atraumatic.   Eyes: Pupils are equal, round, and reactive to light. EOM are normal.   Neck: Normal range of  motion. Neck supple.   Cardiovascular: Normal rate and regular rhythm.    Pulmonary/Chest: Effort normal and breath sounds normal.   Abdominal: He exhibits distension.   Musculoskeletal: Normal range of motion.   Neurological: He is alert and oriented to person, place, and time.   Skin: Skin is warm and dry.   Psychiatric: He has a normal mood and affect.     Results Review:  I have reviewed the labs, radiology results, and diagnostic studies.    Laboratory Data:     Results from last 7 days  Lab Units 11/08/18  0607 11/07/18  1113 11/07/18  0526 11/06/18  0552   SODIUM mmol/L 133*  --  133* 134*   POTASSIUM mmol/L 3.6  3.6 3.0* 2.9* 3.0*   CHLORIDE mmol/L 97  --  97 100   CO2 mmol/L 31.0  --  28.0 25.0   BUN mg/dL 12  --  13 13   CREATININE mg/dL 0.89  --  0.90 0.83   GLUCOSE mg/dL 83  --  76 73   CALCIUM mg/dL 8.2*  --  7.7* 7.7*   ANION GAP mmol/L 5.0  --  8.0 9.0     Estimated Creatinine Clearance: 114.6 mL/min (by C-G formula based on SCr of 0.89 mg/dL).    Results from last 7 days  Lab Units 11/08/18  0607 11/07/18  1113 11/02/18  1407   MAGNESIUM mg/dL 2.3 1.6 1.9           Results from last 7 days  Lab Units 11/08/18  0607 11/07/18  0526 11/06/18  0552 11/05/18  0608 11/04/18  0529   WBC 10*3/mm3 5.00 4.39 5.17 4.47 4.92   HEMOGLOBIN g/dL 8.1* 7.8* 7.9* 7.5* 8.5*   HEMATOCRIT % 22.8* 22.2* 22.0* 21.2* 23.8*   PLATELETS 10*3/mm3 50* 43* 46* 42* 48*       Results from last 7 days  Lab Units 11/08/18  0911 11/08/18  0607 11/07/18  0526 11/06/18  0552 11/05/18  0608   INR  2.55* 2.75* 2.34* 2.51* 2.49*       Culture Data:   No results found for: BLOODCX  No results found for: URINECX  No results found for: RESPCX  No results found for: WOUNDCX  No results found for: STOOLCX  No components found for: BODYFLD    Radiology Data:   Imaging Results (last 24 hours)     ** No results found for the last 24 hours. **          I have reviewed the patient's current medications.     Assessment/Plan     [facility]  "Problems    Diagnosis Date Noted   • Altered mental status [R41.82] 10/29/2018   • CKD (chronic kidney disease) stage 3, GFR 30-59 ml/min (CMS/HCC) [N18.3] 08/02/2018   • Hepatic encephalopathy (CMS/HCC) [K72.90] 04/11/2018   • Thrombocytopenia (CMS/HCC) [D69.6] 07/10/2017   • Chronic hepatitis (CMS/HCC) [K73.9] 06/18/2009       Plan:    1.  Hepatic encephalopathy secondary to liver cirrhosis:  Ammonia level remains normal.  Alert and oriented this am.  Dr. Becerril following.   2.  Coagulopathy/ thrombocytopenia of liver disease:  Dr. Glass with hematology following.  3 units of FFP were started this am, but discontinued secondary to reaction of \"throat swelling\".  Lab is running a transfusion reaction study.       3.  Perinephric abscess:  Aspiration planned with radiology once coagulapathy is managed.  DELVIS MANUEL/ Dr. Ledbetter following.         Discharge Planning: I expect patient to be discharged to home in 4-5 days.      This document has been electronically signed by KALEB Munguia on November 8, 2018 12:08 PM        "

## 2018-11-08 NOTE — PROGRESS NOTES
Tushar Becerril DO,[facility]  Gastroenterology  Hepatology  Endoscopy  Board Certified in Internal Medicine and gastroenterology  [address], [address]  [place]. [address]  T- [phone]   F - [phone]     GASTROENTEROLOGY PROGRESS NOTE   TUSHAR BECERRIL DO.         SUBJECTIVE:   11/7/2018  Chief Complaint:     Subjective  : Good mobilization of fluid.  No nausea or vomiting.  Says that he is going to try to have a percutaneous drain of his kidney tomorrow.  Is under way of trying to get the patient coagulopathy resolved.         CURRENT MEDICATIONS/OBJECTIVE/VS/PE:     Current Medications:     Current Facility-Administered Medications   Medication Dose Route Frequency Provider Last Rate Last Dose   • albumin human 5 % bottle 25 g  25 g Intravenous Q12H Solomon Gomez  mL/hr at 11/05/18 0114 25 g at 11/07/18 0608   • cefTRIAXone (ROCEPHIN) 1 g/100 mL 0.9% NS (MBP)  1 g Intravenous Q24H Solomon Gomez PA   1 g at 11/07/18 1216   • furosemide (LASIX) 100 mg in sodium chloride 0.9 % 100 mL (1 mg/mL) infusion  5 mg/hr Intravenous Continuous Solomon Gomez PA 5 mL/hr at 11/07/18 1711 5 mg/hr at 11/07/18 1711   • HYDROmorphone (DILAUDID) injection 0.25 mg  0.25 mg Intravenous Q3H PRN Anderson Justice DO   0.25 mg at 11/07/18 1842   • ipratropium-albuterol (DUO-NEB) nebulizer solution 3 mL  3 mL Nebulization Q6H PRN Armando Richard MD       • lactulose (CHRONULAC) 10 GM/15ML solution 30 g  30 g Oral TID Eduar Cisneros MD   30 g at 11/07/18 1528   • magic butt ointment   Topical BID Armando Richard MD       • Magnesium Sulfate 2 gram Bolus, followed by 8 gram infusion (total Mg dose 10 grams)- Mg less than or equal to 1mg/dL  2 g Intravenous PRN Solomon Gomez PA        Or   • Magnesium Sulfate 2 gram / 50mL Infusion (GIVE X 3 BAGS TO EQUAL 6GM TOTAL DOSE) - Mg 1.1 - 1.5 mg/dl  2 g Intravenous PRN Solomon Gomez PA        Or   • Magnesium Sulfate 4 gram infusion-  Mg 1.6-1.9 mg/dL  4 g Intravenous PRN Solomon Gomez PA       • metroNIDAZOLE (FLAGYL) IVPB 500 mg  500 mg Intravenous Q8H Solomon Gomez PA   500 mg at 11/07/18 1834   • midodrine (PROAMATINE) tablet 10 mg  10 mg Oral TID Eduar Cisneros MD   10 mg at 11/07/18 1528   • naloxone (NARCAN) injection 0.4 mg  0.4 mg Intravenous Q5 Min PRN Eduar Cisneros MD       • nicotine (NICODERM CQ) 21 MG/24HR patch 1 patch  1 patch Transdermal Q24H Armando Richard MD   1 patch at 11/07/18 0824   • ondansetron (ZOFRAN) injection 4 mg  4 mg Intravenous Q6H PRN Eduar Cisneros MD       • oxyCODONE (ROXICODONE) immediate release tablet 5 mg  5 mg Oral Q6H PRN Anderson Justice DO   5 mg at 11/03/18 0913   • potassium chloride 10 mEq in 100 mL IVPB  10 mEq Intravenous Q1H PRN Solomon Gomez  mL/hr at 11/07/18 1834 10 mEq at 11/07/18 1834   • rifaximin (XIFAXAN) tablet 275 mg  275 mg Oral Q12H Eduar Cisneros MD   275 mg at 11/07/18 0824   • simethicone (MYLICON) chewable tablet 80 mg  80 mg Oral Q6H PRN Eudar Cisneros MD       • sodium chloride 0.9 % flush 3 mL  3 mL Intravenous Q12H Eduar Cisneros MD   3 mL at 11/06/18 2014   • sodium chloride 0.9 % flush 3-10 mL  3-10 mL Intravenous PRN Eduar Cisneros MD   10 mL at 11/07/18 0825       Objective     Physical Exam:   Temp:  [96.7 °F (35.9 °C)-98.2 °F (36.8 °C)] 98 °F (36.7 °C)  Heart Rate:  [69-81] 72  Resp:  [18] 18  BP: (110-138)/(68-90) 120/77     Physical Exam:  General Appearance:    Alert, cooperative, in no acute distress   Head:    Normocephalic, without obvious abnormality, atraumatic   Eyes:            Lids and lashes normal, conjunctivae and sclerae normal, no   icterus, no pallor, corneas clear, PERRLA   Ears:    Ears appear intact with no abnormalities noted   Throat:   No oral lesions, no thrush, oral mucosa moist   Neck:   No adenopathy, supple, trachea midline, no thyromegaly, no     carotid bruit, no JVD   Back:     No  kyphosis present, no scoliosis present, no skin lesions,       erythema or scars, no tenderness to percussion or                   palpation,   range of motion normal   Lungs:     Clear to auscultation,respirations regular, even and                   unlabored    Heart:    Regular rhythm and normal rate, normal S1 and S2, no            murmur, no gallop, no rub, no click   Breast Exam:    Deferred   Abdomen:     Ascites much improved.  Enlarged left lobe of the liver and spleen., no guarding, no rebound                 tenderness   Genitalia:    Deferred   Extremities:   Moves all extremities well, no edema, no cyanosis, no              redness   Pulses:   Pulses palpable and equal bilaterally   Skin:   No bleeding, bruising or rash   Lymph nodes:   No palpable adenopathy   Neurologic:   Cranial nerves 2 - 12 grossly intact, sensation intact, DTR        present and equal bilaterally      Results Review:     Lab Results (last 24 hours)     Procedure Component Value Units Date/Time    Extra Tubes [[id number]] Collected:  11/07/18 1545    Specimen:  Blood from Blood, Venous Line Updated:  11/07/18 1645    Narrative:       The following orders were created for panel order Extra Tubes.  Procedure                               Abnormality         Status                     ---------                               -----------         ------                     Gold Top - SST[[id number]]                                   Final result                 Please view results for these tests on the individual orders.    Gold Top - SST [[id number]] Collected:  11/07/18 1545    Specimen:  Blood Updated:  11/07/18 1645     Extra Tube Hold for add-ons.     Comment: Auto resulted.       Fibrinogen [[id number]]  (Abnormal) Collected:  11/07/18 1540    Specimen:  Blood Updated:  11/07/18 1617     Fibrinogen 104 (L) mg/dL     Potassium [[id number]]  (Abnormal) Collected:  11/07/18 1113    Specimen:  Blood Updated:  11/07/18 1220     Potassium  3.0 (L) mmol/L     Magnesium [[id number]]  (Normal) Collected:  11/07/18 1113    Specimen:  Blood Updated:  11/07/18 1220     Magnesium 1.6 mg/dL     Protime-INR [[id number]]  (Abnormal) Collected:  11/07/18 0526    Specimen:  Blood Updated:  11/07/18 0610     Protime 24.6 (H) Seconds      INR 2.34 (H)    Narrative:       Therapeutic range for most indications is 2.0-3.0 INR,  or 2.5-3.5 for mechanical heart valves.    Basic Metabolic Panel [[id number]]  (Abnormal) Collected:  11/07/18 0526    Specimen:  Blood Updated:  11/07/18 0600     Glucose 76 mg/dL      BUN 13 mg/dL      Creatinine 0.90 mg/dL      Sodium 133 (L) mmol/L      Potassium 2.9 (L) mmol/L      Chloride 97 mmol/L      CO2 28.0 mmol/L      Calcium 7.7 (L) mg/dL      eGFR Non African Amer 88 mL/min/1.73      BUN/Creatinine Ratio 14.4     Anion Gap 8.0 mmol/L     Ammonia [[id number]]  (Normal) Collected:  11/07/18 0526    Specimen:  Blood Updated:  11/07/18 0544     Ammonia 27 umol/L     CBC & Differential [[id number]] Collected:  11/07/18 0526    Specimen:  Blood Updated:  11/07/18 0541    Narrative:       The following orders were created for panel order CBC & Differential.  Procedure                               Abnormality         Status                     ---------                               -----------         ------                     Scan Slide[[id number]]                                                                  CBC Auto Differential[[id number]]        Abnormal            Final result                 Please view results for these tests on the individual orders.    CBC Auto Differential [[id number]]  (Abnormal) Collected:  11/07/18 0526    Specimen:  Blood Updated:  11/07/18 0541     WBC 4.39 10*3/mm3      RBC 2.11 (L) 10*6/mm3      Hemoglobin 7.8 (L) g/dL      Hematocrit 22.2 (L) %      .2 (H) fL      MCH 37.0 (H) pg      MCHC 35.1 g/dL      RDW 14.8 (H) %      RDW-SD 56.9 (H) fl      MPV 9.3 fL      Platelets 43 (L) 10*3/mm3       Neutrophil % 47.5 %      Lymphocyte % 32.1 %      Monocyte % 12.5 (H) %      Eosinophil % 5.9 %      Basophil % 1.8 %      Immature Grans % 0.2 %      Neutrophils, Absolute 2.08 10*3/mm3      Lymphocytes, Absolute 1.41 10*3/mm3      Monocytes, Absolute 0.55 10*3/mm3      Eosinophils, Absolute 0.26 10*3/mm3      Basophils, Absolute 0.08 10*3/mm3      Immature Grans, Absolute 0.01 10*3/mm3            I reviewed the patient's new clinical results.  I reviewed the patient's new imaging results and agree with the interpretation.     ASSESSMENT/PLAN:   ASSESSMENT:   1.  Anasarca secondary to cirrhosis, improved  2.  Cirrhosis of liver     PLAN:   1.  If aspiration of the kidneys infection is done, should have normalization of the patients pro time and platelet counts over 100,000      Tushar Becerril DO  11/07/18  8:05 PM

## 2018-11-08 NOTE — NURSING NOTE
During report, day shift nurse informed me of unacknowledged and signed and held orders for patient. I inquired if these orders were specifically for the procedure and to be released by radiology or if they were orders I should complete. She stated that they were orders that she must complete in the morning prior to the patient's procedure. I reviewed unacknowledged and signed and held orders for patient's CT guided needle biopsy of the kidney. One of the orders called for the patient to receive 3 units of FFP 4 hours prior to the procedure. Thinking that the procedure may be performed early in the morning, I was concerned that I may need to give the FFP during my shift. I called CT and explained the situation. I was informed that I should hold the FFP and allow the day shift nurse to administer it prior to the procedure.

## 2018-11-09 NOTE — SIGNIFICANT NOTE
Pt t/f to step down and will need R/S orders when appropriate.     11/09/18 4998   Rehab Treatment   Discipline occupational therapy assistant

## 2018-11-09 NOTE — THERAPY TREATMENT NOTE
Acute Care - Physical Therapy Treatment Note  [facility]     Patient Name: Armando Mcmullen Sr.  : 1965  MRN: [id number]  Today's Date: 2018  Onset of Illness/Injury or Date of Surgery: 10/29/18  Date of Referral to PT: 10/31/18  Referring Physician: KALEB Jacobsen    Admit Date: 10/29/2018    Visit Dx:    ICD-10-CM ICD-9-CM   1. Altered mental status, unspecified altered mental status type R41.82 780.97   2. Hepatic encephalopathy (CMS/HCC) K72.90 572.2   3. Impaired functional mobility, balance, gait, and endurance Z74.09 V49.89   4. Impaired mobility and ADLs Z74.09 799.89     Patient Active Problem List   Diagnosis   • Anxiety state   • Back pain   • Chronic hepatitis (CMS/HCC)   • Depression   • Substance abuse (CMS/HCC)   • Liver failure with hepatic coma (CMS/HCC)   • Hypersplenism   • Chronic osteomyelitis of right shoulder region (CMS/HCC)   • Thrombocytopenia (CMS/HCC)   • Anemia of chronic disease   • Positive hepatitis C antibody test   • BMI 35.0-35.9,adult   • Tobacco use   • Hepatic encephalopathy (CMS/HCC)   • Anxiety and depression   • CKD (chronic kidney disease) stage 3, GFR 30-59 ml/min (CMS/HCC)   • Ascites   • Physical deconditioning   • Closed fracture of lumbar vertebra with spinal cord injury (CMS/HCC)   • Altered mental status       Therapy Treatment          Rehabilitation Treatment Summary     Row Name 18 0858             Treatment Time/Intention    Discipline physical therapy assistant  -EM      Document Type therapy note (daily note)  -EM      Subjective Information no complaints  -EM      Mode of Treatment physical therapy;individual therapy  -EM      Comment Pt agreeable to therex in bed only.  -EM      Existing Precautions/Restrictions fall  -EM      Recorded by [EM] Jimbo Whatley, PTA 18 1057      Row Name 18 0858             Vital Signs    Pre Systolic BP Rehab 129  -EM      Pre Treatment Diastolic BP 94  -EM      Post Systolic BP Rehab 137   -EM      Post Treatment Diastolic BP 93  -EM      Pretreatment Heart Rate (beats/min) 67  -EM      Posttreatment Heart Rate (beats/min) 89  -EM      Pre SpO2 (%) 91  -EM      O2 Delivery Pre Treatment supplemental O2  -EM      Post SpO2 (%) 92  -EM      O2 Delivery Post Treatment supplemental O2  -EM      Pre Patient Position Supine  -EM      Intra Patient Position Supine  -EM      Post Patient Position Supine  -EM      Recorded by [EM] Jimbo Whatley, PTA 11/09/18 1057      Row Name 11/09/18 0858             Cognitive Assessment/Intervention- PT/OT    Affect/Mental Status (Cognitive) WFL  -EM      Orientation Status (Cognition) oriented x 4  -EM      Follows Commands (Cognition) WFL  -EM      Cognitive Function (Cognitive) WFL  -EM      Memory Deficit (Cognitive) mild deficit  -EM      Safety Deficit (Cognitive) mild deficit  -EM      Personal Safety Interventions supervised activity  -EM      Recorded by [EM] Jimbo Whatley, PTA 11/09/18 1057      Row Name 11/09/18 0858             Lower Extremity Supine Therapeutic Exercise    Performed, Supine Lower Extremity (Therapeutic Exercise) gluteal sets;quadriceps sets;SAQ (short arc quad) over bolster;ankle dorsiflexion/plantarflexion;other (see comments);SLR (straight leg raise)   AP L   -EM      Exercise Type, Supine Lower Extremity (Therapeutic Exercise) AROM (active range of motion)  -EM      Sets/Reps Detail, Supine Lower Extremity (Therapeutic Exercise) 1x30  -EM      Recorded by [EM] Jimbo Whatley, PTA 11/09/18 1057      Row Name 11/09/18 0858             Positioning and Restraints    Pre-Treatment Position in bed  -EM      Post Treatment Position bed  -EM      Recorded by [EM] Jimbo Whatley, PTA 11/09/18 1057      Row Name 11/09/18 0858             Pain Scale: Numbers Pre/Post-Treatment    Pain Scale: Numbers, Pretreatment 8/10  -EM      Pain Scale: Numbers, Post-Treatment 10/10  -EM      Pain Location other (see comments)   STONE, B Shldrs, Lumbar spine  -EM       Pain Intervention(s) Medication (See MAR)  -EM      Recorded by [EM] Jimbo Whatley, PTA 11/09/18 1057      Row Name                Wound 10/30/18 1020 coccyx    Wound - Properties Group Date first assessed: 10/30/18 [AS] Time first assessed: 1020 [AS] Present On Admission : picture taken [AS] Location: coccyx [AS] Recorded by:  [AS] [facility], RN 10/30/18 1021    Row Name 11/09/18 0858             Outcome Summary/Treatment Plan (PT)    Daily Summary of Progress (PT) progress toward functional goals as expected  -EM      Barriers to Overall Progress (PT) Deconditioning  -EM      Plan for Continued Treatment (PT) standing, t/f OOB to chair  -EM      Anticipated Discharge Disposition (PT) skilled nursing facility  -EM      Recorded by [EM] Jimbo Whatley PTA 11/09/18 1057        User Key  (r) = Recorded By, (t) = Taken By, (c) = Cosigned By    Initials Name Effective Dates Discipline    EM Jimbo Whatley PTA 08/11/15 -  PT    AS [facility], RN 12/13/16 -  Nurse          Wound 10/30/18 1020 coccyx (Active)   Dressing Appearance open to air 11/8/2018  8:30 PM   Closure None 11/8/2018  8:30 PM   Base red/granulating 11/8/2018  8:30 PM   Periwound Temperature warm 11/8/2018  8:30 PM   Drainage Amount none 11/8/2018  8:30 PM   Care, Wound barrier applied 11/8/2018  8:30 PM               PT Rehab Goals     Row Name 11/09/18 1000             Bed Mobility Goal 1 (PT)    Activity/Assistive Device (Bed Mobility Goal 1, PT) rolling to left;rolling to right;scooting;sit to supine;supine to sit  -EM      [place] Level/Cues Needed (Bed Mobility Goal 1, PT) minimum assist (75% or more patient effort)  -EM      Time Frame (Bed Mobility Goal 1, PT) 3 days  -EM      Progress/Outcomes (Bed Mobility Goal 1, PT) goal partially met;goal not met  -EM         Transfer Goal 1 (PT)    Activity/Assistive Device (Transfer Goal 1, PT) sit-to-stand/stand-to-sit;bed-to-chair/chair-to-bed;walker, rolling  -EM       [place] Level/Cues Needed (Transfer Goal 1, PT) moderate assist (50-74% patient effort)  -EM      Time Frame (Transfer Goal 1, PT) 3 days  -EM      Progress/Outcome (Transfer Goal 1, PT) goal not met  -EM         Gait Training Goal 1 (PT)    Activity/Assistive Device (Gait Training Goal 1, PT) gait (walking locomotion);assistive device use;increase endurance/gait distance;increase energy conservation;walker, rolling  -EM      [place] Level (Gait Training Goal 1, PT) moderate assist (50-74% patient effort)  -EM      Distance (Gait Goal 1, PT) 3 ft   -EM      Time Frame (Gait Training Goal 1, PT) by discharge  -EM      Progress/Outcome (Gait Training Goal 1, PT) goal not met  -EM         Strength Goal 1 (PT)    Strength Goal 1 (PT) Pt will demonstrate 2x15 reps of at least 4 different LE exercises to demonstrate increased strength for improved transfers  -EM      Time Frame (Strength Goal 1, PT) 3 days  -EM      Progress/Outcome (Strength Goal 1, PT) goal met  -EM         Wheelchair Locomotion Goal 1 (PT)    Activity (Wheelchair Locomotion Goal 1, PT) wheelchair mobility skills, all  -EM      [place] Level/Cues Needed (Wheelchair Locomotion Goal 1, PT) supervision required  -EM      Distance Goal 1 (Wheelchair Locomotion, PT) 25 ft or more  -EM      Time Frame (Wheelchair Locomotion Goal 1, PT) 5 days  -EM      Progress/Outcome (Wheelchair Locomotion Goal 1, PT) goal not met  -EM        User Key  (r) = Recorded By, (t) = Taken By, (c) = Cosigned By    Initials Name Provider Type Discipline    EM Jimbo Whatley, PTA Physical Therapy Assistant PT          Physical Therapy Education     Title: PT OT SLP Therapies (Active)     Topic: Physical Therapy (Active)     Point: Mobility training (Done)    Learning Progress Summary     Learner Status Readiness Method Response Comment Documented by    Patient Done Acceptance E VU Role of PT, POC, discharge recommendations to SNF for further rehab KW 10/31/18 1604                       User Key     Initials Effective Dates Name Provider Type Discipline    KW 07/23/18 -  Sepideh Maya, PT Physical Therapist PT                    PT Recommendation and Plan  Anticipated Discharge Disposition (PT): skilled nursing facility  Outcome Summary/Treatment Plan (PT)  Daily Summary of Progress (PT): progress toward functional goals as expected  Barriers to Overall Progress (PT): Deconditioning  Plan for Continued Treatment (PT): standing, t/f OOB to chair  Anticipated Discharge Disposition (PT): skilled nursing facility  Outcome Summary: Pt goldie tx well, was agreeable to therex in supine only due to not feeling well. Pt performed supine therex well with good participation. 1 goal met this tx. Recommend SNF placement upon discharge at this time.           Outcome Measures     Row Name 11/08/18 0818 11/07/18 0843          How much help from another is currently needed...    Putting on and taking off regular lower body clothing? 2  -BB 2  -BB     Bathing (including washing, rinsing, and drying) 2  -BB 2  -BB     Toileting (which includes using toilet bed pan or urinal) 1  -BB 1  -BB     Putting on and taking off regular upper body clothing 3  -BB 3  -BB     Taking care of personal grooming (such as brushing teeth) 4  -BB 4  -BB     Eating meals 3  -BB 3  -BB     Score 15  -BB 15  -BB       User Key  (r) = Recorded By, (t) = Taken By, (c) = Cosigned By    Initials Name Provider Type    BB Maya Perez COTA/L Occupational Therapy Assistant           Time Calculation:         PT Charges     Row Name 11/09/18 1059             Time Calculation    Start Time 0858  -EM      Stop Time 0926  -EM      Time Calculation (min) 28 min  -EM         Time Calculation- PT    Total Timed Code Minutes- PT 28 minute(s)  -EM        User Key  (r) = Recorded By, (t) = Taken By, (c) = Cosigned By    Initials Name Provider Type    EM Jimbo Whatley, PTA Physical Therapy Assistant        Therapy Suggested Charges      Code   Minutes Charges    None           Therapy Charges for Today     Code Description Service Date Service Provider Modifiers Qty    [id number] HC PT THER PROC EA 15 MIN 11/9/2018 Jimbo Whatley, PTA GP 2          PT G-Codes  Outcome Measure Options: AM-PAC 6 Clicks Basic Mobility (PT)  AM-PAC 6 Clicks Score: 12  Score: 15  Functional Limitation: Mobility: Walking and moving around  Mobility: Walking and Moving Around Current Status (): At least 60 percent but less than 80 percent impaired, limited or restricted  Mobility: Walking and Moving Around Goal Status (): At least 40 percent but less than 60 percent impaired, limited or restricted    Jimbo Whatley, PTA  11/9/2018

## 2018-11-09 NOTE — PROGRESS NOTES
Tushar Becerril DO,[facility]  Gastroenterology  Hepatology  Endoscopy  Board Certified in Internal Medicine and gastroenterology  [address], [address]  [place]. [address]  - [phone]   F - [phone]     GASTROENTEROLOGY PROGRESS NOTE   TUSHAR BECERRIL DO.         SUBJECTIVE:   11/8/2018  Chief Complaint:     Subjective  : No interval problems have been occurring.  Good mobilization of fluid.  Plans for perinephric drainage tomorrow but had a problem with reaction with the fresh frozen plasma.  Doing well now without difficulties with swallowing or shortness of breath.       CURRENT MEDICATIONS/OBJECTIVE/VS/PE:     Current Medications:     Current Facility-Administered Medications   Medication Dose Route Frequency Provider Last Rate Last Dose   • albumin human 5 % bottle 25 g  25 g Intravenous Q12H Solomon Gomez  mL/hr at 11/05/18 0114 25 g at 11/08/18 1856   • cefTRIAXone (ROCEPHIN) 1 g/100 mL 0.9% NS (MBP)  1 g Intravenous Q24H Solomon Gomez PA   1 g at 11/08/18 1816   • furosemide (LASIX) 100 mg in sodium chloride 0.9 % 100 mL (1 mg/mL) infusion  5 mg/hr Intravenous Continuous Solomon Gomez PA 5 mL/hr at 11/08/18 1248 5 mg/hr at 11/08/18 1248   • HYDROmorphone (DILAUDID) injection 0.25 mg  0.25 mg Intravenous Q3H PRN Anderson Justice DO   0.25 mg at 11/08/18 1706   • ipratropium-albuterol (DUO-NEB) nebulizer solution 3 mL  3 mL Nebulization Q6H PRN Armando Richard MD       • lactulose (CHRONULAC) 10 GM/15ML solution 30 g  30 g Oral TID Eduar Cisneros MD   30 g at 11/08/18 2011   • magic butt ointment   Topical BID Armando Richard MD       • Magnesium Sulfate 2 gram Bolus, followed by 8 gram infusion (total Mg dose 10 grams)- Mg less than or equal to 1mg/dL  2 g Intravenous PRN Solomon Gomez PA        Or   • Magnesium Sulfate 2 gram / 50mL Infusion (GIVE X 3 BAGS TO EQUAL 6GM TOTAL DOSE) - Mg 1.1 - 1.5 mg/dl  2 g Intravenous PRN Solomon Gomez, PA         Or   • Magnesium Sulfate 4 gram infusion- Mg 1.6-1.9 mg/dL  4 g Intravenous PRN Solomon Gomez PA 25 mL/hr at 11/07/18 2157 4 g at 11/07/18 2157   • metroNIDAZOLE (FLAGYL) IVPB 500 mg  500 mg Intravenous Q8H Solomon Gomez PA   500 mg at 11/08/18 1706   • midodrine (PROAMATINE) tablet 10 mg  10 mg Oral TID Eduar Cisneros MD   10 mg at 11/08/18 2010   • naloxone (NARCAN) injection 0.4 mg  0.4 mg Intravenous Q5 Min PRN Eduar Cisneros MD       • nicotine (NICODERM CQ) 21 MG/24HR patch 1 patch  1 patch Transdermal Q24H Armando Richard MD   1 patch at 11/08/18 0804   • ondansetron (ZOFRAN) injection 4 mg  4 mg Intravenous Q6H PRN Eduar Cisneros MD       • oxyCODONE (ROXICODONE) immediate release tablet 5 mg  5 mg Oral Q6H PRN Anderson Justice DO   5 mg at 11/03/18 0913   • phytonadione (AQUA-MEPHYTON, VITAMIN K) 5 mg in sodium chloride 0.9 % 50 mL IVPB  5 mg Intravenous Once Briseyda Pérez MD 50 mL/hr at 11/08/18 2011 5 mg at 11/08/18 2011   • potassium chloride 10 mEq in 100 mL IVPB  10 mEq Intravenous Q1H PRN Solomon Gomez  mL/hr at 11/08/18 1816 10 mEq at 11/08/18 1816   • rifaximin (XIFAXAN) tablet 275 mg  275 mg Oral Q12H Eduar Cisneros MD   275 mg at 11/08/18 2010   • simethicone (MYLICON) chewable tablet 80 mg  80 mg Oral Q6H PRN Eduar Cisneros MD       • sodium chloride 0.9 % flush 3 mL  3 mL Intravenous Q12H Eduar Cisneros MD   3 mL at 11/08/18 2011   • sodium chloride 0.9 % flush 3-10 mL  3-10 mL Intravenous PRN Eduar Cisneros MD   10 mL at 11/08/18 1709       Objective     Physical Exam:   Temp:  [96.4 °F (35.8 °C)-98.7 °F (37.1 °C)] 98.1 °F (36.7 °C)  Heart Rate:  [71-93] 85  Resp:  [18] 18  BP: (103-147)/(61-89) 147/86     Physical Exam:  General Appearance:    Alert, cooperative, in no acute distress   Head:    Normocephalic, without obvious abnormality, atraumatic   Eyes:            Lids and lashes normal, conjunctivae and sclerae normal,  no   icterus, no pallor, corneas clear, PERRLA   Ears:    Ears appear intact with no abnormalities noted   Throat:   No oral lesions, no thrush, oral mucosa moist   Neck:   No adenopathy, supple, trachea midline, no thyromegaly, no     carotid bruit, no JVD   Back:     No kyphosis present, no scoliosis present, no skin lesions,       erythema or scars, no tenderness to percussion or                   palpation,   range of motion normal   Lungs:     Clear to auscultation,respirations regular, even and                   unlabored    Heart:    Regular rhythm and normal rate, normal S1 and S2, no            murmur, no gallop, no rub, no click   Breast Exam:    Deferred   Abdomen:     Enlarged left lobe of the liver and spleen.  No ascites felt.     Genitalia:    Deferred   Extremities:   Moves all extremities well, no edema, no cyanosis, no              redness   Pulses:   Pulses palpable and equal bilaterally   Skin:   No bleeding, bruising or rash   Lymph nodes:   No palpable adenopathy   Neurologic:   Cranial nerves 2 - 12 grossly intact, sensation intact, DTR        present and equal bilaterally      Results Review:     Lab Results (last 24 hours)     Procedure Component Value Units Date/Time    Fibrinogen [[id number]]  (Abnormal) Collected:  11/08/18 1753    Specimen:  Blood Updated:  11/08/18 1846     Fibrinogen 123 (L) mg/dL     Protime-INR [[id number]]  (Abnormal) Collected:  11/08/18 1753    Specimen:  Blood Updated:  11/08/18 1839     Protime 23.0 (H) Seconds      INR 2.14 (H)    Narrative:       Therapeutic range for most indications is 2.0-3.0 INR,  or 2.5-3.5 for mechanical heart valves.    Extra Tubes [[id number]] Collected:  11/08/18 0956    Specimen:  Blood from Blood, Venous Line Updated:  11/08/18 1101    Narrative:       The following orders were created for panel order Extra Tubes.  Procedure                               Abnormality         Status                     ---------                                -----------         ------                     Light Blue Top[[id number]]                                   Final result               Lavender Top[[id number]]                                     Final result               Lavender Top[[id number]]                                     Final result               Gold Top - SST[[id number]]                                   Final result               Green Top (Gel)[[id number]]                                  Final result                 Please view results for these tests on the individual orders.    Light Blue Top [[id number]] Collected:  11/08/18 0956    Specimen:  Blood Updated:  11/08/18 1101     Extra Tube hold for add-on     Comment: Auto resulted       Lavender Top [[id number]] Collected:  11/08/18 0956    Specimen:  Blood Updated:  11/08/18 1101     Extra Tube hold for add-on     Comment: Auto resulted       Lavender Top [[id number]] Collected:  11/08/18 0956    Specimen:  Blood Updated:  11/08/18 1101     Extra Tube hold for add-on     Comment: Auto resulted       Gold Top - SST [[id number]] Collected:  11/08/18 0956    Specimen:  Blood Updated:  11/08/18 1101     Extra Tube Hold for add-ons.     Comment: Auto resulted.       Green Top (Gel) [[id number]] Collected:  11/08/18 0956    Specimen:  Blood Updated:  11/08/18 1101     Extra Tube Hold for add-ons.     Comment: Auto resulted.       Protime-INR [[id number]]  (Abnormal) Collected:  11/08/18 0911    Specimen:  Blood Updated:  11/08/18 0958     Protime 26.3 (H) Seconds      INR 2.55 (H)    Narrative:       Therapeutic range for most indications is 2.0-3.0 INR,  or 2.5-3.5 for mechanical heart valves.    Fibrinogen [[id number]]  (Abnormal) Collected:  11/08/18 0607    Specimen:  Blood Updated:  11/08/18 0840     Fibrinogen 95 (L) mg/dL     Vitamin B12 [[id number]]  (Normal) Collected:  11/08/18 0607    Specimen:  Blood Updated:  11/08/18 0740     Vitamin B-12 930 pg/mL     Folate [[id number]]  (Normal) Collected:   11/08/18 0607    Specimen:  Blood Updated:  11/08/18 0740     Folate 8.76 ng/mL     Ferritin [[id number]]  (Abnormal) Collected:  11/08/18 0607    Specimen:  Blood Updated:  11/08/18 0710     Ferritin 530.00 (H) ng/mL     Protime-INR [[id number]]  (Abnormal) Collected:  11/08/18 0607    Specimen:  Blood Updated:  11/08/18 0649     Protime 27.8 (H) Seconds      INR 2.75 (H)    Narrative:       Therapeutic range for most indications is 2.0-3.0 INR,  or 2.5-3.5 for mechanical heart valves.    Iron Profile [[id number]]  (Abnormal) Collected:  11/08/18 0607    Specimen:  Blood Updated:  11/08/18 0645     Iron 52 mcg/dL      TIBC 119 (L) mcg/dL      Iron Saturation 44 %     Potassium [[id number]]  (Normal) Collected:  11/08/18 0607    Specimen:  Blood Updated:  11/08/18 0644     Potassium 3.6 mmol/L     Basic Metabolic Panel [[id number]]  (Abnormal) Collected:  11/08/18 0607    Specimen:  Blood Updated:  11/08/18 0644     Glucose 83 mg/dL      BUN 12 mg/dL      Creatinine 0.89 mg/dL      Sodium 133 (L) mmol/L      Potassium 3.6 mmol/L      Chloride 97 mmol/L      CO2 31.0 mmol/L      Calcium 8.2 (L) mg/dL      eGFR Non African Amer 89 mL/min/1.73      BUN/Creatinine Ratio 13.5     Anion Gap 5.0 mmol/L     Magnesium [[id number]]  (Normal) Collected:  11/08/18 0607    Specimen:  Blood Updated:  11/08/18 0643     Magnesium 2.3 mg/dL     Ammonia [[id number]]  (Normal) Collected:  11/08/18 0607    Specimen:  Blood Updated:  11/08/18 0637     Ammonia 24 umol/L     CBC & Differential [[id number]] Collected:  11/08/18 0607    Specimen:  Blood Updated:  11/08/18 0626    Narrative:       The following orders were created for panel order CBC & Differential.  Procedure                               Abnormality         Status                     ---------                               -----------         ------                     CBC Auto Differential[[id number]]        Abnormal            Final result                 Please view results for  these tests on the individual orders.    CBC Auto Differential [[id number]]  (Abnormal) Collected:  11/08/18 0607    Specimen:  Blood Updated:  11/08/18 0626     WBC 5.00 10*3/mm3      RBC 2.20 (L) 10*6/mm3      Hemoglobin 8.1 (L) g/dL      Hematocrit 22.8 (L) %      .6 (H) fL      MCH 36.8 (H) pg      MCHC 35.5 g/dL      RDW 14.5 %      RDW-SD 54.7 (H) fl      MPV 10.0 fL      Platelets 50 (L) 10*3/mm3      Neutrophil % 48.4 %      Lymphocyte % 30.6 %      Monocyte % 13.0 (H) %      Eosinophil % 6.2 %      Basophil % 1.4 %      Immature Grans % 0.4 %      Neutrophils, Absolute 2.42 10*3/mm3      Lymphocytes, Absolute 1.53 10*3/mm3      Monocytes, Absolute 0.65 10*3/mm3      Eosinophils, Absolute 0.31 10*3/mm3      Basophils, Absolute 0.07 10*3/mm3      Immature Grans, Absolute 0.02 10*3/mm3            I reviewed the patient's new clinical results.  I reviewed the patient's new imaging results and agree with the interpretation.     ASSESSMENT/PLAN:   ASSESSMENT:   1.  Advanced cirrhosis of liver  2.  Anasarca, markedly improved  3.  Ascites, markedly improved    PLAN:   1.  Should be able to discontinue the Lasix and albumin infusions after the drainage of the perinephric abscess      Tushar Becerril DO  11/08/18  8:16 PM

## 2018-11-09 NOTE — PROGRESS NOTES
[facility] Medicine Services  INPATIENT PROGRESS NOTE    Length of Stay: 11  Date of Admission: 10/29/2018  Primary Care Physician: Reddy Grant MD    Subjective   Chief Complaint: No complaints    HPI:        11/9/2018:  INR elevated at 2.67 today.  Due to multiple transfusions, high risk of bleeding and pending aspiration of a perinephric abscess, the patient will be transferred to Stepdown for closer monitoring.      11/8/2018:  INR remains elevated today at 2.55 despite IV vitamin K yesterday.  The patient was ordered to receive 3 units of FFP and 1 unit of platelets this am prior to aspiration of kidney.  The patient was noted by nursing staff to have a reaction of trouble swallowing and the feeling of swelling of the throat.  The patient was given Benadryl and Solumedrol and states his symptoms immediately improved.   Transfusion reaction testing started by lab.     This is a 53 year old  male with PMH of chronic hepatitis C complicated by cirrhosis of the liver that presented to [facility] on 10/29/2018 secondary to altered mental status and was found to have an elevated ammonia level of 129.  Lactulose was given and his levels returned to normal in a couple of days. Urology was consulted due to urine retention.   Ct of the abdomen and pelvis indicated a possible abscess of the left kidney.  Radiology is scheduled to perform guided drainage of the abscess.  Hematology is following due to thrombocytopenia and elevated INR.       Review of Systems   Constitutional: Positive for fatigue.   Cardiovascular: Positive for leg swelling.   Psychiatric/Behavioral: Positive for decreased concentration.      All pertinent negatives and positives are as above. All other systems have been reviewed and are negative unless otherwise stated.     Objective    Temp:  [97 °F (36.1 °C)-98.7 °F (37.1 °C)] 97.2 °F (36.2 °C)  Heart Rate:  [60-93] 72  Resp:  [16-18] 16  BP:  (113-149)/() 133/71    Physical Exam   Constitutional: He is oriented to person, place, and time. He appears well-developed and well-nourished.   HENT:   Head: Normocephalic and atraumatic.   Eyes: Pupils are equal, round, and reactive to light. EOM are normal.   Neck: Normal range of motion. Neck supple.   Cardiovascular: Normal rate and regular rhythm.    Pulmonary/Chest: Effort normal and breath sounds normal.   Abdominal: He exhibits distension.   Musculoskeletal: Normal range of motion.   Neurological: He is alert and oriented to person, place, and time.   Skin: Skin is warm and dry.   Psychiatric: He has a normal mood and affect.     Results Review:  I have reviewed the labs, radiology results, and diagnostic studies.    Laboratory Data:     Results from last 7 days  Lab Units 11/09/18  0526 11/09/18  0004 11/08/18  0607 11/07/18  0526   SODIUM mmol/L 132*  --  133*  --  133*   POTASSIUM mmol/L 3.9 3.6 3.6  3.6  < > 2.9*   CHLORIDE mmol/L 94*  --  97  --  97   CO2 mmol/L 29.0  --  31.0  --  28.0   BUN mg/dL 18  --  12  --  13   CREATININE mg/dL 1.02  --  0.89  --  0.90   GLUCOSE mg/dL 135*  --  83  --  76   CALCIUM mg/dL 9.0  --  8.2*  --  7.7*   ANION GAP mmol/L 9.0  --  5.0  --  8.0   < > = values in this interval not displayed.  Estimated Creatinine Clearance: 100 mL/min (by C-G formula based on SCr of 1.02 mg/dL).    Results from last 7 days  Lab Units 11/08/18  0607 11/07/18  1113 11/02/18  1407   MAGNESIUM mg/dL 2.3 1.6 1.9           Results from last 7 days  Lab Units 11/09/18  0526 11/08/18  0607 11/07/18  0526 11/06/18  0552 11/05/18  0608   WBC 10*3/mm3 9.11 5.00 4.39 5.17 4.47   HEMOGLOBIN g/dL 7.9* 8.1* 7.8* 7.9* 7.5*   HEMATOCRIT % 22.3* 22.8* 22.2* 22.0* 21.2*   PLATELETS 10*3/mm3 49* 50* 43* 46* 42*       Results from last 7 days  Lab Units 11/09/18  0838 11/09/18  0526 11/08/18  1753 11/08/18  0911 11/08/18  0607   INR  2.67* 2.51* 2.14* 2.55* 2.75*       Culture Data:   No results  "found for: BLOODCX  No results found for: URINECX  No results found for: RESPCX  No results found for: WOUNDCX  No results found for: STOOLCX  No components found for: BODYFLD    Radiology Data:   Imaging Results (last 24 hours)     ** No results found for the last 24 hours. **          I have reviewed the patient's current medications.     Assessment/Plan     Active Hospital Problems    Diagnosis Date Noted   • Altered mental status [R41.82] 10/29/2018   • CKD (chronic kidney disease) stage 3, GFR 30-59 ml/min (CMS/HCC) [N18.3] 08/02/2018   • Hepatic encephalopathy (CMS/HCC) [K72.90] 04/11/2018   • Thrombocytopenia (CMS/HCC) [D69.6] 07/10/2017   • Chronic hepatitis (CMS/HCC) [K73.9] 06/18/2009       Plan:    1.  Hepatic encephalopathy secondary to liver cirrhosis:  Ammonia level remains normal.  Alert and oriented this am.  Dr. Becerril following.   2.  Coagulopathy/ thrombocytopenia of liver disease:  Dr. Glass with hematology following.  3 units of FFP were started yesterday, but discontinued secondary to reaction of \"throat swelling\".  Lab is running a transfusion reaction study.       3.  Perinephric abscess:  Aspiration planned with radiology once coagulapathy is managed.  DELVIS MANUEL/ Dr. Ledbetter following.     I spoke with the patient and he states he would like to remain a FULL CODE.     Discharge Planning: I expect patient to be discharged to home in 4-5 days.      This document has been electronically signed by KALEB Munguia on November 9, 2018 11:29 AM        "

## 2018-11-09 NOTE — PLAN OF CARE
Problem: Patient Care Overview  Goal: Plan of Care Review  Outcome: Ongoing (interventions implemented as appropriate)   11/09/18 0018   Coping/Psychosocial   Plan of Care Reviewed With patient   Plan of Care Review   Progress no change   OTHER   Outcome Summary Potassium replacement complete and lab draw ordered. Patient currently resting with no complaints. VSS. Will continue to monitor.      Goal: Discharge Needs Assessment  Outcome: Ongoing (interventions implemented as appropriate)    Goal: Interprofessional Rounds/Family Conf  Outcome: Ongoing (interventions implemented as appropriate)      Problem: Thought Process Alteration (Adult)  Goal: Improved Thought Process  Outcome: Ongoing (interventions implemented as appropriate)      Problem: Fall Risk (Adult)  Goal: Absence of Fall  Outcome: Ongoing (interventions implemented as appropriate)      Problem: Skin Injury Risk (Adult)  Goal: Skin Health and Integrity  Outcome: Ongoing (interventions implemented as appropriate)

## 2018-11-09 NOTE — PLAN OF CARE
Problem: Patient Care Overview  Goal: Plan of Care Review  Outcome: Ongoing (interventions implemented as appropriate)   11/09/18 0018 11/09/18 1058   Coping/Psychosocial   Plan of Care Reviewed With patient --    Plan of Care Review   Progress no change --    OTHER   Outcome Summary --  Pt goldie tx well, was agreeable to therex in supine only due to not feeling well. Pt performed supine therex well with good participation. 1 goal met this tx. Recommend SNF placement upon discharge at this time.
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
## 2018-11-09 NOTE — PROGRESS NOTES
"   LOS: 10 days   Patient Care Team:  Reddy Grant MD as PCP - General (Family Medicine)  Shawn Cortez MD (Inactive) as Surgeon (Orthopedic Surgery)  Tushar Becerril DO as Consulting Physician (Gastroenterology)  Josep Colón MD as Consulting Physician (Nephrology)  Ortiz Ferreira MD as Consulting Physician (Hematology and Oncology)    Subjective     Subjective:  Symptoms:  Stable.  (Prepping for drainage of perirenal abscess this morning. Reports no acute evens overnight. ).    Diet:  Adequate intake.  No nausea or vomiting.    Pain:  He reports pain is unchanged.        History taken from: patient chart RN    Objective     Vital Signs  Temp:  [96.4 °F (35.8 °C)-98.7 °F (37.1 °C)] 98.7 °F (37.1 °C)  Heart Rate:  [71-93] 87  Resp:  [18] 18  BP: (103-142)/(61-89) 142/89    Objective:  General Appearance:  In no acute distress.    Vital signs: (most recent): Blood pressure 142/89, pulse 87, temperature 98.7 °F (37.1 °C), temperature source Temporal Artery , resp. rate 18, height 182.9 cm (72\"), weight 97.1 kg (214 lb), SpO2 95 %.  Vital signs are normal.  No fever.    Output: Producing urine (Urine clear yellow) and producing stool.    HEENT: Normal HEENT exam.  (Scleral icterus is present)    Lungs:  Normal effort and normal respiratory rate.  Breath sounds clear to auscultation.  He is not in respiratory distress.  No stridor.  No decreased breath sounds.    Heart: Normal rate.  Regular rhythm.  S1 normal and S2 normal.  No murmur, gallop or friction rub.   Chest: Symmetric chest wall expansion.   Abdomen: Abdomen is soft and non-distended.  Bowel sounds are normal.   There is generalized tenderness.     Extremities: There is no dependent edema.    Neurological: Patient is alert and oriented to person, place and time.  GCS score is 15.    Pupils:  Pupils are equal, round, and reactive to light.    Skin:  Warm and dry.  No rash or cyanosis. (Excessively dry, jaundice)          Results Review:    Lab " Results (last 24 hours)     Procedure Component Value Units Date/Time    Fibrinogen [[id number]]  (Abnormal) Collected:  11/08/18 1753    Specimen:  Blood Updated:  11/08/18 1846     Fibrinogen 123 (L) mg/dL     Protime-INR [[id number]]  (Abnormal) Collected:  11/08/18 1753    Specimen:  Blood Updated:  11/08/18 1839     Protime 23.0 (H) Seconds      INR 2.14 (H)    Narrative:       Therapeutic range for most indications is 2.0-3.0 INR,  or 2.5-3.5 for mechanical heart valves.    Extra Tubes [[id number]] Collected:  11/08/18 0956    Specimen:  Blood from Blood, Venous Line Updated:  11/08/18 1101    Narrative:       The following orders were created for panel order Extra Tubes.  Procedure                               Abnormality         Status                     ---------                               -----------         ------                     Light Blue Top[[id number]]                                   Final result               Lavender Top[[id number]]                                     Final result               Lavender Top[[id number]]                                     Final result               Gold Top - SST[[id number]]                                   Final result               Green Top (Gel)[[id number]]                                  Final result                 Please view results for these tests on the individual orders.    Light Blue Top [[id number]] Collected:  11/08/18 0956    Specimen:  Blood Updated:  11/08/18 1101     Extra Tube hold for add-on     Comment: Auto resulted       Lavender Top [[id number]] Collected:  11/08/18 0956    Specimen:  Blood Updated:  11/08/18 1101     Extra Tube hold for add-on     Comment: Auto resulted       Lavender Top [[id number]] Collected:  11/08/18 0956    Specimen:  Blood Updated:  11/08/18 1101     Extra Tube hold for add-on     Comment: Auto resulted       Gold Top - SST [[id number]] Collected:  11/08/18 0956    Specimen:  Blood Updated:  11/08/18 1101     Extra  Tube Hold for add-ons.     Comment: Auto resulted.       Green Top (Gel) [[id number]] Collected:  11/08/18 0956    Specimen:  Blood Updated:  11/08/18 1101     Extra Tube Hold for add-ons.     Comment: Auto resulted.       Protime-INR [[id number]]  (Abnormal) Collected:  11/08/18 0911    Specimen:  Blood Updated:  11/08/18 0958     Protime 26.3 (H) Seconds      INR 2.55 (H)    Narrative:       Therapeutic range for most indications is 2.0-3.0 INR,  or 2.5-3.5 for mechanical heart valves.    Fibrinogen [[id number]]  (Abnormal) Collected:  11/08/18 0607    Specimen:  Blood Updated:  11/08/18 0840     Fibrinogen 95 (L) mg/dL     Vitamin B12 [[id number]]  (Normal) Collected:  11/08/18 0607    Specimen:  Blood Updated:  11/08/18 0740     Vitamin B-12 930 pg/mL     Folate [[id number]]  (Normal) Collected:  11/08/18 0607    Specimen:  Blood Updated:  11/08/18 0740     Folate 8.76 ng/mL     Ferritin [[id number]]  (Abnormal) Collected:  11/08/18 0607    Specimen:  Blood Updated:  11/08/18 0710     Ferritin 530.00 (H) ng/mL     Protime-INR [[id number]]  (Abnormal) Collected:  11/08/18 0607    Specimen:  Blood Updated:  11/08/18 0649     Protime 27.8 (H) Seconds      INR 2.75 (H)    Narrative:       Therapeutic range for most indications is 2.0-3.0 INR,  or 2.5-3.5 for mechanical heart valves.    Iron Profile [[id number]]  (Abnormal) Collected:  11/08/18 0607    Specimen:  Blood Updated:  11/08/18 0645     Iron 52 mcg/dL      TIBC 119 (L) mcg/dL      Iron Saturation 44 %     Potassium [[id number]]  (Normal) Collected:  11/08/18 0607    Specimen:  Blood Updated:  11/08/18 0644     Potassium 3.6 mmol/L     Basic Metabolic Panel [[id number]]  (Abnormal) Collected:  11/08/18 0607    Specimen:  Blood Updated:  11/08/18 0644     Glucose 83 mg/dL      BUN 12 mg/dL      Creatinine 0.89 mg/dL      Sodium 133 (L) mmol/L      Potassium 3.6 mmol/L      Chloride 97 mmol/L      CO2 31.0 mmol/L      Calcium 8.2 (L) mg/dL      eGFR Non  Amer 89  mL/min/1.73      BUN/Creatinine Ratio 13.5     Anion Gap 5.0 mmol/L     Magnesium [[id number]]  (Normal) Collected:  11/08/18 0607    Specimen:  Blood Updated:  11/08/18 0643     Magnesium 2.3 mg/dL     Ammonia [[id number]]  (Normal) Collected:  11/08/18 0607    Specimen:  Blood Updated:  11/08/18 0637     Ammonia 24 umol/L     CBC & Differential [[id number]] Collected:  11/08/18 0607    Specimen:  Blood Updated:  11/08/18 0626    Narrative:       The following orders were created for panel order CBC & Differential.  Procedure                               Abnormality         Status                     ---------                               -----------         ------                     CBC Auto Differential[[id number]]        Abnormal            Final result                 Please view results for these tests on the individual orders.    CBC Auto Differential [[id number]]  (Abnormal) Collected:  11/08/18 0607    Specimen:  Blood Updated:  11/08/18 0626     WBC 5.00 10*3/mm3      RBC 2.20 (L) 10*6/mm3      Hemoglobin 8.1 (L) g/dL      Hematocrit 22.8 (L) %      .6 (H) fL      MCH 36.8 (H) pg      MCHC 35.5 g/dL      RDW 14.5 %      RDW-SD 54.7 (H) fl      MPV 10.0 fL      Platelets 50 (L) 10*3/mm3      Neutrophil % 48.4 %      Lymphocyte % 30.6 %      Monocyte % 13.0 (H) %      Eosinophil % 6.2 %      Basophil % 1.4 %      Immature Grans % 0.4 %      Neutrophils, Absolute 2.42 10*3/mm3      Lymphocytes, Absolute 1.53 10*3/mm3      Monocytes, Absolute 0.65 10*3/mm3      Eosinophils, Absolute 0.31 10*3/mm3      Basophils, Absolute 0.07 10*3/mm3      Immature Grans, Absolute 0.02 10*3/mm3          Imaging Results (last 24 hours)     Procedure Component Value Units Date/Time    CT Abdomen Pelvis With Contrast [[id number]] Collected:  11/05/18 1815     Updated:  11/05/18 1857    Narrative:         EXAMINATION:  Computed Tomography           REGION:    Abdomen / Pelvis                     INDICATION:   Repeat to  evaluate hematoma/abscess of kidney per  urology, R41.82 Altered mental status, unspecified K72.90 Hepatic  failure, unspecified without coma Z74.09 Other reduced mobility  Z74.09 Other reduced mobility    HISTORY:  LISA. IMAGING:    CT A/P 11/2/18            TECHNIQUE:      - reconstructions:    axial, coronal, sagittal         - contrast:      oral:  No ;   intravenous: Isovue 300, 100 mL  This exam was performed according to the departmental  dose-optimization program which includes automated exposure  control, adjustment of the mA and/or kV according to patient size  and/or use of iterative reconstruction technique.           COMMENTS:            - - - CT ABDOMEN - - -          THORAX (INFERIOR):      - LUNG BASES:  clear      - PLEURA:    no fluid or mass      - HEART:    normal size, no pericardial fluid     - MISC:      n/a          ABDOMEN:     - LIVER:    Diminished size with nodular contour containing a  tips shunt   - GB:      Cholelithiasis without evidence of wall thickening.    - CBD:      grossly negative   - SPLEEN:    Upper normal/mildly enlarged   - PANCREAS:    normal in size, contour, no focal mass    - VISCERA:    normal caliber, no wall thickening     - MESENTERY:  no mesenteric mass   - CAVITY:    Small amount of free fluid   - BODY WALL:  wnl   - OSSEOUS:    grossly negative for age   - MISC:                 Extensive gastric varices                      RETROPERITONEUM:   - KIDNEYS:    Renal examination again displays overall normal  size and symmetric nephrograms. Again noted is a left pararenal  enhancing focal fluid collection containing air which is  unchanged in size measuring 2.5 x 3.6 x 2.0 cm   - URETERS:    normal course, caliber   - ADRENALS:    normal size, contour   - MISC:      no sig retroperitoneal adenopathy or mass   - VASCULAR:    aorta / iliacs: wnl for age     - - - CT PELVIS - - -      - VISCERA:    normal caliber small/large bowel, no focal  thickening/mass        -  APPENDIX:          wnl   - MESENTERY:  no mass   - VASCULAR:    wnl for age   - CAVITY:    Moderate amount of free fluid   - BLADDER:    unremarkable   - OSSEOUS:    Status post left hip repair    - MISC:     .       Impression:        CONCLUSION:  1. Unchanged left pararenal focal fluid collection with  peripheral enhancement containing air, likely representing an  abscess.  2. Multiple additional findings demonstrating the presence of  cirrhosis status post TIPS shunt, with spleen size at the upper  limits of normal/mildly enlarged, and extensive gastric varices,  as above, unchanged.        Electronically signed by:  PRISCILLA Bustillo MD  11/5/2018 6:56  PM CST Workstation: [id number]           I reviewed the patient's new clinical results.  I reviewed the patient's new imaging results and agree with the interpretation.  I reviewed the patient's other test results and agree with the interpretation      Assessment/Plan       Chronic hepatitis (CMS/HCC)    Thrombocytopenia (CMS/HCC)    Hepatic encephalopathy (CMS/HCC)    CKD (chronic kidney disease) stage 3, GFR 30-59 ml/min (CMS/HCC)    Altered mental status      Assessment & Plan    1. Difficulty urinating with hematuria, likely related to UTI hemorrhagic cystitis without clots-->hematuria resolved  2. Perinephric abscess  -Estimated Creatinine Clearance: 114.6 mL/min (by C-G formula based on SCr of 0.89 mg/dL).  -Cr 0.89  -WBC 5.00  -UA + 10/30/18  -Hgb/Hct 8.1/22.8  -Platelets 50,000  -INR 2.75  -Renal ultrasound 10/30/18 limited exam, especially of the left kidney, due to the presence of bowel gas and the patient's body habitus. No hydronephrosis is visualized. Unremarkable bladder.   -11/2/18 CT abdomen/pelvis LEFT 2.3 x 3.7 x 2.2 cm fluid and air collection infected subcapsular hematoma versus abscess.  -11/5/18 CT abdomen/pelvis showed LEFT renal abscess unchanged  -10/30/18 Urine culture NEGATIVE, UA grossly positive.   -Antibiotic day #11, currently on  Rocephin.     Plan:    Radiology to perform guided drainage of abscess today. Dr. Glass's help much appreciated.     Deondre Virk, KALEB  11/08/18  6:50 PM

## 2018-11-09 NOTE — PLAN OF CARE
Problem: Patient Care Overview  Goal: Plan of Care Review  Outcome: Ongoing (interventions implemented as appropriate)   11/09/18 6326   Coping/Psychosocial   Plan of Care Reviewed With caregiver   OTHER   Outcome Summary Patient is currently NPO for procedure. Per nurse, patient should be able to eat dinner following procedure this evening.

## 2018-11-09 NOTE — SIGNIFICANT NOTE
11/09/18 1252   Rehab Treatment   Discipline physical therapy assistant   Reason Treatment Not Performed other (see comments)  (Pt t/f to Step Down. Will need R/S orders when medically appropriate. )

## 2018-11-09 NOTE — PROGRESS NOTES
"   LOS: 11 days   Patient Care Team:  Reddy Grant MD as PCP - General (Family Medicine)  Shawn Cortez MD (Inactive) as Surgeon (Orthopedic Surgery)  Tushar Becerril DO as Consulting Physician (Gastroenterology)  Josep Colón MD as Consulting Physician (Nephrology)  Ortiz Ferreira MD as Consulting Physician (Hematology and Oncology)    Subjective     Subjective:  Symptoms:  Stable.  (Prepping for drainage of perirenal abscess this morning, cancelled yesterday. Stable. ).    Diet:  Adequate intake.  No nausea or vomiting.    Pain:  He reports pain is unchanged.        History taken from: patient chart RN    Objective     Vital Signs  Temp:  [96.3 °F (35.7 °C)-98.4 °F (36.9 °C)] 96.7 °F (35.9 °C)  Heart Rate:  [58-85] 67  Resp:  [16-18] 18  BP: (123-162)/() 123/71    Objective:  General Appearance:  In no acute distress.    Vital signs: (most recent): Blood pressure 123/71, pulse 67, temperature 96.7 °F (35.9 °C), resp. rate 18, height 182.9 cm (72\"), weight 97.2 kg (214 lb 3.2 oz), SpO2 92 %.  Vital signs are normal.  No fever.    Output: Producing urine (Urine clear yellow) and producing stool.    HEENT: Normal HEENT exam.  (Scleral icterus is present)    Lungs:  Normal effort and normal respiratory rate.  Breath sounds clear to auscultation.  He is not in respiratory distress.  No stridor.  No decreased breath sounds.    Heart: Normal rate.  Regular rhythm.  S1 normal and S2 normal.  No murmur, gallop or friction rub.   Chest: Symmetric chest wall expansion.   Abdomen: Abdomen is soft and non-distended.  Bowel sounds are normal.   There is generalized tenderness.     Extremities: There is no dependent edema.    Neurological: Patient is alert and oriented to person, place and time.  GCS score is 15.    Pupils:  Pupils are equal, round, and reactive to light.    Skin:  Warm and dry.  No rash or cyanosis. (Excessively dry, jaundice)          Results Review:    Lab Results (last 24 hours)     Procedure " Component Value Units Date/Time    Extra Tubes [[id number]] Collected:  11/09/18 0843    Specimen:  Blood from Blood, Venous Line Updated:  11/09/18 0945    Narrative:       The following orders were created for panel order Extra Tubes.  Procedure                               Abnormality         Status                     ---------                               -----------         ------                     Gold Top - SST[[id number]]                                   Final result                 Please view results for these tests on the individual orders.    Gold Top - SST [[id number]] Collected:  11/09/18 0843    Specimen:  Blood Updated:  11/09/18 0945     Extra Tube Hold for add-ons.     Comment: Auto resulted.       Fibrinogen [[id number]]  (Abnormal) Collected:  11/09/18 0838    Specimen:  Blood Updated:  11/09/18 0918     Fibrinogen 108 (L) mg/dL     Protime-INR [[id number]]  (Abnormal) Collected:  11/09/18 0838    Specimen:  Blood Updated:  11/09/18 0908     Protime 27.2 (H) Seconds      INR 2.67 (H)    Narrative:       Therapeutic range for most indications is 2.0-3.0 INR,  or 2.5-3.5 for mechanical heart valves.    Basic Metabolic Panel [[id number]]  (Abnormal) Collected:  11/09/18 0526    Specimen:  Blood Updated:  11/09/18 0558     Glucose 135 (H) mg/dL      BUN 18 mg/dL      Creatinine 1.02 mg/dL      Sodium 132 (L) mmol/L      Potassium 3.9 mmol/L      Chloride 94 (L) mmol/L      CO2 29.0 mmol/L      Calcium 9.0 mg/dL      eGFR Non African Amer 76 mL/min/1.73      BUN/Creatinine Ratio 17.6     Anion Gap 9.0 mmol/L     Fibrinogen [[id number]]  (Abnormal) Collected:  11/09/18 0526    Specimen:  Blood Updated:  11/09/18 0554     Fibrinogen 105 (L) mg/dL     CBC & Differential [[id number]] Collected:  11/09/18 0526    Specimen:  Blood Updated:  11/09/18 0550    Narrative:       The following orders were created for panel order CBC & Differential.  Procedure                               Abnormality         Status                      ---------                               -----------         ------                     Scan Slide[[id number]]                                                                  CBC Auto Differential[[id number]]        Abnormal            Final result                 Please view results for these tests on the individual orders.    Ammonia [[id number]]  (Normal) Collected:  11/09/18 0526    Specimen:  Blood Updated:  11/09/18 0549     Ammonia 20 umol/L     Protime-INR [[id number]]  (Abnormal) Collected:  11/09/18 0526    Specimen:  Blood Updated:  11/09/18 0548     Protime 26.0 (H) Seconds      INR 2.51 (H)    Narrative:       Therapeutic range for most indications is 2.0-3.0 INR,  or 2.5-3.5 for mechanical heart valves.    CBC Auto Differential [[id number]]  (Abnormal) Collected:  11/09/18 0526    Specimen:  Blood Updated:  11/09/18 0545     WBC 9.11 10*3/mm3      RBC 2.17 (L) 10*6/mm3      Hemoglobin 7.9 (L) g/dL      Hematocrit 22.3 (L) %      .8 (H) fL      MCH 36.4 (H) pg      MCHC 35.4 g/dL      RDW 14.1 %      RDW-SD 53.4 (H) fl      MPV 10.6 fL      Platelets 49 (L) 10*3/mm3      Neutrophil % 89.7 (H) %      Lymphocyte % 6.4 (L) %      Monocyte % 3.5 %      Eosinophil % 0.0 %      Basophil % 0.0 %      Immature Grans % 0.4 %      Neutrophils, Absolute 8.17 10*3/mm3      Lymphocytes, Absolute 0.58 (L) 10*3/mm3      Monocytes, Absolute 0.32 10*3/mm3      Eosinophils, Absolute 0.00 10*3/mm3      Basophils, Absolute 0.00 10*3/mm3      Immature Grans, Absolute 0.04 (H) 10*3/mm3     Potassium [[id number]]  (Normal) Collected:  11/09/18 0004    Specimen:  Blood Updated:  11/09/18 0038     Potassium 3.6 mmol/L     Fibrinogen [[id number]]  (Abnormal) Collected:  11/08/18 1753    Specimen:  Blood Updated:  11/08/18 1846     Fibrinogen 123 (L) mg/dL     Protime-INR [[id number]]  (Abnormal) Collected:  11/08/18 1753    Specimen:  Blood Updated:  11/08/18 1839     Protime 23.0 (H) Seconds      INR 2.14 (H)     Narrative:       Therapeutic range for most indications is 2.0-3.0 INR,  or 2.5-3.5 for mechanical heart valves.         Imaging Results (last 24 hours)     Procedure Component Value Units Date/Time    CT Abdomen Pelvis With Contrast [[id number]] Collected:  11/05/18 1815     Updated:  11/05/18 1857    Narrative:         EXAMINATION:  Computed Tomography           REGION:    Abdomen / Pelvis                     INDICATION:   Repeat to evaluate hematoma/abscess of kidney per  urology, R41.82 Altered mental status, unspecified K72.90 Hepatic  failure, unspecified without coma Z74.09 Other reduced mobility  Z74.09 Other reduced mobility    HISTORY:  LISA. IMAGING:    CT A/P 11/2/18            TECHNIQUE:      - reconstructions:    axial, coronal, sagittal         - contrast:      oral:  No ;   intravenous: Isovue 300, 100 mL  This exam was performed according to the departmental  dose-optimization program which includes automated exposure  control, adjustment of the mA and/or kV according to patient size  and/or use of iterative reconstruction technique.           COMMENTS:            - - - CT ABDOMEN - - -          THORAX (INFERIOR):      - LUNG BASES:  clear      - PLEURA:    no fluid or mass      - HEART:    normal size, no pericardial fluid     - MISC:      n/a          ABDOMEN:     - LIVER:    Diminished size with nodular contour containing a  tips shunt   - GB:      Cholelithiasis without evidence of wall thickening.    - CBD:      grossly negative   - SPLEEN:    Upper normal/mildly enlarged   - PANCREAS:    normal in size, contour, no focal mass    - VISCERA:    normal caliber, no wall thickening     - MESENTERY:  no mesenteric mass   - CAVITY:    Small amount of free fluid   - BODY WALL:  wnl   - OSSEOUS:    grossly negative for age   - MISC:                 Extensive gastric varices                      RETROPERITONEUM:   - KIDNEYS:    Renal examination again displays overall normal  size and symmetric  nephrograms. Again noted is a left pararenal  enhancing focal fluid collection containing air which is  unchanged in size measuring 2.5 x 3.6 x 2.0 cm   - URETERS:    normal course, caliber   - ADRENALS:    normal size, contour   - MISC:      no sig retroperitoneal adenopathy or mass   - VASCULAR:    aorta / iliacs: wnl for age     - - - CT PELVIS - - -      - VISCERA:    normal caliber small/large bowel, no focal  thickening/mass        - APPENDIX:          wnl   - MESENTERY:  no mass   - VASCULAR:    wnl for age   - CAVITY:    Moderate amount of free fluid   - BLADDER:    unremarkable   - OSSEOUS:    Status post left hip repair    - MISC:     .       Impression:        CONCLUSION:  1. Unchanged left pararenal focal fluid collection with  peripheral enhancement containing air, likely representing an  abscess.  2. Multiple additional findings demonstrating the presence of  cirrhosis status post TIPS shunt, with spleen size at the upper  limits of normal/mildly enlarged, and extensive gastric varices,  as above, unchanged.        Electronically signed by:  PRISCILLA Bustillo MD  11/5/2018 6:56  PM CST Workstation: [id number]           I reviewed the patient's new clinical results.  I reviewed the patient's new imaging results and agree with the interpretation.  I reviewed the patient's other test results and agree with the interpretation      Assessment/Plan       Chronic hepatitis (CMS/HCC)    Thrombocytopenia (CMS/HCC)    Hepatic encephalopathy (CMS/HCC)    CKD (chronic kidney disease) stage 3, GFR 30-59 ml/min (CMS/HCC)    Altered mental status      Assessment & Plan    1. Difficulty urinating with hematuria, likely related to UTI hemorrhagic cystitis without clots-->hematuria resolved  2. Perinephric abscess  -Estimated Creatinine Clearance: 100 mL/min (by C-G formula based on SCr of 1.02 mg/dL).  -Cr 0.89  -WBC 9.11  -UA + 10/30/18  -Hgb/Hct 7.9/22.3  -Platelets 49,000  -INR 2.51  -Renal ultrasound 10/30/18 limited  exam, especially of the left kidney, due to the presence of bowel gas and the patient's body habitus. No hydronephrosis is visualized. Unremarkable bladder.   -11/2/18 CT abdomen/pelvis LEFT 2.3 x 3.7 x 2.2 cm fluid and air collection infected subcapsular hematoma versus abscess.  -11/5/18 CT abdomen/pelvis showed LEFT renal abscess unchanged  -10/30/18 Urine culture NEGATIVE, UA grossly positive.   -Antibiotic day #12, currently on Rocephin.     Plan:    Radiology to perform guided drainage of abscess today.    KALEB Alexander  11/09/18  5:35 PM

## 2018-11-09 NOTE — CONSULTS
Adult Nutrition  Assessment    Patient Name:  Armando Mcmullen Sr.  YOB: 1965  MRN: [id number]  Admit Date:  10/29/2018    Assessment Date:  11/9/2018    Comments:  Patient has been placed on NPO for procedure. Per nurse, patient should be able to eat dinner following procedure this evening. Per nurse, patient is receiving cryo, platelets, and FFP. Per nurse, patient is tolerating transfusions well. RDN staff will continue to monitor.           Reason for Assessment     Row Name 11/09/18 1402          Reason for Assessment    Reason For Assessment (P)  follow-up protocol     Diagnosis (P)  liver disease               Nutrition/Diet History     Row Name 11/09/18 1402          Nutrition/Diet History    Typical Food/Fluid Intake (P)  Patient is currently NPO for a procedure. Per nurse, patient should be able to eat following procedure by dinner. Per nurse, patient had adverse reaction to blood transfusion but it is now controlled.      Factors Affecting Nutritional Intake (P)  abdominal distension;abdominal pain               Labs/Tests/Procedures/Meds     Row Name 11/09/18 1403          Labs/Procedures/Meds    Lab Results Reviewed (P)  reviewed, pertinent     Lab Results Comments (P)  Ammonia-20, Glucose-135, Na+-132        Diagnostic Tests/Procedures    Diagnostic Test/Procedure Reviewed (P)  reviewed, pertinent     Diagnostic Test/Procedures Comments (P)  Pericentesis, Cryo, platelets, FFP units,         Medications    Pertinent Medications Reviewed (P)  reviewed             Physical Findings     Row Name 11/09/18 1404          Physical Findings    Overall Physical Appearance (P)  amputee     Skin (P)  pressure injury               Nutrition Prescription Ordered     Row Name 11/09/18 1404          Nutrition Prescription PO    Current PO Diet (P)  NPO               Electronically signed by:  Aida Aviles  11/09/18 2:05 PM

## 2018-11-09 NOTE — PRE-PROCEDURE NOTE
Patient's history and physical exam were reviewed, and no changes were noted.  The risks and benefits of the procedure and of conscious sedation were discussed with the patient, and the patient had ample opportunity to ask questions.  Informed consent was obtained.

## 2018-11-10 NOTE — PROGRESS NOTES
[facility] HOSPITALIST PROGRESS NOTE     Patient Identification:  Name:  Armando Mcmullen Sr.  Age:  53 y.o.  Sex:  male  :  1965  MRN:  [id number]  Visit Number:  [id number]  Primary Care Provider:  Reddy Grant MD    Length of stay:  12    Chief complaint:  Pain and right shoulder pain    Subjective: This is a 53 year old  male with PMH of chronic hepatitis C complicated by cirrhosis of the liver that presented to [facility] on 10/29/2018 secondary to altered mental status and was found to have an elevated ammonia level of 129.  Lactulose was given and his levels returned to normal in a couple of days. Urology was consulted due to urine retention.   Ct of the abdomen and pelvis indicated a possible abscess of the left kidney.  Successful CT-guided aspiration of the fluid was done by interventional radiologist on 2018.  Hematology is following due to thrombocytopenia and elevated INR. He was started on FFP but reacted and it was discontinued  11/10/2018 patient was evaluated.  Complains of back pain which is chronic.      ----------------------------------------------------------------------------------------------------------------------  Current Hospital Meds:    albumin human 25 g Intravenous Q12H   ceftriaxone 1 g Intravenous Q24H   lactulose 30 g Oral TID   magic butt ointment  Topical BID   metroNIDAZOLE 500 mg Intravenous Q8H   midodrine 10 mg Oral TID   nicotine 1 patch Transdermal Q24H   rifaximin 275 mg Oral Q12H   sodium chloride 3 mL Intravenous Q12H       furosemide (LASIX) infusion 1 mg/mL 5 mg/hr Last Rate: 5 mg/hr (11/10/18 1101)     ----------------------------------------------------------------------------------------------------------------------  Vital Signs:  Temp:  [96.2 °F (35.7 °C)-97.8 °F (36.6 °C)] 96.2 °F (35.7 °C)  Heart Rate:  [58-87] 84  Resp:  [16-18] 16  BP: (109-162)/(66-91) 138/88      18  0608 18  0500 18  0500   Weight: 100  kg (221 lb) 97.1 kg (214 lb) 97.2 kg (214 lb 3.2 oz)     Body mass index is 29.05 kg/m².    Intake/Output Summary (Last 24 hours) at 11/10/2018 1150  Last data filed at 11/10/2018 0923  Gross per 24 hour   Intake 1310 ml   Output 800 ml   Net 510 ml     Diet Regular  ----------------------------------------------------------------------------------------------------------------------  Physical exam:  Constitutional:  Well-developed and well-nourished.  No respiratory distress.      HENT:  Head:  Normocephalic and atraumatic.  Mouth:  Moist mucous membranes.    Eyes:  Conjunctivae and EOM are normal.  Pupils are equal, round, and reactive to light.  No scleral icterus.    Neck:  Neck supple.  No JVD present.    Cardiovascular:  Normal rate, regular rhythm and normal heart sounds with no murmur.  Pulmonary/Chest:  No respiratory distress, no wheezes, no crackles, with normal breath sounds and good air movement.  Abdominal:  Soft and obese.  Bowel sounds are normal.  Notenderness.  2+ edema on both flanks   Musculoskeletal: Trace edema left lower extremity, right BKA.  Markedly decreased range of movement of the right shoulder   Neurological:  Alert and oriented to person, place, and time.  No cranial nerve deficit.  No tongue deviation.  No facial droop.  No slurred speech.   Skin:  Skin is warm and dry. No rash noted. No pallor.   Peripheral vascular:  Strong pulses in all 4 extremities with no clubbing, no cyanosis, no edema.  Psych: Cooperative with appropriate mood and affect  ----------------------------------------------------------------------------------------------------------------------  Tele:    ----------------------------------------------------------------------------------------------------------------------      Results from last 7 days   Lab Units  11/10/18   0405  11/09/18   0838  11/09/18   0526  11/08/18   1753  11/08/18   0911  11/08/18   0607  11/07/18 0526   WBC 10*3/mm3  8.84   --   9.11    --    --   5.00  4.39   HEMOGLOBIN g/dL  6.9*   --   7.9*   --    --   8.1*  7.8*   HEMATOCRIT %  19.7*  20.0*   --   22.3*   --    --   22.8*  22.2*   MCV fL  105.3*   --   102.8*   --    --   103.6*  105.2*   MCHC g/dL  34.5   --   35.4   --    --   35.5  35.1   PLATELETS 10*3/mm3  57*   --   49*   --    --   50*  43*   INR   2.32*  2.67*  2.51*  2.14*  2.55*  2.75*  2.34*         Results from last 7 days   Lab Units  11/10/18   0405  11/09/18   0526  11/09/18   0004  11/08/18   0607  11/07/18   1113   SODIUM mmol/L  136*  132*   --   133*   --    POTASSIUM mmol/L  3.8  3.9  3.6  3.6  3.6  3.0*   MAGNESIUM mg/dL   --    --    --   2.3  1.6   CHLORIDE mmol/L  96  94*   --   97   --    CO2 mmol/L  29.0  29.0   --   31.0   --    BUN mg/dL  23*  18   --   12   --    CREATININE mg/dL  1.15  1.02   --   0.89   --    EGFR IF NONAFRICN AM mL/min/1.73  66  76   --   89   --    CALCIUM mg/dL  9.2  9.0   --   8.2*   --    GLUCOSE mg/dL  149*  135*   --   83   --    Estimated Creatinine Clearance: 88.7 mL/min (by C-G formula based on SCr of 1.15 mg/dL).    Ammonia   Date Value Ref Range Status   11/10/2018 <9 (L) 9 - 30 umol/L Final   11/09/2018 20 9 - 30 umol/L Final   11/08/2018 24 9 - 30 umol/L Final                       I have personally looked at the labs and they are summarized above.  ----------------------------------------------------------------------------------------------------------------------  Imaging Results (last 24 hours)     Procedure Component Value Units Date/Time    CT Needle Biopsy Abdomen [[id number]] Collected:  11/09/18 [facility]     Updated:  11/10/18 0827    Narrative:       PROCEDURE: CT GUIDANCE BIOPSY    COMPARISON: CT abdomen and pelvis 11/5/2018    HISTORY: Left perinephric fluid collection    CONSENT: The risks, including bleeding, infection and injury to  adjacent organs, as well as the risks, benefits and possibility  of an unsuccessful attempt were thoroughly discussed with the  patient. The  patient had ample opportunity to ask questions and  agreed to proceed. Written, informed consent was obtained.    This exam was performed according to our departmental dose  optimization program, which includes automated exposure control,  adjustment of the mA and/or KV according to patient size and/or  use of iterative reconstruction technique.    TECHNIQUE/FINDINGS: The patient was placed in the right lateral  decubitus position on the fluoroscopy table. An appropriate site  was marked over the right flank. The location was prepped and  draped in sterile fashion. Approximately 10 mL of lidocaine  solution was used for local anesthesia. A 5 Turkmen Yueh needle  was used to gain access to a 3 cm air fluid collection in the  left subcapsular perinephric region. Approximately 5 mL of thick,  cloudy, yellow fluid was aspirated.       Impression:       CONCLUSION:  Successful aspiration of the small air-fluid  collection in the left perinephric region. The aspirate was sent  to laboratory for further analysis. The patient tolerated the  procedure well. No immediate post procedure complications.    Electronically signed by:  Jose Boudreaux MD  11/10/2018 8:26 AM  [facility] Workstation: [id number]        ----------------------------------------------------------------------------------------------------------------------  Assessment and Plan:   Hepatic encephalopathy secondary to liver cirrhosis:      Ammonia level remains normal.    Dr. Becerril following.     Coagulopathy/ thrombocytopenia of liver disease     Hematology on board appreciate recommendation.    Transfusional reaction to FFP    Macrocytic anemia with drop of hemoglobin to 6.9   Transfuse 1 unit of packed red blood cell         Perinephric abscess/fluid collection:      Successful aspiration will follow up with culture results    Hypokalemia replaced    Chronic pain syndrome   Continue with current medication dilaudid/oxycodone.    Liver cirrhosis   Lactulose,  rifaximin    Plan of care was discussed with the patient and the nursing staff.    Barrera Mcmanus MD  11/10/18  11:50 AM
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
## 2018-11-10 NOTE — PLAN OF CARE
Problem: Patient Care Overview  Goal: Plan of Care Review  Outcome: Ongoing (interventions implemented as appropriate)   11/10/18 1546   Coping/Psychosocial   Plan of Care Reviewed With patient   Plan of Care Review   Progress no change   OTHER   Outcome Summary patient recieving 1unit prbc today     Goal: Individualization and Mutuality  Outcome: Ongoing (interventions implemented as appropriate)    Goal: Discharge Needs Assessment  Outcome: Ongoing (interventions implemented as appropriate)    Goal: Interprofessional Rounds/Family Conf  Outcome: Ongoing (interventions implemented as appropriate)      Problem: Fall Risk (Adult)  Goal: Absence of Fall  Outcome: Ongoing (interventions implemented as appropriate)      Problem: Skin Injury Risk (Adult)  Goal: Skin Health and Integrity  Outcome: Ongoing (interventions implemented as appropriate)      Problem: Anemia (Adult)  Goal: Identify Related Risk Factors and Signs and Symptoms  Outcome: Outcome(s) achieved Date Met: 11/10/18    Goal: Symptom Improvement  Outcome: Ongoing (interventions implemented as appropriate)      Problem: Liver Failure, Acute/Chronic (Adult)  Goal: Signs and Symptoms of Listed Potential Problems Will be Absent, Minimized or Managed (Liver Failure, Acute/Chronic)  Outcome: Ongoing (interventions implemented as appropriate)

## 2018-11-10 NOTE — PROGRESS NOTES
"   LOS: 12 days   Patient Care Team:  Reddy Grant MD as PCP - General (Family Medicine)  Shawn Cortez MD (Inactive) as Surgeon (Orthopedic Surgery)  Tushar Becerril DO as Consulting Physician (Gastroenterology)  Josep Colón MD as Consulting Physician (Nephrology)  Ortiz Ferreira MD as Consulting Physician (Hematology and Oncology)    Subjective     Subjective:  Symptoms:  Stable.  (No drainage from left flank dressing. Some tenderness at site, afebrile. ).    Diet:  Adequate intake.  No nausea or vomiting.    Pain:  He reports pain is unchanged.        History taken from: patient chart RN    Objective     Vital Signs  Temp:  [96.2 °F (35.7 °C)-97.8 °F (36.6 °C)] 96.2 °F (35.7 °C)  Heart Rate:  [58-87] 84  Resp:  [16-18] 16  BP: (109-162)/(66-91) 138/88    Objective:  General Appearance:  In no acute distress.    Vital signs: (most recent): Blood pressure 138/88, pulse 84, temperature 96.2 °F (35.7 °C), temperature source Oral, resp. rate 16, height 182.9 cm (72\"), weight 97.2 kg (214 lb 3.2 oz), SpO2 90 %.  Vital signs are normal.  No fever.    Output: Producing urine (Urine clear yellow) and producing stool.    HEENT: Normal HEENT exam.  (Scleral icterus is present)    Lungs:  Normal effort and normal respiratory rate.  Breath sounds clear to auscultation.  He is not in respiratory distress.  No stridor.  No decreased breath sounds.    Heart: Normal rate.  Regular rhythm.  S1 normal and S2 normal.  No murmur, gallop or friction rub.   Chest: Symmetric chest wall expansion.   Abdomen: Abdomen is soft and non-distended.  (Left flank dressing C/D/I).  Bowel sounds are normal.   There is generalized tenderness.     Extremities: There is no dependent edema.    Neurological: Patient is alert and oriented to person, place and time.  GCS score is 15.    Pupils:  Pupils are equal, round, and reactive to light.    Skin:  Warm and dry.  No rash or cyanosis.         Results Review:    Lab Results (last 24 hours)  "    Procedure Component Value Units Date/Time    Reticulocytes [[id number]]  (Abnormal) Collected:  11/10/18 0405    Specimen:  Blood Updated:  11/10/18 0952     Reticulocyte % 5.92 %      Reticulocyte Absolute 0.1107 10*6/mm3      Immature Reticulocyte Fraction 10.7 %      Reticulocyte Production Index 1.14 %      Hematocrit 19.7 %      Reticulocyte Hgb 39.4 pg     Body Fluid Culture - Aspirate, Kidney, Left [[id number]]  (Abnormal) Collected:  11/09/18 1708    Specimen:  Aspirate from Kidney, Left Updated:  11/10/18 0903     BF Culture Heavy growth (4+) Streptococcus, Alpha Hemolytic     Gram Stain Many (4+) WBCs seen      Many (4+) Gram positive cocci      Rare (1+) Yeast    CBC & Differential [[id number]] Collected:  11/10/18 0405    Specimen:  Blood Updated:  11/10/18 0753    Narrative:       The following orders were created for panel order CBC & Differential.  Procedure                               Abnormality         Status                     ---------                               -----------         ------                     Manual Differential[[id number]]          Abnormal            Final result               Scan Slide[[id number]]                                                                  CBC Auto Differential[[id number]]        Abnormal            Final result                 Please view results for these tests on the individual orders.    Manual Differential [[id number]]  (Abnormal) Collected:  11/10/18 0405    Specimen:  Blood Updated:  11/10/18 0753     Neutrophil % 87.0 %      Lymphocyte % 5.0 %      Monocyte % 7.0 %      Bands %  1.0 %      Neutrophils Absolute 7.78 10*3/mm3      Lymphocytes Absolute 0.44 10*3/mm3      Monocytes Absolute 0.62 10*3/mm3      Anisocytosis Slight/1+     Hypochromia Slight/1+     Comment: FEW POLYCHROMASIA CELLS SEEN        Macrocytes Slight/1+     WBC Morphology Normal     Platelet Estimate Decreased    Protime-INR [[id number]]  (Abnormal) Collected:  11/10/18 0405     Specimen:  Blood Updated:  11/10/18 0547     Protime 24.5 Seconds      INR 2.32    Narrative:       Therapeutic range for most indications is 2.0-3.0 INR,  or 2.5-3.5 for mechanical heart valves.    Ammonia [[id number]]  (Abnormal) Collected:  11/10/18 0405    Specimen:  Blood Updated:  11/10/18 0451     Ammonia <9 umol/L     Basic Metabolic Panel [[id number]]  (Abnormal) Collected:  11/10/18 0405    Specimen:  Blood Updated:  11/10/18 0451     Glucose 149 mg/dL      BUN 23 mg/dL      Creatinine 1.15 mg/dL      Sodium 136 mmol/L      Potassium 3.8 mmol/L      Chloride 96 mmol/L      CO2 29.0 mmol/L      Calcium 9.2 mg/dL      eGFR Non African Amer 66 mL/min/1.73      BUN/Creatinine Ratio 20.0     Anion Gap 11.0 mmol/L     CBC Auto Differential [[id number]]  (Abnormal) Collected:  11/10/18 0405    Specimen:  Blood Updated:  11/10/18 0426     WBC 8.84 10*3/mm3      RBC 1.90 10*6/mm3      Hemoglobin 6.9 g/dL      Hematocrit 20.0 %      .3 fL      MCH 36.3 pg      MCHC 34.5 g/dL      RDW 14.2 %      RDW-SD 54.7 fl      MPV 9.9 fL      Platelets 57 10*3/mm3     KELSIE Prep - Swab, [[id number]]  (Normal) Collected:  11/09/18 1708    Specimen:  Aspirate from Kidney, Left Updated:  11/09/18 1838     KOH Prep No yeast or hyphal elements seen    Fungus Culture - Aspirate, Kidney, Left [[id number]] Collected:  11/09/18 1708    Specimen:  Aspirate from Kidney, Left Updated:  11/09/18 1740    AFB Culture - Aspirate, Kidney, Left [[id number]] Collected:  11/09/18 1708    Specimen:  Aspirate from Kidney, Left Updated:  11/09/18 1740    Anaerobic Culture - Aspirate, Kidney [[id number]] Collected:  11/09/18 1708    Specimen:  Aspirate from Kidney Updated:  11/09/18 1740         Imaging Results (last 24 hours)     Procedure Component Value Units Date/Time    CT Abdomen Pelvis With Contrast [[id number]] Collected:  11/05/18 1815     Updated:  11/05/18 1857    Narrative:         EXAMINATION:  Computed Tomography           REGION:     Abdomen / Pelvis                     INDICATION:   Repeat to evaluate hematoma/abscess of kidney per  urology, R41.82 Altered mental status, unspecified K72.90 Hepatic  failure, unspecified without coma Z74.09 Other reduced mobility  Z74.09 Other reduced mobility    HISTORY:  LISA. IMAGING:    CT A/P 11/2/18            TECHNIQUE:      - reconstructions:    axial, coronal, sagittal         - contrast:      oral:  No ;   intravenous: Isovue 300, 100 mL  This exam was performed according to the departmental  dose-optimization program which includes automated exposure  control, adjustment of the mA and/or kV according to patient size  and/or use of iterative reconstruction technique.           COMMENTS:            - - - CT ABDOMEN - - -          THORAX (INFERIOR):      - LUNG BASES:  clear      - PLEURA:    no fluid or mass      - HEART:    normal size, no pericardial fluid     - MISC:      n/a          ABDOMEN:     - LIVER:    Diminished size with nodular contour containing a  tips shunt   - GB:      Cholelithiasis without evidence of wall thickening.    - CBD:      grossly negative   - SPLEEN:    Upper normal/mildly enlarged   - PANCREAS:    normal in size, contour, no focal mass    - VISCERA:    normal caliber, no wall thickening     - MESENTERY:  no mesenteric mass   - CAVITY:    Small amount of free fluid   - BODY WALL:  wnl   - OSSEOUS:    grossly negative for age   - MISC:                 Extensive gastric varices                      RETROPERITONEUM:   - KIDNEYS:    Renal examination again displays overall normal  size and symmetric nephrograms. Again noted is a left pararenal  enhancing focal fluid collection containing air which is  unchanged in size measuring 2.5 x 3.6 x 2.0 cm   - URETERS:    normal course, caliber   - ADRENALS:    normal size, contour   - MISC:      no sig retroperitoneal adenopathy or mass   - VASCULAR:    aorta / iliacs: wnl for age     - - - CT PELVIS - - -      - VISCERA:    normal  caliber small/large bowel, no focal  thickening/mass        - APPENDIX:          wnl   - MESENTERY:  no mass   - VASCULAR:    wnl for age   - CAVITY:    Moderate amount of free fluid   - BLADDER:    unremarkable   - OSSEOUS:    Status post left hip repair    - MISC:     .       Impression:        CONCLUSION:  1. Unchanged left pararenal focal fluid collection with  peripheral enhancement containing air, likely representing an  abscess.  2. Multiple additional findings demonstrating the presence of  cirrhosis status post TIPS shunt, with spleen size at the upper  limits of normal/mildly enlarged, and extensive gastric varices,  as above, unchanged.        Electronically signed by:  PRISCILLA Bustillo MD  11/5/2018 6:56  PM CST Workstation: [id number]           I reviewed the patient's new clinical results.  I reviewed the patient's new imaging results and agree with the interpretation.  I reviewed the patient's other test results and agree with the interpretation      Assessment/Plan       Chronic hepatitis (CMS/HCC)    Thrombocytopenia (CMS/HCC)    Hepatic encephalopathy (CMS/HCC)    CKD (chronic kidney disease) stage 3, GFR 30-59 ml/min (CMS/HCC)    Altered mental status      Assessment & Plan    1. Difficulty urinating with hematuria, likely related to UTI hemorrhagic cystitis without clots-->hematuria resolved  2. Perinephric abscess  -CT guided drainage of left perinephric abscess yesterday-->strep +, rare yeast  -Estimated Creatinine Clearance: 88.7 mL/min (by C-G formula based on SCr of 1.15 mg/dL).  -Cr 0.89-->1.15  -WBC 9.11-->8.84  -UA + 10/30/18  -Hgb/Hct 7.9/22.3-->6.9/19.7  -Platelets 49,000-->57,000  -INR 2.51-->2.32  -Renal ultrasound 10/30/18 limited exam, especially of the left kidney, due to the presence of bowel gas and the patient's body habitus. No hydronephrosis is visualized. Unremarkable bladder.   -11/2/18 CT abdomen/pelvis LEFT 2.3 x 3.7 x 2.2 cm fluid and air collection infected subcapsular  hematoma versus abscess.  -11/5/18 CT abdomen/pelvis showed LEFT renal abscess unchanged  -10/30/18 Urine culture NEGATIVE, UA grossly positive.   -Cetriaxone    Plan:    Continue antibiotic, await culture results.     Deondre Virk, KALEB  11/10/18  12:05 PM

## 2018-11-10 NOTE — PLAN OF CARE
Problem: Patient Care Overview  Goal: Plan of Care Review  Outcome: Ongoing (interventions implemented as appropriate)   11/10/18 0611   Coping/Psychosocial   Plan of Care Reviewed With patient   Plan of Care Review   Progress improving   OTHER   Outcome Summary Pt with stable vital signs through shift. UOP dark yellow. Pt remains on Lasix gtt at 5 mg per hour, receives also Albumin BID. Dilaudid effective for c/o back pain. Denies abdominal pain. Eating without nausea/vomiting. Will continue to monitor closely.     Goal: Discharge Needs Assessment  Outcome: Ongoing (interventions implemented as appropriate)   10/31/18 0950   Discharge Needs Assessment   Readmission Within the Last 30 Days no previous admission in last 30 days   Concerns to be Addressed adjustment to diagnosis/illness;discharge planning   Patient/Family Anticipates Transition to home with help/services   Patient/Family Anticipated Services at Transition home health care;skilled nursing   Transportation Concerns rides, unreliable from others  (utilizes PACS)   Transportation Anticipated family or friend will provide;public transportation   Outpatient/Agency/Support Group Needs homecare agency;skilled nursing facility   Discharge Facility/Level of Care Needs home with home health;nursing facility, skilled  (pending )   Current Discharge Risk lack of support system/caregiver;physical impairment  (left AKA; son works during day)   Disability   Equipment Currently Used at Home wheelchair;commode;crutches;shower chair;walker, susan       Problem: Thought Process Alteration (Adult)  Goal: Improved Thought Process  Outcome: Ongoing (interventions implemented as appropriate)   11/09/18 0018   Thought Process Alteration (Adult)   Improved Thought Process making progress toward outcome       Problem: Fall Risk (Adult)  Goal: Absence of Fall  Outcome: Ongoing (interventions implemented as appropriate)   11/09/18 0018   Fall Risk (Adult)   Absence of Fall making  progress toward outcome       Problem: Skin Injury Risk (Adult)  Goal: Skin Health and Integrity  Outcome: Ongoing (interventions implemented as appropriate)   11/09/18 0018   Skin Injury Risk (Adult)   Skin Health and Integrity making progress toward outcome

## 2018-11-10 NOTE — PROGRESS NOTES
Tushar Becerril DO,[facility]  Gastroenterology  Hepatology  Endoscopy  Board Certified in Internal Medicine and gastroenterology  [address], [address]  [place]. [address]  T- [phone]   F - [phone]     GASTROENTEROLOGY PROGRESS NOTE   TUSHAR BECERRIL DO.         SUBJECTIVE:   11/9/2018  Chief Complaint:     Subjective  : Continued good mobilization of fluid.  No evidence of any hepatic encephalopathy.         CURRENT MEDICATIONS/OBJECTIVE/VS/PE:     Current Medications:     Current Facility-Administered Medications   Medication Dose Route Frequency Provider Last Rate Last Dose   • albumin human 5 % bottle 25 g  25 g Intravenous Q12H Solomon Gomez  mL/hr at 11/05/18 0114 25 g at 11/09/18 0622   • cefTRIAXone (ROCEPHIN) 1 g/100 mL 0.9% NS (MBP)  1 g Intravenous Q24H Solomon Gomez PA   1 g at 11/09/18 1130   • furosemide (LASIX) 100 mg in sodium chloride 0.9 % 100 mL (1 mg/mL) infusion  5 mg/hr Intravenous Continuous Solomon Gomez PA 5 mL/hr at 11/09/18 1143 5 mg/hr at 11/09/18 1143   • HYDROmorphone (DILAUDID) injection 0.25 mg  0.25 mg Intravenous Q3H PRN Anderson Justice DO   0.25 mg at 11/09/18 1820   • ipratropium-albuterol (DUO-NEB) nebulizer solution 3 mL  3 mL Nebulization Q6H PRN Armando Richard MD       • lactulose (CHRONULAC) 10 GM/15ML solution 30 g  30 g Oral TID Eduar Cisneros MD   30 g at 11/09/18 0950   • magic butt ointment   Topical BID Armando Richard MD       • Magnesium Sulfate 2 gram Bolus, followed by 8 gram infusion (total Mg dose 10 grams)- Mg less than or equal to 1mg/dL  2 g Intravenous PRN Solmoon Gomez PA        Or   • Magnesium Sulfate 2 gram / 50mL Infusion (GIVE X 3 BAGS TO EQUAL 6GM TOTAL DOSE) - Mg 1.1 - 1.5 mg/dl  2 g Intravenous PRN Solomon Gomez PA        Or   • Magnesium Sulfate 4 gram infusion- Mg 1.6-1.9 mg/dL  4 g Intravenous PRN Solomon Gomez PA 25 mL/hr at 11/07/18 2157 4 g at 11/07/18 2157   •  metroNIDAZOLE (FLAGYL) IVPB 500 mg  500 mg Intravenous Q8H Solomon Gomez PA   500 mg at 11/09/18 1800   • midodrine (PROAMATINE) tablet 10 mg  10 mg Oral TID Eduar Cisneros MD   10 mg at 11/09/18 0951   • naloxone (NARCAN) injection 0.4 mg  0.4 mg Intravenous Q5 Min PRN Eduar Cisneros MD       • nicotine (NICODERM CQ) 21 MG/24HR patch 1 patch  1 patch Transdermal Q24H Armando Richard MD   1 patch at 11/09/18 0951   • ondansetron (ZOFRAN) injection 4 mg  4 mg Intravenous Q6H PRN Eduar Cisneros MD       • oxyCODONE (ROXICODONE) immediate release tablet 5 mg  5 mg Oral Q6H PRN Anderson Justice DO   5 mg at 11/09/18 1154   • potassium chloride 10 mEq in 100 mL IVPB  10 mEq Intravenous Q1H PRN Solomon Gomez  mL/hr at 11/09/18 0801 10 mEq at 11/09/18 0801   • rifaximin (XIFAXAN) tablet 275 mg  275 mg Oral Q12H Eduar Cisneros MD   275 mg at 11/09/18 0950   • simethicone (MYLICON) chewable tablet 80 mg  80 mg Oral Q6H PRN Eduar Cisneros MD       • sodium chloride (OCEAN) nasal spray 1 spray  1 spray Each Nare PRN Beua Celeste MD       • sodium chloride 0.9 % flush 3 mL  3 mL Intravenous Q12H Eduar Cisneros MD   3 mL at 11/09/18 0952   • sodium chloride 0.9 % flush 3-10 mL  3-10 mL Intravenous PRN Eduar Cisneros MD   10 mL at 11/08/18 1709       Objective     Physical Exam:   Temp:  [96.3 °F (35.7 °C)-98.4 °F (36.9 °C)] 96.7 °F (35.9 °C)  Heart Rate:  [58-85] 75  Resp:  [16-18] 18  BP: (123-162)/() 154/89     Physical Exam:  General Appearance:    Alert, cooperative, in no acute distress   Head:    Normocephalic, without obvious abnormality, atraumatic   Eyes:            Lids and lashes normal, conjunctivae and sclerae normal, no   icterus, no pallor, corneas clear, PERRLA   Ears:    Ears appear intact with no abnormalities noted   Throat:   No oral lesions, no thrush, oral mucosa moist   Neck:   No adenopathy, supple, trachea midline, no thyromegaly, no     carotid  bruit, no JVD   Back:     No kyphosis present, no scoliosis present, no skin lesions,       erythema or scars, no tenderness to percussion or                   palpation,   range of motion normal   Lungs:     Clear to auscultation,respirations regular, even and                   unlabored    Heart:    Regular rhythm and normal rate, normal S1 and S2, no            murmur, no gallop, no rub, no click   Breast Exam:    Deferred   Abdomen:     Normal bowel sounds, no masses, no organomegaly, soft        non-tender, non-distended, no guarding, no rebound                 tenderness   Genitalia:    Deferred   Extremities:   Moves all extremities well, no edema, no cyanosis, no              redness   Pulses:   Pulses palpable and equal bilaterally   Skin:   No bleeding, bruising or rash   Lymph nodes:   No palpable adenopathy   Neurologic:   Cranial nerves 2 - 12 grossly intact, sensation intact, DTR        present and equal bilaterally      Results Review:     Lab Results (last 24 hours)     Procedure Component Value Units Date/Time    Body Fluid Culture - Aspirate, Kidney, Left [[id number]] Collected:  11/09/18 1708    Specimen:  Aspirate from Kidney, Left Updated:  11/09/18 1740    Fungus Culture - Aspirate, Kidney, Left [[id number]] Collected:  11/09/18 1708    Specimen:  Aspirate from Kidney, Left Updated:  11/09/18 1740    AFB Culture - Aspirate, Kidney, Left [[id number]] Collected:  11/09/18 1708    Specimen:  Aspirate from Kidney, Left Updated:  11/09/18 1740    Anaerobic Culture - Aspirate, Kidney [[id number]] Collected:  11/09/18 1708    Specimen:  Aspirate from Kidney Updated:  11/09/18 1740    Lactate Dehydrogenase, Body Fluid - Body Fluid, Peritoneum [[id number]] Collected:  11/09/18 1708    Specimen:  Body Fluid from Peritoneum Updated:  11/09/18 1740    Protein, Body Fluid - Body Fluid, Peritoneum [[id number]] Collected:  11/09/18 1708    Specimen:  Body Fluid from Peritoneum Updated:  11/09/18 1740    Extra Tubes  [[id number]] Collected:  11/09/18 0843    Specimen:  Blood from Blood, Venous Line Updated:  11/09/18 0945    Narrative:       The following orders were created for panel order Extra Tubes.  Procedure                               Abnormality         Status                     ---------                               -----------         ------                     Gold Top - SST[[id number]]                                   Final result                 Please view results for these tests on the individual orders.    Gold Top - SST [[id number]] Collected:  11/09/18 0843    Specimen:  Blood Updated:  11/09/18 0945     Extra Tube Hold for add-ons.     Comment: Auto resulted.       Fibrinogen [[id number]]  (Abnormal) Collected:  11/09/18 0838    Specimen:  Blood Updated:  11/09/18 0918     Fibrinogen 108 (L) mg/dL     Protime-INR [[id number]]  (Abnormal) Collected:  11/09/18 0838    Specimen:  Blood Updated:  11/09/18 0908     Protime 27.2 (H) Seconds      INR 2.67 (H)    Narrative:       Therapeutic range for most indications is 2.0-3.0 INR,  or 2.5-3.5 for mechanical heart valves.    Basic Metabolic Panel [[id number]]  (Abnormal) Collected:  11/09/18 0526    Specimen:  Blood Updated:  11/09/18 0558     Glucose 135 (H) mg/dL      BUN 18 mg/dL      Creatinine 1.02 mg/dL      Sodium 132 (L) mmol/L      Potassium 3.9 mmol/L      Chloride 94 (L) mmol/L      CO2 29.0 mmol/L      Calcium 9.0 mg/dL      eGFR Non African Amer 76 mL/min/1.73      BUN/Creatinine Ratio 17.6     Anion Gap 9.0 mmol/L     Fibrinogen [[id number]]  (Abnormal) Collected:  11/09/18 0526    Specimen:  Blood Updated:  11/09/18 0554     Fibrinogen 105 (L) mg/dL     CBC & Differential [[id number]] Collected:  11/09/18 0526    Specimen:  Blood Updated:  11/09/18 0550    Narrative:       The following orders were created for panel order CBC & Differential.  Procedure                               Abnormality         Status                     ---------                                -----------         ------                     Scan Slide[[id number]]                                                                  CBC Auto Differential[[id number]]        Abnormal            Final result                 Please view results for these tests on the individual orders.    Ammonia [[id number]]  (Normal) Collected:  11/09/18 0526    Specimen:  Blood Updated:  11/09/18 0549     Ammonia 20 umol/L     Protime-INR [[id number]]  (Abnormal) Collected:  11/09/18 0526    Specimen:  Blood Updated:  11/09/18 0548     Protime 26.0 (H) Seconds      INR 2.51 (H)    Narrative:       Therapeutic range for most indications is 2.0-3.0 INR,  or 2.5-3.5 for mechanical heart valves.    CBC Auto Differential [[id number]]  (Abnormal) Collected:  11/09/18 0526    Specimen:  Blood Updated:  11/09/18 0545     WBC 9.11 10*3/mm3      RBC 2.17 (L) 10*6/mm3      Hemoglobin 7.9 (L) g/dL      Hematocrit 22.3 (L) %      .8 (H) fL      MCH 36.4 (H) pg      MCHC 35.4 g/dL      RDW 14.1 %      RDW-SD 53.4 (H) fl      MPV 10.6 fL      Platelets 49 (L) 10*3/mm3      Neutrophil % 89.7 (H) %      Lymphocyte % 6.4 (L) %      Monocyte % 3.5 %      Eosinophil % 0.0 %      Basophil % 0.0 %      Immature Grans % 0.4 %      Neutrophils, Absolute 8.17 10*3/mm3      Lymphocytes, Absolute 0.58 (L) 10*3/mm3      Monocytes, Absolute 0.32 10*3/mm3      Eosinophils, Absolute 0.00 10*3/mm3      Basophils, Absolute 0.00 10*3/mm3      Immature Grans, Absolute 0.04 (H) 10*3/mm3     Potassium [[id number]]  (Normal) Collected:  11/09/18 0004    Specimen:  Blood Updated:  11/09/18 0038     Potassium 3.6 mmol/L     Fibrinogen [[id number]]  (Abnormal) Collected:  11/08/18 1753    Specimen:  Blood Updated:  11/08/18 1846     Fibrinogen 123 (L) mg/dL     Protime-INR [[id number]]  (Abnormal) Collected:  11/08/18 1753    Specimen:  Blood Updated:  11/08/18 1070     Protime 23.0 (H) Seconds      INR 2.14 (H)    Narrative:       Therapeutic range for most  indications is 2.0-3.0 INR,  or 2.5-3.5 for mechanical heart valves.           I reviewed the patient's new clinical results.  I reviewed the patient's new imaging results and agree with the interpretation.     ASSESSMENT/PLAN:   ASSESSMENT:   1.  Cirrhosis of liver, complicated by esophageal varices, hepatic encephalopathy and ascites, status post TIPS  2.  Tip shunt partially occluded  3.  Anasarca, improved    PLAN:   1.  At your discretion, start tapering off the Lasix infusion and get back the patient on oral diuretics  2.  I will be gone until Wednesday of next week.       Tushar Becerril,   11/09/18  6:31 PM
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
## 2018-11-11 NOTE — PROGRESS NOTES
"Pharmacokinetics by Pharmacy - Vancomycin Initial Consult    Armando Mcmullen Sr. is a 53 y.o. male being initiated on vancomycin for intra abdominal infection. Patient is also receiving rifaximin for cirrhosis.      Objective:     [Ht: 182.9 cm (72.01\"); Wt: 99.6 kg (219 lb 9.6 oz)]     Lab Results   Component Value Date    WBC 5.51 11/11/2018    WBC 8.84 11/10/2018    WBC 9.11 11/09/2018      Lab Results   Component Value Date    CRP 6.10 (H) 08/13/2018    CRP 7.10 (H) 08/12/2018    CRP 8.00 (H) 08/11/2018    LACTATE 2.4 (C) 10/31/2018    LACTATE 2.2 (C) 10/29/2018    LACTATE 2.7 (C) 10/29/2018      Temp Readings from Last 1 Encounters:   11/11/18 96.7 °F (35.9 °C) (Oral)     Estimated Creatinine Clearance: 86 mL/min (by C-G formula based on SCr of 1.2 mg/dL).   Lab Results   Component Value Date    CREATININE 1.20 11/11/2018    CREATININE 1.15 11/10/2018    CREATININE 1.02 11/09/2018       Baseline culture results:  Microbiology Results (last 10 days)       Procedure Component Value - Date/Time    Body Fluid Culture - Aspirate, Kidney, Left [[id number]]  (Abnormal) Collected:  11/09/18 1708    Lab Status:  Preliminary result Specimen:  Aspirate from Kidney, Left Updated:  11/10/18 1535     BF Culture Heavy growth (4+) Enterococcus species     Gram Stain Many (4+) WBCs seen      Many (4+) Gram positive cocci      Rare (1+) Yeast    AFB Culture - Aspirate, Kidney, Left [[id number]] Collected:  11/09/18 1708    Lab Status:  Preliminary result Specimen:  Aspirate from Kidney, Left Updated:  11/10/18 1318     AFB Stain No acid fast bacilli seen on concentrated smear    KELSIE Prep - Swab, [[id number]]  (Normal) Collected:  11/09/18 1708    Lab Status:  Final result Specimen:  Aspirate from Kidney, Left Updated:  11/09/18 1838     KOH Prep No yeast or hyphal elements seen               Assessment  WBC 5.51  SCr 1.2 - baseline appears to be around 0.8  Afebrile    All cultures are from left kidney aspiration  11/9 AFB - no acid " fast bacilli seen  11/9 anaerobic culture - needs to be collected  11/9 body fluid - enterococcus speies  11/9 fungal - needs to be collected   11/9 KOH - no yeast or hyphal elements seen    Received 11 days of Ceftriaxone and 6 days of Flagyl - both discontinued today    Received Vancomycin 2000 mg x1 11/11 at 1018    Utilizing AUC dosing  Below regimen is estimated to provide the following:   (400-600)  Peak 28 (goal 30-40)  Trough 13 (goal 10-20)      Plan  1. 1250 mg Q12 to start 11/11 at 2200  2. Peak ordered for 11/12 at 1230. Trough ordered for 11/12 at 2130.  3. Pharmacy will monitor renal function and adjust dose accordingly.    Abbie Carrington RPH  11/11/18 10:50 AM

## 2018-11-11 NOTE — PROGRESS NOTES
[facility] HOSPITALIST PROGRESS NOTE     Patient Identification:  Name:  Armando Mcmullen Sr.  Age:  53 y.o.  Sex:  male  :  1965  MRN:  [id number]  Visit Number:  [id number]  Primary Care Provider:  Reddy Grant MD    Length of stay:  13    Chief complaint: increase in scrotal swelling    Subjective:  This is a 53 year old  male with PMH of chronic hepatitis C complicated by cirrhosis of the liver that presented to [facility] on 10/29/2018 secondary to altered mental status and was found to have an elevated ammonia level of 129.  Lactulose was given and his levels returned to normal in a couple of days. Urology was consulted due to urine retention.   Ct of the abdomen and pelvis indicated a possible abscess of the left kidney.  Successful CT-guided aspiration of the fluid was done by interventional radiologist on 2018.  Hematology is following due to thrombocytopenia and elevated INR. He was started on FFP but reacted and it was discontinued  11/10/2018 patient was evaluated.  Complains of back pain which is chronic.   18. Reports increase scrotal swelling but no pain,no sob.     ----------------------------------------------------------------------------------------------------------------------  Current Hospital Meds:    albumin human 25 g Intravenous Q12H   lactulose 30 g Oral TID   magic butt ointment  Topical BID   midodrine 10 mg Oral TID   nicotine 1 patch Transdermal Q24H   potassium chloride 40 mEq Oral Daily   rifaximin 275 mg Oral Q12H   sodium chloride 3 mL Intravenous Q12H   vancomycin 1,250 mg Intravenous Q12H       furosemide (LASIX) infusion 1 mg/mL 10 mg/hr Last Rate: 10 mg/hr (18 0958)   Pharmacy to dose vancomycin       ----------------------------------------------------------------------------------------------------------------------  Vital Signs:  Temp:  [96.1 °F (35.6 °C)-97.9 °F (36.6 °C)] 96.8 °F (36 °C)  Heart Rate:  [] 92  Resp:   [14-20] 18  BP: (110-164)/(74-99) 138/74      11/08/18  0500 11/09/18  0500 11/11/18  0400   Weight: 97.1 kg (214 lb) 97.2 kg (214 lb 3.2 oz) 99.6 kg (219 lb 9.6 oz)     Body mass index is 29.78 kg/m².    Intake/Output Summary (Last 24 hours) at 11/11/2018 1328  Last data filed at 11/11/2018 0730  Gross per 24 hour   Intake 2046 ml   Output 2240 ml   Net -194 ml     Diet Regular; Low Sodium; 2,000 mg Na  ----------------------------------------------------------------------------------------------------------------------  Physical exam:  Constitutional:  Well-developed and well-nourished.  No respiratory distress.      HENT:  Head:  Normocephalic and atraumatic.  Mouth:  Moist mucous membranes.    Eyes:  Conjunctivae and EOM are normal.  Pupils are equal, round, and reactive to light.  No scleral icterus.    Neck:  Neck supple.  No JVD present.    Cardiovascular:  Normal rate, regular rhythm and normal heart sounds with no murmur.  Pulmonary/Chest:  No respiratory distress, no wheezes, no crackles, with normal breath sounds and good air movement.  Abdominal:  Soft and obese.  Bowel sounds are normal.  Notenderness.  2+ edema on both flanks   Musculoskeletal: 2+ edema right lower extremity, left BKA.  Markedly decreased range of movement of the right shoulder   Neurological:  Alert and oriented to person, place, and time.  No cranial nerve deficit.  No tongue deviation.  No facial droop.  No slurred speech.   Skin:  Skin is warm and dry. No rash noted. No pallor.   Peripheral vascular:  Strong pulses in all 4 extremities with no clubbing, no cyanosis, no edema.  Psych: Cooperative with appropriate mood and affect  Genitourinary: mild scrotal edema    ----------------------------------------------------------------------------------------------------------------------  Tele:    ----------------------------------------------------------------------------------------------------------------------      Results from last  7 days   Lab Units  11/11/18   0603  11/10/18   0405  11/09/18   0838  11/09/18   0526  11/08/18   1753  11/08/18   0911  11/08/18   0607   WBC 10*3/mm3  5.51  8.84   --   9.11   --    --   5.00   HEMOGLOBIN g/dL  8.0*  6.9*   --   7.9*   --    --   8.1*   HEMATOCRIT %  22.4*  19.7*  20.0*   --   22.3*   --    --   22.8*   MCV fL  101.4*  105.3*   --   102.8*   --    --   103.6*   MCHC g/dL  35.7  34.5   --   35.4   --    --   35.5   PLATELETS 10*3/mm3  50*  57*   --   49*   --    --   50*   INR   2.67*  2.32*  2.67*  2.51*  2.14*  2.55*  2.75*         Results from last 7 days   Lab Units  11/11/18   0603  11/10/18   0405  11/09/18   0526   11/08/18   0607  11/07/18   1113   SODIUM mmol/L  135*  136*  132*   --   133*   --    POTASSIUM mmol/L  3.3*  3.8  3.9   < >  3.6  3.6  3.0*   MAGNESIUM mg/dL   --    --    --    --   2.3  1.6   CHLORIDE mmol/L  95  96  94*   --   97   --    CO2 mmol/L  30.0  29.0  29.0   --   31.0   --    BUN mg/dL  22*  23*  18   --   12   --    CREATININE mg/dL  1.20  1.15  1.02   --   0.89   --    EGFR IF NONAFRICN AM mL/min/1.73  63  66  76   --   89   --    CALCIUM mg/dL  9.1  9.2  9.0   --   8.2*   --    GLUCOSE mg/dL  72  149*  135*   --   83   --     < > = values in this interval not displayed.   Estimated Creatinine Clearance: 86 mL/min (by C-G formula based on SCr of 1.2 mg/dL).    Ammonia   Date Value Ref Range Status   11/11/2018 16 9 - 30 umol/L Final   11/10/2018 <9 (L) 9 - 30 umol/L Final   11/09/2018 20 9 - 30 umol/L Final                       I have personally looked at the labs and they are summarized above.  ----------------------------------------------------------------------------------------------------------------------  Imaging Results (last 24 hours)     ** No results found for the last 24 hours. **        ----------------------------------------------------------------------------------------------------------------------  Assessment and Plan:    Hepatic  encephalopathy secondary to liver cirrhosis:      Ammonia level remains normal.    Dr. Becerril following.     Anarsarca probably due to liver cirrhosis    Increase lasix drip to 10mg/hr    Monitor and replace electrolytes     Coagulopathy/ thrombocytopenia of liver disease     Hematology on board appreciate recommendation.     Transfusional reaction to FFP     Macrocytic anemia with drop of hemoglobin to 6.9    Transfused 1 unit of packed red blood cell 11/10/18         Perinephric abscess/fluid collection:      Successful aspiration.     culture result Positive for enterococcus.      D/c metronidazole/rocephin    Start vanco, pharmacy to dose renally     Hypokalemia due to lasix drip.   Replace prn     Chronic pain syndrome   Continue with current medication dilaudid/oxycodone.     Liver cirrhosis   Lactulose, rifaximin     Plan of care was discussed with the patient and the nursing staff.      Barrera Mcmanus MD  11/11/18  1:28 PM

## 2018-11-11 NOTE — PLAN OF CARE
Problem: Patient Care Overview  Goal: Plan of Care Review  Outcome: Ongoing (interventions implemented as appropriate)   11/11/18 6899   Coping/Psychosocial   Plan of Care Reviewed With patient   Plan of Care Review   Progress improving   OTHER   Outcome Summary VSS, Lasix gtt increased to 10, Pt refused Lactulose this shift. Pain med x1 effective. Pt A/O and improving.       Problem: Fall Risk (Adult)  Goal: Absence of Fall  Outcome: Ongoing (interventions implemented as appropriate)      Problem: Skin Injury Risk (Adult)  Goal: Skin Health and Integrity  Outcome: Ongoing (interventions implemented as appropriate)

## 2018-11-11 NOTE — PLAN OF CARE
Problem: Patient Care Overview  Goal: Plan of Care Review   11/11/18 0248   Coping/Psychosocial   Plan of Care Reviewed With patient   Plan of Care Review   Progress improving   OTHER   Outcome Summary pt VSS, pt on lasix gtt to help remove excess fluid, pt pain controlled with medication.

## 2018-11-12 NOTE — PROGRESS NOTES
"Pharmacokinetics by Pharmacy - Vancomycin    Armando Mcmullen . is a 53 y.o. male receiving vancomycin 1250mg IV Q 12 hours day 2 for perinephric abscess       Objective:  [Ht: 182.9 cm (72.01\"); Wt: 97.1 kg (214 lb 1.6 oz)]     Lab Results   Component Value Date    WBC 6.52 11/12/2018    WBC 5.51 11/11/2018    WBC 8.84 11/10/2018      Lab Results   Component Value Date    CRP 6.10 (H) 08/13/2018    CRP 7.10 (H) 08/12/2018    CRP 8.00 (H) 08/11/2018    LACTATE 2.4 (C) 10/31/2018    LACTATE 2.2 (C) 10/29/2018    LACTATE 2.7 (C) 10/29/2018      Temp Readings from Last 1 Encounters:   11/12/18 97.9 °F (36.6 °C) (Oral)     Estimated Creatinine Clearance: 89.5 mL/min (by C-G formula based on SCr of 1.14 mg/dL).   Lab Results   Component Value Date    CREATININE 1.14 11/12/2018    CREATININE 1.20 11/11/2018    CREATININE 1.15 11/10/2018       Lab Results   Component Value Date    VANCOPEAK 44.25 (H) 07/02/2017    VANCOTROUGH 27.26 (H) 07/02/2017    VANCOTROUGH 30.58 (H) 06/17/2017    VANCORANDOM 18.37 06/18/2017    VANCORANDOM 14.65 03/03/2017       Culture Results:  Microbiology Results (last 10 days)       Procedure Component Value - Date/Time    Body Fluid Culture - Aspirate, Kidney, Left [[id number]]  (Abnormal)  (Susceptibility) Collected:  11/09/18 1708    Lab Status:  Preliminary result Specimen:  Aspirate from Kidney, Left Updated:  11/11/18 1611     BF Culture Heavy growth (4+) Enterococcus faecium     Gram Stain Many (4+) WBCs seen      Many (4+) Gram positive cocci      Rare (1+) Yeast    Narrative:       Slide reviewed confirmed    Susceptibility        Enterococcus faecium     NERI     Ampicillin Resistant     Gentamicin High Level Synergy Susceptible     Vancomycin Susceptible                      Anaerobic Culture - Aspirate, Kidney [[id number]] Collected:  11/09/18 1708    Lab Status:  Final result Specimen:  Aspirate from Kidney Updated:  11/11/18 1701     Culture No anaerobes isolated at 2 days    AFB " Culture - Aspirate, Kidney, Left [[id number]] Collected:  11/09/18 1708    Lab Status:  Preliminary result Specimen:  Aspirate from Kidney, Left Updated:  11/10/18 1318     AFB Stain No acid fast bacilli seen on concentrated smear    KELSIE Prep - Swab, [[id number]]  (Normal) Collected:  11/09/18 1708    Lab Status:  Final result Specimen:  Aspirate from Kidney, Left Updated:  11/09/18 1838     KOH Prep No yeast or hyphal elements seen                 Assessment:  Renal function stable (SCr stable, urine output stable).  Afebrile, no leukocytosis.  E. Faecium cultured, sensitive to vancomycin NERI = 1.  Continue current vancomycin dosing, levels today.  AUC goal 400-600    Plan:  1. Continue vancomycin 1250mg IV Q 12 hours  2. Will order vancomycin peak after the 3rd dose and vancomycin trough prior to the 4th dose  3. Pharmacy will monitor renal function and adjust dose accordingly.      Rudolph Younger III, [facility]   11/12/18 9:35 AM

## 2018-11-12 NOTE — PAYOR COMM NOTE
"Lamont Mcmullen Sr. (53 y.o. Male)     Date of Birth Social Security Number Address Home Phone MRN    1965  [address]  [place] [address] [phone] [id number]    Sabianist Marital Status          None Legally        Admission Date Admission Type Admitting Provider Attending Provider Department, Room/Bed    10/29/18 Emergency Eduar Cisneros MD [place], Emmanuel [place], MD [facility] STEPDOWN UNIT, 317/1    Discharge Date Discharge Disposition Discharge Destination                       Attending Provider:  Barrera Mcmanus MD    Allergies:  Aspirin, Penicillins, Codeine Sulfate    Isolation:  None   Infection:  None   Code Status:  CPR    Ht:  182.9 cm (72.01\")   Wt:  97.1 kg (214 lb 1.6 oz)    Admission Cmt:  None   Principal Problem:  None                Active Insurance as of 10/29/2018     Primary Coverage     Payor Plan Insurance Group Employer/Plan Group    [facility] [employer] [facility]      Payor Plan Address Payor Plan Phone Number Payor Plan Fax Number Effective Dates    [address] [address]   1/1/2014 - None Entered    [place] [address]       Subscriber Name Subscriber Birth Date Member ID       LAMONT MCMULLEN SR. 1965 [id number]                 Emergency Contacts      (Rel.) Home Phone Work Phone Mobile Phone    Miguel Mcmullen (Son) [phone] -- [phone]    Loco Mcmullen (Daughter) [phone] -- [phone]    Shreyas Mcmullen (Brother) [phone] -- [phone]            ICU Vital Signs     Row Name 11/12/18 0753 11/12/18 0721 11/12/18 0600 11/12/18 0500 11/12/18 0420       Height and Weight    Height  --  --  182.9 cm (72.01\")  --  --    Height Method  --  --  Stated  --  --    Weight  --  --  97.1 kg (214 lb 1.6 oz)  --  --    Weight Method  --  --  Bed scale  --  --    Ideal Body Weight (IBW) (kg)  --  --  82.09  --  --    BSA (Calculated - sq m)  --  --  2.19 sq meters  --  -- "    BMI (Calculated)  --  --  29  --  --    Weight in (lb) to have BMI = 25  --  --  184  --  --       Vitals    Temp  --  97.9 °F (36.6 °C)  --  --  98 °F (36.7 °C)    Temp src  --  Oral  --  --  Temporal    Pulse  89  93  --  --  93    Heart Rate Source  Monitor  Monitor  --  --  Monitor    Resp  --  18  --  --  18    Resp Rate Source  --  Visual  --  --  Visual    BP  --  141/82  --  --  141/91    Noninvasive MAP (mmHg)  --  107  --  --  111    BP Location  --  Right arm  --  --  Right arm    BP Method  --  Automatic  --  --  Automatic    Patient Position  --  Lying  --  --  Lying       Oxygen Therapy    SpO2  --  92 %  --  --  91 %    Pulse Oximetry Type  --  Intermittent  --  --  Intermittent    Device (Oxygen Therapy)  --  room air  --  --  room air    Row Name 11/12/18 0017 11/11/18 2314 11/11/18 2020 11/11/18 1905 11/11/18 1603       Vitals    Temp  98.1 °F (36.7 °C)  --  --  97.7 °F (36.5 °C)  --    Temp src  Axillary  --  --  Axillary  --    Pulse  89  88  --  92  90    Heart Rate Source  Monitor  Monitor  --  --  Monitor    Resp  18  --  --  18  --    Resp Rate Source  Visual  --  --  --  --    BP  133/89  --  --  144/93  --    Noninvasive MAP (mmHg)  104  --  --  --  --       Oxygen Therapy    SpO2  92 %  --  --  94 %  --    Pulse Oximetry Type  Intermittent  --  --  Intermittent  --    Device (Oxygen Therapy)  room air  --  room air  room air  --    Row Name 11/11/18 1500 11/11/18 1145                Vitals    Temp  97 °F (36.1 °C)  96.8 °F (36 °C)       Temp src  Oral  Oral       Pulse  88  92       Heart Rate Source  Monitor  Monitor       Resp  18  18       Resp Rate Source  Visual  Visual       BP  127/64  138/74       Noninvasive MAP (mmHg)  87  101       BP Location  Right arm  Right arm       BP Method  Automatic  Automatic       Patient Position  Lying  Lying          Oxygen Therapy    SpO2  90 %  92 %       Pulse Oximetry Type  Intermittent  Intermittent       Device (Oxygen Therapy)  room air   "room air           Hospital Medications (active)       Dose Frequency Start End    albumin human 5 % bottle 25 g 25 g Every 12 Hours 11/4/2018     Sig - Route: Infuse 500 mL into a venous catheter Every 12 (Twelve) Hours. - Intravenous    Cosign for Ordering: Accepted by Beau Celeste MD on 11/4/2018  7:10 PM    furosemide (LASIX) 100 mg in sodium chloride 0.9 % 100 mL (1 mg/mL) infusion 10 mg/hr Continuous 11/4/2018     Sig - Route: Infuse 10 mg/hr into a venous catheter Continuous. - Intravenous    Cosign for Ordering: Accepted by Beau Celeste MD on 11/4/2018  7:10 PM    HYDROmorphone (DILAUDID) injection 0.25 mg 0.25 mg Every 3 Hours PRN 11/1/2018 11/11/2018    Sig - Route: Infuse 0.125 mL into a venous catheter Every 3 (Three) Hours As Needed for Severe Pain . - Intravenous    HYDROmorphone (DILAUDID) injection 0.25 mg 0.25 mg Every 3 Hours PRN 11/11/2018 11/21/2018    Sig - Route: Infuse 0.125 mL into a venous catheter Every 3 (Three) Hours As Needed for Severe Pain . - Intravenous    ipratropium-albuterol (DUO-NEB) nebulizer solution 3 mL 3 mL Every 6 Hours PRN 10/30/2018     Sig - Route: Take 3 mL by nebulization Every 6 (Six) Hours As Needed for Wheezing. - Nebulization    lactulose (CHRONULAC) 10 GM/15ML solution 30 g 30 g 3 Times Daily 10/29/2018     Sig - Route: Take 45 mL by mouth 3 (Three) Times a Day. - Oral    magic butt ointment  2 Times Daily 10/30/2018     Sig - Route: Apply  topically to the appropriate area as directed 2 (Two) Times a Day. - Topical    Magnesium Sulfate 2 gram / 50mL Infusion (GIVE X 3 BAGS TO EQUAL 6GM TOTAL DOSE) - Mg 1.1 - 1.5 mg/dl 2 g As Needed 11/7/2018     Sig - Route: Infuse 50 mL into a venous catheter As Needed (See Administration Instructions). - Intravenous    Cosign for Ordering: Accepted by Beau Celeste MD on 11/7/2018  6:18 PM    Linked Group 1:  \"Or\" Linked Group Details        Magnesium Sulfate 2 gram Bolus, followed by 8 gram infusion (total Mg dose 10 " "grams)- Mg less than or equal to 1mg/dL 2 g As Needed 11/7/2018     Sig - Route: Infuse 50 mL into a venous catheter As Needed (See Administration Instructions). - Intravenous    Cosign for Ordering: Accepted by Beau Celeste MD on 11/7/2018  6:18 PM    Linked Group 1:  \"Or\" Linked Group Details        Magnesium Sulfate 4 gram infusion- Mg 1.6-1.9 mg/dL 4 g As Needed 11/7/2018     Sig - Route: Infuse 100 mL into a venous catheter As Needed (See Administration Instructions). - Intravenous    Cosign for Ordering: Accepted by Beau Celeste MD on 11/7/2018  6:18 PM    Linked Group 1:  \"Or\" Linked Group Details        midodrine (PROAMATINE) tablet 10 mg 10 mg 3 Times Daily 10/29/2018     Sig - Route: Take 2 tablets by mouth 3 (Three) Times a Day. - Oral    naloxone (NARCAN) injection 0.4 mg 0.4 mg Every 5 Minutes PRN 10/29/2018     Sig - Route: Infuse 1 mL into a venous catheter Every 5 (Five) Minutes As Needed for Respiratory Depression. - Intravenous    Linked Group 2:  \"And\" Linked Group Details        nicotine (NICODERM CQ) 21 MG/24HR patch 1 patch 1 patch Every 24 Hours Scheduled 10/30/2018     Sig - Route: Place 1 patch on the skin as directed by provider Daily. - Transdermal    ondansetron (ZOFRAN) injection 4 mg 4 mg Every 6 Hours PRN 10/29/2018     Sig - Route: Infuse 2 mL into a venous catheter Every 6 (Six) Hours As Needed for Nausea or Vomiting. - Intravenous    oxyCODONE (ROXICODONE) immediate release tablet 5 mg 5 mg Every 6 Hours PRN 11/1/2018 11/11/2018    Sig - Route: Take 1 tablet by mouth Every 6 (Six) Hours As Needed for Moderate Pain . - Oral    oxyCODONE (ROXICODONE) immediate release tablet 5 mg 5 mg Every 6 Hours PRN 11/11/2018 11/21/2018    Sig - Route: Take 1 tablet by mouth Every 6 (Six) Hours As Needed for Moderate Pain . - Oral    Pharmacy to dose vancomycin  Continuous PRN 11/11/2018 11/18/2018    Sig - Route: Continuous As Needed for Consult. - Does not apply    potassium chloride " (MICRO-K) CR capsule 40 mEq 40 mEq Once 11/11/2018 11/11/2018    Sig - Route: Take 4 capsules by mouth 1 (One) Time. - Oral    potassium chloride (MICRO-K) CR capsule 40 mEq 40 mEq Daily 11/11/2018     Sig - Route: Take 4 capsules by mouth Daily. - Oral    potassium chloride 10 mEq in 100 mL IVPB 10 mEq Every 1 Hour PRN 11/7/2018     Sig - Route: Infuse 100 mL into a venous catheter Every 1 (One) Hour As Needed (See admin Instructions.). - Intravenous    Cosign for Ordering: Accepted by Beau Celeste MD on 11/7/2018  6:18 PM    rifaximin (XIFAXAN) tablet 275 mg 275 mg Every 12 Hours Scheduled 10/29/2018     Sig - Route: Take 0.5 tablets by mouth Every 12 (Twelve) Hours. - Oral    simethicone (MYLICON) chewable tablet 80 mg 80 mg Every 6 Hours PRN 10/29/2018     Sig - Route: Chew 1 tablet Every 6 (Six) Hours As Needed for Flatulence. - Oral    sodium chloride (OCEAN) nasal spray 1 spray 1 spray As Needed 11/9/2018     Sig - Route: 1 spray by Each Nare route As Needed for Congestion. - Each Nare    sodium chloride 0.9 % flush 3 mL 3 mL Every 12 Hours Scheduled 10/29/2018     Sig - Route: Infuse 3 mL into a venous catheter Every 12 (Twelve) Hours. - Intravenous    sodium chloride 0.9 % flush 3-10 mL 3-10 mL As Needed 10/29/2018     Sig - Route: Infuse 3-10 mL into a venous catheter As Needed for Line Care. - Intravenous    vancomycin 1250 mg/250 mL 0.9% NS IVPB ([facility]) 1,250 mg Every 12 Hours 11/11/2018 11/18/2018    Sig - Route: Infuse 250 mL into a venous catheter Every 12 (Twelve) Hours. - Intravenous    vancomycin 2000 mg/500 mL 0.9% NS IVPB ([facility]) 20 mg/kg × 99.6 kg Once 11/11/2018 11/11/2018    Sig - Route: Infuse 500 mL into a venous catheter 1 (One) Time. - Intravenous    cefTRIAXone (ROCEPHIN) 1 g/100 mL 0.9% NS (MBP) (Discontinued) 1 g Every 24 Hours 10/31/2018 11/11/2018    Sig - Route: Infuse 100 mL into a venous catheter Daily. - Intravenous    metroNIDAZOLE (FLAGYL) IVPB 500 mg (Discontinued) 500 mg Every 8  Hours 11/4/2018 11/11/2018    Sig - Route: Infuse 100 mL into a venous catheter Every 8 (Eight) Hours. - Intravenous    Cosign for Ordering: Accepted by Beau Celeste MD on 11/4/2018  7:10 PM          Orders (last 24 hrs)     Start     Ordered    11/12/18 2130  Vancomycin, Trough  Timed      11/11/18 1056    11/12/18 2100  DRUG: Vancomycin TROUGH LEVEL WILL BE DRAWN AT 2130 PLEASE DO NOT HANG NEXT SCHEDULED DOSE UNTIL TROUGH HAS BEEN COLLECTED AND RESULTS RETURNED. PLEASE CALL PHARMACY TO HOLD THERAPY IF LEVEL IS > 20  Nursing Communication  Once     Comments:  DRUG: Vancomycin  TROUGH LEVEL WILL BE DRAWN AT 2130  PLEASE DO NOT HANG NEXT SCHEDULED   DOSE UNTIL TROUGH HAS BEEN COLLECTED   AND RESULTS RETURNED.  PLEASE CALL PHARMACY TO HOLD THERAPY   IF LEVEL IS > 20    11/11/18 1056    11/12/18 1230  Vancomycin, Peak  Timed      11/11/18 1056    11/12/18 0639  Scan Slide  Once,   Status:  Canceled      11/12/18 0638    11/12/18 0600  CBC Auto Differential  PROCEDURE ONCE      11/12/18 0001    11/11/18 2200  vancomycin 1250 mg/250 mL 0.9% NS IVPB ([facility])  Every 12 Hours      11/11/18 1048    11/11/18 2003  oxyCODONE (ROXICODONE) immediate release tablet 5 mg  Every 6 Hours PRN      11/11/18 2004 11/11/18 2003  HYDROmorphone (DILAUDID) injection 0.25 mg  Every 3 Hours PRN      11/11/18 2004 11/11/18 1100  vancomycin 2000 mg/500 mL 0.9% NS IVPB ([facility])  Once      11/11/18 1000    11/11/18 1015  potassium chloride (MICRO-K) CR capsule 40 mEq  Daily      11/11/18 0927    11/11/18 0955  Pharmacy to dose vancomycin  Continuous PRN      11/11/18 0957    11/11/18 0915  potassium chloride (MICRO-K) CR capsule 40 mEq  Once      11/11/18 0820    11/09/18 1821  sodium chloride (OCEAN) nasal spray 1 spray  As Needed      11/09/18 1822    11/07/18 1048  Magnesium Sulfate 2 gram Bolus, followed by 8 gram infusion (total Mg dose 10 grams)- Mg less than or equal to 1mg/dL  As Needed      11/07/18 1048    11/07/18 1048  Magnesium  Sulfate 2 gram / 50mL Infusion (GIVE X 3 BAGS TO EQUAL 6GM TOTAL DOSE) - Mg 1.1 - 1.5 mg/dl  As Needed      11/07/18 1048    11/07/18 1048  Magnesium Sulfate 4 gram infusion- Mg 1.6-1.9 mg/dL  As Needed      11/07/18 1048    11/07/18 1047  potassium chloride 10 mEq in 100 mL IVPB  Every 1 Hour PRN      11/07/18 1048    11/04/18 1330  furosemide (LASIX) 100 mg in sodium chloride 0.9 % 100 mL (1 mg/mL) infusion  Continuous      11/04/18 1230    11/04/18 1330  albumin human 5 % bottle 25 g  Every 12 Hours      11/04/18 1230    11/04/18 1330  metroNIDAZOLE (FLAGYL) IVPB 500 mg  Every 8 Hours,   Status:  Discontinued      11/04/18 1233    11/04/18 1231  Protime-INR  Daily      11/04/18 1230    11/01/18 1326  HYDROmorphone (DILAUDID) injection 0.25 mg  Every 3 Hours PRN      11/01/18 1327    11/01/18 1326  oxyCODONE (ROXICODONE) immediate release tablet 5 mg  Every 6 Hours PRN      11/01/18 1327    10/31/18 1130  cefTRIAXone (ROCEPHIN) 1 g/100 mL 0.9% NS (MBP)  Every 24 Hours,   Status:  Discontinued      10/31/18 1038    10/31/18 0600  Ammonia  Daily      10/30/18 1311    10/30/18 2100  magic butt ointment  2 Times Daily      10/30/18 1548    10/30/18 1000  nicotine (NICODERM CQ) 21 MG/24HR patch 1 patch  Every 24 Hours Scheduled      10/30/18 0907    10/30/18 0907  ipratropium-albuterol (DUO-NEB) nebulizer solution 3 mL  Every 6 Hours PRN      10/30/18 0907    10/29/18 2130  midodrine (PROAMATINE) tablet 10 mg  3 Times Daily      10/29/18 2031    10/29/18 2130  rifaximin (XIFAXAN) tablet 275 mg  Every 12 Hours Scheduled      10/29/18 2031    10/29/18 2130  sodium chloride 0.9 % flush 3 mL  Every 12 Hours Scheduled      10/29/18 2032    10/29/18 2130  lactulose (CHRONULAC) 10 GM/15ML solution 30 g  3 Times Daily      10/29/18 2032    10/29/18 2033  Basic Metabolic Panel  Daily      10/29/18 2032    10/29/18 2033  CBC & Differential  Daily      10/29/18 2032    10/29/18 2032  naloxone (NARCAN) injection 0.4 mg  Every 5  Minutes PRN      10/29/18 2032    10/29/18 2032  sodium chloride 0.9 % flush 3-10 mL  As Needed      10/29/18 2032    10/29/18 2032  ondansetron (ZOFRAN) injection 4 mg  Every 6 Hours PRN      10/29/18 2032    10/29/18 2031  simethicone (MYLICON) chewable tablet 80 mg  Every 6 Hours PRN      10/29/18 2031    Unscheduled  Magnesium  As Needed      11/07/18 1048    Unscheduled  Potassium  As Needed      11/07/18 1048    Unscheduled  Transfuse Cryoprecipitate, 2 Units  Transfusion      11/08/18 0734    --  SCANNED - TELEMETRY        10/29/18 0000    --  SCANNED - TELEMETRY        10/29/18 0000    --  SCANNED - TELEMETRY        10/29/18 0000    --  SCANNED - TELEMETRY        10/29/18 0000    --  SCANNED - TELEMETRY        10/29/18 0000    --  SCANNED - TELEMETRY        10/29/18 0000    --  SCANNED - TELEMETRY        10/29/18 0000    --  SCANNED - TELEMETRY        10/29/18 0000    --  SCANNED - TELEMETRY        10/29/18 0000    --  SCANNED - TELEMETRY        10/29/18 0000    --  SCANNED - TELEMETRY        10/29/18 0000    --  SCANNED - TELEMETRY        10/29/18 0000    --  SCANNED - TELEMETRY        10/29/18 0000    --  SCANNED - TELEMETRY        10/29/18 0000    --  SCANNED - TELEMETRY        10/29/18 0000    --  SCANNED - TELEMETRY        10/29/18 0000    --  SCANNED - TELEMETRY        10/29/18 0000    --  SCANNED - TELEMETRY        10/29/18 0000    --  SCANNED - TELEMETRY        10/29/18 0000    --  SCANNED - TELEMETRY        10/29/18 0000    --  SCANNED - TELEMETRY        10/29/18 0000    --  SCANNED - TELEMETRY        10/29/18 0000    --  SCANNED - TELEMETRY        10/29/18 0000    --  SCANNED - TELEMETRY        10/29/18 0000    --  SCANNED - TELEMETRY        10/29/18 0000    --  SCANNED - TELEMETRY        10/29/18 0000    --  SCANNED - TELEMETRY        10/29/18 0000    --  SCANNED - TELEMETRY        10/29/18 0000    --  SCANNED - TELEMETRY        10/29/18 0000    --  SCANNED - TELEMETRY        10/29/18 0000    --   SCANNED - TELEMETRY        10/29/18 0000    --  SCANNED - TELEMETRY        10/29/18 0000    --  SCANNED - TELEMETRY        10/29/18 0000    --  SCANNED - TELEMETRY        10/29/18 0000    --  SCANNED - TELEMETRY        10/29/18 0000    --  SCANNED - TELEMETRY        10/29/18 0000    --  SCANNED - TELEMETRY        10/29/18 0000    --  SCANNED - TELEMETRY        10/29/18 0000             Physician Progress Notes (last 24 hours) (Notes from 2018  9:07 AM through 2018  9:07 AM)      Barrera Mcmanus MD at 2018  1:27 PM              [facility] HOSPITALIST PROGRESS NOTE     Patient Identification:  Name:  Armando Mcmullen Sr.  Age:  53 y.o.  Sex:  male  :  1965  MRN:  [id number]  Visit Number:  [id number]  Primary Care Provider:  Reddy Grant MD    Length of stay:  13    Chief complaint: increase in scrotal swelling    Subjective:  This is a 53 year old  male with PMH of chronic hepatitis C complicated by cirrhosis of the liver that presented to [facility] on 10/29/2018 secondary to altered mental status and was found to have an elevated ammonia level of 129.  Lactulose was given and his levels returned to normal in a couple of days. Urology was consulted due to urine retention.   Ct of the abdomen and pelvis indicated a possible abscess of the left kidney.  Successful CT-guided aspiration of the fluid was done by interventional radiologist on 2018.  Hematology is following due to thrombocytopenia and elevated INR. He was started on FFP but reacted and it was discontinued  11/10/2018 patient was evaluated.  Complains of back pain which is chronic.   18. Reports increase scrotal swelling but no pain,no sob.     ----------------------------------------------------------------------------------------------------------------------  Current [facility] Meds:    albumin human 25 g Intravenous Q12H   lactulose 30 g Oral TID   magic butt ointment  Topical BID    midodrine 10 mg Oral TID   nicotine 1 patch Transdermal Q24H   potassium chloride 40 mEq Oral Daily   rifaximin 275 mg Oral Q12H   sodium chloride 3 mL Intravenous Q12H   vancomycin 1,250 mg Intravenous Q12H       furosemide (LASIX) infusion 1 mg/mL 10 mg/hr Last Rate: 10 mg/hr (11/11/18 0958)   Pharmacy to dose vancomycin       ----------------------------------------------------------------------------------------------------------------------  Vital Signs:  Temp:  [96.1 °F (35.6 °C)-97.9 °F (36.6 °C)] 96.8 °F (36 °C)  Heart Rate:  [] 92  Resp:  [14-20] 18  BP: (110-164)/(74-99) 138/74      11/08/18  0500 11/09/18  0500 11/11/18  0400   Weight: 97.1 kg (214 lb) 97.2 kg (214 lb 3.2 oz) 99.6 kg (219 lb 9.6 oz)     Body mass index is 29.78 kg/m².    Intake/Output Summary (Last 24 hours) at 11/11/2018 1328  Last data filed at 11/11/2018 0730  Gross per 24 hour   Intake 2046 ml   Output 2240 ml   Net -194 ml     Diet Regular; Low Sodium; 2,000 mg Na  ----------------------------------------------------------------------------------------------------------------------  Physical exam:  Constitutional:  Well-developed and well-nourished.  No respiratory distress.      HENT:  Head:  Normocephalic and atraumatic.  Mouth:  Moist mucous membranes.    Eyes:  Conjunctivae and EOM are normal.  Pupils are equal, round, and reactive to light.  No scleral icterus.    Neck:  Neck supple.  No JVD present.    Cardiovascular:  Normal rate, regular rhythm and normal heart sounds with no murmur.  Pulmonary/Chest:  No respiratory distress, no wheezes, no crackles, with normal breath sounds and good air movement.  Abdominal:  Soft and obese.  Bowel sounds are normal.  Notenderness.  2+ edema on both flanks   Musculoskeletal: 2+ edema right lower extremity,  left BKA.  Markedly decreased range of movement of the right shoulder   Neurological:  Alert and oriented to person, place, and time.  No cranial nerve deficit.  No tongue  deviation.  No facial droop.  No slurred speech.   Skin:  Skin is warm and dry. No rash noted. No pallor.   Peripheral vascular:  Strong pulses in all 4 extremities with no clubbing, no cyanosis, no edema.  Psych: Cooperative with appropriate mood and affect  Genitourinary: mild scrotal edema    ----------------------------------------------------------------------------------------------------------------------  Tele:    ----------------------------------------------------------------------------------------------------------------------      Results from last 7 days   Lab Units  11/11/18   0603  11/10/18   0405  11/09/18   0838  11/09/18   0526  11/08/18   1753  11/08/18   0911  11/08/18   0607   WBC 10*3/mm3  5.51  8.84   --   9.11   --    --   5.00   HEMOGLOBIN g/dL  8.0*  6.9*   --   7.9*   --    --   8.1*   HEMATOCRIT %  22.4*  19.7*  20.0*   --   22.3*   --    --   22.8*   MCV fL  101.4*  105.3*   --   102.8*   --    --   103.6*   MCHC g/dL  35.7  34.5   --   35.4   --    --   35.5   PLATELETS 10*3/mm3  50*  57*   --   49*   --    --   50*   INR   2.67*  2.32*  2.67*  2.51*  2.14*  2.55*  2.75*         Results from last 7 days   Lab Units  11/11/18   0603  11/10/18   0405  11/09/18   0526   11/08/18   0607  11/07/18   1113   SODIUM mmol/L  135*  136*  132*   --   133*   --    POTASSIUM mmol/L  3.3*  3.8  3.9   < >  3.6  3.6  3.0*   MAGNESIUM mg/dL   --    --    --    --   2.3  1.6   CHLORIDE mmol/L  95  96  94*   --   97   --    CO2 mmol/L  30.0  29.0  29.0   --   31.0   --    BUN mg/dL  22*  23*  18   --   12   --    CREATININE mg/dL  1.20  1.15  1.02   --   0.89   --    EGFR IF NONAFRICN AM mL/min/1.73  63  66  76   --   89   --    CALCIUM mg/dL  9.1  9.2  9.0   --   8.2*   --    GLUCOSE mg/dL  72  149*  135*   --   83   --     < > = values in this interval not displayed.   Estimated Creatinine Clearance: 86 mL/min (by C-G formula based on SCr of 1.2 mg/dL).    Ammonia   Date Value Ref Range Status    11/11/2018 16 9 - 30 umol/L Final   11/10/2018 <9 (L) 9 - 30 umol/L Final   11/09/2018 20 9 - 30 umol/L Final                       I have personally looked at the labs and they are summarized above.  ----------------------------------------------------------------------------------------------------------------------  Imaging Results (last 24 hours)     ** No results found for the last 24 hours. **        ----------------------------------------------------------------------------------------------------------------------  Assessment and Plan:    Hepatic encephalopathy secondary to liver cirrhosis:      Ammonia level remains normal.    Dr. Becerril following.     Anarsarca probably due to liver cirrhosis    Increase lasix drip to 10mg/hr    Monitor and replace electrolytes     Coagulopathy/ thrombocytopenia of liver disease     Hematology on board appreciate recommendation.     Transfusional reaction to FFP     Macrocytic anemia with drop of hemoglobin to 6.9    Transfused 1 unit of packed red blood cell 11/10/18         Perinephric abscess/fluid collection:      Successful aspiration.     culture result Positive for enterococcus.      D/c metronidazole/rocephin    Start vanco, pharmacy to dose renally     Hypokalemia due to lasix drip.   Replace prn     Chronic pain syndrome   Continue with current medication dilaudid/oxycodone.     Liver cirrhosis   Lactulose, rifaximin     Plan of care was discussed with the patient and the nursing staff.      Barrera Mcmanus MD  11/11/18  1:28 PM    Electronically signed by Barrera Mmcanus MD at 11/11/2018  1:39 PM     Briseyda Pérez MD at 11/11/2018 10:48 AM          Armando Mcmullen Sr.  [id number]  1965    Subjective   Patient was seen this morning. l.  Denies any new symptoms this morning.  Aspiration site looks good without any swelling or bruising or ecchymosis.  ROS as below.       Altered Mental Status           This is a very  pleasant 53-year-old male who was seen in consultation at the request of KENROY Goode  for evaluation of coagulopathy, liver disease and pancytopenia.  Patient lives in [place] with his son and has past medical history of decompensated cirrhosis of liver, splenomegaly, pancytopenia, depression, back pain.  He was admitted to our hospital with altered mental status and was found to have ammonia level of 129.  He was treated with hepatic encephalopathy.  Patient was reporting abdominal pain on admission and underwent CT scan of abdomen and pelvis which showed perinephric fluid collection concerning for hepatoma/abscess.  Patient is currently feeling better.  His encephalopathy has resolved.  His abdominal pain has improved with IV diuresis.  Patient was evaluated by urology team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

## 2018-11-12 NOTE — PLAN OF CARE
Problem: Patient Care Overview  Goal: Plan of Care Review  Outcome: Ongoing (interventions implemented as appropriate)   11/12/18 8631   Coping/Psychosocial   Plan of Care Reviewed With patient   Plan of Care Review   Progress improving   OTHER   Outcome Summary VSS, Pain med x1 given this afternoon. Lasix gtt continues at 10. Urine output improved.

## 2018-11-12 NOTE — PROGRESS NOTES
"   LOS: 14 days   Patient Care Team:  Reddy Grant MD as PCP - General (Family Medicine)  Shawn Cortez MD (Inactive) as Surgeon (Orthopedic Surgery)  Tushar Becerril DO as Consulting Physician (Gastroenterology)  Josep Colón MD as Consulting Physician (Nephrology)  Ortiz Ferreira MD as Consulting Physician (Hematology and Oncology)    Subjective     Subjective:  Symptoms:  Stable.  (No left flank pain. Urine clear and yellow. ).    Diet:  Adequate intake.  No nausea or vomiting.    Pain:  He reports pain is improving.        History taken from: patient chart RN    Objective     Vital Signs  Temp:  [97 °F (36.1 °C)-98.1 °F (36.7 °C)] 97.9 °F (36.6 °C)  Heart Rate:  [88-93] 89  Resp:  [18] 18  BP: (127-144)/(64-93) 141/82    Objective:  General Appearance:  In no acute distress.    Vital signs: (most recent): Blood pressure 141/82, pulse 89, temperature 97.9 °F (36.6 °C), temperature source Oral, resp. rate 18, height 182.9 cm (72.01\"), weight 97.1 kg (214 lb 1.6 oz), SpO2 92 %.  Vital signs are normal.  No fever.    Output: Producing urine (Urine clear yellow) and producing stool.    HEENT: Normal HEENT exam.  (Scleral icterus is present)    Lungs:  Normal effort and normal respiratory rate.  Breath sounds clear to auscultation.  He is not in respiratory distress.  No stridor.  No decreased breath sounds.    Heart: Normal rate.  Regular rhythm.  S1 normal and S2 normal.  No murmur, gallop or friction rub.   Chest: Symmetric chest wall expansion.   Abdomen: Abdomen is soft and non-distended.  (Left flank dressing C/D/I).  Bowel sounds are normal.   There is generalized tenderness.     Extremities: There is no dependent edema.    Neurological: Patient is alert and oriented to person, place and time.  GCS score is 15.    Pupils:  Pupils are equal, round, and reactive to light.    Skin:  Warm and dry.  No rash or cyanosis.           Results Review:    Lab Results (last 24 hours)     Procedure Component Value " Units Date/Time    Non-gynecologic Cytology [[id number]] Collected:  11/09/18 0509    Specimen:  Body Fluid from Kidney, Left Updated:  11/12/18 0724    Protime-INR [[id number]]  (Abnormal) Collected:  11/12/18 0616    Specimen:  Blood Updated:  11/12/18 0654     Protime 29.5 Seconds      INR 2.97    Narrative:       Therapeutic range for most indications is 2.0-3.0 INR,  or 2.5-3.5 for mechanical heart valves.    Ammonia [[id number]]  (Normal) Collected:  11/12/18 0616    Specimen:  Blood Updated:  11/12/18 0652     Ammonia 13 umol/L     Basic Metabolic Panel [[id number]]  (Abnormal) Collected:  11/12/18 0616    Specimen:  Blood Updated:  11/12/18 0647     Glucose 78 mg/dL      BUN 21 mg/dL      Creatinine 1.14 mg/dL      Sodium 135 mmol/L      Potassium 3.5 mmol/L      Chloride 96 mmol/L      CO2 31.0 mmol/L      Calcium 8.9 mg/dL      eGFR Non African Amer 67 mL/min/1.73      BUN/Creatinine Ratio 18.4     Anion Gap 8.0 mmol/L     CBC & Differential [[id number]] Collected:  11/12/18 0616    Specimen:  Blood Updated:  11/12/18 0639    Narrative:       The following orders were created for panel order CBC & Differential.  Procedure                               Abnormality         Status                     ---------                               -----------         ------                     Scan Slide[[id number]]                                                                  CBC Auto Differential[[id number]]        Abnormal            Final result                 Please view results for these tests on the individual orders.    CBC Auto Differential [[id number]]  (Abnormal) Collected:  11/12/18 0616    Specimen:  Blood Updated:  11/12/18 0639     WBC 6.52 10*3/mm3      RBC 2.41 10*6/mm3      Hemoglobin 8.7 g/dL      Hematocrit 24.7 %      .5 fL      MCH 36.1 pg      MCHC 35.2 g/dL      RDW 16.0 %      RDW-SD 59.6 fl      MPV 9.7 fL      Platelets 43 10*3/mm3      Neutrophil % 61.2 %      Lymphocyte % 20.6 %       Monocyte % 12.0 %      Eosinophil % 4.8 %      Basophil % 0.5 %      Immature Grans % 0.9 %      Neutrophils, Absolute 4.00 10*3/mm3      Lymphocytes, Absolute 1.34 10*3/mm3      Monocytes, Absolute 0.78 10*3/mm3      Eosinophils, Absolute 0.31 10*3/mm3      Basophils, Absolute 0.03 10*3/mm3      Immature Grans, Absolute 0.06 10*3/mm3     Anaerobic Culture - Aspirate, Kidney [[id number]] Collected:  11/09/18 1708    Specimen:  Aspirate from Kidney Updated:  11/11/18 1701     Culture No anaerobes isolated at 2 days    Body Fluid Culture - Aspirate, Kidney, Left [[id number]]  (Abnormal)  (Susceptibility) Collected:  11/09/18 1708    Specimen:  Aspirate from Kidney, Left Updated:  11/11/18 1611     BF Culture Heavy growth (4+) Enterococcus faecium     Gram Stain Many (4+) WBCs seen      Many (4+) Gram positive cocci      Rare (1+) Yeast    Narrative:       Slide reviewed confirmed    Susceptibility      Enterococcus faecium     NERI     Ampicillin Resistant     Gentamicin High Level Synergy Susceptible     Vancomycin Susceptible                         Imaging Results (last 24 hours)     Procedure Component Value Units Date/Time    CT Abdomen Pelvis With Contrast [[id number]] Collected:  11/05/18 1815     Updated:  11/05/18 1857    Narrative:         EXAMINATION:  Computed Tomography           REGION:    Abdomen / Pelvis                     INDICATION:   Repeat to evaluate hematoma/abscess of kidney per  urology, R41.82 Altered mental status, unspecified K72.90 Hepatic  failure, unspecified without coma Z74.09 Other reduced mobility  Z74.09 Other reduced mobility    HISTORY:  LISA. IMAGING:    CT A/P 11/2/18            TECHNIQUE:      - reconstructions:    axial, coronal, sagittal         - contrast:      oral:  No ;   intravenous: Isovue 300, 100 mL  This exam was performed according to the departmental  dose-optimization program which includes automated exposure  control, adjustment of the mA and/or kV according to  patient size  and/or use of iterative reconstruction technique.           COMMENTS:            - - - CT ABDOMEN - - -          THORAX (INFERIOR):      - LUNG BASES:  clear      - PLEURA:    no fluid or mass      - HEART:    normal size, no pericardial fluid     - MISC:      n/a          ABDOMEN:     - LIVER:    Diminished size with nodular contour containing a  tips shunt   - GB:      Cholelithiasis without evidence of wall thickening.    - CBD:      grossly negative   - SPLEEN:    Upper normal/mildly enlarged   - PANCREAS:    normal in size, contour, no focal mass    - VISCERA:    normal caliber, no wall thickening     - MESENTERY:  no mesenteric mass   - CAVITY:    Small amount of free fluid   - BODY WALL:  wnl   - OSSEOUS:    grossly negative for age   - MISC:                 Extensive gastric varices                      RETROPERITONEUM:   - KIDNEYS:    Renal examination again displays overall normal  size and symmetric nephrograms. Again noted is a left pararenal  enhancing focal fluid collection containing air which is  unchanged in size measuring 2.5 x 3.6 x 2.0 cm   - URETERS:    normal course, caliber   - ADRENALS:    normal size, contour   - MISC:      no sig retroperitoneal adenopathy or mass   - VASCULAR:    aorta / iliacs: wnl for age     - - - CT PELVIS - - -      - VISCERA:    normal caliber small/large bowel, no focal  thickening/mass        - APPENDIX:          wnl   - MESENTERY:  no mass   - VASCULAR:    wnl for age   - CAVITY:    Moderate amount of free fluid   - BLADDER:    unremarkable   - OSSEOUS:    Status post left hip repair    - MISC:     .       Impression:        CONCLUSION:  1. Unchanged left pararenal focal fluid collection with  peripheral enhancement containing air, likely representing an  abscess.  2. Multiple additional findings demonstrating the presence of  cirrhosis status post TIPS shunt, with spleen size at the upper  limits of normal/mildly enlarged, and extensive gastric  varices,  as above, unchanged.        Electronically signed by:  PRISCILLA Bustillo MD  11/5/2018 6:56  PM CST Workstation: [id number]           I reviewed the patient's new clinical results.  I reviewed the patient's new imaging results and agree with the interpretation.  I reviewed the patient's other test results and agree with the interpretation      Assessment/Plan       Chronic hepatitis (CMS/HCC)    Thrombocytopenia (CMS/HCC)    Hepatic encephalopathy (CMS/HCC)    CKD (chronic kidney disease) stage 3, GFR 30-59 ml/min (CMS/HCC)    Altered mental status      Assessment & Plan    1. Difficulty urinating with hematuria, likely related to UTI hemorrhagic cystitis without clots-->hematuria resolved  2. Perinephric abscess  -CT guided drainage of left perinephric abscess 11/9/18-->Enterococcus faecium  -Estimated Creatinine Clearance: 89.5 mL/min (by C-G formula based on SCr of 1.14 mg/dL).  -Cr 0.89-->1.15-->1.20-->1.14  -WBC 9.11-->8.84-->5.51-->6.52  -UA + 10/30/18  -Hgb/Hct 7.9/22.3-->6.9/19.7-->8.0/22.4-->8.7/24.7  -Platelets 49,000-->57,000-->50,000-->43,000  -INR 2.51-->2.32-->2.67-->2.97  -Renal ultrasound 10/30/18 limited exam, especially of the left kidney, due to the presence of bowel gas and the patient's body habitus. No hydronephrosis is visualized. Unremarkable bladder.   -11/2/18 CT abdomen/pelvis LEFT 2.3 x 3.7 x 2.2 cm fluid and air collection infected subcapsular hematoma versus abscess.  -11/5/18 CT abdomen/pelvis showed LEFT renal abscess unchanged  -10/30/18 Urine culture NEGATIVE, UA grossly positive.   -On vancomycin at present    Plan:    Consider switching to oral Zyvox, 14 days of therapy, follow up Urology 2 weeks renal ultrasound, CBC 1 week.     KALEB Alexander  11/12/18  12:42 PM

## 2018-11-12 NOTE — PROGRESS NOTES
[facility] HOSPITALIST PROGRESS NOTE     Patient Identification:  Name:  Armando Mcmullen Sr.  Age:  53 y.o.  Sex:  male  :  1965  MRN:  [id number]  Visit Number:  [id number]  Primary Care Provider:  Reddy Grant MD    Length of stay:  14    Chief complaint:  Feeling better with decreased SOB    Subjective: This is a 53 year old  male with PMH of chronic hepatitis C complicated by cirrhosis of the liver that presented to [facility] on 10/29/2018 secondary to altered mental status and was found to have an elevated ammonia level of 129.  Lactulose was given and his levels returned to normal in a couple of days. Urology was consulted due to urine retention.   Ct of the abdomen and pelvis indicated a possible abscess of the left kidney.  Successful CT-guided aspiration of the fluid was done by interventional radiologist on 2018.  Hematology is following due to thrombocytopenia and elevated INR. He was started on FFP but reacted and it was discontinued  11/10/2018 patient was evaluated.  Complains of back pain which is chronic.   18. Reports increase scrotal swelling but no pain,no sob  18 Feeling better. Had good urine output      ----------------------------------------------------------------------------------------------------------------------  Current Hospital Meds:    albumin human 25 g Intravenous Q12H   lactulose 30 g Oral TID   magic butt ointment  Topical BID   midodrine 10 mg Oral TID   nicotine 1 patch Transdermal Q24H   potassium chloride 40 mEq Oral Daily   rifaximin 275 mg Oral Q12H   sodium chloride 3 mL Intravenous Q12H   vancomycin 1,250 mg Intravenous Q12H       furosemide (LASIX) infusion 1 mg/mL 10 mg/hr Last Rate: 10 mg/hr (18 0252)   Pharmacy to dose vancomycin       ----------------------------------------------------------------------------------------------------------------------  Vital Signs:  Temp:  [96.8 °F (36 °C)-98.1 °F (36.7 °C)]  97.9 °F (36.6 °C)  Heart Rate:  [88-93] 89  Resp:  [18] 18  BP: (127-144)/(64-93) 141/82      11/09/18  0500 11/11/18  0400 11/12/18  0600   Weight: 97.2 kg (214 lb 3.2 oz) 99.6 kg (219 lb 9.6 oz) 97.1 kg (214 lb 1.6 oz)     Body mass index is 29.03 kg/m².    Intake/Output Summary (Last 24 hours) at 11/12/2018 0951  Last data filed at 11/12/2018 0805  Gross per 24 hour   Intake 1198.83 ml   Output 4765 ml   Net -3566.17 ml     Diet Regular; Low Sodium; 2,000 mg Na  ----------------------------------------------------------------------------------------------------------------------  Physical exam:  Constitutional:  Well-developed and well-nourished.  No respiratory distress.      HENT:  Head:  Normocephalic and atraumatic.  Mouth:  Moist mucous membranes.    Eyes:  Conjunctivae and EOM are normal.  Pupils are equal, round, and reactive to light.  No scleral icterus.    Neck:  Neck supple.  No JVD present.    Cardiovascular:  Normal rate, regular rhythm and normal heart sounds with no murmur.  Pulmonary/Chest:  No respiratory distress, no wheezes, no crackles, with normal breath sounds and good air movement.  Abdominal:  Soft and obese.  Bowel sounds are normal.  Notenderness.  2+ edema on both flanks   Musculoskeletal: 1+ edema right lower extremity, left BKA.  Markedly decreased range of movement of the right shoulder   Neurological:  Alert and oriented to person, place, and time.  No cranial nerve deficit.  No tongue deviation.  No facial droop.  No slurred speech.   Skin:  Skin is warm and dry. No rash noted. No pallor.   Peripheral vascular:  Strong pulses in all 4 extremities with no clubbing, no cyanosis, 1+ edema.  Psych: Cooperative with appropriate mood and affect  Genitourinary: mild scrotal edema    ----------------------------------------------------------------------------------------------------------------------  Tele:     ----------------------------------------------------------------------------------------------------------------------      Results from last 7 days   Lab Units  11/12/18   0616  11/11/18   0603  11/10/18   0405  11/09/18   0838  11/09/18   0526  11/08/18   1753  11/08/18   0911   WBC 10*3/mm3  6.52  5.51  8.84   --   9.11   --    --    HEMOGLOBIN g/dL  8.7*  8.0*  6.9*   --   7.9*   --    --    HEMATOCRIT %  24.7*  22.4*  19.7*  20.0*   --   22.3*   --    --    MCV fL  102.5*  101.4*  105.3*   --   102.8*   --    --    MCHC g/dL  35.2  35.7  34.5   --   35.4   --    --    PLATELETS 10*3/mm3  43*  50*  57*   --   49*   --    --    INR   2.97*  2.67*  2.32*  2.67*  2.51*  2.14*  2.55*         Results from last 7 days   Lab Units  11/12/18   0616  11/11/18   0603  11/10/18   0405   11/08/18   0607  11/07/18   1113   SODIUM mmol/L  135*  135*  136*   < >  133*   --    POTASSIUM mmol/L  3.5  3.3*  3.8   < >  3.6  3.6  3.0*   MAGNESIUM mg/dL   --    --    --    --   2.3  1.6   CHLORIDE mmol/L  96  95  96   < >  97   --    CO2 mmol/L  31.0  30.0  29.0   < >  31.0   --    BUN mg/dL  21  22*  23*   < >  12   --    CREATININE mg/dL  1.14  1.20  1.15   < >  0.89   --    EGFR IF NONAFRICN AM mL/min/1.73  67  63  66   < >  89   --    CALCIUM mg/dL  8.9  9.1  9.2   < >  8.2*   --    GLUCOSE mg/dL  78  72  149*   < >  83   --     < > = values in this interval not displayed.   Estimated Creatinine Clearance: 89.5 mL/min (by C-G formula based on SCr of 1.14 mg/dL).    Ammonia   Date Value Ref Range Status   11/12/2018 13 9 - 30 umol/L Final   11/11/2018 16 9 - 30 umol/L Final   11/10/2018 <9 (L) 9 - 30 umol/L Final                       I have personally looked at the labs and they are summarized above.  ----------------------------------------------------------------------------------------------------------------------  Imaging Results (last 24 hours)     ** No results found for the last 24 hours. **         ----------------------------------------------------------------------------------------------------------------------  Assessment and Plan:  Hepatic encephalopathy secondary to liver cirrhosis:      Ammonia level remains normal.    Dr. Becerril following.      Anarsarca probably due to liver cirrhosis    continue lasix drip at 10mg/hr    Monitor and replace electrolytes     Coagulopathy/ thrombocytopenia of liver disease     Hematology on board appreciate recommendation.  Will place on p.o vit. k     Transfusional reaction to FFP     Macrocytic anemia with drop of hemoglobin to 6.9    Transfused 1 unit of packed red blood cell 11/10/18         Perinephric abscess/fluid collection:      Successful aspiration.     culture result Positive for enterococcus.      was on  Metronidazole/rocephin dced 11/11/18    continue vanco, pharmacy to dose renally     Hypokalemia due to lasix drip.   Replace prn     Chronic pain syndrome   Continue with current medication dilaudid/oxycodone.     Liver cirrhosis   Lactulose, rifaximin     Plan of care was discussed with the patient and the nursing staff    Barrera Mcmanus MD  11/12/18  9:51 AM

## 2018-11-12 NOTE — PLAN OF CARE
Problem: Patient Care Overview  Goal: Plan of Care Review  Outcome: Ongoing (interventions implemented as appropriate)   11/12/18 7016   Coping/Psychosocial   Plan of Care Reviewed With patient   OTHER   Outcome Summary Patient advanced to regular, low-sodium diet. Patient has good appetite, and good intake of meals.

## 2018-11-12 NOTE — CONSULTS
"Adult Nutrition  Assessment    Patient Name:  Armando Mcmullen Sr.  YOB: 1965  MRN: [id number]  Admit Date:  10/29/2018    Assessment Date:  11/12/2018    Comments:  Patient advanced to regular, low-sodium diet. Patient has good appetite and good intake of meals. Patient did c/o diarrhea. Patient on vancomycin for bacterial infection in peritoneal cavity. RDN staff will continue to monitor.     Reason for Assessment     Row Name 11/12/18 1107          Reason for Assessment    Reason For Assessment  follow-up protocol  (Pended)      Diagnosis  liver disease  (Pended)          Nutrition/Diet History     Row Name 11/12/18 1107          Nutrition/Diet History    Typical Food/Fluid Intake  Patient is currently on a 2 gram low-sodium diet. Patient has good appetite and has 100% intake of last 3 meals.   (Pended)          Anthropometrics     Row Name 11/12/18 0600          Anthropometrics    Height  182.9 cm (72.01\")     Weight  97.1 kg (214 lb 1.6 oz)        Ideal Body Weight (IBW)    Ideal Body Weight (IBW) (kg)  82.09     % Ideal Body Weight  118.3        Body Mass Index (BMI)    BMI (kg/m2)  29.09        IBW Adjustment, Para/Tetraplegia    5% Adjustment, Para (IBW)  77.99     10% Adjustment, Para (IBW)  73.88     10% Adjustment, Tetra (IBW)  73.88     15% Adjustment, Tetra (IBW)  69.78         Labs/Tests/Procedures/Meds     Row Name 11/12/18 1307          Labs/Procedures/Meds    Lab Results Reviewed  reviewed, pertinent  (Pended)      Lab Results Comments  Na+135, Ammonia 13  (Pended)         Diagnostic Tests/Procedures    Diagnostic Test/Procedure Reviewed  reviewed  (Pended)         Medications    Pertinent Medications Reviewed  reviewed  (Pended)          Physical Findings     Row Name 11/12/18 1308          Physical Findings    Overall Physical Appearance  amputee  (Pended)      Gastrointestinal  abdominal distension;ascites  (Pended)      Skin  pressure injury  (Pended)          Estimated/Assessed " "Needs     Row Name 11/12/18 0600          Calculation Measurements    Height  182.9 cm (72.01\")         Nutrition Prescription Ordered     Row Name 11/12/18 1309          Nutrition Prescription PO    Current PO Diet  Regular  (Pended)      Common Modifiers  Low Sodium  (Pended)      Low Sodium Details  2,000 mg Sodium  (Pended)          Evaluation of Received Nutrient/Fluid Intake     Row Name 11/12/18 0600          Calculation Measurements    Height  182.9 cm (72.01\")         Evaluation of Prescribed Nutrient/Fluid Intake     Row Name 11/12/18 0600          Calculation Measurements    Height  182.9 cm (72.01\")             Electronically signed by:  Aida Aviles  11/12/18 1:11 PM  "

## 2018-11-12 NOTE — PLAN OF CARE
Problem: Patient Care Overview  Goal: Plan of Care Review   11/12/18 0556   Coping/Psychosocial   Plan of Care Reviewed With patient   Plan of Care Review   Progress improving   OTHER   Outcome Summary VSS throughout shift, increased urinary output with lasix gtt changes, Pt A/O x4. pt pain controlled with pain administration.

## 2018-11-13 NOTE — PROGRESS NOTES
"Pharmacokinetics by Pharmacy - Vancomycin    Armando Mcmullen . is a 53 y.o. male receiving vancomycin 1250mg IV Q 24 hours day 3 for perinephric abscess (E. Faecium)     Objective:  [Ht: 182.9 cm (72.01\"); Wt: 96.1 kg (211 lb 12.8 oz)]     Lab Results   Component Value Date    WBC 7.71 11/13/2018    WBC 6.52 11/12/2018    WBC 5.51 11/11/2018      Lab Results   Component Value Date    CRP 6.10 (H) 08/13/2018    CRP 7.10 (H) 08/12/2018    CRP 8.00 (H) 08/11/2018    LACTATE 2.4 (C) 10/31/2018    LACTATE 2.2 (C) 10/29/2018    LACTATE 2.7 (C) 10/29/2018      Temp Readings from Last 1 Encounters:   11/13/18 98.2 °F (36.8 °C) (Oral)     Estimated Creatinine Clearance: 101.5 mL/min (by C-G formula based on SCr of 1 mg/dL).   Lab Results   Component Value Date    CREATININE 1.00 11/13/2018    CREATININE 1.14 11/12/2018    CREATININE 1.20 11/11/2018       Lab Results   Component Value Date    VANCOPEAK 37.73 11/12/2018    VANCOPEAK 44.25 (H) 07/02/2017    VANCOTROUGH 27.10 (H) 11/12/2018    VANCOTROUGH 27.26 (H) 07/02/2017    VANCORANDOM 18.37 06/18/2017    VANCORANDOM 14.65 03/03/2017       Culture Results:  Microbiology Results (last 10 days)       Procedure Component Value - Date/Time    Body Fluid Culture - Aspirate, Kidney, Left [[id number]]  (Abnormal)  (Susceptibility) Collected:  11/09/18 1708    Lab Status:  Preliminary result Specimen:  Aspirate from Kidney, Left Updated:  11/11/18 1611     BF Culture Heavy growth (4+) Enterococcus faecium     Gram Stain Many (4+) WBCs seen      Many (4+) Gram positive cocci      Rare (1+) Yeast    Narrative:       Slide reviewed confirmed    Susceptibility        Enterococcus faecium     NERI     Ampicillin Resistant     Gentamicin High Level Synergy Susceptible     Vancomycin Susceptible                      Anaerobic Culture - Aspirate, Kidney [[id number]] Collected:  11/09/18 1708    Lab Status:  Final result Specimen:  Aspirate from Kidney Updated:  11/11/18 1703     Culture No " anaerobes isolated at 2 days    AFB Culture - Aspirate, Kidney, Left [[id number]] Collected:  11/09/18 1708    Lab Status:  Preliminary result Specimen:  Aspirate from Kidney, Left Updated:  11/10/18 1318     AFB Stain No acid fast bacilli seen on concentrated smear    KELSIE Prep - Swab, [[id number]]  (Normal) Collected:  11/09/18 1708    Lab Status:  Final result Specimen:  Aspirate from Kidney, Left Updated:  11/09/18 1838     KOH Prep No yeast or hyphal elements seen                 Assessment:  Renal function remains stable (SCr stable, urine output stable).  Afebrile, no leukocytosis.   Patient is not clearing vancomycin as fast as CrCl would suggest.  Will change vancomycin dosing to vancomycin 1500 mg every 24 hours.  This should provide a vancomycin peak of 28.71, vancomycin trough 12.41 and .17.  AUC goal 400-600.  Will collect a trough prior to the dose on 11/15.    Plan:  1. Change to vancomycin 1500mg IV Q 24 hours  2. Vancomycin trough 11/15   3. Pharmacy will monitor renal function and adjust dose accordingly.      Rudolph Younger III, [facility]   11/13/18 9:16 AM

## 2018-11-13 NOTE — PLAN OF CARE
Problem: Patient Care Overview  Goal: Plan of Care Review   11/13/18 0446   Coping/Psychosocial   Plan of Care Reviewed With patient   Plan of Care Review   Progress improving   OTHER   Outcome Summary diuresing well with lasix drip. vss. pain control with ordered pain medication. sr on cardiac monitor

## 2018-11-13 NOTE — PROGRESS NOTES
[facility]  INPATIENT PROGRESS NOTE    Length of Stay: 15  Date of Admission: 10/29/2018  Primary Care Physician: Reddy Grant MD    Subjective   Chief Complaint: AMS  HPI:  This is a 53 year old  male with PMH of chronic hepatitis C complicated by cirrhosis of the liver that presented to [facility] on 10/29/2018 secondary to altered mental status and was found to have an elevated ammonia level of 129.  Lactulose was given and his levels returned to normal in a couple of days. Urology was consulted due to urine retention.   CT of the abdomen and pelvis indicated a possible abscess of the left kidney.  Successful CT-guided aspiration of the fluid was done by interventional radiologist on 11/09/2018.  Hematology is following due to thrombocytopenia and elevated INR. He was started on FFP but reacted and it was discontinued    Today patient is doing much better.  Denies any symptoms    Review of Systems   Respiratory: Negative for shortness of breath.    Cardiovascular: Negative for chest pain.   Gastrointestinal: Negative for abdominal pain.        All pertinent negatives and positives are as above. All other systems have been reviewed and are negative unless otherwise stated.     Objective    Temp:  [97.2 °F (36.2 °C)-98.2 °F (36.8 °C)] 97.2 °F (36.2 °C)  Heart Rate:  [85-97] 87  Resp:  [16-18] 16  BP: (125-161)/(75-96) 144/96    Physical Exam   Constitutional: He appears well-developed.   HENT:   Head: Normocephalic and atraumatic.   Eyes: Pupils are equal, round, and reactive to light.   Neck: Normal range of motion.   Cardiovascular: Normal rate.   Pulmonary/Chest: He has decreased breath sounds. He has no wheezes. He has rales.   Abdominal: Soft. There is no tenderness.   Musculoskeletal: He exhibits edema.   Edema in the flanks   Neurological: He is alert.   Skin: Skin is warm and dry.   Psychiatric: He has a normal mood and affect.            Results Review:  I have reviewed the labs, radiology results, and diagnostic studies.    Laboratory Data:   Results from last 7 days   Lab Units  11/13/18   0634  11/12/18   0616  11/11/18   0603   SODIUM mmol/L  135*  135*  135*   POTASSIUM mmol/L  3.4*  3.5  3.3*   CHLORIDE mmol/L  93*  96  95   CO2 mmol/L  32.0*  31.0  30.0   BUN mg/dL  21  21  22*   CREATININE mg/dL  1.00  1.14  1.20   GLUCOSE mg/dL  77  78  72   CALCIUM mg/dL  9.1  8.9  9.1   ANION GAP mmol/L  10.0  8.0  10.0     Estimated Creatinine Clearance: 101.5 mL/min (by C-G formula based on SCr of 1 mg/dL).  Results from last 7 days   Lab Units  11/08/18   0607  11/07/18   1113   MAGNESIUM mg/dL  2.3  1.6         Results from last 7 days   Lab Units  11/13/18   0634  11/12/18   0616  11/11/18   0603  11/10/18   0405  11/09/18   0526   WBC 10*3/mm3  7.71  6.52  5.51  8.84  9.11   HEMOGLOBIN g/dL  9.2*  8.7*  8.0*  6.9*  7.9*   HEMATOCRIT %  26.1*  24.7*  22.4*  19.7*  20.0*  22.3*   PLATELETS 10*3/mm3  42*  43*  50*  57*  49*     Results from last 7 days   Lab Units  11/13/18   0634  11/12/18   0616  11/11/18   0603  11/10/18   0405  11/09/18   0838   INR   3.00*  2.97*  2.67*  2.32*  2.67*       Culture Data:   No results found for: BLOODCX  No results found for: URINECX  No results found for: RESPCX  No results found for: WOUNDCX  No results found for: STOOLCX  No components found for: BODYFLD    Radiology Data:   Imaging Results (last 24 hours)     ** No results found for the last 24 hours. **          I have reviewed the patient's current medications.     Assessment/Plan     Active Hospital Problems    Diagnosis   • Altered mental status   • CKD (chronic kidney disease) stage 3, GFR 30-59 ml/min (CMS/HCC)   • Hepatic encephalopathy (CMS/HCC)   • Thrombocytopenia (CMS/HCC)   • Chronic hepatitis (CMS/HCC)       Plan:    #1 hepatic encephalopathy: resolved.  Ammonia level normal.  Continue lactulose, rifaximin  #2 decompensated cirrhosis:  Improved.  Continue symptomatic treatment with lactulose.  Change Lasix drip to IV lasix.  Start patient back on spironolactone.  Weight seems to be close to baseline.  Continue daily weights, sodium and water restriction.  GI was following  #3 perinephric abscess/fluid collection: New.  Change vancomycin to Zyvox today per sensitivities.  Urology recommended 14 days of antibiotics  #4 thrombocytopenia: Secondary to cirrhosis.  Hematology was following the patient.  Monitor    Signed     Dr Anderson Justice,    11/13/2018  2:30 PM

## 2018-11-13 NOTE — PLAN OF CARE
Problem: Patient Care Overview  Goal: Plan of Care Review  Outcome: Ongoing (interventions implemented as appropriate)   11/13/18 0442   Coping/Psychosocial   Plan of Care Reviewed With patient   Plan of Care Review   Progress improving   OTHER   Outcome Summary transferred to [facility] today

## 2018-11-14 NOTE — CONSULTS
Adult Nutrition  Assessment    Patient Name:  Armando Mcmullen Sr.  YOB: 1965  MRN: [id number]  Admit Date:  10/29/2018    Assessment Date:  11/14/2018    Comments:  Patient has an okay appetite today. Patient c/o abdominal pain due to ascites. Patient had a decreased intake of foods with an intake of 75% of meals. Patient currently on a regular low-sodium diet. Ammonia levels continuing to stay low at 15. RDN staff will continue to monitor.     Reason for Assessment     Row Name 11/14/18 1646          Reason for Assessment    Reason For Assessment  follow-up protocol  (Pended)          Nutrition/Diet History     Row Name 11/14/18 1646          Nutrition/Diet History    Typical Food/Fluid Intake  Patient has an okay appetite today. Patient c/o abdominal pain due to ascites. Patient had a decreased intake of foods. Patient had an intake of 75% of meals.   (Pended)      Factors Affecting Nutritional Intake  abdominal distension;abdominal pain  (Pended)            Labs/Tests/Procedures/Meds     Row Name 11/14/18 1650          Labs/Procedures/Meds    Lab Results Reviewed  reviewed, pertinent  (Pended)      Lab Results Comments  Na+-132, Ammonia-15  (Pended)         Diagnostic Tests/Procedures    Diagnostic Test/Procedure Reviewed  reviewed  (Pended)         Medications    Pertinent Medications Reviewed  reviewed  (Pended)          Physical Findings     Row Name 11/14/18 1650          Physical Findings    Overall Physical Appearance  amputee  (Pended)      Gastrointestinal  abdominal distension;ascites  (Pended)      Skin  pressure injury  (Pended)            Nutrition Prescription Ordered     Row Name 11/14/18 1651          Nutrition Prescription PO    Current PO Diet  Regular  (Pended)      Common Modifiers  Low Sodium  (Pended)      Low Sodium Details  2,000 mg Sodium  (Pended)          Evaluation of Received Nutrient/Fluid Intake     Row Name 11/14/18 1652          PO Evaluation    Number of Days PO  Intake Evaluated  1 day  (Pended)      Number of Meals  3  (Pended)      % PO Intake  75%  (Pended)                Electronically signed by:  Aida Aviles  11/14/18 4:52 PM

## 2018-11-14 NOTE — PLAN OF CARE
Problem: Patient Care Overview  Goal: Plan of Care Review  Outcome: Ongoing (interventions implemented as appropriate)   11/14/18 9473   Coping/Psychosocial   Plan of Care Reviewed With patient   Plan of Care Review   Progress no change   OTHER   Outcome Summary client has not medically changed since previous day. Midline replaced today     Goal: Individualization and Mutuality  Outcome: Ongoing (interventions implemented as appropriate)    Goal: Discharge Needs Assessment  Outcome: Ongoing (interventions implemented as appropriate)    Goal: Interprofessional Rounds/Family Conf  Outcome: Ongoing (interventions implemented as appropriate)      Problem: Fall Risk (Adult)  Goal: Absence of Fall  Outcome: Ongoing (interventions implemented as appropriate)      Problem: Anemia (Adult)  Goal: Symptom Improvement  Outcome: Ongoing (interventions implemented as appropriate)      Problem: Liver Failure, Acute/Chronic (Adult)  Goal: Signs and Symptoms of Listed Potential Problems Will be Absent, Minimized or Managed (Liver Failure, Acute/Chronic)  Outcome: Ongoing (interventions implemented as appropriate)

## 2018-11-14 NOTE — PROGRESS NOTES
[facility] Medicine Services  INPATIENT PROGRESS NOTE    Length of Stay: 16  Date of Admission: 10/29/2018  Primary Care Physician: Reddy Grant MD    Subjective   Chief Complaint:  Altered mental status  HPI:  Patient states that he is having back and shoulder pain.  Musculoskeletal in nature.  He is otherwise without complaint.    Review of Systems   Constitutional: Negative for appetite change, chills, fatigue and fever.   Respiratory: Negative for chest tightness and shortness of breath.    Cardiovascular: Negative for chest pain, palpitations and leg swelling.   Gastrointestinal: Positive for abdominal distention. Negative for abdominal pain, constipation, diarrhea, nausea and vomiting.   Musculoskeletal: Positive for back pain.        Right shoulder pain   Skin: Negative for wound.   Neurological: Negative for dizziness, weakness, light-headedness, numbness and headaches.        All pertinent negatives and positives are as above. All other systems have been reviewed and are negative unless otherwise stated.     Objective    Temp:  [97.5 °F (36.4 °C)-98.5 °F (36.9 °C)] 98 °F (36.7 °C)  Heart Rate:  [73-91] 77  Resp:  [16-20] 18  BP: (124-157)/(71-94) 128/71    Physical Exam   Constitutional: He appears well-developed and well-nourished.   HENT:   Head: Normocephalic and atraumatic.   Eyes: EOM are normal. Pupils are equal, round, and reactive to light.   Neck: Normal range of motion. Neck supple.   Cardiovascular: Normal rate, regular rhythm, normal heart sounds and intact distal pulses. Exam reveals no gallop and no friction rub.   No murmur heard.  Pulmonary/Chest: Effort normal. No respiratory distress. He has decreased breath sounds. He has no wheezes. He has no rales. He exhibits no tenderness.   Abdominal: Soft. Bowel sounds are normal. He exhibits distension. There is no tenderness.   Musculoskeletal: He exhibits edema.   Psychiatric: He has a normal mood and  affect. His behavior is normal.   Vitals reviewed.          Results Review:  I have reviewed the labs, radiology results, and diagnostic studies.    Laboratory Data:   Results from last 7 days   Lab Units  11/14/18   0614  11/13/18   0634  11/12/18   0616   SODIUM mmol/L  132*  135*  135*   POTASSIUM mmol/L  3.9  3.4*  3.5   CHLORIDE mmol/L  90*  93*  96   CO2 mmol/L  31.0  32.0*  31.0   BUN mg/dL  21  21  21   CREATININE mg/dL  1.13  1.00  1.14   GLUCOSE mg/dL  78  77  78   CALCIUM mg/dL  8.9  9.1  8.9   ANION GAP mmol/L  11.0  10.0  8.0     Estimated Creatinine Clearance: 89.6 mL/min (by C-G formula based on SCr of 1.13 mg/dL).  Results from last 7 days   Lab Units  11/08/18   0607   MAGNESIUM mg/dL  2.3         Results from last 7 days   Lab Units  11/14/18   0614  11/13/18   0634  11/12/18   0616  11/11/18   0603  11/10/18   0405   WBC 10*3/mm3  10.08*  7.71  6.52  5.51  8.84   HEMOGLOBIN g/dL  8.9*  9.2*  8.7*  8.0*  6.9*   HEMATOCRIT %  26.0*  26.1*  24.7*  22.4*  19.7*  20.0*   PLATELETS 10*3/mm3  40*  42*  43*  50*  57*     Results from last 7 days   Lab Units  11/14/18   0741  11/13/18   0634  11/12/18   0616  11/11/18   0603  11/10/18   0405   INR   3.14*  3.00*  2.97*  2.67*  2.32*       Culture Data:   No results found for: BLOODCX  No results found for: URINECX  No results found for: RESPCX  No results found for: WOUNDCX  No results found for: STOOLCX  No components found for: BODYFLD    Radiology Data:   Imaging Results (last 24 hours)     Procedure Component Value Units Date/Time    US Guided Vascular Access [[id number]] Resulted:  11/14/18 1032     Updated:  11/14/18 1032    Narrative:       This procedure was auto-finalized with no dictation required.    IR Insert Midline Without Port Pump 5 Plus [[id number]] Resulted:  11/14/18 0944     Updated:  11/14/18 0944    Narrative:       This procedure was auto-finalized with no dictation required.          I have reviewed the patient's current medications.      Assessment/Plan     Active Hospital Problems    Diagnosis   • Altered mental status   • CKD (chronic kidney disease) stage 3, GFR 30-59 ml/min (CMS/HCC)   • Hepatic encephalopathy (CMS/HCC)   • Thrombocytopenia (CMS/HCC)   • Chronic hepatitis (CMS/HCC)       Plan:    1.  Hepatic Encephalopathy:  Resolved.    2.  Left perinephric abscess:  Drained 11/9.  Culture showed heavy growth of Enterococcus faecium and Candida albicans.  On oral zyvox per urology recommendations.  Will add diflucan.  Urology following.  3.  Cirrhosis:  Decompensated.  Has esophageal varices, and a nonfunctioning TIPS shunt.  Continue current treatment.  4.  Anasarca:  Improving.  Transition from IV lasix to oral lasix.    5.  Thrombocytopenia:  Chronic.  Due to liver failure.              Discharge Planning: I expect patient to be discharged to home in 4-5 days.        This document has been electronically signed by Ed Franco MD on November 14, 2018 5:02 PM

## 2018-11-14 NOTE — PROGRESS NOTES
TWO PATIENT IDENTIFIERS WERE USED. THE PATIENT WAS DRAPED WITH A FULL BODY DRAPE AND THE PATIENT'S LEFT ARM WAS PREPPED WITH CHLORA PREP. ULTRASOUND WAS USED TO LOCALIZE THELEFT BASILIC VEIN. SUBCUTANEOUS TISSUE AT THE CATHETER SITE WAS INFILTRATED WITH 2% LIDOCAINE. UNDER ULTRASOUND GUIDANCE, THE VEIN WAS ACCESSED WITH A 21 GAUGE  NEEDLE. AN 0.018 WIRE WAS THEN THREADED THROUGH THE NEEDLE. THE 21 GAUGE NEEDLE WAS REMOVED AND A 4 [place] SHEATH WAS PLACED OVER THE WIRE INTO THE VEIN.THE MIDLINE CATHETER WAS TRIMMED TO 16CM. THE MIDLINE CATHETER WAS THEN PLACED OVER THE WIRE INTO THE VEIN, THE SHEATH WAS PEELED AWAY, WIRE WAS REMOVED. CATHETER WAS FLUSHED WITH NORMAL SALINE AND CATHETER TIP APPLIED. BIOPATCH PLACED. CATHETER SECURED WITH STAT LOCK AND TEGADERM. PATIENT TOLERATED PROCEDURE WELL. THIS WAS DONE IN THE ANGIOSUITE      IMPRESSION:SUCCESSFUL PLACEMENT OF DUAL LUMEN MIDLINE.           Ev Alas RN  11/14/2018  9:43 AM

## 2018-11-14 NOTE — PROGRESS NOTES
"   LOS: 15 days   Patient Care Team:  Reddy Grant MD as PCP - General (Family Medicine)  Shawn Cortez MD (Inactive) as Surgeon (Orthopedic Surgery)  Tushar Becerril DO as Consulting Physician (Gastroenterology)  Josep Colón MD as Consulting Physician (Nephrology)  Ortiz Ferreira MD as Consulting Physician (Hematology and Oncology)    Subjective     Subjective:  Symptoms:  Stable.  (No left flank pain. Urine clear and yellow. No changes from  standpoint. ).    Diet:  Adequate intake.  No nausea or vomiting.    Pain:  He reports pain is improving.        History taken from: patient chart RN    Objective     Vital Signs  Temp:  [97.2 °F (36.2 °C)-98.2 °F (36.8 °C)] 97.8 °F (36.6 °C)  Heart Rate:  [84-97] 90  Resp:  [16-18] 18  BP: (135-161)/(75-96) 138/85    Objective:  General Appearance:  In no acute distress.    Vital signs: (most recent): Blood pressure 138/85, pulse 90, temperature 97.8 °F (36.6 °C), resp. rate 18, height 182.9 cm (72.01\"), weight 96.1 kg (211 lb 12.8 oz), SpO2 92 %.  Vital signs are normal.  No fever.    Output: Producing urine (Urine clear yellow) and producing stool.    Lungs:  Normal effort and normal respiratory rate.  Breath sounds clear to auscultation.  He is not in respiratory distress.  No stridor.  No decreased breath sounds.    Heart: Normal rate.  Regular rhythm.  S1 normal and S2 normal.  No murmur, gallop or friction rub.   Chest: Symmetric chest wall expansion.   Abdomen: Abdomen is soft and non-distended.  Bowel sounds are normal.   There is generalized tenderness.     Extremities: There is no dependent edema.    Neurological: Patient is alert and oriented to person, place and time.  GCS score is 15.    Pupils:  Pupils are equal, round, and reactive to light.    Skin:  Warm and dry.  No rash or cyanosis.           Results Review:    Lab Results (last 24 hours)     Procedure Component Value Units Date/Time    Basic Metabolic Panel [[id number]]  (Abnormal) Collected:  " 11/13/18 0634    Specimen:  Blood Updated:  11/13/18 0700     Glucose 77 mg/dL      BUN 21 mg/dL      Creatinine 1.00 mg/dL      Sodium 135 mmol/L      Potassium 3.4 mmol/L      Chloride 93 mmol/L      CO2 32.0 mmol/L      Calcium 9.1 mg/dL      eGFR Non African Amer 78 mL/min/1.73      BUN/Creatinine Ratio 21.0     Anion Gap 10.0 mmol/L     Ammonia [[id number]]  (Normal) Collected:  11/13/18 0634    Specimen:  Blood Updated:  11/13/18 0659     Ammonia 13 umol/L     Protime-INR [[id number]]  (Abnormal) Collected:  11/13/18 0634    Specimen:  Blood Updated:  11/13/18 0657     Protime 29.7 Seconds      INR 3.00    Narrative:       Therapeutic range for most indications is 2.0-3.0 INR,  or 2.5-3.5 for mechanical heart valves.    CBC & Differential [[id number]] Collected:  11/13/18 0634    Specimen:  Blood Updated:  11/13/18 0650    Narrative:       The following orders were created for panel order CBC & Differential.  Procedure                               Abnormality         Status                     ---------                               -----------         ------                     Scan Slide[[id number]]                                                                  CBC Auto Differential[[id number]]        Abnormal            Final result                 Please view results for these tests on the individual orders.    CBC Auto Differential [[id number]]  (Abnormal) Collected:  11/13/18 0634    Specimen:  Blood Updated:  11/13/18 0650     WBC 7.71 10*3/mm3      RBC 2.56 10*6/mm3      Hemoglobin 9.2 g/dL      Hematocrit 26.1 %      .0 fL      MCH 35.9 pg      MCHC 35.2 g/dL      RDW 15.7 %      RDW-SD 56.4 fl      MPV 9.9 fL      Platelets 42 10*3/mm3      Neutrophil % 64.5 %      Lymphocyte % 16.9 %      Monocyte % 11.0 %      Eosinophil % 6.6 %      Basophil % 0.5 %      Immature Grans % 0.5 %      Neutrophils, Absolute 4.97 10*3/mm3      Lymphocytes, Absolute 1.30 10*3/mm3      Monocytes, Absolute 0.85  10*3/mm3      Eosinophils, Absolute 0.51 10*3/mm3      Basophils, Absolute 0.04 10*3/mm3      Immature Grans, Absolute 0.04 10*3/mm3     Vancomycin, Trough [[id number]]  (Abnormal) Collected:  11/12/18 2139    Specimen:  Blood Updated:  11/12/18 2217     Vancomycin Trough 27.10 mcg/mL          Imaging Results (last 24 hours)     Procedure Component Value Units Date/Time    CT Abdomen Pelvis With Contrast [[id number]] Collected:  11/05/18 1815     Updated:  11/05/18 1857    Narrative:         EXAMINATION:  Computed Tomography           REGION:    Abdomen / Pelvis                     INDICATION:   Repeat to evaluate hematoma/abscess of kidney per  urology, R41.82 Altered mental status, unspecified K72.90 Hepatic  failure, unspecified without coma Z74.09 Other reduced mobility  Z74.09 Other reduced mobility    HISTORY:  LISA. IMAGING:    CT A/P 11/2/18            TECHNIQUE:      - reconstructions:    axial, coronal, sagittal         - contrast:      oral:  No ;   intravenous: Isovue 300, 100 mL  This exam was performed according to the departmental  dose-optimization program which includes automated exposure  control, adjustment of the mA and/or kV according to patient size  and/or use of iterative reconstruction technique.           COMMENTS:            - - - CT ABDOMEN - - -          THORAX (INFERIOR):      - LUNG BASES:  clear      - PLEURA:    no fluid or mass      - HEART:    normal size, no pericardial fluid     - MISC:      n/a          ABDOMEN:     - LIVER:    Diminished size with nodular contour containing a  tips shunt   - GB:      Cholelithiasis without evidence of wall thickening.    - CBD:      grossly negative   - SPLEEN:    Upper normal/mildly enlarged   - PANCREAS:    normal in size, contour, no focal mass    - VISCERA:    normal caliber, no wall thickening     - MESENTERY:  no mesenteric mass   - CAVITY:    Small amount of free fluid   - BODY WALL:  wnl   - OSSEOUS:    grossly negative for age   - MISC:                  Extensive gastric varices                      RETROPERITONEUM:   - KIDNEYS:    Renal examination again displays overall normal  size and symmetric nephrograms. Again noted is a left pararenal  enhancing focal fluid collection containing air which is  unchanged in size measuring 2.5 x 3.6 x 2.0 cm   - URETERS:    normal course, caliber   - ADRENALS:    normal size, contour   - MISC:      no sig retroperitoneal adenopathy or mass   - VASCULAR:    aorta / iliacs: wnl for age     - - - CT PELVIS - - -      - VISCERA:    normal caliber small/large bowel, no focal  thickening/mass        - APPENDIX:          wnl   - MESENTERY:  no mass   - VASCULAR:    wnl for age   - CAVITY:    Moderate amount of free fluid   - BLADDER:    unremarkable   - OSSEOUS:    Status post left hip repair    - MISC:     .       Impression:        CONCLUSION:  1. Unchanged left pararenal focal fluid collection with  peripheral enhancement containing air, likely representing an  abscess.  2. Multiple additional findings demonstrating the presence of  cirrhosis status post TIPS shunt, with spleen size at the upper  limits of normal/mildly enlarged, and extensive gastric varices,  as above, unchanged.        Electronically signed by:  PRISCILLA Bustillo MD  11/5/2018 6:56  PM CST Workstation: [id number]           I reviewed the patient's new clinical results.  I reviewed the patient's new imaging results and agree with the interpretation.  I reviewed the patient's other test results and agree with the interpretation      Assessment/Plan       Chronic hepatitis (CMS/HCC)    Thrombocytopenia (CMS/HCC)    Hepatic encephalopathy (CMS/HCC)    CKD (chronic kidney disease) stage 3, GFR 30-59 ml/min (CMS/HCC)    Altered mental status      Assessment & Plan    1. Difficulty urinating with hematuria, likely related to UTI hemorrhagic cystitis without clots-->hematuria resolved  2. Perinephric abscess  -CT guided drainage of left perinephric abscess  11/9/18-->Enterococcus faecium  -Estimated Creatinine Clearance: 101.5 mL/min (by C-G formula based on SCr of 1 mg/dL).  -Cr 0.89-->1.15-->1.20-->1.14-->1.00  -WBC 9.11-->8.84-->5.51-->6.52-->7.71  -UA + 10/30/18  -Hgb/Hct 7.9/22.3-->6.9/19.7-->8.0/22.4-->8.7/24.7-->9.2/26.1, stable  -Platelets 49,000-->57,000-->50,000-->43,000-->42,000, stable  -INR 2.51-->2.32-->2.67-->2.97-->3.00  -Renal ultrasound 10/30/18 limited exam, especially of the left kidney, due to the presence of bowel gas and the patient's body habitus. No hydronephrosis is visualized. Unremarkable bladder.   -11/2/18 CT abdomen/pelvis LEFT 2.3 x 3.7 x 2.2 cm fluid and air collection infected subcapsular hematoma versus abscess.  -11/5/18 CT abdomen/pelvis showed LEFT renal abscess unchanged  -10/30/18 Urine culture NEGATIVE, UA grossly positive.   -On vancomycin at present    Plan:    Consider switching to oral Zyvox, 14 days of therapy, follow up Urology 2 weeks renal ultrasound, CBC 1 week.     Deondre Virk, KALEB  11/13/18  6:52 PM
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
## 2018-11-15 NOTE — PROGRESS NOTES
[facility] Medicine Services  INPATIENT PROGRESS NOTE    Length of Stay: 17  Date of Admission: 10/29/2018  Primary Care Physician: Reddy Grant MD    Subjective   Chief Complaint:  Altered mental status  HPI:  Patient continues to have some back and shoulder pain.  Otherwise ok.  Review of Systems   Constitutional: Negative for appetite change, chills, fatigue and fever.   Respiratory: Negative for chest tightness and shortness of breath.    Cardiovascular: Negative for chest pain, palpitations and leg swelling.   Gastrointestinal: Positive for abdominal distention. Negative for abdominal pain, constipation, diarrhea, nausea and vomiting.   Musculoskeletal: Positive for back pain.        Right shoulder pain   Skin: Negative for wound.   Neurological: Negative for dizziness, weakness, light-headedness, numbness and headaches.        All pertinent negatives and positives are as above. All other systems have been reviewed and are negative unless otherwise stated.     Objective    Temp:  [97.7 °F (36.5 °C)-98 °F (36.7 °C)] 97.7 °F (36.5 °C)  Heart Rate:  [75-83] 76  Resp:  [18] 18  BP: (128-160)/(71-91) 160/80    Physical Exam   Constitutional: He appears well-developed and well-nourished.   HENT:   Head: Normocephalic and atraumatic.   Eyes: EOM are normal. Pupils are equal, round, and reactive to light.   Neck: Normal range of motion. Neck supple.   Cardiovascular: Normal rate, regular rhythm, normal heart sounds and intact distal pulses. Exam reveals no gallop and no friction rub.   No murmur heard.  Pulmonary/Chest: Effort normal. No respiratory distress. He has decreased breath sounds. He has no wheezes. He has no rales. He exhibits no tenderness.   Abdominal: Soft. Bowel sounds are normal. He exhibits distension. There is no tenderness.   Musculoskeletal: He exhibits edema.   Psychiatric: He has a normal mood and affect. His behavior is normal.   Vitals  reviewed.          Results Review:  I have reviewed the labs, radiology results, and diagnostic studies.    Laboratory Data:   Results from last 7 days   Lab Units  11/15/18   0544 11/14/18   0614  11/13/18   0634   SODIUM mmol/L  130*  132*  135*   POTASSIUM mmol/L  3.8  3.9  3.4*   CHLORIDE mmol/L  92*  90*  93*   CO2 mmol/L  28.0  31.0  32.0*   BUN mg/dL  21  21  21   CREATININE mg/dL  1.14  1.13  1.00   GLUCOSE mg/dL  78  78  77   CALCIUM mg/dL  8.7  8.9  9.1   ANION GAP mmol/L  10.0  11.0  10.0     Estimated Creatinine Clearance: 97.4 mL/min (by C-G formula based on SCr of 1.14 mg/dL).          Results from last 7 days   Lab Units  11/15/18   0544  11/14/18   0614  11/13/18   0634  11/12/18   0616  11/11/18   0603   WBC 10*3/mm3  7.26  10.08*  7.71  6.52  5.51   HEMOGLOBIN g/dL  8.6*  8.9*  9.2*  8.7*  8.0*   HEMATOCRIT %  24.9*  26.0*  26.1*  24.7*  22.4*   PLATELETS 10*3/mm3  29*  40*  42*  43*  50*     Results from last 7 days   Lab Units  11/15/18   0544  11/14/18   0741  11/13/18   0634  11/12/18   0616  11/11/18   0603   INR   2.57*  3.14*  3.00*  2.97*  2.67*       Culture Data:   No results found for: BLOODCX  No results found for: URINECX  No results found for: RESPCX  No results found for: WOUNDCX  No results found for: STOOLCX  No components found for: BODYFLD    Radiology Data:   Imaging Results (last 24 hours)     ** No results found for the last 24 hours. **          I have reviewed the patient's current medications.     Assessment/Plan     Active Hospital Problems    Diagnosis   • Altered mental status   • CKD (chronic kidney disease) stage 3, GFR 30-59 ml/min (CMS/HCC)   • Hepatic encephalopathy (CMS/HCC)   • Thrombocytopenia (CMS/HCC)   • Chronic hepatitis (CMS/HCC)       Plan:    1.  Hepatic Encephalopathy:  Resolved.    2.  Left perinephric abscess:  Drained 11/9.  Culture showed heavy growth of Enterococcus faecium and Candida albicans.  Zyvox changed to vancomycin due to thrombocytopenia.   Will add diflucan.  Urology following.  3.  Cirrhosis:  Decompensated.  Has esophageal varices, and a nonfunctioning TIPS shunt.  Continue current treatment.  4.  Anasarca:  Improving.  Transition from IV lasix to oral lasix.    5.  Thrombocytopenia:  Chronic.  Due to liver failure.              Discharge Planning: I expect patient to be discharged to home vs LTACH in 4-5 days.        This document has been electronically signed by Ed Franco MD on November 15, 2018 12:42 PM

## 2018-11-15 NOTE — PROGRESS NOTES
"Pharmacokinetics by Pharmacy - Vancomycin Initial Consult    Armando Mcmullen Sr. is a 53 y.o. male being initiated on vancomycin for perinephric abscess caused by E. Faecium . Patient is also receiving rifaximin and fluconazole.      Objective:     [Ht: 182.9 cm (72.01\"); Wt: 93 kg (205 lb)]     Lab Results   Component Value Date    WBC 7.26 11/15/2018    WBC 10.08 (H) 11/14/2018    WBC 7.71 11/13/2018      Lab Results   Component Value Date    CRP 6.10 (H) 08/13/2018    CRP 7.10 (H) 08/12/2018    CRP 8.00 (H) 08/11/2018    LACTATE 2.4 (C) 10/31/2018    LACTATE 2.2 (C) 10/29/2018    LACTATE 2.7 (C) 10/29/2018      Temp Readings from Last 1 Encounters:   11/15/18 97.7 °F (36.5 °C) (Oral)     Estimated Creatinine Clearance: 97.4 mL/min (by C-G formula based on SCr of 1.14 mg/dL).   Lab Results   Component Value Date    CREATININE 1.14 11/15/2018    CREATININE 1.13 11/14/2018    CREATININE 1.00 11/13/2018       Baseline culture results:  Microbiology Results (last 10 days)       Procedure Component Value - Date/Time    Body Fluid Culture - Aspirate, Kidney, Left [[id number]]  (Abnormal)  (Susceptibility) Collected:  11/09/18 1708    Lab Status:  Final result Specimen:  Aspirate from Kidney, Left Updated:  11/14/18 1149     BF Culture Heavy growth (4+) Enterococcus faecium      Candida albicans     Gram Stain Many (4+) WBCs seen      Many (4+) Gram positive cocci      Rare (1+) Yeast    Narrative:       Slide reviewed confirmed    Susceptibility        Enterococcus faecium     NERI     Ampicillin Resistant     Gentamicin High Level Synergy Susceptible     Vancomycin Susceptible                      Anaerobic Culture - Aspirate, Kidney [[id number]] Collected:  11/09/18 1708    Lab Status:  Final result Specimen:  Aspirate from Kidney Updated:  11/11/18 1701     Culture No anaerobes isolated at 2 days    Fungus Culture - Aspirate, Kidney, Left [[id number]] Collected:  11/09/18 1708    Lab Status:  Preliminary result Specimen:  " Aspirate from Kidney, Left Updated:  11/14/18 1745     Fungus Culture No fungus isolated at less than 1 week    AFB Culture - Aspirate, Kidney, Left [[id number]] Collected:  11/09/18 1708    Lab Status:  Preliminary result Specimen:  Aspirate from Kidney, Left Updated:  11/14/18 1745     AFB Culture No AFB isolated at less than 1 week     AFB Stain No acid fast bacilli seen on concentrated smear    KELSIE Prep - Swab, [[id number]]  (Normal) Collected:  11/09/18 1708    Lab Status:  Final result Specimen:  Aspirate from Kidney, Left Updated:  11/09/18 1838     KOH Prep No yeast or hyphal elements seen               Assessment  WBC is 7.26 - improved  SCr  1.14 - stable    11/9 body fluid culture (kidney aspirate) - e. Faecium and candida albicans    Patient was on zyvox but experienced drop in platelets - restarting vancomycin therapy in its place    Restarting previous vancomycin dose  Patient previously did not clear vancomycin as quickly as CrCl would suggest.      Plan  1. Vancomycin 1500 mg Q24  2. Peak ordered for 11/17 at 1430. Trough ordered for 11/18 at 1130.  3. Pharmacy will monitor renal function and adjust dose accordingly.    Abbie Carrington RPH  11/15/18 11:12 AM

## 2018-11-15 NOTE — PLAN OF CARE
Problem: Patient Care Overview  Goal: Plan of Care Review  Outcome: Ongoing (interventions implemented as appropriate)   11/15/18 0324   Coping/Psychosocial   Plan of Care Reviewed With patient   Plan of Care Review   Progress no change       Problem: Fall Risk (Adult)  Goal: Absence of Fall  Outcome: Ongoing (interventions implemented as appropriate)      Problem: Skin Injury Risk (Adult)  Goal: Skin Health and Integrity  Outcome: Ongoing (interventions implemented as appropriate)      Problem: Anemia (Adult)  Goal: Symptom Improvement  Outcome: Ongoing (interventions implemented as appropriate)      Problem: Liver Failure, Acute/Chronic (Adult)  Goal: Signs and Symptoms of Listed Potential Problems Will be Absent, Minimized or Managed (Liver Failure, Acute/Chronic)  Outcome: Ongoing (interventions implemented as appropriate)

## 2018-11-15 NOTE — PROGRESS NOTES
"   LOS: 17 days   Patient Care Team:  Reddy Grant MD as PCP - General (Family Medicine)  Shawn Cortez MD (Inactive) as Surgeon (Orthopedic Surgery)  Tushar Becerril DO as Consulting Physician (Gastroenterology)  Josep Colón MD as Consulting Physician (Nephrology)  Ortiz Ferreira MD as Consulting Physician (Hematology and Oncology)    Subjective     Subjective:  Symptoms:  Stable.  (Some bleeding at PICC site. No flank pain, no hematuria, no dysuria. ).    Diet:  Adequate intake.  No nausea or vomiting.    Pain:  He reports pain is improving.        History taken from: patient chart RN    Objective     Vital Signs  Temp:  [97.2 °F (36.2 °C)-97.9 °F (36.6 °C)] 97.2 °F (36.2 °C)  Heart Rate:  [72-83] 72  Resp:  [18] 18  BP: (123-160)/(68-91) 123/68    Objective:  General Appearance:  In no acute distress.    Vital signs: (most recent): Blood pressure 123/68, pulse 72, temperature 97.2 °F (36.2 °C), temperature source Temporal, resp. rate 18, height 182.9 cm (72.01\"), weight 93 kg (205 lb), SpO2 92 %.  Vital signs are normal.  No fever.    Output: Producing urine (Urine clear yellow) and producing stool.    Lungs:  Normal effort and normal respiratory rate.  Breath sounds clear to auscultation.  He is not in respiratory distress.  No stridor.  No decreased breath sounds.    Heart: Normal rate.  Regular rhythm.  S1 normal and S2 normal.  No murmur, gallop or friction rub.   Chest: Symmetric chest wall expansion.   Abdomen: Abdomen is soft and non-distended.  Bowel sounds are normal.   There is generalized tenderness.     Extremities: There is no dependent edema.    Neurological: Patient is alert and oriented to person, place and time.  GCS score is 15.    Pupils:  Pupils are equal, round, and reactive to light.    Skin:  Warm and dry.  No rash or cyanosis.           Results Review:    Lab Results (last 24 hours)     Procedure Component Value Units Date/Time    Basic Metabolic Panel [[id number]]  (Abnormal) " Collected:  11/15/18 0544    Specimen:  Blood Updated:  11/15/18 0624     Glucose 78 mg/dL      BUN 21 mg/dL      Creatinine 1.14 mg/dL      Sodium 130 mmol/L      Potassium 3.8 mmol/L      Chloride 92 mmol/L      CO2 28.0 mmol/L      Calcium 8.7 mg/dL      eGFR Non African Amer 67 mL/min/1.73      BUN/Creatinine Ratio 18.4     Anion Gap 10.0 mmol/L     CBC & Differential [[id number]] Collected:  11/15/18 0544    Specimen:  Blood Updated:  11/15/18 0617    Narrative:       The following orders were created for panel order CBC & Differential.  Procedure                               Abnormality         Status                     ---------                               -----------         ------                     Scan Slide[[id number]]                                                                  CBC Auto Differential[[id number]]        Abnormal            Final result                 Please view results for these tests on the individual orders.    CBC Auto Differential [[id number]]  (Abnormal) Collected:  11/15/18 0544    Specimen:  Blood Updated:  11/15/18 0617     WBC 7.26 10*3/mm3      RBC 2.47 10*6/mm3      Hemoglobin 8.6 g/dL      Hematocrit 24.9 %      .8 fL      MCH 34.8 pg      MCHC 34.5 g/dL      RDW 15.9 %      RDW-SD 56.6 fl      MPV 10.6 fL      Platelets 29 10*3/mm3      Neutrophil % 56.2 %      Lymphocyte % 21.6 %      Monocyte % 11.8 %      Eosinophil % 8.8 %      Basophil % 0.6 %      Immature Grans % 1.0 %      Neutrophils, Absolute 4.08 10*3/mm3      Lymphocytes, Absolute 1.57 10*3/mm3      Monocytes, Absolute 0.86 10*3/mm3      Eosinophils, Absolute 0.64 10*3/mm3      Basophils, Absolute 0.04 10*3/mm3      Immature Grans, Absolute 0.07 10*3/mm3      nRBC 0.0 /100 WBC     Protime-INR [[id number]]  (Abnormal) Collected:  11/15/18 0544    Specimen:  Blood Updated:  11/15/18 0613     Protime 26.4 Seconds      INR 2.57    Narrative:       Therapeutic range for most indications is 2.0-3.0 INR,  or  2.5-3.5 for mechanical heart valves.    Ammonia [[id number]]  (Normal) Collected:  11/15/18 0544    Specimen:  Blood Updated:  11/15/18 0608     Ammonia 12 umol/L     Fungus Culture - Aspirate, Kidney, Left [[id number]] Collected:  11/09/18 1708    Specimen:  Aspirate from Kidney, Left Updated:  11/14/18 1745     Fungus Culture No fungus isolated at less than 1 week    AFB Culture - Aspirate, Kidney, Left [[id number]] Collected:  11/09/18 1708    Specimen:  Aspirate from Kidney, Left Updated:  11/14/18 1745     AFB Culture No AFB isolated at less than 1 week     AFB Stain No acid fast bacilli seen on concentrated smear         Imaging Results (last 24 hours)     Procedure Component Value Units Date/Time    CT Abdomen Pelvis With Contrast [[id number]] Collected:  11/05/18 1815     Updated:  11/05/18 1857    Narrative:         EXAMINATION:  Computed Tomography           REGION:    Abdomen / Pelvis                     INDICATION:   Repeat to evaluate hematoma/abscess of kidney per  urology, R41.82 Altered mental status, unspecified K72.90 Hepatic  failure, unspecified without coma Z74.09 Other reduced mobility  Z74.09 Other reduced mobility    HISTORY:  [facility]ISA. IMAGING:    CT A/P 11/2/18            TECHNIQUE:      - reconstructions:    axial, coronal, sagittal         - contrast:      oral:  No ;   intravenous: Isovue 300, 100 mL  This exam was performed according to the departmental  dose-optimization program which includes automated exposure  control, adjustment of the mA and/or kV according to patient size  and/or use of iterative reconstruction technique.           COMMENTS:            - - - CT ABDOMEN - - -          THORAX (INFERIOR):      - LUNG BASES:  clear      - PLEURA:    no fluid or mass      - HEART:    normal size, no pericardial fluid     - MISC:      n/a          ABDOMEN:     - LIVER:    Diminished size with nodular contour containing a  tips shunt   - GB:      Cholelithiasis without evidence of wall  thickening.    - CBD:      grossly negative   - SPLEEN:    Upper normal/mildly enlarged   - PANCREAS:    normal in size, contour, no focal mass    - VISCERA:    normal caliber, no wall thickening     - MESENTERY:  no mesenteric mass   - CAVITY:    Small amount of free fluid   - BODY WALL:  wnl   - OSSEOUS:    grossly negative for age   - MISC:                 Extensive gastric varices                      RETROPERITONEUM:   - KIDNEYS:    Renal examination again displays overall normal  size and symmetric nephrograms. Again noted is a left pararenal  enhancing focal fluid collection containing air which is  unchanged in size measuring 2.5 x 3.6 x 2.0 cm   - URETERS:    normal course, caliber   - ADRENALS:    normal size, contour   - MISC:      no sig retroperitoneal adenopathy or mass   - VASCULAR:    aorta / iliacs: wnl for age     - - - CT PELVIS - - -      - VISCERA:    normal caliber small/large bowel, no focal  thickening/mass        - APPENDIX:          wnl   - MESENTERY:  no mass   - VASCULAR:    wnl for age   - CAVITY:    Moderate amount of free fluid   - BLADDER:    unremarkable   - OSSEOUS:    Status post left hip repair    - MISC:     .       Impression:        CONCLUSION:  1. Unchanged left pararenal focal fluid collection with  peripheral enhancement containing air, likely representing an  abscess.  2. Multiple additional findings demonstrating the presence of  cirrhosis status post TIPS shunt, with spleen size at the upper  limits of normal/mildly enlarged, and extensive gastric varices,  as above, unchanged.        Electronically signed by:  PRISCILLA Bustillo MD  11/5/2018 6:56  PM CST Workstation: [id number]           I reviewed the patient's new clinical results.  I reviewed the patient's new imaging results and agree with the interpretation.  I reviewed the patient's other test results and agree with the interpretation      Assessment/Plan       Chronic hepatitis (CMS/HCC)    Thrombocytopenia  (CMS/HCC)    Hepatic encephalopathy (CMS/HCC)    CKD (chronic kidney disease) stage 3, GFR 30-59 ml/min (CMS/HCC)    Altered mental status      Assessment & Plan    1. Difficulty urinating with hematuria, likely related to UTI hemorrhagic cystitis without clots-->hematuria resolved  2. Perinephric abscess  -CT guided drainage of left perinephric abscess 11/9/18-->Enterococcus faecium and candida  -Estimated Creatinine Clearance: 97.4 mL/min (by C-G formula based on SCr of 1.14 mg/dL).  -Cr 1.13  -WBC 10.08-->7.26  -UA + 10/30/18  -Hgb/Hct 8.9/26.0-->8.6/24.9  -Platelets 40,000-->29,000, dropping after Zyvox initiated.   -INR 3.14  -Renal ultrasound 10/30/18 limited exam, especially of the left kidney, due to the presence of bowel gas and the patient's body habitus. No hydronephrosis is visualized. Unremarkable bladder.   -11/2/18 CT abdomen/pelvis LEFT 2.3 x 3.7 x 2.2 cm fluid and air collection infected subcapsular hematoma versus abscess.  -11/5/18 CT abdomen/pelvis showed LEFT renal abscess unchanged  -10/30/18 Urine culture NEGATIVE, UA grossly positive.   -Switched to Zyvox and CBC is noted, Diflucan added.     Plan:    Stop Zyvox and restart Vanc, monitor CBC.   Monitor for bleeding.   Spoke with Dr. Franco.     KALEB Alexander  11/15/18  5:20 PM

## 2018-11-15 NOTE — PLAN OF CARE
Problem: Patient Care Overview  Goal: Plan of Care Review  Outcome: Ongoing (interventions implemented as appropriate)   11/15/18 1100   Coping/Psychosocial   Plan of Care Reviewed With patient   OTHER   Outcome Summary PT re-evaluation completed as co-eval with OT. Pt able to perform supine<>sit with Tami and was able to sit at EOB for ~5 mins with SBA. Pt able to perform sit<>stand x2 with modAx2 and use of FW walker for balance once standing. Pt would benefit from further skilled PT to increase strength, functional mobility, and to achieve the highest level of independence. Upon d/c recommend SNF for further therapy prior to return home.

## 2018-11-15 NOTE — PLAN OF CARE
Problem: Patient Care Overview  Goal: Plan of Care Review  Outcome: Ongoing (interventions implemented as appropriate)   11/15/18 7381   Coping/Psychosocial   Plan of Care Reviewed With patient   OTHER   Outcome Summary OT re-eval competed on this date; co-tx with PT. Pt reported pain in shoulder, back, and abdomen however agreeable to participate. Pt performed supine<>sit with min A. Pt able to tolerate sitting EOB approx 5 min with SBA and no LOB. Pt able to perform grooming task of washing face with s/u. Pt instructed in 2 reps of sit<>stand with elevated bed requiring mod A x2 with FWW. Pt presents with increased pain and decreased activity tolerance, balance, stength and mobility limiting ADL participation. Pt would benefit from continued skilled OT services to address areas of defict and promote highest level of functioning. Rec SNF at d/c for continued rehab services prior to return home.
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
## 2018-11-15 NOTE — PROGRESS NOTES
"   LOS: 16 days   Patient Care Team:  Reddy Grant MD as PCP - General (Family Medicine)  Shawn Cortez MD (Inactive) as Surgeon (Orthopedic Surgery)  Tushar Becerril DO as Consulting Physician (Gastroenterology)  Josep Colón MD as Consulting Physician (Nephrology)  Ortiz Ferreira MD as Consulting Physician (Hematology and Oncology)    Subjective     Subjective:  Symptoms:  Stable.  (Some bleeding at PICC site. No flank pain, no hematuria, no dysuria. ).    Diet:  Adequate intake.  No nausea or vomiting.    Pain:  He reports pain is improving.        History taken from: patient chart RN    Objective     Vital Signs  Temp:  [97.5 °F (36.4 °C)-98.5 °F (36.9 °C)] 98 °F (36.7 °C)  Heart Rate:  [73-91] 77  Resp:  [16-20] 18  BP: (124-157)/(71-94) 128/71    Objective:  General Appearance:  In no acute distress.    Vital signs: (most recent): Blood pressure 128/71, pulse 77, temperature 98 °F (36.7 °C), temperature source Temporal, resp. rate 18, height 182.9 cm (72.01\"), weight 95.7 kg (210 lb 14.4 oz), SpO2 91 %.  Vital signs are normal.  No fever.    Output: Producing urine (Urine clear yellow) and producing stool.    Lungs:  Normal effort and normal respiratory rate.  Breath sounds clear to auscultation.  He is not in respiratory distress.  No stridor.  No decreased breath sounds.    Heart: Normal rate.  Regular rhythm.  S1 normal and S2 normal.  No murmur, gallop or friction rub.   Chest: Symmetric chest wall expansion.   Abdomen: Abdomen is soft and non-distended.  Bowel sounds are normal.   There is generalized tenderness.     Extremities: There is no dependent edema.    Neurological: Patient is alert and oriented to person, place and time.  GCS score is 15.    Pupils:  Pupils are equal, round, and reactive to light.    Skin:  Warm and dry.  No rash or cyanosis.           Results Review:    Lab Results (last 24 hours)     Procedure Component Value Units Date/Time    Fungus Culture - Aspirate, Kidney, Left " [[id number]] Collected:  11/09/18 1708    Specimen:  Aspirate from Kidney, Left Updated:  11/14/18 1745     Fungus Culture No fungus isolated at less than 1 week    AFB Culture - Aspirate, Kidney, Left [[id number]] Collected:  11/09/18 1708    Specimen:  Aspirate from Kidney, Left Updated:  11/14/18 1745     AFB Culture No AFB isolated at less than 1 week     AFB Stain No acid fast bacilli seen on concentrated smear    Body Fluid Culture - Aspirate, Kidney, Left [[id number]]  (Abnormal)  (Susceptibility) Collected:  11/09/18 1708    Specimen:  Aspirate from Kidney, Left Updated:  11/14/18 1149     BF Culture Heavy growth (4+) Enterococcus faecium      Candida albicans     Gram Stain Many (4+) WBCs seen      Many (4+) Gram positive cocci      Rare (1+) Yeast    Narrative:       Slide reviewed confirmed    Susceptibility      Enterococcus faecium     NERI     Ampicillin Resistant     Gentamicin High Level Synergy Susceptible     Vancomycin Susceptible                    Non-gynecologic Cytology [[id number]] Collected:  11/09/18 0509    Specimen:  Body Fluid from Kidney, Left Updated:  11/14/18 1123     Case Report --     Non-gynecologic Cytology                          Case: [id number]                                  Authorizing Provider:  Barrera Mcmanus        Collected:           11/09/2018 05:09 AM                                 MD Nancy                                                              Ordering Location:     [facility]             Received:            11/12/2018 07:00 AM                                 [facility]PDOWN [facility]                                                   Pathologist:           Jono Lopez MD                                                           Specimen:    Kidney, Left, <5MLS, CLOUDY PINK                                                            Specimen Adequacy Satisfactory for evaluation     Final Diagnosis --     Left perinephric fluid collection,  cytology:  Satisfactory for evaluation.  Negative for malignant cells.  Purulent exudate, consistent with abscess.       Gross Description --     Fluid from Left Kidney:  5 ml fresh cloudy pink fluid  1-thin prep and cellblock made       Microscopic Description --     Marked acute inflammation      Extra Tubes [[id number]] Collected:  11/14/18 0741    Specimen:  Blood, Venous Line Updated:  11/14/18 0845    Narrative:       The following orders were created for panel order Extra Tubes.  Procedure                               Abnormality         Status                     ---------                               -----------         ------                     Gold Top - SST[[id number]]                                   Final result                 Please view results for these tests on the individual orders.    Gold Top - SST [[id number]] Collected:  11/14/18 0741    Specimen:  Blood Updated:  11/14/18 0845     Extra Tube Hold for add-ons.     Comment: Auto resulted.       Protime-INR [[id number]]  (Abnormal) Collected:  11/14/18 0741    Specimen:  Blood Updated:  11/14/18 0804     Protime 30.7 Seconds      INR 3.14    Narrative:       Therapeutic range for most indications is 2.0-3.0 INR,  or 2.5-3.5 for mechanical heart valves.    CBC & Differential [[id number]] Collected:  11/14/18 0614    Specimen:  Blood Updated:  11/14/18 0653    Narrative:       The following orders were created for panel order CBC & Differential.  Procedure                               Abnormality         Status                     ---------                               -----------         ------                     Scan Slide[[id number]]                                       Final result               CBC Auto Differential[[id number]]        Abnormal            Final result                 Please view results for these tests on the individual orders.    Scan Slide [[id number]] Collected:  11/14/18 0614    Specimen:  Blood Updated:  11/14/18 0653      Anisocytosis Mod/2+     Hypochromia Slight/1+     WBC Morphology Normal     Platelet Estimate Decreased    Basic Metabolic Panel [[id number]]  (Abnormal) Collected:  11/14/18 0614    Specimen:  Blood Updated:  11/14/18 0645     Glucose 78 mg/dL      BUN 21 mg/dL      Creatinine 1.13 mg/dL      Sodium 132 mmol/L      Potassium 3.9 mmol/L      Chloride 90 mmol/L      CO2 31.0 mmol/L      Calcium 8.9 mg/dL      eGFR Non African Amer 68 mL/min/1.73      BUN/Creatinine Ratio 18.6     Anion Gap 11.0 mmol/L     Ammonia [[id number]]  (Normal) Collected:  11/14/18 0614    Specimen:  Blood Updated:  11/14/18 0645     Ammonia 15 umol/L     CBC Auto Differential [[id number]]  (Abnormal) Collected:  11/14/18 0614    Specimen:  Blood Updated:  11/14/18 0635     WBC 10.08 10*3/mm3      RBC 2.53 10*6/mm3      Hemoglobin 8.9 g/dL      Hematocrit 26.0 %      .8 fL      MCH 35.2 pg      MCHC 34.2 g/dL      RDW 15.9 %      RDW-SD 57.2 fl      MPV 10.2 fL      Platelets 40 10*3/mm3      Comment: SPECIMEN REANALYZED TO VERIFY PLATELET COUNT.         Neutrophil % 65.7 %      Lymphocyte % 16.7 %      Monocyte % 9.2 %      Eosinophil % 7.6 %      Basophil % 0.5 %      Immature Grans % 0.3 %      Neutrophils, Absolute 6.62 10*3/mm3      Lymphocytes, Absolute 1.68 10*3/mm3      Monocytes, Absolute 0.93 10*3/mm3      Eosinophils, Absolute 0.77 10*3/mm3      Basophils, Absolute 0.05 10*3/mm3      Immature Grans, Absolute 0.03 10*3/mm3      nRBC 0.0 /100 WBC          Imaging Results (last 24 hours)     Procedure Component Value Units Date/Time    CT Abdomen Pelvis With Contrast [[id number]] Collected:  11/05/18 1815     Updated:  11/05/18 1857    Narrative:         EXAMINATION:  Computed Tomography           REGION:    Abdomen / Pelvis                     INDICATION:   Repeat to evaluate hematoma/abscess of kidney per  urology, R41.82 Altered mental status, unspecified K72.90 Hepatic  failure, unspecified without coma Z74.09 Other reduced  mobility  Z74.09 Other reduced mobility    HISTORY:  LISA. IMAGING:    CT A/P 11/2/18            TECHNIQUE:      - reconstructions:    axial, coronal, sagittal         - contrast:      oral:  No ;   intravenous: Isovue 300, 100 mL  This exam was performed according to the departmental  dose-optimization program which includes automated exposure  control, adjustment of the mA and/or kV according to patient size  and/or use of iterative reconstruction technique.           COMMENTS:            - - - CT ABDOMEN - - -          THORAX (INFERIOR):      - LUNG BASES:  clear      - PLEURA:    no fluid or mass      - HEART:    normal size, no pericardial fluid     - MISC:      n/a          ABDOMEN:     - LIVER:    Diminished size with nodular contour containing a  tips shunt   - GB:      Cholelithiasis without evidence of wall thickening.    - CBD:      grossly negative   - SPLEEN:    Upper normal/mildly enlarged   - PANCREAS:    normal in size, contour, no focal mass    - VISCERA:    normal caliber, no wall thickening     - MESENTERY:  no mesenteric mass   - CAVITY:    Small amount of free fluid   - BODY WALL:  wnl   - OSSEOUS:    grossly negative for age   - MISC:                 Extensive gastric varices                      RETROPERITONEUM:   - KIDNEYS:    Renal examination again displays overall normal  size and symmetric nephrograms. Again noted is a left pararenal  enhancing focal fluid collection containing air which is  unchanged in size measuring 2.5 x 3.6 x 2.0 cm   - URETERS:    normal course, caliber   - ADRENALS:    normal size, contour   - MISC:      no sig retroperitoneal adenopathy or mass   - VASCULAR:    aorta / iliacs: wnl for age     - - - CT PELVIS - - -      - VISCERA:    normal caliber small/large bowel, no focal  thickening/mass        - APPENDIX:          wnl   - MESENTERY:  no mass   - VASCULAR:    wnl for age   - CAVITY:    Moderate amount of free fluid   - BLADDER:    unremarkable   - OSSEOUS:     Status post left hip repair    - MISC:     .       Impression:        CONCLUSION:  1. Unchanged left pararenal focal fluid collection with  peripheral enhancement containing air, likely representing an  abscess.  2. Multiple additional findings demonstrating the presence of  cirrhosis status post TIPS shunt, with spleen size at the upper  limits of normal/mildly enlarged, and extensive gastric varices,  as above, unchanged.        Electronically signed by:  PRISCILLA Bustillo MD  11/5/2018 6:56  PM CST Workstation: [id number]           I reviewed the patient's new clinical results.  I reviewed the patient's new imaging results and agree with the interpretation.  I reviewed the patient's other test results and agree with the interpretation      Assessment/Plan       Chronic hepatitis (CMS/HCC)    Thrombocytopenia (CMS/HCC)    Hepatic encephalopathy (CMS/HCC)    CKD (chronic kidney disease) stage 3, GFR 30-59 ml/min (CMS/HCC)    Altered mental status      Assessment & Plan    1. Difficulty urinating with hematuria, likely related to UTI hemorrhagic cystitis without clots-->hematuria resolved  2. Perinephric abscess  -CT guided drainage of left perinephric abscess 11/9/18-->Enterococcus faecium and candida  -Estimated Creatinine Clearance: 89.6 mL/min (by C-G formula based on SCr of 1.13 mg/dL).  -Cr 1.13  -WBC 10.08  -UA + 10/30/18  -Hgb/Hct 8.9/26.0  -Platelets 40,000  -INR 3.14  -Renal ultrasound 10/30/18 limited exam, especially of the left kidney, due to the presence of bowel gas and the patient's body habitus. No hydronephrosis is visualized. Unremarkable bladder.   -11/2/18 CT abdomen/pelvis LEFT 2.3 x 3.7 x 2.2 cm fluid and air collection infected subcapsular hematoma versus abscess.  -11/5/18 CT abdomen/pelvis showed LEFT renal abscess unchanged  -10/30/18 Urine culture NEGATIVE, UA grossly positive.   -Switched to Zyvox and CBC is noted today, Diflucan added.     Plan:    Closely monitor labs since switching to  Zyvox.     Deondre Virk, APRN  11/14/18  6:11 PM

## 2018-11-16 NOTE — PLAN OF CARE
Problem: Patient Care Overview  Goal: Plan of Care Review  Outcome: Ongoing (interventions implemented as appropriate)   11/16/18 0329   Coping/Psychosocial   Plan of Care Reviewed With patient   Plan of Care Review   Progress no change   OTHER   Outcome Summary no changes during this shift      Goal: Individualization and Mutuality  Outcome: Ongoing (interventions implemented as appropriate)    Goal: Discharge Needs Assessment  Outcome: Ongoing (interventions implemented as appropriate)    Goal: Interprofessional Rounds/Family Conf  Outcome: Ongoing (interventions implemented as appropriate)      Problem: Fall Risk (Adult)  Goal: Absence of Fall  Outcome: Ongoing (interventions implemented as appropriate)      Problem: Skin Injury Risk (Adult)  Goal: Skin Health and Integrity  Outcome: Ongoing (interventions implemented as appropriate)      Problem: Anemia (Adult)  Goal: Symptom Improvement  Outcome: Ongoing (interventions implemented as appropriate)      Problem: Liver Failure, Acute/Chronic (Adult)  Goal: Signs and Symptoms of Listed Potential Problems Will be Absent, Minimized or Managed (Liver Failure, Acute/Chronic)  Outcome: Ongoing (interventions implemented as appropriate)

## 2018-11-16 NOTE — PROGRESS NOTES
[facility] Medicine Services  INPATIENT PROGRESS NOTE    Length of Stay: 18  Date of Admission: 10/29/2018  Primary Care Physician: Reddy Garnt MD    Subjective   Chief Complaint:  Altered mental status  HPI:  Patient continues to have some back and shoulder pain.  He also has some diarrhea today.  Review of Systems   Constitutional: Negative for appetite change, chills, fatigue and fever.   Respiratory: Negative for chest tightness and shortness of breath.    Cardiovascular: Negative for chest pain, palpitations and leg swelling.   Gastrointestinal: Positive for abdominal distention. Negative for abdominal pain, constipation, diarrhea, nausea and vomiting.   Musculoskeletal: Positive for back pain.        Right shoulder pain   Skin: Negative for wound.   Neurological: Negative for dizziness, weakness, light-headedness, numbness and headaches.        All pertinent negatives and positives are as above. All other systems have been reviewed and are negative unless otherwise stated.     Objective    Temp:  [96.5 °F (35.8 °C)-97.9 °F (36.6 °C)] 96.5 °F (35.8 °C)  Heart Rate:  [69-76] 73  Resp:  [18] 18  BP: (127-149)/(83-89) 134/83    Physical Exam   Constitutional: He appears well-developed and well-nourished.   HENT:   Head: Normocephalic and atraumatic.   Eyes: EOM are normal. Pupils are equal, round, and reactive to light.   Neck: Normal range of motion. Neck supple.   Cardiovascular: Normal rate, regular rhythm, normal heart sounds and intact distal pulses. Exam reveals no gallop and no friction rub.   No murmur heard.  Pulmonary/Chest: Effort normal. No respiratory distress. He has decreased breath sounds. He has no wheezes. He has no rales. He exhibits no tenderness.   Abdominal: Soft. Bowel sounds are normal. He exhibits distension. There is no tenderness.   Musculoskeletal: He exhibits edema.   Psychiatric: He has a normal mood and affect. His behavior is normal.    Vitals reviewed.          Results Review:  I have reviewed the labs, radiology results, and diagnostic studies.    Laboratory Data:   Results from last 7 days   Lab Units  11/16/18   0702  11/15/18   0544  11/14/18   0614   SODIUM mmol/L  132*  130*  132*   POTASSIUM mmol/L  4.0  3.8  3.9   CHLORIDE mmol/L  93*  92*  90*   CO2 mmol/L  28.0  28.0  31.0   BUN mg/dL  20  21  21   CREATININE mg/dL  1.27  1.14  1.13   GLUCOSE mg/dL  71  78  78   CALCIUM mg/dL  9.0  8.7  8.9   ANION GAP mmol/L  11.0  10.0  11.0     Estimated Creatinine Clearance: 86.6 mL/min (by C-G formula based on SCr of 1.27 mg/dL).          Results from last 7 days   Lab Units  11/16/18   0702  11/15/18   0544  11/14/18   0614  11/13/18   0634  11/12/18   0616   WBC 10*3/mm3  6.19  7.26  10.08*  7.71  6.52   HEMOGLOBIN g/dL  8.4*  8.6*  8.9*  9.2*  8.7*   HEMATOCRIT %  24.3*  24.9*  26.0*  26.1*  24.7*   PLATELETS 10*3/mm3  46*  29*  40*  42*  43*     Results from last 7 days   Lab Units  11/16/18   0702  11/15/18   0544  11/14/18   0741  11/13/18   0634  11/12/18   0616   INR   2.88*  2.57*  3.14*  3.00*  2.97*       Culture Data:   No results found for: BLOODCX  No results found for: URINECX  No results found for: RESPCX  No results found for: WOUNDCX  No results found for: STOOLCX  No components found for: BODYFLD    Radiology Data:   Imaging Results (last 24 hours)     ** No results found for the last 24 hours. **          I have reviewed the patient's current medications.     Assessment/Plan     Active Hospital Problems    Diagnosis   • Altered mental status   • CKD (chronic kidney disease) stage 3, GFR 30-59 ml/min (CMS/HCC)   • Hepatic encephalopathy (CMS/HCC)   • Thrombocytopenia (CMS/HCC)   • Chronic hepatitis (CMS/HCC)       Plan:    1.  Hepatic Encephalopathy:  Resolved.    2.  Left perinephric abscess:  Drained 11/9.  Culture showed heavy growth of Enterococcus faecium and Candida albicans.  Zyvox changed to vancomycin due to  thrombocytopenia.  Will add diflucan.  Urology following.  3.  Cirrhosis:  Decompensated.  Has esophageal varices, and a nonfunctioning TIPS shunt.  Continue current treatment.  4.  Anasarca:  Improving.  Transition from IV lasix to oral lasix.    5.  Thrombocytopenia:  Chronic.  Due to liver failure.  Some better after zyvox stopped.              Discharge Planning: I expect patient to be discharged to home vs LTACH in 4-5 days.        This document has been electronically signed by Ed Franco MD on November 16, 2018 5:57 PM
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
## 2018-11-16 NOTE — SIGNIFICANT NOTE
11/16/18 1433   Rehab Treatment   Discipline physical therapy assistant   Reason Treatment Not Performed patient/family declined treatment  (pt declined ex/eob/oob-states he has diarrhea and his stomach hurts-nsg aware)

## 2018-11-16 NOTE — PROGRESS NOTES
"   LOS: 18 days   Patient Care Team:  Reddy Grant MD as PCP - General (Family Medicine)  Shawn Cortez MD (Inactive) as Surgeon (Orthopedic Surgery)  Tushar Becerril DO as Consulting Physician (Gastroenterology)  Josep Colón MD as Consulting Physician (Nephrology)  Ortiz Ferreira MD as Consulting Physician (Hematology and Oncology)    Subjective     Subjective:  Symptoms:  He reports diarrhea.  (Complains of diarrhea. ).    Diet:  Adequate intake.  No nausea or vomiting.    Pain:  He reports pain is unchanged.        History taken from: patient chart RN    Objective     Vital Signs  Temp:  [96.5 °F (35.8 °C)-97.9 °F (36.6 °C)] 96.5 °F (35.8 °C)  Heart Rate:  [69-76] 73  Resp:  [18] 18  BP: (127-149)/(83-89) 134/83    Objective:  General Appearance:  In no acute distress.    Vital signs: (most recent): Blood pressure 134/83, pulse 73, temperature 96.5 °F (35.8 °C), resp. rate 18, height 182.9 cm (72.01\"), weight 92.1 kg (203 lb), SpO2 93 %.  Vital signs are normal.  No fever.    Output: Producing urine (Urine clear yellow) and producing stool.    Lungs:  Normal effort and normal respiratory rate.  Breath sounds clear to auscultation.  He is not in respiratory distress.  No stridor.  No decreased breath sounds.    Heart: Normal rate.  Regular rhythm.  S1 normal and S2 normal.  No murmur, gallop or friction rub.   Chest: Symmetric chest wall expansion.   Abdomen: Abdomen is soft and distended.  Bowel sounds are normal.   There is generalized tenderness.     Extremities: There is no dependent edema.    Neurological: Patient is alert and oriented to person, place and time.  GCS score is 15.    Pupils:  Pupils are equal, round, and reactive to light.    Skin:  Warm and dry.  No rash or cyanosis.           Results Review:    Lab Results (last 24 hours)     Procedure Component Value Units Date/Time    Protime-INR [[id number]]  (Abnormal) Collected:  11/16/18 0702    Specimen:  Blood Updated:  11/16/18 0735     " Protime 28.8 Seconds      INR 2.88    Narrative:       Therapeutic range for most indications is 2.0-3.0 INR,  or 2.5-3.5 for mechanical heart valves.    Ammonia [[id number]]  (Normal) Collected:  11/16/18 0702    Specimen:  Blood Updated:  11/16/18 0720     Ammonia 9 umol/L     Basic Metabolic Panel [[id number]]  (Abnormal) Collected:  11/16/18 0702    Specimen:  Blood Updated:  11/16/18 0720     Glucose 71 mg/dL      BUN 20 mg/dL      Creatinine 1.27 mg/dL      Sodium 132 mmol/L      Potassium 4.0 mmol/L      Chloride 93 mmol/L      CO2 28.0 mmol/L      Calcium 9.0 mg/dL      eGFR Non African Amer 59 mL/min/1.73      BUN/Creatinine Ratio 15.7     Anion Gap 11.0 mmol/L     CBC & Differential [[id number]] Collected:  11/16/18 0702    Specimen:  Blood Updated:  11/16/18 0714    Narrative:       The following orders were created for panel order CBC & Differential.  Procedure                               Abnormality         Status                     ---------                               -----------         ------                     Scan Slide[[id number]]                                                                  CBC Auto Differential[[id number]]        Abnormal            Final result                 Please view results for these tests on the individual orders.    CBC Auto Differential [[id number]]  (Abnormal) Collected:  11/16/18 0702    Specimen:  Blood Updated:  11/16/18 0714     WBC 6.19 10*3/mm3      RBC 2.39 10*6/mm3      Hemoglobin 8.4 g/dL      Hematocrit 24.3 %      .7 fL      MCH 35.1 pg      MCHC 34.6 g/dL      RDW 16.2 %      RDW-SD 58.9 fl      MPV 10.9 fL      Platelets 46 10*3/mm3      Comment: SPECIMEN REANALYZED TO VERIFY PLATELET COUNT.         Neutrophil % 56.1 %      Lymphocyte % 22.9 %      Monocyte % 12.4 %      Eosinophil % 7.3 %      Basophil % 0.8 %      Immature Grans % 0.5 %      Neutrophils, Absolute 3.47 10*3/mm3      Lymphocytes, Absolute 1.42 10*3/mm3      Monocytes, Absolute  0.77 10*3/mm3      Eosinophils, Absolute 0.45 10*3/mm3      Basophils, Absolute 0.05 10*3/mm3      Immature Grans, Absolute 0.03 10*3/mm3      nRBC 0.0 /100 WBC          Imaging Results (last 24 hours)     Procedure Component Value Units Date/Time    CT Abdomen Pelvis With Contrast [[id number]] Collected:  11/05/18 1815     Updated:  11/05/18 1857    Narrative:         EXAMINATION:  Computed Tomography           REGION:    Abdomen / Pelvis                     INDICATION:   Repeat to evaluate hematoma/abscess of kidney per  urology, R41.82 Altered mental status, unspecified K72.90 Hepatic  failure, unspecified without coma Z74.09 Other reduced mobility  Z74.09 Other reduced mobility    HISTORY:  LISA. IMAGING:    CT A/P 11/2/18            TECHNIQUE:      - reconstructions:    axial, coronal, sagittal         - contrast:      oral:  No ;   intravenous: Isovue 300, 100 mL  This exam was performed according to the departmental  dose-optimization program which includes automated exposure  control, adjustment of the mA and/or kV according to patient size  and/or use of iterative reconstruction technique.           COMMENTS:            - - - CT ABDOMEN - - -          THORAX (INFERIOR):      - LUNG BASES:  clear      - PLEURA:    no fluid or mass      - HEART:    normal size, no pericardial fluid     - MISC:      n/a          ABDOMEN:     - LIVER:    Diminished size with nodular contour containing a  tips shunt   - GB:      Cholelithiasis without evidence of wall thickening.    - CBD:      grossly negative   - SPLEEN:    Upper normal/mildly enlarged   - PANCREAS:    normal in size, contour, no focal mass    - VISCERA:    normal caliber, no wall thickening     - MESENTERY:  no mesenteric mass   - CAVITY:    Small amount of free fluid   - BODY WALL:  wnl   - OSSEOUS:    grossly negative for age   - MISC:                 Extensive gastric varices                      RETROPERITONEUM:   - KIDNEYS:    Renal examination again  displays overall normal  size and symmetric nephrograms. Again noted is a left pararenal  enhancing focal fluid collection containing air which is  unchanged in size measuring 2.5 x 3.6 x 2.0 cm   - URETERS:    normal course, caliber   - ADRENALS:    normal size, contour   - MISC:      no sig retroperitoneal adenopathy or mass   - VASCULAR:    aorta / iliacs: wnl for age     - - - CT PELVIS - - -      - VISCERA:    normal caliber small/large bowel, no focal  thickening/mass        - APPENDIX:          wnl   - MESENTERY:  no mass   - VASCULAR:    wnl for age   - CAVITY:    Moderate amount of free fluid   - BLADDER:    unremarkable   - OSSEOUS:    Status post left hip repair    - MISC:     .       Impression:        CONCLUSION:  1. Unchanged left pararenal focal fluid collection with  peripheral enhancement containing air, likely representing an  abscess.  2. Multiple additional findings demonstrating the presence of  cirrhosis status post TIPS shunt, with spleen size at the upper  limits of normal/mildly enlarged, and extensive gastric varices,  as above, unchanged.        Electronically signed by:  PRISCILLA Bustillo MD  11/5/2018 6:56  PM CST Workstation: [id number]           I reviewed the patient's new clinical results.  I reviewed the patient's new imaging results and agree with the interpretation.  I reviewed the patient's other test results and agree with the interpretation      Assessment/Plan       Chronic hepatitis (CMS/HCC)    Thrombocytopenia (CMS/HCC)    Hepatic encephalopathy (CMS/HCC)    CKD (chronic kidney disease) stage 3, GFR 30-59 ml/min (CMS/HCC)    Altered mental status      Assessment & Plan    1. Difficulty urinating with hematuria, likely related to UTI hemorrhagic cystitis without clots-->hematuria resolved  2. Perinephric abscess  -CT guided drainage of left perinephric abscess 11/9/18-->Enterococcus faecium and candida  -Day #6 of antibiotic therapy (vancomycin), Day #2  (Diflucan)    -Estimated Creatinine Clearance: 86.6 mL/min (by C-G formula based on SCr of 1.27 mg/dL).  -Cr 1.27  -WBC 10.08-->7.26-->6.19  -UA + 10/30/18  -Hgb/Hct 8.9/26.0-->8.6/24.9-->8.4/24.3  -Platelets 40,000-->29,000-->46,000  -INR 2.88  -Renal ultrasound 10/30/18 limited exam, especially of the left kidney, due to the presence of bowel gas and the patient's body habitus. No hydronephrosis is visualized. Unremarkable bladder.   -11/2/18 CT abdomen/pelvis LEFT 2.3 x 3.7 x 2.2 cm fluid and air collection infected subcapsular hematoma versus abscess.  -11/5/18 CT abdomen/pelvis showed LEFT renal abscess unchanged  -10/30/18 Urine culture NEGATIVE, UA grossly positive.     Plan:    Continue vancomycin and Diflucan.   Monitor for resolution of diarrhea.     KALEB Alexander  11/16/18  5:36 PM

## 2018-11-16 NOTE — PLAN OF CARE
Problem: Patient Care Overview  Goal: Plan of Care Review  Outcome: Ongoing (interventions implemented as appropriate)   11/16/18 0822   Coping/Psychosocial   Plan of Care Reviewed With patient   Plan of Care Review   Progress no change   OTHER   Outcome Summary Pt deferred any OOB/EOB this tx. Pt completed LUE ther ex in all planes and was limited on RUE. No new goals met this date. Cont OT PIC.

## 2018-11-16 NOTE — PLAN OF CARE
Problem: Patient Care Overview  Goal: Individualization and Mutuality  Outcome: Ongoing (interventions implemented as appropriate)   11/16/18 1430   Individualization   Patient Specific Preferences apple juice with Lactulose     Goal: Interprofessional Rounds/Family Conf  Outcome: Ongoing (interventions implemented as appropriate)      Problem: Fall Risk (Adult)  Goal: Absence of Fall  Outcome: Ongoing (interventions implemented as appropriate)      Problem: Anemia (Adult)  Goal: Symptom Improvement  Outcome: Ongoing (interventions implemented as appropriate)

## 2018-11-17 NOTE — CONSULTS
"Adult Nutrition  Assessment    Patient Name:  Armando Mcmullen Sr.  YOB: 1965  MRN: [id number]  Admit Date:  10/29/2018    Assessment Date:  11/17/2018    Comments:  Patient's appetite has increased. Patient had 50% intake of 2 meals. Patient no longer has nausea, and he states that his diarrhea is slowing down. Patient is still taking lactulose. Patient stated that is \"feeling better overall\". RDN staff will continue to monitor.     Reason for Assessment     Row Name 11/17/18 3716          Reason for Assessment    Reason For Assessment  follow-up protocol  (Pended)      Diagnosis  liver disease  (Pended)      Identified At Risk by Screening Criteria  large or nonhealing wound, burn or pressure injury  (Pended)          Nutrition/Diet History     Row Name 11/17/18 6955          Nutrition/Diet History    Typical Food/Fluid Intake  Patient's appetite has increased. Patient had 50% intake of 2 meals. Patient no longer has nausea. Patient states that his diarrhea is slowing down, but he is still taking lactulose. Patient stated that is \"feeling better overall\".   (Pended)      Factors Affecting Nutritional Intake  abdominal distension  (Pended)            Labs/Tests/Procedures/Meds     Row Name 11/17/18 8839          Labs/Procedures/Meds    Lab Results Reviewed  reviewed, pertinent  (Pended)      Lab Results Comments  Ammonia-19, Na+-135  (Pended)         Diagnostic Tests/Procedures    Diagnostic Test/Procedure Reviewed  reviewed  (Pended)         Medications    Pertinent Medications Reviewed  reviewed  (Pended)          Physical Findings     Row Name 11/17/18 7043          Physical Findings    Overall Physical Appearance  amputee  (Pended)      Gastrointestinal  abdominal distension;ascites  (Pended)      Skin  pressure injury  (Pended)            Nutrition Prescription Ordered     Row Name 11/17/18 1846          Nutrition Prescription PO    Current PO Diet  Regular  (Pended)      Common Modifiers  Low " Sodium  (Pended)      Low Sodium Details  2,000 mg Sodium  (Pended)          Evaluation of Received Nutrient/Fluid Intake     Row Name 11/17/18 1337          PO Evaluation    Number of Days PO Intake Evaluated  1 day  (Pended)      Number of Meals  1  (Pended)      % PO Intake  50%  (Pended)                Electronically signed by:  Aida Aviles  11/17/18 1:38 PM

## 2018-11-17 NOTE — THERAPY TREATMENT NOTE
Acute Care - Physical Therapy Treatment Note  [facility]     Patient Name: Armando Mcmullen Sr.  : 1965  MRN: [id number]  Today's Date: 2018  Onset of Illness/Injury or Date of Surgery: 10/29/18  Date of Referral to PT: 18  Referring Physician: ASRI Franco MD    Admit Date: 10/29/2018    Visit Dx:    ICD-10-CM ICD-9-CM   1. Altered mental status, unspecified altered mental status type R41.82 780.97   2. Hepatic encephalopathy (CMS/HCC) K72.90 572.2   3. Impaired functional mobility, balance, gait, and endurance Z74.09 V49.89   4. Impaired mobility and ADLs Z74.09 799.89     Patient Active Problem List   Diagnosis   • Anxiety state   • Back pain   • Chronic hepatitis (CMS/HCC)   • Depression   • Substance abuse (CMS/HCC)   • Liver failure with hepatic coma (CMS/HCC)   • Hypersplenism   • Chronic osteomyelitis of right shoulder region (CMS/HCC)   • Thrombocytopenia (CMS/HCC)   • Anemia of chronic disease   • Positive hepatitis C antibody test   • BMI 35.0-35.9,adult   • Tobacco use   • Hepatic encephalopathy (CMS/HCC)   • Anxiety and depression   • CKD (chronic kidney disease) stage 3, GFR 30-59 ml/min (CMS/HCC)   • Ascites   • Physical deconditioning   • Closed fracture of lumbar vertebra with spinal cord injury (CMS/HCC)   • Altered mental status       Therapy Treatment    Rehabilitation Treatment Summary     Row Name 18 0936             Treatment Time/Intention    Discipline  physical therapy assistant  -EM      Document Type  therapy note (daily note)  -EM      Subjective Information  complains of;weakness;pain  -EM      Mode of Treatment  physical therapy;individual therapy  -EM      Patient Effort  adequate  -EM      Existing Precautions/Restrictions  fall  -EM      Recorded by [EM] Jimbo Whatley, PTA 18 1148      Row Name 18 0936             Vital Signs    Pre Systolic BP Rehab  165  -EM      Pre Treatment Diastolic BP  94  -EM      Post Systolic BP Rehab  157  -EM       Post Treatment Diastolic BP  90  -EM      Pretreatment Heart Rate (beats/min)  79  -EM      Posttreatment Heart Rate (beats/min)  76  -EM      Pre SpO2 (%)  94  -EM      O2 Delivery Pre Treatment  room air  -EM      Post SpO2 (%)  95  -EM      O2 Delivery Post Treatment  room air  -EM      Pre Patient Position  Sitting  -EM      Post Patient Position  Sitting  -EM      Recorded by [EM] Jimbo Whatley, PTA 11/17/18 1148      Row Name 11/17/18 0936             Cognitive Assessment/Intervention- PT/OT    Affect/Mental Status (Cognitive)  WFL  -EM      Orientation Status (Cognition)  oriented x 4  -EM      Follows Commands (Cognition)  WFL  -EM      Cognitive Function (Cognitive)  WFL  -EM      Memory Deficit (Cognitive)  mild deficit  -EM      Safety Deficit (Cognitive)  mild deficit  -EM      Personal Safety Interventions  fall prevention program maintained;gait belt;nonskid shoes/slippers when out of bed;supervised activity  -EM      Recorded by [EM] Jimbo Whatley, PTA 11/17/18 1148      Row Name 11/17/18 0936             Bed Mobility Assessment/Treatment    Supine-Sit Orgas (Bed Mobility)  supervision  -EM      Sit-Supine Orgas (Bed Mobility)  supervision  -EM      Assistive Device (Bed Mobility)  bed rails;head of bed elevated  -EM      Recorded by [EM] Jimbo Whatley, PTA 11/17/18 1148      Row Name 11/17/18 0936             Sit-Stand Transfer    Sit-Stand Orgas (Transfers)  moderate assist (50% patient effort);2 person assist  -EM      Recorded by [EM] Jimbo Whatley, PTA 11/17/18 1148      Row Name 11/17/18 0936             Stand-Sit Transfer    Stand-Sit Orgas (Transfers)  moderate assist (50% patient effort);2 person assist  -EM      Recorded by [EM] Jimbo Whatley, PTA 11/17/18 1148      Row Name 11/17/18 0936             Stand Pivot/Stand Step Transfer    Stand Pivot/Stand Step Orgas  moderate assist (50% patient effort) R st pvt EOB to recliner   -EM      Recorded by [EM]  Jimbo Whatley, PTA 11/17/18 1148      Row Name 11/17/18 0936             Therapeutic Exercise    Lower Extremity (Therapeutic Exercise)  gluteal sets;LAQ (long arc quad), bilateral;marching while seated;SAQ (short arc quad), bilateral;SLR (straight leg raise), bilateral;other (see comments) Add Squeeze, R AP  -EM      Exercise Type (Therapeutic Exercise)  AROM (active range of motion);AAROM (active assistive range of motion) AAROM with R SLR   -EM      Position (Therapeutic Exercise)  seated  -EM      Sets/Reps (Therapeutic Exercise)  1x20  -EM      Recorded by [EM] Jimbo Whatley, PTA 11/17/18 1148      Row Name 11/17/18 0936             Positioning and Restraints    Pre-Treatment Position  in bed  -EM      Post Treatment Position  chair  -EM      In Chair  sitting;reclined;call light within reach;encouraged to call for assist  -EM      Recorded by [EM] Jimbo Whatley, [facility] 11/17/18 1148      Row Name 11/17/18 0936             Pain Scale: Numbers Pre/Post-Treatment    Pain Scale: Numbers, Pretreatment  5/10  -EM      Pain Scale: Numbers, Post-Treatment  5/10  -EM      Pain Location  back and R shldr  -EM      Recorded by [EM] Jimbo Whatley, [facility] 11/17/18 1148      Row Name                Wound 10/30/18 1020 coccyx    Wound - Properties Group Date first assessed: 10/30/18 [AS] Time first assessed: 1020 [AS] Present On Admission : picture taken [AS] Location: coccyx [AS] Recorded by:  [AS] Yokasta Heart RN 10/30/18 1021    Row Name                Wound 11/09/18 1712 Left posterior flank    Wound - Properties Group Date first assessed: 11/09/18 [FAMILIA] Time first assessed: 1712 [FAMILIA] Side: Left [FAMILIA] Orientation: posterior [FAMILIA] Location: flank [FAMILIA] Additional Comments: Drainage catheter puncture.  [FAMILIA] Recorded by:  [FAMILIA] Ev Alas RN 11/09/18 1713    Row Name 11/17/18 0936             Outcome Summary/Treatment Plan (PT)    Barriers to Overall Progress (PT)  Deconditioning  -EM      Plan for Continued  Treatment (PT)  standing at EOB  -EM      Anticipated Discharge Disposition (PT)  skilled nursing facility  -EM      Recorded by [EM] Jimbo Whatley, PTA 11/17/18 1148        User Key  (r) = Recorded By, (t) = Taken By, (c) = Cosigned By    Initials Name Effective Dates Discipline    EM Jimbo Whatley, PTA 08/11/15 -  PT    FAMILIA Ev Alas RN 10/17/16 -  Nurse    AS [facility] RN 12/13/16 -  Nurse          Wound 10/30/18 1020 coccyx (Active)   Dressing Appearance open to air 11/17/2018  9:23 AM   Closure Open to air 11/17/2018  9:23 AM   Base pink 11/17/2018  9:23 AM   Periwound intact;blanchable 11/17/2018  9:23 AM   Periwound Temperature warm 11/17/2018  9:23 AM   Periwound Skin Turgor soft 11/17/2018  9:23 AM   Drainage Amount none 11/17/2018  9:23 AM       Wound 11/09/18 1712 Left posterior flank (Active)   Dressing Appearance open to air 11/17/2018  9:23 AM   Closure Open to air 11/17/2018  9:23 AM   Base closed/resurfaced;pink 11/17/2018  9:23 AM   Periwound intact 11/17/2018  9:23 AM   Periwound Temperature warm 11/17/2018  9:23 AM   Periwound Skin Turgor soft 11/17/2018  9:23 AM   Drainage Amount none 11/17/2018  9:23 AM       PT Rehab Goals     Row Name 11/17/18 0936             Bed Mobility Goal 1 (PT)    Activity/Assistive Device (Bed Mobility Goal 1, PT)  rolling to left;rolling to right;scooting  -EM      [place] Level/Cues Needed (Bed Mobility Goal 1, PT)  supervision required  -EM      Time Frame (Bed Mobility Goal 1, PT)  3 days  -EM      Progress/Outcomes (Bed Mobility Goal 1, PT)  goal not met  -EM         Transfer Goal 1 (PT)    Activity/Assistive Device (Transfer Goal 1, PT)  sit-to-stand/stand-to-sit;bed-to-chair/chair-to-bed;walker, rolling  -EM      [place] Level/Cues Needed (Transfer Goal 1, PT)  contact guard assist  -EM      Time Frame (Transfer Goal 1, PT)  5 days  -EM      Progress/Outcome (Transfer Goal 1, PT)  goal not met  -EM         Gait Training Goal 1  (PT)    Activity/Assistive Device (Gait Training Goal 1, PT)  gait (walking locomotion);decrease fall risk;assistive device use;walker, rolling  -EM      [place] Level (Gait Training Goal 1, PT)  minimum assist (75% or more patient effort)  -EM      Distance (Gait Goal 1, PT)  5 ft or more  -EM      Time Frame (Gait Training Goal 1, PT)  by discharge  -EM      Progress/Outcome (Gait Training Goal 1, PT)  goal not met  -EM         Wheelchair Locomotion Goal 1 (PT)    Activity (Wheelchair Locomotion Goal 1, PT)  wheelchair mobility skills, all  -EM      [place] Level/Cues Needed (Wheelchair Locomotion Goal 1, PT)  supervision required  -EM      Distance Goal 1 (Wheelchair Locomotion, PT)  25 ft or more  -EM      Time Frame (Wheelchair Locomotion Goal 1, PT)  by discharge  -EM      Progress/Outcome (Wheelchair Locomotion Goal 1, PT)  goal not met  -EM         Stairs Goal 1 (PT)    Activity/Assistive Device (Stairs Goal 1, PT)  ascending stairs;descending stairs;walker, rolling  -EM      [place] Level/Cues Needed (Stairs Goal 1, PT)  moderate assist (50-74% patient effort)  -EM      Number of Stairs (Stairs Goal 1, PT)  2  -EM      Time Frame (Stairs Goal 1, PT)  by discharge  -EM      Progress/Outcome (Stairs Goal 1, PT)  goal not met  -EM        User Key  (r) = Recorded By, (t) = Taken By, (c) = Cosigned By    Initials Name Provider Type Discipline    EM Jimbo Whatley, PTA Physical Therapy Assistant PT          Physical Therapy Education     Title: PT OT SLP Therapies (Active)     Topic: Physical Therapy (Active)     Point: Mobility training (Done)     Learning Progress Summary           Patient Acceptance, E, VU by KENZIE at 11/15/2018  1:04 PM    Comment:  updated goals, POC    Acceptance, E, VU by KENZIE at 10/31/2018  4:01 PM    Comment:  Role of PT, POC, discharge recommendations to SNF for further rehab                               User Key     Initials Effective Dates Name Provider Type Discipline     KW 07/23/18 -  Sepideh Maya, PT Physical Therapist PT                PT Recommendation and Plan  Anticipated Discharge Disposition (PT): skilled nursing facility  Outcome Summary/Treatment Plan (PT)  Daily Summary of Progress (PT): progress toward functional goals as expected  Barriers to Overall Progress (PT): Deconditioning  Plan for Continued Treatment (PT): standing at EOB  Anticipated Discharge Disposition (PT): skilled nursing facility  Outcome Summary: Pt goldie tx well with good participation. Pt t/f sup-sit with SBAx1 using BR and HOB elevated. Pt t/f sit-stand-sit with mod Ax1. Pt performed R stand pivot t/f EOB to recliner with mod Ax1. No goals met this tx. Pt will benefit from SNFplacement upon discharge at this time.   Outcome Measures     Row Name 11/17/18 0936 11/16/18 0822 11/15/18 1101       How much help from another person do you currently need...    Turning from your back to your side while in flat bed without using bedrails?  3  -EM  --  --    Moving from lying on back to sitting on the side of a flat bed without bedrails?  3  -EM  --  --    Moving to and from a bed to a chair (including a wheelchair)?  2  -EM  --  --    Standing up from a chair using your arms (e.g., wheelchair, bedside chair)?  2  -EM  --  --    Climbing 3-5 steps with a railing?  1  -EM  --  --    To walk in hospital room?  1  -EM  --  --    AM-PAC 6 Clicks Score  12  -EM  --  --       How much help from another is currently needed...    Putting on and taking off regular lower body clothing?  --  2  -KD  2  -AS    Bathing (including washing, rinsing, and drying)  --  2  -KD  2  -AS    Toileting (which includes using toilet bed pan or urinal)  --  1  -KD  1  -AS    Putting on and taking off regular upper body clothing  --  3  -KD  3  -AS    Taking care of personal grooming (such as brushing teeth)  --  3  -KD  3  -AS    Eating meals  --  4  -KD  4  -AS    Score  --  15  -KD  15  -AS       Functional Assessment    Outcome  Measure Options  --  --  AM-PAC 6 Clicks Daily Activity (OT)  -AS    Row Name 11/15/18 1100             How much help from another person do you currently need...    Turning from your back to your side while in flat bed without using bedrails?  3  -KW      Moving from lying on back to sitting on the side of a flat bed without bedrails?  3  -KW      Moving to and from a bed to a chair (including a wheelchair)?  2  -KW      Standing up from a chair using your arms (e.g., wheelchair, bedside chair)?  2  -KW      Climbing 3-5 steps with a railing?  1  -KW      To walk in hospital room?  1  -KW      AM-PAC 6 Clicks Score  12  -KW         Functional Assessment    Outcome Measure Options  AM-PAC 6 Clicks Basic Mobility (PT)  -KW        User Key  (r) = Recorded By, (t) = Taken By, (c) = Cosigned By    Initials Name Provider Type    EM Jimbo Whatley PTA Physical Therapy Assistant    KD Isa Cerna, HEARD/L Occupational Therapy Assistant    AS Gardenia Reyes, OT Occupational Therapist    KW Sepideh Maya, PT Physical Therapist         Time Calculation:   PT Charges     Row Name 11/17/18 1158             Time Calculation    Start Time  0936  -EM      Stop Time  1006  -EM      Time Calculation (min)  30 min  -EM         Time Calculation- PT    Total Timed Code Minutes- PT  30 minute(s)  -EM        User Key  (r) = Recorded By, (t) = Taken By, (c) = Cosigned By    Initials Name Provider Type    EM Jimbo Whatley PTA Physical Therapy Assistant        Therapy Suggested Charges     Code   Minutes Charges    None           Therapy Charges for Today     Code Description Service Date Service Provider Modifiers Qty    73223755531 HC PT THER PROC EA 15 MIN 11/17/2018 Jimbo Whatley PTA GP 1    04909623461 HC PT THERAPEUTIC ACT EA 15 MIN 11/17/2018 Jimbo Whatley PTA GP 1          PT G-Codes  PT Professional Judgement Used?: Yes  Outcome Measure Options: AM-PAC 6 Clicks Daily Activity (OT)  AM-PAC 6 Clicks Score: 12  Score:  15  Functional Limitation: Mobility: Walking and moving around  Mobility: Walking and Moving Around Current Status (): At least 60 percent but less than 80 percent impaired, limited or restricted  Mobility: Walking and Moving Around Goal Status (): At least 20 percent but less than 40 percent impaired, limited or restricted    Jimbo Whatley, PTA  11/17/2018

## 2018-11-17 NOTE — PLAN OF CARE
Problem: Patient Care Overview  Goal: Plan of Care Review  Outcome: Ongoing (interventions implemented as appropriate)   11/17/18 4199   Coping/Psychosocial   Plan of Care Reviewed With patient   Plan of Care Review   Progress no change   OTHER   Outcome Summary Pt deferred bathing. Pt did agree to grooming with Cond I. Pt also worked on UB strengthening for independence with ADL's
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
## 2018-11-17 NOTE — PLAN OF CARE
Problem: Patient Care Overview  Goal: Plan of Care Review  Outcome: Ongoing (interventions implemented as appropriate)   11/17/18 0635 11/17/18 1148   Coping/Psychosocial   Plan of Care Reviewed With patient --    Plan of Care Review   Progress no change --    OTHER   Outcome Summary --  Pt goldie tx well with good participation. Pt t/f sup-sit with SBAx1 using BR and HOB elevated. Pt t/f sit-stand-sit with mod Ax1. Pt performed R stand pivot t/f EOB to recliner with mod Ax1. No goals met this tx. Pt will benefit from SNFplacement upon discharge at this time.

## 2018-11-17 NOTE — PROGRESS NOTES
[facility] Medicine Services  INPATIENT PROGRESS NOTE    Length of Stay: 19  Date of Admission: 10/29/2018  Primary Care Physician: Reddy Grant MD    Subjective   Chief Complaint: altered mental status    HPI:    Patient having diarrhea.  Refused his lactulose.  Ammonia is normal.  Feels fine otherwise.   Confusion is getting better    Review of Systems   Respiratory: Negative for shortness of breath.    Cardiovascular: Negative for chest pain.        All pertinent negatives and positives are as above. All other systems have been reviewed and are negative unless otherwise stated.     Objective    Temp:  [96.5 °F (35.8 °C)-98.2 °F (36.8 °C)] 97 °F (36.1 °C)  Heart Rate:  [66-93] 93  Resp:  [16-18] 18  BP: (117-145)/(74-94) 138/94  Physical Exam   Constitutional: He appears well-developed and well-nourished. No distress.   HENT:   Head: Normocephalic and atraumatic.   Cardiovascular: Normal rate.   Pulmonary/Chest: Effort normal. No respiratory distress. He has no wheezes.   Abdominal: Soft. He exhibits no distension.   Musculoskeletal: Normal range of motion. He exhibits no edema.   Neurological: He is alert. No cranial nerve deficit.   Skin: Skin is warm and dry. He is not diaphoretic.   Psychiatric: He has a normal mood and affect. His behavior is normal. Judgment and thought content normal.   Vitals reviewed.          Results Review:  I have reviewed the labs, radiology results, and diagnostic studies.    Laboratory Data:   Lab Results (last 24 hours)     Procedure Component Value Units Date/Time    Ammonia [[id number]]  (Normal) Collected:  11/17/18 0658    Specimen:  Blood Updated:  11/17/18 0739     Ammonia 19 umol/L     Basic Metabolic Panel [[id number]]  (Abnormal) Collected:  11/17/18 0658    Specimen:  Blood Updated:  11/17/18 0739     Glucose 70 mg/dL      BUN 21 mg/dL      Creatinine 1.27 mg/dL      Sodium 135 mmol/L      Potassium 4.3 mmol/L      Chloride 94  mmol/L      CO2 27.0 mmol/L      Calcium 9.2 mg/dL      eGFR Non African Amer 59 mL/min/1.73      BUN/Creatinine Ratio 16.5     Anion Gap 14.0 mmol/L     CBC & Differential [[id number]] Collected:  11/17/18 0658    Specimen:  Blood Updated:  11/17/18 0734    Narrative:       The following orders were created for panel order CBC & Differential.  Procedure                               Abnormality         Status                     ---------                               -----------         ------                     Scan Slide[[id number]]                                                                  CBC Auto Differential[[id number]]        Abnormal            Final result                 Please view results for these tests on the individual orders.    CBC Auto Differential [[id number]]  (Abnormal) Collected:  11/17/18 0658    Specimen:  Blood Updated:  11/17/18 0734     WBC 5.87 10*3/mm3      RBC 2.59 10*6/mm3      Hemoglobin 9.3 g/dL      Hematocrit 27.6 %      .6 fL      MCH 35.9 pg      MCHC 33.7 g/dL      RDW 17.3 %      RDW-SD 66.5 fl      MPV 9.4 fL      Platelets 46 10*3/mm3      Neutrophil % 54.7 %      Lymphocyte % 24.5 %      Monocyte % 9.9 %      Eosinophil % 9.2 %      Basophil % 1.4 %      Immature Grans % 0.3 %      Neutrophils, Absolute 3.21 10*3/mm3      Lymphocytes, Absolute 1.44 10*3/mm3      Monocytes, Absolute 0.58 10*3/mm3      Eosinophils, Absolute 0.54 10*3/mm3      Basophils, Absolute 0.08 10*3/mm3      Immature Grans, Absolute 0.02 10*3/mm3     Protime-INR [[id number]]  (Abnormal) Collected:  11/17/18 0658    Specimen:  Blood Updated:  11/17/18 0733     Protime 27.0 Seconds      INR 2.64    Narrative:       Therapeutic range for most indications is 2.0-3.0 INR,  or 2.5-3.5 for mechanical heart valves.    Fungus Culture - Aspirate, Kidney, Left [[id number]] Collected:  11/09/18 1708    Specimen:  Aspirate from Kidney, Left Updated:  11/16/18 5727     Fungus Culture No fungus isolated at 1  week    AFB Culture - Aspirate, Kidney, Left [[id number]] Collected:  11/09/18 1708    Specimen:  Aspirate from Kidney, Left Updated:  11/16/18 3537     AFB Culture No AFB isolated at 1 week     AFB Stain No acid fast bacilli seen on concentrated smear          Culture Data:   No results found for: BLOODCX  No results found for: URINECX  No results found for: RESPCX  No results found for: WOUNDCX  No results found for: STOOLCX  No components found for: BODYFLD    Radiology Data:   Imaging Results (last 24 hours)     ** No results found for the last 24 hours. **          I have reviewed the patient's current medications.     Assessment/Plan     Active Hospital Problems    Diagnosis   • Altered mental status   • CKD (chronic kidney disease) stage 3, GFR 30-59 ml/min (CMS/HCC)   • Hepatic encephalopathy (CMS/HCC)   • Thrombocytopenia (CMS/HCC)   • Chronic hepatitis (CMS/HCC)     1.  Hepatic Encephalopathy:  Resolved.    2.  Left perinephric abscess:  Drained 11/9.  Culture showed heavy growth of Enterococcus faecium and Candida albicans. on diflucan. Zyvox changed to vancomycin due to thrombocytopenia.   Urology following.  3.  Cirrhosis:  Decompensated.  Has esophageal varices, and a nonfunctioning TIPS shunt.  Continue current treatment.  4.  Anasarca:  Improving. continue lasix  5.  Thrombocytopenia:  Chronic.  Due to liver failure. platelets are stable                Eduar Cisneros MD   11/17/18   10:02 AM

## 2018-11-17 NOTE — PLAN OF CARE
Problem: Patient Care Overview  Goal: Plan of Care Review  Outcome: Ongoing (interventions implemented as appropriate)   11/17/18 0635   Coping/Psychosocial   Plan of Care Reviewed With patient   Plan of Care Review   Progress no change       Problem: Fall Risk (Adult)  Goal: Absence of Fall  Outcome: Ongoing (interventions implemented as appropriate)      Problem: Anemia (Adult)  Goal: Symptom Improvement  Outcome: Ongoing (interventions implemented as appropriate)      Problem: Liver Failure, Acute/Chronic (Adult)  Goal: Signs and Symptoms of Listed Potential Problems Will be Absent, Minimized or Managed (Liver Failure, Acute/Chronic)  Outcome: Ongoing (interventions implemented as appropriate)

## 2018-11-17 NOTE — PROGRESS NOTES
"   LOS: 19 days   Patient Care Team:  Reddy Grant MD as PCP - General (Family Medicine)  Shawn Cortez MD (Inactive) as Surgeon (Orthopedic Surgery)  Tushar Becerril DO as Consulting Physician (Gastroenterology)  Josep Colón MD as Consulting Physician (Nephrology)  Ortiz Ferreira MD as Consulting Physician (Hematology and Oncology)    Subjective     Subjective:  Symptoms:  He reports diarrhea.  (Diarrhea reduced in frequency and severity. No kidney pain, urine clear and yellow. ).    Diet:  Adequate intake.  No nausea or vomiting.    Pain:  He reports pain is unchanged.        History taken from: patient chart RN    Objective     Vital Signs  Temp:  [96.5 °F (35.8 °C)-98.2 °F (36.8 °C)] 97 °F (36.1 °C)  Heart Rate:  [66-93] 93  Resp:  [16-18] 18  BP: (117-145)/(74-94) 138/94    Objective:  General Appearance:  In no acute distress.    Vital signs: (most recent): Blood pressure 138/94, pulse 93, temperature 97 °F (36.1 °C), resp. rate 18, height 182.9 cm (72.01\"), weight 92.3 kg (203 lb 6.4 oz), SpO2 90 %.  Vital signs are normal.  No fever.    Output: Producing urine (Urine clear yellow) and producing stool.    Lungs:  Normal effort and normal respiratory rate.  Breath sounds clear to auscultation.  He is not in respiratory distress.  No stridor.  No decreased breath sounds.    Heart: Normal rate.  Regular rhythm.  S1 normal and S2 normal.  No murmur, gallop or friction rub.   Chest: Symmetric chest wall expansion.   Abdomen: Abdomen is soft and distended.  Bowel sounds are normal.   There is generalized tenderness.     Extremities: There is no dependent edema.    Neurological: Patient is alert and oriented to person, place and time.  GCS score is 15.    Pupils:  Pupils are equal, round, and reactive to light.    Skin:  Warm and dry.  No rash or cyanosis.           Results Review:    Lab Results (last 24 hours)     Procedure Component Value Units Date/Time    Ammonia [[id number]]  (Normal) Collected:  " 11/17/18 0658    Specimen:  Blood Updated:  11/17/18 0739     Ammonia 19 umol/L     Basic Metabolic Panel [[id number]]  (Abnormal) Collected:  11/17/18 0658    Specimen:  Blood Updated:  11/17/18 0739     Glucose 70 mg/dL      BUN 21 mg/dL      Creatinine 1.27 mg/dL      Sodium 135 mmol/L      Potassium 4.3 mmol/L      Chloride 94 mmol/L      CO2 27.0 mmol/L      Calcium 9.2 mg/dL      eGFR Non African Amer 59 mL/min/1.73      BUN/Creatinine Ratio 16.5     Anion Gap 14.0 mmol/L     CBC & Differential [[id number]] Collected:  11/17/18 0658    Specimen:  Blood Updated:  11/17/18 0734    Narrative:       The following orders were created for panel order CBC & Differential.  Procedure                               Abnormality         Status                     ---------                               -----------         ------                     Scan Slide[[id number]]                                                                  CBC Auto Differential[[id number]]        Abnormal            Final result                 Please view results for these tests on the individual orders.    CBC Auto Differential [[id number]]  (Abnormal) Collected:  11/17/18 0658    Specimen:  Blood Updated:  11/17/18 0734     WBC 5.87 10*3/mm3      RBC 2.59 10*6/mm3      Hemoglobin 9.3 g/dL      Hematocrit 27.6 %      .6 fL      MCH 35.9 pg      MCHC 33.7 g/dL      RDW 17.3 %      RDW-SD 66.5 fl      MPV 9.4 fL      Platelets 46 10*3/mm3      Neutrophil % 54.7 %      Lymphocyte % 24.5 %      Monocyte % 9.9 %      Eosinophil % 9.2 %      Basophil % 1.4 %      Immature Grans % 0.3 %      Neutrophils, Absolute 3.21 10*3/mm3      Lymphocytes, Absolute 1.44 10*3/mm3      Monocytes, Absolute 0.58 10*3/mm3      Eosinophils, Absolute 0.54 10*3/mm3      Basophils, Absolute 0.08 10*3/mm3      Immature Grans, Absolute 0.02 10*3/mm3     Protime-INR [[id number]]  (Abnormal) Collected:  11/17/18 0658    Specimen:  Blood Updated:  11/17/18 0733     Protime  27.0 Seconds      INR 2.64    Narrative:       Therapeutic range for most indications is 2.0-3.0 INR,  or 2.5-3.5 for mechanical heart valves.    Fungus Culture - Aspirate, Kidney, Left [[id number]] Collected:  11/09/18 1708    Specimen:  Aspirate from Kidney, Left Updated:  11/16/18 1745     Fungus Culture No fungus isolated at 1 week    AFB Culture - Aspirate, Kidney, Left [[id number]] Collected:  11/09/18 1708    Specimen:  Aspirate from Kidney, Left Updated:  11/16/18 1745     AFB Culture No AFB isolated at 1 week     AFB Stain No acid fast bacilli seen on concentrated smear         Imaging Results (last 24 hours)     Procedure Component Value Units Date/Time    CT Abdomen Pelvis With Contrast [[id number]] Collected:  11/05/18 1815     Updated:  11/05/18 1857    Narrative:         EXAMINATION:  Computed Tomography           REGION:    Abdomen / Pelvis                     INDICATION:   Repeat to evaluate hematoma/abscess of kidney per  urology, R41.82 Altered mental status, unspecified K72.90 Hepatic  failure, unspecified without coma Z74.09 Other reduced mobility  Z74.09 Other reduced mobility    HISTORY:  LISA. IMAGING:    CT A/P 11/2/18            TECHNIQUE:      - reconstructions:    axial, coronal, sagittal         - contrast:      oral:  No ;   intravenous: Isovue 300, 100 mL  This exam was performed according to the departmental  dose-optimization program which includes automated exposure  control, adjustment of the mA and/or kV according to patient size  and/or use of iterative reconstruction technique.           COMMENTS:            - - - CT ABDOMEN - - -          THORAX (INFERIOR):      - LUNG BASES:  clear      - PLEURA:    no fluid or mass      - HEART:    normal size, no pericardial fluid     - MISC:      n/a          ABDOMEN:     - LIVER:    Diminished size with nodular contour containing a  tips shunt   - GB:      Cholelithiasis without evidence of wall thickening.    - CBD:      grossly negative   -  SPLEEN:    Upper normal/mildly enlarged   - PANCREAS:    normal in size, contour, no focal mass    - VISCERA:    normal caliber, no wall thickening     - MESENTERY:  no mesenteric mass   - CAVITY:    Small amount of free fluid   - BODY WALL:  wnl   - OSSEOUS:    grossly negative for age   - MISC:                 Extensive gastric varices                      RETROPERITONEUM:   - KIDNEYS:    Renal examination again displays overall normal  size and symmetric nephrograms. Again noted is a left pararenal  enhancing focal fluid collection containing air which is  unchanged in size measuring 2.5 x 3.6 x 2.0 cm   - URETERS:    normal course, caliber   - ADRENALS:    normal size, contour   - MISC:      no sig retroperitoneal adenopathy or mass   - VASCULAR:    aorta / iliacs: wnl for age     - - - CT PELVIS - - -      - VISCERA:    normal caliber small/large bowel, no focal  thickening/mass        - APPENDIX:          wnl   - MESENTERY:  no mass   - VASCULAR:    wnl for age   - CAVITY:    Moderate amount of free fluid   - BLADDER:    unremarkable   - OSSEOUS:    Status post left hip repair    - MISC:     .       Impression:        CONCLUSION:  1. Unchanged left pararenal focal fluid collection with  peripheral enhancement containing air, likely representing an  abscess.  2. Multiple additional findings demonstrating the presence of  cirrhosis status post TIPS shunt, with spleen size at the upper  limits of normal/mildly enlarged, and extensive gastric varices,  as above, unchanged.        Electronically signed by:  PRISCILLA Bustillo MD  11/5/2018 6:56  PM CST Workstation: [id number]           I reviewed the patient's new clinical results.  I reviewed the patient's new imaging results and agree with the interpretation.  I reviewed the patient's other test results and agree with the interpretation      Assessment/Plan       Chronic hepatitis (CMS/HCC)    Thrombocytopenia (CMS/HCC)    Hepatic encephalopathy (CMS/HCC)    CKD  (chronic kidney disease) stage 3, GFR 30-59 ml/min (CMS/[facility])    Altered mental status      Assessment & Plan    1. Difficulty urinating with hematuria, likely related to UTI hemorrhagic cystitis without clots-->hematuria resolved  2. Perinephric abscess  -CT guided drainage of left perinephric abscess 11/9/18-->Enterococcus faecium and candida  -Day #7 of antibiotic therapy (vancomycin), Day #3 (Diflucan)    -Estimated Creatinine Clearance: 86.8 mL/min (by C-G formula based on SCr of 1.27 mg/dL).  -Cr 1.27  -WBC 10.08-->7.26-->6.19-->5.87  -UA + 10/30/18  -Hgb/Hct 8.9/26.0-->8.6/24.9-->8.4/24.3-->9.3/27.6  -Platelets 40,000-->29,000-->46,000-->46,000  -INR 2.88-->2.64  -Renal ultrasound 10/30/18 limited exam, especially of the left kidney, due to the presence of bowel gas and the patient's body habitus. No hydronephrosis is visualized. Unremarkable bladder.   -11/2/18 CT abdomen/pelvis LEFT 2.3 x 3.7 x 2.2 cm fluid and air collection infected subcapsular hematoma versus abscess.  -11/5/18 CT abdomen/pelvis showed LEFT renal abscess unchanged  -10/30/18 Urine culture NEGATIVE, UA grossly positive.     Plan:    Continue current care.     KALEB Alexander  11/17/18  11:06 AM

## 2018-11-18 NOTE — THERAPY TREATMENT NOTE
Acute Care - Physical Therapy Treatment Note  [facility]     Patient Name: Armando Mcmullen Sr.  : 1965  MRN: [id number]  Today's Date: 2018  Onset of Illness/Injury or Date of Surgery: 10/29/18  Date of Referral to PT: 18  Referring Physician: SARI Franco MD    Admit Date: 10/29/2018    Visit Dx:    ICD-10-CM ICD-9-CM   1. Altered mental status, unspecified altered mental status type R41.82 780.97   2. Hepatic encephalopathy (CMS/HCC) K72.90 572.2   3. Impaired functional mobility, balance, gait, and endurance Z74.09 V49.89   4. Impaired mobility and ADLs Z74.09 799.89     Patient Active Problem List   Diagnosis   • Anxiety state   • Back pain   • Chronic hepatitis (CMS/HCC)   • Depression   • Substance abuse (CMS/HCC)   • Liver failure with hepatic coma (CMS/HCC)   • Hypersplenism   • Chronic osteomyelitis of right shoulder region (CMS/HCC)   • Thrombocytopenia (CMS/HCC)   • Anemia of chronic disease   • Positive hepatitis C antibody test   • BMI 35.0-35.9,adult   • Tobacco use   • Hepatic encephalopathy (CMS/HCC)   • Anxiety and depression   • CKD (chronic kidney disease) stage 3, GFR 30-59 ml/min (CMS/HCC)   • Ascites   • Physical deconditioning   • Closed fracture of lumbar vertebra with spinal cord injury (CMS/HCC)   • Altered mental status       Therapy Treatment    Rehabilitation Treatment Summary     Row Name 18 1019             Treatment Time/Intention    Discipline  physical therapy assistant  -EM      Document Type  therapy note (daily note)  -EM      Subjective Information  complains of;nausea/vomiting  -EM      Mode of Treatment  physical therapy;individual therapy  -EM      Comment  Pt declined OOB, and OOB to chair stating that his stomach is upset and he is very nauseated. Pt agreeable to therex in sup only.  -EM      Existing Precautions/Restrictions  fall  -EM      Recorded by [EM] Jimbo Whatley, PTA 18 6874      Row Name 18 0924             Vital Signs     Pre Systolic BP Rehab  156  -RC      Pre Treatment Diastolic BP  93  -RC      Post Systolic BP Rehab  163  -RC2      Post Treatment Diastolic BP  99  -RC2      Pretreatment Resp Rate (breaths/min)  72  -RC2      Recorded by [RC] Eunice Bustamante, HEARD/L 11/18/18 0929  [RC2] Eunice Bustamante HEARD/L 11/18/18 0949      Row Name 11/18/18 1019             Cognitive Assessment/Intervention- PT/OT    Affect/Mental Status (Cognitive)  WFL  -EM      Orientation Status (Cognition)  oriented x 4  -EM      Follows Commands (Cognition)  WFL  -EM      Cognitive Function (Cognitive)  WFL  -EM      Memory Deficit (Cognitive)  mild deficit  -EM      Safety Deficit (Cognitive)  mild deficit  -EM      Personal Safety Interventions  fall prevention program maintained;gait belt;supervised activity;nonskid shoes/slippers when out of bed  -EM      Recorded by [EM] Jimbo Whatley, PTA 11/18/18 1154      Row Name 11/18/18 1019             Therapeutic Exercise    Lower Extremity (Therapeutic Exercise)  gluteal sets;SLR (straight leg raise), bilateral;SAQ (short arc quad), bilateral;quad sets, bilateral;other (see comments) Sup Hip Abd, R AP  -EM      Exercise Type (Therapeutic Exercise)  AROM (active range of motion)  -EM      Position (Therapeutic Exercise)  supine  -EM      Sets/Reps (Therapeutic Exercise)  1x30  -EM      Recorded by [EM] Jimbo Whatley, PTA 11/18/18 1154      Row Name 11/18/18 1019             Positioning and Restraints    Pre-Treatment Position  in bed  -EM      Post Treatment Position  bed  -EM      In Bed  supine;call light within reach;encouraged to call for assist;exit alarm on  -EM      Recorded by [EM] Jimbo Whatley, PTA 11/18/18 1154      Row Name 11/18/18 1019             Pain Scale: Numbers Pre/Post-Treatment    Pain Scale: Numbers, Pretreatment  0/10 - no pain  -EM      Pain Scale: Numbers, Post-Treatment  0/10 - no pain  -EM      Recorded by [EM] Jimbo Whatley, PTA 11/18/18 1154      Row Name                 Wound 10/30/18 1020 coccyx    Wound - Properties Group Date first assessed: 10/30/18 [AS] Time first assessed: 1020 [AS] Present On Admission : picture taken [AS] Location: coccyx [AS] Recorded by:  [AS] Yokasta Heart, RN 10/30/18 1021    Row Name                Wound 11/09/18 1712 Left posterior flank    Wound - Properties Group Date first assessed: 11/09/18 [FAMILIA] Time first assessed: 1712 [FAMILIA] Side: Left [FAMILIA] Orientation: posterior [FAMILIA] Location: flank [FAMILIA] Additional Comments: Drainage catheter puncture.  [FAMILIA] Recorded by:  [FAMILIA] Ev Alas RN 11/09/18 1713    Row Name 11/18/18 1019             Outcome Summary/Treatment Plan (PT)    Daily Summary of Progress (PT)  progress toward functional goals as expected  -EM      Plan for Continued Treatment (PT)  Cont OOB to chair  -EM      Anticipated Discharge Disposition (PT)  skilled nursing facility  -EM      Recorded by [EM] Jimbo Whatley, PTA 11/18/18 1154        User Key  (r) = Recorded By, (t) = Taken By, (c) = Cosigned By    Initials Name Effective Dates Discipline    EM Jimbo Whatley, PTA 08/11/15 -  PT    Ev Swartz RN 10/17/16 -  Nurse    RC Eunice Bustamante HEARD/L 03/07/18 -  OT    AS Yokasta Heart, RN 12/13/16 -  Nurse          Wound 10/30/18 1020 coccyx (Active)   Dressing Appearance open to air 11/17/2018  9:00 PM   Closure Open to air 11/17/2018  9:00 PM   Periwound intact;blanchable 11/17/2018  9:00 PM   Care, Wound barrier applied 11/17/2018  9:00 PM       Wound 11/09/18 1712 Left posterior flank (Active)   Dressing Appearance open to air 11/17/2018  9:00 PM   Closure Open to air 11/17/2018  9:00 PM   Base closed/resurfaced;pink 11/17/2018  9:00 PM   Periwound intact 11/17/2018  9:00 PM       PT Rehab Goals     Row Name 11/18/18 1019             Bed Mobility Goal 1 (PT)    Activity/Assistive Device (Bed Mobility Goal 1, PT)  rolling to left;rolling to right;scooting  -EM      Bond Level/Cues Needed (Bed Mobility  Goal 1, PT)  supervision required  -EM      Time Frame (Bed Mobility Goal 1, PT)  3 days  -EM      Progress/Outcomes (Bed Mobility Goal 1, PT)  goal not met  -EM         Transfer Goal 1 (PT)    Activity/Assistive Device (Transfer Goal 1, PT)  sit-to-stand/stand-to-sit;bed-to-chair/chair-to-bed;walker, rolling  -EM      [place] Level/Cues Needed (Transfer Goal 1, PT)  contact guard assist  -EM      Time Frame (Transfer Goal 1, PT)  5 days  -EM      Progress/Outcome (Transfer Goal 1, PT)  goal not met  -EM         Gait Training Goal 1 (PT)    Activity/Assistive Device (Gait Training Goal 1, PT)  gait (walking locomotion);decrease fall risk;assistive device use;walker, rolling  -EM      [place] Level (Gait Training Goal 1, PT)  minimum assist (75% or more patient effort)  -EM      Distance (Gait Goal 1, PT)  5 ft or more  -EM      Time Frame (Gait Training Goal 1, PT)  by discharge  -EM      Progress/Outcome (Gait Training Goal 1, PT)  goal not met  -EM         Wheelchair Locomotion Goal 1 (PT)    Activity (Wheelchair Locomotion Goal 1, PT)  wheelchair mobility skills, all  -EM      [place] Level/Cues Needed (Wheelchair Locomotion Goal 1, PT)  supervision required  -EM      Distance Goal 1 (Wheelchair Locomotion, PT)  25 ft or more  -EM      Time Frame (Wheelchair Locomotion Goal 1, PT)  by discharge  -EM      Progress/Outcome (Wheelchair Locomotion Goal 1, PT)  goal not met  -EM         Stairs Goal 1 (PT)    Activity/Assistive Device (Stairs Goal 1, PT)  ascending stairs;descending stairs;walker, rolling  -EM      [place] Level/Cues Needed (Stairs Goal 1, PT)  moderate assist (50-74% patient effort)  -EM      Number of Stairs (Stairs Goal 1, PT)  2  -EM      Time Frame (Stairs Goal 1, PT)  by discharge  -EM      Progress/Outcome (Stairs Goal 1, PT)  goal not met  -EM        User Key  (r) = Recorded By, (t) = Taken By, (c) = Cosigned By    Initials Name Provider Type Discipline    EM Jimbo Whatley  PTA Physical Therapy Assistant PT          Physical Therapy Education     Title: PT OT SLP Therapies (Active)     Topic: Physical Therapy (Active)     Point: Mobility training (Done)     Learning Progress Summary           Patient Acceptance, E, VU by KENZIE at 11/15/2018  1:04 PM    Comment:  updated goals, POC    Acceptance, ANGELA, VU by KENZIE at 10/31/2018  4:01 PM    Comment:  Role of PT, POC, discharge recommendations to SNF for further rehab                               User Key     Initials Effective Dates Name Provider Type Discipline    KENZIE 07/23/18 -  Sepideh Maya, PT Physical Therapist PT                PT Recommendation and Plan  Anticipated Discharge Disposition (PT): skilled nursing facility  Outcome Summary/Treatment Plan (PT)  Daily Summary of Progress (PT): progress toward functional goals as expected  Barriers to Overall Progress (PT): Deconditioning  Plan for Continued Treatment (PT): Cont OOB to chair  Anticipated Discharge Disposition (PT): skilled nursing facility  Outcome Summary: Pt declined OOB to chair due to nausea and upset stomach. Pt was agreeable and performed supine therex 1x30 ea. No goals met this tx.   Outcome Measures     Row Name 11/18/18 1019 11/17/18 1020 11/17/18 0936       How much help from another person do you currently need...    Turning from your back to your side while in flat bed without using bedrails?  3  -EM  --  3  -EM    Moving from lying on back to sitting on the side of a flat bed without bedrails?  3  -EM  --  3  -EM    Moving to and from a bed to a chair (including a wheelchair)?  2  -EM  --  2  -EM    Standing up from a chair using your arms (e.g., wheelchair, bedside chair)?  2  -EM  --  2  -EM    Climbing 3-5 steps with a railing?  1  -EM  --  1  -EM    To walk in hospital room?  1  -EM  --  1  -EM    AM-PAC 6 Clicks Score  12  -EM  --  12  -EM       How much help from another is currently needed...    Putting on and taking off regular lower body clothing?  --  2   -LW  --    Bathing (including washing, rinsing, and drying)  --  2  -LW  --    Toileting (which includes using toilet bed pan or urinal)  --  1  -LW  --    Putting on and taking off regular upper body clothing  --  3  -LW  --    Taking care of personal grooming (such as brushing teeth)  --  3  -LW  --    Eating meals  --  4  -LW  --    Score  --  15  -LW  --       Functional Assessment    Outcome Measure Options  AM-PAC 6 Clicks Daily Activity (OT)  -EM  --  --    Row Name 11/16/18 0822             How much help from another is currently needed...    Putting on and taking off regular lower body clothing?  2  -KD      Bathing (including washing, rinsing, and drying)  2  -KD      Toileting (which includes using toilet bed pan or urinal)  1  -KD      Putting on and taking off regular upper body clothing  3  -KD      Taking care of personal grooming (such as brushing teeth)  3  -KD      Eating meals  4  -KD      Score  15  -KD        User Key  (r) = Recorded By, (t) = Taken By, (c) = Cosigned By    Initials Name Provider Type    EM Jimbo Whatley PTA Physical Therapy Assistant    KD Isa Cerna, HEARD/L Occupational Therapy Assistant    Jen Murrieta, HEARD/L Occupational Therapy Assistant         Time Calculation:   PT Charges     Row Name 11/18/18 1157             Time Calculation    Start Time  1019  -EM      Stop Time  1034  -EM      Time Calculation (min)  15 min  -EM         Time Calculation- PT    Total Timed Code Minutes- PT  15 minute(s)  -EM        User Key  (r) = Recorded By, (t) = Taken By, (c) = Cosigned By    Initials Name Provider Type    EM Jimbo Whatley PTA Physical Therapy Assistant        Therapy Suggested Charges     Code   Minutes Charges    None           Therapy Charges for Today     Code Description Service Date Service Provider Modifiers Qty    90444374646 HC PT THER PROC EA 15 MIN 11/17/2018 Jimbo Whatley PTA GP 1    19577377240 HC PT THERAPEUTIC ACT EA 15 MIN 11/17/2018 [place]  Jimbo CORMIER, PTA GP 1    [id number]  PT THER PROC EA 15 MIN 11/18/2018 Jimbo Whatley, PTA GP 1          PT G-Codes  PT Professional Judgement Used?: Yes  Outcome Measure Options: AM-PAC 6 Clicks Daily Activity (OT)  AM-PAC 6 Clicks Score: 12  Score: 15  Functional Limitation: Mobility: Walking and moving around  Mobility: Walking and Moving Around Current Status (): At least 60 percent but less than 80 percent impaired, limited or restricted  Mobility: Walking and Moving Around Goal Status (): At least 20 percent but less than 40 percent impaired, limited or restricted    Jimbo Whatley, JULITA  11/18/2018

## 2018-11-18 NOTE — PLAN OF CARE
Problem: Patient Care Overview  Goal: Plan of Care Review  Outcome: Ongoing (interventions implemented as appropriate)   11/17/18 0552   Coping/Psychosocial   Plan of Care Reviewed With patient   Plan of Care Review   Progress no change   OTHER   Outcome Summary Patient resting well in bed, swelling decreasing. Patient did refuse to take evening dose of lactulose because he said it was making him nauseated. Continue to monitor labs and vitals      Goal: Individualization and Mutuality  Outcome: Ongoing (interventions implemented as appropriate)    Goal: Discharge Needs Assessment  Outcome: Ongoing (interventions implemented as appropriate)      Problem: Fall Risk (Adult)  Goal: Absence of Fall  Outcome: Ongoing (interventions implemented as appropriate)      Problem: Skin Injury Risk (Adult)  Goal: Skin Health and Integrity  Outcome: Ongoing (interventions implemented as appropriate)      Problem: Anemia (Adult)  Goal: Symptom Improvement  Outcome: Ongoing (interventions implemented as appropriate)      Problem: Liver Failure, Acute/Chronic (Adult)  Goal: Signs and Symptoms of Listed Potential Problems Will be Absent, Minimized or Managed (Liver Failure, Acute/Chronic)  Outcome: Ongoing (interventions implemented as appropriate)

## 2018-11-18 NOTE — PROGRESS NOTES
"    Pharmacokinetics by Pharmacy - Vancomycin    Armando Mcmullen Sr. is a 53 y.o. male  [Ht: 182.9 cm (72.01\"); Wt: 92.6 kg (204 lb 3.2 oz)]    Estimated Creatinine Clearance: 84.4 mL/min (A) (by C-G formula based on SCr of 1.31 mg/dL (H)).   Lab Results   Component Value Date    CREATININE 1.31 (H) 11/18/2018    CREATININE 1.27 11/17/2018    CREATININE 1.27 11/16/2018      Lab Results   Component Value Date    WBC 6.58 11/18/2018    WBC 5.87 11/17/2018    WBC 6.19 11/16/2018      Temp Readings from Last 1 Encounters:   11/18/18 98 °F (36.7 °C)      Lab Results   Component Value Date    VANCOPEAK 48.40 (H) 11/17/2018    VANCOTROUGH 27.17 (H) 11/18/2018    VANCORANDOM 18.37 06/18/2017        Lab Results   Component Value Date    CRP 6.10 (H) 08/13/2018    CRP 7.10 (H) 08/12/2018    CRP 8.00 (H) 08/11/2018    LACTATE 2.4 (C) 10/31/2018    LACTATE 2.2 (C) 10/29/2018    LACTATE 2.7 (C) 10/29/2018       Culture Results:  Microbiology Results (last 10 days)       Procedure Component Value - Date/Time    Body Fluid Culture - Aspirate, Kidney, Left [[id number]]  (Abnormal)  (Susceptibility) Collected:  11/09/18 1708    Lab Status:  Final result Specimen:  Aspirate from Kidney, Left Updated:  11/14/18 1149     BF Culture Heavy growth (4+) Enterococcus faecium      Candida albicans     Gram Stain Many (4+) WBCs seen      Many (4+) Gram positive cocci      Rare (1+) Yeast    Narrative:       Slide reviewed confirmed    Susceptibility        Enterococcus faecium     NERI     Ampicillin Resistant     Gentamicin High Level Synergy Susceptible     Vancomycin Susceptible                      Anaerobic Culture - Aspirate, Kidney [[id number]] Collected:  11/09/18 1708    Lab Status:  Final result Specimen:  Aspirate from Kidney Updated:  11/11/18 1701     Culture No anaerobes isolated at 2 days    Fungus Culture - Aspirate, Kidney, Left [[id number]]  (Abnormal) Collected:  11/09/18 1708    Lab Status:  Final result Specimen:  Aspirate from " Kidney, Left Updated:  11/18/18 1230     Fungus Culture Heavy growth (4+) Candida albicans    AFB Culture - Aspirate, Kidney, Left [[id number]] Collected:  11/09/18 1708    Lab Status:  Preliminary result Specimen:  Aspirate from Kidney, Left Updated:  11/16/18 1745     AFB Culture No AFB isolated at 1 week     AFB Stain No acid fast bacilli seen on concentrated smear    KELSIE Prep - Swab, [[id number]]  (Normal) Collected:  11/09/18 1708    Lab Status:  Final result Specimen:  Aspirate from Kidney, Left Updated:  11/09/18 1838     KOH Prep No yeast or hyphal elements seen               Indication for use: perinephric abscess (E. Faecium)       Current Vancomycin Dose:  1500 mg IVPB every 24 hours, day 4 of therapy ( restarted after 1 day of zyvox, 3 previous vanc days)      Assessment/Plan:  Reviewed above labs and cultures. Pt is not clearing Vancomycin as you would expect from his CrCl.  Vancomycin peak level from 11/17 at 1413 is 48.40 (goal 30-40). Vancomycin trough level from 11/18 at 1143 is 27.17 (goal 10-20). Lab levels were drawn at the correct time. Will hold current dose. Will order a random level in the am at 0600. Calculated half life is 25.81. Dose likely needs to be 1250 mg every 36 hours. Cr did increase to 1.31. WBC is 6.58, at goal range.  Calculated AUC is 505.09, trough is 12.74 and peak is 32.18, with 1250 mg every 36 hours. Pharmacy will continue to monitor renal function and adjust dose accordingly.    Jenny Adamson [facility]   11/18/18 12:39 PM

## 2018-11-18 NOTE — PLAN OF CARE
Problem: Patient Care Overview  Goal: Plan of Care Review  Outcome: Ongoing (interventions implemented as appropriate)   11/18/18 1702   Coping/Psychosocial   Plan of Care Reviewed With patient   Plan of Care Review   Progress no change   OTHER   Outcome Summary pt c/o nausea and Zofran was given twice      Goal: Interprofessional Rounds/Family Conf  Outcome: Ongoing (interventions implemented as appropriate)      Problem: Fall Risk (Adult)  Goal: Absence of Fall  Outcome: Ongoing (interventions implemented as appropriate)      Problem: Anemia (Adult)  Goal: Symptom Improvement  Outcome: Ongoing (interventions implemented as appropriate)      Problem: Liver Failure, Acute/Chronic (Adult)  Goal: Signs and Symptoms of Listed Potential Problems Will be Absent, Minimized or Managed (Liver Failure, Acute/Chronic)  Outcome: Ongoing (interventions implemented as appropriate)

## 2018-11-18 NOTE — PLAN OF CARE
Problem: Patient Care Overview  Goal: Plan of Care Review  Outcome: Ongoing (interventions implemented as appropriate)   11/18/18 0322   Coping/Psychosocial   Plan of Care Reviewed With patient   Plan of Care Review   Progress no change   OTHER   Outcome Summary ammonia level remaining WNL, recheck this AM. patient refused night time dose of lactulose as well. VSS        Problem: Fall Risk (Adult)  Goal: Absence of Fall  Outcome: Ongoing (interventions implemented as appropriate)      Problem: Liver Failure, Acute/Chronic (Adult)  Goal: Signs and Symptoms of Listed Potential Problems Will be Absent, Minimized or Managed (Liver Failure, Acute/Chronic)  Outcome: Ongoing (interventions implemented as appropriate)

## 2018-11-18 NOTE — PLAN OF CARE
Problem: Patient Care Overview  Goal: Plan of Care Review  Outcome: Ongoing (interventions implemented as appropriate)   11/18/18 0322 11/18/18 3876   Coping/Psychosocial   Plan of Care Reviewed With patient --    Plan of Care Review   Progress no change --    OTHER   Outcome Summary --  Pt declined OOB to chair due to nausea and upset stomach. Pt was agreeable and performed supine therex 1x30 ea. No goals met this tx.

## 2018-11-18 NOTE — PLAN OF CARE
Problem: Patient Care Overview  Goal: Plan of Care Review   11/18/18 1303   Coping/Psychosocial   Plan of Care Reviewed With patient   Plan of Care Review   Progress no change   OTHER   Outcome Summary pt declined oob or bathing, he was agreeable to ue ex w/ l ue only. cont poc

## 2018-11-18 NOTE — PROGRESS NOTES
[facility]  INPATIENT PROGRESS NOTE    Length of Stay: 20  Date of Admission: 10/29/2018  Primary Care Physician: Reddy Grant MD    Subjective   Chief Complaint: nausea  HPI:    Has some nausea today.  Reports mild diarrhea but improving.  Refused his lactulose due to diarrhea    Review of Systems   Respiratory: Negative for shortness of breath.         All pertinent negatives and positives are as above. All other systems have been reviewed and are negative unless otherwise stated.     Objective    Temp:  [97.3 °F (36.3 °C)-98.1 °F (36.7 °C)] 98 °F (36.7 °C)  Heart Rate:  [69-82] 74  Resp:  [18] 18  BP: (116-162)/(78-94) 133/87  Physical Exam   Constitutional: He appears well-developed and well-nourished. No distress.   HENT:   Head: Normocephalic and atraumatic.   Cardiovascular: Normal rate.   Pulmonary/Chest: Effort normal. No respiratory distress. He has no wheezes.   Abdominal: Soft. He exhibits no distension.   Musculoskeletal: Normal range of motion. He exhibits no edema.   Neurological: He is alert. No cranial nerve deficit.   Skin: Skin is warm and dry. He is not diaphoretic.   Psychiatric: He has a normal mood and affect. His behavior is normal. Judgment and thought content normal.   Vitals reviewed.          Results Review:  I have reviewed the labs, radiology results, and diagnostic studies.    Laboratory Data:   Lab Results (last 24 hours)     Procedure Component Value Units Date/Time    Fungus Culture - Aspirate, Kidney, Left [[id number]]  (Abnormal) Collected:  11/09/18 1708    Specimen:  Aspirate from Kidney, Left Updated:  11/18/18 1230     Fungus Culture Heavy growth (4+) Candida albicans    Vancomycin, Trough [[id number]]  (Abnormal) Collected:  11/18/18 1143    Specimen:  Blood Updated:  11/18/18 1213     Vancomycin Trough 27.17 mcg/mL     CBC & Differential [[id number]] Collected:  11/18/18 0608    Specimen:  Blood Updated:  11/18/18  0702    Narrative:       The following orders were created for panel order CBC & Differential.  Procedure                               Abnormality         Status                     ---------                               -----------         ------                     Scan Slide[[id number]]                                       Final result               CBC Auto Differential[[id number]]        Abnormal            Final result                 Please view results for these tests on the individual orders.    Scan Slide [[id number]] Collected:  11/18/18 0608    Specimen:  Blood Updated:  11/18/18 0702     RBC Morphology Normal     WBC Morphology Normal     Platelet Estimate Decreased    Protime-INR [[id number]]  (Abnormal) Collected:  11/18/18 0608    Specimen:  Blood Updated:  11/18/18 0636     Protime 25.5 Seconds      INR 2.45    Narrative:       Therapeutic range for most indications is 2.0-3.0 INR,  or 2.5-3.5 for mechanical heart valves.    Basic Metabolic Panel [[id number]]  (Abnormal) Collected:  11/18/18 0608    Specimen:  Blood Updated:  11/18/18 0635     Glucose 65 mg/dL      BUN 23 mg/dL      Creatinine 1.31 mg/dL      Sodium 136 mmol/L      Potassium 4.4 mmol/L      Chloride 95 mmol/L      CO2 26.0 mmol/L      Calcium 9.4 mg/dL      eGFR Non African Amer 57 mL/min/1.73      BUN/Creatinine Ratio 17.6     Anion Gap 15.0 mmol/L     Ammonia [[id number]]  (Normal) Collected:  11/18/18 0608    Specimen:  Blood Updated:  11/18/18 0635     Ammonia 27 umol/L     CBC Auto Differential [[id number]]  (Abnormal) Collected:  11/18/18 0608    Specimen:  Blood Updated:  11/18/18 0620     WBC 6.58 10*3/mm3      RBC 2.37 10*6/mm3      Hemoglobin 8.4 g/dL      Hematocrit 24.8 %      .6 fL      MCH 35.4 pg      MCHC 33.9 g/dL      RDW 17.0 %      RDW-SD 63.8 fl      MPV 10.6 fL      Platelets 32 10*3/mm3      Neutrophil % 52.4 %      Lymphocyte % 26.6 %      Monocyte % 11.6 %      Eosinophil % 7.9 %      Basophil % 1.2 %       Immature Grans % 0.3 %      Neutrophils, Absolute 3.45 10*3/mm3      Lymphocytes, Absolute 1.75 10*3/mm3      Monocytes, Absolute 0.76 10*3/mm3      Eosinophils, Absolute 0.52 10*3/mm3      Basophils, Absolute 0.08 10*3/mm3      Immature Grans, Absolute 0.02 10*3/mm3      nRBC 0.0 /100 WBC     Vancomycin, Peak [[id number]]  (Abnormal) Collected:  11/17/18 1413    Specimen:  Blood Updated:  11/17/18 1448     Vancomycin Peak 48.40 mcg/mL           Culture Data:   No results found for: BLOODCX  No results found for: URINECX  No results found for: RESPCX  No results found for: WOUNDCX  No results found for: STOOLCX  No components found for: BODYFLD    Radiology Data:   Imaging Results (last 24 hours)     ** No results found for the last 24 hours. **          I have reviewed the patient's current medications.     Assessment/Plan     Active Hospital Problems    Diagnosis   • Altered mental status   • CKD (chronic kidney disease) stage 3, GFR 30-59 ml/min (CMS/HCC)   • Hepatic encephalopathy (CMS/HCC)   • Thrombocytopenia (CMS/HCC)   • Chronic hepatitis (CMS/HCC)     1.  Hepatic Encephalopathy:  Resolved.  patient refusing lactulose for past 2 days.  Encouraged to take it since ammonia is increasing   2.  Left perinephric abscess:  Drained 11/9.  Culture showed heavy growth of Enterococcus faecium and Candida albicans. on diflucan. Zyvox changed to vancomycin due to thrombocytopenia.   Urology following.  3.  Cirrhosis:  Decompensated.  Has esophageal varices, and a nonfunctioning TIPS shunt.  Continue current treatment.  4.  Anasarca:  Improving. continue lasix  5.  Thrombocytopenia:  Chronic.  Due to liver failure. platelets lower today.  Will monitor                Eduar Cisneros MD   11/18/18   12:55 PM
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
## 2018-11-19 PROBLEM — D64.9 CHRONIC ANEMIA: Chronic | Status: ACTIVE | Noted: 2018-01-01

## 2018-11-19 PROBLEM — B18.2 CHRONIC HEPATITIS C (HCC): Chronic | Status: ACTIVE | Noted: 2018-01-01

## 2018-11-19 PROBLEM — K76.82 HEPATIC ENCEPHALOPATHY (HCC): Chronic | Status: ACTIVE | Noted: 2018-01-01

## 2018-11-19 PROBLEM — N15.1 PERINEPHRIC ABSCESS: Chronic | Status: ACTIVE | Noted: 2018-01-01

## 2018-11-19 NOTE — PROGRESS NOTES
"   LOS: 21 days   Patient Care Team:  Reddy Grant MD as PCP - General (Family Medicine)  Shawn Cortez MD (Inactive) as Surgeon (Orthopedic Surgery)  Tushar Becerril DO as Consulting Physician (Gastroenterology)  Josep Colón MD as Consulting Physician (Nephrology)  Ortiz Ferreira MD as Consulting Physician (Hematology and Oncology)    Subjective     Subjective:  Symptoms:  No diarrhea.  (Some nausea today. Denies hematuria, no flank pain. ).    Diet:  He reports  nausea.  No vomiting.    Pain:  He reports pain is unchanged.        History taken from: patient chart RN    Objective     Vital Signs  Temp:  [97.2 °F (36.2 °C)-98.7 °F (37.1 °C)] 97.9 °F (36.6 °C)  Heart Rate:  [72-87] 80  Resp:  [16-18] 18  BP: (121-160)/() 156/108    Objective:  General Appearance:  In no acute distress.    Vital signs: (most recent): Blood pressure (!) 156/108, pulse 80, temperature 97.9 °F (36.6 °C), resp. rate 18, height 182.9 cm (72.01\"), weight 93.4 kg (206 lb), SpO2 92 %.  Vital signs are normal.  No fever.    Output: Producing urine (Urine clear yellow) and producing stool.    Lungs:  Normal effort and normal respiratory rate.  Breath sounds clear to auscultation.  He is not in respiratory distress.  No stridor.  No decreased breath sounds.    Heart: Normal rate.  Regular rhythm.  S1 normal and S2 normal.  No murmur, gallop or friction rub.   Chest: Symmetric chest wall expansion.   Abdomen: Abdomen is soft and distended.  Bowel sounds are normal.   There is generalized tenderness.     Extremities: There is no dependent edema.    Neurological: Patient is alert and oriented to person, place and time.  GCS score is 15.    Pupils:  Pupils are equal, round, and reactive to light.    Skin:  Warm and dry.  No rash or cyanosis.           Results Review:    Lab Results (last 24 hours)     Procedure Component Value Units Date/Time    CBC & Differential [[id number]] Collected:  11/19/18 0610    Specimen:  Blood Updated:  " 11/19/18 0702    Narrative:       The following orders were created for panel order CBC & Differential.  Procedure                               Abnormality         Status                     ---------                               -----------         ------                     Scan Slide[[id number]]                                                                  CBC Auto Differential[[id number]]        Abnormal            Final result                 Please view results for these tests on the individual orders.    Protime-INR [[id number]]  (Abnormal) Collected:  11/19/18 0610    Specimen:  Blood Updated:  11/19/18 0701     Protime 27.1 Seconds      INR 2.66    Narrative:       Therapeutic range for most indications is 2.0-3.0 INR,  or 2.5-3.5 for mechanical heart valves.    Basic Metabolic Panel [[id number]]  (Abnormal) Collected:  11/19/18 0610    Specimen:  Blood Updated:  11/19/18 0648     Glucose 66 mg/dL      BUN 29 mg/dL      Creatinine 1.50 mg/dL      Sodium 135 mmol/L      Potassium 5.0 mmol/L      Chloride 97 mmol/L      CO2 24.0 mmol/L      Calcium 9.7 mg/dL      eGFR Non African Amer 49 mL/min/1.73      BUN/Creatinine Ratio 19.3     Anion Gap 14.0 mmol/L     Vancomycin, Random [[id number]] Collected:  11/19/18 0610    Specimen:  Blood Updated:  11/19/18 0648     Vancomycin Random 18.86 mcg/mL     Ammonia [[id number]]  (Abnormal) Collected:  11/19/18 0610    Specimen:  Blood Updated:  11/19/18 0648     Ammonia 31 umol/L     CBC Auto Differential [[id number]]  (Abnormal) Collected:  11/19/18 0610    Specimen:  Blood Updated:  11/19/18 0641     WBC 6.94 10*3/mm3      RBC 2.37 10*6/mm3      Hemoglobin 8.5 g/dL      Hematocrit 25.2 %      .3 fL      MCH 35.9 pg      MCHC 33.7 g/dL      RDW 17.9 %      RDW-SD 67.5 fl      MPV 9.9 fL      Platelets 45 10*3/mm3      Neutrophil % 62.4 %      Lymphocyte % 21.5 %      Monocyte % 8.9 %      Eosinophil % 5.3 %      Basophil % 1.6 %      Immature Grans % 0.3 %       Neutrophils, Absolute 4.33 10*3/mm3      Lymphocytes, Absolute 1.49 10*3/mm3      Monocytes, Absolute 0.62 10*3/mm3      Eosinophils, Absolute 0.37 10*3/mm3      Basophils, Absolute 0.11 10*3/mm3      Immature Grans, Absolute 0.02 10*3/mm3          Imaging Results (last 24 hours)     Procedure Component Value Units Date/Time    CT Abdomen Pelvis With Contrast [[id number]] Collected:  11/05/18 1815     Updated:  11/05/18 1857    Narrative:         EXAMINATION:  Computed Tomography           REGION:    Abdomen / Pelvis                     INDICATION:   Repeat to evaluate hematoma/abscess of kidney per  urology, R41.82 Altered mental status, unspecified K72.90 Hepatic  failure, unspecified without coma Z74.09 Other reduced mobility  Z74.09 Other reduced mobility    HISTORY:  [facility]. IMAGING:    CT A/P 11/2/18            TECHNIQUE:      - reconstructions:    axial, coronal, sagittal         - contrast:      oral:  No ;   intravenous: Isovue 300, 100 mL  This exam was performed according to the departmental  dose-optimization program which includes automated exposure  control, adjustment of the mA and/or kV according to patient size  and/or use of iterative reconstruction technique.           COMMENTS:            - - - CT ABDOMEN - - -          THORAX (INFERIOR):      - LUNG BASES:  clear      - PLEURA:    no fluid or mass      - HEART:    normal size, no pericardial fluid     - MISC:      n/a          ABDOMEN:     - LIVER:    Diminished size with nodular contour containing a  tips shunt   - GB:      Cholelithiasis without evidence of wall thickening.    - CBD:      grossly negative   - SPLEEN:    Upper normal/mildly enlarged   - PANCREAS:    normal in size, contour, no focal mass    - VISCERA:    normal caliber, no wall thickening     - MESENTERY:  no mesenteric mass   - CAVITY:    Small amount of free fluid   - BODY WALL:  wnl   - OSSEOUS:    grossly negative for age   - MISC:                 Extensive gastric varices                       RETROPERITONEUM:   - KIDNEYS:    Renal examination again displays overall normal  size and symmetric nephrograms. Again noted is a left pararenal  enhancing focal fluid collection containing air which is  unchanged in size measuring 2.5 x 3.6 x 2.0 cm   - URETERS:    normal course, caliber   - ADRENALS:    normal size, contour   - MISC:      no sig retroperitoneal adenopathy or mass   - VASCULAR:    aorta / iliacs: wnl for age     - - - CT PELVIS - - -      - VISCERA:    normal caliber small/large bowel, no focal  thickening/mass        - APPENDIX:          wnl   - MESENTERY:  no mass   - VASCULAR:    wnl for age   - CAVITY:    Moderate amount of free fluid   - BLADDER:    unremarkable   - OSSEOUS:    Status post left hip repair    - MISC:     .       Impression:        CONCLUSION:  1. Unchanged left pararenal focal fluid collection with  peripheral enhancement containing air, likely representing an  abscess.  2. Multiple additional findings demonstrating the presence of  cirrhosis status post TIPS shunt, with spleen size at the upper  limits of normal/mildly enlarged, and extensive gastric varices,  as above, unchanged.        Electronically signed by:  PRISCILLA Bustillo MD  11/5/2018 6:56  PM CST Workstation: [id number]           I reviewed the patient's new clinical results.  I reviewed the patient's new imaging results and agree with the interpretation.  I reviewed the patient's other test results and agree with the interpretation      Assessment/Plan       Chronic hepatitis (CMS/HCC)    Thrombocytopenia (CMS/HCC)    Hepatic encephalopathy (CMS/HCC)    CKD (chronic kidney disease) stage 3, GFR 30-59 ml/min (CMS/HCC)    Altered mental status      Assessment & Plan    1. Difficulty urinating with hematuria, likely related to UTI hemorrhagic cystitis without clots-->hematuria resolved  2. Perinephric abscess  -CT guided drainage of left perinephric abscess 11/9/18-->Enterococcus faecium and  candida  -Day #9 of antibiotic therapy (vancomycin), Day #5 (Diflucan)    -Estimated Creatinine Clearance: 66.8 mL/min (A) (by C-G formula based on SCr of 1.5 mg/dL (H)).  -Cr 1.27  -WBC 6.94  -UA + 10/30/18  -Hgb/Hct 8.5/25.2, stable  -Platelets 45,000  -INR 2.66  -Renal ultrasound 10/30/18 limited exam, especially of the left kidney, due to the presence of bowel gas and the patient's body habitus. No hydronephrosis is visualized. Unremarkable bladder.   -11/2/18 CT abdomen/pelvis LEFT 2.3 x 3.7 x 2.2 cm fluid and air collection infected subcapsular hematoma versus abscess.  -11/5/18 CT abdomen/pelvis showed LEFT renal abscess unchanged  -10/30/18 Urine culture NEGATIVE, UA grossly positive.     Plan:    Continue current care.     KALEB Alexander  11/19/18  3:46 PM

## 2018-11-19 NOTE — SIGNIFICANT NOTE
11/19/18 1405   Rehab Treatment   Discipline physical therapy assistant   Reason Treatment Not Performed patient/family declined treatment  (pt with n/v and manual bp 150/110-nsg made aware-pt vomited on gown and clean gown put on pt)

## 2018-11-19 NOTE — PROGRESS NOTES
Progress Note  Allen Hercules MD  Hospitalist    Date of visit: 11/19/2018     LOS: 21 days   Patient Care Team:  Reddy Grant MD as PCP - General (Family Medicine)  Tushar Becerril DO as Consulting Physician (Gastroenterology)    Chief Complaint: nausea, vomiting, abdominal discomfort    Subjective     Interval History:     Patient Complaints: nausea / vomiting    History taken from: patient    Medication Review:   Current Facility-Administered Medications   Medication Dose Route Frequency Provider Last Rate Last Dose   • albumin human 5 % bottle 25 g  25 g Intravenous Q12H Solomon Gomez PA 0 mL/hr at 11/11/18 2000 25 g at 11/19/18 1723   • fluconazole (DIFLUCAN) IVPB 400 mg  400 mg Intravenous Daily Ed Franco MD   400 mg at 11/19/18 0857   • furosemide (LASIX) tablet 40 mg  40 mg Oral Daily Ed Franco MD   40 mg at 11/19/18 0921   • HYDROmorphone (DILAUDID) injection 0.25 mg  0.25 mg Intravenous Q3H PRN Allen Hercules MD   0.25 mg at 11/19/18 0357   • ipratropium-albuterol (DUO-NEB) nebulizer solution 3 mL  3 mL Nebulization Q6H PRN Armando Richard MD       • lactulose (CHRONULAC) 10 GM/15ML solution 30 g  30 g Oral TID Eduar Cisneros MD   30 g at 11/17/18 0919   • magic butt ointment   Topical BID Armando Richard MD       • Magnesium Sulfate 2 gram Bolus, followed by 8 gram infusion (total Mg dose 10 grams)- Mg less than or equal to 1mg/dL  2 g Intravenous PRN Solomon Gomez PA        Or   • Magnesium Sulfate 2 gram / 50mL Infusion (GIVE X 3 BAGS TO EQUAL 6GM TOTAL DOSE) - Mg 1.1 - 1.5 mg/dl  2 g Intravenous PRN Solomon Gomez PA        Or   • Magnesium Sulfate 4 gram infusion- Mg 1.6-1.9 mg/dL  4 g Intravenous PRN Solomon Gomez PA 25 mL/hr at 11/07/18 2157 4 g at 11/07/18 2157   • midodrine (PROAMATINE) tablet 10 mg  10 mg Oral TID Eduar Cisneros MD   10 mg at 11/18/18 2050   • naloxone (NARCAN) injection 0.4 mg  0.4 mg Intravenous Q5 Min PRN Eduar Cisneros  MD Rolando       • nicotine (NICODERM CQ) 21 MG/24HR patch 1 patch  1 patch Transdermal Q24H Armando Richard MD   1 patch at 11/19/18 0908   • ondansetron (ZOFRAN) injection 4 mg  4 mg Intravenous Q6H PRN Eduar Cisneros MD   4 mg at 11/19/18 0901   • oxyCODONE (ROXICODONE) immediate release tablet 5 mg  5 mg Oral Q6H PRN Allen Hercules MD   5 mg at 11/18/18 2050   • Pharmacy to dose vancomycin   Does not apply Continuous PRN Deondre Virk APRN       • potassium chloride (MICRO-K) CR capsule 40 mEq  40 mEq Oral Daily Barrera Mcmanus MD   40 mEq at 11/18/18 0903   • potassium chloride 10 mEq in 100 mL IVPB  10 mEq Intravenous Q1H PRN Solomon Gomez  mL/hr at 11/09/18 0801 10 mEq at 11/09/18 0801   • promethazine (PHENERGAN) injection 12.5 mg  12.5 mg Intravenous Q6H PRN Allen Hercules MD   12.5 mg at 11/19/18 1602   • rifaximin (XIFAXAN) tablet 275 mg  275 mg Oral Q12H Eduar Cisneros MD   275 mg at 11/19/18 0921   • simethicone (MYLICON) chewable tablet 80 mg  80 mg Oral Q6H PRN Eduar Cisneros MD   80 mg at 11/16/18 1309   • sodium chloride (OCEAN) nasal spray 1 spray  1 spray Each Nare PRN Beau Celeste MD       • sodium chloride 0.9 % flush 10 mL  10 mL Intravenous Q12H Vinh, Anderson T, DO   10 mL at 11/19/18 0919   • sodium chloride 0.9 % flush 10 mL  10 mL Intravenous Q12H Vinh Anderson T, DO   10 mL at 11/19/18 0918   • sodium chloride 0.9 % flush 10 mL  10 mL Intravenous PRN Vinh Anderson T, DO   10 mL at 11/18/18 0905   • sodium chloride 0.9 % flush 20 mL  20 mL Intravenous PRN Vinh, Anderson T, DO   20 mL at 11/18/18 2050   • sodium chloride 0.9 % flush 3 mL  3 mL Intravenous Q12H Eduar Cisneros MD   3 mL at 11/18/18 2052   • sodium chloride 0.9 % flush 3-10 mL  3-10 mL Intravenous PRN Eduar Cisneros MD   10 mL at 11/13/18 0637   • spironolactone (ALDACTONE) tablet 25 mg  25 mg Oral Daily Anderson Justice DO   25 mg at 11/19/18 0922   • vancomycin  1250 mg/250 mL 0.9% NS IVPB ([facility])  1,250 mg Intravenous Q36H Eduar Cisneros MD   1,250 mg at 11/19/18 1125       Review of Systems:   Review of Systems   Constitutional: Positive for fatigue. Negative for fever.   Respiratory: Positive for cough. Negative for shortness of breath and wheezing.    Cardiovascular: Negative for chest pain and leg swelling.   Gastrointestinal: Positive for abdominal distention, nausea and vomiting. Negative for abdominal pain and diarrhea.   Genitourinary: Negative for enuresis, frequency, hematuria, penile swelling and urgency.   Musculoskeletal: Negative for arthralgias and back pain.   Skin: Positive for pallor. Negative for color change and rash.   Neurological: Positive for weakness. Negative for syncope, facial asymmetry and light-headedness.   Psychiatric/Behavioral: Negative for agitation, behavioral problems and confusion.   All other systems reviewed and are negative.      Objective     Vital Signs  Temp:  [97.2 °F (36.2 °C)-98.7 °F (37.1 °C)] 98.7 °F (37.1 °C)  Heart Rate:  [76-93] 91  Resp:  [16-18] 18  BP: (121-160)/() 158/90    Physical Exam:  Physical Exam   Constitutional: He is oriented to person, place, and time. He appears ill. No distress.   Eyes: EOM are normal. Pupils are equal, round, and reactive to light. Scleral icterus is present.   Neck: Normal range of motion. Neck supple.   Cardiovascular: Normal rate and regular rhythm.   Pulmonary/Chest: Effort normal and breath sounds normal. No respiratory distress. He has no wheezes.   Abdominal: Soft. He exhibits distension. There is no tenderness.   Musculoskeletal:   L AKA   Neurological: He is alert and oriented to person, place, and time. He displays normal reflexes. No cranial nerve deficit. Coordination normal.   Skin: Skin is warm and dry. There is pallor.   Psychiatric: He has a normal mood and affect. His behavior is normal.        Results Review:    Lab Results (last 24 hours)     Procedure  Component Value Units Date/Time    CBC & Differential [[id number]] Collected:  11/19/18 0610    Specimen:  Blood Updated:  11/19/18 0702    Narrative:       The following orders were created for panel order CBC & Differential.  Procedure                               Abnormality         Status                     ---------                               -----------         ------                     Scan Slide[[id number]]                                                                  CBC Auto Differential[[id number]]        Abnormal            Final result                 Please view results for these tests on the individual orders.    Protime-INR [[id number]]  (Abnormal) Collected:  11/19/18 0610    Specimen:  Blood Updated:  11/19/18 0701     Protime 27.1 Seconds      INR 2.66    Narrative:       Therapeutic range for most indications is 2.0-3.0 INR,  or 2.5-3.5 for mechanical heart valves.    Basic Metabolic Panel [[id number]]  (Abnormal) Collected:  11/19/18 0610    Specimen:  Blood Updated:  11/19/18 0648     Glucose 66 mg/dL      BUN 29 mg/dL      Creatinine 1.50 mg/dL      Sodium 135 mmol/L      Potassium 5.0 mmol/L      Chloride 97 mmol/L      CO2 24.0 mmol/L      Calcium 9.7 mg/dL      eGFR Non African Amer 49 mL/min/1.73      BUN/Creatinine Ratio 19.3     Anion Gap 14.0 mmol/L     Vancomycin, Random [[id number]] Collected:  11/19/18 0610    Specimen:  Blood Updated:  11/19/18 0648     Vancomycin Random 18.86 mcg/mL     Ammonia [[id number]]  (Abnormal) Collected:  11/19/18 0610    Specimen:  Blood Updated:  11/19/18 0648     Ammonia 31 umol/L     CBC Auto Differential [[id number]]  (Abnormal) Collected:  11/19/18 0610    Specimen:  Blood Updated:  11/19/18 0641     WBC 6.94 10*3/mm3      RBC 2.37 10*6/mm3      Hemoglobin 8.5 g/dL      Hematocrit 25.2 %      .3 fL      MCH 35.9 pg      MCHC 33.7 g/dL      RDW 17.9 %      RDW-SD 67.5 fl      MPV 9.9 fL      Platelets 45 10*3/mm3      Neutrophil % 62.4 %       Lymphocyte % 21.5 %      Monocyte % 8.9 %      Eosinophil % 5.3 %      Basophil % 1.6 %      Immature Grans % 0.3 %      Neutrophils, Absolute 4.33 10*3/mm3      Lymphocytes, Absolute 1.49 10*3/mm3      Monocytes, Absolute 0.62 10*3/mm3      Eosinophils, Absolute 0.37 10*3/mm3      Basophils, Absolute 0.11 10*3/mm3      Immature Grans, Absolute 0.02 10*3/mm3           Imaging Results (last 24 hours)     ** No results found for the last 24 hours. **          Assessment/Plan       Hepatic encephalopathy (CMS/HCC)    Liver cirrhosis (CMS/HCC)    Altered mental status    Perinephric abscess    Thrombocytopenia (CMS/HCC)    CKD (chronic kidney disease) stage 3, GFR 30-59 ml/min (CMS/HCC)    Chronic hepatitis C (CMS/HCC)    Chronic anemia    Nausea / vomiting - minimally better - we'll provide him with PRN Phenergan. Continue with the IV antibiotics for the Enterococcus / Candida isolated from the perinephric abscess.     Allen Hercules MD  11/19/18  6:06 PM

## 2018-11-19 NOTE — PROGRESS NOTES
"Pharmacokinetics by Pharmacy - Vancomycin - Day 5    Armando Mcmullen Sr. is a 53 y.o. male on vancomycin for perinephric abscess (E. faecium)   Concurrently on fluconazole     [Ht: 182.9 cm (72.01\"); Wt: 93.4 kg (206 lb)]    Estimated Creatinine Clearance: 66.8 mL/min (A) (by C-G formula based on SCr of 1.5 mg/dL (H)).   Lab Results   Component Value Date    CREATININE 1.50 (H) 11/19/2018    CREATININE 1.31 (H) 11/18/2018    CREATININE 1.27 11/17/2018        Lab Results   Component Value Date    WBC 6.94 11/19/2018    WBC 6.58 11/18/2018    WBC 5.87 11/17/2018      Lab Results   Component Value Date    CRP 6.10 (H) 08/13/2018    CRP 7.10 (H) 08/12/2018    CRP 8.00 (H) 08/11/2018    LACTATE 2.4 (C) 10/31/2018    LACTATE 2.2 (C) 10/29/2018    LACTATE 2.7 (C) 10/29/2018      Temp Readings from Last 1 Encounters:   11/19/18 97.3 °F (36.3 °C)       Lab Results   Component Value Date    VANCOPEAK 48.40 (H) 11/17/2018    VANCOPEAK 37.73 11/12/2018    VANCOTROUGH 27.17 (H) 11/18/2018    VANCOTROUGH 27.10 (H) 11/12/2018    VANCORANDOM 18.86 11/19/2018    VANCORANDOM 18.37 06/18/2017       Culture Results:  Microbiology Results (last 10 days)       Procedure Component Value - Date/Time    Body Fluid Culture - Aspirate, Kidney, Left [[id number]]  (Abnormal)  (Susceptibility) Collected:  11/09/18 1708    Lab Status:  Final result Specimen:  Aspirate from Kidney, Left Updated:  11/14/18 1149     BF Culture Heavy growth (4+) Enterococcus faecium      Candida albicans     Gram Stain Many (4+) WBCs seen      Many (4+) Gram positive cocci      Rare (1+) Yeast    Narrative:       Slide reviewed confirmed    Susceptibility        Enterococcus faecium     NERI     Ampicillin Resistant     Gentamicin High Level Synergy Susceptible     Vancomycin Susceptible                      Anaerobic Culture - Aspirate, Kidney [[id number]] Collected:  11/09/18 1708    Lab Status:  Final result Specimen:  Aspirate from Kidney Updated:  11/11/18 1701     " Culture No anaerobes isolated at 2 days    Fungus Culture - Aspirate, Kidney, Left [[id number]]  (Abnormal) Collected:  11/09/18 1708    Lab Status:  Final result Specimen:  Aspirate from Kidney, Left Updated:  11/18/18 1230     Fungus Culture Heavy growth (4+) Candida albicans    AFB Culture - Aspirate, Kidney, Left [[id number]] Collected:  11/09/18 1708    Lab Status:  Preliminary result Specimen:  Aspirate from Kidney, Left Updated:  11/16/18 1745     AFB Culture No AFB isolated at 1 week     AFB Stain No acid fast bacilli seen on concentrated smear    KELSIE Prep - Swab, [[id number]]  (Normal) Collected:  11/09/18 1708    Lab Status:  Final result Specimen:  Aspirate from Kidney, Left Updated:  11/09/18 1838     KOH Prep No yeast or hyphal elements seen                 Indication: perinephric abscess (E. faecium)    Current dose: Vancomycin 1250 mg q36h, day 5    Assessment/Plan  Random this morning = 18.86, has fallen back within goal range (10-20)    Adjusted dose to 1250mg q36h  Recheck peak 11/22 @ 1130  Recheck trough 11/23 @ 2030    Calculated AUC is 505.09, trough is 12.74 and peak is 32.18    SCr trending up, WBC stable and WNL, afebrile.     Jono Breen RPH  11/19/18 8:18 AM

## 2018-11-19 NOTE — PLAN OF CARE
Problem: Patient Care Overview  Goal: Plan of Care Review  Outcome: Ongoing (interventions implemented as appropriate)   11/19/18 6713   Coping/Psychosocial   Plan of Care Reviewed With patient   Plan of Care Review   Progress no change   OTHER   Outcome Summary pt has been nauseated alot this shift       Problem: Fall Risk (Adult)  Goal: Absence of Fall  Outcome: Ongoing (interventions implemented as appropriate)      Problem: Skin Injury Risk (Adult)  Goal: Skin Health and Integrity  Outcome: Ongoing (interventions implemented as appropriate)      Problem: Anemia (Adult)  Goal: Symptom Improvement  Outcome: Ongoing (interventions implemented as appropriate)      Problem: Liver Failure, Acute/Chronic (Adult)  Goal: Signs and Symptoms of Listed Potential Problems Will be Absent, Minimized or Managed (Liver Failure, Acute/Chronic)  Outcome: Ongoing (interventions implemented as appropriate)

## 2018-11-19 NOTE — PLAN OF CARE
Problem: Patient Care Overview  Goal: Plan of Care Review  Outcome: Ongoing (interventions implemented as appropriate)   11/19/18 0447   Coping/Psychosocial   Plan of Care Reviewed With patient   Plan of Care Review   Progress no change   OTHER   Outcome Summary no c/o nausea this shift       Problem: Fall Risk (Adult)  Goal: Absence of Fall  Outcome: Ongoing (interventions implemented as appropriate)      Problem: Anemia (Adult)  Goal: Symptom Improvement  Outcome: Ongoing (interventions implemented as appropriate)      Problem: Liver Failure, Acute/Chronic (Adult)  Goal: Signs and Symptoms of Listed Potential Problems Will be Absent, Minimized or Managed (Liver Failure, Acute/Chronic)  Outcome: Ongoing (interventions implemented as appropriate)

## 2018-11-19 NOTE — SIGNIFICANT NOTE
11/19/18 1216   Rehab Treatment   Discipline occupational therapy assistant   Reason Treatment Not Performed other (see comments)  (Pt initially declined tx, but agreed. pt BP taken and was 170/115 dinamap and 169/112  manual)

## 2018-11-19 NOTE — PAYOR COMM NOTE
"Lamont Mcmullen Sr. (53 y.o. Male)     Date of Birth Social Security Number Address Home Phone MRN    [id number]  [address]  [facility] [address] [phone] [id number]    Orthodox Marital Status          None Legally        Admission Date Admission Type Admitting Provider Attending Provider Department, Room/Bed    10/29/18 Emergency Eduar Cisneros MD [place], Emmanuel [place], MD [address], 332/1    Discharge Date Discharge Disposition Discharge Destination                       Attending Provider:  Barrera Mcmanus MD    Allergies:  Aspirin, Penicillins, Codeine Sulfate    Isolation:  None   Infection:  None   Code Status:  CPR    Ht:  182.9 cm (72.01\")   Wt:  93.4 kg (206 lb)    Admission Cmt:  None   Principal Problem:  None                Active Insurance as of 10/29/2018     Primary Coverage     Payor Plan Insurance Group Employer/Plan Group    [facility] [employer] [facility]      Payor Plan Address Payor Plan Phone Number Payor Plan Fax Number Effective Dates    [address] [address]   1/1/2014 - None Entered    [place] [address]       Subscriber Name Subscriber Birth Date Member ID       LAMONT MCMULLEN SRJuanita 1965 [id number]                 Emergency Contacts      (Rel.) Home Phone Work Phone Mobile Phone    Miguel Mcmullen (Son) [phone] -- [phone]    BenedictLoco (Daughter) [phone] -- [phone]    Shreyas Mcmullen (Brother) [phone] -- [phone]               History & Physical      Eduar Cisneros MD at 10/29/2018  8:13 PM                [facility] Medicine Admission      Date of Admission: 10/29/2018      Primary Care Physician: Reddy Grant MD      Chief Complaint: altered mental status    HPI:    53 yr old male with CMH of hepatitis presents with altered mental status.  History is taken from chart as patient has altered mental status.  " He was found to have elevated ammonia .  He takes lactulose at home.     Past Medical History:  has a past medical history of Allergic rhinitis; Anxiety state (12/1/2008); Back pain (12/1/2008); Chronic hepatitis (CMS/HCC) (6/18/2009); Chronic osteomyelitis (CMS/HCC); CKD (chronic kidney disease); Confusional arousals; Depression (12/1/2008); Essential hypertension (12/1/2008); Hepatic cirrhosis (CMS/HCC) (5/26/2009); Hypersplenism (6/28/2010); Insomnia (12/1/2008); Liver cirrhosis (CMS/HCC) (5/26/2009); Osteoarthritis (12/1/2008); Osteoarthritis; and Pneumonia.    Past Surgical History:  has a past surgical history that includes Shoulder surgery; Hip surgery; Leg amputation; incision and drainage leg (Right, 2/27/2017); and TIPS procedure.    Family History: family history includes Alzheimer's disease in his mother; Coronary artery disease in his other; Diabetes type II in his maternal aunt; Heart disease in his father and other; Hypertension in his father and other; No Known Problems in his brother, daughter, sister, and son.    Social History:  reports that he has been smoking Cigarettes.  He has been smoking about 0.25 packs per day. He has never used smokeless tobacco. He reports that he does not drink alcohol or use drugs.    Allergies:   Allergies   Allergen Reactions   • Aspirin Other (See Comments)     D/T liver   • Penicillins Unknown (See Comments)     Unknown     • Codeine Sulfate Hives and Itching       Medications: Scheduled Meds:  cefepime 2 g Intravenous Once   lactulose 30 g Oral Once   sodium chloride 1,000 mL Intravenous Once     Continuous Infusions:   PRN Meds:.  No current facility-administered medications on file prior to encounter.      Current Outpatient Prescriptions on File Prior to Encounter   Medication Sig Dispense Refill   • furosemide (LASIX) 20 MG tablet Take 1 tablet by mouth Daily. 90 tablet 3   • Incontinence Supplies (BEDPAN) misc Bedpan (Dx occasional incontinence, weakness, AMS  related to hepatic encephalopathy) 1 each 0   • lactulose (CHRONULAC) 10 GM/15ML solution Take 45 mL by mouth 3 (Three) Times a Day. 1892 mL 3   • midodrine (PROAMATINE) 10 MG tablet Take 1 tablet by mouth 3 (Three) Times a Day. 270 tablet 3   • Misc. Devices (COMMODE BEDSIDE) misc Large bedside commode (Dx hepatic encephalopathy, LLE amputation) 1 each 0   • potassium chloride (K-DUR,KLOR-CON) 20 MEQ CR tablet Take 1 tablet by mouth Daily for 31 days. 90 tablet 3   • simethicone (GAS-X) 80 MG chewable tablet Chew 1 tablet Every 6 (Six) Hours As Needed for Flatulence. 120 tablet 3   • spironolactone (ALDACTONE) 25 MG tablet 2qd 180 tablet 3   • XIFAXAN 550 MG tablet 1 bid 180 tablet 3       Review of Systems:  Review of Systems   Unable to perform ROS: Mental status change      Otherwise complete ROS is negative except as mentioned above.    Physical Exam:   Temp:  [97.2 °F (36.2 °C)] 97.2 °F (36.2 °C)  Heart Rate:  [72] 72  Resp:  [16] 16  BP: (123-131)/(58-65) 123/58  Physical Exam   Constitutional: He appears well-developed and well-nourished. No distress.   HENT:   Head: Normocephalic and atraumatic.   Cardiovascular: Normal rate.    Pulmonary/Chest: Effort normal. No respiratory distress. He has no wheezes.   Abdominal: Soft. He exhibits no distension.   Musculoskeletal: Normal range of motion. He exhibits no edema.   Neurological: He is alert. No cranial nerve deficit.   Skin: Skin is warm and dry. He is not diaphoretic.   Vitals reviewed.        Results Reviewed:  I have personally reviewed current lab, radiology, and data and agree with results.  Lab Results (last 24 hours)     Procedure Component Value Units Date/Time    CBC & Differential [[id number]] Collected:  10/29/18 1813    Specimen:  Blood Updated:  10/29/18 2002    Narrative:       The following orders were created for panel order CBC & Differential.  Procedure                               Abnormality         Status                     ---------                                -----------         ------                     Manual Differential[[id number]]                              Final result               Scan Slide[[id number]]                                                                  CBC Auto Differential[[id number]]        Abnormal            Final result                 Please view results for these tests on the individual orders.    Manual Differential [[id number]] Collected:  10/29/18 1813    Specimen:  Blood from Arm, Left Updated:  10/29/18 2002     Neutrophil % 73.0 %      Lymphocyte % 14.0 %      Monocyte % 6.0 %      Eosinophil % 1.0 %      Basophil % 2.0 %      Bands %  4.0 %      Neutrophils Absolute 5.01 10*3/mm3      Lymphocytes Absolute 0.91 10*3/mm3      Monocytes Absolute 0.39 10*3/mm3      Eosinophils Absolute 0.07 10*3/mm3      Basophils Absolute 0.13 10*3/mm3      Anisocytosis Slight/1+     Hypochromia Slight/1+     Macrocytes Slight/1+     WBC Morphology Normal     Platelet Estimate Decreased    CBC Auto Differential [[id number]]  (Abnormal) Collected:  10/29/18 1813    Specimen:  Blood from Arm, Left Updated:  10/29/18 2001     WBC 6.51 10*3/mm3      RBC 2.99 (L) 10*6/mm3      Hemoglobin 11.0 (L) g/dL      Hematocrit 32.9 (L) %      .0 (H) fL      MCH 36.8 (H) pg      MCHC 33.4 g/dL      RDW 15.0 (H) %      RDW-SD 60.0 (H) fl      MPV 9.8 fL      Platelets 63 (L) 10*3/mm3     Blood Culture - Blood, [[id number]] Collected:  10/29/18 1924    Specimen:  Blood from Hand, Right Updated:  10/29/18 1931    Lactic Acid, Plasma [[id number]]  (Abnormal) Collected:  10/29/18 1813    Specimen:  Blood from Arm, Left Updated:  10/29/18 1845     Lactate 2.7 (C) mmol/L     Lactic Acid, Reflex Timer (This will reflex a repeat order 3-3:15 hours after ordered.) [[id number]] Collected:  10/29/18 1813    Specimen:  Blood from Arm, Left Updated:  10/29/18 1844    aPTT [[id number]]  (Abnormal) Collected:  10/29/18 1813    Specimen:  Blood from Arm, Left  Updated:  10/29/18 1843     PTT 41.8 (H) seconds     Narrative:       The recommended Heparin therapeutic range is 68-97 seconds.    Protime-INR [[id number]]  (Abnormal) Collected:  10/29/18 1813    Specimen:  Blood from Arm, Left Updated:  10/29/18 1843     Protime 19.5 (H) Seconds      INR 1.71 (H)    Narrative:       Therapeutic range for most indications is 2.0-3.0 INR,  or 2.5-3.5 for mechanical heart valves.    Ammonia [[id number]]  (Abnormal) Collected:  10/29/18 1813    Specimen:  Blood from Arm, Left Updated:  10/29/18 1835     Ammonia 129 (H) umol/L     Comprehensive Metabolic Panel [[id number]]  (Abnormal) Collected:  10/29/18 1813    Specimen:  Blood from Arm, Left Updated:  10/29/18 1834     Glucose 75 mg/dL      BUN 27 (H) mg/dL      Creatinine 1.71 (H) mg/dL      Sodium 135 (L) mmol/L      Potassium 3.9 mmol/L      Chloride 102 mmol/L      CO2 27.0 mmol/L      Calcium 8.7 mg/dL      Total Protein 6.5 g/dL      Albumin 2.40 (L) g/dL      ALT (SGPT) 17 (L) U/L      AST (SGOT) 46 U/L      Alkaline Phosphatase 168 (H) U/L      Total Bilirubin 8.1 (H) mg/dL      eGFR Non African Amer 42 (L) mL/min/1.73      Globulin 4.1 (H) gm/dL      A/G Ratio 0.6 (L) g/dL      BUN/Creatinine Ratio 15.8     Anion Gap 6.0 mmol/L     Lipase [[id number]]  (Normal) Collected:  10/29/18 1813    Specimen:  Blood from Arm, Left Updated:  10/29/18 1834     Lipase 85 U/L     Ethanol [[id number]] Collected:  10/29/18 1813    Specimen:  Blood from Arm, Left Updated:  10/29/18 1834     Ethanol <10 mg/dL      Ethanol % <0.010 %     Influenza Antigen, Rapid - Swab, Nasopharynx [[id number]]  (Normal) Collected:  10/29/18 1813    Specimen:  Swab from Nasopharynx Updated:  10/29/18 1830     Influenza A Ag, EIA Negative     Influenza B Ag, EIA Negative    Blood Culture - Blood, [[id number]] Collected:  10/29/18 1813    Specimen:  Blood from Arm, Left Updated:  10/29/18 1820        Imaging Results (last 24 hours)     Procedure Component Value  Units Date/Time    CT Head Without Contrast [[id number]] Collected:  10/29/18 1825     Updated:  10/29/18 1903    Narrative:       EXAM DESCRIPTION: CT HEAD WO CONTRAST    CLINICAL HISTORY:  Confusion/delirium, altered LOC, unexplained,  R41.82 Altered mental status, unspecified .      COMPARISON: 9/4/2018    DOSE LENGTH PRODUCT: 939.7    CONTRAST: None    TECHNIQUE:    Axial images from skull base to vertex.    This exam was performed according to our departmental dose  optimization program, which includes automated exposure control,  adjustment of the mA and/or KV according to patient size and/or  use of iterative reconstruction technique.    FINDINGS:  No Chiari malformation.  Extra-axial spaces: Appropriate for the mild degree of volume  loss present.    Intracranial hemorrhage: No evidence of intracranial hemorrhage  or suspicious extra-axial fluid collection.  Ventricular system: No hydrocephalus.   Basal cisterns: Unremarkable.   Cerebral parenchyma: No edema, midline shift, or mass effect.  Gray-white differentiation is maintained.    Midline shift: None.    Cerebellum: No acute or suspicious interval change.   Brainstem : Unremarkable CT appearance.   Calvarium: No acute or suspicious calvarial lesion.    Vascular system: Calcification, otherwise unremarkable  noncontrast appearance.    Paranasal sinuses and mastoid air cells: Clear.    Visualized orbits: No acute finding.    Visualized upper cervical spine: Limited, unremarkable.   Sella: Unremarkable.    Skull base: Unremarkable.     ADDITIONAL FINDINGS: None      Impression:       No CT evidence of intracranial hemorrhage, mass effect, or acute  large vessel distribution infarct.    Electronically signed by:  Tho Chavez MD  10/29/2018 7:02 PM  CDT Workstation: [id number]            Assessment:    Active Hospital Problems    Diagnosis   • Altered mental status       Hepatic Encephalopathy - will start on lactulose.  Will reevaluate ammonia in AM.  CT head  "is negative    Chronic hepatitis     Thrombocytopenia - chronic    DVT prophylaxis - SCD AIDA            Eduar Cisneros MD  10/29/18  8:14 PM                  Electronically signed by Eduar Cisneros MD at 10/29/2018  8:17 PM       Hospital Medications (active)       Dose Frequency Start End    albumin human 5 % bottle 25 g 25 g Every 12 Hours 11/4/2018     Sig - Route: Infuse 500 mL into a venous catheter Every 12 (Twelve) Hours. - Intravenous    Cosign for Ordering: Accepted by Beau Celeste MD on 11/4/2018  7:10 PM    fluconazole (DIFLUCAN) IVPB 400 mg 400 mg Daily 11/15/2018 11/22/2018    Sig - Route: Infuse 200 mL into a venous catheter Daily. - Intravenous    furosemide (LASIX) tablet 40 mg 40 mg Daily 11/14/2018     Sig - Route: Take 1 tablet by mouth Daily. - Oral    HYDROmorphone (DILAUDID) injection 0.25 mg 0.25 mg Every 3 Hours PRN 11/14/2018 11/24/2018    Sig - Route: Infuse 0.25 mL into a venous catheter Every 3 (Three) Hours As Needed for Severe Pain . - Intravenous    ipratropium-albuterol (DUO-NEB) nebulizer solution 3 mL 3 mL Every 6 Hours PRN 10/30/2018     Sig - Route: Take 3 mL by nebulization Every 6 (Six) Hours As Needed for Wheezing. - Nebulization    lactulose (CHRONULAC) 10 GM/15ML solution 30 g 30 g 3 Times Daily 10/29/2018     Sig - Route: Take 45 mL by mouth 3 (Three) Times a Day. - Oral    magic butt ointment  2 Times Daily 10/30/2018     Sig - Route: Apply  topically to the appropriate area as directed 2 (Two) Times a Day. - Topical    Magnesium Sulfate 2 gram / 50mL Infusion (GIVE X 3 BAGS TO EQUAL 6GM TOTAL DOSE) - Mg 1.1 - 1.5 mg/dl 2 g As Needed 11/7/2018     Sig - Route: Infuse 50 mL into a venous catheter As Needed (See Administration Instructions). - Intravenous    Cosign for Ordering: Accepted by Beau Celeste MD on 11/7/2018  6:18 PM    Linked Group 1:  \"Or\" Linked Group Details        Magnesium Sulfate 2 gram Bolus, followed by 8 gram infusion (total Mg dose 10 grams)- " "Mg less than or equal to 1mg/dL 2 g As Needed 11/7/2018     Sig - Route: Infuse 50 mL into a venous catheter As Needed (See Administration Instructions). - Intravenous    Cosign for Ordering: Accepted by Beau Celeste MD on 11/7/2018  6:18 PM    Linked Group 1:  \"Or\" Linked Group Details        Magnesium Sulfate 4 gram infusion- Mg 1.6-1.9 mg/dL 4 g As Needed 11/7/2018     Sig - Route: Infuse 100 mL into a venous catheter As Needed (See Administration Instructions). - Intravenous    Cosign for Ordering: Accepted by Beau Celeste MD on 11/7/2018  6:18 PM    Linked Group 1:  \"Or\" Linked Group Details        midodrine (PROAMATINE) tablet 10 mg 10 mg 3 Times Daily 10/29/2018     Sig - Route: Take 2 tablets by mouth 3 (Three) Times a Day. - Oral    naloxone (NARCAN) injection 0.4 mg 0.4 mg Every 5 Minutes PRN 10/29/2018     Sig - Route: Infuse 1 mL into a venous catheter Every 5 (Five) Minutes As Needed for Respiratory Depression. - Intravenous    Linked Group 2:  \"And\" Linked Group Details        nicotine (NICODERM CQ) 21 MG/24HR patch 1 patch 1 patch Every 24 Hours Scheduled 10/30/2018     Sig - Route: Place 1 patch on the skin as directed by provider Daily. - Transdermal    ondansetron (ZOFRAN) injection 4 mg 4 mg Every 6 Hours PRN 10/29/2018     Sig - Route: Infuse 2 mL into a venous catheter Every 6 (Six) Hours As Needed for Nausea or Vomiting. - Intravenous    oxyCODONE (ROXICODONE) immediate release tablet 5 mg 5 mg Every 6 Hours PRN 11/11/2018 11/21/2018    Sig - Route: Take 1 tablet by mouth Every 6 (Six) Hours As Needed for Moderate Pain . - Oral    Pharmacy to dose vancomycin  Continuous PRN 11/15/2018 11/29/2018    Sig - Route: Continuous As Needed for Consult. - Does not apply    potassium chloride (MICRO-K) CR capsule 40 mEq 40 mEq Daily 11/11/2018     Sig - Route: Take 4 capsules by mouth Daily. - Oral    potassium chloride 10 mEq in 100 mL IVPB 10 mEq Every 1 Hour PRN 11/7/2018     Sig - Route: Infuse " 100 mL into a venous catheter Every 1 (One) Hour As Needed (See admin Instructions.). - Intravenous    Cosign for Ordering: Accepted by Beau Celeste MD on 11/7/2018  6:18 PM    rifaximin (XIFAXAN) tablet 275 mg 275 mg Every 12 Hours Scheduled 10/29/2018     Sig - Route: Take 0.5 tablets by mouth Every 12 (Twelve) Hours. - Oral    simethicone (MYLICON) chewable tablet 80 mg 80 mg Every 6 Hours PRN 10/29/2018     Sig - Route: Chew 1 tablet Every 6 (Six) Hours As Needed for Flatulence. - Oral    sodium chloride (OCEAN) nasal spray 1 spray 1 spray As Needed 11/9/2018     Sig - Route: 1 spray by Each Nare route As Needed for Congestion. - Each Nare    sodium chloride 0.9 % flush 10 mL 10 mL Every 12 Hours Scheduled 11/15/2018     Sig - Route: Infuse 10 mL into a venous catheter Every 12 (Twelve) Hours. - Intravenous    sodium chloride 0.9 % flush 10 mL 10 mL Every 12 Hours Scheduled 11/15/2018     Sig - Route: Infuse 10 mL into a venous catheter Every 12 (Twelve) Hours. - Intravenous    sodium chloride 0.9 % flush 10 mL 10 mL As Needed 11/15/2018     Sig - Route: Infuse 10 mL into a venous catheter As Needed for Line Care (After Medication Administration). - Intravenous    sodium chloride 0.9 % flush 20 mL 20 mL As Needed 11/15/2018     Sig - Route: Infuse 20 mL into a venous catheter As Needed for Line Care (After Blood Draws or Blood Product Administration). - Intravenous    sodium chloride 0.9 % flush 3 mL 3 mL Every 12 Hours Scheduled 10/29/2018     Sig - Route: Infuse 3 mL into a venous catheter Every 12 (Twelve) Hours. - Intravenous    sodium chloride 0.9 % flush 3-10 mL 3-10 mL As Needed 10/29/2018     Sig - Route: Infuse 3-10 mL into a venous catheter As Needed for Line Care. - Intravenous    spironolactone (ALDACTONE) tablet 25 mg 25 mg Daily 11/13/2018     Sig - Route: Take 1 tablet by mouth Daily. - Oral    vancomycin 1250 mg/250 mL 0.9% NS IVPB ([facility]) 1,250 mg Every 36 Hours 11/19/2018 11/29/2018    Sig -  Route: Infuse 250 mL into a venous catheter Every 36 (Thirty-Six) Hours. - Intravenous    Cosign for Ordering: Required by Eduar Cisneros MD    vancomycin 1500 mg/500 mL 0.9% NS IVPB ([facility]) (Discontinued) 1,500 mg Every 24 Hours 11/15/2018 11/18/2018    Sig - Route: Infuse 500 mL into a venous catheter Daily. - Intravenous             Physician Progress Notes (last 24 hours) (Notes from 11/18/2018  8:47 AM through 11/19/2018  8:47 AM)      Eduar Cisneros MD at 11/18/2018 12:54 PM              [facility] Medicine Services  INPATIENT PROGRESS NOTE    Length of Stay: 20  Date of Admission: 10/29/2018  Primary Care Physician: Reddy Grant MD    Subjective   Chief Complaint: nausea  HPI:    Has some nausea today.  Reports mild diarrhea but improving.  Refused his lactulose due to diarrhea    Review of Systems   Respiratory: Negative for shortness of breath.         All pertinent negatives and positives are as above. All other systems have been reviewed and are negative unless otherwise stated.     Objective    Temp:  [97.3 °F (36.3 °C)-98.1 °F (36.7 °C)] 98 °F (36.7 °C)  Heart Rate:  [69-82] 74  Resp:  [18] 18  BP: (116-162)/(78-94) 133/87  Physical Exam   Constitutional: He appears well-developed and well-nourished. No distress.   HENT:   Head: Normocephalic and atraumatic.   Cardiovascular: Normal rate.   Pulmonary/Chest: Effort normal. No respiratory distress. He has no wheezes.   Abdominal: Soft. He exhibits no distension.   Musculoskeletal: Normal range of motion. He exhibits no edema.   Neurological: He is alert. No cranial nerve deficit.   Skin: Skin is warm and dry. He is not diaphoretic.   Psychiatric: He has a normal mood and affect. His behavior is normal. Judgment and thought content normal.   Vitals reviewed.          Results Review:  I have reviewed the labs, radiology results, and diagnostic studies.    Laboratory Data:   Lab Results (last 24 hours)      Procedure Component Value Units Date/Time    Fungus Culture - Aspirate, Kidney, Left [[id number]]  (Abnormal) Collected:  11/09/18 1708    Specimen:  Aspirate from Kidney, Left Updated:  11/18/18 1230     Fungus Culture Heavy growth (4+) Candida albicans    Vancomycin, Trough [[id number]]  (Abnormal) Collected:  11/18/18 1143    Specimen:  Blood Updated:  11/18/18 1213     Vancomycin Trough 27.17 mcg/mL     CBC & Differential [[id number]] Collected:  11/18/18 0608    Specimen:  Blood Updated:  11/18/18 0702    Narrative:       The following orders were created for panel order CBC & Differential.  Procedure                               Abnormality         Status                     ---------                               -----------         ------                     Scan Slide[[id number]]                                       Final result               CBC Auto Differential[[id number]]        Abnormal            Final result                 Please view results for these tests on the individual orders.    Scan Slide [[id number]] Collected:  11/18/18 0608    Specimen:  Blood Updated:  11/18/18 0702     RBC Morphology Normal     WBC Morphology Normal     Platelet Estimate Decreased    Protime-INR [[id number]]  (Abnormal) Collected:  11/18/18 0608    Specimen:  Blood Updated:  11/18/18 0636     Protime 25.5 Seconds      INR 2.45    Narrative:       Therapeutic range for most indications is 2.0-3.0 INR,  or 2.5-3.5 for mechanical heart valves.    Basic Metabolic Panel [[id number]]  (Abnormal) Collected:  11/18/18 0608    Specimen:  Blood Updated:  11/18/18 0635     Glucose 65 mg/dL      BUN 23 mg/dL      Creatinine 1.31 mg/dL      Sodium 136 mmol/L      Potassium 4.4 mmol/L      Chloride 95 mmol/L      CO2 26.0 mmol/L      Calcium 9.4 mg/dL      eGFR Non African Amer 57 mL/min/1.73      BUN/Creatinine Ratio 17.6     Anion Gap 15.0 mmol/L     Ammonia [[id number]]  (Normal) Collected:  11/18/18 0608    Specimen:  Blood Updated:   11/18/18 0635     Ammonia 27 umol/L     CBC Auto Differential [[id number]]  (Abnormal) Collected:  11/18/18 0608    Specimen:  Blood Updated:  11/18/18 0620     WBC 6.58 10*3/mm3      RBC 2.37 10*6/mm3      Hemoglobin 8.4 g/dL      Hematocrit 24.8 %      .6 fL      MCH 35.4 pg      MCHC 33.9 g/dL      RDW 17.0 %      RDW-SD 63.8 fl      MPV 10.6 fL      Platelets 32 10*3/mm3      Neutrophil % 52.4 %      Lymphocyte % 26.6 %      Monocyte % 11.6 %      Eosinophil % 7.9 %      Basophil % 1.2 %      Immature Grans % 0.3 %      Neutrophils, Absolute 3.45 10*3/mm3      Lymphocytes, Absolute 1.75 10*3/mm3      Monocytes, Absolute 0.76 10*3/mm3      Eosinophils, Absolute 0.52 10*3/mm3      Basophils, Absolute 0.08 10*3/mm3      Immature Grans, Absolute 0.02 10*3/mm3      nRBC 0.0 /100 WBC     Vancomycin, Peak [[id number]]  (Abnormal) Collected:  11/17/18 1413    Specimen:  Blood Updated:  11/17/18 1448     Vancomycin Peak 48.40 mcg/mL           Culture Data:   No results found for: BLOODCX  No results found for: URINECX  No results found for: RESPCX  No results found for: WOUNDCX  No results found for: STOOLCX  No components found for: BODYFLD    Radiology Data:   Imaging Results (last 24 hours)     ** No results found for the last 24 hours. **          I have reviewed the patient's current medications.     Assessment/Plan     Active Hospital Problems    Diagnosis   • Altered mental status   • CKD (chronic kidney disease) stage 3, GFR 30-59 ml/min (CMS/HCC)   • Hepatic encephalopathy (CMS/HCC)   • Thrombocytopenia (CMS/HCC)   • Chronic hepatitis (CMS/HCC)     1.  Hepatic Encephalopathy:  Resolved.  patient refusing lactulose for past 2 days.  Encouraged to take it since ammonia is increasing   2.  Left perinephric abscess:  Drained 11/9.  Culture showed heavy growth of Enterococcus faecium and Candida albicans. on diflucan. Zyvox changed to vancomycin due to thrombocytopenia.   Urology following.  3.  Cirrhosis:   Decompensated.  Has esophageal varices, and a nonfunctioning TIPS shunt.  Continue current treatment.  4.  Anasarca:  Improving. continue lasix  5.  Thrombocytopenia:  Chronic.  Due to liver failure. platelets  lower today.  Will monitor                Eduar Cisneros MD   11/18/18   12:55 PM      Electronically signed by Eduar Cisneros MD at 11/18/2018 12:58 PM       Consult Notes (last 24 hours) (Notes from 11/18/2018  8:47 AM through 11/19/2018  8:47 AM)     No notes of this type exist for this encounter.        Awaiting LT[facility] precert      Tammie Werner RN  [facility]  [phone]  Fax [phone]

## 2018-11-20 NOTE — SIGNIFICANT NOTE
11/20/18 1005   Rehab Treatment   Discipline physical therapy assistant   Reason Treatment Not Performed patient/family declined treatment  (nsg states pt has had elevated BP this AM, but ok to check w/ pt, pt states his BP has been up and has had N/V, defers tx )

## 2018-11-20 NOTE — PAYOR COMM NOTE
"Lamont Mcmullen Sr. (53 y.o. Male)     Date of Birth Social Security Number Address Home Phone MRN    1965  [id number]  [facility] [id number] [phone] [id number]    [employer] Marital Status          None Legally        Admission Date Admission Type Admitting Provider Attending Provider Department, Room/Bed    10/29/18 Emergency Eduar Cisneros MD [place], Emmanuel [place], MD [address], 332/1    Discharge Date Discharge Disposition Discharge Destination                       Attending Provider:  Barrera Mcmanus MD    Allergies:  Aspirin, Penicillins, Codeine Sulfate    Isolation:  None   Infection:  None   Code Status:  CPR    Ht:  182.9 cm (72.01\")   Wt:  93.4 kg (205 lb 14.4 oz)    Admission Cmt:  None   Principal Problem:  Hepatic encephalopathy (CMS/HCC) [K72.90]                 Active Insurance as of 10/29/2018     Primary Coverage     Payor Plan Insurance Group Employer/Plan Group    Formerly [facility] [employer] [facility]      Payor Plan Address Payor Plan Phone Number Payor Plan Fax Number Effective Dates    [address] [address]   1/1/2014 - None Entered    [place] [address]       Subscriber Name Subscriber Birth Date Member ID       LAMONT MCMULLEN SR. 1965 [id number]                 Emergency Contacts      (Rel.) Home Phone Work Phone Mobile Phone    Miguel Mcmullen (Son) [phone] -- [phone]    Loco Mcmullen (Daughter) [phone] -- [phone]    Shreyas Mcmullen (Brother) [phone] -- [phone]            ICU Vital Signs     Row Name 11/20/18 0845 11/20/18 0744 11/20/18 0518 11/20/18 0455 11/20/18 0452       Height and Weight    Weight  --  --  --  --  93.4 kg (205 lb 14.4 oz)    Weight Method  --  --  --  --  Bed scale       Vitals    Temp  --  97.6 °F (36.4 °C)  --  --  --    Temp src  --  Oral  --  --  --    Pulse  88  89  --  --  --    Heart Rate Source  --  Monitor  --  --  --    " Resp  --  18  --  --  --    Resp Rate Source  --  Visual  --  --  --    BP  --  --  156/98  158/99  --    BP Location  --  Right arm  Left arm  Left arm  --    BP Method  --  Manual  Manual  Manual  --    Patient Position  --  Lying  Lying  Lying  --       Oxygen Therapy    SpO2  --  91 %  --  --  --    Device (Oxygen Therapy)  --  room air  --  --  --    Row Name 11/20/18 0451 11/20/18 0353 11/20/18 0200 11/20/18 0100 11/19/18 2345       Vitals    Temp  98.3 °F (36.8 °C)  --  --  --  --    Pulse  92  91  -- pt. ref tele  -- pt. off tele  --    Heart Rate Source  --  Monitor  --  --  --    Resp  18  --  --  --  --    BP  160/110  (Abnormal)   --  --  --  161/97    BP Location  Left arm  --  --  --  Left arm    BP Method  Manual  --  --  --  Manual    Patient Position  Lying  --  --  --  Lying       Oxygen Therapy    SpO2  90 %  --  --  --  --    Device (Oxygen Therapy)  room air  --  --  --  --    Row Name 11/19/18 2340 11/19/18 2338 11/19/18 2047 11/19/18 1940 11/19/18 1654       Vitals    Temp  --  98 °F (36.7 °C)  --  --  --    Pulse  --  97  94  --  91    Heart Rate Source  --  --  Monitor  --  Monitor    Resp  --  20  20  --  --    Resp Rate Source  --  --  Visual  --  --    BP  --  166/108  (Abnormal)   146/92  --  --    BP Location  --  Left arm  Left arm  --  --    BP Method  --  Manual  Automatic  --  --    Patient Position  --  Lying  Lying  --  --       Oxygen Therapy    SpO2  91 %  86 %  (Abnormal)   90 %  --  --    Device (Oxygen Therapy)  nasal cannula  room air pt placed on 2L  room air  room air  --    Flow (L/min)  2  --  --  2  --    Row Name 11/19/18 1628 11/19/18 1447 11/19/18 1300             Vitals    Temp  98.7 °F (37.1 °C)  --  97.9 °F (36.6 °C)      Temp src  Temporal  --  --      Pulse  93  --  80      Heart Rate Source  Monitor  --  --      Resp  18  --  18      Resp Rate Source  Visual  --  --      BP  158/90  156/108  (Abnormal)   150/82      BP Location  Right arm  Right arm  Right arm   "    BP Method  Manual  Manual  Manual      Patient Position  Lying  Lying  Lying         Oxygen Therapy    SpO2  91 %  --  92 %      Pulse Oximetry Type  Intermittent  --  --      Device (Oxygen Therapy)  room air  --  room air          Hospital Medications (active)       Dose Frequency Start End    albumin human 5 % bottle 25 g 25 g Every 12 Hours 11/4/2018     Sig - Route: Infuse 500 mL into a venous catheter Every 12 (Twelve) Hours. - Intravenous    Cosign for Ordering: Accepted by Beau Celeste MD on 11/4/2018  7:10 PM    fluconazole (DIFLUCAN) IVPB 400 mg 400 mg Daily 11/15/2018 11/22/2018    Sig - Route: Infuse 200 mL into a venous catheter Daily. - Intravenous    furosemide (LASIX) tablet 40 mg 40 mg Daily 11/14/2018     Sig - Route: Take 1 tablet by mouth Daily. - Oral    HYDROmorphone (DILAUDID) injection 0.25 mg 0.25 mg Every 3 Hours PRN 11/14/2018 11/24/2018    Sig - Route: Infuse 0.25 mL into a venous catheter Every 3 (Three) Hours As Needed for Severe Pain . - Intravenous    ipratropium-albuterol (DUO-NEB) nebulizer solution 3 mL 3 mL Every 6 Hours PRN 10/30/2018     Sig - Route: Take 3 mL by nebulization Every 6 (Six) Hours As Needed for Wheezing. - Nebulization    lactulose (CHRONULAC) 10 GM/15ML solution 30 g 30 g 3 Times Daily 10/29/2018     Sig - Route: Take 45 mL by mouth 3 (Three) Times a Day. - Oral    magic butt ointment  2 Times Daily 10/30/2018     Sig - Route: Apply  topically to the appropriate area as directed 2 (Two) Times a Day. - Topical    Magnesium Sulfate 2 gram / 50mL Infusion (GIVE X 3 BAGS TO EQUAL 6GM TOTAL DOSE) - Mg 1.1 - 1.5 mg/dl 2 g As Needed 11/7/2018     Sig - Route: Infuse 50 mL into a venous catheter As Needed (See Administration Instructions). - Intravenous    Cosign for Ordering: Accepted by Beau Celeste MD on 11/7/2018  6:18 PM    Linked Group 1:  \"Or\" Linked Group Details        Magnesium Sulfate 2 gram Bolus, followed by 8 gram infusion (total Mg dose 10 grams)- " "Mg less than or equal to 1mg/dL 2 g As Needed 11/7/2018     Sig - Route: Infuse 50 mL into a venous catheter As Needed (See Administration Instructions). - Intravenous    Cosign for Ordering: Accepted by Beau Celeste MD on 11/7/2018  6:18 PM    Linked Group 1:  \"Or\" Linked Group Details        Magnesium Sulfate 4 gram infusion- Mg 1.6-1.9 mg/dL 4 g As Needed 11/7/2018     Sig - Route: Infuse 100 mL into a venous catheter As Needed (See Administration Instructions). - Intravenous    Cosign for Ordering: Accepted by Beau Celeste MD on 11/7/2018  6:18 PM    Linked Group 1:  \"Or\" Linked Group Details        midodrine (PROAMATINE) tablet 10 mg 10 mg 3 Times Daily 10/29/2018     Sig - Route: Take 2 tablets by mouth 3 (Three) Times a Day. - Oral    naloxone (NARCAN) injection 0.4 mg 0.4 mg Every 5 Minutes PRN 10/29/2018     Sig - Route: Infuse 1 mL into a venous catheter Every 5 (Five) Minutes As Needed for Respiratory Depression. - Intravenous    Linked Group 2:  \"And\" Linked Group Details        nicotine (NICODERM CQ) 21 MG/24HR patch 1 patch 1 patch Every 24 Hours Scheduled 10/30/2018     Sig - Route: Place 1 patch on the skin as directed by provider Daily. - Transdermal    ondansetron (ZOFRAN) injection 4 mg 4 mg Every 6 Hours PRN 10/29/2018     Sig - Route: Infuse 2 mL into a venous catheter Every 6 (Six) Hours As Needed for Nausea or Vomiting. - Intravenous    oxyCODONE (ROXICODONE) immediate release tablet 5 mg 5 mg Every 6 Hours PRN 11/11/2018 11/21/2018    Sig - Route: Take 1 tablet by mouth Every 6 (Six) Hours As Needed for Moderate Pain . - Oral    Pharmacy to dose vancomycin  Continuous PRN 11/15/2018 11/29/2018    Sig - Route: Continuous As Needed for Consult. - Does not apply    potassium chloride (MICRO-K) CR capsule 40 mEq 40 mEq Daily 11/11/2018     Sig - Route: Take 4 capsules by mouth Daily. - Oral    potassium chloride 10 mEq in 100 mL IVPB 10 mEq Every 1 Hour PRN 11/7/2018     Sig - Route: Infuse " 100 mL into a venous catheter Every 1 (One) Hour As Needed (See admin Instructions.). - Intravenous    Cosign for Ordering: Accepted by Beau Celeste MD on 11/7/2018  6:18 PM    promethazine (PHENERGAN) injection 12.5 mg 12.5 mg Every 6 Hours PRN 11/19/2018     Sig - Route: Infuse 0.5 mL into a venous catheter Every 6 (Six) Hours As Needed for Nausea or Vomiting. - Intravenous    rifaximin (XIFAXAN) tablet 275 mg 275 mg Every 12 Hours Scheduled 10/29/2018     Sig - Route: Take 0.5 tablets by mouth Every 12 (Twelve) Hours. - Oral    simethicone (MYLICON) chewable tablet 80 mg 80 mg Every 6 Hours PRN 10/29/2018     Sig - Route: Chew 1 tablet Every 6 (Six) Hours As Needed for Flatulence. - Oral    sodium chloride (OCEAN) nasal spray 1 spray 1 spray As Needed 11/9/2018     Sig - Route: 1 spray by Each Nare route As Needed for Congestion. - Each Nare    sodium chloride 0.9 % flush 10 mL 10 mL Every 12 Hours Scheduled 11/15/2018     Sig - Route: Infuse 10 mL into a venous catheter Every 12 (Twelve) Hours. - Intravenous    sodium chloride 0.9 % flush 10 mL 10 mL Every 12 Hours Scheduled 11/15/2018     Sig - Route: Infuse 10 mL into a venous catheter Every 12 (Twelve) Hours. - Intravenous    sodium chloride 0.9 % flush 10 mL 10 mL As Needed 11/15/2018     Sig - Route: Infuse 10 mL into a venous catheter As Needed for Line Care (After Medication Administration). - Intravenous    sodium chloride 0.9 % flush 20 mL 20 mL As Needed 11/15/2018     Sig - Route: Infuse 20 mL into a venous catheter As Needed for Line Care (After Blood Draws or Blood Product Administration). - Intravenous    sodium chloride 0.9 % flush 3 mL 3 mL Every 12 Hours Scheduled 10/29/2018     Sig - Route: Infuse 3 mL into a venous catheter Every 12 (Twelve) Hours. - Intravenous    sodium chloride 0.9 % flush 3-10 mL 3-10 mL As Needed 10/29/2018     Sig - Route: Infuse 3-10 mL into a venous catheter As Needed for Line Care. - Intravenous    spironolactone  (ALDACTONE) tablet 25 mg 25 mg Daily 11/13/2018     Sig - Route: Take 1 tablet by mouth Daily. - Oral    vancomycin 1250 mg/250 mL 0.9% NS IVPB ([facility]) 1,250 mg Every 36 Hours 11/19/2018 11/29/2018    Sig - Route: Infuse 250 mL into a venous catheter Every 36 (Thirty-Six) Hours. - Intravenous    Cosign for Ordering: Required by Eduar Cisneros MD          Orders (last 24 hrs)     Start     Ordered    11/23/18 2030  Vancomycin, Trough  Timed      11/19/18 0847    11/23/18 2000  DRUG: vancomycin TROUGH LEVEL WILL BE DRAWN AT 2030 PLEASE DO NOT HANG NEXT SCHEDULED DOSE UNTIL TROUGH HAS BEEN COLLECTED AND RESULTS RETURNED. PLEASE CALL PHARMACY TO HOLD THERAPY IF LEVEL IS > 20  Nursing Communication  Once     Comments:  DRUG: vancomycin   TROUGH LEVEL WILL BE DRAWN AT 2030  PLEASE DO NOT HANG NEXT SCHEDULED   DOSE UNTIL TROUGH HAS BEEN COLLECTED   AND RESULTS RETURNED.  PLEASE CALL PHARMACY TO HOLD THERAPY   IF LEVEL IS > 20    11/19/18 0847    11/22/18 1130  Vancomycin, Peak Please draw peak 1-hr after completion of vancomycin infusion  Timed     Comments:  Please draw peak 1-hr after completion of vancomycin infusion      11/19/18 0847    11/20/18 0713  Scan Slide  Once,   Status:  Canceled      11/20/18 0712    11/20/18 0600  CBC Auto Differential  PROCEDURE ONCE      11/20/18 0001    11/19/18 1448  promethazine (PHENERGAN) injection 12.5 mg  Every 6 Hours PRN      11/19/18 1448    11/19/18 1341  Diet Regular; Low Sodium; 2,000 mg Na  Diet Effective Now     Comments:  Offer Orange Sherbet  No garner, sausage or aged cheese (zyvox rest)    11/19/18 1341    11/19/18 0900  vancomycin 1250 mg/250 mL 0.9% NS IVPB ([facility])  Every 36 Hours      11/19/18 0813    11/15/18 1950  sodium chloride 0.9 % flush 10 mL  Every 12 Hours Scheduled      11/15/18 1950    11/15/18 1950  sodium chloride 0.9 % flush 10 mL  Every 12 Hours Scheduled      11/15/18 1950    11/15/18 1950  sodium chloride 0.9 % flush 10 mL  As Needed      11/15/18  1950    11/15/18 1950  sodium chloride 0.9 % flush 20 mL  As Needed      11/15/18 1950    11/15/18 1111  Pharmacy to dose vancomycin  Continuous PRN      11/15/18 1111    11/15/18 1000  fluconazole (DIFLUCAN) IVPB 400 mg  Daily      11/15/18 0904    11/14/18 1830  furosemide (LASIX) tablet 40 mg  Daily      11/14/18 1737    11/14/18 0356  HYDROmorphone (DILAUDID) injection 0.25 mg  Every 3 Hours PRN      11/14/18 0356    11/13/18 1515  spironolactone (ALDACTONE) tablet 25 mg  Daily      11/13/18 1422    11/11/18 2003  oxyCODONE (ROXICODONE) immediate release tablet 5 mg  Every 6 Hours PRN      11/11/18 2004 11/11/18 1015  potassium chloride (MICRO-K) CR capsule 40 mEq  Daily      11/11/18 0927    11/09/18 1821  sodium chloride (OCEAN) nasal spray 1 spray  As Needed      11/09/18 1822    11/07/18 1048  Magnesium Sulfate 2 gram Bolus, followed by 8 gram infusion (total Mg dose 10 grams)- Mg less than or equal to 1mg/dL  As Needed      11/07/18 1048    11/07/18 1048  Magnesium Sulfate 2 gram / 50mL Infusion (GIVE X 3 BAGS TO EQUAL 6GM TOTAL DOSE) - Mg 1.1 - 1.5 mg/dl  As Needed      11/07/18 1048    11/07/18 1048  Magnesium Sulfate 4 gram infusion- Mg 1.6-1.9 mg/dL  As Needed      11/07/18 1048    11/07/18 1047  potassium chloride 10 mEq in 100 mL IVPB  Every 1 Hour PRN      11/07/18 1048    11/04/18 1330  albumin human 5 % bottle 25 g  Every 12 Hours      11/04/18 1230    11/04/18 1231  Protime-INR  Daily      11/04/18 1230    10/31/18 0600  Ammonia  Daily      10/30/18 1311    10/30/18 2100  magic butt ointment  2 Times Daily      10/30/18 1548    10/30/18 1000  nicotine (NICODERM CQ) 21 MG/24HR patch 1 patch  Every 24 Hours Scheduled      10/30/18 0907    10/30/18 0907  ipratropium-albuterol (DUO-NEB) nebulizer solution 3 mL  Every 6 Hours PRN      10/30/18 0907    10/29/18 2130  midodrine (PROAMATINE) tablet 10 mg  3 Times Daily      10/29/18 2031    10/29/18 2130  rifaximin (XIFAXAN) tablet 275 mg  Every 12  Hours Scheduled      10/29/18 2031    10/29/18 2130  sodium chloride 0.9 % flush 3 mL  Every 12 Hours Scheduled      10/29/18 2032    10/29/18 2130  lactulose (CHRONULAC) 10 GM/15ML solution 30 g  3 Times Daily      10/29/18 2032    10/29/18 2033  Basic Metabolic Panel  Daily      10/29/18 2032    10/29/18 2033  CBC & Differential  Daily      10/29/18 2032    10/29/18 2032  naloxone (NARCAN) injection 0.4 mg  Every 5 Minutes PRN      10/29/18 2032    10/29/18 2032  sodium chloride 0.9 % flush 3-10 mL  As Needed      10/29/18 2032    10/29/18 2032  ondansetron (ZOFRAN) injection 4 mg  Every 6 Hours PRN      10/29/18 2032    10/29/18 2031  simethicone (MYLICON) chewable tablet 80 mg  Every 6 Hours PRN      10/29/18 2031    Unscheduled  Magnesium  As Needed      11/07/18 1048    Unscheduled  Potassium  As Needed      11/07/18 1048    Unscheduled  Transfuse Cryoprecipitate, 2 Units  Transfusion      11/08/18 0734    Unscheduled  MIDLINE CARE - Change Dressing As Needed When Damp, Loose or Soiled  As Needed      11/15/18 1950    Unscheduled  MIDLINE CARE - Change Needleless Connectors  As Needed     Comments:  Per [facility] Policy    11/15/18 1950    --  SCANNED - TELEMETRY        10/29/18 0000    --  SCANNED - TELEMETRY        10/29/18 0000    --  SCANNED - TELEMETRY        10/29/18 0000    --  SCANNED - TELEMETRY        10/29/18 0000    --  SCANNED - TELEMETRY        10/29/18 0000    --  SCANNED - TELEMETRY        10/29/18 0000    --  SCANNED - TELEMETRY        10/29/18 0000    --  SCANNED - TELEMETRY        10/29/18 0000    --  SCANNED - TELEMETRY        10/29/18 0000    --  SCANNED - TELEMETRY        10/29/18 0000    --  SCANNED - TELEMETRY        10/29/18 0000    --  SCANNED - TELEMETRY        10/29/18 0000    --  SCANNED - TELEMETRY        10/29/18 0000    --  SCANNED - TELEMETRY        10/29/18 0000    --  SCANNED - TELEMETRY        10/29/18 0000    --  SCANNED - TELEMETRY        10/29/18 0000    --  SCANNED -  TELEMETRY        10/29/18 0000    --  SCANNED - TELEMETRY        10/29/18 0000    --  SCANNED - TELEMETRY        10/29/18 0000    --  SCANNED - TELEMETRY        10/29/18 0000    --  SCANNED - TELEMETRY        10/29/18 0000    --  SCANNED - TELEMETRY        10/29/18 0000    --  SCANNED - TELEMETRY        10/29/18 0000    --  SCANNED - TELEMETRY        10/29/18 0000    --  SCANNED - TELEMETRY        10/29/18 0000    --  SCANNED - TELEMETRY        10/29/18 0000    --  SCANNED - TELEMETRY        10/29/18 0000    --  SCANNED - TELEMETRY        10/29/18 0000    --  SCANNED - TELEMETRY        10/29/18 0000    --  SCANNED - TELEMETRY        10/29/18 0000    --  SCANNED - TELEMETRY        10/29/18 0000    --  SCANNED - TELEMETRY        10/29/18 0000    --  SCANNED - TELEMETRY        10/29/18 0000    --  SCANNED - TELEMETRY        10/29/18 0000    --  SCANNED - TELEMETRY        10/29/18 0000    --  SCANNED - TELEMETRY        10/29/18 0000    --  SCANNED - TELEMETRY        10/29/18 0000    --  SCANNED - TELEMETRY        10/29/18 0000    --  SCANNED - TELEMETRY        10/29/18 0000    --  SCANNED - TELEMETRY        10/29/18 0000    --  SCANNED - TELEMETRY        10/29/18 0000    --  SCANNED - TELEMETRY        10/29/18 0000    --  SCANNED - TELEMETRY        10/29/18 0000    --  SCANNED - TELEMETRY        10/29/18 0000    --  SCANNED - TELEMETRY        10/29/18 0000    --  SCANNED - TELEMETRY        10/29/18 0000    --  SCANNED - TELEMETRY        10/29/18 0000    --  SCANNED - TELEMETRY        10/29/18 0000    --  SCANNED - TELEMETRY        10/29/18 0000    --  SCANNED - TELEMETRY        10/29/18 0000    --  SCANNED - TELEMETRY        10/29/18 0000    --  SCANNED - TELEMETRY        10/29/18 0000    --  SCANNED - TELEMETRY        10/29/18 0000    --  SCANNED - TELEMETRY        10/29/18 0000    --  SCANNED - TELEMETRY        10/29/18 0000    --  SCANNED - TELEMETRY        10/29/18 0000    --  SCANNED - TELEMETRY        10/29/18  0000    --  SCANNED - TELEMETRY        10/29/18 0000    --  SCANNED - TELEMETRY        10/29/18 0000    --  SCANNED - TELEMETRY        10/29/18 0000    --  SCANNED - TELEMETRY        10/29/18 0000    --  SCANNED - TELEMETRY        10/29/18 0000             Physician Progress Notes (last 24 hours) (Notes from 11/19/2018  9:00 AM through 11/20/2018  9:00 AM)      Allen Hercules MD at 11/19/2018  3:57 PM          Progress Note  Allen Hercules MD  Hospitalist    Date of visit: 11/19/2018     LOS: 21 days   Patient Care Team:  Reddy Grant MD as PCP - General (Family Medicine)  Tushar Becerril DO as Consulting Physician (Gastroenterology)    Chief Complaint: nausea, vomiting, abdominal discomfort    Subjective     Interval History:     Patient Complaints: nausea / vomiting    History taken from: patient    Medication Review:   Current Facility-Administered Medications   Medication Dose Route Frequency Provider Last Rate Last Dose   • albumin human 5 % bottle 25 g  25 g Intravenous Q12H Solomon Gomez PA 0 mL/hr at 11/11/18 2000 25 g at 11/19/18 1723   • fluconazole (DIFLUCAN) IVPB 400 mg  400 mg Intravenous Daily Ed Franco MD   400 mg at 11/19/18 0857   • furosemide (LASIX) tablet 40 mg  40 mg Oral Daily Ed Franco MD   40 mg at 11/19/18 0921   • HYDROmorphone (DILAUDID) injection 0.25 mg  0.25 mg Intravenous Q3H PRN Allen Hercules MD   0.25 mg at 11/19/18 0357   • ipratropium-albuterol (DUO-NEB) nebulizer solution 3 mL  3 mL Nebulization Q6H PRN Armando Richard MD       • lactulose (CHRONULAC) 10 GM/15ML solution 30 g  30 g Oral TID Eduar Cisneros MD   30 g at 11/17/18 0919   • magic butt ointment   Topical BID Armando Richard MD       • Magnesium Sulfate 2 gram Bolus, followed by 8 gram infusion (total Mg dose 10 grams)- Mg less than or equal to 1mg/dL  2 g Intravenous PRN Solomon Gomez PA        Or   • Magnesium Sulfate 2 gram / 50mL Infusion (GIVE X 3 BAGS TO EQUAL 6GM  TOTAL DOSE) - Mg 1.1 - 1.5 mg/dl  2 g Intravenous PRN Solomon Gomez PA        Or   • Magnesium Sulfate 4 gram infusion- Mg 1.6-1.9 mg/dL  4 g Intravenous PRN Solomon Gomez PA 25 mL/hr at 11/07/18 2157 4 g at 11/07/18 2157   • midodrine (PROAMATINE) tablet 10 mg  10 mg Oral TID Eduar Cisneros MD   10 mg at 11/18/18 2050   • naloxone (NARCAN) injection 0.4 mg  0.4 mg Intravenous Q5 Min PRN Eduar Cisneros MD       • nicotine (NICODERM CQ) 21 MG/24HR patch 1 patch  1 patch Transdermal Q24H Armando Richard MD   1 patch at 11/19/18 0908   • ondansetron (ZOFRAN) injection 4 mg  4 mg Intravenous Q6H PRN Eduar Cisneros MD   4 mg at 11/19/18 0901   • oxyCODONE (ROXICODONE) immediate release tablet 5 mg  5 mg Oral Q6H PRN Allen Hercules MD   5 mg at 11/18/18 2050   • Pharmacy to dose vancomycin   Does not apply Continuous PRN Deondre Virk APRN       • potassium chloride (MICRO-K) CR capsule 40 mEq  40 mEq Oral Daily Barrera Mcmanus MD   40 mEq at 11/18/18 0903   • potassium chloride 10 mEq in 100 mL IVPB  10 mEq Intravenous Q1H PRN Solomon Gomez  mL/hr at 11/09/18 0801 10 mEq at 11/09/18 0801   • promethazine (PHENERGAN) injection 12.5 mg  12.5 mg Intravenous Q6H PRN Allen Hercules MD   12.5 mg at 11/19/18 1602   • rifaximin (XIFAXAN) tablet 275 mg  275 mg Oral Q12H Eduar Cisneros MD   275 mg at 11/19/18 0921   • simethicone (MYLICON) chewable tablet 80 mg  80 mg Oral Q6H PRN Eduar Cisneros MD   80 mg at 11/16/18 1309   • sodium chloride (OCEAN) nasal spray 1 spray  1 spray Each Nare PRN Beau Celeste MD       • sodium chloride 0.9 % flush 10 mL  10 mL Intravenous Q12H Vinh, Anderson T, DO   10 mL at 11/19/18 0919   • sodium chloride 0.9 % flush 10 mL  10 mL Intravenous Q12H Vinh, Anderson T, DO   10 mL at 11/19/18 0918   • sodium chloride 0.9 % flush 10 mL  10 mL Intravenous PRN Vinh, Anderson T, DO   10 mL at 11/18/18 0905   • sodium chloride 0.9 % flush  20 mL  20 mL Intravenous PRN Anderson Justice, DO   20 mL at 11/18/18 2050   • sodium chloride 0.9 % flush 3 mL  3 mL Intravenous Q12H Eduar Cisneros MD   3 mL at 11/18/18 2052   • sodium chloride 0.9 % flush 3-10 mL  3-10 mL Intravenous PRN Eduar Cisneros MD   10 mL at 11/13/18 0637   • spironolactone (ALDACTONE) tablet 25 mg  25 mg Oral Daily Anderson Justice, DO   25 mg at 11/19/18 0922   • vancomycin 1250 mg/250 mL 0.9% NS IVPB ([facility])  1,250 mg Intravenous Q36H Eduar Cisneros MD   1,250 mg at 11/19/18 1125       Review of Systems:   Review of Systems   Constitutional: Positive for fatigue. Negative for fever.   Respiratory: Positive for cough. Negative for shortness of breath and wheezing.    Cardiovascular: Negative for chest pain and leg swelling.   Gastrointestinal: Positive for abdominal distention, nausea and vomiting. Negative for abdominal pain and diarrhea.   Genitourinary: Negative for enuresis, frequency, hematuria, penile swelling and urgency.   Musculoskeletal: Negative for arthralgias and back pain.   Skin: Positive for pallor. Negative for color change and rash.   Neurological: Positive for weakness. Negative for syncope, facial asymmetry and light-headedness.   Psychiatric/Behavioral: Negative for agitation, behavioral problems and confusion.   All other systems reviewed and are negative.      Objective     Vital Signs  Temp:  [97.2 °F (36.2 °C)-98.7 °F (37.1 °C)] 98.7 °F (37.1 °C)  Heart Rate:  [76-93] 91  Resp:  [16-18] 18  BP: (121-160)/() 158/90    Physical Exam:  Physical Exam   Constitutional: He is oriented to person, place, and time. He appears ill. No distress.   Eyes: EOM are normal. Pupils are equal, round, and reactive to light. Scleral icterus is present.   Neck: Normal range of motion. Neck supple.   Cardiovascular: Normal rate and regular rhythm.   Pulmonary/Chest: Effort normal and breath sounds normal. No respiratory distress. He has no wheezes.   Abdominal:  Soft. He exhibits distension. There is no tenderness.   Musculoskeletal:   L AKA   Neurological: He is alert and oriented to person, place, and time. He displays normal reflexes. No cranial nerve deficit. Coordination normal.   Skin: Skin is warm and dry. There is pallor.   Psychiatric: He has a normal mood and affect. His behavior is normal.        Results Review:    Lab Results (last 24 hours)     Procedure Component Value Units Date/Time    CBC & Differential [[id number]] Collected:  11/19/18 0610    Specimen:  Blood Updated:  11/19/18 0702    Narrative:       The following orders were created for panel order CBC & Differential.  Procedure                               Abnormality         Status                     ---------                               -----------         ------                     Scan Slide[[id number]]                                                                  CBC Auto Differential[[id number]]        Abnormal            Final result                 Please view results for these tests on the individual orders.    Protime-INR [[id number]]  (Abnormal) Collected:  11/19/18 0610    Specimen:  Blood Updated:  11/19/18 0701     Protime 27.1 Seconds      INR 2.66    Narrative:       Therapeutic range for most indications is 2.0-3.0 INR,  or 2.5-3.5 for mechanical heart valves.    Basic Metabolic Panel [[id number]]  (Abnormal) Collected:  11/19/18 0610    Specimen:  Blood Updated:  11/19/18 0648     Glucose 66 mg/dL      BUN 29 mg/dL      Creatinine 1.50 mg/dL      Sodium 135 mmol/L      Potassium 5.0 mmol/L      Chloride 97 mmol/L      CO2 24.0 mmol/L      Calcium 9.7 mg/dL      eGFR Non African Amer 49 mL/min/1.73      BUN/Creatinine Ratio 19.3     Anion Gap 14.0 mmol/L     Vancomycin, Random [[id number]] Collected:  11/19/18 0610    Specimen:  Blood Updated:  11/19/18 0648     Vancomycin Random 18.86 mcg/mL     Ammonia [[id number]]  (Abnormal) Collected:  11/19/18 0610    Specimen:  Blood Updated:   11/19/18 0648     Ammonia 31 umol/L     CBC Auto Differential [[id number]]  (Abnormal) Collected:  11/19/18 0610    Specimen:  Blood Updated:  11/19/18 0641     WBC 6.94 10*3/mm3      RBC 2.37 10*6/mm3      Hemoglobin 8.5 g/dL      Hematocrit 25.2 %      .3 fL      MCH 35.9 pg      MCHC 33.7 g/dL      RDW 17.9 %      RDW-SD 67.5 fl      MPV 9.9 fL      Platelets 45 10*3/mm3      Neutrophil % 62.4 %      Lymphocyte % 21.5 %      Monocyte % 8.9 %      Eosinophil % 5.3 %      Basophil % 1.6 %      Immature Grans % 0.3 %      Neutrophils, Absolute 4.33 10*3/mm3      Lymphocytes, Absolute 1.49 10*3/mm3      Monocytes, Absolute 0.62 10*3/mm3      Eosinophils, Absolute 0.37 10*3/mm3      Basophils, Absolute 0.11 10*3/mm3      Immature Grans, Absolute 0.02 10*3/mm3           Imaging Results (last 24 hours)     ** No results found for the last 24 hours. **          Assessment/Plan       Hepatic encephalopathy (CMS/HCC)    Liver cirrhosis (CMS/HCC)    Altered mental status    Perinephric abscess    Thrombocytopenia (CMS/HCC)    CKD (chronic kidney disease) stage 3, GFR 30-59 ml/min (CMS/HCC)    Chronic hepatitis C (CMS/HCC)    Chronic anemia    Nausea / vomiting - minimally better - we'll provide him with PRN Phenergan. Continue with the IV antibiotics for the Enterococcus / Candida isolated from the perinephric abscess.     Allen Hercules MD  11/19/18  6:06 PM        Electronically signed by Allen Hercules MD at 11/19/2018  6:09 PM       Consult Notes (last 24 hours) (Notes from 11/19/2018  9:00 AM through 11/20/2018  9:00 AM)     No notes of this type exist for this encounter.      Auth#[id number]  Tammie Werner RN  [facility]  [phone]  Fax [phone]

## 2018-11-20 NOTE — PLAN OF CARE
Problem: Patient Care Overview  Goal: Plan of Care Review  Outcome: Ongoing (interventions implemented as appropriate)   11/20/18 0509 11/20/18 0845 11/20/18 1405   Coping/Psychosocial   Plan of Care Reviewed With --  patient --    Plan of Care Review   Progress no change --  --    OTHER   Outcome Summary --  --  bp has been high; pt nausea has been better       Problem: Fall Risk (Adult)  Goal: Absence of Fall  Outcome: Ongoing (interventions implemented as appropriate)      Problem: Skin Injury Risk (Adult)  Goal: Skin Health and Integrity  Outcome: Ongoing (interventions implemented as appropriate)      Problem: Anemia (Adult)  Goal: Symptom Improvement  Outcome: Ongoing (interventions implemented as appropriate)      Problem: Liver Failure, Acute/Chronic (Adult)  Goal: Signs and Symptoms of Listed Potential Problems Will be Absent, Minimized or Managed (Liver Failure, Acute/Chronic)  Outcome: Ongoing (interventions implemented as appropriate)

## 2018-11-20 NOTE — PLAN OF CARE
Problem: Patient Care Overview  Goal: Plan of Care Review  Outcome: Ongoing (interventions implemented as appropriate)   11/20/18 0509   Coping/Psychosocial   Plan of Care Reviewed With patient   Plan of Care Review   Progress no change     Goal: Individualization and Mutuality  Outcome: Ongoing (interventions implemented as appropriate)    Goal: Discharge Needs Assessment  Outcome: Ongoing (interventions implemented as appropriate)    Goal: Interprofessional Rounds/Family Conf  Outcome: Ongoing (interventions implemented as appropriate)      Problem: Fall Risk (Adult)  Goal: Absence of Fall  Outcome: Ongoing (interventions implemented as appropriate)      Problem: Skin Injury Risk (Adult)  Goal: Skin Health and Integrity  Outcome: Ongoing (interventions implemented as appropriate)      Problem: Anemia (Adult)  Goal: Symptom Improvement  Outcome: Ongoing (interventions implemented as appropriate)      Problem: Liver Failure, Acute/Chronic (Adult)  Goal: Signs and Symptoms of Listed Potential Problems Will be Absent, Minimized or Managed (Liver Failure, Acute/Chronic)  Outcome: Ongoing (interventions implemented as appropriate)

## 2018-11-20 NOTE — PROGRESS NOTES
Progress Note  Allen Hercules MD  Hospitalist    Date of visit: 11/20/2018     LOS: 22 days   Patient Care Team:  Reddy Grant MD as PCP - General (Family Medicine)  Tushar Becerril DO as Consulting Physician (Gastroenterology)    Chief Complaint: nausea, vomiting, abdominal discomfort    Subjective     Interval History:     Patient Complaints: nausea / vomiting    History taken from: patient    Medication Review:   Current Facility-Administered Medications   Medication Dose Route Frequency Provider Last Rate Last Dose   • albumin human 5 % bottle 25 g  25 g Intravenous Q12H Solomon Gomez PA 0 mL/hr at 11/11/18 2000 25 g at 11/20/18 0537   • amLODIPine (NORVASC) tablet 5 mg  5 mg Oral Q24H Allen Hercules MD   5 mg at 11/20/18 1234   • [START ON 11/21/2018] fluconazole (DIFLUCAN) tablet 400 mg  400 mg Oral Once Shawn Ledbetter MD       • furosemide (LASIX) tablet 40 mg  40 mg Oral Daily Ed Franco MD   40 mg at 11/20/18 0931   • hydrALAZINE (APRESOLINE) tablet 10 mg  10 mg Oral Q8H Allen Hercules MD   10 mg at 11/20/18 1234   • HYDROmorphone (DILAUDID) injection 0.25 mg  0.25 mg Intravenous Q3H PRN Allen Hercules MD   0.25 mg at 11/19/18 2127   • ipratropium-albuterol (DUO-NEB) nebulizer solution 3 mL  3 mL Nebulization Q6H PRN Armando Richard MD       • lactulose (CHRONULAC) 10 GM/15ML solution 30 g  30 g Oral TID Eduar Cisneros MD   30 g at 11/17/18 0919   • linezolid (ZYVOX) tablet 600 mg  600 mg Oral Q12H Allen Hercules MD       • magic butt ointment   Topical BID Armando Richard MD       • Magnesium Sulfate 2 gram Bolus, followed by 8 gram infusion (total Mg dose 10 grams)- Mg less than or equal to 1mg/dL  2 g Intravenous PRN Solomon Gomez PA        Or   • Magnesium Sulfate 2 gram / 50mL Infusion (GIVE X 3 BAGS TO EQUAL 6GM TOTAL DOSE) - Mg 1.1 - 1.5 mg/dl  2 g Intravenous PRN Sloomon Gomez PA        Or   • Magnesium Sulfate 4 gram infusion- Mg 1.6-1.9 mg/dL  4 g  Intravenous PRN Solomon Gomez PA 25 mL/hr at 11/07/18 2157 4 g at 11/07/18 2157   • midodrine (PROAMATINE) tablet 10 mg  10 mg Oral TID Eduar Cisneros MD   10 mg at 11/19/18 2044   • naloxone (NARCAN) injection 0.4 mg  0.4 mg Intravenous Q5 Min PRN Eduar Cisneros MD       • nicotine (NICODERM CQ) 21 MG/24HR patch 1 patch  1 patch Transdermal Q24H Armando Richard MD   1 patch at 11/20/18 0918   • ondansetron (ZOFRAN) injection 4 mg  4 mg Intravenous Q6H PRN Eduar Cisneros MD   4 mg at 11/19/18 0901   • oxyCODONE (ROXICODONE) immediate release tablet 5 mg  5 mg Oral Q6H PRN Allen Hercules MD   5 mg at 11/19/18 1846   • [START ON 11/21/2018] pantoprazole (PROTONIX) EC tablet 40 mg  40 mg Oral Q AM Allen Hercules MD       • potassium chloride 10 mEq in 100 mL IVPB  10 mEq Intravenous Q1H PRN Solomon Gomez  mL/hr at 11/09/18 0801 10 mEq at 11/09/18 0801   • promethazine (PHENERGAN) injection 12.5 mg  12.5 mg Intravenous Q6H PRN Allen Hercules MD   12.5 mg at 11/19/18 2300   • rifaximin (XIFAXAN) tablet 275 mg  275 mg Oral Q12H Eduar Cisneros MD   275 mg at 11/20/18 0930   • simethicone (MYLICON) chewable tablet 80 mg  80 mg Oral Q6H PRN Eduar Cisneros MD   80 mg at 11/16/18 1309   • sodium chloride (OCEAN) nasal spray 1 spray  1 spray Each Nare PRN Beau Celeste MD       • sodium chloride 0.9 % flush 10 mL  10 mL Intravenous Q12H Anderson Justice DO   10 mL at 11/20/18 0933   • sodium chloride 0.9 % flush 10 mL  10 mL Intravenous Q12H Vinh, Anderson T, DO   10 mL at 11/20/18 0932   • sodium chloride 0.9 % flush 10 mL  10 mL Intravenous PRN Vinh, Anderson T, DO   10 mL at 11/18/18 0905   • sodium chloride 0.9 % flush 20 mL  20 mL Intravenous PRN Vinh, Anderson T, DO   20 mL at 11/18/18 2050   • sodium chloride 0.9 % flush 3 mL  3 mL Intravenous Q12H Eduar Cisneros MD   3 mL at 11/18/18 2052   • sodium chloride 0.9 % flush 3-10 mL  3-10 mL Intravenous PRN Eduar Cisneros  MD Rolando   10 mL at 11/13/18 0637       Review of Systems:   Review of Systems   Constitutional: Positive for fatigue. Negative for fever.   Respiratory: Positive for cough. Negative for shortness of breath and wheezing.    Cardiovascular: Negative for chest pain and leg swelling.   Gastrointestinal: Positive for abdominal distention, nausea and vomiting. Negative for abdominal pain and diarrhea.   Genitourinary: Negative for enuresis, frequency, hematuria, penile swelling and urgency.   Musculoskeletal: Negative for arthralgias and back pain.   Skin: Positive for pallor. Negative for color change and rash.   Neurological: Positive for weakness. Negative for syncope, facial asymmetry and light-headedness.   Psychiatric/Behavioral: Negative for agitation, behavioral problems and confusion.   All other systems reviewed and are negative.      Objective     Vital Signs  Temp:  [96.8 °F (36 °C)-98.7 °F (37.1 °C)] 96.8 °F (36 °C)  Heart Rate:  [88-97] 91  Resp:  [18-20] 18  BP: (146-180)/() 168/100    Physical Exam:  Physical Exam   Constitutional: He is oriented to person, place, and time. He appears ill. No distress.   Eyes: EOM are normal. Pupils are equal, round, and reactive to light. Scleral icterus is present.   Neck: Normal range of motion. Neck supple.   Cardiovascular: Normal rate and regular rhythm.   Pulmonary/Chest: Effort normal and breath sounds normal. No respiratory distress. He has no wheezes.   Abdominal: Soft. He exhibits distension. There is no tenderness.   Musculoskeletal:   L AKA   Neurological: He is alert and oriented to person, place, and time. He displays normal reflexes. No cranial nerve deficit. Coordination normal.   Skin: Skin is warm and dry. There is pallor.   Psychiatric: He has a normal mood and affect. His behavior is normal.        Results Review:    Lab Results (last 24 hours)     Procedure Component Value Units Date/Time    CBC & Differential [[id number]] Collected:  11/20/18  0652    Specimen:  Blood Updated:  11/20/18 0741    Narrative:       The following orders were created for panel order CBC & Differential.  Procedure                               Abnormality         Status                     ---------                               -----------         ------                     Scan Slide[[id number]]                                                                  CBC Auto Differential[[id number]]        Abnormal            Final result                 Please view results for these tests on the individual orders.    CBC Auto Differential [[id number]]  (Abnormal) Collected:  11/20/18 0652    Specimen:  Blood Updated:  11/20/18 0741     WBC 8.19 10*3/mm3      RBC 2.17 10*6/mm3      Hemoglobin 7.7 g/dL      Hematocrit 23.4 %      .8 fL      MCH 35.5 pg      MCHC 32.9 g/dL      RDW 18.5 %      RDW-SD 70.2 fl      MPV 9.6 fL      Platelets 38 10*3/mm3      Comment: SPECIMEN REANALYZED TO CONFIRM PLATELET COUNT        Neutrophil % 75.7 %      Lymphocyte % 12.0 %      Monocyte % 9.8 %      Eosinophil % 1.0 %      Basophil % 1.0 %      Immature Grans % 0.5 %      Neutrophils, Absolute 6.21 10*3/mm3      Lymphocytes, Absolute 0.98 10*3/mm3      Monocytes, Absolute 0.80 10*3/mm3      Eosinophils, Absolute 0.08 10*3/mm3      Basophils, Absolute 0.08 10*3/mm3      Immature Grans, Absolute 0.04 10*3/mm3      nRBC 0.0 /100 WBC     Protime-INR [[id number]]  (Abnormal) Collected:  11/20/18 0652    Specimen:  Blood Updated:  11/20/18 0735     Protime 32.5 Seconds      INR 3.38    Narrative:       Therapeutic range for most indications is 2.0-3.0 INR,  or 2.5-3.5 for mechanical heart valves.    Basic Metabolic Panel [[id number]]  (Abnormal) Collected:  11/20/18 0652    Specimen:  Blood Updated:  11/20/18 0723     Glucose 79 mg/dL      BUN 42 mg/dL      Creatinine 1.87 mg/dL      Sodium 139 mmol/L      Potassium 5.5 mmol/L      Chloride 96 mmol/L      CO2 19.0 mmol/L      Calcium 10.4 mg/dL       eGFR Non African Amer 38 mL/min/1.73      BUN/Creatinine Ratio 22.5     Anion Gap 24.0 mmol/L     Ammonia [[id number]]  (Normal) Collected:  11/20/18 0652    Specimen:  Blood Updated:  11/20/18 0722     Ammonia 22 umol/L           Imaging Results (last 24 hours)     ** No results found for the last 24 hours. **          Assessment/Plan       Hepatic encephalopathy (CMS/HCC)    Liver cirrhosis (CMS/HCC)    Altered mental status    Perinephric abscess    Thrombocytopenia (CMS/HCC)    CKD (chronic kidney disease) stage 3, GFR 30-59 ml/min (CMS/HCC)    Chronic hepatitis C (CMS/HCC)    Chronic anemia    Nausea / vomiting - minimally better - we'll provide him with PRN Phenergan.     Continue with the antibiotics for the Enterococcus / Candida isolated from the perinephric abscess. We'll change to po Zyvox.    Creatinine and serum K increasing. We'll stop K supplement / Aldactone.    Repeat CBC as the Hgb is down to 7.7 today    Allen Hercules MD  11/20/18  2:39 PM

## 2018-11-21 NOTE — SIGNIFICANT NOTE
11/20/18 1545   Rehab Treatment   Discipline occupational therapy assistant   Reason Treatment Not Performed patient/family declined treatment  (Pt declined tx due to n/v. OT will check back tomorrow.)

## 2018-11-21 NOTE — PLAN OF CARE
Problem: Patient Care Overview  Goal: Plan of Care Review  Outcome: Ongoing (interventions implemented as appropriate)   11/21/18 1222   Coping/Psychosocial   Plan of Care Reviewed With patient   OTHER   Outcome Summary Pt agreeable to PT tx this AM, but reporting he was fatigued and referring OOB or EOB . Pt able to perform BLE therex 1x20. No goals met this date. Pt would benefit from SNF upon d/c.
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
## 2018-11-21 NOTE — THERAPY TREATMENT NOTE
Acute Care - Physical Therapy Treatment Note  [facility]     Patient Name: Armando Mcmullen Sr.  : 1965  MRN: [id number]  Today's Date: 2018  Onset of Illness/Injury or Date of Surgery: 10/29/18  Date of Referral to PT: 18  Referring Physician: SARI Franco MD    Admit Date: 10/29/2018    Visit Dx:    ICD-10-CM ICD-9-CM   1. Altered mental status, unspecified altered mental status type R41.82 780.97   2. Hepatic encephalopathy (CMS/HCC) K72.90 572.2   3. Impaired functional mobility, balance, gait, and endurance Z74.09 V49.89   4. Impaired mobility and ADLs Z74.09 799.89     Patient Active Problem List   Diagnosis   • Anxiety state   • Back pain   • Liver cirrhosis (CMS/HCC)   • Depression   • Substance abuse (CMS/HCC)   • Liver failure with hepatic coma (CMS/HCC)   • Hypersplenism   • Chronic osteomyelitis of right shoulder region (CMS/HCC)   • Thrombocytopenia (CMS/HCC)   • Anemia of chronic disease   • Positive hepatitis C antibody test   • BMI 35.0-35.9,adult   • Tobacco use   • Hepatic encephalopathy (CMS/HCC)   • Anxiety and depression   • CKD (chronic kidney disease) stage 3, GFR 30-59 ml/min (CMS/HCC)   • Ascites   • Physical deconditioning   • Closed fracture of lumbar vertebra with spinal cord injury (CMS/HCC)   • Altered mental status   • Chronic hepatitis C (CMS/HCC)   • Perinephric abscess   • Chronic anemia       Therapy Treatment    Rehabilitation Treatment Summary     Row Name 18 1047             Treatment Time/Intention    Discipline  physical therapist  -KW      Document Type  therapy note (daily note)  -KW      Subjective Information  complains of;fatigue  -KW      Mode of Treatment  physical therapy;individual therapy  -KW      Patient/Family Observations  no family present  -KW      Patient Effort  adequate  -KW      Comment  Pt declining OOB or EOB d/t fatigue  -KW      Recorded by [KW] Sepideh Maya, PT 18 1220      Row Name 18 1047             Vital  Signs    Pre Systolic BP Rehab  170 RN aware of increased BP, ok to treat  -KW      Pre Treatment Diastolic BP  92  -KW      Post Systolic BP Rehab  182  -KW      Post Treatment Diastolic BP  94  -KW      Pretreatment Heart Rate (beats/min)  90  -KW      Posttreatment Heart Rate (beats/min)  92  -KW      Pre SpO2 (%)  90  -KW      O2 Delivery Pre Treatment  supplemental O2  -KW      Post SpO2 (%)  90  -KW      O2 Delivery Post Treatment  supplemental O2  -KW      Pre Patient Position  Supine  -KW      Intra Patient Position  Supine  -KW      Post Patient Position  Supine  -KW      Recorded by [KW] Sepideh Maya, PT 11/21/18 1220      Row Name 11/21/18 1047             Cognitive Assessment/Intervention- PT/OT    Affect/Mental Status (Cognitive)  WFL  -KW      Orientation Status (Cognition)  oriented x 4  -KW      Follows Commands (Cognition)  WFL  -KW      Cognitive Function (Cognitive)  WFL  -KW      Personal Safety Interventions  fall prevention program maintained;gait belt;nonskid shoes/slippers when out of bed;supervised activity  -KW      Recorded by [KW] Sepideh Maya, PT 11/21/18 1220      Row Name 11/21/18 1047             Bed Mobility Assessment/Treatment    Rolling Left [place] (Bed Mobility)  supervision  -KW      Rolling Right [place] (Bed Mobility)  supervision  -KW      Assistive Device (Bed Mobility)  head of bed elevated;bed rails  -KW      Recorded by [KW] Sepideh Maya, PT 11/21/18 1220      Row Name 11/21/18 1047             Transfer Assessment/Treatment    Comment (Transfers)  Pt deferring this date 2/2 fatigue  -KW      Recorded by [KW] Sepideh Maya, PT 11/21/18 1220      Row Name 11/21/18 1047             Lower Extremity Supine Therapeutic Exercise    Performed, Supine Lower Extremity (Therapeutic Exercise)  hip flexion/extension;hip abduction/adduction;knee flexion/extension;ankle dorsiflexion/plantarflexion;SLR (straight leg raise);gluteal sets;ankle pumps  -KW      Exercise Type,  Supine Lower Extremity (Therapeutic Exercise)  AROM (active range of motion)  -KW      Expected Outcomes, Supine Lower Extremity (Therapeutic Exercise)  improve functional tolerance, community activity;improve functional tolerance, household activity;improve motor control;improve functional stability;improve performance, BADLs;improve performance, gait skills;improve performance, gait skills on uneven ground;improve performance, steer a wheelchair  -KW      Sets/Reps Detail, Supine Lower Extremity (Therapeutic Exercise)  1x20  -KW      Recorded by [KW] Sepideh Maya, PT 11/21/18 1220      Row Name 11/21/18 1047             Pain Assessment    Additional Documentation  Pain Scale: Numbers Pre/Post-Treatment (Group)  -KW      Recorded by [KW] Sepideh Maya, PT 11/21/18 1220      Row Name 11/21/18 1047             Pain Scale: Numbers Pre/Post-Treatment    Pain Scale: Numbers, Pretreatment  8/10  -KW      Pain Scale: Numbers, Post-Treatment  6/10  -KW      Pain Location  back  -KW      Pain Intervention(s)  Repositioned;Medication (See MAR);Rest  -KW      Recorded by [KW] Sepideh Maya, PT 11/21/18 1220      Row Name                Wound 10/30/18 1020 coccyx    Wound - Properties Group Date first assessed: 10/30/18 [AS] Time first assessed: 1020 [AS] Present On Admission : picture taken [AS] Location: coccyx [AS] Recorded by:  [AS] Yokasta Heatr RN 10/30/18 1021    Row Name                Wound 11/09/18 1712 Left posterior flank    Wound - Properties Group Date first assessed: 11/09/18 [FAMILIA] Time first assessed: 1712 [FAMILIA] Side: Left [FAMILIA] Orientation: posterior [FAMILIA] Location: flank [FAMILIA] Additional Comments: Drainage catheter puncture.  [FAMILIA] Recorded by:  [FAMILIA] Ev Alas RN 11/09/18 1713    Row Name 11/21/18 1047             Plan of Care Review    Plan of Care Reviewed With  patient  -KW      Recorded by [KW] Sepideh Maya, PT 11/21/18 1220      Row Name 11/21/18 1047             Outcome Summary/Treatment  Plan (PT)    Daily Summary of Progress (PT)  progress towards functional goals is fair  -KW      Barriers to Overall Progress (PT)  pain, deconditioning  -KW      Anticipated Discharge Disposition (PT)  skilled nursing facility  -KW      Recorded by [KW] Sepideh Maya, PT 11/21/18 1220        User Key  (r) = Recorded By, (t) = Taken By, (c) = Cosigned By    Initials Name Effective Dates Discipline    FAMILIA Ev Alas RN 10/17/16 -  Nurse    AS Yokasta Heart RN 12/13/16 -  Nurse    Sepideh Loera, PT 07/23/18 -  PT          Wound 10/30/18 1020 coccyx (Active)   Dressing Appearance open to air 11/21/2018  9:50 AM   Closure Open to air 11/21/2018  9:50 AM   Base pink 11/21/2018  9:50 AM   Periwound intact;blanchable 11/21/2018  9:50 AM   Drainage Amount none 11/21/2018  9:50 AM   Care, Wound barrier applied 11/21/2018  9:50 AM       Wound 11/09/18 1712 Left posterior flank (Active)   Dressing Appearance open to air 11/21/2018  9:50 AM   Closure Open to air 11/21/2018  9:50 AM   Periwound intact 11/21/2018  9:50 AM   Drainage Amount none 11/21/2018  9:50 AM       PT Rehab Goals     Row Name 11/21/18 1047             Bed Mobility Goal 1 (PT)    Activity/Assistive Device (Bed Mobility Goal 1, PT)  rolling to left;rolling to right;scooting  -KW      [place] Level/Cues Needed (Bed Mobility Goal 1, PT)  supervision required  -KW      Time Frame (Bed Mobility Goal 1, PT)  3 days  -KW      Progress/Outcomes (Bed Mobility Goal 1, PT)  goal not met  -KW         Transfer Goal 1 (PT)    Activity/Assistive Device (Transfer Goal 1, PT)  sit-to-stand/stand-to-sit;bed-to-chair/chair-to-bed;walker, rolling  -KW      [place] Level/Cues Needed (Transfer Goal 1, PT)  contact guard assist  -KW      Time Frame (Transfer Goal 1, PT)  5 days  -KW      Progress/Outcome (Transfer Goal 1, PT)  goal not met  -KW         Gait Training Goal 1 (PT)    Activity/Assistive Device (Gait Training Goal 1, PT)  gait (walking  locomotion);decrease fall risk;assistive device use;walker, rolling  -KW      [place] Level (Gait Training Goal 1, PT)  minimum assist (75% or more patient effort)  -KW      Distance (Gait Goal 1, PT)  5 ft or more  -KW      Time Frame (Gait Training Goal 1, PT)  by discharge  -KW      Progress/Outcome (Gait Training Goal 1, PT)  goal not met  -KW         Wheelchair Locomotion Goal 1 (PT)    Activity (Wheelchair Locomotion Goal 1, PT)  wheelchair mobility skills, all  -KW      [place] Level/Cues Needed (Wheelchair Locomotion Goal 1, PT)  supervision required  -KW      Distance Goal 1 (Wheelchair Locomotion, PT)  25 ft or more  -KW      Time Frame (Wheelchair Locomotion Goal 1, PT)  by discharge  -KW      Progress/Outcome (Wheelchair Locomotion Goal 1, PT)  goal not met  -KW         Stairs Goal 1 (PT)    Activity/Assistive Device (Stairs Goal 1, PT)  ascending stairs;descending stairs;walker, rolling  -KW      [place] Level/Cues Needed (Stairs Goal 1, PT)  moderate assist (50-74% patient effort)  -KW      Number of Stairs (Stairs Goal 1, PT)  2  -KW      Time Frame (Stairs Goal 1, PT)  by discharge  -KW      Progress/Outcome (Stairs Goal 1, PT)  goal not met  -KW        User Key  (r) = Recorded By, (t) = Taken By, (c) = Cosigned By    Initials Name Provider Type Discipline    Sepideh Loera, LIAN Physical Therapist PT          Physical Therapy Education     Title: PT OT SLP Therapies (Active)     Topic: Physical Therapy (Active)     Point: Mobility training (Done)     Learning Progress Summary           Patient Acceptance, E, VU by KENZIE at 11/15/2018  1:04 PM    Comment:  updated goals, POC    Acceptance, E, VU by KENZIE at 10/31/2018  4:01 PM    Comment:  Role of PT, POC, discharge recommendations to SNF for further rehab                               User Key     Initials Effective Dates Name Provider Type Discipline    KENZIE 07/23/18 -  Sepideh Maya, LIAN Physical Therapist PT                PT Recommendation  and Plan  Anticipated Discharge Disposition (PT): skilled nursing facility  Planned Therapy Interventions (PT Eval): balance training, bed mobility training, gait training, patient/family education, wheelchair management/propulsion training, transfer training, stretching, strengthening, ROM (range of motion), stair training  Therapy Frequency (PT Clinical Impression): other (see comments)(5-14x/week)  Outcome Summary/Treatment Plan (PT)  Daily Summary of Progress (PT): progress towards functional goals is fair  Barriers to Overall Progress (PT): pain, deconditioning  Anticipated Discharge Disposition (PT): skilled nursing facility  Plan of Care Reviewed With: patient  Outcome Summary: Pt agreeable to PT tx this AM, but reporting he was fatigued and referring OOB or EOB . Pt able to perform BLE therex 1x20. No goals met this date. Pt would benefit from SNF upon d/c.  Outcome Measures     Row Name 11/21/18 1047             How much help from another person do you currently need...    Turning from your back to your side while in flat bed without using bedrails?  3  -KW      Moving from lying on back to sitting on the side of a flat bed without bedrails?  3  -KW      Moving to and from a bed to a chair (including a wheelchair)?  2  -KW      Standing up from a chair using your arms (e.g., wheelchair, bedside chair)?  2  -KW      Climbing 3-5 steps with a railing?  1  -KW      To walk in hospital room?  1  -KW      AM-PAC 6 Clicks Score  12  -KW         Functional Assessment    Outcome Measure Options  AM-PAC 6 Clicks Basic Mobility (PT)  -KW        User Key  (r) = Recorded By, (t) = Taken By, (c) = Cosigned By    Initials Name Provider Type    Sepideh Loera, PT Physical Therapist         Time Calculation:   PT Charges     Row Name 11/21/18 1220             Time Calculation    Start Time  1047  -KW      Stop Time  1114  -KW      Time Calculation (min)  27 min  -KW      PT Received On  11/21/18  -KW         Time  Calculation- PT    Total Timed Code Minutes- PT  27 minute(s)  -KW         Timed Charges    47365 - PT Therapeutic Exercise Minutes  27  -KW        User Key  (r) = Recorded By, (t) = Taken By, (c) = Cosigned By    Initials Name Provider Type    Sepideh oLera, PT Physical Therapist        Therapy Suggested Charges     Code   Minutes Charges    58331 (CPT®) Hc Pt Neuromusc Re Education Ea 15 Min      58669 (CPT®) Hc Pt Ther Proc Ea 15 Min 27 2    71712 (CPT®) Hc Gait Training Ea 15 Min      30313 (CPT®) Hc Pt Therapeutic Act Ea 15 Min      26899 (CPT®) Hc Pt Manual Therapy Ea 15 Min      46420 (CPT®) Hc Pt Iontophoresis Ea 15 Min      38415 (CPT®) Hc Pt Elec Stim Ea-Per 15 Min      11364 (CPT®) Hc Pt Ultrasound Ea 15 Min      14950 (CPT®) Hc Pt Self Care/Mgmt/Train Ea 15 Min      74988 (CPT®) Hc Pt Prosthetic (S) Train Initial Encounter, Each 15 Min      37872 (CPT®) Hc Pt Orthotic(S)/Prosthetic(S) Encounter, Each 15 Min      28188 (CPT®) Hc Orthotic(S) Mgmt/Train Initial Encounter, Each 15min      Total  27 2        Therapy Charges for Today     Code Description Service Date Service Provider Modifiers Qty    22686978765 HC PT THER PROC EA 15 MIN 11/21/2018 Sepideh Maya, PT GP 2          PT G-Codes  PT Professional Judgement Used?: Yes  Outcome Measure Options: AM-PAC 6 Clicks Basic Mobility (PT)  AM-PAC 6 Clicks Score: 12  Score: 15  Functional Limitation: Mobility: Walking and moving around  Mobility: Walking and Moving Around Current Status (): At least 60 percent but less than 80 percent impaired, limited or restricted  Mobility: Walking and Moving Around Goal Status (): At least 20 percent but less than 40 percent impaired, limited or restricted    Sepideh Maya, PT  11/21/2018

## 2018-11-21 NOTE — PLAN OF CARE
Problem: Patient Care Overview  Goal: Plan of Care Review  Outcome: Ongoing (interventions implemented as appropriate)   11/21/18 4902   Coping/Psychosocial   Plan of Care Reviewed With patient   Plan of Care Review   Progress no change   OTHER   Outcome Summary Pt has poor appetite and PO intake related to nausea. PO 0-25%. No preferences or requests vocied.

## 2018-11-21 NOTE — PROGRESS NOTES
Progress Note  Allen Hercules MD  Hospitalist    Date of visit: 11/21/2018     LOS: 23 days   Patient Care Team:  Reddy Grant MD as PCP - General (Family Medicine)  Tushar Becerril DO as Consulting Physician (Gastroenterology)    Chief Complaint: nausea, vomiting, abdominal discomfort    Subjective     Interval History:     Patient Complaints: nausea / vomiting - some better     History taken from: patient    Medication Review:   Current Facility-Administered Medications   Medication Dose Route Frequency Provider Last Rate Last Dose   • albumin human 5 % bottle 25 g  25 g Intravenous Q12H Solomon Gomez PA 0 mL/hr at 11/11/18 2000 25 g at 11/21/18 0535   • amLODIPine (NORVASC) tablet 5 mg  5 mg Oral Q24H Allen Hercules MD   5 mg at 11/21/18 0914   • [START ON 11/22/2018] furosemide (LASIX) tablet 20 mg  20 mg Oral BID Allen Hrecules MD       • hydrALAZINE (APRESOLINE) tablet 10 mg  10 mg Oral Q8H Allen Hercules MD   10 mg at 11/21/18 0535   • HYDROmorphone (DILAUDID) injection 0.25 mg  0.25 mg Intravenous Q3H PRN Allen Hercules MD   0.25 mg at 11/21/18 0326   • ipratropium-albuterol (DUO-NEB) nebulizer solution 3 mL  3 mL Nebulization Q6H PRN Armando Richard MD       • lactulose (CHRONULAC) 10 GM/15ML solution 30 g  30 g Oral TID Eduar Cisnreos MD   30 g at 11/17/18 0919   • linezolid (ZYVOX) tablet 600 mg  600 mg Oral Q12H Allen Hercules MD   600 mg at 11/21/18 0914   • magic butt ointment   Topical BID Armando Richard MD       • Magnesium Sulfate 2 gram Bolus, followed by 8 gram infusion (total Mg dose 10 grams)- Mg less than or equal to 1mg/dL  2 g Intravenous PRN Solomon Gomez PA        Or   • Magnesium Sulfate 2 gram / 50mL Infusion (GIVE X 3 BAGS TO EQUAL 6GM TOTAL DOSE) - Mg 1.1 - 1.5 mg/dl  2 g Intravenous PRN Solomon Gomez PA        Or   • Magnesium Sulfate 4 gram infusion- Mg 1.6-1.9 mg/dL  4 g Intravenous PRN Solomon Gomez PA 25 mL/hr at 11/07/18 2157 4 g at  11/07/18 2157   • naloxone (NARCAN) injection 0.4 mg  0.4 mg Intravenous Q5 Min PRN Eduar Cisneros MD       • nicotine (NICODERM CQ) 21 MG/24HR patch 1 patch  1 patch Transdermal Q24H Armando Richard MD   1 patch at 11/21/18 0917   • ondansetron (ZOFRAN) injection 4 mg  4 mg Intravenous Q6H PRN Eduar Cisneros MD   4 mg at 11/19/18 0901   • oxyCODONE (ROXICODONE) immediate release tablet 5 mg  5 mg Oral Q6H PRN Allen Hercules MD   5 mg at 11/20/18 2244   • pantoprazole (PROTONIX) EC tablet 40 mg  40 mg Oral Q AM Allen Hercules MD   40 mg at 11/21/18 0535   • potassium chloride 10 mEq in 100 mL IVPB  10 mEq Intravenous Q1H PRN Solomon Gomez  mL/hr at 11/09/18 0801 10 mEq at 11/09/18 0801   • promethazine (PHENERGAN) injection 12.5 mg  12.5 mg Intravenous Q6H PRN Allen Hercules MD   12.5 mg at 11/20/18 1804   • rifaximin (XIFAXAN) tablet 275 mg  275 mg Oral Q12H Eduar Cisneros MD   275 mg at 11/21/18 0914   • simethicone (MYLICON) chewable tablet 80 mg  80 mg Oral Q6H PRN Eduar Cisneros MD   80 mg at 11/16/18 1309   • sodium chloride (OCEAN) nasal spray 1 spray  1 spray Each Nare PRN Beau Celeste MD       • sodium chloride 0.9 % flush 10 mL  10 mL Intravenous Q12H Vinh, Anderson T, DO   10 mL at 11/21/18 0920   • sodium chloride 0.9 % flush 10 mL  10 mL Intravenous Q12H Vinh, Anderson T, DO   10 mL at 11/21/18 0920   • sodium chloride 0.9 % flush 10 mL  10 mL Intravenous PRN Vinh Anderson T, DO   10 mL at 11/21/18 0918   • sodium chloride 0.9 % flush 20 mL  20 mL Intravenous PRN Anderson Justice DO   20 mL at 11/18/18 2050   • sodium chloride 0.9 % flush 3 mL  3 mL Intravenous Q12H Eduar Cisneros MD   3 mL at 11/21/18 0920   • sodium chloride 0.9 % flush 3-10 mL  3-10 mL Intravenous PRN Eduar Cisneros MD   10 mL at 11/13/18 0637       Review of Systems:   Review of Systems   Constitutional: Positive for fatigue. Negative for fever.   Respiratory: Positive for cough.  Negative for shortness of breath and wheezing.    Cardiovascular: Negative for chest pain and leg swelling.   Gastrointestinal: Positive for abdominal distention and nausea. Negative for abdominal pain, diarrhea and vomiting.   Genitourinary: Negative for enuresis, frequency, hematuria, penile swelling and urgency.   Musculoskeletal: Negative for arthralgias and back pain.   Skin: Positive for pallor. Negative for color change and rash.   Neurological: Positive for weakness. Negative for syncope, facial asymmetry and light-headedness.   Psychiatric/Behavioral: Negative for agitation, behavioral problems and confusion.   All other systems reviewed and are negative.      Objective     Vital Signs  Temp:  [96.8 °F (36 °C)-98.5 °F (36.9 °C)] 98.5 °F (36.9 °C)  Heart Rate:  [87-93] 87  Resp:  [18-20] 18  BP: (160-180)/() 166/97    Physical Exam:  Physical Exam   Constitutional: He is oriented to person, place, and time. He appears ill. No distress.   Eyes: EOM are normal. Pupils are equal, round, and reactive to light. Scleral icterus is present.   Neck: Normal range of motion. Neck supple.   Cardiovascular: Normal rate and regular rhythm.   Pulmonary/Chest: Effort normal and breath sounds normal. No respiratory distress. He has no wheezes.   Abdominal: Soft. He exhibits distension. There is no tenderness.   Musculoskeletal:   L AKA   Neurological: He is alert and oriented to person, place, and time. He displays normal reflexes. No cranial nerve deficit. Coordination normal.   Skin: Skin is warm and dry. There is pallor.   Psychiatric: He has a normal mood and affect. His behavior is normal.        Results Review:    Lab Results (last 24 hours)     Procedure Component Value Units Date/Time    CBC & Differential [[id number]] Collected:  11/21/18 0842    Specimen:  Blood Updated:  11/21/18 0907    Narrative:       The following orders were created for panel order CBC & Differential.  Procedure                                Abnormality         Status                     ---------                               -----------         ------                     Manual Differential[[id number]]                              In process                 Scan Slide[[id number]]                                                                  CBC Auto Differential[[id number]]        Abnormal            Final result                 Please view results for these tests on the individual orders.    CBC Auto Differential [[id number]]  (Abnormal) Collected:  11/21/18 0842    Specimen:  Blood Updated:  11/21/18 0907     WBC 8.61 10*3/mm3      RBC 1.94 10*6/mm3      Hemoglobin 6.9 g/dL      Hematocrit 20.3 %      .6 fL      MCH 35.6 pg      MCHC 34.0 g/dL      RDW 18.2 %      RDW-SD 68.2 fl      MPV 10.5 fL      Platelets 31 10*3/mm3     Manual Differential [[id number]] Collected:  11/21/18 0842    Specimen:  Blood Updated:  11/21/18 0906    Ammonia [[id number]]  (Normal) Collected:  11/21/18 0729    Specimen:  Blood Updated:  11/21/18 0751     Ammonia 24 umol/L     Basic Metabolic Panel [[id number]]  (Abnormal) Collected:  11/21/18 0729    Specimen:  Blood Updated:  11/21/18 0751     Glucose 98 mg/dL      BUN 58 mg/dL      Creatinine 2.00 mg/dL      Sodium 136 mmol/L      Potassium 5.1 mmol/L      Chloride 94 mmol/L      CO2 21.0 mmol/L      Calcium 10.6 mg/dL      eGFR Non African Amer 35 mL/min/1.73      BUN/Creatinine Ratio 29.0     Anion Gap 21.0 mmol/L     Protime-INR [[id number]]  (Abnormal) Collected:  11/21/18 0729    Specimen:  Blood Updated:  11/21/18 0750     Protime -- Seconds      Comment: FINGERSTICK PT, NO SECONDS AVAILABLE        INR 2.40    Narrative:       Therapeutic range for most indications is 2.0-3.0 INR,  or 2.5-3.5 for mechanical heart valves.          Imaging Results (last 24 hours)     ** No results found for the last 24 hours. **          Assessment/Plan       Hepatic encephalopathy (CMS/HCC)    Liver cirrhosis (CMS/HCC)     Altered mental status    Perinephric abscess    Thrombocytopenia (CMS/HCC)    CKD (chronic kidney disease) stage 3, GFR 30-59 ml/min (CMS/HCC)    Chronic hepatitis C (CMS/HCC)    Chronic anemia    Nausea / vomiting - minimally better - we'll provide him with PRN Phenergan.     Continue with the antibiotics for the Enterococcus / Candida isolated from the perinephric abscess. We'll change to po Zyvox.    Creatinine and serum K increasing. We'll stop K supplement / Aldactone.    Repeat CBC shows a Hgb of 6.9. We'll transfuse 2 u PRBCs.    Allen Hercules MD  11/21/18  10:01 AM

## 2018-11-21 NOTE — PROGRESS NOTES
"   LOS: 23 days   Patient Care Team:  Reddy Grant MD as PCP - General (Family Medicine)  Tushar Becerril DO as Consulting Physician (Gastroenterology)    Subjective     Subjective:  Symptoms:  No diarrhea.  (No hematuria, no flank pain. ).    Diet:  No nausea or vomiting.    Pain:  He reports pain is unchanged.        History taken from: patient chart RN    Objective     Vital Signs  Temp:  [97.4 °F (36.3 °C)-98.5 °F (36.9 °C)] 97.4 °F (36.3 °C)  Heart Rate:  [87-96] 94  Resp:  [18-20] 20  BP: (125-176)/() 135/91    Objective:  General Appearance:  In no acute distress.    Vital signs: (most recent): Blood pressure 135/91, pulse 94, temperature 97.4 °F (36.3 °C), temperature source Oral, resp. rate 20, height 182.9 cm (72.01\"), weight 93.8 kg (206 lb 14.4 oz), SpO2 93 %.  Vital signs are normal.  No fever.    Output: Producing urine (Urine clear yellow) and producing stool.    Lungs:  Normal effort and normal respiratory rate.  Breath sounds clear to auscultation.  He is not in respiratory distress.  No stridor.  No decreased breath sounds.    Heart: Normal rate.  Regular rhythm.  S1 normal and S2 normal.  No murmur, gallop or friction rub.   Chest: Symmetric chest wall expansion.   Abdomen: Abdomen is soft and distended.  Bowel sounds are normal.   There is generalized tenderness.     Extremities: There is no dependent edema.    Neurological: Patient is alert and oriented to person, place and time.  GCS score is 15.    Pupils:  Pupils are equal, round, and reactive to light.    Skin:  Warm and dry.  No rash or cyanosis.           Results Review:    Lab Results (last 24 hours)     Procedure Component Value Units Date/Time    CBC & Differential [[id number]] Collected:  11/21/18 0842    Specimen:  Blood Updated:  11/21/18 1016    Narrative:       The following orders were created for panel order CBC & Differential.  Procedure                               Abnormality         Status                   "   ---------                               -----------         ------                     Manual Differential[[id number]]          Abnormal            Final result               Scan Slide[[id number]]                                                                  CBC Auto Differential[[id number]]        Abnormal            Final result                 Please view results for these tests on the individual orders.    Manual Differential [[id number]]  (Abnormal) Collected:  11/21/18 0842    Specimen:  Blood Updated:  11/21/18 1016     Neutrophil % 77.0 %      Lymphocyte % 13.0 %      Monocyte % 7.0 %      Eosinophil % 1.0 %      Basophil % 1.0 %      Metamyelocyte % 1.0 %      Neutrophils Absolute 6.63 10*3/mm3      Lymphocytes Absolute 1.12 10*3/mm3      Monocytes Absolute 0.60 10*3/mm3      Eosinophils Absolute 0.09 10*3/mm3      Basophils Absolute 0.09 10*3/mm3      Anisocytosis Slight/1+     Comment: FEW HYPOCHROMIC CELLS SEEN        Macrocytes Slight/1+     WBC Morphology Normal     Platelet Estimate Decreased     Comment: RARE GIANT PLATELET SEEN       CBC Auto Differential [[id number]]  (Abnormal) Collected:  11/21/18 0842    Specimen:  Blood Updated:  11/21/18 0907     WBC 8.61 10*3/mm3      RBC 1.94 10*6/mm3      Hemoglobin 6.9 g/dL      Hematocrit 20.3 %      .6 fL      MCH 35.6 pg      MCHC 34.0 g/dL      RDW 18.2 %      RDW-SD 68.2 fl      MPV 10.5 fL      Platelets 31 10*3/mm3     Ammonia [[id number]]  (Normal) Collected:  11/21/18 0729    Specimen:  Blood Updated:  11/21/18 0751     Ammonia 24 umol/L     Basic Metabolic Panel [[id number]]  (Abnormal) Collected:  11/21/18 0729    Specimen:  Blood Updated:  11/21/18 0751     Glucose 98 mg/dL      BUN 58 mg/dL      Creatinine 2.00 mg/dL      Sodium 136 mmol/L      Potassium 5.1 mmol/L      Chloride 94 mmol/L      CO2 21.0 mmol/L      Calcium 10.6 mg/dL      eGFR Non African Amer 35 mL/min/1.73      BUN/Creatinine Ratio 29.0     Anion Gap 21.0 mmol/L      Protime-INR [[id number]]  (Abnormal) Collected:  11/21/18 0729    Specimen:  Blood Updated:  11/21/18 0750     Protime -- Seconds      Comment: FINGERSTICK PT, NO SECONDS AVAILABLE        INR 2.40    Narrative:       Therapeutic range for most indications is 2.0-3.0 INR,  or 2.5-3.5 for mechanical heart valves.         Imaging Results (last 24 hours)     Procedure Component Value Units Date/Time    CT Abdomen Pelvis With Contrast [[id number]] Collected:  11/05/18 1815     Updated:  11/05/18 1857    Narrative:         EXAMINATION:  Computed Tomography           REGION:    Abdomen / Pelvis                     INDICATION:   Repeat to evaluate hematoma/abscess of kidney per  urology, R41.82 Altered mental status, unspecified K72.90 Hepatic  failure, unspecified without coma Z74.09 Other reduced mobility  Z74.09 Other reduced mobility    HISTORY:  [facility]ISA. IMAGING:    CT A/P 11/2/18            TECHNIQUE:      - reconstructions:    axial, coronal, sagittal         - contrast:      oral:  No ;   intravenous: Isovue 300, 100 mL  This exam was performed according to the departmental  dose-optimization program which includes automated exposure  control, adjustment of the mA and/or kV according to patient size  and/or use of iterative reconstruction technique.           COMMENTS:            - - - CT ABDOMEN - - -          THORAX (INFERIOR):      - LUNG BASES:  clear      - PLEURA:    no fluid or mass      - HEART:    normal size, no pericardial fluid     - MISC:      n/a          ABDOMEN:     - LIVER:    Diminished size with nodular contour containing a  tips shunt   - GB:      Cholelithiasis without evidence of wall thickening.    - CBD:      grossly negative   - SPLEEN:    Upper normal/mildly enlarged   - PANCREAS:    normal in size, contour, no focal mass    - VISCERA:    normal caliber, no wall thickening     - MESENTERY:  no mesenteric mass   - CAVITY:    Small amount of free fluid   - BODY WALL:  wnl   - OSSEOUS:    grossly  negative for age   - MISC:                 Extensive gastric varices                      RETROPERITONEUM:   - KIDNEYS:    Renal examination again displays overall normal  size and symmetric nephrograms. Again noted is a left pararenal  enhancing focal fluid collection containing air which is  unchanged in size measuring 2.5 x 3.6 x 2.0 cm   - URETERS:    normal course, caliber   - ADRENALS:    normal size, contour   - MISC:      no sig retroperitoneal adenopathy or mass   - VASCULAR:    aorta / iliacs: wnl for age     - - - CT PELVIS - - -      - VISCERA:    normal caliber small/large bowel, no focal  thickening/mass        - APPENDIX:          wnl   - MESENTERY:  no mass   - VASCULAR:    wnl for age   - CAVITY:    Moderate amount of free fluid   - BLADDER:    unremarkable   - OSSEOUS:    Status post left hip repair    - MISC:     .       Impression:        CONCLUSION:  1. Unchanged left pararenal focal fluid collection with  peripheral enhancement containing air, likely representing an  abscess.  2. Multiple additional findings demonstrating the presence of  cirrhosis status post TIPS shunt, with spleen size at the upper  limits of normal/mildly enlarged, and extensive gastric varices,  as above, unchanged.        Electronically signed by:  PRISCILLA Bustillo MD  11/5/2018 6:56  PM CST Workstation: [id number]           I reviewed the patient's new clinical results.  I reviewed the patient's new imaging results and agree with the interpretation.  I reviewed the patient's other test results and agree with the interpretation      Assessment/Plan       Hepatic encephalopathy (CMS/HCC)    Liver cirrhosis (CMS/HCC)    Thrombocytopenia (CMS/HCC)    CKD (chronic kidney disease) stage 3, GFR 30-59 ml/min (CMS/HCC)    Altered mental status    Chronic hepatitis C (CMS/HCC)    Perinephric abscess    Chronic anemia      Assessment & Plan    1. Difficulty urinating with hematuria, likely related to UTI hemorrhagic cystitis without  clots-->hematuria resolved  2. Perinephric abscess  -CT guided drainage of left perinephric abscess 11/9/18-->Enterococcus faecium and candida  -Day #11 of antibiotic therapy (vancomycin and now Zyvox again), Day #6 (Diflucan)  -Vancomycin 11/11/18 to 11/12/18 then Zyvox 11/13/18 to 11/14/18, Vanc again 11/15/18 to 11/19/18, Zyvox restarted 11/20/18.     -Estimated Creatinine Clearance: 50.2 mL/min (A) (by C-G formula based on SCr of 2 mg/dL (H)).  -Cr 1.27  -WBC 6.94-->8.19-->8.61  -UA + 10/30/18  -Hgb/Hct 8.5/25.2-->7.7/23.4-->6.9/20.3-->2 units of PRBCs ordered today  -Platelets 45,000-->38,000-->31,000  -INR 2.66-->3.38-->2.40  -Renal ultrasound 10/30/18 limited exam, especially of the left kidney, due to the presence of bowel gas and the patient's body habitus. No hydronephrosis is visualized. Unremarkable bladder.   -11/2/18 CT abdomen/pelvis LEFT 2.3 x 3.7 x 2.2 cm fluid and air collection infected subcapsular hematoma versus abscess.  -11/5/18 CT abdomen/pelvis showed LEFT renal abscess unchanged  -10/30/18 Urine culture NEGATIVE, UA grossly positive.     Plan:    Closely monitor labs and for bleeding. Following    KALEB Alexander  11/21/18  5:40 PM

## 2018-11-21 NOTE — DISCHARGE PLACEMENT REQUEST
"Lamont Mcmullen SrJuanita (53 y.o. Male)     Date of Birth Social Security Number Address Home Phone MRN    [id number]  [id number]  [facility] [address] [phone] [id number]    Buddhist Marital Status          None Legally        Admission Date Admission Type Admitting Provider Attending Provider Department, Room/Bed    10/29/18 Emergency Eduar Cisneros MD [place], Emmanuel [place], MD [address], [address]    Discharge Date Discharge Disposition Discharge Destination                       Attending Provider:  Barrera Mcmanus MD    Allergies:  Aspirin, Penicillins, Codeine Sulfate    Isolation:  None   Infection:  None   Code Status:  CPR    Ht:  182.9 cm (72.01\")   Wt:  93.8 kg (206 lb 14.4 oz)    Admission Cmt:  None   Principal Problem:  Hepatic encephalopathy (CMS/HCC) [K72.90]                 Active Insurance as of 10/29/2018     Primary Coverage     Payor Plan Insurance Group Employer/Plan Group    [place]      Payor Plan Address Payor Plan Phone Number Payor Plan Fax Number Effective Dates    [address] [address]   1/1/2014 - None Entered    [place] [address]       Subscriber Name Subscriber Birth Date Member ID       LAMONT MCMULLEN SRJuanita 1965 [id number]                 Emergency Contacts      (Rel.) Home Phone Work Phone Mobile Phone    Miguel Mcmullen (Son) [phone] -- [phone]    Loco Mcmullen (Daughter) [phone] -- [phone]    Shreyas Mcmullen (Brother) [phone] -- [phone]            Emergency Contact Information     Name Relation Home Work Mobile    Miguel Mcmullen Son [phone]    BenedictLoco Daughter [phone]    Shreyas Mcmullen Brother [phone]          Insurance Information                [place] Phone:     Subscriber: Lamont Mcmullen SrJuanita Subscriber#: [id number]    Group#:  " Precert#:              History & Physical      Eduar Cisneros MD at 10/29/2018  8:13 PM                [facility] Medicine Admission      Date of Admission: 10/29/2018      Primary Care Physician: Reddy Grant MD      Chief Complaint: altered mental status    HPI:    53 yr old male with CMH of hepatitis presents with altered mental status.  History is taken from chart as patient has altered mental status.  He was found to have elevated ammonia .  He takes lactulose at home.     Past Medical History:  has a past medical history of Allergic rhinitis; Anxiety state (12/1/2008); Back pain (12/1/2008); Chronic hepatitis (CMS/HCC) (6/18/2009); Chronic osteomyelitis (CMS/HCC); CKD (chronic kidney disease); Confusional arousals; Depression (12/1/2008); Essential hypertension (12/1/2008); Hepatic cirrhosis (CMS/HCC) (5/26/2009); Hypersplenism (6/28/2010); Insomnia (12/1/2008); Liver cirrhosis (CMS/HCC) (5/26/2009); Osteoarthritis (12/1/2008); Osteoarthritis; and Pneumonia.    Past Surgical History:  has a past surgical history that includes Shoulder surgery; Hip surgery; Leg amputation; incision and drainage leg (Right, 2/27/2017); and TIPS procedure.    Family History: family history includes Alzheimer's disease in his mother; Coronary artery disease in his other; Diabetes type II in his maternal aunt; Heart disease in his father and other; Hypertension in his father and other; No Known Problems in his brother, daughter, sister, and son.    Social History:  reports that he has been smoking Cigarettes.  He has been smoking about 0.25 packs per day. He has never used smokeless tobacco. He reports that he does not drink alcohol or use drugs.    Allergies:   Allergies   Allergen Reactions   • Aspirin Other (See Comments)     D/T liver   • Penicillins Unknown (See Comments)     Unknown     • Codeine Sulfate Hives and Itching       Medications: Scheduled Meds:  cefepime 2 g Intravenous  Once   lactulose 30 g Oral Once   sodium chloride 1,000 mL Intravenous Once     Continuous Infusions:   PRN Meds:.  No current facility-administered medications on file prior to encounter.      Current Outpatient Prescriptions on File Prior to Encounter   Medication Sig Dispense Refill   • furosemide (LASIX) 20 MG tablet Take 1 tablet by mouth Daily. 90 tablet 3   • Incontinence Supplies (BEDPAN) misc Bedpan (Dx occasional incontinence, weakness, AMS related to hepatic encephalopathy) 1 each 0   • lactulose (CHRONULAC) 10 GM/15ML solution Take 45 mL by mouth 3 (Three) Times a Day. 1892 mL 3   • midodrine (PROAMATINE) 10 MG tablet Take 1 tablet by mouth 3 (Three) Times a Day. 270 tablet 3   • Misc. Devices (COMMODE BEDSIDE) [facility] Large bedside commode (Dx hepatic encephalopathy, LLE amputation) 1 each 0   • potassium chloride (K-DUR,KLOR-CON) 20 MEQ CR tablet Take 1 tablet by mouth Daily for 31 days. 90 tablet 3   • simethicone (GAS-X) 80 MG chewable tablet Chew 1 tablet Every 6 (Six) Hours As Needed for Flatulence. 120 tablet 3   • spironolactone (ALDACTONE) 25 MG tablet 2qd 180 tablet 3   • XIFAXAN 550 MG tablet 1 bid 180 tablet 3       Review of Systems:  Review of Systems   Unable to perform ROS: Mental status change      Otherwise complete ROS is negative except as mentioned above.    Physical Exam:   Temp:  [97.2 °F (36.2 °C)] 97.2 °F (36.2 °C)  Heart Rate:  [72] 72  Resp:  [16] 16  BP: (123-131)/(58-65) 123/58  Physical Exam   Constitutional: He appears well-developed and well-nourished. No distress.   HENT:   Head: Normocephalic and atraumatic.   Cardiovascular: Normal rate.    Pulmonary/Chest: Effort normal. No respiratory distress. He has no wheezes.   Abdominal: Soft. He exhibits no distension.   Musculoskeletal: Normal range of motion. He exhibits no edema.   Neurological: He is alert. No cranial nerve deficit.   Skin: Skin is warm and dry. He is not diaphoretic.   Vitals reviewed.        Results  Reviewed:  I have personally reviewed current lab, radiology, and data and agree with results.  Lab Results (last 24 hours)     Procedure Component Value Units Date/Time    CBC & Differential [[id number]] Collected:  10/29/18 1813    Specimen:  Blood Updated:  10/29/18 2002    Narrative:       The following orders were created for panel order CBC & Differential.  Procedure                               Abnormality         Status                     ---------                               -----------         ------                     Manual Differential[[id number]]                              Final result               Scan Slide[[id number]]                                                                  CBC Auto Differential[[id number]]        Abnormal            Final result                 Please view results for these tests on the individual orders.    Manual Differential [[id number]] Collected:  10/29/18 1813    Specimen:  Blood from Arm, Left Updated:  10/29/18 2002     Neutrophil % 73.0 %      Lymphocyte % 14.0 %      Monocyte % 6.0 %      Eosinophil % 1.0 %      Basophil % 2.0 %      Bands %  4.0 %      Neutrophils Absolute 5.01 10*3/mm3      Lymphocytes Absolute 0.91 10*3/mm3      Monocytes Absolute 0.39 10*3/mm3      Eosinophils Absolute 0.07 10*3/mm3      Basophils Absolute 0.13 10*3/mm3      Anisocytosis Slight/1+     Hypochromia Slight/1+     Macrocytes Slight/1+     WBC Morphology Normal     Platelet Estimate Decreased    CBC Auto Differential [[id number]]  (Abnormal) Collected:  10/29/18 1813    Specimen:  Blood from Arm, Left Updated:  10/29/18 2001     WBC 6.51 10*3/mm3      RBC 2.99 (L) 10*6/mm3      Hemoglobin 11.0 (L) g/dL      Hematocrit 32.9 (L) %      .0 (H) fL      MCH 36.8 (H) pg      MCHC 33.4 g/dL      RDW 15.0 (H) %      RDW-SD 60.0 (H) fl      MPV 9.8 fL      Platelets 63 (L) 10*3/mm3     Blood Culture - Blood, [[id number]] Collected:  10/29/18 1924    Specimen:  Blood from Hand, Right  Updated:  10/29/18 1931    Lactic Acid, Plasma [[id number]]  (Abnormal) Collected:  10/29/18 1813    Specimen:  Blood from Arm, Left Updated:  10/29/18 1845     Lactate 2.7 (C) mmol/L     Lactic Acid, Reflex Timer (This will reflex a repeat order 3-3:15 hours after ordered.) [[id number]] Collected:  10/29/18 1813    Specimen:  Blood from Arm, Left Updated:  10/29/18 1844    aPTT [[id number]]  (Abnormal) Collected:  10/29/18 1813    Specimen:  Blood from Arm, Left Updated:  10/29/18 1843     PTT 41.8 (H) seconds     Narrative:       The recommended Heparin therapeutic range is 68-97 seconds.    Protime-INR [[id number]]  (Abnormal) Collected:  10/29/18 1813    Specimen:  Blood from Arm, Left Updated:  10/29/18 1843     Protime 19.5 (H) Seconds      INR 1.71 (H)    Narrative:       Therapeutic range for most indications is 2.0-3.0 INR,  or 2.5-3.5 for mechanical heart valves.    Ammonia [[id number]]  (Abnormal) Collected:  10/29/18 1813    Specimen:  Blood from Arm, Left Updated:  10/29/18 1835     Ammonia 129 (H) umol/L     Comprehensive Metabolic Panel [[id number]]  (Abnormal) Collected:  10/29/18 1813    Specimen:  Blood from Arm, Left Updated:  10/29/18 1834     Glucose 75 mg/dL      BUN 27 (H) mg/dL      Creatinine 1.71 (H) mg/dL      Sodium 135 (L) mmol/L      Potassium 3.9 mmol/L      Chloride 102 mmol/L      CO2 27.0 mmol/L      Calcium 8.7 mg/dL      Total Protein 6.5 g/dL      Albumin 2.40 (L) g/dL      ALT (SGPT) 17 (L) U/L      AST (SGOT) 46 U/L      Alkaline Phosphatase 168 (H) U/L      Total Bilirubin 8.1 (H) mg/dL      eGFR Non African Amer 42 (L) mL/min/1.73      Globulin 4.1 (H) gm/dL      A/G Ratio 0.6 (L) g/dL      BUN/Creatinine Ratio 15.8     Anion Gap 6.0 mmol/L     Lipase [[id number]]  (Normal) Collected:  10/29/18 1813    Specimen:  Blood from Arm, Left Updated:  10/29/18 1834     Lipase 85 U/L     Ethanol [[id number]] Collected:  10/29/18 1813    Specimen:  Blood from Arm, Left Updated:  10/29/18 1834      Ethanol <10 mg/dL      Ethanol % <0.010 %     Influenza Antigen, Rapid - Swab, Nasopharynx [[id number]]  (Normal) Collected:  10/29/18 1813    Specimen:  Swab from Nasopharynx Updated:  10/29/18 1830     Influenza A Ag, EIA Negative     Influenza B Ag, EIA Negative    Blood Culture - Blood, [[id number]] Collected:  10/29/18 1813    Specimen:  Blood from Arm, Left Updated:  10/29/18 1820        Imaging Results (last 24 hours)     Procedure Component Value Units Date/Time    CT Head Without Contrast [[id number]] Collected:  10/29/18 1825     Updated:  10/29/18 1903    Narrative:       EXAM DESCRIPTION: CT HEAD WO CONTRAST    CLINICAL HISTORY:  Confusion/delirium, altered LOC, unexplained,  R41.82 Altered mental status, unspecified .      COMPARISON: 9/4/2018    DOSE LENGTH PRODUCT: 939.7    CONTRAST: None    TECHNIQUE:    Axial images from skull base to vertex.    This exam was performed according to our departmental dose  optimization program, which includes automated exposure control,  adjustment of the mA and/or KV according to patient size and/or  use of iterative reconstruction technique.    FINDINGS:  No Chiari malformation.  Extra-axial spaces: Appropriate for the mild degree of volume  loss present.    Intracranial hemorrhage: No evidence of intracranial hemorrhage  or suspicious extra-axial fluid collection.  Ventricular system: No hydrocephalus.   Basal cisterns: Unremarkable.   Cerebral parenchyma: No edema, midline shift, or mass effect.  Gray-white differentiation is maintained.    Midline shift: None.    Cerebellum: No acute or suspicious interval change.   Brainstem : Unremarkable CT appearance.   Calvarium: No acute or suspicious calvarial lesion.    Vascular system: Calcification, otherwise unremarkable  noncontrast appearance.    Paranasal sinuses and mastoid air cells: Clear.    Visualized orbits: No acute finding.    Visualized upper cervical spine: Limited, unremarkable.   Sella: Unremarkable.     Skull base: Unremarkable.     ADDITIONAL FINDINGS: None      Impression:       No CT evidence of intracranial hemorrhage, mass effect, or acute  large vessel distribution infarct.    Electronically signed by:  Tho Chavez MD  10/29/2018 7:02 PM  CDT Workstation: [id number]            Assessment:    Active Hospital Problems    Diagnosis   • Altered mental status       Hepatic Encephalopathy - will start on lactulose.  Will reevaluate ammonia in AM.  CT head is negative    Chronic hepatitis     Thrombocytopenia - chronic    DVT prophylaxis - SCD AIDA            Eduar Cisneros MD  10/29/18  8:14 PM                  Electronically signed by Eduar Cisneros MD at 10/29/2018  8:17 PM       Hospital Medications (active)       Dose Frequency Start End    albumin human 5 % bottle 25 g 25 g Every 12 Hours 11/4/2018     Sig - Route: Infuse 500 mL into a venous catheter Every 12 (Twelve) Hours. - Intravenous    Cosign for Ordering: Accepted by Beau Celeste MD on 11/4/2018  7:10 PM    amLODIPine (NORVASC) tablet 5 mg 5 mg Every 24 Hours Scheduled 11/20/2018     Sig - Route: Take 1 tablet by mouth Daily. - Oral    fluconazole (DIFLUCAN) tablet 400 mg 400 mg Once 11/21/2018 11/21/2018    Sig - Route: Take 2 tablets by mouth 1 (One) Time. - Oral    furosemide (LASIX) tablet 20 mg 20 mg 2 Times Daily (Diuretics) 11/22/2018     Sig - Route: Take 1 tablet by mouth 2 (Two) Times a Day. - Oral    hydrALAZINE (APRESOLINE) tablet 10 mg 10 mg Every 8 Hours Scheduled 11/20/2018     Sig - Route: Take 1 tablet by mouth Every 8 (Eight) Hours. - Oral    HYDROmorphone (DILAUDID) injection 0.25 mg 0.25 mg Every 3 Hours PRN 11/14/2018 11/24/2018    Sig - Route: Infuse 0.25 mL into a venous catheter Every 3 (Three) Hours As Needed for Severe Pain . - Intravenous    ipratropium-albuterol (DUO-NEB) nebulizer solution 3 mL 3 mL Every 6 Hours PRN 10/30/2018     Sig - Route: Take 3 mL by nebulization Every 6 (Six) Hours As Needed for Wheezing.  "- Nebulization    lactulose (CHRONULAC) 10 GM/15ML solution 30 g 30 g 3 Times Daily 10/29/2018     Sig - Route: Take 45 mL by mouth 3 (Three) Times a Day. - Oral    linezolid (ZYVOX) tablet 600 mg 600 mg Every 12 Hours Scheduled 11/20/2018 11/29/2018    Sig - Route: Take 1 tablet by mouth Every 12 (Twelve) Hours. - Oral    magic butt ointment  2 Times Daily 10/30/2018     Sig - Route: Apply  topically to the appropriate area as directed 2 (Two) Times a Day. - Topical    Magnesium Sulfate 2 gram / 50mL Infusion (GIVE X 3 BAGS TO EQUAL 6GM TOTAL DOSE) - Mg 1.1 - 1.5 mg/dl 2 g As Needed 11/7/2018     Sig - Route: Infuse 50 mL into a venous catheter As Needed (See Administration Instructions). - Intravenous    Cosign for Ordering: Accepted by Beau Celeste MD on 11/7/2018  6:18 PM    Linked Group 1:  \"Or\" Linked Group Details        Magnesium Sulfate 2 gram Bolus, followed by 8 gram infusion (total Mg dose 10 grams)- Mg less than or equal to 1mg/dL 2 g As Needed 11/7/2018     Sig - Route: Infuse 50 mL into a venous catheter As Needed (See Administration Instructions). - Intravenous    Cosign for Ordering: Accepted by Beau Celeste MD on 11/7/2018  6:18 PM    Linked Group 1:  \"Or\" Linked Group Details        Magnesium Sulfate 4 gram infusion- Mg 1.6-1.9 mg/dL 4 g As Needed 11/7/2018     Sig - Route: Infuse 100 mL into a venous catheter As Needed (See Administration Instructions). - Intravenous    Cosign for Ordering: Accepted by Beau Celeste MD on 11/7/2018  6:18 PM    Linked Group 1:  \"Or\" Linked Group Details        naloxone (NARCAN) injection 0.4 mg 0.4 mg Every 5 Minutes PRN 10/29/2018     Sig - Route: Infuse 1 mL into a venous catheter Every 5 (Five) Minutes As Needed for Respiratory Depression. - Intravenous    Linked Group 2:  \"And\" Linked Group Details        nicotine (NICODERM CQ) 21 MG/24HR patch 1 patch 1 patch Every 24 Hours Scheduled 10/30/2018     Sig - Route: Place 1 patch on the skin as directed by " provider Daily. - Transdermal    ondansetron (ZOFRAN) injection 4 mg 4 mg Every 6 Hours PRN 10/29/2018     Sig - Route: Infuse 2 mL into a venous catheter Every 6 (Six) Hours As Needed for Nausea or Vomiting. - Intravenous    oxyCODONE (ROXICODONE) immediate release tablet 5 mg 5 mg Every 6 Hours PRN 11/11/2018 12/1/2018    Sig - Route: Take 1 tablet by mouth Every 6 (Six) Hours As Needed for Moderate Pain . - Oral    pantoprazole (PROTONIX) EC tablet 40 mg 40 mg Every Early Morning 11/21/2018     Sig - Route: Take 1 tablet by mouth Every Morning. - Oral    potassium chloride 10 mEq in 100 mL IVPB 10 mEq Every 1 Hour PRN 11/7/2018     Sig - Route: Infuse 100 mL into a venous catheter Every 1 (One) Hour As Needed (See admin Instructions.). - Intravenous    Cosign for Ordering: Accepted by Beau Celeste MD on 11/7/2018  6:18 PM    promethazine (PHENERGAN) injection 12.5 mg 12.5 mg Every 6 Hours PRN 11/19/2018     Sig - Route: Infuse 0.5 mL into a venous catheter Every 6 (Six) Hours As Needed for Nausea or Vomiting. - Intravenous    rifaximin (XIFAXAN) tablet 275 mg 275 mg Every 12 Hours Scheduled 10/29/2018     Sig - Route: Take 0.5 tablets by mouth Every 12 (Twelve) Hours. - Oral    simethicone (MYLICON) chewable tablet 80 mg 80 mg Every 6 Hours PRN 10/29/2018     Sig - Route: Chew 1 tablet Every 6 (Six) Hours As Needed for Flatulence. - Oral    sodium chloride (OCEAN) nasal spray 1 spray 1 spray As Needed 11/9/2018     Sig - Route: 1 spray by Each Nare route As Needed for Congestion. - Each Nare    sodium chloride 0.9 % flush 10 mL 10 mL Every 12 Hours Scheduled 11/15/2018     Sig - Route: Infuse 10 mL into a venous catheter Every 12 (Twelve) Hours. - Intravenous    sodium chloride 0.9 % flush 10 mL 10 mL Every 12 Hours Scheduled 11/15/2018     Sig - Route: Infuse 10 mL into a venous catheter Every 12 (Twelve) Hours. - Intravenous    sodium chloride 0.9 % flush 10 mL 10 mL As Needed 11/15/2018     Sig - Route:  Infuse 10 mL into a venous catheter As Needed for Line Care (After Medication Administration). - Intravenous    sodium chloride 0.9 % flush 20 mL 20 mL As Needed 11/15/2018     Sig - Route: Infuse 20 mL into a venous catheter As Needed for Line Care (After Blood Draws or Blood Product Administration). - Intravenous    sodium chloride 0.9 % flush 3 mL 3 mL Every 12 Hours Scheduled 10/29/2018     Sig - Route: Infuse 3 mL into a venous catheter Every 12 (Twelve) Hours. - Intravenous    sodium chloride 0.9 % flush 3-10 mL 3-10 mL As Needed 10/29/2018     Sig - Route: Infuse 3-10 mL into a venous catheter As Needed for Line Care. - Intravenous    fluconazole (DIFLUCAN) IVPB 400 mg (Discontinued) 400 mg Daily 11/15/2018 11/20/2018    Sig - Route: Infuse 200 mL into a venous catheter Daily. - Intravenous    furosemide (LASIX) tablet 40 mg (Discontinued) 40 mg Daily 11/14/2018 11/21/2018    Sig - Route: Take 1 tablet by mouth Daily. - Oral    midodrine (PROAMATINE) tablet 10 mg (Discontinued) 10 mg 3 Times Daily 10/29/2018 11/21/2018    Sig - Route: Take 2 tablets by mouth 3 (Three) Times a Day. - Oral             Physician Progress Notes (last 24 hours) (Notes from 11/20/2018  1:11 PM through 11/21/2018  1:11 PM)      Allen Hercules MD at 11/21/2018  9:03 AM          Progress Note  Allen Hercules MD  Hospitalist    Date of visit: 11/21/2018     LOS: 23 days   Patient Care Team:  Reddy Grant MD as PCP - General (Family Medicine)  Tushar Becerril DO as Consulting Physician (Gastroenterology)    Chief Complaint: nausea, vomiting, abdominal discomfort    Subjective     Interval History:     Patient Complaints: nausea / vomiting - some better     History taken from: patient    Medication Review:   Current Facility-Administered Medications   Medication Dose Route Frequency Provider Last Rate Last Dose   • albumin human 5 % bottle 25 g  25 g Intravenous Q12H Solomon Gomez PA 0 mL/hr at 11/11/18 2000 25 g at 11/21/18  0535   • amLODIPine (NORVASC) tablet 5 mg  5 mg Oral Q24H Allen Hercules MD   5 mg at 11/21/18 0914   • [START ON 11/22/2018] furosemide (LASIX) tablet 20 mg  20 mg Oral BID Allen Hercules MD       • hydrALAZINE (APRESOLINE) tablet 10 mg  10 mg Oral Q8H Allen Hercules MD   10 mg at 11/21/18 0535   • HYDROmorphone (DILAUDID) injection 0.25 mg  0.25 mg Intravenous Q3H PRN Allen Hercules MD   0.25 mg at 11/21/18 0326   • ipratropium-albuterol (DUO-NEB) nebulizer solution 3 mL  3 mL Nebulization Q6H PRN Armando Richard MD       • lactulose (CHRONULAC) 10 GM/15ML solution 30 g  30 g Oral TID Eduar Cisneros MD   30 g at 11/17/18 0919   • linezolid (ZYVOX) tablet 600 mg  600 mg Oral Q12H Allen Hercules MD   600 mg at 11/21/18 0914   • magic butt ointment   Topical BID Armando Richard MD       • Magnesium Sulfate 2 gram Bolus, followed by 8 gram infusion (total Mg dose 10 grams)- Mg less than or equal to 1mg/dL  2 g Intravenous PRN Solomon Gomez PA        Or   • Magnesium Sulfate 2 gram / 50mL Infusion (GIVE X 3 BAGS TO EQUAL 6GM TOTAL DOSE) - Mg 1.1 - 1.5 mg/dl  2 g Intravenous PRN Solomon Gomez PA        Or   • Magnesium Sulfate 4 gram infusion- Mg 1.6-1.9 mg/dL  4 g Intravenous PRN Solomon Gomez PA 25 mL/hr at 11/07/18 2157 4 g at 11/07/18 2157   • naloxone (NARCAN) injection 0.4 mg  0.4 mg Intravenous Q5 Min PRN Eduar Cisneros MD       • nicotine (NICODERM CQ) 21 MG/24HR patch 1 patch  1 patch Transdermal Q24H Armando Richard MD   1 patch at 11/21/18 0917   • ondansetron (ZOFRAN) injection 4 mg  4 mg Intravenous Q6H PRN Eduar Cisneros MD   4 mg at 11/19/18 0901   • oxyCODONE (ROXICODONE) immediate release tablet 5 mg  5 mg Oral Q6H PRN Allen Hercules MD   5 mg at 11/20/18 2244   • pantoprazole (PROTONIX) EC tablet 40 mg  40 mg Oral Q AM Allen Hercules MD   40 mg at 11/21/18 0556   • potassium chloride 10 mEq in 100 mL IVPB  10 mEq Intravenous Q1H PRN Solomon Gomez, PA  100 mL/hr at 11/09/18 0801 10 mEq at 11/09/18 0801   • promethazine (PHENERGAN) injection 12.5 mg  12.5 mg Intravenous Q6H PRN Allen Hercules MD   12.5 mg at 11/20/18 1804   • rifaximin (XIFAXAN) tablet 275 mg  275 mg Oral Q12H Eduar Cisneros MD   275 mg at 11/21/18 0914   • simethicone (MYLICON) chewable tablet 80 mg  80 mg Oral Q6H PRN Eduar Cisneros MD   80 mg at 11/16/18 1309   • sodium chloride (OCEAN) nasal spray 1 spray  1 spray Each Nare PRN Beau Celeste MD       • sodium chloride 0.9 % flush 10 mL  10 mL Intravenous Q12H Vinh, Anderson T, DO   10 mL at 11/21/18 0920   • sodium chloride 0.9 % flush 10 mL  10 mL Intravenous Q12H Vinh, Anderson T, DO   10 mL at 11/21/18 0920   • sodium chloride 0.9 % flush 10 mL  10 mL Intravenous PRN Vinh, Anderson T, DO   10 mL at 11/21/18 0918   • sodium chloride 0.9 % flush 20 mL  20 mL Intravenous PRN Vinh, Anderson T, DO   20 mL at 11/18/18 2050   • sodium chloride 0.9 % flush 3 mL  3 mL Intravenous Q12H Eduar Cisneros MD   3 mL at 11/21/18 0920   • sodium chloride 0.9 % flush 3-10 mL  3-10 mL Intravenous PRN Eduar Cisneros MD   10 mL at 11/13/18 0637       Review of Systems:   Review of Systems   Constitutional: Positive for fatigue. Negative for fever.   Respiratory: Positive for cough. Negative for shortness of breath and wheezing.    Cardiovascular: Negative for chest pain and leg swelling.   Gastrointestinal: Positive for abdominal distention and nausea. Negative for abdominal pain, diarrhea and vomiting.   Genitourinary: Negative for enuresis, frequency, hematuria, penile swelling and urgency.   Musculoskeletal: Negative for arthralgias and back pain.   Skin: Positive for pallor. Negative for color change and rash.   Neurological: Positive for weakness. Negative for syncope, facial asymmetry and light-headedness.   Psychiatric/Behavioral: Negative for agitation, behavioral problems and confusion.   All other systems reviewed and are  negative.      Objective     Vital Signs  Temp:  [96.8 °F (36 °C)-98.5 °F (36.9 °C)] 98.5 °F (36.9 °C)  Heart Rate:  [87-93] 87  Resp:  [18-20] 18  BP: (160-180)/() 166/97    Physical Exam:  Physical Exam   Constitutional: He is oriented to person, place, and time. He appears ill. No distress.   Eyes: EOM are normal. Pupils are equal, round, and reactive to light. Scleral icterus is present.   Neck: Normal range of motion. Neck supple.   Cardiovascular: Normal rate and regular rhythm.   Pulmonary/Chest: Effort normal and breath sounds normal. No respiratory distress. He has no wheezes.   Abdominal: Soft. He exhibits distension. There is no tenderness.   Musculoskeletal:   L AKA   Neurological: He is alert and oriented to person, place, and time. He displays normal reflexes. No cranial nerve deficit. Coordination normal.   Skin: Skin is warm and dry. There is pallor.   Psychiatric: He has a normal mood and affect. His behavior is normal.        Results Review:    Lab Results (last 24 hours)     Procedure Component Value Units Date/Time    CBC & Differential [[id number]] Collected:  11/21/18 0842    Specimen:  Blood Updated:  11/21/18 0907    Narrative:       The following orders were created for panel order CBC & Differential.  Procedure                               Abnormality         Status                     ---------                               -----------         ------                     Manual Differential[[id number]]                              In process                 Scan Slide[[id number]]                                                                  CBC Auto Differential[[id number]]        Abnormal            Final result                 Please view results for these tests on the individual orders.    CBC Auto Differential [[id number]]  (Abnormal) Collected:  11/21/18 0842    Specimen:  Blood Updated:  11/21/18 0907     WBC 8.61 10*3/mm3      RBC 1.94 10*6/mm3      Hemoglobin 6.9 g/dL       Hematocrit 20.3 %      .6 fL      MCH 35.6 pg      MCHC 34.0 g/dL      RDW 18.2 %      RDW-SD 68.2 fl      MPV 10.5 fL      Platelets 31 10*3/mm3     Manual Differential [[id number]] Collected:  11/21/18 0842    Specimen:  Blood Updated:  11/21/18 0906    Ammonia [[id number]]  (Normal) Collected:  11/21/18 0729    Specimen:  Blood Updated:  11/21/18 0751     Ammonia 24 umol/L     Basic Metabolic Panel [[id number]]  (Abnormal) Collected:  11/21/18 0729    Specimen:  Blood Updated:  11/21/18 0751     Glucose 98 mg/dL      BUN 58 mg/dL      Creatinine 2.00 mg/dL      Sodium 136 mmol/L      Potassium 5.1 mmol/L      Chloride 94 mmol/L      CO2 21.0 mmol/L      Calcium 10.6 mg/dL      eGFR Non African Amer 35 mL/min/1.73      BUN/Creatinine Ratio 29.0     Anion Gap 21.0 mmol/L     Protime-INR [[id number]]  (Abnormal) Collected:  11/21/18 0729    Specimen:  Blood Updated:  11/21/18 0750     Protime -- Seconds      Comment: FINGERSTICK PT, NO SECONDS AVAILABLE        INR 2.40    Narrative:       Therapeutic range for most indications is 2.0-3.0 INR,  or 2.5-3.5 for mechanical heart valves.          Imaging Results (last 24 hours)     ** No results found for the last 24 hours. **          Assessment/Plan       Hepatic encephalopathy (CMS/HCC)    Liver cirrhosis (CMS/HCC)    Altered mental status    Perinephric abscess    Thrombocytopenia (CMS/HCC)    CKD (chronic kidney disease) stage 3, GFR 30-59 ml/min (CMS/HCC)    Chronic hepatitis C (CMS/HCC)    Chronic anemia    Nausea / vomiting - minimally better - we'll provide him with PRN Phenergan.     Continue with the antibiotics for the Enterococcus / Candida isolated from the perinephric abscess. We'll change to po Zyvox.    Creatinine and serum K increasing. We'll stop K supplement / Aldactone.    Repeat CBC shows a Hgb of 6.9. We'll transfuse 2 u PRBCs.    Allen Hercules MD  11/21/18  10:01 AM        Electronically signed by Allen Hercules MD at 11/21/2018 10:02 AM        Consult Notes (last 24 hours) (Notes from 11/20/2018  1:11 PM through 11/21/2018  1:11 PM)     No notes of this type exist for this encounter.

## 2018-11-21 NOTE — PLAN OF CARE
Problem: Patient Care Overview  Goal: Plan of Care Review  Outcome: Ongoing (interventions implemented as appropriate)   11/21/18 8735   Coping/Psychosocial   Plan of Care Reviewed With patient   Plan of Care Review   Progress no change   OTHER   Outcome Summary Pt. declined EOB sitting activity this date;agreeable to supine BUE exercise; /98; c/o pain with (R)UE movement (shoulder flexion deferred); tolerated (L)UE exercise 10 reps x 2 sets with 1 lb free weight, bilateral hand  strengthening and (R) bicep/forearm strengthening. Pt. easily fatigued, requiring frequent rests with activity; O2 90% with supplemental. Continue OT per poc. No new goals met this date.

## 2018-11-21 NOTE — CONSULTS
"Consulted with pt. Pt states he is, \"too tired to talk right now.\" I briefly explained palliative care and left pamphlet. Informed pt to alert nurse or call contact number if he would like to talk at a later time. Spoke with Paula BEAR and explained to call me if the pt becomes more awake and would like to speak with me.   "

## 2018-11-21 NOTE — CONSULTS
Adult Nutrition  Assessment    Patient Name:  Armando Mcmullen Sr.  YOB: 1965  MRN: [id number]  Admit Date:  10/29/2018    Assessment Date:  11/21/2018    Comments:  Pt has had poor appetite and intakes over past few days. PO intakes low sodium diet 0-25%. Fruit plate untouched at lunch. Pt continues to have nausea at times and had vomiting yesterday.  RN to give 2 units PRBC today.  No preferences or requests voiced.  RD will monitor.    Reason for Assessment     Row Name 11/21/18 1436          Reason for Assessment    Reason For Assessment  follow-up protocol         Nutrition/Diet History     Row Name 11/21/18 1436          Nutrition/Diet History    Typical Food/Fluid Intake  Pt has poor appetite and intake, continues to have nausea.  Vomiting yesterday but none today.             Labs/Tests/Procedures/Meds     Row Name 11/21/18 1436          Labs/Procedures/Meds    Lab Results Reviewed  reviewed, pertinent     Lab Results Comments  BUN 58; Cr 2.0        Medications    Pertinent Medications Reviewed  reviewed, pertinent         Physical Findings     Row Name 11/21/18 1437          Physical Findings    Overall Physical Appearance  amputee     Gastrointestinal  abdominal distension;ascites     Skin  pressure injury;jaundice           Nutrition Prescription Ordered     Row Name 11/21/18 1437          Nutrition Prescription PO    Current PO Diet  Regular     Common Modifiers  Low Sodium     Low Sodium Details  2,000 mg Sodium         Evaluation of Received Nutrient/Fluid Intake     Row Name 11/21/18 1438          PO Evaluation    Number of Days PO Intake Evaluated  2 days     % PO Intake  0-25%               Electronically signed by:  Peace Segovia RD  11/21/18 2:38 PM

## 2018-11-21 NOTE — DISCHARGE PLACEMENT REQUEST
"Lamont Mcmullen SrJuanita (53 y.o. Male)     Date of Birth Social Security Number Address Home Phone MRN    1965  [id number]  [place] [address] [phone] [id number]    Adventism Marital Status          None Legally        Admission Date Admission Type Admitting Provider Attending Provider Department, Room/Bed    10/29/18 Emergency Eduar Cisneros MD [place], Emmanuel [place], MD [address], 332[address]    Discharge Date Discharge Disposition Discharge Destination                       Attending Provider:  Barrera Mcmanus MD    Allergies:  Aspirin, Penicillins, Codeine Sulfate    Isolation:  None   Infection:  None   Code Status:  CPR    Ht:  182.9 cm (72.01\")   Wt:  93.8 kg (206 lb 14.4 oz)    Admission Cmt:  None   Principal Problem:  Hepatic encephalopathy (CMS/HCC) [K72.90]                 Active Insurance as of 10/29/2018     Primary Coverage     Payor Plan Insurance Group Employer/Plan Group    [employer]      Payor Plan Address Payor Plan Phone Number Payor Plan Fax Number Effective Dates    [address] [address]   1/1/2014 - None Entered    [place] [address]       Subscriber Name Subscriber Birth Date Member ID       LAMONT MCMULLEN SRJuanita 1965 [id number]                 Emergency Contacts      (Rel.) Home Phone Work Phone Mobile Phone    Miguel Mcmullen (Son) [phone] -- [phone]    Loco Mcmullen (Daughter) [phone] -- [phone]    Shreyas Mcmullen (Brother) [phone] -- [phone]            Emergency Contact Information     Name Relation Home Work Mobile    Miguel Mcmullen Son [phone]    BenedictLoco Daughter [phone]    Shreyas Mcmullen Brother [phone]          Insurance Information                [employer] [place] Phone:     Subscriber: Lamont Mcmullen SrJuanita Subscriber#: [id number]    Group#:  " Precert#:

## 2018-11-21 NOTE — PROGRESS NOTES
"   LOS: 22 days   Patient Care Team:  Reddy Grant MD as PCP - General (Family Medicine)  Tushar Becerril DO as Consulting Physician (Gastroenterology)    Subjective     Subjective:  Symptoms:  No diarrhea.  (Stable).    Diet:  No nausea or vomiting.    Pain:  He reports pain is unchanged.        History taken from: patient chart RN    Objective     Vital Signs  Temp:  [96.8 °F (36 °C)-98.3 °F (36.8 °C)] 97.8 °F (36.6 °C)  Heart Rate:  [88-97] 92  Resp:  [18-20] 20  BP: (146-180)/() 179/108    Objective:  General Appearance:  In no acute distress.    Vital signs: (most recent): Blood pressure (!) 179/108, pulse 92, temperature 97.8 °F (36.6 °C), temperature source Tympanic, resp. rate 20, height 182.9 cm (72.01\"), weight 93.4 kg (205 lb 14.4 oz), SpO2 90 %.  Vital signs are normal.  No fever.    Output: Producing urine (Urine clear yellow) and producing stool.    Lungs:  Normal effort and normal respiratory rate.  Breath sounds clear to auscultation.  He is not in respiratory distress.  No stridor.  No decreased breath sounds.    Heart: Normal rate.  Regular rhythm.  S1 normal and S2 normal.  No murmur, gallop or friction rub.   Chest: Symmetric chest wall expansion.   Abdomen: Abdomen is soft and distended.  Bowel sounds are normal.   There is generalized tenderness.     Extremities: There is no dependent edema.    Neurological: Patient is alert and oriented to person, place and time.  GCS score is 15.    Pupils:  Pupils are equal, round, and reactive to light.    Skin:  Warm and dry.  No rash or cyanosis.           Results Review:    Lab Results (last 24 hours)     Procedure Component Value Units Date/Time    CBC & Differential [[id number]] Collected:  11/20/18 0652    Specimen:  Blood Updated:  11/20/18 0741    Narrative:       The following orders were created for panel order CBC & Differential.  Procedure                               Abnormality         Status                     ---------            "                    -----------         ------                     Scan Slide[[id number]]                                                                  CBC Auto Differential[[id number]]        Abnormal            Final result                 Please view results for these tests on the individual orders.    CBC Auto Differential [[id number]]  (Abnormal) Collected:  11/20/18 0652    Specimen:  Blood Updated:  11/20/18 0741     WBC 8.19 10*3/mm3      RBC 2.17 10*6/mm3      Hemoglobin 7.7 g/dL      Hematocrit 23.4 %      .8 fL      MCH 35.5 pg      MCHC 32.9 g/dL      RDW 18.5 %      RDW-SD 70.2 fl      MPV 9.6 fL      Platelets 38 10*3/mm3      Comment: SPECIMEN REANALYZED TO CONFIRM PLATELET COUNT        Neutrophil % 75.7 %      Lymphocyte % 12.0 %      Monocyte % 9.8 %      Eosinophil % 1.0 %      Basophil % 1.0 %      Immature Grans % 0.5 %      Neutrophils, Absolute 6.21 10*3/mm3      Lymphocytes, Absolute 0.98 10*3/mm3      Monocytes, Absolute 0.80 10*3/mm3      Eosinophils, Absolute 0.08 10*3/mm3      Basophils, Absolute 0.08 10*3/mm3      Immature Grans, Absolute 0.04 10*3/mm3      nRBC 0.0 /100 WBC     Protime-INR [[id number]]  (Abnormal) Collected:  11/20/18 0652    Specimen:  Blood Updated:  11/20/18 0735     Protime 32.5 Seconds      INR 3.38    Narrative:       Therapeutic range for most indications is 2.0-3.0 INR,  or 2.5-3.5 for mechanical heart valves.    Basic Metabolic Panel [[id number]]  (Abnormal) Collected:  11/20/18 0652    Specimen:  Blood Updated:  11/20/18 0723     Glucose 79 mg/dL      BUN 42 mg/dL      Creatinine 1.87 mg/dL      Sodium 139 mmol/L      Potassium 5.5 mmol/L      Chloride 96 mmol/L      CO2 19.0 mmol/L      Calcium 10.4 mg/dL      eGFR Non African Amer 38 mL/min/1.73      BUN/Creatinine Ratio 22.5     Anion Gap 24.0 mmol/L     Ammonia [[id number]]  (Normal) Collected:  11/20/18 0652    Specimen:  Blood Updated:  11/20/18 0722     Ammonia 22 umol/L          Imaging Results (last  24 hours)     Procedure Component Value Units Date/Time    CT Abdomen Pelvis With Contrast [[id number]] Collected:  11/05/18 1815     Updated:  11/05/18 1857    Narrative:         EXAMINATION:  Computed Tomography           REGION:    Abdomen / Pelvis                     INDICATION:   Repeat to evaluate hematoma/abscess of kidney per  urology, R41.82 Altered mental status, unspecified K72.90 Hepatic  failure, unspecified without coma Z74.09 Other reduced mobility  Z74.09 Other reduced mobility    HISTORY:  LISA. IMAGING:    CT A/P 11/2/18            TECHNIQUE:      - reconstructions:    axial, coronal, sagittal         - contrast:      oral:  No ;   intravenous: Isovue 300, 100 mL  This exam was performed according to the departmental  dose-optimization program which includes automated exposure  control, adjustment of the mA and/or kV according to patient size  and/or use of iterative reconstruction technique.           COMMENTS:            - - - CT ABDOMEN - - -          THORAX (INFERIOR):      - LUNG BASES:  clear      - PLEURA:    no fluid or mass      - HEART:    normal size, no pericardial fluid     - MISC:      n/a          ABDOMEN:     - LIVER:    Diminished size with nodular contour containing a  tips shunt   - GB:      Cholelithiasis without evidence of wall thickening.    - CBD:      grossly negative   - SPLEEN:    Upper normal/mildly enlarged   - PANCREAS:    normal in size, contour, no focal mass    - VISCERA:    normal caliber, no wall thickening     - MESENTERY:  no mesenteric mass   - CAVITY:    Small amount of free fluid   - BODY WALL:  wnl   - OSSEOUS:    grossly negative for age   - MISC:                 Extensive gastric varices                      RETROPERITONEUM:   - KIDNEYS:    Renal examination again displays overall normal  size and symmetric nephrograms. Again noted is a left pararenal  enhancing focal fluid collection containing air which is  unchanged in size measuring 2.5 x 3.6 x 2.0 cm    - URETERS:    normal course, caliber   - ADRENALS:    normal size, contour   - MISC:      no sig retroperitoneal adenopathy or mass   - VASCULAR:    aorta / iliacs: wnl for age     - - - CT PELVIS - - -      - VISCERA:    normal caliber small/large bowel, no focal  thickening/mass        - APPENDIX:          wnl   - MESENTERY:  no mass   - VASCULAR:    wnl for age   - CAVITY:    Moderate amount of free fluid   - BLADDER:    unremarkable   - OSSEOUS:    Status post left hip repair    - MISC:     .       Impression:        CONCLUSION:  1. Unchanged left pararenal focal fluid collection with  peripheral enhancement containing air, likely representing an  abscess.  2. Multiple additional findings demonstrating the presence of  cirrhosis status post TIPS shunt, with spleen size at the upper  limits of normal/mildly enlarged, and extensive gastric varices,  as above, unchanged.        Electronically signed by:  PRISCILLA Bustillo MD  11/5/2018 6:56  PM CST Workstation: [id number]           I reviewed the patient's new clinical results.  I reviewed the patient's new imaging results and agree with the interpretation.  I reviewed the patient's other test results and agree with the interpretation      Assessment/Plan       Hepatic encephalopathy (CMS/HCC)    Liver cirrhosis (CMS/HCC)    Thrombocytopenia (CMS/HCC)    CKD (chronic kidney disease) stage 3, GFR 30-59 ml/min (CMS/HCC)    Altered mental status    Chronic hepatitis C (CMS/HCC)    Perinephric abscess    Chronic anemia      Assessment & Plan    1. Difficulty urinating with hematuria, likely related to UTI hemorrhagic cystitis without clots-->hematuria resolved  2. Perinephric abscess  -CT guided drainage of left perinephric abscess 11/9/18-->Enterococcus faecium and candida  -Day #10 of antibiotic therapy (vancomycin and now Zyvox again), Day #6 (Diflucan)  -Vancomycin 11/11/18 to 11/12/18 then Zyvox 11/13/18 to 11/14/18, Vanc again 11/15/18 to 11/19/18, Zyvox restarted  today 11/20/18.     -Estimated Creatinine Clearance: 53.6 mL/min (A) (by C-G formula based on SCr of 1.87 mg/dL (H)).  -Cr 1.27  -WBC 6.94-->8.19  -UA + 10/30/18  -Hgb/Hct 8.5/25.2-->7.7/23.4  -Platelets 45,000-->38,000  -INR 2.66-->3.38  -Renal ultrasound 10/30/18 limited exam, especially of the left kidney, due to the presence of bowel gas and the patient's body habitus. No hydronephrosis is visualized. Unremarkable bladder.   -11/2/18 CT abdomen/pelvis LEFT 2.3 x 3.7 x 2.2 cm fluid and air collection infected subcapsular hematoma versus abscess.  -11/5/18 CT abdomen/pelvis showed LEFT renal abscess unchanged  -10/30/18 Urine culture NEGATIVE, UA grossly positive.     Plan:    Continue current care.  Closely monitor labs and for bleeding.     KALEB Alexander  11/20/18  6:15 PM
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
## 2018-11-22 NOTE — THERAPY TREATMENT NOTE
Acute Care - Physical Therapy Treatment Note  [facility]     Patient Name: Armando Mcmullen Sr.  : 1965  MRN: [id number]  Today's Date: 2018  Onset of Illness/Injury or Date of Surgery: 10/29/18  Date of Referral to PT: 18  Referring Physician: SARI Franco MD    Admit Date: 10/29/2018    Visit Dx:    ICD-10-CM ICD-9-CM   1. Altered mental status, unspecified altered mental status type R41.82 780.97   2. Hepatic encephalopathy (CMS/HCC) K72.90 572.2   3. Impaired functional mobility, balance, gait, and endurance Z74.09 V49.89   4. Impaired mobility and ADLs Z74.09 799.89     Patient Active Problem List   Diagnosis   • Anxiety state   • Back pain   • Liver cirrhosis (CMS/HCC)   • Depression   • Substance abuse (CMS/HCC)   • Liver failure with hepatic coma (CMS/HCC)   • Hypersplenism   • Chronic osteomyelitis of right shoulder region (CMS/HCC)   • Thrombocytopenia (CMS/HCC)   • Anemia of chronic disease   • Positive hepatitis C antibody test   • BMI 35.0-35.9,adult   • Tobacco use   • Hepatic encephalopathy (CMS/HCC)   • Anxiety and depression   • CKD (chronic kidney disease) stage 3, GFR 30-59 ml/min (CMS/HCC)   • Ascites   • Physical deconditioning   • Closed fracture of lumbar vertebra with spinal cord injury (CMS/HCC)   • Altered mental status   • Chronic hepatitis C (CMS/HCC)   • Perinephric abscess   • Chronic anemia       Therapy Treatment    Rehabilitation Treatment Summary     Row Name 18 1445 18 0750          Treatment Time/Intention    Discipline  physical therapy assistant  -LN  occupational therapy assistant  -BB     Document Type  therapy note (daily note)  -LN  therapy note (daily note)  -BB     Subjective Information  complains of;weakness;pain  -LN  complains of;pain;fatigue  -BB     Mode of Treatment  physical therapy  -LN  individual therapy;occupational therapy  -BB     Total Minutes, Occupational Therapy Treatment  --  24  -BB     Therapy Frequency (OT Eval)  --   other (see comments) 5-7 days/wk  -BB     Patient Effort  adequate  -LN  fair  -BB     Comment  --  pt defers OOB/EOB  -BB     Existing Precautions/Restrictions  fall  -LN  fall  -BB     Recorded by [LN] Stephanie Du, PTA 11/22/18 1524 [BB] Maya Perez COTA/L 11/22/18 1319     Row Name 11/22/18 1445 11/22/18 0750          Vital Signs    Pre Systolic BP Rehab  110  -LN  --     Pre Treatment Diastolic BP  60  -LN  --     Post Systolic BP Rehab  128  -LN  --     Post Treatment Diastolic BP  84  -LN  --     Pretreatment Heart Rate (beats/min)  105  -LN  90  -BB     Intratreatment Heart Rate (beats/min)  109  -LN  --     Posttreatment Heart Rate (beats/min)  108  -LN  --     Pre SpO2 (%)  94  -LN  91  -BB     O2 Delivery Pre Treatment  supplemental O2  -LN  supplemental O2  -BB     Intra SpO2 (%)  91  -LN  --     Post SpO2 (%)  92  -LN  --     Pre Patient Position  Supine  -LN  Supine  -BB     Intra Patient Position  Sitting  -LN  --     Post Patient Position  Supine  -LN  --     Recorded by [LN] Stpehanie Du, PTA 11/22/18 1524 [BB] Maya Perez COTA/L 11/22/18 1319     Row Name 11/22/18 1445 11/22/18 0750          Cognitive Assessment/Intervention- PT/OT    Affect/Mental Status (Cognitive)  WFL  -LN  WFL  -BB     Orientation Status (Cognition)  oriented x 4  -LN  oriented x 4  -BB     Follows Commands (Cognition)  WFL  -LN  WFL  -BB     Cognitive Function (Cognitive)  WFL  -LN  WFL  -BB     Personal Safety Interventions  --  supervised activity;fall prevention program maintained;nonskid shoes/slippers when out of bed  -BB     Recorded by [LN] Stephanie Du, PTA 11/22/18 1524 [BB] Maya Perez COTA/L 11/22/18 1319     Row Name 11/22/18 1445             Bed Mobility Assessment/Treatment    Rolling Left [place] (Bed Mobility)  not tested  -LN      Rolling Right [place] (Bed Mobility)  supervision  -LN      Supine-Sit [place] (Bed Mobility)  supervision  -LN      Sit-Supine  [place] (Bed Mobility)  supervision  -LN      Assistive Device (Bed Mobility)  head of bed elevated;bed rails  -LN      Comment (Bed Mobility)  sat eob x 15' sba/ind-no unsteadiness noted  -LN      Recorded by [LN] Stephanie Du, PTA 11/22/18 1524      Row Name 11/22/18 1445             Sit-Stand Transfer    Sit-Stand [place] (Transfers)  not tested  -LN      Recorded by [LN] Stephanie Du, PTA 11/22/18 1524      Row Name 11/22/18 1445             Stand Pivot/Stand Step Transfer    Stand Pivot/Stand Step [place]  not tested  -LN      Recorded by [LN] Stephanie Du, PTA 11/22/18 1524      Row Name 11/22/18 0750             Upper Body Dressing Assessment/Training    Upper Body Dressing [place] Level  doff;don;contact guard assist hospital gown  -BB      Upper Body Dressing Position  long sitting  -BB      Recorded by [BB] Maya Perez COTA/L 11/22/18 1319      Row Name 11/22/18 0750             Grooming Assessment/Training    [place] Level (Grooming)  grooming skills;oral care regimen;wash face, hands;conditional independence;set up  -BB      Grooming Position  long sitting  -BB      Recorded by [BB] Maya Perez COTA/L 11/22/18 1319      Row Name 11/22/18 1445 11/22/18 0750          Positioning and Restraints    Pre-Treatment Position  --  in bed  -BB     Post Treatment Position  bed  -LN  bed  -BB     In Bed  notified nsg;supine;call light within reach;encouraged to call for assist;exit alarm on declined sidelying  -LN  fowlers;call light within reach;encouraged to call for assist;exit alarm on  -BB     Recorded by [LN] Stephanie Du, PTA 11/22/18 1524 [BB] Maya Perez COTA/L 11/22/18 1319     Row Name 11/22/18 1445 11/22/18 0750          Pain Scale: Numbers Pre/Post-Treatment    Pain Scale: Numbers, Pretreatment  7/10  -LN  8/10  -BB     Pain Scale: Numbers, Post-Treatment  -- 7 1/2  -LN  8/10  -BB     Pain Location - Orientation  upper  -LN  --     Pain Location   back  -LN  abdomen  -BB     Pain Intervention(s)  Declines  -LN  --     Recorded by [LN] Stephanie Du, PTA 11/22/18 1524 [BB] Maya Perez HEARD/L 11/22/18 1319     Row Name                Wound 10/30/18 1020 coccyx    Wound - Properties Group Date first assessed: 10/30/18 [AS] Time first assessed: 1020 [AS] Present On Admission : picture taken [AS] Location: coccyx [AS] Recorded by:  [AS] Yokasta Heart, RN 10/30/18 1021    Row Name                Wound 11/09/18 1712 Left posterior flank    Wound - Properties Group Date first assessed: 11/09/18 [FAMILIA] Time first assessed: 1712 [FAMILIA] Side: Left [FAMILIA] Orientation: posterior [FAMILIA] Location: flank [FAMILIA] Additional Comments: Drainage catheter puncture.  [FAMILIA] Recorded by:  [FAMILIA] Ev Alas RN 11/09/18 1713    Row Name 11/22/18 1445 11/22/18 0750          Plan of Care Review    Plan of Care Reviewed With  patient  -LN  patient  -BB     Recorded by [LN] Stephanie uD, PTA 11/22/18 1524 [BB] Maya Perez HEARD/L 11/22/18 1319     Row Name 11/22/18 0750             Outcome Summary/Treatment Plan (OT)    Daily Summary of Progress (OT)  progress toward functional goals is gradual  -BB      Plan for Continued Treatment (OT)  continue POC  -BB      Anticipated Discharge Disposition (OT)  skilled nursing facility  -BB      Recorded by [BB] Maya Perez HEARD/L 11/22/18 1319      Row Name 11/22/18 1445             Outcome Summary/Treatment Plan (PT)    Anticipated Discharge Disposition (PT)  skilled nursing facility  -LN      Recorded by [LN] Stephanie Du S, PTA 11/22/18 1524        User Key  (r) = Recorded By, (t) = Taken By, (c) = Cosigned By    Initials Name Effective Dates Discipline    FAMILIA Ev Alas RN 10/17/16 -  Nurse    LN Stephanie Du S, PTA 03/07/18 -  PT    BB Maya Perez HEARD/L 03/07/18 -  OT    AS Yokasta Heart, RN 12/13/16 -  Nurse          Wound 10/30/18 1020 coccyx (Active)   Dressing Appearance open to air  11/22/2018  8:20 AM   Closure Open to air 11/22/2018  8:20 AM   Base pink 11/22/2018  8:20 AM   Periwound blanchable 11/22/2018  8:20 AM   Periwound Temperature warm 11/22/2018  8:20 AM   Periwound Skin Turgor soft 11/22/2018  8:20 AM   Drainage Amount none 11/22/2018  8:20 AM   Care, Wound barrier applied 11/21/2018  8:30 PM       Wound 11/09/18 1712 Left posterior flank (Active)   Dressing Appearance open to air 11/22/2018  8:20 AM   Closure None 11/22/2018  8:20 AM   Base closed/resurfaced 11/22/2018  8:20 AM   Periwound intact 11/22/2018  8:20 AM   Periwound Temperature warm 11/22/2018  8:20 AM   Periwound Skin Turgor soft 11/22/2018  8:20 AM   Drainage Amount none 11/22/2018  8:20 AM       PT Rehab Goals     Row Name 11/22/18 1445             Bed Mobility Goal 1 (PT)    Activity/Assistive Device (Bed Mobility Goal 1, PT)  rolling to left;rolling to right;scooting  -LN      [place] Level/Cues Needed (Bed Mobility Goal 1, PT)  supervision required  -LN      Time Frame (Bed Mobility Goal 1, PT)  3 days  -LN      Progress/Outcomes (Bed Mobility Goal 1, PT)  goal not met  -LN         Transfer Goal 1 (PT)    Activity/Assistive Device (Transfer Goal 1, PT)  sit-to-stand/stand-to-sit;bed-to-chair/chair-to-bed;walker, rolling  -LN      [place] Level/Cues Needed (Transfer Goal 1, PT)  contact guard assist  -LN      Time Frame (Transfer Goal 1, PT)  5 days  -LN      Progress/Outcome (Transfer Goal 1, PT)  goal not met  -LN         Gait Training Goal 1 (PT)    Activity/Assistive Device (Gait Training Goal 1, PT)  gait (walking locomotion);decrease fall risk;assistive device use;walker, rolling  -LN      [place] Level (Gait Training Goal 1, PT)  minimum assist (75% or more patient effort)  -LN      Distance (Gait Goal 1, PT)  5 ft or more  -LN      Time Frame (Gait Training Goal 1, PT)  by discharge  -LN      Progress/Outcome (Gait Training Goal 1, PT)  goal not met  -LN         Wheelchair Locomotion Goal 1  (PT)    Activity (Wheelchair Locomotion Goal 1, PT)  wheelchair mobility skills, all  -LN      [place] Level/Cues Needed (Wheelchair Locomotion Goal 1, PT)  supervision required  -LN      Distance Goal 1 (Wheelchair Locomotion, PT)  25 ft or more  -LN      Time Frame (Wheelchair Locomotion Goal 1, PT)  by discharge  -LN      Progress/Outcome (Wheelchair Locomotion Goal 1, PT)  goal not met  -LN         Stairs Goal 1 (PT)    Activity/Assistive Device (Stairs Goal 1, PT)  ascending stairs;descending stairs;walker, rolling  -LN      [place] Level/Cues Needed (Stairs Goal 1, PT)  moderate assist (50-74% patient effort)  -LN      Number of Stairs (Stairs Goal 1, PT)  2  -LN      Time Frame (Stairs Goal 1, PT)  by discharge  -LN      Progress/Outcome (Stairs Goal 1, PT)  goal not met  -LN        User Key  (r) = Recorded By, (t) = Taken By, (c) = Cosigned By    Initials Name Provider Type Discipline    Stephanie Marion PTA Physical Therapy Assistant PT          Physical Therapy Education     Title: PT OT SLP Therapies (Active)     Topic: Physical Therapy (Active)     Point: Mobility training (Done)     Learning Progress Summary           Patient Acceptance, E,TB, VU,NR by DYLAN at 11/22/2018  3:24 PM    Comment:  encouraged eob/oob    Acceptance, E, VU by KENZIE at 11/15/2018  1:04 PM    Comment:  updated goals, POC    Acceptance, E, VU by KENZIE at 10/31/2018  4:01 PM    Comment:  Role of PT, POC, discharge recommendations to SNF for further rehab                   Point: Precautions (Done)     Learning Progress Summary           Patient Acceptance, E,TB, VU,NR by DYLAN at 11/22/2018  3:24 PM    Comment:  encouraged eob/oob                               User Key     Initials Effective Dates Name Provider Type Discipline    DYLAN 03/07/18 -  Stephanie Du PTA Physical Therapy Assistant PT    KENZIE 07/23/18 -  Sepideh Maya PT Physical Therapist PT                PT Recommendation and Plan  Anticipated Discharge Disposition (PT):  skilled nursing facility  Outcome Summary/Treatment Plan (PT)  Anticipated Discharge Disposition (PT): skilled nursing facility  Plan of Care Reviewed With: patient  Progress: improving  Outcome Summary: sup-sit-sup sba of 1,sat eob x 15' sba/ind-no goals met-if d/c would benefit snf with pt/ot  Outcome Measures     Row Name 11/22/18 1445 11/22/18 0750 11/21/18 1246       How much help from another person do you currently need...    Turning from your back to your side while in flat bed without using bedrails?  3  -LN  --  --    Moving from lying on back to sitting on the side of a flat bed without bedrails?  3  -LN  --  --    Moving to and from a bed to a chair (including a wheelchair)?  2  -LN  --  --    Standing up from a chair using your arms (e.g., wheelchair, bedside chair)?  2  -LN  --  --    Climbing 3-5 steps with a railing?  1  -LN  --  --    To walk in hospital room?  1  -LN  --  --    AM-PAC 6 Clicks Score  12  -LN  --  --       How much help from another is currently needed...    Putting on and taking off regular lower body clothing?  --  2  -BB  2  -LJ    Bathing (including washing, rinsing, and drying)  --  2  -BB  2  -LJ    Toileting (which includes using toilet bed pan or urinal)  --  1  -BB  1  -LJ    Putting on and taking off regular upper body clothing  --  3  -BB  3  -LJ    Taking care of personal grooming (such as brushing teeth)  --  4  -BB  3  -LJ    Eating meals  --  4  -BB  4  -LJ    Score  --  16  -BB  15  -LJ       Functional Assessment    Outcome Measure Options  AM-PAC 6 Clicks Basic Mobility (PT)  -LN  --  --    Row Name 11/21/18 1047             How much help from another person do you currently need...    Turning from your back to your side while in flat bed without using bedrails?  3  -KW      Moving from lying on back to sitting on the side of a flat bed without bedrails?  3  -KW      Moving to and from a bed to a chair (including a wheelchair)?  2  -KW      Standing up from a chair  using your arms (e.g., wheelchair, bedside chair)?  2  -KW      Climbing 3-5 steps with a railing?  1  -KW      To walk in hospital room?  1  -KW      AM-PAC 6 Clicks Score  12  -KW         Functional Assessment    Outcome Measure Options  AM-PAC 6 Clicks Basic Mobility (PT)  -KW        User Key  (r) = Recorded By, (t) = Taken By, (c) = Cosigned By    Initials Name Provider Type    Stephanie Marion, JULITA Physical Therapy Assistant    BB Maya Perez, HEARD/L Occupational Therapy Assistant    Mackenzie Rueda, HEARD/L Occupational Therapy Assistant    KW Sepideh Maya, PT Physical Therapist         Time Calculation:   PT Charges     Row Name 11/22/18 1445             Time Calculation    Start Time  1445  -LN      Stop Time  1510  -LN      Time Calculation (min)  25 min  -LN      PT Received On  11/22/18  -LN         Time Calculation- PT    Total Timed Code Minutes- PT  25 minute(s)  -LN        User Key  (r) = Recorded By, (t) = Taken By, (c) = Cosigned By    Initials Name Provider Type    LN Stephanie Du, PTA Physical Therapy Assistant        Therapy Suggested Charges     Code   Minutes Charges    88640 (CPT®) Hc Pt Neuromusc Re Education Ea 15 Min      21954 (CPT®) Hc Pt Ther Proc Ea 15 Min 27 2    90872 (CPT®) Hc Gait Training Ea 15 Min      93731 (CPT®) Hc Pt Therapeutic Act Ea 15 Min      78474 (CPT®) Hc Pt Manual Therapy Ea 15 Min      64353 (CPT®) Hc Pt Iontophoresis Ea 15 Min      57493 (CPT®) Hc Pt Elec Stim Ea-Per 15 Min      80931 (CPT®) Hc Pt Ultrasound Ea 15 Min      97225 (CPT®) Hc Pt Self Care/Mgmt/Train Ea 15 Min      56682 (CPT®) Hc Pt Prosthetic (S) Train Initial Encounter, Each 15 Min      59824 (CPT®) Hc Pt Orthotic(S)/Prosthetic(S) Encounter, Each 15 Min      94634 (CPT®) Hc Orthotic(S) Mgmt/Train Initial Encounter, Each 15min      Total  27 2        Therapy Charges for Today     Code Description Service Date Service Provider Modifiers Qty    90571277063 HC PT THERAPEUTIC ACT EA 15 MIN  11/22/2018 Stephanie Du, PTA GP 2          PT G-Codes  PT Professional Judgement Used?: Yes  Outcome Measure Options: AM-PAC 6 Clicks Basic Mobility (PT)  AM-PAC 6 Clicks Score: 12  Score: 16  Functional Limitation: Mobility: Walking and moving around  Mobility: Walking and Moving Around Current Status (): At least 60 percent but less than 80 percent impaired, limited or restricted  Mobility: Walking and Moving Around Goal Status (): At least 20 percent but less than 40 percent impaired, limited or restricted    Stephanie Du PTA  11/22/2018

## 2018-11-22 NOTE — PLAN OF CARE
Problem: Patient Care Overview  Goal: Plan of Care Review  Outcome: Ongoing (interventions implemented as appropriate)   11/22/18 1320   Coping/Psychosocial   Plan of Care Reviewed With patient   Plan of Care Review   Progress no change   OTHER   Outcome Summary Pt c/o pain and fatigue. No goals met this tx.

## 2018-11-22 NOTE — PROGRESS NOTES
Progress Note  Allen Hercules MD  Hospitalist    Date of visit: 11/22/2018     LOS: 24 days   Patient Care Team:  Reddy Grant MD as PCP - General (Family Medicine)  Tushar Becerril DO as Consulting Physician (Gastroenterology)    Chief Complaint: nausea, vomiting, abdominal discomfort    Subjective     Interval History:     Patient Complaints: nausea / vomiting - some better. Still very weak.    History taken from: patient    Medication Review:   Current Facility-Administered Medications   Medication Dose Route Frequency Provider Last Rate Last Dose   • albumin human 5 % bottle 25 g  25 g Intravenous Q12H Solomon Gomez PA 0 mL/hr at 11/11/18 2000 25 g at 11/22/18 0538   • amLODIPine (NORVASC) tablet 5 mg  5 mg Oral Q24H Allen Hercules MD   5 mg at 11/22/18 0820   • furosemide (LASIX) tablet 20 mg  20 mg Oral BID Allen Hercules MD   20 mg at 11/22/18 0820   • hydrALAZINE (APRESOLINE) tablet 10 mg  10 mg Oral Q8H Allen Hercules MD   10 mg at 11/22/18 0538   • HYDROmorphone (DILAUDID) injection 0.25 mg  0.25 mg Intravenous Q3H PRN Allen Hercules MD   0.25 mg at 11/22/18 1002   • ipratropium-albuterol (DUO-NEB) nebulizer solution 3 mL  3 mL Nebulization Q6H PRN Armando Richard MD       • lactulose (CHRONULAC) 10 GM/15ML solution 30 g  30 g Oral TID Eduar Cisneros MD   30 g at 11/17/18 0919   • magic butt ointment   Topical BID Armando Richard MD       • Magnesium Sulfate 2 gram Bolus, followed by 8 gram infusion (total Mg dose 10 grams)- Mg less than or equal to 1mg/dL  2 g Intravenous PRN Solomon Gomez PA        Or   • Magnesium Sulfate 2 gram / 50mL Infusion (GIVE X 3 BAGS TO EQUAL 6GM TOTAL DOSE) - Mg 1.1 - 1.5 mg/dl  2 g Intravenous PRN Solomon Gomez PA        Or   • Magnesium Sulfate 4 gram infusion- Mg 1.6-1.9 mg/dL  4 g Intravenous PRN Solomon Gomez PA 25 mL/hr at 11/07/18 2157 4 g at 11/07/18 2157   • naloxone (NARCAN) injection 0.4 mg  0.4 mg Intravenous Q5 Min PRN  Eduar Cisneros MD       • nicotine (NICODERM CQ) 21 MG/24HR patch 1 patch  1 patch Transdermal Q24H Armando Richard MD   1 patch at 11/22/18 0820   • ondansetron (ZOFRAN) injection 4 mg  4 mg Intravenous Q6H PRN Eduar Cisneros MD   4 mg at 11/19/18 0901   • oxyCODONE (ROXICODONE) immediate release tablet 5 mg  5 mg Oral Q6H PRN Allen Hercules MD   5 mg at 11/22/18 0820   • pantoprazole (PROTONIX) EC tablet 40 mg  40 mg Oral Q AM Allen Hercules MD   40 mg at 11/22/18 0538   • potassium chloride 10 mEq in 100 mL IVPB  10 mEq Intravenous Q1H PRN Solomon Gomez  mL/hr at 11/09/18 0801 10 mEq at 11/09/18 0801   • promethazine (PHENERGAN) injection 12.5 mg  12.5 mg Intravenous Q6H PRN Allen Hercules MD   12.5 mg at 11/20/18 1804   • rifaximin (XIFAXAN) tablet 275 mg  275 mg Oral Q12H Eduar Cisneros MD   275 mg at 11/22/18 0820   • simethicone (MYLICON) chewable tablet 80 mg  80 mg Oral Q6H PRN Eduar Cisneros MD   80 mg at 11/16/18 1309   • sodium chloride (OCEAN) nasal spray 1 spray  1 spray Each Nare PRN Beau Celeste MD       • sodium chloride 0.9 % flush 10 mL  10 mL Intravenous Q12H Vinh, Anderson T, DO   10 mL at 11/22/18 0826   • sodium chloride 0.9 % flush 10 mL  10 mL Intravenous Q12H Vinh, Anderson T, DO   10 mL at 11/22/18 0825   • sodium chloride 0.9 % flush 10 mL  10 mL Intravenous PRN Vinh Anderson T, DO   10 mL at 11/21/18 0918   • sodium chloride 0.9 % flush 20 mL  20 mL Intravenous PRN Vinh Anderson T, DO   20 mL at 11/18/18 2050   • sodium chloride 0.9 % flush 3 mL  3 mL Intravenous Q12H Eduar Cisneros MD   3 mL at 11/21/18 0920   • sodium chloride 0.9 % flush 3-10 mL  3-10 mL Intravenous PRN Eduar Cisneros MD   10 mL at 11/13/18 0637       Review of Systems:   Review of Systems   Constitutional: Positive for fatigue. Negative for fever.   Respiratory: Positive for cough. Negative for shortness of breath and wheezing.    Cardiovascular: Negative for chest  pain and leg swelling.   Gastrointestinal: Positive for abdominal distention and nausea. Negative for abdominal pain, diarrhea and vomiting.   Genitourinary: Negative for enuresis, frequency, hematuria, penile swelling and urgency.   Musculoskeletal: Negative for arthralgias and back pain.   Skin: Positive for pallor. Negative for color change and rash.   Neurological: Positive for weakness. Negative for syncope, facial asymmetry and light-headedness.   Psychiatric/Behavioral: Negative for agitation, behavioral problems and confusion.   All other systems reviewed and are negative.      Objective     Vital Signs  Temp:  [96.1 °F (35.6 °C)-98.5 °F (36.9 °C)] 97.6 °F (36.4 °C)  Heart Rate:  [] 104  Resp:  [16-20] 18  BP: (125-167)/(82-98) 145/94    Physical Exam:  Physical Exam   Constitutional: He is oriented to person, place, and time. He appears ill. No distress.   Eyes: EOM are normal. Pupils are equal, round, and reactive to light. Scleral icterus is present.   Neck: Normal range of motion. Neck supple.   Cardiovascular: Normal rate and regular rhythm.   Pulmonary/Chest: Effort normal and breath sounds normal. No respiratory distress. He has no wheezes.   Abdominal: Soft. He exhibits distension. There is no tenderness.   Musculoskeletal:   L AKA   Neurological: He is alert and oriented to person, place, and time. He displays normal reflexes. No cranial nerve deficit. Coordination normal.   Skin: Skin is warm and dry. There is pallor.   Psychiatric: He has a normal mood and affect. His behavior is normal.        Results Review:    Lab Results (last 24 hours)     Procedure Component Value Units Date/Time    Protime-INR [[id number]]  (Abnormal) Collected:  11/22/18 0539    Specimen:  Blood Updated:  11/22/18 0620     Protime 32.3 Seconds      INR 3.36    Narrative:       Therapeutic range for most indications is 2.0-3.0 INR,  or 2.5-3.5 for mechanical heart valves.    CBC & Differential [[id number]] Collected:   11/22/18 0539    Specimen:  Blood Updated:  11/22/18 0620    Narrative:       The following orders were created for panel order CBC & Differential.  Procedure                               Abnormality         Status                     ---------                               -----------         ------                     Scan Slide[[id number]]                                                                  CBC Auto Differential[[id number]]        Abnormal            Final result                 Please view results for these tests on the individual orders.    CBC Auto Differential [[id number]]  (Abnormal) Collected:  11/22/18 0539    Specimen:  Blood Updated:  11/22/18 0619     WBC 7.53 10*3/mm3      RBC 2.38 10*6/mm3      Hemoglobin 8.1 g/dL      Hematocrit 23.5 %      MCV 98.7 fL      MCH 34.0 pg      MCHC 34.5 g/dL      RDW 21.7 %      RDW-SD 76.5 fl      MPV 10.2 fL      Platelets 26 10*3/mm3      Comment: SPECIMEN REANALYZED TO CONFIRM PLATELET COUNT        Neutrophil % 64.7 %      Lymphocyte % 17.4 %      Monocyte % 13.1 %      Eosinophil % 3.5 %      Basophil % 0.8 %      Immature Grans % 0.5 %      Neutrophils, Absolute 4.87 10*3/mm3      Lymphocytes, Absolute 1.31 10*3/mm3      Monocytes, Absolute 0.99 10*3/mm3      Eosinophils, Absolute 0.26 10*3/mm3      Basophils, Absolute 0.06 10*3/mm3      Immature Grans, Absolute 0.04 10*3/mm3      nRBC 0.0 /100 WBC     Basic Metabolic Panel [[id number]]  (Abnormal) Collected:  11/22/18 0539    Specimen:  Blood Updated:  11/22/18 0609     Glucose 93 mg/dL      BUN 61 mg/dL      Creatinine 1.89 mg/dL      Sodium 134 mmol/L      Potassium 4.3 mmol/L      Chloride 94 mmol/L      CO2 22.0 mmol/L      Calcium 10.5 mg/dL      eGFR Non African Amer 37 mL/min/1.73      BUN/Creatinine Ratio 32.3     Anion Gap 18.0 mmol/L     Ammonia [[id number]]  (Normal) Collected:  11/22/18 0539    Specimen:  Blood Updated:  11/22/18 0604     Ammonia 16 umol/L           Imaging Results (last 24  hours)     ** No results found for the last 24 hours. **          Assessment/Plan       Hepatic encephalopathy (CMS/HCC)    Liver cirrhosis (CMS/HCC)    Thrombocytopenia (CMS/HCC)    Perinephric abscess    CKD (chronic kidney disease) stage 3, GFR 30-59 ml/min (CMS/HCC)    Chronic hepatitis C (CMS/HCC)    Chronic anemia    Nausea / vomiting - minimally better - we'll provide him with PRN Phenergan.    His hemoglobin is now 8.1 but his platelet count dropped to 26,000.  We'll stop the Zyvox for now, hoping that this will help the platelet count.  Continue other medications as listed.    He refused hospice.  We are still waiting for nursing home placement    Allen Hercules MD  11/22/18  12:01 PM
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
## 2018-11-22 NOTE — PLAN OF CARE
Problem: Patient Care Overview  Goal: Plan of Care Review   11/22/18 1445   Coping/Psychosocial   Plan of Care Reviewed With patient   Plan of Care Review   Progress improving   OTHER   Outcome Summary sup-sit-sup sba of 1,sat eob x 15' sba/ind-no goals met-if d/c would benefit snf with pt/ot     Goal: Discharge Needs Assessment   10/31/18 0950 11/10/18 1546 11/16/18 1430   Discharge Needs Assessment   Readmission Within the Last 30 Days no previous admission in last 30 days --  --    Concerns to be Addressed --  discharge planning --    Concerns Comments --  --  LTACH?   Patient/Family Anticipates Transition to home with help/services --  --    Patient/Family Anticipated Services at [facility];skilled nursing --  --    Transportation Concerns rides, unreliable from others  (utilizes PACS) --  --    Transportation Anticipated family or friend will provide;public transportation --  --    Anticipated Changes Related to Illness (unable to assess at this time.) --  --    Equipment Needed After Discharge none --  --    Outpatient/Agency/Support Group Needs homecare agency;skilled nursing facility --  --    Discharge Facility/Level of Care Needs home with home health;nursing facility, skilled  (pending ) --  --    Current Discharge Risk lack of support system/caregiver;physical impairment  (left AKA; son works during day) --  --    Discharge Coordination/Progress Pending medical stability- PT/MD recommendations for safe d/c plan. Patient currently has mental status change.  --  --    Disability   Equipment Currently Used at Home wheelchair;commode;crutches;shower chair;walker, susan --  --

## 2018-11-22 NOTE — PROGRESS NOTES
"   LOS: 24 days   Patient Care Team:  Reddy Grant MD as PCP - General (Family Medicine)  Tushar Becerril DO as Consulting Physician (Gastroenterology)    Subjective     Subjective:  Symptoms:  Stable.  No diarrhea.  (Complains of fatigue today. ).    Diet:  No vomiting.    Pain:  He reports pain is unchanged.        History taken from: patient chart RN    Objective     Vital Signs  Temp:  [96.1 °F (35.6 °C)-98.5 °F (36.9 °C)] 97.6 °F (36.4 °C)  Heart Rate:  [] 104  Resp:  [16-20] 18  BP: (125-167)/(82-98) 145/94    Objective:  General Appearance:  In no acute distress.    Vital signs: (most recent): Blood pressure 145/94, pulse 104, temperature 97.6 °F (36.4 °C), temperature source Temporal, resp. rate 18, height 182.9 cm (72.01\"), weight 93.7 kg (206 lb 9.6 oz), SpO2 90 %.  Vital signs are normal.  No fever.    Output: Producing urine (Urine clear yellow) and producing stool.    Lungs:  Normal effort and normal respiratory rate.  Breath sounds clear to auscultation.  He is not in respiratory distress.  No stridor.  No decreased breath sounds.    Heart: Normal rate.  Regular rhythm.  S1 normal and S2 normal.  No murmur, gallop or friction rub.   Chest: Symmetric chest wall expansion.   Abdomen: Abdomen is soft and distended.  Bowel sounds are normal.   There is generalized tenderness.     Extremities: There is no dependent edema.    Neurological: Patient is alert and oriented to person, place and time.  GCS score is 15.    Pupils:  Pupils are equal, round, and reactive to light.    Skin:  Warm and dry.  No rash or cyanosis.           Results Review:    Lab Results (last 24 hours)     Procedure Component Value Units Date/Time    Protime-INR [[id number]]  (Abnormal) Collected:  11/22/18 0539    Specimen:  Blood Updated:  11/22/18 0620     Protime 32.3 Seconds      INR 3.36    Narrative:       Therapeutic range for most indications is 2.0-3.0 INR,  or 2.5-3.5 for mechanical heart valves.    CBC & " Differential [[id number]] Collected:  11/22/18 0539    Specimen:  Blood Updated:  11/22/18 0620    Narrative:       The following orders were created for panel order CBC & Differential.  Procedure                               Abnormality         Status                     ---------                               -----------         ------                     Scan Slide[[id number]]                                                                  CBC Auto Differential[[id number]]        Abnormal            Final result                 Please view results for these tests on the individual orders.    CBC Auto Differential [[id number]]  (Abnormal) Collected:  11/22/18 0539    Specimen:  Blood Updated:  11/22/18 0619     WBC 7.53 10*3/mm3      RBC 2.38 10*6/mm3      Hemoglobin 8.1 g/dL      Hematocrit 23.5 %      MCV 98.7 fL      MCH 34.0 pg      MCHC 34.5 g/dL      RDW 21.7 %      RDW-SD 76.5 fl      MPV 10.2 fL      Platelets 26 10*3/mm3      Comment: SPECIMEN REANALYZED TO CONFIRM PLATELET COUNT        Neutrophil % 64.7 %      Lymphocyte % 17.4 %      Monocyte % 13.1 %      Eosinophil % 3.5 %      Basophil % 0.8 %      Immature Grans % 0.5 %      Neutrophils, Absolute 4.87 10*3/mm3      Lymphocytes, Absolute 1.31 10*3/mm3      Monocytes, Absolute 0.99 10*3/mm3      Eosinophils, Absolute 0.26 10*3/mm3      Basophils, Absolute 0.06 10*3/mm3      Immature Grans, Absolute 0.04 10*3/mm3      nRBC 0.0 /100 WBC     Basic Metabolic Panel [[id number]]  (Abnormal) Collected:  11/22/18 0539    Specimen:  Blood Updated:  11/22/18 0609     Glucose 93 mg/dL      BUN 61 mg/dL      Creatinine 1.89 mg/dL      Sodium 134 mmol/L      Potassium 4.3 mmol/L      Chloride 94 mmol/L      CO2 22.0 mmol/L      Calcium 10.5 mg/dL      eGFR Non African Amer 37 mL/min/1.73      BUN/Creatinine Ratio 32.3     Anion Gap 18.0 mmol/L     Ammonia [[id number]]  (Normal) Collected:  11/22/18 0539    Specimen:  Blood Updated:  11/22/18 0604     Ammonia 16 umol/L      CBC & Differential [[id number]] Collected:  11/21/18 0842    Specimen:  Blood Updated:  11/21/18 1016    Narrative:       The following orders were created for panel order CBC & Differential.  Procedure                               Abnormality         Status                     ---------                               -----------         ------                     Manual Differential[[id number]]          Abnormal            Final result               Scan Slide[[id number]]                                                                  CBC Auto Differential[[id number]]        Abnormal            Final result                 Please view results for these tests on the individual orders.    Manual Differential [[id number]]  (Abnormal) Collected:  11/21/18 0842    Specimen:  Blood Updated:  11/21/18 1016     Neutrophil % 77.0 %      Lymphocyte % 13.0 %      Monocyte % 7.0 %      Eosinophil % 1.0 %      Basophil % 1.0 %      Metamyelocyte % 1.0 %      Neutrophils Absolute 6.63 10*3/mm3      Lymphocytes Absolute 1.12 10*3/mm3      Monocytes Absolute 0.60 10*3/mm3      Eosinophils Absolute 0.09 10*3/mm3      Basophils Absolute 0.09 10*3/mm3      Anisocytosis Slight/1+     Comment: FEW HYPOCHROMIC CELLS SEEN        Macrocytes Slight/1+     WBC Morphology Normal     Platelet Estimate Decreased     Comment: RARE GIANT PLATELET SEEN       CBC Auto Differential [[id number]]  (Abnormal) Collected:  11/21/18 0842    Specimen:  Blood Updated:  11/21/18 0907     WBC 8.61 10*3/mm3      RBC 1.94 10*6/mm3      Hemoglobin 6.9 g/dL      Hematocrit 20.3 %      .6 fL      MCH 35.6 pg      MCHC 34.0 g/dL      RDW 18.2 %      RDW-SD 68.2 fl      MPV 10.5 fL      Platelets 31 10*3/mm3          Imaging Results (last 24 hours)     Procedure Component Value Units Date/Time    CT Abdomen Pelvis With Contrast [[id number]] Collected:  11/05/18 1815     Updated:  11/05/18 1857    Narrative:         EXAMINATION:  Computed Tomography            REGION:    Abdomen / Pelvis                     INDICATION:   Repeat to evaluate hematoma/abscess of kidney per  urology, R41.82 Altered mental status, unspecified K72.90 Hepatic  failure, unspecified without coma Z74.09 Other reduced mobility  Z74.09 Other reduced mobility    HISTORY:  LISA. IMAGING:    CT A/P 11/2/18            TECHNIQUE:      - reconstructions:    axial, coronal, sagittal         - contrast:      oral:  No ;   intravenous: Isovue 300, 100 mL  This exam was performed according to the departmental  dose-optimization program which includes automated exposure  control, adjustment of the mA and/or kV according to patient size  and/or use of iterative reconstruction technique.           COMMENTS:            - - - CT ABDOMEN - - -          THORAX (INFERIOR):      - LUNG BASES:  clear      - PLEURA:    no fluid or mass      - HEART:    normal size, no pericardial fluid     - MISC:      n/a          ABDOMEN:     - LIVER:    Diminished size with nodular contour containing a  tips shunt   - GB:      Cholelithiasis without evidence of wall thickening.    - CBD:      grossly negative   - SPLEEN:    Upper normal/mildly enlarged   - PANCREAS:    normal in size, contour, no focal mass    - VISCERA:    normal caliber, no wall thickening     - MESENTERY:  no mesenteric mass   - CAVITY:    Small amount of free fluid   - BODY WALL:  wnl   - OSSEOUS:    grossly negative for age   - MISC:                 Extensive gastric varices                      RETROPERITONEUM:   - KIDNEYS:    Renal examination again displays overall normal  size and symmetric nephrograms. Again noted is a left pararenal  enhancing focal fluid collection containing air which is  unchanged in size measuring 2.5 x 3.6 x 2.0 cm   - URETERS:    normal course, caliber   - ADRENALS:    normal size, contour   - MISC:      no sig retroperitoneal adenopathy or mass   - VASCULAR:    aorta / iliacs: wnl for age     - - - CT PELVIS - - -      - VISCERA:     normal caliber small/large bowel, no focal  thickening/mass        - APPENDIX:          wnl   - MESENTERY:  no mass   - VASCULAR:    wnl for age   - CAVITY:    Moderate amount of free fluid   - BLADDER:    unremarkable   - OSSEOUS:    Status post left hip repair    - MISC:     .       Impression:        CONCLUSION:  1. Unchanged left pararenal focal fluid collection with  peripheral enhancement containing air, likely representing an  abscess.  2. Multiple additional findings demonstrating the presence of  cirrhosis status post TIPS shunt, with spleen size at the upper  limits of normal/mildly enlarged, and extensive gastric varices,  as above, unchanged.        Electronically signed by:  PRISCILLA Bustillo MD  11/5/2018 6:56  PM CST Workstation: [id number]           I reviewed the patient's new clinical results.  I reviewed the patient's new imaging results and agree with the interpretation.  I reviewed the patient's other test results and agree with the interpretation      Assessment/Plan       Hepatic encephalopathy (CMS/HCC)    Liver cirrhosis (CMS/HCC)    Thrombocytopenia (CMS/HCC)    CKD (chronic kidney disease) stage 3, GFR 30-59 ml/min (CMS/HCC)    Altered mental status    Chronic hepatitis C (CMS/HCC)    Perinephric abscess    Chronic anemia      Assessment & Plan    1. Difficulty urinating with hematuria, likely related to UTI hemorrhagic cystitis without clots-->hematuria resolved  2. Perinephric abscess status post CT guided drainage of left perinephric abscess 11/9/18-->Enterococcus faecium and candida  -Day #12 of antibiotic therapy (vancomycin and now Zyvox again), Day #7  (Diflucan)  -Vancomycin 11/11/18 to 11/12/18 then Zyvox 11/13/18 to 11/14/18, Vanc again 11/15/18 to 11/19/18, Zyvox restarted 11/20/18.     -Estimated Creatinine Clearance: 53.1 mL/min (A) (by C-G formula based on SCr of 1.89 mg/dL (H)).  -Cr 1.89  -WBC 6.94-->8.19-->8.61-->7.53  -UA + 10/30/18  -Hgb/Hct  8.5/25.2-->7.7/23.4-->6.9/20.3-->8.1/23.5 (2 units of PRBCs 11/21/18)  -Platelets 45,000-->38,000-->31,000-->26,000  -INR 2.66-->3.38-->2.40-->3.36  -Renal ultrasound 10/30/18 limited exam, especially of the left kidney, due to the presence of bowel gas and the patient's body habitus. No hydronephrosis is visualized. Unremarkable bladder.   -11/2/18 CT abdomen/pelvis LEFT 2.3 x 3.7 x 2.2 cm fluid and air collection infected subcapsular hematoma versus abscess.  -11/5/18 CT abdomen/pelvis showed LEFT renal abscess unchanged  -10/30/18 Urine culture NEGATIVE, UA grossly positive.     Plan:    Closely monitor labs and for bleeding. Following.     KALEB Alexander  11/22/18  8:43 AM

## 2018-11-22 NOTE — PLAN OF CARE
Problem: Patient Care Overview  Goal: Plan of Care Review  Outcome: Ongoing (interventions implemented as appropriate)   11/22/18 0315   Coping/Psychosocial   Plan of Care Reviewed With patient   Plan of Care Review   Progress no change   OTHER   Outcome Summary Pt still complaining of pain, 2 units PRB's infused.       Problem: Fall Risk (Adult)  Goal: Absence of Fall  Outcome: Ongoing (interventions implemented as appropriate)      Problem: Skin Injury Risk (Adult)  Goal: Skin Health and Integrity  Outcome: Ongoing (interventions implemented as appropriate)      Problem: Anemia (Adult)  Goal: Symptom Improvement  Outcome: Ongoing (interventions implemented as appropriate)      Problem: Liver Failure, Acute/Chronic (Adult)  Goal: Signs and Symptoms of Listed Potential Problems Will be Absent, Minimized or Managed (Liver Failure, Acute/Chronic)  Outcome: Ongoing (interventions implemented as appropriate)

## 2018-11-23 NOTE — PLAN OF CARE
Problem: Patient Care Overview  Goal: Plan of Care Review   11/23/18 1315   Coping/Psychosocial   Plan of Care Reviewed With patient   Plan of Care Review   Progress no change   OTHER   Outcome Summary 20 reps preston le ex-no goals met-if d/c would benefit from snf with pt/ot     Goal: Discharge Needs Assessment   10/31/18 0950 11/10/18 1546 11/16/18 1430   Discharge Needs Assessment   Readmission Within the Last 30 Days no previous admission in last 30 days --  --    Concerns to be Addressed --  discharge planning --    Concerns Comments --  --  LTACH?   Patient/Family Anticipates Transition to home with help/services --  --    Patient/Family Anticipated Services at [facility];skilled nursing --  --    Transportation Concerns rides, unreliable from others  (utilizes PACS) --  --    Transportation Anticipated family or friend will provide;public transportation --  --    Anticipated Changes Related to Illness (unable to assess at this time.) --  --    Equipment Needed After Discharge none --  --    Outpatient/Agency/Support Group Needs homecare agency;skilled nursing facility --  --    Discharge Facility/Level of Care Needs home with home health;nursing facility, skilled  (pending ) --  --    Current Discharge Risk lack of support system/caregiver;physical impairment  (left AKA; son works during day) --  --    Discharge Coordination/Progress Pending medical stability- PT/MD recommendations for safe d/c plan. Patient currently has mental status change.  --  --    Disability   Equipment Currently Used at Home wheelchair;commode;crutches;shower chair;walker, susan --  --

## 2018-11-23 NOTE — PLAN OF CARE
Problem: Patient Care Overview  Goal: Plan of Care Review  Outcome: Ongoing (interventions implemented as appropriate)   11/23/18 0418   Coping/Psychosocial   Plan of Care Reviewed With patient   Plan of Care Review   Progress no change       Problem: Fall Risk (Adult)  Goal: Absence of Fall  Outcome: Ongoing (interventions implemented as appropriate)      Problem: Skin Injury Risk (Adult)  Goal: Skin Health and Integrity  Outcome: Ongoing (interventions implemented as appropriate)      Problem: Anemia (Adult)  Goal: Symptom Improvement  Outcome: Ongoing (interventions implemented as appropriate)      Problem: Liver Failure, Acute/Chronic (Adult)  Goal: Signs and Symptoms of Listed Potential Problems Will be Absent, Minimized or Managed (Liver Failure, Acute/Chronic)  Outcome: Ongoing (interventions implemented as appropriate)

## 2018-11-23 NOTE — PLAN OF CARE
Problem: Patient Care Overview  Goal: Plan of Care Review   11/23/18 4188   Coping/Psychosocial   Plan of Care Reviewed With patient   Plan of Care Review   Progress no change   OTHER   Outcome Summary Pt performed L UE ther ex with yellow theraband 20-25 reps and R UE AG as tolerated. pt not OOB 2* to platelets 20.. Pt would benefit from SNF placement for further rehab at d/c.

## 2018-11-23 NOTE — PROGRESS NOTES
"   LOS: 25 days   Patient Care Team:  Reddy Grant MD as PCP - General (Family Medicine)  Tushar Becerril DO as Consulting Physician (Gastroenterology)    Subjective     Subjective:  Symptoms:  Stable.  No diarrhea.  (No hematuria, no abdominal pain. ).    Diet:  No vomiting.    Pain:  He reports pain is unchanged.        History taken from: patient chart RN    Objective     Vital Signs  Temp:  [96.1 °F (35.6 °C)-97.7 °F (36.5 °C)] 96.1 °F (35.6 °C)  Heart Rate:  [100-108] 105  Resp:  [18] 18  BP: (111-138)/(69-92) 111/69    Objective:  General Appearance:  In no acute distress.    Vital signs: (most recent): Blood pressure 111/69, pulse 105, temperature 96.1 °F (35.6 °C), resp. rate 18, height 182.9 cm (72.01\"), weight 94.1 kg (207 lb 7.3 oz), SpO2 90 %.  Vital signs are normal.  No fever.    Output: Producing urine (Urine clear yellow) and producing stool.    Lungs:  Normal effort and normal respiratory rate.  Breath sounds clear to auscultation.  He is not in respiratory distress.  No stridor.  No decreased breath sounds.    Heart: Normal rate.  Regular rhythm.  S1 normal and S2 normal.  No murmur, gallop or friction rub.   Chest: Symmetric chest wall expansion.   Abdomen: Abdomen is soft and distended.  There are signs of ascites. Bowel sounds are normal.   There is generalized tenderness.     Extremities: There is no dependent edema.    Neurological: Patient is alert and oriented to person, place and time.  GCS score is 15.    Pupils:  Pupils are equal, round, and reactive to light.    Skin:  Warm and dry.  There is ecchymosis.  No rash or cyanosis.           Results Review:    Lab Results (last 24 hours)     Procedure Component Value Units Date/Time    Basic Metabolic Panel [[id number]]  (Abnormal) Collected:  11/23/18 0611    Specimen:  Blood Updated:  11/23/18 0705     Glucose 90 mg/dL      BUN 60 mg/dL      Creatinine 1.78 mg/dL      Sodium 134 mmol/L      Potassium 3.9 mmol/L      Chloride 91 mmol/L  "     CO2 25.0 mmol/L      Calcium 10.2 mg/dL      eGFR Non African Amer 40 mL/min/1.73      BUN/Creatinine Ratio 33.7     Anion Gap 18.0 mmol/L     CBC & Differential [[id number]] Collected:  11/23/18 0611    Specimen:  Blood Updated:  11/23/18 0705    Narrative:       The following orders were created for panel order CBC & Differential.  Procedure                               Abnormality         Status                     ---------                               -----------         ------                     Scan Slide[[id number]]                                                                  CBC Auto Differential[[id number]]        Abnormal            Final result                 Please view results for these tests on the individual orders.    Protime-INR [[id number]]  (Abnormal) Collected:  11/23/18 0611    Specimen:  Blood Updated:  11/23/18 0704     Protime 30.4 Seconds      INR 3.10    Narrative:       Therapeutic range for most indications is 2.0-3.0 INR,  or 2.5-3.5 for mechanical heart valves.    CBC Auto Differential [[id number]]  (Abnormal) Collected:  11/23/18 0611    Specimen:  Blood Updated:  11/23/18 0703     WBC 5.06 10*3/mm3      RBC 2.21 10*6/mm3      Hemoglobin 7.5 g/dL      Hematocrit 21.0 %      MCV 95.0 fL      MCH 33.9 pg      MCHC 35.7 g/dL      RDW 20.3 %      RDW-SD 69.7 fl      MPV 10.8 fL      Platelets 20 10*3/mm3      Neutrophil % 62.0 %      Lymphocyte % 19.6 %      Monocyte % 10.7 %      Eosinophil % 6.5 %      Basophil % 0.8 %      Immature Grans % 0.4 %      Neutrophils, Absolute 3.14 10*3/mm3      Lymphocytes, Absolute 0.99 10*3/mm3      Monocytes, Absolute 0.54 10*3/mm3      Eosinophils, Absolute 0.33 10*3/mm3      Basophils, Absolute 0.04 10*3/mm3      Immature Grans, Absolute 0.02 10*3/mm3      nRBC 0.0 /100 WBC     Narrative:       Specimen recollected to verify results    Ammonia [[id number]]  (Normal) Collected:  11/23/18 0539    Specimen:  Blood Updated:  11/23/18 0637      Ammonia 26 umol/L          Imaging Results (last 24 hours)     Procedure Component Value Units Date/Time    CT Abdomen Pelvis With Contrast [[id number]] Collected:  11/05/18 1815     Updated:  11/05/18 1857    Narrative:         EXAMINATION:  Computed Tomography           REGION:    Abdomen / Pelvis                     INDICATION:   Repeat to evaluate hematoma/abscess of kidney per  urology, R41.82 Altered mental status, unspecified K72.90 Hepatic  failure, unspecified without coma Z74.09 Other reduced mobility  Z74.09 Other reduced mobility    HISTORY:  [facility]ISA. IMAGING:    CT A/P 11/2/18            TECHNIQUE:      - reconstructions:    axial, coronal, sagittal         - contrast:      oral:  No ;   intravenous: Isovue 300, 100 mL  This exam was performed according to the departmental  dose-optimization program which includes automated exposure  control, adjustment of the mA and/or kV according to patient size  and/or use of iterative reconstruction technique.           COMMENTS:            - - - CT ABDOMEN - - -          THORAX (INFERIOR):      - LUNG BASES:  clear      - PLEURA:    no fluid or mass      - HEART:    normal size, no pericardial fluid     - MISC:      n/a          ABDOMEN:     - LIVER:    Diminished size with nodular contour containing a  tips shunt   - GB:      Cholelithiasis without evidence of wall thickening.    - CBD:      grossly negative   - SPLEEN:    Upper normal/mildly enlarged   - PANCREAS:    normal in size, contour, no focal mass    - VISCERA:    normal caliber, no wall thickening     - MESENTERY:  no mesenteric mass   - CAVITY:    Small amount of free fluid   - BODY WALL:  wnl   - OSSEOUS:    grossly negative for age   - MISC:                 Extensive gastric varices                      RETROPERITONEUM:   - KIDNEYS:    Renal examination again displays overall normal  size and symmetric nephrograms. Again noted is a left pararenal  enhancing focal fluid collection containing air which  is  unchanged in size measuring 2.5 x 3.6 x 2.0 cm   - URETERS:    normal course, caliber   - ADRENALS:    normal size, contour   - MISC:      no sig retroperitoneal adenopathy or mass   - VASCULAR:    aorta / iliacs: wnl for age     - - - CT PELVIS - - -      - VISCERA:    normal caliber small/large bowel, no focal  thickening/mass        - APPENDIX:          wnl   - MESENTERY:  no mass   - VASCULAR:    wnl for age   - CAVITY:    Moderate amount of free fluid   - BLADDER:    unremarkable   - OSSEOUS:    Status post left hip repair    - MISC:     .       Impression:        CONCLUSION:  1. Unchanged left pararenal focal fluid collection with  peripheral enhancement containing air, likely representing an  abscess.  2. Multiple additional findings demonstrating the presence of  cirrhosis status post TIPS shunt, with spleen size at the upper  limits of normal/mildly enlarged, and extensive gastric varices,  as above, unchanged.        Electronically signed by:  PRISCILLA Bustillo MD  11/5/2018 6:56  PM CST Workstation: [id number]           I reviewed the patient's new clinical results.  I reviewed the patient's new imaging results and agree with the interpretation.  I reviewed the patient's other test results and agree with the interpretation      Assessment/Plan       Hepatic encephalopathy (CMS/HCC)    Liver cirrhosis (CMS/HCC)    Thrombocytopenia (CMS/HCC)    CKD (chronic kidney disease) stage 3, GFR 30-59 ml/min (CMS/HCC)    Chronic hepatitis C (CMS/HCC)    Perinephric abscess    Chronic anemia      Assessment & Plan    1. Difficulty urinating with hematuria, likely related to UTI hemorrhagic cystitis without clots-->hematuria resolved  2. Perinephric abscess status post CT guided drainage of left perinephric abscess 11/9/18-->Enterococcus faecium and candida  -12 days of antibiotic therapy complete (vancomycin and Zyvox), Day #8  (Diflucan)  -Vancomycin 11/11/18 to 11/12/18 then Zyvox 11/13/18 to 11/14/18, Vanc again  11/15/18 to 11/19/18, Zyvox restarted 11/20/18 to 11/22/18    -Estimated Creatinine Clearance: 56.5 mL/min (A) (by C-G formula based on SCr of 1.78 mg/dL (H)).  -Cr 1.89-->1.78  -WBC 6.94-->8.19-->8.61-->7.53-->5.06  -UA + 10/30/18  -Hgb/Hct 8.5/25.2-->7.7/23.4-->6.9/20.3-->8.1/23.5-->7.5/21.0 (2 units of PRBCs 11/21/18)  -Platelets 45,000-->38,000-->31,000-->26,000-->20,000  -INR 2.66-->3.38-->2.40-->3.36-->3.1  -Renal ultrasound 10/30/18 limited exam, especially of the left kidney, due to the presence of bowel gas and the patient's body habitus. No hydronephrosis is visualized. Unremarkable bladder.   -11/2/18 CT abdomen/pelvis LEFT 2.3 x 3.7 x 2.2 cm fluid and air collection infected subcapsular hematoma versus abscess.  -11/5/18 CT abdomen/pelvis showed LEFT renal abscess unchanged  -10/30/18 Urine culture NEGATIVE, UA grossly positive.     Plan:    Closely monitor labs and for bleeding. Ultrasound abdomen ordered. Following.     KALEB Alexander  11/23/18  10:47 AM
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
## 2018-11-23 NOTE — PROGRESS NOTES
Progress Note  Allen Hercules MD  Hospitalist    Date of visit: 11/23/2018     LOS: 25 days   Patient Care Team:  Reddy Grant MD as PCP - General (Family Medicine)  Tushar Becerril DO as Consulting Physician (Gastroenterology)    Chief Complaint: nausea, vomiting, abdominal discomfort    Subjective     Interval History:     Patient Complaints: nausea / vomiting - some better. Still very weak, still short of breath.    History taken from: patient    Medication Review:   Current Facility-Administered Medications   Medication Dose Route Frequency Provider Last Rate Last Dose   • albumin human 5 % bottle 25 g  25 g Intravenous Q12H Solomon Gomez PA 0 mL/hr at 11/11/18 2000 25 g at 11/23/18 0507   • amLODIPine (NORVASC) tablet 5 mg  5 mg Oral Q24H Allen Hercules MD   5 mg at 11/23/18 0819   • furosemide (LASIX) injection 20 mg  20 mg Intravenous Q12H Allen Hercules MD       • hydrALAZINE (APRESOLINE) tablet 10 mg  10 mg Oral Q8H Allen Hercules MD   10 mg at 11/23/18 1357   • HYDROmorphone (DILAUDID) injection 0.25 mg  0.25 mg Intravenous Q3H PRN Allen Hercules MD   0.25 mg at 11/23/18 1042   • ipratropium-albuterol (DUO-NEB) nebulizer solution 3 mL  3 mL Nebulization Q4H PRN Allen Hercules MD   3 mL at 11/23/18 1110   • lactulose (CHRONULAC) 10 GM/15ML solution 30 g  30 g Oral TID Eduar Cisneros MD   30 g at 11/23/18 0819   • magic butt ointment   Topical BID Armando Richard MD       • Magnesium Sulfate 2 gram Bolus, followed by 8 gram infusion (total Mg dose 10 grams)- Mg less than or equal to 1mg/dL  2 g Intravenous PRN Solomon Gomez PA        Or   • Magnesium Sulfate 2 gram / 50mL Infusion (GIVE X 3 BAGS TO EQUAL 6GM TOTAL DOSE) - Mg 1.1 - 1.5 mg/dl  2 g Intravenous PRN Solomon Gomez PA        Or   • Magnesium Sulfate 4 gram infusion- Mg 1.6-1.9 mg/dL  4 g Intravenous PRN Solomon Gomez PA 25 mL/hr at 11/07/18 2157 4 g at 11/07/18 2157   • naloxone (NARCAN) injection 0.4 mg  0.4  mg Intravenous Q5 Min PRN Eduar Cisneros MD       • nicotine (NICODERM CQ) 21 MG/24HR patch 1 patch  1 patch Transdermal Q24H Armando Richard MD   1 patch at 11/23/18 0819   • ondansetron (ZOFRAN) injection 4 mg  4 mg Intravenous Q6H PRN Eduar Cisneros MD   4 mg at 11/19/18 0901   • oxyCODONE (ROXICODONE) immediate release tablet 5 mg  5 mg Oral Q6H PRN Allen Hercules MD   5 mg at 11/23/18 0818   • pantoprazole (PROTONIX) EC tablet 40 mg  40 mg Oral Q AM Allen Hercules MD   40 mg at 11/23/18 0544   • potassium chloride 10 mEq in 100 mL IVPB  10 mEq Intravenous Q1H PRN Solomon Gomez  mL/hr at 11/09/18 0801 10 mEq at 11/09/18 0801   • promethazine (PHENERGAN) injection 12.5 mg  12.5 mg Intravenous Q6H PRN Allen Hercules MD   12.5 mg at 11/20/18 1804   • rifaximin (XIFAXAN) tablet 275 mg  275 mg Oral Q12H Eduar Cisneros MD   275 mg at 11/23/18 0819   • simethicone (MYLICON) chewable tablet 80 mg  80 mg Oral Q6H PRN Eduar Cisneros MD   80 mg at 11/16/18 1309   • sodium chloride (OCEAN) nasal spray 1 spray  1 spray Each Nare PRN Beau Celeste MD       • sodium chloride 0.9 % flush 10 mL  10 mL Intravenous Q12H Vinh, Anderson T, DO   10 mL at 11/23/18 0820   • sodium chloride 0.9 % flush 10 mL  10 mL Intravenous Q12H Vinh, Anderson T, DO   10 mL at 11/23/18 0820   • sodium chloride 0.9 % flush 10 mL  10 mL Intravenous PRN Vinh, Anderson T, DO   10 mL at 11/21/18 0918   • sodium chloride 0.9 % flush 20 mL  20 mL Intravenous PRN Vinh Anderson T, DO   20 mL at 11/23/18 0820   • sodium chloride 0.9 % flush 3 mL  3 mL Intravenous Q12H Eduar Cisneros MD   3 mL at 11/23/18 0821   • sodium chloride 0.9 % flush 3-10 mL  3-10 mL Intravenous PRN Eduar Cisneros MD   10 mL at 11/13/18 0637       Review of Systems:   Review of Systems   Constitutional: Positive for fatigue. Negative for fever.   Respiratory: Positive for cough and shortness of breath. Negative for wheezing.     Cardiovascular: Negative for chest pain and leg swelling.   Gastrointestinal: Positive for abdominal distention and nausea. Negative for abdominal pain, diarrhea and vomiting.   Genitourinary: Negative for enuresis, frequency, hematuria, penile swelling and urgency.   Musculoskeletal: Negative for arthralgias and back pain.   Skin: Positive for pallor. Negative for color change and rash.   Neurological: Positive for weakness. Negative for syncope, facial asymmetry and light-headedness.   Psychiatric/Behavioral: Negative for agitation, behavioral problems and confusion.   All other systems reviewed and are negative.      Objective     Vital Signs  Temp:  [96.1 °F (35.6 °C)-97.3 °F (36.3 °C)] 96.1 °F (35.6 °C)  Heart Rate:  [100-108] 102  Resp:  [18-22] 22  BP: (111-160)/(69-91) 160/82    Physical Exam:  Physical Exam   Constitutional: He is oriented to person, place, and time. He appears ill. No distress.   Eyes: EOM are normal. Pupils are equal, round, and reactive to light. Scleral icterus is present.   Neck: Normal range of motion. Neck supple.   Cardiovascular: Normal rate and regular rhythm.   Pulmonary/Chest: Effort normal and breath sounds normal. No respiratory distress. He has no wheezes.   Abdominal: Soft. He exhibits distension. There is no tenderness.   Musculoskeletal:   L AKA   Neurological: He is alert and oriented to person, place, and time. He displays normal reflexes. No cranial nerve deficit. Coordination normal.   Skin: Skin is warm and dry. There is pallor.   Psychiatric: He has a normal mood and affect. His behavior is normal.        Results Review:    Lab Results (last 24 hours)     Procedure Component Value Units Date/Time    Basic Metabolic Panel [[id number]]  (Abnormal) Collected:  11/23/18 0611    Specimen:  Blood Updated:  11/23/18 0705     Glucose 90 mg/dL      BUN 60 mg/dL      Creatinine 1.78 mg/dL      Sodium 134 mmol/L      Potassium 3.9 mmol/L      Chloride 91 mmol/L      CO2 25.0  mmol/L      Calcium 10.2 mg/dL      eGFR Non African Amer 40 mL/min/1.73      BUN/Creatinine Ratio 33.7     Anion Gap 18.0 mmol/L     CBC & Differential [[id number]] Collected:  11/23/18 0611    Specimen:  Blood Updated:  11/23/18 0705    Narrative:       The following orders were created for panel order CBC & Differential.  Procedure                               Abnormality         Status                     ---------                               -----------         ------                     Scan Slide[[id number]]                                                                  CBC Auto Differential[[id number]]        Abnormal            Final result                 Please view results for these tests on the individual orders.    Protime-INR [[id number]]  (Abnormal) Collected:  11/23/18 0611    Specimen:  Blood Updated:  11/23/18 0704     Protime 30.4 Seconds      INR 3.10    Narrative:       Therapeutic range for most indications is 2.0-3.0 INR,  or 2.5-3.5 for mechanical heart valves.    CBC Auto Differential [[id number]]  (Abnormal) Collected:  11/23/18 0611    Specimen:  Blood Updated:  11/23/18 0703     WBC 5.06 10*3/mm3      RBC 2.21 10*6/mm3      Hemoglobin 7.5 g/dL      Hematocrit 21.0 %      MCV 95.0 fL      MCH 33.9 pg      MCHC 35.7 g/dL      RDW 20.3 %      RDW-SD 69.7 fl      MPV 10.8 fL      Platelets 20 10*3/mm3      Neutrophil % 62.0 %      Lymphocyte % 19.6 %      Monocyte % 10.7 %      Eosinophil % 6.5 %      Basophil % 0.8 %      Immature Grans % 0.4 %      Neutrophils, Absolute 3.14 10*3/mm3      Lymphocytes, Absolute 0.99 10*3/mm3      Monocytes, Absolute 0.54 10*3/mm3      Eosinophils, Absolute 0.33 10*3/mm3      Basophils, Absolute 0.04 10*3/mm3      Immature Grans, Absolute 0.02 10*3/mm3      nRBC 0.0 /100 WBC     Narrative:       Specimen recollected to verify results    Ammonia [[id number]]  (Normal) Collected:  11/23/18 0539    Specimen:  Blood Updated:  11/23/18 0637     Ammonia 26 umol/L            Imaging Results (last 24 hours)     ** No results found for the last 24 hours. **          Assessment/Plan       Hepatic encephalopathy (CMS/HCC)    Liver cirrhosis (CMS/HCC)    Thrombocytopenia (CMS/HCC)    Perinephric abscess    CKD (chronic kidney disease) stage 3, GFR 30-59 ml/min (CMS/HCC)    Chronic hepatitis C (CMS/HCC)    Chronic anemia    Nausea / vomiting - minimally better - we'll provide him with PRN Phenergan. Change Lasix to IV 20 mg BID and obtain abdominal US to assess the amount ascites.    His hemoglobin is now 7.5, but his platelet count dropped to 20,000. Continue other medications as listed.    He refused hospice.  We are still waiting for nursing home placement    Allen Hercules MD  11/23/18  2:52 PM

## 2018-11-23 NOTE — THERAPY TREATMENT NOTE
"Acute Care - Physical Therapy Treatment Note  [facility]     Patient Name: Armando Mcmullen Sr.  : 1965  MRN: [id number]  Today's Date: 2018  Onset of Illness/Injury or Date of Surgery: 10/29/18  Date of Referral to PT: 18  Referring Physician: SARI Franco MD    Admit Date: 10/29/2018    Visit Dx:    ICD-10-CM ICD-9-CM   1. Altered mental status, unspecified altered mental status type R41.82 780.97   2. Hepatic encephalopathy (CMS/HCC) K72.90 572.2   3. Impaired functional mobility, balance, gait, and endurance Z74.09 V49.89   4. Impaired mobility and ADLs Z74.09 799.89     Patient Active Problem List   Diagnosis   • Anxiety state   • Back pain   • Liver cirrhosis (CMS/HCC)   • Depression   • Substance abuse (CMS/HCC)   • Liver failure with hepatic coma (CMS/HCC)   • Hypersplenism   • Chronic osteomyelitis of right shoulder region (CMS/HCC)   • Thrombocytopenia (CMS/HCC)   • Anemia of chronic disease   • Positive hepatitis C antibody test   • BMI 35.0-35.9,adult   • Tobacco use   • Hepatic encephalopathy (CMS/HCC)   • Anxiety and depression   • CKD (chronic kidney disease) stage 3, GFR 30-59 ml/min (CMS/HCC)   • Ascites   • Physical deconditioning   • Closed fracture of lumbar vertebra with spinal cord injury (CMS/HCC)   • Altered mental status   • Chronic hepatitis C (CMS/HCC)   • Perinephric abscess   • Chronic anemia       Therapy Treatment    Rehabilitation Treatment Summary     Row Name 18 1345 18 1315          Treatment Time/Intention    Discipline  occupational therapy assistant  -TO  physical therapy assistant  -LN     Document Type  therapy note (daily note)  -TO  therapy note (daily note)  -LN     Subjective Information  complains of;pain  -TO  complains of;pain \"I'm getting my appetite back\"  -LN     Mode of Treatment  individual therapy;occupational therapy  -TO  physical therapy  -LN     Therapy Frequency (OT Eval)  other (see comments) 5-7x/week  -TO  --     Patient " Effort  good  -TO  adequate  -LN     Comment  --  platelets 20  -LN     Existing Precautions/Restrictions  --  fall  -LN     Recorded by [TO] Anival Saeed HEARD/L 11/23/18 1433 [LN] Stephanie Du, PTA 11/23/18 1507     Row Name 11/23/18 1345 11/23/18 1315          Vital Signs    Pre Systolic BP Rehab  160  -TO  127  -LN     Pre Treatment Diastolic BP  82  -TO  90  -LN     Post Systolic BP Rehab  --  128  -LN     Post Treatment Diastolic BP  --  94  -LN     Pretreatment Heart Rate (beats/min)  101  -TO  105  -LN     Posttreatment Heart Rate (beats/min)  107  -TO  107  -LN     Pre SpO2 (%)  92  -TO  92  -LN     O2 Delivery Pre Treatment  supplemental O2  -TO  supplemental O2  -LN     Post SpO2 (%)  90  -TO  92  -LN     O2 Delivery Post Treatment  supplemental O2  -TO  --     Pre Patient Position  Supine  -TO  Supine  -LN     Intra Patient Position  Supine  -TO  Supine  -LN     Post Patient Position  --  Supine  -LN     Recorded by [TO] Anival Saeed COTA/L 11/23/18 1433 [LN] Stephanie Du, PTA 11/23/18 1507     Row Name 11/23/18 1345 11/23/18 1315          Cognitive Assessment/Intervention- PT/OT    Affect/Mental Status (Cognitive)  WFL  -TO  WFL  -LN     Orientation Status (Cognition)  oriented x 4  -TO  oriented x 4  -LN     Follows Commands (Cognition)  WFL  -TO  WFL  -LN     Cognitive Function (Cognitive)  --  WFL  -LN     Recorded by [TO] Anival Saeed COTA/L 11/23/18 1433 [LN] Stephanie Du, PTA 11/23/18 1507     Row Name 11/23/18 1315             Bed Mobility Assessment/Treatment    Rolling Left [place] (Bed Mobility)  not tested  -LN      Rolling Right [place] (Bed Mobility)  not tested  -LN      Supine-Sit [place] (Bed Mobility)  not tested  -LN      Sit-Supine [place] (Bed Mobility)  not tested  -LN      Assistive Device (Bed Mobility)  head of bed elevated;bed rails  -LN      Recorded by [LN] Stephanie Du, PTA 11/23/18 1507      Row Name 11/23/18 1315             Sit-Stand  Transfer    Sit-Stand [place] (Transfers)  not tested  -LN      Recorded by [LN] Stephanie Du, PTA 11/23/18 1507      Row Name 11/23/18 1315             Stand Pivot/Stand Step Transfer    Stand Pivot/Stand Step [place]  not tested  -LN      Recorded by [LN] Stephanie Du, PTA 11/23/18 1507      Row Name 11/23/18 1345             Upper Extremity Supine Therapeutic Exercise    Performed, Supine Upper Extremity (Therapeutic Exercise)  shoulder flexion/extension;shoulder external/internal rotation;shoulder horizontal abduction/adduction;shoulder/scapular protraction/retraction;elbow flexion/extension R UE as tolerated 2* to pain.  L UE yellow theraband 20-25 r  -TO      Device, Supine Upper Extremity (Therapeutic Exercise)  elastic bands/tubing  -TO      Exercise Type, Supine Upper Extremity (Therapeutic Exercise)  AROM (active range of motion);resistive exercise  -TO      Expected Outcomes, Supine Upper Extremity (Therapeutic Exercise)  improve functional tolerance, self-care activity;improve performance, transfer skills  -TO      Sets/Reps Detail, Supine Upper Extremity (Therapeutic Exercise)  20-25x 1  -TO      Recorded by [TO] Anival Saeed COTA/L 11/23/18 1433      Row Name 11/23/18 1315             Lower Extremity Supine Therapeutic Exercise    Performed, Supine Lower Extremity (Therapeutic Exercise)  hip abduction/adduction;ankle dorsiflexion/plantarflexion;SAQ (short arc quad) over bolster;SLR (straight leg raise);quadriceps sets;gluteal sets;heel slides  -LN      Exercise Type, Supine Lower Extremity (Therapeutic Exercise)  AROM (active range of motion)  -LN      Sets/Reps Detail, Supine Lower Extremity (Therapeutic Exercise)  1/20  -LN      Recorded by [LN] Stephanie Du, PTA 11/23/18 1507      Row Name 11/23/18 1345 11/23/18 1315          Positioning and Restraints    Pre-Treatment Position  in bed  -TO  --     Post Treatment Position  bed  -TO  bed  -LN     In Bed  supine;call light within  reach;encouraged to call for assist  -TO  supine;call light within reach;encouraged to call for assist;exit alarm on  -LN     Recorded by [TO] Anival Saeed COTA/L 11/23/18 1433 [LN] Stephanie Du, PTA 11/23/18 1507     Row Name 11/23/18 1345             Pain Assessment    Additional Documentation  Pain Scale: Numbers Pre/Post-Treatment (Group)  -TO      Recorded by [TO] Anival Saeed COTA/L 11/23/18 1433      Row Name 11/23/18 1345 11/23/18 1315          Pain Scale: Numbers Pre/Post-Treatment    Pain Scale: Numbers, Pretreatment  7/10  -TO  7/10  -LN     Pain Scale: Numbers, Post-Treatment  7/10  -TO  7/10  -LN     Pain Location - Orientation  --  lower middle back  -LN     Pain Location  --  -- right shoulder  -LN     Pain Intervention(s)  --  -- pt states just had pain meds  -LN     Recorded by [TO] Anival Saeed COTA/L 11/23/18 1433 [LN] Stephanie Du, PTA 11/23/18 1507     Row Name                Wound 10/30/18 1020 coccyx    Wound - Properties Group Date first assessed: 10/30/18 [AS] Time first assessed: 1020 [AS] Present On Admission : picture taken [AS] Location: coccyx [AS] Recorded by:  [AS] [facility] RN 10/30/18 1021    Row Name                Wound 11/09/18 1712 Left posterior flank    Wound - Properties Group Date first assessed: 11/09/18 [FAMILIA] Time first assessed: 1712 [FAMILIA] Side: Left [FAMILIA] Orientation: posterior [FAMILIA] Location: flank [FAMILIA] Additional Comments: Drainage catheter puncture.  [FAMILIA] Recorded by:  [FAMILIA] Ev Alas RN 11/09/18 1713    Row Name 11/23/18 1315             Plan of Care Review    Plan of Care Reviewed With  patient  -LN      Recorded by [LN] Stephanie Du, PTA 11/23/18 1507      Row Name 11/23/18 1315             Outcome Summary/Treatment Plan (PT)    Plan for Continued Treatment (PT)  cont  -LN      Anticipated Discharge Disposition (PT)  skilled nursing facility  -LN      Recorded by [LN] Stephanie Du, PTA 11/23/18 1503        User Key  (r) = Recorded  By, (t) = Taken By, (c) = Cosigned By    Initials Name Effective Dates Discipline    FAMILIA Ev Alas RN 10/17/16 -  Nurse    LN Stephanie Du, PTA 03/07/18 -  PT    TO Anival Saeed, HEARD/L 03/07/18 -  OT    AS [facility] RN 12/13/16 -  Nurse          Wound 10/30/18 1020 coccyx (Active)   Dressing Appearance open to air 11/23/2018  9:00 AM   Closure Open to air 11/23/2018  9:00 AM   Base pink 11/23/2018  9:00 AM   Periwound blanchable 11/23/2018  9:00 AM   Periwound Temperature warm 11/23/2018  9:00 AM   Periwound Skin Turgor soft 11/23/2018  9:00 AM   Drainage Amount none 11/23/2018  9:00 AM   Care, Wound barrier applied 11/22/2018  9:00 PM       Wound 11/09/18 1712 Left posterior flank (Active)   Dressing Appearance open to air 11/23/2018  9:00 AM   Closure None 11/23/2018  9:00 AM   Base closed/resurfaced 11/23/2018  9:00 AM   Periwound intact 11/23/2018  9:00 AM   Periwound Temperature warm 11/23/2018  9:00 AM   Periwound Skin Turgor soft 11/23/2018  9:00 AM   Drainage Amount none 11/23/2018  9:00 AM       PT Rehab Goals     Row Name 11/23/18 1315             Bed Mobility Goal 1 (PT)    Activity/Assistive Device (Bed Mobility Goal 1, PT)  rolling to left;rolling to right;scooting  -LN      [place] Level/Cues Needed (Bed Mobility Goal 1, PT)  supervision required  -LN      Time Frame (Bed Mobility Goal 1, PT)  3 days  -LN      Progress/Outcomes (Bed Mobility Goal 1, PT)  goal not met  -LN         Transfer Goal 1 (PT)    Activity/Assistive Device (Transfer Goal 1, PT)  sit-to-stand/stand-to-sit;bed-to-chair/chair-to-bed;walker, rolling  -LN      [place] Level/Cues Needed (Transfer Goal 1, PT)  contact guard assist  -LN      Time Frame (Transfer Goal 1, PT)  5 days  -LN      Progress/Outcome (Transfer Goal 1, PT)  goal not met  -LN         Gait Training Goal 1 (PT)    Activity/Assistive Device (Gait Training Goal 1, PT)  gait (walking locomotion);decrease fall risk;assistive device  use;walker, rolling  -LN      [place] Level (Gait Training Goal 1, PT)  minimum assist (75% or more patient effort)  -LN      Distance (Gait Goal 1, PT)  5 ft or more  -LN      Time Frame (Gait Training Goal 1, PT)  by discharge  -LN      Progress/Outcome (Gait Training Goal 1, PT)  goal not met  -LN         Wheelchair Locomotion Goal 1 (PT)    Activity (Wheelchair Locomotion Goal 1, PT)  wheelchair mobility skills, all  -LN      [place] Level/Cues Needed (Wheelchair Locomotion Goal 1, PT)  supervision required  -LN      Distance Goal 1 (Wheelchair Locomotion, PT)  25 ft or more  -LN      Time Frame (Wheelchair Locomotion Goal 1, PT)  by discharge  -LN      Progress/Outcome (Wheelchair Locomotion Goal 1, PT)  goal not met  -LN         Stairs Goal 1 (PT)    Activity/Assistive Device (Stairs Goal 1, PT)  ascending stairs;descending stairs;walker, rolling  -LN      [place] Level/Cues Needed (Stairs Goal 1, PT)  moderate assist (50-74% patient effort)  -LN      Number of Stairs (Stairs Goal 1, PT)  2  -LN      Time Frame (Stairs Goal 1, PT)  by discharge  -LN      Progress/Outcome (Stairs Goal 1, PT)  goal not met  -LN        User Key  (r) = Recorded By, (t) = Taken By, (c) = Cosigned By    Initials Name Provider Type Discipline    Stephanie Marion PTA Physical Therapy Assistant PT          Physical Therapy Education     Title: PT OT SLP Therapies (Active)     Topic: Physical Therapy (Active)     Point: Mobility training (Done)     Learning Progress Summary           Patient Acceptance, E,TB, VU,NR by DYLAN at 11/23/2018  3:08 PM    Comment:  encouraged eob/oob    Acceptance, E,TB, VU,NR by DYLAN at 11/22/2018  3:24 PM    Comment:  encouraged eob/oob    Acceptance, E, VU by KENIZE at 11/15/2018  1:04 PM    Comment:  updated goals, POC    Acceptance, E, VU by KENZIE at 10/31/2018  4:01 PM    Comment:  Role of PT, POC, discharge recommendations to SNF for further rehab                   Point: Home exercise program  (Done)     Learning Progress Summary           Patient Acceptance, E,TB, VU,NR by LN at 11/23/2018  3:08 PM    Comment:  encouraged eob/oob                   Point: Precautions (Done)     Learning Progress Summary           Patient Acceptance, E,TB, VU,NR by LN at 11/23/2018  3:08 PM    Comment:  encouraged eob/oob    Acceptance, E,TB, VU,NR by LN at 11/22/2018  3:24 PM    Comment:  encouraged eob/oob                               User Key     Initials Effective Dates Name Provider Type Discipline    LN 03/07/18 -  Stephanie Du PTA Physical Therapy Assistant PT    KW 07/23/18 -  Sepideh Maya PT Physical Therapist PT                PT Recommendation and Plan  Anticipated Discharge Disposition (PT): skilled nursing facility  Outcome Summary/Treatment Plan (PT)  Plan for Continued Treatment (PT): cont  Anticipated Discharge Disposition (PT): skilled nursing facility  Plan of Care Reviewed With: patient  Progress: no change  Outcome Summary: 20 reps preston le ex-no goals met-if d/c would benefit from snf with pt/ot  Outcome Measures     Row Name 11/23/18 1345 11/23/18 1315 11/22/18 1445       How much help from another person do you currently need...    Turning from your back to your side while in flat bed without using bedrails?  --  3  -LN  3  -LN    Moving from lying on back to sitting on the side of a flat bed without bedrails?  --  3  -LN  3  -LN    Moving to and from a bed to a chair (including a wheelchair)?  --  1  -LN  2  -LN    Standing up from a chair using your arms (e.g., wheelchair, bedside chair)?  --  2  -LN  2  -LN    Climbing 3-5 steps with a railing?  --  1  -LN  1  -LN    To walk in hospital room?  --  1  -LN  1  -LN    AM-PAC 6 Clicks Score  --  11  -LN  12  -LN       How much help from another is currently needed...    Putting on and taking off regular lower body clothing?  2  -TO  --  --    Bathing (including washing, rinsing, and drying)  2  -TO  --  --    Toileting (which includes using toilet  bed pan or urinal)  2  -TO  --  --    Putting on and taking off regular upper body clothing  3  -TO  --  --    Taking care of personal grooming (such as brushing teeth)  4  -TO  --  --    Eating meals  4  -TO  --  --    Score  17  -TO  --  --       Functional Assessment    Outcome Measure Options  --  AM-PAC 6 Clicks Basic Mobility (PT)  -LN  AM-PAC 6 Clicks Basic Mobility (PT)  -LN    Row Name 11/22/18 0750 11/21/18 1246 11/21/18 1047       How much help from another person do you currently need...    Turning from your back to your side while in flat bed without using bedrails?  --  --  3  -KW    Moving from lying on back to sitting on the side of a flat bed without bedrails?  --  --  3  -KW    Moving to and from a bed to a chair (including a wheelchair)?  --  --  2  -KW    Standing up from a chair using your arms (e.g., wheelchair, bedside chair)?  --  --  2  -KW    Climbing 3-5 steps with a railing?  --  --  1  -KW    To walk in hospital room?  --  --  1  -KW    AM-PAC 6 Clicks Score  --  --  12  -KW       How much help from another is currently needed...    Putting on and taking off regular lower body clothing?  2  -BB  2  -LJ  --    Bathing (including washing, rinsing, and drying)  2  -BB  2  -LJ  --    Toileting (which includes using toilet bed pan or urinal)  1  -BB  1  -LJ  --    Putting on and taking off regular upper body clothing  3  -BB  3  -LJ  --    Taking care of personal grooming (such as brushing teeth)  4  -BB  3  -LJ  --    Eating meals  4  -BB  4  -LJ  --    Score  16  -BB  15  -LJ  --       Functional Assessment    Outcome Measure Options  --  --  AM-PAC 6 Clicks Basic Mobility (PT)  -KW      User Key  (r) = Recorded By, (t) = Taken By, (c) = Cosigned By    Initials Name Provider Type    LN Stephanie Du, PTA Physical Therapy Assistant    BB Maya Perez, HEARD/L Occupational Therapy Assistant    Anival Nascimento, HEARD/L Occupational Therapy Assistant    Mackenzie Rueda, HEARD/L  Occupational Therapy Assistant    Sepideh Loera, PT Physical Therapist         Time Calculation:   PT Charges     Row Name 11/23/18 1315             Time Calculation    Start Time  1315  -LN      Stop Time  1340  -LN      Time Calculation (min)  25 min  -LN      PT Received On  11/23/18  -LN         Time Calculation- PT    Total Timed Code Minutes- PT  25 minute(s)  -LN        User Key  (r) = Recorded By, (t) = Taken By, (c) = Cosigned By    Initials Name Provider Type    LN Stephanie Du PTA Physical Therapy Assistant        Therapy Suggested Charges     Code   Minutes Charges    18428 (CPT®) Hc Pt Neuromusc Re Education Ea 15 Min      83273 (CPT®) Hc Pt Ther Proc Ea 15 Min 27 2    58885 (CPT®) Hc Gait Training Ea 15 Min      49642 (CPT®) Hc Pt Therapeutic Act Ea 15 Min      11638 (CPT®) Hc Pt Manual Therapy Ea 15 Min      25145 (CPT®) Hc Pt Iontophoresis Ea 15 Min      48943 (CPT®) Hc Pt Elec Stim Ea-Per 15 Min      02294 (CPT®) Hc Pt Ultrasound Ea 15 Min      70796 (CPT®) Hc Pt Self Care/Mgmt/Train Ea 15 Min      37261 (CPT®) Hc Pt Prosthetic (S) Train Initial Encounter, Each 15 Min      34981 (CPT®) Hc Pt Orthotic(S)/Prosthetic(S) Encounter, Each 15 Min      46503 (CPT®) Hc Orthotic(S) Mgmt/Train Initial Encounter, Each 15min      Total  27 2        Therapy Charges for Today     Code Description Service Date Service Provider Modifiers Qty    10593027535 HC PT THERAPEUTIC ACT EA 15 MIN 11/22/2018 Stephanie Du, PTA GP 2    71750586331 HC PT THERAPEUTIC ACT EA 15 MIN 11/23/2018 Stephanie Du, PTA GP 1    21372034904 HC PT THER PROC EA 15 MIN 11/23/2018 Stephanie Du, PTA GP 1          PT G-Codes  PT Professional Judgement Used?: Yes  Outcome Measure Options: AM-PAC 6 Clicks Basic Mobility (PT)  AM-PAC 6 Clicks Score: 11  Score: 17  Functional Limitation: Mobility: Walking and moving around  Mobility: Walking and Moving Around Current Status (): At least 60 percent but less than 80 percent impaired,  limited or restricted  Mobility: Walking and Moving Around Goal Status (): At least 20 percent but less than 40 percent impaired, limited or restricted    Stephanie Du, PTA  11/23/2018
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
## 2018-11-24 PROBLEM — K72.90 LIVER FAILURE (HCC): Status: ACTIVE | Noted: 2017-05-05

## 2018-11-24 NOTE — PLAN OF CARE
Problem: Patient Care Overview  Goal: Plan of Care Review  Outcome: Ongoing (interventions implemented as appropriate)    Goal: Individualization and Mutuality  Outcome: Ongoing (interventions implemented as appropriate)    Goal: Discharge Needs Assessment  Outcome: Ongoing (interventions implemented as appropriate)    Goal: Interprofessional Rounds/Family Conf  Outcome: Ongoing (interventions implemented as appropriate)      Problem: Fall Risk (Adult)  Goal: Absence of Fall  Outcome: Ongoing (interventions implemented as appropriate)      Problem: Skin Injury Risk (Adult)  Goal: Skin Health and Integrity  Outcome: Ongoing (interventions implemented as appropriate)      Problem: Liver Failure, Acute/Chronic (Adult)  Goal: Signs and Symptoms of Listed Potential Problems Will be Absent, Minimized or Managed (Liver Failure, Acute/Chronic)  Outcome: Ongoing (interventions implemented as appropriate)

## 2018-11-24 NOTE — PROGRESS NOTES
Progress Note  Allen Hercules MD  Hospitalist    Date of visit: 11/24/2018     LOS: 26 days   Patient Care Team:  Reddy Grant MD as PCP - General (Family Medicine)  Tushar Becerril DO as Consulting Physician (Gastroenterology)    Chief Complaint: nausea, vomiting, abdominal discomfort    Subjective     Interval History:     Patient Complaints: nausea / vomiting - some better. Still very weak, still short of breath.    History taken from: patient    Medication Review:   Current Facility-Administered Medications   Medication Dose Route Frequency Provider Last Rate Last Dose   • albumin human 5 % bottle 25 g  25 g Intravenous Q12H Solomon Gomez PA 0 mL/hr at 11/11/18 2000 25 g at 11/24/18 0629   • amLODIPine (NORVASC) tablet 5 mg  5 mg Oral Q24H Allen Hercules MD   5 mg at 11/24/18 0947   • furosemide (LASIX) injection 20 mg  20 mg Intravenous Q12H Allen Hercules MD   20 mg at 11/24/18 0947   • hydrALAZINE (APRESOLINE) tablet 10 mg  10 mg Oral Q8H Allen Hercules MD   10 mg at 11/23/18 2203   • HYDROmorphone (DILAUDID) injection 0.25 mg  0.25 mg Intravenous Q3H PRN Allen Hercules MD       • ipratropium-albuterol (DUO-NEB) nebulizer solution 3 mL  3 mL Nebulization Q4H PRN Allen Hercules MD   3 mL at 11/23/18 1110   • lactulose (CHRONULAC) 10 GM/15ML solution 30 g  30 g Oral TID Eduar Cisneros MD   30 g at 11/24/18 0947   • magic butt ointment   Topical BID Armando Richard MD       • Magnesium Sulfate 2 gram Bolus, followed by 8 gram infusion (total Mg dose 10 grams)- Mg less than or equal to 1mg/dL  2 g Intravenous PRN Solomon Gomez PA        Or   • Magnesium Sulfate 2 gram / 50mL Infusion (GIVE X 3 BAGS TO EQUAL 6GM TOTAL DOSE) - Mg 1.1 - 1.5 mg/dl  2 g Intravenous PRN Solomon Gomez PA        Or   • Magnesium Sulfate 4 gram infusion- Mg 1.6-1.9 mg/dL  4 g Intravenous PRN Solomon Gomez PA 25 mL/hr at 11/07/18 2157 4 g at 11/07/18 2157   • naloxone (NARCAN) injection 0.4 mg  0.4 mg  Intravenous Q5 Min PRN Eduar Cisneros MD       • nicotine (NICODERM CQ) 21 MG/24HR patch 1 patch  1 patch Transdermal Q24H Armando Richard MD   1 patch at 11/24/18 0949   • ondansetron (ZOFRAN) injection 4 mg  4 mg Intravenous Q6H PRN Eduar Cisneros MD   4 mg at 11/19/18 0901   • oxyCODONE (ROXICODONE) immediate release tablet 5 mg  5 mg Oral Q6H PRN Allen Hercules MD   5 mg at 11/24/18 0454   • pantoprazole (PROTONIX) EC tablet 40 mg  40 mg Oral Q AM Allen Hercules MD   40 mg at 11/23/18 0544   • potassium chloride 10 mEq in 100 mL IVPB  10 mEq Intravenous Q1H PRN Solomon Gomez  mL/hr at 11/09/18 0801 10 mEq at 11/09/18 0801   • promethazine (PHENERGAN) injection 12.5 mg  12.5 mg Intravenous Q6H PRN Allen Hercules MD   12.5 mg at 11/20/18 1804   • rifaximin (XIFAXAN) tablet 275 mg  275 mg Oral Q12H Eduar Cisneros MD   275 mg at 11/24/18 0947   • simethicone (MYLICON) chewable tablet 80 mg  80 mg Oral Q6H PRN Eduar Cisneros MD   80 mg at 11/16/18 1309   • sodium chloride (OCEAN) nasal spray 1 spray  1 spray Each Nare PRN Beau Celeste MD       • sodium chloride 0.9 % flush 10 mL  10 mL Intravenous Q12H Ivnh, Anderson T, DO   10 mL at 11/24/18 0950   • sodium chloride 0.9 % flush 10 mL  10 mL Intravenous Q12H Vinh, Anderson T, DO   10 mL at 11/24/18 0950   • sodium chloride 0.9 % flush 10 mL  10 mL Intravenous PRN Vinh, Anderson T, DO   10 mL at 11/21/18 0918   • sodium chloride 0.9 % flush 20 mL  20 mL Intravenous PRN Vinh Anderson T, DO   20 mL at 11/24/18 0949   • sodium chloride 0.9 % flush 3 mL  3 mL Intravenous Q12H Eduar Cisneros MD   3 mL at 11/24/18 0950   • sodium chloride 0.9 % flush 3-10 mL  3-10 mL Intravenous PRN Eduar Cisneros MD   10 mL at 11/13/18 0637       Review of Systems:   Review of Systems   Constitutional: Positive for fatigue. Negative for fever.   Respiratory: Positive for cough and shortness of breath. Negative for wheezing.     Cardiovascular: Negative for chest pain and leg swelling.   Gastrointestinal: Positive for abdominal distention and nausea. Negative for abdominal pain, diarrhea and vomiting.   Genitourinary: Negative for enuresis, frequency, hematuria, penile swelling and urgency.   Musculoskeletal: Negative for arthralgias and back pain.   Skin: Positive for pallor. Negative for color change and rash.   Neurological: Positive for weakness. Negative for syncope, facial asymmetry and light-headedness.   Psychiatric/Behavioral: Negative for agitation, behavioral problems and confusion.   All other systems reviewed and are negative.      Objective     Vital Signs  Temp:  [97.3 °F (36.3 °C)-98.5 °F (36.9 °C)] 98.5 °F (36.9 °C)  Heart Rate:  [] 102  Resp:  [18-22] 18  BP: (120-160)/(81-90) 138/84    Physical Exam:  Physical Exam   Constitutional: He is oriented to person, place, and time. He appears ill. No distress.   Eyes: EOM are normal. Pupils are equal, round, and reactive to light. Scleral icterus is present.   Neck: Normal range of motion. Neck supple.   Cardiovascular: Normal rate and regular rhythm.   Pulmonary/Chest: Effort normal and breath sounds normal. No respiratory distress. He has no wheezes.   Abdominal: Soft. He exhibits distension. There is no tenderness.   Musculoskeletal:   L AKA   Neurological: He is alert and oriented to person, place, and time. He displays normal reflexes. No cranial nerve deficit. Coordination normal.   Skin: Skin is warm and dry. There is pallor.   Psychiatric: He has a normal mood and affect. His behavior is normal.        Results Review:    Lab Results (last 24 hours)     Procedure Component Value Units Date/Time    Protime-INR [[id number]]  (Abnormal) Collected:  11/24/18 0534    Specimen:  Blood Updated:  11/24/18 0631     Protime 29.5 Seconds      INR 2.97    Narrative:       Therapeutic range for most indications is 2.0-3.0 INR,  or 2.5-3.5 for mechanical heart valves.    CBC &  Differential [[id number]] Collected:  11/24/18 0534    Specimen:  Blood Updated:  11/24/18 0620    Narrative:       The following orders were created for panel order CBC & Differential.  Procedure                               Abnormality         Status                     ---------                               -----------         ------                     Scan Slide[[id number]]                                                                  CBC Auto Differential[[id number]]        Abnormal            Final result                 Please view results for these tests on the individual orders.    CBC Auto Differential [[id number]]  (Abnormal) Collected:  11/24/18 0534    Specimen:  Blood Updated:  11/24/18 0620     WBC 4.79 10*3/mm3      RBC 2.12 10*6/mm3      Hemoglobin 7.2 g/dL      Hematocrit 20.3 %      MCV 95.8 fL      MCH 34.0 pg      MCHC 35.5 g/dL      RDW 19.5 %      RDW-SD 67.7 fl      MPV 11.0 fL      Platelets 16 10*3/mm3      Neutrophil % 63.6 %      Lymphocyte % 20.0 %      Monocyte % 8.1 %      Eosinophil % 7.3 %      Basophil % 0.8 %      Immature Grans % 0.2 %      Neutrophils, Absolute 3.04 10*3/mm3      Lymphocytes, Absolute 0.96 10*3/mm3      Monocytes, Absolute 0.39 10*3/mm3      Eosinophils, Absolute 0.35 10*3/mm3      Basophils, Absolute 0.04 10*3/mm3      Immature Grans, Absolute 0.01 10*3/mm3      nRBC 0.0 /100 WBC     Ammonia [[id number]]  (Abnormal) Collected:  11/24/18 0534    Specimen:  Blood Updated:  11/24/18 0618     Ammonia 40 umol/L     Basic Metabolic Panel [[id number]]  (Abnormal) Collected:  11/24/18 0534    Specimen:  Blood Updated:  11/24/18 0618     Glucose 110 mg/dL      BUN 55 mg/dL      Creatinine 1.61 mg/dL      Sodium 134 mmol/L      Potassium 3.7 mmol/L      Chloride 90 mmol/L      CO2 26.0 mmol/L      Calcium 10.0 mg/dL      eGFR Non African Amer 45 mL/min/1.73      BUN/Creatinine Ratio 34.2     Anion Gap 18.0 mmol/L     AFB Culture - Aspirate, Kidney, Left [[id number]]  Collected:  11/09/18 1708    Specimen:  Aspirate from Kidney, Left Updated:  11/23/18 1745     AFB Culture No AFB isolated at 2 weeks     AFB Stain No acid fast bacilli seen on concentrated smear          Imaging Results (last 24 hours)     Procedure Component Value Units Date/Time    US Abdomen Limited [[id number]] Collected:  11/24/18 0845     Updated:  11/24/18 1015    Narrative:         PROCEDURE: US ABDOMEN LIMITED    TECHNIQUE: Limited scanning of the abdomen to assess for ascites.    COMPARISON: None    HISTORY: worsening ascites, R41.82 Altered mental status,  unspecified K72.90 Hepatic failure, unspecified without coma  Z74.09 Other reduced mobility Z74.09 Other reduced mobility    FINDINGS  There is a small to moderate amount of ascites in the abdomen.  The liver appears cirrhotic.      Impression:       CONCLUSION:   Small to moderate amount of ascites in the abdomen.    Electronically signed by:  Jose Boudreaux MD  11/24/2018 10:14 AM  CST Workstation: [id number]          Assessment/Plan       Hepatic encephalopathy (CMS/HCC)    Liver cirrhosis (CMS/HCC)    Liver failure (CMS/HCC)    Thrombocytopenia (CMS/HCC)    Chronic hepatitis C (CMS/HCC)    Chronic anemia    CKD (chronic kidney disease) stage 3, GFR 30-59 ml/min (CMS/HCC)    Perinephric abscess    Nausea / vomiting - better - continue with Lasix IV 20 mg BID; the abdominal US mentions a small-to-moderate amount of ascites.    His hemoglobin is now 7., but his platelet count dropped to 16,000. Continue other medications as listed.  Is off Zyvox for the last few days.    He refused hospice.  We are still waiting for nursing home placement.  His overall prognosis is very poor.    Allen Hercules MD  11/24/18  10:40 AM

## 2018-11-24 NOTE — SIGNIFICANT NOTE
11/24/18 1015   Rehab Treatment   Discipline physical therapy assistant   Reason Treatment Not Performed patient/family declined treatment  (per HEARD, pt defers all therapy today, pt w/ uncontrolled pain this date)

## 2018-11-24 NOTE — SIGNIFICANT NOTE
11/24/18 1327   Rehab Treatment   Discipline occupational therapy assistant   Reason Treatment Not Performed patient/family declined treatment, not feeling well  (Pt state he is unable to do any therapy this date )

## 2018-11-24 NOTE — PROGRESS NOTES
"   LOS: 26 days   Patient Care Team:  Reddy Grant MD as PCP - General (Family Medicine)  Tushar Becerril DO as Consulting Physician (Gastroenterology)    Subjective     Subjective:  Symptoms:  No diarrhea.  (No hematuria, complains of abdominal pain. +BM).    Diet:  No vomiting.    Pain:  He reports pain is unchanged.        History taken from: patient chart RN    Objective     Vital Signs  Temp:  [97.3 °F (36.3 °C)-98.5 °F (36.9 °C)] 98.5 °F (36.9 °C)  Heart Rate:  [] 102  Resp:  [18-22] 18  BP: (111-160)/(69-90) 138/84    Objective:  General Appearance:  In no acute distress.    Vital signs: (most recent): Blood pressure 138/84, pulse 102, temperature 98.5 °F (36.9 °C), resp. rate 18, height 182.9 cm (72.01\"), weight 94.7 kg (208 lb 11.2 oz), SpO2 91 %.  Vital signs are normal.  No fever.    Output: Producing urine (Urine clear yellow) and producing stool.    Lungs:  Normal effort and normal respiratory rate.  Breath sounds clear to auscultation.  He is not in respiratory distress.  No stridor.  No decreased breath sounds.    Heart: Normal rate.  Regular rhythm.  S1 normal and S2 normal.  No murmur, gallop or friction rub.   Chest: Symmetric chest wall expansion.   Abdomen: Abdomen is soft and distended.  There are signs of ascites. Bowel sounds are normal.   There is generalized tenderness.     Extremities: There is no dependent edema.    Neurological: Patient is alert and oriented to person, place and time.  GCS score is 15.    Pupils:  Pupils are equal, round, and reactive to light.    Skin:  Warm and dry.  There is ecchymosis.  No rash or cyanosis.           Results Review:    Lab Results (last 24 hours)     Procedure Component Value Units Date/Time    Protime-INR [[id number]]  (Abnormal) Collected:  11/24/18 0534    Specimen:  Blood Updated:  11/24/18 0631     Protime 29.5 Seconds      INR 2.97    Narrative:       Therapeutic range for most indications is 2.0-3.0 INR,  or 2.5-3.5 for mechanical " heart valves.    CBC & Differential [[id number]] Collected:  11/24/18 0534    Specimen:  Blood Updated:  11/24/18 0620    Narrative:       The following orders were created for panel order CBC & Differential.  Procedure                               Abnormality         Status                     ---------                               -----------         ------                     Scan Slide[[id number]]                                                                  CBC Auto Differential[[id number]]        Abnormal            Final result                 Please view results for these tests on the individual orders.    CBC Auto Differential [[id number]]  (Abnormal) Collected:  11/24/18 0534    Specimen:  Blood Updated:  11/24/18 0620     WBC 4.79 10*3/mm3      RBC 2.12 10*6/mm3      Hemoglobin 7.2 g/dL      Hematocrit 20.3 %      MCV 95.8 fL      MCH 34.0 pg      MCHC 35.5 g/dL      RDW 19.5 %      RDW-SD 67.7 fl      MPV 11.0 fL      Platelets 16 10*3/mm3      Neutrophil % 63.6 %      Lymphocyte % 20.0 %      Monocyte % 8.1 %      Eosinophil % 7.3 %      Basophil % 0.8 %      Immature Grans % 0.2 %      Neutrophils, Absolute 3.04 10*3/mm3      Lymphocytes, Absolute 0.96 10*3/mm3      Monocytes, Absolute 0.39 10*3/mm3      Eosinophils, Absolute 0.35 10*3/mm3      Basophils, Absolute 0.04 10*3/mm3      Immature Grans, Absolute 0.01 10*3/mm3      nRBC 0.0 /100 WBC     Ammonia [[id number]]  (Abnormal) Collected:  11/24/18 0534    Specimen:  Blood Updated:  11/24/18 0618     Ammonia 40 umol/L     Basic Metabolic Panel [[id number]]  (Abnormal) Collected:  11/24/18 0534    Specimen:  Blood Updated:  11/24/18 0618     Glucose 110 mg/dL      BUN 55 mg/dL      Creatinine 1.61 mg/dL      Sodium 134 mmol/L      Potassium 3.7 mmol/L      Chloride 90 mmol/L      CO2 26.0 mmol/L      Calcium 10.0 mg/dL      eGFR Non African Amer 45 mL/min/1.73      BUN/Creatinine Ratio 34.2     Anion Gap 18.0 mmol/L     AFB Culture - Aspirate, Kidney,  Left [[id number]] Collected:  11/09/18 1708    Specimen:  Aspirate from Kidney, Left Updated:  11/23/18 1745     AFB Culture No AFB isolated at 2 weeks     AFB Stain No acid fast bacilli seen on concentrated smear         Imaging Results (last 24 hours)     Procedure Component Value Units Date/Time    CT Abdomen Pelvis With Contrast [[id number]] Collected:  11/05/18 1815     Updated:  11/05/18 1857    Narrative:         EXAMINATION:  Computed Tomography           REGION:    Abdomen / Pelvis                     INDICATION:   Repeat to evaluate hematoma/abscess of kidney per  urology, R41.82 Altered mental status, unspecified K72.90 Hepatic  failure, unspecified without coma Z74.09 Other reduced mobility  Z74.09 Other reduced mobility    HISTORY:  LISA. IMAGING:    CT A/P 11/2/18            TECHNIQUE:      - reconstructions:    axial, coronal, sagittal         - contrast:      oral:  No ;   intravenous: Isovue 300, 100 mL  This exam was performed according to the departmental  dose-optimization program which includes automated exposure  control, adjustment of the mA and/or kV according to patient size  and/or use of iterative reconstruction technique.           COMMENTS:            - - - CT ABDOMEN - - -          THORAX (INFERIOR):      - LUNG BASES:  clear      - PLEURA:    no fluid or mass      - HEART:    normal size, no pericardial fluid     - MISC:      n/a          ABDOMEN:     - LIVER:    Diminished size with nodular contour containing a  tips shunt   - GB:      Cholelithiasis without evidence of wall thickening.    - CBD:      grossly negative   - SPLEEN:    Upper normal/mildly enlarged   - PANCREAS:    normal in size, contour, no focal mass    - VISCERA:    normal caliber, no wall thickening     - MESENTERY:  no mesenteric mass   - CAVITY:    Small amount of free fluid   - BODY WALL:  wnl   - OSSEOUS:    grossly negative for age   - MISC:                 Extensive gastric varices                       RETROPERITONEUM:   - KIDNEYS:    Renal examination again displays overall normal  size and symmetric nephrograms. Again noted is a left pararenal  enhancing focal fluid collection containing air which is  unchanged in size measuring 2.5 x 3.6 x 2.0 cm   - URETERS:    normal course, caliber   - ADRENALS:    normal size, contour   - MISC:      no sig retroperitoneal adenopathy or mass   - VASCULAR:    aorta / iliacs: wnl for age     - - - CT PELVIS - - -      - VISCERA:    normal caliber small/large bowel, no focal  thickening/mass        - APPENDIX:          wnl   - MESENTERY:  no mass   - VASCULAR:    wnl for age   - CAVITY:    Moderate amount of free fluid   - BLADDER:    unremarkable   - OSSEOUS:    Status post left hip repair    - MISC:     .       Impression:        CONCLUSION:  1. Unchanged left pararenal focal fluid collection with  peripheral enhancement containing air, likely representing an  abscess.  2. Multiple additional findings demonstrating the presence of  cirrhosis status post TIPS shunt, with spleen size at the upper  limits of normal/mildly enlarged, and extensive gastric varices,  as above, unchanged.        Electronically signed by:  PRISCILLA Bustillo MD  11/5/2018 6:56  PM CST Workstation: [id number]           I reviewed the patient's new clinical results.  I reviewed the patient's new imaging results and agree with the interpretation.  I reviewed the patient's other test results and agree with the interpretation      Assessment/Plan       Hepatic encephalopathy (CMS/HCC)    Liver cirrhosis (CMS/HCC)    Thrombocytopenia (CMS/HCC)    CKD (chronic kidney disease) stage 3, GFR 30-59 ml/min (CMS/HCC)    Chronic hepatitis C (CMS/HCC)    Perinephric abscess    Chronic anemia      Assessment & Plan    1. Difficulty urinating with hematuria, likely related to UTI hemorrhagic cystitis without clots-->hematuria resolved  2. Perinephric abscess status post CT guided drainage of left perinephric abscess  11/9/18-->Enterococcus faecium and candida  -12 days of antibiotic therapy complete (vancomycin and Zyvox), Day #9 (Diflucan)  -Vancomycin 11/11/18 to 11/12/18 then Zyvox 11/13/18 to 11/14/18, Vanc again 11/15/18 to 11/19/18, Zyvox restarted 11/20/18 to 11/22/18    -Estimated Creatinine Clearance: 62.6 mL/min (A) (by C-G formula based on SCr of 1.61 mg/dL (H)).  -Cr 1.61  -WBC 4.79  -UA + 10/30/18  -Hgb/Hct 7.2/20.3 (2 units of PRBCs 11/21/18)  -Platelets 31,000-->26,000-->20,000-->16,000  -INR 2.66-->3.38-->2.40-->3.36-->3.1-->2.97  -Renal ultrasound 10/30/18 limited exam, especially of the left kidney, due to the presence of bowel gas and the patient's body habitus. No hydronephrosis is visualized. Unremarkable bladder.   -11/2/18 CT abdomen/pelvis LEFT 2.3 x 3.7 x 2.2 cm fluid and air collection infected subcapsular hematoma versus abscess.  -11/5/18 CT abdomen/pelvis showed LEFT renal abscess unchanged  -10/30/18 Urine culture NEGATIVE, UA grossly positive.   -abdominal ultrasound today, pending    Plan:    Closely monitor labs and for bleeding.   Ultrasound abdomen ordered, pending.     KALEB Alexander  11/24/18  9:36 AM

## 2018-11-25 PROBLEM — J96.01 ACUTE RESPIRATORY FAILURE WITH HYPOXIA (HCC): Chronic | Status: ACTIVE | Noted: 2018-01-01

## 2018-11-25 NOTE — PROGRESS NOTES
Rapid Response Note:    11/25/2018    5:00 PM    Patient is a 53 y.o. male admitted for worsening hepatic encephalopathy that became more agitated and hypoxic this afternoon and had to be transferred to the ICU due to:      Acute respiratory failure with hypoxia (CMS/HCC)    Liver cirrhosis (CMS/HCC)    Liver failure (CMS/HCC)    Hepatic encephalopathy (CMS/HCC)    Thrombocytopenia (CMS/HCC)    Chronic hepatitis C (CMS/HCC)    Chronic anemia    CKD (chronic kidney disease) stage 3, GFR 30-59 ml/min (CMS/HCC)    Perinephric abscess      Assessment/Plan     His portable CXRay showed pulmonary vascular congestion, his Hgb devonte to 7.6 after 1 unit of PRBCs and his ABG had a pH of 7.41, pCO2 of 33 and pO2 of 95 on supplemental Oxygen.    We'll continue with the nebulized treatments, IV diuresis, supplemental O2 and supportive care. He can return to the floor if his condition remains stable.    Allen Hercules MD  11/25/18  6:00 PM

## 2018-11-25 NOTE — PLAN OF CARE
Problem: Patient Care Overview  Goal: Plan of Care Review  Outcome: Ongoing (interventions implemented as appropriate)   11/25/18 0544   Coping/Psychosocial   Plan of Care Reviewed With patient   Plan of Care Review   Progress no change   OTHER   Outcome Summary Pt educated on the importance of turning. Pt still refused to turn most of the night. Pt also removes O2 frequently. Pt has had 2 large BM.

## 2018-11-25 NOTE — SIGNIFICANT NOTE
11/25/18 0802   Rehab Treatment   Discipline physical therapy assistant   Reason Treatment Not Performed other (see comments)  (pt w/ low H & H, low platelets, no tx this date)

## 2018-11-25 NOTE — PROGRESS NOTES
"   LOS: 27 days   Patient Care Team:  Reddy Grant MD as PCP - General (Family Medicine)  Tushar Becerril DO as Consulting Physician (Gastroenterology)    Subjective     Subjective:  Symptoms:  (No hematuria. Generalized bruising. Abdominal distention with ascites, tenderness to palpation. Afebrile. ).        History taken from: patient chart RN    Objective     Vital Signs  Temp:  [97.9 °F (36.6 °C)-98.6 °F (37 °C)] 98.3 °F (36.8 °C)  Heart Rate:  [] 105  Resp:  [18] 18  BP: (103-140)/(73-89) 110/80    Objective:  General Appearance:  In no acute distress.    Vital signs: (most recent): Blood pressure 110/80, pulse 105, temperature 98.3 °F (36.8 °C), temperature source Temporal, resp. rate 18, height 182.9 cm (72.01\"), weight 94.1 kg (207 lb 6.4 oz), SpO2 (!) 88 %.  No fever.    Output: Producing urine (Urine clear yellow).    Lungs:  Normal effort and normal respiratory rate.  Breath sounds clear to auscultation.  He is not in respiratory distress.  No stridor.  No decreased breath sounds.    Heart: Tachycardia.  Regular rhythm.  S1 normal and S2 normal.  No murmur, gallop or friction rub.   Chest: Symmetric chest wall expansion.   Abdomen: Abdomen is soft and distended.  There are signs of ascites. Bowel sounds are normal.   There is generalized tenderness.     Extremities: There is no dependent edema.    Neurological: Patient is alert and oriented to person, place and time.  GCS score is 15.    Pupils:  Pupils are equal, round, and reactive to light.    Skin:  Warm and dry.  There is ecchymosis.  No rash or cyanosis.           Results Review:    Lab Results (last 24 hours)     Procedure Component Value Units Date/Time    Extra Tubes [[id number]] Collected:  11/25/18 0650    Specimen:  Blood, Venous Line Updated:  11/25/18 0800    Narrative:       The following orders were created for panel order Extra Tubes.  Procedure                               Abnormality         Status                   "   ---------                               -----------         ------                     Gold Top - SST[[id number]]                                   Final result                 Please view results for these tests on the individual orders.    [facility] - SST [[id number]] Collected:  11/25/18 0650    Specimen:  Blood Updated:  11/25/18 0800     Extra Tube Hold for add-ons.     Comment: Auto resulted.       CBC & Differential [[id number]] Collected:  11/25/18 0650    Specimen:  Blood Updated:  11/25/18 0724    Narrative:       The following orders were created for panel order CBC & Differential.  Procedure                               Abnormality         Status                     ---------                               -----------         ------                     Scan Slide[[id number]]                                                                  CBC Auto Differential[[id number]]        Abnormal            Final result                 Please view results for these tests on the individual orders.    CBC Auto Differential [[id number]]  (Abnormal) Collected:  11/25/18 0650    Specimen:  Blood Updated:  11/25/18 0724     WBC 5.12 10*3/mm3      RBC 2.00 10*6/mm3      Hemoglobin 6.8 g/dL      Hematocrit 19.4 %      MCV 97.0 fL      MCH 34.0 pg      MCHC 35.1 g/dL      RDW 19.3 %      RDW-SD 67.0 fl      MPV 11.5 fL      Platelets 15 10*3/mm3      Neutrophil % 73.9 %      Lymphocyte % 13.7 %      Monocyte % 5.7 %      Eosinophil % 4.9 %      Basophil % 1.6 %      Immature Grans % 0.2 %      Neutrophils, Absolute 3.79 10*3/mm3      Lymphocytes, Absolute 0.70 10*3/mm3      Monocytes, Absolute 0.29 10*3/mm3      Eosinophils, Absolute 0.25 10*3/mm3      Basophils, Absolute 0.08 10*3/mm3      Immature Grans, Absolute 0.01 10*3/mm3     Basic Metabolic Panel [[id number]]  (Abnormal) Collected:  11/25/18 0650    Specimen:  Blood Updated:  11/25/18 0719     Glucose 84 mg/dL      BUN 54 mg/dL      Creatinine 1.69 mg/dL      Sodium 136  mmol/L      Potassium 4.1 mmol/L      Chloride 91 mmol/L      CO2 24.0 mmol/L      Calcium 10.3 mg/dL      eGFR Non African Amer 42 mL/min/1.73      BUN/Creatinine Ratio 32.0     Anion Gap 21.0 mmol/L     Ammonia [[id number]]  (Normal) Collected:  11/25/18 0650    Specimen:  Blood Updated:  11/25/18 0718     Ammonia 24 umol/L     Protime-INR [[id number]]  (Abnormal) Collected:  11/25/18 0650    Specimen:  Blood Updated:  11/25/18 0712     Protime 29.7 Seconds      INR 3.00    Narrative:       Therapeutic range for most indications is 2.0-3.0 INR,  or 2.5-3.5 for mechanical heart valves.         Imaging Results (last 24 hours)     Procedure Component Value Units Date/Time    CT Abdomen Pelvis With Contrast [[id number]] Collected:  11/05/18 1815     Updated:  11/05/18 1857    Narrative:         EXAMINATION:  Computed Tomography           REGION:    Abdomen / Pelvis                     INDICATION:   Repeat to evaluate hematoma/abscess of kidney per  urology, R41.82 Altered mental status, unspecified K72.90 Hepatic  failure, unspecified without coma Z74.09 Other reduced mobility  Z74.09 Other reduced mobility    HISTORY:  LISA. IMAGING:    CT A/P 11/2/18            TECHNIQUE:      - reconstructions:    axial, coronal, sagittal         - contrast:      oral:  No ;   intravenous: Isovue 300, 100 mL  This exam was performed according to the departmental  dose-optimization program which includes automated exposure  control, adjustment of the mA and/or kV according to patient size  and/or use of iterative reconstruction technique.           COMMENTS:            - - - CT ABDOMEN - - -          THORAX (INFERIOR):      - LUNG BASES:  clear      - PLEURA:    no fluid or mass      - HEART:    normal size, no pericardial fluid     - MISC:      n/a          ABDOMEN:     - LIVER:    Diminished size with nodular contour containing a  tips shunt   - GB:      Cholelithiasis without evidence of wall thickening.    - CBD:      grossly  negative   - SPLEEN:    Upper normal/mildly enlarged   - PANCREAS:    normal in size, contour, no focal mass    - VISCERA:    normal caliber, no wall thickening     - MESENTERY:  no mesenteric mass   - CAVITY:    Small amount of free fluid   - BODY WALL:  wnl   - OSSEOUS:    grossly negative for age   - MISC:                 Extensive gastric varices                      RETROPERITONEUM:   - KIDNEYS:    Renal examination again displays overall normal  size and symmetric nephrograms. Again noted is a left pararenal  enhancing focal fluid collection containing air which is  unchanged in size measuring 2.5 x 3.6 x 2.0 cm   - URETERS:    normal course, caliber   - ADRENALS:    normal size, contour   - MISC:      no sig retroperitoneal adenopathy or mass   - VASCULAR:    aorta / iliacs: wnl for age     - - - CT PELVIS - - -      - VISCERA:    normal caliber small/large bowel, no focal  thickening/mass        - APPENDIX:          wnl   - MESENTERY:  no mass   - VASCULAR:    wnl for age   - CAVITY:    Moderate amount of free fluid   - BLADDER:    unremarkable   - OSSEOUS:    Status post left hip repair    - MISC:     .       Impression:        CONCLUSION:  1. Unchanged left pararenal focal fluid collection with  peripheral enhancement containing air, likely representing an  abscess.  2. Multiple additional findings demonstrating the presence of  cirrhosis status post TIPS shunt, with spleen size at the upper  limits of normal/mildly enlarged, and extensive gastric varices,  as above, unchanged.        Electronically signed by:  PRISCILLA Bustillo MD  11/5/2018 6:56  PM CST Workstation: [id number]         Imaging Results (last 72 hours)     Procedure Component Value Units Date/Time    US Abdomen Limited [[id number]] Collected:  11/24/18 0845     Updated:  11/24/18 1015    Narrative:         PROCEDURE: US ABDOMEN LIMITED    TECHNIQUE: Limited scanning of the abdomen to assess for ascites.    COMPARISON: None    HISTORY:  worsening ascites, R41.82 Altered mental status,  unspecified K72.90 Hepatic failure, unspecified without coma  Z74.09 Other reduced mobility Z74.09 Other reduced mobility    FINDINGS  There is a small to moderate amount of ascites in the abdomen.  The liver appears cirrhotic.      Impression:       CONCLUSION:   Small to moderate amount of ascites in the abdomen.    Electronically signed by:  Jose Boudreaux MD  11/24/2018 10:14 AM  CST Workstation: [id number]          I reviewed the patient's new clinical results.  I reviewed the patient's new imaging results and agree with the interpretation.  I reviewed the patient's other test results and agree with the interpretation      Assessment/Plan       Hepatic encephalopathy (CMS/HCC)    Liver cirrhosis (CMS/HCC)    Liver failure (CMS/HCC)    Thrombocytopenia (CMS/HCC)    CKD (chronic kidney disease) stage 3, GFR 30-59 ml/min (CMS/HCC)    Chronic hepatitis C (CMS/HCC)    Perinephric abscess    Chronic anemia      Assessment & Plan    1. Difficulty urinating with hematuria, likely related to UTI hemorrhagic cystitis without clots-->hematuria resolved  2. Perinephric abscess status post CT guided drainage of left perinephric abscess 11/9/18-->Enterococcus faecium and candida  -12 days of antibiotic therapy complete (vancomycin and Zyvox) and 10 days of (Diflucan), COMPLETE  -Vancomycin 11/11/18 to 11/12/18 then Zyvox 11/13/18 to 11/14/18, Vanc again 11/15/18 to 11/19/18, Zyvox restarted 11/20/18 to 11/22/18    -Estimated Creatinine Clearance: 59.5 mL/min (A) (by C-G formula based on SCr of 1.69 mg/dL (H)).  -WBC 4.79-->5.12  -UA + 10/30/18  -Hgb/Hct 7.2/20.3-->6.8/19.4 (2 units of PRBCs 11/21/18), PRBCs ordered today 11/25/18  -Platelets 31,000-->26,000-->20,000-->16,000-->15,000  -INR 2.66-->3.38-->2.40-->3.36-->3.1-->2.97-->3.00  -Renal ultrasound 10/30/18 limited exam, especially of the left kidney, due to the presence of bowel gas and the patient's body habitus. No  hydronephrosis is visualized. Unremarkable bladder.   -11/2/18 CT abdomen/pelvis LEFT 2.3 x 3.7 x 2.2 cm fluid and air collection infected subcapsular hematoma versus abscess.  -11/5/18 CT abdomen/pelvis showed LEFT renal abscess unchanged  -10/30/18 Urine culture NEGATIVE, UA grossly positive.   -abdominal ultrasound 11/24/18, ascites    Plan:    Closely monitor labs and for bleeding.    PRBCs ordered.     KALEB Alexander  11/25/18  11:11 AM

## 2018-11-25 NOTE — SIGNIFICANT NOTE
11/25/18 0743   Rehab Treatment   Discipline occupational therapy assistant   Reason Treatment Not Performed other (see comments)  (Pt H&H 6.8/10,4, Platelets 15, no tx this date )

## 2018-11-25 NOTE — PROGRESS NOTES
Progress Note  Allen Hercules MD  Hospitalist    Date of visit: 11/25/2018     LOS: 27 days   Patient Care Team:  Reddy Grant MD as PCP - General (Family Medicine)  Tushar Becerril DO as Consulting Physician (Gastroenterology)    Chief Complaint: nausea, vomiting, abdominal discomfort    Subjective     Interval History:     Patient Complaints: nausea / vomiting - some better. Still very weak, still short of breath.    History taken from: patient    Medication Review:   Current Facility-Administered Medications   Medication Dose Route Frequency Provider Last Rate Last Dose   • albumin human 5 % bottle 25 g  25 g Intravenous Q12H Solomon Gomez PA   Stopped at 11/25/18 0846   • amLODIPine (NORVASC) tablet 5 mg  5 mg Oral Q24H Allen Hercules MD   5 mg at 11/24/18 0947   • furosemide (LASIX) injection 20 mg  20 mg Intravenous Q12H Allen Hercules MD   20 mg at 11/25/18 0917   • HYDROmorphone (DILAUDID) injection 1 mg  1 mg Intravenous Q2H PRN Allen Hercules MD       • ipratropium-albuterol (DUO-NEB) nebulizer solution 3 mL  3 mL Nebulization Q4H PRN Allen Hercules MD   3 mL at 11/25/18 1337   • lactulose (CHRONULAC) 10 GM/15ML solution 30 g  30 g Oral TID Eduar Cisneros MD   30 g at 11/24/18 0947   • magic butt ointment   Topical BID Armando Richard MD       • Magnesium Sulfate 2 gram Bolus, followed by 8 gram infusion (total Mg dose 10 grams)- Mg less than or equal to 1mg/dL  2 g Intravenous PRN Solomon Gomez PA        Or   • Magnesium Sulfate 2 gram / 50mL Infusion (GIVE X 3 BAGS TO EQUAL 6GM TOTAL DOSE) - Mg 1.1 - 1.5 mg/dl  2 g Intravenous PRN Solomon Gomez PA        Or   • Magnesium Sulfate 4 gram infusion- Mg 1.6-1.9 mg/dL  4 g Intravenous PRN Solomon Gomez PA 25 mL/hr at 11/07/18 2157 4 g at 11/07/18 2157   • naloxone (NARCAN) injection 0.4 mg  0.4 mg Intravenous Q5 Min PRN Eduar Cisneros MD       • nicotine (NICODERM CQ) 21 MG/24HR patch 1 patch  1 patch Transdermal Q24H  Armando Richard MD   1 patch at 11/25/18 1014   • ondansetron (ZOFRAN) injection 4 mg  4 mg Intravenous Q6H PRN Eduar Cisneros MD   4 mg at 11/19/18 0901   • oxyCODONE (ROXICODONE) immediate release tablet 5 mg  5 mg Oral Q6H PRN Allen Hercules MD   5 mg at 11/24/18 1938   • [START ON 11/26/2018] pantoprazole (PROTONIX) injection 40 mg  40 mg Intravenous Q AM Allen Hercules MD       • potassium chloride 10 mEq in 100 mL IVPB  10 mEq Intravenous Q1H PRN Solomon Gomez  mL/hr at 11/09/18 0801 10 mEq at 11/09/18 0801   • promethazine (PHENERGAN) injection 12.5 mg  12.5 mg Intravenous Q6H PRN Allen Hercules MD   12.5 mg at 11/25/18 0917   • rifaximin (XIFAXAN) tablet 275 mg  275 mg Oral Q12H Eduar Cisneros MD   275 mg at 11/25/18 1013   • simethicone (MYLICON) chewable tablet 80 mg  80 mg Oral Q6H PRN Eduar Cisneros MD   80 mg at 11/16/18 1309   • sodium chloride (OCEAN) nasal spray 1 spray  1 spray Each Nare PRN Beau Celeste MD       • sodium chloride 0.9 % flush 10 mL  10 mL Intravenous Q12H VinhAnderson T, DO   10 mL at 11/25/18 0918   • sodium chloride 0.9 % flush 10 mL  10 mL Intravenous Q12H Vinh Anderson T, DO   10 mL at 11/25/18 0918   • sodium chloride 0.9 % flush 10 mL  10 mL Intravenous PRN VinhAnderson T, DO   10 mL at 11/21/18 0918   • sodium chloride 0.9 % flush 20 mL  20 mL Intravenous PRN VinhGurpreety T, DO   20 mL at 11/24/18 0949   • sodium chloride 0.9 % flush 3 mL  3 mL Intravenous Q12H Eduar Cisneros MD   3 mL at 11/25/18 1034   • sodium chloride 0.9 % flush 3-10 mL  3-10 mL Intravenous PRN Eduar Cisneros MD   10 mL at 11/13/18 0637       Review of Systems:   Review of Systems   Constitutional: Positive for fatigue. Negative for fever.   Respiratory: Positive for cough and shortness of breath. Negative for wheezing.    Cardiovascular: Negative for chest pain and leg swelling.   Gastrointestinal: Positive for abdominal distention and nausea. Negative  for abdominal pain, diarrhea and vomiting.   Genitourinary: Negative for enuresis, frequency, hematuria, penile swelling and urgency.   Musculoskeletal: Negative for arthralgias and back pain.   Skin: Positive for pallor. Negative for color change and rash.   Neurological: Positive for weakness. Negative for syncope, facial asymmetry and light-headedness.   Psychiatric/Behavioral: Negative for agitation, behavioral problems and confusion.   All other systems reviewed and are negative.      Objective     Vital Signs  Temp:  [97.9 °F (36.6 °C)-98.7 °F (37.1 °C)] 98.6 °F (37 °C)  Heart Rate:  [] 102  Resp:  [18-20] 18  BP: (103-140)/(73-89) 140/88    Physical Exam:  Physical Exam   Constitutional: He is oriented to person, place, and time. He appears ill. No distress.   Eyes: EOM are normal. Pupils are equal, round, and reactive to light. Scleral icterus is present.   Neck: Normal range of motion. Neck supple.   Cardiovascular: Normal rate and regular rhythm.   Pulmonary/Chest: Effort normal and breath sounds normal. No respiratory distress. He has no wheezes.   Abdominal: Soft. He exhibits distension. There is no tenderness.   Musculoskeletal:   L AKA   Neurological: He is alert and oriented to person, place, and time. He displays normal reflexes. No cranial nerve deficit. Coordination normal.   Skin: Skin is warm and dry. There is pallor.   Psychiatric: He has a normal mood and affect. His behavior is normal.        Results Review:    Lab Results (last 24 hours)     Procedure Component Value Units Date/Time    Extra Tubes [[id number]] Collected:  11/25/18 0650    Specimen:  Blood, Venous Line Updated:  11/25/18 0800    Narrative:       The following orders were created for panel order Extra Tubes.  Procedure                               Abnormality         Status                     ---------                               -----------         ------                     [facility][[id number]]                                    Final result                 Please view results for these tests on the individual orders.    Gold Top - SST [[id number]] Collected:  11/25/18 0650    Specimen:  Blood Updated:  11/25/18 0800     Extra Tube Hold for add-ons.     Comment: Auto resulted.       CBC & Differential [[id number]] Collected:  11/25/18 0650    Specimen:  Blood Updated:  11/25/18 0724    Narrative:       The following orders were created for panel order CBC & Differential.  Procedure                               Abnormality         Status                     ---------                               -----------         ------                     Scan Slide[[id number]]                                                                  CBC Auto Differential[[id number]]        Abnormal            Final result                 Please view results for these tests on the individual orders.    CBC Auto Differential [[id number]]  (Abnormal) Collected:  11/25/18 0650    Specimen:  Blood Updated:  11/25/18 0724     WBC 5.12 10*3/mm3      RBC 2.00 10*6/mm3      Hemoglobin 6.8 g/dL      Hematocrit 19.4 %      MCV 97.0 fL      MCH 34.0 pg      MCHC 35.1 g/dL      RDW 19.3 %      RDW-SD 67.0 fl      MPV 11.5 fL      Platelets 15 10*3/mm3      Neutrophil % 73.9 %      Lymphocyte % 13.7 %      Monocyte % 5.7 %      Eosinophil % 4.9 %      Basophil % 1.6 %      Immature Grans % 0.2 %      Neutrophils, Absolute 3.79 10*3/mm3      Lymphocytes, Absolute 0.70 10*3/mm3      Monocytes, Absolute 0.29 10*3/mm3      Eosinophils, Absolute 0.25 10*3/mm3      Basophils, Absolute 0.08 10*3/mm3      Immature Grans, Absolute 0.01 10*3/mm3     Basic Metabolic Panel [[id number]]  (Abnormal) Collected:  11/25/18 0650    Specimen:  Blood Updated:  11/25/18 0719     Glucose 84 mg/dL      BUN 54 mg/dL      Creatinine 1.69 mg/dL      Sodium 136 mmol/L      Potassium 4.1 mmol/L      Chloride 91 mmol/L      CO2 24.0 mmol/L      Calcium 10.3 mg/dL      eGFR Non African Amer 42  mL/min/1.73      BUN/Creatinine Ratio 32.0     Anion Gap 21.0 mmol/L     Ammonia [[id number]]  (Normal) Collected:  11/25/18 0650    Specimen:  Blood Updated:  11/25/18 0718     Ammonia 24 umol/L     Protime-INR [[id number]]  (Abnormal) Collected:  11/25/18 0650    Specimen:  Blood Updated:  11/25/18 0712     Protime 29.7 Seconds      INR 3.00    Narrative:       Therapeutic range for most indications is 2.0-3.0 INR,  or 2.5-3.5 for mechanical heart valves.          Imaging Results (last 24 hours)     ** No results found for the last 24 hours. **          Assessment/Plan       Hepatic encephalopathy (CMS/HCC)    Liver cirrhosis (CMS/HCC)    Liver failure (CMS/HCC)    Thrombocytopenia (CMS/HCC)    Chronic hepatitis C (CMS/HCC)    Chronic anemia    CKD (chronic kidney disease) stage 3, GFR 30-59 ml/min (CMS/HCC)    Perinephric abscess    Nausea / vomiting, worse again today. Increase IV Phenergan to 25 mg every 6 hours as needed.    His hemoglobin is now 6.8, his platelet count dropped to 15,000.  With transfuse him again 2 units of packed red blood cells and start him on IV Protonix.    He refused hospice. He is still waiting for nursing home placement. His overall prognosis is very poor.    Allen Hercules MD  11/25/18  2:34 PM

## 2018-11-26 NOTE — PROGRESS NOTES
[facility] Medicine Services  INPATIENT PROGRESS NOTE    Length of Stay: 28  Date of Admission: 10/29/2018  Primary Care Physician: Reddy Grant MD    Subjective   Chief Complaint: Shortness of air.  HPI:  Patient is seen for follow-up today.  He has his history of end-stage liver disease and was transferred to ICU due to increasing agitation and respiratory distress.  He is feeling better and his mental status is back to baseline.  He remains on high flow supplemental oxygen of 9 L nasal prong and maintaining saturation of 91-92%    Review of Systems   Constitutional: Positive for activity change, appetite change and fatigue. Negative for chills, diaphoresis and fever.   HENT: Negative for trouble swallowing and voice change.    Eyes: Negative for photophobia and visual disturbance.   Respiratory: Positive for shortness of breath. Negative for cough, choking, chest tightness, wheezing and stridor.    Cardiovascular: Negative for chest pain, palpitations and leg swelling.   Gastrointestinal: Negative for abdominal distention, abdominal pain, blood in stool, constipation, diarrhea, nausea and vomiting.   Endocrine: Negative for cold intolerance, heat intolerance, polydipsia, polyphagia and polyuria.   Genitourinary: Negative for decreased urine volume, difficulty urinating, dysuria, enuresis, flank pain, frequency, hematuria and urgency.   Musculoskeletal: Negative for arthralgias, gait problem, myalgias, neck pain and neck stiffness.   Skin: Negative for pallor, rash and wound.   Neurological: Negative for dizziness, tremors, seizures, syncope, facial asymmetry, speech difficulty, weakness, light-headedness, numbness and headaches.   Hematological: Bruises/bleeds easily.   Psychiatric/Behavioral: Negative for agitation, behavioral problems and confusion.       Objective    Temp:  [97.2 °F (36.2 °C)-98.7 °F (37.1 °C)] 97.6 °F (36.4 °C)  Heart Rate:  [] 100  Resp:   [15-22] 16  BP: (121-152)/(63-97) 152/82    Physical Exam   Constitutional: He is oriented to person, place, and time. He appears well-developed and well-nourished. No distress.   HENT:   Head: Normocephalic and atraumatic.   Eyes: EOM are normal. Pupils are equal, round, and reactive to light. No scleral icterus.   Neck: Normal range of motion. Neck supple. No JVD present. No thyromegaly present.   Cardiovascular: Normal rate, regular rhythm and normal heart sounds. Exam reveals no gallop and no friction rub.   No murmur heard.  Pulmonary/Chest: Effort normal. He has decreased breath sounds. He has no wheezes. He has rhonchi. He has no rales. He exhibits no tenderness.   Abdominal: Soft. Bowel sounds are normal. He exhibits distension and ascites. He exhibits no mass. There is no tenderness. There is no rebound and no guarding.   Musculoskeletal: He exhibits deformity. He exhibits no edema or tenderness.   Neurological: He is alert and oriented to person, place, and time. No cranial nerve deficit. He exhibits normal muscle tone. Coordination normal.   Skin: Skin is warm and dry. No rash noted. He is not diaphoretic. No erythema. No pallor.   Psychiatric: He has a normal mood and affect. His behavior is normal. Judgment and thought content normal.   Nursing note and vitals reviewed.  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       Medication Review:    Current Facility-Administered Medications:   •  albumin human 5 % bottle 25 g, 25 g, Intravenous, Q12H, Solomon Gomez PA, Last Rate: 0 mL/hr at 11/25/18 0846, 25 g at 11/26/18 0521  •  famotidine (PEPCID) injection 20 mg, 20 mg, Intravenous, Daily, Manoj Zaldivar DO, 20 mg at 11/26/18 1043  •  furosemide (LASIX) injection 20 mg, 20 mg, Intravenous, Q12H, Allen Hercules MD, 20 mg at 11/26/18 0521  •  HYDROmorphone (DILAUDID) injection 0.5 mg, 0.5 mg, Intravenous, Q2H PRN, Allen Hercules MD, 0.5 mg at 11/26/18 0826  •  ipratropium-albuterol (DUO-NEB) nebulizer  solution 3 mL, 3 mL, Nebulization, Q4H - RT, Allen Hercules MD, 3 mL at 11/25/18 2232  •  lactulose (CHRONULAC) 10 GM/15ML solution 30 g, 30 g, Oral, TID, Eduar Cisneros MD, 30 g at 11/25/18 2028  •  magic butt ointment, , Topical, BID, Armando Richard MD  •  Magnesium Sulfate 2 gram Bolus, followed by 8 gram infusion (total Mg dose 10 grams)- Mg less than or equal to 1mg/dL, 2 g, Intravenous, PRN **OR** Magnesium Sulfate 2 gram / 50mL Infusion (GIVE X 3 BAGS TO EQUAL 6GM TOTAL DOSE) - Mg 1.1 - 1.5 mg/dl, 2 g, Intravenous, PRN **OR** Magnesium Sulfate 4 gram infusion- Mg 1.6-1.9 mg/dL, 4 g, Intravenous, PRN, Solomon Gomez PA, Last Rate: 25 mL/hr at 11/07/18 2157, 4 g at 11/07/18 2157  •  [DISCONTINUED] morphine injection 1 mg, 1 mg, Intravenous, Q4H PRN, 1 mg at 11/01/18 0942 **AND** naloxone (NARCAN) injection 0.4 mg, 0.4 mg, Intravenous, Q5 Min PRN, Eduar Cisneros MD  •  ondansetron (ZOFRAN) injection 4 mg, 4 mg, Intravenous, Q6H PRN, Eduar Cisneros MD, 4 mg at 11/26/18 1238  •  pantoprazole (PROTONIX) injection 40 mg, 40 mg, Intravenous, Q AM, Allen Hercules MD, 40 mg at 11/26/18 0546  •  potassium chloride 10 mEq in 100 mL IVPB, 10 mEq, Intravenous, Q1H PRN, Solomon Gomez PA, Last Rate: 100 mL/hr at 11/09/18 0801, 10 mEq at 11/09/18 0801  •  promethazine (PHENERGAN) injection 12.5 mg, 12.5 mg, Intravenous, Q6H PRN, Allen Hercules MD, 12.5 mg at 11/25/18 1532  •  rifaximin (XIFAXAN) tablet 275 mg, 275 mg, Oral, Q12H, Eduar Cisneros MD, 275 mg at 11/26/18 0922  •  simethicone (MYLICON) chewable tablet 80 mg, 80 mg, Oral, Q6H PRN, Eduar Cisneros MD, 80 mg at 11/16/18 1309  •  sodium chloride (OCEAN) nasal spray 1 spray, 1 spray, Each Nare, PRN, Beau Celeste MD  •  sodium chloride 0.9 % flush 10 mL, 10 mL, Intravenous, Q12H, Vinh, Sony T, DO, 10 mL at 11/25/18 2028  •  sodium chloride 0.9 % flush 10 mL, 10 mL, Intravenous, Q12H, Vinh, Sony T, DO, 10 mL at 11/26/18  0924  •  sodium chloride 0.9 % flush 10 mL, 10 mL, Intravenous, PRN, Vinh, Anderson T, DO, 10 mL at 11/21/18 0918  •  sodium chloride 0.9 % flush 20 mL, 20 mL, Intravenous, PRN, Vinh, Anderson T, DO, 20 mL at 11/24/18 0949  •  sodium chloride 0.9 % flush 3 mL, 3 mL, Intravenous, Q12H, Eduar Cisneros MD, 3 mL at 11/25/18 2028  •  sodium chloride 0.9 % flush 3-10 mL, 3-10 mL, Intravenous, PRN, Eduar Cisneros MD, 10 mL at 11/13/18 0637    Results Review:  I have reviewed the labs, radiology results, and diagnostic studies.    Laboratory Data:   Results from last 7 days   Lab Units  11/26/18   0247 11/25/18   0650  11/24/18   0534   SODIUM mmol/L  136*  136*  134*   POTASSIUM mmol/L  4.3  4.1  3.7   CHLORIDE mmol/L  92*  91*  90*   CO2 mmol/L  16.0*  24.0  26.0   BUN mg/dL  64*  54*  55*   CREATININE mg/dL  1.97*  1.69*  1.61*   GLUCOSE mg/dL  93  84  110*   CALCIUM mg/dL  10.5*  10.3*  10.0   ANION GAP mmol/L  28.0*  21.0*  18.0*     Estimated Creatinine Clearance: 51.6 mL/min (A) (by C-G formula based on SCr of 1.97 mg/dL (H)).          Results from last 7 days   Lab Units  11/26/18   0247 11/25/18   1930  11/25/18   0650  11/24/18   0534  11/23/18   0611  11/22/18   0539   WBC 10*3/mm3  7.74   --   5.12  4.79  5.06  7.53   HEMOGLOBIN g/dL  7.1*  7.6*  6.8*  7.2*  7.5*  8.1*   HEMATOCRIT %  20.6*  22.3*  19.4*  20.3*  21.0*  23.5*   PLATELETS 10*3/mm3  8*   --   15*  16*  20*  26*     Results from last 7 days   Lab Units  11/25/18   0650  11/24/18   0534  11/23/18   0611  11/22/18   0539  11/21/18   0729   INR   3.00*  2.97*  3.10*  3.36*  2.40*       Culture Data:   No results found for: BLOODCX  No results found for: URINECX  No results found for: RESPCX  No results found for: WOUNDCX  No results found for: STOOLCX  No components found for: BODYFLD    Radiology Data:   Imaging Results (last 24 hours)     Procedure Component Value Units Date/Time    XR Chest 1 View [[id number]] Collected:  11/25/18 1711      Updated:  11/25/18 1756    Narrative:       PROCEDURE: Portable chest x-ray    TECHNIQUE: Single AP view of the chest    COMPARISON: 11/2/2018    HISTORY: dyspnea, R41.82 Altered mental status, unspecified  K72.90 Hepatic failure, unspecified without coma Z74.09 Other  reduced mobility Z74.09 Other reduced mobility    FINDINGS:     Life-support devices: None    Lungs/pleura: Small left pleural effusion. Pulmonary vascular  congestion.    Heart, hilar and mediastinal structures: Enlarged cardiac  silhouette.      Impression:       CONCLUSION:  Enlarged cardiac silhouette.  Small left pleural effusion.  Pulmonary vascular congestion.    Electronically signed by:  Jose Boudreaux MD  11/25/2018 5:55 PM  CST Workstation: [id number]          I have reviewed the patient's current medications.     Assessment/Plan     Hospital Problem List:  Principal Problem:   - Acute respiratory failure with hypoxia (likely due to pulmonary vascular congestion/ possible diastolic heart failure): Continue supplemental oxygen, diuretics and bronchodilators.   Echocardiogram done August 3, 2018 showed:  · Left ventricular wall thickness is consistent with mild concentric hypertrophy.  · Left ventricular systolic function is normal. Estimated EF = 63%.  · Left ventricular diastolic dysfunction (grade I) consistent with impaired relaxation.  · Left atrial cavity size is moderately dilated.  · There is calcification of the aortic valve.  · Mild aortic valve stenosis is present.  · Mild tricuspid valve regurgitation is present.  · Mild dilation of the aortic root is present.        -  Liver cirrhosis secondary to chronic hepatitis C complicated by anemia, severe thrombocytopenia and elevated INR: Patient will be receiving transfusion of platelets today.  Chronic thrombocytopenia did worsen due to IV Zyvox which was stopped on 11/22/2018.  Continue to monitor hemoglobin and transfuse packed red blood cells if the need arises.  Continue lactulose and  Xifaxan.  GI is following.  - CKD (chronic kidney disease) stage 3: Creatinine is mostly at baseline.  We'll continue to monitor and consult nephrologist if the need arises.   -Perinephric abscess: Patient has completed a course of antimicrobial therapy.  Sheppard catheter remains in place with no gross hematuria and urology is following.  - Continue GI and DVT prophylaxis.   - Overall prognosis is guarded.  Hospice was discussed with the patient but patient declined and wants to remain a full code.        Discharge Planning: In progress.    Armando Richard MD   11/26/18   12:51 PM
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
## 2018-11-26 NOTE — PAYOR COMM NOTE
"Lamont Mcmullen Sr. (53 y.o. Male)     Date of Birth Social Security Number Address Home Phone MRN    [id number]  [id number]  [facility] [address] [phone] [id number]    Druze Marital Status          None Legally        Admission Date Admission Type Admitting Provider Attending Provider Department, Room/Bed    10/29/18 Emergency Eduar Cisneros MD [place], Emmanuel [place], MD [facility] CRITICAL CARE, 20/A    Discharge Date Discharge Disposition Discharge Destination                       Attending Provider:  Barrera Mcmanus MD    Allergies:  Aspirin, Penicillins, Codeine Sulfate    Isolation:  None   Infection:  None   Code Status:  CPR    Ht:  182.9 cm (72\")   Wt:  96.3 kg (212 lb 4.9 oz)    Admission Cmt:  None   Principal Problem:  Acute respiratory failure with hypoxia (CMS/HCC) [J96.01]                 Active Insurance as of 10/29/2018     Primary Coverage     Payor Plan Insurance Group Employer/Plan Group    [facility] [employer] [facility]      Payor Plan Address Payor Plan Phone Number Payor Plan Fax Number Effective Dates    [address] [address]   1/1/2014 - None Entered    [place] [address]       Subscriber Name Subscriber Birth Date Member ID       LAMONT MCMULLEN SR. 1965 [id number]                 Emergency Contacts      (Rel.) Home Phone Work Phone Mobile Phone    Miguel Mcmullen (Son) [phone] -- [phone]    Loco Mcmullen (Daughter) [phone] -- [phone]    Shreyas Mcmullen (Brother) [phone] -- [phone]        Suzanne Manzanares RNCM  [facility]  [phone]     Phone  [phone]       Fax  Cont stay review    Hospital Medications (active)       Dose Frequency Start End    albumin human 5 % bottle 25 g 25 g Every 12 Hours 11/4/2018     Sig - Route: Infuse 500 mL into a venous catheter Every 12 (Twelve) Hours. - Intravenous    Cosign for Ordering: Accepted by " "Beau Celeste MD on 11/4/2018  7:10 PM    diphenhydrAMINE (BENADRYL) injection 50 mg 50 mg Once 11/26/2018     Sig - Route: Infuse 1 mL into a venous catheter 1 (One) Time. - Intravenous    famotidine (PEPCID) injection 20 mg 20 mg Daily 11/26/2018     Sig - Route: Infuse 2 mL into a venous catheter Daily. - Intravenous    furosemide (LASIX) injection 20 mg 20 mg Every 12 Hours 11/26/2018     Sig - Route: Infuse 2 mL into a venous catheter Every 12 (Twelve) Hours. - Intravenous    HYDROmorphone (DILAUDID) injection 0.5 mg 0.5 mg Every 2 Hours PRN 11/25/2018     Sig - Route: Infuse 0.5 mL into a venous catheter Every 2 (Two) Hours As Needed for Severe Pain . - Intravenous    ipratropium-albuterol (DUO-NEB) nebulizer solution 3 mL 3 mL Every 4 Hours - RT 11/25/2018     Sig - Route: Take 3 mL by nebulization Every 4 (Four) Hours. - Nebulization    lactulose (CHRONULAC) 10 GM/15ML solution 30 g 30 g 3 Times Daily 10/29/2018     Sig - Route: Take 45 mL by mouth 3 (Three) Times a Day. - Oral    magic butt ointment  2 Times Daily 10/30/2018     Sig - Route: Apply  topically to the appropriate area as directed 2 (Two) Times a Day. - Topical    Magnesium Sulfate 2 gram / 50mL Infusion (GIVE X 3 BAGS TO EQUAL 6GM TOTAL DOSE) - Mg 1.1 - 1.5 mg/dl 2 g As Needed 11/7/2018     Sig - Route: Infuse 50 mL into a venous catheter As Needed (See Administration Instructions). - Intravenous    Cosign for Ordering: Accepted by Beau Celeste MD on 11/7/2018  6:18 PM    Linked Group 1:  \"Or\" Linked Group Details        Magnesium Sulfate 2 gram Bolus, followed by 8 gram infusion (total Mg dose 10 grams)- Mg less than or equal to 1mg/dL 2 g As Needed 11/7/2018     Sig - Route: Infuse 50 mL into a venous catheter As Needed (See Administration Instructions). - Intravenous    Cosign for Ordering: Accepted by Beau Celeste MD on 11/7/2018  6:18 PM    Linked Group 1:  \"Or\" Linked Group Details        Magnesium Sulfate 4 gram infusion- Mg " "1.6-1.9 mg/dL 4 g As Needed 11/7/2018     Sig - Route: Infuse 100 mL into a venous catheter As Needed (See Administration Instructions). - Intravenous    Cosign for Ordering: Accepted by Beau Celeste MD on 11/7/2018  6:18 PM    Linked Group 1:  \"Or\" Linked Group Details        methylPREDNISolone sodium succinate (SOLU-Medrol) injection 125 mg 125 mg Once 11/26/2018     Sig - Route: Infuse 2 mL into a venous catheter 1 (One) Time. - Intravenous    naloxone (NARCAN) injection 0.4 mg 0.4 mg Every 5 Minutes PRN 10/29/2018     Sig - Route: Infuse 1 mL into a venous catheter Every 5 (Five) Minutes As Needed for Respiratory Depression. - Intravenous    Linked Group 2:  \"And\" Linked Group Details        ondansetron (ZOFRAN) injection 4 mg 4 mg Every 6 Hours PRN 10/29/2018     Sig - Route: Infuse 2 mL into a venous catheter Every 6 (Six) Hours As Needed for Nausea or Vomiting. - Intravenous    pantoprazole (PROTONIX) injection 40 mg 40 mg Every Early Morning 11/26/2018     Sig - Route: Infuse 10 mL into a venous catheter Every Morning. - Intravenous    potassium chloride 10 mEq in 100 mL IVPB 10 mEq Every 1 Hour PRN 11/7/2018     Sig - Route: Infuse 100 mL into a venous catheter Every 1 (One) Hour As Needed (See admin Instructions.). - Intravenous    Cosign for Ordering: Accepted by Beau Celeste MD on 11/7/2018  6:18 PM    promethazine (PHENERGAN) injection 12.5 mg 12.5 mg Every 6 Hours PRN 11/19/2018     Sig - Route: Infuse 0.5 mL into a venous catheter Every 6 (Six) Hours As Needed for Nausea or Vomiting. - Intravenous    rifaximin (XIFAXAN) tablet 275 mg 275 mg Every 12 Hours Scheduled 10/29/2018     Sig - Route: Take 0.5 tablets by mouth Every 12 (Twelve) Hours. - Oral    simethicone (MYLICON) chewable tablet 80 mg 80 mg Every 6 Hours PRN 10/29/2018     Sig - Route: Chew 1 tablet Every 6 (Six) Hours As Needed for Flatulence. - Oral    sodium chloride (OCEAN) nasal spray 1 spray 1 spray As Needed 11/9/2018     Sig - " Route: 1 spray by Each Nare route As Needed for Congestion. - Each Nare    sodium chloride 0.9 % flush 10 mL 10 mL Every 12 Hours Scheduled 11/15/2018     Sig - Route: Infuse 10 mL into a venous catheter Every 12 (Twelve) Hours. - Intravenous    sodium chloride 0.9 % flush 10 mL 10 mL Every 12 Hours Scheduled 11/15/2018     Sig - Route: Infuse 10 mL into a venous catheter Every 12 (Twelve) Hours. - Intravenous    sodium chloride 0.9 % flush 10 mL 10 mL As Needed 11/15/2018     Sig - Route: Infuse 10 mL into a venous catheter As Needed for Line Care (After Medication Administration). - Intravenous    sodium chloride 0.9 % flush 20 mL 20 mL As Needed 11/15/2018     Sig - Route: Infuse 20 mL into a venous catheter As Needed for Line Care (After Blood Draws or Blood Product Administration). - Intravenous    sodium chloride 0.9 % flush 3 mL 3 mL Every 12 Hours Scheduled 10/29/2018     Sig - Route: Infuse 3 mL into a venous catheter Every 12 (Twelve) Hours. - Intravenous    sodium chloride 0.9 % flush 3-10 mL 3-10 mL As Needed 10/29/2018     Sig - Route: Infuse 3-10 mL into a venous catheter As Needed for Line Care. - Intravenous    sodium chloride 0.9 % infusion  - ADS Override Pull   11/25/2018 11/25/2018    Notes to Pharmacy: MARY CALLES: cabinet override    amLODIPine (NORVASC) tablet 5 mg (Discontinued) 5 mg Every 24 Hours Scheduled 11/20/2018 11/25/2018    Sig - Route: Take 1 tablet by mouth Daily. - Oral    furosemide (LASIX) injection 20 mg (Discontinued) 20 mg Every 12 Hours 11/23/2018 11/25/2018    Sig - Route: Infuse 2 mL into a venous catheter Every 12 (Twelve) Hours. - Intravenous    hydrALAZINE (APRESOLINE) tablet 10 mg (Discontinued) 10 mg Every 8 Hours Scheduled 11/20/2018 11/25/2018    Sig - Route: Take 1 tablet by mouth Every 8 (Eight) Hours. - Oral    HYDROmorphone (DILAUDID) injection 0.25 mg (Discontinued) 0.25 mg Every 3 Hours PRN 11/24/2018 11/25/2018    Sig - Route: Infuse 0.125 mL into a venous  catheter Every 3 (Three) Hours As Needed for Severe Pain . - Intravenous    HYDROmorphone (DILAUDID) injection 0.5 mg (Discontinued) 0.5 mg Every 2 Hours PRN 11/25/2018 11/25/2018    Sig - Route: Infuse 0.25 mL into a venous catheter Every 2 (Two) Hours As Needed for Severe Pain . - Intravenous    HYDROmorphone (DILAUDID) injection 0.5 mg (Discontinued) 0.5 mg Every 2 Hours PRN 11/25/2018 11/25/2018    Sig - Route: Infuse 0.25 mL into a venous catheter Every 2 (Two) Hours As Needed for Severe Pain . - Intravenous    HYDROmorphone (DILAUDID) injection 1 mg (Discontinued) 1 mg Every 2 Hours PRN 11/25/2018 11/25/2018    Sig - Route: Infuse 1 mL into a venous catheter Every 2 (Two) Hours As Needed for Severe Pain . - Intravenous    ipratropium-albuterol (DUO-NEB) nebulizer solution 3 mL (Discontinued) 3 mL Every 4 Hours PRN 11/23/2018 11/25/2018    Sig - Route: Take 3 mL by nebulization Every 4 (Four) Hours As Needed for Wheezing. - Nebulization    nicotine (NICODERM CQ) 21 MG/24HR patch 1 patch (Discontinued) 1 patch Every 24 Hours Scheduled 10/30/2018 11/25/2018    Sig - Route: Place 1 patch on the skin as directed by provider Daily. - Transdermal    oxyCODONE (ROXICODONE) immediate release tablet 5 mg (Discontinued) 5 mg Every 6 Hours PRN 11/11/2018 11/25/2018    Sig - Route: Take 1 tablet by mouth Every 6 (Six) Hours As Needed for Moderate Pain . - Oral    pantoprazole (PROTONIX) EC tablet 40 mg (Discontinued) 40 mg Every Early Morning 11/21/2018 11/25/2018    Sig - Route: Take 1 tablet by mouth Every Morning. - Oral          Ventilator/Non-Invasive Ventilation Settings (From admission, onward)    None        Operative/Procedure Notes (last 24 hours) (Notes from 11/25/2018 10:10 AM through 11/26/2018 10:10 AM)     No notes of this type exist for this encounter.           Physician Progress Notes (last 24 hours) (Notes from 11/25/2018 10:10 AM through 11/26/2018 10:10 AM)      Allen Hercules MD at 11/25/2018  5:01  "PM        Rapid Response Note:    11/25/2018    5:00 PM    Patient is a 53 y.o. male admitted for worsening hepatic encephalopathy that became more agitated and hypoxic this afternoon and had to be transferred to the ICU due to:      Acute respiratory failure with hypoxia (CMS/HCC)    Liver cirrhosis (CMS/HCC)    Liver failure (CMS/HCC)    Hepatic encephalopathy (CMS/HCC)    Thrombocytopenia (CMS/HCC)    Chronic hepatitis C (CMS/HCC)    Chronic anemia    CKD (chronic kidney disease) stage 3, GFR 30-59 ml/min (CMS/HCC)    Perinephric abscess      Assessment/Plan     His portable CXRay showed pulmonary vascular congestion, his Hgb devonte to 7.6 after 1 unit of PRBCs and his ABG had a pH of 7.41, pCO2 of 33 and pO2 of 95 on supplemental Oxygen.    We'll continue with the nebulized treatments, IV diuresis, supplemental O2 and supportive care. He can return to the floor if his condition remains stable.    Allen Hercules MD  11/25/18  6:00 PM    Electronically signed by Allen Hercules MD at 11/25/2018 10:24 PM     Deondre Virk APRN at 11/25/2018 11:10 AM     Attestation signed by Shawn Ledbetter MD at 11/25/2018 11:30 AM    I have reviewed the documentation above and agree.                       LOS: 27 days   Patient Care Team:  Reddy Grant MD as PCP - General (Family Medicine)  Tushar Becerril DO as Consulting Physician (Gastroenterology)    Subjective     Subjective:  Symptoms:  (No hematuria. Generalized bruising. Abdominal distention with ascites, tenderness to palpation. Afebrile. ).        History taken from: patient chart RN    Objective     Vital Signs  Temp:  [97.9 °F (36.6 °C)-98.6 °F (37 °C)] 98.3 °F (36.8 °C)  Heart Rate:  [] 105  Resp:  [18] 18  BP: (103-140)/(73-89) 110/80    Objective:  General Appearance:  In no acute distress.    Vital signs: (most recent): Blood pressure 110/80, pulse 105, temperature 98.3 °F (36.8 °C), temperature source Temporal, resp. rate 18, height 182.9 cm (72.01\"), " weight 94.1 kg (207 lb 6.4 oz), SpO2 (!) 88 %.  No fever.    Output: Producing urine (Urine clear yellow).    Lungs:  Normal effort and normal respiratory rate.  Breath sounds clear to auscultation.  He is not in respiratory distress.  No stridor.  No decreased breath sounds.    Heart: Tachycardia.  Regular rhythm.  S1 normal and S2 normal.  No murmur, gallop or friction rub.   Chest: Symmetric chest wall expansion.   Abdomen: Abdomen is soft and distended.  There are signs of ascites. Bowel sounds are normal.   There is generalized tenderness.     Extremities: There is no dependent edema.    Neurological: Patient is alert and oriented to person, place and time.  GCS score is 15.    Pupils:  Pupils are equal, round, and reactive to light.    Skin:  Warm and dry.  There is ecchymosis.  No rash or cyanosis.           Results Review:    Lab Results (last 24 hours)     Procedure Component Value Units Date/Time    Extra Tubes [[id number]] Collected:  11/25/18 0650    Specimen:  Blood, Venous Line Updated:  11/25/18 0800    Narrative:       The following orders were created for panel order Extra Tubes.  Procedure                               Abnormality         Status                     ---------                               -----------         ------                     [facility][[id number]]                                   Final result                 Please view results for these tests on the individual orders.    [facility] [[id number]] Collected:  11/25/18 0650    Specimen:  Blood Updated:  11/25/18 0800     Extra Tube Hold for add-ons.     Comment: Auto resulted.       CBC & Differential [[id number]] Collected:  11/25/18 0650    Specimen:  Blood Updated:  11/25/18 0724    Narrative:       The following orders were created for panel order CBC & Differential.  Procedure                               Abnormality         Status                     ---------                               -----------          ------                     Scan Slide[[id number]]                                                                  CBC Auto Differential[[id number]]        Abnormal            Final result                 Please view results for these tests on the individual orders.    CBC Auto Differential [[id number]]  (Abnormal) Collected:  11/25/18 0650    Specimen:  Blood Updated:  11/25/18 0724     WBC 5.12 10*3/mm3      RBC 2.00 10*6/mm3      Hemoglobin 6.8 g/dL      Hematocrit 19.4 %      MCV 97.0 fL      MCH 34.0 pg      MCHC 35.1 g/dL      RDW 19.3 %      RDW-SD 67.0 fl      MPV 11.5 fL      Platelets 15 10*3/mm3      Neutrophil % 73.9 %      Lymphocyte % 13.7 %      Monocyte % 5.7 %      Eosinophil % 4.9 %      Basophil % 1.6 %      Immature Grans % 0.2 %      Neutrophils, Absolute 3.79 10*3/mm3      Lymphocytes, Absolute 0.70 10*3/mm3      Monocytes, Absolute 0.29 10*3/mm3      Eosinophils, Absolute 0.25 10*3/mm3      Basophils, Absolute 0.08 10*3/mm3      Immature Grans, Absolute 0.01 10*3/mm3     Basic Metabolic Panel [[id number]]  (Abnormal) Collected:  11/25/18 0650    Specimen:  Blood Updated:  11/25/18 0719     Glucose 84 mg/dL      BUN 54 mg/dL      Creatinine 1.69 mg/dL      Sodium 136 mmol/L      Potassium 4.1 mmol/L      Chloride 91 mmol/L      CO2 24.0 mmol/L      Calcium 10.3 mg/dL      eGFR Non African Amer 42 mL/min/1.73      BUN/Creatinine Ratio 32.0     Anion Gap 21.0 mmol/L     Ammonia [[id number]]  (Normal) Collected:  11/25/18 0650    Specimen:  Blood Updated:  11/25/18 0718     Ammonia 24 umol/L     Protime-INR [[id number]]  (Abnormal) Collected:  11/25/18 0650    Specimen:  Blood Updated:  11/25/18 0712     Protime 29.7 Seconds      INR 3.00    Narrative:       Therapeutic range for most indications is 2.0-3.0 INR,  or 2.5-3.5 for mechanical heart valves.         Imaging Results (last 24 hours)     Procedure Component Value Units Date/Time    CT Abdomen Pelvis With Contrast [[id number]] Collected:  11/05/18  1815     Updated:  11/05/18 1857    Narrative:         EXAMINATION:  Computed Tomography           REGION:    Abdomen / Pelvis                     INDICATION:   Repeat to evaluate hematoma/abscess of kidney per  urology, R41.82 Altered mental status, unspecified K72.90 Hepatic  failure, unspecified without coma Z74.09 Other reduced mobility  Z74.09 Other reduced mobility    HISTORY:  LISA. IMAGING:    CT A/P 11/2/18            TECHNIQUE:      - reconstructions:    axial, coronal, sagittal         - contrast:      oral:  No ;   intravenous: Isovue 300, 100 mL  This exam was performed according to the departmental  dose-optimization program which includes automated exposure  control, adjustment of the mA and/or kV according to patient size  and/or use of iterative reconstruction technique.           COMMENTS:            - - - CT ABDOMEN - - -          THORAX (INFERIOR):      - LUNG BASES:  clear      - PLEURA:    no fluid or mass      - HEART:    normal size, no pericardial fluid     - MISC:      n/a          ABDOMEN:     - LIVER:    Diminished size with nodular contour containing a  tips shunt   - GB:      Cholelithiasis without evidence of wall thickening.    - CBD:      grossly negative   - SPLEEN:    Upper normal/mildly enlarged   - PANCREAS:    normal in size, contour, no focal mass    - VISCERA:    normal caliber, no wall thickening     - MESENTERY:  no mesenteric mass   - CAVITY:    Small amount of free fluid   - BODY WALL:  wnl   - OSSEOUS:    grossly negative for age   - MISC:                 Extensive gastric varices                      RETROPERITONEUM:   - KIDNEYS:    Renal examination again displays overall normal  size and symmetric nephrograms. Again noted is a left pararenal  enhancing focal fluid collection containing air which is  unchanged in size measuring 2.5 x 3.6 x 2.0 cm   - URETERS:    normal course, caliber   - ADRENALS:    normal size, contour   - MISC:      no sig retroperitoneal adenopathy  or mass   - VASCULAR:    aorta / iliacs: wnl for age     - - - CT PELVIS - - -      - VISCERA:    normal caliber small/large bowel, no focal  thickening/mass        - APPENDIX:          wnl   - MESENTERY:  no mass   - VASCULAR:    wnl for age   - CAVITY:    Moderate amount of free fluid   - BLADDER:    unremarkable   - OSSEOUS:    Status post left hip repair    - MISC:     .       Impression:        CONCLUSION:  1. Unchanged left pararenal focal fluid collection with  peripheral enhancement containing air, likely representing an  abscess.  2. Multiple additional findings demonstrating the presence of  cirrhosis status post TIPS shunt, with spleen size at the upper  limits of normal/mildly enlarged, and extensive gastric varices,  as above, unchanged.        Electronically signed by:  PRISCILLA Bustillo MD  11/5/2018 6:56  PM CST Workstation: [id number]         Imaging Results (last 72 hours)     Procedure Component Value Units Date/Time    US Abdomen Limited [[id number]] Collected:  11/24/18 0845     Updated:  11/24/18 1015    Narrative:         PROCEDURE: US ABDOMEN LIMITED    TECHNIQUE: Limited scanning of the abdomen to assess for ascites.    COMPARISON: None    HISTORY: worsening ascites, R41.82 Altered mental status,  unspecified K72.90 Hepatic failure, unspecified without coma  Z74.09 Other reduced mobility Z74.09 Other reduced mobility    FINDINGS  There is a small to moderate amount of ascites in the abdomen.  The liver appears cirrhotic.      Impression:       CONCLUSION:   Small to moderate amount of ascites in the abdomen.    Electronically signed by:  Jose Boudreaux MD  11/24/2018 10:14 AM  CST Workstation: [id number]          I reviewed the patient's new clinical results.  I reviewed the patient's new imaging results and agree with the interpretation.  I reviewed the patient's other test results and agree with the interpretation      Assessment/Plan       Hepatic encephalopathy (CMS/HCC)    Liver cirrhosis  (CMS/HCC)    Liver failure (CMS/HCC)    Thrombocytopenia (CMS/HCC)    CKD (chronic kidney disease) stage 3, GFR 30-59 ml/min (CMS/HCC)    Chronic hepatitis C (CMS/HCC)    Perinephric abscess    Chronic anemia      Assessment & Plan    1. Difficulty urinating with hematuria, likely related to UTI hemorrhagic cystitis without clots-->hematuria resolved  2. Perinephric abscess status post CT guided drainage of left perinephric abscess 11/9/18-->Enterococcus faecium and candida  -12 days of antibiotic therapy complete (vancomycin and Zyvox) and 10 days of (Diflucan), COMPLETE  -Vancomycin 11/11/18 to 11/12/18 then Zyvox 11/13/18 to 11/14/18, Vanc again 11/15/18 to 11/19/18, Zyvox restarted 11/20/18 to 11/22/18    -Estimated Creatinine Clearance: 59.5 mL/min (A) (by C-G formula based on SCr of 1.69 mg/dL (H)).  -WBC 4.79-->5.12  -UA + 10/30/18  -Hgb/Hct 7.2/20.3-->6.8/19.4 (2 units of PRBCs 11/21/18), PRBCs ordered today 11/25/18  -Platelets 31,000-->26,000-->20,000-->16,000-->15,000  -INR 2.66-->3.38-->2.40-->3.36-->3.1-->2.97-->3.00  -Renal ultrasound 10/30/18 limited exam, especially of the left kidney, due to the presence of bowel gas and the patient's body habitus. No hydronephrosis is visualized. Unremarkable bladder.   -11/2/18 CT abdomen/pelvis LEFT 2.3 x 3.7 x 2.2 cm fluid and air collection infected subcapsular hematoma versus abscess.  -11/5/18 CT abdomen/pelvis showed LEFT renal abscess unchanged  -10/30/18 Urine culture NEGATIVE, UA grossly positive.   -abdominal ultrasound 11/24/18, ascites    Plan:    Closely monitor labs and for bleeding.    PRBCs ordered.     KALEB Alexander  11/25/18  11:11 AM    Electronically signed by [place], Shawn CARRILLO MD at 11/25/2018 11:30 AM       Medical Student Notes (last 24 hours) (Notes from 11/25/2018 10:10 AM through 11/26/2018 10:10 AM)     No notes of this type exist for this encounter.        Consult Notes (last 24 hours) (Notes from 11/25/2018 10:10 AM through  11/26/2018 10:10 AM)     No notes of this type exist for this encounter.

## 2018-11-26 NOTE — SIGNIFICANT NOTE
11/26/18 0718   Rehab Time/Intention   Evaluation Not Performed unable to evaluate, medical status change   Rehab Treatment   Discipline occupational therapist     Pt t/f to ICU. Pt will need new restart orders for OT if/when pt is appropriate. Thank you.

## 2018-11-26 NOTE — CONSULTS
"Adult Nutrition  Assessment    Patient Name:  Armando Mcmullen .  YOB: 1965  MRN: [id number]  Admit Date:  10/29/2018    Assessment Date:  11/26/2018    Comments:  Patient has decreased appetite and intake of food. Patient is currently eating 0-25% of meals. Patient's platelets have decreased to 8. Patient is refusing to eat meals at this time. However, patient has requested a boost breeze. Boost breeze was ordered, but labs will be monitored with boost breeze intake.  RDN staff will continue to monitor.     Reason for Assessment     Row Name 11/26/18 1136          Reason for Assessment    Reason For Assessment  follow-up protocol  (Pended)      Diagnosis  liver disease  (Pended)      Identified At Risk by Screening Criteria  large or nonhealing wound, burn or pressure injury  (Pended)          Nutrition/Diet History     Row Name 11/26/18 1136          Nutrition/Diet History    Typical Food/Fluid Intake  Patient has decreased appetite and intake of food. Patient is currently eating 0-25% of meals. Patient's platelets have decreased to 8.   (Pended)          Anthropometrics     Row Name 11/26/18 0505          Anthropometrics    Height  182.9 cm (72\")     Weight  96.3 kg (212 lb 4.9 oz)        Ideal Body Weight (IBW)    Ideal Body Weight (IBW) (kg)  82.07     % Ideal Body Weight  117.34        Body Mass Index (BMI)    BMI (kg/m2)  28.85        IBW Adjustment, Para/Tetraplegia    5% Adjustment, Para (IBW)  77.97     10% Adjustment, Para (IBW)  73.86     10% Adjustment, Tetra (IBW)  73.86     15% Adjustment, Tetra (IBW)  69.76         Labs/Tests/Procedures/Meds     Row Name 11/26/18 1139          Labs/Procedures/Meds    Lab Results Reviewed  reviewed, pertinent  (Pended)      Lab Results Comments  BUN-64  (Pended)         Diagnostic Tests/Procedures    Diagnostic Test/Procedure Reviewed  reviewed  (Pended)         Medications    Pertinent Medications Reviewed  reviewed  (Pended)          Physical Findings  " "   Row Name 11/26/18 1140          Physical Findings    Overall Physical Appearance  amputee  (Pended)      Gastrointestinal  ascites;abdominal distension  (Pended)      Skin  non-healing wound(s);pressure injury  (Pended)          Estimated/Assessed Needs     Row Name 11/26/18 0505          Calculation Measurements    Height  182.9 cm (72\")         Nutrition Prescription Ordered     Row Name 11/26/18 1140          Nutrition Prescription PO    Current PO Diet  Clear Liquid  (Pended)      Fluid Consistency  Thin  (Pended)      Common Modifiers  Low Sodium  (Pended)      Low Sodium Details  2,000 mg Sodium  (Pended)          Evaluation of Received Nutrient/Fluid Intake     Row Name 11/26/18 1141 11/26/18 0505       Calculation Measurements    Height  --  182.9 cm (72\")       PO Evaluation    Number of Days PO Intake Evaluated  2 days  (Pended)   --    Number of Meals  4  (Pended)   --    % PO Intake  15%  (Pended)   --        Evaluation of Prescribed Nutrient/Fluid Intake     Row Name 11/26/18 0505          Calculation Measurements    Height  182.9 cm (72\")             Electronically signed by:  Aida Aviles  11/26/18 11:42 AM  "

## 2018-11-26 NOTE — PLAN OF CARE
Problem: Patient Care Overview  Goal: Plan of Care Review  Outcome: Ongoing (interventions implemented as appropriate)   11/26/18 7356   Coping/Psychosocial   Plan of Care Reviewed With patient   Plan of Care Review   Progress declining   OTHER   Outcome Summary Patient has decreased appetite and intake of food. Patient is currently eating 0-25% of meals. Patient's platelets have decreased to 8. Patient currently refusing meals, but willing to drink boost breeze.

## 2018-11-26 NOTE — PROGRESS NOTES
"   LOS: 28 days   Patient Care Team:  Reddy Grant MD as PCP - General (Family Medicine)  Tushar Becerril DO as Consulting Physician (Gastroenterology)    Subjective     Subjective:  Symptoms:  (Transferred to ICU yesterday afternoon for respiratory compromise. Oxygen supplementation high flow nasal cannula. Complains of fatigue and chills.     Sheppard was anchored yesterday. No hematuria present and no penoscrotal edema. ).        History taken from: patient chart RN    Objective     Vital Signs  Temp:  [97.2 °F (36.2 °C)-98.7 °F (37.1 °C)] 97.6 °F (36.4 °C)  Heart Rate:  [] 100  Resp:  [15-22] 16  BP: (121-147)/(63-97) 147/91    Objective:  General Appearance:  Ill-appearing.    Vital signs: (most recent): Blood pressure 152/82, pulse 100, temperature 97.6 °F (36.4 °C), temperature source Oral, resp. rate 16, height 182.9 cm (72\"), weight 96.3 kg (212 lb 4.9 oz), SpO2 91 %.  No fever.    Output: Producing urine (Urine dark yellow, Sheppard).    Lungs:  Normal effort and normal respiratory rate.  Breath sounds clear to auscultation.  He is not in respiratory distress.  No stridor.  There are decreased breath sounds.    Heart: Tachycardia.  Regular rhythm.  S1 normal and S2 normal.  No murmur, gallop or friction rub.   Chest: Symmetric chest wall expansion.   Abdomen: Abdomen is soft and distended.  There are signs of ascites. Bowel sounds are normal.   There is generalized tenderness.     Extremities: There is no dependent edema.    Neurological: Patient is alert and oriented to person, place and time.  GCS score is 15.    Pupils:  Pupils are equal, round, and reactive to light.    Skin:  Warm and dry.  There is ecchymosis.  No rash or cyanosis.           Results Review:    Lab Results (last 24 hours)     Procedure Component Value Units Date/Time    CBC & Differential [[id number]] Collected:  11/26/18 0247    Specimen:  Blood Updated:  11/26/18 0329    Narrative:       The following orders were created for " panel order CBC & Differential.  Procedure                               Abnormality         Status                     ---------                               -----------         ------                     Scan Slide[[id number]]                                       Final result               CBC Auto Differential[[id number]]        Abnormal            Final result                 Please view results for these tests on the individual orders.    Scan Slide [[id number]] Collected:  11/26/18 0247    Specimen:  Blood Updated:  11/26/18 0329     Anisocytosis Slight/1+     Hypochromia Slight/1+     Macrocytes Slight/1+     Poikilocytes Slight/1+     Target Cells Slight/1+     WBC Morphology Normal     Platelet Estimate Decreased    CBC Auto Differential [[id number]]  (Abnormal) Collected:  11/26/18 0247    Specimen:  Blood Updated:  11/26/18 0316     WBC 7.74 10*3/mm3      RBC 2.13 10*6/mm3      Hemoglobin 7.1 g/dL      Hematocrit 20.6 %      MCV 96.7 fL      MCH 33.3 pg      MCHC 34.5 g/dL      RDW 19.2 %      RDW-SD 65.4 fl      MPV -- fL      Comment: INSTRUMENT UNABLE TO CALCULATE RESULTS        Platelets 8 10*3/mm3      Neutrophil % 82.7 %      Lymphocyte % 9.6 %      Monocyte % 5.4 %      Eosinophil % 1.4 %      Basophil % 0.6 %      Immature Grans % 0.3 %      Neutrophils, Absolute 6.40 10*3/mm3      Lymphocytes, Absolute 0.74 10*3/mm3      Monocytes, Absolute 0.42 10*3/mm3      Eosinophils, Absolute 0.11 10*3/mm3      Basophils, Absolute 0.05 10*3/mm3      Immature Grans, Absolute 0.02 10*3/mm3      nRBC 0.0 /100 WBC     Basic Metabolic Panel [[id number]]  (Abnormal) Collected:  11/26/18 0247    Specimen:  Blood Updated:  11/26/18 0312     Glucose 93 mg/dL      BUN 64 mg/dL      Creatinine 1.97 mg/dL      Sodium 136 mmol/L      Potassium 4.3 mmol/L      Chloride 92 mmol/L      CO2 16.0 mmol/L      Calcium 10.5 mg/dL      eGFR Non African Amer 36 mL/min/1.73      BUN/Creatinine Ratio 32.5     Anion Gap 28.0 mmol/L      Ammonia [[id number]]  (Normal) Collected:  11/26/18 0247    Specimen:  Blood Updated:  11/26/18 0311     Ammonia 27 umol/L     Hemoglobin & Hematocrit, Blood [[id number]]  (Abnormal) Collected:  11/25/18 1930    Specimen:  Blood Updated:  11/25/18 1944     Hemoglobin 7.6 g/dL      Hematocrit 22.3 %     Blood Gas, Arterial [[id number]]  (Abnormal) Collected:  11/25/18 1700    Specimen:  Arterial Blood Updated:  11/25/18 1718     Site Right Radial     Aren's Test N/A     pH, Arterial 7.411 pH units      pCO2, Arterial 33.3 mm Hg      Comment: 84 Value below reference range        pO2, Arterial 95.1 mm Hg      HCO3, Arterial 21.1 mmol/L      Base Excess, Arterial -3.1 mmol/L      Comment: 84 Value below reference range        O2 Saturation, Arterial 98.5 %      Barometric Pressure for Blood Gas 736 mmHg      Modality NRB     FIO2 100 %      Ventilator Mode NA     Collected by niels     Comment: Meter: [id number]     :  [id number]            Imaging Results (last 24 hours)     Procedure Component Value Units Date/Time    CT Abdomen Pelvis With Contrast [[id number]] Collected:  11/05/18 1815     Updated:  11/05/18 1857    Narrative:         EXAMINATION:  Computed Tomography           REGION:    Abdomen / Pelvis                     INDICATION:   Repeat to evaluate hematoma/abscess of kidney per  urology, R41.82 Altered mental status, unspecified K72.90 Hepatic  failure, unspecified without coma Z74.09 Other reduced mobility  Z74.09 Other reduced mobility    HISTORY:  LISA. IMAGING:    CT A/P 11/2/18            TECHNIQUE:      - reconstructions:    axial, coronal, sagittal         - contrast:      oral:  No ;   intravenous: Isovue 300, 100 mL  This exam was performed according to the departmental  dose-optimization program which includes automated exposure  control, adjustment of the mA and/or kV according to patient size  and/or use of iterative reconstruction technique.           COMMENTS:            - - - CT  ABDOMEN - - -          THORAX (INFERIOR):      - LUNG BASES:  clear      - PLEURA:    no fluid or mass      - HEART:    normal size, no pericardial fluid     - MISC:      n/a          ABDOMEN:     - LIVER:    Diminished size with nodular contour containing a  tips shunt   - GB:      Cholelithiasis without evidence of wall thickening.    - CBD:      grossly negative   - SPLEEN:    Upper normal/mildly enlarged   - PANCREAS:    normal in size, contour, no focal mass    - VISCERA:    normal caliber, no wall thickening     - MESENTERY:  no mesenteric mass   - CAVITY:    Small amount of free fluid   - BODY WALL:  wnl   - OSSEOUS:    grossly negative for age   - MISC:                 Extensive gastric varices                      RETROPERITONEUM:   - KIDNEYS:    Renal examination again displays overall normal  size and symmetric nephrograms. Again noted is a left pararenal  enhancing focal fluid collection containing air which is  unchanged in size measuring 2.5 x 3.6 x 2.0 cm   - URETERS:    normal course, caliber   - ADRENALS:    normal size, contour   - MISC:      no sig retroperitoneal adenopathy or mass   - VASCULAR:    aorta / iliacs: wnl for age     - - - CT PELVIS - - -      - VISCERA:    normal caliber small/large bowel, no focal  thickening/mass        - APPENDIX:          wnl   - MESENTERY:  no mass   - VASCULAR:    wnl for age   - CAVITY:    Moderate amount of free fluid   - BLADDER:    unremarkable   - OSSEOUS:    Status post left hip repair    - MISC:     .       Impression:        CONCLUSION:  1. Unchanged left pararenal focal fluid collection with  peripheral enhancement containing air, likely representing an  abscess.  2. Multiple additional findings demonstrating the presence of  cirrhosis status post TIPS shunt, with spleen size at the upper  limits of normal/mildly enlarged, and extensive gastric varices,  as above, unchanged.        Electronically signed by:  PRISCILLA Bustillo MD  11/5/2018 6:56  PM  CST Workstation: [id number]         Imaging Results (last 72 hours)     Procedure Component Value Units Date/Time    US Abdomen Limited [[id number]] Collected:  11/24/18 0845     Updated:  11/24/18 1015    Narrative:         PROCEDURE: US ABDOMEN LIMITED    TECHNIQUE: Limited scanning of the abdomen to assess for ascites.    COMPARISON: None    HISTORY: worsening ascites, R41.82 Altered mental status,  unspecified K72.90 Hepatic failure, unspecified without coma  Z74.09 Other reduced mobility Z74.09 Other reduced mobility    FINDINGS  There is a small to moderate amount of ascites in the abdomen.  The liver appears cirrhotic.      Impression:       CONCLUSION:   Small to moderate amount of ascites in the abdomen.    Electronically signed by:  Jose Boudreaux MD  11/24/2018 10:14 AM  CST Workstation: [id number]          I reviewed the patient's new clinical results.  I reviewed the patient's new imaging results and agree with the interpretation.  I reviewed the patient's other test results and agree with the interpretation      Assessment/Plan       Acute respiratory failure with hypoxia (CMS/HCC)    Liver cirrhosis (CMS/HCC)    Liver failure (CMS/HCC)    Thrombocytopenia (CMS/HCC)    Hepatic encephalopathy (CMS/HCC)    CKD (chronic kidney disease) stage 3, GFR 30-59 ml/min (CMS/HCC)    Chronic hepatitis C (CMS/HCC)    Perinephric abscess    Chronic anemia      Assessment & Plan    1. Difficulty urinating with hematuria, likely related to UTI hemorrhagic cystitis without clots-->hematuria resolved-->Sheppard anchored yesterday, no hematuria present or signs of catheter related trauma  2. Perinephric abscess status post CT guided drainage of left perinephric abscess 11/9/18-->Enterococcus faecium and candida  -12 days of antibiotic therapy complete (vancomycin and Zyvox) and 10 days of (Diflucan), COMPLETE  -Vancomycin 11/11/18 to 11/12/18 then Zyvox 11/13/18 to 11/14/18, Vanc again 11/15/18 to 11/19/18, Zyvox restarted 11/20/18  to 11/22/18    -Estimated Creatinine Clearance: 51.6 mL/min (A) (by C-G formula based on SCr of 1.97 mg/dL (H)).  -WBC 4.79-->5.12-->7.74  -UA + 10/30/18  -Hgb/Hct 7.2/20.3-->6.8/19.4-->7.1/20.6 (2 units of PRBCs 11/21/18, 11/25/18)  -Platelets 31,000-->26,000-->20,000-->16,000-->15,000-->8,000 (platelets to be transfused today)  -INR 2.66-->3.38-->2.40-->3.36-->3.1-->2.97-->3.00  -Renal ultrasound 10/30/18 limited exam, especially of the left kidney, due to the presence of bowel gas and the patient's body habitus. No hydronephrosis is visualized. Unremarkable bladder.   -11/2/18 CT abdomen/pelvis LEFT 2.3 x 3.7 x 2.2 cm fluid and air collection infected subcapsular hematoma versus abscess.  -11/5/18 CT abdomen/pelvis showed LEFT renal abscess unchanged  -10/30/18 Urine culture NEGATIVE, UA grossly positive.   -abdominal ultrasound 11/24/18, ascites    Plan:    Closely monitor labs and for bleeding.    Platelets to be transfused today.   Sheppard is place, closely monitor for hematuria, keep Shepaprd anchored.     KALEB Alexander  11/26/18  12:48 PM

## 2018-11-27 NOTE — PLAN OF CARE
Problem: Patient Care Overview  Goal: Plan of Care Review  Outcome: Ongoing (interventions implemented as appropriate)   11/27/18 0551   Coping/Psychosocial   Plan of Care Reviewed With patient   Plan of Care Review   Progress declining   OTHER   Outcome Summary Lambert became extremely SOA and restless early in the night. Per Dr. Rae's orders, he was given 40 mg Lasix, 5 mg. Lopressor for HR in 170s, and was placed on CPAP. Patient has been agitated off and on since then, but has had no other problems while he remains on the CPAP     Goal: Individualization and Mutuality  Outcome: Ongoing (interventions implemented as appropriate)    Goal: Discharge Needs Assessment  Outcome: Ongoing (interventions implemented as appropriate)    Goal: Interprofessional Rounds/Family Conf  Outcome: Ongoing (interventions implemented as appropriate)      Problem: Fall Risk (Adult)  Goal: Absence of Fall  Outcome: Ongoing (interventions implemented as appropriate)      Problem: Skin Injury Risk (Adult)  Goal: Skin Health and Integrity  Outcome: Ongoing (interventions implemented as appropriate)      Problem: Anemia (Adult)  Goal: Symptom Improvement  Outcome: Ongoing (interventions implemented as appropriate)      Problem: Liver Failure, Acute/Chronic (Adult)  Goal: Signs and Symptoms of Listed Potential Problems Will be Absent, Minimized or Managed (Liver Failure, Acute/Chronic)  Outcome: Ongoing (interventions implemented as appropriate)      Problem: ARDS (Acute Resp Distress Syndrome) (Adult)  Goal: Signs and Symptoms of Listed Potential Problems Will be Absent, Minimized or Managed (ARDS)  Outcome: Ongoing (interventions implemented as appropriate)

## 2018-11-27 NOTE — SIGNIFICANT NOTE
"Patient had a significant drop in blood pressure.  Patient was seen and examined.  Diagnosis, prognosis, treatment options and possible/probable outcomes were discussed with family.  After discussion the family decided to change patient to \"No CPR\" effective immediately with plan to transition to full comfort measures later this evening when the remainder of the family arrives.  We will continue current treatments, but we will not escalate therapy.  Patient still doesn't have a blood pressure obtainable by blood pressure cuff.  We will not add pressors.  Family is aware and agrees with treatment plan.        This document has been electronically signed by Ed Franco MD on November 27, 2018 4:31 PM      "

## 2018-11-27 NOTE — PROGRESS NOTES
"Intensive Care Follow-up      LOS: 29 days     Mr. Armando Mcmullen Sr., 53 y.o. male is followed for: Respiratory failure      CHIEF COMPLIANT: Shortness of breath    Subjective - Interval History     This gentleman is 53 years old and has a long history of liver failure from hepatitis.  He's had previous admissions for altered mental state.  Recently he has developed shortness of breath and has developed hepatorenal and Opana pulmonary syndromes.  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  Also has recently developed respiratory failure requiring BiPAP and anemia and thrombocytopenia.  He is also developed renal failure with elevated BUN and creatinine.  Describe any COPD in the past but is been a smoker.  Not producing purulent sputum The patient's relevant past medical, surgical and social history were reviewed and updated in Epic as appropriate.     Objective   /83   Pulse 86   Temp 97 °F (36.1 °C) (Temporal)   Resp 28   Ht 182.9 cm (72\")   Wt 98.4 kg (216 lb 14.9 oz)   SpO2 94%   BMI 29.42 kg/m²   ROS    Constitutional-no night sweats weight loss headaches  GI no abdominal pain nausea or diarrhea  Neuro no seizure or neurologic deficits  Musculoskeletal no deformity or joint pain   no dysuria or hematuria  Skin no rash or other lesions  All other systems reviewed and were negative except for the above.      Vent Settings: FiO2 (%):  [65 %-75 %] 65 %    Physical Exam: Chronically ill-appearing dyspneic white male using a BiPAP machine    General Appearance:       Head:    Normocephalic, without obvious abnormality, atraumatic   Eyes:            Lids and lashes normal, conjunctivae and sclerae normal, no   icterus, no pallor, corneas clear, PERRLA   Ears:    Ears appear intact with no abnormalities noted   Throat:   No oral lesions, no thrush, oral mucosa moist   Neck:   No adenopathy, supple, trachea midline, no thyromegaly, no   carotid bruit, no JVD   Back:     No kyphosis present, no scoliosis present, no skin " lesions,      erythema or scars, no tenderness to percussion or                   palpation,   range of motion normal   Lungs:   Diminished breath sounds with rales in the right lung     Heart:    Regular rhythm and normal rate, normal S1 and S2, no            murmur, no gallop, no rub, no click   Chest Wall:    No abnormalities observed   Abdomen:     Normal bowel sounds, no masses, no organomegaly, soft        non-tender, non-distended, no guarding, no rebound                tenderness   Rectal:     Deferred   Extremities:   Moves all extremities well, no edema, no cyanosis, no             redness   Pulses:   Pulses palpable and equal bilaterally   Skin:   No bleeding, bruising or rash   Lymph nodes:   No palpable adenopathy         LAB:    pH, Arterial   Date Value Ref Range Status   11/25/2018 7.411 7.350 - 7.450 pH units Final     pCO2, Arterial   Date Value Ref Range Status   11/25/2018 33.3 (L) 35.0 - 45.0 mm Hg Final     Comment:     84 Value below reference range     pO2, Arterial   Date Value Ref Range Status   11/25/2018 95.1 83.0 - 108.0 mm Hg Final     Lab Results   Component Value Date    WBC 8.15 11/27/2018    HGB 6.8 (L) 11/27/2018    HCT 20.7 (L) 11/27/2018    .5 (H) 11/27/2018    PLT 59 (L) 11/27/2018     Lab Results   Component Value Date    GLUCOSE 85 11/27/2018    BUN 77 (H) 11/27/2018    CREATININE 2.08 (H) 11/27/2018    EGFRIFNONA 33 (L) 11/27/2018    BCR 37.0 (H) 11/27/2018    CO2 14.0 (L) 11/27/2018    CALCIUM 11.2 (H) 11/27/2018    ALBUMIN 2.10 (L) 10/31/2018    AST 47 10/31/2018    ALT 26 10/31/2018         RAD:   Imaging Results (last 24 hours)     Procedure Component Value Units Date/Time    XR Chest 1 View [[id number]] Collected:  11/26/18 2204     Updated:  11/26/18 2231    Narrative:         [facility] [place]    Patient Name: LAMONT FISHER    ATTENDING: LEE MALHOTRA     REFERRING: LAMONT CONDE    ORDERING: AMAYA  LAMONT    -----------------------    PROCEDURE: Chest, AP portable    Date of exam: 11/26/2018 at 2204    HISTORY: Shortness of air    A single portable view of the chest was obtained. Study is  compared to previous chest of 11/25/2018.    Cardiomegaly with generalized vascular congestion and left  basilar infiltrate and effusion are seen without change from  prior exam. Evidence of previous kyphoplasty in the thoracic  spine.      Impression:       No change from earlier exam.    Electronically signed by:  Pramod Grant MD  11/26/2018 10:30 PM  CST Workstation: [id number]         Impression      Active Hospital Problems    Diagnosis   • **Acute respiratory failure with hypoxia (CMS/HCC)   • Chronic hepatitis C (CMS/HCC)   • Perinephric abscess   • Chronic anemia   • CKD (chronic kidney disease) stage 3, GFR 30-59 ml/min (CMS/HCC)   • Hepatic encephalopathy (CMS/HCC)   • Thrombocytopenia (CMS/HCC)   • Liver failure (CMS/HCC)   • Liver cirrhosis (CMS/HCC)            Plan        Assessment chronic renal failure with a PET (hepatorenal syndromes with pulmonary congestion, hypoxemia, hypoxemic respiratory failure, anemia, thrombocytopenia.    Recommendations the situation looks a bleak, which continue his measures including BiPAP.  As patient would probably benefit from DNR status and hospice, will follow        This document has been produced with the assistance of Dragon dictation  This document has been electronically signed by Óscar Clemente MD on November 27, 2018 11:45 AM       I discussed the patient's findings and my recommendations with patient and nursing staff

## 2018-11-27 NOTE — PLAN OF CARE
Problem: Patient Care Overview  Goal: Plan of Care Review  Outcome: Ongoing (interventions implemented as appropriate)   11/27/18 8877   Coping/Psychosocial   Plan of Care Reviewed With patient   Plan of Care Review   Progress improving   OTHER   Outcome Summary Patient placed on ventilator. TF not started d/t bloody discharge from NG tube. RN will monitor discharge from NG tube, and if patient becomes able to tolerate TF, RN will initiate. If patient is able to tolerate TF, RD recommends [place] with goal rate of 50cc/hr.
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
## 2018-11-27 NOTE — CONSULTS
Adult Nutrition  Assessment    Patient Name:  Armando Mcmullen Sr.  YOB: 1965  MRN: [id number]  Admit Date:  10/29/2018    Assessment Date:  11/27/2018    Comments:  RD TF consult ordered. Patient placed on ventilator d/t respiratory distress. TF not started d/t bloody discharge from NG tube. RN will monitor discharge from NG tube, and if patient becomes able to tolerate TF, RN will initiate. If patient is able to tolerate TF, RD recommends Suplena with goal rate of 50cc/hr, which provides 100% calculated energy and protein needs at 2,160 kcals and 54 grams protein.      Reason for Assessment     Row Name 11/27/18 1453          Reason for Assessment    Reason For Assessment  TF/PN;physician consult  (Pended)          Nutrition/Diet History     Row Name 11/27/18 145          Nutrition/Diet History    Typical Food/Fluid Intake  RD TF consult ordered. Patient placed on ventilator d/t respiratory distress. TF not to be started d/t bloody discharge from NG tube. RN will monitor discharge from NG tube, and if patient becmoes able to tolerate TF, RN will initiate. If patient is able to tolerate TF, RD recommends Suplena with goal rate of 50cc/hr, which provides 100% calculated energy and protein needs at 2,160 kcals and 54 grams protein.    (Pended)            Labs/Tests/Procedures/Meds     Row Name 11/27/18 1096          Labs/Procedures/Meds    Lab Results Reviewed  reviewed, pertinent  (Pended)      Lab Results Comments  K+-5.2, BUN-77, Cr-2.08, Ammonia-9   (Pended)         Diagnostic Tests/Procedures    Diagnostic Test/Procedure Reviewed  reviewed, pertinent  (Pended)      Diagnostic Test/Procedures Comments  ventilated, NG tube placed   (Pended)         Medications    Pertinent Medications Reviewed  reviewed  (Pended)          Physical Findings     Row Name 11/27/18 0417          Physical Findings    Overall Physical Appearance  amputee;on ventilator support  (Pended)      Gastrointestinal   ascites;abdominal distension  (Pended)      Tubes  nasogastric tube  (Pended)      Skin  pressure injury  (Pended)          Estimated/Assessed Needs     Row Name 11/27/18 1421          Calculation Measurements    Weight Used For Calculations  85 kg (187 lb 5 oz)  (Pended)         Estimated/Assessed Needs    Additional Documentation  [place]-St. Jeor Equation (Group);Calorie Requirements (Group);KCAL/KG (Group);Fluid Requirements (Group);Protein Requirements (Group)  (Pended)         Calorie Requirements    Weight Used For Calorie Calculations  85 kg (187 lb 5 oz)  (Pended)      Estimated Calorie Requirement (kcal/day)  2100  (Pended)         KCAL/KG    14 Kcal/Kg (kcal)  1189.5  (Pended)      15 Kcal/Kg (kcal)  1274.46  (Pended)      18 Kcal/Kg (kcal)  1529.35  (Pended)      20 Kcal/Kg (kcal)  1699.28  (Pended)      25 Kcal/Kg (kcal)  2124.1  (Pended)      30 Kcal/Kg (kcal)  2548.92  (Pended)      35 Kcal/Kg (kcal)  2973.74  (Pended)      40 Kcal/Kg (kcal)  3398.56  (Pended)      45 Kcal/Kg (kcal)  3823.38  (Pended)      50 Kcal/Kg (kcal)  4248.2  (Pended)         [place]-St. Jeor Equation    RMR (Sal[place]-St. Jeor Equation)  1727.64  (Pended)         Protein Requirements    Weight Used For Protein Calculations  85 kg (187 lb 5 oz)  (Pended)      Est Protein Requirement Amount (gms/kg)  0.6 gm protein  (Pended)      Estimated Protein Requirements (gms/day)  50.98  (Pended)         Fluid Requirements    Estimated Fluid Requirements (mL/day)  2100  (Pended)      RDA Method (mL)  2100  (Pended)      Chelsie-Sandoval Method (over 20 kg)  3199.28  (Pended)          Nutrition Prescription Ordered     Row Name 11/27/18 1456          Nutrition Prescription PO    Current PO Diet  NPO  (Pended)         Nutrition Prescription EN    Enteral Route  NG  (Pended)      Product  Other (comment)  (Pended)  Suplena (1.8)     Modulars  None  (Pended)      TF Delivery Method  Continuous  (Pended)      Continuous TF Goal Rate (mL/hr)  50  mL/hr  (Pended)      Water flush (mL)   25 mL  (Pended)      Water Flush Frequency  Per hour  (Pended)          Evaluation of Received Nutrient/Fluid Intake     Row Name 11/27/18 1421          Calculation Measurements    Weight Used For Calculations  85 kg (187 lb 5 oz)  (Pended)          Evaluation of Prescribed Nutrient/Fluid Intake     Row Name 11/27/18 1421          Calculation Measurements    Weight Used For Calculations  85 kg (187 lb 5 oz)  (Pended)              Electronically signed by:  Aida Aviles  11/27/18 2:58 PM

## 2018-11-27 NOTE — ANESTHESIA PROCEDURE NOTES
Airway  Urgency: emergent    Date/Time: 11/27/2018 12:40 PM  End Time:11/27/2018 12:45 PM  Airway not difficult    General Information and Staff    Patient location during procedure: ICU  Anesthesiologist: Rodriguez Gomez MD    Consent for Airway (if performed for an anesthetic, see related documentation for consents)  Consent: The procedure was performed in an emergent situation. Verbal consent not obtained. Written consent not obtained.  Risks and benefits: risks, benefits and alternatives were not discussed      Indications and Patient Condition  Indications for airway management: airway protection    Preoxygenated: yes  MILS maintained throughout  Mask difficulty assessment: 0 - not attempted    Final Airway Details  Final airway type: endotracheal airway      Successful airway: ETT  Cuffed: yes   Successful intubation technique: video laryngoscopy  Facilitating devices/methods: intubating stylet  Endotracheal tube insertion site: oral  Blade: Sanchez  Blade size: 4  ETT size (mm): 8.0  Cormack-Lehane Classification: grade I - full view of glottis  Placement verified by: chest auscultation and capnometry   Measured from: teeth  ETT to teeth (cm): 22  Number of attempts at approach: 2

## 2018-11-27 NOTE — PROGRESS NOTES
[facility] Medicine Services  INPATIENT PROGRESS NOTE    Length of Stay: 29  Date of Admission: 10/29/2018  Primary Care Physician: Reddy Grant MD    Subjective   Chief Complaint: Shortness of air.  HPI:  Patient is seen for follow-up today.  He has his history of end-stage liver disease and was transferred to ICU due to increasing agitation and respiratory distress.  He is feeling better and his mental status is back to baseline.  He has been on BiPAP since last night due to worsening respiratory distress.    Review of Systems   Constitutional: Positive for activity change, appetite change and fatigue. Negative for chills, diaphoresis and fever.   HENT: Negative for trouble swallowing and voice change.    Eyes: Negative for photophobia and visual disturbance.   Respiratory: Positive for shortness of breath. Negative for cough, choking, chest tightness, wheezing and stridor.    Cardiovascular: Negative for chest pain, palpitations and leg swelling.   Gastrointestinal: Negative for abdominal distention, abdominal pain, blood in stool, constipation, diarrhea, nausea and vomiting.   Endocrine: Negative for cold intolerance, heat intolerance, polydipsia, polyphagia and polyuria.   Genitourinary: Negative for decreased urine volume, difficulty urinating, dysuria, enuresis, flank pain, frequency, hematuria and urgency.   Musculoskeletal: Negative for arthralgias, gait problem, myalgias, neck pain and neck stiffness.   Skin: Negative for pallor, rash and wound.   Neurological: Negative for dizziness, tremors, seizures, syncope, facial asymmetry, speech difficulty, weakness, light-headedness, numbness and headaches.   Hematological: Bruises/bleeds easily.   Psychiatric/Behavioral: Negative for agitation, behavioral problems and confusion.       Objective    Temp:  [96.8 °F (36 °C)-98.1 °F (36.7 °C)] 97 °F (36.1 °C)  Heart Rate:  [] 86  Resp:  [11-34] 28  BP:  (122-162)/(70-97) 148/83  FiO2 (%):  [65 %-75 %] 65 %    Physical Exam   Constitutional: He is oriented to person, place, and time. He appears well-developed and well-nourished. No distress.   HENT:   Head: Normocephalic and atraumatic.   Eyes: EOM are normal. Pupils are equal, round, and reactive to light. No scleral icterus.   Neck: Normal range of motion. Neck supple. No JVD present. No thyromegaly present.   Cardiovascular: Normal rate, regular rhythm and normal heart sounds. Exam reveals no gallop and no friction rub.   No murmur heard.  Pulmonary/Chest: Effort normal. He has decreased breath sounds. He has no wheezes. He has rhonchi. He has no rales. He exhibits no tenderness.   Abdominal: Soft. Bowel sounds are normal. He exhibits distension and ascites. He exhibits no mass. There is no tenderness. There is no rebound and no guarding.   Musculoskeletal: He exhibits deformity. He exhibits no edema or tenderness.   Neurological: He is alert and oriented to person, place, and time. No cranial nerve deficit. He exhibits normal muscle tone. Coordination normal.   Skin: Skin is warm and dry. No rash noted. He is not diaphoretic. No erythema. No pallor.   Psychiatric: He has a normal mood and affect. His behavior is normal. Judgment and thought content normal.   Nursing note and vitals reviewed.  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       Medication Review:    Current Facility-Administered Medications:   •  albumin human 5 % bottle 25 g, 25 g, Intravenous, Q12H, Solomon Gomez PA, Last Rate: 250 mL/hr at 11/27/18 0618, 25 g at 11/27/18 0618  •  furosemide (LASIX) injection 20 mg, 20 mg, Intravenous, Q12H, Allen Hercules MD, 20 mg at 11/27/18 0619  •  HYDROmorphone (DILAUDID) injection 0.5 mg, 0.5 mg, Intravenous, Q2H PRN, Allen Hercules MD, 0.5 mg at 11/26/18 7817  •  ipratropium-albuterol (DUO-NEB) nebulizer solution 3 mL, 3 mL, Nebulization, Q4H - RT, Allen Hercules MD, 3 mL at 11/27/18 1042  •  lactulose  (CHRONULAC) 10 GM/15ML solution 30 g, 30 g, Oral, TID, Eduar Cisneros MD, 30 g at 11/27/18 0850  •  magic butt ointment, , Topical, BID, Armando Richard MD  •  Magnesium Sulfate 2 gram Bolus, followed by 8 gram infusion (total Mg dose 10 grams)- Mg less than or equal to 1mg/dL, 2 g, Intravenous, PRN **OR** Magnesium Sulfate 2 gram / 50mL Infusion (GIVE X 3 BAGS TO EQUAL 6GM TOTAL DOSE) - Mg 1.1 - 1.5 mg/dl, 2 g, Intravenous, PRN **OR** Magnesium Sulfate 4 gram infusion- Mg 1.6-1.9 mg/dL, 4 g, Intravenous, PRN, Solomon Gomez PA, Last Rate: 25 mL/hr at 11/07/18 2157, 4 g at 11/07/18 2157  •  [DISCONTINUED] morphine injection 1 mg, 1 mg, Intravenous, Q4H PRN, 1 mg at 11/01/18 0942 **AND** naloxone (NARCAN) injection 0.4 mg, 0.4 mg, Intravenous, Q5 Min PRN, Eduar Cisneros MD  •  ondansetron (ZOFRAN) injection 4 mg, 4 mg, Intravenous, Q6H PRN, Eduar Cisneros MD, 4 mg at 11/26/18 1238  •  pantoprazole (PROTONIX) injection 40 mg, 40 mg, Intravenous, Q AM, Allen Hercules MD, 40 mg at 11/27/18 0618  •  potassium chloride 10 mEq in 100 mL IVPB, 10 mEq, Intravenous, Q1H PRN, Solomon Gomez PA, Last Rate: 100 mL/hr at 11/09/18 0801, 10 mEq at 11/09/18 0801  •  promethazine (PHENERGAN) injection 12.5 mg, 12.5 mg, Intravenous, Q6H PRN, Allen Hercules MD, 12.5 mg at 11/25/18 1532  •  rifaximin (XIFAXAN) tablet 275 mg, 275 mg, Oral, Q12H, Eduar Cisneros MD, 275 mg at 11/27/18 0846  •  simethicone (MYLICON) chewable tablet 80 mg, 80 mg, Oral, Q6H PRN, Eduar Cisneros MD, 80 mg at 11/16/18 1309  •  sodium bicarbonate tablet 650 mg, 650 mg, Oral, 4x Daily, Armando Richard MD, 650 mg at 11/27/18 0845  •  sodium chloride (OCEAN) nasal spray 1 spray, 1 spray, Each Nare, PRN, Beau Celeste MD  •  sodium chloride 0.9 % flush 10 mL, 10 mL, Intravenous, Q12H, VinhAnderson T, DO, 10 mL at 11/27/18 0851  •  sodium chloride 0.9 % flush 10 mL, 10 mL, Intravenous, Q12H, Vinh Anderson T, DO, 10 mL at  11/27/18 0850  •  sodium chloride 0.9 % flush 10 mL, 10 mL, Intravenous, PRN, Vinh, Anderson T, DO, 10 mL at 11/21/18 0918  •  sodium chloride 0.9 % flush 20 mL, 20 mL, Intravenous, PRN, Vinh, Anderson T, DO, 20 mL at 11/24/18 0949  •  sodium chloride 0.9 % flush 3 mL, 3 mL, Intravenous, Q12H, Eduar Cisneros MD, 3 mL at 11/25/18 2028  •  sodium chloride 0.9 % flush 3-10 mL, 3-10 mL, Intravenous, PRN, Eduar Cisneros MD, 10 mL at 11/13/18 0637    Results Review:  I have reviewed the labs, radiology results, and diagnostic studies.    Laboratory Data:   Results from last 7 days   Lab Units  11/27/18   0311  11/26/18   0247  11/25/18   0650   SODIUM mmol/L  138  136*  136*   POTASSIUM mmol/L  5.2*  4.3  4.1   CHLORIDE mmol/L  91*  92*  91*   CO2 mmol/L  14.0*  16.0*  24.0   BUN mg/dL  77*  64*  54*   CREATININE mg/dL  2.08*  1.97*  1.69*   GLUCOSE mg/dL  85  93  84   CALCIUM mg/dL  11.2*  10.5*  10.3*   ANION GAP mmol/L  33.0*  28.0*  21.0*     Estimated Creatinine Clearance: 49.3 mL/min (A) (by C-G formula based on SCr of 2.08 mg/dL (H)).          Results from last 7 days   Lab Units  11/27/18   0311  11/26/18   1844  11/26/18   0247  11/25/18   1930  11/25/18   0650  11/24/18   0534   WBC 10*3/mm3  8.15  7.33  7.74   --   5.12  4.79   HEMOGLOBIN g/dL  6.8*  7.1*  7.1*  7.6*  6.8*  7.2*   HEMATOCRIT %  20.7*  20.1*  20.6*  22.3*  19.4*  20.3*   PLATELETS 10*3/mm3  59*  72*  8*   --   15*  16*     Results from last 7 days   Lab Units  11/25/18   0650  11/24/18   0534  11/23/18   0611  11/22/18   0539  11/21/18   0729   INR   3.00*  2.97*  3.10*  3.36*  2.40*       Culture Data:   No results found for: BLOODCX  No results found for: URINECX  No results found for: RESPCX  No results found for: WOUNDCX  No results found for: STOOLCX  No components found for: BODYFLD    Radiology Data:   Imaging Results (last 24 hours)     Procedure Component Value Units Date/Time    XR Chest 1 View [[id number]] Collected:   11/26/18 2204     Updated:  11/26/18 2231    Narrative:         [facility] [place]    Patient Name: LAMONT FISHER    ATTENDING: LEE MALHOTRA     REFERRING: LAMONT CONDE    ORDERING: LAMONT CONDE    -----------------------    PROCEDURE: Chest, AP portable    Date of exam: 11/26/2018 at 2204    HISTORY: Shortness of air    A single portable view of the chest was obtained. Study is  compared to previous chest of 11/25/2018.    Cardiomegaly with generalized vascular congestion and left  basilar infiltrate and effusion are seen without change from  prior exam. Evidence of previous kyphoplasty in the thoracic  spine.      Impression:       No change from earlier exam.    Electronically signed by:  Pramod Grant MD  11/26/2018 10:30 PM  CST Workstation: [employer]          I have reviewed the patient's current medications.     Assessment/Plan     Hospital Problem List:  Principal Problem:   - Acute respiratory failure with hypoxia (likely due to pulmonary vascular congestion/ possible diastolic heart failure/ hepatopulmonary syndrome): Continue non invasive resporatory therapy, diuretics, bronchodilators and consult pulmonary.   Echocardiogram done August 3, 2018 showed:  · Left ventricular wall thickness is consistent with mild concentric hypertrophy.  · Left ventricular systolic function is normal. Estimated EF = 63%.  · Left ventricular diastolic dysfunction (grade I) consistent with impaired relaxation.  · Left atrial cavity size is moderately dilated.  · There is calcification of the aortic valve.  · Mild aortic valve stenosis is present.  · Mild tricuspid valve regurgitation is present.  · Mild dilation of the aortic root is present.        -  Liver cirrhosis secondary to chronic hepatitis C complicated by anemia, severe thrombocytopenia and elevated INR: Platelet count is improved following transfusion of platelets (chronic thrombocytopenia did worsen due to IV Zyvox which was  stopped on 11/22/2018).  Patient is scheduled to receive transfusion of one unit of packed red blood cells today due to drop in hemoglobin.  Continue to monitor hemoglobin and transfuse packed red blood cells as the need arises.  Continue lactulose and Xifaxan.  GI is following.  - CKD (chronic kidney disease) stage 3 (? hepatorenal syndrome): Creatinine is mostly at baseline.  Will continue to monitor and consult nephrologist if the need arises.   -Perinephric abscess: Patient has completed a course of antimicrobial therapy.  Sheppard catheter remains in place with no gross hematuria and urology is following.  - Continue GI and DVT prophylaxis.   - Overall prognosis is poor.  Hospice was discussed with the patient but patient declined and wants to remain a full code.        Discharge Planning: In progress.    Armando Richard MD   11/27/18   12:24 PM    1320 hours: Was notified that patient reportedly went into agonal rhythm with severe respiratory distress.  Patient was emergently intubated.  Postop intubation orders were written family was notified.

## 2018-11-28 LAB
ABO + RH BLD: NORMAL
BH BB BLOOD EXPIRATION DATE: NORMAL
BH BB BLOOD TYPE BARCODE: 600
BH BB DISPENSE STATUS: NORMAL
BH BB PRODUCT CODE: NORMAL
BH BB UNIT NUMBER: NORMAL
UNIT  ABO: NORMAL
UNIT  RH: NORMAL

## 2018-11-28 NOTE — DISCHARGE SUMMARY
[facility] Medicine Services  DEATH SUMMARY       Date of Admission: 10/29/2018  Date of Death:  11/27/2018 at approximately 1748 hours  Primary Care Physician: Reddy Grant MD    Presenting Problem/History of Present Illness:  Hepatic encephalopathy (CMS/HCC) [K72.90]  Altered mental status, unspecified altered mental status type [R41.82]   53-year-old male with history of chronic hepatitis C complicated by cirrhosis with end-stage liver disease, chronic kidney disease who seen in the emergency department due to altered level of consciousness.  Patient was noted to have elevated ammonia level in the emergency department.  CT scan of the head was unremarkable.  He was admitted for hepatic encephalopathy.  Final Death Diagnoses:  Active Hospital Problems    Diagnosis   • **Acute respiratory failure with hypoxia (CMS/HCC)   • Chronic hepatitis C (CMS/HCC)   • Perinephric abscess   • Chronic anemia   • CKD (chronic kidney disease) stage 3, GFR 30-59 ml/min (CMS/HCC)   • Hepatic encephalopathy (CMS/HCC)   • Thrombocytopenia (CMS/HCC)   • Liver failure (CMS/HCC)   • Liver cirrhosis (CMS/HCC)       Consults:   Consults     Date and Time Order Name Status Description    11/7/2018 1028 Hematology & Oncology Inpatient Consult Completed     11/4/2018 1231 Inpatient Gastroenterology Consult Completed     10/30/2018 1314 Inpatient Urology Consult Completed     10/29/2018 1911 Hospitalist (on-call MD unless specified)            Procedures Performed: None.                Pertinent Test Results:   Lab Results (last 7 days)     Procedure Component Value Units Date/Time    Respiratory Culture - Sputum, ET Suction [[id number]] Collected:  11/27/18 1327    Specimen:  Sputum from ET Suction Updated:  11/27/18 1400     Gram Stain Many (4+) WBCs per low power field      Few (2+) Epithelial cells per low power field      Many (4+) Gram negative bacilli    Blood Gas, Arterial [[id number]]   (Abnormal) Collected:  11/27/18 1325    Specimen:  Arterial Blood Updated:  11/27/18 1337     Site Right Radial     Aren's Test N/A     pH, Arterial 6.972 pH units      Comment: 85 Value below critical limit        pCO2, Arterial 39.1 mm Hg      pO2, Arterial 64.1 mm Hg      Comment: 84 Value below reference range        HCO3, Arterial 9.0 mmol/L      Comment: 84 Value below reference range        Base Excess, Arterial -21.0 mmol/L      Comment: 84 Value below reference range        O2 Saturation, Arterial 78.5 %      Comment: 84 Value below reference range        Barometric Pressure for Blood Gas 749 mmHg      Modality Ventilator     FIO2 100 %      Ventilator Mode AC     Set Tidal Volume 600     Set Mech Resp Rate 14.0     PEEP 10.0     Collected by DELVIS Guaman     Comment: Meter: [id number]     :  [id number]       CBC & Differential [[id number]] Collected:  11/27/18 0311    Specimen:  Blood Updated:  11/27/18 0423    Narrative:       The following orders were created for panel order CBC & Differential.  Procedure                               Abnormality         Status                     ---------                               -----------         ------                     CBC Auto Differential[[id number]]        Abnormal            Final result                 Please view results for these tests on the individual orders.    CBC Auto Differential [[id number]]  (Abnormal) Collected:  11/27/18 0311    Specimen:  Blood Updated:  11/27/18 0423     WBC 8.15 10*3/mm3      RBC 2.04 10*6/mm3      Hemoglobin 6.8 g/dL      Hematocrit 20.7 %      .5 fL      MCH 33.3 pg      MCHC 32.9 g/dL      RDW 19.2 %      RDW-SD 68.8 fl      MPV 11.0 fL      Platelets 59 10*3/mm3      Neutrophil % 90.1 %      Lymphocyte % 6.3 %      Monocyte % 2.9 %      Eosinophil % 0.0 %      Basophil % 0.1 %      Immature Grans % 0.6 %      Neutrophils, Absolute 7.34 10*3/mm3      Lymphocytes, Absolute 0.51 10*3/mm3      Monocytes,  Absolute 0.24 10*3/mm3      Eosinophils, Absolute 0.00 10*3/mm3      Basophils, Absolute 0.01 10*3/mm3      Immature Grans, Absolute 0.05 10*3/mm3      nRBC 0.0 /100 WBC     Basic Metabolic Panel [[id number]]  (Abnormal) Collected:  11/27/18 0311    Specimen:  Blood Updated:  11/27/18 0401     Glucose 85 mg/dL      BUN 77 mg/dL      Creatinine 2.08 mg/dL      Sodium 138 mmol/L      Potassium 5.2 mmol/L      Chloride 91 mmol/L      CO2 14.0 mmol/L      Calcium 11.2 mg/dL      eGFR Non African Amer 33 mL/min/1.73      BUN/Creatinine Ratio 37.0     Anion Gap 33.0 mmol/L     Ammonia [[id number]]  (Abnormal) Collected:  11/27/18 0311    Specimen:  Blood Updated:  11/27/18 0358     Ammonia <9 umol/L     Scan Slide [[id number]] Collected:  11/26/18 1844    Specimen:  Blood Updated:  11/26/18 2102     Anisocytosis Mod/2+     Hypochromia Mod/2+     Poikilocytes Slight/1+     Comment: WITH FEW EACH OF ACANTHOCYTES AND ECHINOCYTES AND RARE SCHISTOCYTE         WBC Morphology Normal     Platelet Estimate Decreased    CBC (No Diff) [[id number]]  (Abnormal) Collected:  11/26/18 1844    Specimen:  Blood Updated:  11/26/18 1901     WBC 7.33 10*3/mm3      RBC 2.10 10*6/mm3      Hemoglobin 7.1 g/dL      Hematocrit 20.1 %      MCV 95.7 fL      MCH 33.8 pg      MCHC 35.3 g/dL      RDW 18.4 %      RDW-SD 63.1 fl      MPV 10.8 fL      Platelets 72 10*3/mm3     CBC & Differential [[id number]] Collected:  11/26/18 0247    Specimen:  Blood Updated:  11/26/18 0329    Narrative:       The following orders were created for panel order CBC & Differential.  Procedure                               Abnormality         Status                     ---------                               -----------         ------                     Scan Slide[[id number]]                                       Final result               CBC Auto Differential[[id number]]        Abnormal            Final result                 Please view results for these tests on the  individual orders.    Scan Slide [[id number]] Collected:  11/26/18 0247    Specimen:  Blood Updated:  11/26/18 0329     Anisocytosis Slight/1+     Hypochromia Slight/1+     Macrocytes Slight/1+     Poikilocytes Slight/1+     Target Cells Slight/1+     WBC Morphology Normal     Platelet Estimate Decreased    CBC Auto Differential [[id number]]  (Abnormal) Collected:  11/26/18 0247    Specimen:  Blood Updated:  11/26/18 0316     WBC 7.74 10*3/mm3      RBC 2.13 10*6/mm3      Hemoglobin 7.1 g/dL      Hematocrit 20.6 %      MCV 96.7 fL      MCH 33.3 pg      MCHC 34.5 g/dL      RDW 19.2 %      RDW-SD 65.4 fl      MPV -- fL      Comment: INSTRUMENT UNABLE TO CALCULATE RESULTS        Platelets 8 10*3/mm3      Neutrophil % 82.7 %      Lymphocyte % 9.6 %      Monocyte % 5.4 %      Eosinophil % 1.4 %      Basophil % 0.6 %      Immature Grans % 0.3 %      Neutrophils, Absolute 6.40 10*3/mm3      Lymphocytes, Absolute 0.74 10*3/mm3      Monocytes, Absolute 0.42 10*3/mm3      Eosinophils, Absolute 0.11 10*3/mm3      Basophils, Absolute 0.05 10*3/mm3      Immature Grans, Absolute 0.02 10*3/mm3      nRBC 0.0 /100 WBC     Basic Metabolic Panel [[id number]]  (Abnormal) Collected:  11/26/18 0247    Specimen:  Blood Updated:  11/26/18 0312     Glucose 93 mg/dL      BUN 64 mg/dL      Creatinine 1.97 mg/dL      Sodium 136 mmol/L      Potassium 4.3 mmol/L      Chloride 92 mmol/L      CO2 16.0 mmol/L      Calcium 10.5 mg/dL      eGFR Non African Amer 36 mL/min/1.73      BUN/Creatinine Ratio 32.5     Anion Gap 28.0 mmol/L     Ammonia [[id number]]  (Normal) Collected:  11/26/18 0247    Specimen:  Blood Updated:  11/26/18 0311     Ammonia 27 umol/L     Hemoglobin & Hematocrit, Blood [[id number]]  (Abnormal) Collected:  11/25/18 1930    Specimen:  Blood Updated:  11/25/18 1944     Hemoglobin 7.6 g/dL      Hematocrit 22.3 %     Blood Gas, Arterial [[id number]]  (Abnormal) Collected:  11/25/18 1700    Specimen:  Arterial Blood Updated:  11/25/18 4005      Site Right Radial     Aren's Test N/A     pH, Arterial 7.411 pH units      pCO2, Arterial 33.3 mm Hg      Comment: 84 Value below reference range        pO2, Arterial 95.1 mm Hg      HCO3, Arterial 21.1 mmol/L      Base Excess, Arterial -3.1 mmol/L      Comment: 84 Value below reference range        O2 Saturation, Arterial 98.5 %      Barometric Pressure for Blood Gas 736 mmHg      Modality NRB     FIO2 100 %      Ventilator Mode NA     Collected by niels     Comment: Meter: [id number]     :  [id number]       Extra Tubes [[id number]] Collected:  11/25/18 0650    Specimen:  Blood, Venous Line Updated:  11/25/18 0800    Narrative:       The following orders were created for panel order Extra Tubes.  Procedure                               Abnormality         Status                     ---------                               -----------         ------                     [facility][[id number]]                                   Final result                 Please view results for these tests on the individual orders.    [facility] [[id number]] Collected:  11/25/18 0650    Specimen:  Blood Updated:  11/25/18 0800     Extra Tube Hold for add-ons.     Comment: Auto resulted.       CBC & Differential [[id number]] Collected:  11/25/18 0650    Specimen:  Blood Updated:  11/25/18 0724    Narrative:       The following orders were created for panel order CBC & Differential.  Procedure                               Abnormality         Status                     ---------                               -----------         ------                     Scan Slide[[id number]]                                                                  CBC Auto Differential[[id number]]        Abnormal            Final result                 Please view results for these tests on the individual orders.    CBC Auto Differential [[id number]]  (Abnormal) Collected:  11/25/18 0650    Specimen:  Blood Updated:  11/25/18 0724     WBC 5.12  10*3/mm3      RBC 2.00 10*6/mm3      Hemoglobin 6.8 g/dL      Hematocrit 19.4 %      MCV 97.0 fL      MCH 34.0 pg      MCHC 35.1 g/dL      RDW 19.3 %      RDW-SD 67.0 fl      MPV 11.5 fL      Platelets 15 10*3/mm3      Neutrophil % 73.9 %      Lymphocyte % 13.7 %      Monocyte % 5.7 %      Eosinophil % 4.9 %      Basophil % 1.6 %      Immature Grans % 0.2 %      Neutrophils, Absolute 3.79 10*3/mm3      Lymphocytes, Absolute 0.70 10*3/mm3      Monocytes, Absolute 0.29 10*3/mm3      Eosinophils, Absolute 0.25 10*3/mm3      Basophils, Absolute 0.08 10*3/mm3      Immature Grans, Absolute 0.01 10*3/mm3     Basic Metabolic Panel [[id number]]  (Abnormal) Collected:  11/25/18 0650    Specimen:  Blood Updated:  11/25/18 0719     Glucose 84 mg/dL      BUN 54 mg/dL      Creatinine 1.69 mg/dL      Sodium 136 mmol/L      Potassium 4.1 mmol/L      Chloride 91 mmol/L      CO2 24.0 mmol/L      Calcium 10.3 mg/dL      eGFR Non African Amer 42 mL/min/1.73      BUN/Creatinine Ratio 32.0     Anion Gap 21.0 mmol/L     Ammonia [[id number]]  (Normal) Collected:  11/25/18 0650    Specimen:  Blood Updated:  11/25/18 0718     Ammonia 24 umol/L     Protime-INR [[id number]]  (Abnormal) Collected:  11/25/18 0650    Specimen:  Blood Updated:  11/25/18 0712     Protime 29.7 Seconds      INR 3.00    Narrative:       Therapeutic range for most indications is 2.0-3.0 INR,  or 2.5-3.5 for mechanical heart valves.    Protime-INR [[id number]]  (Abnormal) Collected:  11/24/18 0534    Specimen:  Blood Updated:  11/24/18 0631     Protime 29.5 Seconds      INR 2.97    Narrative:       Therapeutic range for most indications is 2.0-3.0 INR,  or 2.5-3.5 for mechanical heart valves.    CBC & Differential [[id number]] Collected:  11/24/18 0534    Specimen:  Blood Updated:  11/24/18 0620    Narrative:       The following orders were created for panel order CBC & Differential.  Procedure                               Abnormality         Status                      ---------                               -----------         ------                     Scan Slide[[id number]]                                                                  CBC Auto Differential[[id number]]        Abnormal            Final result                 Please view results for these tests on the individual orders.    CBC Auto Differential [[id number]]  (Abnormal) Collected:  11/24/18 0534    Specimen:  Blood Updated:  11/24/18 0620     WBC 4.79 10*3/mm3      RBC 2.12 10*6/mm3      Hemoglobin 7.2 g/dL      Hematocrit 20.3 %      MCV 95.8 fL      MCH 34.0 pg      MCHC 35.5 g/dL      RDW 19.5 %      RDW-SD 67.7 fl      MPV 11.0 fL      Platelets 16 10*3/mm3      Neutrophil % 63.6 %      Lymphocyte % 20.0 %      Monocyte % 8.1 %      Eosinophil % 7.3 %      Basophil % 0.8 %      Immature Grans % 0.2 %      Neutrophils, Absolute 3.04 10*3/mm3      Lymphocytes, Absolute 0.96 10*3/mm3      Monocytes, Absolute 0.39 10*3/mm3      Eosinophils, Absolute 0.35 10*3/mm3      Basophils, Absolute 0.04 10*3/mm3      Immature Grans, Absolute 0.01 10*3/mm3      nRBC 0.0 /100 WBC     Ammonia [[id number]]  (Abnormal) Collected:  11/24/18 0534    Specimen:  Blood Updated:  11/24/18 0618     Ammonia 40 umol/L     Basic Metabolic Panel [[id number]]  (Abnormal) Collected:  11/24/18 0534    Specimen:  Blood Updated:  11/24/18 0618     Glucose 110 mg/dL      BUN 55 mg/dL      Creatinine 1.61 mg/dL      Sodium 134 mmol/L      Potassium 3.7 mmol/L      Chloride 90 mmol/L      CO2 26.0 mmol/L      Calcium 10.0 mg/dL      eGFR Non African Amer 45 mL/min/1.73      BUN/Creatinine Ratio 34.2     Anion Gap 18.0 mmol/L     AFB Culture - Aspirate, Kidney, Left [[id number]] Collected:  11/09/18 1708    Specimen:  Aspirate from Kidney, Left Updated:  11/23/18 1745     AFB Culture No AFB isolated at 2 weeks     AFB Stain No acid fast bacilli seen on concentrated smear    Basic Metabolic Panel [[id number]]  (Abnormal) Collected:  11/23/18 0611     Specimen:  Blood Updated:  11/23/18 0705     Glucose 90 mg/dL      BUN 60 mg/dL      Creatinine 1.78 mg/dL      Sodium 134 mmol/L      Potassium 3.9 mmol/L      Chloride 91 mmol/L      CO2 25.0 mmol/L      Calcium 10.2 mg/dL      eGFR Non African Amer 40 mL/min/1.73      BUN/Creatinine Ratio 33.7     Anion Gap 18.0 mmol/L     CBC & Differential [[id number]] Collected:  11/23/18 0611    Specimen:  Blood Updated:  11/23/18 0705    Narrative:       The following orders were created for panel order CBC & Differential.  Procedure                               Abnormality         Status                     ---------                               -----------         ------                     Scan Slide[[id number]]                                                                  CBC Auto Differential[[id number]]        Abnormal            Final result                 Please view results for these tests on the individual orders.    Protime-INR [[id number]]  (Abnormal) Collected:  11/23/18 0611    Specimen:  Blood Updated:  11/23/18 0704     Protime 30.4 Seconds      INR 3.10    Narrative:       Therapeutic range for most indications is 2.0-3.0 INR,  or 2.5-3.5 for mechanical heart valves.    CBC Auto Differential [[id number]]  (Abnormal) Collected:  11/23/18 0611    Specimen:  Blood Updated:  11/23/18 0703     WBC 5.06 10*3/mm3      RBC 2.21 10*6/mm3      Hemoglobin 7.5 g/dL      Hematocrit 21.0 %      MCV 95.0 fL      MCH 33.9 pg      MCHC 35.7 g/dL      RDW 20.3 %      RDW-SD 69.7 fl      MPV 10.8 fL      Platelets 20 10*3/mm3      Neutrophil % 62.0 %      Lymphocyte % 19.6 %      Monocyte % 10.7 %      Eosinophil % 6.5 %      Basophil % 0.8 %      Immature Grans % 0.4 %      Neutrophils, Absolute 3.14 10*3/mm3      Lymphocytes, Absolute 0.99 10*3/mm3      Monocytes, Absolute 0.54 10*3/mm3      Eosinophils, Absolute 0.33 10*3/mm3      Basophils, Absolute 0.04 10*3/mm3      Immature Grans, Absolute 0.02 10*3/mm3      nRBC 0.0  /100 WBC     Narrative:       Specimen recollected to verify results    Ammonia [[id number]]  (Normal) Collected:  11/23/18 0539    Specimen:  Blood Updated:  11/23/18 0637     Ammonia 26 umol/L     Protime-INR [[id number]]  (Abnormal) Collected:  11/22/18 0539    Specimen:  Blood Updated:  11/22/18 0620     Protime 32.3 Seconds      INR 3.36    Narrative:       Therapeutic range for most indications is 2.0-3.0 INR,  or 2.5-3.5 for mechanical heart valves.    CBC & Differential [[id number]] Collected:  11/22/18 0539    Specimen:  Blood Updated:  11/22/18 0620    Narrative:       The following orders were created for panel order CBC & Differential.  Procedure                               Abnormality         Status                     ---------                               -----------         ------                     Scan Slide[[id number]]                                                                  CBC Auto Differential[[id number]]        Abnormal            Final result                 Please view results for these tests on the individual orders.    CBC Auto Differential [[id number]]  (Abnormal) Collected:  11/22/18 0539    Specimen:  Blood Updated:  11/22/18 0619     WBC 7.53 10*3/mm3      RBC 2.38 10*6/mm3      Hemoglobin 8.1 g/dL      Hematocrit 23.5 %      MCV 98.7 fL      MCH 34.0 pg      MCHC 34.5 g/dL      RDW 21.7 %      RDW-SD 76.5 fl      MPV 10.2 fL      Platelets 26 10*3/mm3      Comment: SPECIMEN REANALYZED TO CONFIRM PLATELET COUNT        Neutrophil % 64.7 %      Lymphocyte % 17.4 %      Monocyte % 13.1 %      Eosinophil % 3.5 %      Basophil % 0.8 %      Immature Grans % 0.5 %      Neutrophils, Absolute 4.87 10*3/mm3      Lymphocytes, Absolute 1.31 10*3/mm3      Monocytes, Absolute 0.99 10*3/mm3      Eosinophils, Absolute 0.26 10*3/mm3      Basophils, Absolute 0.06 10*3/mm3      Immature Grans, Absolute 0.04 10*3/mm3      nRBC 0.0 /100 WBC     Basic Metabolic Panel [[id number]]  (Abnormal) Collected:   11/22/18 0539    Specimen:  Blood Updated:  11/22/18 0609     Glucose 93 mg/dL      BUN 61 mg/dL      Creatinine 1.89 mg/dL      Sodium 134 mmol/L      Potassium 4.3 mmol/L      Chloride 94 mmol/L      CO2 22.0 mmol/L      Calcium 10.5 mg/dL      eGFR Non African Amer 37 mL/min/1.73      BUN/Creatinine Ratio 32.3     Anion Gap 18.0 mmol/L     Ammonia [[id number]]  (Normal) Collected:  11/22/18 0539    Specimen:  Blood Updated:  11/22/18 0604     Ammonia 16 umol/L     CBC & Differential [[id number]] Collected:  11/21/18 0842    Specimen:  Blood Updated:  11/21/18 1016    Narrative:       The following orders were created for panel order CBC & Differential.  Procedure                               Abnormality         Status                     ---------                               -----------         ------                     Manual Differential[[id number]]          Abnormal            Final result               Scan Slide[[id number]]                                                                  CBC Auto Differential[[id number]]        Abnormal            Final result                 Please view results for these tests on the individual orders.    Manual Differential [[id number]]  (Abnormal) Collected:  11/21/18 0842    Specimen:  Blood Updated:  11/21/18 1016     Neutrophil % 77.0 %      Lymphocyte % 13.0 %      Monocyte % 7.0 %      Eosinophil % 1.0 %      Basophil % 1.0 %      Metamyelocyte % 1.0 %      Neutrophils Absolute 6.63 10*3/mm3      Lymphocytes Absolute 1.12 10*3/mm3      Monocytes Absolute 0.60 10*3/mm3      Eosinophils Absolute 0.09 10*3/mm3      Basophils Absolute 0.09 10*3/mm3      Anisocytosis Slight/1+     Comment: FEW HYPOCHROMIC CELLS SEEN        Macrocytes Slight/1+     WBC Morphology Normal     Platelet Estimate Decreased     Comment: RARE GIANT PLATELET SEEN       CBC Auto Differential [[id number]]  (Abnormal) Collected:  11/21/18 0842    Specimen:  Blood Updated:  11/21/18 0907     WBC 8.61  10*3/mm3      RBC 1.94 10*6/mm3      Hemoglobin 6.9 g/dL      Hematocrit 20.3 %      .6 fL      MCH 35.6 pg      MCHC 34.0 g/dL      RDW 18.2 %      RDW-SD 68.2 fl      MPV 10.5 fL      Platelets 31 10*3/mm3     Ammonia [[id number]]  (Normal) Collected:  11/21/18 0729    Specimen:  Blood Updated:  11/21/18 0751     Ammonia 24 umol/L     Basic Metabolic Panel [[id number]]  (Abnormal) Collected:  11/21/18 0729    Specimen:  Blood Updated:  11/21/18 0751     Glucose 98 mg/dL      BUN 58 mg/dL      Creatinine 2.00 mg/dL      Sodium 136 mmol/L      Potassium 5.1 mmol/L      Chloride 94 mmol/L      CO2 21.0 mmol/L      Calcium 10.6 mg/dL      eGFR Non African Amer 35 mL/min/1.73      BUN/Creatinine Ratio 29.0     Anion Gap 21.0 mmol/L     Protime-INR [[id number]]  (Abnormal) Collected:  11/21/18 0729    Specimen:  Blood Updated:  11/21/18 0750     Protime -- Seconds      Comment: FINGERSTICK PT, NO SECONDS AVAILABLE        INR 2.40    Narrative:       Therapeutic range for most indications is 2.0-3.0 INR,  or 2.5-3.5 for mechanical heart valves.        Imaging Results (all)     Procedure Component Value Units Date/Time    XR Chest 1 View [[id number]] Collected:  11/27/18 1305     Updated:  11/27/18 1329    Narrative:       Chest single view.    CLINICAL INDICATION: Shortness of breath . Postintubation.    COMPARISON: Chest November 26, 2018    FINDINGS: Cardiac silhouette is enlarged in size. Prominence of  the central right pulmonary artery. Complete opacification left  hemithorax. Unfavorable change since prior examination. This  suggests left lung collapse possibly secondary to mucous plug.  Endotracheal tube identified with tip 6.25 cm above the  bifurcation the philip.      Impression:       CONCLUSION: Complete opacification left hemithorax unfavorable  change since prior exam ,left lung collapse. Possibly secondary  to mucous plug.    Endotracheal tube with tip 6.25 cm above the bifurcation of the  philip in  satisfactory position.    Electronically signed by:  Conner Hernadez MD  11/27/2018 1:28 PM CST  Workstation: [id number]    XR Chest 1 View [[id number]] Collected:  11/26/18 2204     Updated:  11/26/18 2231    Narrative:         [facility] [place]    Patient Name: LAMONT FISHER    ATTENDING: LEE MALHOTRA     REFERRING: LAMONT CONDE    ORDERING: LAMONT CONDE    -----------------------    PROCEDURE: Chest, AP portable    Date of exam: 11/26/2018 at 2204    HISTORY: Shortness of air    A single portable view of the chest was obtained. Study is  compared to previous chest of 11/25/2018.    Cardiomegaly with generalized vascular congestion and left  basilar infiltrate and effusion are seen without change from  prior exam. Evidence of previous kyphoplasty in the thoracic  spine.      Impression:       No change from earlier exam.    Electronically signed by:  Pramod Grant MD  11/26/2018 10:30 PM  CST Workstation: [id number]    XR Chest 1 View [[id number]] Collected:  11/25/18 1719     Updated:  11/25/18 1756    Narrative:       PROCEDURE: Portable chest x-ray    TECHNIQUE: Single AP view of the chest    COMPARISON: 11/2/2018    HISTORY: dyspnea, R41.82 Altered mental status, unspecified  K72.90 Hepatic failure, unspecified without coma Z74.09 Other  reduced mobility Z74.09 Other reduced mobility    FINDINGS:     Life-support devices: None    Lungs/pleura: Small left pleural effusion. Pulmonary vascular  congestion.    Heart, hilar and mediastinal structures: Enlarged cardiac  silhouette.      Impression:       CONCLUSION:  Enlarged cardiac silhouette.  Small left pleural effusion.  Pulmonary vascular congestion.    Electronically signed by:  Jose Boudreaux MD  11/25/2018 5:55 PM  CST Workstation: [id number]    US Abdomen Limited [[id number]] Collected:  11/24/18 0845     Updated:  11/24/18 1015    Narrative:         PROCEDURE: US ABDOMEN LIMITED    TECHNIQUE: Limited scanning of the abdomen to assess  for ascites.    COMPARISON: None    HISTORY: worsening ascites, R41.82 Altered mental status,  unspecified K72.90 Hepatic failure, unspecified without coma  Z74.09 Other reduced mobility Z74.09 Other reduced mobility    FINDINGS  There is a small to moderate amount of ascites in the abdomen.  The liver appears cirrhotic.      Impression:       CONCLUSION:   Small to moderate amount of ascites in the abdomen.    Electronically signed by:  Jose Boudreaux MD  11/24/2018 10:14 AM  CST Workstation: [id number]    US Guided Vascular Access [[id number]] Resulted:  11/14/18 1032     Updated:  11/14/18 1032    Narrative:       This procedure was auto-finalized with no dictation required.    IR Insert Midline Without Port Pump 5 Plus [[id number]] Resulted:  11/14/18 0944     Updated:  11/14/18 0944    Narrative:       This procedure was auto-finalized with no dictation required.    CT Needle Biopsy Abdomen [[id number]] Collected:  11/09/18 1640     Updated:  11/10/18 0827    Narrative:       PROCEDURE: CT GUIDANCE BIOPSY    COMPARISON: CT abdomen and pelvis 11/5/2018    HISTORY: Left perinephric fluid collection    CONSENT: The risks, including bleeding, infection and injury to  adjacent organs, as well as the risks, benefits and possibility  of an unsuccessful attempt were thoroughly discussed with the  patient. The patient had ample opportunity to ask questions and  agreed to proceed. Written, informed consent was obtained.    This exam was performed according to our departmental dose  optimization program, which includes automated exposure control,  adjustment of the mA and/or KV according to patient size and/or  use of iterative reconstruction technique.    TECHNIQUE/FINDINGS: The patient was placed in the right lateral  decubitus position on the fluoroscopy table. An appropriate site  was marked over the right flank. The location was prepped and  draped in sterile fashion. Approximately 10 mL of lidocaine  solution was used for  local anesthesia. A 5 [place] Yueh needle  was used to gain access to a 3 cm air fluid collection in the  left subcapsular perinephric region. Approximately 5 mL of thick,  cloudy, yellow fluid was aspirated.       Impression:       CONCLUSION:  Successful aspiration of the small air-fluid  collection in the left perinephric region. The aspirate was sent  to laboratory for further analysis. The patient tolerated the  procedure well. No immediate post procedure complications.    Electronically signed by:  Jose Boudreaux MD  11/10/2018 8:26 AM  [facility] Workstation: [id number]    CT Abdomen Pelvis With Contrast [[id number]] Collected:  11/05/18 1815     Updated:  11/05/18 5335    Narrative:         EXAMINATION:  Computed Tomography           REGION:    Abdomen / Pelvis                     INDICATION:   Repeat to evaluate hematoma/abscess of kidney per  urology, R41.82 Altered mental status, unspecified K72.90 Hepatic  failure, unspecified without coma Z74.09 Other reduced mobility  Z74.09 Other reduced mobility    HISTORY:  LISA. IMAGING:    CT A/P 11/2/18            TECHNIQUE:      - reconstructions:    axial, coronal, sagittal         - contrast:      oral:  No ;   intravenous: Isovue 300, 100 mL  This exam was performed according to the departmental  dose-optimization program which includes automated exposure  control, adjustment of the mA and/or kV according to patient size  and/or use of iterative reconstruction technique.           COMMENTS:            - - - CT ABDOMEN - - -          THORAX (INFERIOR):      - LUNG BASES:  clear      - PLEURA:    no fluid or mass      - HEART:    normal size, no pericardial fluid     - MISC:      n/a          ABDOMEN:     - LIVER:    Diminished size with nodular contour containing a  tips shunt   - GB:      Cholelithiasis without evidence of wall thickening.    - CBD:      grossly negative   - SPLEEN:    Upper normal/mildly enlarged   - PANCREAS:    normal in size, contour, no focal mass     - VISCERA:    normal caliber, no wall thickening     - MESENTERY:  no mesenteric mass   - CAVITY:    Small amount of free fluid   - BODY WALL:  wnl   - OSSEOUS:    grossly negative for age   - MISC:                 Extensive gastric varices                      RETROPERITONEUM:   - KIDNEYS:    Renal examination again displays overall normal  size and symmetric nephrograms. Again noted is a left pararenal  enhancing focal fluid collection containing air which is  unchanged in size measuring 2.5 x 3.6 x 2.0 cm   - URETERS:    normal course, caliber   - ADRENALS:    normal size, contour   - MISC:      no sig retroperitoneal adenopathy or mass   - VASCULAR:    aorta / iliacs: wnl for age     - - - CT PELVIS - - -      - VISCERA:    normal caliber small/large bowel, no focal  thickening/mass        - APPENDIX:          wnl   - MESENTERY:  no mass   - VASCULAR:    wnl for age   - CAVITY:    Moderate amount of free fluid   - BLADDER:    unremarkable   - OSSEOUS:    Status post left hip repair    - MISC:     .       Impression:        CONCLUSION:  1. Unchanged left pararenal focal fluid collection with  peripheral enhancement containing air, likely representing an  abscess.  2. Multiple additional findings demonstrating the presence of  cirrhosis status post TIPS shunt, with spleen size at the upper  limits of normal/mildly enlarged, and extensive gastric varices,  as above, unchanged.        Electronically signed by:  PRISCILLA Bustillo MD  11/5/2018 6:56  PM CST Workstation: [id number]    CT Abdomen Pelvis With Contrast [[id number]] Collected:  11/02/18 1420     Updated:  11/02/18 1543    Addenda:         ADDENDUM   ADDENDUM #1       Addendum: Referring clinician notified of findings by phone at  3:35 PM on 11/2/2018.    Electronically signed by:  Eduard Vigil MD  11/2/2018 3:42 PM CDT  Workstation: [id number]    Signed:  11/02/18 1542 by Hiren Vigil MD    Narrative:         PROCEDURE: Ct abdomen and pelvis with  contrast    REASON FOR EXAM: Follow up abnormal scan, possible renal mass?,  R41.82 Altered mental status, unspecified K72.90 Hepatic failure,  unspecified without coma Z74.09 Other reduced mobility Z74.09  Other reduced mobility    FINDINGS: Comparison study dated  November 1, 2018. Axial  computer tomography sequential imaging was performed from the  diaphragms through the symphysis pubis with IV contrast  administration. Sagittal and coronal reformates was performed.  This exam was performed according to our departmental dose  optimization program, which includes automated exposure control,  adjustment of the mA and/or KV according to patient size and/or  use of iterative reconstruction technique.     Imaging through lung bases reveals small bilateral pleural  effusions left worse than right. Bibasilar posterior basilar  small crescent-shaped opacities. Lungs are otherwise clear.    The liver is diffusely small and has a nodular peripheral contour  suggesting changes from cirrhosis. TIPS shunt is noted in place.  This shunt appears patent. Small gallstones are seen dependently  within distended gallbladder. The biliary system appears normal.  The pancreas is normal. The spleen is normal. Bilateral adrenal  glands are normal. Right kidney renal pelvis and mid zone  nonobstructive renal calculi with the largest within the renal  pelvis which measures 7.9 mm in greatest diameter. Otherwise  right kidney and ureter are normal. Left kidney mid zone and  lower pole nonobstructive renal calculi with the largest within  the mid zone which measures 5 mm in greatest diameter. There is  no evidence of left kidney renal mass. Small fluid and air  collection along the lateral border of the left kidney with  enhancing mild surrounding rind with this fluid and air  collection measuring 2.3 x 3.7 x 2.2 cm. Otherwise left kidney  and ureter are unremarkable. The bladder is normal. The prostate  appears normal. Moderate amount of  abdominal ascites is seen  tracking around the liver and spleen in the left and right  paracolic gutter and in the lower pelvis. Multiple colonic  diverticula. The hollow viscera is otherwise unremarkable. No  lymphadenopathy in the abdomen or the pelvis. Atherosclerotic  vascular calcifications of the abdominal aorta and bilateral  common iliac arteries. No acute osseous abnormality. Stable  multiple bilateral lower old rib fractures. Old L2 and L3  vertebral body compression fractures which have undergone prior  vertebroplasty. Stable old T11 anterior vertebral body wedge  compression fracture with loss of anterior vertebral body height  by 25%. Old healed right inferior pubic ramus fracture.  Compression screw as well as cortical screw fixation of left hip  fracture.      Impression:       1. Enhanced study reveals no evidence of left renal mass..  2. 2.3 x 3.7 x 2.2 cm fluid and air collection with mildly  enhancing surrounding rim adjacent to the lateral left kidney may  represent infected subcapsular hematoma versus abscess..  3. Bilateral kidney nonobstructive renal calculi..  4. Stable liver appearance consistent with cirrhosis.TIPS shunt  is noted in place. .  5. Cholelithiasis with distended gallbladder..  6. Moderate amount of abdominal ascites.  7. Colonic diverticulosis.    Electronically signed by:  Eduard Vigil MD  11/2/2018 3:31 PM CDT  Workstation: [id number]    US Gallbladder [[id number]] Collected:  11/02/18 1428     Updated:  11/02/18 1533    Narrative:         Ultrasound gallbladder    HISTORY: Abdominal pain. CT demonstrated a distended gallbladder.  Hepatic failure.    Ultrasound examination of the right upper quadrant was performed.    COMPARISON: None. Correlation CT 11/2/2018.    Cirrhosis.  Ascites.  No focal intrahepatic lesion.  Gallbladder distended to 13.1 cm in length.  A few small calculi within the gallbladder.  Dilated intrahepatic dilated ducts within the right lobe of  the  liver.  Common bile duct obscured by bowel gas.  Increased echogenicity of the right kidney, compatible with  medical renal disease.  Right kidney 9.19 cm in length.  Pancreas obscured by bowel gas.      Impression:       CONCLUSION:  Cirrhosis.  Ascites.  Gallbladder distended to 13.1 cm in length.  Cholelithiasis.  Dilated intrahepatic dilated ducts within the right lobe of the  liver.  Common bile duct obscured by bowel gas.  Increased echogenicity of the right kidney, compatible with  medical renal disease.    09502    Electronically signed by:  Claude Whatley MD  11/2/2018 3:32 PM CDT  Workstation: [id number]    XR Chest PA & Lateral [[id number]] Collected:  11/02/18 1414     Updated:  11/02/18 1436    Narrative:         TWO VIEW CHEST    HISTORY: Follow-up infiltrates and effusions.    Frontal and lateral films of the chest were obtained.  COMPARISON: September 4, 2018    EKG leads.  Cardiomegaly.  Small bilateral pleural effusions, larger on the left.  Associated minimal compressive atelectasis left lower lobe.  The pulmonary vasculature is not increased.  No pneumothorax.  Osteoporosis.  Old rib fractures.  Old ununited fracture left clavicle.  Vertebroplasty mid thoracic spine.  Old ununited fracture right humeral neck.  Postoperative changes proximal left humerus.      Impression:       CONCLUSION:  Cardiomegaly.  Small bilateral pleural effusions, larger on the left.  Associated minimal compressive atelectasis left lower lobe.    76754    Electronically signed by:  Claude Whatley MD  11/2/2018 2:35 PM CDT  Workstation: [email or web address]    CT Abdomen Pelvis Without Contrast [[id number]] Collected:  11/01/18 1453     Updated:  11/01/18 1559    Narrative:         PROCEDURE: Ct abdomen and pelvis without contrast    REASON FOR EXAM: Abdominal pain, R41.82 Altered mental status,  unspecified K72.90 Hepatic failure, unspecified without coma  Z74.09 Other reduced mobility    FINDINGS: Comparison study dated   April 30, 2018. Axial computer  tomography sequential imaging was performed from the diaphragms  through the symphysis pubis without IV contrast administration.  Sagittal and coronal reformates was performed. This exam was  performed according to our departmental dose optimization  program, which includes automated exposure control, adjustment of  the mA and/or KV according to patient size and/or use of  iterative reconstruction technique.    Imaging through lung bases reveals small bilateral pleural  effusions left worse than right. Associated left lower lobe  posterior basilar crescent shaped patchy opacities suspicious for  passive atelectasis. Otherwise lung bases are unremarkable.    The liver appears small and has a diffuse peripheral nodular  contour suggesting changes from cirrhosis. TIPS shunt is noted in  place. Gallstones are noted within distended gallbladder. The  pancreas is normal. The spleen is normal. Bilateral adrenal  glands are normal. Right kidney mid zone, lower pole, and renal  pelvis nonobstructive renal calculi with the largest within the  renal pelvis which measures 8 mm in greatest diameter. Otherwise  right kidney and ureter are normal. Left kidney mild questionable  soft tissue prominence in the upper pole. Left kidney mid zone  and lower pole small nonobstructive renal calculi. Otherwise left  kidney and ureter are normal. The bladder is normal. The prostate  is normal. Small to moderate amount of abdominal ascites in the  abdomen and lower pelvis. No lymphadenopathy in the abdomen or  pelvis. The hollow viscera appears normal. Stable multiple  bilateral lower old rib fractures. Old L2 and L3 vertebral body  compression fractures which have undergone prior vertebroplasty.  Stable T11 old anterior vertebral body wedge compression fracture  with loss of anterior vertebral body height by 25%. Old healed  right inferior pubic ramus fracture. Compression screw as well as  cortical screw  fixation of left hip fracture. No acute osseous  abnormality.      Impression:       1. Small bilateral pleural effusions left worse than right.  Associated left lower lobe posterior basilar segment passive  atelectasis..  2. Liver appearance suggests changes from cirrhosis.TIPS shunt is  noted in place.  3. Cholelithiasis with distended gallbladder..  4. Multiple right kidney nonobstructive renal calculi..  5.Left kidney mild questionable soft tissue prominence in the  upper pole. Cannot exclude mass lesion in this region. Recommend  clinical correlation and if clinically indicated enhanced CT  abdomen pelvis study to further evaluate.  6. Small to moderate amount of abdominal ascites.  7. Remainder CT abdomen pelvis study is unremarkable as described  above.    Electronically signed by:  Eduard Vigil MD  11/1/2018 3:58 PM CDT  Workstation: [id number]    XR Shoulder 2+ View Right [[id number]] Collected:  11/01/18 1507     Updated:  11/01/18 1538    Narrative:         Three view right shoulder    HISTORY: Right shoulder pain    AP films with the humerus in internal and external rotation and  scapular Y view were obtained.    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  Disuse osteoporosis humeral head.  Ossification coracoclavicular ligament, compatible with prior  trauma.  Old right rib fractures.  Prior vertebroplasty thoracic spine.      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  Disuse osteoporosis humeral head.  Ossification coracoclavicular ligament, compatible with prior  trauma.  Old right rib fractures.    45853    Electronically signed by:  Claude Whatley MD  11/1/2018 3:37 PM CDT  Workstation: [id number]    US Renal Bilateral [[id number]] Collected:  10/31/18 0800     Updated:  10/31/18 0833    Narrative:         EXAMINATION: Ultrasound, retroperitoneal / renal     CLINICAL INDICATION / HISTORY: hematuria difficulty  urinating,  R41.82 Altered mental status, unspecified K72.90 Hepatic failure,  unspecified without coma  COMPARISON:  None  TECHNIQUE:  Grayscale and color Doppler ultrasound.    FINDINGS:  The right kidney measures 9 cm in length. The left kidney  measures 6.8 cm in length. No hydronephrosis is seen. The left  kidney is poorly evaluated due to the presence of bowel gas and  due to the patient's body habitus. The urinary bladder has an  unremarkable appearance.    Incidental note made of a cirrhotic appearing liver and ascites  as well as stones in the gallbladder.         Impression:       CONCLUSION:   1.  Limited exam, especially of the left kidney, due to the  presence of bowel gas and the patient's body habitus.  2.  No hydronephrosis is visualized.  3.  Cirrhotic liver and ascites.  4.  Cholelithiasis.    Electronically signed by:  Jose Boudreaux MD  10/31/2018 8:32 AM  CDT Workstation: [id number]    US Guided Vascular Access [[id number]] Resulted:  10/30/18 1530     Updated:  10/30/18 1530    Narrative:       This procedure was auto-finalized with no dictation required.    IR Insert Midline Without Port Pump 5 Plus [[id number]] Resulted:  10/30/18 1430     Updated:  10/30/18 1430    Narrative:       This procedure was auto-finalized with no dictation required.    CT Head Without Contrast [[id number]] Collected:  10/29/18 1825     Updated:  10/29/18 1903    Narrative:       EXAM DESCRIPTION: CT HEAD WO CONTRAST    CLINICAL HISTORY:  Confusion/delirium, altered LOC, unexplained,  R41.82 Altered mental status, unspecified .      COMPARISON: 9/4/2018    DOSE LENGTH PRODUCT: 939.7    CONTRAST: None    TECHNIQUE:    Axial images from skull base to vertex.    This exam was performed according to our departmental dose  optimization program, which includes automated exposure control,  adjustment of the mA and/or KV according to patient size and/or  use of iterative reconstruction technique.    FINDINGS:  No Chiari  malformation.  Extra-axial spaces: Appropriate for the mild degree of volume  loss present.    Intracranial hemorrhage: No evidence of intracranial hemorrhage  or suspicious extra-axial fluid collection.  Ventricular system: No hydrocephalus.   Basal cisterns: Unremarkable.   Cerebral parenchyma: No edema, midline shift, or mass effect.  Gray-white differentiation is maintained.    Midline shift: None.    Cerebellum: No acute or suspicious interval change.   Brainstem : Unremarkable CT appearance.   Calvarium: No acute or suspicious calvarial lesion.    Vascular system: Calcification, otherwise unremarkable  noncontrast appearance.    Paranasal sinuses and mastoid air cells: Clear.    Visualized orbits: No acute finding.    Visualized upper cervical spine: Limited, unremarkable.   Sella: Unremarkable.    Skull base: Unremarkable.     ADDITIONAL FINDINGS: None      Impression:       No CT evidence of intracranial hemorrhage, mass effect, or acute  large vessel distribution infarct.    Electronically signed by:  Tho Chavez MD  10/29/2018 7:02 PM  CDT Workstation: [id number]            Hospital Course:  The patient was admitted for hepatic encephalopathy and started on lactulose.  His ammonia level was serially monitored.  He did initially improve and was started on his home medications including Xifaxan and Aldactone.  His hospital course was complicated by perinephric abscess that required CT-guided drainage.  He completed a course of antimicrobial therapy.  He also was given transfusions of packed red blood cells, FFP and platelets due to cirrhosis induced thrombocytopenia  and anemia.  Patient  developed acute respiratory failure requiring transfer to the CCU and was managed with noninvasive respiratory therapy.  He was seen by multiple consultants including gastroenterology, hematologist and pulmonary.  His overall prognosis was deemed poor but patient remained a full code.  He did go into acute respiratory  failure and had to be emergently intubated.  Further discussion was had with family members who decided to make the patient comfort care.  Patient  a few hours later and family was duly notified.  Need for autopsy was discussed with the family members but they declined.            Armando Richard MD  18  6:03 PM    Time: 25 minutes

## 2018-11-28 NOTE — THERAPY DISCHARGE NOTE
Acute Care - Occupational Therapy Discharge Summary  [facility]     Patient Name: Armando Mcmullen Sr.  : 1965  MRN: [id number]    Today's Date: 2018  Onset of Illness/Injury or Date of Surgery: 10/29/18    Date of Referral to OT: 18(restart order)  Referring Physician: SARI Franco MD      Admit Date: 10/29/2018        OT Recommendation and Plan    Visit Dx:    ICD-10-CM ICD-9-CM   1. Altered mental status, unspecified altered mental status type R41.82 780.97   2. Hepatic encephalopathy (CMS/HCC) K72.90 572.2   3. Impaired functional mobility, balance, gait, and endurance Z74.09 V49.89   4. Impaired mobility and ADLs Z74.09 799.89               OT Rehab Goals     Row Name 18 1658             Transfer Goal 1 (OT)    Activity/Assistive Device (Transfer Goal 1, OT)  sit-to-stand/stand-to-sit;bed-to-chair/chair-to-bed;toilet  -AS      [place] Level/Cues Needed (Transfer Goal 1, OT)  contact guard assist  -AS      Time Frame (Transfer Goal 1, OT)  long term goal (LTG);by discharge  -AS      Progress/Outcome (Transfer Goal 1, OT)  goal not met;continuing progress toward goal  -AS         Bathing Goal 1 (OT)    Activity/Assistive Device (Bathing Goal 1, OT)  bathing skills, all;long-handled sponge  -AS      [place] Level/Cues Needed (Bathing Goal 1, OT)  minimum assist (75% or more patient effort)  -AS      Time Frame (Bathing Goal 1, OT)  long term goal (LTG);by discharge  -AS      Progress/Outcomes (Bathing Goal 1, OT)  goal not met;continuing progress toward goal  -AS         Dressing Goal 1 (OT)    Activity/Assistive Device (Dressing Goal 1, OT)  dressing skills, all  -AS      [place]/Cues Needed (Dressing Goal 1, OT)  minimum assist (75% or more patient effort)  -AS      Time Frame (Dressing Goal 1, OT)  long term goal (LTG);by discharge  -AS      Progress/Outcome (Dressing Goal 1, OT)  goal not met;continuing progress toward goal  -AS         Toileting Goal 1 (OT)     Activity/Device (Toileting Goal 1, OT)  toileting skills, all;commode, bedside with drop arms  -AS      [place] Level/Cues Needed (Toileting Goal 1, OT)  minimum assist (75% or more patient effort)  -AS      Time Frame (Toileting Goal 1, OT)  long term goal (LTG);by discharge  -AS      Progress/Outcome (Toileting Goal 1, OT)  goal not met;continuing progress toward goal  -AS         Strength Goal 1 (OT)    Strength Goal 1 (OT)  Pt will increase BUE gross muscle strength (in available range) at least 1/2 grade level to benefit ADLs and functional transfers.   -AS      Time Frame (Strength Goal 1, OT)  long term goal (LTG);by discharge  -AS      Progress/Outcome (Strength Goal 1, OT)  goal not met;continuing progress toward goal  -AS        User Key  (r) = Recorded By, (t) = Taken By, (c) = Cosigned By    Initials Name Provider Type Discipline    AS Gardenia Reyes, OT Occupational Therapist OT              Therapy Suggested Charges     Code   Minutes Charges    74015 (CPT®) Hc Ot Neuromusc Re Education Ea 15 Min      49232 (CPT®) Hc Ot Ther Proc Ea 15 Min 78 5    53947 (CPT®) Hc Ot Therapeutic Act Ea 15 Min      28234 (CPT®) Hc Ot Manual Therapy Ea 15 Min      00576 (CPT®) Hc Ot Iontophoresis Ea 15 Min      73594 (CPT®) Hc Ot Elec Stim Ea-Per 15 Min      23479 (CPT®) Hc Ot Ultrasound Ea 15 Min      01539 (CPT®) Hc Ot Self Care/Mgmt/Train Ea 15 Min      Total  78 5              OT Discharge Summary  Anticipated Discharge Disposition (OT): skilled nursing facility  Reason for Discharge: Patient   Outcomes Achieved: Refer to plan of care for updates on goals achieved  Discharge Destination: other (comment)(Pt )      Gardenia Reyes OT  2018

## 2018-11-28 NOTE — THERAPY DISCHARGE NOTE
Acute Care - Physical Therapy Discharge Summary  [facility]       Patient Name: Armando Mcmullen Sr.  : 1965  MRN: [id number]    Today's Date: 2018  Onset of Illness/Injury or Date of Surgery: 10/29/18    Date of Referral to PT: 18  Referring Physician: SARI Franco MD      Admit Date: 10/29/2018      PT Recommendation and Plan    Visit Dx:    ICD-10-CM ICD-9-CM   1. Altered mental status, unspecified altered mental status type R41.82 780.97   2. Hepatic encephalopathy (CMS/HCC) K72.90 572.2   3. Impaired functional mobility, balance, gait, and endurance Z74.09 V49.89   4. Impaired mobility and ADLs Z74.09 799.89             Therapy Suggested Charges     Code   Minutes Charges    12286 (CPT®) Hc Pt Neuromusc Re Education Ea 15 Min      68081 (CPT®) Hc Pt Ther Proc Ea 15 Min 27 2    36358 (CPT®) Hc Gait Training Ea 15 Min      44223 (CPT®) Hc Pt Therapeutic Act Ea 15 Min      14774 (CPT®) Hc Pt Manual Therapy Ea 15 Min      21172 (CPT®) Hc Pt Iontophoresis Ea 15 Min      82496 (CPT®) Hc Pt Elec Stim Ea-Per 15 Min      82219 (CPT®) Hc Pt Ultrasound Ea 15 Min      46505 (CPT®) Hc Pt Self Care/Mgmt/Train Ea 15 Min      50780 (CPT®) Hc Pt Prosthetic (S) Train Initial Encounter, Each 15 Min      18856 (CPT®) Hc Pt Orthotic(S)/Prosthetic(S) Encounter, Each 15 Min      60024 (CPT®) Hc Orthotic(S) Mgmt/Train Initial Encounter, Each 15min      Total  27 2                  PT Discharge Summary  Anticipated Discharge Disposition (PT): skilled nursing facility  Reason for Discharge: Patient   Outcomes Achieved: Other(Patient )  Discharge Destination: other (comment)(Patient )      Monica Galeano, PT   2018

## 2018-12-01 LAB
BACTERIA SPEC RESP CULT: ABNORMAL
GRAM STN SPEC: ABNORMAL

## 2018-12-21 LAB
MYCOBACTERIUM SPEC CULT: NORMAL
NIGHT BLUE STAIN TISS: NORMAL

## 2019-01-15 NOTE — PLAN OF CARE
Problem: Patient Care Overview (Adult)  Goal: Plan of Care Review  Outcome: Ongoing (interventions implemented as appropriate)  Encourage intake of meals and supplement.    07/04/17 2469   Coping/Psychosocial Response Interventions   Plan Of Care Reviewed With patient   Patient Care Overview   Progress no change   Outcome Evaluation   Outcome Summary/Follow up Plan initial assessment            Quality 110: Preventive Care And Screening: Influenza Immunization: Influenza Immunization not Administered for Documented Reasons. Quality 131: Pain Assessment And Follow-Up: Pain assessment using a standardized tool is documented as negative, no follow-up plan required Detail Level: Detailed

## 2019-10-20 NOTE — NURSING NOTE
"Entered patient room to check beeping pump and  progress of blood infusion and found patient jerking spasmodically and gasping.  Found that patient had removed high flow oxygen nasal cannula.  Replaced cannula and checked o2 sat. Saturation was 66% at 10 L high flow.  Staff Assist was called and respiratory came to room.  Pt recovered to 80% on high flow and was then placed on non rebreather by RT.  Pt is now sating in the mid 90's.  Pt is mildly confused and talking about [place] [employer] but when asked by Dr. Hercules if he wants to be placed on a \"breathing machine\" he stated \"not really but do it if you have to\" pt to be transferred to ICU for closer monitoring.  Pt is able to clearly state name and birthdate.   " NG tube attempt unsuccessful x2 attempt at this time.

## 2020-03-23 NOTE — PROGRESS NOTES
Subjective   Armando Mcmullen is a 52 y.o. male.     History of Present Illness   progress new primary care.  Patient carries diagnoses of chronic osteomyelitis of the shoulder.  Status post left below-knee amputation due to trauma.  History of cirrhosis of the liver from what appears to be hepatitis C chronic.  History of substance abuse.  Current tobacco abuse.  History of chronic depression and anxiety and insomnia.  Patient states is seeing Dr. Becerril of GI medicine for his liver disease.  Has not gone back to orthopedics.  Counseled the need to do same.  States having continued problems with insomnia.  Of counseled on sleep hygiene treatments of insomnia and its association with depression.  [place]'s are listed.  Needed.  Also need to start pneumonia series.    The following portions of the patient's history were reviewed and updated as appropriate: allergies, current medications, past family history, past medical history, past social history, past surgical history and problem list.    Review of Systems   Constitutional: Negative for activity change, appetite change, fatigue and unexpected weight change.   HENT: Negative for trouble swallowing and voice change.    Eyes: Negative for redness and visual disturbance.   Respiratory: Negative for cough and wheezing.    Cardiovascular: Negative for chest pain and palpitations.   Gastrointestinal: Negative for abdominal pain, constipation, diarrhea, nausea and vomiting.   Genitourinary: Negative for urgency.   Musculoskeletal: Positive for arthralgias and back pain. Negative for joint swelling.   Neurological: Negative for syncope and headaches.   Hematological: Negative for adenopathy.   Psychiatric/Behavioral: Positive for dysphoric mood and sleep disturbance.       Objective   Physical Exam   Constitutional: He is oriented to person, place, and time. He appears well-developed.   HENT:   Head: Normocephalic.   Right Ear: External ear normal.   Nose: Nose normal.    Mouth/Throat: Oropharynx is clear and moist.   Poor dentition   Eyes: Pupils are equal, round, and reactive to light.   Neck: Normal range of motion. No thyromegaly present.   Cardiovascular: Normal rate, regular rhythm, normal heart sounds and intact distal pulses.  Exam reveals no gallop and no friction rub.    No murmur heard.  Pulmonary/Chest: Breath sounds normal.   Abdominal: Soft. He exhibits no distension and no mass. There is no tenderness.   Musculoskeletal: Normal range of motion.   Left BKA residual is clear.  Exam per orthopedics.   Neurological: He is alert and oriented to person, place, and time. He has normal reflexes. No cranial nerve deficit or sensory deficit.   Skin: Skin is warm and dry.   Psychiatric: His speech is normal. Thought content normal. His affect is blunt. He is slowed.       Assessment/Plan   Problems Addressed this Visit        Cardiovascular and Mediastinum    Essential hypertension - Primary (Chronic)    Relevant Orders    Lipid Panel With LDL / HDL Ratio    Comprehensive Metabolic Panel    Magnesium       Digestive    Hepatic cirrhosis (Chronic)    Relevant Orders    CBC (No Diff)       Musculoskeletal and Integument    Chronic osteomyelitis of right shoulder region (Chronic)    Relevant Orders    Ambulatory Referral to Orthopedic Surgery       Hematopoietic and Hemostatic    Anemia (Chronic)       Other    Depression (Chronic)    Relevant Medications    traZODone (DESYREL) 50 MG tablet    Positive hepatitis C antibody test (Chronic)    BMI 35.0-35.9,adult (Chronic)    Tobacco use (Chronic)      Other Visit Diagnoses     Need for vaccination        Relevant Orders    Pneumococcal Conjugate Vaccine 13-Valent All (Completed)       on stopping smoking.  3-10m      on sleep hygiene start low-dose trazodone at night.  Counseled pros and cons of same.  Recheck IN 4 weeks.  Start pneumonia series.   need for following up with all subspecialties.  30-35  minutes spent on case.            Include Location In Plan?: No Detail Level: Simple Detail Level: Zone

## (undated) DEVICE — GLV SURG TRIUMPH LT PF LTX 7 STRL

## (undated) DEVICE — UNDRPD BREATH 23X36 BG/10

## (undated) DEVICE — GAUZE,SPONGE,FLUFF,6"X6.75",STRL,5/TRAY: Brand: MEDLINE

## (undated) DEVICE — GLV SURG SENSICARE ALOE LF PF SZ7.5 GRN

## (undated) DEVICE — SOL ANTISEP SCRB HIBICLENS CHG4PCT 8OZ

## (undated) DEVICE — GLV SURG TRIUMPH LT PF LTX 9 STRL

## (undated) DEVICE — STRIP PACKING W IODOFORM 1/4

## (undated) DEVICE — HANDPIECE SET WITH COAXIAL HIGH FLOW TIP AND SUCTION TUBE: Brand: INTERPULSE

## (undated) DEVICE — PAD,ABDOMINAL,8"X10",ST,LF: Brand: MEDLINE

## (undated) DEVICE — SOL IRR NACL 0.9PCT BT 1000ML

## (undated) DEVICE — TB CULTURETT2 LIQ STUARTS RED

## (undated) DEVICE — PK EXTRM LF 60

## (undated) DEVICE — DBD-PACK,SHOULDER III: Brand: MEDLINE

## (undated) DEVICE — 3M™ IOBAN™ 2 ANTIMICROBIAL INCISE DRAPE 6651EZ: Brand: IOBAN™ 2

## (undated) DEVICE — INTENDED FOR TISSUE SEPARATION, AND OTHER PROCEDURES THAT REQUIRE A SHARP SURGICAL BLADE TO PUNCTURE OR CUT.: Brand: BARD-PARKER ® CARBON RIB-BACK BLADES

## (undated) DEVICE — NDL HYPO SFTY GLD 22G 1 1/2IN

## (undated) DEVICE — GOWN,PREVENTION PLUS,XLNG/XXLARGE,STRL: Brand: MEDLINE

## (undated) DEVICE — DISPOSABLE BIPOLAR CODE, 12' (3.66 M): Brand: CONMED

## (undated) DEVICE — STERILE POLYISOPRENE POWDER-FREE SURGICAL GLOVES WITH EMOLLIENT COATING: Brand: PROTEXIS

## (undated) DEVICE — GLV SURG TRIUMPH ORTHO W/ALOE PF LTX 7.5 STRL

## (undated) DEVICE — DRAPE,U/ SHT,SPLIT,PLAS,STERIL: Brand: MEDLINE

## (undated) DEVICE — GLV SURG SENSICARE GREEN W/ALOE PF LF 9 STRL